# Patient Record
Sex: FEMALE | Race: WHITE | NOT HISPANIC OR LATINO | Employment: FULL TIME | URBAN - METROPOLITAN AREA
[De-identification: names, ages, dates, MRNs, and addresses within clinical notes are randomized per-mention and may not be internally consistent; named-entity substitution may affect disease eponyms.]

---

## 2017-01-04 DIAGNOSIS — M79.604 PAIN OF RIGHT LOWER EXTREMITY: Primary | ICD-10-CM

## 2017-01-05 NOTE — TELEPHONE ENCOUNTER
Percocet 5-325mg      Last Written Prescription Date: 12/09/2016  Last Fill Quantity: 30,  # refills: 0   Last Office Visit with G, P or [facility] prescribing provider: 06/10/2016    Leilani Cristobal, Pharmacy Technician  [facility]  [phone]

## 2017-01-06 RX ORDER — OXYCODONE AND ACETAMINOPHEN 5; 325 MG/1; MG/1
1 TABLET ORAL
Qty: 30 TABLET | Refills: 0 | Status: SHIPPED | OUTPATIENT
Start: 2017-01-08 | End: 2017-02-03

## 2017-01-11 DIAGNOSIS — G89.4 CHRONIC PAIN SYNDROME: Primary | ICD-10-CM

## 2017-01-12 NOTE — TELEPHONE ENCOUNTER
Toradol      Last Written Prescription Date: 12/9/16  Last Fill Quantity: 30,  # refills: 1   Last Office Visit with [facility], [facility] or [facility] prescribing provider: 6/10/16    Routing refill request to provider for review/approval because:  Drug not on the G refill protocol

## 2017-01-13 NOTE — TELEPHONE ENCOUNTER
Should have had enough to last until 2/9. Please verify last 3 fill dates with pharmacy.  Yaima Muse MD

## 2017-01-16 RX ORDER — KETOROLAC TROMETHAMINE 10 MG/1
TABLET, FILM COATED ORAL
Qty: 30 TABLET | Refills: 1 | OUTPATIENT
Start: 2017-01-16

## 2017-01-16 NOTE — TELEPHONE ENCOUNTER
Please see message below. Please get last 3 fill dates, and does she have 1 refill remaining?  Yaima Muse MD

## 2017-01-30 ENCOUNTER — TELEPHONE (OUTPATIENT)
Dept: FAMILY MEDICINE | Facility: CLINIC | Age: 42
End: 2017-01-30

## 2017-02-03 DIAGNOSIS — G89.4 CHRONIC PAIN SYNDROME: Primary | ICD-10-CM

## 2017-02-03 RX ORDER — KETOROLAC TROMETHAMINE 10 MG/1
TABLET, FILM COATED ORAL
Qty: 30 TABLET | Refills: 1 | Status: SHIPPED | OUTPATIENT
Start: 2017-02-03 | End: 2017-04-09

## 2017-02-03 NOTE — TELEPHONE ENCOUNTER
ketorolac (TORADOL) 10 MG tablet      Last Written Prescription Date:  12/9/16  Last Fill Quantity: 30,   # refills: 1  Last Office Visit with [facility], [facility] or [facility] prescribing provider: 6/10/16  Future Office visit:    Next 5 appointments (look out 90 days)     Apr 14, 2017 11:00 AM   [facility] Visit with [facility] Muse [facility]   [facility]    [address] [place] [id number] [phone]                   Routing refill request to provider for review/approval because:  Drug not on the [facility], [facility] or [facility] refill protocol or controlled substance

## 2017-02-07 ENCOUNTER — APPOINTMENT (OUTPATIENT)
Dept: GENERAL RADIOLOGY | Facility: CLINIC | Age: 42
End: 2017-02-07
Attending: FAMILY MEDICINE
Payer: COMMERCIAL

## 2017-02-07 ENCOUNTER — HOSPITAL ENCOUNTER (EMERGENCY)
Facility: CLINIC | Age: 42
Discharge: HOME | End: 2017-02-07
Attending: FAMILY MEDICINE | Admitting: FAMILY MEDICINE
Payer: COMMERCIAL

## 2017-02-07 VITALS
TEMPERATURE: 97.7 F | RESPIRATION RATE: 16 BRPM | BODY MASS INDEX: 26.52 KG/M2 | HEIGHT: 66 IN | WEIGHT: 165 LBS | SYSTOLIC BLOOD PRESSURE: 123 MMHG | OXYGEN SATURATION: 96 % | DIASTOLIC BLOOD PRESSURE: 70 MMHG

## 2017-02-07 DIAGNOSIS — R10.9 ABDOMINAL CRAMPING: ICD-10-CM

## 2017-02-07 DIAGNOSIS — G89.4 CHRONIC PAIN SYNDROME: ICD-10-CM

## 2017-02-07 LAB
ALBUMIN SERPL-MCNC: 3.3 G/DL (ref 3.4–5)
ALBUMIN UR-MCNC: NEGATIVE MG/DL
ALP SERPL-CCNC: 59 U/L (ref 40–150)
ALT SERPL W P-5'-P-CCNC: 18 U/L (ref 0–50)
AMPHETAMINES UR QL: ABNORMAL
ANION GAP SERPL CALCULATED.3IONS-SCNC: 10 MMOL/L (ref 3–14)
APPEARANCE UR: CLEAR
AST SERPL W P-5'-P-CCNC: 12 U/L (ref 0–45)
BACTERIA #/AREA URNS HPF: ABNORMAL /HPF
BARBITURATES UR QL: ABNORMAL
BASOPHILS # BLD AUTO: 0 10E9/L (ref 0–0.2)
BASOPHILS NFR BLD AUTO: 0.6 %
BENZODIAZ UR QL: ABNORMAL
BILIRUB SERPL-MCNC: 0.1 MG/DL (ref 0.2–1.3)
BILIRUB UR QL STRIP: NEGATIVE
BUN SERPL-MCNC: 11 MG/DL (ref 7–30)
CALCIUM SERPL-MCNC: 8.4 MG/DL (ref 8.5–10.1)
CANNABINOIDS UR QL: ABNORMAL
CHLORIDE SERPL-SCNC: 105 MMOL/L (ref 94–109)
CO2 SERPL-SCNC: 25 MMOL/L (ref 20–32)
COCAINE UR QL: ABNORMAL
COLOR UR AUTO: YELLOW
CREAT SERPL-MCNC: 0.52 MG/DL (ref 0.52–1.04)
DIFFERENTIAL METHOD BLD: ABNORMAL
EOSINOPHIL # BLD AUTO: 0.1 10E9/L (ref 0–0.7)
EOSINOPHIL NFR BLD AUTO: 1.5 %
ERYTHROCYTE [DISTWIDTH] IN BLOOD BY AUTOMATED COUNT: 16.8 % (ref 10–15)
GFR SERPL CREATININE-BSD FRML MDRD: ABNORMAL ML/MIN/1.7M2
GLUCOSE SERPL-MCNC: 183 MG/DL (ref 70–99)
GLUCOSE UR STRIP-MCNC: NEGATIVE MG/DL
HCG UR QL: NEGATIVE
HCT VFR BLD AUTO: 34.5 % (ref 35–47)
HGB BLD-MCNC: 10.1 G/DL (ref 11.7–15.7)
HGB UR QL STRIP: ABNORMAL
IMM GRANULOCYTES # BLD: 0 10E9/L (ref 0–0.4)
IMM GRANULOCYTES NFR BLD: 0.3 %
KETONES UR STRIP-MCNC: ABNORMAL MG/DL
LEUKOCYTE ESTERASE UR QL STRIP: NEGATIVE
LIPASE SERPL-CCNC: 218 U/L (ref 73–393)
LYMPHOCYTES # BLD AUTO: 1.7 10E9/L (ref 0.8–5.3)
LYMPHOCYTES NFR BLD AUTO: 25.4 %
MCH RBC QN AUTO: 19.3 PG (ref 26.5–33)
MCHC RBC AUTO-ENTMCNC: 29.3 G/DL (ref 31.5–36.5)
MCV RBC AUTO: 66 FL (ref 78–100)
MDA UR QL SCN: ABNORMAL
METHADONE UR QL SCN: ABNORMAL
METHAMPHET UR QL SCN: ABNORMAL
MONOCYTES # BLD AUTO: 0.5 10E9/L (ref 0–1.3)
MONOCYTES NFR BLD AUTO: 7.5 %
MUCOUS THREADS #/AREA URNS LPF: PRESENT /LPF
NEUTROPHILS # BLD AUTO: 4.3 10E9/L (ref 1.6–8.3)
NEUTROPHILS NFR BLD AUTO: 64.7 %
NITRATE UR QL: NEGATIVE
NON-SQ EPI CELLS #/AREA URNS LPF: ABNORMAL /LPF
OPIATES UR QL SCN: ABNORMAL
OXYCODONE UR QL: ABNORMAL
PCP UR QL SCN: ABNORMAL
PH UR STRIP: 6 PH (ref 5–7)
PLATELET # BLD AUTO: 261 10E9/L (ref 150–450)
POTASSIUM SERPL-SCNC: 4.1 MMOL/L (ref 3.4–5.3)
PROT SERPL-MCNC: 6.9 G/DL (ref 6.8–8.8)
RBC # BLD AUTO: 5.24 10E12/L (ref 3.8–5.2)
RBC #/AREA URNS AUTO: ABNORMAL /HPF (ref 0–2)
SODIUM SERPL-SCNC: 140 MMOL/L (ref 133–144)
SP GR UR STRIP: >1.03 (ref 1–1.03)
TRICYCLICS UR QL SCN: ABNORMAL
URN SPEC COLLECT METH UR: ABNORMAL
UROBILINOGEN UR STRIP-ACNC: 0.2 EU/DL (ref 0.2–1)
WBC # BLD AUTO: 6.7 10E9/L (ref 4–11)
WBC #/AREA URNS AUTO: ABNORMAL /HPF (ref 0–2)

## 2017-02-07 PROCEDURE — 99285 EMERGENCY DEPT VISIT HI MDM: CPT | Mod: 25

## 2017-02-07 PROCEDURE — 80305 DRUG TEST PRSMV DIR OPT OBS: CPT | Performed by: FAMILY MEDICINE

## 2017-02-07 PROCEDURE — 36415 COLL VENOUS BLD VENIPUNCTURE: CPT | Performed by: FAMILY MEDICINE

## 2017-02-07 PROCEDURE — 80053 COMPREHEN METABOLIC PANEL: CPT | Performed by: FAMILY MEDICINE

## 2017-02-07 PROCEDURE — 81001 URINALYSIS AUTO W/SCOPE: CPT | Performed by: FAMILY MEDICINE

## 2017-02-07 PROCEDURE — 96375 TX/PRO/DX INJ NEW DRUG ADDON: CPT

## 2017-02-07 PROCEDURE — 25000128 H RX IP 250 OP 636: Performed by: FAMILY MEDICINE

## 2017-02-07 PROCEDURE — 99285 EMERGENCY DEPT VISIT HI MDM: CPT | Performed by: FAMILY MEDICINE

## 2017-02-07 PROCEDURE — 85025 COMPLETE CBC W/AUTO DIFF WBC: CPT | Performed by: FAMILY MEDICINE

## 2017-02-07 PROCEDURE — 96374 THER/PROPH/DIAG INJ IV PUSH: CPT

## 2017-02-07 PROCEDURE — 83690 ASSAY OF LIPASE: CPT | Performed by: FAMILY MEDICINE

## 2017-02-07 PROCEDURE — 81025 URINE PREGNANCY TEST: CPT | Performed by: FAMILY MEDICINE

## 2017-02-07 PROCEDURE — 96361 HYDRATE IV INFUSION ADD-ON: CPT

## 2017-02-07 PROCEDURE — 74020 XR ABDOMEN 2 VW: CPT | Mod: TC

## 2017-02-07 RX ORDER — SODIUM CHLORIDE 9 MG/ML
1000 INJECTION, SOLUTION INTRAVENOUS CONTINUOUS
Status: DISCONTINUED | OUTPATIENT
Start: 2017-02-07 | End: 2017-02-07 | Stop reason: HOSPADM

## 2017-02-07 RX ORDER — KETOROLAC TROMETHAMINE 30 MG/ML
30 INJECTION, SOLUTION INTRAMUSCULAR; INTRAVENOUS ONCE
Status: COMPLETED | OUTPATIENT
Start: 2017-02-07 | End: 2017-02-07

## 2017-02-07 RX ORDER — DICYCLOMINE HCL 20 MG
20 TABLET ORAL 4 TIMES DAILY PRN
Qty: 20 TABLET | Refills: 0 | Status: SHIPPED | OUTPATIENT
Start: 2017-02-07 | End: 2017-04-14

## 2017-02-07 RX ORDER — LIDOCAINE 40 MG/G
CREAM TOPICAL
Status: DISCONTINUED | OUTPATIENT
Start: 2017-02-07 | End: 2017-02-07 | Stop reason: HOSPADM

## 2017-02-07 RX ORDER — LORAZEPAM 2 MG/ML
1 INJECTION INTRAMUSCULAR ONCE
Status: COMPLETED | OUTPATIENT
Start: 2017-02-07 | End: 2017-02-07

## 2017-02-07 RX ORDER — CALCIUM POLYCARBOPHIL 625 MG 625 MG/1
2 TABLET ORAL DAILY
Qty: 60 TABLET | Refills: 1 | COMMUNITY
Start: 2017-02-07 | End: 2017-03-09

## 2017-02-07 RX ADMIN — SODIUM CHLORIDE 1000 ML: 9 INJECTION, SOLUTION INTRAVENOUS at 03:08

## 2017-02-07 RX ADMIN — KETOROLAC TROMETHAMINE 30 MG: 30 INJECTION, SOLUTION INTRAMUSCULAR at 03:09

## 2017-02-07 RX ADMIN — LORAZEPAM 1 MG: 2 INJECTION INTRAMUSCULAR; INTRAVENOUS at 03:42

## 2017-02-07 ASSESSMENT — ENCOUNTER SYMPTOMS
EYES NEGATIVE: 1
VOMITING: 0
ABDOMINAL PAIN: 1
DYSURIA: 0
MUSCULOSKELETAL NEGATIVE: 1
ACTIVITY CHANGE: 1
FATIGUE: 1
DIARRHEA: 0
NERVOUS/ANXIOUS: 1
NAUSEA: 1
RESPIRATORY NEGATIVE: 1
APPETITE CHANGE: 1
CARDIOVASCULAR NEGATIVE: 1
NEUROLOGICAL NEGATIVE: 1
RECTAL PAIN: 0
ENDOCRINE NEGATIVE: 1
FLANK PAIN: 0
FEVER: 0
CONSTIPATION: 1
BLOOD IN STOOL: 0

## 2017-02-07 NOTE — ED NOTES
Pt presents with constipation.  Pt is unsure of last bowel movement, maybe a week ago.  Pt complaining of intense abdominal pain and when she burps smells like the worst farts.  Pt has tried a suppository and a laxative, small result.  Last took both at 1000 on 02/16/17.

## 2017-02-07 NOTE — ED NOTES
IV placed.  Medication administered and bolus started.  Unable to collect labs.  Order changed to lab collect.  Pt ready for radiology at this time.  Pt unable to provide urine sample.

## 2017-02-07 NOTE — ED PROVIDER NOTES
History     Chief Complaint   Patient presents with     Constipation     HPI  Stacy Brown is a 41 year old female who presents to the ER with concerns about lower abdominal cramping pains and constipation.  Patient states that she's had chronic constipation for some time.  She states usually she can take a laxative and a rectal suppository and have results resolving her symptoms but has been unable to have any results from using these medications.  She is rates that it's been around 10 days since her last bowel movement.  She is concerned about possible obstruction or bowel and she states that she has a fecal taste to her burps.  She also is requesting pain medications for her lower abdominal pain.  She stated after asked that she ran out of her Percocet this morning and cannot fill a new prescription until 02/09/2017.  She takes chronic pain medications because of a dog bite injury to her right leg with chronic pain since that bite injury.  Patient has diabetes.      I have reviewed the Medications, Allergies, Past Medical and Surgical History, and Social History in the Epic system.  Patient Active Problem List   Diagnosis     TYPE 2 DIABETES, HBA1C GOAL < 7%     HYPERLIPIDEMIA LDL GOAL <100     PMDD (premenstrual dysphoric disorder)     Health Care Home     Iron deficiency anemia     Halitosis     RUQ abdominal pain     Gallbladder polyp     Moderate major depression (H)     Restless legs syndrome (RLS)     Insomnia     Chronic pain syndrome     Overweight       Past Medical History   Diagnosis Date     Type II or unspecified type diabetes mellitus without mention of complication, not stated as uncontrolled      Mixed hyperlipidemia      Knee pain, chronic         Past Surgical History   Procedure Laterality Date     Hc open tx metatarsal fracture  age 12     softball injury,open fracture left foot     C stabism surg,prev eye surg,not musc       Right     Hc tooth extraction w/forcep       Extract wisdom  teeth     Tubal ligation  7/27/2006       Family History   Problem Relation Age of Onset     DIABETES Maternal Grandmother      Allergies Mother      Hypertension Maternal Grandmother      CEREBROVASCULAR DISEASE Paternal Grandfather      CANCER Maternal Grandfather      Lung - metastatic     HEART DISEASE Maternal Grandmother      Bypass     Anesthesia Reaction No family hx of      Lipids Father      cholesterol     Alzheimer Disease Paternal Grandmother      HEART DISEASE Paternal Grandmother      valve replacement       Social History     Social History     Marital Status:      Spouse Name: Humble     Number of Children: 2     Years of Education: 14     Occupational History     [employer]      Social History Main Topics     Smoking status: Former Smoker -- 6 years     Quit date: 09/22/2010     Smokeless tobacco: Never Used     Alcohol Use: No      Comment: 5/30/2014 States she has quit     Drug Use: No     Sexual Activity:     Partners: Male     Birth Control/ Protection: Surgical     Other Topics Concern      Service No     Blood Transfusions No     Caffeine Concern Yes     Diet Pepsi/at least 20 ounces/day - advised not more than 16 ounces/day     Occupational Exposure Yes     L[employer] Store/[employer]     Hobby Hazards No     Sleep Concern No     Stress Concern No     Weight Concern No     Special Diet No     Back Care No     Exercise No     Advised walking 30 minutes/day at least 3 days/week     Seat Belt Yes     Parent/Sibling W/ Cabg, Mi Or Angioplasty Before 65f 55m? Yes     Social History Narrative     and lives at home in [place] with , Humble, and their daughter and son.       Current Outpatient Rx   Name  Route  Sig  Dispense  Refill     dicyclomine (BENTYL) 20 MG tablet    Oral    Take 1 tablet (20 mg) by mouth 4 times daily as needed (ABDOMINAL CRAMPING) AS NEEDED FOR CRAMPING    20 tablet    0       calcium polycarbophil (FIBERCON) 625 MG tablet    Oral    Take 2  tablets (1,250 mg) by mouth daily    60 tablet    1       ketorolac (TORADOL) 10 MG tablet        TAKE ONE TABLET BY MOUTH NIGHTLY AS NEEDED FOR MODERATE PAIN    30 tablet    1       oxyCODONE-acetaminophen (PERCOCET) 5-325 MG per tablet    Oral    Take 1 tablet by mouth nightly as needed for moderate to severe pain    30 tablet    0       [facility]10[facility] 30/30ds, OK to fill on o ...       ketorolac (TORADOL) 10 MG tablet        TAKE ONE TABLET BY MOUTH NIGHTLY AS NEEDED FOR MODERATE PAIN    30 tablet    1       traZODone (DESYREL) 50 MG tablet        TAKE 1-2 TABLETS BY MOUTH NIGHTLY AS NEEDED FOR SLEEP    90 tablet    3       albuterol (PROAIR HFA, PROVENTIL HFA, VENTOLIN HFA) 108 (90 BASE) MCG/ACT inhaler    Inhalation    Inhale 2 puffs into the lungs every 3 hours as needed for shortness of breath / dyspnea    1 Inhaler    0       metFORMIN (GLUCOPHAGE-XR) 500 MG 24 hr tablet    Oral    Take 4 tablets (2,000 mg) by mouth daily (with dinner)    120 tablet    3       insulin glargine (LANTUS SOLOSTAR) 100 UNIT/ML PEN        23 units once daily and increase by 3 units every 4 days, up 37 units daily.    6 mL    5       Dose updated.  Do not fill today.  Patient to call ...       ferrous sulfate 325 (65 FE) MG tablet    Oral    Take 1 tablet (325 mg) by mouth 2 times daily  Patient taking differently: Take 1 tablet by mouth daily (with breakfast) Prn use    180 tablet    3       IBUPROFEN PO    Oral    Take 600 mg by mouth every 4 hours as needed for moderate pain               insulin pen needle (NOVOFINE) 32G X 6 MM        Use once daily or as directed.    100 each    3       Blood Glucose Monitoring Suppl (ACCU-CHEK COMPLETE) KIT    Does not apply    1 Device daily    1 Device    0       blood glucose monitoring (BL TEST STRIP PACK) test strip        Use to test blood sugar 1-2 times daily or as directed. Per patients glucose meter (getting new one today)    100 each    3       blood glucose monitoring  "(FREESTYLE) lancets        Use to test blood sugars 1-2 times daily or as directed, per patients glucose meter.    3 Box    3       blood glucose meter (NO BRAND SPECIFIED) meter device kit        Use to test blood sugar 1-2 times daily or as directed.    1 kit    0       Multiple Vitamins-Minerals (MULTI-VITAMIN GUMMIES) CHEW    Oral    Take  by mouth.               BLOOD GLUCOSE TEST STRIPS STRP    Does not apply    use qid as directed    100    10         Allergies   Allergen Reactions     Amoxicillin Hives       Immunization History   Administered Date(s) Administered     Influenza (IIV3) 09/21/2012     Influenza Vaccine IM 3yrs+ 4 Valent IIV4 12/28/2015     Pneumococcal 23 valent 12/28/2015     TDAP (ADACEL AGES 11-64) 10/08/2007, 09/07/2015       Review of Systems   Constitutional: Positive for activity change, appetite change and fatigue. Negative for fever.   HENT: Negative.    Eyes: Negative.    Respiratory: Negative.    Cardiovascular: Negative.    Gastrointestinal: Positive for nausea, abdominal pain (lower right and left but worse on the left pain) and constipation. Negative for vomiting, diarrhea, blood in stool and rectal pain.   Endocrine: Negative.    Genitourinary: Positive for pelvic pain. Negative for dysuria, urgency, flank pain, vaginal bleeding and vaginal discharge.   Musculoskeletal: Negative.    Skin: Negative.    Neurological: Negative.    Psychiatric/Behavioral: The patient is nervous/anxious.    All other systems reviewed and are negative.      Physical Exam   BP: 126/88 mmHg  Heart Rate: 86  Temp: 97.7  F (36.5  C)  Resp: 18  Height: 167.6 cm (5' 6\")  Weight: 74.844 kg (165 lb)  SpO2: 96 %  Physical Exam   Constitutional: She is oriented to person, place, and time. She appears well-developed and well-nourished. She appears distressed.   HENT:   Head: Normocephalic and atraumatic.   Mouth/Throat: Oropharynx is clear and moist.   Eyes: Conjunctivae and EOM are normal. Pupils are equal, " round, and reactive to light.   Neck: Normal range of motion. Neck supple.   Cardiovascular: Normal rate.    Pulmonary/Chest: Effort normal. No respiratory distress.   Abdominal: Soft. She exhibits no distension, no fluid wave, no pulsatile midline mass and no mass. Bowel sounds are decreased. There is tenderness (diffusely tender) in the suprapubic area and left lower quadrant. There is guarding (mild). There is no rigidity and no rebound. No hernia.       Musculoskeletal: Normal range of motion.   Neurological: She is alert and oriented to person, place, and time.   Skin: Skin is warm and intact. No bruising, no ecchymosis and no laceration noted. She is not diaphoretic.   Psychiatric: Her speech is normal and behavior is normal. Thought content normal. Her mood appears anxious. Cognition and memory are normal.   Nursing note and vitals reviewed.      ED Course   Procedures           Critical Care time:  none               Results for orders placed or performed during the hospital encounter of 02/07/17 (from the past 48 hour(s))   CBC with platelets differential   Result Value Ref Range    WBC 6.7 4.0 - 11.0 10e9/L    RBC Count 5.24 (H) 3.8 - 5.2 10e12/L    Hemoglobin 10.1 (L) 11.7 - 15.7 g/dL    Hematocrit 34.5 (L) 35.0 - 47.0 %    MCV 66 (L) 78 - 100 fl    MCH 19.3 (L) 26.5 - 33.0 pg    MCHC 29.3 (L) 31.5 - 36.5 g/dL    RDW 16.8 (H) 10.0 - 15.0 %    Platelet Count 261 150 - 450 10e9/L    Diff Method Automated Method     % Neutrophils 64.7 %    % Lymphocytes 25.4 %    % Monocytes 7.5 %    % Eosinophils 1.5 %    % Basophils 0.6 %    % Immature Granulocytes 0.3 %    Absolute Neutrophil 4.3 1.6 - 8.3 10e9/L    Absolute Lymphocytes 1.7 0.8 - 5.3 10e9/L    Absolute Monocytes 0.5 0.0 - 1.3 10e9/L    Absolute Eosinophils 0.1 0.0 - 0.7 10e9/L    Absolute Basophils 0.0 0.0 - 0.2 10e9/L    Abs Immature Granulocytes 0.0 0 - 0.4 10e9/L   Comprehensive metabolic panel   Result Value Ref Range    Sodium 140 133 - 144 mmol/L     Potassium 4.1 3.4 - 5.3 mmol/L    Chloride 105 94 - 109 mmol/L    Carbon Dioxide 25 20 - 32 mmol/L    Anion Gap 10 3 - 14 mmol/L    Glucose 183 (H) 70 - 99 mg/dL    Urea Nitrogen 11 7 - 30 mg/dL    Creatinine 0.52 0.52 - 1.04 mg/dL    GFR Estimate >90  Non  GFR Calc   >60 mL/min/1.7m2    GFR Estimate If Black >90   GFR Calc   >60 mL/min/1.7m2    Calcium 8.4 (L) 8.5 - 10.1 mg/dL    Bilirubin Total 0.1 (L) 0.2 - 1.3 mg/dL    Albumin 3.3 (L) 3.4 - 5.0 g/dL    Protein Total 6.9 6.8 - 8.8 g/dL    Alkaline Phosphatase 59 40 - 150 U/L    ALT 18 0 - 50 U/L    AST 12 0 - 45 U/L   Lipase   Result Value Ref Range    Lipase 218 73 - 393 U/L   XR Abdomen 2 Views    Narrative    XR ABDOMEN 2 VW  2/7/2017 3:41 AM      HISTORY: Abdominal pain.     COMPARISON: None.      Impression    IMPRESSION: Supine and upright views. The bowel gas pattern is  unremarkable. No free air or air-fluid levels. Small amount of stool  in the colon.    HUMPHREY YADAV MD   Drug abuse screen   Result Value Ref Range    Methamphetamine Qual Urine  NEG     Negative   Cutoff for a negative methamphetamine is 1000 ng/mL or less.      Cocaine Qual Urine  NEG     Negative   Cutoff for a negative cocaine is 300 ng/mL or less.      Cannabinoids Qual Urine (A) NEG     Positive   Cutoff for a positive cannabinoid is greater than 50 ng/mL. This is an   unconfirmed screening result to be used for medical purposes only.      MDMA Qual Urine  NEG     Negative   Cutoff for a negative MDMA (ecstasy) is 500 ng/mL or less.      Methadone Qual Urine  NEG     Negative   Cutoff for a negative methadone is 300 ng/mL or less.      Opiates Qualitative Urine  NEG     Negative   Cutoff for a negative opiate is 300 ng/mL or less.      Benzodiazepine Qual Urine  NEG     Negative   Cutoff for a negative benzodiazepine is 300 ng/mL or less.      Tricyc Anti Qual Urine  NEG     Negative   Cutoff for a negative tricyclic antidepressant is 1000 ng/mL or  less.      Barbiturates Qual Urine  NEG     Negative   Cutoff for a negative barbituate is 300 ng/mL or less.      PCP Qual Urine  NEG     Negative   Cutoff for a negative PCP is 25 ng/mL or less.      Amphetamine Qual Urine  NEG     Negative   Cutoff for a negative amphetamine is 1000 ng/mL or less.      Oxycodone Qual Urine  NEG     Negative   Cutoff for a negative Oxycodone is 100 ng/mL or less.     UA reflex to Microscopic and Culture   Result Value Ref Range    Color Urine Yellow     Appearance Urine Clear     Glucose Urine Negative NEG mg/dL    Bilirubin Urine Negative NEG    Ketones Urine Trace (A) NEG mg/dL    Specific Gravity Urine >1.030 1.003 - 1.035    Blood Urine Small (A) NEG    pH Urine 6.0 5.0 - 7.0 pH    Protein Albumin Urine Negative NEG mg/dL    Urobilinogen Urine 0.2 0.2 - 1.0 EU/dL    Nitrite Urine Negative NEG    Leukocyte Esterase Urine Negative NEG    Source Midstream Urine    HCG qualitative urine   Result Value Ref Range    HCG Qual Urine Negative NEG   Urine Microscopic   Result Value Ref Range    WBC Urine O - 2 0 - 2 /HPF    RBC Urine O - 2 0 - 2 /HPF    Squamous Epithelial /LPF Urine Few FEW /LPF    Bacteria Urine Few (A) NEG /HPF    Mucous Urine Present (A) NEG /LPF         Assessments & Plan (with Medical Decision Making)  Patient with concerns about intense intermittent episodes of abdominal pain associated with increased burping and gas.  Significant workup initiated due to the dental pain with essentially unremarkable findings without specific etiology identified for her symptomatology.  Patient was concerned about constipation but her abdominal x-ray was unremarkable for signs of constipation or instructions.  She was somewhat insistent on receiving narcotic medications however it discussed with her that this would potentially increase her symptoms and slow her intestinal movement and also increase her risk for constipation so we would like to avoid additional narcotic medication.   I discussed the use of a non-fermenting fiber laxative such as FiberCon.  Also discussed use of medications such as Bentyl to help with the abdominal cramping symptoms.  Dietary changes were also discussed.  Medications prescribed as listed below.  She was discharged home in the care of her family.     I have reviewed the nursing notes.    I have reviewed the findings, diagnosis, plan and need for follow up with the patient.    Discharge Medication List as of 2/7/2017  5:15 AM      START taking these medications    Details   dicyclomine (BENTYL) 20 MG tablet Take 1 tablet (20 mg) by mouth 4 times daily as needed (ABDOMINAL CRAMPING) AS NEEDED FOR CRAMPING, Disp-20 tablet, R-0, Local Print      calcium polycarbophil (FIBERCON) 625 MG tablet Take 2 tablets (1,250 mg) by mouth daily, Disp-60 tablet, R-1, OTC                  I verbally discussed the findings of the evaluation today in the ER. I have verbally discussed with Stacy the suggested treatment(s) as described in the discharge instructions and handouts. I have prescribed the above listed medications and instructed her on appropriate use of these medications.      I have verbally suggested she follow-up in her clinic or return to the ER for increased symptoms. See the follow-up recommendations documented  in the after visit summary in this visit's EPIC chart.      Final diagnoses:   Abdominal cramping   Chronic pain syndrome       2/7/2017   [facility] EMERGENCY DEPARTMENT      Tre Morales,   02/08/17 0228

## 2017-02-07 NOTE — DISCHARGE INSTRUCTIONS
Please read and follow the handout(s) instructions. Return, if needed, for increased or worsening symptoms and as directed by the handout(s).    Increase your fluid intake. Drinking small amounts often is the best way to take in more fluids when you are feeling nauseated.    You may want to start eating some yogurt or take a probiotic tablet daily. Beeno can also help the gas pains/cramping.    Fibercon and the Bentyl can also help. The Bentyl has the most potential for side effects.    See the note for work.    I hope you feel better soon!    Electronically signed, Tre Morales DO

## 2017-02-07 NOTE — ED NOTES
Pain is 8/10.  Administered Ativan to pt.  Pt is unable to provide urine sample at this time.  Supplies left in room and instructions for pt.  Pt states almost immediately feeling some relief after administration.

## 2017-02-07 NOTE — ED NOTES
Urine sample collected and sent to lab.  Pt able to walk without difficulty.  Pt states pain is 7/10, pt states that she is tired.  Pt back to bed.  Call light within reach and no requests at this time.

## 2017-02-07 NOTE — ED AVS SNAPSHOT
[facility] Emergency Department    [address] [place] [address]    Phone:  [phone]    Fax:  [phone]                                       Stacy Brown   MRN: [id number]    Department:  [facility] Emergency Department   Date of Visit:  2/7/2017           After Visit Summary Signature Page     I have received my discharge instructions, and my questions have been answered. I have discussed any challenges I see with this plan with the nurse or doctor.    ..........................................................................................................................................  Patient/Patient Representative Signature      ..........................................................................................................................................  Patient Representative Print Name and Relationship to Patient    ..................................................               ................................................  Date                                            Time    ..........................................................................................................................................  Reviewed by Signature/Title    ...................................................              ..............................................  Date                                                            Time

## 2017-02-07 NOTE — LETTER
[facility] [place] [place]  Emergency Room  [address]   [address]  Tel: [phone]   Fax: [phone]  2017    Stacy Brown  [address] [place] [address]  [phone] (home)     : 1975          To Whom it May Concern:    Stacy Brown was seen in our ER today 2017. I expect her condition to improve over the next 2-3 days.  She may return to work, without restriction, when improved. If not improved during the above expected time period,  then I have encouraged her to be rechecked in her clinic for further evaluation.      Please contact me for questions or concerns.    Sincerely,      Tre Morales  Physician  [facility] Emergency Room

## 2017-02-07 NOTE — ED AVS SNAPSHOT
[facility] Emergency Department    [address] [place] [address]    Phone:  [phone]    Fax:  [phone]                                       Stacy Brown   MRN: [id number]    Department:  [facility] Emergency Department   Date of Visit:  2/7/2017           Patient Information     Date Of Birth          1975        Your diagnoses for this visit were:     Abdominal cramping     Chronic pain syndrome        You were seen by Tre Morales DO.      Follow-up Information     Follow up with Yaima Muse MD.    Specialty:  Family Practice    Why:  As needed, if not improved in 3-5 days    Contact information:    [facility]  [address]   [place] [address] [phone]          Follow up with [facility] Emergency Department.    Specialty:  EMERGENCY MEDICINE    Why:  If symptoms worsen    Contact information:    [address] [place] [address] [phone]    Additional information:    From [address]: Exit at [address] on south side of [place]. Turn right on [address]. Turn left at stoplight on [address]. [facility] will be in view two blocks ahead        Discharge Instructions       Please read and follow the handout(s) instructions. Return, if needed, for increased or worsening symptoms and as directed by the handout(s).    Increase your fluid intake. Drinking small amounts often is the best way to take in more fluids when you are feeling nauseated.    You may want to start eating some yogurt or take a probiotic tablet daily. Beeno can also help the gas pains/cramping.    Fibercon and the Bentyl can also help. The Bentyl has the most potential for side effects.    See the note for work.    I hope you feel better soon!    Electronically signed, Tre Morales DO      Discharge References/Attachments     ABDOMINAL PAIN, UNKNOWN CAUSE, (FEMALE) (ENGLISH)    GASTROPARESIS, DIABETIC  (ENGLISH)      Future Appointments        Provider Department Dept Phone Center    4/14/2017 11:00 AM Yaima Muse MD [facility] [phone] [facility]      24 Hour Appointment Hotline       To make an appointment at any [facility], call [phone] ([phone]). If you don't have a family doctor or clinic, we will help you find one. [facility] are conveniently located to serve the needs of you and your family.             Review of your medicines      START taking        Dose / Directions Last dose taken    calcium polycarbophil 625 MG tablet   Commonly known as:  FIBERCON   Dose:  2 tablet   Quantity:  60 tablet        Take 2 tablets (1,250 mg) by mouth daily   Refills:  1        dicyclomine 20 MG tablet   Commonly known as:  BENTYL   Dose:  20 mg   Quantity:  20 tablet        Take 1 tablet (20 mg) by mouth 4 times daily as needed (ABDOMINAL CRAMPING) AS NEEDED FOR CRAMPING   Refills:  0          Our records show that you are taking the medicines listed below. If these are incorrect, please call your family doctor or clinic.        Dose / Directions Last dose taken    albuterol 108 (90 BASE) MCG/ACT Inhaler   Commonly known as:  PROAIR HFA/PROVENTIL HFA/VENTOLIN HFA   Dose:  2 puff   Quantity:  1 Inhaler        Inhale 2 puffs into the lungs every 3 hours as needed for shortness of breath / dyspnea   Refills:  0        blood glucose monitoring lancets   Quantity:  3 Box        Use to test blood sugars 1-2 times daily or as directed, per patients glucose meter.   Refills:  3        blood glucose monitoring test strip   Commonly known as:  BL TEST STRIP PACK   Quantity:  100 each        Use to test blood sugar 1-2 times daily or as directed. Per patients glucose meter (getting new one today)   Refills:  3        * BLOOD GLUCOSE TEST STRIPS STRP   Quantity:  100        use qid as directed   Refills:  10        * blood glucose monitoring meter device kit   Commonly  known as:  no brand specified   Quantity:  1 kit        Use to test blood sugar 1-2 times daily or as directed.   Refills:  0        * ACCU-CHEK COMPLETE Kit   Dose:  1 Device   Quantity:  1 Device        1 Device daily   Refills:  0        ferrous sulfate 325 (65 FE) MG tablet   Commonly known as:  IRON   Dose:  1 tablet   Quantity:  180 tablet        Take 1 tablet (325 mg) by mouth 2 times daily   Refills:  3        IBUPROFEN PO   Dose:  600 mg        Take 600 mg by mouth every 4 hours as needed for moderate pain   Refills:  0        insulin glargine 100 UNIT/ML injection   Commonly known as:  LANTUS SOLOSTAR   Quantity:  6 mL        23 units once daily and increase by 3 units every 4 days, up 37 units daily.   Refills:  5        insulin pen needle 32G X 6 MM   Commonly known as:  NOVOFINE   Quantity:  100 each        Use once daily or as directed.   Refills:  3        * ketorolac 10 MG tablet   Commonly known as:  TORADOL   Quantity:  30 tablet        TAKE ONE TABLET BY MOUTH NIGHTLY AS NEEDED FOR MODERATE PAIN   Refills:  1        * ketorolac 10 MG tablet   Commonly known as:  TORADOL   Quantity:  30 tablet        TAKE ONE TABLET BY MOUTH NIGHTLY AS NEEDED FOR MODERATE PAIN   Refills:  1        metFORMIN 500 MG 24 hr tablet   Commonly known as:  GLUCOPHAGE-XR   Dose:  2000 mg   Quantity:  120 tablet        Take 4 tablets (2,000 mg) by mouth daily (with dinner)   Refills:  3        MULTI-VITAMIN GUMMIES Chew        Take  by mouth.   Refills:  0        oxyCODONE-acetaminophen 5-325 MG per tablet   Commonly known as:  PERCOCET   Dose:  1 tablet   Quantity:  30 tablet   Start taking on:  2/9/2017        Take 1 tablet by mouth nightly as needed for moderate to severe pain   Refills:  0        traZODone 50 MG tablet   Commonly known as:  DESYREL   Quantity:  90 tablet        TAKE 1-2 TABLETS BY MOUTH NIGHTLY AS NEEDED FOR SLEEP   Refills:  3        * Notice:  This list has 5 medication(s) that are the same as other  "medications prescribed for you. Read the directions carefully, and ask your doctor or other care provider to review them with you.            Prescriptions were sent or printed at these locations (2 Prescriptions)                   Other Prescriptions                Not Printed or Sent (1 of 2)         calcium polycarbophil (FIBERCON) 625 MG tablet                 Printed at Department/Unit printer (1 of 2)         dicyclomine (BENTYL) 20 MG tablet                Procedures and tests performed during your visit     CBC with platelets differential    Comprehensive metabolic panel    Drug abuse screen    HCG qualitative urine    Lipase    Peripheral IV catheter    UA reflex to Microscopic and Culture    Urine Microscopic    XR Abdomen 2 Views      Orders Needing Specimen Collection     None      Pending Results     Date and Time Order Name Status Description    2/7/2017 0251 XR Abdomen 2 Views Preliminary             Pending Culture Results     No orders found from 2/6/2017 to 2/8/2017.            Thank you for choosing [facility]       Thank you for choosing [facility] for your care. Our goal is always to provide you with excellent care. Hearing back from our patients is one way we can continue to improve our services. Please take a few minutes to complete the written survey that you may receive in the mail after you visit with us. Thank you!        [employer] Information     [employer] lets you send messages to your doctor, view your test results, renew your prescriptions, schedule appointments and more. To sign up, go to [email or web address] . Click on \"Log in\" on the left side of the screen, which will take you to the Welcome page. Then click on \"Sign up Now\" on the right side of the page.     You will be asked to enter the access code listed below, as well as some personal information. Please follow the directions to create your username and password.     Your access code is: [email or web address]  Expires: 5/8/2017  5:13 AM   "   Your access code will  in 90 days. If you need help or a new code, please call your [facility] clinic or [phone].        Care EveryWhere ID     This is your Care EveryWhere ID. This could be used by other organizations to access your [facility] medical records  [id number]        After Visit Summary       This is your record. Keep this with you and show to your community pharmacist(s) and doctor(s) at your next visit.

## 2017-03-07 DIAGNOSIS — M79.604 PAIN OF RIGHT LOWER EXTREMITY: ICD-10-CM

## 2017-03-07 RX ORDER — OXYCODONE AND ACETAMINOPHEN 5; 325 MG/1; MG/1
1 TABLET ORAL
Qty: 30 TABLET | Refills: 0 | Status: SHIPPED | OUTPATIENT
Start: 2017-03-11 | End: 2017-04-09

## 2017-03-08 NOTE — TELEPHONE ENCOUNTER
Percocet      Last Written Prescription Date: 2/9/17  Last Fill Quantity: 30,  # refills: 0   Last Office Visit with [facility] or [facility] prescribing provider: 6/10/16                                         Next 5 appointments (look out 90 days)     Apr 14, 2017 11:00 AM CDT   [facility]    [address] [place] [id number] [phone]               Estephanie Damon [facility]- Float Dept.

## 2017-04-09 DIAGNOSIS — G89.4 CHRONIC PAIN SYNDROME: ICD-10-CM

## 2017-04-09 DIAGNOSIS — M79.604 PAIN OF RIGHT LOWER EXTREMITY: ICD-10-CM

## 2017-04-10 RX ORDER — OXYCODONE AND ACETAMINOPHEN 5; 325 MG/1; MG/1
1 TABLET ORAL
Qty: 30 TABLET | Refills: 0 | Status: SHIPPED | OUTPATIENT
Start: 2017-04-10 | End: 2017-05-06

## 2017-04-10 RX ORDER — KETOROLAC TROMETHAMINE 10 MG/1
TABLET, FILM COATED ORAL
Qty: 30 TABLET | Refills: 1 | Status: SHIPPED | OUTPATIENT
Start: 2017-04-10 | End: 2017-06-12

## 2017-04-10 NOTE — TELEPHONE ENCOUNTER
oxyCODONE-acetaminophen (PERCOCET) 5-325 MG per tablet     Last Written Prescription Date:  3/11/17  Last Fill Quantity: 30,   # refills: 0  Last Office Visit with [facility], [facility] or [facility] prescribing provider: 6/10/16  Future Office visit:    Next 5 appointments (look out 90 days)     Apr 14, 2017 11:00 AM CDT   Office Visit with Yaima Muse MD   [facility] ([address] [address]   [phone]                   Routing refill request to provider for review/approval because:  Drug not on the [facility], [facility] or [facility] refill protocol or controlled substance            ketorolac (TORADOL) 10 MG tablet  Last Written Prescription Date:  2/3/17  Last Fill Quantity: 30,   # refills: 1  Last Office Visit with [facility], [facility] or [facility] prescribing provider: 6/10/16  Future Office visit:    Next 5 appointments (look out 90 days)     Apr 14, 2017 11:00 AM CDT   Office Visit with Yaima Muse MD   [facility] ([address] [address]   [phone]                   Routing refill request to provider for review/approval because:  Drug not on the [facility], [facility] or [facility] refill protocol or controlled substance

## 2017-04-14 ENCOUNTER — OFFICE VISIT (OUTPATIENT)
Dept: FAMILY MEDICINE | Facility: CLINIC | Age: 42
End: 2017-04-14
Payer: COMMERCIAL

## 2017-04-14 VITALS
BODY MASS INDEX: 26.15 KG/M2 | SYSTOLIC BLOOD PRESSURE: 122 MMHG | RESPIRATION RATE: 18 BRPM | DIASTOLIC BLOOD PRESSURE: 60 MMHG | WEIGHT: 162 LBS | HEART RATE: 76 BPM | TEMPERATURE: 97.9 F

## 2017-04-14 DIAGNOSIS — N63.10 LUMP OF RIGHT BREAST: ICD-10-CM

## 2017-04-14 DIAGNOSIS — E66.3 OVERWEIGHT: ICD-10-CM

## 2017-04-14 DIAGNOSIS — G89.4 CHRONIC PAIN SYNDROME: ICD-10-CM

## 2017-04-14 DIAGNOSIS — M25.561 CHRONIC PAIN OF RIGHT KNEE: ICD-10-CM

## 2017-04-14 DIAGNOSIS — D50.9 IRON DEFICIENCY ANEMIA, UNSPECIFIED IRON DEFICIENCY ANEMIA TYPE: ICD-10-CM

## 2017-04-14 DIAGNOSIS — E11.65 TYPE 2 DIABETES MELLITUS WITH HYPERGLYCEMIA, WITH LONG-TERM CURRENT USE OF INSULIN (H): Primary | ICD-10-CM

## 2017-04-14 DIAGNOSIS — E78.5 HYPERLIPIDEMIA LDL GOAL <100: ICD-10-CM

## 2017-04-14 DIAGNOSIS — G89.29 CHRONIC PAIN OF RIGHT KNEE: ICD-10-CM

## 2017-04-14 DIAGNOSIS — Z23 ENCOUNTER FOR IMMUNIZATION: ICD-10-CM

## 2017-04-14 DIAGNOSIS — Z79.4 TYPE 2 DIABETES MELLITUS WITH HYPERGLYCEMIA, WITH LONG-TERM CURRENT USE OF INSULIN (H): Primary | ICD-10-CM

## 2017-04-14 DIAGNOSIS — F33.1 MAJOR DEPRESSIVE DISORDER, RECURRENT EPISODE, MODERATE (H): ICD-10-CM

## 2017-04-14 LAB
ANION GAP SERPL CALCULATED.3IONS-SCNC: 9 MMOL/L (ref 3–14)
BUN SERPL-MCNC: 16 MG/DL (ref 7–30)
CALCIUM SERPL-MCNC: 9.3 MG/DL (ref 8.5–10.1)
CHLORIDE SERPL-SCNC: 105 MMOL/L (ref 94–109)
CHOLEST SERPL-MCNC: 268 MG/DL
CO2 SERPL-SCNC: 24 MMOL/L (ref 20–32)
CREAT SERPL-MCNC: 0.45 MG/DL (ref 0.52–1.04)
CREAT UR-MCNC: 131 MG/DL
ERYTHROCYTE [DISTWIDTH] IN BLOOD BY AUTOMATED COUNT: 20 % (ref 10–15)
GFR SERPL CREATININE-BSD FRML MDRD: ABNORMAL ML/MIN/1.7M2
GLUCOSE SERPL-MCNC: 183 MG/DL (ref 70–99)
HBA1C MFR BLD: 9.3 % (ref 4.3–6)
HCT VFR BLD AUTO: 38.6 % (ref 35–47)
HDLC SERPL-MCNC: 65 MG/DL
HGB BLD-MCNC: 11.5 G/DL (ref 11.7–15.7)
LDLC SERPL CALC-MCNC: 173 MG/DL
MCH RBC QN AUTO: 19.1 PG (ref 26.5–33)
MCHC RBC AUTO-ENTMCNC: 29.8 G/DL (ref 31.5–36.5)
MCV RBC AUTO: 64 FL (ref 78–100)
MICROALBUMIN UR-MCNC: 21 MG/L
MICROALBUMIN/CREAT UR: 15.8 MG/G CR (ref 0–25)
NONHDLC SERPL-MCNC: 203 MG/DL
PLATELET # BLD AUTO: 361 10E9/L (ref 150–450)
POTASSIUM SERPL-SCNC: 4.1 MMOL/L (ref 3.4–5.3)
RBC # BLD AUTO: 6.01 10E12/L (ref 3.8–5.2)
SODIUM SERPL-SCNC: 138 MMOL/L (ref 133–144)
TRIGL SERPL-MCNC: 150 MG/DL
TSH SERPL DL<=0.005 MIU/L-ACNC: 0.87 MU/L (ref 0.4–4)
WBC # BLD AUTO: 7.4 10E9/L (ref 4–11)

## 2017-04-14 PROCEDURE — 80048 BASIC METABOLIC PNL TOTAL CA: CPT | Performed by: FAMILY MEDICINE

## 2017-04-14 PROCEDURE — 82043 UR ALBUMIN QUANTITATIVE: CPT | Performed by: FAMILY MEDICINE

## 2017-04-14 PROCEDURE — 85027 COMPLETE CBC AUTOMATED: CPT | Performed by: FAMILY MEDICINE

## 2017-04-14 PROCEDURE — 99207 C FOOT EXAM  NO CHARGE: CPT | Performed by: FAMILY MEDICINE

## 2017-04-14 PROCEDURE — 90471 IMMUNIZATION ADMIN: CPT | Performed by: FAMILY MEDICINE

## 2017-04-14 PROCEDURE — 99214 OFFICE O/P EST MOD 30 MIN: CPT | Mod: 25 | Performed by: FAMILY MEDICINE

## 2017-04-14 PROCEDURE — 36415 COLL VENOUS BLD VENIPUNCTURE: CPT | Performed by: FAMILY MEDICINE

## 2017-04-14 PROCEDURE — 84443 ASSAY THYROID STIM HORMONE: CPT | Performed by: FAMILY MEDICINE

## 2017-04-14 PROCEDURE — 90670 PCV13 VACCINE IM: CPT | Performed by: FAMILY MEDICINE

## 2017-04-14 PROCEDURE — 83036 HEMOGLOBIN GLYCOSYLATED A1C: CPT | Performed by: FAMILY MEDICINE

## 2017-04-14 PROCEDURE — 80061 LIPID PANEL: CPT | Performed by: FAMILY MEDICINE

## 2017-04-14 ASSESSMENT — PAIN SCALES - GENERAL: PAINLEVEL: MODERATE PAIN (5)

## 2017-04-14 NOTE — MR AVS SNAPSHOT
After Visit Summary   4/14/2017    Stacy Brown    MRN: [id number]           Patient Information     Date Of Birth          1975        Visit Information        Provider Department      4/14/2017 11:00 AM Yaima Muse MD [facility]        Today's Diagnoses     Type 2 diabetes mellitus with hyperglycemia, with long-term current use of insulin (H)    -  1    Hyperlipidemia LDL goal <100        Iron deficiency anemia, unspecified iron deficiency anemia type        Major depressive disorder, recurrent episode, moderate (H)        Chronic pain syndrome        Overweight        Chronic pain of right knee        Lump of right breast        Encounter for immunization           Follow-ups after your visit        Your next 10 appointments already scheduled     Apr 20, 2017 11:00 AM CDT   US BREAST RIGHT CMPL 4 QUAD with PHUS1, PH RAD   [facility] Ultrasound ([facility])    [address] [place] [address] [phone]           Please bring a list of your medicines (including vitamins, minerals and over-the-counter drugs). Also, tell your doctor about any allergies you may have. Wear comfortable clothes and leave your valuables at home.  You do not need to do anything special to prepare for your exam.  Please call the Imaging Department at your exam site with any questions.              Future tests that were ordered for you today     Open Future Orders        Priority Expected Expires Ordered    US Breast Right Complete 4 Quadrants Routine  4/14/2018 4/14/2017            Who to contact     If you have questions or need follow up information about today's clinic visit or your schedule please contact [facility] directly at [phone].  Normal or non-critical lab and imaging results will be communicated to you by MyChart, letter or phone within 4 business days after the clinic has received the results. If you do not  "hear from us within 7 days, please contact the clinic through [employer] or phone. If you have a critical or abnormal lab result, we will notify you by phone as soon as possible.  Submit refill requests through [employer] or call your pharmacy and they will forward the refill request to us. Please allow 3 business days for your refill to be completed.          Additional Information About Your Visit        [employer] Information     [employer] lets you send messages to your doctor, view your test results, renew your prescriptions, schedule appointments and more. To sign up, go to [email or web address] . Click on \"Log in\" on the left side of the screen, which will take you to the Welcome page. Then click on \"Sign up Now\" on the right side of the page.     You will be asked to enter the access code listed below, as well as some personal information. Please follow the directions to create your username and password.     Your access code is: [email or web address]  Expires: 2017  6:13 AM     Your access code will  in 90 days. If you need help or a new code, please call your [place] clinic or [phone].        Care EveryWhere ID     This is your Care EveryWhere ID. This could be used by other organizations to access your [place] medical records  [id number]        Your Vitals Were     Pulse Temperature Respirations BMI (Body Mass Index)          76 97.9  F (36.6  C) (Temporal) 18 26.15 kg/m2         Blood Pressure from Last 3 Encounters:   17 122/60   17 123/70   16 122/78    Weight from Last 3 Encounters:   17 162 lb (73.5 kg)   17 165 lb (74.8 kg)   16 165 lb (74.8 kg)              We Performed the Following     ADMIN: Vaccine, Initial (60122)     Albumin Random Urine Quantitative     Basic metabolic panel  (Ca, Cl, CO2, Creat, Gluc, K, Na, BUN)     CBC with platelets     FOOT EXAM     Hemoglobin A1c     Lipid Profile with reflex to direct LDL     Pneumococcal vaccine 13 valent PCV13 IM " (Prevnar) [95577]     SCREENING QUESTIONS FOR ADULT IMMUNIZATIONS     TSH with free T4 reflex          Today's Medication Changes          These changes are accurate as of: 4/14/17  1:30 PM.  If you have any questions, ask your nurse or doctor.               Start taking these medicines.        Dose/Directions    FLUoxetine 20 MG capsule   Commonly known as:  PROzac   Used for:  Major depressive disorder, recurrent episode, moderate (H)   Started by:  Yaima Muse MD        Dose:  20 mg   Take 1 capsule (20 mg) by mouth daily   Quantity:  90 capsule   Refills:  1       GLUCOSAMINE CHOND DOUBLE STR Tabs   Used for:  Chronic pain of right knee   Started by:  Yaima Muse MD        Dose:  1 tablet   Take 1 tablet by mouth daily   Quantity:  90 tablet   Refills:  3            Where to get your medicines      These medications were sent to [facility] [address]   [address] [facility] [address]     Phone:  [phone]     FLUoxetine 20 MG capsule    GLUCOSAMINE CHOND DOUBLE STR Tabs                Primary Care Provider Office Phone # Fax #    Yaima Muse [phone]       [address]   [place] [address]        Thank you!     Thank you for choosing [facility]  for your care. Our goal is always to provide you with excellent care. Hearing back from our patients is one way we can continue to improve our services. Please take a few minutes to complete the written survey that you may receive in the mail after your visit with us. Thank you!             Your Updated Medication List - Protect others around you: Learn how to safely use, store and throw away your medicines at www.disposemymeds.org.          This list is accurate as of: 4/14/17  1:30 PM.  Always use your most recent med list.                   Brand Name Dispense Instructions for use    ACCU-CHEK COMPLETE Kit      1 Device    1 Device daily       blood glucose monitoring lancets     3 Box    Use to test blood sugars 1-2 times daily or as directed, per patients glucose meter.       blood glucose monitoring test strip    BL TEST STRIP PACK    100 each    Use to test blood sugar 1-2 times daily or as directed. Per patients glucose meter (getting new one today)       FLUoxetine 20 MG capsule    PROzac    90 capsule    Take 1 capsule (20 mg) by mouth daily       GLUCOSAMINE CHOND DOUBLE STR Tabs     90 tablet    Take 1 tablet by mouth daily       IBUPROFEN PO      Take 600 mg by mouth every 4 hours as needed for moderate pain       insulin glargine 100 UNIT/ML injection    LANTUS SOLOSTAR    6 mL    23 units once daily and increase by 3 units every 4 days, up 37 units daily.       insulin pen needle 32G X 6 MM    NOVOFINE    100 each    Use once daily or as directed.       ketorolac 10 MG tablet    TORADOL    30 tablet    TAKE ONE TABLET BY MOUTH NIGHTLY AS NEEDED FOR MODERATE PAIN       metFORMIN 500 MG 24 hr tablet    GLUCOPHAGE-XR    120 tablet    Take 4 tablets (2,000 mg) by mouth daily (with dinner)       MULTI-VITAMIN GUMMIES Chew      Take  by mouth.       oxyCODONE-acetaminophen 5-325 MG per tablet    PERCOCET    30 tablet    Take 1 tablet by mouth nightly as needed for moderate to severe pain       traZODone 50 MG tablet    DESYREL    90 tablet    TAKE 1-2 TABLETS BY MOUTH NIGHTLY AS NEEDED FOR SLEEP

## 2017-04-14 NOTE — NURSING NOTE
Prior to injection verified patient identity using patient's name and date of birth. Instucted to wait for 20minutes after injection.  Fanta ARIAS MA

## 2017-04-14 NOTE — PROGRESS NOTES
SUBJECTIVE:                                                    Stacy Brown is a 41 year old female who presents to clinic today for the following health issues:    (E11.65,  Z79.4) Type 2 diabetes mellitus with hyperglycemia, with long-term current use of insulin (H)   Comment: Patient has a history of Type II Diabetes treated with Lantus 37 units qd and Metformin 2000 mg qd. She says that she is taking her Diabetes medications 1-2 times weekly at best. She often forgets to take it. Her last HGB A1C was 9.8% 10 months ago. She has not been checking her blood sugar. She states that she is following a fair Diabetic diet. She complains of paresthesias in her bilateral feet, in between her toes, for the last week. Feels like a foot fungus, but no rash seen. Patient denies any other concerns.     (E78.5) Hyperlipidemia LDL goal <100  Comment: Patient has a history of Hyperlipidemia, currently not treated with a statin. She is following a low fat/ low cholesterol diet. She denies any myalgias or other associated symptoms.     (D50.9) Iron deficiency anemia, unspecified iron deficiency anemia type  Comment: Patient has a history of Iron Deficiency Anemia treated with an iron supplement, but she has not been taking it regularly. She is due for a lab recheck today.    (F33.1) Major depressive disorder, recurrent episode, moderate (H)  Comment: Patient has a history of Depression.  She hasn't been sleeping. She says that she has been feeling increasingly depressed and unmotivated. Her  feels that she needs to be on something for depression, as she currently is not. The patient was on Cymbalta in the past, but says that the insurance made this too expensive so she stopped taking it. She feels that she needs to be on something to treat her depression. She has been on Prozac, Zoloft and Effexor in the past.     PHQ-9 SCORE 12/28/2015 6/10/2016 4/14/2017   Total Score - - -   Total Score 11 7 16     (G89.4) Chronic  pain syndrome  Comment: Patient has a history of chronic pain syndrome treated with Toradol and Percocet. She has been taking both of these at night with some relief. Her  says that her pain relief is noticeable after taking her Percocet.     (E66.3) Overweight  Comment: Patient says that she is walking daily at work and working on losing weight.     (M25.561,  G89.29) Chronic pain of right knee  Comment: She complains of posterior right knee pain. Patient was bitten by a dog in the back of her right knee several years ago, and complains of intermittent pain since then. She says this pain is especially severe with ambulation. Her knee pops when waling. 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 She has been taking her Percocet and Toradol with some relief.  However, her pain is still unbearable at this time, wondering what else she can take or do.     (N63) Lump of right breast  Comment: Patient complains of a persistent lump in the right upper breast, near the midline. She says that the size of this lump fluctuates, and it is tender to touch. Patient was evaluated for this in the clinic at her last visit in 6/2016. She had an U/S and Mammogram at that time that were normal. It hasn't changed.     Problem list and histories reviewed & adjusted, as indicated.  Additional history: as documented    Patient Active Problem List   Diagnosis     TYPE 2 DIABETES, HBA1C GOAL < 7%     HYPERLIPIDEMIA LDL GOAL <100     PMDD (premenstrual dysphoric disorder)     Health Care Home     Iron deficiency anemia     Halitosis     RUQ abdominal pain     Gallbladder polyp     Moderate major depression (H)     Restless legs syndrome (RLS)     Insomnia     Chronic pain syndrome     Overweight     Past Surgical History:   Procedure Laterality Date     C STABISM SURG,PREV EYE SURG,NOT MUSC      Right     HC OPEN TX METATARSAL FRACTURE  age 12     softball injury,open fracture left foot     HC TOOTH EXTRACTION W/FORCEP      Extract wisdom teeth     TUBAL LIGATION  7/27/2006       Social History   Substance Use Topics     Smoking status: Former Smoker     Years: 6.00     Quit date: 9/22/2010     Smokeless tobacco: Never Used     Alcohol use No      Comment: 5/30/2014 States she has quit     Family History   Problem Relation Age of Onset     DIABETES Maternal Grandmother      Allergies Mother      Hypertension Maternal Grandmother      CEREBROVASCULAR DISEASE Paternal Grandfather      CANCER Maternal Grandfather      Lung - metastatic     HEART DISEASE Maternal Grandmother      Bypass     Anesthesia Reaction No family hx of      Lipids Father      cholesterol     Alzheimer Disease Paternal Grandmother      HEART DISEASE Paternal Grandmother      valve replacement         Current Outpatient Prescriptions   Medication Sig Dispense Refill     Misc Natural Products (GLUCOSAMINE CHOND DOUBLE STR) TABS Take 1 tablet by mouth daily 90 tablet 3     FLUoxetine (PROZAC) 20 MG capsule Take 1 capsule (20 mg) by mouth daily 90 capsule 1     ketorolac (TORADOL) 10 MG tablet TAKE ONE TABLET BY MOUTH NIGHTLY AS NEEDED FOR MODERATE PAIN 30 tablet 1     oxyCODONE-acetaminophen (PERCOCET) 5-325 MG per tablet Take 1 tablet by mouth nightly as needed for moderate to severe pain 30 tablet 0     traZODone (DESYREL) 50 MG tablet TAKE 1-2 TABLETS BY MOUTH NIGHTLY AS NEEDED FOR SLEEP 90 tablet 3     metFORMIN (GLUCOPHAGE-XR) 500 MG 24 hr tablet Take 4 tablets (2,000 mg) by mouth daily (with dinner) 120 tablet 3     insulin glargine (LANTUS SOLOSTAR) 100 UNIT/ML PEN 23 units once daily and increase by 3 units every 4 days, up 37 units daily. 6 mL 5     insulin pen needle (NOVOFINE) 32G X 6 MM Use once daily or as directed. 100 each 3     Blood Glucose Monitoring Suppl (ACCU-CHEK COMPLETE) KIT 1 Device daily 1 Device 0     blood glucose monitoring (BL TEST STRIP PACK) test strip Use to  test blood sugar 1-2 times daily or as directed. Per patients glucose meter (getting new one today) 100 each 3     blood glucose monitoring (FREESTYLE) lancets Use to test blood sugars 1-2 times daily or as directed, per patients glucose meter. 3 Box 3     Multiple Vitamins-Minerals (MULTI-VITAMIN GUMMIES) CHEW Take  by mouth.       IBUPROFEN PO Take 600 mg by mouth every 4 hours as needed for moderate pain       Allergies   Allergen Reactions     Amoxicillin Hives     Recent Labs   Lab Test  04/14/17   1224  02/07/17   0340  06/10/16   1103  12/28/15   1117  08/10/15   1353  10/22/14   0910  07/21/14   0949   03/31/14   1054   11/29/13   1514   A1C  9.3*   --   9.8*  10.5*  11.5*   --   10.5*   --   10.2*   < >   --    LDL   --    --    --   135*  122   --   171*   --   170*   --    --    HDL   --    --    --    --   38*   --   46*   --   48*   --    --    TRIG   --    --    --    --   321*   --   204*   --   302*   --    --    ALT   --   18   --    --   25  27  28   --    --    --    --    CR   --   0.52   --    --   0.45*  0.41*   --    < >   --    --   0.42*   GFRESTIMATED   --   >90  Non  GFR Calc     --    --   >90  Non  GFR Calc    >90  Non  GFR Calc     --    < >   --    --   >90   GFRESTBLACK   --   >90   GFR Calc     --    --   >90   GFR Calc    >90   GFR Calc     --    < >   --    --   >90   POTASSIUM   --   4.1   --    --   3.8  3.8   --    < >   --    --   4.2   TSH   --    --    --    --   0.73   --    --    --    --    --   0.55    < > = values in this interval not displayed.        Reviewed and updated as needed this visit by clinical staff  Tobacco  Allergies  Meds  Med Hx  Surg Hx  Fam Hx  Soc Hx      Reviewed and updated as needed this visit by Provider         ROS:  SKIN: Positive for a rash on her 3rd finger, and purple discoloration on the right fifth finger. She says that this area is  intermittently sensitive and becomes larger in size. Patient says that she has tried triple Antibiotic cream without relief. 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 That woman's doctor thought it was caused by hitting her finger when she was closing mailboxes, causing inflammation.   All other ROS reviewed and are negative or non-contributory except as stated in HPI or above.     OBJECTIVE:                                                    /60  Pulse 76  Temp 97.9  F (36.6  C) (Temporal)  Resp 18  Wt 162 lb (73.5 kg)  BMI 26.15 kg/m2  Body mass index is 26.15 kg/(m^2).   Vitals noted.  Patient alert, oriented, and in no acute distress.  CV:  RRR without murmur.  Respiratory:  Lungs clear to auscultation bilaterally.  Skin:On the dorsum of the right hand, the middle phalanx shows some elevated, thickened skin in a linear pattern almost like skin lorraine. No other areas of rash, no web space rash, no rash on wrists or forearms. This looks thickened like a scar from a previous surgery.   Foot: Diabetic foot exam showed decreased sensation to pinprick testing on bilateral toes and lateral sides of feet. Sensation is normal to light touch testing. Skin is intact. No callus, no ulcers.     Diagnostic Test Results:  Orders Placed This Encounter   Procedures     FOOT EXAM     US Breast Right Complete 4 Quadrants     Hemoglobin A1c     Lipid Profile with reflex to direct LDL     Basic metabolic panel  (Ca, Cl, CO2, Creat, Gluc, K, Na, BUN)     TSH with free T4 reflex     CBC with platelets     Albumin Random Urine Quantitative        ASSESSMENT:                                                        ICD-10-CM    1. Type 2 diabetes mellitus with hyperglycemia, with long-term current use of insulin (H) E11.65 Hemoglobin A1c    Z79.4 Lipid Profile with reflex to direct LDL     Basic metabolic panel  (Ca, Cl, CO2, Creat, Gluc, K, Na, BUN)     TSH with free T4 reflex     Albumin Random Urine  Quantitative     FOOT EXAM     simvastatin (ZOCOR) 20 MG tablet     metFORMIN (GLUCOPHAGE-XR) 500 MG 24 hr tablet     insulin glargine (LANTUS SOLOSTAR) 100 UNIT/ML injection     insulin pen needle (NOVOFINE) 32G X 6 MM     blood glucose monitoring (BL TEST STRIP PACK) test strip     blood glucose monitoring (FREESTYLE) lancets   2. Hyperlipidemia LDL goal <100 E78.5 simvastatin (ZOCOR) 20 MG tablet   3. Iron deficiency anemia, unspecified iron deficiency anemia type D50.9 CBC with platelets     Ferritin     Iron and iron binding capacity     ferrous sulfate (IRON) 325 (65 FE) MG tablet   4. Major depressive disorder, recurrent episode, moderate (H) F33.1 FLUoxetine (PROZAC) 20 MG capsule   5. Chronic pain syndrome G89.4    6. Overweight E66.3    7. Chronic pain of right knee M25.561 Misc Natural Products (GLUCOSAMINE CHOND DOUBLE STR) TABS    G89.29    8. Lump of right breast N63 US Breast Right Complete 4 Quadrants   9. Encounter for immunization Z23 Pneumococcal vaccine 13 valent PCV13 IM (Prevnar) [90824]     ADMIN: Vaccine, Initial (80203)     SCREENING QUESTIONS FOR ADULT IMMUNIZATIONS       PLAN:                                                        (E11.65,  Z79.4) Type 2 diabetes mellitus with hyperglycemia, with long-term current use of insulin (H)   Plan: Labs drawn, will notify with results and discuss further evaluation/ treatment if needed at that time. Discussed the effects of long term uncontrolled diabetes with the patient. I stressed to her the importance of taking her medications every day. Patient is in agreement with this plan. She will work to follow a diabetic diet.     Hemoglobin A1c,     Lipid Profile with reflex to direct LDL,     Basic metabolic panel  (Ca, Cl, CO2, Creat, Gluc, K, Na, BUN),     TSH with free T4 reflex,     Albumin Random Urine Quantitative,     FOOT EXAM    (E78.5) Hyperlipidemia LDL goal <100  Plan: Lipid profile collected, will notify with results and discuss further  "evaluation/ treatment if needed.     (D50.9) Iron deficiency anemia, unspecified iron deficiency anemia type  Plan: CBC collected, will notify with results and discuss further evaluation/ treatment if needed at that time.     CBC with platelets    (F33.1) Major depressive disorder, recurrent episode, moderate (H)  Plan: Discussed patient's concerns about Depression. I am willing to start her on an antidepressant. Prescribed Prozac 20 mg qd, see orders. Encouraged the patient to monitor her symptoms and follow up in 4-6 weeks or sooner if needed.     FLUoxetine (PROZAC) 20 MG capsule    (G89.4) Chronic pain syndrome  Plan: Discussed patient's chronic pain. Encouraged her to begin taking her Toradol in the morning to give her pain relief throughout the day, and Percocet at night. Patient will try this and notify me with results.     (E66.3) Overweight  Plan: Encouraged the patient to continue exercising regularly and following a healthy diet.     (M25.561,  G89.29) Chronic pain of right knee  Plan: See \"(G89.4) Chronic pain syndrome\" plan above. I prescribed Glucosamine Chondroitin to combat her pain as well, see orders.     Misc Natural Products (GLUCOSAMINE CHOND DOUBLE STR) TABS    (N63) Lump of right breast  Plan: Breast U/S ordered to evaluate the patient's breast lump, see orders. Depending on findings we may want a mammogram as well. Will notify with results and discuss further evaluation/ treatment if needed at that time.     US Breast Right Complete 4 Quadrants    For the rash on her finger, she is going to just use otc cortisone, may also cover it during work hours to reduce irritation.     PCV also given.     This document serves as a record of services personally performed by Yaima Muse MD. It was created on their behalf by Demetrice Silva, a trained medical scribe. The creation of this record is based on the provider's personal observations and the statements of the patient. This document has " been checked and approved by the attending provider.    Yaima Muse MD  [facility]

## 2017-04-14 NOTE — NURSING NOTE
"Chief Complaint   Patient presents with     Depression     Diabetes       Initial /60  Pulse 76  Temp 97.9  F (36.6  C) (Temporal)  Resp 18  Wt 162 lb (73.5 kg)  BMI 26.15 kg/m2 Estimated body mass index is 26.15 kg/(m^2) as calculated from the following:    Height as of 2/7/17: 5' 6\" (1.676 m).    Weight as of this encounter: 162 lb (73.5 kg).  Medication Reconciliation: complete   Fanta ARIAS MA      "

## 2017-04-15 LAB
FERRITIN SERPL-MCNC: 4 NG/ML (ref 12–150)
IRON SATN MFR SERPL: 4 % (ref 15–46)
IRON SERPL-MCNC: 26 UG/DL (ref 35–180)
TIBC SERPL-MCNC: 585 UG/DL (ref 240–430)

## 2017-04-15 PROCEDURE — 83550 IRON BINDING TEST: CPT | Performed by: FAMILY MEDICINE

## 2017-04-15 PROCEDURE — 83540 ASSAY OF IRON: CPT | Performed by: FAMILY MEDICINE

## 2017-04-15 PROCEDURE — 36415 COLL VENOUS BLD VENIPUNCTURE: CPT | Performed by: FAMILY MEDICINE

## 2017-04-15 PROCEDURE — 82728 ASSAY OF FERRITIN: CPT | Performed by: FAMILY MEDICINE

## 2017-04-15 ASSESSMENT — PATIENT HEALTH QUESTIONNAIRE - PHQ9: SUM OF ALL RESPONSES TO PHQ QUESTIONS 1-9: 16

## 2017-04-17 ENCOUNTER — TELEPHONE (OUTPATIENT)
Dept: FAMILY MEDICINE | Facility: CLINIC | Age: 42
End: 2017-04-17

## 2017-04-17 DIAGNOSIS — K04.7 DENTAL INFECTION: Primary | ICD-10-CM

## 2017-04-17 RX ORDER — FERROUS SULFATE 325(65) MG
325 TABLET ORAL 2 TIMES DAILY
Qty: 180 TABLET | Refills: 3 | Status: SHIPPED | OUTPATIENT
Start: 2017-04-17 | End: 2019-08-08 | Stop reason: ALTCHOICE

## 2017-04-17 RX ORDER — METFORMIN HCL 500 MG
2000 TABLET, EXTENDED RELEASE 24 HR ORAL
Qty: 360 TABLET | Refills: 3 | Status: SHIPPED | OUTPATIENT
Start: 2017-04-17 | End: 2018-06-07

## 2017-04-17 RX ORDER — LANCETS 28 GAUGE
EACH MISCELLANEOUS
Qty: 3 BOX | Refills: 3 | Status: SHIPPED | OUTPATIENT
Start: 2017-04-17 | End: 2024-04-24

## 2017-04-17 RX ORDER — SIMVASTATIN 20 MG
20 TABLET ORAL AT BEDTIME
Qty: 90 TABLET | Refills: 3 | Status: SHIPPED | OUTPATIENT
Start: 2017-04-17 | End: 2019-12-18

## 2017-04-17 NOTE — PROGRESS NOTES
Please notify Stacy of the following:   Her diabetes control is just a little better than last time, but still not near her goal. She does need to take her medication (oral AND insulin) every day as prescribed.   She also needs to be on a cholesterol lowering medication, as her cholesterol is very high. I am sending in a Rx for that, as well.   She needs to take her iron pills, as her iron is extremely low.  She said she has some at home, I would like her to take one twice daily, and then I'd like to see her back in a month to see how she is doing with taking her medications.   I also would like her to see Jemma for a diabetes ed refresher.   I am sending in a Rx for her iron pills, too, just for when she runs out.   If she has any questions or concerns, let me know.   Yaima Muse MD

## 2017-04-20 ENCOUNTER — HOSPITAL ENCOUNTER (OUTPATIENT)
Dept: MAMMOGRAPHY | Facility: CLINIC | Age: 42
End: 2017-04-20
Attending: FAMILY MEDICINE
Payer: COMMERCIAL

## 2017-04-20 ENCOUNTER — HOSPITAL ENCOUNTER (OUTPATIENT)
Dept: ULTRASOUND IMAGING | Facility: CLINIC | Age: 42
Discharge: HOME | End: 2017-04-20
Attending: FAMILY MEDICINE | Admitting: FAMILY MEDICINE
Payer: COMMERCIAL

## 2017-04-20 DIAGNOSIS — N63.10 LUMP OF RIGHT BREAST: ICD-10-CM

## 2017-04-20 PROCEDURE — 76642 ULTRASOUND BREAST LIMITED: CPT | Mod: RT

## 2017-04-20 PROCEDURE — G0206 DX MAMMO INCL CAD UNI: HCPCS | Mod: RT

## 2017-04-21 ENCOUNTER — TELEPHONE (OUTPATIENT)
Dept: FAMILY MEDICINE | Facility: CLINIC | Age: 42
End: 2017-04-21

## 2017-04-21 DIAGNOSIS — E11.65 TYPE 2 DIABETES MELLITUS WITH HYPERGLYCEMIA, WITH LONG-TERM CURRENT USE OF INSULIN (H): Primary | ICD-10-CM

## 2017-04-21 DIAGNOSIS — Z79.4 TYPE 2 DIABETES MELLITUS WITH HYPERGLYCEMIA, WITH LONG-TERM CURRENT USE OF INSULIN (H): Primary | ICD-10-CM

## 2017-04-21 RX ORDER — CLINDAMYCIN HCL 150 MG
300 CAPSULE ORAL 4 TIMES DAILY
Qty: 80 CAPSULE | Refills: 0 | Status: SHIPPED | OUTPATIENT
Start: 2017-04-21 | End: 2018-03-18

## 2017-04-21 NOTE — TELEPHONE ENCOUNTER
Offered MTM service during consultation today for her diabetes, and pt would like to see MTM clinic.    MTM Referral order placed today.  Please contact pt for appt.    Thanks you,  Flaca Wesley, Pharm D   referred by [facility] [employer] [facility]

## 2017-04-22 ENCOUNTER — HOSPITAL ENCOUNTER (EMERGENCY)
Facility: CLINIC | Age: 42
Discharge: HOME | End: 2017-04-22
Attending: FAMILY MEDICINE | Admitting: FAMILY MEDICINE
Payer: COMMERCIAL

## 2017-04-22 VITALS
WEIGHT: 162 LBS | HEIGHT: 66 IN | SYSTOLIC BLOOD PRESSURE: 145 MMHG | DIASTOLIC BLOOD PRESSURE: 87 MMHG | RESPIRATION RATE: 16 BRPM | BODY MASS INDEX: 26.03 KG/M2 | TEMPERATURE: 98.4 F | OXYGEN SATURATION: 100 %
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
## 2017-04-22 DIAGNOSIS — S39.012A LOW BACK STRAIN, INITIAL ENCOUNTER: Primary | ICD-10-CM

## 2017-04-22 PROCEDURE — 99284 EMERGENCY DEPT VISIT MOD MDM: CPT | Mod: Z6 | Performed by: FAMILY MEDICINE

## 2017-04-22 PROCEDURE — 99282 EMERGENCY DEPT VISIT SF MDM: CPT | Performed by: FAMILY MEDICINE

## 2017-04-22 RX ORDER — PREDNISONE 20 MG/1
20 TABLET ORAL DAILY
Qty: 3 TABLET | Refills: 0 | Status: SHIPPED | OUTPATIENT
Start: 2017-04-22 | End: 2017-04-25

## 2017-04-22 ASSESSMENT — ENCOUNTER SYMPTOMS: BACK PAIN: 1

## 2017-04-22 NOTE — ED AVS SNAPSHOT
[facility] Emergency Department    [address] [place] [address]    Phone:  [phone]    Fax:  [phone]                                       Stacy Brown   MRN: [id number]    Department:  [facility] Emergency Department   Date of Visit:  4/22/2017           After Visit Summary Signature Page     I have received my discharge instructions, and my questions have been answered. I have discussed any challenges I see with this plan with the nurse or doctor.    ..........................................................................................................................................  Patient/Patient Representative Signature      ..........................................................................................................................................  Patient Representative Print Name and Relationship to Patient    ..................................................               ................................................  Date                                            Time    ..........................................................................................................................................  Reviewed by Signature/Title    ...................................................              ..............................................  Date                                                            Time

## 2017-04-22 NOTE — ED PROVIDER NOTES
History     Chief Complaint   Patient presents with     Back Pain     The history is provided by the patient.     Stacy Brown is a 41 year old female who presents to the emergency department with back pain. She states that while putting her pants on this morning she suddenly felt a pop in her lower mid back. Patient rates the pain 8/10. She denies radiation down legs, numbness or tingling in legs or buttocks, incontinence, or other injuries during this episode. Patient has had low back pain when son was born and occasionally has back soreness but never a severe injury. Patient has history of chronic right knee pain after a dog bite which she takes oxycodone for. Patient took one oxycodone this morning for this back pain without relief.    I have reviewed the Medications, Allergies, Past Medical and Surgical History, and Social History in the Epic system.    Patient Active Problem List   Diagnosis     TYPE 2 DIABETES, HBA1C GOAL < 7%     HYPERLIPIDEMIA LDL GOAL <100     PMDD (premenstrual dysphoric disorder)     Health Care Home     Iron deficiency anemia     Halitosis     Moderate major depression (H)     Restless legs syndrome (RLS)     Insomnia     Chronic pain syndrome     Overweight     Past Medical History:   Diagnosis Date     Knee pain, chronic      Mixed hyperlipidemia      Type II or unspecified type diabetes mellitus without mention of complication, not stated as uncontrolled        Past Surgical History:   Procedure Laterality Date     C STABISM SURG,PREV EYE SURG,NOT MUSC      Right     HC OPEN TX METATARSAL FRACTURE  age 12    softball injury,open fracture left foot     HC TOOTH EXTRACTION W/FORCEP      Extract wisdom teeth     TUBAL LIGATION  7/27/2006       Family History   Problem Relation Age of Onset     DIABETES Maternal Grandmother      Allergies Mother      Hypertension Maternal Grandmother      CEREBROVASCULAR DISEASE Paternal Grandfather      CANCER Maternal Grandfather      Lung -  metastatic     HEART DISEASE Maternal Grandmother      Bypass     Anesthesia Reaction No family hx of      Lipids Father      cholesterol     Alzheimer Disease Paternal Grandmother      HEART DISEASE Paternal Grandmother      valve replacement       Social History   Substance Use Topics     Smoking status: Former Smoker     Years: 6.00     Quit date: 9/22/2010     Smokeless tobacco: Never Used     Alcohol use No        Immunization History   Administered Date(s) Administered     Influenza (IIV3) 09/21/2012     Influenza Vaccine IM 3yrs+ 4 Valent IIV4 12/28/2015     Pneumococcal (PCV 13) 04/14/2017     Pneumococcal 23 valent 12/28/2015     TDAP Vaccine (Adacel) 10/08/2007, 09/07/2015          Allergies   Allergen Reactions     Amoxicillin Hives       Current Outpatient Prescriptions   Medication Sig Dispense Refill     predniSONE (DELTASONE) 20 MG tablet Take 1 tablet (20 mg) by mouth daily for 3 days 3 tablet 0     oxyCODONE-acetaminophen (PERCOCET) 5-325 MG per tablet Take 1 tablet by mouth nightly as needed for moderate to severe pain 30 tablet 0     clindamycin (CLEOCIN) 150 MG capsule Take 2 capsules (300 mg) by mouth 4 times daily for 10 days 80 capsule 0     ferrous sulfate (IRON) 325 (65 FE) MG tablet Take 1 tablet (325 mg) by mouth 2 times daily 180 tablet 3     simvastatin (ZOCOR) 20 MG tablet Take 1 tablet (20 mg) by mouth At Bedtime 90 tablet 3     metFORMIN (GLUCOPHAGE-XR) 500 MG 24 hr tablet Take 4 tablets (2,000 mg) by mouth daily (with dinner) 360 tablet 3     insulin glargine (LANTUS SOLOSTAR) 100 UNIT/ML injection 26 units once daily 6 mL 5     insulin pen needle (NOVOFINE) 32G X 6 MM Use once daily or as directed. 100 each 3     blood glucose monitoring (BL TEST STRIP PACK) test strip Use to test blood sugar 1-2 times daily or as directed. Per patients glucose meter (getting new one today) 100 each 3     blood glucose monitoring (FREESTYLE) lancets Use to test blood sugars 1-2 times daily or as  "directed, per patients glucose meter. 3 Box 3     Misc Natural Products (GLUCOSAMINE CHOND DOUBLE STR) TABS Take 1 tablet by mouth daily 90 tablet 3     FLUoxetine (PROZAC) 20 MG capsule Take 1 capsule (20 mg) by mouth daily 90 capsule 1     ketorolac (TORADOL) 10 MG tablet TAKE ONE TABLET BY MOUTH NIGHTLY AS NEEDED FOR MODERATE PAIN 30 tablet 1     traZODone (DESYREL) 50 MG tablet TAKE 1-2 TABLETS BY MOUTH NIGHTLY AS NEEDED FOR SLEEP 90 tablet 3     IBUPROFEN PO Take 600 mg by mouth every 4 hours as needed for moderate pain       Blood Glucose Monitoring Suppl (ACCU-CHEK COMPLETE) KIT 1 Device daily 1 Device 0     Multiple Vitamins-Minerals (MULTI-VITAMIN GUMMIES) CHEW Take  by mouth.       Review of Systems   Genitourinary: Negative for enuresis.   Musculoskeletal: Positive for back pain (low mid).   All other systems reviewed and are negative.      Physical Exam   BP: 145/87  Heart Rate: 76  Temp: 98.4  F (36.9  C)  Resp: 16  Height: 167.6 cm (5' 6\")  Weight: 73.5 kg (162 lb)  SpO2: 100 %    Physical Exam   Constitutional: She is oriented to person, place, and time. She appears well-developed and well-nourished. No distress.   HENT:   Head: Normocephalic and atraumatic.   Eyes: Conjunctivae and EOM are normal.   Neck: Normal range of motion. Neck supple.   Musculoskeletal:        Thoracic back: She exhibits no bony tenderness.        Lumbar back: She exhibits no bony tenderness.   She has low back tenderness to the left of the spine.  She has no midline back tenderness.  There is no radiation to the legs.  She has no \"saddle numbness\".  She has tenderness with straight leg raise of both the right and left leg at about 20 .   Neurological: She is alert and oriented to person, place, and time.   Skin: Skin is warm and dry.   Psychiatric: She has a normal mood and affect. Her behavior is normal.   Nursing note and vitals reviewed.      ED Course     ED Course     Procedures      Assessments & Plan (with Medical " Decision Making)  Stacy is a 41 year old female who presents to the ED with complaints mid low back pain beginning this morning after putting her pants on. Patient took one oxycodone without relief.  She has chronic pain and has oxycodone at home.  Upon arrival, patient had blood pressure of 145/87 and was otherwise vitally stable. On exam, she exhibits tenderness with bilateral straight leg raise, and left-sided low back tenderness with palpation. Because patient did not have trauma or fall, I do not believe an xray is necessary at this time. I will order prednisone to be taken for three days. I also recommended she take ibuprofen and her oxycodone as needed for pain. I will provide a work note per patient request. I discussed my plan with the patient and she was in agreement and suitable for discharge.       I have reviewed the nursing notes.    I have reviewed the findings, diagnosis, plan and need for follow up with the patient.    New Prescriptions    PREDNISONE (DELTASONE) 20 MG TABLET    Take 1 tablet (20 mg) by mouth daily for 3 days       Final diagnoses:   Low back strain, initial encounter     This document serves as a record of services personally performed by Tre Lopez MD. It was created on their behalf by Jossy Strong, a trained medical scribe. The creation of this record is based on the provider's personal observations and the statements of the patient. This document has been checked and approved by the attending provider.     Note: Chart documentation done in part with Dragon Voice Recognition software. Although reviewed after completion, some word and grammatical errors may remain.    4/22/2017   [facility] EMERGENCY DEPARTMENT     Tre Lopez MD  04/22/17 110

## 2017-04-22 NOTE — ED AVS SNAPSHOT
[facility] Emergency Department    [address] [place] [address]    Phone:  [phone]    Fax:  [phone]                                       Stacy Brown   MRN: [id number]    Department:  [facility] Emergency Department   Date of Visit:  4/22/2017           Patient Information     Date Of Birth          1975        Your diagnoses for this visit were:     Low back strain, initial encounter        You were seen by Tre Lopez MD.      Follow-up Information     Schedule an appointment as soon as possible for a visit with Yaima Muse MD.    Specialty:  Family Practice    Why:  As needed    Contact information:    [facility]  [address]   [place] [address]  [phone]          Discharge Instructions         Back Sprain or Strain    Injury to the muscles (strain) or ligaments (sprain) around the spine can be troubling. Injury may occur after a sudden forceful twisting or bending force such as in a car accident, after a simple awkward movement, or after lifting something heavy with poor body positioning. In any case, muscle spasm is often present and adds to the pain.  Thankfully, most people feel better in 1 to 2 weeks, and most of the rest in 1 to 2 months. Most people can remain active. Unless you had a forceful physical injury such as a car accident or fall, X-rays are usually not ordered for the first evaluation of a back sprain or strain. If pain continues and does not respond to medical treatment, your healthcare provider may order X-rays and other tests.  Home care  The following guidelines will help you care for your injury at home:    When in bed, try to find a comfortable position. A firm mattress is best. Try lying flat on your back with pillows under your knees. You can also try lying on your side with your knees bent up toward your chest and a pillow between your knees.    Don't sit for long periods. Try not to take  long car rides or take other trips that have you sitting for a long time. This puts more stress on the lower back than standing or walking.    During the first 24 to 72 hours after an injury or flare-up, apply an ice pack to the painful area for 20 minutes. Then remove it for 20 minutes. Do this for 60 to 90 minutes, or several times a day, This will reduce swelling and pain. Be sure to wrap the ice pack in a thin towel or plastic to protect your skin.    You can start with ice, then switch to heat. Heat from a hot shower, hot bath, or heating pad reduces swelling and pain and works well for muscle spasms. Put heat on the painful area for 20 minutes, then remove for 20 minutes. Do this for 60 to 90 minutes, or several times a day. Do not use a heating pad while sleeping. It can burn the skin.    You can alternate the ice and heat. Talk with your healthcare provider to find out the best treatment or therapy for your back pain.    Therapeutic massage will help relax the back muscles without stretching them.    Be aware of safe lifting methods. Do not lift anything over 15 pounds until all of the pain is gone.  Medicines  Talk to your healthcare provider before using medicines, especially if you have other health problems or are taking other medicines.    You may use acetaminophen or ibuprofen to control pain, unless another pain medicine was prescribed. If you have chronic conditions like diabetes, liver or kidney disease, stomach ulcers, or gastrointestinal bleeding, or are taking blood-thinner medicines, talk with your doctor before taking any medicines.    I did send a prescription for prednisone to the pharmacy out front. Please take one a day for three days.  Follow-up care  Follow up with your healthcare provider or this facility as advised. You may need physical therapy or more tests if your symptoms get worse.  If you had X-rays today, they didn t show any broken bones, breaks, or fractures. Sometimes  fractures don t show up on the first X-ray. Bruises and sprains can sometimes hurt as much as a fracture. These injuries can take time to heal completely. If your symptoms don t improve or they get worse, talk with your healthcare provider. You may need a repeat X-ray.  Call 911  Call for emergency care if any of the following occur:    Trouble breathing    Confused    Very drowsy or trouble awakening    Fainting or loss of consciousness    Rapid or very slow heart rate    Loss of bowel or bladder control  When to seek medical advice  Call your healthcare provider right away if any of the following occur:    Pain gets worse or spreads to your arms or legs    Weakness or numbness in one or both arms or legs    Numbness in the groin or genital area    Thank you for choosing our Emergency Department for your care.     Sincerely,    Dr Fabián Lopez M.D.          24 Hour Appointment Hotline       To make an appointment at any [facility], call [phone] ([phone]). If you don't have a family doctor or clinic, we will help you find one. [facility] are conveniently located to serve the needs of you and your family.             Review of your medicines      START taking        Dose / Directions Last dose taken    predniSONE 20 MG tablet   Commonly known as:  DELTASONE   Dose:  20 mg   Quantity:  3 tablet        Take 1 tablet (20 mg) by mouth daily for 3 days   Refills:  0          Our records show that you are taking the medicines listed below. If these are incorrect, please call your family doctor or clinic.        Dose / Directions Last dose taken    ACCU-CHEK COMPLETE Kit   Dose:  1 Device   Quantity:  1 Device        1 Device daily   Refills:  0        blood glucose monitoring lancets   Quantity:  3 Box        Use to test blood sugars 1-2 times daily or as directed, per patients glucose meter.   Refills:  3        blood glucose monitoring test strip   Commonly known as:  BL TEST STRIP PACK    Quantity:  100 each        Use to test blood sugar 1-2 times daily or as directed. Per patients glucose meter (getting new one today)   Refills:  3        clindamycin 150 MG capsule   Commonly known as:  CLEOCIN   Dose:  300 mg   Quantity:  80 capsule        Take 2 capsules (300 mg) by mouth 4 times daily for 10 days   Refills:  0        ferrous sulfate 325 (65 FE) MG tablet   Commonly known as:  IRON   Dose:  325 mg   Quantity:  180 tablet        Take 1 tablet (325 mg) by mouth 2 times daily   Refills:  3        FLUoxetine 20 MG capsule   Commonly known as:  PROzac   Dose:  20 mg   Quantity:  90 capsule        Take 1 capsule (20 mg) by mouth daily   Refills:  1        GLUCOSAMINE CHOND DOUBLE STR Tabs   Dose:  1 tablet   Quantity:  90 tablet        Take 1 tablet by mouth daily   Refills:  3        IBUPROFEN PO   Dose:  600 mg        Take 600 mg by mouth every 4 hours as needed for moderate pain   Refills:  0        insulin glargine 100 UNIT/ML injection   Commonly known as:  LANTUS SOLOSTAR   Quantity:  6 mL        26 units once daily   Refills:  5        insulin pen needle 32G X 6 MM   Commonly known as:  NOVOFINE   Quantity:  100 each        Use once daily or as directed.   Refills:  3        ketorolac 10 MG tablet   Commonly known as:  TORADOL   Quantity:  30 tablet        TAKE ONE TABLET BY MOUTH NIGHTLY AS NEEDED FOR MODERATE PAIN   Refills:  1        metFORMIN 500 MG 24 hr tablet   Commonly known as:  GLUCOPHAGE-XR   Dose:  2000 mg   Quantity:  360 tablet        Take 4 tablets (2,000 mg) by mouth daily (with dinner)   Refills:  3        MULTI-VITAMIN GUMMIES Chew        Take  by mouth.   Refills:  0        oxyCODONE-acetaminophen 5-325 MG per tablet   Commonly known as:  PERCOCET   Dose:  1 tablet   Quantity:  30 tablet        Take 1 tablet by mouth nightly as needed for moderate to severe pain   Refills:  0        simvastatin 20 MG tablet   Commonly known as:  ZOCOR   Dose:  20 mg   Quantity:  90 tablet     "    Take 1 tablet (20 mg) by mouth At Bedtime   Refills:  3        traZODone 50 MG tablet   Commonly known as:  DESYREL   Quantity:  90 tablet        TAKE 1-2 TABLETS BY MOUTH NIGHTLY AS NEEDED FOR SLEEP   Refills:  3                Prescriptions were sent or printed at these locations (1 Prescription)                   [facility] [place], [place] - [address]   [address] [place] [facility] [address]    Telephone:  [phone]   Fax:  [phone]   Hours:                  E-Prescribed (1 of 1)         predniSONE (DELTASONE) 20 MG tablet                Orders Needing Specimen Collection     None      Pending Results     No orders found from 2017 to 2017.            Pending Culture Results     No orders found from 2017 to 2017.            Thank you for choosing [facility]       Thank you for choosing [facility] for your care. Our goal is always to provide you with excellent care. Hearing back from our patients is one way we can continue to improve our services. Please take a few minutes to complete the written survey that you may receive in the mail after you visit with us. Thank you!        AVIcode Information     [employer] lets you send messages to your doctor, view your test results, renew your prescriptions, schedule appointments and more. To sign up, go to [email or web address] . Click on \"Log in\" on the left side of the screen, which will take you to the Welcome page. Then click on \"Sign up Now\" on the right side of the page.     You will be asked to enter the access code listed below, as well as some personal information. Please follow the directions to create your username and password.     Your access code is: [email or web address]  Expires: 2017  6:13 AM     Your access code will  in 90 days. If you need help or a new code, please call your [facility] clinic or [phone].        Care EveryWhere ID     This is your Care EveryWhere ID. This could be used by other " organizations to access your [facility] medical records  [id number]        After Visit Summary       This is your record. Keep this with you and show to your community pharmacist(s) and doctor(s) at your next visit.

## 2017-04-22 NOTE — DISCHARGE INSTRUCTIONS
Back Sprain or Strain    Injury to the muscles (strain) or ligaments (sprain) around the spine can be troubling. Injury may occur after a sudden forceful twisting or bending force such as in a car accident, after a simple awkward movement, or after lifting something heavy with poor body positioning. In any case, muscle spasm is often present and adds to the pain.  Thankfully, most people feel better in 1 to 2 weeks, and most of the rest in 1 to 2 months. Most people can remain active. Unless you had a forceful physical injury such as a car accident or fall, X-rays are usually not ordered for the first evaluation of a back sprain or strain. If pain continues and does not respond to medical treatment, your healthcare provider may order X-rays and other tests.  Home care  The following guidelines will help you care for your injury at home:    When in bed, try to find a comfortable position. A firm mattress is best. Try lying flat on your back with pillows under your knees. You can also try lying on your side with your knees bent up toward your chest and a pillow between your knees.    Don't sit for long periods. Try not to take long car rides or take other trips that have you sitting for a long time. This puts more stress on the lower back than standing or walking.    During the first 24 to 72 hours after an injury or flare-up, apply an ice pack to the painful area for 20 minutes. Then remove it for 20 minutes. Do this for 60 to 90 minutes, or several times a day, This will reduce swelling and pain. Be sure to wrap the ice pack in a thin towel or plastic to protect your skin.    You can start with ice, then switch to heat. Heat from a hot shower, hot bath, or heating pad reduces swelling and pain and works well for muscle spasms. Put heat on the painful area for 20 minutes, then remove for 20 minutes. Do this for 60 to 90 minutes, or several times a day. Do not use a heating pad while sleeping. It can burn the  skin.    You can alternate the ice and heat. Talk with your healthcare provider to find out the best treatment or therapy for your back pain.    Therapeutic massage will help relax the back muscles without stretching them.    Be aware of safe lifting methods. Do not lift anything over 15 pounds until all of the pain is gone.  Medicines  Talk to your healthcare provider before using medicines, especially if you have other health problems or are taking other medicines.    You may use acetaminophen or ibuprofen to control pain, unless another pain medicine was prescribed. If you have chronic conditions like diabetes, liver or kidney disease, stomach ulcers, or gastrointestinal bleeding, or are taking blood-thinner medicines, talk with your doctor before taking any medicines.    I did send a prescription for prednisone to the pharmacy out front. Please take one a day for three days.  Follow-up care  Follow up with your healthcare provider or this facility as advised. You may need physical therapy or more tests if your symptoms get worse.  If you had X-rays today, they didn t show any broken bones, breaks, or fractures. Sometimes fractures don t show up on the first X-ray. Bruises and sprains can sometimes hurt as much as a fracture. These injuries can take time to heal completely. If your symptoms don t improve or they get worse, talk with your healthcare provider. You may need a repeat X-ray.  Call 911  Call for emergency care if any of the following occur:    Trouble breathing    Confused    Very drowsy or trouble awakening    Fainting or loss of consciousness    Rapid or very slow heart rate    Loss of bowel or bladder control  When to seek medical advice  Call your healthcare provider right away if any of the following occur:    Pain gets worse or spreads to your arms or legs    Weakness or numbness in one or both arms or legs    Numbness in the groin or genital area    Thank you for choosing our Emergency  Department for your care.     Sincerely,    Dr Fabián Lopez M.D.

## 2017-04-22 NOTE — LETTER
[facility] EMERGENCY DEPARTMENT  [address] [place] [address]  [phone]    2017    Stacy Brown  [address]    [place] [address]  [phone] (home)     : 1975      To Whom it may concern:    Stacy Brown was seen in our Emergency Department today, 2017.  I expect her condition to improve over the next few days.  She may return to work when improved.    Sincerely,            Tre Lopez

## 2017-05-02 ENCOUNTER — HOSPITAL ENCOUNTER (EMERGENCY)
Facility: CLINIC | Age: 42
Discharge: HOME | End: 2017-05-02
Attending: NURSE PRACTITIONER | Admitting: NURSE PRACTITIONER
Payer: COMMERCIAL

## 2017-05-02 ENCOUNTER — APPOINTMENT (OUTPATIENT)
Dept: GENERAL RADIOLOGY | Facility: CLINIC | Age: 42
End: 2017-05-02
Attending: NURSE PRACTITIONER
Payer: COMMERCIAL

## 2017-05-02 VITALS
HEIGHT: 66 IN | HEART RATE: 95 BPM | SYSTOLIC BLOOD PRESSURE: 123 MMHG | OXYGEN SATURATION: 98 % | WEIGHT: 162 LBS | DIASTOLIC BLOOD PRESSURE: 91 MMHG | RESPIRATION RATE: 12 BRPM | BODY MASS INDEX: 26.03 KG/M2 | TEMPERATURE: 98.2 F

## 2017-05-02 DIAGNOSIS — J06.9 VIRAL URI WITH COUGH: ICD-10-CM

## 2017-05-02 LAB
DEPRECATED S PYO AG THROAT QL EIA: NORMAL
FLUAV+FLUBV AG SPEC QL: NEGATIVE
FLUAV+FLUBV AG SPEC QL: NORMAL
MICRO REPORT STATUS: NORMAL
SPECIMEN SOURCE: NORMAL
SPECIMEN SOURCE: NORMAL

## 2017-05-02 PROCEDURE — 99283 EMERGENCY DEPT VISIT LOW MDM: CPT | Mod: Z6 | Performed by: NURSE PRACTITIONER

## 2017-05-02 PROCEDURE — 87081 CULTURE SCREEN ONLY: CPT | Performed by: FAMILY MEDICINE

## 2017-05-02 PROCEDURE — 71020 XR CHEST 2 VW: CPT | Mod: TC

## 2017-05-02 PROCEDURE — 99284 EMERGENCY DEPT VISIT MOD MDM: CPT | Performed by: NURSE PRACTITIONER

## 2017-05-02 PROCEDURE — 87804 INFLUENZA ASSAY W/OPTIC: CPT | Performed by: NURSE PRACTITIONER

## 2017-05-02 PROCEDURE — 87880 STREP A ASSAY W/OPTIC: CPT | Performed by: FAMILY MEDICINE

## 2017-05-02 PROCEDURE — 25000131 ZZH RX MED GY IP 250 OP 636 PS 637: Performed by: NURSE PRACTITIONER

## 2017-05-02 RX ORDER — DEXAMETHASONE SODIUM PHOSPHATE 10 MG/ML
10 INJECTION, SOLUTION INTRAMUSCULAR; INTRAVENOUS ONCE
Status: COMPLETED | OUTPATIENT
Start: 2017-05-02 | End: 2017-05-02

## 2017-05-02 RX ADMIN — DEXAMETHASONE SODIUM PHOSPHATE 10 MG: 10 INJECTION, SOLUTION INTRAMUSCULAR; INTRAVENOUS at 11:46

## 2017-05-02 ASSESSMENT — ENCOUNTER SYMPTOMS
ACTIVITY CHANGE: 1
MYALGIAS: 1
COUGH: 1
SORE THROAT: 1

## 2017-05-02 NOTE — ED NOTES
Son had strep two weeks ago.  ill with resp prob after. Pt has sore throat, cough, sinus pressure, ha, chest discomfort since Sunday and feeling worse.

## 2017-05-02 NOTE — ED PROVIDER NOTES
"  History     Chief Complaint   Patient presents with     Cough     HPI  Stacy Brown is a 41 year old female who presents to the ED today with c/o productive cough, sore throat, sinus pressure and chest congestion since Sunday. Patient reports that her  had similar symptoms last week. Her son had strep throat 2 weeks ago.  Patient denies any vomiting or diarrhea.  Patient has been taking Nyquil with some relief at bedtime.  Patient has been able to stay well hydrated. Patient denies any fever/chills.  She reports mild body aches.     I have reviewed the Medications, Allergies, Past Medical and Surgical History, and Social History in the Epic system.    Review of Systems   Constitutional: Positive for activity change.   HENT: Positive for congestion and sore throat.    Respiratory: Positive for cough.    Musculoskeletal: Positive for myalgias.   All other systems reviewed and are negative.      Physical Exam   BP: (!) 123/91  Pulse: 95  Temp: 98.2  F (36.8  C)  Resp: 16  Height: 167.6 cm (5' 6\")  Weight: 73.5 kg (162 lb)  SpO2: 98 %  Physical Exam   Constitutional: She is oriented to person, place, and time. She appears well-developed and well-nourished. No distress.   HENT:   Head: Normocephalic.   Right Ear: External ear normal. A middle ear effusion is present.   Left Ear: External ear normal.   Mouth/Throat: Oropharynx is clear and moist. No oropharyngeal exudate.   Eyes: Conjunctivae are normal.   Neck: Normal range of motion. Neck supple.   Cardiovascular: Normal rate and regular rhythm.    Pulmonary/Chest: Effort normal and breath sounds normal. No respiratory distress. She has no wheezes.   Abdominal: Soft. Bowel sounds are normal.   Neurological: She is alert and oriented to person, place, and time.   Skin: Skin is warm. She is not diaphoretic. No erythema.   Psychiatric: She has a normal mood and affect.       ED Course     ED Course     Procedures    Results for orders placed or performed during " the hospital encounter of 05/02/17   XR Chest 2 Views    Narrative    XR CHEST 2 VW 5/2/2017 11:48 AM     HISTORY: cough      Impression    IMPRESSION: Negative exam.    TRE HILL MD   Rapid strep screen   Result Value Ref Range    Specimen Description Throat     Rapid Strep A Screen       NEGATIVE: No Group A streptococcal antigen detected by immunoassay, await   culture report.      Micro Report Status FINAL 05/02/2017    Influenza A/B antigen   Result Value Ref Range    Influenza A/B Agn Specimen Nares     Influenza A Negative NEG    Influenza B  NEG     Negative   Test results must be correlated with clinical data. If necessary, results   should be confirmed by a molecular assay or viral culture.       Assessments & Plan (with Medical Decision Making)  Stacy is a 41 year old female with a hx of Type 2 diabetes, depression and high cholesterol who presents to the ED today with viral type symptoms since Sunday. Please refer to HPI and focused exam.  Patient on exam is well hydrated, she is non-toxic appearing.  Exam is rather unremarkable except for some clear fluid behind Right TM.  Patient was swabbed for influenza and strep and these are both negative.  CXR obtained to r/o pneumonia and is also negative.  I discussed with patient supportive care at home including Tylenol/Ibuprofen and decongestants for congestion and fluid behind ear as well as plenty of rest and hydration.  Patient can follow up with PCP as needed in one week.  Reasons to return to the ED discussed, patient agreeable to plan of care and questions were answered prior to discharge.  Patient discharged from the ED in stable condition.      I have reviewed the nursing notes.    I have reviewed the findings, diagnosis, plan and need for follow up with the patient.    Discharge Medication List as of 5/2/2017 12:30 PM          Final diagnoses:   Viral URI with cough       5/2/2017   [facility] EMERGENCY DEPARTMENT     Miriam Griffith  JOVITA Maria CNP  05/02/17 9402

## 2017-05-02 NOTE — ED AVS SNAPSHOT
[facility] Emergency Department    [address] [place] [address]    Phone:  [phone]    Fax:  [phone]                                       Stacy Brown   MRN: [id number]    Department:  [facility] Emergency Department   Date of Visit:  5/2/2017           After Visit Summary Signature Page     I have received my discharge instructions, and my questions have been answered. I have discussed any challenges I see with this plan with the nurse or doctor.    ..........................................................................................................................................  Patient/Patient Representative Signature      ..........................................................................................................................................  Patient Representative Print Name and Relationship to Patient    ..................................................               ................................................  Date                                            Time    ..........................................................................................................................................  Reviewed by Signature/Title    ...................................................              ..............................................  Date                                                            Time

## 2017-05-02 NOTE — ED AVS SNAPSHOT
[facility] Emergency Department    [address] [place] [address]    Phone:  [phone]    Fax:  [phone]                                       Stacy Brown   MRN: [id number]    Department:  [facility] Emergency Department   Date of Visit:  5/2/2017           Patient Information     Date Of Birth          1975        Your diagnoses for this visit were:     Viral URI with cough        You were seen by Miriam Griffith APRN CNP.      Follow-up Information     Follow up with Yaima Muse MD In 1 week.    Specialty:  Family Practice    Why:  As needed    Contact information:    [facility]  [address]   [place] [address] [phone]          Discharge Instructions         Viral Upper Respiratory Illness (Adult)  You have a viral upper respiratory illness (URI), which is another term for the common cold. This illness is contagious during the first few days. It is spread through the air by coughing and sneezing. It may also be spread by direct contact (touching the sick person and then touching your own eyes, nose, or mouth). Frequent handwashing will decrease risk of spread. Most viral illnesses go away within 7 to 10 days with rest and simple home remedies. Sometimes the illness may last for several weeks. Antibiotics will not kill a virus, and they are generally not prescribed for this condition.    Home care    If symptoms are severe, rest at home for the first 2 to 3 days. When you resume activity, don't let yourself get too tired.    Avoid being exposed to cigarette smoke (yours or others ).    You may use acetaminophen or ibuprofen to control pain and fever, unless another medicine was prescribed. (Note: If you have chronic liver or kidney disease, have ever had a stomach ulcer or gastrointestinal bleeding, or are taking blood-thinning medicines, talk with your healthcare provider before using these medicines.) Aspirin should never be  given to anyone under 18 years of age who is ill with a viral infection or fever. It may cause severe liver or brain damage.    Your appetite may be poor, so a light diet is fine. Avoid dehydration by drinking 6 to 8 glasses of fluids per day (water, soft drinks, juices, tea, or soup). Extra fluids will help loosen secretions in the nose and lungs.    Over-the-counter cold medicines will not shorten the length of time you re sick, but they may be helpful for the following symptoms: cough, sore throat, and nasal and sinus congestion. (Note: Do not use decongestants if you have high blood pressure.)  Follow-up care  Follow up with your healthcare provider, or as advised.  When to seek medical advice  Call your healthcare provider right away if any of these occur:    Cough with lots of colored sputum (mucus)    Severe headache; face, neck, or ear pain    Difficulty swallowing due to throat pain    Fever of 100.4 F (38 C)  Call 911, or get immediate medical care  Call emergency services right away if any of these occur:    Chest pain, shortness of breath, wheezing, or difficulty breathing    Coughing up blood    Inability to swallow due to throat pain    5837-3771 [employer]. [address] [place] [address]. All rights reserved. This information is not intended as a substitute for professional medical care. Always follow your healthcare professional's instructions.      Stacy, you can take Sudafed or Claritin D for you congestion and fluid behind you right ear.  Make sure you are getting plenty of rest and drinking plenty of fluids.     Future Appointments        Provider Department Dept Phone Center    5/10/2017 3:30 PM Jhonny Espino [facility] [phone] formerly [facility]      24 Hour Appointment Hotline       To make an appointment at any [facility], call [phone] ([phone]). If you don't have a family doctor or clinic, we will help you find one.  [facility] are conveniently located to serve the needs of you and your family.             Review of your medicines      Our records show that you are taking the medicines listed below. If these are incorrect, please call your family doctor or clinic.        Dose / Directions Last dose taken    ACCU-CHEK COMPLETE Kit   Dose:  1 Device   Quantity:  1 Device        1 Device daily   Refills:  0        blood glucose monitoring lancets   Quantity:  3 Box        Use to test blood sugars 1-2 times daily or as directed, per patients glucose meter.   Refills:  3        blood glucose monitoring test strip   Commonly known as:  BL TEST STRIP PACK   Quantity:  100 each        Use to test blood sugar 1-2 times daily or as directed. Per patients glucose meter (getting new one today)   Refills:  3        ferrous sulfate 325 (65 FE) MG tablet   Commonly known as:  IRON   Dose:  325 mg   Quantity:  180 tablet        Take 1 tablet (325 mg) by mouth 2 times daily   Refills:  3        FLUoxetine 20 MG capsule   Commonly known as:  PROzac   Dose:  20 mg   Quantity:  90 capsule        Take 1 capsule (20 mg) by mouth daily   Refills:  1        GLUCOSAMINE CHOND DOUBLE STR Tabs   Dose:  1 tablet   Quantity:  90 tablet        Take 1 tablet by mouth daily   Refills:  3        IBUPROFEN PO   Dose:  600 mg        Take 600 mg by mouth every 4 hours as needed for moderate pain   Refills:  0        insulin glargine 100 UNIT/ML injection   Commonly known as:  LANTUS SOLOSTAR   Quantity:  6 mL        26 units once daily   Refills:  5        insulin pen needle 32G X 6 MM   Commonly known as:  NOVOFINE   Quantity:  100 each        Use once daily or as directed.   Refills:  3        ketorolac 10 MG tablet   Commonly known as:  TORADOL   Quantity:  30 tablet        TAKE ONE TABLET BY MOUTH NIGHTLY AS NEEDED FOR MODERATE PAIN   Refills:  1        metFORMIN 500 MG 24 hr tablet   Commonly known as:  GLUCOPHAGE-XR   Dose:  2000 mg   Quantity:  360  tablet        Take 4 tablets (2,000 mg) by mouth daily (with dinner)   Refills:  3        MULTI-VITAMIN GUMMIES Chew        Take  by mouth.   Refills:  0        oxyCODONE-acetaminophen 5-325 MG per tablet   Commonly known as:  PERCOCET   Dose:  1 tablet   Quantity:  30 tablet        Take 1 tablet by mouth nightly as needed for moderate to severe pain   Refills:  0        simvastatin 20 MG tablet   Commonly known as:  ZOCOR   Dose:  20 mg   Quantity:  90 tablet        Take 1 tablet (20 mg) by mouth At Bedtime   Refills:  3        traZODone 50 MG tablet   Commonly known as:  DESYREL   Quantity:  90 tablet        TAKE 1-2 TABLETS BY MOUTH NIGHTLY AS NEEDED FOR SLEEP   Refills:  3          ASK your doctor about these medications        Dose / Directions Last dose taken    clindamycin 150 MG capsule   Commonly known as:  CLEOCIN   Dose:  300 mg   Quantity:  80 capsule   Ask about: Should I take this medication?        Take 2 capsules (300 mg) by mouth 4 times daily for 10 days   Refills:  0                Procedures and tests performed during your visit     Beta strep group A culture    Influenza A/B antigen    Rapid strep screen    XR Chest 2 Views      Orders Needing Specimen Collection     None      Pending Results     Date and Time Order Name Status Description    5/2/2017 1112 Beta strep group A culture In process             Pending Culture Results     Date and Time Order Name Status Description    5/2/2017 1112 Beta strep group A culture In process             Pending Results Instructions     If you had any lab results that were not finalized at the time of your Discharge, you can call the ED Lab Result RN at [phone]. You will be contacted by this team for any positive Lab results or changes in treatment. The nurses are available 7 days a week from 10A to 6:30P.  You can leave a message 24 hours per day and they will return your call.        Thank you for choosing [facility]       Thank you for choosing  "[facility] for your care. Our goal is always to provide you with excellent care. Hearing back from our patients is one way we can continue to improve our services. Please take a few minutes to complete the written survey that you may receive in the mail after you visit with us. Thank you!        [email or web address] Information     [employer] lets you send messages to your doctor, view your test results, renew your prescriptions, schedule appointments and more. To sign up, go to [email or web address] . Click on \"Log in\" on the left side of the screen, which will take you to the Welcome page. Then click on \"Sign up Now\" on the right side of the page.     You will be asked to enter the access code listed below, as well as some personal information. Please follow the directions to create your username and password.     Your access code is: [email or web address]  Expires: 2017  6:13 AM     Your access code will  in 90 days. If you need help or a new code, please call your [facility] clinic or [phone].        Care EveryWhere ID     This is your Care EveryWhere ID. This could be used by other organizations to access your [facility] medical records  [id number]        After Visit Summary       This is your record. Keep this with you and show to your community pharmacist(s) and doctor(s) at your next visit.                  "

## 2017-05-02 NOTE — DISCHARGE INSTRUCTIONS

## 2017-05-03 NOTE — PROGRESS NOTES
Notify Stacy that with her normal mammogram and ultrasound, I recommend we do a follow up exam in 6 months and decide then if she needs to have any repeat testing.   Yaima Muse MD

## 2017-05-04 LAB
BACTERIA SPEC CULT: NORMAL
MICRO REPORT STATUS: NORMAL
SPECIMEN SOURCE: NORMAL

## 2017-05-05 ENCOUNTER — HOSPITAL ENCOUNTER (EMERGENCY)
Facility: CLINIC | Age: 42
Discharge: HOME | End: 2017-05-05
Attending: NURSE PRACTITIONER | Admitting: NURSE PRACTITIONER
Payer: COMMERCIAL

## 2017-05-05 ENCOUNTER — APPOINTMENT (OUTPATIENT)
Dept: GENERAL RADIOLOGY | Facility: CLINIC | Age: 42
End: 2017-05-05
Attending: NURSE PRACTITIONER
Payer: COMMERCIAL

## 2017-05-05 ENCOUNTER — TELEPHONE (OUTPATIENT)
Dept: EMERGENCY MEDICINE | Facility: CLINIC | Age: 42
End: 2017-05-05

## 2017-05-05 VITALS
HEART RATE: 90 BPM | SYSTOLIC BLOOD PRESSURE: 128 MMHG | OXYGEN SATURATION: 99 % | RESPIRATION RATE: 20 BRPM | DIASTOLIC BLOOD PRESSURE: 75 MMHG | BODY MASS INDEX: 26.15 KG/M2 | TEMPERATURE: 97.4 F | WEIGHT: 162 LBS

## 2017-05-05 DIAGNOSIS — R05.9 COUGH: ICD-10-CM

## 2017-05-05 DIAGNOSIS — M79.604 PAIN OF RIGHT LOWER EXTREMITY: ICD-10-CM

## 2017-05-05 DIAGNOSIS — J06.9 VIRAL URI WITH COUGH: ICD-10-CM

## 2017-05-05 DIAGNOSIS — J06.9 ACUTE RESPIRATORY DISEASE: ICD-10-CM

## 2017-05-05 DIAGNOSIS — F51.01 PRIMARY INSOMNIA: ICD-10-CM

## 2017-05-05 LAB
ANION GAP SERPL CALCULATED.3IONS-SCNC: 13 MMOL/L (ref 3–14)
BASOPHILS # BLD AUTO: 0 10E9/L (ref 0–0.2)
BASOPHILS NFR BLD AUTO: 0.4 %
BUN SERPL-MCNC: 19 MG/DL (ref 7–30)
CALCIUM SERPL-MCNC: 8.8 MG/DL (ref 8.5–10.1)
CHLORIDE SERPL-SCNC: 103 MMOL/L (ref 94–109)
CO2 SERPL-SCNC: 23 MMOL/L (ref 20–32)
CREAT SERPL-MCNC: 0.49 MG/DL (ref 0.52–1.04)
DEPRECATED S PYO AG THROAT QL EIA: NORMAL
DIFFERENTIAL METHOD BLD: ABNORMAL
EOSINOPHIL # BLD AUTO: 0 10E9/L (ref 0–0.7)
EOSINOPHIL NFR BLD AUTO: 0.2 %
ERYTHROCYTE [DISTWIDTH] IN BLOOD BY AUTOMATED COUNT: 19.5 % (ref 10–15)
GFR SERPL CREATININE-BSD FRML MDRD: ABNORMAL ML/MIN/1.7M2
GLUCOSE SERPL-MCNC: 224 MG/DL (ref 70–99)
HCT VFR BLD AUTO: 35.8 % (ref 35–47)
HGB BLD-MCNC: 11 G/DL (ref 11.7–15.7)
IMM GRANULOCYTES # BLD: 0 10E9/L (ref 0–0.4)
IMM GRANULOCYTES NFR BLD: 0 %
LYMPHOCYTES # BLD AUTO: 1.9 10E9/L (ref 0.8–5.3)
LYMPHOCYTES NFR BLD AUTO: 40 %
MCH RBC QN AUTO: 19.7 PG (ref 26.5–33)
MCHC RBC AUTO-ENTMCNC: 30.7 G/DL (ref 31.5–36.5)
MCV RBC AUTO: 64 FL (ref 78–100)
MICRO REPORT STATUS: NORMAL
MONOCYTES # BLD AUTO: 0.5 10E9/L (ref 0–1.3)
MONOCYTES NFR BLD AUTO: 11 %
NEUTROPHILS # BLD AUTO: 2.3 10E9/L (ref 1.6–8.3)
NEUTROPHILS NFR BLD AUTO: 48.4 %
PLATELET # BLD AUTO: 273 10E9/L (ref 150–450)
POTASSIUM SERPL-SCNC: 3.7 MMOL/L (ref 3.4–5.3)
RBC # BLD AUTO: 5.59 10E12/L (ref 3.8–5.2)
SODIUM SERPL-SCNC: 139 MMOL/L (ref 133–144)
SPECIMEN SOURCE: NORMAL
WBC # BLD AUTO: 4.7 10E9/L (ref 4–11)

## 2017-05-05 PROCEDURE — 96375 TX/PRO/DX INJ NEW DRUG ADDON: CPT

## 2017-05-05 PROCEDURE — 94640 AIRWAY INHALATION TREATMENT: CPT

## 2017-05-05 PROCEDURE — 96374 THER/PROPH/DIAG INJ IV PUSH: CPT

## 2017-05-05 PROCEDURE — 25000128 H RX IP 250 OP 636: Performed by: NURSE PRACTITIONER

## 2017-05-05 PROCEDURE — 25000125 ZZHC RX 250: Performed by: NURSE PRACTITIONER

## 2017-05-05 PROCEDURE — 96361 HYDRATE IV INFUSION ADD-ON: CPT

## 2017-05-05 PROCEDURE — 71020 XR CHEST 2 VW: CPT | Mod: TC

## 2017-05-05 PROCEDURE — 99285 EMERGENCY DEPT VISIT HI MDM: CPT | Mod: 25 | Performed by: NURSE PRACTITIONER

## 2017-05-05 PROCEDURE — 87880 STREP A ASSAY W/OPTIC: CPT | Performed by: NURSE PRACTITIONER

## 2017-05-05 PROCEDURE — 87081 CULTURE SCREEN ONLY: CPT | Performed by: NURSE PRACTITIONER

## 2017-05-05 PROCEDURE — 99285 EMERGENCY DEPT VISIT HI MDM: CPT | Mod: 25

## 2017-05-05 PROCEDURE — 85025 COMPLETE CBC W/AUTO DIFF WBC: CPT | Performed by: NURSE PRACTITIONER

## 2017-05-05 PROCEDURE — 80048 BASIC METABOLIC PNL TOTAL CA: CPT | Performed by: NURSE PRACTITIONER

## 2017-05-05 RX ORDER — MORPHINE SULFATE 4 MG/ML
4 INJECTION, SOLUTION INTRAMUSCULAR; INTRAVENOUS
Status: COMPLETED | OUTPATIENT
Start: 2017-05-05 | End: 2017-05-05

## 2017-05-05 RX ORDER — SODIUM CHLORIDE 9 MG/ML
1000 INJECTION, SOLUTION INTRAVENOUS CONTINUOUS
Status: DISCONTINUED | OUTPATIENT
Start: 2017-05-05 | End: 2017-05-05 | Stop reason: HOSPADM

## 2017-05-05 RX ORDER — ALBUTEROL SULFATE 90 UG/1
2 AEROSOL, METERED RESPIRATORY (INHALATION) EVERY 4 HOURS PRN
Qty: 1 INHALER | Refills: 0 | Status: SHIPPED | OUTPATIENT
Start: 2017-05-05 | End: 2017-08-15

## 2017-05-05 RX ORDER — BENZONATATE 200 MG/1
200 CAPSULE ORAL 3 TIMES DAILY PRN
Qty: 21 CAPSULE | Refills: 0 | Status: SHIPPED | OUTPATIENT
Start: 2017-05-05 | End: 2017-05-10

## 2017-05-05 RX ORDER — KETOROLAC TROMETHAMINE 30 MG/ML
30 INJECTION, SOLUTION INTRAMUSCULAR; INTRAVENOUS ONCE
Status: COMPLETED | OUTPATIENT
Start: 2017-05-05 | End: 2017-05-05

## 2017-05-05 RX ORDER — CODEINE PHOSPHATE AND GUAIFENESIN 10; 100 MG/5ML; MG/5ML
2 SOLUTION ORAL
Qty: 120 ML | Refills: 0 | Status: SHIPPED | OUTPATIENT
Start: 2017-05-05 | End: 2017-05-10

## 2017-05-05 RX ORDER — IPRATROPIUM BROMIDE AND ALBUTEROL SULFATE 2.5; .5 MG/3ML; MG/3ML
3 SOLUTION RESPIRATORY (INHALATION) ONCE
Status: COMPLETED | OUTPATIENT
Start: 2017-05-05 | End: 2017-05-05

## 2017-05-05 RX ADMIN — KETOROLAC TROMETHAMINE 30 MG: 30 INJECTION, SOLUTION INTRAMUSCULAR at 17:43

## 2017-05-05 RX ADMIN — SODIUM CHLORIDE 1000 ML: 9 INJECTION, SOLUTION INTRAVENOUS at 17:42

## 2017-05-05 RX ADMIN — IPRATROPIUM BROMIDE AND ALBUTEROL SULFATE 3 ML: .5; 3 SOLUTION RESPIRATORY (INHALATION) at 18:04

## 2017-05-05 RX ADMIN — MORPHINE SULFATE 4 MG: 4 INJECTION, SOLUTION INTRAMUSCULAR; INTRAVENOUS at 18:45

## 2017-05-05 ASSESSMENT — ENCOUNTER SYMPTOMS
SORE THROAT: 1
ABDOMINAL PAIN: 1
NAUSEA: 1
COUGH: 1
BACK PAIN: 1
VOMITING: 1
SINUS PRESSURE: 1
FEVER: 1
SHORTNESS OF BREATH: 1
APPETITE CHANGE: 1

## 2017-05-05 NOTE — ED NOTES
"Pt has call light on, stating \"My IV has blown\".  She is complaining of pain and swelling in her right forearm, below the insertion site.  Site is assessed above and below IV insertion site.  It flushes easily, good blood return.  There is no visible swelling, skin is warm, no redness, no firmness.  IV fluid rate is slowed down and pt is reassured.  Will reassess to see if this helped her symptoms.   "

## 2017-05-05 NOTE — TELEPHONE ENCOUNTER
Patient need an ER Follow up with Yaima Muse for cold symptoms per Miriam Griffith. Please call patient to schedule appointment.

## 2017-05-05 NOTE — ED AVS SNAPSHOT
[facility] Emergency Department    [address] [place] [address]    Phone:  [phone]    Fax:  [phone]                                       Stacy Brown   MRN: [id number]    Department:  [facility] Emergency Department   Date of Visit:  5/5/2017           Patient Information     Date Of Birth          1975        Your diagnoses for this visit were:     Viral URI with cough        You were seen by Miriam Griffith, JOVITA CNP.      Follow-up Information     Follow up with Yaima Muse MD.    Specialty:  Family Practice    Why:  A message has been left to work you in for a clinic appt next week.     Contact information:    [facility]  [address] [place] [address] [phone]          Follow up with [facility].    Specialty:  EMERGENCY MEDICINE    Why:  If symptoms worsen    Contact information:    [address] [place] [address] [phone]    Additional information:    From [facility] [address]: Exit at [address] on south side of [place]. Turn right on [address] [employer]. Turn left at stoplight on [address]. [facility] will be in view two blocks ahead        Discharge Instructions         Viral Upper Respiratory Illness (Adult)  You have a viral upper respiratory illness (URI), which is another term for the common cold. This illness is contagious during the first few days. It is spread through the air by coughing and sneezing. It may also be spread by direct contact (touching the sick person and then touching your own eyes, nose, or mouth). Frequent handwashing will decrease risk of spread. Most viral illnesses go away within 7 to 10 days with rest and simple home remedies. Sometimes the illness may last for several weeks. Antibiotics will not kill a virus, and they are generally not prescribed for this condition.    Home care    If symptoms are severe, rest at home for the first  2 to 3 days. When you resume activity, don't let yourself get too tired.    Avoid being exposed to cigarette smoke (yours or others ).    You may use acetaminophen or ibuprofen to control pain and fever, unless another medicine was prescribed. (Note: If you have chronic liver or kidney disease, have ever had a stomach ulcer or gastrointestinal bleeding, or are taking blood-thinning medicines, talk with your healthcare provider before using these medicines.) Aspirin should never be given to anyone under 18 years of age who is ill with a viral infection or fever. It may cause severe liver or brain damage.    Your appetite may be poor, so a light diet is fine. Avoid dehydration by drinking 6 to 8 glasses of fluids per day (water, soft drinks, juices, tea, or soup). Extra fluids will help loosen secretions in the nose and lungs.    Over-the-counter cold medicines will not shorten the length of time you re sick, but they may be helpful for the following symptoms: cough, sore throat, and nasal and sinus congestion. (Note: Do not use decongestants if you have high blood pressure.)  Follow-up care  Follow up with your healthcare provider, or as advised.  When to seek medical advice  Call your healthcare provider right away if any of these occur:    Cough with lots of colored sputum (mucus)    Severe headache; face, neck, or ear pain    Difficulty swallowing due to throat pain    Fever of 100.4 F (38 C)  Call 911, or get immediate medical care  Call emergency services right away if any of these occur:    Chest pain, shortness of breath, wheezing, or difficulty breathing    Coughing up blood    Inability to swallow due to throat pain    5878-5947 [employer]. [address] [place] [address], [place] [address]. All rights reserved. This information is not intended as a substitute for professional medical care. Always follow your healthcare professional's instructions.          Future Appointments        Provider  Department Dept Phone Center    5/10/2017 3:30 PM Jhonny Espino, [facility] [phone] [facility]      24 Hour Appointment Hotline       To make an appointment at any [facility], call [phone] ([phone]). If you don't have a family doctor or clinic, we will help you find one. [facility] are conveniently located to serve the needs of you and your family.             Review of your medicines      START taking        Dose / Directions Last dose taken    albuterol 108 (90 BASE) MCG/ACT Inhaler   Commonly known as:  albuterol   Dose:  2 puff   Quantity:  1 Inhaler        Inhale 2 puffs into the lungs every 4 hours as needed for shortness of breath / dyspnea   Refills:  0        benzonatate 200 MG capsule   Commonly known as:  TESSALON   Dose:  200 mg   Quantity:  21 capsule        Take 1 capsule (200 mg) by mouth 3 times daily as needed for cough   Refills:  0          Our records show that you are taking the medicines listed below. If these are incorrect, please call your family doctor or clinic.        Dose / Directions Last dose taken    ACCU-CHEK COMPLETE Kit   Dose:  1 Device   Quantity:  1 Device        1 Device daily   Refills:  0        blood glucose monitoring lancets   Quantity:  3 Box        Use to test blood sugars 1-2 times daily or as directed, per patients glucose meter.   Refills:  3        blood glucose monitoring test strip   Commonly known as:  BL TEST STRIP PACK   Quantity:  100 each        Use to test blood sugar 1-2 times daily or as directed. Per patients glucose meter (getting new one today)   Refills:  3        ferrous sulfate 325 (65 FE) MG tablet   Commonly known as:  IRON   Dose:  325 mg   Quantity:  180 tablet        Take 1 tablet (325 mg) by mouth 2 times daily   Refills:  3        FLUoxetine 20 MG capsule   Commonly known as:  PROzac   Dose:  20 mg   Quantity:  90 capsule        Take 1 capsule (20 mg) by mouth daily   Refills:  1         GLUCOSAMINE CHOND DOUBLE STR Tabs   Dose:  1 tablet   Quantity:  90 tablet        Take 1 tablet by mouth daily   Refills:  3        IBUPROFEN PO   Dose:  600 mg        Take 600 mg by mouth every 4 hours as needed for moderate pain   Refills:  0        insulin glargine 100 UNIT/ML injection   Commonly known as:  LANTUS SOLOSTAR   Quantity:  6 mL        26 units once daily   Refills:  5        insulin pen needle 32G X 6 MM   Commonly known as:  NOVOFINE   Quantity:  100 each        Use once daily or as directed.   Refills:  3        ketorolac 10 MG tablet   Commonly known as:  TORADOL   Quantity:  30 tablet        TAKE ONE TABLET BY MOUTH NIGHTLY AS NEEDED FOR MODERATE PAIN   Refills:  1        metFORMIN 500 MG 24 hr tablet   Commonly known as:  GLUCOPHAGE-XR   Dose:  2000 mg   Quantity:  360 tablet        Take 4 tablets (2,000 mg) by mouth daily (with dinner)   Refills:  3        MULTI-VITAMIN GUMMIES Chew        Take  by mouth.   Refills:  0        oxyCODONE-acetaminophen 5-325 MG per tablet   Commonly known as:  PERCOCET   Dose:  1 tablet   Quantity:  30 tablet        Take 1 tablet by mouth nightly as needed for moderate to severe pain   Refills:  0        simvastatin 20 MG tablet   Commonly known as:  ZOCOR   Dose:  20 mg   Quantity:  90 tablet        Take 1 tablet (20 mg) by mouth At Bedtime   Refills:  3        traZODone 50 MG tablet   Commonly known as:  DESYREL   Quantity:  90 tablet        TAKE 1-2 TABLETS BY MOUTH NIGHTLY AS NEEDED FOR SLEEP   Refills:  3                Prescriptions were sent or printed at these locations (2 Prescriptions)                   [facility] [place] [address] [facility]    [address], [facility] [address]    Telephone:  [phone]   Fax:  [phone]   Hours:                  E-Prescribed (2 of 2)         albuterol (ALBUTEROL) 108 (90 BASE) MCG/ACT Inhaler               benzonatate (TESSALON) 200 MG capsule                Procedures and tests performed  "during your visit     Basic metabolic panel    Beta strep group A culture    CBC with platelets differential    Peripheral IV catheter    Rapid strep screen    XR Chest 2 Views      Orders Needing Specimen Collection     None      Pending Results     Date and Time Order Name Status Description    2017 1710 Beta strep group A culture In process             Pending Culture Results     Date and Time Order Name Status Description    2017 1710 Beta strep group A culture In process             Pending Results Instructions     If you had any lab results that were not finalized at the time of your Discharge, you can call the ED Lab Result RN at [phone]. You will be contacted by this team for any positive Lab results or changes in treatment. The nurses are available 7 days a week from 10A to 6:30P.  You can leave a message 24 hours per day and they will return your call.        Thank you for choosing [facility]       Thank you for choosing [facility] for your care. Our goal is always to provide you with excellent care. Hearing back from our patients is one way we can continue to improve our services. Please take a few minutes to complete the written survey that you may receive in the mail after you visit with us. Thank you!        [employer] Information     [employer] lets you send messages to your doctor, view your test results, renew your prescriptions, schedule appointments and more. To sign up, go to [email or web address] . Click on \"Log in\" on the left side of the screen, which will take you to the Welcome page. Then click on \"Sign up Now\" on the right side of the page.     You will be asked to enter the access code listed below, as well as some personal information. Please follow the directions to create your username and password.     Your access code is: [email or web address]  Expires: 2017  6:13 AM     Your access code will  in 90 days. If you need help or a new code, please call your [facility] clinic or " [phone].        Care EveryWhere ID     This is your Care EveryWhere ID. This could be used by other organizations to access your [facility] medical records  [id number]        After Visit Summary       This is your record. Keep this with you and show to your community pharmacist(s) and doctor(s) at your next visit.

## 2017-05-05 NOTE — ED PROVIDER NOTES
"  History     Chief Complaint   Patient presents with     Cough     The history is provided by the patient.     Stacy Borwn is a 41 year old female who presents to the ED with concerns of a persistent cough. The patient was initially seen in the ED on 5/2, 3 days ago, complaining of a productive cough, sore throat, and sinus pressure since 4/30. Patient had a negative Rapid Strep Swab and Influenza swab, and a normal chest x-ray at her last visit. Patient was diagnosed with a Viral URI, educated on supportive cares, and discharged. Today, patient returns to the ED complaining that her cough and fevers have persisted. She says that her cough is now \"in her chest\" and is so severe that it is causing her low back and abdominal muscles to ache. Patient also complains she feels short of breath. Patient states that she has been experiencing intermittent fevers, though is unsure when she last had a fever. She also reports a sore throat and emesis. She is \"unable to keep any food or water down\". Patient has been taking Ibuprofen, most recently at noon, and Sudafed Chest and Sinus. Patient denies any rash or other associated symptoms.     Patient Active Problem List   Diagnosis     TYPE 2 DIABETES, HBA1C GOAL < 7%     HYPERLIPIDEMIA LDL GOAL <100     PMDD (premenstrual dysphoric disorder)     Health Care Home     Iron deficiency anemia     Halitosis     Moderate major depression (H)     Restless legs syndrome (RLS)     Insomnia     Chronic pain syndrome     Overweight     Past Medical History:   Diagnosis Date     Knee pain, chronic      Mixed hyperlipidemia      Type II or unspecified type diabetes mellitus without mention of complication, not stated as uncontrolled        Past Surgical History:   Procedure Laterality Date     C STABISM SURG,PREV EYE SURG,NOT MUSC      Right     HC OPEN TX METATARSAL FRACTURE  age 12    softball injury,open fracture left foot     HC TOOTH EXTRACTION W/FORCEP      Extract wisdom teeth "     TUBAL LIGATION  7/27/2006       Family History   Problem Relation Age of Onset     DIABETES Maternal Grandmother      Allergies Mother      Hypertension Maternal Grandmother      CEREBROVASCULAR DISEASE Paternal Grandfather      CANCER Maternal Grandfather      Lung - metastatic     HEART DISEASE Maternal Grandmother      Bypass     Anesthesia Reaction No family hx of      Lipids Father      cholesterol     Alzheimer Disease Paternal Grandmother      HEART DISEASE Paternal Grandmother      valve replacement       Social History   Substance Use Topics     Smoking status: Former Smoker     Years: 6.00     Quit date: 9/22/2010     Smokeless tobacco: Never Used     Alcohol use No        Immunization History   Administered Date(s) Administered     Influenza (IIV3) 09/21/2012     Influenza Vaccine IM 3yrs+ 4 Valent IIV4 12/28/2015     Pneumococcal (PCV 13) 04/14/2017     Pneumococcal 23 valent 12/28/2015     TDAP Vaccine (Adacel) 10/08/2007, 09/07/2015          Allergies   Allergen Reactions     Amoxicillin Hives       Current Outpatient Prescriptions   Medication Sig Dispense Refill     ferrous sulfate (IRON) 325 (65 FE) MG tablet Take 1 tablet (325 mg) by mouth 2 times daily 180 tablet 3     simvastatin (ZOCOR) 20 MG tablet Take 1 tablet (20 mg) by mouth At Bedtime 90 tablet 3     metFORMIN (GLUCOPHAGE-XR) 500 MG 24 hr tablet Take 4 tablets (2,000 mg) by mouth daily (with dinner) 360 tablet 3     insulin glargine (LANTUS SOLOSTAR) 100 UNIT/ML injection 26 units once daily 6 mL 5     Misc Natural Products (GLUCOSAMINE CHOND DOUBLE STR) TABS Take 1 tablet by mouth daily 90 tablet 3     FLUoxetine (PROZAC) 20 MG capsule Take 1 capsule (20 mg) by mouth daily 90 capsule 1     ketorolac (TORADOL) 10 MG tablet TAKE ONE TABLET BY MOUTH NIGHTLY AS NEEDED FOR MODERATE PAIN 30 tablet 1     oxyCODONE-acetaminophen (PERCOCET) 5-325 MG per tablet Take 1 tablet by mouth nightly as needed for moderate to severe pain 30 tablet 0      "traZODone (DESYREL) 50 MG tablet TAKE 1-2 TABLETS BY MOUTH NIGHTLY AS NEEDED FOR SLEEP 90 tablet 3     IBUPROFEN PO Take 600 mg by mouth every 4 hours as needed for moderate pain       insulin pen needle (NOVOFINE) 32G X 6 MM Use once daily or as directed. 100 each 3     blood glucose monitoring (BL TEST STRIP PACK) test strip Use to test blood sugar 1-2 times daily or as directed. Per patients glucose meter (getting new one today) 100 each 3     blood glucose monitoring (FREESTYLE) lancets Use to test blood sugars 1-2 times daily or as directed, per patients glucose meter. 3 Box 3     Blood Glucose Monitoring Suppl (ACCU-CHEK COMPLETE) KIT 1 Device daily 1 Device 0     Multiple Vitamins-Minerals (MULTI-VITAMIN GUMMIES) CHEW Take  by mouth.       I have reviewed the Medications, Allergies, Past Medical and Surgical History, and Social History in the Epic system.    Review of Systems   Constitutional: Positive for appetite change (decreased) and fever (intermittent).   HENT: Positive for sinus pressure and sore throat.    Respiratory: Positive for cough (productive) and shortness of breath.    Gastrointestinal: Positive for abdominal pain (\"sore\"), nausea and vomiting.   Musculoskeletal: Positive for back pain (\"sore\").   Skin: Negative for rash.   All other systems reviewed and are negative.      Physical Exam   BP: (!) 135/97  Pulse: 97  Temp: 98  F (36.7  C)  Resp: 18  Weight: 73.5 kg (162 lb)  SpO2: 98 %    Physical Exam   Constitutional: She is oriented to person, place, and time.   HENT:   Head: Normocephalic and atraumatic.   Right Ear: Tympanic membrane, external ear and ear canal normal.   Left Ear: Tympanic membrane, external ear and ear canal normal.   Mouth/Throat: Mucous membranes are dry (modestly). Posterior oropharyngeal erythema present.   Eyes: Conjunctivae and EOM are normal.   Neck: Normal range of motion. Neck supple.   Cardiovascular: Normal rate, regular rhythm, normal heart sounds and intact " distal pulses.    No murmur heard.  Pulmonary/Chest: No respiratory distress. She has decreased breath sounds (right lower lobe). She has no wheezes. She has no rhonchi. She has no rales. She exhibits no tenderness.   Abdominal: Soft. Bowel sounds are normal. She exhibits no distension and no mass. There is no tenderness. There is no rebound and no guarding.   Musculoskeletal: She exhibits no edema.   Lymphadenopathy:     She has no cervical adenopathy.   Neurological: She is alert and oriented to person, place, and time.   Skin: Skin is warm and dry. No rash noted. No pallor.   Psychiatric: She has a normal mood and affect. Her behavior is normal.   Nursing note and vitals reviewed.      ED Course     ED Course     Procedures      Results for orders placed or performed during the hospital encounter of 05/05/17 (from the past 24 hour(s))   Rapid strep screen   Result Value Ref Range    Specimen Description Throat     Rapid Strep A Screen       NEGATIVE: No Group A streptococcal antigen detected by immunoassay, await   culture report.  Culture in progress      Micro Report Status FINAL 05/05/2017    CBC with platelets differential   Result Value Ref Range    WBC 4.7 4.0 - 11.0 10e9/L    RBC Count 5.59 (H) 3.8 - 5.2 10e12/L    Hemoglobin 11.0 (L) 11.7 - 15.7 g/dL    Hematocrit 35.8 35.0 - 47.0 %    MCV 64 (L) 78 - 100 fl    MCH 19.7 (L) 26.5 - 33.0 pg    MCHC 30.7 (L) 31.5 - 36.5 g/dL    RDW 19.5 (H) 10.0 - 15.0 %    Platelet Count 273 150 - 450 10e9/L    Diff Method Automated Method     % Neutrophils 48.4 %    % Lymphocytes 40.0 %    % Monocytes 11.0 %    % Eosinophils 0.2 %    % Basophils 0.4 %    % Immature Granulocytes 0.0 %    Absolute Neutrophil 2.3 1.6 - 8.3 10e9/L    Absolute Lymphocytes 1.9 0.8 - 5.3 10e9/L    Absolute Monocytes 0.5 0.0 - 1.3 10e9/L    Absolute Eosinophils 0.0 0.0 - 0.7 10e9/L    Absolute Basophils 0.0 0.0 - 0.2 10e9/L    Abs Immature Granulocytes 0.0 0 - 0.4 10e9/L   Basic metabolic panel    Result Value Ref Range    Sodium 139 133 - 144 mmol/L    Potassium 3.7 3.4 - 5.3 mmol/L    Chloride 103 94 - 109 mmol/L    Carbon Dioxide 23 20 - 32 mmol/L    Anion Gap 13 3 - 14 mmol/L    Glucose 224 (H) 70 - 99 mg/dL    Urea Nitrogen 19 7 - 30 mg/dL    Creatinine 0.49 (L) 0.52 - 1.04 mg/dL    GFR Estimate >90  Non  GFR Calc   >60 mL/min/1.7m2    GFR Estimate If Black >90   GFR Calc   >60 mL/min/1.7m2    Calcium 8.8 8.5 - 10.1 mg/dL   XR Chest 2 Views    Narrative    CHEST TWO VIEWS  5/5/2017 5:33 PM     COMPARISON: Two-view chest x-ray 5/2/2017    HISTORY: Worsening cough    FINDINGS: The cardiac silhouette, pulmonary vasculature, lungs and  pleural spaces are within normal limits.      Impression    IMPRESSION: Clear lungs.     REBEAC TOWNSEND MD       Medications   sodium chloride (PF) 0.9% PF flush 3 mL (not administered)   sodium chloride (PF) 0.9% PF flush 3 mL (3 mLs Intracatheter Given 5/5/17 1742)   0.9% sodium chloride BOLUS (0 mLs Intravenous Stopped 5/5/17 1933)     Followed by   0.9% sodium chloride infusion (1,000 mLs Intravenous Not Given 5/5/17 1820)   ketorolac (TORADOL) injection 30 mg (30 mg Intravenous Given 5/5/17 1743)   ipratropium - albuterol 0.5 mg/2.5 mg/3 mL (DUONEB) neb solution 3 mL (3 mLs Nebulization Given 5/5/17 1804)   morphine (PF) injection 4 mg (4 mg Intravenous Given 5/5/17 1845)       Assessments & Plan (with Medical Decision Making)  Stacy is a 41 year old female who presents to the ED complaining of a persistent cough and fever. She was evaluated in the ED 3 days ago, at which time she had a negative Influenza Swab, Strep Swab, and Chest X-ray. She was diagnosed with a Viral URI. Patient is afebrile with normal vitals upon presentation to the ED. On exam, she exhibits diminished breath sounds in the right lower lobe, modestly dry mucus membranes, and a injected posterior oropharynx. Otherwise, exam is unremarkable. IV was established and  "patient was given IV fluids and IV Toradol with no relief.  Patient had a \"coughing fit\" in the room and is now crying because her chest wall is painful.  Patient was given IV morphine with improvement in her pain.  Rapid Strep Swab collected, which is negative. Will culture and notify with positive results. Labs drawn, including CBC and BMP, these are relatively unchanged from 2 days ago, white count remains normal, hemoglobin remains stable at 11.  Glucose is elevated on panel in 224. Chest X-ray re-evaluated and is again negative for any acute infiltrate.  Patient is insistent she needs to go home with pain medication for her chest wall pain, patient is on chronic Percocet for her \"nerve pain\", I will discharge patient today with Robitussin-AC which she can use at bedtime.  I did give patient Tessalon Perles to help with her cough as well as an albuterol inhaler as she did have some relief with a DuoNeb here, this may help with her chest congestion overall.  I did discuss with patient the nature of a viral process, and how antibiotics really at this point would likely be more harmful than of any benefit.  A work and request was made for patient's primary care provider, Dr. Muse, to follow-up with patient in clinic next week.  I did discuss with patient continuing adequate hydration at home as well as Tylenol/ibuprofen for her symptoms.  Reasons to return to the emergency department were discussed, patient is agreeable to plan of care and was discharged in stable condition with her  driving.       I have reviewed the nursing notes.    I have reviewed the findings, diagnosis, plan and need for follow up with the patient.    Discharge Medication List as of 5/5/2017  7:33 PM      START taking these medications    Details   albuterol (ALBUTEROL) 108 (90 BASE) MCG/ACT Inhaler Inhale 2 puffs into the lungs every 4 hours as needed for shortness of breath / dyspnea, Disp-1 Inhaler, R-0, E-Prescribe    "   benzonatate (TESSALON) 200 MG capsule Take 1 capsule (200 mg) by mouth 3 times daily as needed for cough, Disp-21 capsule, R-0, E-Prescribe             Final diagnoses:   Viral URI with cough     This document serves as a record of services personally performed by Miriam Griffith AP. It was created on their behalf by Demetrice Silva, a trained medical scribe. The creation of this record is based on the provider's personal observations and the statements of the patient. This document has been checked and approved by the attending provider.    Note: Chart documentation done in part with Dragon Voice Recognition software. Although reviewed after completion, some word and grammatical errors may remain.    5/5/2017   [facility] EMERGENCY DEPARTMENT     Miriam Griffith, JOVITA CNP  05/05/17 2049

## 2017-05-05 NOTE — ED AVS SNAPSHOT
[facility] Emergency Department    [address] [place] [address]    Phone:  [phone]    Fax:  [phone]                                       Stacy Brown   MRN: [id number]    Department:  [facility] Emergency Department   Date of Visit:  5/5/2017           After Visit Summary Signature Page     I have received my discharge instructions, and my questions have been answered. I have discussed any challenges I see with this plan with the nurse or doctor.    ..........................................................................................................................................  Patient/Patient Representative Signature      ..........................................................................................................................................  Patient Representative Print Name and Relationship to Patient    ..................................................               ................................................  Date                                            Time    ..........................................................................................................................................  Reviewed by Signature/Title    ...................................................              ..............................................  Date                                                            Time

## 2017-05-05 NOTE — ED NOTES
Pt is still complaining of discomfort in her right forearm despite decreasing rate on infusion.  Fluid is stopped, provider is updated.

## 2017-05-06 NOTE — DISCHARGE INSTRUCTIONS

## 2017-05-07 LAB
BACTERIA SPEC CULT: NORMAL
MICRO REPORT STATUS: NORMAL
SPECIMEN SOURCE: NORMAL

## 2017-05-08 RX ORDER — OXYCODONE AND ACETAMINOPHEN 5; 325 MG/1; MG/1
1 TABLET ORAL
Qty: 30 TABLET | Refills: 0 | Status: SHIPPED | OUTPATIENT
Start: 2017-05-10 | End: 2017-05-09

## 2017-05-08 RX ORDER — TRAZODONE HYDROCHLORIDE 50 MG/1
TABLET, FILM COATED ORAL
Qty: 180 TABLET | Refills: 1 | Status: SHIPPED | OUTPATIENT
Start: 2017-05-08 | End: 2017-10-05

## 2017-05-08 NOTE — TELEPHONE ENCOUNTER
"    Patient Contact    Attempt # 1    Was call answered?  Yes.  \"May I please speak with Stayc\"  Is patient available?   No. Left message with  for patient to call me back.  Patient is at work, but  is reporting she still feels like \"doggie do\" today.  She will call back to talk to a triage nurse for further information collection.  Nadine Naqvi RN      "

## 2017-05-08 NOTE — TELEPHONE ENCOUNTER
Patient was seen in the ED for Viral URI.  Please see if patient really needs to have a follow up with Dr. Muse.  Patient was seen on Friday 05/05/17   Shu NAGEL

## 2017-05-08 NOTE — TELEPHONE ENCOUNTER
Per Yaima Muse MD patient can be worked in on Friday 5/12 at 1:15pm. Patient states she can not make that appointment time. Please advise on another day that will work. Patient advised rx sent to [facility] pharmacy and that her percocet can be filled on or after 5/10/2017. Patient states she is all out of percocet because she has been having such a bad cough that hurts her chest. Patient would like to discuss this.  Anna Weldon CMA

## 2017-05-08 NOTE — TELEPHONE ENCOUNTER
oxyCODONE-acetaminophen (PERCOCET) 5-325 MG per tablet  Last Written Prescription Date:  4/10/17  Last Fill Quantity: 30,   # refills: 0  Last Office Visit with [facility], [facility] or [facility] prescribing provider: 4/14/17  Future Office visit:    Next 5 appointments (look out 90 days)     May 10, 2017  3:30 PM CDT   Office Visit with Jhonny Espino [facility] ([address] [address]   [phone]                   Routing refill request to provider for review/approval because:  Drug not on the [facility], [facility] or [facility] refill protocol or controlled substance          traZODone (DESYREL) 50 MG tablet  Last Written Prescription Date:  11/8/16  Last Fill Quantity: 90,   # refills: 3  Last Office Visit with [facility], [facility] or [facility] prescribing provider: 4/14/17  Future Office visit:    Next 5 appointments (look out 90 days)     May 10, 2017  3:30 PM CDT   Office Visit with Jhonny Espino [facility] ([address] [address]   [phone]                   Routing refill request to provider for review/approval because:  Drug not on the [facility], [facility] or [facility] refill protocol or controlled substance

## 2017-05-08 NOTE — TELEPHONE ENCOUNTER
"  ED for acute condition Discharge Protocol    \"Hi, my name is Rachelle Mustafa, a registered nurse, and I am calling from [facility].  I am calling to follow up and see how things are going for you after your recent emergency visit.\"    Tell me how you are doing now that you are home?\"   Continues with coughing, reports pain related to cough 7/10, Ibuprofen not helpful.  Not sleeping well.         Discharge Instructions    \"Let's review your discharge instructions.  What is/are the follow-up recommendations?  Pt. Response: Follow up with PCP, medications as ordered.      \"Has an appointment with your primary care provider been scheduled?\"  Appointment needed, will route for work-in appointment due to continued cough and not feeling well.     Medications    \"Tell me what changed about your medicines when you discharged?\"    Start Tessalon, Robitussin AC, Albuterol inhaler.  Reports that these medications are not helpful.     \"What questions do you have about your medications?\"   None        Call Summary    \"What questions or concerns do you have about your recent visit and your follow-up care?\"     none    \"If you have questions or things don't continue to improve, we encourage you contact us through the main clinic number (give number).  Even if the clinic is not open, triage nurses are available 24/7 to help you.     We would like you to know that our clinic has extended hours (provide information).  We also have urgent care (provide details on closest location and hours/contact info)\"    \"Thank you for your time and take care!\"    Rachelle Mustafa RN             "

## 2017-05-09 RX ORDER — OXYCODONE AND ACETAMINOPHEN 5; 325 MG/1; MG/1
1 TABLET ORAL
Qty: 30 TABLET | Refills: 0 | Status: SHIPPED | OUTPATIENT
Start: 2017-05-09 | End: 2017-07-11

## 2017-05-09 NOTE — TELEPHONE ENCOUNTER
Patient states she called the Pharmacy & the RX is dated for 5.10.2017, patient is requesting it be filled today due to pain in chest, please advise  Thank you,  Alicia Mathis  Patient Representative

## 2017-05-09 NOTE — TELEPHONE ENCOUNTER
Per Yaima Muse MD it is ok to work patient in next Tuesday 5/16 at 3:45pm.  advised on this and will have patient return call to confirm this time and date.  Appointment scheduled  Anna Weldon CMA

## 2017-05-09 NOTE — TELEPHONE ENCOUNTER
I did approve it today, 1 day early. However, generally I don't recommend narcotic pain meds for chest pain from cough, so prefer that she just uses heating pad/ice, and ibuprofen or tylenol.   Yaima Muse MD

## 2017-05-10 ENCOUNTER — OFFICE VISIT (OUTPATIENT)
Dept: PHARMACY | Facility: CLINIC | Age: 42
End: 2017-05-10
Payer: COMMERCIAL

## 2017-05-10 VITALS — DIASTOLIC BLOOD PRESSURE: 73 MMHG | SYSTOLIC BLOOD PRESSURE: 130 MMHG | HEART RATE: 66 BPM

## 2017-05-10 DIAGNOSIS — Z71.89 ENCOUNTER FOR HERB AND VITAMIN SUPPLEMENT MANAGEMENT: ICD-10-CM

## 2017-05-10 DIAGNOSIS — G47.00 INSOMNIA, UNSPECIFIED TYPE: ICD-10-CM

## 2017-05-10 DIAGNOSIS — Z79.4 TYPE 2 DIABETES MELLITUS WITH HYPERGLYCEMIA, WITH LONG-TERM CURRENT USE OF INSULIN (H): Primary | ICD-10-CM

## 2017-05-10 DIAGNOSIS — E11.65 TYPE 2 DIABETES MELLITUS WITH HYPERGLYCEMIA, WITH LONG-TERM CURRENT USE OF INSULIN (H): Primary | ICD-10-CM

## 2017-05-10 DIAGNOSIS — G89.4 CHRONIC PAIN SYNDROME: ICD-10-CM

## 2017-05-10 DIAGNOSIS — E78.5 HYPERLIPIDEMIA LDL GOAL <100: ICD-10-CM

## 2017-05-10 DIAGNOSIS — F33.1 MAJOR DEPRESSIVE DISORDER, RECURRENT EPISODE, MODERATE (H): ICD-10-CM

## 2017-05-10 DIAGNOSIS — D50.9 IRON DEFICIENCY ANEMIA, UNSPECIFIED IRON DEFICIENCY ANEMIA TYPE: ICD-10-CM

## 2017-05-10 PROCEDURE — 99605 MTMS BY PHARM NP 15 MIN: CPT | Performed by: PHARMACIST

## 2017-05-10 PROCEDURE — 99607 MTMS BY PHARM ADDL 15 MIN: CPT | Performed by: PHARMACIST

## 2017-05-10 NOTE — PATIENT INSTRUCTIONS
Recommendations from today's MTM visit:                                                    MTM (medication therapy management) is a service provided by a clinical pharmacist designed to help you get the most of out of your medicines.   Today we reviewed what your medicines are for, how to know if they are working, that your medicines are safe and how to make your medicine regimen as easy as possible.     1. Continue to work on remember to take your medications and testing your blood sugars every day.    Diabetes Goals:    Home Monitoring of Blood Sugars:Fasting  mg/dL and 2 hours after a meal less than 180 mg/dL.    Hemoglobin A1C: Less than 7%. Yours is   Lab Results   Component Value Date    A1C 9.3 04/14/2017   .    Blood Pressure: Less than 140/90mmHg. Yours is /73  Pulse 66  LMP 04/06/2017.    Cholesterol: You are taking simvastatin to help decrease the risk of heart disease.    Things you can do to help lower your blood sugars:    Diet: 3 meals and 1-2 snacks per day with 45-60 grams of carbohydrates per meal and 15-30 grams of carbohydrates per snack. Try to fill your plate at least half-full with vegetables, fill one-quarter full with lean meats or protein, and also make sure you get at least some carbohydrate with every meal.    Exercise: 30 minutes per day of anything that will increase your heart rate and make you break a sweat! Gardening, walking, cleaning the house, changing the oil in your car, etc. If you feel like 30 minutes per day is too much, start small. Even lifting canned foods or working your arms with a resistance band in front of the TV can help.    Next MTM visit: 1 year or sooner if needed    To schedule another MTM appointment, please call the clinic directly or you may call the MTM scheduling line at [phone] or toll-free at [phone].     My Clinical Pharmacist's contact information:                                                      It was a pleasure seeing you  today!  Please feel free to contact me with any questions or concerns you have.      Castro Espino, Elzbieta  Medication Therapy Management Provider  Pager (voicemail): [phone]    You may receive a survey about the MTM services you received.  I would appreciate your feedback to help me serve you better in the future. Please fill it out and return it when you can. Your comments will be anonymous.

## 2017-05-10 NOTE — PROGRESS NOTES
SUBJECTIVE/OBJECTIVE:                                                    Stacy Brown is a 41 year old female coming in for an initial visit for Medication Therapy Management.  She was referred to me from her [facility].     Chief Complaint: Comprehensive Medication Review   Personal Healthcare Goals:  wants to make sure she is on the most effective options and that there are no significant interactions    Allergies/ADRs: Reviewed in Epic  Tobacco: History of tobacco dependence - quit 5 years ago   Alcohol: Less than 1 beverage / month  Caffeine: 1 - 32 oz sodas/day  Activity: Lots of walking through her job - active with children when she goes home. Ill recently and not as active  PMH: Reviewed in Epic    Medication Adherence: improved last two weeks - forgot one day ('s birthday on Saturday) -> previously only taking diabetes meds once a week    Diabetes:  Pt currently taking metformin XR 2000 mg daily and Lantus 26 units daily. Pt is not experiencing side effects.  SMBG: 3-4 times a week.   Ranges (patient reported): 80-95 mg/dL  Patient is not experiencing hypoglycemia  Recent symptoms of high blood sugar? none  Eye exam: due  Foot exam: up to date  Microalbumin is < 30 mg/g. Pt is not taking an ACEi/ARB.  Aspirin: Not taking due to age  Diet/Exercise: Does not eat much at work.  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  Sometimes eats snacks at night - has been trying good.  Does drink the large regular pop described above, but that soda takes her the whole day to consume and often does not finish.  She tries to use lots of ice to cut down on volume and drink a lot of water during the day as well.     Depression:  Current medications include: Fluoxetine 20 mg once daily. Pt reports that depression symptoms are improving.  Pt has only been on for under 4 weeks since last PCP visit. Pt has lots  of stressors including disabled , two children with autism, and both sets of parents with declining health. She is someone who typically takes care of others before herself, but has made it a goal to start taking care of herself more.    PHQ-9 SCORE 12/28/2015 6/10/2016 4/14/2017   Total Score - - -   Total Score 11 7 16     Hyperlipidemia: Current therapy includes simvastatin 20 mg once daily.  Pt reports no significant myalgias or other side effects.   The 10-year ASCVD risk score ([place] GARRISON Jr, et al., 2013) is: 1.5%    Values used to calculate the score:      Age: 41 years      Sex: Female      Is Non- : No      Diabetic: Yes      Tobacco smoker: No      Systolic Blood Pressure: 128 mmHg      Is BP treated: No      HDL Cholesterol: 65 mg/dL      Total Cholesterol: 268 mg/dL    Insomnia: Pt is taking trazodone  mg nightly as needed.  Helps mind turn off. Does not use every night only if she hasn't been active with kids.  Works well. Denies any side effects.     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  Uses one Percocet 5-325 and Toradol 10 mg about 4-5 times per week.  Finds that this works well with her pain. She did try gabapentin in the past but did not find it to be overly helpful and found Lyrica just made her drowsy.  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  Does describe restless leg symptoms overnight that contribute to pain.  Has been using ibuprofen PRN for chest pain from recent cough and did start glucosamine chondroitin a few weeks ago, but has yet to notice any benefit. Pt is taking Senna-S every 2-3 days to help with constipation. Denies any other side effects.     Anemia: Pt is taking ferrous 325 mg twice daily.  Denies any issues outside of constipation. Last Hemoglobin was 11 mg/dL. Ferritin was  low.    Supplements: Pt is taking a daily [employer]'s vitamin chew. Denies any issues.    Current labs include:  BP Readings from Last 3 Encounters:   05/05/17 128/75   05/02/17 (!) 123/91   04/22/17 145/87     Today's Vitals: /73  Pulse 66  LMP 04/06/2017  Lab Results   Component Value Date    A1C 9.3 04/14/2017   .  Lab Results   Component Value Date    CHOL 268 04/14/2017     Lab Results   Component Value Date    TRIG 150 04/14/2017     Lab Results   Component Value Date    HDL 65 04/14/2017     Lab Results   Component Value Date     04/14/2017       Liver Function Studies -   Recent Labs   Lab Test  02/07/17   0340   PROTTOTAL  6.9   ALBUMIN  3.3*   BILITOTAL  0.1*   ALKPHOS  59   AST  12   ALT  18       Lab Results   Component Value Date    UCRR 131 04/14/2017    MICROL 21 04/14/2017    UMALCR 15.80 04/14/2017       Last Basic Metabolic Panel:  Lab Results   Component Value Date     05/05/2017      Lab Results   Component Value Date    POTASSIUM 3.7 05/05/2017     Lab Results   Component Value Date    CHLORIDE 103 05/05/2017     Lab Results   Component Value Date    BUN 19 05/05/2017     Lab Results   Component Value Date    CR 0.49 05/05/2017     GFR Estimate   Date Value Ref Range Status   05/05/2017 >90  Non  GFR Calc   >60 mL/min/1.7m2 Final   04/14/2017 >90  Non  GFR Calc   >60 mL/min/1.7m2 Final   02/07/2017 >90  Non  GFR Calc   >60 mL/min/1.7m2 Final     TSH   Date Value Ref Range Status   04/14/2017 0.87 0.40 - 4.00 mU/L Final     Most Recent Immunizations   Administered Date(s) Administered     Influenza (IIV3) 09/21/2012     Influenza Vaccine IM 3yrs+ 4 Valent IIV4 12/28/2015     Pneumococcal (PCV 13) 04/14/2017     Pneumococcal 23 valent 12/28/2015     TDAP Vaccine (Adacel) 09/07/2015     ASSESSMENT:                                                       Current medications were reviewed today.     Medication Adherence: no issues  identified    Diabetes: Improved. Patient is not meeting A1c goal of < 7%. Self monitoring of blood glucose is at goal of fasting  mg/dL. Pt is on most effective oral and injectable diabetes medications - consistent adherence per patient report have lowered her sugars into goal range.  Likely would benefit from checking additional times of the day prior to upcoming PCP visit to determine control throughout the day.     Depression:  Improving per patient.     Hyperlipidemia: Stable. Pt is on moderate intensity statin which is indicated based on 2013 ACC/AHA guidelines for lipid management.      Insomnia: Stable.     Pain: Stable per patient.     Anemia: Needs Improvement. Patient has just recently started to adhere to taking iron supplement consistently - likely needs more time to see effect.  May help with restless legs if iron is properly supplemented.     Supplements: Stable.     PLAN:                                                      1. Continue to work on remember to take your medications and testing your blood sugars every day. Encouraged her to bring meter to PCP visit next week.  2. Reviewed goals and lifestyle of diabetes with patient.   3. Added Aspirin not prescribed placeholder.    I spent 60 minutes with this patient today. A copy of the visit note was provided to the patient's primary care provider.    Will follow up in 1 year or sooner if needed.    The patient was given a summary of these recommendations as an after visit summary.     Castro Espino, Elzbieta  Medication Therapy Management Provider  Pager (voicemail): [phone]

## 2017-05-10 NOTE — MR AVS SNAPSHOT
After Visit Summary   5/10/2017    Stacy Brown    MRN: [id number]           Patient Information     Date Of Birth          1975        Visit Information        Provider Department      5/10/2017 3:30 PM Jhonny Espino, [facility] MTM        Care Instructions    Recommendations from today's MT visit:                                                    MTM (medication therapy management) is a service provided by a clinical pharmacist designed to help you get the most of out of your medicines.   Today we reviewed what your medicines are for, how to know if they are working, that your medicines are safe and how to make your medicine regimen as easy as possible.     1. Continue to work on remember to take your medications and testing your blood sugars every day.    Diabetes Goals:    Home Monitoring of Blood Sugars:Fasting  mg/dL and 2 hours after a meal less than 180 mg/dL.    Hemoglobin A1C: Less than 7%. Yours is   Lab Results   Component Value Date    A1C 9.3 04/14/2017   .    Blood Pressure: Less than 140/90mmHg. Yours is /73  Pulse 66  LMP 04/06/2017.    Cholesterol: You are taking simvastatin to help decrease the risk of heart disease.    Things you can do to help lower your blood sugars:    Diet: 3 meals and 1-2 snacks per day with 45-60 grams of carbohydrates per meal and 15-30 grams of carbohydrates per snack. Try to fill your plate at least half-full with vegetables, fill one-quarter full with lean meats or protein, and also make sure you get at least some carbohydrate with every meal.    Exercise: 30 minutes per day of anything that will increase your heart rate and make you break a sweat! Gardening, walking, cleaning the house, changing the oil in your car, etc. If you feel like 30 minutes per day is too much, start small. Even lifting canned foods or working your arms with a resistance band in front of the TV can help.    Next MTM visit: 1 year or  sooner if needed    To schedule another MT appointment, please call the clinic directly or you may call the MT scheduling line at [phone] or toll-free at [phone].     My Clinical Pharmacist's contact information:                                                      It was a pleasure seeing you today!  Please feel free to contact me with any questions or concerns you have.      Castro Espino, Elzbieta  Medication Therapy Management Provider  Pager (voicemail): [phone]    You may receive a survey about the [facility] services you received.  I would appreciate your feedback to help me serve you better in the future. Please fill it out and return it when you can. Your comments will be anonymous.              Follow-ups after your visit        Your next 10 appointments already scheduled     May 16, 2017  3:45 PM CDT   [facility] Visit [facility] Muse MD   [facility]    [address] [place] [id number] [phone]           Bring a current list of meds and any records pertaining to this visit.  For Physicals, please bring immunization records and any forms needing to be filled out.  Please arrive 10 minutes early to complete paperwork.              Who to contact     If you have questions or need follow up information about today's clinic visit or your schedule please contact [facility] directly at [phone].  Normal or non-critical lab and imaging results will be communicated to you by MyChart, letter or phone within 4 business days after the clinic has received the results. If you do not hear from us within 7 days, please contact the clinic through MyChart or phone. If you have a critical or abnormal lab result, we will notify you by phone as soon as possible.  Submit refill requests through [employer] or call your pharmacy and they will forward the refill request to us. Please allow 3 business days for your refill to be  "completed.          Additional Information About Your Visit        [employer] Information     [employer] lets you send messages to your doctor, view your test results, renew your prescriptions, schedule appointments and more. To sign up, go to [email or web address] . Click on \"Log in\" on the left side of the screen, which will take you to the Welcome page. Then click on \"Sign up Now\" on the right side of the page.     You will be asked to enter the access code listed below, as well as some personal information. Please follow the directions to create your username and password.     Your access code is: [email or web address]  Expires: 2017  4:18 PM     Your access code will  in 90 days. If you need help or a new code, please call your [facility] clinic or [phone].        Care EveryWhere ID     This is your Care EveryWhere ID. This could be used by other organizations to access your [facility] medical records  [id number]        Your Vitals Were     Pulse Last Period                66 2017           Blood Pressure from Last 3 Encounters:   05/10/17 130/73   17 128/75   17 (!) 123/91    Weight from Last 3 Encounters:   17 162 lb (73.5 kg)   17 162 lb (73.5 kg)   17 162 lb (73.5 kg)              Today, you had the following     No orders found for display       Primary Care Provider Office Phone # Fax #    Yaima Nicole Muse [phone]       [facility] [address] [place] [address]        Thank you!     Thank you for choosing [facility]  for your care. Our goal is always to provide you with excellent care. Hearing back from our patients is one way we can continue to improve our services. Please take a few minutes to complete the written survey that you may receive in the mail after your visit with us. Thank you!             Your Updated Medication List - Protect others around you: Learn how to safely use, store and throw " away your medicines at www.disposemymeds.org.          This list is accurate as of: 5/10/17  4:18 PM.  Always use your most recent med list.                   Brand Name Dispense Instructions for use    ACCU-CHEK COMPLETE Kit     1 Device    1 Device daily       albuterol 108 (90 BASE) MCG/ACT Inhaler    albuterol    1 Inhaler    Inhale 2 puffs into the lungs every 4 hours as needed for shortness of breath / dyspnea       blood glucose monitoring lancets     3 Box    Use to test blood sugars 1-2 times daily or as directed, per patients glucose meter.       blood glucose monitoring test strip    BL TEST STRIP PACK    100 each    Use to test blood sugar 1-2 times daily or as directed. Per patients glucose meter (getting new one today)       ferrous sulfate 325 (65 FE) MG tablet    IRON    180 tablet    Take 1 tablet (325 mg) by mouth 2 times daily       FLUoxetine 20 MG capsule    PROzac    90 capsule    Take 1 capsule (20 mg) by mouth daily       GLUCOSAMINE CHOND DOUBLE STR Tabs     90 tablet    Take 1 tablet by mouth daily       IBUPROFEN PO      Take 600 mg by mouth every 4 hours as needed for moderate pain       insulin glargine 100 UNIT/ML injection    LANTUS SOLOSTAR    6 mL    26 units once daily       insulin pen needle 32G X 6 MM    NOVOFINE    100 each    Use once daily or as directed.       ketorolac 10 MG tablet    TORADOL    30 tablet    TAKE ONE TABLET BY MOUTH NIGHTLY AS NEEDED FOR MODERATE PAIN       metFORMIN 500 MG 24 hr tablet    GLUCOPHAGE-XR    360 tablet    Take 4 tablets (2,000 mg) by mouth daily (with dinner)       MULTI-VITAMIN GUMMIES Chew      Take 1 chew tab by mouth daily       oxyCODONE-acetaminophen 5-325 MG per tablet    PERCOCET    30 tablet    Take 1 tablet by mouth nightly as needed for moderate to severe pain       simvastatin 20 MG tablet    ZOCOR    90 tablet    Take 1 tablet (20 mg) by mouth At Bedtime       traZODone 50 MG tablet    DESYREL    180 tablet    TAKE 1-2 TABLETS BY  MOUTH NIGHTLY AS NEEDED FOR SLEEP

## 2017-05-10 NOTE — Clinical Note
CORNELIUSI - was referred to [facility] by the retail pharmacy.  Report that fasting blood sugars have been 80-95 mg/dL since taking diabetes medications daily for last two weeks. Encouraged her to check a few post-prandial readings and to bring her glucometer to her follow-up appointment with you next week.  Castro Espino, BeaD Medication Therapy Management Provider Pager (voicemail): [phone]

## 2017-05-11 RX ORDER — AMOXICILLIN 250 MG
1 CAPSULE ORAL DAILY PRN
COMMUNITY
End: 2017-08-15

## 2017-05-16 ENCOUNTER — OFFICE VISIT (OUTPATIENT)
Dept: FAMILY MEDICINE | Facility: CLINIC | Age: 42
End: 2017-05-16
Payer: COMMERCIAL

## 2017-05-16 VITALS
HEART RATE: 96 BPM | DIASTOLIC BLOOD PRESSURE: 72 MMHG | OXYGEN SATURATION: 99 % | TEMPERATURE: 98.5 F | SYSTOLIC BLOOD PRESSURE: 132 MMHG | BODY MASS INDEX: 26.9 KG/M2 | HEIGHT: 66 IN | WEIGHT: 167.4 LBS

## 2017-05-16 DIAGNOSIS — F33.1 MAJOR DEPRESSIVE DISORDER, RECURRENT EPISODE, MODERATE (H): ICD-10-CM

## 2017-05-16 DIAGNOSIS — R05.9 COUGH: Primary | ICD-10-CM

## 2017-05-16 DIAGNOSIS — K30 UPSET STOMACH: ICD-10-CM

## 2017-05-16 PROCEDURE — 99213 OFFICE O/P EST LOW 20 MIN: CPT | Performed by: FAMILY MEDICINE

## 2017-05-16 RX ORDER — FLUTICASONE PROPIONATE 110 UG/1
2 AEROSOL, METERED RESPIRATORY (INHALATION) 2 TIMES DAILY
Qty: 1 INHALER | Refills: 0 | Status: SHIPPED | OUTPATIENT
Start: 2017-05-16 | End: 2017-08-15

## 2017-05-16 ASSESSMENT — PAIN SCALES - GENERAL: PAINLEVEL: SEVERE PAIN (6)

## 2017-05-16 NOTE — MR AVS SNAPSHOT
"              After Visit Summary   5/16/2017    Stacy Brown    MRN: [id number]           Patient Information     Date Of Birth          1975        Visit Information        Provider Department      5/16/2017 3:45 PM Yaima Muse MD [facility]        Today's Diagnoses     Cough    -  1    Major depressive disorder, recurrent episode, moderate (H)        Upset stomach           Follow-ups after your visit        Your next 10 appointments already scheduled     Aug 15, 2017  4:00 PM CDT   Office Visit with Yaima Muse MD   [facility] ([facility])    [address] [place] [id number] [phone]           Bring a current list of meds and any records pertaining to this visit.  For Physicals, please bring immunization records and any forms needing to be filled out.  Please arrive 10 minutes early to complete paperwork.              Who to contact     If you have questions or need follow up information about today's clinic visit or your schedule please contact [facility] directly at [phone].  Normal or non-critical lab and imaging results will be communicated to you by [email or web address], letter or phone within 4 business days after the clinic has received the results. If you do not hear from us within 7 days, please contact the clinic through Kyru[employer] or phone. If you have a critical or abnormal lab result, we will notify you by phone as soon as possible.  Submit refill requests through [employer] or call your pharmacy and they will forward the refill request to us. Please allow 3 business days for your refill to be completed.          Additional Information About Your Visit        MyChart Information     [employer] lets you send messages to your doctor, view your test results, renew your prescriptions, schedule appointments and more. To sign up, go to [email or web address] . Click on \"Log in\" on the left side of " "the screen, which will take you to the Welcome page. Then click on \"Sign up Now\" on the right side of the page.     You will be asked to enter the access code listed below, as well as some personal information. Please follow the directions to create your username and password.     Your access code is: [email or web address]  Expires: 2017  4:18 PM     Your access code will  in 90 days. If you need help or a new code, please call your [facility] clinic or [phone].        Care EveryWhere ID     This is your Care EveryWhere ID. This could be used by other organizations to access your [facility] medical records  [id number]        Your Vitals Were     Pulse Temperature Height Last Period Pulse Oximetry BMI (Body Mass Index)    96 98.5  F (36.9  C) (Temporal) 5' 6\" (1.676 m) 2017 99% 27.02 kg/m2       Blood Pressure from Last 3 Encounters:   17 132/72   05/10/17 130/73   17 128/75    Weight from Last 3 Encounters:   17 167 lb 6.4 oz (75.9 kg)   17 162 lb (73.5 kg)   17 162 lb (73.5 kg)              Today, you had the following     No orders found for display         Today's Medication Changes          These changes are accurate as of: 17  4:34 PM.  If you have any questions, ask your nurse or doctor.               Start taking these medicines.        Dose/Directions    fluticasone 110 MCG/ACT Inhaler   Commonly known as:  FLOVENT HFA   Used for:  Cough   Started by:  Yaima Muse MD        Dose:  2 puff   Inhale 2 puffs into the lungs 2 times daily   Quantity:  1 Inhaler   Refills:  0         These medicines have changed or have updated prescriptions.        Dose/Directions    FLUoxetine 20 MG capsule   Commonly known as:  PROzac   This may have changed:  how much to take   Used for:  Major depressive disorder, recurrent episode, moderate (H)   Changed by:  Yaima Muse MD        Dose:  40 mg   Take 2 capsules (40 mg) by mouth daily   Quantity:  " 180 capsule   Refills:  1            Where to get your medicines      These medications were sent to [facility], [place] - [address] [facility]   [address] [facility] , [place] [address]     Phone:  [phone]     FLUoxetine 20 MG capsule    fluticasone 110 MCG/ACT Inhaler                Primary Care Provider Office Phone # Fax #    Yaima Nicole Muse [phone]       [place] [id number] [facility]   [place] [address]        Thank you!     Thank you for choosing [facility]  for your care. Our goal is always to provide you with excellent care. Hearing back from our patients is one way we can continue to improve our services. Please take a few minutes to complete the written survey that you may receive in the mail after your visit with us. Thank you!             Your Updated Medication List - Protect others around you: Learn how to safely use, store and throw away your medicines at www.disposemymeds.org.          This list is accurate as of: 5/16/17  4:34 PM.  Always use your most recent med list.                   Brand Name Dispense Instructions for use    ACCU-CHEK COMPLETE Kit     1 Device    1 Device daily       albuterol 108 (90 BASE) MCG/ACT Inhaler    albuterol    1 Inhaler    Inhale 2 puffs into the lungs every 4 hours as needed for shortness of breath / dyspnea       ASPIRIN NOT PRESCRIBED    INTENTIONAL     Antiplatelet medication not prescribed intentionally due to Not indicated based on age       blood glucose monitoring lancets     3 Box    Use to test blood sugars 1-2 times daily or as directed, per patients glucose meter.       blood glucose monitoring test strip    BL TEST STRIP PACK    100 each    Use to test blood sugar 1-2 times daily or as directed. Per patients glucose meter (getting new one today)       ferrous sulfate 325 (65 FE) MG tablet    IRON    180 tablet    Take 1 tablet (325 mg) by mouth 2 times daily       FLUoxetine 20  MG capsule    PROzac    180 capsule    Take 2 capsules (40 mg) by mouth daily       fluticasone 110 MCG/ACT Inhaler    FLOVENT HFA    1 Inhaler    Inhale 2 puffs into the lungs 2 times daily       GLUCOSAMINE CHOND DOUBLE STR Tabs     90 tablet    Take 1 tablet by mouth daily       IBUPROFEN PO      Take 600 mg by mouth every 4 hours as needed for moderate pain       insulin glargine 100 UNIT/ML injection    LANTUS SOLOSTAR    6 mL    26 units once daily       insulin pen needle 32G X 6 MM    NOVOFINE    100 each    Use once daily or as directed.       ketorolac 10 MG tablet    TORADOL    30 tablet    TAKE ONE TABLET BY MOUTH NIGHTLY AS NEEDED FOR MODERATE PAIN       metFORMIN 500 MG 24 hr tablet    GLUCOPHAGE-XR    360 tablet    Take 4 tablets (2,000 mg) by mouth daily (with dinner)       MULTI-VITAMIN GUMMIES Chew      Take 1 chew tab by mouth daily       oxyCODONE-acetaminophen 5-325 MG per tablet    PERCOCET    30 tablet    Take 1 tablet by mouth nightly as needed for moderate to severe pain       senna-docusate 8.6-50 MG per tablet    SENOKOT-S;PERICOLACE     Take 1 tablet by mouth daily as needed for constipation       simvastatin 20 MG tablet    ZOCOR    90 tablet    Take 1 tablet (20 mg) by mouth At Bedtime       traZODone 50 MG tablet    DESYREL    180 tablet    TAKE 1-2 TABLETS BY MOUTH NIGHTLY AS NEEDED FOR SLEEP

## 2017-05-16 NOTE — TELEPHONE ENCOUNTER
The flovent inhaler has a $50 copay and Stacy does not want to pay that. Is it possible to change to [employer] Ellipta?  We have free voucher for those.    If Ok please send new script to [facility]    Thanks  [phone]

## 2017-05-16 NOTE — NURSING NOTE
"Chief Complaint   Patient presents with     Hospital F/U     cough, chest tightness and pain from coughing, productive cough       Initial /72  Pulse 96  Temp 98.5  F (36.9  C) (Temporal)  Ht 5' 6\" (1.676 m)  Wt 167 lb 6.4 oz (75.9 kg)  LMP 04/06/2017  SpO2 99%  BMI 27.02 kg/m2 Estimated body mass index is 27.02 kg/(m^2) as calculated from the following:    Height as of this encounter: 5' 6\" (1.676 m).    Weight as of this encounter: 167 lb 6.4 oz (75.9 kg).  Medication Reconciliation: complete   Anna Weldon CMA    "

## 2017-05-16 NOTE — PROGRESS NOTES
SUBJECTIVE:  Stacy Brown, a 41 year old female scheduled an appointment to discuss the following issues:     Cough  Major depressive disorder, recurrent episode, moderate (H)  Upset stomach     Stacy Brown is a 41 year old female here for an ED follow up. She was seen in our ED on 5/2 and again on 5/5 with cough, thought due to viral URI. Flu test was negative.  She was treated supportively.  Patient has had an ongoing cough that is not improving. At hte first visit, she had a negative chest x-ray, influenza and strep test. At her second visit on 5/5, she also had a negative chest x-ray and strep. She had labs done as well which were normal. She was started on an albuterol inhaler and tessalon Perles.     Since ED she still has a persistent cough. She will switch from a dry to a wet cough. She has been using her inhaler with some relief. The last 2 weeks she develops SOB with exertion at work as a .     She also is having relationship issues with her spouse. This is causing a lot of stress on her. She has talked about marriage counseling but spouse is not interested. He deals with chronic health issues and chronic pain, and it makes it hard on the family. She was just started on Prozac, 20 mg daily, 4 weeks ago or so, and would like this increased if possible.     Patient has also been developing nausea and thinks it's from her percocet and is wondering if she could start on something else for pain.  She hasn't done well with Vicodin in the past.   Medical, social, surgical, and family histories reviewed, see EPIC.     ROS:  C: NEGATIVE for fever, chills  E: NEGATIVE for vision changes   R: POSITIVE for a persistent cough and occasional SOB.   CV: NEGATIVE for chest pain, palpitations   GI: POSITIVE for nausea, maybe from her meds. NEGATIVE for abdominal pain, heartburn, or change in bowel habits  : NEGATIVE for frequency, dysuria, or hematuria  M: NEGATIVE for significant arthralgias or  "myalgia  N: NEGATIVE for weakness, dizziness or paresthesias or headache  P: POSITIVE for depression, on Prozac.     OBJECTIVE:  /72  Pulse 96  Temp 98.5  F (36.9  C) (Temporal)  Ht 5' 6\" (1.676 m)  Wt 167 lb 6.4 oz (75.9 kg)  LMP 04/06/2017  SpO2 99%  BMI 27.02 kg/m2  EXAM:  GENERAL APPEARANCE: healthy, alert and no distress  Neck:  Supple without lymphadenopathy, JVD or masses.   CV:  RRR without murmur.   Respiratory:  Lungs clear to auscultation bilaterally.    ASSESSMENT/PLAN:  (R05) Cough  (primary encounter diagnosis)  Comment: Her exam was normal. I reviewed both her ED visits. I suspect this is just a resolving viral infection, with post-infectious inflammation causing a cough. I am going to try her on Flovent for the next month. See orders.  Follow up if symptoms persist or worsen.  I did not repeat xray since she has had twice recently and both normal.   Plan: fluticasone (FLOVENT HFA) 110 MCG/ACT Inhaler            (F33.1) Major depressive disorder, recurrent episode, moderate (H)  Comment: Discussed her depression. I encourage her to talk to her spouse and keep encouraging him to try counseling. Patient would like her Prozac increased, I am okay with this. I am going to double her dose from 20 mg to 40 mg a day.  Discussed that this may take 4-6 weeks to be fully effective. Will f/u if no help.   Plan: FLUoxetine (PROZAC) 20 MG capsule            (K30) Upset stomach  Comment: We discussed her nausea. She recently started on iron pills and also resumed her metformin 2000 mg daily, (which she had not been taking prior to then) which could be the cause of her nausea instead of the percocet.  Because she is fatigued and her ferritin was very low (4), I don't want her to stop her iron, but we discussed taking iron pill with a vitamin C and food in the morning, which may help absorption and decrease side effects.  We also may need to consider decreasing her metformin if this doesn't help. She may " get by with a lower dose of metformin as long as she takes it consistently. I think we have had to increase her dose over time because she was never that compliant.  She may get better side effect profile AND diabetic control even with only 1000 mg daily as long as she is consistent and follows a more strict diabetic diet.     Plan: as above.     Follow up in July for a diabetes check. Sooner prn.     This document serves as a record of services personally performed by Yaima Muse MD. It was created on their behalf by Della De La Cruz, a trained medical scribe. The creation of this record is based on the provider's personal observations and the statements of the patient. This document has been checked and approved by the attending provider.     Yaima Muse MD

## 2017-06-12 DIAGNOSIS — G89.4 CHRONIC PAIN SYNDROME: ICD-10-CM

## 2017-06-13 RX ORDER — KETOROLAC TROMETHAMINE 10 MG/1
TABLET, FILM COATED ORAL
Qty: 30 TABLET | Refills: 1 | Status: SHIPPED | OUTPATIENT
Start: 2017-06-13 | End: 2017-08-15

## 2017-06-13 NOTE — TELEPHONE ENCOUNTER
ketorolac (TORADOL) 10 MG tablet      Last Written Prescription Date:  4/10/17  Last Fill Quantity: 30,   # refills: 1  Last Office Visit with [facility], [facility] or [facility] prescribing provider: 5/16/17  Future Office visit:    Next 5 appointments (look out 90 days)     Aug 15, 2017  4:00 PM CDT   Office Visit with Yaima Muse MD   [facility] ([address] [id number] [phone]                   Routing refill request to provider for review/approval because:  Drug not on the [facility], [facility] or [facility] refill protocol or controlled substance

## 2017-06-13 NOTE — TELEPHONE ENCOUNTER
Routing refill request to provider for review/approval because:  Drug not on the [facility] refill protocol     Rachelle Mustafa RN

## 2017-06-15 ENCOUNTER — APPOINTMENT (OUTPATIENT)
Dept: GENERAL RADIOLOGY | Facility: CLINIC | Age: 42
End: 2017-06-15
Attending: FAMILY MEDICINE

## 2017-06-15 ENCOUNTER — HOSPITAL ENCOUNTER (EMERGENCY)
Facility: CLINIC | Age: 42
Discharge: HOME | End: 2017-06-15
Attending: FAMILY MEDICINE | Admitting: FAMILY MEDICINE

## 2017-06-15 VITALS
SYSTOLIC BLOOD PRESSURE: 124 MMHG | OXYGEN SATURATION: 97 % | HEART RATE: 94 BPM | WEIGHT: 170 LBS | BODY MASS INDEX: 27.32 KG/M2 | HEIGHT: 66 IN | TEMPERATURE: 99 F | DIASTOLIC BLOOD PRESSURE: 83 MMHG | RESPIRATION RATE: 16 BRPM

## 2017-06-15 DIAGNOSIS — M25.561 ACUTE PAIN OF RIGHT KNEE: Primary | ICD-10-CM

## 2017-06-15 DIAGNOSIS — S80.211A ABRASION OF KNEE, RIGHT, INITIAL ENCOUNTER: ICD-10-CM
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
## 2017-06-15 PROCEDURE — 73562 X-RAY EXAM OF KNEE 3: CPT | Mod: TC,RT

## 2017-06-15 PROCEDURE — 25000132 ZZH RX MED GY IP 250 OP 250 PS 637: Performed by: FAMILY MEDICINE

## 2017-06-15 PROCEDURE — 99284 EMERGENCY DEPT VISIT MOD MDM: CPT | Mod: Z6 | Performed by: FAMILY MEDICINE

## 2017-06-15 PROCEDURE — 99283 EMERGENCY DEPT VISIT LOW MDM: CPT | Performed by: FAMILY MEDICINE

## 2017-06-15 RX ORDER — OXYCODONE HYDROCHLORIDE 5 MG/1
5 TABLET ORAL ONCE
Status: COMPLETED | OUTPATIENT
Start: 2017-06-15 | End: 2017-06-15

## 2017-06-15 RX ORDER — OXYCODONE HYDROCHLORIDE 5 MG/1
5 TABLET ORAL EVERY 6 HOURS PRN
Qty: 5 TABLET | Refills: 0 | Status: SHIPPED | OUTPATIENT
Start: 2017-06-15 | End: 2017-11-21

## 2017-06-15 RX ADMIN — OXYCODONE HYDROCHLORIDE 5 MG: 5 TABLET ORAL at 15:33

## 2017-06-15 ASSESSMENT — ENCOUNTER SYMPTOMS
WOUND: 1
ARTHRALGIAS: 1

## 2017-06-15 NOTE — DISCHARGE INSTRUCTIONS
RICE     Rest an injury, elevate it, and use ice and compression as directed.   RICE stands for rest, ice, compression, and elevation. These can limit pain and swelling after an injury. RICE may be recommended to help treat fractures, sprains, strains, and bruises or bumps.   Home care  The following explain the details of RICE:    Rest. Limit the use of the injured body part. This helps prevent further damage to the body part and gives it time to heal. In some cases, you may need a sling, brace, splint, or cast to help keep the body part still until it has healed.    Ice. Applying ice right after an injury helps relieve pain and swelling. Wrap a bag of ice in a thin towel. Then, place it over the injured area. Do this for 10 to 15 minutes every 3 to 4 hours. Continue for the next 1 to 3 days or until your symptoms improve. Never put ice directly on your skin or ice an area longer than 15 minutes at a time.    Compression. Putting pressure on an injury helps reduce swelling and provides support. Wrap the injured area firmly with an elastic bandage/wrap. Make sure not to wrap the bandage too tightly or you will cut off blood flow to the injured area. If your bandage loosens, rewrap it.    Elevation. Keeping an injury raised above the level of your heart reduces swelling, pain, and throbbing. For instance, if you have a broken leg, it may help to rest your leg on several pillows when sitting or lying down. Try to keep the injured area elevated for at least 2 to 3 hours per day.    Pain medicine: I will send you home with a prescription for 5 tablets of oxycodone for pain. You can still use Tylenol or ibuprofen for pain as well.   Follow-up care  Follow up with your healthcare provider, or as advised.  When to seek medical advice  Call your healthcare provider right away if any of these occur:    Fever of 100.4 F (38 C) or higher, or as directed by your healthcare provider    Increased pain or swelling in the injured  body part    Injured body part becomes cold, blue, numb, or tingly    Signs of infection. These include warmth in the skin, redness, drainage, or bad smell coming from the injured body part.    Thank you for choosing our Emergency Department for your care.     Sincerely,    Dr Fabián Lopez M.D.

## 2017-06-15 NOTE — LETTER
[facility] EMERGENCY DEPARTMENT  [address] [place] [id number]  [phone]    Carla 15, 2017    Stacy Brown  [address] [facility] [address]  [phone] (home)     : 1975      To Whom it may concern:    Stacy Brown was seen in our Emergency Department today, Carla 15, 2017.  I expect her condition to improve over the next few days.  She may return to work on .    Sincerely,            Tre Lopez

## 2017-06-15 NOTE — ED PROVIDER NOTES
History     Chief Complaint   Patient presents with     Fall     The history is provided by the patient.     Stacy Brown is a 42 year old female who presents to the emergency department for evaluation after a fall. 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 Currently, she reports pain in her right knee. Patient has been walking on her knee since the injury but reports significant pain. Her tetanus is up to date.     I have reviewed the Medications, Allergies, Past Medical and Surgical History, and Social History in the Epic system.    Allergies:   Allergies   Allergen Reactions     Amoxicillin Hives         No current facility-administered medications on file prior to encounter.   Current Outpatient Prescriptions on File Prior to Encounter:  ketorolac (TORADOL) 10 MG tablet TAKE ONE TABLET BY MOUTH NIGHTLY AS NEEDED FOR MODERATE PAIN   FLUoxetine (PROZAC) 20 MG capsule Take 2 capsules (40 mg) by mouth daily   fluticasone (FLOVENT HFA) 110 MCG/ACT Inhaler Inhale 2 puffs into the lungs 2 times daily   fluticasone furoate (ARNUITY ELLIPTA) 100 MCG/ACT AEPB inhalation powder Inhale 1 puff into the lungs daily   senna-docusate (SENOKOT-S;PERICOLACE) 8.6-50 MG per tablet Take 1 tablet by mouth daily as needed for constipation   ASPIRIN NOT PRESCRIBED (INTENTIONAL) Antiplatelet medication not prescribed intentionally due to Not indicated based on age   oxyCODONE-acetaminophen (PERCOCET) 5-325 MG per tablet Take 1 tablet by mouth nightly as needed for moderate to severe pain   traZODone (DESYREL) 50 MG tablet TAKE 1-2 TABLETS BY MOUTH NIGHTLY AS NEEDED FOR SLEEP   albuterol (ALBUTEROL) 108 (90 BASE) MCG/ACT Inhaler Inhale 2 puffs into the lungs every 4 hours as needed for shortness of breath / dyspnea (Patient not taking: Reported on 5/16/2017)   ferrous sulfate (IRON) 325 (65 FE) MG tablet Take 1  tablet (325 mg) by mouth 2 times daily   simvastatin (ZOCOR) 20 MG tablet Take 1 tablet (20 mg) by mouth At Bedtime   metFORMIN (GLUCOPHAGE-XR) 500 MG 24 hr tablet Take 4 tablets (2,000 mg) by mouth daily (with dinner)   insulin glargine (LANTUS SOLOSTAR) 100 UNIT/ML injection 26 units once daily   insulin pen needle (NOVOFINE) 32G X 6 MM Use once daily or as directed.   blood glucose monitoring (BL TEST STRIP PACK) test strip Use to test blood sugar 1-2 times daily or as directed. Per patients glucose meter (getting new one today)   blood glucose monitoring (FREESTYLE) lancets Use to test blood sugars 1-2 times daily or as directed, per patients glucose meter.   Misc Natural Products (GLUCOSAMINE CHOND DOUBLE STR) TABS Take 1 tablet by mouth daily   IBUPROFEN PO Take 600 mg by mouth every 4 hours as needed for moderate pain   Blood Glucose Monitoring Suppl (ACCU-CHEK COMPLETE) KIT 1 Device daily   Multiple Vitamins-Minerals (MULTI-VITAMIN GUMMIES) CHEW Take 1 chew tab by mouth daily        Patient Active Problem List   Diagnosis     TYPE 2 DIABETES, HBA1C GOAL < 7%     HYPERLIPIDEMIA LDL GOAL <100     PMDD (premenstrual dysphoric disorder)     Health Care Home     Iron deficiency anemia     Halitosis     Moderate major depression (H)     Restless legs syndrome (RLS)     Insomnia     Chronic pain syndrome     Overweight       Past Surgical History:   Procedure Laterality Date     C STABISM SURG,PREV EYE SURG,NOT MUSC      Right     HC OPEN TX METATARSAL FRACTURE  age 12    softball injury,open fracture left foot     HC TOOTH EXTRACTION W/FORCEP      Extract wisdom teeth     TUBAL LIGATION  7/27/2006       Social History   Substance Use Topics     Smoking status: Former Smoker     Years: 6.00     Quit date: 9/22/2010     Smokeless tobacco: Never Used     Alcohol use 0.0 oz/week     0 Standard drinks or equivalent per week      Comment: once a month       Most Recent Immunizations   Administered Date(s) Administered  "    Influenza (IIV3) 09/21/2012     Influenza Vaccine IM 3yrs+ 4 Valent IIV4 12/28/2015     Pneumococcal (PCV 13) 04/14/2017     Pneumococcal 23 valent 12/28/2015     TDAP Vaccine (Adacel) 09/07/2015       BMI: Estimated body mass index is 27.44 kg/(m^2) as calculated from the following:    Height as of this encounter: 1.676 m (5' 6\").    Weight as of this encounter: 77.1 kg (170 lb).      Review of Systems   Musculoskeletal: Positive for arthralgias (right knee).   Skin: Positive for wound (right knee).   All other systems reviewed and are negative.    Her last tetanus was Sept 2015     Physical Exam   BP: 118/72  Heart Rate: 95  Temp: 99  F (37.2  C)  Resp: 16  Height: 167.6 cm (5' 6\")  Weight: 77.1 kg (170 lb)  SpO2: 97 %    Physical Exam   Constitutional: She is oriented to person, place, and time. She appears well-developed and well-nourished. No distress.   HENT:   Head: Normocephalic and atraumatic.   Eyes: Conjunctivae and EOM are normal.   Neck: Normal range of motion. Neck supple.   Cardiovascular: Intact distal pulses.    Musculoskeletal:        Right knee: She exhibits decreased range of motion (secondary to pain). Tenderness found.        Left knee: Normal.   Neurological: She is alert and oriented to person, place, and time.   Skin: Skin is warm and dry.   Abrasion to right knee.   Psychiatric: She has a normal mood and affect. Her behavior is normal.   Nursing note and vitals reviewed.      ED Course     ED Course     Procedures         Results for orders placed or performed during the hospital encounter of 06/15/17 (from the past 24 hour(s))   Knee XR, 3 views, right    Narrative    KNEE RIGHT THREE VIEWS    6/15/2017 3:56 PM     HISTORY: Fall at work, right knee pain at lateral tibial plateau.    COMPARISON: 8/17/2015      Impression    IMPRESSION: Normal right knee.       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 Upon arrival, patient is vitally stable. On exam, patient has an abrasion to her right knee with slight decreased range of motion secondary to pain.  There is some swelling at the area of injury.  She has a skin abrasion as well.. Distal pulses are intact. Patient was given 5 mg oxycodone here in the ED which was helpful with pain. A right knee xray was done and is negative for fracture.  I offered the patient appear crutches, and she accepted.  I did give her a prescription for 5 tablets of oxycodone for pain.  I did write her a note for work saying she can return to work on Monday, June 19.  She was discharged from the ED.       I have reviewed the nursing notes.    I have reviewed the findings, diagnosis, plan and need for follow up with the patient.       New Prescriptions    OXYCODONE (ROXICODONE) 5 MG IR TABLET    Take 1 tablet (5 mg) by mouth every 6 hours as needed for pain       Final diagnoses:   Acute pain of right knee     This document serves as a record of services personally performed by Tre Lopez MD. It was created on their behalf by Jossy Strong, a trained medical scribe. The creation of this record is based on the provider's personal observations and the statements of the patient. This document has been checked and approved by the attending provider.     Note: Chart documentation done in part with Dragon Voice Recognition software. Although reviewed after completion, some word and grammatical errors may remain.    6/15/2017   [facility] EMERGENCY DEPARTMENT     Tre Lopez MD  06/15/17 5188

## 2017-06-15 NOTE — ED AVS SNAPSHOT
[facility] Emergency Department    [address] [place] [address]    Phone:  [phone]    Fax:  [phone]                                       Stacy Brown   MRN: [id number]    Department:  [facility] Emergency Department   Date of Visit:  6/15/2017           Patient Information     Date Of Birth          1975        Your diagnoses for this visit were:     Acute pain of right knee        You were seen by Tre Lopez MD.      Follow-up Information     Schedule an appointment as soon as possible for a visit with Yaima Muse MD.    Specialty:  Family Practice    Why:  As needed    Contact information:    [facility]  [address] [place] [address] [phone]          Discharge Instructions         RICE     Rest an injury, elevate it, and use ice and compression as directed.   RICE stands for rest, ice, compression, and elevation. These can limit pain and swelling after an injury. RICE may be recommended to help treat fractures, sprains, strains, and bruises or bumps.   Home care  The following explain the details of RICE:    Rest. Limit the use of the injured body part. This helps prevent further damage to the body part and gives it time to heal. In some cases, you may need a sling, brace, splint, or cast to help keep the body part still until it has healed.    Ice. Applying ice right after an injury helps relieve pain and swelling. Wrap a bag of ice in a thin towel. Then, place it over the injured area. Do this for 10 to 15 minutes every 3 to 4 hours. Continue for the next 1 to 3 days or until your symptoms improve. Never put ice directly on your skin or ice an area longer than 15 minutes at a time.    Compression. Putting pressure on an injury helps reduce swelling and provides support. Wrap the injured area firmly with an elastic bandage/wrap. Make sure not to wrap the bandage too tightly or you will cut off blood flow to the  injured area. If your bandage loosens, rewrap it.    Elevation. Keeping an injury raised above the level of your heart reduces swelling, pain, and throbbing. For instance, if you have a broken leg, it may help to rest your leg on several pillows when sitting or lying down. Try to keep the injured area elevated for at least 2 to 3 hours per day.    Pain medicine: I will send you home with a prescription for 5 tablets of oxycodone for pain. You can still use Tylenol or ibuprofen for pain as well.   Follow-up care  Follow up with your healthcare provider, or as advised.  When to seek medical advice  Call your healthcare provider right away if any of these occur:    Fever of 100.4 F (38 C) or higher, or as directed by your healthcare provider    Increased pain or swelling in the injured body part    Injured body part becomes cold, blue, numb, or tingly    Signs of infection. These include warmth in the skin, redness, drainage, or bad smell coming from the injured body part.    Thank you for choosing our Emergency Department for your care.     Sincerely,    Dr Fabián Lopez M.D.          Future Appointments        Provider Department Dept Phone Center    6/16/2017  3:45 PM [facility] MAMMO ROOM 1 [facility] [phone] [facility]    8/15/2017 4:00 PM Yaima Muse MD [facility] [phone] [facility]      24 Hour Appointment Hotline       To make an appointment at any [facility], call [phone] ([phone]). If you don't have a family doctor or clinic, we will help you find one. [facility] are conveniently located to serve the needs of you and your family.          ED Discharge Orders     Crutches       Use gait belt during crutch training.                     Review of your medicines      START taking        Dose / Directions Last dose taken    oxyCODONE 5 MG IR tablet   Commonly known as:  ROXICODONE   Dose:  5 mg   Quantity:  5 tablet         Take 1 tablet (5 mg) by mouth every 6 hours as needed for pain   Refills:  0          Our records show that you are taking the medicines listed below. If these are incorrect, please call your family doctor or clinic.        Dose / Directions Last dose taken    ACCU-CHEK COMPLETE Kit   Dose:  1 Device   Quantity:  1 Device        1 Device daily   Refills:  0        albuterol 108 (90 BASE) MCG/ACT Inhaler   Commonly known as:  albuterol   Dose:  2 puff   Quantity:  1 Inhaler        Inhale 2 puffs into the lungs every 4 hours as needed for shortness of breath / dyspnea   Refills:  0        ASPIRIN NOT PRESCRIBED   Commonly known as:  INTENTIONAL        Antiplatelet medication not prescribed intentionally due to Not indicated based on age   Refills:  0        blood glucose monitoring lancets   Quantity:  3 Box        Use to test blood sugars 1-2 times daily or as directed, per patients glucose meter.   Refills:  3        blood glucose monitoring test strip   Commonly known as:  BL TEST STRIP PACK   Quantity:  100 each        Use to test blood sugar 1-2 times daily or as directed. Per patients glucose meter (getting new one today)   Refills:  3        ferrous sulfate 325 (65 FE) MG tablet   Commonly known as:  IRON   Dose:  325 mg   Quantity:  180 tablet        Take 1 tablet (325 mg) by mouth 2 times daily   Refills:  3        FLUoxetine 20 MG capsule   Commonly known as:  PROzac   Dose:  40 mg   Quantity:  180 capsule        Take 2 capsules (40 mg) by mouth daily   Refills:  1        fluticasone 110 MCG/ACT Inhaler   Commonly known as:  FLOVENT HFA   Dose:  2 puff   Quantity:  1 Inhaler        Inhale 2 puffs into the lungs 2 times daily   Refills:  0        fluticasone furoate 100 MCG/ACT Aepb inhalation powder   Commonly known as:  ARNUITY ELLIPTA   Dose:  1 puff   Quantity:  1 each        Inhale 1 puff into the lungs daily   Refills:  0        GLUCOSAMINE CHOND DOUBLE STR Tabs   Dose:  1 tablet   Quantity:  90  tablet        Take 1 tablet by mouth daily   Refills:  3        IBUPROFEN PO   Dose:  600 mg        Take 600 mg by mouth every 4 hours as needed for moderate pain   Refills:  0        insulin glargine 100 UNIT/ML injection   Commonly known as:  LANTUS SOLOSTAR   Quantity:  6 mL        26 units once daily   Refills:  5        insulin pen needle 32G X 6 MM   Commonly known as:  NOVOFINE   Quantity:  100 each        Use once daily or as directed.   Refills:  3        ketorolac 10 MG tablet   Commonly known as:  TORADOL   Quantity:  30 tablet        TAKE ONE TABLET BY MOUTH NIGHTLY AS NEEDED FOR MODERATE PAIN   Refills:  1        metFORMIN 500 MG 24 hr tablet   Commonly known as:  GLUCOPHAGE-XR   Dose:  2000 mg   Quantity:  360 tablet        Take 4 tablets (2,000 mg) by mouth daily (with dinner)   Refills:  3        MULTI-VITAMIN GUMMIES Chew   Dose:  1 chew tab        Take 1 chew tab by mouth daily   Refills:  0        oxyCODONE-acetaminophen 5-325 MG per tablet   Commonly known as:  PERCOCET   Dose:  1 tablet   Quantity:  30 tablet        Take 1 tablet by mouth nightly as needed for moderate to severe pain   Refills:  0        senna-docusate 8.6-50 MG per tablet   Commonly known as:  SENOKOT-S;PERICOLACE   Dose:  1 tablet        Take 1 tablet by mouth daily as needed for constipation   Refills:  0        simvastatin 20 MG tablet   Commonly known as:  ZOCOR   Dose:  20 mg   Quantity:  90 tablet        Take 1 tablet (20 mg) by mouth At Bedtime   Refills:  3        traZODone 50 MG tablet   Commonly known as:  DESYREL   Quantity:  180 tablet        TAKE 1-2 TABLETS BY MOUTH NIGHTLY AS NEEDED FOR SLEEP   Refills:  1                Prescriptions were sent or printed at these locations (1 Prescription)                   [facility] [place] - [address]    [address]  [place] [address]    Telephone:  [phone]   Fax:  [phone]   Hours:                  Printed at Department/Unit  "printer (1 of 1)         oxyCODONE (ROXICODONE) 5 MG IR tablet                Procedures and tests performed during your visit     Knee XR, 3 views, right      Orders Needing Specimen Collection     None      Pending Results     No orders found from 2017 to 2017.            Pending Culture Results     No orders found from 2017 to 2017.            Pending Results Instructions     If you had any lab results that were not finalized at the time of your Discharge, you can call the ED Lab Result RN at [phone]. You will be contacted by this team for any positive Lab results or changes in treatment. The nurses are available 7 days a week from 10A to 6:30P.  You can leave a message 24 hours per day and they will return your call.        Thank you for choosing [facility]       Thank you for choosing [facility] for your care. Our goal is always to provide you with excellent care. Hearing back from our patients is one way we can continue to improve our services. Please take a few minutes to complete the written survey that you may receive in the mail after you visit with us. Thank you!        [email or web address] Information     [email or web address] lets you send messages to your doctor, view your test results, renew your prescriptions, schedule appointments and more. To sign up, go to [email or web address] . Click on \"Log in\" on the left side of the screen, which will take you to the Welcome page. Then click on \"Sign up Now\" on the right side of the page.     You will be asked to enter the access code listed below, as well as some personal information. Please follow the directions to create your username and password.     Your access code is: [email or web address]  Expires: 2017  4:18 PM     Your access code will  in 90 days. If you need help or a new code, please call your [facility] clinic or [phone].        Care EveryWhere ID     This is your Care EveryWhere ID. This could be used by other organizations to access your " Industry medical records  [id number]        After Visit Summary       This is your record. Keep this with you and show to your community pharmacist(s) and doctor(s) at your next visit.

## 2017-06-15 NOTE — ED AVS SNAPSHOT
[facility] Emergency Department    [address] [place] [address]    Phone:  [phone]    Fax:  [phone]                                       Stacy Brown   MRN: [id number]    Department:  [facility] Emergency Department   Date of Visit:  6/15/2017           After Visit Summary Signature Page     I have received my discharge instructions, and my questions have been answered. I have discussed any challenges I see with this plan with the nurse or doctor.    ..........................................................................................................................................  Patient/Patient Representative Signature      ..........................................................................................................................................  Patient Representative Print Name and Relationship to Patient    ..................................................               ................................................  Date                                            Time    ..........................................................................................................................................  Reviewed by Signature/Title    ...................................................              ..............................................  Date                                                            Time

## 2017-06-16 ENCOUNTER — HOSPITAL ENCOUNTER (OUTPATIENT)
Dept: MAMMOGRAPHY | Facility: CLINIC | Age: 42
Discharge: HOME | End: 2017-06-16
Attending: FAMILY MEDICINE | Admitting: FAMILY MEDICINE
Payer: COMMERCIAL

## 2017-06-16 DIAGNOSIS — Z12.31 VISIT FOR SCREENING MAMMOGRAM: ICD-10-CM

## 2017-06-16 PROCEDURE — G0202 SCR MAMMO BI INCL CAD: HCPCS

## 2017-06-16 NOTE — PROGRESS NOTES
Appropriate assistive devices provided during their visit. yes (Yes, No, N/A) crutches (list device)    Exam table and/or cart  placed in the lowest position.na(Yes, No, N/A)    Brakes on tables/carts/wheelchairs used at all times. na (Yes, No, N/A)    Non slip footwear applied. na (Yes, No, NA)    Patient was accompanied by staff throughout visit. yes (Yes, No, N/A)    Equipment safety straps used. na (Yes, No, N/A)    Assist with toileting. na (Yes, No, N/A)

## 2017-07-11 DIAGNOSIS — M79.604 PAIN OF RIGHT LOWER EXTREMITY: ICD-10-CM

## 2017-07-11 RX ORDER — OXYCODONE AND ACETAMINOPHEN 5; 325 MG/1; MG/1
1 TABLET ORAL
Qty: 30 TABLET | Refills: 0 | Status: SHIPPED | OUTPATIENT
Start: 2017-07-11 | End: 2017-08-15

## 2017-07-11 NOTE — TELEPHONE ENCOUNTER
percocet  Last Written Prescription Date: 5/9/17  Last Fill Quantity: 30,  # refills: 0   Last Office Visit with G, P or [facility] prescribing provider: 5/16/17                                     Next 5 appointments (look out 90 days)     Aug 15, 2017  4:00 PM CDT   Office Visit with Yaima Muse MD   [facility]    [address] [place] [address] [phone]

## 2017-08-15 ENCOUNTER — OFFICE VISIT (OUTPATIENT)
Dept: FAMILY MEDICINE | Facility: CLINIC | Age: 42
End: 2017-08-15
Payer: COMMERCIAL

## 2017-08-15 VITALS
DIASTOLIC BLOOD PRESSURE: 72 MMHG | BODY MASS INDEX: 26.95 KG/M2 | TEMPERATURE: 98.5 F | OXYGEN SATURATION: 99 % | SYSTOLIC BLOOD PRESSURE: 124 MMHG | HEART RATE: 99 BPM | WEIGHT: 167 LBS

## 2017-08-15 DIAGNOSIS — G89.4 CHRONIC PAIN SYNDROME: ICD-10-CM

## 2017-08-15 DIAGNOSIS — Z79.4 TYPE 2 DIABETES MELLITUS WITH HYPERGLYCEMIA, WITH LONG-TERM CURRENT USE OF INSULIN (H): Primary | ICD-10-CM

## 2017-08-15 DIAGNOSIS — E11.65 TYPE 2 DIABETES MELLITUS WITH HYPERGLYCEMIA, WITH LONG-TERM CURRENT USE OF INSULIN (H): Primary | ICD-10-CM

## 2017-08-15 DIAGNOSIS — M25.511 RIGHT ANTERIOR SHOULDER PAIN: ICD-10-CM

## 2017-08-15 DIAGNOSIS — M79.604 PAIN OF RIGHT LOWER EXTREMITY: ICD-10-CM

## 2017-08-15 LAB — HBA1C MFR BLD: 9.4 % (ref 4.3–6)

## 2017-08-15 PROCEDURE — 99214 OFFICE O/P EST MOD 30 MIN: CPT | Performed by: FAMILY MEDICINE

## 2017-08-15 PROCEDURE — 99207 C FOOT EXAM  NO CHARGE: CPT | Mod: 25 | Performed by: FAMILY MEDICINE

## 2017-08-15 PROCEDURE — 36415 COLL VENOUS BLD VENIPUNCTURE: CPT | Performed by: FAMILY MEDICINE

## 2017-08-15 PROCEDURE — 83036 HEMOGLOBIN GLYCOSYLATED A1C: CPT | Performed by: FAMILY MEDICINE

## 2017-08-15 RX ORDER — OXYCODONE AND ACETAMINOPHEN 5; 325 MG/1; MG/1
1 TABLET ORAL
Qty: 30 TABLET | Refills: 0 | Status: SHIPPED | OUTPATIENT
Start: 2017-08-15 | End: 2017-09-09

## 2017-08-15 RX ORDER — KETOROLAC TROMETHAMINE 10 MG/1
TABLET, FILM COATED ORAL
Qty: 30 TABLET | Refills: 1 | Status: SHIPPED | OUTPATIENT
Start: 2017-08-15 | End: 2017-10-11

## 2017-08-15 ASSESSMENT — PAIN SCALES - GENERAL: PAINLEVEL: SEVERE PAIN (7)

## 2017-08-15 NOTE — NURSING NOTE
"Chief Complaint   Patient presents with     Diabetes     Recheck Medication     Musculoskeletal Problem     right shoulder pain       Initial /72  Pulse 99  Temp 98.5  F (36.9  C) (Temporal)  Wt 167 lb (75.8 kg)  LMP 07/26/2017 (Approximate)  SpO2 99%  Breastfeeding? No  BMI 26.95 kg/m2 Estimated body mass index is 26.95 kg/(m^2) as calculated from the following:    Height as of 6/15/17: 5' 6\" (1.676 m).    Weight as of this encounter: 167 lb (75.8 kg).  Medication Reconciliation: complete   Anna Weldon, SANTANA    "

## 2017-08-15 NOTE — PROGRESS NOTES
"  SUBJECTIVE:                                                    Stacy Brown is a 42 year old female who presents to clinic today for the following health issues:    (E11.65,  Z79.4) Type 2 diabetes mellitus with hyperglycemia, with long-term current use of insulin (H)    Comment: The patient has a history of Type II Diabetes treated with Lantus, 28-30 units daily and Metformin 2000 mg qd. Her last HGB A1C was 9.3% 4 months ago. She admits that she forgets to take her Metformin, and is only taking it 3-4 times weekly. Patient is taking her Lantus about 6 times weekly. She says this has improved from prior, as she was only taking her Metformin and Insulin about once weekly. Patient is checking her sugars occasionally. She most recently checked it yesterday, it was around 200. She is not doing a great job with her diet.  She is fairly random about how many units she uses, just clicks it and whatever setting she gets is what she uses.  Not intentional. Despite this, she feels pretty good, no symptoms of low or high blood sugars, no new paresthesias or other concerns.     (M25.511) Right anterior shoulder pain  Comment: Patient complains of right shoulder pain. She says that this has worsened since her last visit. Patient describes this pain as shooting pain from her right shoulder, distally down her arm. She says that when her right arm is up above her head, she has to manually move it back down because it is so painful. 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 Patient's pain is now so severe, that she is only sleeping about 2-3 hours nightly. She says that she sleeps on her left side, with her right shoulder falling forward. She had a right shoulder MRI on 4/2016:    \"IMPRESSION:  1. Minimal degenerative changes of the AC joint.  2. Small focal fatty rests in the marrow of the humeral head (a benign  process).  3. No fracture, malalignment, rotator cuff " "tear, or significant joint  effusion. Etiology for patient's symptoms is not definitely seen.\"    (G89.4) Chronic pain syndrome  Comment: Patient has a history of Chronic Pain Syndrome treated with Ibuprofen, Toradol, Oxycodone, and Percocet. She needs a refill on her percocet.     (M79.604) Pain of right lower extremity  Comment: Patient has a history of right lower extremity pain treated with Ibuprofen, Toradol, Oxycodone, and Percocet. As above.       Problem list and histories reviewed & adjusted, as indicated.  Additional history: as documented    Patient Active Problem List   Diagnosis     TYPE 2 DIABETES, HBA1C GOAL < 7%     HYPERLIPIDEMIA LDL GOAL <100     PMDD (premenstrual dysphoric disorder)     Health Care Home     Iron deficiency anemia     Halitosis     Moderate major depression (H)     Restless legs syndrome (RLS)     Insomnia     Chronic pain syndrome     Overweight     Past Surgical History:   Procedure Laterality Date     C STABISM SURG,PREV EYE SURG,NOT MUSC      Right     HC OPEN TX METATARSAL FRACTURE  age 12    softball injury,open fracture left foot     HC TOOTH EXTRACTION W/FORCEP      Extract wisdom teeth     TUBAL LIGATION  7/27/2006       Social History   Substance Use Topics     Smoking status: Former Smoker     Years: 6.00     Quit date: 9/22/2010     Smokeless tobacco: Never Used     Alcohol use 0.0 oz/week     0 Standard drinks or equivalent per week      Comment: once a month     Family History   Problem Relation Age of Onset     DIABETES Maternal Grandmother      Allergies Mother      Hypertension Maternal Grandmother      CEREBROVASCULAR DISEASE Paternal Grandfather      CANCER Maternal Grandfather      Lung - metastatic     HEART DISEASE Maternal Grandmother      Bypass     Anesthesia Reaction No family hx of      Lipids Father      cholesterol     Alzheimer Disease Paternal Grandmother      HEART DISEASE Paternal Grandmother      valve replacement         Current Outpatient " Prescriptions   Medication Sig Dispense Refill     ketorolac (TORADOL) 10 MG tablet TAKE ONE TABLET BY MOUTH NIGHTLY AS NEEDED FOR MODERATE PAIN 30 tablet 1     oxyCODONE-acetaminophen (PERCOCET) 5-325 MG per tablet Take 1 tablet by mouth nightly as needed for moderate to severe pain 30 tablet 0     oxyCODONE (ROXICODONE) 5 MG IR tablet Take 1 tablet (5 mg) by mouth every 6 hours as needed for pain 5 tablet 0     FLUoxetine (PROZAC) 20 MG capsule Take 2 capsules (40 mg) by mouth daily 180 capsule 1     ASPIRIN NOT PRESCRIBED (INTENTIONAL) Antiplatelet medication not prescribed intentionally due to Not indicated based on age       traZODone (DESYREL) 50 MG tablet TAKE 1-2 TABLETS BY MOUTH NIGHTLY AS NEEDED FOR SLEEP 180 tablet 1     ferrous sulfate (IRON) 325 (65 FE) MG tablet Take 1 tablet (325 mg) by mouth 2 times daily 180 tablet 3     simvastatin (ZOCOR) 20 MG tablet Take 1 tablet (20 mg) by mouth At Bedtime 90 tablet 3     metFORMIN (GLUCOPHAGE-XR) 500 MG 24 hr tablet Take 4 tablets (2,000 mg) by mouth daily (with dinner) 360 tablet 3     insulin glargine (LANTUS SOLOSTAR) 100 UNIT/ML injection 26 units once daily (Patient taking differently: 28 or 30 units once daily) 6 mL 5     insulin pen needle (NOVOFINE) 32G X 6 MM Use once daily or as directed. 100 each 3     blood glucose monitoring (BL TEST STRIP PACK) test strip Use to test blood sugar 1-2 times daily or as directed. Per patients glucose meter (getting new one today) 100 each 3     blood glucose monitoring (FREESTYLE) lancets Use to test blood sugars 1-2 times daily or as directed, per patients glucose meter. 3 Box 3     IBUPROFEN PO Take 600 mg by mouth every 4 hours as needed for moderate pain       Blood Glucose Monitoring Suppl (ACCU-CHEK COMPLETE) KIT 1 Device daily 1 Device 0     Multiple Vitamins-Minerals (MULTI-VITAMIN GUMMIES) CHEW Take 1 chew tab by mouth daily        Allergies   Allergen Reactions     Amoxicillin Hives     Recent Labs   Lab  Test  05/05/17   1715  04/14/17   1224  02/07/17   0340  06/10/16   1103  12/28/15   1117  08/10/15   1353  10/22/14   0910  07/21/14   0949   A1C   --   9.3*   --   9.8*  10.5*  11.5*   --   10.5*   LDL   --   173*   --    --   135*  122   --   171*   HDL   --   65   --    --    --   38*   --   46*   TRIG   --   150*   --    --    --   321*   --   204*   ALT   --    --   18   --    --   25  27  28   CR  0.49*  0.45*  0.52   --    --   0.45*  0.41*   --    GFRESTIMATED  >90  Non  GFR Calc    >90  Non  GFR Calc    >90  Non  GFR Calc     --    --   >90  Non  GFR Calc    >90  Non  GFR Calc     --    GFRESTBLACK  >90   GFR Calc    >90   GFR Calc    >90   GFR Calc     --    --   >90   GFR Calc    >90   GFR Calc     --    POTASSIUM  3.7  4.1  4.1   --    --   3.8  3.8   --    TSH   --   0.87   --    --    --   0.73   --    --       BP Readings from Last 3 Encounters:   08/15/17 124/72   06/15/17 124/83   05/16/17 132/72    Wt Readings from Last 3 Encounters:   08/15/17 167 lb (75.8 kg)   06/15/17 170 lb (77.1 kg)   05/16/17 167 lb 6.4 oz (75.9 kg)        Reviewed and updated as needed this visit by clinical staffTobacco  Allergies  Meds  Med Hx  Surg Hx  Fam Hx  Soc Hx      Reviewed and updated as needed this visit by Provider         ROS:  All other ROS reviewed and are negative or non-contributory except as stated in HPI.    OBJECTIVE:     /72  Pulse 99  Temp 98.5  F (36.9  C) (Temporal)  Wt 167 lb (75.8 kg)  LMP 07/26/2017 (Approximate)  SpO2 99%  Breastfeeding? No  BMI 26.95 kg/m2  Body mass index is 26.95 kg/(m^2).   Vitals noted.  Patient alert, oriented, and in no acute distress.  CV:  RRR without murmur.  Respiratory:  Lungs clear to auscultation bilaterally.  Foot: Diabetic foot exam shows some decreased sensation to bilateral fifth  toes. Otherwise normal. Her skin is in good condition, no calluses or ulcers.      Diagnostic Test Results:  Orders Placed This Encounter   Procedures     Hemoglobin A1c     PHYSICAL THERAPY REFERRAL       ASSESSMENT:       ICD-10-CM    1. Type 2 diabetes mellitus with hyperglycemia, with long-term current use of insulin (H) E11.65 Hemoglobin A1c    Z79.4 DIABETES EDUCATOR REFERRAL     FOOT EXAM   2. Right anterior shoulder pain M25.511 PHYSICAL THERAPY REFERRAL   3. Chronic pain syndrome G89.4 ketorolac (TORADOL) 10 MG tablet   4. Pain of right lower extremity M79.604 oxyCODONE-acetaminophen (PERCOCET) 5-325 MG per tablet       PLAN:     (E11.65,  Z79.4) Type 2 diabetes mellitus with hyperglycemia, with long-term current use of insulin (H)   Plan: HGB A1C collected, will notify with results and discuss further evaluation/ treatment if needed at that time. Hopefully since she has been taking her medication more often, it will be quite improved.  I encouraged her to work on taking her Metformin and insulin daily as instructed, and to work hard on eating right and being active. 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 Continue following a low sugar/ low carb diabetic diet. Follow up in 6 months if at goal, or sooner if needed.     Hemoglobin A1c    (M25.511) Right anterior shoulder pain  Plan: Discussed patient's shoulder pain. I suspect her AC arthritis has something to do with this, but also she may have some repetitive use injury such as tendonitis.  I offered referral to orthopedics, Dr. Pacheco, who specializes in shoulder joint issues, vs. Further imaging, vs conservative measures such as time and rest.  Discussed sleeping positions to help, I she needs to keep her shoulder from flexing forward during sleep. I recommend she use a body pillow or other position to help keep this in more neutral position.  We opted NOT to see ortho right now, as she might also recommend PT and would like to avoid any surgery or other  interventions right now if possible. PT referral placed. Patient will try this and notify me with results.  If her pain persists after PT, we will proceed with referral to DR. Pacheco for further eval and treatment.     PHYSICAL THERAPY REFERRAL    (G89.4) Chronic pain syndrome  Plan: Refilled Toradol and percocet, see orders.     ketorolac (TORADOL) 10 MG tablet    (M79.604) Pain of right lower extremity  Plan: Refilled Percocet and Toradol, see orders.     oxyCODONE-acetaminophen (PERCOCET) 5-325 MG per tablet    This document serves as a record of services personally performed by Yaima Muse MD. It was created on their behalf by Demetrice Silva, a trained medical scribe. The creation of this record is based on the provider's personal observations and the statements of the patient. This document has been checked and approved by the attending provider.    Yaima Muse MD  [facility]

## 2017-08-15 NOTE — MR AVS SNAPSHOT
"              After Visit Summary   8/15/2017    Stacy Brown    MRN: [id number]           Patient Information     Date Of Birth          1975        Visit Information        Provider Department      8/15/2017 4:00 PM Yaima Muse MD [facility]        Today's Diagnoses     Type 2 diabetes mellitus with hyperglycemia, with long-term current use of insulin (H)    -  1    Right anterior shoulder pain        Chronic pain syndrome        Pain of right lower extremity           Follow-ups after your visit        Additional Services     PHYSICAL THERAPY REFERRAL       *This therapy referral will be filtered to a centralized scheduling office at [facility] and the patient will receive a call to schedule an appointment at a [facility] location most convenient for them. *     [facility] provides Physical Therapy evaluation and treatment and many specialty services across the [facility] system.  If requesting a specialty program, please choose from the list below.    If you have not heard from the scheduling office within 2 business days, please call [phone] for all locations, with the exception of [place], please call [phone].  Treatment: Evaluation & Treatment of right anterior/superior shoulder pain. Works as , repetitive use.  Strengthening regimen and pain reduction needed.   Special Instructions/Modalities: as indicated  Special Programs: none    Please be aware that coverage of these services is subject to the terms and limitations of your health insurance plan.  Call member services at your health plan with any benefit or coverage questions.      **Note to Provider:  If you are referring outside of [facility] for the therapy appointment, please list the name of the location in the \"special instructions\" above, print the referral and give to the patient to schedule the appointment.                  Who to contact     If " "you have questions or need follow up information about today's clinic visit or your schedule please contact [facility] directly at [phone].  Normal or non-critical lab and imaging results will be communicated to you by MyChart, letter or phone within 4 business days after the clinic has received the results. If you do not hear from us within 7 days, please contact the clinic through [employer] or phone. If you have a critical or abnormal lab result, we will notify you by phone as soon as possible.  Submit refill requests through [employer] or call your pharmacy and they will forward the refill request to us. Please allow 3 business days for your refill to be completed.          Additional Information About Your Visit        [employer] Information     [employer] lets you send messages to your doctor, view your test results, renew your prescriptions, schedule appointments and more. To sign up, go to [email or web address] . Click on \"Log in\" on the left side of the screen, which will take you to the Welcome page. Then click on \"Sign up Now\" on the right side of the page.     You will be asked to enter the access code listed below, as well as some personal information. Please follow the directions to create your username and password.     Your access code is: [email or web address]  Expires: 2017  5:09 PM     Your access code will  in 90 days. If you need help or a new code, please call your [facility] clinic or [phone].        Care EveryWhere ID     This is your Care EveryWhere ID. This could be used by other organizations to access your [facility] medical records  [id number]        Your Vitals Were     Pulse Temperature Last Period Pulse Oximetry Breastfeeding? BMI (Body Mass Index)    99 98.5  F (36.9  C) (Temporal) 2017 (Approximate) 99% No 26.95 kg/m2       Blood Pressure from Last 3 Encounters:   08/15/17 124/72   06/15/17 124/83   17 132/72    Weight from Last 3 Encounters:   08/15/17 " 167 lb (75.8 kg)   06/15/17 170 lb (77.1 kg)   05/16/17 167 lb 6.4 oz (75.9 kg)              We Performed the Following     Hemoglobin A1c     PHYSICAL THERAPY REFERRAL          Today's Medication Changes          These changes are accurate as of: 8/15/17  5:09 PM.  If you have any questions, ask your nurse or doctor.               These medicines have changed or have updated prescriptions.        Dose/Directions    insulin glargine 100 UNIT/ML injection   Commonly known as:  LANTUS SOLOSTAR   This may have changed:  additional instructions   Used for:  Type 2 diabetes mellitus with hyperglycemia, with long-term current use of insulin (H)        26 units once daily   Quantity:  6 mL   Refills:  5       ketorolac 10 MG tablet   Commonly known as:  TORADOL   This may have changed:  See the new instructions.   Used for:  Chronic pain syndrome   Changed by:  Yaima Muse MD        TAKE ONE TABLET BY MOUTH NIGHTLY AS NEEDED FOR MODERATE PAIN   Quantity:  30 tablet   Refills:  1            Where to get your medicines      These medications were sent to [address]   [address] [place] [address] [facility] [address]     Phone:  [phone]     ketorolac 10 MG tablet         Some of these will need a paper prescription and others can be bought over the counter.  Ask your nurse if you have questions.     Bring a paper prescription for each of these medications     oxyCODONE-acetaminophen 5-325 MG per tablet                Primary Care Provider Office Phone # Fax #    Yaima Muse [phone]       [address] [place] [address]   [facility] [address]        Equal Access to Services     [facility] AH: Hadii candace bruceo Solisandraali, waaxda luqadaha, qaybta kaalmada adeegyada, conrado mason. So [facility] [phone].    ATENCIÓN: Si habla español, tiene a hewitt disposición servicios gratuitos de asistencia lingüística. Llame al  [phone].    We comply with applicable federal civil rights laws and [place] laws. We do not discriminate on the basis of race, color, national origin, age, disability sex, sexual orientation or gender identity.            Thank you!     Thank you for choosing [facility]  for your care. Our goal is always to provide you with excellent care. Hearing back from our patients is one way we can continue to improve our services. Please take a few minutes to complete the written survey that you may receive in the mail after your visit with us. Thank you!             Your Updated Medication List - Protect others around you: Learn how to safely use, store and throw away your medicines at www.disposemymeds.org.          This list is accurate as of: 8/15/17  5:09 PM.  Always use your most recent med list.                   Brand Name Dispense Instructions for use Diagnosis    ACCU-CHEK COMPLETE Kit     1 Device    1 Device daily    Type 2 diabetes, HbA1c goal < 7% (H)       ASPIRIN NOT PRESCRIBED    INTENTIONAL     Antiplatelet medication not prescribed intentionally due to Not indicated based on age        blood glucose monitoring lancets     3 Box    Use to test blood sugars 1-2 times daily or as directed, per patients glucose meter.    Type 2 diabetes mellitus with hyperglycemia, with long-term current use of insulin (H)       blood glucose monitoring test strip    BL TEST STRIP PACK    100 each    Use to test blood sugar 1-2 times daily or as directed. Per patients glucose meter (getting new one today)    Type 2 diabetes mellitus with hyperglycemia, with long-term current use of insulin (H)       ferrous sulfate 325 (65 FE) MG tablet    IRON    180 tablet    Take 1 tablet (325 mg) by mouth 2 times daily    Iron deficiency anemia, unspecified iron deficiency anemia type       FLUoxetine 20 MG capsule    PROzac    180 capsule    Take 2 capsules (40 mg) by mouth daily    Major depressive disorder,  recurrent episode, moderate (H)       IBUPROFEN PO      Take 600 mg by mouth every 4 hours as needed for moderate pain        insulin glargine 100 UNIT/ML injection    LANTUS SOLOSTAR    6 mL    26 units once daily    Type 2 diabetes mellitus with hyperglycemia, with long-term current use of insulin (H)       insulin pen needle 32G X 6 MM    NOVOFINE    100 each    Use once daily or as directed.    Type 2 diabetes mellitus with hyperglycemia, with long-term current use of insulin (H)       ketorolac 10 MG tablet    TORADOL    30 tablet    TAKE ONE TABLET BY MOUTH NIGHTLY AS NEEDED FOR MODERATE PAIN    Chronic pain syndrome       metFORMIN 500 MG 24 hr tablet    GLUCOPHAGE-XR    360 tablet    Take 4 tablets (2,000 mg) by mouth daily (with dinner)    Type 2 diabetes mellitus with hyperglycemia, with long-term current use of insulin (H)       MULTI-VITAMIN GUMMIES Chew      Take 1 chew tab by mouth daily        oxyCODONE 5 MG IR tablet    ROXICODONE    5 tablet    Take 1 tablet (5 mg) by mouth every 6 hours as needed for pain    Acute pain of right knee       oxyCODONE-acetaminophen 5-325 MG per tablet    PERCOCET    30 tablet    Take 1 tablet by mouth nightly as needed for moderate to severe pain    Pain of right lower extremity       simvastatin 20 MG tablet    ZOCOR    90 tablet    Take 1 tablet (20 mg) by mouth At Bedtime    Hyperlipidemia LDL goal <100, Type 2 diabetes mellitus with hyperglycemia, with long-term current use of insulin (H)       traZODone 50 MG tablet    DESYREL    180 tablet    TAKE 1-2 TABLETS BY MOUTH NIGHTLY AS NEEDED FOR SLEEP    Primary insomnia

## 2017-08-16 ENCOUNTER — TELEPHONE (OUTPATIENT)
Dept: FAMILY MEDICINE | Facility: CLINIC | Age: 42
End: 2017-08-16

## 2017-08-16 NOTE — PROGRESS NOTES
Notify Stacy that her diabetes control has NOT improved as we had hoped.  It is actually slightly worse, despite taking her medication more often.  I can raise her insulin dose, but it is only going to work if she actually takes it regularly. We HAVE to make this a priority. I want to speak to her personally, and want to get her re-connected with diabetes educator.   Yaima Muse MD

## 2017-08-16 NOTE — TELEPHONE ENCOUNTER
----- Message from Yaima Muse MD sent at 8/15/2017 10:41 PM CDT -----  Notify Stacy that her diabetes control has NOT improved as we had hoped.  It is actually slightly worse, despite taking her medication more often.  I can raise her insulin dose, but it is only going to work if she actually takes it regularly. We HAVE to make this a priority. I want to speak to her personally, and want to get her re-connected with diabetes educator.   Yaima Muse MD

## 2017-08-18 ENCOUNTER — TELEPHONE (OUTPATIENT)
Dept: FAMILY MEDICINE | Facility: CLINIC | Age: 42
End: 2017-08-18

## 2017-08-18 NOTE — TELEPHONE ENCOUNTER
Diabetes Education Scheduling Outreach #1:    Call to patient to schedule. Left message with phone number to call to schedule.    Plan for 2nd outreach attempt within 1 week.    Mary Porter OnCall  Diabetes and Nutrition Scheduling

## 2017-08-18 NOTE — TELEPHONE ENCOUNTER
I spoke to Stacy and stressed the importance of getting her health in order. I asked her to see diabetes ed, she does not want to yet. She wants 3 more months to work on taking her med as scheduled, will walk every day and eat better and get things under control. She is going to see me again in 3 months and promises to see diabetes ed if not better at that time.  Yaima Muse MD

## 2017-09-05 ENCOUNTER — HOSPITAL ENCOUNTER (OUTPATIENT)
Dept: PHYSICAL THERAPY | Facility: CLINIC | Age: 42
Setting detail: THERAPIES SERIES
End: 2017-09-05
Attending: FAMILY MEDICINE
Payer: COMMERCIAL

## 2017-09-05 PROCEDURE — 40000718 ZZHC STATISTIC PT DEPARTMENT ORTHO VISIT

## 2017-09-05 PROCEDURE — 97110 THERAPEUTIC EXERCISES: CPT | Mod: GP

## 2017-09-05 PROCEDURE — 97161 PT EVAL LOW COMPLEX 20 MIN: CPT | Mod: GP

## 2017-09-06 NOTE — PROGRESS NOTES
09/05/17 1600   General Information   Start of Care Date 09/05/17   Referring Physician Dr. Yaima Muse   Patient/Family Goals Statement reduce the pain level in right shoulder   Orders Evaluate and Treat   Date of Order 09/05/17   Insurance Type [employer]   Insurance Comments/Visits Authorized 40 sessions   Medical Diagnosis right anterior shoulder pain   Surgical/Medical history reviewed Yes   Precautions/Limitations no known precautions/limitations   Weight-Bearing Status - LUE full weight-bearing   Weight-Bearing Status - RUE full weight-bearing   Weight-Bearing Status - LLE full weight-bearing   Weight-Bearing Status - RLE full weight-bearing   Body Part(s)   Body Part(s) Shoulder   Presentation and Etiology   Pertinent history of current problem (include personal factors and/or comorbidities that impact the POC)  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 Pain;D. Decreased ROM;F. Decreased strength and endurance   Functional Limitations perform activities of daily living;perform required work activities;perform desired leisure / sports activities   Symptom Location right shoulder   How/Where did it occur With repetition/overuse   Onset date of current episode/exacerbation 08/02/17   Chronicity Recurrent   Pain rating (0-10 point scale) Best (/10);Worst (/10)   Best (/10) best 0/10    average 5/10   Worst (/10) 8/10   Pain rating comment pain is always in the same spot . bicep tendon and impingement   Pain quality A. Sharp;B. Dull;C. Aching   Pain quality comment change of positions changes the pain feel   Frequency of pain/symptoms B. Intermittent   Pain/symptoms are: Worse during the day   Pain/symptoms exacerbated by G. Certain positions;H. Overhead reach;L. Work tasks   Pain/symptoms eased by E. Changing positions;F. Certain positions   Progression of symptoms since onset: Worsened   Prior Level of Function   Prior Level of Function-Mobility indep  (no  pain)   Prior Level of Function-ADLs indep   Current Level of Function   Patient role/employment history A. Employed;C. Homemaker   Employment Comments  from a truck   Living environment House/townhome   Home/community accessibility driving is okay but opening heavy doors or reaching are painful   Fall Risk Screen   Fall screen completed by PT   Per patient - Fall 2 or more times in past year? No   Per patient - Fall with injury in past year? No   Functional Scales   Other Scales  SPADI 74.62   Shoulder Objective Findings   Side (if bilateral, select both right and left) Right   Integumentary  no issues identified   Posture mild forward head and rounded shoulders   Cervical Screen (ROM, quadrant) normal   Thoracic Mobility Screen normal   Shoulder ROM Comment end range pinch   Scapulothoracic Rhythm WFL   Polanco-Trevor Test pos   Palpation supraspinatus tendon and infraspinatus tendon were very tender . pain isolated to those tendons   Right Shoulder Flexion AROM 170   Right Shoulder Abduction AROM 147   Right Shoulder ER AROM 94   Right Shoulder IR AROM 78   Right Shoulder Flexion Strength 5   Right Shoulder Abduction Strength 5   Right Shoulder ER Strength 5   Right Shoulder IR Strength 5   Right Shoulder Extension Strength 5   Right Mid Trapezius Strength 4   Right Lower Trapezius Strength 3   Planned Therapy Interventions   Planned Therapy Interventions manual therapy;neuromuscular re-education;strengthening;ROM   Planned Modality Interventions   Planned Modality Interventions Ultrasound   Clinical Impression   Criteria for Skilled Therapeutic Interventions Met yes, treatment indicated   PT Diagnosis impingment symptoms and posture dysfunction , post shoulder weakness.   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[facility] [employer]   Clinical Presentation Stable/Uncomplicated   Clinical Decision Making (Complexity) Low complexity   Therapy  Frequency 1 time/week  (increase to 2x or down to 1x/week as pt needs)   Predicted Duration of Therapy Intervention (days/wks) 60 days   Risk & Benefits of therapy have been explained Yes   Patient, Family & other staff in agreement with plan of care Yes   Education Assessment   Preferred Learning Style Listening;Reading;Demonstration;Pictures/video   Barriers to Learning No barriers   Ortho Goal 1   Goal Identifier 1   Goal Description Pt will report 75 to 100% reduced in pain with full ROM to be able to do her job pain free.   Target Date 11/03/17   Ortho Goal 2   Goal Identifier 2   Goal Description Pt will be independent with correct posture and body mechanics to do her job and preserve pain mangment.   Target Date 11/03/17   Total Evaluation Time   Total Evaluation Time 30

## 2017-09-09 ENCOUNTER — HOSPITAL ENCOUNTER (EMERGENCY)
Facility: CLINIC | Age: 42
Discharge: HOME | End: 2017-09-09
Attending: PHYSICIAN ASSISTANT | Admitting: PHYSICIAN ASSISTANT
Payer: COMMERCIAL

## 2017-09-09 VITALS
HEIGHT: 66 IN | BODY MASS INDEX: 26.84 KG/M2 | WEIGHT: 167 LBS | RESPIRATION RATE: 16 BRPM | TEMPERATURE: 97.6 F | SYSTOLIC BLOOD PRESSURE: 147 MMHG | HEART RATE: 88 BPM | DIASTOLIC BLOOD PRESSURE: 86 MMHG | OXYGEN SATURATION: 99 %

## 2017-09-09 DIAGNOSIS — N39.0 URINARY TRACT INFECTION, SITE UNSPECIFIED: ICD-10-CM

## 2017-09-09 LAB
ALBUMIN UR-MCNC: 30 MG/DL
APPEARANCE UR: ABNORMAL
BACTERIA #/AREA URNS HPF: ABNORMAL /HPF
BILIRUB UR QL STRIP: NEGATIVE
COLOR UR AUTO: YELLOW
GLUCOSE UR STRIP-MCNC: >499 MG/DL
HGB UR QL STRIP: NEGATIVE
KETONES UR STRIP-MCNC: 5 MG/DL
LEUKOCYTE ESTERASE UR QL STRIP: ABNORMAL
MUCOUS THREADS #/AREA URNS LPF: PRESENT /LPF
NITRATE UR QL: NEGATIVE
PH UR STRIP: 5 PH (ref 5–7)
RBC #/AREA URNS AUTO: 5 /HPF (ref 0–2)
SOURCE: ABNORMAL
SP GR UR STRIP: 1.03 (ref 1–1.03)
SQUAMOUS #/AREA URNS AUTO: 2 /HPF (ref 0–1)
UROBILINOGEN UR STRIP-MCNC: 2 MG/DL (ref 0–2)
WBC #/AREA URNS AUTO: 4 /HPF (ref 0–2)

## 2017-09-09 PROCEDURE — 81001 URINALYSIS AUTO W/SCOPE: CPT | Performed by: FAMILY MEDICINE

## 2017-09-09 PROCEDURE — 99283 EMERGENCY DEPT VISIT LOW MDM: CPT | Mod: Z6 | Performed by: PHYSICIAN ASSISTANT

## 2017-09-09 PROCEDURE — 99283 EMERGENCY DEPT VISIT LOW MDM: CPT | Performed by: PHYSICIAN ASSISTANT

## 2017-09-09 RX ORDER — PHENAZOPYRIDINE HYDROCHLORIDE 200 MG/1
200 TABLET, FILM COATED ORAL 3 TIMES DAILY
Qty: 9 TABLET | Refills: 0 | Status: SHIPPED | OUTPATIENT
Start: 2017-09-09 | End: 2017-09-12

## 2017-09-09 RX ORDER — CIPROFLOXACIN 500 MG/1
500 TABLET, FILM COATED ORAL 2 TIMES DAILY
Qty: 6 TABLET | Refills: 0 | Status: SHIPPED | OUTPATIENT
Start: 2017-09-09 | End: 2017-09-12

## 2017-09-09 ASSESSMENT — ENCOUNTER SYMPTOMS
ABDOMINAL PAIN: 1
FEVER: 0
DIFFICULTY URINATING: 1
CHILLS: 1
DYSURIA: 1
FREQUENCY: 1

## 2017-09-09 NOTE — ED PROVIDER NOTES
History     Chief Complaint   Patient presents with     Rule out Urinary Tract Infection     The history is provided by the patient.     Stacy Brown is a 42 year old female with a history of recurrent UTI's and Type II Diabetes who presents to the ED complaining of urinary issues. Patient states that she has recently been experiencing dysuria, urinary frequency, urinary urgency, suprapubic pressure, and decreased urine output. She also notes intermittent chills. Patient denies any fever or other associated symptoms.     Patient Active Problem List   Diagnosis     TYPE 2 DIABETES, HBA1C GOAL < 7%     HYPERLIPIDEMIA LDL GOAL <100     PMDD (premenstrual dysphoric disorder)     Health Care Home     Iron deficiency anemia     Halitosis     Moderate major depression (H)     Restless legs syndrome (RLS)     Insomnia     Chronic pain syndrome     Overweight     Past Medical History:   Diagnosis Date     Knee pain, chronic      Mixed hyperlipidemia      Type II or unspecified type diabetes mellitus without mention of complication, not stated as uncontrolled        Past Surgical History:   Procedure Laterality Date     C STABISM SURG,PREV EYE SURG,NOT MUSC      Right     HC OPEN TX METATARSAL FRACTURE  age 12    softball injury,open fracture left foot     HC TOOTH EXTRACTION W/FORCEP      Extract wisdom teeth     TUBAL LIGATION  7/27/2006       Family History   Problem Relation Age of Onset     DIABETES Maternal Grandmother      Allergies Mother      Hypertension Maternal Grandmother      CEREBROVASCULAR DISEASE Paternal Grandfather      CANCER Maternal Grandfather      Lung - metastatic     HEART DISEASE Maternal Grandmother      Bypass     Anesthesia Reaction No family hx of      Lipids Father      cholesterol     Alzheimer Disease Paternal Grandmother      HEART DISEASE Paternal Grandmother      valve replacement       Social History   Substance Use Topics     Smoking status: Former Smoker     Years: 6.00     Quit  date: 9/22/2010     Smokeless tobacco: Never Used     Alcohol use 0.0 oz/week     0 Standard drinks or equivalent per week      Comment: once a month        Immunization History   Administered Date(s) Administered     Influenza (IIV3) 09/21/2012     Influenza Vaccine IM 3yrs+ 4 Valent IIV4 12/28/2015     Pneumococcal (PCV 13) 04/14/2017     Pneumococcal 23 valent 12/28/2015     TDAP Vaccine (Adacel) 10/08/2007, 09/07/2015          Allergies   Allergen Reactions     Amoxicillin Hives       Current Outpatient Prescriptions   Medication Sig Dispense Refill     ciprofloxacin (CIPRO) 500 MG tablet Take 1 tablet (500 mg) by mouth 2 times daily for 3 days 6 tablet 0     phenazopyridine (PYRIDIUM) 200 MG tablet Take 1 tablet (200 mg) by mouth 3 times daily for 3 days 9 tablet 0     ketorolac (TORADOL) 10 MG tablet TAKE ONE TABLET BY MOUTH NIGHTLY AS NEEDED FOR MODERATE PAIN 30 tablet 1     oxyCODONE (ROXICODONE) 5 MG IR tablet Take 1 tablet (5 mg) by mouth every 6 hours as needed for pain 5 tablet 0     FLUoxetine (PROZAC) 20 MG capsule Take 2 capsules (40 mg) by mouth daily 180 capsule 1     traZODone (DESYREL) 50 MG tablet TAKE 1-2 TABLETS BY MOUTH NIGHTLY AS NEEDED FOR SLEEP 180 tablet 1     ferrous sulfate (IRON) 325 (65 FE) MG tablet Take 1 tablet (325 mg) by mouth 2 times daily 180 tablet 3     simvastatin (ZOCOR) 20 MG tablet Take 1 tablet (20 mg) by mouth At Bedtime 90 tablet 3     metFORMIN (GLUCOPHAGE-XR) 500 MG 24 hr tablet Take 4 tablets (2,000 mg) by mouth daily (with dinner) 360 tablet 3     insulin glargine (LANTUS SOLOSTAR) 100 UNIT/ML injection 26 units once daily (Patient taking differently: 28 or 30 units once daily) 6 mL 5     IBUPROFEN PO Take 600 mg by mouth every 4 hours as needed for moderate pain       Multiple Vitamins-Minerals (MULTI-VITAMIN GUMMIES) CHEW Take 1 chew tab by mouth daily        ASPIRIN NOT PRESCRIBED (INTENTIONAL) Antiplatelet medication not prescribed intentionally due to Not  "indicated based on age       insulin pen needle (NOVOFINE) 32G X 6 MM Use once daily or as directed. 100 each 3     blood glucose monitoring (BL TEST STRIP PACK) test strip Use to test blood sugar 1-2 times daily or as directed. Per patients glucose meter (getting new one today) 100 each 3     blood glucose monitoring (FREESTYLE) lancets Use to test blood sugars 1-2 times daily or as directed, per patients glucose meter. 3 Box 3     Blood Glucose Monitoring Suppl (ACCU-CHEK COMPLETE) KIT 1 Device daily 1 Device 0     I have reviewed the Medications, Allergies, Past Medical and Surgical History, and Social History in the Epic system.    Review of Systems   Constitutional: Positive for chills. Negative for fever.   Gastrointestinal: Positive for abdominal pain (suprapubic).   Genitourinary: Positive for decreased urine volume, difficulty urinating, dysuria, frequency and urgency.   All other systems reviewed and are negative.      Physical Exam   BP: 147/86  Pulse: 88  Temp: 97.6  F (36.4  C)  Resp: 16  Height: 167.6 cm (5' 6\")  Weight: 75.8 kg (167 lb)  SpO2: 99 %    Physical Exam   Constitutional: She is oriented to person, place, and time. No distress.   HENT:   Head: Normocephalic and atraumatic.   Eyes: Conjunctivae and EOM are normal.   Neck: Normal range of motion.   Cardiovascular: Normal rate, regular rhythm, normal heart sounds and intact distal pulses.    No murmur heard.  Pulmonary/Chest: Effort normal and breath sounds normal. No respiratory distress. She has no wheezes. She has no rales.   Abdominal: Soft. Bowel sounds are normal. She exhibits no distension. There is no hepatosplenomegaly. There is tenderness (mild) in the suprapubic area. There is CVA tenderness (right sided). There is no rebound and no guarding.   Musculoskeletal: Normal range of motion.   Neurological: She is alert and oriented to person, place, and time.   Skin: Skin is warm and dry. No rash noted. She is not diaphoretic. No pallor. "   Psychiatric: She has a normal mood and affect. Her behavior is normal.   Nursing note and vitals reviewed.      ED Course     ED Course     Procedures             Critical Care time:  none               Results for orders placed or performed during the hospital encounter of 09/09/17 (from the past 24 hour(s))   UA with Microscopic   Result Value Ref Range    Color Urine Yellow     Appearance Urine Slightly Cloudy     Glucose Urine >499 (A) NEG^Negative mg/dL    Bilirubin Urine Negative NEG^Negative    Ketones Urine 5 (A) NEG^Negative mg/dL    Specific Gravity Urine 1.033 1.003 - 1.035    Blood Urine Negative NEG^Negative    pH Urine 5.0 5.0 - 7.0 pH    Protein Albumin Urine 30 (A) NEG^Negative mg/dL    Urobilinogen mg/dL 2.0 0.0 - 2.0 mg/dL    Nitrite Urine Negative NEG^Negative    Leukocyte Esterase Urine Small (A) NEG^Negative    Source Midstream Urine     WBC Urine 4 (H) 0 - 2 /HPF    RBC Urine 5 (H) 0 - 2 /HPF    Bacteria Urine Few (A) NEG^Negative /HPF    Squamous Epithelial /HPF Urine 2 (H) 0 - 1 /HPF    Mucous Urine Present (A) NEG^Negative /LPF       Medications - No data to display    Assessments & Plan (with Medical Decision Making)  Urinary tract infection, site unspecified     Stacy is a 42 year old female who presents to the ED complaining of dysuria, urinary urgency, urinary frequency, suprapubic abdominal pain, and decreased urine output. She is afebrile with normal vitals upon presentation to the ED. On exam, she exhibits very mild right CVA tenderness and mild suprapubic tenderness to palpation. UA collected, which is consistent with a UTI as above. I reviewed my findings with the patient.  Encouraged the patient to rest and stay well hydrated. Prescribed Cipro to combat infection, see orders. I also prescribed Pyridium to treat dysuria, see orders. The patient can use Tylenol/ Ibuprofen to treat her pain prn. Patient is in agreement with this treatment plan and stable for discharge. Follow up in  clinic if symptoms persist, or sooner with high fever, hematuria, or other worsening symptoms.      I have reviewed the nursing notes.    I have reviewed the findings, diagnosis, plan and need for follow up with the patient.       New Prescriptions    CIPROFLOXACIN (CIPRO) 500 MG TABLET    Take 1 tablet (500 mg) by mouth 2 times daily for 3 days    PHENAZOPYRIDINE (PYRIDIUM) 200 MG TABLET    Take 1 tablet (200 mg) by mouth 3 times daily for 3 days       Final diagnoses:   Urinary tract infection, site unspecified     This document serves as a record of services personally performed by Alexandr Givens PA. It was created on their behalf by Demetrice Silva, a trained medical scribe. The creation of this record is based on the provider's personal observations and the statements of the patient. This document has been checked and approved by the attending provider.    Note: Chart documentation done in part with Dragon Voice Recognition software. Although reviewed after completion, some word and grammatical errors may remain.    9/9/2017   Alexandr Givens PA-C   [facility] EMERGENCY DEPARTMENT     Alexandr Givens PA-C  09/09/17 2026

## 2017-09-09 NOTE — DISCHARGE INSTRUCTIONS
Urinary Tract Infections in Women    Urinary tract infections (UTIs) are most often caused by bacteria (germs). These bacteria enter the urinary tract. The bacteria may come from outside the body. Or they may travel from the skin outside the rectum or vagina into the urethra. Female anatomy makes it easy for bacteria from the bowel to enter a woman s urinary tract, which is the most common source of UTI. This means women develop UTIs more often than men. Pain in or around the urinary tract is a common UTI symptom. But the only way to know for sure if you have a UTI for the healthcare provider to test your urine. The two tests that may be done are the urinalysis and urine culture.  Types of UTIs    Cystitis: A bladder infection (cystitis) is the most common UTI in women. You may have urgent or frequent urination. You may also have pain, burning when you urinate, and bloody urine.    Urethritis: This is an inflamed urethra, which is the tube that carries urine from the bladder to outside the body. You may have lower stomach or back pain. You may also have urgent or frequent urination.    Pyelonephritis: This is a kidney infection. If not treated, it can be serious and damage your kidneys. In severe cases, you may be hospitalized. You may have a fever and lower back pain.  Medicines to treat a UTI  Most UTIs are treated with antibiotics. These kill the bacteria. The length of time you need to take them depends on the type of infection. It may be as short as 3 days. If you have repeated UTIs, a low-dose antibiotic may be needed for several months. Take antibiotics exactly as directed. Don t stop taking them until all of the medicine is gone. If you stop taking the antibiotic too soon, the infection may not go away, and you may develop a resistance to the antibiotic. This can make it much harder to treat.  Lifestyle changes to treat and prevent UTIs  The lifestyle changes below will help get rid of your UTI. They may  also help prevent future UTIs.    Drink plenty of fluids. This includes water, juice, or other caffeine-free drinks. Fluids help flush bacteria out of your body.    Empty your bladder. Always empty your bladder when you feel the urge to urinate. And always urinate before going to sleep. Urine that stays in your bladder can lead to infection. Try to urinate before and after sex as well.    Practice good personal hygiene. Wipe yourself from front to back after using the toilet. This helps keep bacteria from getting into the urethra.    Use condoms during sex. These help prevent UTIs caused by sexually transmitted bacteria. Also, avoid using spermicides during sex. These can increase the risk of UTIs. Choose other forms of birth control instead. For women who tend to get UTIs after sex, a low-dose of a preventive antibiotic may be used. Be sure to discuss this option with your healthcare provider.    Follow up with your healthcare provider as directed. He or she may test to make sure the infection has cleared. If needed, more treatment may be started.  Date Last Reviewed: 1/1/2017 2000-2017 [employer]. [address] [place] [id number]. All rights reserved. This information is not intended as a substitute for professional medical care. Always follow your healthcare professional's instructions.

## 2017-09-09 NOTE — ED AVS SNAPSHOT
[facility] Emergency Department    [address] [place] [address]    Phone:  [phone]    Fax:  [phone]                                       Stacy Brown   MRN: [id number]    Department:  [facility] Emergency Department   Date of Visit:  9/9/2017           Patient Information     Date Of Birth          1975        Your diagnoses for this visit were:     Urinary tract infection, site unspecified        You were seen by Alexandr Givens PA-C.      Follow-up Information     Follow up with Yaima Muse MD.    Specialty:  Family Practice    Why:  As needed, If symptoms worsen    Contact information:    [address]   [place] [id number] [phone]          Discharge Instructions         Urinary Tract Infections in Women    Urinary tract infections (UTIs) are most often caused by bacteria (germs). These bacteria enter the urinary tract. The bacteria may come from outside the body. Or they may travel from the skin outside the rectum or vagina into the urethra. Female anatomy makes it easy for bacteria from the bowel to enter a woman s urinary tract, which is the most common source of UTI. This means women develop UTIs more often than men. Pain in or around the urinary tract is a common UTI symptom. But the only way to know for sure if you have a UTI for the healthcare provider to test your urine. The two tests that may be done are the urinalysis and urine culture.  Types of UTIs    Cystitis: A bladder infection (cystitis) is the most common UTI in women. You may have urgent or frequent urination. You may also have pain, burning when you urinate, and bloody urine.    Urethritis: This is an inflamed urethra, which is the tube that carries urine from the bladder to outside the body. You may have lower stomach or back pain. You may also have urgent or frequent urination.    Pyelonephritis: This is a kidney infection. If not treated, it can be serious and  damage your kidneys. In severe cases, you may be hospitalized. You may have a fever and lower back pain.  Medicines to treat a UTI  Most UTIs are treated with antibiotics. These kill the bacteria. The length of time you need to take them depends on the type of infection. It may be as short as 3 days. If you have repeated UTIs, a low-dose antibiotic may be needed for several months. Take antibiotics exactly as directed. Don t stop taking them until all of the medicine is gone. If you stop taking the antibiotic too soon, the infection may not go away, and you may develop a resistance to the antibiotic. This can make it much harder to treat.  Lifestyle changes to treat and prevent UTIs  The lifestyle changes below will help get rid of your UTI. They may also help prevent future UTIs.    Drink plenty of fluids. This includes water, juice, or other caffeine-free drinks. Fluids help flush bacteria out of your body.    Empty your bladder. Always empty your bladder when you feel the urge to urinate. And always urinate before going to sleep. Urine that stays in your bladder can lead to infection. Try to urinate before and after sex as well.    Practice good personal hygiene. Wipe yourself from front to back after using the toilet. This helps keep bacteria from getting into the urethra.    Use condoms during sex. These help prevent UTIs caused by sexually transmitted bacteria. Also, avoid using spermicides during sex. These can increase the risk of UTIs. Choose other forms of birth control instead. For women who tend to get UTIs after sex, a low-dose of a preventive antibiotic may be used. Be sure to discuss this option with your healthcare provider.    Follow up with your healthcare provider as directed. He or she may test to make sure the infection has cleared. If needed, more treatment may be started.  Date Last Reviewed: 1/1/2017 2000-2017 [employer]. [address] [place] [address], [place] [address]. All rights  reserved. This information is not intended as a substitute for professional medical care. Always follow your healthcare professional's instructions.          Future Appointments        Provider Department Dept Phone Center    9/21/2017 4:30 PM Sherly Pavon, PT [facility] [phone] [place]    9/28/2017 4:30 PM Sherly Pavon, PT [facility] [phone] [place]    10/5/2017 4:45 PM Sherly Pavon, PT [facility] [phone] [place]    10/12/2017 4:45 PM Sherly Pavon, PT [facility] [phone] [facility]    11/21/2017 5:00 PM Yaima Muse MD [facility] [phone] [facility]      24 Hour Appointment Hotline       To make an appointment at any [facility], call [phone] ([phone]). If you don't have a family doctor or clinic, we will help you find one. [facility] are conveniently located to serve the needs of you and your family.             Review of your medicines      START taking        Dose / Directions Last dose taken    ciprofloxacin 500 MG tablet   Commonly known as:  CIPRO   Dose:  500 mg   Quantity:  6 tablet        Take 1 tablet (500 mg) by mouth 2 times daily for 3 days   Refills:  0        phenazopyridine 200 MG tablet   Commonly known as:  PYRIDIUM   Dose:  200 mg   Quantity:  9 tablet        Take 1 tablet (200 mg) by mouth 3 times daily for 3 days   Refills:  0          Our records show that you are taking the medicines listed below. If these are incorrect, please call your family doctor or clinic.        Dose / Directions Last dose taken    ACCU-CHEK COMPLETE Kit   Dose:  1 Device   Quantity:  1 Device        1 Device daily   Refills:  0        ASPIRIN NOT PRESCRIBED   Commonly known as:  INTENTIONAL        Antiplatelet medication not prescribed intentionally due to Not indicated based on age   Refills:  0        blood  glucose monitoring lancets   Quantity:  3 Box        Use to test blood sugars 1-2 times daily or as directed, per patients glucose meter.   Refills:  3        blood glucose monitoring test strip   Commonly known as:  BL TEST STRIP PACK   Quantity:  100 each        Use to test blood sugar 1-2 times daily or as directed. Per patients glucose meter (getting new one today)   Refills:  3        ferrous sulfate 325 (65 FE) MG tablet   Commonly known as:  IRON   Dose:  325 mg   Quantity:  180 tablet        Take 1 tablet (325 mg) by mouth 2 times daily   Refills:  3        FLUoxetine 20 MG capsule   Commonly known as:  PROzac   Dose:  40 mg   Quantity:  180 capsule        Take 2 capsules (40 mg) by mouth daily   Refills:  1        IBUPROFEN PO   Dose:  600 mg        Take 600 mg by mouth every 4 hours as needed for moderate pain   Refills:  0        insulin glargine 100 UNIT/ML injection   Commonly known as:  LANTUS SOLOSTAR   Quantity:  6 mL        26 units once daily   Refills:  5        insulin pen needle 32G X 6 MM   Commonly known as:  NOVOFINE   Quantity:  100 each        Use once daily or as directed.   Refills:  3        ketorolac 10 MG tablet   Commonly known as:  TORADOL   Quantity:  30 tablet        TAKE ONE TABLET BY MOUTH NIGHTLY AS NEEDED FOR MODERATE PAIN   Refills:  1        metFORMIN 500 MG 24 hr tablet   Commonly known as:  GLUCOPHAGE-XR   Dose:  2000 mg   Quantity:  360 tablet        Take 4 tablets (2,000 mg) by mouth daily (with dinner)   Refills:  3        MULTI-VITAMIN GUMMIES Chew   Dose:  1 chew tab        Take 1 chew tab by mouth daily   Refills:  0        oxyCODONE 5 MG IR tablet   Commonly known as:  ROXICODONE   Dose:  5 mg   Quantity:  5 tablet        Take 1 tablet (5 mg) by mouth every 6 hours as needed for pain   Refills:  0        simvastatin 20 MG tablet   Commonly known as:  ZOCOR   Dose:  20 mg   Quantity:  90 tablet        Take 1 tablet (20 mg) by mouth At Bedtime   Refills:  3         traZODone 50 MG tablet   Commonly known as:  DESYREL   Quantity:  180 tablet        TAKE 1-2 TABLETS BY MOUTH NIGHTLY AS NEEDED FOR SLEEP   Refills:  1                Prescriptions were sent or printed at these locations (2 Prescriptions)                   [facility]   [address] [address]    Telephone:  [phone]   Fax:  [phone]   Hours:                  These medications are not ready yet because we are checking if your insurance will help you pay for them. Call your pharmacy to confirm that your medication is ready for pickup. It may take up to 24 hours for them to receive the prescription. If the prescription is not ready within 3 business days, please contact your clinic or your provider (2 of 2)         ciprofloxacin (CIPRO) 500 MG tablet               phenazopyridine (PYRIDIUM) 200 MG tablet                Procedures and tests performed during your visit     UA with Microscopic      Orders Needing Specimen Collection     Ordered          09/09/17 1775  Urine Culture Aerobic Bacterial - ROUTINE, Prio: Routine, Needs to be Collected     Scheduled Task Status   09/09/17 1848 Collect Urine Culture Aerobic Bacterial Open   Order Class:  PCU Collect                  Pending Results     No orders found from 9/7/2017 to 9/10/2017.            Pending Culture Results     No orders found from 9/7/2017 to 9/10/2017.            Pending Results Instructions     If you had any lab results that were not finalized at the time of your Discharge, you can call the ED Lab Result RN at [phone]. You will be contacted by this team for any positive Lab results or changes in treatment. The nurses are available 7 days a week from 10A to 6:30P.  You can leave a message 24 hours per day and they will return your call.        Thank you for choosing [facility]       Thank you for choosing [facility] for your care. Our goal is always to provide you with excellent care. Hearing back  "from our patients is one way we can continue to improve our services. Please take a few minutes to complete the written survey that you may receive in the mail after you visit with us. Thank you!        [employer] Information     [employer] lets you send messages to your doctor, view your test results, renew your prescriptions, schedule appointments and more. To sign up, go to [email or web address] . Click on \"Log in\" on the left side of the screen, which will take you to the Welcome page. Then click on \"Sign up Now\" on the right side of the page.     You will be asked to enter the access code listed below, as well as some personal information. Please follow the directions to create your username and password.     Your access code is: [email or web address]  Expires: 2017  5:09 PM     Your access code will  in 90 days. If you need help or a new code, please call your [facility] clinic or [phone].        Care EveryWhere ID     This is your Care EveryWhere ID. This could be used by other organizations to access your [facility] medical records  [id number]        Equal Access to Services     BRADLEY KRAUS : Hadarron Olivier, hazel padilla, candace cm, conrado moya . So [facility] [phone].    ATENCIÓN: Si habla español, tiene a hewitt disposición servicios gratuitos de asistencia lingüística. Gio al [phone].    We comply with applicable federal civil rights laws and [place] laws. We do not discriminate on the basis of race, color, national origin, age, disability sex, sexual orientation or gender identity.            After Visit Summary       This is your record. Keep this with you and show to your community pharmacist(s) and doctor(s) at your next visit.                  "

## 2017-09-09 NOTE — ED AVS SNAPSHOT
[facility] Emergency Department    [address] [place] [address]    Phone:  [phone]    Fax:  [phone]                                       Stacy Brown   MRN: [id number]    Department:  [facility] Emergency Department   Date of Visit:  9/9/2017           After Visit Summary Signature Page     I have received my discharge instructions, and my questions have been answered. I have discussed any challenges I see with this plan with the nurse or doctor.    ..........................................................................................................................................  Patient/Patient Representative Signature      ..........................................................................................................................................  Patient Representative Print Name and Relationship to Patient    ..................................................               ................................................  Date                                            Time    ..........................................................................................................................................  Reviewed by Signature/Title    ...................................................              ..............................................  Date                                                            Time

## 2017-09-10 ENCOUNTER — HOSPITAL ENCOUNTER (EMERGENCY)
Facility: CLINIC | Age: 42
Discharge: HOME | End: 2017-09-10
Attending: FAMILY MEDICINE | Admitting: FAMILY MEDICINE
Payer: COMMERCIAL

## 2017-09-10 ENCOUNTER — APPOINTMENT (OUTPATIENT)
Dept: CT IMAGING | Facility: CLINIC | Age: 42
End: 2017-09-10
Attending: FAMILY MEDICINE
Payer: COMMERCIAL

## 2017-09-10 VITALS
DIASTOLIC BLOOD PRESSURE: 67 MMHG | TEMPERATURE: 98.8 F | SYSTOLIC BLOOD PRESSURE: 122 MMHG | WEIGHT: 167 LBS | HEART RATE: 90 BPM | BODY MASS INDEX: 26.95 KG/M2 | OXYGEN SATURATION: 97 % | RESPIRATION RATE: 18 BRPM

## 2017-09-10 DIAGNOSIS — S33.5XXA SPRAIN OF LUMBAR REGION, INITIAL ENCOUNTER: ICD-10-CM

## 2017-09-10 DIAGNOSIS — N39.0 URINARY TRACT INFECTION WITHOUT HEMATURIA, SITE UNSPECIFIED: ICD-10-CM

## 2017-09-10 DIAGNOSIS — S39.012A STRAIN OF BACK, INITIAL ENCOUNTER: ICD-10-CM
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
## 2017-09-10 LAB
ALBUMIN SERPL-MCNC: 3.3 G/DL (ref 3.4–5)
ALP SERPL-CCNC: 68 U/L (ref 40–150)
ALT SERPL W P-5'-P-CCNC: 17 U/L (ref 0–50)
ANION GAP SERPL CALCULATED.3IONS-SCNC: 12 MMOL/L (ref 3–14)
AST SERPL W P-5'-P-CCNC: 8 U/L (ref 0–45)
BASOPHILS # BLD AUTO: 0 10E9/L (ref 0–0.2)
BASOPHILS NFR BLD AUTO: 0.5 %
BILIRUB SERPL-MCNC: 0.2 MG/DL (ref 0.2–1.3)
BUN SERPL-MCNC: 11 MG/DL (ref 7–30)
CALCIUM SERPL-MCNC: 8.4 MG/DL (ref 8.5–10.1)
CHLORIDE SERPL-SCNC: 106 MMOL/L (ref 94–109)
CO2 SERPL-SCNC: 23 MMOL/L (ref 20–32)
CREAT SERPL-MCNC: 0.4 MG/DL (ref 0.52–1.04)
DIFFERENTIAL METHOD BLD: ABNORMAL
EOSINOPHIL # BLD AUTO: 0.1 10E9/L (ref 0–0.7)
EOSINOPHIL NFR BLD AUTO: 1.3 %
ERYTHROCYTE [DISTWIDTH] IN BLOOD BY AUTOMATED COUNT: 20.6 % (ref 10–15)
GFR SERPL CREATININE-BSD FRML MDRD: >90 ML/MIN/1.7M2
GLUCOSE SERPL-MCNC: 158 MG/DL (ref 70–99)
HCT VFR BLD AUTO: 33.9 % (ref 35–47)
HGB BLD-MCNC: 10.4 G/DL (ref 11.7–15.7)
LYMPHOCYTES # BLD AUTO: 1.5 10E9/L (ref 0.8–5.3)
LYMPHOCYTES NFR BLD AUTO: 18.8 %
MCH RBC QN AUTO: 20.6 PG (ref 26.5–33)
MCHC RBC AUTO-ENTMCNC: 30.7 G/DL (ref 31.5–36.5)
MCV RBC AUTO: 67 FL (ref 78–100)
MONOCYTES # BLD AUTO: 0.7 10E9/L (ref 0–1.3)
MONOCYTES NFR BLD AUTO: 8.8 %
NEUTROPHILS # BLD AUTO: 5.5 10E9/L (ref 1.6–8.3)
NEUTROPHILS NFR BLD AUTO: 70.6 %
PLATELET # BLD AUTO: 245 10E9/L (ref 150–450)
POTASSIUM SERPL-SCNC: 3.8 MMOL/L (ref 3.4–5.3)
PROT SERPL-MCNC: 6.8 G/DL (ref 6.8–8.8)
RBC # BLD AUTO: 5.06 10E12/L (ref 3.8–5.2)
SODIUM SERPL-SCNC: 141 MMOL/L (ref 133–144)
WBC # BLD AUTO: 7.8 10E9/L (ref 4–11)

## 2017-09-10 PROCEDURE — 96374 THER/PROPH/DIAG INJ IV PUSH: CPT | Performed by: FAMILY MEDICINE

## 2017-09-10 PROCEDURE — 25000132 ZZH RX MED GY IP 250 OP 250 PS 637: Performed by: FAMILY MEDICINE

## 2017-09-10 PROCEDURE — 74176 CT ABD & PELVIS W/O CONTRAST: CPT

## 2017-09-10 PROCEDURE — 25000128 H RX IP 250 OP 636: Performed by: FAMILY MEDICINE

## 2017-09-10 PROCEDURE — 99284 EMERGENCY DEPT VISIT MOD MDM: CPT | Mod: Z6 | Performed by: FAMILY MEDICINE

## 2017-09-10 PROCEDURE — 80053 COMPREHEN METABOLIC PANEL: CPT | Performed by: FAMILY MEDICINE

## 2017-09-10 PROCEDURE — 99284 EMERGENCY DEPT VISIT MOD MDM: CPT | Mod: 25 | Performed by: FAMILY MEDICINE

## 2017-09-10 PROCEDURE — 85025 COMPLETE CBC W/AUTO DIFF WBC: CPT | Performed by: FAMILY MEDICINE

## 2017-09-10 RX ORDER — LIDOCAINE 40 MG/G
CREAM TOPICAL
Status: DISCONTINUED | OUTPATIENT
Start: 2017-09-10 | End: 2017-09-10 | Stop reason: HOSPADM

## 2017-09-10 RX ORDER — ONDANSETRON 2 MG/ML
4 INJECTION INTRAMUSCULAR; INTRAVENOUS EVERY 30 MIN PRN
Status: DISCONTINUED | OUTPATIENT
Start: 2017-09-10 | End: 2017-09-10 | Stop reason: HOSPADM

## 2017-09-10 RX ORDER — TRAMADOL HYDROCHLORIDE 50 MG/1
50-100 TABLET ORAL EVERY 6 HOURS PRN
Qty: 12 TABLET | Refills: 0 | Status: SHIPPED | OUTPATIENT
Start: 2017-09-10 | End: 2017-11-21

## 2017-09-10 RX ORDER — KETOROLAC TROMETHAMINE 30 MG/ML
30 INJECTION, SOLUTION INTRAMUSCULAR; INTRAVENOUS ONCE
Status: COMPLETED | OUTPATIENT
Start: 2017-09-10 | End: 2017-09-10

## 2017-09-10 RX ORDER — CIPROFLOXACIN 500 MG/1
500 TABLET, FILM COATED ORAL ONCE
Status: COMPLETED | OUTPATIENT
Start: 2017-09-10 | End: 2017-09-10

## 2017-09-10 RX ADMIN — CIPROFLOXACIN HYDROCHLORIDE 500 MG: 500 TABLET, FILM COATED ORAL at 16:17

## 2017-09-10 RX ADMIN — KETOROLAC TROMETHAMINE 30 MG: 30 INJECTION, SOLUTION INTRAMUSCULAR at 14:16

## 2017-09-10 ASSESSMENT — ENCOUNTER SYMPTOMS
FREQUENCY: 1
VOMITING: 1
FEVER: 0
NAUSEA: 1
DIZZINESS: 1
LIGHT-HEADEDNESS: 1
BACK PAIN: 1

## 2017-09-10 NOTE — ED AVS SNAPSHOT
[facility] Emergency Department    [address] [place] [address]    Phone:  [phone]    Fax:  [phone]                                       Stacy Brown   MRN: [id number]    Department:  [facility] Emergency Department   Date of Visit:  9/10/2017           Patient Information     Date Of Birth          1975        Your diagnoses for this visit were:     Urinary tract infection without hematuria, site unspecified     Sprain of lumbar region, initial encounter        You were seen by Trevor Zuñiga MD.      Follow-up Information     Follow up with Yaima Muse MD. Schedule an appointment as soon as possible for a visit in 2 days.    Specialty:  Family Practice    Why:  For follow up on your ED stay    Contact information:    [address] [place] [address] [phone]        Discharge References/Attachments     BACK SPRAIN/STRAIN (ENGLISH)    URINARY TRACT INFECTIONS IN WOMEN (ENGLISH)      Future Appointments        Provider Department Dept Phone Center    9/21/2017 4:30 PM Sherly Pavon, PT [facility] [phone] [facility]    9/28/2017 4:30 PM Sherly Pavon, PT [facility] [phone] [place]    10/5/2017 4:45 PM Sherly Pavon PT [facility] [phone] [place]    10/12/2017 4:45 PM Sherly Pavon, PT [facility] [phone] [facility]    11/21/2017 5:00 PM Yaima Muse MD [facility] [phone] [facility]      24 Hour Appointment Hotline       To make an appointment at any [facility], call [phone] ([phone]). If you don't have a family doctor or clinic, we will help you find one. [facility] are conveniently located to serve the needs of you and your family.             Review of your medicines      START taking        Dose / Directions Last dose taken     traMADol 50 MG tablet   Commonly known as:  ULTRAM   Dose:   mg   Quantity:  12 tablet        Take 1-2 tablets ( mg) by mouth every 6 hours as needed for pain   Refills:  0          Our records show that you are taking the medicines listed below. If these are incorrect, please call your family doctor or clinic.        Dose / Directions Last dose taken    ACCU-CHEK COMPLETE Kit   Dose:  1 Device   Quantity:  1 Device        1 Device daily   Refills:  0        ASPIRIN NOT PRESCRIBED   Commonly known as:  INTENTIONAL        Antiplatelet medication not prescribed intentionally due to Not indicated based on age   Refills:  0        blood glucose monitoring lancets   Quantity:  3 Box        Use to test blood sugars 1-2 times daily or as directed, per patients glucose meter.   Refills:  3        blood glucose monitoring test strip   Commonly known as:  BL TEST STRIP PACK   Quantity:  100 each        Use to test blood sugar 1-2 times daily or as directed. Per patients glucose meter (getting new one today)   Refills:  3        ciprofloxacin 500 MG tablet   Commonly known as:  CIPRO   Dose:  500 mg   Quantity:  6 tablet        Take 1 tablet (500 mg) by mouth 2 times daily for 3 days   Refills:  0        ferrous sulfate 325 (65 FE) MG tablet   Commonly known as:  IRON   Dose:  325 mg   Quantity:  180 tablet        Take 1 tablet (325 mg) by mouth 2 times daily   Refills:  3        FLUoxetine 20 MG capsule   Commonly known as:  PROzac   Dose:  40 mg   Quantity:  180 capsule        Take 2 capsules (40 mg) by mouth daily   Refills:  1        IBUPROFEN PO   Dose:  600 mg        Take 600 mg by mouth every 4 hours as needed for moderate pain   Refills:  0        insulin glargine 100 UNIT/ML injection   Commonly known as:  LANTUS SOLOSTAR   Quantity:  6 mL        26 units once daily   Refills:  5        insulin pen needle 32G X 6 MM   Commonly known as:  NOVOFINE   Quantity:  100 each        Use once daily or as directed.    Refills:  3        ketorolac 10 MG tablet   Commonly known as:  TORADOL   Quantity:  30 tablet        TAKE ONE TABLET BY MOUTH NIGHTLY AS NEEDED FOR MODERATE PAIN   Refills:  1        metFORMIN 500 MG 24 hr tablet   Commonly known as:  GLUCOPHAGE-XR   Dose:  2000 mg   Quantity:  360 tablet        Take 4 tablets (2,000 mg) by mouth daily (with dinner)   Refills:  3        MULTI-VITAMIN GUMMIES Chew   Dose:  1 chew tab        Take 1 chew tab by mouth daily   Refills:  0        oxyCODONE 5 MG IR tablet   Commonly known as:  ROXICODONE   Dose:  5 mg   Quantity:  5 tablet        Take 1 tablet (5 mg) by mouth every 6 hours as needed for pain   Refills:  0        phenazopyridine 200 MG tablet   Commonly known as:  PYRIDIUM   Dose:  200 mg   Quantity:  9 tablet        Take 1 tablet (200 mg) by mouth 3 times daily for 3 days   Refills:  0        simvastatin 20 MG tablet   Commonly known as:  ZOCOR   Dose:  20 mg   Quantity:  90 tablet        Take 1 tablet (20 mg) by mouth At Bedtime   Refills:  3        traZODone 50 MG tablet   Commonly known as:  DESYREL   Quantity:  180 tablet        TAKE 1-2 TABLETS BY MOUTH NIGHTLY AS NEEDED FOR SLEEP   Refills:  1                Prescriptions were sent or printed at these locations (1 Prescription)                   Other Prescriptions                Printed at Department/Unit printer (1 of 1)         traMADol (ULTRAM) 50 MG tablet                Procedures and tests performed during your visit     CBC with platelets differential    CT Abdomen Pelvis w/o Contrast (stone protocol)    Comprehensive metabolic panel      Orders Needing Specimen Collection     None      Pending Results     No orders found from 9/8/2017 to 9/11/2017.            Pending Culture Results     No orders found from 9/8/2017 to 9/11/2017.            Pending Results Instructions     If you had any lab results that were not finalized at the time of your Discharge, you can call the ED Lab Result RN at [phone].  "You will be contacted by this team for any positive Lab results or changes in treatment. The nurses are available 7 days a week from 10A to 6:30P.  You can leave a message 24 hours per day and they will return your call.        Thank you for choosing [facility]       Thank you for choosing [facility] for your care. Our goal is always to provide you with excellent care. Hearing back from our patients is one way we can continue to improve our services. Please take a few minutes to complete the written survey that you may receive in the mail after you visit with us. Thank you!        [employer] Information     [employer] lets you send messages to your doctor, view your test results, renew your prescriptions, schedule appointments and more. To sign up, go to [email or web address] . Click on \"Log in\" on the left side of the screen, which will take you to the Welcome page. Then click on \"Sign up Now\" on the right side of the page.     You will be asked to enter the access code listed below, as well as some personal information. Please follow the directions to create your username and password.     Your access code is: [email or web address]  Expires: 2017  5:09 PM     Your access code will  in 90 days. If you need help or a new code, please call your [facility] clinic or [phone].        Care EveryWhere ID     This is your Care EveryWhere ID. This could be used by other organizations to access your [facility] medical records  [id number]        Equal Access to Services     BRADLEY KRAUS : Hadii candace Olivier, waaxda luchidiadaha, qaybta kaalmada toi, conrado mason. So [facility] [phone].    ATENCIÓN: Si habla español, tiene a hewitt disposición servicios gratuitos de asistencia lingüística. Llame al [phone].    We comply with applicable federal civil rights laws and [place] laws. We do not discriminate on the basis of race, color, national origin, age, disability sex, sexual orientation " or gender identity.            After Visit Summary       This is your record. Keep this with you and show to your community pharmacist(s) and doctor(s) at your next visit.

## 2017-09-10 NOTE — ED NOTES
Pt here yesterday and dx with UTI.  Today c/o vomiting up her antibiotics and back pain.  Tearful in triage.  Denies fevers.

## 2017-09-10 NOTE — ED AVS SNAPSHOT
[facility] Emergency Department    [address] [place] [address]    Phone:  [phone]    Fax:  [phone]                                       Stacy Brown   MRN: [id number]    Department:  [facility] Emergency Department   Date of Visit:  9/10/2017           After Visit Summary Signature Page     I have received my discharge instructions, and my questions have been answered. I have discussed any challenges I see with this plan with the nurse or doctor.    ..........................................................................................................................................  Patient/Patient Representative Signature      ..........................................................................................................................................  Patient Representative Print Name and Relationship to Patient    ..................................................               ................................................  Date                                            Time    ..........................................................................................................................................  Reviewed by Signature/Title    ...................................................              ..............................................  Date                                                            Time

## 2017-09-10 NOTE — LETTER
To Whom it may concern:      Stacy Brown was seen in our Emergency Department today, 09/10/17.  I expect her condition to improve over the next 2 days.  she may return to work/school when improved.    Sincerely,  Trevor Zuñiga MD

## 2017-09-13 DIAGNOSIS — M79.604 PAIN OF RIGHT LOWER EXTREMITY: ICD-10-CM

## 2017-09-14 NOTE — TELEPHONE ENCOUNTER
Stacy is requesting Oxycodone-Acetaminophen 5mg-325mg with directions to take one tablet every night at bedtime as needed for pain. #30  I do not see this medication as active in Epic, therefore I did not want to select for renewal.  Last filled 08/15/2017  Last office visit: 08/15/2017    Thanks  Rosette Saravia [facility]   [phone]

## 2017-09-15 RX ORDER — OXYCODONE AND ACETAMINOPHEN 5; 325 MG/1; MG/1
1 TABLET ORAL
Qty: 30 TABLET | Refills: 0 | Status: CANCELLED | OUTPATIENT
Start: 2017-09-15

## 2017-09-15 RX ORDER — OXYCODONE AND ACETAMINOPHEN 5; 325 MG/1; MG/1
1 TABLET ORAL
Qty: 30 TABLET | Refills: 0 | Status: SHIPPED | OUTPATIENT
Start: 2017-09-15 | End: 2017-10-13

## 2017-09-28 ENCOUNTER — HOSPITAL ENCOUNTER (OUTPATIENT)
Dept: PHYSICAL THERAPY | Facility: CLINIC | Age: 42
Setting detail: THERAPIES SERIES
End: 2017-09-28
Attending: FAMILY MEDICINE
Payer: COMMERCIAL

## 2017-09-28 PROCEDURE — 40000718 ZZHC STATISTIC PT DEPARTMENT ORTHO VISIT

## 2017-09-28 PROCEDURE — 97110 THERAPEUTIC EXERCISES: CPT | Mod: GP

## 2017-09-28 PROCEDURE — 97140 MANUAL THERAPY 1/> REGIONS: CPT | Mod: GP

## 2017-09-29 ENCOUNTER — TELEPHONE (OUTPATIENT)
Dept: FAMILY MEDICINE | Facility: CLINIC | Age: 42
End: 2017-09-29

## 2017-09-29 ASSESSMENT — PATIENT HEALTH QUESTIONNAIRE - PHQ9: SUM OF ALL RESPONSES TO PHQ QUESTIONS 1-9: 13

## 2017-09-29 NOTE — TELEPHONE ENCOUNTER
Pt returned call and updated PHQ-9. Will route to PCP as FYI due to high score. Pt has an appointment to see PCP in November. Stacia Weathers CMA ([facility])

## 2017-09-29 NOTE — TELEPHONE ENCOUNTER
I have attempted to call the pt to update PHQ-9. I left message for pt to call back. I will call back another time. Stacia Weathers CMA ([facility])

## 2017-09-29 NOTE — TELEPHONE ENCOUNTER
Patient is due for a PHQ-9.  Index start date:9/14/2017  Index end date:11/14/2017    Please call patient.  Pt is scheduled for 11/21/2017 but we need an updated PHQ-9 by 11/14/2017.

## 2017-10-05 ENCOUNTER — HOSPITAL ENCOUNTER (OUTPATIENT)
Dept: PHYSICAL THERAPY | Facility: CLINIC | Age: 42
Setting detail: THERAPIES SERIES
End: 2017-10-05
Attending: FAMILY MEDICINE
Payer: COMMERCIAL

## 2017-10-05 DIAGNOSIS — F51.01 PRIMARY INSOMNIA: ICD-10-CM

## 2017-10-05 PROCEDURE — 40000718 ZZHC STATISTIC PT DEPARTMENT ORTHO VISIT

## 2017-10-05 PROCEDURE — 97110 THERAPEUTIC EXERCISES: CPT | Mod: GP

## 2017-10-06 RX ORDER — TRAZODONE HYDROCHLORIDE 50 MG/1
TABLET, FILM COATED ORAL
Qty: 180 TABLET | Refills: 1 | Status: SHIPPED | OUTPATIENT
Start: 2017-10-06 | End: 2018-04-24

## 2017-10-06 NOTE — TELEPHONE ENCOUNTER
Trazodone       Last Written Prescription Date: 5/08/2017  Last Fill Quantity: 180; # refills: 1  Last Office Visit with [facility], [facility] or [facility] prescribing provider:  8/15/2017   Next 5 appointments (look out 90 days)     Nov 21, 2017  5:00 PM CST   [facility]   [facility]    [address] [place] [id number] [phone]                   Last PHQ-9 score on record=   PHQ-9 SCORE 9/29/2017   Total Score -   Total Score 13       Lab Results   Component Value Date    AST 8 09/10/2017     Lab Results   Component Value Date    ALT 17 09/10/2017

## 2017-10-11 DIAGNOSIS — G89.4 CHRONIC PAIN SYNDROME: ICD-10-CM

## 2017-10-11 RX ORDER — KETOROLAC TROMETHAMINE 10 MG/1
TABLET, FILM COATED ORAL
Qty: 30 TABLET | Refills: 1 | Status: SHIPPED | OUTPATIENT
Start: 2017-10-11 | End: 2017-12-13

## 2017-10-11 NOTE — TELEPHONE ENCOUNTER
ketorolac (TORADOL) 10 MG tablet      Last Written Prescription Date: 8/15/17  Last Fill Quantity: 30,  # refills: 1   Last Office Visit with [facility], [facility] or [facility] prescribing provider: 8/15/17                                         Next 5 appointments (look out 90 days)     Nov 21, 2017  5:00 PM CST   [facility] Visit with [facility]   [facility]    [address] [place] [id number] [phone]

## 2017-10-13 DIAGNOSIS — M79.604 PAIN OF RIGHT LOWER EXTREMITY: ICD-10-CM

## 2017-10-13 RX ORDER — OXYCODONE AND ACETAMINOPHEN 5; 325 MG/1; MG/1
1 TABLET ORAL
Qty: 30 TABLET | Refills: 0 | Status: SHIPPED | OUTPATIENT
Start: 2017-10-15 | End: 2017-11-15

## 2017-10-13 NOTE — TELEPHONE ENCOUNTER
Percocet 5/325      Last Written Prescription Date:  09/15/17  Last Fill Quantity: 30,   # refills: 0   Last Office Visit with [facility], [facility] or [facility] prescribing provider: 08/15/17  Future Office visit:    Next 5 appointments (look out 90 days)     Nov 21, 2017  5:00 PM CST   [facility]   [facility]    [address] [place] [id number] [phone]                   Routing refill request to provider for review/approval because:  Drug not on the [facility], [facility] or [facility] refill protocol or controlled substance  On behalf of [facility]  [facility]

## 2017-10-19 ENCOUNTER — HOSPITAL ENCOUNTER (OUTPATIENT)
Dept: PHYSICAL THERAPY | Facility: CLINIC | Age: 42
Setting detail: THERAPIES SERIES
End: 2017-10-19
Attending: FAMILY MEDICINE
Payer: COMMERCIAL

## 2017-10-19 PROCEDURE — 97140 MANUAL THERAPY 1/> REGIONS: CPT | Mod: GP

## 2017-10-19 PROCEDURE — 40000718 ZZHC STATISTIC PT DEPARTMENT ORTHO VISIT

## 2017-10-19 PROCEDURE — 97035 APP MDLTY 1+ULTRASOUND EA 15: CPT | Mod: GP

## 2017-10-24 NOTE — ADDENDUM NOTE
Encounter addended by: Sherly Pavon, PT on: 10/24/2017  8:26 AM<BR>     Actions taken: Flowsheet accepted

## 2017-11-15 DIAGNOSIS — M79.604 PAIN OF RIGHT LOWER EXTREMITY: ICD-10-CM

## 2017-11-15 RX ORDER — OXYCODONE AND ACETAMINOPHEN 5; 325 MG/1; MG/1
1 TABLET ORAL
Qty: 30 TABLET | Refills: 0 | Status: SHIPPED | OUTPATIENT
Start: 2017-11-15 | End: 2017-12-13

## 2017-11-21 ENCOUNTER — OFFICE VISIT (OUTPATIENT)
Dept: FAMILY MEDICINE | Facility: CLINIC | Age: 42
End: 2017-11-21
Payer: COMMERCIAL

## 2017-11-21 VITALS
DIASTOLIC BLOOD PRESSURE: 72 MMHG | SYSTOLIC BLOOD PRESSURE: 112 MMHG | BODY MASS INDEX: 27.37 KG/M2 | OXYGEN SATURATION: 100 % | HEART RATE: 96 BPM | WEIGHT: 169.6 LBS | TEMPERATURE: 97.5 F

## 2017-11-21 DIAGNOSIS — M25.561 CHRONIC PAIN OF RIGHT KNEE: ICD-10-CM

## 2017-11-21 DIAGNOSIS — E66.3 OVERWEIGHT: ICD-10-CM

## 2017-11-21 DIAGNOSIS — F33.1 MAJOR DEPRESSIVE DISORDER, RECURRENT EPISODE, MODERATE (H): ICD-10-CM

## 2017-11-21 DIAGNOSIS — Z23 NEED FOR PROPHYLACTIC VACCINATION AND INOCULATION AGAINST INFLUENZA: ICD-10-CM

## 2017-11-21 DIAGNOSIS — M25.552 HIP PAIN, LEFT: ICD-10-CM

## 2017-11-21 DIAGNOSIS — E11.65 TYPE 2 DIABETES MELLITUS WITH HYPERGLYCEMIA, WITH LONG-TERM CURRENT USE OF INSULIN (H): Primary | ICD-10-CM

## 2017-11-21 DIAGNOSIS — Z72.0 TOBACCO ABUSE DISORDER: ICD-10-CM

## 2017-11-21 DIAGNOSIS — G89.4 CHRONIC PAIN SYNDROME: ICD-10-CM

## 2017-11-21 DIAGNOSIS — G89.29 CHRONIC PAIN OF RIGHT KNEE: ICD-10-CM

## 2017-11-21 DIAGNOSIS — Z79.4 TYPE 2 DIABETES MELLITUS WITH HYPERGLYCEMIA, WITH LONG-TERM CURRENT USE OF INSULIN (H): Primary | ICD-10-CM

## 2017-11-21 LAB — HBA1C MFR BLD: 9.7 % (ref 4.3–6)

## 2017-11-21 PROCEDURE — 83036 HEMOGLOBIN GLYCOSYLATED A1C: CPT | Performed by: FAMILY MEDICINE

## 2017-11-21 PROCEDURE — 90686 IIV4 VACC NO PRSV 0.5 ML IM: CPT | Performed by: FAMILY MEDICINE

## 2017-11-21 PROCEDURE — 90471 IMMUNIZATION ADMIN: CPT | Performed by: FAMILY MEDICINE

## 2017-11-21 PROCEDURE — 36415 COLL VENOUS BLD VENIPUNCTURE: CPT | Performed by: FAMILY MEDICINE

## 2017-11-21 PROCEDURE — 99214 OFFICE O/P EST MOD 30 MIN: CPT | Mod: 25 | Performed by: FAMILY MEDICINE

## 2017-11-21 ASSESSMENT — PAIN SCALES - GENERAL: PAINLEVEL: NO PAIN (0)

## 2017-11-21 NOTE — MR AVS SNAPSHOT
"              After Visit Summary   2017    Stacy Brown    MRN: [id number]           Patient Information     Date Of Birth          1975        Visit Information        Provider Department      2017 5:00 PM Yaima Muse MD [facility]        Today's Diagnoses     Type 2 diabetes mellitus with hyperglycemia, with long-term current use of insulin (H)    -  1    Need for prophylactic vaccination and inoculation against influenza           Follow-ups after your visit        Who to contact     If you have questions or need follow up information about today's clinic visit or your schedule please contact [facility] directly at [phone].  Normal or non-critical lab and imaging results will be communicated to you by MyChart, letter or phone within 4 business days after the clinic has received the results. If you do not hear from us within 7 days, please contact the clinic through MyChart or phone. If you have a critical or abnormal lab result, we will notify you by phone as soon as possible.  Submit refill requests through [facility] or call your pharmacy and they will forward the refill request to us. Please allow 3 business days for your refill to be completed.          Additional Information About Your Visit        MyChart Information     [facility] lets you send messages to your doctor, view your test results, renew your prescriptions, schedule appointments and more. To sign up, go to [email or web address] . Click on \"Log in\" on the left side of the screen, which will take you to the Welcome page. Then click on \"Sign up Now\" on the right side of the page.     You will be asked to enter the access code listed below, as well as some personal information. Please follow the directions to create your username and password.     Your access code is: [email or web address]  Expires: 2018  6:30 PM     Your access code will  in 90 days. If you need help or a new " code, please call your [facility] clinic or [phone].        Care EveryWhere ID     This is your Care EveryWhere ID. This could be used by other organizations to access your [facility] medical records  [id number]        Your Vitals Were     Pulse Temperature Last Period Pulse Oximetry Breastfeeding? BMI (Body Mass Index)    96 97.5  F (36.4  C) (Temporal) 11/16/2017 (Exact Date) 100% No 27.37 kg/m2       Blood Pressure from Last 3 Encounters:   11/21/17 112/72   09/10/17 122/67   09/09/17 147/86    Weight from Last 3 Encounters:   11/21/17 169 lb 9.6 oz (76.9 kg)   09/10/17 167 lb (75.8 kg)   09/09/17 167 lb (75.8 kg)              We Performed the Following     FLU VAC, SPLIT VIRUS IM > 3 YO (QUADRIVALENT) [33457]     Hemoglobin A1c     Vaccine Administration, Initial [27184]          Today's Medication Changes          These changes are accurate as of: 11/21/17  6:30 PM.  If you have any questions, ask your nurse or doctor.               These medicines have changed or have updated prescriptions.        Dose/Directions    insulin glargine 100 UNIT/ML injection   Commonly known as:  LANTUS SOLOSTAR   This may have changed:  additional instructions   Used for:  Type 2 diabetes mellitus with hyperglycemia, with long-term current use of insulin (H)        26 units once daily   Quantity:  6 mL   Refills:  5                Primary Care Provider Office Phone # Fax #    Yaima Nicole Muse [phone]       [address] [place] [address]        Equal Access to Services     [facility] HospitalNEELA : Hadii candace bruceo Soshira, waaxda luqadaha, qaybta kaalmada conrado cm idiin hayaan adeeg kharash la'aan . So [facility] [phone].    ATENCIÓN: Si habla español, tiene a hewitt disposición servicios gratuitos de asistencia lingüística. Llame al [phone].    We comply with applicable federal civil rights laws and [place] laws. We do not discriminate on the basis of race, color, national origin,  age, disability, sex, sexual orientation, or gender identity.            Thank you!     Thank you for choosing [facility]  for your care. Our goal is always to provide you with excellent care. Hearing back from our patients is one way we can continue to improve our services. Please take a few minutes to complete the written survey that you may receive in the mail after your visit with us. Thank you!             Your Updated Medication List - Protect others around you: Learn how to safely use, store and throw away your medicines at www.disposemymeds.org.          This list is accurate as of: 11/21/17  6:30 PM.  Always use your most recent med list.                   Brand Name Dispense Instructions for use Diagnosis    ACCU-CHEK COMPLETE Kit     1 Device    1 Device daily    Type 2 diabetes, HbA1c goal < 7% (H)       ASPIRIN NOT PRESCRIBED    INTENTIONAL     Antiplatelet medication not prescribed intentionally due to Not indicated based on age        blood glucose monitoring lancets     3 Box    Use to test blood sugars 1-2 times daily or as directed, per patients glucose meter.    Type 2 diabetes mellitus with hyperglycemia, with long-term current use of insulin (H)       blood glucose monitoring test strip    BL TEST STRIP PACK    100 each    Use to test blood sugar 1-2 times daily or as directed. Per patients glucose meter (getting new one today)    Type 2 diabetes mellitus with hyperglycemia, with long-term current use of insulin (H)       ferrous sulfate 325 (65 FE) MG tablet    IRON    180 tablet    Take 1 tablet (325 mg) by mouth 2 times daily    Iron deficiency anemia, unspecified iron deficiency anemia type       FLUoxetine 20 MG capsule    PROzac    180 capsule    Take 2 capsules (40 mg) by mouth daily    Major depressive disorder, recurrent episode, moderate (H)       IBUPROFEN PO      Take 600 mg by mouth every 4 hours as needed for moderate pain        insulin glargine 100 UNIT/ML  injection    LANTUS SOLOSTAR    6 mL    26 units once daily    Type 2 diabetes mellitus with hyperglycemia, with long-term current use of insulin (H)       insulin pen needle 32G X 6 MM    NOVOFINE    100 each    Use once daily or as directed.    Type 2 diabetes mellitus with hyperglycemia, with long-term current use of insulin (H)       ketorolac 10 MG tablet    TORADOL    30 tablet    TAKE ONE TABLET BY MOUTH EVERY NIGHT AS NEEDED FOR MODERATE PAIN    Chronic pain syndrome       metFORMIN 500 MG 24 hr tablet    GLUCOPHAGE-XR    360 tablet    Take 4 tablets (2,000 mg) by mouth daily (with dinner)    Type 2 diabetes mellitus with hyperglycemia, with long-term current use of insulin (H)       MULTI-VITAMIN GUMMIES Chew      Take 1 chew tab by mouth daily        oxyCODONE-acetaminophen 5-325 MG per tablet    PERCOCET    30 tablet    Take 1 tablet by mouth nightly as needed for moderate to severe pain    Pain of right lower extremity       simvastatin 20 MG tablet    ZOCOR    90 tablet    Take 1 tablet (20 mg) by mouth At Bedtime    Hyperlipidemia LDL goal <100, Type 2 diabetes mellitus with hyperglycemia, with long-term current use of insulin (H)       traZODone 50 MG tablet    DESYREL    180 tablet    TAKE 1-2 TABLETS BY MOUTH NIGHTLY AS NEEDED FOR SLEEP    Primary insomnia

## 2017-11-21 NOTE — NURSING NOTE
"Chief Complaint   Patient presents with     Recheck Medication     Diabetes     Musculoskeletal Problem     left hip has gotten painful, trouble walking       Initial /72  Pulse 96  Temp 97.5  F (36.4  C) (Temporal)  Wt 169 lb 9.6 oz (76.9 kg)  LMP 11/16/2017 (Exact Date)  SpO2 100%  Breastfeeding? No  BMI 27.37 kg/m2 Estimated body mass index is 27.37 kg/(m^2) as calculated from the following:    Height as of 9/9/17: 5' 6\" (1.676 m).    Weight as of this encounter: 169 lb 9.6 oz (76.9 kg).  Medication Reconciliation: complete   Anna Weldon CMA    "

## 2017-11-22 ENCOUNTER — TELEPHONE (OUTPATIENT)
Dept: FAMILY MEDICINE | Facility: CLINIC | Age: 42
End: 2017-11-22

## 2017-11-22 NOTE — TELEPHONE ENCOUNTER
Patient notified of Ortho appointment being scheduled on Monday, 11/27 at 10:10. Patient stating that will not work for her and stating she wanted to wait till January, and then she wanted to see Dr. Clay. Scheduled appointment has been cancelled and number given to call and reschedule her appointment. Stephanie Guallpa LPN

## 2017-11-22 NOTE — PROGRESS NOTES

## 2017-11-22 NOTE — TELEPHONE ENCOUNTER
Pt called back and was read msg below. Pt verbalized understanding and had no further questions. Rosa Fleming, CMA

## 2017-11-22 NOTE — TELEPHONE ENCOUNTER
----- Message from Yaima Muse MD sent at 11/21/2017  8:04 PM CST -----  Notify Stacy things have gotten even worse. Since she can't get to the diabetes educator for another month or more, she needs to AT LEAST start checking her sugars twice daily, and update me with her numbers in 2 weeks.  I won't hear any excuse for her not doing this.  She also HAS to take her insulin EVERY DAY as prescribed. Then we can review any changes to her medication .   Yaima Muse MD
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
## 2017-11-22 NOTE — TELEPHONE ENCOUNTER
Called and left message for patient to call clinic.  When they call back please give them the providers message.  Jim Louie MA

## 2017-11-22 NOTE — PROGRESS NOTES
Notify Stacy things have gotten even worse. Since she can't get to the diabetes educator for another month or more, she needs to AT LEAST start checking her sugars twice daily, and update me with her numbers in 2 weeks.  I won't hear any excuse for her not doing this.  She also HAS to take her insulin EVERY DAY as prescribed. Then we can review any changes to her medication .   Yaima Muse MD

## 2017-12-06 ENCOUNTER — OFFICE VISIT (OUTPATIENT)
Dept: ORTHOPEDICS | Facility: CLINIC | Age: 42
End: 2017-12-06
Payer: COMMERCIAL

## 2017-12-06 ENCOUNTER — TELEPHONE (OUTPATIENT)
Dept: FAMILY MEDICINE | Facility: CLINIC | Age: 42
End: 2017-12-06

## 2017-12-06 ENCOUNTER — RADIANT APPOINTMENT (OUTPATIENT)
Dept: GENERAL RADIOLOGY | Facility: CLINIC | Age: 42
End: 2017-12-06
Attending: ORTHOPAEDIC SURGERY
Payer: COMMERCIAL

## 2017-12-06 VITALS — BODY MASS INDEX: 26.86 KG/M2 | WEIGHT: 167.1 LBS | HEIGHT: 66 IN | TEMPERATURE: 98.7 F

## 2017-12-06 DIAGNOSIS — M70.62 GREATER TROCHANTERIC BURSITIS OF LEFT HIP: Primary | ICD-10-CM

## 2017-12-06 DIAGNOSIS — M53.3 CHRONIC SI JOINT PAIN: ICD-10-CM

## 2017-12-06 DIAGNOSIS — G89.29 CHRONIC SI JOINT PAIN: ICD-10-CM

## 2017-12-06 DIAGNOSIS — M25.552 HIP PAIN, LEFT: ICD-10-CM

## 2017-12-06 PROCEDURE — 20610 DRAIN/INJ JOINT/BURSA W/O US: CPT | Mod: LT | Performed by: ORTHOPAEDIC SURGERY

## 2017-12-06 PROCEDURE — 73502 X-RAY EXAM HIP UNI 2-3 VIEWS: CPT | Mod: TC

## 2017-12-06 PROCEDURE — 99244 OFF/OP CNSLTJ NEW/EST MOD 40: CPT | Mod: 25 | Performed by: ORTHOPAEDIC SURGERY

## 2017-12-06 RX ORDER — MELOXICAM 15 MG/1
15 TABLET ORAL DAILY
Qty: 30 TABLET | Refills: 0 | Status: SHIPPED | OUTPATIENT
Start: 2017-12-06 | End: 2018-03-23

## 2017-12-06 RX ORDER — TRIAMCINOLONE ACETONIDE 40 MG/ML
40 INJECTION, SUSPENSION INTRA-ARTICULAR; INTRAMUSCULAR ONCE
Qty: 1 ML | Refills: 0 | OUTPATIENT
Start: 2017-12-06 | End: 2017-12-06

## 2017-12-06 ASSESSMENT — PAIN SCALES - GENERAL: PAINLEVEL: SEVERE PAIN (7)

## 2017-12-06 NOTE — TELEPHONE ENCOUNTER
Panel Management Review      Patient has the following on her problem list:     Depression / Dysthymia review    Measure:  Needs PHQ-9 score of 4 or less during index window.  Administer PHQ-9 and if score is 5 or more, send encounter to provider for next steps.    5 - 7 month window range: 1-16    PHQ-9 SCORE 6/10/2016 4/14/2017 9/29/2017   Total Score - - -   Total Score 7 16 13       If PHQ-9 recheck is 5 or more, route to provider for next steps.    Patient is due for:  PHQ9    Diabetes    ASA: Not Required     Last A1C  Lab Results   Component Value Date    A1C 9.7 11/21/2017    A1C 9.4 08/15/2017    A1C 9.3 04/14/2017    A1C 9.8 06/10/2016    A1C 10.5 12/28/2015     A1C tested: FAILED    Last LDL:    Lab Results   Component Value Date    CHOL 268 04/14/2017     Lab Results   Component Value Date    HDL 65 04/14/2017     Lab Results   Component Value Date     04/14/2017     Lab Results   Component Value Date    TRIG 150 04/14/2017     Lab Results   Component Value Date    CHOLHDLRATIO 5.9 08/10/2015     Lab Results   Component Value Date    NHDL 203 04/14/2017       Is the patient on a Statin? YES             Is the patient on Aspirin? NO    Medications     HMG CoA Reductase Inhibitors    simvastatin (ZOCOR) 20 MG tablet          Last three blood pressure readings:  BP Readings from Last 3 Encounters:   11/21/17 112/72   09/10/17 122/67   09/09/17 147/86       Date of last diabetes office visit: 11/21/2017     Tobacco History:     History   Smoking Status     Former Smoker     Years: 6.00     Quit date: 9/22/2010   Smokeless Tobacco     Never Used               Composite cancer screening  Chart review shows that this patient is due/due soon for the following None  Summary:    Patient is due/failing the following:   A1C    Action needed:   Routed to provider for review.    Type of outreach:    Pt was seen on 11/21 and discussed with MD the need to check blood sugars and eat healthy. Pt is to call with  blood sugar readings this week.    Questions for provider review:    None                                                                                                                                    Anna Weldon CMA      Chart routed to Care Team .

## 2017-12-06 NOTE — LETTER
[address] [address]  Phone: [phone]  Fax: [phone]    December 6, 2017        Stacy Brown  [address] [place] [facility] [place] [facility] [address]          To whom it may concern:    RE: Stacy Brown    Patient was seen and treated today at our clinic.  Recommend patient be limited to 10 hour days for the next 2 weeks.    Please contact me for questions or concerns.      Sincerely,        Markie Posadas APRN, CNP, DNP  Orthopedic Surgery

## 2017-12-06 NOTE — MR AVS SNAPSHOT
"              After Visit Summary   2017    Stacy Brown    MRN: [id number]           Patient Information     Date Of Birth          1975        Visit Information        Provider Department      2017 2:50 PM Ayden Clay, DO [facility]        Today's Diagnoses     Hip pain, left    -  1       Follow-ups after your visit        Who to contact     If you have questions or need follow up information about today's clinic visit or your schedule please contact [facility] directly at [phone].  Normal or non-critical lab and imaging results will be communicated to you by "[employer]hart, letter or phone within 4 business days after the clinic has received the results. If you do not hear from us within 7 days, please contact the clinic through [employer] or phone. If you have a critical or abnormal lab result, we will notify you by phone as soon as possible.  Submit refill requests through [facility] or call your pharmacy and they will forward the refill request to us. Please allow 3 business days for your refill to be completed.          Additional Information About Your Visit        MyChart Information     [facility] lets you send messages to your doctor, view your test results, renew your prescriptions, schedule appointments and more. To sign up, go to [email or web address] . Click on \"Log in\" on the left side of the screen, which will take you to the Welcome page. Then click on \"Sign up Now\" on the right side of the page.     You will be asked to enter the access code listed below, as well as some personal information. Please follow the directions to create your username and password.     Your access code is: [email or web address]  Expires: 2018  6:30 PM     Your access code will  in 90 days. If you need help or a new code, please call your [facility] or [phone].        Care EveryWhere ID     This is your Care EveryWhere ID. This could be used by other " "organizations to access your [facility] medical records  [id number]        Your Vitals Were     Temperature Height Last Period BMI (Body Mass Index)          98.7  F (37.1  C) (Temporal) 1.676 m (5' 6\") 11/16/2017 (Exact Date) 26.97 kg/m2         Blood Pressure from Last 3 Encounters:   11/21/17 112/72   09/10/17 122/67   09/09/17 147/86    Weight from Last 3 Encounters:   12/06/17 75.8 kg (167 lb 1.6 oz)   11/21/17 76.9 kg (169 lb 9.6 oz)   09/10/17 75.8 kg (167 lb)                 Today's Medication Changes          These changes are accurate as of: 12/6/17  2:53 PM.  If you have any questions, ask your nurse or doctor.               These medicines have changed or have updated prescriptions.        Dose/Directions    insulin glargine 100 UNIT/ML injection   Commonly known as:  LANTUS SOLOSTAR   This may have changed:  additional instructions   Used for:  Type 2 diabetes mellitus with hyperglycemia, with long-term current use of insulin (H)        26 units once daily   Quantity:  6 mL   Refills:  5                Primary Care Provider Office Phone # Fax #    Yaima Nicole Muse [phone] [address] [place] [address]        Equal Access to Services     BRADLEY KRAUS AH: Hadii candace delarosa hadasho Soshira, waaxda luqadaha, qaybta kaalmada adeegyada, conrado moya . So [facility] [phone].    ATENCIÓN: Si habla español, tiene a hewitt disposición servicios gratuitos de asistencia lingüística. Llame al [phone].    We comply with applicable federal civil rights laws and [place] laws. We do not discriminate on the basis of race, color, national origin, age, disability, sex, sexual orientation, or gender identity.            Thank you!     Thank you for choosing [facility]  for your care. Our goal is always to provide you with excellent care. Hearing back from our patients is one way we can continue to improve our services. Please take a few " minutes to complete the written survey that you may receive in the mail after your visit with us. Thank you!             Your Updated Medication List - Protect others around you: Learn how to safely use, store and throw away your medicines at www.disposemymeds.org.          This list is accurate as of: 12/6/17  2:53 PM.  Always use your most recent med list.                   Brand Name Dispense Instructions for use Diagnosis    ACCU-CHEK COMPLETE Kit     1 Device    1 Device daily    Type 2 diabetes, HbA1c goal < 7% (H)       ASPIRIN NOT PRESCRIBED    INTENTIONAL     Antiplatelet medication not prescribed intentionally due to Not indicated based on age        blood glucose monitoring lancets     3 Box    Use to test blood sugars 1-2 times daily or as directed, per patients glucose meter.    Type 2 diabetes mellitus with hyperglycemia, with long-term current use of insulin (H)       blood glucose monitoring test strip    BL TEST STRIP PACK    100 each    Use to test blood sugar 1-2 times daily or as directed. Per patients glucose meter (getting new one today)    Type 2 diabetes mellitus with hyperglycemia, with long-term current use of insulin (H)       ferrous sulfate 325 (65 FE) MG tablet    IRON    180 tablet    Take 1 tablet (325 mg) by mouth 2 times daily    Iron deficiency anemia, unspecified iron deficiency anemia type       FLUoxetine 20 MG capsule    PROzac    180 capsule    Take 2 capsules (40 mg) by mouth daily    Major depressive disorder, recurrent episode, moderate (H)       IBUPROFEN PO      Take 600 mg by mouth every 4 hours as needed for moderate pain        insulin glargine 100 UNIT/ML injection    LANTUS SOLOSTAR    6 mL    26 units once daily    Type 2 diabetes mellitus with hyperglycemia, with long-term current use of insulin (H)       insulin pen needle 32G X 6 MM    NOVOFINE    100 each    Use once daily or as directed.    Type 2 diabetes mellitus with hyperglycemia, with long-term current use  of insulin (H)       ketorolac 10 MG tablet    TORADOL    30 tablet    TAKE ONE TABLET BY MOUTH EVERY NIGHT AS NEEDED FOR MODERATE PAIN    Chronic pain syndrome       metFORMIN 500 MG 24 hr tablet    GLUCOPHAGE-XR    360 tablet    Take 4 tablets (2,000 mg) by mouth daily (with dinner)    Type 2 diabetes mellitus with hyperglycemia, with long-term current use of insulin (H)       MULTI-VITAMIN GUMMIES Chew      Take 1 chew tab by mouth daily        oxyCODONE-acetaminophen 5-325 MG per tablet    PERCOCET    30 tablet    Take 1 tablet by mouth nightly as needed for moderate to severe pain    Pain of right lower extremity       simvastatin 20 MG tablet    ZOCOR    90 tablet    Take 1 tablet (20 mg) by mouth At Bedtime    Hyperlipidemia LDL goal <100, Type 2 diabetes mellitus with hyperglycemia, with long-term current use of insulin (H)       traZODone 50 MG tablet    DESYREL    180 tablet    TAKE 1-2 TABLETS BY MOUTH NIGHTLY AS NEEDED FOR SLEEP    Primary insomnia

## 2017-12-06 NOTE — PROGRESS NOTES
ORTHOPEDIC CONSULT      Chief Complaint: Stacy Brown is a 42 year old female who is being seen for Chief Complaint   Patient presents with     Musculoskeletal Problem     left hip pain     Consult     ref: Dr. Muse       History of Present Illness:   Stacy Brown is a 42 year old female who is seen in consultation at the request of Dr. Muse for evaluation of left hip pain.  Mechanism of Injury: No trauma or recallable event.  Has had pain off and on to the left hip for about 3 years, however the pain has been much present and progressive more recently when her working hours were expanded.  Patient states working 12-16 hour days, and by the end of the day the pain is intolerable.  Works as a .  States pain is lateral left hip, ache/pressure, moderate to severe, cannot lay on hip, and states walking and weight bearing activity by the end of the day.  Rest improves the pain.  Takes 1 percocet and 1 toradol a night to help with chronic right knee pain and this does not improve the pain.  Takes ibuprofen off and on throughout the day and this helps somewhat.  Patient has not had previous injections or PT to the left thigh.     Denies any significant heart, lung, kidney disease.  Denies any previous issues with NSAIDs and no hx of gastric ulcers.   States she does have IDDM and last A1c was >9.5.   Denies any kidney problems with this.    Patient's past medical, surgical, social and family histories reviewed.     Past Medical History:   Diagnosis Date     Knee pain, chronic      Mixed hyperlipidemia      Type II or unspecified type diabetes mellitus without mention of complication, not stated as uncontrolled        Past Surgical History:   Procedure Laterality Date     C STABISM SURG,PREV EYE SURG,NOT MUSC      Right     HC OPEN TX METATARSAL FRACTURE  age 12    softball injury,open fracture left foot     HC TOOTH EXTRACTION W/FORCEP      Extract wisdom teeth     TUBAL LIGATION  7/27/2006        Medications:    Current Outpatient Prescriptions on File Prior to Visit:  FLUoxetine (PROZAC) 20 MG capsule Take 2 capsules (40 mg) by mouth daily   oxyCODONE-acetaminophen (PERCOCET) 5-325 MG per tablet Take 1 tablet by mouth nightly as needed for moderate to severe pain   ketorolac (TORADOL) 10 MG tablet TAKE ONE TABLET BY MOUTH EVERY NIGHT AS NEEDED FOR MODERATE PAIN   traZODone (DESYREL) 50 MG tablet TAKE 1-2 TABLETS BY MOUTH NIGHTLY AS NEEDED FOR SLEEP   ASPIRIN NOT PRESCRIBED (INTENTIONAL) Antiplatelet medication not prescribed intentionally due to Not indicated based on age   ferrous sulfate (IRON) 325 (65 FE) MG tablet Take 1 tablet (325 mg) by mouth 2 times daily   simvastatin (ZOCOR) 20 MG tablet Take 1 tablet (20 mg) by mouth At Bedtime   metFORMIN (GLUCOPHAGE-XR) 500 MG 24 hr tablet Take 4 tablets (2,000 mg) by mouth daily (with dinner)   insulin glargine (LANTUS SOLOSTAR) 100 UNIT/ML injection 26 units once daily (Patient taking differently: 28 or 30 units once daily)   insulin pen needle (NOVOFINE) 32G X 6 MM Use once daily or as directed.   blood glucose monitoring (BL TEST STRIP PACK) test strip Use to test blood sugar 1-2 times daily or as directed. Per patients glucose meter (getting new one today)   blood glucose monitoring (FREESTYLE) lancets Use to test blood sugars 1-2 times daily or as directed, per patients glucose meter.   IBUPROFEN PO Take 600 mg by mouth every 4 hours as needed for moderate pain   Blood Glucose Monitoring Suppl (ACCU-CHEK COMPLETE) KIT 1 Device daily   Multiple Vitamins-Minerals (MULTI-VITAMIN GUMMIES) CHEW Take 1 chew tab by mouth daily      No current facility-administered medications on file prior to visit.     Allergies   Allergen Reactions     Amoxicillin Hives       Social History     Occupational History     [employer]     Social History Main Topics     Smoking status: Former Smoker     Years: 6.00     Quit date: 9/22/2010     Smokeless  "tobacco: Never Used     Alcohol use 0.0 oz/week     0 Standard drinks or equivalent per week      Comment: once a month     Drug use: No     Sexual activity: Not Currently     Partners: Male     Birth control/ protection: Surgical       Family History   Problem Relation Age of Onset     DIABETES Maternal Grandmother      Allergies Mother      Hypertension Maternal Grandmother      CEREBROVASCULAR DISEASE Paternal Grandfather      CANCER Maternal Grandfather      Lung - metastatic     HEART DISEASE Maternal Grandmother      Bypass     Anesthesia Reaction No family hx of      Lipids Father      cholesterol     Alzheimer Disease Paternal Grandmother      HEART DISEASE Paternal Grandmother      valve replacement       REVIEW OF SYSTEMS  10 point review systems performed otherwise negative as noted as per history of present illness.    Physical Exam:  Vitals: Temp 98.7  F (37.1  C) (Temporal)  Ht 1.676 m (5' 6\")  Wt 75.8 kg (167 lb 1.6 oz)  LMP 11/16/2017 (Exact Date)  BMI 26.97 kg/m2  BMI= Body mass index is 26.97 kg/(m^2).  Constitutional: healthy, alert and no acute distress   Psychiatric: mentation appears normal and affect normal/bright  NEURO: no focal deficits  RESP: Normal with easy respirations and no use of accessory muscles to breathe, no audible wheezing or retractions  CV: LLE:  no edema   SKIN: No erythema, rashes, excoriation, or breakdown. No evidence of infection.   JOINT/EXTREMITIES:left Hip Exam: Palpation: Tender: exquisite tenderness to left greater trochanter. Pain at SI Joint  Non-tender:   throughout hip otherwise  Range of Motion:  Full ROM to flexion, adduction, abduction.  NO pain with passive internal and external rotation.    Strength:  Hip flexion 5/5, adduction, abduction 5/5  Special tests: + JAY to create pain in buttock  Straight leg raise: pain into SI joint   Lymph: no appreciated lymphedema  GAIT: antalgic    Diagnostic Modalities:  left hip X-ray: No fracture, dislocation and " or lesion. Normal alignment.  Joint space maintained no significant arthritis. No appreciable soft tissue abnormality.   Independent visualization of the images was performed.      Impression: left hip greater trochanteric bursitis  Left SI joint pain    Plan:  All of the above pertinent physical exam and imaging modalities findings was reviewed with Stacy.    Discussed options.  Could not take time off of work to do PT.  Is agreeable to injections and trying Mobic. Understands she should stop Toradol when taking Mobic.    I recommend conservative care for the patient to include NSAIDs, steroid injections, activity modifications. Today I provided or dispensed Mobic prescription, referral for SI joint injection.    Discussed with patient that the SI joint and the greater trochanteric steroid injection can both increase her blood sugars, especially since her blood glucose has not been well controlled.  Patient verbalizes need to monitor her blood glucose closely and does want to proceed with injections understanding they may increase her blood sugars.     The patient was counseled about an  injection, including discussion of risks (including infection), contents of the injection, rationale for performing the injection, and expected benefits of the injection. The skin was prepped with alcohol and betadine and then utilizing sterile technique an injection of the left hip trochanteric bursa at point of maximum tenderness was performed. The injection consisted 1ml of Kenalog (40mg per 1 ml) mixed with 3ml of 0.5% Marcaine. The patient tolerated the injection well, and there were no complications. The injection site was covered with a Band-Aid. The injection was performed by Surya Posadas, APRN, CNP, DNP    I have prescribed an antiinflammatory medication.  We discussed that it is the same class of some the common over the counter medications (Ibuprofen, Advil, Motrin, Aleve, Naproxen, and  Naprosyn). I recommend to avoid  taking these OTC's medication when taking the medication that I prescribed. This medication should be stopped if having stomach issues, bleeding, high blood pressure and/or chest pain    After discussing with patient about backing off of work, as 16 hour days seem to correlate when pain worsened, patient states realistically she could drop down to 10 hour days but could not take time off.  Letter written to limited work days to 10 hours for 2 weeks.    Return to clinic PRN, or sooner as needed for changes.  Re-x-ray on return: No    Scribed by:  Surya Posadas, APRN, CNP, DNP  4:46 PM  12/6/2017    I attest I have seen and evaluated the patient.  I agree with above impression and plan.  Abdelrahman Clay D.O.

## 2017-12-06 NOTE — LETTER
12/6/2017         RE: Stacy Brown  [address]  [facility] [address]        Dear Colleague,    Thank you for referring your patient, Stacy Brown, to the [facility]. Please see a copy of my visit note below.    ORTHOPEDIC CONSULT      Chief Complaint: Stacy Brown is a 42 year old female who is being seen for Chief Complaint   Patient presents with     Musculoskeletal Problem     left hip pain     Consult     ref: Dr. Muse       History of Present Illness:   Stacy Brown is a 42 year old female who is seen in consultation at the request of Dr. Muse for evaluation of left hip pain.  Mechanism of Injury: No trauma or recallable event.  Has had pain off and on to the left hip for about 3 years, however the pain has been much present and progressive more recently when her working hours were expanded.  Patient states working 12-16 hour days, and by the end of the day the pain is intolerable.  Works as a .  States pain is lateral left hip, ache/pressure, moderate to severe, cannot lay on hip, and states walking and weight bearing activity by the end of the day.  Rest improves the pain.  Takes 1 percocet and 1 toradol a night to help with chronic right knee pain and this does not improve the pain.  Takes ibuprofen off and on throughout the day and this helps somewhat.  Patient has not had previous injections or PT to the left thigh.     Denies any significant heart, lung, kidney disease.  Denies any previous issues with NSAIDs and no hx of gastric ulcers.   States she does have IDDM and last A1c was >9.5.   Denies any kidney problems with this.    Patient's past medical, surgical, social and family histories reviewed.     Past Medical History:   Diagnosis Date     Knee pain, chronic      Mixed hyperlipidemia      Type II or unspecified type diabetes mellitus without mention of complication, not stated as uncontrolled        Past Surgical History:   Procedure  Laterality Date     C STABISM SURG,PREV EYE SURG,NOT MUSC      Right     HC OPEN TX METATARSAL FRACTURE  age 12    softball injury,open fracture left foot     HC TOOTH EXTRACTION W/FORCEP      Extract wisdom teeth     TUBAL LIGATION  7/27/2006       Medications:    Current Outpatient Prescriptions on File Prior to Visit:  FLUoxetine (PROZAC) 20 MG capsule Take 2 capsules (40 mg) by mouth daily   oxyCODONE-acetaminophen (PERCOCET) 5-325 MG per tablet Take 1 tablet by mouth nightly as needed for moderate to severe pain   ketorolac (TORADOL) 10 MG tablet TAKE ONE TABLET BY MOUTH EVERY NIGHT AS NEEDED FOR MODERATE PAIN   traZODone (DESYREL) 50 MG tablet TAKE 1-2 TABLETS BY MOUTH NIGHTLY AS NEEDED FOR SLEEP   ASPIRIN NOT PRESCRIBED (INTENTIONAL) Antiplatelet medication not prescribed intentionally due to Not indicated based on age   ferrous sulfate (IRON) 325 (65 FE) MG tablet Take 1 tablet (325 mg) by mouth 2 times daily   simvastatin (ZOCOR) 20 MG tablet Take 1 tablet (20 mg) by mouth At Bedtime   metFORMIN (GLUCOPHAGE-XR) 500 MG 24 hr tablet Take 4 tablets (2,000 mg) by mouth daily (with dinner)   insulin glargine (LANTUS SOLOSTAR) 100 UNIT/ML injection 26 units once daily (Patient taking differently: 28 or 30 units once daily)   insulin pen needle (NOVOFINE) 32G X 6 MM Use once daily or as directed.   blood glucose monitoring (BL TEST STRIP PACK) test strip Use to test blood sugar 1-2 times daily or as directed. Per patients glucose meter (getting new one today)   blood glucose monitoring (FREESTYLE) lancets Use to test blood sugars 1-2 times daily or as directed, per patients glucose meter.   IBUPROFEN PO Take 600 mg by mouth every 4 hours as needed for moderate pain   Blood Glucose Monitoring Suppl (ACCU-CHEK COMPLETE) KIT 1 Device daily   Multiple Vitamins-Minerals (MULTI-VITAMIN GUMMIES) CHEW Take 1 chew tab by mouth daily      No current facility-administered medications on file prior to visit.     Allergies  "  Allergen Reactions     Amoxicillin Hives       Social History     Occupational History     [employer]     Social History Main Topics     Smoking status: Former Smoker     Years: 6.00     Quit date: 9/22/2010     Smokeless tobacco: Never Used     Alcohol use 0.0 oz/week     0 Standard drinks or equivalent per week      Comment: once a month     Drug use: No     Sexual activity: Not Currently     Partners: Male     Birth control/ protection: Surgical       Family History   Problem Relation Age of Onset     DIABETES Maternal Grandmother      Allergies Mother      Hypertension Maternal Grandmother      CEREBROVASCULAR DISEASE Paternal Grandfather      CANCER Maternal Grandfather      Lung - metastatic     HEART DISEASE Maternal Grandmother      Bypass     Anesthesia Reaction No family hx of      Lipids Father      cholesterol     Alzheimer Disease Paternal Grandmother      HEART DISEASE Paternal Grandmother      valve replacement       REVIEW OF SYSTEMS  10 point review systems performed otherwise negative as noted as per history of present illness.    Physical Exam:  Vitals: Temp 98.7  F (37.1  C) (Temporal)  Ht 1.676 m (5' 6\")  Wt 75.8 kg (167 lb 1.6 oz)  LMP 11/16/2017 (Exact Date)  BMI 26.97 kg/m2  BMI= Body mass index is 26.97 kg/(m^2).  Constitutional: healthy, alert and no acute distress   Psychiatric: mentation appears normal and affect normal/bright  NEURO: no focal deficits  RESP: Normal with easy respirations and no use of accessory muscles to breathe, no audible wheezing or retractions  CV: LLE:  no edema   SKIN: No erythema, rashes, excoriation, or breakdown. No evidence of infection.   JOINT/EXTREMITIES:left Hip Exam: Palpation: Tender: exquisite tenderness to left greater trochanter. Pain at SI Joint  Non-tender:   throughout hip otherwise  Range of Motion:  Full ROM to flexion, adduction, abduction.  NO pain with passive internal and external rotation.    Strength:  Hip flexion " 5/5, adduction, abduction 5/5  Special tests: + JAY to create pain in buttock  Straight leg raise: pain into SI joint   Lymph: no appreciated lymphedema  GAIT: antalgic    Diagnostic Modalities:  left hip X-ray: No fracture, dislocation and or lesion. Normal alignment.  Joint space maintained no significant arthritis. No appreciable soft tissue abnormality.   Independent visualization of the images was performed.      Impression: left hip greater trochanteric bursitis  Left SI joint pain    Plan:  All of the above pertinent physical exam and imaging modalities findings was reviewed with Stacy.    Discussed options.  Could not take time off of work to do PT.  Is agreeable to injections and trying Mobic. Understands she should stop Toradol when taking Mobic.    I recommend conservative care for the patient to include NSAIDs, steroid injections, activity modifications. Today I provided or dispensed Mobic prescription, referral for SI joint injection.    Discussed with patient that the SI joint and the greater trochanteric steroid injection can both increase her blood sugars, especially since her blood glucose has not been well controlled.  Patient verbalizes need to monitor her blood glucose closely and does want to proceed with injections understanding they may increase her blood sugars.     The patient was counseled about an  injection, including discussion of risks (including infection), contents of the injection, rationale for performing the injection, and expected benefits of the injection. The skin was prepped with alcohol and betadine and then utilizing sterile technique an injection of the left hip trochanteric bursa at point of maximum tenderness was performed. The injection consisted 1ml of Kenalog (40mg per 1 ml) mixed with 3ml of 0.5% Marcaine. The patient tolerated the injection well, and there were no complications. The injection site was covered with a Band-Aid. The injection was performed by Surya  JOVITA Posadas, CNP, DNP    I have prescribed an antiinflammatory medication.  We discussed that it is the same class of some the common over the counter medications (Ibuprofen, Advil, Motrin, Aleve, Naproxen, and  Naprosyn). I recommend to avoid taking these OTC's medication when taking the medication that I prescribed. This medication should be stopped if having stomach issues, bleeding, high blood pressure and/or chest pain    After discussing with patient about backing off of work, as 16 hour days seem to correlate when pain worsened, patient states realistically she could drop down to 10 hour days but could not take time off.  Letter written to limited work days to 10 hours for 2 weeks.    Return to clinic PRN, or sooner as needed for changes.  Re-x-ray on return: No    Scribed by:  JOVITA Sanchez, CNP, DNP  4:46 PM  12/6/2017    I attest I have seen and evaluated the patient.  I agree with above impression and plan.  Abdelrahman Clay D.O.    Again, thank you for allowing me to participate in the care of your patient.        Sincerely,        Ayden Clay, DO

## 2017-12-06 NOTE — NURSING NOTE
"Chief Complaint   Patient presents with     Musculoskeletal Problem     left hip pain     Consult     ref: Dr. Muse       Initial Temp 98.7  F (37.1  C) (Temporal)  Ht 1.676 m (5' 6\")  Wt 75.8 kg (167 lb 1.6 oz)  LMP 11/16/2017 (Exact Date)  BMI 26.97 kg/m2 Estimated body mass index is 26.97 kg/(m^2) as calculated from the following:    Height as of this encounter: 1.676 m (5' 6\").    Weight as of this encounter: 75.8 kg (167 lb 1.6 oz).  Medication Reconciliation: complete   Kapil/BARBARA     "

## 2017-12-13 ENCOUNTER — TELEPHONE (OUTPATIENT)
Dept: ORTHOPEDICS | Facility: CLINIC | Age: 42
End: 2017-12-13

## 2017-12-13 DIAGNOSIS — G89.4 CHRONIC PAIN SYNDROME: ICD-10-CM

## 2017-12-13 DIAGNOSIS — M79.604 PAIN OF RIGHT LOWER EXTREMITY: ICD-10-CM

## 2017-12-13 NOTE — LETTER
[address] [address]  Phone: [phone]  Fax: [phone]    December 13, 2017        Stacy Brown  [address] [facility] [address]          To whom it may concern:    RE: Stacy Brown    Due to increasing pain, and symptoms recommend patient be off of work for the next 2 weeks, will then re-evaluate patient.    Please contact me for questions or concerns.      Sincerely,        Markie Posadas, APRN, CNP, DNP  Orthopedic Surgery

## 2017-12-13 NOTE — TELEPHONE ENCOUNTER
patient  called back and the letter needs to say effective immediately. Once letter is changed please fax letter to: letter to [facility] :[phone].    Please call Stacy once the letter is faxed.    Kapil/BARBARA

## 2017-12-13 NOTE — TELEPHONE ENCOUNTER
New letter written, includes the word immediately.    Markie Posadas APRN, CNP, DNP  Orthopedic Surgery

## 2017-12-13 NOTE — TELEPHONE ENCOUNTER
This is reasonable.  Will give her a letter for 2 weeks off of work, we should see in return to the clinic around this time to re-evaluate.    Markie Posadas, APRN, CNP, DNP  Orthopedic Surgery

## 2017-12-13 NOTE — TELEPHONE ENCOUNTER
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  Please call when ready to be picked up.     Phone Number Patient can be reached at: Cell number on file:    Telephone Information:   Mobile [phone]       Best Time: any    Can we leave a detailed message on this number? YES    Call taken on 12/13/2017 at 12:28 PM by Ada Lopez

## 2017-12-13 NOTE — LETTER
[address] [address]  Phone: [phone]  Fax: [phone]    December 13, 2017        Stacy Brown  [address] [facility] [address]          To whom it may concern:    RE: Stacy Brown    Due to increasing pain, and symptoms recommend patient be off of work immediately for the next 2 weeks, will then re-evaluate patient.    Please contact me for questions or concerns.      Sincerely,        Markie Posadas, APRN, CNP, DNP  Orthopedic Surgery

## 2017-12-13 NOTE — TELEPHONE ENCOUNTER
"I talked to Stacy and she would like to be pulled out of work immediately until after Reggie.    She states, \" I can hardly walk by the end of the shift.\"    Kapil/BARBARA   "

## 2017-12-14 ENCOUNTER — TELEPHONE (OUTPATIENT)
Dept: SURGERY | Facility: CLINIC | Age: 42
End: 2017-12-14

## 2017-12-14 RX ORDER — OXYCODONE AND ACETAMINOPHEN 5; 325 MG/1; MG/1
1 TABLET ORAL
Qty: 30 TABLET | Refills: 0 | Status: SHIPPED | OUTPATIENT
Start: 2017-12-17 | End: 2018-01-15

## 2017-12-14 NOTE — TELEPHONE ENCOUNTER
Patient called to setup SI joint injection with Dr. Marshall on 1/11/18 at 1000.  Patient instructed to make an appt with PCP for a preop physical and also she needs to have a  for the day of the procedure.  Patient verbalized understanding.

## 2017-12-19 RX ORDER — KETOROLAC TROMETHAMINE 10 MG/1
TABLET, FILM COATED ORAL
Qty: 30 TABLET | Refills: 1 | Status: SHIPPED | OUTPATIENT
Start: 2017-12-19 | End: 2018-01-03 | Stop reason: ALTCHOICE

## 2017-12-19 NOTE — TELEPHONE ENCOUNTER
TORADOL      Last Written Prescription Date:  10/11/17  Last Fill Quantity: 30,   # refills: 1  Last Office Visit: 11/21/17  Future Office visit:    Next 5 appointments (look out 90 days)     Dec 27, 2017  8:20 AM CST   Return Visit with Ayden Clay DO   [facility] ([facility]    [address] [place] [id number]   [phone]                   Routing refill request to provider for review/approval because:  Drug not on the [facility], [facility] or [facility] refill protocol or controlled substance

## 2017-12-20 ENCOUNTER — TELEPHONE (OUTPATIENT)
Dept: FAMILY MEDICINE | Facility: CLINIC | Age: 42
End: 2017-12-20

## 2017-12-20 NOTE — TELEPHONE ENCOUNTER
Reason for Call:  Same Day Appointment, Requested Provider:  Yaima Muse M.D.    PCP: Yaima Muse    Reason for visit: Patient is having surgery on 1/11/2018 and needs a pre-op done before then.     Duration of symptoms: n/a    Have you been treated for this in the past? n/a    Additional comments: please call the patient to get this schedule.     Can we leave a detailed message on this number? YES    Phone number patient can be reached at: Cell number on file:    Telephone Information:   Mobile [phone]       Best Time: any    Call taken on 12/20/2017 at 5:58 PM by Ofe Castro

## 2017-12-22 ENCOUNTER — APPOINTMENT (OUTPATIENT)
Dept: GENERAL RADIOLOGY | Facility: CLINIC | Age: 42
End: 2017-12-22
Attending: PHYSICIAN ASSISTANT
Payer: COMMERCIAL

## 2017-12-22 ENCOUNTER — HOSPITAL ENCOUNTER (EMERGENCY)
Facility: CLINIC | Age: 42
Discharge: HOME | End: 2017-12-22
Attending: PHYSICIAN ASSISTANT | Admitting: PHYSICIAN ASSISTANT
Payer: COMMERCIAL

## 2017-12-22 VITALS
HEART RATE: 75 BPM | TEMPERATURE: 97.3 F | RESPIRATION RATE: 16 BRPM | OXYGEN SATURATION: 99 % | DIASTOLIC BLOOD PRESSURE: 76 MMHG | SYSTOLIC BLOOD PRESSURE: 134 MMHG

## 2017-12-22 DIAGNOSIS — T17.208A FOREIGN BODY IN PHARYNX, INITIAL ENCOUNTER: ICD-10-CM

## 2017-12-22 PROCEDURE — 99283 EMERGENCY DEPT VISIT LOW MDM: CPT | Performed by: PHYSICIAN ASSISTANT

## 2017-12-22 PROCEDURE — 25000132 ZZH RX MED GY IP 250 OP 250 PS 637: Performed by: PHYSICIAN ASSISTANT

## 2017-12-22 PROCEDURE — 99284 EMERGENCY DEPT VISIT MOD MDM: CPT | Mod: Z6 | Performed by: PHYSICIAN ASSISTANT

## 2017-12-22 PROCEDURE — 70360 X-RAY EXAM OF NECK: CPT | Mod: TC

## 2017-12-22 PROCEDURE — 25000125 ZZHC RX 250: Performed by: PHYSICIAN ASSISTANT

## 2017-12-22 RX ADMIN — LIDOCAINE HYDROCHLORIDE 30 ML: 20 SOLUTION ORAL; TOPICAL at 11:40

## 2017-12-22 ASSESSMENT — ENCOUNTER SYMPTOMS
FEVER: 0
NAUSEA: 0
VOMITING: 1
ABDOMINAL PAIN: 0
SHORTNESS OF BREATH: 0
SORE THROAT: 1

## 2017-12-22 NOTE — ED NOTES
Pt put on call light and states that she hiccupped x2 and the pill seemed to go down.  Pt states she feels much better.

## 2017-12-22 NOTE — TELEPHONE ENCOUNTER
Patient can be worked in for pre-op on 1/3/2018 at 1:30pm. Please contact patient to advise and confirm. Appointment made to hold time.  Anna Weldon CMA

## 2017-12-22 NOTE — ED PROVIDER NOTES
"  History     Chief Complaint   Patient presents with     Swallowed Foreign Body     HPI  Stacy Brown is a 42 year old female who presents to the emergency department for concerns of a pill stuck in her throat.  This morning around 9 AM she took 2 metformin pills.  She immediately developed a sensation in her upper throat that the pill was stuck.  She has drank \"a ton of fluid\" and is kept that down okay.  She tried eating something and vomited that up.  She denies nausea otherwise unless she tries to eat.  This sensation is constant.  She complains of pain in her throat with swallowing or breathing.  This has never happened before.    Problem List:    Patient Active Problem List    Diagnosis Date Noted     Tobacco abuse disorder 11/21/2017     Priority: Medium     Overweight 01/05/2016     Priority: Medium     Chronic pain syndrome 08/14/2015     Priority: Medium     Insomnia 08/11/2015     Priority: Medium     Moderate major depression (H) 02/09/2015     Priority: Medium     Restless legs syndrome (RLS) 02/09/2015     Priority: Medium     Halitosis 11/26/2014     Priority: Medium     Iron deficiency anemia 03/12/2014     Priority: Medium     PMDD (premenstrual dysphoric disorder) 01/18/2013     Priority: Medium     TYPE 2 DIABETES, HBA1C GOAL < 7% 10/31/2010     Priority: Medium     HYPERLIPIDEMIA LDL GOAL <100 10/31/2010     Priority: Medium     Health Care Home 07/01/2013     Priority: Low     *See Letters for [facility] ED Care Plan: My Access Plan              Past Medical History:    Past Medical History:   Diagnosis Date     Knee pain, chronic      Mixed hyperlipidemia      Type II or unspecified type diabetes mellitus without mention of complication, not stated as uncontrolled        Past Surgical History:    Past Surgical History:   Procedure Laterality Date     C STABISM SURG,PREV EYE SURG,NOT MUSC      Right     HC OPEN TX METATARSAL FRACTURE  age 12    softball injury,open fracture left foot     HC TOOTH " EXTRACTION W/FORCEP      Extract wisdom teeth     TUBAL LIGATION  7/27/2006       Family History:    Family History   Problem Relation Age of Onset     DIABETES Maternal Grandmother      Allergies Mother      Hypertension Maternal Grandmother      CEREBROVASCULAR DISEASE Paternal Grandfather      CANCER Maternal Grandfather      Lung - metastatic     HEART DISEASE Maternal Grandmother      Bypass     Anesthesia Reaction No family hx of      Lipids Father      cholesterol     Alzheimer Disease Paternal Grandmother      HEART DISEASE Paternal Grandmother      valve replacement       Social History:  Marital Status:   [2]  Social History   Substance Use Topics     Smoking status: Former Smoker     Years: 6.00     Quit date: 9/22/2010     Smokeless tobacco: Never Used     Alcohol use 0.0 oz/week     0 Standard drinks or equivalent per week      Comment: once a month        Medications:      ketorolac (TORADOL) 10 MG tablet   oxyCODONE-acetaminophen (PERCOCET) 5-325 MG per tablet   meloxicam (MOBIC) 15 MG tablet   FLUoxetine (PROZAC) 20 MG capsule   traZODone (DESYREL) 50 MG tablet   ASPIRIN NOT PRESCRIBED (INTENTIONAL)   ferrous sulfate (IRON) 325 (65 FE) MG tablet   simvastatin (ZOCOR) 20 MG tablet   metFORMIN (GLUCOPHAGE-XR) 500 MG 24 hr tablet   insulin glargine (LANTUS SOLOSTAR) 100 UNIT/ML injection   insulin pen needle (NOVOFINE) 32G X 6 MM   blood glucose monitoring (BL TEST STRIP PACK) test strip   blood glucose monitoring (FREESTYLE) lancets   IBUPROFEN PO   Blood Glucose Monitoring Suppl (ACCU-CHEK COMPLETE) KIT   Multiple Vitamins-Minerals (MULTI-VITAMIN GUMMIES) CHEW         Review of Systems   Constitutional: Negative for fever.   HENT: Positive for sore throat.         FB in throat   Respiratory: Negative for shortness of breath.    Cardiovascular: Negative for chest pain.   Gastrointestinal: Positive for vomiting (x1 after eating food, keeps fluids down). Negative for abdominal pain and nausea.    All other systems reviewed and are negative.      Physical Exam   BP: 142/83  Pulse: 73  Temp: 97.5  F (36.4  C)  Resp: 16  SpO2: 97 %      Physical Exam   Constitutional: She is oriented to person, place, and time. She appears well-developed. No distress.   HENT:   Head: Normocephalic and atraumatic.   Mouth/Throat: Oropharynx is clear and moist. No oropharyngeal exudate.   Eyes: Conjunctivae are normal.   Neck: Normal range of motion.   Cardiovascular: Normal rate, regular rhythm and normal heart sounds.    Pulmonary/Chest: Effort normal and breath sounds normal. No respiratory distress.   Abdominal: Soft. She exhibits no distension. There is no tenderness.   Lymphadenopathy:     She has no cervical adenopathy.   Neurological: She is alert and oriented to person, place, and time.   Skin: Skin is warm and dry. She is not diaphoretic.   Psychiatric: She has a normal mood and affect.   Nursing note and vitals reviewed.      ED Course     ED Course     Procedures      Results for orders placed or performed during the hospital encounter of 12/22/17 (from the past 24 hour(s))   XR Neck Soft Tissue    Narrative    NECK SOFT TISSUE   12/22/2017 11:31 AM     HISTORY: Concern for foreign body, pill in throat.     COMPARISON: None.     FINDINGS: There is slight straightening of the normal cervical  lordosis, likely due to positioning of the patient or muscle spasm.  Cervical spine is otherwise seen from the craniocervical junction  through the mid to lower portion of T1. Prevertebral soft tissues are  grossly within normal limits. No radiopaque foreign bodies project  over the soft tissues of the upper esophagus. Epiglottis is normal in  appearance. Visualized portions of the lung apices are clear.      Impression    IMPRESSION:  1. No evidence for radiopaque foreign body within the soft tissues to  suggest retained pill in the oropharynx or upper esophagus.  2. Straightening of the normal cervical lordosis is likely due  to  positioning of the patient or muscle spasm.       Medications   lidocaine (viscous) (XYLOCAINE) 2 % 15 mL, alum & mag hydroxide-simethicone (MYLANTA ES/MAALOX  ES) 15 mL GI Cocktail (30 mLs Oral Given 12/22/17 1140)        Assessments & Plan (with Medical Decision Making)  Stacy Brown is a 42 year old female who presented to the ED complaining of a pill stuck in her throat.  This developed around 9 AM after she attempted to take to metformin pills at once.  She has tolerated fluids orally but no food as that caused her to vomit.  On arrival to the ED she had normal vital signs.  Exam was overall unremarkable.  Concerned that there was a pill stuck in her throat versus esophageal irritation from a pill that had been lodged for short time. No evidence for airway involvement. She was given a GI cocktail here for symptomatic relief.  X-ray of her neck was obtained to evaluate for foreign body, and this showed no evidence for foreign body.  While in the ED the patient apparently hiccuped and felt the pill go down and reported all symptoms had resolved.  She was comfortable going home at this time.  I did explain that she may have some irritation in her esophagus from the pill being lodged there.  I advised advancing diet slowly as tolerated.  If she continues to have issues into next week she should follow-up in the clinic.  She was given instructions on when to return to the ED.  All questions answered and patient was discharged home.     I have reviewed the nursing notes.    I have reviewed the findings, diagnosis, plan and need for follow up with the patient.    New Prescriptions    No medications on file       Final diagnoses:   Foreign body in pharynx, initial encounter     Note: Chart documentation done in part with Dragon Voice Recognition software. Although reviewed after completion, some word and grammatical errors may remain.     12/22/2017   [facility] EMERGENCY DEPARTMENT     Hannah Lopez  GUILLE Sierra  12/22/17 1240

## 2017-12-22 NOTE — ED NOTES
Pt swallowed two metformin pills this morning and feels as if they are stuck in her throat.  Pt tried to eat something and it didn't go down and she vomited two times.

## 2017-12-22 NOTE — DISCHARGE INSTRUCTIONS
Foreign Object In Pharynx  Objects that are swallowed can get stuck in the throat (pharynx). This is most common in children, but it can happen in adults as well. Objects that may become stuck include food, bones, small button batteries, fridge magnets, dry dog food, coins, or other small items. You (or your child) had an object stuck in the throat. A stuck object can cause coughing, choking, pain when swallowing, or trouble swallowing. If the object is dangerous (such as a battery) or blocks breathing, it may need emergency removal.  The object has been removed. You are being sent home to recover. For a day or so, it may continue to feel like something is stuck in the throat. It may also hurt to swallow. This is because the throat tissues were irritated and injured. Symptoms should start to get better as the tissue heals.  Home care    If swallowing is painful, have liquids and soft foods until it gets better.     Gargling with salt solution (1/2 teaspoon of table salt dissolved in 8 ounces of warm water) may help soothe a sore throat. Spit out the solution--don't t swallow it. Ensure that children don't t swallow the solution.    To prevent stuck objects in the future:    Cut food into small pieces that are no longer than one-half inch. Chew well.    Be careful when eating fish or other food with bones.    Be careful with foods that may become stuck, such as raisins, grapes, nuts, hot dog skin, and hard candies. Don't give these to young children.    If pills are an issue, ask the healthcare provider for smaller pills or liquid medicine.  Follow-up care  Follow up with your healthcare provider, or as advised.  When to seek medical advice  Call the healthcare provider if any of the following occur:    Fever of 100.4 F (38 C) or higher, or as directed by the healthcare provider    Throat pain that doesn't improve or gets worse    Continued trouble swallowing, choking, or other symptoms    Inability to open the  mouth wide due to pain    Inability to eat or drink, or refusal to eat or drink    Drooling or inability to swallow saliva    Trouble breathing, noisy breathing, or a muffled voice    Increased pain with neck movement    Nausea or vomiting    Abdominal pain    Bloody bowel movements

## 2017-12-22 NOTE — ED AVS SNAPSHOT
[facility] Emergency Department    [address] [place] [address]    Phone:  [phone]    Fax:  [phone]                                       Stacy Brown   MRN: [id number]    Department:  [facility] Emergency Department   Date of Visit:  12/22/2017           Patient Information     Date Of Birth          1975        Your diagnoses for this visit were:     Foreign body in pharynx, initial encounter        You were seen by Hannah Lopez PA-C.      Follow-up Information     Follow up with [facility] Emergency Department.    Specialty:  EMERGENCY MEDICINE    Why:  If symptoms worsen    Contact information:    [address]  [place] [address] [phone]    Additional information:    From [address]: Exit at [address] on [place] side of [place]. Turn right on [address]. Turn left at stoplight on [address]. [facility] will be in view two blocks ahead        Follow up with Yaima Muse MD In 1 week.    Specialty:  Family Practice    Why:  As needed for persistent symptoms    Contact information:    [address]  [place] [address]  [phone]          Discharge Instructions         Foreign Object In Pharynx  Objects that are swallowed can get stuck in the throat (pharynx). This is most common in children, but it can happen in adults as well. Objects that may become stuck include food, bones, small button batteries, fridge magnets, dry dog food, coins, or other small items. You (or your child) had an object stuck in the throat. A stuck object can cause coughing, choking, pain when swallowing, or trouble swallowing. If the object is dangerous (such as a battery) or blocks breathing, it may need emergency removal.  The object has been removed. You are being sent home to recover. For a day or so, it may continue to feel like something is stuck in the throat. It may also hurt to swallow. This is  because the throat tissues were irritated and injured. Symptoms should start to get better as the tissue heals.  Home care    If swallowing is painful, have liquids and soft foods until it gets better.     Gargling with salt solution (1/2 teaspoon of table salt dissolved in 8 ounces of warm water) may help soothe a sore throat. Spit out the solution--don't t swallow it. Ensure that children don't t swallow the solution.    To prevent stuck objects in the future:    Cut food into small pieces that are no longer than one-half inch. Chew well.    Be careful when eating fish or other food with bones.    Be careful with foods that may become stuck, such as raisins, grapes, nuts, hot dog skin, and hard candies. Don't give these to young children.    If pills are an issue, ask the healthcare provider for smaller pills or liquid medicine.  Follow-up care  Follow up with your healthcare provider, or as advised.  When to seek medical advice  Call the healthcare provider if any of the following occur:    Fever of 100.4 F (38 C) or higher, or as directed by the healthcare provider    Throat pain that doesn't improve or gets worse    Continued trouble swallowing, choking, or other symptoms    Inability to open the mouth wide due to pain    Inability to eat or drink, or refusal to eat or drink    Drooling or inability to swallow saliva    Trouble breathing, noisy breathing, or a muffled voice    Increased pain with neck movement    Nausea or vomiting    Abdominal pain    Bloody bowel movements          Future Appointments        Provider Department Dept Phone Center    12/27/2017 8:20 AM Ayden Clay DO [facility] [phone] [facility]    1/3/2018 1:30 PM Yaima Muse MD [facility] [phone] [facility]      24 Hour Appointment Hotline       To make an appointment at any [facility], call [phone] ([phone]). If you don't have a family doctor or  clinic, we will help you find one. [facility] are conveniently located to serve the needs of you and your family.             Review of your medicines      CONTINUE these medicines which may have CHANGED, or have new prescriptions. If we are uncertain of the size of tablets/capsules you have at home, strength may be listed as something that might have changed.        Dose / Directions Last dose taken    insulin glargine 100 UNIT/ML injection   Commonly known as:  LANTUS SOLOSTAR   What changed:  additional instructions   Quantity:  6 mL        26 units once daily   Refills:  5          Our records show that you are taking the medicines listed below. If these are incorrect, please call your family doctor or clinic.        Dose / Directions Last dose taken    ACCU-CHEK COMPLETE Kit   Dose:  1 Device   Quantity:  1 Device        1 Device daily   Refills:  0        ASPIRIN NOT PRESCRIBED   Commonly known as:  INTENTIONAL        Antiplatelet medication not prescribed intentionally due to Not indicated based on age   Refills:  0        blood glucose monitoring lancets   Quantity:  3 Box        Use to test blood sugars 1-2 times daily or as directed, per patients glucose meter.   Refills:  3        blood glucose monitoring test strip   Commonly known as:  BL TEST STRIP PACK   Quantity:  100 each        Use to test blood sugar 1-2 times daily or as directed. Per patients glucose meter (getting new one today)   Refills:  3        ferrous sulfate 325 (65 FE) MG tablet   Commonly known as:  IRON   Dose:  325 mg   Quantity:  180 tablet        Take 1 tablet (325 mg) by mouth 2 times daily   Refills:  3        FLUoxetine 20 MG capsule   Commonly known as:  PROzac   Dose:  40 mg   Quantity:  180 capsule        Take 2 capsules (40 mg) by mouth daily   Refills:  1        IBUPROFEN PO   Dose:  600 mg        Take 600 mg by mouth every 4 hours as needed for moderate pain   Refills:  0        insulin pen needle 32G X 6 MM    Commonly known as:  NOVOFINE   Quantity:  100 each        Use once daily or as directed.   Refills:  3        ketorolac 10 MG tablet   Commonly known as:  TORADOL   Quantity:  30 tablet        TAKE ONE TABLET BY MOUTH EVERY NIGHT AS NEEDED FOR MODERATE PAIN   Refills:  1        meloxicam 15 MG tablet   Commonly known as:  MOBIC   Dose:  15 mg   Quantity:  30 tablet        Take 1 tablet (15 mg) by mouth daily   Refills:  0        metFORMIN 500 MG 24 hr tablet   Commonly known as:  GLUCOPHAGE-XR   Dose:  2000 mg   Quantity:  360 tablet        Take 4 tablets (2,000 mg) by mouth daily (with dinner)   Refills:  3        MULTI-VITAMIN GUMMIES Chew   Dose:  1 chew tab        Take 1 chew tab by mouth daily   Refills:  0        oxyCODONE-acetaminophen 5-325 MG per tablet   Commonly known as:  PERCOCET   Dose:  1 tablet   Quantity:  30 tablet        Take 1 tablet by mouth nightly as needed for moderate to severe pain   Refills:  0        simvastatin 20 MG tablet   Commonly known as:  ZOCOR   Dose:  20 mg   Quantity:  90 tablet        Take 1 tablet (20 mg) by mouth At Bedtime   Refills:  3        traZODone 50 MG tablet   Commonly known as:  DESYREL   Quantity:  180 tablet        TAKE 1-2 TABLETS BY MOUTH NIGHTLY AS NEEDED FOR SLEEP   Refills:  1                Procedures and tests performed during your visit     XR Neck Soft Tissue      Orders Needing Specimen Collection     None      Pending Results     Date and Time Order Name Status Description    12/22/2017 1118 XR Neck Soft Tissue Preliminary             Pending Culture Results     No orders found from 12/20/2017 to 12/23/2017.            Pending Results Instructions     If you had any lab results that were not finalized at the time of your Discharge, you can call the ED Lab Result RN at [phone]. You will be contacted by this team for any positive Lab results or changes in treatment. The nurses are available 7 days a week from 10A to 6:30P.  You can leave a message  "24 hours per day and they will return your call.        Thank you for choosing [facility]       Thank you for choosing [facility] for your care. Our goal is always to provide you with excellent care. Hearing back from our patients is one way we can continue to improve our services. Please take a few minutes to complete the written survey that you may receive in the mail after you visit with us. Thank you!        [employer] Information     [employer] lets you send messages to your doctor, view your test results, renew your prescriptions, schedule appointments and more. To sign up, go to [email or web address] . Click on \"Log in\" on the left side of the screen, which will take you to the Welcome page. Then click on \"Sign up Now\" on the right side of the page.     You will be asked to enter the access code listed below, as well as some personal information. Please follow the directions to create your username and password.     Your access code is: [email or web address]  Expires: 2018  6:30 PM     Your access code will  in 90 days. If you need help or a new code, please call your [facility] clinic or [phone].        Care EveryWhere ID     This is your Care EveryWhere ID. This could be used by other organizations to access your [facility] medical records  [id number]        Equal Access to Services     BRADLEY KRAUS AH: Royce Olivier, waaxda luqadaha, qaybta kaalmada toi, conrado mason. So [facility] [phone].    ATENCIÓN: Si habla español, tiene a hewitt disposición servicios gratuitos de asistencia lingüística. Llame al [phone].    We comply with applicable federal civil rights laws and [place] laws. We do not discriminate on the basis of race, color, national origin, age, disability, sex, sexual orientation, or gender identity.            After Visit Summary       This is your record. Keep this with you and show to your community pharmacist(s) and doctor(s) at your next " visit.

## 2017-12-27 ENCOUNTER — OFFICE VISIT (OUTPATIENT)
Dept: ORTHOPEDICS | Facility: CLINIC | Age: 42
End: 2017-12-27
Payer: COMMERCIAL

## 2017-12-27 ENCOUNTER — TELEPHONE (OUTPATIENT)
Dept: ORTHOPEDICS | Facility: CLINIC | Age: 42
End: 2017-12-27

## 2017-12-27 VITALS — TEMPERATURE: 97.2 F | WEIGHT: 167 LBS | HEIGHT: 66 IN | BODY MASS INDEX: 26.84 KG/M2

## 2017-12-27 DIAGNOSIS — G89.29 CHRONIC SI JOINT PAIN: ICD-10-CM

## 2017-12-27 DIAGNOSIS — M53.3 CHRONIC SI JOINT PAIN: ICD-10-CM

## 2017-12-27 DIAGNOSIS — M70.62 GREATER TROCHANTERIC BURSITIS OF LEFT HIP: Primary | ICD-10-CM

## 2017-12-27 PROCEDURE — 99213 OFFICE O/P EST LOW 20 MIN: CPT | Performed by: ORTHOPAEDIC SURGERY

## 2017-12-27 ASSESSMENT — PAIN SCALES - GENERAL: PAINLEVEL: SEVERE PAIN (7)

## 2017-12-27 NOTE — LETTER
[address] [address]  Phone: [phone]  Fax: [phone]    December 27, 2017        Stacy Brown  [address] [place] [address]          To whom it may concern:    RE: Stacy Brown    Patient was seen and treated today at our clinic and missed work.  Recommending that patient return to work starting at 6 hour day on January 4th.  Also recommend that she be allowed to do duties that involves more standing than walking.  Patient will need also need time off starting January 11th for a procedure.  Her surgeon from that procedure will then help determine how long to be off following the procedure.      Please contact me for questions or concerns.      Sincerely,        JOVITA Monroy, CNP, DNP  Orthopedic Surgery

## 2017-12-27 NOTE — LETTER
12/27/2017         RE: Stacy Brown  [address]  [place] [address]        Dear Colleague,    Thank you for referring your patient, Stacy Brown, to the [facility]. Please see a copy of my visit note below.    Office Visit-Follow up    Chief Complaint: Stacy Brown is a 42 year old female who is being seen for   Chief Complaint   Patient presents with     RECHECK     left hip greater trochanteric bursitis, Left SI joint pain       History of Present Illness:   Today's visit:  Per patient the lateral hip pain and tenderness has improved following the injection and rest from work.  She states those symptoms continue to improve and get better and happy about that progress.      She states the pain lower buttock and tenderness their is getting worse and really bothersome.  She notes she is scheduled for SI joint injection on 1/11/18.     Visit from 12/6/17  Stacy Brown is a 42 year old female who is seen in consultation at the request of Dr. Muse for evaluation of left hip pain.  Mechanism of Injury: No trauma or recallable event.  Has had pain off and on to the left hip for about 3 years, however the pain has been much present and progressive more recently when her working hours were expanded.  Patient states working 12-16 hour days, and by the end of the day the pain is intolerable.  Works as a .  States pain is lateral left hip, ache/pressure, moderate to severe, cannot lay on hip, and states walking and weight bearing activity by the end of the day.  Rest improves the pain.  Takes 1 percocet and 1 toradol a night to help with chronic right knee pain and this does not improve the pain.  Takes ibuprofen off and on throughout the day and this helps somewhat.  Patient has not had previous injections or PT to the left thigh.      Denies any significant heart, lung, kidney disease.  Denies any previous issues with NSAIDs and no hx of gastric ulcers.   States  "she does have IDDM and last A1c was >9.5.   Denies any kidney problems with this.        REVIEW OF SYSTEMS  General: negative for, night sweats, dizziness, fatigue  Resp: No shortness of breath and no cough  CV: negative for chest pain, syncope or near-syncope  GI: negative for nausea, vomiting and diarrhea  : negative for dysuria and hematuria  Musculoskeletal: as above  Neurologic: negative for syncope   Hematologic: negative for bleeding disorder    Physical Exam:  Vitals: Temp 97.2  F (36.2  C) (Temporal)  Ht 1.676 m (5' 6\")  Wt 75.8 kg (167 lb)  BMI 26.95 kg/m2  BMI= Body mass index is 26.95 kg/(m^2).  Constitutional: healthy, alert and no acute distress   Psychiatric: mentation appears normal and affect normal/bright  NEURO: no focal deficits  RESP: Normal with easy respirations and no use of accessory muscles to breathe, no audible wheezing or retractions  CV: LLE:  no edema  SKIN: No erythema, rashes, excoriation, or breakdown. No evidence of infection.   JOINT/EXTREMITIES:left Hip Exam: Palpation: Tender: mild tenderness to left greater trochanter. Exquisite Pain at SI Joint  Non-tender:   throughout hip otherwise  Range of Motion:  Full ROM to flexion, adduction, abduction.  NO pain with passive internal and external rotation.    Strength:  Hip flexion 5/5, adduction, abduction 5/5  Special tests: + JAY to create pain in buttock  Straight leg raise: pain into SI joint                        Lymph: no appreciated lymphedema  GAIT: antalgic        Diagnostic Modalities:  None today.  Previous imaging reviewed.  Independent visualization of the images was performed.      Impression: left hip greater trochanteric bursitis resolving  Left hip SI joint pain - scheduled for injection 1/11/18    Plan:  All of the above pertinent physical exam and imaging modalities findings was reviewed with Stacy.    Trochanteric bursitis resolving, SI joint pain is getting worse.  Plan for injection on 1/11/18.  "     Discussed with patient for return to work, she would like one more week to continue to rest then start at 6 hour days, prior to full duty. Letter given.  She is planning for injection on 1/11/18 and stated her doctor from that procedure can help determine when it is safe to return.      Return to clinic after 2-3 weeks SI joint injection if no better, or sooner as needed for changes.  Re-x-ray on return: No    Scribed by:  Surya Posadas, APRN, CNP, DNP  9:06 AM  12/27/2017    I attest I have seen and evaluated the patient.  I agree with above impression and plan.  Abdelrahman Clay D.O.          Again, thank you for allowing me to participate in the care of your patient.        Sincerely,        Ayden Clay, DO

## 2017-12-27 NOTE — TELEPHONE ENCOUNTER
Our goal is to have forms completed with 72 hours, however some forms may require a visit or additional information.    Who is the form from?: Patient  Where the form came from: Patient or family brought in     What clinic location was the form placed at?: Only  Where the form was placed: '[facility]  What number is listed as a contact on the form?: [employer]    Phone call message- patient request for a letter, form or note:    Date needed: within one week  Patient will  at the clinic when completed- [phone]- Stacy  Has the patient signed a consent form for release of information? YES    Additional comments:     Call taken on 12/27/2017 at 8:26 AM by Maria Martin    Type of letter, form or note: disability

## 2017-12-27 NOTE — MR AVS SNAPSHOT
After Visit Summary   12/27/2017    Stacy Brown    MRN: [id number]           Patient Information     Date Of Birth          1975        Visit Information        Provider Department      12/27/2017 8:20 AM Ayden Clay DO [facility]        Today's Diagnoses     Greater trochanteric bursitis of left hip    -  1    Chronic SI joint pain           Follow-ups after your visit        Your next 10 appointments already scheduled     Jan 03, 2018  1:30 PM CST   Pre-Op physical with Yaima Muse MD   [facility]    [address] [place] [address] [phone]            Jan 11, 2018   Procedure with Alan Marshall MD   [facility] ([facility])    [address] [place] [id number] [phone]           From [address]: Exit at [address] on south side of [place]. Turn right on [address]. Turn left at stoplight on [address]. [facility] will be in view two blocks ahead              Who to contact     If you have questions or need follow up information about today's clinic visit or your schedule please contact [facility] directly at [phone].  Normal or non-critical lab and imaging results will be communicated to you by Invodohart, letter or phone within 4 business days after the clinic has received the results. If you do not hear from us within 7 days, please contact the clinic through Invodohart or phone. If you have a critical or abnormal lab result, we will notify you by phone as soon as possible.  Submit refill requests through Zaranga or call your pharmacy and they will forward the refill request to us. Please allow 3 business days for your refill to be completed.          Additional Information About Your Visit        Zaranga Information     Zaranga lets you send messages to your doctor, view your test results,  "renew your prescriptions, schedule appointments and more. To sign up, go to [email or web address] . Click on \"Log in\" on the left side of the screen, which will take you to the Welcome page. Then click on \"Sign up Now\" on the right side of the page.     You will be asked to enter the access code listed below, as well as some personal information. Please follow the directions to create your username and password.     Your access code is: [email or web address]  Expires: 2018  6:30 PM     Your access code will  in 90 days. If you need help or a new code, please call your [place] clinic or [phone].        Care EveryWhere ID     This is your Care EveryWhere ID. This could be used by other organizations to access your [place] medical records  [id number]        Your Vitals Were     Temperature Height BMI (Body Mass Index)             97.2  F (36.2  C) (Temporal) 1.676 m (5' 6\") 26.95 kg/m2          Blood Pressure from Last 3 Encounters:   17 134/76   17 112/72   09/10/17 122/67    Weight from Last 3 Encounters:   17 75.8 kg (167 lb)   17 75.8 kg (167 lb 1.6 oz)   17 76.9 kg (169 lb 9.6 oz)              Today, you had the following     No orders found for display         Today's Medication Changes          These changes are accurate as of: 17 11:15 AM.  If you have any questions, ask your nurse or doctor.               These medicines have changed or have updated prescriptions.        Dose/Directions    insulin glargine 100 UNIT/ML injection   Commonly known as:  LANTUS SOLOSTAR   This may have changed:  additional instructions   Used for:  Type 2 diabetes mellitus with hyperglycemia, with long-term current use of insulin (H)        26 units once daily   Quantity:  6 mL   Refills:  5                Primary Care Provider Office Phone # Fax #    Yaima Nicole Muse [phone]       [address] [place] [address]        Equal Access to Services  "    BRADLEY KRAUS : Hadii aad [place] maria c Olivier, waaxda luqadaha, qaybta kaalmada toi, conrado elisabeth hayjoellen hastingsreubenjena moya . So [facility] [phone].    ATENCIÓN: Si habla español, tiene a hewitt disposición servicios gratuitos de asistencia lingüística. Llame al [phone].    We comply with applicable federal civil rights laws and [place] laws. We do not discriminate on the basis of race, color, national origin, age, disability, sex, sexual orientation, or gender identity.            Thank you!     Thank you for choosing [facility]  for your care. Our goal is always to provide you with excellent care. Hearing back from our patients is one way we can continue to improve our services. Please take a few minutes to complete the written survey that you may receive in the mail after your visit with us. Thank you!             Your Updated Medication List - Protect others around you: Learn how to safely use, store and throw away your medicines at www.disposemymeds.org.          This list is accurate as of: 12/27/17 11:15 AM.  Always use your most recent med list.                   Brand Name Dispense Instructions for use Diagnosis    ACCU-CHEK COMPLETE Kit     1 Device    1 Device daily    Type 2 diabetes, HbA1c goal < 7% (H)       ASPIRIN NOT PRESCRIBED    INTENTIONAL     Antiplatelet medication not prescribed intentionally due to Not indicated based on age        blood glucose monitoring lancets     3 Box    Use to test blood sugars 1-2 times daily or as directed, per patients glucose meter.    Type 2 diabetes mellitus with hyperglycemia, with long-term current use of insulin (H)       blood glucose monitoring test strip    BL TEST STRIP PACK    100 each    Use to test blood sugar 1-2 times daily or as directed. Per patients glucose meter (getting new one today)    Type 2 diabetes mellitus with hyperglycemia, with long-term current use of insulin (H)       ferrous sulfate 325 (65 FE) MG tablet     IRON    180 tablet    Take 1 tablet (325 mg) by mouth 2 times daily    Iron deficiency anemia, unspecified iron deficiency anemia type       FLUoxetine 20 MG capsule    PROzac    180 capsule    Take 2 capsules (40 mg) by mouth daily    Major depressive disorder, recurrent episode, moderate (H)       IBUPROFEN PO      Take 600 mg by mouth every 4 hours as needed for moderate pain        insulin glargine 100 UNIT/ML injection    LANTUS SOLOSTAR    6 mL    26 units once daily    Type 2 diabetes mellitus with hyperglycemia, with long-term current use of insulin (H)       insulin pen needle 32G X 6 MM    NOVOFINE    100 each    Use once daily or as directed.    Type 2 diabetes mellitus with hyperglycemia, with long-term current use of insulin (H)       ketorolac 10 MG tablet    TORADOL    30 tablet    TAKE ONE TABLET BY MOUTH EVERY NIGHT AS NEEDED FOR MODERATE PAIN    Chronic pain syndrome       meloxicam 15 MG tablet    MOBIC    30 tablet    Take 1 tablet (15 mg) by mouth daily    Greater trochanteric bursitis of left hip, Chronic SI joint pain       metFORMIN 500 MG 24 hr tablet    GLUCOPHAGE-XR    360 tablet    Take 4 tablets (2,000 mg) by mouth daily (with dinner)    Type 2 diabetes mellitus with hyperglycemia, with long-term current use of insulin (H)       MULTI-VITAMIN GUMMIES Chew      Take 1 chew tab by mouth daily        oxyCODONE-acetaminophen 5-325 MG per tablet    PERCOCET    30 tablet    Take 1 tablet by mouth nightly as needed for moderate to severe pain    Pain of right lower extremity       simvastatin 20 MG tablet    ZOCOR    90 tablet    Take 1 tablet (20 mg) by mouth At Bedtime    Hyperlipidemia LDL goal <100, Type 2 diabetes mellitus with hyperglycemia, with long-term current use of insulin (H)       traZODone 50 MG tablet    DESYREL    180 tablet    TAKE 1-2 TABLETS BY MOUTH NIGHTLY AS NEEDED FOR SLEEP    Primary insomnia

## 2017-12-27 NOTE — NURSING NOTE
"Chief Complaint   Patient presents with     RECHECK     left hip greater trochanteric bursitis, Left SI joint pain       Initial Temp 97.2  F (36.2  C) (Temporal)  Ht 1.676 m (5' 6\")  Wt 75.8 kg (167 lb)  BMI 26.95 kg/m2 Estimated body mass index is 26.95 kg/(m^2) as calculated from the following:    Height as of this encounter: 1.676 m (5' 6\").    Weight as of this encounter: 75.8 kg (167 lb).  Medication Reconciliation: complete   Kapil/BARBARA     "

## 2018-01-02 ENCOUNTER — TELEPHONE (OUTPATIENT)
Dept: ORTHOPEDICS | Facility: CLINIC | Age: 43
End: 2018-01-02

## 2018-01-02 NOTE — LETTER
[address] [address]  Phone: [phone]  Fax: [phone]    January 2, 2018        Stacy Brown  [address] [place] [address]          To whom it may concern:    RE: Stacy Brown      Patient may return to work without restrictions starting  1/4/18.   Patient will need also need time off starting January 11th for a procedure.  Her surgeon from that procedure will then help determine how long to be off following the procedure.      Please contact me for questions or concerns.      Sincerely,        Markie Posadas, APRN, CNP, DNP  Orthopedic Surgery

## 2018-01-02 NOTE — TELEPHONE ENCOUNTER
Reason for Call:  Other     Detailed comments: Patient would like to go back to work sooner and would like an updated workability note. She would like to go back to work on 01/04/18. Please call her when ready.    Phone Number Patient can be reached at: Cell number on file:    Telephone Information:   Mobile [phone]       Best Time: any    Can we leave a detailed message on this number? YES    Call taken on 1/2/2018 at 10:51 AM by Nadine Glez

## 2018-01-02 NOTE — TELEPHONE ENCOUNTER
Done.  Signed given speciality  (lower level) for patient to .    Markie Posadas APRN, CNP, DNP  Orthopedic Surgery

## 2018-01-02 NOTE — TELEPHONE ENCOUNTER
Called and left voicemail for patient.  Need to know if she wants unrestricted, or still wants decreased hours.  Also will need to know if she wants it faxed, picked up, etc.       Markie Posadas, APRN, CNP, DNP  Orthopedic Surgery

## 2018-01-02 NOTE — TELEPHONE ENCOUNTER
Patient would like to return to work with no restrictions up until the procedure date which is on 01/11/18. We will reassess after procedure date. Patient will  the letter here in [place]. Please call her when ready.

## 2018-01-03 ENCOUNTER — OFFICE VISIT (OUTPATIENT)
Dept: FAMILY MEDICINE | Facility: CLINIC | Age: 43
End: 2018-01-03
Payer: COMMERCIAL

## 2018-01-03 VITALS
TEMPERATURE: 97.3 F | BODY MASS INDEX: 27.25 KG/M2 | WEIGHT: 168.8 LBS | HEART RATE: 99 BPM | OXYGEN SATURATION: 100 % | DIASTOLIC BLOOD PRESSURE: 72 MMHG | SYSTOLIC BLOOD PRESSURE: 108 MMHG

## 2018-01-03 DIAGNOSIS — G89.4 CHRONIC PAIN SYNDROME: ICD-10-CM

## 2018-01-03 DIAGNOSIS — E78.5 HYPERLIPIDEMIA LDL GOAL <100: ICD-10-CM

## 2018-01-03 DIAGNOSIS — D50.9 IRON DEFICIENCY ANEMIA, UNSPECIFIED IRON DEFICIENCY ANEMIA TYPE: ICD-10-CM

## 2018-01-03 DIAGNOSIS — Z79.4 TYPE 2 DIABETES MELLITUS WITH HYPERGLYCEMIA, WITH LONG-TERM CURRENT USE OF INSULIN (H): ICD-10-CM

## 2018-01-03 DIAGNOSIS — Z01.818 PREOP GENERAL PHYSICAL EXAM: Primary | ICD-10-CM

## 2018-01-03 DIAGNOSIS — E11.65 TYPE 2 DIABETES MELLITUS WITH HYPERGLYCEMIA, WITH LONG-TERM CURRENT USE OF INSULIN (H): ICD-10-CM

## 2018-01-03 DIAGNOSIS — F32.1 MODERATE MAJOR DEPRESSION (H): ICD-10-CM

## 2018-01-03 DIAGNOSIS — M46.1 SACROILIITIS (H): ICD-10-CM

## 2018-01-03 LAB
CREAT SERPL-MCNC: 0.57 MG/DL (ref 0.52–1.04)
GFR SERPL CREATININE-BSD FRML MDRD: >90 ML/MIN/1.7M2
HGB BLD-MCNC: 10.3 G/DL (ref 11.7–15.7)

## 2018-01-03 PROCEDURE — 99215 OFFICE O/P EST HI 40 MIN: CPT | Mod: 25 | Performed by: FAMILY MEDICINE

## 2018-01-03 PROCEDURE — 82565 ASSAY OF CREATININE: CPT | Performed by: FAMILY MEDICINE

## 2018-01-03 PROCEDURE — 36415 COLL VENOUS BLD VENIPUNCTURE: CPT | Performed by: FAMILY MEDICINE

## 2018-01-03 PROCEDURE — 93000 ELECTROCARDIOGRAM COMPLETE: CPT | Performed by: FAMILY MEDICINE

## 2018-01-03 PROCEDURE — 85018 HEMOGLOBIN: CPT | Performed by: FAMILY MEDICINE

## 2018-01-03 ASSESSMENT — PAIN SCALES - GENERAL: PAINLEVEL: SEVERE PAIN (7)

## 2018-01-03 NOTE — PROGRESS NOTES
[address] [address]  [phone]  Dept: [phone]    PRE-OP EVALUATION:  Today's date: 1/3/2018    Stacy Brown (: 1975) presents for pre-operative evaluation assessment as requested by Dr. Alan Marshall.  She requires evaluation and anesthesia risk assessment prior to undergoing surgery/procedure for treatment of chronic SI joint pain.  Proposed procedure: inject joint sacroiliac left    Date of Surgery/ Procedure: 2018  Time of Surgery/ Procedure: 10:00am  Hospital/Surgical Facility: [facility]  Primary Physician: Yaima Muse  Type of Anesthesia Anticipated: Local with MAC    Patient has a Health Care Directive or Living Will:  NO, FULL CODE    Preop Questions 1/3/2018   1.  Do you have a history of heart attack, stroke, stent, bypass or surgery on an artery in the head, neck, heart or legs? No   2.  Do you ever have any pain or discomfort in your chest? No   3.  Do you have a history of  Heart Failure? No   4.   Are you troubled by shortness of breath when:  walking on a level surface, or up a slight hill, or at night? No   5.  Do you currently have a cold, bronchitis or other respiratory infection? No   6.  Do you have a cough, shortness of breath, or wheezing? No   7.  Do you sometimes get pains in the calves of your legs when you walk? No   8. Do you or anyone in your family have previous history of blood clots? No   9.  Do you or does anyone in your family have a serious bleeding problem such as prolonged bleeding following surgeries or cuts? No   10. Have you ever had problems with anemia or been told to take iron pills? YES - has been treated with iron pills   11. Have you had any abnormal blood loss such as black, tarry or bloody stools, or abnormal vaginal bleeding? No   12. Have you ever had a blood transfusion? No   13. Have you or any of your relatives ever had problems with anesthesia? YES - paternal grandma   14. Do  "you have sleep apnea, excessive snoring or daytime drowsiness? No   15. Do you have any prosthetic heart valves? No   16. Do you have prosthetic joints? No   17. Is there any chance that you may be pregnant? No           HPI:                                                      Brief HPI related to upcoming procedure: Stacy Brown is a 42 year old female who presents to the clinic for a Pre-Operative Evaluation for a left SI joint injection to treat chronic left SI joint pain. This procedure is scheduled for 1/11/2018 with Dr. Alan Marshall. The patient did follow up with orthopedics on 12/27/2017:    \"Per patient the lateral hip pain and tenderness has improved following the injection and rest from work.  She states those symptoms continue to improve and get better and happy about that progress.       She states the pain lower buttock and tenderness their is getting worse and really bothersome.  She notes she is scheduled for SI joint injection on 1/11/18.      Visit from 12/6/17    Stacy Brown is a 42 year old female who is seen in consultation at the request of Dr. Muse for evaluation of left hip pain.  Mechanism of Injury: No trauma or recallable event.  Has had pain off and on to the left hip for about 3 years, however the pain has been much present and progressive more recently when her working hours were expanded.  Patient states working 12-16 hour days, and by the end of the day the pain is intolerable.  Works as a .  States pain is lateral left hip, ache/pressure, moderate to severe, cannot lay on hip, and states walking and weight bearing activity by the end of the day.  Rest improves the pain.  Takes 1 percocet and 1 toradol a night to help with chronic right knee pain and this does not improve the pain.  Takes ibuprofen off and on throughout the day and this helps somewhat.  Patient has not had previous injections or PT to the left thigh.       Denies any significant heart, lung, " "kidney disease.  Denies any previous issues with NSAIDs and no hx of gastric ulcers.   States she does have IDDM and last A1c was >9.5.   Denies any kidney problems with this.\"    See problem list for active medical problems.  Problems all longstanding and stable, except as noted/documented.  See ROS for pertinent symptoms related to these conditions.                                                                                                  .    MEDICAL HISTORY:                                                    Patient Active Problem List    Diagnosis Date Noted     Tobacco abuse disorder 11/21/2017     Priority: Medium     Overweight 01/05/2016     Priority: Medium     Chronic pain syndrome 08/14/2015     Priority: Medium     Insomnia 08/11/2015     Priority: Medium     Moderate major depression (H) 02/09/2015     Priority: Medium     Restless legs syndrome (RLS) 02/09/2015     Priority: Medium     Halitosis 11/26/2014     Priority: Medium     Iron deficiency anemia 03/12/2014     Priority: Medium     PMDD (premenstrual dysphoric disorder) 01/18/2013     Priority: Medium     TYPE 2 DIABETES, HBA1C GOAL < 7% 10/31/2010     Priority: Medium     HYPERLIPIDEMIA LDL GOAL <100 10/31/2010     Priority: Medium     Health Care Home 07/01/2013     Priority: Low     *See Letters for [facility] Care Plan: My Access Plan            Past Medical History:   Diagnosis Date     Knee pain, chronic      Mixed hyperlipidemia      Type II or unspecified type diabetes mellitus without mention of complication, not stated as uncontrolled      Past Surgical History:   Procedure Laterality Date     C STABISM SURG,PREV EYE SURG,NOT MUSC      Right     HC OPEN TX METATARSAL FRACTURE  age 12    softball injury,open fracture left foot     HC TOOTH EXTRACTION W/FORCEP      Extract wisdom teeth     TUBAL LIGATION  7/27/2006     Current Outpatient Prescriptions   Medication Sig Dispense Refill     oxyCODONE-acetaminophen (PERCOCET) 5-325 MG per " tablet Take 1 tablet by mouth nightly as needed for moderate to severe pain 30 tablet 0     meloxicam (MOBIC) 15 MG tablet Take 1 tablet (15 mg) by mouth daily 30 tablet 0     FLUoxetine (PROZAC) 20 MG capsule Take 2 capsules (40 mg) by mouth daily 180 capsule 1     traZODone (DESYREL) 50 MG tablet TAKE 1-2 TABLETS BY MOUTH NIGHTLY AS NEEDED FOR SLEEP 180 tablet 1     ASPIRIN NOT PRESCRIBED (INTENTIONAL) Antiplatelet medication not prescribed intentionally due to Not indicated based on age       ferrous sulfate (IRON) 325 (65 FE) MG tablet Take 1 tablet (325 mg) by mouth 2 times daily 180 tablet 3     simvastatin (ZOCOR) 20 MG tablet Take 1 tablet (20 mg) by mouth At Bedtime 90 tablet 3     metFORMIN (GLUCOPHAGE-XR) 500 MG 24 hr tablet Take 4 tablets (2,000 mg) by mouth daily (with dinner) 360 tablet 3     insulin glargine (LANTUS SOLOSTAR) 100 UNIT/ML injection 26 units once daily (Patient taking differently: 28 or 30 units once daily) 6 mL 5     insulin pen needle (NOVOFINE) 32G X 6 MM Use once daily or as directed. 100 each 3     blood glucose monitoring (BL TEST STRIP PACK) test strip Use to test blood sugar 1-2 times daily or as directed. Per patients glucose meter (getting new one today) 100 each 3     blood glucose monitoring (FREESTYLE) lancets Use to test blood sugars 1-2 times daily or as directed, per patients glucose meter. 3 Box 3     IBUPROFEN PO Take 600 mg by mouth every 4 hours as needed for moderate pain       Blood Glucose Monitoring Suppl (ACCU-CHEK COMPLETE) KIT 1 Device daily 1 Device 0     Multiple Vitamins-Minerals (MULTI-VITAMIN GUMMIES) CHEW Take 1 chew tab by mouth daily        OTC products: None, except as noted above    Allergies   Allergen Reactions     Amoxicillin Hives      Latex Allergy: NO    Social History   Substance Use Topics     Smoking status: Former Smoker     Years: 6.00     Quit date: 9/22/2010     Smokeless tobacco: Never Used     Alcohol use 0.0 oz/week     0 Standard  drinks or equivalent per week      Comment: once a month     History   Drug Use No       REVIEW OF SYSTEMS:                                                    C: NEGATIVE for fever, chills, unexplained or significant change in weight  I: NEGATIVE for worrisome rashes, moles or lesions  E: NEGATIVE for vision changes or irritation  E/M: NEGATIVE for ear, mouth and throat problems  R: NEGATIVE for significant cough or SOB  B: NEGATIVE for masses, tenderness or discharge  CV: NEGATIVE for chest pain, palpitations or peripheral edema  GI: NEGATIVE for nausea, abdominal pain, heartburn, or change in bowel habits  : NEGATIVE for frequency, dysuria, or hematuria  M: POSITIVEE for chronic back pain.  She is currently on Mobic. She only has a couple pills left. She is NOT taking her Toradol while on the Mobic.    N: NEGATIVE for weakness, dizziness or paresthesias  E: NEGATIVE for temperature intolerance, skin/hair changes.  She has been better about taking her diabetes meds lately.  She occasionally misses but much better than prior.  Her last A1C was:  Lab Results   Component Value Date    A1C 9.7 11/21/2017     At that time she was not compliant with her meds or diet. She is usually taking 30 units of her lantus now. She is also taking her metformin.   H: NEGATIVE for bleeding problems  P: NEGATIVE for changes in mood or affect, stable depression.     EXAM:                                                    /72  Pulse 99  Temp 97.3  F (36.3  C) (Temporal)  Wt 168 lb 12.8 oz (76.6 kg)  SpO2 100%  BMI 27.25 kg/m2    GENERAL APPEARANCE: healthy, alert and no distress     EYES: EOMI, PERRL     HENT: ear canals normal. Some clear fluid behind left TM. Right TM is normal. Nose and mouth without ulcers or lesions     NECK: no adenopathy, no asymmetry, masses, or scars and thyroid normal to palpation, no bruits.      RESP: lungs clear to auscultation - no rales, rhonchi or wheezes     CV: regular rates and rhythm,  normal S1 S2, no S3 or S4 and no murmur, click or rub     ABDOMEN:  soft, nontender, no HSM or masses and bowel sounds normal     MS: extremities normal- no gross deformities noted, no evidence of inflammation in joints, FROM in all extremities.     SKIN: no suspicious lesions or rashes     NEURO: Normal strength and tone, sensory exam grossly normal, mentation intact and speech normal     PSYCH: mentation appears normal. and affect normal/bright     LYMPHATICS: No cervical or supraclavicular nodes    DIAGNOSTICS:                                                      Orders Placed This Encounter   Procedures     Hemoglobin     Creatinine     EKG: appears normal, NSR, normal axis, normal intervals, no acute ST/T changes c/w ischemia, no LVH by voltage criteria    IMPRESSION:                                                    Reason for surgery/procedure: Chronic Left SI Joint Pain   Diagnosis/reason for consult: Pre-Operative Evaluation for Left SI Joint Injection       ICD-10-CM    1. Preop general physical exam Z01.818 Hemoglobin     Creatinine   2. Sacroiliitis (H) M46.1    3. Chronic pain syndrome G89.4    4. Type 2 diabetes mellitus with hyperglycemia, with long-term current use of insulin (H) E11.65     Z79.4    5. Hyperlipidemia LDL goal <100 E78.5    6. Iron deficiency anemia, unspecified iron deficiency anemia type D50.9    7. Moderate major depression (H) F32.1        The proposed surgical procedure is considered LOW risk.    REVISED CARDIAC RISK INDEX  The patient has the following serious cardiovascular risks for perioperative complications such as (MI, PE, VFib and 3  AV Block):  Diabetes Mellitus (on Insulin)  INTERPRETATION: 1 risks: Class II (low risk - 0.9% complication rate)    The patient has the following additional risks for perioperative complications:  No identified additional risks    RECOMMENDATIONS:                                                      -- Labs collected, will notify with  results and discuss further evaluation/ treatment if needed at that time.   -- EKG done, as above. Normal, no further workup required prior to injection.   -- Discussed no eating/ drinking the day of surgery. Advised to take only half her Lantus dose on the evening prior to her procedure. Advised to check her blood sugar in the am before leaving home.   -- Discussed staying off Toradol and Mobic until after her injection. Encouraged Tylenol and Percocet for pain control until after her procedure. She should not use her Percocet the day of surgery.    Prior to your injection, you will want to follow these instructions regarding your medications:     On Wednesday evening (the day before your procedure) take only 1/2 your usual dose of Lantus insulin.   If you are currently taking 30 units, take only 15 on Wednesday night. Please check your blood sugar in the morning on Thursday before you go in to make sure it's not TOO low or TOO high.  If it's less than 70 or higher than 160 please call and let us know.      You will stop taking your Mobic when it runs out, and don't restart your Toradol until AFTER you return home from your injection.     Every thing else you can continue through Wednesday as usual.  Please do NOT take any medications by mouth on the morning of your procedure.      APPROVAL GIVEN to proceed with proposed procedure, without further diagnostic evaluation       Signed Electronically by: Yaima Muse MD    Copy of this evaluation report is provided to requesting physician.    [facility] Preop Guidelines    This document serves as a record of services personally performed by Yaima Muse MD. It was created on their behalf by Demetrice Silva, a trained medical scribe. The creation of this record is based on the provider's personal observations and the statements of the patient. This document has been checked and approved by the attending provider.

## 2018-01-03 NOTE — PATIENT INSTRUCTIONS
Prior to your injection, you will want to follow these instructions regarding your medications:     On Wednesday evening (the day before your procedure) take only 1/2 your usual dose of Lantus insulin.   If you are currently taking 30 units, take only 15 on Wednesday night. Please check your blood sugar in the morning on Thursday before you go in to make sure it's not TOO low or TOO high.  If it's less than 70 or higher than 160 please call and let us know.      You will stop taking your Mobic when it runs out, and don't restart your Toradol until AFTER you return home from your injection.     Every thing else you can continue through Wednesday as usual.  Please do NOT take any medications by mouth on the morning of your procedure.      Yaima Muse MD         Before Your Surgery      Call your surgeon if there is any change in your health. This includes signs of a cold or flu (such as a sore throat, runny nose, cough, rash or fever).    Do not smoke, drink alcohol or take over the counter medicine (unless your surgeon or primary care doctor tells you to) for the 24 hours before and after surgery.    If you take prescribed drugs: Follow your doctor s orders about which medicines to take and which to stop until after surgery.    Eating and drinking prior to surgery: follow the instructions from your surgeon    Take a shower or bath the night before surgery. Use the soap your surgeon gave you to gently clean your skin. If you do not have soap from your surgeon, use your regular soap. Do not shave or scrub the surgery site.  Wear clean pajamas and have clean sheets on your bed.

## 2018-01-03 NOTE — NURSING NOTE
"Chief Complaint   Patient presents with     Pre-Op Exam       Initial /72  Pulse 99  Temp 97.3  F (36.3  C) (Temporal)  Wt 168 lb 12.8 oz (76.6 kg)  SpO2 100%  BMI 27.25 kg/m2 Estimated body mass index is 27.25 kg/(m^2) as calculated from the following:    Height as of 12/27/17: 5' 6\" (1.676 m).    Weight as of this encounter: 168 lb 12.8 oz (76.6 kg).  Medication Reconciliation: complete   Anna Weldon CMA    "

## 2018-01-03 NOTE — MR AVS SNAPSHOT
After Visit Summary   1/3/2018    Stacy Brown    MRN: [id number]           Patient Information     Date Of Birth          1975        Visit Information        Provider Department      1/3/2018 1:30 PM Yaima Muse MD [facility]        Today's Diagnoses     Preop general physical exam    -  1    Sacroiliitis (H)        Type 2 diabetes mellitus with hyperglycemia, with long-term current use of insulin (H)        Hyperlipidemia LDL goal <100        Iron deficiency anemia, unspecified iron deficiency anemia type          Care Instructions    Prior to your injection, you will want to follow these instructions regarding your medications:     On Wednesday evening (the day before your procedure) take only 1/2 your usual dose of Lantus insulin.   If you are currently taking 30 units, take only 15 on Wednesday night. Please check your blood sugar in the morning on Thursday before you go in to make sure it's not TOO low or TOO high.  If it's less than 70 or higher than 160 please call and let us know.      You will stop taking your Mobic when it runs out, and don't restart your Toradol until AFTER you return home from your injection.     Every thing else you can continue through Wednesday as usual.  Please do NOT take any medications by mouth on the morning of your procedure.      Yaima Muse MD         Before Your Surgery      Call your surgeon if there is any change in your health. This includes signs of a cold or flu (such as a sore throat, runny nose, cough, rash or fever).    Do not smoke, drink alcohol or take over the counter medicine (unless your surgeon or primary care doctor tells you to) for the 24 hours before and after surgery.    If you take prescribed drugs: Follow your doctor s orders about which medicines to take and which to stop until after surgery.    Eating and drinking prior to surgery: follow the instructions from your surgeon    Take a  shower or bath the night before surgery. Use the soap your surgeon gave you to gently clean your skin. If you do not have soap from your surgeon, use your regular soap. Do not shave or scrub the surgery site.  Wear clean pajamas and have clean sheets on your bed.           Follow-ups after your visit        Your next 10 appointments already scheduled     Jan 11, 2018   Procedure with Alan Marshall MD   [facility] ([facility])    [address] [place] [id number] [phone]           From [address]: Exit at [address] on south side of [place]. Turn right on [address]. Turn left at stoplight on [address]. [facility] will be in view two blocks ahead            Jan 11, 2018 10:00 AM CST   XR SURGERY ANA M LESS THAN 5 MIN FLUORO W STILLS with PHCARM1   [facility] ([facility])    [address] [id number] [phone]           Please bring a list of your current medicines to your exam. (Include vitamins, minerals and over-thecounter medicines.) Leave your valuables at home.  Tell your doctor if there is a chance you may be pregnant.  You do not need to do anything special for this exam.              Who to contact     If you have questions or need follow up information about today's clinic visit or your schedule please contact [facility] directly at [phone].  Normal or non-critical lab and imaging results will be communicated to you by MyChart, letter or phone within 4 business days after the clinic has received the results. If you do not hear from us within 7 days, please contact the clinic through MyChart or phone. If you have a critical or abnormal lab result, we will notify you by phone as soon as possible.  Submit refill requests through [employer] or call your pharmacy and they will forward the refill request to us. Please allow 3 business days for your refill to be  "completed.          Additional Information About Your Visit        [employer] Information     [employer] lets you send messages to your doctor, view your test results, renew your prescriptions, schedule appointments and more. To sign up, go to [email or web address] . Click on \"Log in\" on the left side of the screen, which will take you to the Welcome page. Then click on \"Sign up Now\" on the right side of the page.     You will be asked to enter the access code listed below, as well as some personal information. Please follow the directions to create your username and password.     Your access code is: [email or web address]  Expires: 2018  6:30 PM     Your access code will  in 90 days. If you need help or a new code, please call your [facility] clinic or [phone].        Care EveryWhere ID     This is your Care EveryWhere ID. This could be used by other organizations to access your [facility] medical records  [id number]        Your Vitals Were     Pulse Temperature Pulse Oximetry BMI (Body Mass Index)          99 97.3  F (36.3  C) (Temporal) 100% 27.25 kg/m2         Blood Pressure from Last 3 Encounters:   18 108/72   17 134/76   17 112/72    Weight from Last 3 Encounters:   18 168 lb 12.8 oz (76.6 kg)   17 167 lb (75.8 kg)   17 167 lb 1.6 oz (75.8 kg)              We Performed the Following     Creatinine     Hemoglobin          Today's Medication Changes          These changes are accurate as of: 1/3/18  2:21 PM.  If you have any questions, ask your nurse or doctor.               These medicines have changed or have updated prescriptions.        Dose/Directions    insulin glargine 100 UNIT/ML injection   Commonly known as:  LANTUS SOLOSTAR   This may have changed:  additional instructions   Used for:  Type 2 diabetes mellitus with hyperglycemia, with long-term current use of insulin (H)        26 units once daily   Quantity:  6 mL   Refills:  5         Stop taking these medicines " if you haven't already. Please contact your care team if you have questions.     ketorolac 10 MG tablet   Commonly known as:  TORADOL   Stopped by:  Yaima Muse MD                    Primary Care Provider Office Phone # Fax #    Yaima Muse [phone] [address] [place] [address]        Equal Access to Services     [facility]: Hadii aad ku hadasho Soomaali, waaxda luqadaha, qaybta kaalmada adeegyada, waxay idiin hayaan adeeg kharash la'aan ah. So [facility] [phone].    ATENCIÓN: Si habla español, tiene a hewitt disposición servicios gratuitos de asistencia lingüística. Llame al [phone].    We comply with applicable federal civil rights laws and [place] laws. We do not discriminate on the basis of race, color, national origin, age, disability, sex, sexual orientation, or gender identity.            Thank you!     Thank you for choosing [facility]  for your care. Our goal is always to provide you with excellent care. Hearing back from our patients is one way we can continue to improve our services. Please take a few minutes to complete the written survey that you may receive in the mail after your visit with us. Thank you!             Your Updated Medication List - Protect others around you: Learn how to safely use, store and throw away your medicines at www.disposemymeds.org.          This list is accurate as of: 1/3/18  2:21 PM.  Always use your most recent med list.                   Brand Name Dispense Instructions for use Diagnosis    ACCU-CHEK COMPLETE Kit     1 Device    1 Device daily    Type 2 diabetes, HbA1c goal < 7% (H)       ASPIRIN NOT PRESCRIBED    INTENTIONAL     Antiplatelet medication not prescribed intentionally due to Not indicated based on age        blood glucose monitoring lancets     3 Box    Use to test blood sugars 1-2 times daily or as directed, per patients glucose meter.    Type 2 diabetes mellitus with  hyperglycemia, with long-term current use of insulin (H)       blood glucose monitoring test strip    BL TEST STRIP PACK    100 each    Use to test blood sugar 1-2 times daily or as directed. Per patients glucose meter (getting new one today)    Type 2 diabetes mellitus with hyperglycemia, with long-term current use of insulin (H)       ferrous sulfate 325 (65 FE) MG tablet    IRON    180 tablet    Take 1 tablet (325 mg) by mouth 2 times daily    Iron deficiency anemia, unspecified iron deficiency anemia type       FLUoxetine 20 MG capsule    PROzac    180 capsule    Take 2 capsules (40 mg) by mouth daily    Major depressive disorder, recurrent episode, moderate (H)       IBUPROFEN PO      Take 600 mg by mouth every 4 hours as needed for moderate pain        insulin glargine 100 UNIT/ML injection    LANTUS SOLOSTAR    6 mL    26 units once daily    Type 2 diabetes mellitus with hyperglycemia, with long-term current use of insulin (H)       insulin pen needle 32G X 6 MM    NOVOFINE    100 each    Use once daily or as directed.    Type 2 diabetes mellitus with hyperglycemia, with long-term current use of insulin (H)       meloxicam 15 MG tablet    MOBIC    30 tablet    Take 1 tablet (15 mg) by mouth daily    Greater trochanteric bursitis of left hip, Chronic SI joint pain       metFORMIN 500 MG 24 hr tablet    GLUCOPHAGE-XR    360 tablet    Take 4 tablets (2,000 mg) by mouth daily (with dinner)    Type 2 diabetes mellitus with hyperglycemia, with long-term current use of insulin (H)       MULTI-VITAMIN GUMMIES Chew      Take 1 chew tab by mouth daily        oxyCODONE-acetaminophen 5-325 MG per tablet    PERCOCET    30 tablet    Take 1 tablet by mouth nightly as needed for moderate to severe pain    Pain of right lower extremity       simvastatin 20 MG tablet    ZOCOR    90 tablet    Take 1 tablet (20 mg) by mouth At Bedtime    Hyperlipidemia LDL goal <100, Type 2 diabetes mellitus with hyperglycemia, with long-term  current use of insulin (H)       traZODone 50 MG tablet    DESYREL    180 tablet    TAKE 1-2 TABLETS BY MOUTH NIGHTLY AS NEEDED FOR SLEEP    Primary insomnia

## 2018-01-10 ENCOUNTER — ANESTHESIA EVENT (OUTPATIENT)
Dept: SURGERY | Facility: CLINIC | Age: 43
End: 2018-01-10
Payer: COMMERCIAL

## 2018-01-10 ASSESSMENT — LIFESTYLE VARIABLES: TOBACCO_USE: 1

## 2018-01-10 NOTE — ANESTHESIA PREPROCEDURE EVALUATION
Anesthesia Evaluation     . Pt has had prior anesthetic. Type: General and MAC    No history of anesthetic complications          ROS/MED HX    ENT/Pulmonary:     (+)tobacco use, Past use , . .    Neurologic:  - neg neurologic ROS     Cardiovascular:  - neg cardiovascular ROS   (+) ----. : . . . :. . Previous cardiac testing date:results:date: results:ECG reviewed date:1/8/18 results:NSR date: results:          METS/Exercise Tolerance:     Hematologic:     (+) Anemia, -      Musculoskeletal:   (+) , , other musculoskeletal- Left hip pain      GI/Hepatic:  - neg GI/hepatic ROS       Renal/Genitourinary:  - ROS Renal section negative       Endo:     (+) type I DM, Last HgA1c: 9.7 date: 11/21/17 Using insulin - not using insulin pump Normal glucose range: 91 today in SDS not previously admitted for DM/DKA .      Psychiatric:     (+) psychiatric history depression      Infectious Disease:  - neg infectious disease ROS       Malignancy:      - no malignancy   Other:    (+) No chance of pregnancy C-spine cleared: N/A, H/O Chronic Pain,H/O chronic opiod use , no other significant disability                    Physical Exam  Normal systems: cardiovascular and pulmonary    Airway   Mallampati: II  TM distance: >3 FB  Neck ROM: full    Dental   (+) loose and chipped    Cardiovascular   Rhythm and rate: regular and normal      Pulmonary    breath sounds clear to auscultation    Other findings: Poor dentition, missing upper front  teeth                Anesthesia Plan      History & Physical Review  History and physical reviewed and following examination; no interval change.    ASA Status:  2 .    NPO Status:  > 8 hours    Plan for MAC with Propofol induction. Reason for MAC:  Deep or markedly invasive procedure (G8)         Postoperative Care      Consents  Anesthetic plan, risks, benefits and alternatives discussed with:  Patient.  Use of blood products discussed: No .   .                          .

## 2018-01-10 NOTE — H&P (VIEW-ONLY)
[address] [address]  [phone]  Dept: [phone]    PRE-OP EVALUATION:  Today's date: 1/3/2018    Stacy Brown (: 1975) presents for pre-operative evaluation assessment as requested by Dr. Alan Marshall.  She requires evaluation and anesthesia risk assessment prior to undergoing surgery/procedure for treatment of chronic SI joint pain.  Proposed procedure: inject joint sacroiliac left    Date of Surgery/ Procedure: 2018  Time of Surgery/ Procedure: 10:00am  Hospital/Surgical Facility: [facility]  Primary Physician: Yaima Muse  Type of Anesthesia Anticipated: Local with MAC    Patient has a Health Care Directive or Living Will:  NO, FULL CODE    Preop Questions 1/3/2018   1.  Do you have a history of heart attack, stroke, stent, bypass or surgery on an artery in the head, neck, heart or legs? No   2.  Do you ever have any pain or discomfort in your chest? No   3.  Do you have a history of  Heart Failure? No   4.   Are you troubled by shortness of breath when:  walking on a level surface, or up a slight hill, or at night? No   5.  Do you currently have a cold, bronchitis or other respiratory infection? No   6.  Do you have a cough, shortness of breath, or wheezing? No   7.  Do you sometimes get pains in the calves of your legs when you walk? No   8. Do you or anyone in your family have previous history of blood clots? No   9.  Do you or does anyone in your family have a serious bleeding problem such as prolonged bleeding following surgeries or cuts? No   10. Have you ever had problems with anemia or been told to take iron pills? YES - has been treated with iron pills   11. Have you had any abnormal blood loss such as black, tarry or bloody stools, or abnormal vaginal bleeding? No   12. Have you ever had a blood transfusion? No   13. Have you or any of your relatives ever had problems with anesthesia? YES - paternal grandma   14. Do  "you have sleep apnea, excessive snoring or daytime drowsiness? No   15. Do you have any prosthetic heart valves? No   16. Do you have prosthetic joints? No   17. Is there any chance that you may be pregnant? No           HPI:                                                      Brief HPI related to upcoming procedure: Stacy Brown is a 42 year old female who presents to the clinic for a Pre-Operative Evaluation for a left SI joint injection to treat chronic left SI joint pain. This procedure is scheduled for 1/11/2018 with Dr. Alan Marshall. The patient did follow up with orthopedics on 12/27/2017:    \"Per patient the lateral hip pain and tenderness has improved following the injection and rest from work.  She states those symptoms continue to improve and get better and happy about that progress.       She states the pain lower buttock and tenderness their is getting worse and really bothersome.  She notes she is scheduled for SI joint injection on 1/11/18.      Visit from 12/6/17    Stacy Brown is a 42 year old female who is seen in consultation at the request of Dr. Muse for evaluation of left hip pain.  Mechanism of Injury: No trauma or recallable event.  Has had pain off and on to the left hip for about 3 years, however the pain has been much present and progressive more recently when her working hours were expanded.  Patient states working 12-16 hour days, and by the end of the day the pain is intolerable.  Works as a .  States pain is lateral left hip, ache/pressure, moderate to severe, cannot lay on hip, and states walking and weight bearing activity by the end of the day.  Rest improves the pain.  Takes 1 percocet and 1 toradol a night to help with chronic right knee pain and this does not improve the pain.  Takes ibuprofen off and on throughout the day and this helps somewhat.  Patient has not had previous injections or PT to the left thigh.       Denies any significant heart, lung, " "kidney disease.  Denies any previous issues with NSAIDs and no hx of gastric ulcers.   States she does have IDDM and last A1c was >9.5.   Denies any kidney problems with this.\"    See problem list for active medical problems.  Problems all longstanding and stable, except as noted/documented.  See ROS for pertinent symptoms related to these conditions.                                                                                                  .    MEDICAL HISTORY:                                                    Patient Active Problem List    Diagnosis Date Noted     Tobacco abuse disorder 11/21/2017     Priority: Medium     Overweight 01/05/2016     Priority: Medium     Chronic pain syndrome 08/14/2015     Priority: Medium     Insomnia 08/11/2015     Priority: Medium     Moderate major depression (H) 02/09/2015     Priority: Medium     Restless legs syndrome (RLS) 02/09/2015     Priority: Medium     Halitosis 11/26/2014     Priority: Medium     Iron deficiency anemia 03/12/2014     Priority: Medium     PMDD (premenstrual dysphoric disorder) 01/18/2013     Priority: Medium     TYPE 2 DIABETES, HBA1C GOAL < 7% 10/31/2010     Priority: Medium     HYPERLIPIDEMIA LDL GOAL <100 10/31/2010     Priority: Medium     Health Care Home 07/01/2013     Priority: Low     *See Letters for [facility] Care Plan: My Access Plan            Past Medical History:   Diagnosis Date     Knee pain, chronic      Mixed hyperlipidemia      Type II or unspecified type diabetes mellitus without mention of complication, not stated as uncontrolled      Past Surgical History:   Procedure Laterality Date     C STABISM SURG,PREV EYE SURG,NOT [facility]      Right     HC OPEN TX METATARSAL FRACTURE  age 12    softball injury,open fracture left foot     HC TOOTH EXTRACTION W/FORCEP      Extract wisdom teeth     TUBAL LIGATION  7/27/2006     Current Outpatient Prescriptions   Medication Sig Dispense Refill     oxyCODONE-acetaminophen (PERCOCET) 5-325 MG per " tablet Take 1 tablet by mouth nightly as needed for moderate to severe pain 30 tablet 0     meloxicam (MOBIC) 15 MG tablet Take 1 tablet (15 mg) by mouth daily 30 tablet 0     FLUoxetine (PROZAC) 20 MG capsule Take 2 capsules (40 mg) by mouth daily 180 capsule 1     traZODone (DESYREL) 50 MG tablet TAKE 1-2 TABLETS BY MOUTH NIGHTLY AS NEEDED FOR SLEEP 180 tablet 1     ASPIRIN NOT PRESCRIBED (INTENTIONAL) Antiplatelet medication not prescribed intentionally due to Not indicated based on age       ferrous sulfate (IRON) 325 (65 FE) MG tablet Take 1 tablet (325 mg) by mouth 2 times daily 180 tablet 3     simvastatin (ZOCOR) 20 MG tablet Take 1 tablet (20 mg) by mouth At Bedtime 90 tablet 3     metFORMIN (GLUCOPHAGE-XR) 500 MG 24 hr tablet Take 4 tablets (2,000 mg) by mouth daily (with dinner) 360 tablet 3     insulin glargine (LANTUS SOLOSTAR) 100 UNIT/ML injection 26 units once daily (Patient taking differently: 28 or 30 units once daily) 6 mL 5     insulin pen needle (NOVOFINE) 32G X 6 MM Use once daily or as directed. 100 each 3     blood glucose monitoring (BL TEST STRIP PACK) test strip Use to test blood sugar 1-2 times daily or as directed. Per patients glucose meter (getting new one today) 100 each 3     blood glucose monitoring (FREESTYLE) lancets Use to test blood sugars 1-2 times daily or as directed, per patients glucose meter. 3 Box 3     IBUPROFEN PO Take 600 mg by mouth every 4 hours as needed for moderate pain       Blood Glucose Monitoring Suppl (ACCU-CHEK COMPLETE) KIT 1 Device daily 1 Device 0     Multiple Vitamins-Minerals (MULTI-VITAMIN GUMMIES) CHEW Take 1 chew tab by mouth daily        OTC products: None, except as noted above    Allergies   Allergen Reactions     Amoxicillin Hives      Latex Allergy: NO    Social History   Substance Use Topics     Smoking status: Former Smoker     Years: 6.00     Quit date: 9/22/2010     Smokeless tobacco: Never Used     Alcohol use 0.0 oz/week     0 Standard  drinks or equivalent per week      Comment: once a month     History   Drug Use No       REVIEW OF SYSTEMS:                                                    C: NEGATIVE for fever, chills, unexplained or significant change in weight  I: NEGATIVE for worrisome rashes, moles or lesions  E: NEGATIVE for vision changes or irritation  E/M: NEGATIVE for ear, mouth and throat problems  R: NEGATIVE for significant cough or SOB  B: NEGATIVE for masses, tenderness or discharge  CV: NEGATIVE for chest pain, palpitations or peripheral edema  GI: NEGATIVE for nausea, abdominal pain, heartburn, or change in bowel habits  : NEGATIVE for frequency, dysuria, or hematuria  M: POSITIVEE for chronic back pain.  She is currently on Mobic. She only has a couple pills left. She is NOT taking her Toradol while on the Mobic.    N: NEGATIVE for weakness, dizziness or paresthesias  E: NEGATIVE for temperature intolerance, skin/hair changes.  She has been better about taking her diabetes meds lately.  She occasionally misses but much better than prior.  Her last A1C was:  Lab Results   Component Value Date    A1C 9.7 11/21/2017     At that time she was not compliant with her meds or diet. She is usually taking 30 units of her lantus now. She is also taking her metformin.   H: NEGATIVE for bleeding problems  P: NEGATIVE for changes in mood or affect, stable depression.     EXAM:                                                    /72  Pulse 99  Temp 97.3  F (36.3  C) (Temporal)  Wt 168 lb 12.8 oz (76.6 kg)  SpO2 100%  BMI 27.25 kg/m2    GENERAL APPEARANCE: healthy, alert and no distress     EYES: EOMI, PERRL     HENT: ear canals normal. Some clear fluid behind left TM. Right TM is normal. Nose and mouth without ulcers or lesions     NECK: no adenopathy, no asymmetry, masses, or scars and thyroid normal to palpation, no bruits.      RESP: lungs clear to auscultation - no rales, rhonchi or wheezes     CV: regular rates and rhythm,  normal S1 S2, no S3 or S4 and no murmur, click or rub     ABDOMEN:  soft, nontender, no HSM or masses and bowel sounds normal     MS: extremities normal- no gross deformities noted, no evidence of inflammation in joints, FROM in all extremities.     SKIN: no suspicious lesions or rashes     NEURO: Normal strength and tone, sensory exam grossly normal, mentation intact and speech normal     PSYCH: mentation appears normal. and affect normal/bright     LYMPHATICS: No cervical or supraclavicular nodes    DIAGNOSTICS:                                                      Orders Placed This Encounter   Procedures     Hemoglobin     Creatinine     EKG: appears normal, NSR, normal axis, normal intervals, no acute ST/T changes c/w ischemia, no LVH by voltage criteria    IMPRESSION:                                                    Reason for surgery/procedure: Chronic Left SI Joint Pain   Diagnosis/reason for consult: Pre-Operative Evaluation for Left SI Joint Injection       ICD-10-CM    1. Preop general physical exam Z01.818 Hemoglobin     Creatinine   2. Sacroiliitis (H) M46.1    3. Chronic pain syndrome G89.4    4. Type 2 diabetes mellitus with hyperglycemia, with long-term current use of insulin (H) E11.65     Z79.4    5. Hyperlipidemia LDL goal <100 E78.5    6. Iron deficiency anemia, unspecified iron deficiency anemia type D50.9    7. Moderate major depression (H) F32.1        The proposed surgical procedure is considered LOW risk.    REVISED CARDIAC RISK INDEX  The patient has the following serious cardiovascular risks for perioperative complications such as (MI, PE, VFib and 3  AV Block):  Diabetes Mellitus (on Insulin)  INTERPRETATION: 1 risks: Class II (low risk - 0.9% complication rate)    The patient has the following additional risks for perioperative complications:  No identified additional risks    RECOMMENDATIONS:                                                      -- Labs collected, will notify with  results and discuss further evaluation/ treatment if needed at that time.   -- EKG done, as above. Normal, no further workup required prior to injection.   -- Discussed no eating/ drinking the day of surgery. Advised to take only half her Lantus dose on the evening prior to her procedure. Advised to check her blood sugar in the am before leaving home.   -- Discussed staying off Toradol and Mobic until after her injection. Encouraged Tylenol and Percocet for pain control until after her procedure. She should not use her Percocet the day of surgery.    Prior to your injection, you will want to follow these instructions regarding your medications:     On Wednesday evening (the day before your procedure) take only 1/2 your usual dose of Lantus insulin.   If you are currently taking 30 units, take only 15 on Wednesday night. Please check your blood sugar in the morning on Thursday before you go in to make sure it's not TOO low or TOO high.  If it's less than 70 or higher than 160 please call and let us know.      You will stop taking your Mobic when it runs out, and don't restart your Toradol until AFTER you return home from your injection.     Every thing else you can continue through Wednesday as usual.  Please do NOT take any medications by mouth on the morning of your procedure.      APPROVAL GIVEN to proceed with proposed procedure, without further diagnostic evaluation       Signed Electronically by: Yaima Muse MD    Copy of this evaluation report is provided to requesting physician.    [facility] Preop Guidelines    This document serves as a record of services personally performed by Yaima Muse MD. It was created on their behalf by Demetrice Silva, a trained medical scribe. The creation of this record is based on the provider's personal observations and the statements of the patient. This document has been checked and approved by the attending provider.

## 2018-01-11 ENCOUNTER — SURGERY (OUTPATIENT)
Age: 43
End: 2018-01-11

## 2018-01-11 ENCOUNTER — ANESTHESIA (OUTPATIENT)
Dept: SURGERY | Facility: CLINIC | Age: 43
End: 2018-01-11
Payer: COMMERCIAL

## 2018-01-11 ENCOUNTER — HOSPITAL ENCOUNTER (OUTPATIENT)
Facility: CLINIC | Age: 43
Discharge: HOME | End: 2018-01-11
Attending: ANESTHESIOLOGY | Admitting: ANESTHESIOLOGY
Payer: COMMERCIAL

## 2018-01-11 ENCOUNTER — HOSPITAL ENCOUNTER (OUTPATIENT)
Dept: GENERAL RADIOLOGY | Facility: CLINIC | Age: 43
End: 2018-01-11
Attending: ANESTHESIOLOGY | Admitting: ANESTHESIOLOGY
Payer: COMMERCIAL

## 2018-01-11 VITALS
RESPIRATION RATE: 16 BRPM | SYSTOLIC BLOOD PRESSURE: 125 MMHG | DIASTOLIC BLOOD PRESSURE: 91 MMHG | OXYGEN SATURATION: 98 % | TEMPERATURE: 97.6 F

## 2018-01-11 DIAGNOSIS — G89.29 CHRONIC SI JOINT PAIN: ICD-10-CM

## 2018-01-11 DIAGNOSIS — M53.3 CHRONIC SI JOINT PAIN: ICD-10-CM

## 2018-01-11 LAB
GLUCOSE BLDC GLUCOMTR-MCNC: 91 MG/DL (ref 70–99)
HCG UR QL: NEGATIVE

## 2018-01-11 PROCEDURE — 25000128 H RX IP 250 OP 636: Performed by: NURSE ANESTHETIST, CERTIFIED REGISTERED

## 2018-01-11 PROCEDURE — 25000125 ZZHC RX 250: Performed by: ANESTHESIOLOGY

## 2018-01-11 PROCEDURE — 25000125 ZZHC RX 250: Performed by: NURSE ANESTHETIST, CERTIFIED REGISTERED

## 2018-01-11 PROCEDURE — 40000277 XR SURGERY CARM FLUORO LESS THAN 5 MIN W STILLS: Mod: TC

## 2018-01-11 PROCEDURE — 25000128 H RX IP 250 OP 636: Performed by: ANESTHESIOLOGY

## 2018-01-11 PROCEDURE — 82962 GLUCOSE BLOOD TEST: CPT

## 2018-01-11 PROCEDURE — 81025 URINE PREGNANCY TEST: CPT | Performed by: NURSE ANESTHETIST, CERTIFIED REGISTERED

## 2018-01-11 PROCEDURE — 27096 INJECT SACROILIAC JOINT: CPT | Mod: LT | Performed by: ANESTHESIOLOGY

## 2018-01-11 PROCEDURE — 37000008 ZZH ANESTHESIA TECHNICAL FEE, 1ST 30 MIN: Performed by: ANESTHESIOLOGY

## 2018-01-11 PROCEDURE — 84702 CHORIONIC GONADOTROPIN TEST: CPT | Performed by: ANESTHESIOLOGY

## 2018-01-11 PROCEDURE — 64493 INJ PARAVERT F JNT L/S 1 LEV: CPT | Performed by: ANESTHESIOLOGY

## 2018-01-11 RX ORDER — SODIUM CHLORIDE, SODIUM LACTATE, POTASSIUM CHLORIDE, CALCIUM CHLORIDE 600; 310; 30; 20 MG/100ML; MG/100ML; MG/100ML; MG/100ML
INJECTION, SOLUTION INTRAVENOUS CONTINUOUS
Status: DISCONTINUED | OUTPATIENT
Start: 2018-01-11 | End: 2018-01-11 | Stop reason: HOSPADM

## 2018-01-11 RX ORDER — PROPOFOL 10 MG/ML
INJECTION, EMULSION INTRAVENOUS PRN
Status: DISCONTINUED | OUTPATIENT
Start: 2018-01-11 | End: 2018-01-11

## 2018-01-11 RX ORDER — BUPIVACAINE HYDROCHLORIDE 5 MG/ML
INJECTION, SOLUTION PERINEURAL PRN
Status: DISCONTINUED | OUTPATIENT
Start: 2018-01-11 | End: 2018-01-11 | Stop reason: HOSPADM

## 2018-01-11 RX ORDER — LIDOCAINE 40 MG/G
CREAM TOPICAL
Status: DISCONTINUED | OUTPATIENT
Start: 2018-01-11 | End: 2018-01-11 | Stop reason: HOSPADM

## 2018-01-11 RX ORDER — TRIAMCINOLONE ACETONIDE 40 MG/ML
INJECTION, SUSPENSION INTRA-ARTICULAR; INTRAMUSCULAR PRN
Status: DISCONTINUED | OUTPATIENT
Start: 2018-01-11 | End: 2018-01-11 | Stop reason: HOSPADM

## 2018-01-11 RX ORDER — IOPAMIDOL 612 MG/ML
INJECTION, SOLUTION INTRATHECAL PRN
Status: DISCONTINUED | OUTPATIENT
Start: 2018-01-11 | End: 2018-01-11 | Stop reason: HOSPADM

## 2018-01-11 RX ORDER — LIDOCAINE HYDROCHLORIDE 20 MG/ML
INJECTION, SOLUTION INFILTRATION; PERINEURAL PRN
Status: DISCONTINUED | OUTPATIENT
Start: 2018-01-11 | End: 2018-01-11

## 2018-01-11 RX ADMIN — SODIUM BICARBONATE 50 MEQ: 84 INJECTION, SOLUTION INTRAVENOUS at 10:06

## 2018-01-11 RX ADMIN — LIDOCAINE HYDROCHLORIDE 40 MG: 20 INJECTION, SOLUTION INFILTRATION; PERINEURAL at 10:00

## 2018-01-11 RX ADMIN — BUPIVACAINE HYDROCHLORIDE 2 ML: 5 INJECTION, SOLUTION PERINEURAL at 10:06

## 2018-01-11 RX ADMIN — PROPOFOL 40 MG: 10 INJECTION, EMULSION INTRAVENOUS at 10:04

## 2018-01-11 RX ADMIN — PROPOFOL 20 MG: 10 INJECTION, EMULSION INTRAVENOUS at 10:00

## 2018-01-11 RX ADMIN — PROPOFOL 40 MG: 10 INJECTION, EMULSION INTRAVENOUS at 10:02

## 2018-01-11 RX ADMIN — SODIUM CHLORIDE, POTASSIUM CHLORIDE, SODIUM LACTATE AND CALCIUM CHLORIDE: 600; 310; 30; 20 INJECTION, SOLUTION INTRAVENOUS at 09:36

## 2018-01-11 RX ADMIN — LIDOCAINE HYDROCHLORIDE 0.5 ML: 10 INJECTION, SOLUTION EPIDURAL; INFILTRATION; INTRACAUDAL; PERINEURAL at 09:36

## 2018-01-11 RX ADMIN — IOPAMIDOL 3 ML: 612 INJECTION, SOLUTION INTRATHECAL at 10:06

## 2018-01-11 RX ADMIN — TRIAMCINOLONE ACETONIDE 40 MG: 40 INJECTION, SUSPENSION INTRA-ARTICULAR; INTRAMUSCULAR at 10:07

## 2018-01-11 NOTE — ANESTHESIA POSTPROCEDURE EVALUATION
Patient: Stacy Brown    Procedure(s):  INJECT JOINT SACROILIAC LEFT - Wound Class: I-Clean    Diagnosis:chronic SI joint pain  Diagnosis Additional Information: No value filed.    Anesthesia Type:  MAC    Note:  Anesthesia Post Evaluation    Patient location during evaluation: Phase 2 and Bedside  Patient participation: Able to fully participate in evaluation  Level of consciousness: awake  Pain management: adequate  Airway patency: patent  Cardiovascular status: acceptable and hemodynamically stable  Respiratory status: acceptable, room air and nonlabored ventilation  Hydration status: stable  PONV: none     Anesthetic complications: None    Comments: Patient was happy with the anesthesia care received and no anesthesia related complications were noted.  I will follow up with the patient again if it is needed.        Last vitals:  Vitals:    01/11/18 0925   BP: 105/68   Resp: 16   Temp: 97.6  F (36.4  C)         Electronically Signed By: JOVITA Jaeger CRNA  January 11, 2018  10:19 AM

## 2018-01-11 NOTE — DISCHARGE INSTRUCTIONS
Home Care Instructions  [facility] [phone]               Procedure:  Epidural Spine Injection or Joint injection    Activity:    Rest today    Do not work today    Resume normal activity tomorrow    Pain:    You may experience soreness at the injection site for one or two days    You may use an ice pack for 20 minutes every 2 hours for the first 24 hours    You may use a heating pad after the first 24 hours    You may use Tylenol  (acetaminophen) every 4 hours or other pain medicines as directed by your physician    Safety  Sedation medicine, if given may remain active for many hours.    It is important for the next 24 hours that you do not:    Drive a car    Operate machines or power tools    Consume alcohol, including beer    Sign any important papers or legal documents    You may experience numbness radiating into your legs or arms, (depending on the procedure location)  This numbness may last several hours.  Until the numb sensation returns to normal please use caution in walking, climbing stairs, stepping out of your vehicle, etc.    Common side effects of steroids:  Not everyone will experience corticosteroid side effects. If side effects are experienced they will gradually subside in the 7-10 day period following an injection.    Most common side effects include:    Flushed face and/or chest    Feeling of warmth, particularly in face but could be overall feeling of warmth    Increased blood sugar in diabetic patients    Menstrual irregularities may occur.  If taking hormone based birth control an alternate method of birth control is recommended    Sleep disturbances and/or mood swings are possible    Leg cramps    Please contact us if you have:  Severe pain   Fever more than 101.5 degrees Fahrenheit  Signs of infection (redness, swelling or drainage)      If you have questions, please contact Dr. Marshall's office at [phone] between the hours of 8:00am and 5:00pm  Monday through Friday. After office hours you can contact the on call provider by dialing [phone].  If you need immediate attention we recommend that you go to a hospital emergency room or dial [phone].

## 2018-01-11 NOTE — ANESTHESIA CARE TRANSFER NOTE
Patient: Stacy Brown    Procedure(s):  INJECT JOINT SACROILIAC LEFT - Wound Class: I-Clean    Diagnosis: chronic SI joint pain  Diagnosis Additional Information: No value filed.    Anesthesia Type:   MAC     Note:  Airway :Room Air  Patient transferred to:Phase II  Handoff Report: Identifed the Patient, Identified the Reponsible Provider, Reviewed the pertinent medical history, Discussed the surgical course, Reviewed Intra-OP anesthesia mangement and issues during anesthesia, Set expectations for post-procedure period and Allowed opportunity for questions and acknowledgement of understanding      Vitals: (Last set prior to Anesthesia Care Transfer)    CRNA VITALS  1/11/2018 0946 - 1/11/2018 1019      1/11/2018             Pulse: 64    SpO2: 100 %    Resp Rate (observed): 13                Electronically Signed By: JOVITA Jaeger CRNA  January 11, 2018  10:19 AM

## 2018-01-11 NOTE — IP AVS SNAPSHOT
MRN:[id number]                      After Visit Summary   1/11/2018    Stacy Brown    MRN: [id number]           Thank you!     Thank you for choosing [facility] for your care. Our goal is always to provide you with excellent care. Hearing back from our patients is one way we can continue to improve our services. Please take a few minutes to complete the written survey that you may receive in the mail after you visit with us. Thank you!        Patient Information     Date Of Birth          1975        About your hospital stay     You were admitted on:  January 11, 2018 You last received care in the:  [facility] Phase II    You were discharged on:  January 11, 2018       Who to Call     For medical emergencies, please call 911.  For non-urgent questions about your medical care, please call your primary care provider or clinic, [phone]  For questions related to your surgery, please call your surgery clinic        Attending Provider     Provider Specialty    [facility]       Primary Care Provider Office Phone # Fax #    Yaima Nicole Muse [phone]      After Care Instructions     Discharge Instructions       Review outpatient procedure discharge instructions as directed by Provider. Send in pain form                  Further instructions from your care team       Home Care Instructions  [facility] [phone]               Procedure:  Epidural Spine Injection or Joint injection    Activity:    Rest today    Do not work today    Resume normal activity tomorrow    Pain:    You may experience soreness at the injection site for one or two days    You may use an ice pack for 20 minutes every 2 hours for the first 24 hours    You may use a heating pad after the first 24 hours    You may use Tylenol  (acetaminophen) every 4 hours or other pain medicines as directed by your physician    Safety  Sedation  medicine, if given may remain active for many hours.    It is important for the next 24 hours that you do not:    Drive a car    Operate machines or power tools    Consume alcohol, including beer    Sign any important papers or legal documents    You may experience numbness radiating into your legs or arms, (depending on the procedure location)  This numbness may last several hours.  Until the numb sensation returns to normal please use caution in walking, climbing stairs, stepping out of your vehicle, etc.    Common side effects of steroids:  Not everyone will experience corticosteroid side effects. If side effects are experienced they will gradually subside in the 7-10 day period following an injection.    Most common side effects include:    Flushed face and/or chest    Feeling of warmth, particularly in face but could be overall feeling of warmth    Increased blood sugar in diabetic patients    Menstrual irregularities may occur.  If taking hormone based birth control an alternate method of birth control is recommended    Sleep disturbances and/or mood swings are possible    Leg cramps    Please contact us if you have:  Severe pain   Fever more than 101.5 degrees Fahrenheit  Signs of infection (redness, swelling or drainage)      If you have questions, please contact Dr. Marshall's office at [phone] between the hours of 8:00am and 5:00pm Monday through Friday. After office hours you can contact the on call provider by dialing [phone].  If you need immediate attention we recommend that you go to a hospital emergency room or dial [phone].                 Pending Results     No orders found from 1/9/2018 to 1/12/2018.            Admission Information     Date & Time Provider Department Dept. Phone    1/11/2018 Alan Marshall MD [facility] Phase [phone]      Your Vitals Were     Blood Pressure Temperature Respirations Pulse Oximetry          120/83 97.6  F (36.4  C) (Oral) 16 100%        MyChart  "Information     [employer] lets you send messages to your doctor, view your test results, renew your prescriptions, schedule appointments and more. To sign up, go to [email or web address] . Click on \"Log in\" on the left side of the screen, which will take you to the Welcome page. Then click on \"Sign up Now\" on the right side of the page.     You will be asked to enter the access code listed below, as well as some personal information. Please follow the directions to create your username and password.     Your access code is: [email or web address]  Expires: 2018  6:30 PM     Your access code will  in 90 days. If you need help or a new code, please call your [facility] clinic or [phone].        Care EveryWhere ID     This is your Care EveryWhere ID. This could be used by other organizations to access your [facility] medical records  [id number]        Equal Access to Services     BRADLEY KRAUS : Hadii candace bruceo Soshira, waaxda lujustus, qaybta kaalmada adebhavik, conrado moya . So [facility] [phone].    ATENCIÓN: Si rachelle valdez, tiene a hewitt disposición servicios gratuitos de asistencia lingüística. Llame al [phone].    We comply with applicable federal civil rights laws and [place] laws. We do not discriminate on the basis of race, color, national origin, age, disability, sex, sexual orientation, or gender identity.               Review of your medicines      CONTINUE these medicines which may have CHANGED, or have new prescriptions. If we are uncertain of the size of tablets/capsules you have at home, strength may be listed as something that might have changed.        Dose / Directions    insulin glargine 100 UNIT/ML injection   Commonly known as:  LANTUS SOLOSTAR   This may have changed:  additional instructions   Used for:  Type 2 diabetes mellitus with hyperglycemia, with long-term current use of insulin (H)        26 units once daily   Quantity:  6 mL   Refills:  5       "   CONTINUE these medicines which have NOT CHANGED        Dose / Directions    ACCU-CHEK COMPLETE Kit   Used for:  Type 2 diabetes, HbA1c goal < 7% (H)        Dose:  1 Device   1 Device daily   Quantity:  1 Device   Refills:  0       ASPIRIN NOT PRESCRIBED   Commonly known as:  INTENTIONAL        Antiplatelet medication not prescribed intentionally due to Not indicated based on age   Refills:  0       blood glucose monitoring lancets   Used for:  Type 2 diabetes mellitus with hyperglycemia, with long-term current use of insulin (H)        Use to test blood sugars 1-2 times daily or as directed, per patients glucose meter.   Quantity:  3 Box   Refills:  3       blood glucose monitoring test strip   Commonly known as:  BL TEST STRIP PACK   Used for:  Type 2 diabetes mellitus with hyperglycemia, with long-term current use of insulin (H)        Use to test blood sugar 1-2 times daily or as directed. Per patients glucose meter (getting new one today)   Quantity:  100 each   Refills:  3       ferrous sulfate 325 (65 FE) MG tablet   Commonly known as:  IRON   Used for:  Iron deficiency anemia, unspecified iron deficiency anemia type        Dose:  325 mg   Take 1 tablet (325 mg) by mouth 2 times daily   Quantity:  180 tablet   Refills:  3       FLUoxetine 20 MG capsule   Commonly known as:  PROzac   Used for:  Major depressive disorder, recurrent episode, moderate (H)        Dose:  40 mg   Take 2 capsules (40 mg) by mouth daily   Quantity:  180 capsule   Refills:  1       IBUPROFEN PO        Dose:  600 mg   Take 600 mg by mouth every 4 hours as needed for moderate pain   Refills:  0       insulin pen needle 32G X 6 MM   Commonly known as:  NOVOFINE   Used for:  Type 2 diabetes mellitus with hyperglycemia, with long-term current use of insulin (H)        Use once daily or as directed.   Quantity:  100 each   Refills:  3       meloxicam 15 MG tablet   Commonly known as:  MOBIC   Used for:  Greater trochanteric bursitis of left  hip, Chronic SI joint pain        Dose:  15 mg   Take 1 tablet (15 mg) by mouth daily   Quantity:  30 tablet   Refills:  0       metFORMIN 500 MG 24 hr tablet   Commonly known as:  GLUCOPHAGE-XR   Used for:  Type 2 diabetes mellitus with hyperglycemia, with long-term current use of insulin (H)        Dose:  2000 mg   Take 4 tablets (2,000 mg) by mouth daily (with dinner)   Quantity:  360 tablet   Refills:  3       MULTI-VITAMIN GUMMIES Chew        Dose:  1 chew tab   Take 1 chew tab by mouth daily   Refills:  0       oxyCODONE-acetaminophen 5-325 MG per tablet   Commonly known as:  PERCOCET   Used for:  Pain of right lower extremity        Dose:  1 tablet   Take 1 tablet by mouth nightly as needed for moderate to severe pain   Quantity:  30 tablet   Refills:  0       simvastatin 20 MG tablet   Commonly known as:  ZOCOR   Used for:  Hyperlipidemia LDL goal <100, Type 2 diabetes mellitus with hyperglycemia, with long-term current use of insulin (H)        Dose:  20 mg   Take 1 tablet (20 mg) by mouth At Bedtime   Quantity:  90 tablet   Refills:  3       traZODone 50 MG tablet   Commonly known as:  DESYREL   Used for:  Primary insomnia        TAKE 1-2 TABLETS BY MOUTH NIGHTLY AS NEEDED FOR SLEEP   Quantity:  180 tablet   Refills:  1                Protect others around you: Learn how to safely use, store and throw away your medicines at www.disposemymeds.org.             Medication List: This is a list of all your medications and when to take them. Check marks below indicate your daily home schedule. Keep this list as a reference.      Medications           Morning Afternoon Evening Bedtime As Needed    ACCU-CHEK COMPLETE Kit   1 Device daily                                ASPIRIN NOT PRESCRIBED   Commonly known as:  INTENTIONAL   Antiplatelet medication not prescribed intentionally due to Not indicated based on age                                blood glucose monitoring lancets   Use to test blood sugars 1-2 times  daily or as directed, per patients glucose meter.                                blood glucose monitoring test strip   Commonly known as:  BL TEST STRIP PACK   Use to test blood sugar 1-2 times daily or as directed. Per patients glucose meter (getting new one today)                                ferrous sulfate 325 (65 FE) MG tablet   Commonly known as:  IRON   Take 1 tablet (325 mg) by mouth 2 times daily                                FLUoxetine 20 MG capsule   Commonly known as:  PROzac   Take 2 capsules (40 mg) by mouth daily                                IBUPROFEN PO   Take 600 mg by mouth every 4 hours as needed for moderate pain                                insulin glargine 100 UNIT/ML injection   Commonly known as:  LANTUS SOLOSTAR   26 units once daily                                insulin pen needle 32G X 6 MM   Commonly known as:  NOVOFINE   Use once daily or as directed.                                meloxicam 15 MG tablet   Commonly known as:  MOBIC   Take 1 tablet (15 mg) by mouth daily                                metFORMIN 500 MG 24 hr tablet   Commonly known as:  GLUCOPHAGE-XR   Take 4 tablets (2,000 mg) by mouth daily (with dinner)                                MULTI-VITAMIN GUMMIES Chew   Take 1 chew tab by mouth daily                                oxyCODONE-acetaminophen 5-325 MG per tablet   Commonly known as:  PERCOCET   Take 1 tablet by mouth nightly as needed for moderate to severe pain                                simvastatin 20 MG tablet   Commonly known as:  ZOCOR   Take 1 tablet (20 mg) by mouth At Bedtime                                traZODone 50 MG tablet   Commonly known as:  DESYREL   TAKE 1-2 TABLETS BY MOUTH NIGHTLY AS NEEDED FOR SLEEP

## 2018-01-11 NOTE — IP AVS SNAPSHOT
[facility] Phase II    [address]     [place] [address]    Phone:  [phone]                                       After Visit Summary   1/11/2018    Stacy Brown    MRN: [id number]           After Visit Summary Signature Page     I have received my discharge instructions, and my questions have been answered. I have discussed any challenges I see with this plan with the nurse or doctor.    ..........................................................................................................................................  Patient/Patient Representative Signature      ..........................................................................................................................................  Patient Representative Print Name and Relationship to Patient    ..................................................               ................................................  Date                                            Time    ..........................................................................................................................................  Reviewed by Signature/Title    ...................................................              ..............................................  Date                                                            Time

## 2018-01-15 DIAGNOSIS — M79.604 PAIN OF RIGHT LOWER EXTREMITY: ICD-10-CM

## 2018-01-18 NOTE — TELEPHONE ENCOUNTER
Requested Prescriptions   Pending Prescriptions Disp Refills     oxyCODONE-acetaminophen (PERCOCET) 5-325 MG per tablet 30 tablet 0     Sig: Take 1 tablet by mouth nightly as needed for moderate to severe pain    There is no refill protocol information for this order        Last Written Prescription Date:  12/17/17  Last Fill Quantity: 30,  # refills: 0   Last Office Visit with [facility] or [facility] prescribing provider:  Pre-op 1/13/18   Future Office Visit:     MARCELA PaigeN, RN

## 2018-01-19 RX ORDER — OXYCODONE AND ACETAMINOPHEN 5; 325 MG/1; MG/1
1 TABLET ORAL
Qty: 30 TABLET | Refills: 0 | Status: SHIPPED | OUTPATIENT
Start: 2018-01-19 | End: 2018-02-15

## 2018-01-28 ENCOUNTER — TELEPHONE (OUTPATIENT)
Dept: FAMILY MEDICINE | Facility: CLINIC | Age: 43
End: 2018-01-28

## 2018-01-28 DIAGNOSIS — F51.01 PRIMARY INSOMNIA: ICD-10-CM

## 2018-01-29 NOTE — TELEPHONE ENCOUNTER
"Requested Prescriptions   Pending Prescriptions Disp Refills     traZODone (DESYREL) 50 MG tablet [Pharmacy Med Name: TRAZODONE HCL 50MG TABS] 180 tablet 1     Sig: TAKE 1-2 TABLETS BY MOUTH NIGHTLY AS NEEDED FOR SLEEP    Serotonin Modulators Passed    1/28/2018  8:21 PM       Passed - Recent or future visit with authorizing provider's specialty    Patient had office visit in the last year or has a visit in the next 30 days with authorizing provider.  See \"Patient Info\" tab in inbasket, or \"Choose Columns\" in Meds & Orders section of the refill encounter.            Passed - Patient is age 18 or older       Passed - No active pregnancy on record       Passed - No positive pregnancy test in past 12 months          Last Written Prescription Date:  10/6/17  Last Fill Quantity: 180,  # refills: 1   Last Office Visit with [facility], [facility] or [facility] prescribing provider:  1/3/18   Future Office Visit:       "

## 2018-01-31 RX ORDER — TRAZODONE HYDROCHLORIDE 50 MG/1
TABLET, FILM COATED ORAL
Qty: 180 TABLET | Refills: 1 | OUTPATIENT
Start: 2018-01-31

## 2018-01-31 RX ORDER — TRAZODONE HYDROCHLORIDE 50 MG/1
TABLET, FILM COATED ORAL
Qty: 180 TABLET | Refills: 1 | Status: CANCELLED | OUTPATIENT
Start: 2018-01-31

## 2018-01-31 NOTE — TELEPHONE ENCOUNTER
Notify her that the Rx she has from October should last her until April.  It was a 6 month Rx.   Yaima Muse MD

## 2018-01-31 NOTE — TELEPHONE ENCOUNTER
Patient should have refills to 4/2018 minimum.  Please call pharmacy to confirm.  Declining.  MARCELA PaigeN, RN

## 2018-01-31 NOTE — TELEPHONE ENCOUNTER
Spoke with [facility] pharmacy. They states the last Trazodone prescription they had for patient was from 10/6/2017 #180 x1 refill. Patient picked up first 180 on 10/6/2017 and refill for 180 on 12/3/2017. Pharmacy states patient called the automated refill line to request this medication be refilled. MD to please advise.  Anna Weldon, CMA

## 2018-02-01 DIAGNOSIS — F51.01 PRIMARY INSOMNIA: ICD-10-CM

## 2018-02-01 NOTE — TELEPHONE ENCOUNTER
"Requested Prescriptions   Pending Prescriptions Disp Refills     traZODone (DESYREL) 50 MG tablet [Pharmacy Med Name: TRAZODONE HCL 50MG TABS] 180 tablet 1     Sig: TAKE 1-2 TABLETS BY MOUTH NIGHTLY AS NEEDED FOR SLEEP    Serotonin Modulators Passed    2/1/2018 10:10 AM       Passed - Recent or future visit with authorizing provider's specialty    Patient had office visit in the last year or has a visit in the next 30 days with authorizing provider.  See \"Patient Info\" tab in inbasket, or \"Choose Columns\" in Meds & Orders section of the refill encounter.            Passed - Patient is age 18 or older       Passed - No active pregnancy on record       Passed - No positive pregnancy test in past 12 months          Last Written Prescription Date:  10/6/17  Last Fill Quantity: 180,  # refills: 1   Last Office Visit with [facility], [facility] or [facility] prescribing provider:  1/3/18   Future Office Visit:       "

## 2018-02-02 RX ORDER — TRAZODONE HYDROCHLORIDE 50 MG/1
TABLET, FILM COATED ORAL
Qty: 180 TABLET | Refills: 1 | OUTPATIENT
Start: 2018-02-02

## 2018-02-02 NOTE — TELEPHONE ENCOUNTER
Bg in pharmacy is called again.  He states patient picked up #180 on 10/6/17 and #180 on 12/13/17.  He states insurance will most likely not be covering this as she has picked up the refill too early and now is requesting more.    RN can see this is a duplicate as PCP had addressed this in an encounter from 1/28/18.  This past refill was to last to April.  Closing this encounter.  Nadine Naqvi, MARCELAN, RN

## 2018-02-02 NOTE — TELEPHONE ENCOUNTER
Patient should have enough refills to get through to 4/6/18, MINIMUM.  Please call pharmacy to confirm.  Declined medication.  MARCELA PaigeN, RN

## 2018-02-05 NOTE — TELEPHONE ENCOUNTER
I called pt and informed her of the below msg and she states that she found her other bottle.  Nadine Recinos MA

## 2018-02-15 DIAGNOSIS — M79.604 PAIN OF RIGHT LOWER EXTREMITY: ICD-10-CM

## 2018-02-15 NOTE — TELEPHONE ENCOUNTER
Requested Prescriptions   Pending Prescriptions Disp Refills     oxyCODONE-acetaminophen (PERCOCET) 5-325 MG per tablet 30 tablet 0     Sig: Take 1 tablet by mouth nightly as needed for moderate to severe pain    There is no refill protocol information for this order        Last Written Prescription Date:  1/19/2018  Last Fill Quantity: 30,  # refills: 0   Last office visit: 1/3/2018 with prescribing provider:  1/3/2018   Future Office Visit:

## 2018-02-16 RX ORDER — OXYCODONE AND ACETAMINOPHEN 5; 325 MG/1; MG/1
1 TABLET ORAL
Qty: 30 TABLET | Refills: 0 | Status: SHIPPED | OUTPATIENT
Start: 2018-02-18 | End: 2018-03-15

## 2018-03-15 DIAGNOSIS — G89.4 CHRONIC PAIN SYNDROME: ICD-10-CM

## 2018-03-15 DIAGNOSIS — M79.604 PAIN OF RIGHT LOWER EXTREMITY: ICD-10-CM

## 2018-03-16 NOTE — TELEPHONE ENCOUNTER
Requested Prescriptions   Pending Prescriptions Disp Refills     ketorolac (TORADOL) 10 MG tablet [Pharmacy Med Name: KETOROLAC TROMETHAMINE 10MG TABS] 30 tablet 1     Sig: TAKE ONE TABLET BY MOUTH EVERY NIGHT AS NEEDED FOR MODERATE PAIN    There is no refill protocol information for this order        oxyCODONE-acetaminophen (PERCOCET) 5-325 MG per tablet 30 tablet 0     Sig: Take 1 tablet by mouth nightly as needed for moderate to severe pain    There is no refill protocol information for this order      Tordol_NOT ON CURRENT MED LIST  Last Written Prescription Date:  12/19/2017  Last Fill Quantity: 30,  # refills: 1   Last office visit: 1/3/2018 with prescribing provider:  Almaz Baltazar Office Visit:      Percocet  Last Written Prescription Date:  02/18/2018  Last Fill Quantity: 30,  # refills: 0   Last office visit: 1/3/2018 with prescribing provider:  Almaz Baltazar Office Visit:

## 2018-03-16 NOTE — TELEPHONE ENCOUNTER
Please find out if she is taking her Meloxicam (Mobic). She can't be on both the Mobic/Meloxicam AND the Toradol. One or the other.   Yaima Muse MD

## 2018-03-18 DIAGNOSIS — K04.7 DENTAL INFECTION: ICD-10-CM

## 2018-03-19 RX ORDER — OXYCODONE AND ACETAMINOPHEN 5; 325 MG/1; MG/1
1 TABLET ORAL
Qty: 30 TABLET | Refills: 0 | Status: SHIPPED | OUTPATIENT
Start: 2018-03-20 | End: 2018-04-24

## 2018-03-19 RX ORDER — KETOROLAC TROMETHAMINE 10 MG/1
TABLET, FILM COATED ORAL
Qty: 30 TABLET | Refills: 1 | Status: SHIPPED | OUTPATIENT
Start: 2018-03-19 | End: 2018-07-17

## 2018-03-19 NOTE — TELEPHONE ENCOUNTER
Requested Prescriptions   Pending Prescriptions Disp Refills     clindamycin (CLEOCIN) 150 MG capsule [Pharmacy Med Name: CLINDAMYCIN HCL 150MG CAPS] 80 capsule 0     Sig: TAKE TWO CAPSULES BY MOUTH FOUR TIMES A DAY FOR 10 DAYS    There is no refill protocol information for this order        clindamycin (CLEOCIN) 300 MG capsule  Last Written Prescription Date:  12/28/2015  Last Fill Quantity: 40,  # refills: 0   Last office visit: 1/3/2018 with prescribing provider:     Future Office Visit:      Manda Moody CMA

## 2018-03-19 NOTE — TELEPHONE ENCOUNTER
Spoke with pt and she states Mobic was a one time medication and she is not taking it. She is needing a refill on Toradol. Rosa Fleming, CMA

## 2018-03-20 NOTE — TELEPHONE ENCOUNTER
This is an unexpected refill request. Please get more info.  Why does she need this?  Yaima Muse MD

## 2018-03-20 NOTE — TELEPHONE ENCOUNTER
Routing refill request to provider for review/approval because:  Drug not on the [facility] refill protocol   Kimberly Estrada RN

## 2018-03-21 ENCOUNTER — TELEPHONE (OUTPATIENT)
Dept: FAMILY MEDICINE | Facility: CLINIC | Age: 43
End: 2018-03-21

## 2018-03-21 ENCOUNTER — APPOINTMENT (OUTPATIENT)
Dept: CT IMAGING | Facility: CLINIC | Age: 43
End: 2018-03-21
Attending: EMERGENCY MEDICINE
Payer: COMMERCIAL

## 2018-03-21 ENCOUNTER — HOSPITAL ENCOUNTER (EMERGENCY)
Facility: CLINIC | Age: 43
Discharge: HOME | End: 2018-03-21
Attending: EMERGENCY MEDICINE | Admitting: EMERGENCY MEDICINE
Payer: COMMERCIAL

## 2018-03-21 VITALS
RESPIRATION RATE: 18 BRPM | TEMPERATURE: 97.1 F | BODY MASS INDEX: 27.44 KG/M2 | HEART RATE: 86 BPM | SYSTOLIC BLOOD PRESSURE: 154 MMHG | OXYGEN SATURATION: 100 % | DIASTOLIC BLOOD PRESSURE: 78 MMHG | WEIGHT: 170 LBS

## 2018-03-21 DIAGNOSIS — S20.211S CONTUSION OF RIGHT CHEST WALL, SEQUELA: ICD-10-CM

## 2018-03-21 LAB
CREAT SERPL-MCNC: 0.3 MG/DL (ref 0.52–1.04)
GFR SERPL CREATININE-BSD FRML MDRD: >90 ML/MIN/1.7M2

## 2018-03-21 PROCEDURE — 71260 CT THORAX DX C+: CPT

## 2018-03-21 PROCEDURE — 96374 THER/PROPH/DIAG INJ IV PUSH: CPT | Mod: XS | Performed by: EMERGENCY MEDICINE

## 2018-03-21 PROCEDURE — 96376 TX/PRO/DX INJ SAME DRUG ADON: CPT | Performed by: EMERGENCY MEDICINE

## 2018-03-21 PROCEDURE — 25000125 ZZHC RX 250: Performed by: EMERGENCY MEDICINE

## 2018-03-21 PROCEDURE — 99285 EMERGENCY DEPT VISIT HI MDM: CPT | Mod: 25 | Performed by: EMERGENCY MEDICINE

## 2018-03-21 PROCEDURE — 96375 TX/PRO/DX INJ NEW DRUG ADDON: CPT | Performed by: EMERGENCY MEDICINE

## 2018-03-21 PROCEDURE — 99285 EMERGENCY DEPT VISIT HI MDM: CPT | Mod: Z6 | Performed by: EMERGENCY MEDICINE

## 2018-03-21 PROCEDURE — 25000128 H RX IP 250 OP 636: Performed by: EMERGENCY MEDICINE

## 2018-03-21 PROCEDURE — 82565 ASSAY OF CREATININE: CPT | Performed by: EMERGENCY MEDICINE

## 2018-03-21 RX ORDER — IOPAMIDOL 755 MG/ML
500 INJECTION, SOLUTION INTRAVASCULAR ONCE
Status: COMPLETED | OUTPATIENT
Start: 2018-03-21 | End: 2018-03-21

## 2018-03-21 RX ORDER — CLINDAMYCIN HCL 150 MG
CAPSULE ORAL
Qty: 80 CAPSULE | Refills: 0 | Status: SHIPPED | OUTPATIENT
Start: 2018-03-21 | End: 2018-03-28

## 2018-03-21 RX ORDER — ONDANSETRON 2 MG/ML
4 INJECTION INTRAMUSCULAR; INTRAVENOUS ONCE
Status: COMPLETED | OUTPATIENT
Start: 2018-03-21 | End: 2018-03-21

## 2018-03-21 RX ORDER — HYDROMORPHONE HYDROCHLORIDE 1 MG/ML
0.5 INJECTION, SOLUTION INTRAMUSCULAR; INTRAVENOUS; SUBCUTANEOUS
Status: DISCONTINUED | OUTPATIENT
Start: 2018-03-21 | End: 2018-03-21 | Stop reason: HOSPADM

## 2018-03-21 RX ORDER — ONDANSETRON 4 MG/1
4 TABLET, ORALLY DISINTEGRATING ORAL EVERY 6 HOURS PRN
Qty: 15 TABLET | Refills: 0 | Status: SHIPPED | OUTPATIENT
Start: 2018-03-21 | End: 2019-02-13

## 2018-03-21 RX ADMIN — ONDANSETRON 4 MG: 2 INJECTION INTRAMUSCULAR; INTRAVENOUS at 17:48

## 2018-03-21 RX ADMIN — HYDROMORPHONE HYDROCHLORIDE 0.5 MG: 1 INJECTION, SOLUTION INTRAMUSCULAR; INTRAVENOUS; SUBCUTANEOUS at 17:50

## 2018-03-21 RX ADMIN — SODIUM CHLORIDE 60 ML: 9 INJECTION, SOLUTION INTRAVENOUS at 18:52

## 2018-03-21 RX ADMIN — HYDROMORPHONE HYDROCHLORIDE 0.5 MG: 1 INJECTION, SOLUTION INTRAMUSCULAR; INTRAVENOUS; SUBCUTANEOUS at 19:43

## 2018-03-21 RX ADMIN — IOPAMIDOL 81 ML: 755 INJECTION, SOLUTION INTRAVENOUS at 18:51

## 2018-03-21 ASSESSMENT — ENCOUNTER SYMPTOMS
SHORTNESS OF BREATH: 1
WOUND: 1
NAUSEA: 1
ABDOMINAL PAIN: 0
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
## 2018-03-21 NOTE — ED AVS SNAPSHOT
[facility] Emergency Department    [address] [place] [address]    Phone:  [phone]    Fax:  [phone]                                       Stacy Brown   MRN: [id number]    Department:  [facility] Emergency Department   Date of Visit:  3/21/2018           Patient Information     Date Of Birth          1975        Your diagnoses for this visit were:     Contusion of right chest wall, sequela        You were seen by Ankur Medina MD.      Follow-up Information     Follow up with Yaima Muse MD In 3 days.    Specialty:  Family Practice    Why:  If symptoms worsen    Contact information:    [address]   [place] [address]  [phone]          Discharge Instructions         Chest Contusion    A contusion is a bruise to the skin, muscle, or ribs. It may cause pain, tenderness, and swelling. It may turn the skin purple until it heals. Contusions take a few days to a few weeks to heal.  Home care  Follow these guidelines when caring for yourself at home:    Rest. Don t do any heavy lifting or strenuous activity. Don t do any activity that causes pain.    Put an ice pack on the injured area. Do this for 20 minutes every 1 to 2 hours the first day. You can make an ice pack by wrapping a plastic bag of ice cubes in a thin towel. Continue to use the ice pack 3 to 4 times a day for the next 2 days. Then use the ice pack as needed to ease pain and swelling.    After 1 to 2 days you may put a warm compress on the area. Do this for 10 minutes several times a day. A warm compress is a clean cloth that s damp with warm water.    Hold a pillow to the affected area when you cough. This will help ease pain.    You may use over-the-counter pain medicine such as acetaminophen or ibuprofen to control pain, unless another pain medicine was prescribed. If you have chronic liver or kidney disease, talk with your healthcare provider before using these medicines.  Also talk with your provider if you ve had a stomach ulcer or gastrointestinal bleeding.  Follow-up care  Follow up with your healthcare provider, or as advised.  When to seek medical advice  Call your healthcare provider right away if any of these occur:    New abdominal pain or abdominal pain that gets worse    Fever of 100.4 F (38 C) or higher, or as directed by your healthcare provider  When to call 911  Call 911 or get immediate medical attention if any of these occur:     Dizziness, weakness, or fainting    Shortness of breath, trouble breathing, or breathing fast    Chest pain gets worse when you breathe    Severe pain that comes on suddenly or lasts more than an hour  Date Last Reviewed: 5/1/2017 2000-2017 [employer]. [address] [place] [address], [place] [address]. All rights reserved. This information is not intended as a substitute for professional medical care. Always follow your healthcare professional's instructions.          Your next 10 appointments already scheduled     Mar 23, 2018  9:45 AM CDT   SHORT with Yaima Muse MD   [facility] ([facility])    [address] [place] [id number] [phone]              24 Hour Appointment Hotline       To make an appointment at any [facility], call [phone]). If you don't have a family doctor or clinic, we will help you find one. [facility] are conveniently located to serve the needs of you and your family.             Review of your medicines      START taking        Dose / Directions Last dose taken    clindamycin 150 MG capsule   Commonly known as:  CLEOCIN   Quantity:  80 capsule        TAKE TWO CAPSULES BY MOUTH FOUR TIMES A DAY FOR 10 DAYS   Refills:  0        ondansetron 4 MG ODT tab   Commonly known as:  ZOFRAN ODT   Dose:  4 mg   Quantity:  15 tablet        Take 1 tablet (4 mg) by mouth every 6 hours as needed for nausea   Refills:  0           Our records show that you are taking the medicines listed below. If these are incorrect, please call your family doctor or clinic.        Dose / Directions Last dose taken    ACCU-CHEK COMPLETE Kit   Dose:  1 Device   Quantity:  1 Device        1 Device daily   Refills:  0        ASPIRIN NOT PRESCRIBED   Commonly known as:  INTENTIONAL        Antiplatelet medication not prescribed intentionally due to Not indicated based on age   Refills:  0        blood glucose monitoring lancets   Quantity:  3 Box        Use to test blood sugars 1-2 times daily or as directed, per patients glucose meter.   Refills:  3        blood glucose monitoring test strip   Commonly known as:  BL TEST STRIP PACK   Quantity:  100 each        Use to test blood sugar 1-2 times daily or as directed. Per patients glucose meter (getting new one today)   Refills:  3        ferrous sulfate 325 (65 FE) MG tablet   Commonly known as:  IRON   Dose:  325 mg   Quantity:  180 tablet        Take 1 tablet (325 mg) by mouth 2 times daily   Refills:  3        FLUoxetine 20 MG capsule   Commonly known as:  PROzac   Dose:  40 mg   Quantity:  180 capsule        Take 2 capsules (40 mg) by mouth daily   Refills:  1        IBUPROFEN PO   Dose:  600 mg        Take 600 mg by mouth every 4 hours as needed for moderate pain   Refills:  0        insulin glargine 100 UNIT/ML injection   Commonly known as:  LANTUS SOLOSTAR   Quantity:  6 mL        26 units once daily   Refills:  5        insulin pen needle 32G X 6 MM   Commonly known as:  NOVOFINE   Quantity:  100 each        Use once daily or as directed.   Refills:  3        ketorolac 10 MG tablet   Commonly known as:  TORADOL   Quantity:  30 tablet        TAKE ONE TABLET BY MOUTH EVERY NIGHT AS NEEDED FOR MODERATE PAIN   Refills:  1        meloxicam 15 MG tablet   Commonly known as:  MOBIC   Dose:  15 mg   Quantity:  30 tablet        Take 1 tablet (15 mg) by mouth daily   Refills:  0        metFORMIN 500 MG 24 hr  tablet   Commonly known as:  GLUCOPHAGE-XR   Dose:  2000 mg   Quantity:  360 tablet        Take 4 tablets (2,000 mg) by mouth daily (with dinner)   Refills:  3        MULTI-VITAMIN GUMMIES Chew   Dose:  1 chew tab        Take 1 chew tab by mouth daily   Refills:  0        oxyCODONE-acetaminophen 5-325 MG per tablet   Commonly known as:  PERCOCET   Dose:  1 tablet   Quantity:  30 tablet        Take 1 tablet by mouth nightly as needed for moderate to severe pain   Refills:  0        simvastatin 20 MG tablet   Commonly known as:  ZOCOR   Dose:  20 mg   Quantity:  90 tablet        Take 1 tablet (20 mg) by mouth At Bedtime   Refills:  3        traZODone 50 MG tablet   Commonly known as:  DESYREL   Quantity:  180 tablet        TAKE 1-2 TABLETS BY MOUTH NIGHTLY AS NEEDED FOR SLEEP   Refills:  1                Prescriptions were sent or printed at these locations (1 Prescription)                   [facility] [place] [id number] [facility]    [address]  [facility] [address]    Telephone:  [phone]   Fax:  [phone]   Hours:                  E-Prescribed (1 of 1)         ondansetron (ZOFRAN ODT) 4 MG ODT tab                Procedures and tests performed during your visit     CT CHEST W CONTRAST    Creatinine      Orders Needing Specimen Collection     None      Pending Results     Date and Time Order Name Status Description    3/21/2018 1704 CT CHEST W CONTRAST Preliminary             Pending Culture Results     No orders found from 3/19/2018 to 3/22/2018.            Pending Results Instructions     If you had any lab results that were not finalized at the time of your Discharge, you can call the ED Lab Result RN at [phone]. You will be contacted by this team for any positive Lab results or changes in treatment. The nurses are available 7 days a week from 10A to 6:30P.  You can leave a message 24 hours per day and they will return your call.        Thank you for choosing [facility]      "  Thank you for choosing [facility] for your care. Our goal is always to provide you with excellent care. Hearing back from our patients is one way we can continue to improve our services. Please take a few minutes to complete the written survey that you may receive in the mail after you visit with us. Thank you!        [employer] Information     [employer] lets you send messages to your doctor, view your test results, renew your prescriptions, schedule appointments and more. To sign up, go to [email or web address] . Click on \"Log in\" on the left side of the screen, which will take you to the Welcome page. Then click on \"Sign up Now\" on the right side of the page.     You will be asked to enter the access code listed below, as well as some personal information. Please follow the directions to create your username and password.     Your access code is: [email or web address]  Expires: 2018  7:53 PM     Your access code will  in 90 days. If you need help or a new code, please call your [facility] clinic or [phone].        Care EveryWhere ID     This is your Care EveryWhere ID. This could be used by other organizations to access your [facility] medical records  [id number]        Equal Access to Services     BRADLEY KRAUS AH: Royce Olivier, wadanayda luchidiadaha, qaybta kaalmada adebhavik, conrado mason. So [facility] [phone].    ATENCIÓN: Si habla español, tiene a hewitt disposición servicios gratuitos de asistencia lingüística. Llame al [phone].    We comply with applicable federal civil rights laws and [place] laws. We do not discriminate on the basis of race, color, national origin, age, disability, sex, sexual orientation, or gender identity.            After Visit Summary       This is your record. Keep this with you and show to your community pharmacist(s) and doctor(s) at your next visit.                  "

## 2018-03-21 NOTE — TELEPHONE ENCOUNTER
Patient can be worked in for only the ER follow up on Friday 3/23 at 9:45am. Please contact patient to advise and confirm. Appointment made to hold time.   Anna Weldon CMA

## 2018-03-21 NOTE — ED AVS SNAPSHOT
[facility] Emergency Department    [address] [place] [address]    Phone:  [phone]    Fax:  [phone]                                       Stacy Brown   MRN: [id number]    Department:  [facility] Emergency Department   Date of Visit:  3/21/2018           After Visit Summary Signature Page     I have received my discharge instructions, and my questions have been answered. I have discussed any challenges I see with this plan with the nurse or doctor.    ..........................................................................................................................................  Patient/Patient Representative Signature      ..........................................................................................................................................  Patient Representative Print Name and Relationship to Patient    ..................................................               ................................................  Date                                            Time    ..........................................................................................................................................  Reviewed by Signature/Title    ...................................................              ..............................................  Date                                                            Time
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
## 2018-03-22 NOTE — DISCHARGE INSTRUCTIONS
Chest Contusion    A contusion is a bruise to the skin, muscle, or ribs. It may cause pain, tenderness, and swelling. It may turn the skin purple until it heals. Contusions take a few days to a few weeks to heal.  Home care  Follow these guidelines when caring for yourself at home:    Rest. Don t do any heavy lifting or strenuous activity. Don t do any activity that causes pain.    Put an ice pack on the injured area. Do this for 20 minutes every 1 to 2 hours the first day. You can make an ice pack by wrapping a plastic bag of ice cubes in a thin towel. Continue to use the ice pack 3 to 4 times a day for the next 2 days. Then use the ice pack as needed to ease pain and swelling.    After 1 to 2 days you may put a warm compress on the area. Do this for 10 minutes several times a day. A warm compress is a clean cloth that s damp with warm water.    Hold a pillow to the affected area when you cough. This will help ease pain.    You may use over-the-counter pain medicine such as acetaminophen or ibuprofen to control pain, unless another pain medicine was prescribed. If you have chronic liver or kidney disease, talk with your healthcare provider before using these medicines. Also talk with your provider if you ve had a stomach ulcer or gastrointestinal bleeding.  Follow-up care  Follow up with your healthcare provider, or as advised.  When to seek medical advice  Call your healthcare provider right away if any of these occur:    New abdominal pain or abdominal pain that gets worse    Fever of 100.4 F (38 C) or higher, or as directed by your healthcare provider  When to call 911  Call 911 or get immediate medical attention if any of these occur:     Dizziness, weakness, or fainting    Shortness of breath, trouble breathing, or breathing fast    Chest pain gets worse when you breathe    Severe pain that comes on suddenly or lasts more than an hour  Date Last Reviewed: 5/1/2017 2000-2017 [employer] [address]  [place] [address]. All rights reserved. This information is not intended as a substitute for professional medical care. Always follow your healthcare professional's instructions.

## 2018-03-23 ENCOUNTER — OFFICE VISIT (OUTPATIENT)
Dept: FAMILY MEDICINE | Facility: CLINIC | Age: 43
End: 2018-03-23
Payer: COMMERCIAL

## 2018-03-23 VITALS
BODY MASS INDEX: 27.74 KG/M2 | HEIGHT: 66 IN | TEMPERATURE: 98.1 F | HEART RATE: 87 BPM | DIASTOLIC BLOOD PRESSURE: 86 MMHG | OXYGEN SATURATION: 100 % | SYSTOLIC BLOOD PRESSURE: 128 MMHG | WEIGHT: 172.6 LBS

## 2018-03-23 DIAGNOSIS — K59.00 CONSTIPATION, UNSPECIFIED CONSTIPATION TYPE: ICD-10-CM

## 2018-03-23 DIAGNOSIS — S20.211D CONTUSION OF RIGHT CHEST WALL, SUBSEQUENT ENCOUNTER: ICD-10-CM

## 2018-03-23 DIAGNOSIS — S16.1XXD STRAIN OF NECK MUSCLE, SUBSEQUENT ENCOUNTER: ICD-10-CM

## 2018-03-23 DIAGNOSIS — S30.1XXD CONTUSION OF ABDOMINAL WALL, SUBSEQUENT ENCOUNTER: ICD-10-CM
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
## 2018-03-23 DIAGNOSIS — S80.02XD CONTUSION OF LEFT KNEE, SUBSEQUENT ENCOUNTER: ICD-10-CM

## 2018-03-23 DIAGNOSIS — S06.0X0D CONCUSSION WITHOUT LOSS OF CONSCIOUSNESS, SUBSEQUENT ENCOUNTER: ICD-10-CM

## 2018-03-23 LAB
ERYTHROCYTE [DISTWIDTH] IN BLOOD BY AUTOMATED COUNT: 19.7 % (ref 10–15)
HCT VFR BLD AUTO: 39.8 % (ref 35–47)
HGB BLD-MCNC: 12.2 G/DL (ref 11.7–15.7)
MCH RBC QN AUTO: 22.1 PG (ref 26.5–33)
MCHC RBC AUTO-ENTMCNC: 30.7 G/DL (ref 31.5–36.5)
MCV RBC AUTO: 72 FL (ref 78–100)
PLATELET # BLD AUTO: 380 10E9/L (ref 150–450)
RBC # BLD AUTO: 5.52 10E12/L (ref 3.8–5.2)
WBC # BLD AUTO: 6.5 10E9/L (ref 4–11)

## 2018-03-23 PROCEDURE — 36415 COLL VENOUS BLD VENIPUNCTURE: CPT | Performed by: FAMILY MEDICINE

## 2018-03-23 PROCEDURE — 85027 COMPLETE CBC AUTOMATED: CPT | Performed by: FAMILY MEDICINE

## 2018-03-23 PROCEDURE — 99213 OFFICE O/P EST LOW 20 MIN: CPT | Performed by: FAMILY MEDICINE

## 2018-03-23 RX ORDER — NAPROXEN 500 MG/1
500 TABLET ORAL 2 TIMES DAILY WITH MEALS
Qty: 60 TABLET | Refills: 1 | Status: SHIPPED | OUTPATIENT
Start: 2018-03-23 | End: 2018-07-17

## 2018-03-23 RX ORDER — ASPIRIN 81 MG
100 TABLET, DELAYED RELEASE (ENTERIC COATED) ORAL DAILY
Qty: 60 TABLET | Refills: 1 | Status: SHIPPED | OUTPATIENT
Start: 2018-03-23 | End: 2020-05-05

## 2018-03-23 ASSESSMENT — PAIN SCALES - GENERAL: PAINLEVEL: SEVERE PAIN (7)
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
## 2018-03-23 NOTE — PROGRESS NOTES
Notify Stacy that her blood count is normal.  It is actually much better than her last one 2 months ago. Hopefully she continues to take her iron. It is improved but still low.   Yaima Muse MD
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
## 2018-03-28 ENCOUNTER — RADIANT APPOINTMENT (OUTPATIENT)
Dept: GENERAL RADIOLOGY | Facility: CLINIC | Age: 43
End: 2018-03-28
Attending: ORTHOPAEDIC SURGERY
Payer: COMMERCIAL

## 2018-03-28 ENCOUNTER — OFFICE VISIT (OUTPATIENT)
Dept: ORTHOPEDICS | Facility: CLINIC | Age: 43
End: 2018-03-28
Payer: COMMERCIAL

## 2018-03-28 VITALS — TEMPERATURE: 97.8 F | BODY MASS INDEX: 27.64 KG/M2 | WEIGHT: 172 LBS | HEIGHT: 66 IN

## 2018-03-28 DIAGNOSIS — M25.552 BILATERAL HIP PAIN: ICD-10-CM

## 2018-03-28 DIAGNOSIS — M25.551 BILATERAL HIP PAIN: Primary | ICD-10-CM

## 2018-03-28 DIAGNOSIS — M25.551 BILATERAL HIP PAIN: ICD-10-CM

## 2018-03-28 DIAGNOSIS — M89.8X6 PAIN IN LEFT TIBIA: ICD-10-CM

## 2018-03-28 DIAGNOSIS — M25.552 BILATERAL HIP PAIN: Primary | ICD-10-CM

## 2018-03-28 PROCEDURE — 73523 X-RAY EXAM HIPS BI 5/> VIEWS: CPT | Mod: TC

## 2018-03-28 PROCEDURE — 99214 OFFICE O/P EST MOD 30 MIN: CPT | Performed by: ORTHOPAEDIC SURGERY

## 2018-03-28 PROCEDURE — 73590 X-RAY EXAM OF LOWER LEG: CPT | Mod: TC

## 2018-03-28 RX ORDER — OXYCODONE HYDROCHLORIDE 5 MG/1
TABLET ORAL
Qty: 30 TABLET | Refills: 0 | Status: SHIPPED | OUTPATIENT
Start: 2018-03-28 | End: 2018-11-26

## 2018-03-28 ASSESSMENT — PAIN SCALES - GENERAL: PAINLEVEL: EXTREME PAIN (8)
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
## 2018-03-30 ENCOUNTER — OFFICE VISIT (OUTPATIENT)
Dept: FAMILY MEDICINE | Facility: CLINIC | Age: 43
End: 2018-03-30
Payer: COMMERCIAL

## 2018-03-30 VITALS
TEMPERATURE: 97.6 F | BODY MASS INDEX: 29.09 KG/M2 | HEART RATE: 90 BPM | WEIGHT: 180.2 LBS | DIASTOLIC BLOOD PRESSURE: 62 MMHG | OXYGEN SATURATION: 98 % | SYSTOLIC BLOOD PRESSURE: 110 MMHG | RESPIRATION RATE: 16 BRPM

## 2018-03-30 DIAGNOSIS — S06.0X0D CONCUSSION WITHOUT LOSS OF CONSCIOUSNESS, SUBSEQUENT ENCOUNTER: ICD-10-CM

## 2018-03-30 DIAGNOSIS — S30.1XXD CONTUSION OF ABDOMINAL WALL, SUBSEQUENT ENCOUNTER: ICD-10-CM

## 2018-03-30 DIAGNOSIS — S80.02XD CONTUSION OF LEFT KNEE, SUBSEQUENT ENCOUNTER: ICD-10-CM

## 2018-03-30 DIAGNOSIS — S20.211D CONTUSION OF RIGHT CHEST WALL, SUBSEQUENT ENCOUNTER: ICD-10-CM
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
## 2018-03-30 DIAGNOSIS — F32.1 MODERATE MAJOR DEPRESSION (H): ICD-10-CM

## 2018-03-30 DIAGNOSIS — S16.1XXD STRAIN OF NECK MUSCLE, SUBSEQUENT ENCOUNTER: ICD-10-CM

## 2018-03-30 PROCEDURE — 99213 OFFICE O/P EST LOW 20 MIN: CPT | Performed by: FAMILY MEDICINE

## 2018-03-30 ASSESSMENT — ANXIETY QUESTIONNAIRES
5. BEING SO RESTLESS THAT IT IS HARD TO SIT STILL: NEARLY EVERY DAY
3. WORRYING TOO MUCH ABOUT DIFFERENT THINGS: NEARLY EVERY DAY
IF YOU CHECKED OFF ANY PROBLEMS ON THIS QUESTIONNAIRE, HOW DIFFICULT HAVE THESE PROBLEMS MADE IT FOR YOU TO DO YOUR WORK, TAKE CARE OF THINGS AT HOME, OR GET ALONG WITH OTHER PEOPLE: VERY DIFFICULT
7. FEELING AFRAID AS IF SOMETHING AWFUL MIGHT HAPPEN: NEARLY EVERY DAY
GAD7 TOTAL SCORE: 21
1. FEELING NERVOUS, ANXIOUS, OR ON EDGE: NEARLY EVERY DAY
6. BECOMING EASILY ANNOYED OR IRRITABLE: NEARLY EVERY DAY
2. NOT BEING ABLE TO STOP OR CONTROL WORRYING: NEARLY EVERY DAY

## 2018-03-30 ASSESSMENT — PATIENT HEALTH QUESTIONNAIRE - PHQ9: 5. POOR APPETITE OR OVEREATING: NEARLY EVERY DAY

## 2018-03-30 ASSESSMENT — PAIN SCALES - GENERAL: PAINLEVEL: EXTREME PAIN (8)
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
## 2018-03-30 NOTE — LETTER
My Depression Action Plan  Name: Stacy Brown   Date of Birth 1975  Date: 3/30/2018    My doctor: Yaima Muse   My clinic: [address] [id number] [phone]          GREEN    ZONE   Good Control    What it looks like:     Things are going generally well. You have normal up s and down s. You may even feel depressed from time to time, but bad moods usually last less than a day.   What you need to do:  1. Continue to care for yourself (see self care plan)  2. Check your depression survival kit and update it as needed  3. Follow your physician s recommendations including any medication.  4. Do not stop taking medication unless you consult with your physician first.           YELLOW         ZONE Getting Worse    What it looks like:     Depression is starting to interfere with your life.     It may be hard to get out of bed; you may be starting to isolate yourself from others.    Symptoms of depression are starting to last most all day and this has happened for several days.     You may have suicidal thoughts but they are not constant.   What you need to do:     1. Call your care team, your response to treatment will improve if you keep your care team informed of your progress. Yellow periods are signs an adjustment may need to be made.     2. Continue your self-care, even if you have to fake it!    3. Talk to someone in your support network    4. Open up your depression survival kit           RED    ZONE Medical Alert - Get Help    What it looks like:     Depression is seriously interfering with your life.     You may experience these or other symptoms: You can t get out of bed most days, can t work or engage in other necessary activities, you have trouble taking care of basic hygiene, or basic responsibilities, thoughts of suicide or death that will not go away, self-injurious behavior.     What you need to do:  1. Call your care  team and request a same-day appointment. If they are not available (weekends or after hours) call your local crisis line, emergency room or 911.            Depression Self Care Plan / Survival Kit    Self-Care for Depression  Here s the deal. Your body and mind are really not as separate as most people think.  What you do and think affects how you feel and how you feel influences what you do and think. This means if you do things that people who feel good do, it will help you feel better.  Sometimes this is all it takes.  There is also a place for medication and therapy depending on how severe your depression is, so be sure to consult with your medical provider and/ or Behavioral Health Consultant if your symptoms are worsening or not improving.     In order to better manage my stress, I will:    Exercise  Get some form of exercise, every day. This will help reduce pain and release endorphins, the  feel good  chemicals in your brain. This is almost as good as taking antidepressants!  This is not the same as joining a gym and then never going! (they count on that by the way ) It can be as simple as just going for a walk or doing some gardening, anything that will get you moving.      Hygiene   Maintain good hygiene (Get out of bed in the morning, Make your bed, Brush your teeth, Take a shower, and Get dressed like you were going to work, even if you are unemployed).  If your clothes don't fit try to get ones that do.    Diet  I will strive to eat foods that are good for me, drink plenty of water, and avoid excessive sugar, caffeine, alcohol, and other mood-altering substances.  Some foods that are helpful in depression are: complex carbohydrates, B vitamins, flaxseed, fish or fish oil, fresh fruits and vegetables.    Psychotherapy  I agree to participate in Individual Therapy (if recommended).    Medication  If prescribed medications, I agree to take them.  Missing doses can result in serious side effects.  I  understand that drinking alcohol, or other illicit drug use, may cause potential side effects.  I will not stop my medication abruptly without first discussing it with my provider.    Staying Connected With Others  I will stay in touch with my friends, family members, and my primary care provider/team.    Use your imagination  Be creative.  We all have a creative side; it doesn t matter if it s oil painting, sand castles, or mud pies! This will also kick up the endorphins.    Witness Beauty  (AKA stop and smell the roses) Take a look outside, even in mid-winter. Notice colors, textures. Watch the squirrels and birds.     Service to others  Be of service to others.  There is always someone else in need.  By helping others we can  get out of ourselves  and remember the really important things.  This also provides opportunities for practicing all the other parts of the program.    Humor  Laugh and be silly!  Adjust your TV habits for less news and crime-drama and more comedy.    Control your stress  Try breathing deep, massage therapy, biofeedback, and meditation. Find time to relax each day.     My support system    Clinic Contact:  Phone number:    Contact 1:  Phone number:    Contact 2:  Phone number:    [facility]/:  Phone number:    Therapist:  Phone number:    Local crisis center:    Phone number:    Other community support:  Phone number:
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
## 2018-03-31 ASSESSMENT — ANXIETY QUESTIONNAIRES: GAD7 TOTAL SCORE: 21

## 2018-03-31 ASSESSMENT — PATIENT HEALTH QUESTIONNAIRE - PHQ9: SUM OF ALL RESPONSES TO PHQ QUESTIONS 1-9: 24

## 2018-04-02 ENCOUNTER — TRANSFERRED RECORDS (OUTPATIENT)
Dept: HEALTH INFORMATION MANAGEMENT | Facility: CLINIC | Age: 43
End: 2018-04-02
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
## 2018-04-10 ENCOUNTER — HOSPITAL ENCOUNTER (OUTPATIENT)
Dept: PHYSICAL THERAPY | Facility: CLINIC | Age: 43
Setting detail: THERAPIES SERIES
End: 2018-04-10
Attending: FAMILY MEDICINE
Payer: COMMERCIAL

## 2018-04-10 PROCEDURE — 40000767 ZZHC STATISTIC PT CONCUSSION VISIT: Performed by: PHYSICAL THERAPIST

## 2018-04-10 PROCEDURE — 97163 PT EVAL HIGH COMPLEX 45 MIN: CPT | Mod: GP | Performed by: PHYSICAL THERAPIST

## 2018-04-10 PROCEDURE — 97110 THERAPEUTIC EXERCISES: CPT | Mod: GP | Performed by: PHYSICAL THERAPIST
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
## 2018-04-11 ENCOUNTER — OFFICE VISIT (OUTPATIENT)
Dept: ORTHOPEDICS | Facility: CLINIC | Age: 43
End: 2018-04-11
Payer: COMMERCIAL

## 2018-04-11 VITALS
WEIGHT: 172 LBS | DIASTOLIC BLOOD PRESSURE: 60 MMHG | SYSTOLIC BLOOD PRESSURE: 130 MMHG | HEIGHT: 66 IN | BODY MASS INDEX: 27.64 KG/M2

## 2018-04-11 DIAGNOSIS — M70.62 GREATER TROCHANTERIC BURSITIS OF LEFT HIP: Primary | ICD-10-CM

## 2018-04-11 PROCEDURE — 20610 DRAIN/INJ JOINT/BURSA W/O US: CPT | Mod: 50 | Performed by: ORTHOPAEDIC SURGERY

## 2018-04-11 PROCEDURE — 99213 OFFICE O/P EST LOW 20 MIN: CPT | Mod: 25 | Performed by: ORTHOPAEDIC SURGERY

## 2018-04-11 RX ORDER — TRIAMCINOLONE ACETONIDE 40 MG/ML
40 INJECTION, SUSPENSION INTRA-ARTICULAR; INTRAMUSCULAR ONCE
Qty: 1 ML | Refills: 0
Start: 2018-04-11 | End: 2019-02-13

## 2018-04-11 ASSESSMENT — PAIN SCALES - GENERAL: PAINLEVEL: SEVERE PAIN (7)

## 2018-04-11 NOTE — MR AVS SNAPSHOT
After Visit Summary   4/11/2018    Stacy Brown    MRN: [id number]           Patient Information     Date Of Birth          1975        Visit Information        Provider Department      4/11/2018 11:30 AM Ayden Clay,  [facility]         Follow-ups after your visit        Your next 10 appointments already scheduled     Apr 17, 2018 12:00 PM CDT   Concussion Treatment with Stephanie Retana, ELEAZAR   [facility] ([facility])    [address] [place] [id number]   [phone]            Apr 27, 2018 10:15 AM CDT   [facility]cussion Treatment with Stephanie [facility]ana, PT   [facility]    [address] [address]   [phone]            Apr 30, 2018  1:45 PM CDT   Office Visit with Yaima Muse MD   [facility]    [address] [address]   [phone]           Bring a current list of meds and any records pertaining to this visit. For Physicals, please bring immunization records and any forms needing to be filled out. Please arrive 10 minutes early to complete paperwork.            May 02, 2018  1:00 PM CDT   Concussion Treatment with Stephanie Retana, PT   [facility] ([facility]    [address] [address]   [phone]            May 09, 2018  8:45 AM CDT   Concussion Treatment with Stephanie Retana, PT   [facility] ([facility])    [address] [address]   [phone]            May 16, 2018  1:45 PM CDT   Concussion Treatment with Stephanie Retana, PT   [facility] ([facility])    [address] [address]   [phone]            May 23, 2018  1:00 PM CDT   Concussion Treatment with Stephanie Retana, PT   [facility]  "([facility])    [address] [address] [phone]              Who to contact     If you have questions or need follow up information about today's clinic visit or your schedule please contact [facility] directly at [phone].  Normal or non-critical lab and imaging results will be communicated to you by MyChart, letter or phone within 4 business days after the clinic has received the results. If you do not hear from us within 7 days, please contact the clinic through MyChart or phone. If you have a critical or abnormal lab result, we will notify you by phone as soon as possible.  Submit refill requests through [employer] or call your pharmacy and they will forward the refill request to us. Please allow 3 business days for your refill to be completed.          Additional Information About Your Visit        MyChart Information     [employer] lets you send messages to your doctor, view your test results, renew your prescriptions, schedule appointments and more. To sign up, go to [email or web address] . Click on \"Log in\" on the left side of the screen, which will take you to the Welcome page. Then click on \"Sign up Now\" on the right side of the page.     You will be asked to enter the access code listed below, as well as some personal information. Please follow the directions to create your username and password.     Your access code is: [email or web address]  Expires: 2018  7:53 PM     Your access code will  in 90 days. If you need help or a new code, please call your [facility] or [phone].        Care EveryWhere ID     This is your Care EveryWhere ID. This could be used by other organizations to access your [facility] medical records  [id number]        Your Vitals Were     Height Last Period BMI (Body Mass Index)             1.676 m (5' 6\") 2018 27.76 kg/m2          Blood Pressure from Last 3 Encounters:   18 130/60   18 110/62 "   03/23/18 128/86    Weight from Last 3 Encounters:   04/11/18 78 kg (172 lb)   03/30/18 81.7 kg (180 lb 3.2 oz)   03/28/18 78 kg (172 lb)              Today, you had the following     No orders found for display         Today's Medication Changes          These changes are accurate as of 4/11/18 11:33 AM.  If you have any questions, ask your nurse or doctor.               These medicines have changed or have updated prescriptions.        Dose/Directions    insulin glargine 100 UNIT/ML injection   Commonly known as:  LANTUS SOLOSTAR   This may have changed:  additional instructions   Used for:  Type 2 diabetes mellitus with hyperglycemia, with long-term current use of insulin (H)        26 units once daily   Quantity:  6 mL   Refills:  5                Primary Care Provider Office Phone # Fax #    Yaima Nicole Muse [phone] [address] [place] [address]        Equal Access to Services     BRADLEY KRASU AH: Hadii candace lombardi Sohsira, waaxda lujustus, qaybta kaalmada toi, conrado moya . So [facility] [phone].    ATENCIÓN: Si habla español, tiene a hewitt disposición servicios gratuitos de asistencia lingüística. Llame al [phone].    We comply with applicable federal civil rights laws and [place] laws. We do not discriminate on the basis of race, color, national origin, age, disability, sex, sexual orientation, or gender identity.            Thank you!     Thank you for choosing [facility]  for your care. Our goal is always to provide you with excellent care. Hearing back from our patients is one way we can continue to improve our services. Please take a few minutes to complete the written survey that you may receive in the mail after your visit with us. Thank you!             Your Updated Medication List - Protect others around you: Learn how to safely use, store and throw away your medicines at www.disposemymeds.org.           This list is accurate as of 4/11/18 11:33 AM.  Always use your most recent med list.                   Brand Name Dispense Instructions for use Diagnosis    ACCU-CHEK COMPLETE Kit     1 Device    1 Device daily    Type 2 diabetes, HbA1c goal < 7% (H)       ASPIRIN NOT PRESCRIBED    INTENTIONAL     Antiplatelet medication not prescribed intentionally due to Not indicated based on age        blood glucose monitoring lancets     3 Box    Use to test blood sugars 1-2 times daily or as directed, per patients glucose meter.    Type 2 diabetes mellitus with hyperglycemia, with long-term current use of insulin (H)       blood glucose monitoring test strip    BL TEST STRIP PACK    100 each    Use to test blood sugar 1-2 times daily or as directed. Per patients glucose meter (getting new one today)    Type 2 diabetes mellitus with hyperglycemia, with long-term current use of insulin (H)       docusate sodium 100 MG tablet    COLACE    60 tablet    Take 100 mg by mouth daily    Constipation, unspecified constipation type       ferrous sulfate 325 (65 Fe) MG tablet    IRON    180 tablet    Take 1 tablet (325 mg) by mouth 2 times daily    Iron deficiency anemia, unspecified iron deficiency anemia type       FLUoxetine 20 MG capsule    PROzac    180 capsule    Take 2 capsules (40 mg) by mouth daily    Major depressive disorder, recurrent episode, moderate (H)       IBUPROFEN PO      Take 600 mg by mouth every 4 hours as needed for moderate pain        insulin glargine 100 UNIT/ML injection    LANTUS SOLOSTAR    6 mL    26 units once daily    Type 2 diabetes mellitus with hyperglycemia, with long-term current use of insulin (H)       insulin pen needle 32G X 6 MM    NOVOFINE    100 each    Use once daily or as directed.    Type 2 diabetes mellitus with hyperglycemia, with long-term current use of insulin (H)       ketorolac 10 MG tablet    TORADOL    30 tablet    TAKE ONE TABLET BY MOUTH EVERY NIGHT AS NEEDED FOR MODERATE  PAIN    Chronic pain syndrome       metFORMIN 500 MG 24 hr tablet    GLUCOPHAGE-XR    360 tablet    Take 4 tablets (2,000 mg) by mouth daily (with dinner)    Type 2 diabetes mellitus with hyperglycemia, with long-term current use of insulin (H)       MULTI-VITAMIN GUMMIES Chew      Take 1 chew tab by mouth daily        naproxen 500 MG tablet    NAPROSYN    60 tablet    Take 1 tablet (500 mg) by mouth 2 times daily (with meals)    Motor vehicle collision, subsequent encounter       oxyCODONE IR 5 MG tablet    ROXICODONE    30 tablet    1-2 tabs three times a day as needed for pain    Bilateral hip pain, Pain in left tibia       oxyCODONE-acetaminophen 5-325 MG per tablet    PERCOCET    30 tablet    Take 1 tablet by mouth nightly as needed for moderate to severe pain    Pain of right lower extremity       simvastatin 20 MG tablet    ZOCOR    90 tablet    Take 1 tablet (20 mg) by mouth At Bedtime    Hyperlipidemia LDL goal <100, Type 2 diabetes mellitus with hyperglycemia, with long-term current use of insulin (H)       traZODone 50 MG tablet    DESYREL    180 tablet    TAKE 1-2 TABLETS BY MOUTH NIGHTLY AS NEEDED FOR SLEEP    Primary insomnia
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
## 2018-04-12 NOTE — ADDENDUM NOTE
Encounter addended by: Sherly Pavon, PT on: 4/12/2018 12:47 PM<BR>     Actions taken: Sign clinical note, Episode resolved
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
## 2018-04-17 ENCOUNTER — HOSPITAL ENCOUNTER (OUTPATIENT)
Dept: PHYSICAL THERAPY | Facility: CLINIC | Age: 43
Setting detail: THERAPIES SERIES
End: 2018-04-17
Attending: FAMILY MEDICINE
Payer: COMMERCIAL

## 2018-04-17 DIAGNOSIS — M25.552 BILATERAL HIP PAIN: ICD-10-CM

## 2018-04-17 DIAGNOSIS — M89.8X6 PAIN IN LEFT TIBIA: ICD-10-CM

## 2018-04-17 DIAGNOSIS — M25.551 BILATERAL HIP PAIN: ICD-10-CM

## 2018-04-17 DIAGNOSIS — M79.604 PAIN OF RIGHT LOWER EXTREMITY: ICD-10-CM

## 2018-04-17 PROCEDURE — 40000767 ZZHC STATISTIC PT CONCUSSION VISIT: Performed by: PHYSICAL THERAPIST

## 2018-04-17 PROCEDURE — 97140 MANUAL THERAPY 1/> REGIONS: CPT | Mod: GP | Performed by: PHYSICAL THERAPIST

## 2018-04-17 PROCEDURE — 97110 THERAPEUTIC EXERCISES: CPT | Mod: GP | Performed by: PHYSICAL THERAPIST

## 2018-04-17 RX ORDER — OXYCODONE HYDROCHLORIDE 5 MG/1
TABLET ORAL
Qty: 30 TABLET | Refills: 0 | OUTPATIENT
Start: 2018-04-17

## 2018-04-24 DIAGNOSIS — F51.01 PRIMARY INSOMNIA: ICD-10-CM

## 2018-04-24 RX ORDER — OXYCODONE AND ACETAMINOPHEN 5; 325 MG/1; MG/1
1 TABLET ORAL
Qty: 30 TABLET | Refills: 0 | Status: SHIPPED | OUTPATIENT
Start: 2018-04-24 | End: 2018-05-21

## 2018-04-24 NOTE — TELEPHONE ENCOUNTER
Pt advised on MD notes as written and states understanding. rx walked to [facility] pharmacy.  Anna Weldon CMA

## 2018-04-24 NOTE — TELEPHONE ENCOUNTER
Patient calling to follow up on RX, states she's been out 4.20.  Please advise.  Thank you,  Alicia Mathis  Patient Representative

## 2018-04-25 RX ORDER — TRAZODONE HYDROCHLORIDE 50 MG/1
TABLET, FILM COATED ORAL
Qty: 180 TABLET | Refills: 1 | Status: SHIPPED | OUTPATIENT
Start: 2018-04-25 | End: 2018-10-10 | Stop reason: ALTCHOICE

## 2018-04-25 NOTE — TELEPHONE ENCOUNTER
"Requested Prescriptions   Pending Prescriptions Disp Refills     traZODone (DESYREL) 50 MG tablet [Pharmacy Med Name: TRAZODONE HCL 50MG TABS] 180 tablet 1     Sig: TAKE 1-2 TABLETS BY MOUTH NIGHTLY AS NEEDED FOR SLEEP    Serotonin Modulators Passed    4/24/2018  9:47 PM       Passed - Recent (12 mo) or future (30 days) visit within the authorizing provider's specialty    Patient had office visit in the last 12 months or has a visit in the next 30 days with authorizing provider or within the authorizing provider's specialty.  See \"Patient Info\" tab in inbasket, or \"Choose Columns\" in Meds & Orders section of the refill encounter.           Passed - Patient is age 18 or older       Passed - No active pregnancy on record       Passed - No positive pregnancy test in past 12 months          Last Written Prescription Date:  10/6/17  Last Fill Quantity: 180,  # refills: 1   Last Office Visit with [facility] prescribing provider:  3/30/18   Future Office Visit:    Next 5 appointments (look out 90 days)     Apr 30, 2018  1:45 PM CDT   [facility]    [address] [facility] [address] [phone]                   "

## 2018-04-27 ENCOUNTER — HOSPITAL ENCOUNTER (OUTPATIENT)
Dept: PHYSICAL THERAPY | Facility: OTHER | Age: 43
Setting detail: THERAPIES SERIES
End: 2018-04-27
Attending: FAMILY MEDICINE
Payer: COMMERCIAL

## 2018-04-27 ENCOUNTER — TELEPHONE (OUTPATIENT)
Dept: FAMILY MEDICINE | Facility: CLINIC | Age: 43
End: 2018-04-27

## 2018-04-27 DIAGNOSIS — S06.0X0S CONCUSSION WITHOUT LOSS OF CONSCIOUSNESS, SEQUELA (H): Primary | ICD-10-CM

## 2018-04-27 PROCEDURE — 97140 MANUAL THERAPY 1/> REGIONS: CPT | Mod: GP | Performed by: PHYSICAL THERAPIST

## 2018-04-27 PROCEDURE — 40000718 ZZHC STATISTIC PT DEPARTMENT ORTHO VISIT: Performed by: PHYSICAL THERAPIST

## 2018-04-27 PROCEDURE — 97110 THERAPEUTIC EXERCISES: CPT | Mod: GP | Performed by: PHYSICAL THERAPIST
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
## 2018-04-30 ENCOUNTER — OFFICE VISIT (OUTPATIENT)
Dept: FAMILY MEDICINE | Facility: CLINIC | Age: 43
End: 2018-04-30
Payer: COMMERCIAL

## 2018-04-30 VITALS
OXYGEN SATURATION: 96 % | DIASTOLIC BLOOD PRESSURE: 68 MMHG | BODY MASS INDEX: 29.12 KG/M2 | HEART RATE: 82 BPM | SYSTOLIC BLOOD PRESSURE: 102 MMHG | TEMPERATURE: 97.6 F | WEIGHT: 180.4 LBS

## 2018-04-30 DIAGNOSIS — M54.6 CHRONIC BILATERAL THORACIC BACK PAIN: ICD-10-CM

## 2018-04-30 DIAGNOSIS — G89.29 CHRONIC BILATERAL THORACIC BACK PAIN: ICD-10-CM

## 2018-04-30 DIAGNOSIS — S06.0X0D CONCUSSION WITHOUT LOSS OF CONSCIOUSNESS, SUBSEQUENT ENCOUNTER: Primary | ICD-10-CM

## 2018-04-30 PROCEDURE — 99213 OFFICE O/P EST LOW 20 MIN: CPT | Performed by: FAMILY MEDICINE

## 2018-04-30 RX ORDER — DIAZEPAM 5 MG
5 TABLET ORAL DAILY PRN
Qty: 20 TABLET | Refills: 0 | Status: SHIPPED | OUTPATIENT
Start: 2018-04-30 | End: 2018-07-02

## 2018-04-30 ASSESSMENT — PAIN SCALES - GENERAL: PAINLEVEL: SEVERE PAIN (7)
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
## 2018-05-09 ENCOUNTER — HOSPITAL ENCOUNTER (OUTPATIENT)
Dept: PHYSICAL THERAPY | Facility: OTHER | Age: 43
Setting detail: THERAPIES SERIES
End: 2018-05-09
Attending: FAMILY MEDICINE
Payer: COMMERCIAL

## 2018-05-09 ENCOUNTER — TELEPHONE (OUTPATIENT)
Dept: FAMILY MEDICINE | Facility: CLINIC | Age: 43
End: 2018-05-09

## 2018-05-09 DIAGNOSIS — E11.9 TYPE 2 DIABETES, HBA1C GOAL < 7% (H): Primary | ICD-10-CM

## 2018-05-09 DIAGNOSIS — E78.5 HYPERLIPIDEMIA LDL GOAL <100: ICD-10-CM

## 2018-05-09 PROCEDURE — 97140 MANUAL THERAPY 1/> REGIONS: CPT | Mod: GP | Performed by: PHYSICAL THERAPIST

## 2018-05-09 PROCEDURE — 97110 THERAPEUTIC EXERCISES: CPT | Mod: GP | Performed by: PHYSICAL THERAPIST

## 2018-05-09 PROCEDURE — 40000767 ZZHC STATISTIC PT CONCUSSION VISIT: Performed by: PHYSICAL THERAPIST

## 2018-05-09 NOTE — TELEPHONE ENCOUNTER
Future lab orders placed for Microalbumin, Lipids, and A1C . Note patient is scheduled for her diabetic check on 6/1/18 at which time labs can be completed. Stephanie Guallpa LPN

## 2018-05-14 ENCOUNTER — TELEPHONE (OUTPATIENT)
Dept: FAMILY MEDICINE | Facility: CLINIC | Age: 43
End: 2018-05-14

## 2018-05-14 DIAGNOSIS — Z79.4 TYPE 2 DIABETES MELLITUS WITH HYPERGLYCEMIA, WITH LONG-TERM CURRENT USE OF INSULIN (H): ICD-10-CM

## 2018-05-14 DIAGNOSIS — E11.65 TYPE 2 DIABETES MELLITUS WITH HYPERGLYCEMIA, WITH LONG-TERM CURRENT USE OF INSULIN (H): ICD-10-CM

## 2018-05-14 NOTE — TELEPHONE ENCOUNTER
Reason for Call:  Form, our goal is to have forms completed with 72 hours, however, some forms may require a visit or additional information.    Type of letter, form or note:  FMLA    Who is the form from?: Patient    Where did the form come from: Patient or family brought in       What clinic location was the form placed at?: [facility]    Where the form was placed: 's Box    What number is listed as a contact on the form?: pts cell        Additional comments: call when finished pt will      Call taken on 5/14/2018 at 11:57 AM by Shu Estrada

## 2018-05-14 NOTE — TELEPHONE ENCOUNTER
"Requested Prescriptions   Pending Prescriptions Disp Refills     LANTUS SOLOSTAR 100 UNIT/ML soln [Pharmacy Med Name: LANTUS SOLOSTAR 100UNIT/ML SOPN] 15 mL 5     Sig: INJECT 26 UNITS UNDER THE SKIN DAILY    Long Acting Insulin Protocol Failed    5/14/2018 11:35 AM       Failed - LDL on file in past 12 months    Recent Labs   Lab Test  04/14/17   1224   LDL  173*            Failed - Microalbumin on file in past 12 months    Recent Labs   Lab Test  04/14/17   1239   MICROL  21   UMALCR  15.80            Failed - HgbA1C in past 3 or 6 months    If HgbA1C is 8 or greater, it needs to be on file within the past 3 months.  If less than 8, must be on file within the past 6 months.     Recent Labs   Lab Test  11/21/17   1823   A1C  9.7*            Passed - Blood pressure less than 140/90 in past 6 months    BP Readings from Last 3 Encounters:   04/30/18 102/68   04/11/18 130/60   03/30/18 110/62                Passed - Serum creatinine on file in past 12 months    Recent Labs   Lab Test  03/21/18   1735   CR  0.30*            Passed - Patient is age 18 or older       Passed - Recent (6 mo) or future (30 days) visit within the authorizing provider's specialty    Patient had office visit in the last 6 months or has a visit in the next 30 days with authorizing provider or within the authorizing provider's specialty.  See \"Patient Info\" tab in inbasket, or \"Choose Columns\" in Meds & Orders section of the refill encounter.              Last Written Prescription Date:  4/17/17  Last Fill Quantity: 6 mL,  # refills: 5   Last Office Visit with [facility], [facility] or [facility] prescribing provider:  4/30/18   Future Office Visit:    Next 5 appointments (look out 90 days)     Jun 01, 2018 12:45 PM CDT   SHORT with Yaima Muse MD   [facility] ([address] [address] [phone]            Jun 01, 2018  1:00 PM CDT   SHORT with Yaima Muse MD "   [facility]    [address] [facility] [id number] [phone]

## 2018-05-15 NOTE — TELEPHONE ENCOUNTER
Routing refill request to provider for review/approval because:  Labs out of range:  Cr, A1c  Labs not current:  A1c, Microalbumin, and LDL  T'd up 1 month for provider review.    Has an appointment on 6/1/18.  MARCELA PaigeN, RN

## 2018-05-16 ENCOUNTER — HOSPITAL ENCOUNTER (OUTPATIENT)
Dept: PHYSICAL THERAPY | Facility: OTHER | Age: 43
Setting detail: THERAPIES SERIES
End: 2018-05-16
Attending: FAMILY MEDICINE
Payer: COMMERCIAL

## 2018-05-16 PROCEDURE — 97140 MANUAL THERAPY 1/> REGIONS: CPT | Mod: GP | Performed by: PHYSICAL THERAPIST

## 2018-05-16 PROCEDURE — 40000767 ZZHC STATISTIC PT CONCUSSION VISIT: Performed by: PHYSICAL THERAPIST

## 2018-05-17 ENCOUNTER — HOSPITAL ENCOUNTER (OUTPATIENT)
Dept: SPEECH THERAPY | Facility: CLINIC | Age: 43
Setting detail: THERAPIES SERIES
End: 2018-05-17
Attending: FAMILY MEDICINE
Payer: COMMERCIAL

## 2018-05-17 PROCEDURE — 96125 COGNITIVE TEST BY HC PRO: CPT | Mod: GN | Performed by: SPEECH-LANGUAGE PATHOLOGIST

## 2018-05-17 PROCEDURE — 97127 ZZHC SP THERAPEUTIC INTERVENTION W/FOCUS ON COGNITIVE FUNCTION,EA 15 MIN: CPT | Mod: GN | Performed by: SPEECH-LANGUAGE PATHOLOGIST

## 2018-05-17 PROCEDURE — 40000211 ZZHC STATISTIC SLP  DEPARTMENT VISIT: Performed by: SPEECH-LANGUAGE PATHOLOGIST
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
## 2018-05-21 ENCOUNTER — HOSPITAL ENCOUNTER (OUTPATIENT)
Dept: SPEECH THERAPY | Facility: CLINIC | Age: 43
Setting detail: THERAPIES SERIES
End: 2018-05-21
Attending: FAMILY MEDICINE
Payer: COMMERCIAL

## 2018-05-21 DIAGNOSIS — M79.604 PAIN OF RIGHT LOWER EXTREMITY: ICD-10-CM

## 2018-05-21 PROCEDURE — 40000211 ZZHC STATISTIC SLP  DEPARTMENT VISIT: Performed by: SPEECH-LANGUAGE PATHOLOGIST

## 2018-05-21 PROCEDURE — 97127 ZZHC SP THERAPEUTIC INTERVENTION W/FOCUS ON COGNITIVE FUNCTION,EA 15 MIN: CPT | Mod: GN | Performed by: SPEECH-LANGUAGE PATHOLOGIST

## 2018-05-21 NOTE — ADDENDUM NOTE
Encounter addended by: Mary Ann Naik, SLP on: 5/21/2018  9:38 AM<BR>     Actions taken: Charge Capture section accepted

## 2018-05-21 NOTE — TELEPHONE ENCOUNTER
Percocet 5-325 MG       Last Written Prescription Date:  4/24/18  Last Fill Quantity: 30,   # refills: 0  Last Office Visit: 4/30/18  Future Office visit:    Next 5 appointments (look out 90 days)     Jun 01, 2018 12:45 PM CDT   SHORT with Yaima Muse MD   [facility] ([address] [address]   [phone]            Jun 01, 2018  1:00 PM CDT   SHORT with Yaima Muse MD   [facility] ([facility]    [address] [place] [address]   [phone]                   Routing refill request to provider for review/approval because:  Drug not on the [facility], [facility] or [facility] refill protocol or controlled substance

## 2018-05-22 DIAGNOSIS — E11.65 TYPE 2 DIABETES MELLITUS WITH HYPERGLYCEMIA, WITH LONG-TERM CURRENT USE OF INSULIN (H): ICD-10-CM

## 2018-05-22 DIAGNOSIS — Z79.4 TYPE 2 DIABETES MELLITUS WITH HYPERGLYCEMIA, WITH LONG-TERM CURRENT USE OF INSULIN (H): ICD-10-CM

## 2018-05-22 RX ORDER — OXYCODONE AND ACETAMINOPHEN 5; 325 MG/1; MG/1
1 TABLET ORAL
Qty: 30 TABLET | Refills: 0 | Status: SHIPPED | OUTPATIENT
Start: 2018-05-22 | End: 2018-06-21

## 2018-05-22 NOTE — TELEPHONE ENCOUNTER
Patient says dose has increased for her Lantus Solostar, the dose we have is stating 26 units daily. She is currently using 30 units. could we get new RX sent to [facility]?  Thanks

## 2018-05-23 ENCOUNTER — HOSPITAL ENCOUNTER (OUTPATIENT)
Dept: PHYSICAL THERAPY | Facility: OTHER | Age: 43
Setting detail: THERAPIES SERIES
End: 2018-05-23
Attending: FAMILY MEDICINE
Payer: COMMERCIAL

## 2018-05-23 PROCEDURE — 40000767 ZZHC STATISTIC PT CONCUSSION VISIT: Performed by: PHYSICAL THERAPIST

## 2018-05-23 PROCEDURE — 97110 THERAPEUTIC EXERCISES: CPT | Mod: GP | Performed by: PHYSICAL THERAPIST

## 2018-05-23 PROCEDURE — 97140 MANUAL THERAPY 1/> REGIONS: CPT | Mod: GP | Performed by: PHYSICAL THERAPIST

## 2018-05-30 ENCOUNTER — OFFICE VISIT (OUTPATIENT)
Dept: BEHAVIORAL HEALTH | Facility: CLINIC | Age: 43
End: 2018-05-30
Payer: COMMERCIAL

## 2018-05-30 ENCOUNTER — OFFICE VISIT (OUTPATIENT)
Dept: FAMILY MEDICINE | Facility: CLINIC | Age: 43
End: 2018-05-30
Payer: COMMERCIAL

## 2018-05-30 VITALS
TEMPERATURE: 98.1 F | DIASTOLIC BLOOD PRESSURE: 64 MMHG | SYSTOLIC BLOOD PRESSURE: 104 MMHG | WEIGHT: 181.4 LBS | HEART RATE: 109 BPM | BODY MASS INDEX: 29.28 KG/M2 | OXYGEN SATURATION: 97 %

## 2018-05-30 DIAGNOSIS — F32.1 MODERATE MAJOR DEPRESSION (H): Primary | ICD-10-CM

## 2018-05-30 DIAGNOSIS — R51.9 CHRONIC DAILY HEADACHE: ICD-10-CM

## 2018-05-30 DIAGNOSIS — S06.0X0D CONCUSSION WITHOUT LOSS OF CONSCIOUSNESS, SUBSEQUENT ENCOUNTER: Primary | ICD-10-CM

## 2018-05-30 PROCEDURE — 99214 OFFICE O/P EST MOD 30 MIN: CPT | Performed by: FAMILY MEDICINE

## 2018-05-30 PROCEDURE — 99207 ZZC NO CHARGE BEHAVIORAL WARM HANDOFF: CPT | Performed by: MARRIAGE & FAMILY THERAPIST

## 2018-05-30 RX ORDER — PROPRANOLOL HYDROCHLORIDE 60 MG/1
60 TABLET ORAL 2 TIMES DAILY
Qty: 60 TABLET | Refills: 1 | Status: SHIPPED | OUTPATIENT
Start: 2018-05-30 | End: 2018-07-17

## 2018-05-30 ASSESSMENT — PAIN SCALES - GENERAL: PAINLEVEL: EXTREME PAIN (8)
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
## 2018-05-31 ENCOUNTER — HOSPITAL ENCOUNTER (OUTPATIENT)
Dept: SPEECH THERAPY | Facility: CLINIC | Age: 43
Setting detail: THERAPIES SERIES
End: 2018-05-31
Attending: FAMILY MEDICINE
Payer: COMMERCIAL

## 2018-05-31 ENCOUNTER — OFFICE VISIT (OUTPATIENT)
Dept: BEHAVIORAL HEALTH | Facility: CLINIC | Age: 43
End: 2018-05-31
Payer: COMMERCIAL

## 2018-05-31 ENCOUNTER — HOSPITAL ENCOUNTER (OUTPATIENT)
Dept: PHYSICAL THERAPY | Facility: CLINIC | Age: 43
Setting detail: THERAPIES SERIES
End: 2018-05-31
Attending: FAMILY MEDICINE
Payer: COMMERCIAL

## 2018-05-31 DIAGNOSIS — F43.10 PTSD (POST-TRAUMATIC STRESS DISORDER): ICD-10-CM

## 2018-05-31 DIAGNOSIS — F32.1 MODERATE MAJOR DEPRESSION (H): Primary | ICD-10-CM

## 2018-05-31 PROCEDURE — 92507 TX SP LANG VOICE COMM INDIV: CPT | Mod: GN | Performed by: SPEECH-LANGUAGE PATHOLOGIST

## 2018-05-31 PROCEDURE — 97140 MANUAL THERAPY 1/> REGIONS: CPT | Mod: GP | Performed by: PHYSICAL THERAPIST

## 2018-05-31 PROCEDURE — 40000211 ZZHC STATISTIC SLP  DEPARTMENT VISIT: Performed by: SPEECH-LANGUAGE PATHOLOGIST

## 2018-05-31 PROCEDURE — 97110 THERAPEUTIC EXERCISES: CPT | Mod: GP | Performed by: PHYSICAL THERAPIST

## 2018-05-31 PROCEDURE — 90791 PSYCH DIAGNOSTIC EVALUATION: CPT | Performed by: MARRIAGE & FAMILY THERAPIST

## 2018-05-31 PROCEDURE — 97127 ZZHC SP THERAPEUTIC INTERVENTION W/FOCUS ON COGNITIVE FUNCTION,EA 15 MIN: CPT | Mod: GN | Performed by: SPEECH-LANGUAGE PATHOLOGIST

## 2018-05-31 PROCEDURE — 40000767 ZZHC STATISTIC PT CONCUSSION VISIT: Performed by: PHYSICAL THERAPIST
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
## 2018-06-04 ENCOUNTER — HOSPITAL ENCOUNTER (OUTPATIENT)
Dept: PHYSICAL THERAPY | Facility: OTHER | Age: 43
Setting detail: THERAPIES SERIES
End: 2018-06-04
Attending: FAMILY MEDICINE
Payer: COMMERCIAL

## 2018-06-04 ENCOUNTER — HOSPITAL ENCOUNTER (OUTPATIENT)
Dept: SPEECH THERAPY | Facility: CLINIC | Age: 43
Setting detail: THERAPIES SERIES
End: 2018-06-04
Attending: FAMILY MEDICINE
Payer: COMMERCIAL

## 2018-06-04 PROCEDURE — 92507 TX SP LANG VOICE COMM INDIV: CPT | Mod: GN | Performed by: SPEECH-LANGUAGE PATHOLOGIST

## 2018-06-04 PROCEDURE — 40000767 ZZHC STATISTIC PT CONCUSSION VISIT: Performed by: PHYSICAL THERAPIST

## 2018-06-04 PROCEDURE — 97140 MANUAL THERAPY 1/> REGIONS: CPT | Mod: GP | Performed by: PHYSICAL THERAPIST

## 2018-06-04 PROCEDURE — 40000211 ZZHC STATISTIC SLP  DEPARTMENT VISIT: Performed by: SPEECH-LANGUAGE PATHOLOGIST

## 2018-06-04 PROCEDURE — 97110 THERAPEUTIC EXERCISES: CPT | Mod: GP | Performed by: PHYSICAL THERAPIST

## 2018-06-04 PROCEDURE — 97530 THERAPEUTIC ACTIVITIES: CPT | Mod: GP | Performed by: PHYSICAL THERAPIST

## 2018-06-07 ENCOUNTER — OFFICE VISIT (OUTPATIENT)
Dept: FAMILY MEDICINE | Facility: CLINIC | Age: 43
End: 2018-06-07
Payer: COMMERCIAL

## 2018-06-07 ENCOUNTER — TELEPHONE (OUTPATIENT)
Dept: FAMILY MEDICINE | Facility: CLINIC | Age: 43
End: 2018-06-07

## 2018-06-07 ENCOUNTER — OFFICE VISIT (OUTPATIENT)
Dept: BEHAVIORAL HEALTH | Facility: CLINIC | Age: 43
End: 2018-06-07
Payer: COMMERCIAL

## 2018-06-07 VITALS
BODY MASS INDEX: 28.76 KG/M2 | HEART RATE: 82 BPM | DIASTOLIC BLOOD PRESSURE: 60 MMHG | WEIGHT: 178.2 LBS | TEMPERATURE: 97.5 F | SYSTOLIC BLOOD PRESSURE: 80 MMHG

## 2018-06-07 DIAGNOSIS — E11.65 TYPE 2 DIABETES MELLITUS WITH HYPERGLYCEMIA, WITH LONG-TERM CURRENT USE OF INSULIN (H): Primary | ICD-10-CM

## 2018-06-07 DIAGNOSIS — N92.1 MENORRHAGIA WITH IRREGULAR CYCLE: ICD-10-CM

## 2018-06-07 DIAGNOSIS — E11.9 TYPE 2 DIABETES, HBA1C GOAL < 7% (H): Primary | ICD-10-CM

## 2018-06-07 DIAGNOSIS — E78.5 HYPERLIPIDEMIA LDL GOAL <100: ICD-10-CM

## 2018-06-07 DIAGNOSIS — R53.81 MALAISE: ICD-10-CM

## 2018-06-07 DIAGNOSIS — F43.10 PTSD (POST-TRAUMATIC STRESS DISORDER): ICD-10-CM

## 2018-06-07 DIAGNOSIS — Z79.4 TYPE 2 DIABETES MELLITUS WITH HYPERGLYCEMIA, WITH LONG-TERM CURRENT USE OF INSULIN (H): Primary | ICD-10-CM

## 2018-06-07 DIAGNOSIS — Z12.31 VISIT FOR SCREENING MAMMOGRAM: ICD-10-CM

## 2018-06-07 DIAGNOSIS — F32.1 MODERATE MAJOR DEPRESSION (H): Primary | ICD-10-CM

## 2018-06-07 DIAGNOSIS — D50.9 IRON DEFICIENCY ANEMIA, UNSPECIFIED IRON DEFICIENCY ANEMIA TYPE: ICD-10-CM

## 2018-06-07 LAB
ANION GAP SERPL CALCULATED.3IONS-SCNC: 11 MMOL/L (ref 3–14)
BASOPHILS # BLD AUTO: 0.1 10E9/L (ref 0–0.2)
BASOPHILS NFR BLD AUTO: 0.8 %
BUN SERPL-MCNC: 10 MG/DL (ref 7–30)
CALCIUM SERPL-MCNC: 8.6 MG/DL (ref 8.5–10.1)
CHLORIDE SERPL-SCNC: 100 MMOL/L (ref 94–109)
CHOLEST SERPL-MCNC: 291 MG/DL
CO2 SERPL-SCNC: 26 MMOL/L (ref 20–32)
CREAT SERPL-MCNC: 0.59 MG/DL (ref 0.52–1.04)
DIFFERENTIAL METHOD BLD: ABNORMAL
EOSINOPHIL # BLD AUTO: 0.1 10E9/L (ref 0–0.7)
EOSINOPHIL NFR BLD AUTO: 1.1 %
ERYTHROCYTE [DISTWIDTH] IN BLOOD BY AUTOMATED COUNT: 16.8 % (ref 10–15)
FERRITIN SERPL-MCNC: 5 NG/ML (ref 12–150)
GFR SERPL CREATININE-BSD FRML MDRD: >90 ML/MIN/1.7M2
GLUCOSE SERPL-MCNC: 295 MG/DL (ref 70–99)
HBA1C MFR BLD: 9.9 % (ref 0–5.6)
HCT VFR BLD AUTO: 37.7 % (ref 35–47)
HDLC SERPL-MCNC: 38 MG/DL
HGB BLD-MCNC: 11.6 G/DL (ref 11.7–15.7)
IMM GRANULOCYTES # BLD: 0 10E9/L (ref 0–0.4)
IMM GRANULOCYTES NFR BLD: 0.2 %
LDLC SERPL CALC-MCNC: 197 MG/DL
LYMPHOCYTES # BLD AUTO: 1.9 10E9/L (ref 0.8–5.3)
LYMPHOCYTES NFR BLD AUTO: 28.8 %
MCH RBC QN AUTO: 22.7 PG (ref 26.5–33)
MCHC RBC AUTO-ENTMCNC: 30.8 G/DL (ref 31.5–36.5)
MCV RBC AUTO: 74 FL (ref 78–100)
MONOCYTES # BLD AUTO: 0.5 10E9/L (ref 0–1.3)
MONOCYTES NFR BLD AUTO: 7.3 %
NEUTROPHILS # BLD AUTO: 4 10E9/L (ref 1.6–8.3)
NEUTROPHILS NFR BLD AUTO: 61.8 %
NONHDLC SERPL-MCNC: 253 MG/DL
NRBC # BLD AUTO: 0 10*3/UL
NRBC BLD AUTO-RTO: 0 /100
PLATELET # BLD AUTO: 282 10E9/L (ref 150–450)
POTASSIUM SERPL-SCNC: 3.9 MMOL/L (ref 3.4–5.3)
RBC # BLD AUTO: 5.12 10E12/L (ref 3.8–5.2)
SODIUM SERPL-SCNC: 137 MMOL/L (ref 133–144)
TRIGL SERPL-MCNC: 280 MG/DL
WBC # BLD AUTO: 6.4 10E9/L (ref 4–11)

## 2018-06-07 PROCEDURE — 90832 PSYTX W PT 30 MINUTES: CPT | Performed by: MARRIAGE & FAMILY THERAPIST

## 2018-06-07 PROCEDURE — 82728 ASSAY OF FERRITIN: CPT | Performed by: FAMILY MEDICINE

## 2018-06-07 PROCEDURE — 36415 COLL VENOUS BLD VENIPUNCTURE: CPT | Performed by: FAMILY MEDICINE

## 2018-06-07 PROCEDURE — 99214 OFFICE O/P EST MOD 30 MIN: CPT | Performed by: FAMILY MEDICINE

## 2018-06-07 PROCEDURE — 80061 LIPID PANEL: CPT | Performed by: FAMILY MEDICINE

## 2018-06-07 PROCEDURE — 80048 BASIC METABOLIC PNL TOTAL CA: CPT | Performed by: FAMILY MEDICINE

## 2018-06-07 PROCEDURE — 83036 HEMOGLOBIN GLYCOSYLATED A1C: CPT | Performed by: FAMILY MEDICINE

## 2018-06-07 PROCEDURE — 85025 COMPLETE CBC W/AUTO DIFF WBC: CPT | Performed by: FAMILY MEDICINE

## 2018-06-07 RX ORDER — METFORMIN HCL 500 MG
2000 TABLET, EXTENDED RELEASE 24 HR ORAL
Qty: 360 TABLET | Refills: 3 | Status: SHIPPED | OUTPATIENT
Start: 2018-06-07 | End: 2019-08-28

## 2018-06-07 ASSESSMENT — PAIN SCALES - GENERAL: PAINLEVEL: SEVERE PAIN (7)

## 2018-06-07 NOTE — TELEPHONE ENCOUNTER
Lm to let pt know ultrasound is scheduled for 6/18/18 @ 9am in [place]. Should come with comfortably full bladder. Rosa Fleming, CMA
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
## 2018-06-07 NOTE — TELEPHONE ENCOUNTER
Patient notified of message.  No further questions at this time.    Thank You,  Brenna Vicente  Patient Representative, [facility]

## 2018-06-11 ENCOUNTER — HOSPITAL ENCOUNTER (OUTPATIENT)
Dept: PHYSICAL THERAPY | Facility: OTHER | Age: 43
Setting detail: THERAPIES SERIES
End: 2018-06-11
Attending: FAMILY MEDICINE
Payer: COMMERCIAL

## 2018-06-11 PROCEDURE — 97140 MANUAL THERAPY 1/> REGIONS: CPT | Mod: GP | Performed by: PHYSICAL THERAPIST

## 2018-06-11 PROCEDURE — 40000767 ZZHC STATISTIC PT CONCUSSION VISIT: Performed by: PHYSICAL THERAPIST

## 2018-06-11 PROCEDURE — 97110 THERAPEUTIC EXERCISES: CPT | Mod: GP | Performed by: PHYSICAL THERAPIST

## 2018-06-11 PROCEDURE — 97530 THERAPEUTIC ACTIVITIES: CPT | Mod: GP | Performed by: PHYSICAL THERAPIST

## 2018-06-13 ENCOUNTER — HOSPITAL ENCOUNTER (OUTPATIENT)
Dept: SPEECH THERAPY | Facility: CLINIC | Age: 43
Setting detail: THERAPIES SERIES
End: 2018-06-13
Attending: FAMILY MEDICINE
Payer: COMMERCIAL

## 2018-06-13 PROCEDURE — 40000211 ZZHC STATISTIC SLP  DEPARTMENT VISIT: Performed by: SPEECH-LANGUAGE PATHOLOGIST

## 2018-06-13 PROCEDURE — 92507 TX SP LANG VOICE COMM INDIV: CPT | Mod: GN | Performed by: SPEECH-LANGUAGE PATHOLOGIST

## 2018-06-18 ENCOUNTER — HOSPITAL ENCOUNTER (OUTPATIENT)
Dept: PHYSICAL THERAPY | Facility: OTHER | Age: 43
Setting detail: THERAPIES SERIES
End: 2018-06-18
Attending: FAMILY MEDICINE
Payer: COMMERCIAL

## 2018-06-18 ENCOUNTER — HOSPITAL ENCOUNTER (OUTPATIENT)
Dept: ULTRASOUND IMAGING | Facility: CLINIC | Age: 43
Discharge: HOME | End: 2018-06-18
Attending: FAMILY MEDICINE | Admitting: FAMILY MEDICINE
Payer: COMMERCIAL

## 2018-06-18 DIAGNOSIS — N92.1 MENORRHAGIA WITH IRREGULAR CYCLE: ICD-10-CM

## 2018-06-18 PROCEDURE — 76856 US EXAM PELVIC COMPLETE: CPT

## 2018-06-18 PROCEDURE — 97530 THERAPEUTIC ACTIVITIES: CPT | Mod: GP | Performed by: PHYSICAL THERAPIST

## 2018-06-18 PROCEDURE — 97110 THERAPEUTIC EXERCISES: CPT | Mod: GP | Performed by: PHYSICAL THERAPIST

## 2018-06-18 PROCEDURE — 97140 MANUAL THERAPY 1/> REGIONS: CPT | Mod: GP | Performed by: PHYSICAL THERAPIST

## 2018-06-18 PROCEDURE — 40000718 ZZHC STATISTIC PT DEPARTMENT ORTHO VISIT: Performed by: PHYSICAL THERAPIST

## 2018-06-21 ENCOUNTER — TELEPHONE (OUTPATIENT)
Dept: FAMILY MEDICINE | Facility: CLINIC | Age: 43
End: 2018-06-21

## 2018-06-21 ENCOUNTER — HOSPITAL ENCOUNTER (OUTPATIENT)
Dept: SPEECH THERAPY | Facility: CLINIC | Age: 43
Setting detail: THERAPIES SERIES
End: 2018-06-21
Attending: FAMILY MEDICINE
Payer: COMMERCIAL

## 2018-06-21 DIAGNOSIS — N92.1 MENORRHAGIA WITH IRREGULAR CYCLE: Primary | ICD-10-CM

## 2018-06-21 DIAGNOSIS — M79.604 PAIN OF RIGHT LOWER EXTREMITY: ICD-10-CM

## 2018-06-21 DIAGNOSIS — R93.89 ABNORMAL PELVIC ULTRASOUND: ICD-10-CM

## 2018-06-21 PROCEDURE — 92507 TX SP LANG VOICE COMM INDIV: CPT | Mod: GN | Performed by: SPEECH-LANGUAGE PATHOLOGIST

## 2018-06-21 PROCEDURE — 40000211 ZZHC STATISTIC SLP  DEPARTMENT VISIT: Performed by: SPEECH-LANGUAGE PATHOLOGIST

## 2018-06-21 NOTE — TELEPHONE ENCOUNTER
----- Message from Yaima Muse MD sent at 6/21/2018  8:26 AM CDT -----  Notify Stacy that I am referring her to OB/GYN to evaluate this possibly with another type of ultrasound or possibly a camera procedure to look inside the uterus to see if there is a polyp there. Please arrange referral to OB/GYN. Yaima Muse MD

## 2018-06-21 NOTE — TELEPHONE ENCOUNTER
Percocet       Last Written Prescription Date:  5/22/18  Last Fill Quantity: 30,   # refills: 0  Last Office Visit: 6-7-18  Future Office visit:    Next 5 appointments (look out 90 days)     Jul 25, 2018  2:45 PM CDT   SHORT with Yaima Muse MD   [facility]    [address] [place] [id number]   [phone]                   Routing refill request to provider for review/approval because:  Drug not on the [facility], UMP or  Health refill protocol or controlled substance

## 2018-06-21 NOTE — ADDENDUM NOTE
Encounter addended by: Mary Ann Naik, SLP on: 6/21/2018  9:10 AM<BR>     Actions taken: Flowsheet accepted

## 2018-06-21 NOTE — PROGRESS NOTES
Notify Stacy that I am referring her to OB/GYN to evaluate this possibly with another type of ultrasound or possibly a camera procedure to look inside the uterus to see if there is a polyp there. Please arrange referral to OB/GYN. Yaima Muse MD

## 2018-06-22 RX ORDER — OXYCODONE AND ACETAMINOPHEN 5; 325 MG/1; MG/1
1 TABLET ORAL
Qty: 30 TABLET | Refills: 0 | Status: SHIPPED | OUTPATIENT
Start: 2018-06-22 | End: 2018-07-23

## 2018-06-22 NOTE — TELEPHONE ENCOUNTER
Referral placed for OB/GYN, [facility]. Message sent to [facility] schedulers to schedule patient for the appointment. Stephanie Guallpa LPN

## 2018-06-28 ENCOUNTER — HOSPITAL ENCOUNTER (OUTPATIENT)
Dept: SPEECH THERAPY | Facility: CLINIC | Age: 43
Setting detail: THERAPIES SERIES
End: 2018-06-28
Attending: FAMILY MEDICINE
Payer: COMMERCIAL

## 2018-06-28 PROCEDURE — 92507 TX SP LANG VOICE COMM INDIV: CPT | Mod: GN | Performed by: SPEECH-LANGUAGE PATHOLOGIST

## 2018-06-28 PROCEDURE — 40000211 ZZHC STATISTIC SLP  DEPARTMENT VISIT: Performed by: SPEECH-LANGUAGE PATHOLOGIST

## 2018-07-02 ENCOUNTER — HOSPITAL ENCOUNTER (OUTPATIENT)
Dept: PHYSICAL THERAPY | Facility: OTHER | Age: 43
Setting detail: THERAPIES SERIES
End: 2018-07-02
Attending: FAMILY MEDICINE
Payer: COMMERCIAL

## 2018-07-02 ENCOUNTER — OFFICE VISIT (OUTPATIENT)
Dept: FAMILY MEDICINE | Facility: CLINIC | Age: 43
End: 2018-07-02
Payer: COMMERCIAL

## 2018-07-02 VITALS
SYSTOLIC BLOOD PRESSURE: 130 MMHG | OXYGEN SATURATION: 99 % | WEIGHT: 181 LBS | DIASTOLIC BLOOD PRESSURE: 72 MMHG | TEMPERATURE: 97.8 F | HEART RATE: 90 BPM | BODY MASS INDEX: 29.21 KG/M2

## 2018-07-02 DIAGNOSIS — S06.0X0D CONCUSSION WITHOUT LOSS OF CONSCIOUSNESS, SUBSEQUENT ENCOUNTER: Primary | ICD-10-CM

## 2018-07-02 DIAGNOSIS — M54.2 NECK PAIN: ICD-10-CM

## 2018-07-02 DIAGNOSIS — G89.29 CHRONIC BILATERAL THORACIC BACK PAIN: ICD-10-CM
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
## 2018-07-02 DIAGNOSIS — M54.6 CHRONIC BILATERAL THORACIC BACK PAIN: ICD-10-CM

## 2018-07-02 PROCEDURE — 97110 THERAPEUTIC EXERCISES: CPT | Mod: GP | Performed by: PHYSICAL THERAPIST

## 2018-07-02 PROCEDURE — 40000718 ZZHC STATISTIC PT DEPARTMENT ORTHO VISIT: Performed by: PHYSICAL THERAPIST

## 2018-07-02 PROCEDURE — 99213 OFFICE O/P EST LOW 20 MIN: CPT | Performed by: FAMILY MEDICINE

## 2018-07-02 PROCEDURE — 97140 MANUAL THERAPY 1/> REGIONS: CPT | Mod: GP | Performed by: PHYSICAL THERAPIST

## 2018-07-02 RX ORDER — DIAZEPAM 5 MG
5 TABLET ORAL DAILY PRN
Qty: 10 TABLET | Refills: 0 | Status: SHIPPED | OUTPATIENT
Start: 2018-07-02 | End: 2019-01-09

## 2018-07-02 ASSESSMENT — PAIN SCALES - GENERAL: PAINLEVEL: SEVERE PAIN (7)
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
## 2018-07-02 NOTE — TELEPHONE ENCOUNTER
Diazepam      Last Written Prescription Date:  4/30/18  Last Fill Quantity: 20,   # refills: 0  Last Office Visit: 7/2/18  Future Office visit:    Next 5 appointments (look out 90 days)     Jul 06, 2018  2:15 PM CDT   Office Visit with Sindhu Peterson DO   [facility]    [address] [place] [address]   [phone]            Jul 25, 2018  2:45 PM CDT   SHORT with Yaima Muse MD   [facility]    [address] [place] [address]   [phone]            Aug 06, 2018 11:15 AM CDT   SHORT with Yaima Muse MD   [facility]    [address] [place] [address]   [phone]                   Routing refill request to provider for review/approval because:  Drug not on the [facility], [facility] or [facility] refill protocol or controlled substance
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
## 2018-07-05 ENCOUNTER — HOSPITAL ENCOUNTER (OUTPATIENT)
Dept: SPEECH THERAPY | Facility: CLINIC | Age: 43
Setting detail: THERAPIES SERIES
End: 2018-07-05
Attending: FAMILY MEDICINE
Payer: COMMERCIAL

## 2018-07-05 PROCEDURE — 92507 TX SP LANG VOICE COMM INDIV: CPT | Mod: GN | Performed by: SPEECH-LANGUAGE PATHOLOGIST

## 2018-07-05 PROCEDURE — 40000211 ZZHC STATISTIC SLP  DEPARTMENT VISIT: Performed by: SPEECH-LANGUAGE PATHOLOGIST

## 2018-07-06 ENCOUNTER — OFFICE VISIT (OUTPATIENT)
Dept: OBGYN | Facility: CLINIC | Age: 43
End: 2018-07-06
Attending: FAMILY MEDICINE
Payer: COMMERCIAL

## 2018-07-06 VITALS
HEART RATE: 90 BPM | BODY MASS INDEX: 29.49 KG/M2 | WEIGHT: 182.7 LBS | SYSTOLIC BLOOD PRESSURE: 122 MMHG | DIASTOLIC BLOOD PRESSURE: 77 MMHG

## 2018-07-06 DIAGNOSIS — N84.0 ENDOMETRIAL POLYP: ICD-10-CM

## 2018-07-06 DIAGNOSIS — N92.0 EXCESSIVE OR FREQUENT MENSTRUATION: Primary | ICD-10-CM

## 2018-07-06 PROCEDURE — 99204 OFFICE O/P NEW MOD 45 MIN: CPT | Performed by: OBSTETRICS & GYNECOLOGY

## 2018-07-06 NOTE — NURSING NOTE
"Chief Complaint   Patient presents with     Abnormal Uterine Bleeding       Initial /77 (BP Location: Right arm, Patient Position: Chair, Cuff Size: Adult Regular)  Pulse 90  Wt 182 lb 11.2 oz (82.9 kg)  LMP 2018 (Exact Date)  BMI 29.49 kg/m2 Estimated body mass index is 29.49 kg/(m^2) as calculated from the following:    Height as of 18: 5' 6\" (1.676 m).    Weight as of this encounter: 182 lb 11.2 oz (82.9 kg).  BP completed using cuff size: regular        The following HM Due: NONE      The following patient reported/Care Every where data was sent to:  P ABSTRACT QUALITY INITIATIVES [84409]       N/a    Nadine Knox CMA  2018           "

## 2018-07-06 NOTE — PATIENT INSTRUCTIONS
If you have any questions regarding your visit, Please contact your care team.    [facility] CLINIC HOURS TELEPHONE NUMBER   Sindhu Peterson DO.    SANTANA Acevedo -    LETI Dubois       Monday, Wednesday, Thursday and Friday, [place]  8:30a.m-5:00 p.m   [facility]  [address] [place] [address]  [phone] ask for [facility]    Imaging [phone]       Urgent Care locations:    [facility] Saturday and Sunday   9 am - 5 pm    Monday-Friday   12 pm - 8 pm  Saturday and Sunday   9 am - 5 pm   [phone]     [facility] Labor and Delivery:  [phone]    If you need a medication refill, please contact your pharmacy. Please allow 3 business days for your refill to be completed.  As always, Thank you for trusting us with your healthcare needs!
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
## 2018-07-09 ENCOUNTER — HOSPITAL ENCOUNTER (EMERGENCY)
Facility: CLINIC | Age: 43
Discharge: HOME | End: 2018-07-09
Attending: NURSE PRACTITIONER | Admitting: NURSE PRACTITIONER
Payer: COMMERCIAL

## 2018-07-09 ENCOUNTER — APPOINTMENT (OUTPATIENT)
Dept: ULTRASOUND IMAGING | Facility: CLINIC | Age: 43
End: 2018-07-09
Attending: NURSE PRACTITIONER
Payer: COMMERCIAL

## 2018-07-09 ENCOUNTER — TELEPHONE (OUTPATIENT)
Dept: FAMILY MEDICINE | Facility: CLINIC | Age: 43
End: 2018-07-09

## 2018-07-09 VITALS
RESPIRATION RATE: 16 BRPM | HEIGHT: 66 IN | SYSTOLIC BLOOD PRESSURE: 145 MMHG | TEMPERATURE: 98.3 F | HEART RATE: 89 BPM | WEIGHT: 183 LBS | OXYGEN SATURATION: 95 % | DIASTOLIC BLOOD PRESSURE: 80 MMHG | BODY MASS INDEX: 29.41 KG/M2

## 2018-07-09 DIAGNOSIS — R10.2 PELVIC PAIN IN FEMALE: ICD-10-CM

## 2018-07-09 DIAGNOSIS — N94.6 DYSMENORRHEA: ICD-10-CM

## 2018-07-09 LAB
ALBUMIN UR-MCNC: NEGATIVE MG/DL
ANION GAP SERPL CALCULATED.3IONS-SCNC: 7 MMOL/L (ref 3–14)
APPEARANCE UR: CLEAR
BACTERIA #/AREA URNS HPF: ABNORMAL /HPF
BASOPHILS # BLD AUTO: 0.1 10E9/L (ref 0–0.2)
BASOPHILS NFR BLD AUTO: 0.7 %
BILIRUB UR QL STRIP: NEGATIVE
BUN SERPL-MCNC: 12 MG/DL (ref 7–30)
CALCIUM SERPL-MCNC: 7.9 MG/DL (ref 8.5–10.1)
CHLORIDE SERPL-SCNC: 105 MMOL/L (ref 94–109)
CO2 SERPL-SCNC: 25 MMOL/L (ref 20–32)
COLOR UR AUTO: YELLOW
CREAT SERPL-MCNC: 0.41 MG/DL (ref 0.52–1.04)
DIFFERENTIAL METHOD BLD: ABNORMAL
EOSINOPHIL NFR BLD AUTO: 1.2 %
ERYTHROCYTE [DISTWIDTH] IN BLOOD BY AUTOMATED COUNT: 16 % (ref 10–15)
GFR SERPL CREATININE-BSD FRML MDRD: >90 ML/MIN/1.7M2
GLUCOSE SERPL-MCNC: 264 MG/DL (ref 70–99)
GLUCOSE UR STRIP-MCNC: >499 MG/DL
HCG UR QL: NEGATIVE
HCT VFR BLD AUTO: 36 % (ref 35–47)
HGB BLD-MCNC: 11.4 G/DL (ref 11.7–15.7)
HGB UR QL STRIP: ABNORMAL
IMM GRANULOCYTES # BLD: 0 10E9/L (ref 0–0.4)
IMM GRANULOCYTES NFR BLD: 0.3 %
KETONES UR STRIP-MCNC: NEGATIVE MG/DL
LEUKOCYTE ESTERASE UR QL STRIP: NEGATIVE
LYMPHOCYTES # BLD AUTO: 1.9 10E9/L (ref 0.8–5.3)
LYMPHOCYTES NFR BLD AUTO: 28.2 %
MCH RBC QN AUTO: 23.8 PG (ref 26.5–33)
MCHC RBC AUTO-ENTMCNC: 31.7 G/DL (ref 31.5–36.5)
MCV RBC AUTO: 75 FL (ref 78–100)
MONOCYTES # BLD AUTO: 0.5 10E9/L (ref 0–1.3)
MONOCYTES NFR BLD AUTO: 6.7 %
MUCOUS THREADS #/AREA URNS LPF: PRESENT /LPF
NEUTROPHILS # BLD AUTO: 4.3 10E9/L (ref 1.6–8.3)
NEUTROPHILS NFR BLD AUTO: 62.9 %
NITRATE UR QL: NEGATIVE
NRBC # BLD AUTO: 0 10*3/UL
NRBC BLD AUTO-RTO: 0 /100
PH UR STRIP: 5 PH (ref 5–7)
PLATELET # BLD AUTO: 313 10E9/L (ref 150–450)
POTASSIUM SERPL-SCNC: 3.8 MMOL/L (ref 3.4–5.3)
RBC # BLD AUTO: 4.79 10E12/L (ref 3.8–5.2)
RBC #/AREA URNS AUTO: 83 /HPF (ref 0–2)
SODIUM SERPL-SCNC: 137 MMOL/L (ref 133–144)
SOURCE: ABNORMAL
SP GR UR STRIP: 1.02 (ref 1–1.03)
SQUAMOUS #/AREA URNS AUTO: <1 /HPF (ref 0–1)
UROBILINOGEN UR STRIP-MCNC: 0 MG/DL (ref 0–2)
WBC # BLD AUTO: 6.9 10E9/L (ref 4–11)
WBC #/AREA URNS AUTO: 11 /HPF (ref 0–5)

## 2018-07-09 PROCEDURE — 36415 COLL VENOUS BLD VENIPUNCTURE: CPT | Performed by: NURSE PRACTITIONER

## 2018-07-09 PROCEDURE — 96375 TX/PRO/DX INJ NEW DRUG ADDON: CPT | Performed by: NURSE PRACTITIONER

## 2018-07-09 PROCEDURE — 80048 BASIC METABOLIC PNL TOTAL CA: CPT | Performed by: NURSE PRACTITIONER

## 2018-07-09 PROCEDURE — 81001 URINALYSIS AUTO W/SCOPE: CPT | Performed by: FAMILY MEDICINE

## 2018-07-09 PROCEDURE — 87086 URINE CULTURE/COLONY COUNT: CPT | Performed by: NURSE PRACTITIONER

## 2018-07-09 PROCEDURE — 25000128 H RX IP 250 OP 636: Performed by: NURSE PRACTITIONER

## 2018-07-09 PROCEDURE — 81025 URINE PREGNANCY TEST: CPT | Performed by: FAMILY MEDICINE

## 2018-07-09 PROCEDURE — 96376 TX/PRO/DX INJ SAME DRUG ADON: CPT | Performed by: NURSE PRACTITIONER

## 2018-07-09 PROCEDURE — 96361 HYDRATE IV INFUSION ADD-ON: CPT | Performed by: NURSE PRACTITIONER

## 2018-07-09 PROCEDURE — 99285 EMERGENCY DEPT VISIT HI MDM: CPT | Mod: Z6 | Performed by: NURSE PRACTITIONER

## 2018-07-09 PROCEDURE — 99285 EMERGENCY DEPT VISIT HI MDM: CPT | Mod: 25 | Performed by: NURSE PRACTITIONER

## 2018-07-09 PROCEDURE — 93976 VASCULAR STUDY: CPT | Mod: XS

## 2018-07-09 PROCEDURE — 85025 COMPLETE CBC W/AUTO DIFF WBC: CPT | Performed by: NURSE PRACTITIONER

## 2018-07-09 PROCEDURE — 96374 THER/PROPH/DIAG INJ IV PUSH: CPT | Performed by: NURSE PRACTITIONER

## 2018-07-09 RX ORDER — ONDANSETRON 2 MG/ML
4 INJECTION INTRAMUSCULAR; INTRAVENOUS EVERY 30 MIN PRN
Status: DISCONTINUED | OUTPATIENT
Start: 2018-07-09 | End: 2018-07-09 | Stop reason: HOSPADM

## 2018-07-09 RX ORDER — KETOROLAC TROMETHAMINE 30 MG/ML
30 INJECTION, SOLUTION INTRAMUSCULAR; INTRAVENOUS ONCE
Status: COMPLETED | OUTPATIENT
Start: 2018-07-09 | End: 2018-07-09

## 2018-07-09 RX ORDER — HYDROMORPHONE HYDROCHLORIDE 1 MG/ML
0.5 INJECTION, SOLUTION INTRAMUSCULAR; INTRAVENOUS; SUBCUTANEOUS
Status: COMPLETED | OUTPATIENT
Start: 2018-07-09 | End: 2018-07-09

## 2018-07-09 RX ORDER — HYDROMORPHONE HYDROCHLORIDE 2 MG/1
2 TABLET ORAL EVERY 6 HOURS PRN
Qty: 8 TABLET | Refills: 0 | Status: SHIPPED | OUTPATIENT
Start: 2018-07-09 | End: 2018-07-17

## 2018-07-09 RX ADMIN — ONDANSETRON 4 MG: 2 INJECTION INTRAMUSCULAR; INTRAVENOUS at 16:00

## 2018-07-09 RX ADMIN — SODIUM CHLORIDE 1000 ML: 9 INJECTION, SOLUTION INTRAVENOUS at 16:00

## 2018-07-09 RX ADMIN — HYDROMORPHONE HYDROCHLORIDE 0.5 MG: 1 INJECTION, SOLUTION INTRAMUSCULAR; INTRAVENOUS; SUBCUTANEOUS at 17:09

## 2018-07-09 RX ADMIN — KETOROLAC TROMETHAMINE 30 MG: 30 INJECTION, SOLUTION INTRAMUSCULAR at 16:02

## 2018-07-09 RX ADMIN — HYDROMORPHONE HYDROCHLORIDE 0.5 MG: 1 INJECTION, SOLUTION INTRAMUSCULAR; INTRAVENOUS; SUBCUTANEOUS at 16:03

## 2018-07-09 RX ADMIN — HYDROMORPHONE HYDROCHLORIDE 0.5 MG: 1 INJECTION, SOLUTION INTRAMUSCULAR; INTRAVENOUS; SUBCUTANEOUS at 18:26

## 2018-07-09 ASSESSMENT — ENCOUNTER SYMPTOMS: NAUSEA: 1

## 2018-07-09 NOTE — TELEPHONE ENCOUNTER
Stacy Brown is a 43 year old female who calls with painful periods.  Patient reports that she was diagnosed on 07/06/2018 with a uterine polyp.  She reports that her period started on 07/05/2018.  She reports that her period is heavier then normal, and her pain is worse.  She reports that she did use her percocet for pain, but was not effective.  She rates a 10/10.  She is nauseated, but not vomiting.  She is vague with symptoms.  She is requesting something for pain.  She declined emergent care.      NURSING ASSESSMENT:  Description:  Painful periods.   Onset/duration:  07/05//2018.   Precip. factors:  Diagnosed with uterine polyp.   Associated symptoms:  Heavier bleeding then normal.   Improves/worsens symptoms:  No change.   Pain scale (0-10)   10/10  LMP/preg/breast feeding:  LMP started 07/05/2018  Allergies:   Allergies   Allergen Reactions     Amoxicillin Hives     Hydrocodone-Acetaminophen GI Disturbance     NURSING PLAN: Routed to provider Yes    RECOMMENDED DISPOSITION:  To ED/UC for evaluation, another person to drive - Patient declines and is requesting something from her PCP for pain.    Will comply with recommendation: Yes  If further questions/concerns or if symptoms do not improve, worsen or new symptoms develop, call your PCP or [facility] as soon as possible.    Guideline used:  Telephone Triage Protocols for Nurses, Fifth Edition, Jazmin Mustafa RN

## 2018-07-09 NOTE — ED AVS SNAPSHOT
[facility] Emergency Department    [address] [place] [address]    Phone:  [phone]    Fax:  [phone]                                       Stacy Brown   MRN: [id number]    Department:  [facility] Emergency Department   Date of Visit:  7/9/2018           Patient Information     Date Of Birth          1975        Your diagnoses for this visit were:     Pelvic pain in female        You were seen by Miriam Griffith APRN CNP.      Follow-up Information     Follow up with Yaima Muse MD.    Specialty:  Family Practice    Contact information:    [address] [place] [id number] [phone]          Follow up with [facility] Emergency [facility].    Specialty:  EMERGENCY MEDICINE    Why:  If symptoms worsen    Contact information:    [address] [place] [address] [phone]    Additional information:    From [address]: Exit at [employer] on south side of [place]. Turn right on [address]. Turn left at stoplight on [address]. [facility] will be in view two blocks ahead        Discharge Instructions         Heavy Menstrual Bleeding    Heavy menstrual bleeding means that your periods are heavier or longer than usual. You may soak through a pad or tampon every 1 to 3 hours on the heaviest days of your period. You may also pass large, dark clots. And your periods may last longer than 7 days.  If you have heavy periods often, this can cause a problem called anemia. With anemia, your red blood cell count is too low. Red blood cells are needed because they help carry oxygen throughout your body. Severe anemia may cause you to look pale and feel weak or tired. You might also become short of breath easily.  There are many possible causes of heavy menstrual bleeding. Hormonal imbalance is the most common cause. Having benign growths in your uterus, such as fibroids or polyps, is another cause. Taking  certain medicines or having certain health problems or bleeding disorders are also causes.  To treat heavy menstrual bleeding, your healthcare provider may prescribe medicines first. If these don t help, you may need further testing and treatments.  Home care  Medicines  If you re prescribed medicines, be sure to take them as directed.    To help control heavy bleeding, any of the following may be used:  ? Hormone therapy (this includes all methods of hormonal birth control such as pills, shots, cream, ring, patch, or hormone-releasing IUD)  ? Nonsteroidal anti-inflammatory drugs (NSAIDs), such as ibuprofen  ? Antifibrinolytic medicines, such as transexamic acid    To help treat anemia, iron supplements may be prescribed.            General care    Get plenty of rest if you tire easily. Avoid heavy exertion.    To help relieve pain or cramping, try using a heating pad on the lower belly or back. A warm bath may also help.  Follow-up care  Follow up with your healthcare provider, or as advised.  When to seek medical advice  Call your healthcare provider right away if any of these occur:    Heavier bleeding (soaking 1 pad or tampon every hour for 3 hours)    Heavy bleeding that lasts longer than 1 week    Fever of 100.4 F (38 C) or higher, or as directed by your provider    Pain or cramping that gets worse instead of better    Signs of anemia such as pale skin, extreme fatigue or weakness, or shortness of breath    Dizziness or fainting  Date Last Reviewed: 10/1/2017    8128-7741 [employer]. [address] [place] [address]. All rights reserved. This information is not intended as a substitute for professional medical care. Always follow your healthcare professional's instructions.          Your next 10 appointments already scheduled     Jul 12, 2018  8:00 AM CDT   Concussion Treatment with PACHECO Camarena   [facility] ([facility])    [address] [place] [address]  "[place] [address]   [phone]            Jul 16, 2018 11:15 AM CDT   Neuro Treatment with Besys Brock PT   [facility] ([facility])    [address] [place]  [place] [address]   [phone]            Jul 19, 2018  8:00 AM CDT   Concussion Treatment with Mary Ann Naik, SLP   [facility] ([facility])    [address] [place] [address] [address] [place] [address]   [phone]            Jul 25, 2018  2:30 PM CDT   MA SCREENING DIGITAL BILATERAL with PHMA1   [facility] ([facility])    [address] [place] [address]  [place] [address]   [phone]           Do not use any powder, lotion or deodorant under your arms or on your breast. If you do, we will ask you to remove it before your exam.  Wear comfortable, two-piece clothing.  If you have any allergies, tell your care team.  Bring any previous mammograms from other facilities or have them mailed to the breast center. Three-dimensional (3D) mammograms are available at [facility] locations in [facility], [facility], [facility], [place], [place], and [place]. [facility] locations include [place] and [facility] in [place]. Benefits of 3D mammograms include: - Improved rate of cancer detection - Decreases your chance of having to go back for more tests, which means fewer: - \"False-positive\" results (This means that there is an abnormal area but it isn't cancer.) - Invasive testing procedures, such as a biopsy or surgery - Can provide clearer images of the breast if you have dense breast tissue. 3D mammography is an optional exam that anyone can have with a 2D mammogram. It doesn't replace or take the place of a 2D mammogram. 2D mammograms remain an effective screening test for all women.  Not all insurance companies cover the cost of a 3D mammogram. Check with your insurance.            Jul 25, 2018  2:45 PM CDT   SHORT with " Yaima Muse MD   [facility]    [address] [place] [address]   [phone]            Jul 26, 2018  8:00 AM CDT   Concussion Treatment with PACHECO Camarena   [facility] ([facility])    [address] [place] [address] [place] [address]   [phone]            Jul 30, 2018 11:45 AM CDT   Concussion Treatment with PACHECO Camarena   [facility] ([facility])    [address] [place] [address] [place] [address]   [phone]            Aug 06, 2018 10:30 AM CDT   Concussion Treatment with PACHECO Camarena   [facility] ([facility])    [address] [place] [address] [place] [address]   [phone]            Aug 06, 2018 11:15 AM CDT   SHORT with Yaima Muse MD   [facility]    [address] [place] [address]   [phone]            Aug 15, 2018  7:45 AM CDT   Concussion Treatment with PACHECO Camarena   [facility] ([facility])    [address] [place] [address] [place] [address]   [phone]              24 Hour Appointment Hotline       To make an appointment at any [facility], call [phone] ([phone]). If you don't have a family doctor or clinic, we will help you find one. [facility] are conveniently located to serve the needs of you and your family.             Review of your medicines      START taking        Dose / Directions Last dose taken    HYDROmorphone 2 MG tablet   Commonly known as:  DILAUDID   Dose:  2 mg   Quantity:  8 tablet        Take 1 tablet (2 mg) by mouth every 6 hours as needed for pain   Refills:  0          Our records show that you are taking the medicines listed below. If these are incorrect, please call your family doctor or clinic.        Dose / Directions Last dose taken     ACCU-CHEK COMPLETE Kit   Dose:  1 Device   Quantity:  1 Device        1 Device daily   Refills:  0        ASPIRIN NOT PRESCRIBED   Commonly known as:  INTENTIONAL        Antiplatelet medication not prescribed intentionally due to Not indicated based on age   Refills:  0        blood glucose monitoring lancets   Quantity:  3 Box        Use to test blood sugars 1-2 times daily or as directed, per patients glucose meter.   Refills:  3        blood glucose monitoring test strip   Commonly known as:  BL TEST STRIP PACK   Quantity:  100 each        Use to test blood sugar 1-2 times daily or as directed. Per patients glucose meter (getting new one today)   Refills:  3        diazepam 5 MG tablet   Commonly known as:  VALIUM   Dose:  5 mg   Quantity:  10 tablet        Take 1 tablet (5 mg) by mouth daily as needed for muscle spasms   Refills:  0        docusate sodium 100 MG tablet   Commonly known as:  COLACE   Dose:  100 mg   Quantity:  60 tablet        Take 100 mg by mouth daily   Refills:  1        ferrous sulfate 325 (65 Fe) MG tablet   Commonly known as:  IRON   Dose:  325 mg   Quantity:  180 tablet        Take 1 tablet (325 mg) by mouth 2 times daily   Refills:  3        FLUoxetine 20 MG capsule   Commonly known as:  PROzac   Dose:  40 mg   Quantity:  180 capsule        Take 2 capsules (40 mg) by mouth daily   Refills:  1        IBUPROFEN PO   Dose:  600 mg        Take 600 mg by mouth every 4 hours as needed for moderate pain   Refills:  0        insulin glargine 100 UNIT/ML injection   Commonly known as:  LANTUS SOLOSTAR   Dose:  30 Units   Quantity:  30 mL        Inject 30 Units Subcutaneous daily Appointment needed for additional refills.   Refills:  3        insulin pen needle 32G X 6 MM   Commonly known as:  NOVOFINE   Quantity:  100 each        Use once daily or as directed.   Refills:  3        ketorolac 10 MG tablet   Commonly known as:  TORADOL   Quantity:  30 tablet        TAKE ONE TABLET BY MOUTH EVERY  NIGHT AS NEEDED FOR MODERATE PAIN   Refills:  1        metFORMIN 500 MG 24 hr tablet   Commonly known as:  GLUCOPHAGE-XR   Dose:  2000 mg   Quantity:  360 tablet        Take 4 tablets (2,000 mg) by mouth daily (with dinner)   Refills:  3        MULTI-VITAMIN GUMMIES Chew   Dose:  1 chew tab        Take 1 chew tab by mouth daily   Refills:  0        naproxen 500 MG tablet   Commonly known as:  NAPROSYN   Dose:  500 mg   Quantity:  60 tablet        Take 1 tablet (500 mg) by mouth 2 times daily (with meals)   Refills:  1        oxyCODONE IR 5 MG tablet   Commonly known as:  ROXICODONE   Quantity:  30 tablet        1-2 tabs three times a day as needed for pain   Refills:  0        oxyCODONE-acetaminophen 5-325 MG per tablet   Commonly known as:  PERCOCET   Dose:  1 tablet   Quantity:  30 tablet        Take 1 tablet by mouth nightly as needed for moderate to severe pain   Refills:  0        propranolol HCl 60 MG Tabs   Dose:  60 mg   Quantity:  60 tablet        Take 1 tablet (60 mg) by mouth 2 times daily   Refills:  1        simvastatin 20 MG tablet   Commonly known as:  ZOCOR   Dose:  20 mg   Quantity:  90 tablet        Take 1 tablet (20 mg) by mouth At Bedtime   Refills:  3        traZODone 50 MG tablet   Commonly known as:  DESYREL   Quantity:  180 tablet        TAKE 1-2 TABLETS BY MOUTH NIGHTLY AS NEEDED FOR SLEEP   Refills:  1                Information about OPIOIDS     PRESCRIPTION OPIOIDS: WHAT YOU NEED TO KNOW   We gave you an opioid (narcotic) pain medicine. It is important to manage your pain, but opioids are not always the best choice. You should first try all the other options your care team gave you. Take this medicine for as short a time (and as few doses) as possible.     These medicines have risks:    DO NOT drive when on new or higher doses of pain medicine. These medicines can affect your alertness and reaction times, and you could be arrested for driving under the influence (DUI). If you need to use  opioids long-term, talk to your care team about driving.    DO NOT operate heave machinery    DO NOT do any other dangerous activities while taking these medicines.     DO NOT drink any alcohol while taking these medicines.      If the opioid prescribed includes acetaminophen, DO NOT take with any other medicines that contain acetaminophen. Read all labels carefully. Look for the word  acetaminophen  or  Tylenol.  Ask your pharmacist if you have questions or are unsure.    You can get addicted to pain medicines, especially if you have a history of addiction (chemical, alcohol or substance dependence). Talk to your care team about ways to reduce this risk.    Store your pills in a secure place, locked if possible. We will not replace any lost or stolen medicine. If you don t finish your medicine, please throw away (dispose) as directed by your pharmacist. The [place] [employer] has more information about safe disposal: https://www.pca.[facility].[place].us/living-green/managing-unwanted-medications.     All opioids tend to cause constipation. Drink plenty of water and eat foods that have a lot of fiber, such as fruits, vegetables, prune juice, apple juice and high-fiber cereal. Take a laxative (Miralax, milk of magnesia, Colace, Senna) if you don t move your bowels at least every other day.         Prescriptions were sent or printed at these locations (1 Prescription)                   [facility] [place], [place] [facility] [address] [facility]    [address] , [facility] [address]    Telephone:  [phone]   Fax:  [phone]   Hours:                  Printed at Department/Unit printer (1 of 1)         HYDROmorphone (DILAUDID) 2 MG tablet                Procedures and tests performed during your visit     Basic metabolic panel    CBC with platelets differential    HCG qualitative urine    Peripheral IV catheter    Routine UA with microscopic    US Pelvis Cmplt w Transvag & Doppler LmtPel Duplex Limited     Urine Culture      Orders Needing Specimen Collection     None      Pending Results     Date and Time Order Name Status Description    7/9/2018 1507 Urine Culture In process             Pending Culture Results     Date and Time Order Name Status Description    7/9/2018 1507 Urine Culture In process             Pending Results Instructions     If you had any lab results that were not finalized at the time of your Discharge, you can call the ED Lab Result RN at [phone]. You will be contacted by this team for any positive Lab results or changes in treatment. The nurses are available 7 days a week from 10A to 6:30P.  You can leave a message 24 hours per day and they will return your call.        Thank you for choosing [facility]       Thank you for choosing [facility] for your care. Our goal is always to provide you with excellent care. Hearing back from our patients is one way we can continue to improve our services. Please take a few minutes to complete the written survey that you may receive in the mail after you visit with us. Thank you!        Care EveryWhere ID     This is your Care EveryWhere ID. This could be used by other organizations to access your [facility] medical records  [id number]        Equal Access to Services     BRADLEY KRAUS : Hadarron Olivier, wadanayda lujustus, qaybta kaalyousif cm, conrado moya . So [facility] [phone].    ATENCIÓN: Si habla español, tiene a hewitt disposición servicios gratuitos de asistencia lingüística. Llame al [phone].    We comply with applicable federal civil rights laws and [place] laws. We do not discriminate on the basis of race, color, national origin, age, disability, sex, sexual orientation, or gender identity.            After Visit Summary       This is your record. Keep this with you and show to your community pharmacist(s) and doctor(s) at your next visit.

## 2018-07-09 NOTE — DISCHARGE INSTRUCTIONS
Heavy Menstrual Bleeding    Heavy menstrual bleeding means that your periods are heavier or longer than usual. You may soak through a pad or tampon every 1 to 3 hours on the heaviest days of your period. You may also pass large, dark clots. And your periods may last longer than 7 days.  If you have heavy periods often, this can cause a problem called anemia. With anemia, your red blood cell count is too low. Red blood cells are needed because they help carry oxygen throughout your body. Severe anemia may cause you to look pale and feel weak or tired. You might also become short of breath easily.  There are many possible causes of heavy menstrual bleeding. Hormonal imbalance is the most common cause. Having benign growths in your uterus, such as fibroids or polyps, is another cause. Taking certain medicines or having certain health problems or bleeding disorders are also causes.  To treat heavy menstrual bleeding, your healthcare provider may prescribe medicines first. If these don t help, you may need further testing and treatments.  Home care  Medicines  If you re prescribed medicines, be sure to take them as directed.    To help control heavy bleeding, any of the following may be used:  ? Hormone therapy (this includes all methods of hormonal birth control such as pills, shots, cream, ring, patch, or hormone-releasing IUD)  ? Nonsteroidal anti-inflammatory drugs (NSAIDs), such as ibuprofen  ? Antifibrinolytic medicines, such as transexamic acid    To help treat anemia, iron supplements may be prescribed.            General care    Get plenty of rest if you tire easily. Avoid heavy exertion.    To help relieve pain or cramping, try using a heating pad on the lower belly or back. A warm bath may also help.  Follow-up care  Follow up with your healthcare provider, or as advised.  When to seek medical advice  Call your healthcare provider right away if any of these occur:    Heavier bleeding (soaking 1 pad or tampon  every hour for 3 hours)    Heavy bleeding that lasts longer than 1 week    Fever of 100.4 F (38 C) or higher, or as directed by your provider    Pain or cramping that gets worse instead of better    Signs of anemia such as pale skin, extreme fatigue or weakness, or shortness of breath    Dizziness or fainting  Date Last Reviewed: 10/1/2017    7397-2062 [employer]. [address] [place] [address]. All rights reserved. This information is not intended as a substitute for professional medical care. Always follow your healthcare professional's instructions.
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
## 2018-07-09 NOTE — ED AVS SNAPSHOT
[facility] Emergency Department    [address] [place] [address]    Phone:  [phone]    Fax:  [phone]                                       Stacy Brown   MRN: [id number]    Department:  [facility] Emergency Department   Date of Visit:  7/9/2018           After Visit Summary Signature Page     I have received my discharge instructions, and my questions have been answered. I have discussed any challenges I see with this plan with the nurse or doctor.    ..........................................................................................................................................  Patient/Patient Representative Signature      ..........................................................................................................................................  Patient Representative Print Name and Relationship to Patient    ..................................................               ................................................  Date                                            Time    ..........................................................................................................................................  Reviewed by Signature/Title    ...................................................              ..............................................  Date                                                            Time

## 2018-07-09 NOTE — ED TRIAGE NOTES
"Patient presents with concerns for mid pelvic pain \"excruciating\" since Friday She reports she was seen at [facility] last week and dx with a polyp on her uterus. Kina Zhang RN  "

## 2018-07-10 ENCOUNTER — TELEPHONE (OUTPATIENT)
Dept: OBGYN | Facility: CLINIC | Age: 43
End: 2018-07-10

## 2018-07-10 LAB
BACTERIA SPEC CULT: NORMAL
Lab: NORMAL
SPECIMEN SOURCE: NORMAL

## 2018-07-10 NOTE — TELEPHONE ENCOUNTER
Surgery Scheduled.    Date of Surgery 7/24/18 Time of Surgery 9:15 am  Procedure: Exam Under Anesthesia/ Operative Hysteroscopy/ Polypectomy/ D&C  Hospital/Surgical Facility: [facility]  Surgeon: Dr. Peterson  Type of Anesthesia Anticipated: MAC  Pre-op: 7/23/18 with Dr. Peterson at  OB  2 week post op: 8/8/18 with Dr. Peterson at  [facility]  Pre-certification 7/10/18  Consent signed: NA  Hospital Stay NA       [facility] surgery packet mailed to patient's home address.  Patient instructed NPO 12 hours prior to surgery, arrive 1 hours prior to surgery, must have a .  Patient understood and agrees to the plan.      Surgery Pre-Certification    Medical Record Number: [id number]  Stacy Brown  YOB: 1975   Phone: [phone] (home) [phone] (work)  Primary Provider: Yaima Muse    Reason for Admit:  Menometrorrhagia    Surgeon: Dr. Peterson  Surgical Procedure: Exam Under Anesthesia/ Operative Hysteroscopy/ Polypectomy/ D&C  ICD-9 Coded: N92.0  Date of Surgery: 7/24/18  Consent signed? N/A      Hospital: [facility]  Outpatient    Requestor:  Mabel Ojeda     Location:  [facility]    Katja Arvizu CMA
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
## 2018-07-11 NOTE — TELEPHONE ENCOUNTER
Spoke to Matthew at [facility] of [employer], CPT code: 03146 no auth needed on this procedure. Patient will have a $200 co-payment and will be responsible 30% coinsurance for any agents/drugs/supplies. Call ref. # I- [id number] Matthew  07/11/18 1:25pm

## 2018-07-12 ENCOUNTER — HOSPITAL ENCOUNTER (OUTPATIENT)
Dept: SPEECH THERAPY | Facility: CLINIC | Age: 43
Setting detail: THERAPIES SERIES
End: 2018-07-12
Attending: FAMILY MEDICINE
Payer: COMMERCIAL

## 2018-07-12 PROCEDURE — 40000211 ZZHC STATISTIC SLP  DEPARTMENT VISIT: Performed by: SPEECH-LANGUAGE PATHOLOGIST

## 2018-07-12 PROCEDURE — 92507 TX SP LANG VOICE COMM INDIV: CPT | Mod: GN | Performed by: SPEECH-LANGUAGE PATHOLOGIST

## 2018-07-16 ENCOUNTER — HOSPITAL ENCOUNTER (OUTPATIENT)
Dept: PHYSICAL THERAPY | Facility: OTHER | Age: 43
Setting detail: THERAPIES SERIES
End: 2018-07-16
Attending: FAMILY MEDICINE
Payer: COMMERCIAL

## 2018-07-16 PROCEDURE — 97110 THERAPEUTIC EXERCISES: CPT | Mod: GP | Performed by: PHYSICAL THERAPIST

## 2018-07-16 PROCEDURE — 97140 MANUAL THERAPY 1/> REGIONS: CPT | Mod: GP | Performed by: PHYSICAL THERAPIST

## 2018-07-16 PROCEDURE — 40000718 ZZHC STATISTIC PT DEPARTMENT ORTHO VISIT: Performed by: PHYSICAL THERAPIST

## 2018-07-19 ENCOUNTER — HOSPITAL ENCOUNTER (OUTPATIENT)
Dept: SPEECH THERAPY | Facility: CLINIC | Age: 43
Setting detail: THERAPIES SERIES
End: 2018-07-19
Attending: FAMILY MEDICINE
Payer: COMMERCIAL

## 2018-07-19 PROCEDURE — 92507 TX SP LANG VOICE COMM INDIV: CPT | Mod: GN | Performed by: SPEECH-LANGUAGE PATHOLOGIST

## 2018-07-19 PROCEDURE — 40000211 ZZHC STATISTIC SLP  DEPARTMENT VISIT: Performed by: SPEECH-LANGUAGE PATHOLOGIST

## 2018-07-23 ENCOUNTER — OFFICE VISIT (OUTPATIENT)
Dept: OBGYN | Facility: CLINIC | Age: 43
End: 2018-07-23
Payer: COMMERCIAL

## 2018-07-23 ENCOUNTER — ANESTHESIA EVENT (OUTPATIENT)
Dept: SURGERY | Facility: AMBULATORY SURGERY CENTER | Age: 43
End: 2018-07-23

## 2018-07-23 VITALS
TEMPERATURE: 97.2 F | OXYGEN SATURATION: 100 % | HEART RATE: 82 BPM | DIASTOLIC BLOOD PRESSURE: 74 MMHG | WEIGHT: 181 LBS | BODY MASS INDEX: 29.09 KG/M2 | SYSTOLIC BLOOD PRESSURE: 110 MMHG | HEIGHT: 66 IN

## 2018-07-23 DIAGNOSIS — M79.604 PAIN OF RIGHT LOWER EXTREMITY: ICD-10-CM

## 2018-07-23 DIAGNOSIS — Z01.818 PREOP GENERAL PHYSICAL EXAM: Primary | ICD-10-CM

## 2018-07-23 PROCEDURE — 99214 OFFICE O/P EST MOD 30 MIN: CPT | Performed by: OBSTETRICS & GYNECOLOGY

## 2018-07-23 NOTE — MR AVS SNAPSHOT
After Visit Summary   7/23/2018    Stacy Brown    MRN: [id number]           Patient Information     Date Of Birth          1975        Visit Information        Provider Department      7/23/2018 9:30 AM Sindhu Peterson DO [facility]        Today's Diagnoses     Preop general physical exam    -  1      Care Instructions      Before Your Surgery      Call your surgeon if there is any change in your health. This includes signs of a cold or flu (such as a sore throat, runny nose, cough, rash or fever).    Do not smoke, drink alcohol or take over the counter medicine (unless your surgeon or primary care doctor tells you to) for the 24 hours before and after surgery.    If you take prescribed drugs: Follow your doctor s orders about which medicines to take and which to stop until after surgery.    Eating and drinking prior to surgery: follow the instructions from your surgeon    Take a shower or bath the night before surgery. Use the soap your surgeon gave you to gently clean your skin. If you do not have soap from your surgeon, use your regular soap. Do not shave or scrub the surgery site.  Wear clean pajamas and have clean sheets on your bed.                                                          If you have any questions regarding your visit, Please contact your care team.    [facility] CLINIC HOURS TELEPHONE NUMBER   Sindhu Peterson DO.    SANTANA Acevedo -    LETI Dubois       Monday, Wednesday, Thursday and Friday, [place]  8:30a.m-5:00 p.m   [facility]  [address] [place] [address]  [phone] ask for [facility]    Imaging Ikyszpeela-[phone]       Urgent Care locations:    [facility] Saturday and Sunday   9 am - 5 pm    Monday-Friday   12 pm - 8 pm  Saturday and Sunday   9 am - 5 pm   [phone]     [facility] Labor and Delivery:  [phone]  [phone]    If you need a medication refill, please contact your pharmacy. Please allow 3 business days for your refill to be completed.  As always, Thank you for trusting us with your healthcare needs!                Follow-ups after your visit        Your next 10 appointments already scheduled     Jul 23, 2018  9:30 AM CDT   Office Visit with Sindhu Peterson DO   [facility] ([facility]    [address]  [facility] [address]   [phone]           Bring a current list of meds and any records pertaining to this visit. For Physicals, please bring immunization records and any forms needing to be filled out. Please arrive 10 minutes early to complete paperwork.            Jul 24, 2018   Procedure with Sindhu Peterson DO   [facility] (--)    [address]  [facility] [address]   [phone]            Jul 25, 2018 10:00 AM CDT   Neuro Treatment with Stephanie Retana, PT   [facility]    [address] [place] [id number] [phone]            Jul 25, 2018  2:30 PM CDT   MA SCREENING DIGITAL BILATERAL with PHMA1   [facility] ([facility])    [address] [place] [address] [phone]           Do not use any powder, lotion or deodorant under your arms or on your breast. If you do, we will ask you to remove it before your exam.  Wear comfortable, two-piece clothing.  If you have any allergies, tell your care team.  Bring any previous mammograms from other facilities or have them mailed to the breast center. Three-dimensional (3D) mammograms are available at [place] locations in [facility], [facility], [facility], [place], [place], and [place]. Health locations include [place] [facility] in [place]. Benefits of 3D mammograms include: - Improved rate of cancer detection - Decreases  "your chance of having to go back for more tests, which means fewer: - \"False-positive\" results (This means that there is an abnormal area but it isn't cancer.) - Invasive testing procedures, such as a biopsy or surgery - Can provide clearer images of the breast if you have dense breast tissue. 3D mammography is an optional exam that anyone can have with a 2D mammogram. It doesn't replace or take the place of a 2D mammogram. 2D mammograms remain an effective screening test for all women.  Not all insurance companies cover the cost of a 3D mammogram. Check with your insurance.            Jul 25, 2018  2:45 PM CDT   SHORT with Yaima Muse MD   [facility] ([facility])    [address] [place] [address]   [phone]            Jul 26, 2018  8:00 AM CDT   Concussion Treatment with PACHECO Camarena   [facility] ([facility])    [address] [place] [address] [place] [address]   [phone]            Jul 30, 2018 10:30 AM CDT   Neuro Treatment with Stephanie Retana PT   [facility]    [address] [place] [address]   [phone]            Jul 30, 2018 11:45 AM CDT   Concussion Treatment with PACHECO Camarena   [facility] ([facility])    [address] [place] [address] [place] [address]   [phone]            Aug 06, 2018 10:30 AM CDT   Concussion Treatment with PACHECO Camarena   [facility] ([facility])    [address] [place] [address] [place] [address]   [phone]            Aug 06, 2018 11:15 AM CDT   SHORT with Yiama Muse MD   [facility] ([address] [address]   [phone]              Who to contact     If you have questions or need follow up information about today's clinic visit or your schedule " "please contact [facility] directly at [phone].  Normal or non-critical lab and imaging results will be communicated to you by MyChart, letter or phone within 4 business days after the clinic has received the results. If you do not hear from us within 7 days, please contact the clinic through MyChart or phone. If you have a critical or abnormal lab result, we will notify you by phone as soon as possible.  Submit refill requests through [employer] or call your pharmacy and they will forward the refill request to us. Please allow 3 business days for your refill to be completed.          Additional Information About Your Visit        Care EveryWhere ID     This is your Care EveryWhere ID. This could be used by other organizations to access your [facility] medical records  [id number]        Your Vitals Were     Pulse Temperature Height Last Period Pulse Oximetry Breastfeeding?    82 97.2  F (36.2  C) (Oral) 5' 6\" (1.676 m) 07/05/2018 (Exact Date) 100% No    BMI (Body Mass Index)                   29.21 kg/m2            Blood Pressure from Last 3 Encounters:   07/23/18 110/74   07/09/18 145/80   07/06/18 122/77    Weight from Last 3 Encounters:   07/23/18 181 lb (82.1 kg)   07/09/18 183 lb (83 kg)   07/06/18 182 lb 11.2 oz (82.9 kg)              Today, you had the following     No orders found for display       Primary Care Provider Office Phone # Fax #    Yaima Nicole Muse [phone]       [address] [place] [address]        Equal Access to Services     [facility]: Hadii aad ku hadasho Soomaali, waaxda luqadaha, qaybta kaalmada adebhavik, conrado moya . So [facility] [phone].    ATENCIÓN: Si habla español, tiene a hewitt disposición servicios gratuitos de asistencia lingüística. Llame al [phone].    We comply with applicable federal civil rights laws and [place] laws. We do not discriminate on the basis of race, color, national " origin, age, disability, sex, sexual orientation, or gender identity.            Thank you!     Thank you for choosing [facility]  for your care. Our goal is always to provide you with excellent care. Hearing back from our patients is one way we can continue to improve our services. Please take a few minutes to complete the written survey that you may receive in the mail after your visit with us. Thank you!             Your Updated Medication List - Protect others around you: Learn how to safely use, store and throw away your medicines at www.disposemymeds.org.          This list is accurate as of 7/23/18  9:26 AM.  Always use your most recent med list.                   Brand Name Dispense Instructions for use Diagnosis    ACCU-CHEK COMPLETE Kit     1 Device    1 Device daily    Type 2 diabetes, HbA1c goal < 7% (H)       blood glucose monitoring lancets     3 Box    Use to test blood sugars 1-2 times daily or as directed, per patients glucose meter.    Type 2 diabetes mellitus with hyperglycemia, with long-term current use of insulin (H)       blood glucose monitoring test strip    BL TEST STRIP PACK    100 each    Use to test blood sugar 1-2 times daily or as directed. Per patients glucose meter (getting new one today)    Type 2 diabetes mellitus with hyperglycemia, with long-term current use of insulin (H)       diazepam 5 MG tablet    VALIUM    10 tablet    Take 1 tablet (5 mg) by mouth daily as needed for muscle spasms    Chronic bilateral thoracic back pain       docusate sodium 100 MG tablet    COLACE    60 tablet    Take 100 mg by mouth daily    Constipation, unspecified constipation type       ferrous sulfate 325 (65 Fe) MG tablet    IRON    180 tablet    Take 1 tablet (325 mg) by mouth 2 times daily    Iron deficiency anemia, unspecified iron deficiency anemia type       FLUoxetine 20 MG capsule    PROzac    180 capsule    Take 2 capsules (40 mg) by mouth daily    Major depressive  disorder, recurrent episode, moderate (H)       IBUPROFEN PO      Take 600 mg by mouth every 4 hours as needed for moderate pain        insulin glargine 100 UNIT/ML injection    LANTUS SOLOSTAR    30 mL    Inject 30 Units Subcutaneous daily Appointment needed for additional refills.    Type 2 diabetes mellitus with hyperglycemia, with long-term current use of insulin (H)       insulin pen needle 32G X 6 MM    NOVOFINE    100 each    Use once daily or as directed.    Type 2 diabetes mellitus with hyperglycemia, with long-term current use of insulin (H)       metFORMIN 500 MG 24 hr tablet    GLUCOPHAGE-XR    360 tablet    Take 4 tablets (2,000 mg) by mouth daily (with dinner)    Type 2 diabetes mellitus with hyperglycemia, with long-term current use of insulin (H)       MULTI-VITAMIN GUMMIES Chew      Take 1 chew tab by mouth daily        oxyCODONE IR 5 MG tablet    ROXICODONE    30 tablet    1-2 tabs three times a day as needed for pain    Bilateral hip pain, Pain in left tibia       oxyCODONE-acetaminophen 5-325 MG per tablet    PERCOCET    30 tablet    Take 1 tablet by mouth nightly as needed for moderate to severe pain    Pain of right lower extremity       simvastatin 20 MG tablet    ZOCOR    90 tablet    Take 1 tablet (20 mg) by mouth At Bedtime    Hyperlipidemia LDL goal <100, Type 2 diabetes mellitus with hyperglycemia, with long-term current use of insulin (H)       traZODone 50 MG tablet    DESYREL    180 tablet    TAKE 1-2 TABLETS BY MOUTH NIGHTLY AS NEEDED FOR SLEEP    Primary insomnia

## 2018-07-23 NOTE — PATIENT INSTRUCTIONS
Before Your Surgery      Call your surgeon if there is any change in your health. This includes signs of a cold or flu (such as a sore throat, runny nose, cough, rash or fever).    Do not smoke, drink alcohol or take over the counter medicine (unless your surgeon or primary care doctor tells you to) for the 24 hours before and after surgery.    If you take prescribed drugs: Follow your doctor s orders about which medicines to take and which to stop until after surgery.    Eating and drinking prior to surgery: follow the instructions from your surgeon    Take a shower or bath the night before surgery. Use the soap your surgeon gave you to gently clean your skin. If you do not have soap from your surgeon, use your regular soap. Do not shave or scrub the surgery site.  Wear clean pajamas and have clean sheets on your bed.                                                          If you have any questions regarding your visit, Please contact your care team.    [place] [facility] [facility] CLINIC HOURS TELEPHONE NUMBER   Sindhu HensleyDO monica.    SANTANA Acevedo -    LETI Dubois       Monday, Wednesday, Thursday and Friday, [place]  8:30a.m-5:00 p.m   [facility]  [address] [place] [address]  [phone] ask for [facility]    Imaging [place]-[phone]       Urgent Care locations:    [facility] Saturday and Sunday   9 am - 5 pm    Monday-Friday   12 pm - 8 pm  Saturday and Sunday   9 am - 5 pm   [phone]     [facility] Labor and Delivery:  [phone]    If you need a medication refill, please contact your pharmacy. Please allow 3 business days for your refill to be completed.  As always, Thank you for trusting us with your healthcare needs!
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
## 2018-07-24 ENCOUNTER — ANESTHESIA (OUTPATIENT)
Dept: SURGERY | Facility: AMBULATORY SURGERY CENTER | Age: 43
End: 2018-07-24
Payer: COMMERCIAL

## 2018-07-24 ENCOUNTER — HOSPITAL ENCOUNTER (OUTPATIENT)
Facility: AMBULATORY SURGERY CENTER | Age: 43
Discharge: HOME | End: 2018-07-24
Attending: OBSTETRICS & GYNECOLOGY | Admitting: OBSTETRICS & GYNECOLOGY
Payer: COMMERCIAL

## 2018-07-24 ENCOUNTER — SURGERY (OUTPATIENT)
Age: 43
End: 2018-07-24

## 2018-07-24 VITALS
OXYGEN SATURATION: 100 % | RESPIRATION RATE: 16 BRPM | TEMPERATURE: 97.4 F | SYSTOLIC BLOOD PRESSURE: 113 MMHG | DIASTOLIC BLOOD PRESSURE: 84 MMHG

## 2018-07-24 DIAGNOSIS — G89.18 POSTOPERATIVE PAIN: Primary | ICD-10-CM

## 2018-07-24 LAB
BETA HCG QUAL IFA URINE: NEGATIVE
GLUCOSE BLDC GLUCOMTR-MCNC: 207 MG/DL (ref 70–99)
GLUCOSE BLDC GLUCOMTR-MCNC: 295 MG/DL (ref 70–99)

## 2018-07-24 PROCEDURE — G8918 PT W/O PREOP ORDER IV AB PRO: HCPCS

## 2018-07-24 PROCEDURE — 58558 HYSTEROSCOPY BIOPSY: CPT

## 2018-07-24 PROCEDURE — 84703 CHORIONIC GONADOTROPIN ASSAY: CPT | Performed by: ANESTHESIOLOGY

## 2018-07-24 PROCEDURE — 82962 GLUCOSE BLOOD TEST: CPT | Mod: 76 | Performed by: OBSTETRICS & GYNECOLOGY

## 2018-07-24 PROCEDURE — 58558 HYSTEROSCOPY BIOPSY: CPT | Performed by: OBSTETRICS & GYNECOLOGY

## 2018-07-24 PROCEDURE — G8907 PT DOC NO EVENTS ON DISCHARG: HCPCS

## 2018-07-24 PROCEDURE — 36415 COLL VENOUS BLD VENIPUNCTURE: CPT | Performed by: OBSTETRICS & GYNECOLOGY

## 2018-07-24 PROCEDURE — 88305 TISSUE EXAM BY PATHOLOGIST: CPT | Performed by: OBSTETRICS & GYNECOLOGY

## 2018-07-24 RX ORDER — GABAPENTIN 300 MG/1
300 CAPSULE ORAL ONCE
Status: COMPLETED | OUTPATIENT
Start: 2018-07-24 | End: 2018-07-24

## 2018-07-24 RX ORDER — MEPERIDINE HYDROCHLORIDE 25 MG/ML
12.5 INJECTION INTRAMUSCULAR; INTRAVENOUS; SUBCUTANEOUS
Status: DISCONTINUED | OUTPATIENT
Start: 2018-07-24 | End: 2018-07-25 | Stop reason: HOSPADM

## 2018-07-24 RX ORDER — PHYSOSTIGMINE SALICYLATE 1 MG/ML
1.2 INJECTION INTRAVENOUS
Status: DISCONTINUED | OUTPATIENT
Start: 2018-07-24 | End: 2018-07-25 | Stop reason: HOSPADM

## 2018-07-24 RX ORDER — FENTANYL CITRATE 50 UG/ML
25-50 INJECTION, SOLUTION INTRAMUSCULAR; INTRAVENOUS
Status: DISCONTINUED | OUTPATIENT
Start: 2018-07-24 | End: 2018-07-25 | Stop reason: HOSPADM

## 2018-07-24 RX ORDER — DEXAMETHASONE SODIUM PHOSPHATE 4 MG/ML
INJECTION, SOLUTION INTRA-ARTICULAR; INTRALESIONAL; INTRAMUSCULAR; INTRAVENOUS; SOFT TISSUE PRN
Status: DISCONTINUED | OUTPATIENT
Start: 2018-07-24 | End: 2018-07-24

## 2018-07-24 RX ORDER — DEXAMETHASONE SODIUM PHOSPHATE 4 MG/ML
4 INJECTION, SOLUTION INTRA-ARTICULAR; INTRALESIONAL; INTRAMUSCULAR; INTRAVENOUS; SOFT TISSUE EVERY 10 MIN PRN
Status: DISCONTINUED | OUTPATIENT
Start: 2018-07-24 | End: 2018-07-25 | Stop reason: HOSPADM

## 2018-07-24 RX ORDER — NALOXONE HYDROCHLORIDE 0.4 MG/ML
.1-.4 INJECTION, SOLUTION INTRAMUSCULAR; INTRAVENOUS; SUBCUTANEOUS
Status: DISCONTINUED | OUTPATIENT
Start: 2018-07-24 | End: 2018-07-25 | Stop reason: HOSPADM

## 2018-07-24 RX ORDER — LIDOCAINE 40 MG/G
CREAM TOPICAL
Status: DISCONTINUED | OUTPATIENT
Start: 2018-07-24 | End: 2018-07-25 | Stop reason: HOSPADM

## 2018-07-24 RX ORDER — PROPOFOL 10 MG/ML
INJECTION, EMULSION INTRAVENOUS CONTINUOUS PRN
Status: DISCONTINUED | OUTPATIENT
Start: 2018-07-24 | End: 2018-07-24

## 2018-07-24 RX ORDER — HYDRALAZINE HYDROCHLORIDE 20 MG/ML
2.5-5 INJECTION INTRAMUSCULAR; INTRAVENOUS EVERY 10 MIN PRN
Status: DISCONTINUED | OUTPATIENT
Start: 2018-07-24 | End: 2018-07-25 | Stop reason: HOSPADM

## 2018-07-24 RX ORDER — LIDOCAINE HYDROCHLORIDE 20 MG/ML
INJECTION, SOLUTION INFILTRATION; PERINEURAL PRN
Status: DISCONTINUED | OUTPATIENT
Start: 2018-07-24 | End: 2018-07-24

## 2018-07-24 RX ORDER — HYDROMORPHONE HYDROCHLORIDE 1 MG/ML
.3-.5 INJECTION, SOLUTION INTRAMUSCULAR; INTRAVENOUS; SUBCUTANEOUS EVERY 10 MIN PRN
Status: DISCONTINUED | OUTPATIENT
Start: 2018-07-24 | End: 2018-07-25 | Stop reason: HOSPADM

## 2018-07-24 RX ORDER — OXYCODONE AND ACETAMINOPHEN 5; 325 MG/1; MG/1
1 TABLET ORAL
Qty: 30 TABLET | Refills: 0 | Status: SHIPPED | OUTPATIENT
Start: 2018-07-24 | End: 2018-07-26

## 2018-07-24 RX ORDER — ACETAMINOPHEN 325 MG/1
975 TABLET ORAL ONCE
Status: COMPLETED | OUTPATIENT
Start: 2018-07-24 | End: 2018-07-24

## 2018-07-24 RX ORDER — FENTANYL CITRATE 50 UG/ML
INJECTION, SOLUTION INTRAMUSCULAR; INTRAVENOUS PRN
Status: DISCONTINUED | OUTPATIENT
Start: 2018-07-24 | End: 2018-07-24

## 2018-07-24 RX ORDER — KETOROLAC TROMETHAMINE 30 MG/ML
30 INJECTION, SOLUTION INTRAMUSCULAR; INTRAVENOUS ONCE
Status: DISCONTINUED | OUTPATIENT
Start: 2018-07-24 | End: 2018-07-25 | Stop reason: HOSPADM

## 2018-07-24 RX ORDER — METOPROLOL TARTRATE 1 MG/ML
1-2 INJECTION, SOLUTION INTRAVENOUS EVERY 5 MIN PRN
Status: DISCONTINUED | OUTPATIENT
Start: 2018-07-24 | End: 2018-07-25 | Stop reason: HOSPADM

## 2018-07-24 RX ORDER — ALBUTEROL SULFATE 0.83 MG/ML
2.5 SOLUTION RESPIRATORY (INHALATION) EVERY 4 HOURS PRN
Status: DISCONTINUED | OUTPATIENT
Start: 2018-07-24 | End: 2018-07-25 | Stop reason: HOSPADM

## 2018-07-24 RX ORDER — PROPOFOL 10 MG/ML
INJECTION, EMULSION INTRAVENOUS PRN
Status: DISCONTINUED | OUTPATIENT
Start: 2018-07-24 | End: 2018-07-24

## 2018-07-24 RX ORDER — ONDANSETRON 4 MG/1
4 TABLET, ORALLY DISINTEGRATING ORAL EVERY 30 MIN PRN
Status: DISCONTINUED | OUTPATIENT
Start: 2018-07-24 | End: 2018-07-25 | Stop reason: HOSPADM

## 2018-07-24 RX ORDER — OXYCODONE HYDROCHLORIDE 5 MG/1
10 TABLET ORAL EVERY 4 HOURS PRN
Status: DISCONTINUED | OUTPATIENT
Start: 2018-07-24 | End: 2018-07-25 | Stop reason: HOSPADM

## 2018-07-24 RX ORDER — SODIUM CHLORIDE, SODIUM LACTATE, POTASSIUM CHLORIDE, CALCIUM CHLORIDE 600; 310; 30; 20 MG/100ML; MG/100ML; MG/100ML; MG/100ML
INJECTION, SOLUTION INTRAVENOUS CONTINUOUS
Status: DISCONTINUED | OUTPATIENT
Start: 2018-07-24 | End: 2018-07-25 | Stop reason: HOSPADM

## 2018-07-24 RX ORDER — ONDANSETRON 2 MG/ML
INJECTION INTRAMUSCULAR; INTRAVENOUS PRN
Status: DISCONTINUED | OUTPATIENT
Start: 2018-07-24 | End: 2018-07-24

## 2018-07-24 RX ORDER — KETOROLAC TROMETHAMINE 30 MG/ML
30 INJECTION, SOLUTION INTRAMUSCULAR; INTRAVENOUS ONCE
Status: COMPLETED | OUTPATIENT
Start: 2018-07-24 | End: 2018-07-24

## 2018-07-24 RX ORDER — ONDANSETRON 2 MG/ML
4 INJECTION INTRAMUSCULAR; INTRAVENOUS EVERY 30 MIN PRN
Status: DISCONTINUED | OUTPATIENT
Start: 2018-07-24 | End: 2018-07-25 | Stop reason: HOSPADM

## 2018-07-24 RX ORDER — IBUPROFEN 600 MG/1
600 TABLET, FILM COATED ORAL EVERY 6 HOURS PRN
Qty: 30 TABLET | Refills: 0 | Status: SHIPPED | OUTPATIENT
Start: 2018-07-24 | End: 2018-08-08

## 2018-07-24 RX ADMIN — OXYCODONE HYDROCHLORIDE 5 MG: 5 TABLET ORAL at 10:09

## 2018-07-24 RX ADMIN — FENTANYL CITRATE 50 MCG: 50 INJECTION, SOLUTION INTRAMUSCULAR; INTRAVENOUS at 09:05

## 2018-07-24 RX ADMIN — ACETAMINOPHEN 975 MG: 325 TABLET ORAL at 08:15

## 2018-07-24 RX ADMIN — SODIUM CHLORIDE, SODIUM LACTATE, POTASSIUM CHLORIDE, CALCIUM CHLORIDE: 600; 310; 30; 20 INJECTION, SOLUTION INTRAVENOUS at 09:05

## 2018-07-24 RX ADMIN — LIDOCAINE HYDROCHLORIDE 60 MG: 20 INJECTION, SOLUTION INFILTRATION; PERINEURAL at 09:05

## 2018-07-24 RX ADMIN — DEXAMETHASONE SODIUM PHOSPHATE 4 MG: 4 INJECTION, SOLUTION INTRA-ARTICULAR; INTRALESIONAL; INTRAMUSCULAR; INTRAVENOUS; SOFT TISSUE at 09:10

## 2018-07-24 RX ADMIN — PROPOFOL 30 MG: 10 INJECTION, EMULSION INTRAVENOUS at 09:05

## 2018-07-24 RX ADMIN — GABAPENTIN 300 MG: 300 CAPSULE ORAL at 08:15

## 2018-07-24 RX ADMIN — KETOROLAC TROMETHAMINE 30 MG: 30 INJECTION, SOLUTION INTRAMUSCULAR; INTRAVENOUS at 09:10

## 2018-07-24 RX ADMIN — PROPOFOL 100 MCG/KG/MIN: 10 INJECTION, EMULSION INTRAVENOUS at 09:05

## 2018-07-24 RX ADMIN — ONDANSETRON 4 MG: 2 INJECTION INTRAMUSCULAR; INTRAVENOUS at 09:10

## 2018-07-24 ASSESSMENT — LIFESTYLE VARIABLES: TOBACCO_USE: 1

## 2018-07-24 ASSESSMENT — COPD QUESTIONNAIRES: COPD: 0

## 2018-07-24 NOTE — H&P
I have reviewed her preop H&P from 7/23/18 and no interval changes are needed.  Today's UPT is negative and informed consent has been provided for the procedures and a blood transfusion if emergently necessary.  Her glucose value is pending.

## 2018-07-24 NOTE — DISCHARGE INSTRUCTIONS
[facility]  Same-Day Surgery   Adult Discharge Orders & Instructions   For 24 hours after surgery  1. Get plenty of rest.  A responsible adult must stay with you for at least 24 hours after you leave the hospital.   2. Do not drive or use heavy equipment.  If you have weakness or tingling, don't drive or use heavy equipment until this feeling goes away.  3. Do not drink alcohol.  4. Avoid strenuous or risky activities.  Ask for help when climbing stairs.   5. You may feel lightheaded.  IF so, sit for a few minutes before standing.  Have someone help you get up.   6. If you have nausea (feel sick to your stomach): Drink only clear liquids such as apple juice, ginger ale, broth or 7-Up.  Rest may also help.  Be sure to drink enough fluids.  Move to a regular diet as you feel able.  7. You may have a slight fever. Call the doctor if your fever is over 100 F (37.7 C) (taken under the tongue) or lasts longer than 24 hours.  8. You may have a dry mouth, a sore throat, muscle aches or trouble sleeping.  These should go away after 24 hours.  9. Do not make important or legal decisions.        10.  Nothing per vagina x 2 weeks including no intercourse, douching, or tampon use.         11.  No tub baths or swimming x 2 weeks but you may shower at any time.         12.  If you are bleeding and soaking a maxi pad every hour or less, then you need to go to the emergency room.    Call your doctor for any of the followin.  Signs of infection (fever, growing tenderness at the surgery site, a large amount of drainage or bleeding, severe pain, foul-smelling drainage, redness, swelling).    2. It has been over 8 to 10 hours since surgery and you are still not able to urinate (pass water).    3.  Headache for over 24 hours.  To contact a doctor, call _____[phone]______________________

## 2018-07-24 NOTE — DISCHARGE SUMMARY
Physician Discharge Summary     Patient ID:  Stacy Brown  [id number]  43 year old  1975    Admit date: 7/24/2018    Discharge date and time: 7/24/2018     Admitting Physician: Sindhu Peterson DO     Discharge Physician: Sindhu Peterson DO    Admission Diagnoses: Menometrorrhagia    Discharge Diagnoses: Same    Admission Condition: good    Discharged Condition: good    Indication for Admission: none    Hospital Course: not applicable    Consults: none    Significant Diagnostic Studies: none    Treatments: surgery: exam under anesthesia, operative hysteroscopy with polypectomy using MyoSure Lite, and dilatation and curettage    Discharge Exam: normal postop exam    Disposition: home    Patient Instructions:   Patient's Medications   New Prescriptions    IBUPROFEN (ADVIL/MOTRIN) 600 MG TABLET    Take 1 tablet (600 mg) by mouth every 6 hours as needed for moderate pain   Previous Medications    BLOOD GLUCOSE MONITORING (BL TEST STRIP PACK) TEST STRIP    Use to test blood sugar 1-2 times daily or as directed. Per patients glucose meter (getting new one today)    BLOOD GLUCOSE MONITORING (FREESTYLE) LANCETS    Use to test blood sugars 1-2 times daily or as directed, per patients glucose meter.    BLOOD GLUCOSE MONITORING SUPPL (ACCU-CHEK COMPLETE) KIT    1 Device daily    DIAZEPAM (VALIUM) 5 MG TABLET    Take 1 tablet (5 mg) by mouth daily as needed for muscle spasms    DOCUSATE SODIUM (COLACE) 100 MG TABLET    Take 100 mg by mouth daily    FERROUS SULFATE (IRON) 325 (65 FE) MG TABLET    Take 1 tablet (325 mg) by mouth 2 times daily    FLUOXETINE (PROZAC) 20 MG CAPSULE    Take 2 capsules (40 mg) by mouth daily    IBUPROFEN PO    Take 600 mg by mouth every 4 hours as needed for moderate pain    INSULIN GLARGINE (LANTUS SOLOSTAR) 100 UNIT/ML PEN    Inject 30 Units Subcutaneous daily Appointment needed for additional refills.    INSULIN PEN NEEDLE (NOVOFINE) 32G X 6 MM    Use once daily or as directed.     METFORMIN (GLUCOPHAGE-XR) 500 MG 24 HR TABLET    Take 4 tablets (2,000 mg) by mouth daily (with dinner)    MULTIPLE VITAMINS-MINERALS (MULTI-VITAMIN GUMMIES) CHEW    Take 1 chew tab by mouth daily     OXYCODONE IR (ROXICODONE) 5 MG TABLET    1-2 tabs three times a day as needed for pain    OXYCODONE-ACETAMINOPHEN (PERCOCET) 5-325 MG PER TABLET    Take 1 tablet by mouth nightly as needed for moderate to severe pain    SIMVASTATIN (ZOCOR) 20 MG TABLET    Take 1 tablet (20 mg) by mouth At Bedtime    TRAZODONE (DESYREL) 50 MG TABLET    TAKE 1-2 TABLETS BY MOUTH NIGHTLY AS NEEDED FOR SLEEP   Modified Medications    No medications on file   Discontinued Medications    ASPIRIN NOT PRESCRIBED (INTENTIONAL)    Antiplatelet medication not prescribed intentionally due to Not indicated based on age    HYDROMORPHONE (DILAUDID) 2 MG TABLET    Take 1 tablet (2 mg) by mouth every 6 hours as needed for pain    KETOROLAC (TORADOL) 10 MG TABLET    TAKE ONE TABLET BY MOUTH EVERY NIGHT AS NEEDED FOR MODERATE PAIN    NAPROXEN (NAPROSYN) 500 MG TABLET    Take 1 tablet (500 mg) by mouth 2 times daily (with meals)    PROPRANOLOL HCL 60 MG TABS    Take 1 tablet (60 mg) by mouth 2 times daily     Activity: activity as tolerated and no driving for today  Diet: regular diet  Wound Care: keep wound clean and dry  Your pathology report should be back in about 1 week.    Follow-up with Dr. Peterson in 2 weeks or earlier prn.    Signed:  Sindhu Peterson DO  7/24/2018  9:41 AM

## 2018-07-24 NOTE — IP AVS SNAPSHOT
[facility]    [address] [place] [address]    Phone:  [phone]                                       After Visit Summary   7/24/2018    Stacy Brown    MRN: [id number]           After Visit Summary Signature Page     I have received my discharge instructions, and my questions have been answered. I have discussed any challenges I see with this plan with the nurse or doctor.    ..........................................................................................................................................  Patient/Patient Representative Signature      ..........................................................................................................................................  Patient Representative Print Name and Relationship to Patient    ..................................................               ................................................  Date                                            Time    ..........................................................................................................................................  Reviewed by Signature/Title    ...................................................              ..............................................  Date                                                            Time

## 2018-07-24 NOTE — IP AVS SNAPSHOT
MRN:[id number]                      After Visit Summary   7/24/2018    Stacy Brown    MRN: [id number]           Thank you!     Thank you for choosing [facility] for your care. Our goal is always to provide you with excellent care. Hearing back from our patients is one way we can continue to improve our services. Please take a few minutes to complete the written survey that you may receive in the mail after you visit with us. Thank you!        Patient Information     Date Of Birth          1975        About your hospital stay     You were admitted on:  July 24, 2018 You last received care in the:  [facility]    You were discharged on:  July 24, 2018       Who to Call     For medical emergencies, please call 911.  For non-urgent questions about your medical care, please call your primary care provider or clinic, [phone]  For questions related to your surgery, please call your surgery clinic        Attending Provider     Provider Specialty    Sindhu Peterson,  OB/Gyn       Primary Care Provider Office Phone # Fax #    Yaima Nicole Muse [phone]      Your next 10 appointments already scheduled     Jul 25, 2018 10:00 AM CDT   Neuro Treatment with Stephanie Retana, PT   [facility]    [address] [address] [phone]            Jul 25, 2018  2:30 PM CDT   MA SCREENING DIGITAL BILATERAL with PHMA1   [facility] ([facility])    [address] [address] [phone]           Do not use any powder, lotion or deodorant under your arms or on your breast. If you do, we will ask you to remove it before your exam.  Wear comfortable, two-piece clothing.  If you have any allergies, tell your care team.  Bring any previous mammograms from other facilities or have them mailed to the breast center. Three-dimensional (3D)  "mammograms are available at [facility] locations in [place], [place], [place], [place], [facility], [place], [place], and [place]. -Health locations include [facility] in [place]. Benefits of 3D mammograms include: - Improved rate of cancer detection - Decreases your chance of having to go back for more tests, which means fewer: - \"False-positive\" results (This means that there is an abnormal area but it isn't cancer.) - Invasive testing procedures, such as a biopsy or surgery - Can provide clearer images of the breast if you have dense breast tissue. 3D mammography is an optional exam that anyone can have with a 2D mammogram. It doesn't replace or take the place of a 2D mammogram. 2D mammograms remain an effective screening test for all women.  Not all insurance companies cover the cost of a 3D mammogram. Check with your insurance.            Jul 25, 2018  2:45 PM CDT   SHORT with Yaima Muse MD   [facility]    [address] [place] [address]   [phone]            Jul 26, 2018  8:00 AM CDT   Concussion Treatment with PACHECO Camarena   [facility] ([facility])    [address] [place] [address] [address] [place] [address]   [phone]            Jul 30, 2018 10:30 AM CDT   Neuro Treatment with Stephanie Retana, ELEAZAR   [facility]    [id number] [place] [address]   [phone]            Jul 30, 2018 11:45 AM CDT   Concussion Treatment with PACHECO Camarena   [facility] ([facility])    [address] [place] [address] [address] [place] [address]   [phone]            Aug 06, 2018 10:30 AM CDT   Concussion Treatment with PACHECO Camarena   [facility] ([facility])    [address] [place] [address] [address] [place] [address]   [phone]            Aug 06, 2018 11:15 " AM CDT   SHORT with Yaima Muse MD   [facility]    [address]  [place] [address]   [phone]            Aug 08, 2018 10:30 AM CDT   Post Op with Sindhu Peterson DO   [facility]    [address] [place], [place] [address] [facility] [address]   [phone]            Aug 15, 2018  7:45 AM CDT   Concussion Treatment with PACHECO Camarena   [facility] ([facility])    [address] [place] [address]  [place] [address]   [phone]              Further instructions from your care team       [facility]  Same-Day Surgery   Adult Discharge Orders & Instructions   For 24 hours after surgery  1. Get plenty of rest.  A responsible adult must stay with you for at least 24 hours after you leave the hospital.   2. Do not drive or use heavy equipment.  If you have weakness or tingling, don't drive or use heavy equipment until this feeling goes away.  3. Do not drink alcohol.  4. Avoid strenuous or risky activities.  Ask for help when climbing stairs.   5. You may feel lightheaded.  IF so, sit for a few minutes before standing.  Have someone help you get up.   6. If you have nausea (feel sick to your stomach): Drink only clear liquids such as apple juice, ginger ale, broth or 7-Up.  Rest may also help.  Be sure to drink enough fluids.  Move to a regular diet as you feel able.  7. You may have a slight fever. Call the doctor if your fever is over 100 F (37.7 C) (taken under the tongue) or lasts longer than 24 hours.  8. You may have a dry mouth, a sore throat, muscle aches or trouble sleeping.  These should go away after 24 hours.  9. Do not make important or legal decisions.        10.  Nothing per vagina x 2 weeks including no intercourse, douching, or tampon use.         11.  No tub baths or swimming x 2 weeks but you may shower at any time.          12.  If you are bleeding and soaking a maxi pad every hour or less, then you need to go to the emergency room.    Call your doctor for any of the followin.  Signs of infection (fever, growing tenderness at the surgery site, a large amount of drainage or bleeding, severe pain, foul-smelling drainage, redness, swelling).    2. It has been over 8 to 10 hours since surgery and you are still not able to urinate (pass water).    3.  Headache for over 24 hours.  To contact a doctor, call _____[phone]______________________     Pending Results     No orders found from 2018 to 2018.            Admission Information     Date & Time Provider Department Dept. Phone    2018 Sindhu Peterson, DO [facility] [phone]      Your Vitals Were     Blood Pressure Temperature Respirations Last Period Pulse Oximetry       128/75 97.4  F (36.3  C) (Temporal) 16 2018 (Exact Date) 99%       Care EveryWhere ID     This is your Care EveryWhere ID. This could be used by other organizations to access your [facility] medical records  [id number]        Equal Access to Services     BRADLEY KRAUS : Hadii candace bruceo Soshira, waaxda luqadaha, qaybta kaalmada adeegyada, conrado mason. So [facility] [phone].    ATENCIÓN: Si habla español, tiene a hewitt disposición servicios gratuitos de asistencia lingüística. Llame al [phone].    We comply with applicable federal civil rights laws and [place] laws. We do not discriminate on the basis of race, color, national origin, age, disability, sex, sexual orientation, or gender identity.               Review of your medicines      CONTINUE these medicines which may have CHANGED, or have new prescriptions. If we are uncertain of the size of tablets/capsules you have at home, strength may be listed as something that might have changed.        Dose / Directions    * IBUPROFEN PO   This may have changed:  Another medication  with the same name was added. Make sure you understand how and when to take each.        Dose:  600 mg   Take 600 mg by mouth every 4 hours as needed for moderate pain   Refills:  0       * ibuprofen 600 MG tablet   Commonly known as:  ADVIL/MOTRIN   This may have changed:  You were already taking a medication with the same name, and this prescription was added. Make sure you understand how and when to take each.   Used for:  Postoperative pain        Dose:  600 mg   Take 1 tablet (600 mg) by mouth every 6 hours as needed for moderate pain   Quantity:  30 tablet   Refills:  0       * Notice:  This list has 2 medication(s) that are the same as other medications prescribed for you. Read the directions carefully, and ask your doctor or other care provider to review them with you.      CONTINUE these medicines which have NOT CHANGED        Dose / Directions    ACCU-CHEK COMPLETE Kit   Used for:  Type 2 diabetes, HbA1c goal < 7% (H)        Dose:  1 Device   1 Device daily   Quantity:  1 Device   Refills:  0       blood glucose monitoring lancets   Used for:  Type 2 diabetes mellitus with hyperglycemia, with long-term current use of insulin (H)        Use to test blood sugars 1-2 times daily or as directed, per patients glucose meter.   Quantity:  3 Box   Refills:  3       blood glucose monitoring test strip   Commonly known as:  BL TEST STRIP PACK   Used for:  Type 2 diabetes mellitus with hyperglycemia, with long-term current use of insulin (H)        Use to test blood sugar 1-2 times daily or as directed. Per patients glucose meter (getting new one today)   Quantity:  100 each   Refills:  3       diazepam 5 MG tablet   Commonly known as:  VALIUM   Used for:  Chronic bilateral thoracic back pain        Dose:  5 mg   Take 1 tablet (5 mg) by mouth daily as needed for muscle spasms   Quantity:  10 tablet   Refills:  0       docusate sodium 100 MG tablet   Commonly known as:  COLACE   Used for:  Constipation, unspecified  constipation type        Dose:  100 mg   Take 100 mg by mouth daily   Quantity:  60 tablet   Refills:  1       ferrous sulfate 325 (65 Fe) MG tablet   Commonly known as:  IRON   Used for:  Iron deficiency anemia, unspecified iron deficiency anemia type        Dose:  325 mg   Take 1 tablet (325 mg) by mouth 2 times daily   Quantity:  180 tablet   Refills:  3       FLUoxetine 20 MG capsule   Commonly known as:  PROzac   Used for:  Major depressive disorder, recurrent episode, moderate (H)        Dose:  40 mg   Take 2 capsules (40 mg) by mouth daily   Quantity:  180 capsule   Refills:  1       insulin glargine 100 UNIT/ML injection   Commonly known as:  LANTUS SOLOSTAR   Used for:  Type 2 diabetes mellitus with hyperglycemia, with long-term current use of insulin (H)        Dose:  30 Units   Inject 30 Units Subcutaneous daily Appointment needed for additional refills.   Quantity:  30 mL   Refills:  3       insulin pen needle 32G X 6 MM   Commonly known as:  NOVOFINE   Used for:  Type 2 diabetes mellitus with hyperglycemia, with long-term current use of insulin (H)        Use once daily or as directed.   Quantity:  100 each   Refills:  3       metFORMIN 500 MG 24 hr tablet   Commonly known as:  GLUCOPHAGE-XR   Used for:  Type 2 diabetes mellitus with hyperglycemia, with long-term current use of insulin (H)        Dose:  2000 mg   Take 4 tablets (2,000 mg) by mouth daily (with dinner)   Quantity:  360 tablet   Refills:  3       MULTI-VITAMIN GUMMIES Chew        Dose:  1 chew tab   Take 1 chew tab by mouth daily   Refills:  0       oxyCODONE IR 5 MG tablet   Commonly known as:  ROXICODONE   Used for:  Bilateral hip pain, Pain in left tibia        1-2 tabs three times a day as needed for pain   Quantity:  30 tablet   Refills:  0       oxyCODONE-acetaminophen 5-325 MG per tablet   Commonly known as:  PERCOCET   Used for:  Pain of right lower extremity        Dose:  1 tablet   Take 1 tablet by mouth nightly as needed for  moderate to severe pain   Quantity:  30 tablet   Refills:  0       simvastatin 20 MG tablet   Commonly known as:  ZOCOR   Used for:  Hyperlipidemia LDL goal <100, Type 2 diabetes mellitus with hyperglycemia, with long-term current use of insulin (H)        Dose:  20 mg   Take 1 tablet (20 mg) by mouth At Bedtime   Quantity:  90 tablet   Refills:  3       traZODone 50 MG tablet   Commonly known as:  DESYREL   Used for:  Primary insomnia        TAKE 1-2 TABLETS BY MOUTH NIGHTLY AS NEEDED FOR SLEEP   Quantity:  180 tablet   Refills:  1            Where to get your medicines      Some of these will need a paper prescription and others can be bought over the counter. Ask your nurse if you have questions.     Bring a paper prescription for each of these medications     ibuprofen 600 MG tablet                Protect others around you: Learn how to safely use, store and throw away your medicines at www.disposemymeds.org.             Medication List: This is a list of all your medications and when to take them. Check marks below indicate your daily home schedule. Keep this list as a reference.      Medications           Morning Afternoon Evening Bedtime As Needed    ACCU-CHEK COMPLETE Kit   1 Device daily                                blood glucose monitoring lancets   Use to test blood sugars 1-2 times daily or as directed, per patients glucose meter.                                blood glucose monitoring test strip   Commonly known as:  BL TEST STRIP PACK   Use to test blood sugar 1-2 times daily or as directed. Per patients glucose meter (getting new one today)                                diazepam 5 MG tablet   Commonly known as:  VALIUM   Take 1 tablet (5 mg) by mouth daily as needed for muscle spasms                                docusate sodium 100 MG tablet   Commonly known as:  COLACE   Take 100 mg by mouth daily                                ferrous sulfate 325 (65 Fe) MG tablet   Commonly known as:  IRON    Take 1 tablet (325 mg) by mouth 2 times daily                                FLUoxetine 20 MG capsule   Commonly known as:  PROzac   Take 2 capsules (40 mg) by mouth daily                                * IBUPROFEN PO   Take 600 mg by mouth every 4 hours as needed for moderate pain                                * ibuprofen 600 MG tablet   Commonly known as:  ADVIL/MOTRIN   Take 1 tablet (600 mg) by mouth every 6 hours as needed for moderate pain                                insulin glargine 100 UNIT/ML injection   Commonly known as:  LANTUS SOLOSTAR   Inject 30 Units Subcutaneous daily Appointment needed for additional refills.                                insulin pen needle 32G X 6 MM   Commonly known as:  NOVOFINE   Use once daily or as directed.                                metFORMIN 500 MG 24 hr tablet   Commonly known as:  GLUCOPHAGE-XR   Take 4 tablets (2,000 mg) by mouth daily (with dinner)                                MULTI-VITAMIN GUMMIES Chew   Take 1 chew tab by mouth daily                                oxyCODONE IR 5 MG tablet   Commonly known as:  ROXICODONE   1-2 tabs three times a day as needed for pain                                oxyCODONE-acetaminophen 5-325 MG per tablet   Commonly known as:  PERCOCET   Take 1 tablet by mouth nightly as needed for moderate to severe pain                                simvastatin 20 MG tablet   Commonly known as:  ZOCOR   Take 1 tablet (20 mg) by mouth At Bedtime                                traZODone 50 MG tablet   Commonly known as:  DESYREL   TAKE 1-2 TABLETS BY MOUTH NIGHTLY AS NEEDED FOR SLEEP                                * Notice:  This list has 2 medication(s) that are the same as other medications prescribed for you. Read the directions carefully, and ask your doctor or other care provider to review them with you.

## 2018-07-24 NOTE — ANESTHESIA POSTPROCEDURE EVALUATION
Patient: Stacy Brown    Procedure(s):  Exam under anesthesia, operative hysteroscopy, polypectomy, D & C - Wound Class: II-Clean Contaminated    Diagnosis:Menometrorrhagia  Diagnosis Additional Information: No value filed.    Anesthesia Type:  MAC    Note:  Anesthesia Post Evaluation    Patient location during evaluation: Phase 2  Patient participation: Able to fully participate in evaluation  Level of consciousness: awake and alert  Pain management: adequate  Airway patency: patent  Cardiovascular status: acceptable  Respiratory status: acceptable  Hydration status: acceptable  PONV: none     Anesthetic complications: None    Comments: BS decreased from 300 to low 200s. Baseline control likely sub-optimal with most recent hgA1C 9.9.        Last vitals:  Vitals:    07/24/18 0826 07/24/18 0943 07/24/18 0953   BP: 121/79 121/73 128/75   Resp: 16 14 16   Temp: 97.4  F (36.3  C)     SpO2: 99% 99% 99%         Electronically Signed By: Дмитрий Foley MD  July 24, 2018  10:11 AM
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
## 2018-07-24 NOTE — ANESTHESIA CARE TRANSFER NOTE
Patient: Stacy Brown    Procedure(s):  Exam under anesthesia, operative hysteroscopy, polypectomy, D & C - Wound Class: II-Clean Contaminated    Diagnosis: Menometrorrhagia  Diagnosis Additional Information: No value filed.    Anesthesia Type:   MAC     Note:  Airway :Room Air  Patient transferred to:Phase II  Comments: /68, HR 74, RR 12, Sats 99, Temp 98Handoff Report: Identifed the Patient, Identified the Reponsible Provider, Reviewed the pertinent medical history, Discussed the surgical course, Reviewed Intra-OP anesthesia mangement and issues during anesthesia, Set expectations for post-procedure period and Allowed opportunity for questions and acknowledgement of understanding      Vitals: (Last set prior to Anesthesia Care Transfer)    CRNA VITALS  7/24/2018 0904 - 7/24/2018 0934      7/24/2018             Pulse: 72    SpO2: 99 %                Electronically Signed By: JOVITA Land CRNA  July 24, 2018  9:34 AM

## 2018-07-24 NOTE — TELEPHONE ENCOUNTER
Percocet      Last Written Prescription Date:  6/22/2018  Last Fill Quantity: 30,   # refills: 0  Last Office Visit: 7/02/2018  Future Office visit:    Next 5 appointments (look out 90 days)     Jul 25, 2018  2:45 PM CDT   SHORT with Yaima Muse MD   [facility] ([address] [address]   [phone]            Aug 06, 2018 11:15 AM CDT   SHORT with Yaima Muse MD   [facility] ([address] [address]   [phone]                   Routing refill request to provider for review/approval because:  Drug not on the [facility], [facility] or [facility] refill protocol or controlled substance

## 2018-07-25 ENCOUNTER — OFFICE VISIT (OUTPATIENT)
Dept: FAMILY MEDICINE | Facility: CLINIC | Age: 43
End: 2018-07-25
Payer: COMMERCIAL

## 2018-07-25 ENCOUNTER — HOSPITAL ENCOUNTER (OUTPATIENT)
Dept: MAMMOGRAPHY | Facility: CLINIC | Age: 43
Discharge: HOME | End: 2018-07-25
Attending: FAMILY MEDICINE | Admitting: FAMILY MEDICINE
Payer: COMMERCIAL

## 2018-07-25 ENCOUNTER — HOSPITAL ENCOUNTER (OUTPATIENT)
Dept: PHYSICAL THERAPY | Facility: OTHER | Age: 43
Setting detail: THERAPIES SERIES
End: 2018-07-25
Attending: FAMILY MEDICINE
Payer: COMMERCIAL

## 2018-07-25 VITALS
HEART RATE: 84 BPM | TEMPERATURE: 98.3 F | DIASTOLIC BLOOD PRESSURE: 76 MMHG | SYSTOLIC BLOOD PRESSURE: 112 MMHG | RESPIRATION RATE: 18 BRPM | OXYGEN SATURATION: 99 % | HEIGHT: 66 IN | WEIGHT: 183 LBS | BODY MASS INDEX: 29.41 KG/M2
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
## 2018-07-25 DIAGNOSIS — Z12.31 VISIT FOR SCREENING MAMMOGRAM: ICD-10-CM

## 2018-07-25 DIAGNOSIS — F43.10 PTSD (POST-TRAUMATIC STRESS DISORDER): ICD-10-CM

## 2018-07-25 DIAGNOSIS — S06.0X0D CONCUSSION WITHOUT LOSS OF CONSCIOUSNESS, SUBSEQUENT ENCOUNTER: Primary | ICD-10-CM

## 2018-07-25 PROCEDURE — 99213 OFFICE O/P EST LOW 20 MIN: CPT | Performed by: FAMILY MEDICINE

## 2018-07-25 PROCEDURE — 97140 MANUAL THERAPY 1/> REGIONS: CPT | Mod: GP | Performed by: PHYSICAL THERAPIST

## 2018-07-25 PROCEDURE — 77067 SCR MAMMO BI INCL CAD: CPT

## 2018-07-25 PROCEDURE — 97110 THERAPEUTIC EXERCISES: CPT | Mod: GP | Performed by: PHYSICAL THERAPIST

## 2018-07-25 PROCEDURE — 40000767 ZZHC STATISTIC PT CONCUSSION VISIT: Performed by: PHYSICAL THERAPIST

## 2018-07-25 ASSESSMENT — PAIN SCALES - GENERAL: PAINLEVEL: MILD PAIN (3)

## 2018-07-25 NOTE — PROGRESS NOTES
Outpatient Physical Therapy Progress Note     Patient: Stacy Brown  : 1975    Beginning/End Dates of Reporting Period:  4/10/18 to 2018    Referring Provider:     Therapy Diagnosis: concussion, neck pain, LBP, HA, dizziness     Client Self Report: Patient underwent a D-N-C yesterday and since the procedure the neck pain and stiffness has increased.  Over all feels 50-60% improved in all sx's. Patient not back to work yet.     Objective Measurements:  Objective Measure: CCS  Details: 38    Objective Measure: NDI  Details: 36%    Objective Measure: HA # and position in the head  Details: #3 Occiput area.           Outcome Measures (most recent score):  Concussion Symptom Assessment (score out of 90). A higher score indicates greater impairment: 38    Goals:  Goal Identifier 1   Goal Description Patient has over all 50% decrease in concussion symptoms as measured by the CCS of 26 (not met at 38 currently)   Target Date 10/22/18   Date Met      Progress:     Goal Identifier 2   Goal Description Patient has no dizziness, no vision issues and has no shoulder pain, and therefore is safe to drive.  (dizziness 60% better, vision 50% better, UT's yet)   Target Date 10/22/18   Date Met      Progress:     Goal Identifier 3   Goal Description Pateint has full ROM in the neck and head with no HA or neck pain (HA pain is more centralized, neck pain worse since yesterday)   Target Date 10/22/18   Date Met      Progress:         Progress Toward Goals:   Progress this reporting period: Patient feels that all her symptoms are 50-60% better. However the sleeping is still an issue with waking up from nightmares and this causes a HA. On occasion pt DELCID will be a #0. HA's have been centralizing to the back of the head. Little increase in neck pain today due to laying on procedure table for DNC yesterday.           Plan:  Continue therapy per current plan of care.    Discharge:  No    Thank you for the  referral,            Stephanie Retana PT
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
## 2018-07-26 ENCOUNTER — HOSPITAL ENCOUNTER (OUTPATIENT)
Dept: SPEECH THERAPY | Facility: CLINIC | Age: 43
Setting detail: THERAPIES SERIES
End: 2018-07-26
Attending: FAMILY MEDICINE
Payer: COMMERCIAL

## 2018-07-26 PROCEDURE — 97127 ZZHC SP THERAPEUTIC INTERVENTION W/FOCUS ON COGNITIVE FUNCTION,EA 15 MIN: CPT | Mod: GN | Performed by: SPEECH-LANGUAGE PATHOLOGIST

## 2018-07-26 PROCEDURE — 40000211 ZZHC STATISTIC SLP  DEPARTMENT VISIT: Performed by: SPEECH-LANGUAGE PATHOLOGIST

## 2018-07-26 PROCEDURE — 92507 TX SP LANG VOICE COMM INDIV: CPT | Mod: GN | Performed by: SPEECH-LANGUAGE PATHOLOGIST

## 2018-07-26 RX ORDER — OXYCODONE AND ACETAMINOPHEN 5; 325 MG/1; MG/1
1 TABLET ORAL
Qty: 30 TABLET | Refills: 0 | Status: SHIPPED | OUTPATIENT
Start: 2018-07-26 | End: 2018-08-23

## 2018-07-26 NOTE — TELEPHONE ENCOUNTER
Pharmacy calling, they never received Rx. Unable to find Rx in [place] area or at providers desk. Had pharmacy check  last fill was #30 6/22/18. Can new rx be printed and signed in pcp's absence? Patient has been stopping into pharmacy checking for script. Rosa Fleming, CMA
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
## 2018-07-27 LAB — COPATH REPORT: NORMAL

## 2018-07-30 ENCOUNTER — HOSPITAL ENCOUNTER (OUTPATIENT)
Dept: PHYSICAL THERAPY | Facility: OTHER | Age: 43
Setting detail: THERAPIES SERIES
End: 2018-07-30
Attending: FAMILY MEDICINE
Payer: COMMERCIAL

## 2018-07-30 ENCOUNTER — HOSPITAL ENCOUNTER (OUTPATIENT)
Dept: SPEECH THERAPY | Facility: CLINIC | Age: 43
Setting detail: THERAPIES SERIES
End: 2018-07-30
Attending: FAMILY MEDICINE
Payer: COMMERCIAL

## 2018-07-30 PROCEDURE — 97140 MANUAL THERAPY 1/> REGIONS: CPT | Mod: GP | Performed by: PHYSICAL THERAPIST

## 2018-07-30 PROCEDURE — 97127 ZZHC SP THERAPEUTIC INTERVENTION W/FOCUS ON COGNITIVE FUNCTION,EA 15 MIN: CPT | Mod: GN | Performed by: SPEECH-LANGUAGE PATHOLOGIST

## 2018-07-30 PROCEDURE — 40000767 ZZHC STATISTIC PT CONCUSSION VISIT: Performed by: PHYSICAL THERAPIST

## 2018-07-30 PROCEDURE — 40000211 ZZHC STATISTIC SLP  DEPARTMENT VISIT: Performed by: SPEECH-LANGUAGE PATHOLOGIST

## 2018-08-06 ENCOUNTER — TELEPHONE (OUTPATIENT)
Dept: FAMILY MEDICINE | Facility: CLINIC | Age: 43
End: 2018-08-06
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
## 2018-08-06 NOTE — TELEPHONE ENCOUNTER
Left message on home # for pt to call and speak to any . I called the number below, but went to unidentified voice mail that sounded like a young man, did not want to leave message there.   Thank you,  Estephanie Chavez- Pt Rep.
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
## 2018-08-08 ENCOUNTER — OFFICE VISIT (OUTPATIENT)
Dept: OBGYN | Facility: CLINIC | Age: 43
End: 2018-08-08
Payer: COMMERCIAL

## 2018-08-08 ENCOUNTER — OFFICE VISIT (OUTPATIENT)
Dept: FAMILY MEDICINE | Facility: CLINIC | Age: 43
End: 2018-08-08
Payer: COMMERCIAL

## 2018-08-08 VITALS
WEIGHT: 189 LBS | TEMPERATURE: 98.6 F | BODY MASS INDEX: 30.51 KG/M2 | OXYGEN SATURATION: 98 % | DIASTOLIC BLOOD PRESSURE: 73 MMHG | SYSTOLIC BLOOD PRESSURE: 110 MMHG | HEART RATE: 89 BPM

## 2018-08-08 VITALS
DIASTOLIC BLOOD PRESSURE: 58 MMHG | WEIGHT: 189.4 LBS | HEART RATE: 98 BPM | OXYGEN SATURATION: 98 % | TEMPERATURE: 98.1 F | SYSTOLIC BLOOD PRESSURE: 112 MMHG | BODY MASS INDEX: 30.57 KG/M2

## 2018-08-08 DIAGNOSIS — S06.0X0D CONCUSSION WITHOUT LOSS OF CONSCIOUSNESS, SUBSEQUENT ENCOUNTER: ICD-10-CM
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
## 2018-08-08 DIAGNOSIS — Z98.890 POSTOPERATIVE STATE: Primary | ICD-10-CM

## 2018-08-08 DIAGNOSIS — M54.2 NECK PAIN ON RIGHT SIDE: ICD-10-CM

## 2018-08-08 PROCEDURE — 99213 OFFICE O/P EST LOW 20 MIN: CPT | Performed by: FAMILY MEDICINE

## 2018-08-08 PROCEDURE — 99212 OFFICE O/P EST SF 10 MIN: CPT | Performed by: OBSTETRICS & GYNECOLOGY

## 2018-08-08 RX ORDER — KETOROLAC TROMETHAMINE 10 MG/1
TABLET, FILM COATED ORAL
COMMUNITY
Start: 2018-07-23 | End: 2018-08-23

## 2018-08-08 ASSESSMENT — PAIN SCALES - GENERAL: PAINLEVEL: SEVERE PAIN (7)

## 2018-08-08 NOTE — PATIENT INSTRUCTIONS
If you have any questions regarding your visit, Please contact your care team.    Women [facility] CLINIC HOURS TELEPHONE NUMBER   Sindhu Peterson DO.    [facility] -    [facility]       Monday, Wednesday, Thursday and Friday, [place]  8:30a.m-5:00 p.m   [facility]  [address] [place] [address]  [phone] ask for [facility]    Imaging Ayncjffhju-[phone]       Urgent Care locations:    [facility] Saturday and Sunday   9 am - 5 pm    Monday-Friday   12 pm - 8 pm  Saturday and Sunday   9 am - 5 pm   [phone]     [facility] Delivery:  [phone]    If you need a medication refill, please contact your pharmacy. Please allow 3 business days for your refill to be completed.  As always, Thank you for trusting us with your healthcare needs!
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
## 2018-08-08 NOTE — PROGRESS NOTES
This 42 y/o female, , s/p tubal ligation, presents for her 2-week postop check and denies any problems after surgery.  She has not had a menses since surgery but will call or return if it is abnormal.  /73  Pulse 89  Temp 98.6  F (37  C) (Oral)  Wt 189 lb (85.7 kg)  LMP 2018  SpO2 98%  Breastfeeding? No  BMI 30.51 kg/m2  ROS:  10 systems were reviewed and the positives were listed under problems.  Her pelvic exam was unremarkable.  There were no palpable uterine nor adnexal masses or tenderness.  She is not currently bleeding.  Assessment - 2-week postop check  Plan - The results of her pathology report were reviewed with the patient and her questions were addressed.  She had 2 endometrial polyps and both were benign.  These could have been the source of her menometrorrhagia so will wait to see what her periods are like in the future.  If abnormal, then she will call/return.  She is to f/u with her PCP later today for her annual exam.

## 2018-08-08 NOTE — MR AVS SNAPSHOT
After Visit Summary   8/8/2018    Stacy Brown    MRN: [id number]           Patient Information     Date Of Birth          1975        Visit Information        Provider Department      8/8/2018 1:30 PM Yaima Muse MD [facility]         Follow-ups after your visit        Your next 10 appointments already scheduled     Aug 13, 2018  1:45 PM CDT   Neuro Treatment with Stephanie Retana, PT   [facility]    [address] [place] [address]   [phone]            Aug 15, 2018  7:45 AM CDT   Concussion Treatment with Mary Ann Naik, SLP   [facility] Speech Therapy ([facility])    [address] [place] [address]   [phone]            Aug 20, 2018 11:45 AM CDT   Concussion Treatment with Mary Ann Naik, SLP   [facility] Speech Therapy ([facility])    [address] [place] [address]   [phone]            Aug 20, 2018  1:45 PM CDT   Neuro Treatment with Stephanie Retana, PT   [facility]    [address] [place] [address]   [phone]            Aug 27, 2018  1:45 PM CDT   Neuro Treatment with Stephanie Retana, PT   [facility]    [address] [place] [address]   [phone]            Sep 05, 2018  1:00 PM CDT   Neuro Treatment with Stephanie Retana, PT   [facility]    [address] [place] [address]   [phone]              Who to contact     If you have questions or need follow up information about today's clinic visit or your schedule please contact [facility] directly at [phone].  Normal or non-critical lab and imaging results will be communicated to you by MyChart, letter or phone within 4 business days after the clinic has received the results.  If you do not hear from us within 7 days, please contact the clinic through [employer] or phone. If you have a critical or abnormal lab result, we will notify you by phone as soon as possible.  Submit refill requests through [employer] or call your pharmacy and they will forward the refill request to us. Please allow 3 business days for your refill to be completed.          Additional Information About Your Visit        Care EveryWhere ID     This is your Care EveryWhere ID. This could be used by other organizations to access your [place] medical records  [id number]        Your Vitals Were     Pulse Temperature Last Period Pulse Oximetry Breastfeeding? BMI (Body Mass Index)    98 98.1  F (36.7  C) (Temporal) 08/08/2018 (Exact Date) 98% No 30.57 kg/m2       Blood Pressure from Last 3 Encounters:   08/08/18 112/58   08/08/18 110/73   07/25/18 112/76    Weight from Last 3 Encounters:   08/08/18 189 lb 6.4 oz (85.9 kg)   08/08/18 189 lb (85.7 kg)   07/25/18 183 lb (83 kg)              Today, you had the following     No orders found for display         Today's Medication Changes          These changes are accurate as of 8/8/18  2:53 PM.  If you have any questions, ask your nurse or doctor.               These medicines have changed or have updated prescriptions.        Dose/Directions    IBUPROFEN PO   This may have changed:  Another medication with the same name was removed. Continue taking this medication, and follow the directions you see here.   Changed by:  Sindhu Peterson, DO        Dose:  600 mg   Take 600 mg by mouth every 4 hours as needed for moderate pain   Refills:  0                Primary Care Provider Office Phone # Fax #    Yaima Nicole Muse [phone]       [address] [place] [address]        Equal Access to Services     DELON KRAUS AH: Royce Olivier, hazel padilla, conrado jack. So [facility]  [phone].    ATENCIÓN: Si rachelle valdez, tiene a hewitt disposición servicios gratuitos de asistencia lingüística. Gio murphy [phone].    We comply with applicable federal civil rights laws and [place] laws. We do not discriminate on the basis of race, color, national origin, age, disability, sex, sexual orientation, or gender identity.            Thank you!     Thank you for choosing [facility]  for your care. Our goal is always to provide you with excellent care. Hearing back from our patients is one way we can continue to improve our services. Please take a few minutes to complete the written survey that you may receive in the mail after your visit with us. Thank you!             Your Updated Medication List - Protect others around you: Learn how to safely use, store and throw away your medicines at www.disposemymeds.org.          This list is accurate as of 8/8/18  2:53 PM.  Always use your most recent med list.                   Brand Name Dispense Instructions for use Diagnosis    ACCU-CHEK COMPLETE Kit     1 Device    1 Device daily    Type 2 diabetes, HbA1c goal < 7% (H)       blood glucose monitoring lancets     3 Box    Use to test blood sugars 1-2 times daily or as directed, per patients glucose meter.    Type 2 diabetes mellitus with hyperglycemia, with long-term current use of insulin (H)       blood glucose monitoring test strip    BL TEST STRIP PACK    100 each    Use to test blood sugar 1-2 times daily or as directed. Per patients glucose meter (getting new one today)    Type 2 diabetes mellitus with hyperglycemia, with long-term current use of insulin (H)       diazepam 5 MG tablet    VALIUM    10 tablet    Take 1 tablet (5 mg) by mouth daily as needed for muscle spasms    Chronic bilateral thoracic back pain       docusate sodium 100 MG tablet    COLACE    60 tablet    Take 100 mg by mouth daily    Constipation, unspecified constipation type       ferrous sulfate 325 (65 Fe) MG  tablet    IRON    180 tablet    Take 1 tablet (325 mg) by mouth 2 times daily    Iron deficiency anemia, unspecified iron deficiency anemia type       FLUoxetine 20 MG capsule    PROzac    180 capsule    Take 2 capsules (40 mg) by mouth daily    Major depressive disorder, recurrent episode, moderate (H)       IBUPROFEN PO      Take 600 mg by mouth every 4 hours as needed for moderate pain        insulin glargine 100 UNIT/ML injection    LANTUS SOLOSTAR    30 mL    Inject 30 Units Subcutaneous daily Appointment needed for additional refills.    Type 2 diabetes mellitus with hyperglycemia, with long-term current use of insulin (H)       insulin pen needle 32G X 6 MM    NOVOFINE    100 each    Use once daily or as directed.    Type 2 diabetes mellitus with hyperglycemia, with long-term current use of insulin (H)       ketorolac 10 MG tablet    TORADOL          metFORMIN 500 MG 24 hr tablet    GLUCOPHAGE-XR    360 tablet    Take 4 tablets (2,000 mg) by mouth daily (with dinner)    Type 2 diabetes mellitus with hyperglycemia, with long-term current use of insulin (H)       MULTI-VITAMIN GUMMIES Chew      Take 1 chew tab by mouth daily        oxyCODONE IR 5 MG tablet    ROXICODONE    30 tablet    1-2 tabs three times a day as needed for pain    Bilateral hip pain, Pain in left tibia       oxyCODONE-acetaminophen 5-325 MG per tablet    PERCOCET    30 tablet    Take 1 tablet by mouth nightly as needed for moderate to severe pain    Pain of right lower extremity       simvastatin 20 MG tablet    ZOCOR    90 tablet    Take 1 tablet (20 mg) by mouth At Bedtime    Hyperlipidemia LDL goal <100, Type 2 diabetes mellitus with hyperglycemia, with long-term current use of insulin (H)       traZODone 50 MG tablet    DESYREL    180 tablet    TAKE 1-2 TABLETS BY MOUTH NIGHTLY AS NEEDED FOR SLEEP    Primary insomnia

## 2018-08-08 NOTE — MR AVS SNAPSHOT
After Visit Summary   8/8/2018    Stacy Brown    MRN: [id number]           Patient Information     Date Of Birth          1975        Visit Information        Provider Department      8/8/2018 10:30 AM Sindhu Peterson DO [facility]        Care Instructions                                                         If you have any questions regarding your visit, Please contact your care team.    [facility] CLINIC HOURS TELEPHONE NUMBER   Sindhu Peterson DO.    SANTANA Acevedo -    LETI Dubois       Monday, Wednesday, [facility] and [facility]  8:30a.m-5:00 p.m   [facility]  [address] [place] [address]  [phone] ask for [facility] [place] [facility]    Imaging [phone]       Urgent Care locations:    [facility] Saturday and Sunday   9 am - 5 pm    Monday-Friday   12 pm - 8 pm  Saturday and Sunday   9 am - 5 pm   [phone]     [facility] Labor and Delivery:  [phone]    If you need a medication refill, please contact your pharmacy. Please allow 3 business days for your refill to be completed.  As always, Thank you for trusting us with your healthcare needs!                Follow-ups after your visit        Your next 10 appointments already scheduled     Aug 08, 2018  1:30 PM CDT   SHORT with Yaima Muse MD   [facility] ([facility]    [address] [address] [phone]            Aug 13, 2018  1:45 PM CDT   Neuro Treatment with Stephanie Retana, ELEAZAR   [facility] ([facility])    [address] [address] [phone]            Aug 15, 2018  7:45 AM CDT   Concussion Treatment with Mary Ann Naik, SLP   [facility] Speech Therapy ([facility])    [address] [place] [address] [address] [place] [id number] [phone]             Aug 20, 2018 11:45 AM CDT   Concussion Treatment with Mary Ann Naik, SLP   [facility] Speech Therapy ([facility])    [address] [place] [address]   [phone]            Aug 20, 2018  1:45 PM CDT   Neuro Treatment with Stephanie Retana, PT   [facility])    [address] [address] [phone]            Aug 27, 2018  1:45 PM CDT   Neuro Treatment with Stephanie Retana, PT   [facility] ([facility])    [address] [address] [phone]            Sep 05, 2018  1:00 PM CDT   Neuro Treatment with Stephanie Retana, PT   [facility] ([facility])    [address] [id number] [phone]              Who to contact     If you have questions or need follow up information about today's clinic visit or your schedule please contact [facility] directly at [phone].  Normal or non-critical lab and imaging results will be communicated to you by MyChart, letter or phone within 4 business days after the clinic has received the results. If you do not hear from us within 7 days, please contact the clinic through MyChart or phone. If you have a critical or abnormal lab result, we will notify you by phone as soon as possible.  Submit refill requests through [employer] or call your pharmacy and they will forward the refill request to us. Please allow 3 business days for your refill to be completed.          Additional Information About Your Visit        Care EveryWhere ID     This is your Care EveryWhere ID. This could be used by other organizations to access your [place] medical records  [id number]        Your Vitals Were     Pulse Temperature Last Period Pulse Oximetry Breastfeeding? BMI (Body Mass Index)    89 98.6  F (37  C) (Oral) 07/06/2018 98% No 30.51 kg/m2       Blood Pressure from Last 3  Encounters:   08/08/18 110/73   07/25/18 112/76   07/24/18 113/84    Weight from Last 3 Encounters:   08/08/18 189 lb (85.7 kg)   07/25/18 183 lb (83 kg)   07/23/18 181 lb (82.1 kg)              Today, you had the following     No orders found for display         Today's Medication Changes          These changes are accurate as of 8/8/18 10:58 AM.  If you have any questions, ask your nurse or doctor.               These medicines have changed or have updated prescriptions.        Dose/Directions    IBUPROFEN PO   This may have changed:  Another medication with the same name was removed. Continue taking this medication, and follow the directions you see here.   Changed by:  Sindhu Peterson,         Dose:  600 mg   Take 600 mg by mouth every 4 hours as needed for moderate pain   Refills:  0                Primary Care Provider Office Phone # Fax #    Yaima Nicole Muse [phone]       [address] [place] [address]        Equal Access to Services     [facility] AH: Hadii candace ku hadasho Soomaali, waaxda luqadaha, qaybta kaalmada adeegyada, waxay idiin hayjoellen moya . So [facility] [phone].    ATENCIÓN: Si habla español, tiene a hewitt disposición servicios gratuitos de asistencia lingüística. Llame al [phone].    We comply with applicable federal civil rights laws and [place] laws. We do not discriminate on the basis of race, color, national origin, age, disability, sex, sexual orientation, or gender identity.            Thank you!     Thank you for choosing [facility]  for your care. Our goal is always to provide you with excellent care. Hearing back from our patients is one way we can continue to improve our services. Please take a few minutes to complete the written survey that you may receive in the mail after your visit with us. Thank you!             Your Updated Medication List - Protect others around you: Learn how to safely use,  store and throw away your medicines at www.disposemymeds.org.          This list is accurate as of 8/8/18 10:58 AM.  Always use your most recent med list.                   Brand Name Dispense Instructions for use Diagnosis    ACCU-CHEK COMPLETE Kit     1 Device    1 Device daily    Type 2 diabetes, HbA1c goal < 7% (H)       blood glucose monitoring lancets     3 Box    Use to test blood sugars 1-2 times daily or as directed, per patients glucose meter.    Type 2 diabetes mellitus with hyperglycemia, with long-term current use of insulin (H)       blood glucose monitoring test strip    BL TEST STRIP PACK    100 each    Use to test blood sugar 1-2 times daily or as directed. Per patients glucose meter (getting new one today)    Type 2 diabetes mellitus with hyperglycemia, with long-term current use of insulin (H)       diazepam 5 MG tablet    VALIUM    10 tablet    Take 1 tablet (5 mg) by mouth daily as needed for muscle spasms    Chronic bilateral thoracic back pain       docusate sodium 100 MG tablet    COLACE    60 tablet    Take 100 mg by mouth daily    Constipation, unspecified constipation type       ferrous sulfate 325 (65 Fe) MG tablet    IRON    180 tablet    Take 1 tablet (325 mg) by mouth 2 times daily    Iron deficiency anemia, unspecified iron deficiency anemia type       FLUoxetine 20 MG capsule    PROzac    180 capsule    Take 2 capsules (40 mg) by mouth daily    Major depressive disorder, recurrent episode, moderate (H)       IBUPROFEN PO      Take 600 mg by mouth every 4 hours as needed for moderate pain        insulin glargine 100 UNIT/ML injection    LANTUS SOLOSTAR    30 mL    Inject 30 Units Subcutaneous daily Appointment needed for additional refills.    Type 2 diabetes mellitus with hyperglycemia, with long-term current use of insulin (H)       insulin pen needle 32G X 6 MM    NOVOFINE    100 each    Use once daily or as directed.    Type 2 diabetes mellitus with hyperglycemia, with long-term  current use of insulin (H)       ketorolac 10 MG tablet    TORADOL          metFORMIN 500 MG 24 hr tablet    GLUCOPHAGE-XR    360 tablet    Take 4 tablets (2,000 mg) by mouth daily (with dinner)    Type 2 diabetes mellitus with hyperglycemia, with long-term current use of insulin (H)       MULTI-VITAMIN GUMMIES Chew      Take 1 chew tab by mouth daily        oxyCODONE IR 5 MG tablet    ROXICODONE    30 tablet    1-2 tabs three times a day as needed for pain    Bilateral hip pain, Pain in left tibia       oxyCODONE-acetaminophen 5-325 MG per tablet    PERCOCET    30 tablet    Take 1 tablet by mouth nightly as needed for moderate to severe pain    Pain of right lower extremity       simvastatin 20 MG tablet    ZOCOR    90 tablet    Take 1 tablet (20 mg) by mouth At Bedtime    Hyperlipidemia LDL goal <100, Type 2 diabetes mellitus with hyperglycemia, with long-term current use of insulin (H)       traZODone 50 MG tablet    DESYREL    180 tablet    TAKE 1-2 TABLETS BY MOUTH NIGHTLY AS NEEDED FOR SLEEP    Primary insomnia
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
## 2018-08-10 ASSESSMENT — ANXIETY QUESTIONNAIRES
5. BEING SO RESTLESS THAT IT IS HARD TO SIT STILL: SEVERAL DAYS
1. FEELING NERVOUS, ANXIOUS, OR ON EDGE: SEVERAL DAYS
2. NOT BEING ABLE TO STOP OR CONTROL WORRYING: MORE THAN HALF THE DAYS
IF YOU CHECKED OFF ANY PROBLEMS ON THIS QUESTIONNAIRE, HOW DIFFICULT HAVE THESE PROBLEMS MADE IT FOR YOU TO DO YOUR WORK, TAKE CARE OF THINGS AT HOME, OR GET ALONG WITH OTHER PEOPLE: SOMEWHAT DIFFICULT
3. WORRYING TOO MUCH ABOUT DIFFERENT THINGS: SEVERAL DAYS
7. FEELING AFRAID AS IF SOMETHING AWFUL MIGHT HAPPEN: SEVERAL DAYS
GAD7 TOTAL SCORE: 9
6. BECOMING EASILY ANNOYED OR IRRITABLE: MORE THAN HALF THE DAYS

## 2018-08-10 ASSESSMENT — PATIENT HEALTH QUESTIONNAIRE - PHQ9: 5. POOR APPETITE OR OVEREATING: SEVERAL DAYS

## 2018-08-11 ASSESSMENT — PATIENT HEALTH QUESTIONNAIRE - PHQ9: SUM OF ALL RESPONSES TO PHQ QUESTIONS 1-9: 12

## 2018-08-11 ASSESSMENT — ANXIETY QUESTIONNAIRES: GAD7 TOTAL SCORE: 9

## 2018-08-15 ENCOUNTER — HOSPITAL ENCOUNTER (OUTPATIENT)
Dept: SPEECH THERAPY | Facility: CLINIC | Age: 43
Setting detail: THERAPIES SERIES
End: 2018-08-15
Attending: FAMILY MEDICINE
Payer: COMMERCIAL

## 2018-08-15 PROCEDURE — 97127 ZZHC SP THERAPEUTIC INTERVENTION W/FOCUS ON COGNITIVE FUNCTION,EA 15 MIN: CPT | Mod: GN | Performed by: SPEECH-LANGUAGE PATHOLOGIST

## 2018-08-15 PROCEDURE — 40000211 ZZHC STATISTIC SLP  DEPARTMENT VISIT: Performed by: SPEECH-LANGUAGE PATHOLOGIST
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
## 2018-08-20 ENCOUNTER — HOSPITAL ENCOUNTER (OUTPATIENT)
Dept: PHYSICAL THERAPY | Facility: OTHER | Age: 43
Setting detail: THERAPIES SERIES
End: 2018-08-20
Attending: FAMILY MEDICINE
Payer: COMMERCIAL

## 2018-08-20 ENCOUNTER — HOSPITAL ENCOUNTER (OUTPATIENT)
Dept: SPEECH THERAPY | Facility: CLINIC | Age: 43
Setting detail: THERAPIES SERIES
End: 2018-08-20
Attending: FAMILY MEDICINE
Payer: COMMERCIAL

## 2018-08-20 PROCEDURE — 97140 MANUAL THERAPY 1/> REGIONS: CPT | Mod: GP | Performed by: PHYSICAL THERAPIST

## 2018-08-20 PROCEDURE — 97127 ZZHC SP THERAPEUTIC INTERVENTION W/FOCUS ON COGNITIVE FUNCTION,EA 15 MIN: CPT | Mod: GN | Performed by: SPEECH-LANGUAGE PATHOLOGIST

## 2018-08-20 PROCEDURE — 40000767 ZZHC STATISTIC PT CONCUSSION VISIT: Performed by: PHYSICAL THERAPIST

## 2018-08-20 PROCEDURE — 40000211 ZZHC STATISTIC SLP  DEPARTMENT VISIT: Performed by: SPEECH-LANGUAGE PATHOLOGIST

## 2018-08-23 ENCOUNTER — TELEPHONE (OUTPATIENT)
Dept: FAMILY MEDICINE | Facility: CLINIC | Age: 43
End: 2018-08-23

## 2018-08-23 DIAGNOSIS — M79.604 PAIN OF RIGHT LOWER EXTREMITY: ICD-10-CM

## 2018-08-23 RX ORDER — KETOROLAC TROMETHAMINE 10 MG/1
10 TABLET, FILM COATED ORAL DAILY PRN
Qty: 30 TABLET | Refills: 1 | Status: SHIPPED | OUTPATIENT
Start: 2018-08-23 | End: 2018-11-26

## 2018-08-23 RX ORDER — OXYCODONE AND ACETAMINOPHEN 5; 325 MG/1; MG/1
1 TABLET ORAL
Qty: 30 TABLET | Refills: 0 | Status: SHIPPED | OUTPATIENT
Start: 2018-08-25 | End: 2018-09-20

## 2018-08-23 NOTE — TELEPHONE ENCOUNTER
Letter printed to be signed by provider, will wait to see see where script is to walk to pharmacy if not already done. appt scheduled for 9/26/18 @ 1:15pm with Dr. Muse, attempted to call pt to let her know. No answer, unable to leave msg. Rosa Fleming CMA
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
## 2018-08-24 ENCOUNTER — TELEPHONE (OUTPATIENT)
Dept: FAMILY MEDICINE | Facility: CLINIC | Age: 43
End: 2018-08-24
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
## 2018-08-24 NOTE — TELEPHONE ENCOUNTER
Reason for Call:  Other call back    Detailed comments: patient states that Dr. Muse wrote her a doctors note for work but did not specify the actual day that she can go back.  Patient states that the note needs to say that she can only work on Tuesdays starting on 9/11/18.  Patient states that she will pick this note up.  Please advise.      Phone Number Patient can be reached at: Cell number on file:    Telephone Information:   Mobile [phone]       Best Time: anytime    Can we leave a detailed message on this number? YES    Call taken on 8/24/2018 at 1:13 PM by Brenna Vicente

## 2018-08-24 NOTE — TELEPHONE ENCOUNTER
Pt advised of appt and letter printed, will  at clinic, rx walked to pharmacy. Rosa Fleming, CMA

## 2018-08-27 ENCOUNTER — HOSPITAL ENCOUNTER (OUTPATIENT)
Dept: PHYSICAL THERAPY | Facility: OTHER | Age: 43
Setting detail: THERAPIES SERIES
End: 2018-08-27
Attending: FAMILY MEDICINE
Payer: COMMERCIAL

## 2018-08-27 PROCEDURE — 97110 THERAPEUTIC EXERCISES: CPT | Mod: GP | Performed by: PHYSICAL THERAPIST

## 2018-08-27 PROCEDURE — 40000767 ZZHC STATISTIC PT CONCUSSION VISIT: Performed by: PHYSICAL THERAPIST

## 2018-09-04 ENCOUNTER — TELEPHONE (OUTPATIENT)
Dept: FAMILY MEDICINE | Facility: CLINIC | Age: 43
End: 2018-09-04

## 2018-09-04 NOTE — TELEPHONE ENCOUNTER
"Patient dropped off letter for Dr. Muse. 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 I need all of these things in the note to return back to work. 1)statement of return to work with general reason for absence 2)statement of restrictions needed stating working only Tuesdays for now and follow up 3)statesment that I can resume driving in [facility] 4) statement that returning to work is not a risk to employee or others 5) date of return to work 9/11/1/ 6) That I am not on any meds that impair my driving. Please fax to [employer] [phone]. And I would like to  a copy. My cell is [phone] and you can leave a detailed message.\"    MD to please advise on letter for work.  Anna Weldon CMA   "
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
## 2018-09-05 ENCOUNTER — HOSPITAL ENCOUNTER (OUTPATIENT)
Dept: SPEECH THERAPY | Facility: CLINIC | Age: 43
Setting detail: THERAPIES SERIES
End: 2018-09-05
Attending: FAMILY MEDICINE
Payer: COMMERCIAL

## 2018-09-05 ENCOUNTER — HOSPITAL ENCOUNTER (OUTPATIENT)
Dept: PHYSICAL THERAPY | Facility: OTHER | Age: 43
Setting detail: THERAPIES SERIES
End: 2018-09-05
Attending: FAMILY MEDICINE
Payer: COMMERCIAL

## 2018-09-05 PROCEDURE — 97530 THERAPEUTIC ACTIVITIES: CPT | Mod: GP | Performed by: PHYSICAL THERAPIST

## 2018-09-05 PROCEDURE — 40000767 ZZHC STATISTIC PT CONCUSSION VISIT: Performed by: PHYSICAL THERAPIST

## 2018-09-05 PROCEDURE — 40000211 ZZHC STATISTIC SLP  DEPARTMENT VISIT: Performed by: SPEECH-LANGUAGE PATHOLOGIST

## 2018-09-05 PROCEDURE — 97127 ZZHC SP THERAPEUTIC INTERVENTION W/FOCUS ON COGNITIVE FUNCTION,EA 15 MIN: CPT | Mod: GN | Performed by: SPEECH-LANGUAGE PATHOLOGIST

## 2018-09-05 PROCEDURE — 97110 THERAPEUTIC EXERCISES: CPT | Mod: GP | Performed by: PHYSICAL THERAPIST

## 2018-09-05 NOTE — ADDENDUM NOTE
Encounter addended by: Mary Ann Naik, SLP on: 9/5/2018 11:54 AM<BR>     Actions taken: Flowsheet accepted

## 2018-09-07 NOTE — TELEPHONE ENCOUNTER
Patient returned call, message below was relayed to patient, patient states understanding.  Thank you,  Alicia Mathis  Patient Representative

## 2018-09-07 NOTE — TELEPHONE ENCOUNTER
Letter faxed to number provided and left detailed msg as requested for patient that copy of letter is at  to be picked up and reminded pt she is to not be taking her oxycodone or trazodone when she will be working as it is stated in the letter that she can be driving and is not taking any medications that impair her driving. Rosa Fleming, CMA

## 2018-09-12 ENCOUNTER — HOSPITAL ENCOUNTER (OUTPATIENT)
Dept: SPEECH THERAPY | Facility: CLINIC | Age: 43
Setting detail: THERAPIES SERIES
End: 2018-09-12
Attending: FAMILY MEDICINE
Payer: COMMERCIAL

## 2018-09-12 PROCEDURE — 97127 ZZHC SP THERAPEUTIC INTERVENTION W/FOCUS ON COGNITIVE FUNCTION,EA 15 MIN: CPT | Mod: GN | Performed by: SPEECH-LANGUAGE PATHOLOGIST

## 2018-09-12 PROCEDURE — 40000211 ZZHC STATISTIC SLP  DEPARTMENT VISIT: Performed by: SPEECH-LANGUAGE PATHOLOGIST

## 2018-09-13 ENCOUNTER — HOSPITAL ENCOUNTER (OUTPATIENT)
Dept: PHYSICAL THERAPY | Facility: OTHER | Age: 43
Setting detail: THERAPIES SERIES
End: 2018-09-13
Attending: FAMILY MEDICINE
Payer: COMMERCIAL

## 2018-09-13 PROCEDURE — 40000767 ZZHC STATISTIC PT CONCUSSION VISIT: Performed by: PHYSICAL THERAPIST

## 2018-09-13 PROCEDURE — 97140 MANUAL THERAPY 1/> REGIONS: CPT | Mod: GP | Performed by: PHYSICAL THERAPIST

## 2018-09-13 PROCEDURE — 97110 THERAPEUTIC EXERCISES: CPT | Mod: GP | Performed by: PHYSICAL THERAPIST

## 2018-09-19 ENCOUNTER — HOSPITAL ENCOUNTER (OUTPATIENT)
Dept: PHYSICAL THERAPY | Facility: OTHER | Age: 43
Setting detail: THERAPIES SERIES
End: 2018-09-19
Attending: FAMILY MEDICINE
Payer: COMMERCIAL

## 2018-09-19 ENCOUNTER — HOSPITAL ENCOUNTER (OUTPATIENT)
Dept: SPEECH THERAPY | Facility: CLINIC | Age: 43
Setting detail: THERAPIES SERIES
End: 2018-09-19
Attending: FAMILY MEDICINE
Payer: COMMERCIAL

## 2018-09-19 PROCEDURE — 97110 THERAPEUTIC EXERCISES: CPT | Mod: GP | Performed by: PHYSICAL THERAPIST

## 2018-09-19 PROCEDURE — 40000767 ZZHC STATISTIC PT CONCUSSION VISIT: Performed by: PHYSICAL THERAPIST

## 2018-09-19 PROCEDURE — 40000211 ZZHC STATISTIC SLP  DEPARTMENT VISIT: Performed by: SPEECH-LANGUAGE PATHOLOGIST

## 2018-09-19 PROCEDURE — 97530 THERAPEUTIC ACTIVITIES: CPT | Mod: GP | Performed by: PHYSICAL THERAPIST

## 2018-09-19 PROCEDURE — 97127 ZZHC SP THERAPEUTIC INTERVENTION W/FOCUS ON COGNITIVE FUNCTION,EA 15 MIN: CPT | Mod: GN | Performed by: SPEECH-LANGUAGE PATHOLOGIST

## 2018-09-20 DIAGNOSIS — M79.604 PAIN OF RIGHT LOWER EXTREMITY: ICD-10-CM

## 2018-09-20 NOTE — TELEPHONE ENCOUNTER
Requested Prescriptions   Pending Prescriptions Disp Refills     oxyCODONE-acetaminophen (PERCOCET) 5-325 MG per tablet 30 tablet 0     Sig: Take 1 tablet by mouth nightly as needed for moderate to severe pain    There is no refill protocol information for this order        Last Written Prescription Date:  8/25/18  Last Fill Quantity: 30,  # refills: 0   Last office visit: 8/8/2018 with prescribing provider:  8/8/18   Future Office Visit:   Next 5 appointments (look out 90 days)     Sep 26, 2018  1:15 PM CDT   Office Visit with Yaima Muse MD   [facility]    [address] [place] [address] [phone]
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
## 2018-09-21 RX ORDER — OXYCODONE AND ACETAMINOPHEN 5; 325 MG/1; MG/1
1 TABLET ORAL
Qty: 30 TABLET | Refills: 0 | Status: SHIPPED | OUTPATIENT
Start: 2018-09-24 | End: 2018-10-22

## 2018-09-26 ENCOUNTER — HOSPITAL ENCOUNTER (OUTPATIENT)
Dept: SPEECH THERAPY | Facility: CLINIC | Age: 43
Setting detail: THERAPIES SERIES
End: 2018-09-26
Attending: FAMILY MEDICINE
Payer: COMMERCIAL

## 2018-09-26 ENCOUNTER — OFFICE VISIT (OUTPATIENT)
Dept: FAMILY MEDICINE | Facility: CLINIC | Age: 43
End: 2018-09-26
Payer: COMMERCIAL

## 2018-09-26 DIAGNOSIS — S06.0X0D CONCUSSION WITHOUT LOSS OF CONSCIOUSNESS, SUBSEQUENT ENCOUNTER: Primary | ICD-10-CM
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
## 2018-09-26 DIAGNOSIS — G89.4 CHRONIC PAIN SYNDROME: ICD-10-CM

## 2018-09-26 DIAGNOSIS — F51.01 PRIMARY INSOMNIA: ICD-10-CM

## 2018-09-26 PROCEDURE — 40000211 ZZHC STATISTIC SLP  DEPARTMENT VISIT: Performed by: SPEECH-LANGUAGE PATHOLOGIST

## 2018-09-26 PROCEDURE — 92507 TX SP LANG VOICE COMM INDIV: CPT | Mod: GN | Performed by: SPEECH-LANGUAGE PATHOLOGIST

## 2018-09-26 PROCEDURE — 99213 OFFICE O/P EST LOW 20 MIN: CPT | Performed by: FAMILY MEDICINE

## 2018-09-26 RX ORDER — ZOLPIDEM TARTRATE 5 MG/1
5 TABLET ORAL
Qty: 30 TABLET | Refills: 0 | Status: SHIPPED | OUTPATIENT
Start: 2018-09-26 | End: 2018-10-23

## 2018-09-26 ASSESSMENT — PAIN SCALES - GENERAL: PAINLEVEL: SEVERE PAIN (6)
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
## 2018-10-03 ENCOUNTER — HOSPITAL ENCOUNTER (OUTPATIENT)
Dept: SPEECH THERAPY | Facility: CLINIC | Age: 43
Setting detail: THERAPIES SERIES
End: 2018-10-03
Attending: FAMILY MEDICINE
Payer: COMMERCIAL

## 2018-10-03 ENCOUNTER — HOSPITAL ENCOUNTER (OUTPATIENT)
Dept: PHYSICAL THERAPY | Facility: OTHER | Age: 43
Setting detail: THERAPIES SERIES
End: 2018-10-03
Attending: FAMILY MEDICINE
Payer: COMMERCIAL

## 2018-10-03 PROCEDURE — 97110 THERAPEUTIC EXERCISES: CPT | Mod: GP | Performed by: PHYSICAL THERAPIST

## 2018-10-03 PROCEDURE — 40000211 ZZHC STATISTIC SLP  DEPARTMENT VISIT: Performed by: SPEECH-LANGUAGE PATHOLOGIST

## 2018-10-03 PROCEDURE — 40000767 ZZHC STATISTIC PT CONCUSSION VISIT: Performed by: PHYSICAL THERAPIST

## 2018-10-03 PROCEDURE — 97035 APP MDLTY 1+ULTRASOUND EA 15: CPT | Mod: GP | Performed by: PHYSICAL THERAPIST

## 2018-10-03 PROCEDURE — 97127 ZZHC SP THERAPEUTIC INTERVENTION W/FOCUS ON COGNITIVE FUNCTION,EA 15 MIN: CPT | Mod: GN | Performed by: SPEECH-LANGUAGE PATHOLOGIST

## 2018-10-08 ENCOUNTER — HOSPITAL ENCOUNTER (OUTPATIENT)
Dept: SPEECH THERAPY | Facility: CLINIC | Age: 43
Setting detail: THERAPIES SERIES
End: 2018-10-08
Attending: FAMILY MEDICINE
Payer: COMMERCIAL

## 2018-10-08 PROCEDURE — 40000211 ZZHC STATISTIC SLP  DEPARTMENT VISIT: Performed by: SPEECH-LANGUAGE PATHOLOGIST

## 2018-10-08 PROCEDURE — 97127 ZZHC SP THERAPEUTIC INTERVENTION W/FOCUS ON COGNITIVE FUNCTION,EA 15 MIN: CPT | Mod: GN | Performed by: SPEECH-LANGUAGE PATHOLOGIST

## 2018-10-10 ENCOUNTER — TELEPHONE (OUTPATIENT)
Dept: FAMILY MEDICINE | Facility: CLINIC | Age: 43
End: 2018-10-10

## 2018-10-10 ENCOUNTER — HOSPITAL ENCOUNTER (OUTPATIENT)
Dept: PHYSICAL THERAPY | Facility: OTHER | Age: 43
Setting detail: THERAPIES SERIES
End: 2018-10-10
Attending: FAMILY MEDICINE
Payer: COMMERCIAL

## 2018-10-10 VITALS
TEMPERATURE: 97.6 F | DIASTOLIC BLOOD PRESSURE: 74 MMHG | SYSTOLIC BLOOD PRESSURE: 108 MMHG | OXYGEN SATURATION: 98 % | BODY MASS INDEX: 29.76 KG/M2 | WEIGHT: 184.4 LBS | HEART RATE: 100 BPM

## 2018-10-10 DIAGNOSIS — M25.511 RIGHT SHOULDER PAIN: Primary | ICD-10-CM

## 2018-10-10 PROCEDURE — 97035 APP MDLTY 1+ULTRASOUND EA 15: CPT | Mod: GP | Performed by: PHYSICAL THERAPIST

## 2018-10-10 PROCEDURE — 97110 THERAPEUTIC EXERCISES: CPT | Mod: GP | Performed by: PHYSICAL THERAPIST

## 2018-10-10 PROCEDURE — 40000767 ZZHC STATISTIC PT CONCUSSION VISIT: Performed by: PHYSICAL THERAPIST
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
## 2018-10-10 NOTE — TELEPHONE ENCOUNTER
Reason for Call:  Other call back    Detailed comments: Patient was told to call PCP back when she is ready to add another day on to her work schedule. She feels that she would like to give it a try. Can a letter be written for her employer to make it ok for her to return to work on Tuesdays and Thursdays. You can call her back with any questions. Was also told that we would call her with a date for a 2 month follow up appt from her last one. Has not heard anything about that yet.     Phone Number Patient can be reached at: Cell number on file:    Telephone Information:   Mobile [phone]     Best Time: any    Can we leave a detailed message on this number? YES    Call taken on 10/10/2018 at 10:43 AM by Rachael Do
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
## 2018-10-10 NOTE — TELEPHONE ENCOUNTER
Per Yaima Muse MD ok for new letter to work stating she is ok to work two days a week now. Spoke with patient and scheduled follow up appointment. Patient would like a copy of the letter faxed to her work at [phone] and a copy left at the  for her to . Patient advised this will be completed in the next 30 minutes or so, she states understanding.  Anna Weldon CMA

## 2018-10-10 NOTE — TELEPHONE ENCOUNTER
Reason for Call:  Other call back    Detailed comments: Seen Physical Therapy today for her right shoulder and they think patient should get a cortisone shot due to going back to work and is sore with just one day a week and now patient can go back to 2 days a week PT and patient feel that this shot could help. Please call patient to discuss this.     Phone Number Patient can be reached at: Cell number on file:    Telephone Information:   Mobile [phone]       Best Time: any    Can we leave a detailed message on this number? YES    Call taken on 10/10/2018 at 1:49 PM by Ofe Castro

## 2018-10-15 NOTE — ADDENDUM NOTE
Encounter addended by: Stephanie Retana, PT on: 10/15/2018  8:36 AM<BR>     Actions taken: Charge Capture section accepted

## 2018-10-16 NOTE — TELEPHONE ENCOUNTER
Per Yaima Muse MD she is ok with patient having cortisone injection of her right shoulder. She states patient should see ortho for this Dr. Pacheco. See orders.  LM for patient to call [phone] -to advise  Anna Weldon CMA

## 2018-10-17 ENCOUNTER — OFFICE VISIT (OUTPATIENT)
Dept: ORTHOPEDICS | Facility: CLINIC | Age: 43
End: 2018-10-17
Payer: COMMERCIAL

## 2018-10-17 ENCOUNTER — RADIANT APPOINTMENT (OUTPATIENT)
Dept: GENERAL RADIOLOGY | Facility: CLINIC | Age: 43
End: 2018-10-17
Attending: ORTHOPAEDIC SURGERY
Payer: COMMERCIAL

## 2018-10-17 VITALS
HEIGHT: 66 IN | WEIGHT: 183.5 LBS | DIASTOLIC BLOOD PRESSURE: 75 MMHG | BODY MASS INDEX: 29.49 KG/M2 | SYSTOLIC BLOOD PRESSURE: 111 MMHG

## 2018-10-17 DIAGNOSIS — M25.511 RIGHT SHOULDER PAIN: ICD-10-CM

## 2018-10-17 DIAGNOSIS — M75.41 SUBACROMIAL IMPINGEMENT OF RIGHT SHOULDER: Primary | ICD-10-CM

## 2018-10-17 PROCEDURE — 99213 OFFICE O/P EST LOW 20 MIN: CPT | Mod: 25 | Performed by: ORTHOPAEDIC SURGERY

## 2018-10-17 PROCEDURE — 20610 DRAIN/INJ JOINT/BURSA W/O US: CPT | Mod: RT | Performed by: ORTHOPAEDIC SURGERY

## 2018-10-17 PROCEDURE — 73030 X-RAY EXAM OF SHOULDER: CPT | Mod: TC

## 2018-10-17 RX ORDER — TIZANIDINE 2 MG/1
2 TABLET ORAL
Qty: 30 TABLET | Refills: 0 | Status: SHIPPED | OUTPATIENT
Start: 2018-10-17 | End: 2018-11-26

## 2018-10-17 RX ORDER — TRIAMCINOLONE ACETONIDE 40 MG/ML
40 INJECTION, SUSPENSION INTRA-ARTICULAR; INTRAMUSCULAR ONCE
Qty: 1 ML | Refills: 0
Start: 2018-10-17 | End: 2019-02-13

## 2018-10-17 RX ORDER — METHOCARBAMOL 500 MG/1
500 TABLET, FILM COATED ORAL
Qty: 30 TABLET | Refills: 0 | Status: CANCELLED | OUTPATIENT
Start: 2018-10-17

## 2018-10-17 ASSESSMENT — PAIN SCALES - GENERAL: PAINLEVEL: EXTREME PAIN (8)
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
## 2018-10-17 NOTE — MR AVS SNAPSHOT
After Visit Summary   10/17/2018    Stacy Brown    MRN: [id number]           Patient Information     Date Of Birth          1975        Visit Information        Provider Department      10/17/2018 10:20 AM Ayden Clay DO [facility]        Today's Diagnoses     Right shoulder pain    -  1       Follow-ups after your visit        Your next 10 appointments already scheduled     Oct 24, 2018  1:00 PM CDT   [facility] with Stephanie Retana, PT   [facility]    [address] [address]   [phone]            Oct 31, 2018 11:45 AM CDT   Concussion Treatment with Mary Ann Naik, SLP   [facility] ([facility])    [address] [place] [address] [address] [place] [address]   [phone]            Oct 31, 2018  1:00 PM CDT   Neuro Treatment with Stephanie Retana, PT   [facility] ([facility])    [address] [address]   [phone]            Nov 07, 2018  4:45 PM CST   Concussion Treatment with PACHECO Camarena   [facility] ([facility])    [address] [place] [address] [address] [place] [address]   [phone]            Nov 12, 2018  1:00 PM CST   Concussion Treatment with PACHECO Camarena   [facility] ([facility])    [address] [place] [address] [address] [place] [address]   [phone]            Nov 14, 2018  1:45 PM CST   SHORT with Yaima Muse MD   [facility]    [address] [address]   [phone]              Who to contact     If you have questions or need follow up information about today's clinic visit or your schedule please contact [facility] directly at [phone].  Normal or non-critical lab and imaging results will be communicated to you by MyChart, letter or  "phone within 4 business days after the clinic has received the results. If you do not hear from us within 7 days, please contact the clinic through [employer] or phone. If you have a critical or abnormal lab result, we will notify you by phone as soon as possible.  Submit refill requests through [email or web address] or call your pharmacy and they will forward the refill request to us. Please allow 3 business days for your refill to be completed.          Additional Information About Your Visit        Care EveryWhere ID     This is your Care EveryWhere ID. This could be used by other organizations to access your [place] medical records  [id number]        Your Vitals Were     Height BMI (Body Mass Index)                1.676 m (5' 6\") 29.62 kg/m2           Blood Pressure from Last 3 Encounters:   10/17/18 111/75   09/26/18 108/74   08/08/18 112/58    Weight from Last 3 Encounters:   10/17/18 83.2 kg (183 lb 8 oz)   09/26/18 83.6 kg (184 lb 6.4 oz)   08/08/18 85.9 kg (189 lb 6.4 oz)               Primary Care Provider Office Phone # Fax #    Yaima Nicole Muse [phone] [address] [place] [address]        Equal Access to Services     BRADLEY KRAUS AH: Hadii aad ku hadasho Soomaali, waaxda luqadaha, qaybta kaalmada adeegyada, waxay idiin haynavan kevin worthington laaye mason. So [facility] [phone].    ATENCIÓN: Si habla español, tiene a hewitt disposición servicios gratuitos de asistencia lingüística. Llame al [phone].    We comply with applicable federal civil rights laws and [place] laws. We do not discriminate on the basis of race, color, national origin, age, disability, sex, sexual orientation, or gender identity.            Thank you!     Thank you for choosing [facility]  for your care. Our goal is always to provide you with excellent care. Hearing back from our patients is one way we can continue to improve our services. Please take a few minutes to complete the written " <<-----Click on this checkbox to enter Post-Op Dx survey that you may receive in the mail after your visit with us. Thank you!             Your Updated Medication List - Protect others around you: Learn how to safely use, store and throw away your medicines at www.disposemymeds.org.          This list is accurate as of 10/17/18 10:23 AM.  Always use your most recent med list.                   Brand Name Dispense Instructions for use Diagnosis    ACCU-CHEK COMPLETE Kit     1 Device    1 Device daily    Type 2 diabetes, HbA1c goal < 7% (H)       blood glucose monitoring lancets     3 Box    Use to test blood sugars 1-2 times daily or as directed, per patients glucose meter.    Type 2 diabetes mellitus with hyperglycemia, with long-term current use of insulin (H)       blood glucose monitoring test strip    BL TEST STRIP PACK    100 each    Use to test blood sugar 1-2 times daily or as directed. Per patients glucose meter (getting new one today)    Type 2 diabetes mellitus with hyperglycemia, with long-term current use of insulin (H)       diazepam 5 MG tablet    VALIUM    10 tablet    Take 1 tablet (5 mg) by mouth daily as needed for muscle spasms    Chronic bilateral thoracic back pain       docusate sodium 100 MG tablet    COLACE    60 tablet    Take 100 mg by mouth daily    Constipation, unspecified constipation type       ferrous sulfate 325 (65 Fe) MG tablet    IRON    180 tablet    Take 1 tablet (325 mg) by mouth 2 times daily    Iron deficiency anemia, unspecified iron deficiency anemia type       FLUoxetine 20 MG capsule    PROzac    180 capsule    Take 2 capsules (40 mg) by mouth daily    Major depressive disorder, recurrent episode, moderate (H)       IBUPROFEN PO      Take 600 mg by mouth every 4 hours as needed for moderate pain        insulin glargine 100 UNIT/ML injection    LANTUS SOLOSTAR    30 mL    Inject 30 Units Subcutaneous daily Appointment needed for additional refills.    Type 2 diabetes mellitus with hyperglycemia, with long-term current use  of insulin (H)       insulin pen needle 32G X 6 MM    NOVOFINE    100 each    Use once daily or as directed.    Type 2 diabetes mellitus with hyperglycemia, with long-term current use of insulin (H)       ketorolac 10 MG tablet    TORADOL    30 tablet    Take 1 tablet (10 mg) by mouth daily as needed for moderate pain    Pain of right lower extremity       metFORMIN 500 MG 24 hr tablet    GLUCOPHAGE-XR    360 tablet    Take 4 tablets (2,000 mg) by mouth daily (with dinner)    Type 2 diabetes mellitus with hyperglycemia, with long-term current use of insulin (H)       MULTI-VITAMIN GUMMIES Chew      Take 1 chew tab by mouth daily        oxyCODONE IR 5 MG tablet    ROXICODONE    30 tablet    1-2 tabs three times a day as needed for pain    Bilateral hip pain, Pain in left tibia       oxyCODONE-acetaminophen 5-325 MG per tablet    PERCOCET    30 tablet    Take 1 tablet by mouth nightly as needed for moderate to severe pain    Pain of right lower extremity       simvastatin 20 MG tablet    ZOCOR    90 tablet    Take 1 tablet (20 mg) by mouth At Bedtime    Hyperlipidemia LDL goal <100, Type 2 diabetes mellitus with hyperglycemia, with long-term current use of insulin (H)       zolpidem 5 MG tablet    AMBIEN    30 tablet    Take 1 tablet (5 mg) by mouth nightly as needed for sleep    Primary insomnia

## 2018-10-17 NOTE — NURSING NOTE
"Stacy Brown is a 43 year old female who presents for:  Chief Complaint   Patient presents with     Shoulder Pain     right shoulder pain        Initial Vitals:  /75 (BP Location: Right arm, Patient Position: Sitting, Cuff Size: Adult Large)  Ht 1.676 m (5' 6\")  Wt 83.2 kg (183 lb 8 oz)  BMI 29.62 kg/m2 Estimated body mass index is 29.62 kg/(m^2) as calculated from the following:    Height as of this encounter: 1.676 m (5' 6\").    Weight as of this encounter: 83.2 kg (183 lb 8 oz).. Body surface area is 1.97 meters squared. BP completed using cuff size: large  Extreme Pain (8)    Do you feel safe in your environment?  Yes  Do you need any refills today? No    Nursing Comments:         Katja Holcomb  "
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
## 2018-10-22 DIAGNOSIS — M25.551 BILATERAL HIP PAIN: ICD-10-CM

## 2018-10-22 DIAGNOSIS — M79.604 PAIN OF RIGHT LOWER EXTREMITY: ICD-10-CM

## 2018-10-22 DIAGNOSIS — M89.8X6 PAIN IN LEFT TIBIA: ICD-10-CM

## 2018-10-22 DIAGNOSIS — M25.552 BILATERAL HIP PAIN: ICD-10-CM

## 2018-10-22 NOTE — TELEPHONE ENCOUNTER
Oxycodone  5 MG      Last Written Prescription Date:  3/28/18  Last Fill Quantity: 30,   # refills: 0  Last Office Visit: 9-26-18  Future Office visit:    Next 5 appointments (look out 90 days)     Nov 14, 2018  1:45 PM CST   [facility]    [address] [place] [id number] [phone]                   Routing refill request to provider for review/approval because:  Drug not on the [facility], [facility] or [facility] refill protocol or controlled substance

## 2018-10-23 ENCOUNTER — TELEPHONE (OUTPATIENT)
Dept: FAMILY MEDICINE | Facility: CLINIC | Age: 43
End: 2018-10-23

## 2018-10-23 DIAGNOSIS — F51.01 PRIMARY INSOMNIA: ICD-10-CM

## 2018-10-23 RX ORDER — ZOLPIDEM TARTRATE 5 MG/1
5 TABLET ORAL
Qty: 30 TABLET | Refills: 1 | Status: SHIPPED | OUTPATIENT
Start: 2018-10-26 | End: 2018-12-11

## 2018-10-23 RX ORDER — OXYCODONE AND ACETAMINOPHEN 5; 325 MG/1; MG/1
1 TABLET ORAL
Qty: 30 TABLET | Refills: 0 | Status: SHIPPED | OUTPATIENT
Start: 2018-10-23 | End: 2018-11-26

## 2018-10-23 NOTE — TELEPHONE ENCOUNTER
Zolpidem       Last Written Prescription Date:  9-26-18  Last Fill Quantity: 30,   # refills: 0  Last Office Visit: 9-26-18  Future Office visit:    Next 5 appointments (look out 90 days)     Nov 14, 2018  1:45 PM CST   [facility]    [address] [place] [id number] [phone]                   Routing refill request to provider for review/approval because:  Drug not on the [facility], [facility] or  Health refill protocol or controlled substance

## 2018-10-24 ENCOUNTER — HOSPITAL ENCOUNTER (OUTPATIENT)
Dept: PHYSICAL THERAPY | Facility: OTHER | Age: 43
Setting detail: THERAPIES SERIES
End: 2018-10-24
Attending: FAMILY MEDICINE
Payer: COMMERCIAL

## 2018-10-24 PROCEDURE — 97035 APP MDLTY 1+ULTRASOUND EA 15: CPT | Mod: GP | Performed by: PHYSICAL THERAPIST

## 2018-10-24 PROCEDURE — 40000767 ZZHC STATISTIC PT CONCUSSION VISIT: Performed by: PHYSICAL THERAPIST

## 2018-10-24 PROCEDURE — 97110 THERAPEUTIC EXERCISES: CPT | Mod: GP | Performed by: PHYSICAL THERAPIST

## 2018-10-31 ENCOUNTER — TELEPHONE (OUTPATIENT)
Dept: FAMILY MEDICINE | Facility: CLINIC | Age: 43
End: 2018-10-31

## 2018-10-31 ENCOUNTER — HOSPITAL ENCOUNTER (OUTPATIENT)
Dept: SPEECH THERAPY | Facility: CLINIC | Age: 43
Setting detail: THERAPIES SERIES
End: 2018-10-31
Attending: FAMILY MEDICINE
Payer: COMMERCIAL

## 2018-10-31 ENCOUNTER — HOSPITAL ENCOUNTER (OUTPATIENT)
Dept: PHYSICAL THERAPY | Facility: OTHER | Age: 43
Setting detail: THERAPIES SERIES
End: 2018-10-31
Attending: FAMILY MEDICINE
Payer: COMMERCIAL

## 2018-10-31 PROCEDURE — 97127 ZZHC SP THERAPEUTIC INTERVENTION W/FOCUS ON COGNITIVE FUNCTION,EA 15 MIN: CPT | Mod: GN | Performed by: SPEECH-LANGUAGE PATHOLOGIST

## 2018-10-31 PROCEDURE — 97110 THERAPEUTIC EXERCISES: CPT | Mod: GP | Performed by: PHYSICAL THERAPIST

## 2018-10-31 PROCEDURE — 97035 APP MDLTY 1+ULTRASOUND EA 15: CPT | Mod: GP | Performed by: PHYSICAL THERAPIST

## 2018-10-31 PROCEDURE — 40000718 ZZHC STATISTIC PT DEPARTMENT ORTHO VISIT: Performed by: PHYSICAL THERAPIST

## 2018-10-31 PROCEDURE — 40000211 ZZHC STATISTIC SLP  DEPARTMENT VISIT: Performed by: SPEECH-LANGUAGE PATHOLOGIST

## 2018-10-31 NOTE — TELEPHONE ENCOUNTER
Ambien order printed and signed by provider. Walked to the  pharmacy, [place]. Stephanie Guallpa LPN

## 2018-10-31 NOTE — TELEPHONE ENCOUNTER
Copy of printed order for the Ambien signed today by provider and was walked to the pharmacy. Stephanie Guallpa LPN

## 2018-10-31 NOTE — TELEPHONE ENCOUNTER
No documentation of fax being delivered to the pharmacy. Telephone encounter received from pharmacy today that they did not receive script that was signed on 10/23/18. Stephanie Guallpa LPN

## 2018-10-31 NOTE — TELEPHONE ENCOUNTER
No documentation on script for the Ambien. Will send to provider to reprint script from refill request on 10/23/18, signed off on 10/26/18. Stephanie Guallpa LPN

## 2018-11-06 ENCOUNTER — TELEPHONE (OUTPATIENT)
Dept: FAMILY MEDICINE | Facility: CLINIC | Age: 43
End: 2018-11-06

## 2018-11-06 DIAGNOSIS — G89.29 CHRONIC RIGHT SHOULDER PAIN: ICD-10-CM

## 2018-11-06 DIAGNOSIS — M75.41 SUBACROMIAL IMPINGEMENT OF RIGHT SHOULDER: Primary | ICD-10-CM

## 2018-11-06 DIAGNOSIS — M25.511 CHRONIC RIGHT SHOULDER PAIN: ICD-10-CM

## 2018-11-06 NOTE — TELEPHONE ENCOUNTER
----- Message from Stephanie Retana, PT sent at 11/6/2018 12:29 PM CST -----  Regarding: RE: rehab  Can do as a regular PT order, but in comments write iontophoresis with dex.    THANKS!!  ----- Message -----     From: Yaima Muse MD     Sent: 11/6/2018  12:14 PM       To: Stephanie Retana PT  Subject: RE: rehab                                        Sure.  Do I have to order that separately? Or can it just be a generic PT referral order? I have never had to order that separately before, if I just write modalities as indicated, is that good enough?  Yaima Muse MD   ----- Message -----     From: Stephanie Retana PT     Sent: 10/31/2018   2:00 PM       To: Yaima Muse MD  Subject: rehab                                            Hi Dr. Muse,    Stacy's shoulder pain is a little better and more localized. I feel that iontophoresis would be very affective for the inflammation in the biceps tendon insertion.    Can we get an order for iontophoresis with dexamethasone.    Thanks    Rebecca

## 2018-11-07 ENCOUNTER — HOSPITAL ENCOUNTER (OUTPATIENT)
Dept: SPEECH THERAPY | Facility: CLINIC | Age: 43
Setting detail: THERAPIES SERIES
End: 2018-11-07
Attending: FAMILY MEDICINE
Payer: COMMERCIAL

## 2018-11-07 PROCEDURE — 97127 ZZHC SP THERAPEUTIC INTERVENTION W/FOCUS ON COGNITIVE FUNCTION,EA 15 MIN: CPT | Mod: GN | Performed by: SPEECH-LANGUAGE PATHOLOGIST

## 2018-11-07 PROCEDURE — 40000211 ZZHC STATISTIC SLP  DEPARTMENT VISIT: Performed by: SPEECH-LANGUAGE PATHOLOGIST

## 2018-11-12 ENCOUNTER — HOSPITAL ENCOUNTER (OUTPATIENT)
Dept: SPEECH THERAPY | Facility: CLINIC | Age: 43
Setting detail: THERAPIES SERIES
End: 2018-11-12
Attending: FAMILY MEDICINE
Payer: COMMERCIAL

## 2018-11-12 PROCEDURE — 97127 ZZHC SP THERAPEUTIC INTERVENTION W/FOCUS ON COGNITIVE FUNCTION,EA 15 MIN: CPT | Mod: GN | Performed by: SPEECH-LANGUAGE PATHOLOGIST

## 2018-11-12 PROCEDURE — 40000211 ZZHC STATISTIC SLP  DEPARTMENT VISIT: Performed by: SPEECH-LANGUAGE PATHOLOGIST

## 2018-11-14 ENCOUNTER — TELEPHONE (OUTPATIENT)
Dept: FAMILY MEDICINE | Facility: CLINIC | Age: 43
End: 2018-11-14

## 2018-11-14 ENCOUNTER — HOSPITAL ENCOUNTER (OUTPATIENT)
Dept: PHYSICAL THERAPY | Facility: OTHER | Age: 43
Setting detail: THERAPIES SERIES
End: 2018-11-14
Attending: FAMILY MEDICINE
Payer: COMMERCIAL

## 2018-11-14 PROCEDURE — 97033 APP MDLTY 1+IONTPHRSIS EA 15: CPT | Mod: GP | Performed by: PHYSICAL THERAPIST

## 2018-11-14 PROCEDURE — 97110 THERAPEUTIC EXERCISES: CPT | Mod: GP | Performed by: PHYSICAL THERAPIST

## 2018-11-14 PROCEDURE — 97140 MANUAL THERAPY 1/> REGIONS: CPT | Mod: GP | Performed by: PHYSICAL THERAPIST

## 2018-11-14 PROCEDURE — 40000718 ZZHC STATISTIC PT DEPARTMENT ORTHO VISIT: Performed by: PHYSICAL THERAPIST

## 2018-11-14 NOTE — TELEPHONE ENCOUNTER
Message had been left of need to reschedule appointment today, 11/14 due to provider being out of office. Stephanie Guallpa LPN

## 2018-11-14 NOTE — TELEPHONE ENCOUNTER
Pt calling back. Can she be worked in somewhere for this?   Thank you,  Estephanie Chavez- Pt Rep.
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
## 2018-11-16 NOTE — TELEPHONE ENCOUNTER
Per Yaima Muse MD ok to add saturdays of work with no restrictions. See new letter. Patient advised and letter at  for her to .  Anna Weldon CMA

## 2018-11-16 NOTE — TELEPHONE ENCOUNTER
Per Yaima Muse MD patient can be seen on 11/26 at 11:30am. She will extend patient's work letter until patient is able to be seen.   Patient advised and is asking for her letter to say she is ok to now work on Saturdays starting 11/24. Appointment made. Please advise on workability.  Anna Weldon CMA

## 2018-11-23 ENCOUNTER — HOSPITAL ENCOUNTER (OUTPATIENT)
Dept: PHYSICAL THERAPY | Facility: OTHER | Age: 43
Setting detail: THERAPIES SERIES
End: 2018-11-23
Attending: FAMILY MEDICINE
Payer: COMMERCIAL

## 2018-11-23 PROCEDURE — 97033 APP MDLTY 1+IONTPHRSIS EA 15: CPT | Mod: GP | Performed by: PHYSICAL THERAPIST

## 2018-11-23 PROCEDURE — 97110 THERAPEUTIC EXERCISES: CPT | Mod: GP | Performed by: PHYSICAL THERAPIST

## 2018-11-23 PROCEDURE — 40000718 ZZHC STATISTIC PT DEPARTMENT ORTHO VISIT: Performed by: PHYSICAL THERAPIST

## 2018-11-26 ENCOUNTER — OFFICE VISIT (OUTPATIENT)
Dept: FAMILY MEDICINE | Facility: CLINIC | Age: 43
End: 2018-11-26
Payer: COMMERCIAL

## 2018-11-26 VITALS
HEART RATE: 104 BPM | RESPIRATION RATE: 10 BRPM | OXYGEN SATURATION: 100 % | DIASTOLIC BLOOD PRESSURE: 72 MMHG | TEMPERATURE: 98.2 F | SYSTOLIC BLOOD PRESSURE: 124 MMHG | WEIGHT: 182 LBS | BODY MASS INDEX: 29.38 KG/M2

## 2018-11-26 DIAGNOSIS — M79.604 PAIN OF RIGHT LOWER EXTREMITY: ICD-10-CM
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
## 2018-11-26 DIAGNOSIS — S06.0X0D CONCUSSION WITHOUT LOSS OF CONSCIOUSNESS, SUBSEQUENT ENCOUNTER: ICD-10-CM

## 2018-11-26 DIAGNOSIS — M75.41 SUBACROMIAL IMPINGEMENT OF RIGHT SHOULDER: ICD-10-CM

## 2018-11-26 DIAGNOSIS — Z23 NEED FOR PROPHYLACTIC VACCINATION AND INOCULATION AGAINST INFLUENZA: ICD-10-CM

## 2018-11-26 PROCEDURE — 99213 OFFICE O/P EST LOW 20 MIN: CPT | Performed by: FAMILY MEDICINE

## 2018-11-26 PROCEDURE — 90471 IMMUNIZATION ADMIN: CPT | Performed by: FAMILY MEDICINE

## 2018-11-26 PROCEDURE — 90686 IIV4 VACC NO PRSV 0.5 ML IM: CPT | Performed by: FAMILY MEDICINE

## 2018-11-26 RX ORDER — OXYCODONE AND ACETAMINOPHEN 5; 325 MG/1; MG/1
1 TABLET ORAL
Qty: 30 TABLET | Refills: 0 | Status: SHIPPED | OUTPATIENT
Start: 2018-11-26 | End: 2018-12-20

## 2018-11-26 RX ORDER — TIZANIDINE 2 MG/1
2 TABLET ORAL
Qty: 30 TABLET | Refills: 1 | Status: SHIPPED | OUTPATIENT
Start: 2018-11-26 | End: 2019-01-25

## 2018-11-26 ASSESSMENT — PAIN SCALES - GENERAL: PAINLEVEL: MODERATE PAIN (5)
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
## 2018-11-26 NOTE — MR AVS SNAPSHOT
After Visit Summary   11/26/2018    Stacy Brown    MRN: [id number]           Patient Information     Date Of Birth          1975        Visit Information        Provider Department      11/26/2018 11:30 AM Yaima Muse MD [facility]        Today's Diagnoses     Subacromial impingement of right shoulder        Pain of right lower extremity           Follow-ups after your visit        Your next 10 appointments already scheduled     Nov 28, 2018  9:30 AM CST   [facility] with Stephanie [facility]ana, PT   [facility]    [address] [address]   [phone]            Dec 05, 2018 10:45 AM CST   Neuro Treatment with Stephanie Retana, PT   [facility]    [address] [address]   [phone]            Dec 12, 2018  9:30 AM CST   Neuro Treatment with Stephanie Retana, PT   [facility]    [address] [address]   [phone]            Jan 09, 2019  1:45 PM CST   SHORT with Yaima Muse MD   [facility]    [address] [id number] [phone]              Who to contact     If you have questions or need follow up information about today's clinic visit or your schedule please contact [facility] directly at [phone].  Normal or non-critical lab and imaging results will be communicated to you by MyChart, letter or phone within 4 business days after the clinic has received the results. If you do not hear from us within 7 days, please contact the clinic through [facility] or phone. If you have a critical or abnormal lab result, we will notify you by phone as soon as possible.  Submit refill requests through [employer] or call your pharmacy and they will forward the refill request to us.  "Please allow 3 business days for your refill to be completed.          Additional Information About Your Visit        MyChart Information     [facility] lets you send messages to your doctor, view your test results, renew your prescriptions, schedule appointments and more. To sign up, go to [email or web address] . Click on \"Log in\" on the left side of the screen, which will take you to the Welcome page. Then click on \"Sign up Now\" on the right side of the page.     You will be asked to enter the access code listed below, as well as some personal information. Please follow the directions to create your username and password.     Your access code is: [email or web address]  Expires: 2019  1:40 PM     Your access code will  in 90 days. If you need help or a new code, please call your [facility] clinic or [phone].        Care EveryWhere ID     This is your Care EveryWhere ID. This could be used by other organizations to access your [facility] medical records  [id number]        Your Vitals Were     Pulse Temperature Respirations Last Period Pulse Oximetry Breastfeeding?    104 98.2  F (36.8  C) (Temporal) 10 10/06/2018 (Exact Date) 100% No    BMI (Body Mass Index)                   29.38 kg/m2            Blood Pressure from Last 3 Encounters:   18 124/72   10/17/18 111/75   18 108/74    Weight from Last 3 Encounters:   18 182 lb (82.6 kg)   10/17/18 183 lb 8 oz (83.2 kg)   18 184 lb 6.4 oz (83.6 kg)              Today, you had the following     No orders found for display         Where to get your medicines      These medications were sent to [facility] [place]  [place] - [address]  [address], [place] [address]     Phone:  [phone]     tiZANidine 2 MG tablet         Some of these will need a paper prescription and others can be bought over the counter.  Ask your nurse if you have questions.     Bring a paper prescription for each of these medications     " oxyCODONE-acetaminophen 5-325 MG tablet         Information about OPIOIDS     PRESCRIPTION OPIOIDS: WHAT YOU NEED TO KNOW   We gave you an opioid (narcotic) pain medicine. It is important to manage your pain, but opioids are not always the best choice. You should first try all the other options your care team gave you. Take this medicine for as short a time (and as few doses) as possible.    Some activities can increase your pain, such as bandage changes or therapy sessions. It may help to take your pain medicine 30 to 60 minutes before these activities. Reduce your stress by getting enough sleep, working on hobbies you enjoy and practicing relaxation or meditation. Talk to your care team about ways to manage your pain beyond prescription opioids.    These medicines have risks:    DO NOT drive when on new or higher doses of pain medicine. These medicines can affect your alertness and reaction times, and you could be arrested for driving under the influence (DUI). If you need to use opioids long-term, talk to your care team about driving.    DO NOT operate heavy machinery    DO NOT do any other dangerous activities while taking these medicines.    DO NOT drink any alcohol while taking these medicines.     If the opioid prescribed includes acetaminophen, DO NOT take with any other medicines that contain acetaminophen. Read all labels carefully. Look for the word  acetaminophen  or  Tylenol.  Ask your pharmacist if you have questions or are unsure.    You can get addicted to pain medicines, especially if you have a history of addiction (chemical, alcohol or substance dependence). Talk to your care team about ways to reduce this risk.    All opioids tend to cause constipation. Drink plenty of water and eat foods that have a lot of fiber, such as fruits, vegetables, prune juice, apple juice and high-fiber cereal. Take a laxative (Miralax, milk of magnesia, Colace, Senna) if you don t move your bowels at least every other  day. Other side effects include upset stomach, sleepiness, dizziness, throwing up, tolerance (needing more of the medicine to have the same effect), physical dependence and slowed breathing.    Store your pills in a secure place, locked if possible. We will not replace any lost or stolen medicine. If you don t finish your medicine, please throw away (dispose) as directed by your pharmacist. The [place] Pollution Control Agency has more information about safe disposal: https://www.pca.[facility].[place].us/living-green/managing-unwanted-medications         Primary Care Provider Office Phone # Fax #    Yaima Nicole Muse [phone] [address] [place] [address]        Equal Access to Services     BRADLEY KRAUS : Royce bruceo Soshira, waaxda luqadaha, qaybta kaalmada kevinyada, conrado moya . So [facility] [phone].    ATENCIÓN: Si habla español, tiene a hewitt disposición servicios gratuitos de asistencia lingüística. Llame al [phone].    We comply with applicable federal civil rights laws and [place] laws. We do not discriminate on the basis of race, color, national origin, age, disability, sex, sexual orientation, or gender identity.            Thank you!     Thank you for choosing [facility]  for your care. Our goal is always to provide you with excellent care. Hearing back from our patients is one way we can continue to improve our services. Please take a few minutes to complete the written survey that you may receive in the mail after your visit with us. Thank you!             Your Updated Medication List - Protect others around you: Learn how to safely use, store and throw away your medicines at www.disposemymeds.org.          This list is accurate as of 11/26/18  1:40 PM.  Always use your most recent med list.                   Brand Name Dispense Instructions for use Diagnosis    ACCU-CHEK COMPLETE Kit     1 Device    1  Device daily    Type 2 diabetes, HbA1c goal < 7% (H)       blood glucose monitoring lancets     3 Box    Use to test blood sugars 1-2 times daily or as directed, per patients glucose meter.    Type 2 diabetes mellitus with hyperglycemia, with long-term current use of insulin (H)       blood glucose monitoring test strip    BL TEST STRIP PACK    100 each    Use to test blood sugar 1-2 times daily or as directed. Per patients glucose meter (getting new one today)    Type 2 diabetes mellitus with hyperglycemia, with long-term current use of insulin (H)       diazepam 5 MG tablet    VALIUM    10 tablet    Take 1 tablet (5 mg) by mouth daily as needed for muscle spasms    Chronic bilateral thoracic back pain       docusate sodium 100 MG tablet    COLACE    60 tablet    Take 100 mg by mouth daily    Constipation, unspecified constipation type       ferrous sulfate 325 (65 Fe) MG tablet    IRON    180 tablet    Take 1 tablet (325 mg) by mouth 2 times daily    Iron deficiency anemia, unspecified iron deficiency anemia type       FLUoxetine 20 MG capsule    PROzac    180 capsule    Take 2 capsules (40 mg) by mouth daily    Major depressive disorder, recurrent episode, moderate (H)       IBUPROFEN PO      Take 600 mg by mouth every 4 hours as needed for moderate pain        insulin glargine 100 UNIT/ML pen    LANTUS SOLOSTAR    30 mL    Inject 30 Units Subcutaneous daily Appointment needed for additional refills.    Type 2 diabetes mellitus with hyperglycemia, with long-term current use of insulin (H)       insulin pen needle 32G X 6 MM miscellaneous    NOVOFINE    100 each    Use once daily or as directed.    Type 2 diabetes mellitus with hyperglycemia, with long-term current use of insulin (H)       metFORMIN 500 MG 24 hr tablet    GLUCOPHAGE-XR    360 tablet    Take 4 tablets (2,000 mg) by mouth daily (with dinner)    Type 2 diabetes mellitus with hyperglycemia, with long-term current use of insulin (H)       MULTI-VITAMIN  GUMMIES Chew      Take 1 chew tab by mouth daily        oxyCODONE-acetaminophen 5-325 MG tablet    PERCOCET    30 tablet    Take 1 tablet by mouth nightly as needed for moderate to severe pain    Pain of right lower extremity       simvastatin 20 MG tablet    ZOCOR    90 tablet    Take 1 tablet (20 mg) by mouth At Bedtime    Hyperlipidemia LDL goal <100, Type 2 diabetes mellitus with hyperglycemia, with long-term current use of insulin (H)       tiZANidine 2 MG tablet    ZANAFLEX    30 tablet    Take 1 tablet (2 mg) by mouth nightly as needed for muscle spasms (pain) Can cause sedation. Do not take with alcohol. No driving.    Subacromial impingement of right shoulder       zolpidem 5 MG tablet    AMBIEN    30 tablet    Take 1 tablet (5 mg) by mouth nightly as needed for sleep    Primary insomnia

## 2018-11-26 NOTE — PROGRESS NOTES
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
## 2018-12-05 ENCOUNTER — HOSPITAL ENCOUNTER (OUTPATIENT)
Dept: SPEECH THERAPY | Facility: CLINIC | Age: 43
Setting detail: THERAPIES SERIES
End: 2018-12-05
Attending: FAMILY MEDICINE
Payer: COMMERCIAL

## 2018-12-05 ENCOUNTER — HOSPITAL ENCOUNTER (OUTPATIENT)
Dept: PHYSICAL THERAPY | Facility: OTHER | Age: 43
Setting detail: THERAPIES SERIES
End: 2018-12-05
Attending: FAMILY MEDICINE
Payer: COMMERCIAL

## 2018-12-05 PROCEDURE — 97033 APP MDLTY 1+IONTPHRSIS EA 15: CPT | Mod: GP | Performed by: PHYSICAL THERAPIST

## 2018-12-05 PROCEDURE — 97127 ZZHC SP THERAPEUTIC INTERVENTION W/FOCUS ON COGNITIVE FUNCTION,EA 15 MIN: CPT | Mod: GN | Performed by: SPEECH-LANGUAGE PATHOLOGIST

## 2018-12-05 PROCEDURE — 40000211 ZZHC STATISTIC SLP  DEPARTMENT VISIT: Performed by: SPEECH-LANGUAGE PATHOLOGIST

## 2018-12-05 PROCEDURE — 40000718 ZZHC STATISTIC PT DEPARTMENT ORTHO VISIT: Performed by: PHYSICAL THERAPIST

## 2018-12-06 NOTE — PROGRESS NOTES
"Outpatient Speech Language Pathology Progress Note     Patient: Stacy Brown  : 1975    Beginning/End Dates of Reporting Period:  2018 to 11/15/2018    Referring Provider: Yaima Muse MD    Therapy Diagnosis: Cognitive Linguistic Defitics    Client Self Report: Patient reports that her mother-in-law  2 days ago \"and today is really not a good day to do this.\"  SLP made mention she would note that in the discharge summary.  \"Normally I'd do much better.\" \"The difference between now and when I started in May is huge!\"     Objective Measurements:      Goals:  Goal Identifier Attention   Goal Description Stacy will complete mod complete alternating attention tasks to 90% accuracy without cues   Target Date 18   Date Met      Progress:     Goal Identifier Memory   Goal Description Memory   Target Date 18   Date Met      Progress:     Goal Identifier Memory   Goal Description Stacy will complete moderate level delayed memory tasks using external compensatory memory strategies to 90% without cues   Target Date 18   Date Met      Progress: Patient is able to complete moderate level memory tasks using compensatory memory strategies with good accuracy.     Goal Identifier Language   Goal Description Patient to complete complex expressive language tasks to 90% accuracy without cues.   Target Date 18   Date Met      Progress:  Expressive language skills are increasing and patient is presenting with word finding difficulties at sentence and conversation level     Progress Toward Goals:   Progress this reporting period: Patient has made significant gains toward all goals and is using internal and external memory strategies to compensate for short term memory deficits.  She has returned to her employment on a part time basis and is continue to add hours as she is physically and cognitively able.  Her expressive language skills have increased significantly and  She is " able to express her thoughts, wants and needs in detail at sentence and conversation level with only occasional difficulties.  Re-Assessment indicates scores increased on RBAN from 83 to 89 standard score.  Please note that re-assessment was completed on day when patient was not performing at usual level due to family death.  Patient requests that therapy be placed on hold due to ensure that patient has indeed made the gains noted and able to consistently perform at her previous level of baseline functioning.  Patient has benefited from skilled intervention.        Plan:  Continue therapy per current plan of care.    Discharge:  No

## 2018-12-07 DIAGNOSIS — F51.01 PRIMARY INSOMNIA: ICD-10-CM

## 2018-12-07 RX ORDER — ZOLPIDEM TARTRATE 5 MG/1
5 TABLET ORAL
Qty: 30 TABLET | Refills: 1 | Status: CANCELLED | OUTPATIENT
Start: 2018-12-07

## 2018-12-07 NOTE — TELEPHONE ENCOUNTER
Prior Authorization Retail Medication Request    Medication/Dose: zolpidem : MAX QTY OF 60.000  DAYS  ICD code (if different than what is on RX):  na  Previously Tried and Failed:  [facility]  Rationale:  na    Insurance Name:  [facility] federal   Insurance ID:  [id number]  Bin: [id number]   Phone [phone]   n feprx  Group; [id number]    Pharmacy Information (if different than what is on RX)  Name:  [facility]  Phone:  [facility]

## 2018-12-10 NOTE — TELEPHONE ENCOUNTER
Central Prior Authorization Team   Phone: [phone]      PA Initiation    Medication: zolpidem : MAX QTY OF 60.000  DAYS  Insurance Company: [employer] - Phone [phone] Fax [phone]  Pharmacy Filling the Rx: [facility] [place] [address] [address]  Filling Pharmacy Phone: [phone]  Filling Pharmacy Fax:    Start Date: 12/10/2018

## 2018-12-11 NOTE — TELEPHONE ENCOUNTER
Routing refill request to provider for review/approval because:  Drug not on the G refill protocol   Leisa Sanchez RN, BSN      Ambien  Last Written Prescription Date:  10/16/2018  Last Fill Quantity: 30,  # refills: 1   Last office visit: 11/26/2018 with prescribing provider:  11/26/2018   Future Office Visit:   Next 5 appointments (look out 90 days)    Jan 09, 2019  1:45 PM CST  SHORT with Yaima Muse MD  [facility] [address] [place] [address] [phone]           Requested Prescriptions   Pending Prescriptions Disp Refills     zolpidem (AMBIEN) 5 MG tablet 30 tablet 1     Sig: Take 1 tablet (5 mg) by mouth nightly as needed for sleep    There is no refill protocol information for this order        Leisa Sanchez RN on 12/11/2018 at 8:42 AM

## 2018-12-12 ENCOUNTER — HOSPITAL ENCOUNTER (OUTPATIENT)
Dept: PHYSICAL THERAPY | Facility: OTHER | Age: 43
Setting detail: THERAPIES SERIES
End: 2018-12-12
Attending: FAMILY MEDICINE
Payer: COMMERCIAL

## 2018-12-12 PROCEDURE — 97033 APP MDLTY 1+IONTPHRSIS EA 15: CPT | Mod: GP | Performed by: PHYSICAL THERAPIST

## 2018-12-12 PROCEDURE — 97110 THERAPEUTIC EXERCISES: CPT | Mod: GP | Performed by: PHYSICAL THERAPIST

## 2018-12-12 RX ORDER — ZOLPIDEM TARTRATE 5 MG/1
5 TABLET ORAL
Qty: 30 TABLET | Refills: 2 | Status: SHIPPED | OUTPATIENT
Start: 2018-12-15 | End: 2019-03-20

## 2018-12-13 ENCOUNTER — TELEPHONE (OUTPATIENT)
Dept: FAMILY MEDICINE | Facility: CLINIC | Age: 43
End: 2018-12-13

## 2018-12-13 NOTE — TELEPHONE ENCOUNTER
Prior Authorization Retail Medication Request    Medication/Dose: Prior Auth needed for Zolipem  ICD code (if different than what is on RX):  --  Previously Tried and Failed:  --  Rationale:  Prior Auth needed for Zolpidem    Insurance Name:  BC/ [phone]  Insurance ID:  [id number]      Pharmacy Information (if different than what is on RX)  Name:  [facility]  Phone:  [phone]    Leilani Cristobal, Pharmacy Technician  [facility]  [phone]

## 2018-12-14 NOTE — TELEPHONE ENCOUNTER
Approval faxed to the [facility] to inform of approval. Stephanie Guallpa LPN

## 2018-12-14 NOTE — TELEPHONE ENCOUNTER
Prior Authorization Approval    Authorization Effective Date: 11/12/2018  Authorization Expiration Date: 12/12/2019  Medication: Prior Auth needed for Zolipem  Approved Dose/Quantity:    Reference #:     Insurance Company: [employer] - Phone [phone] Fax [phone]  Expected CoPay:       CoPay Card Available:      Foundation Assistance Needed:    Which Pharmacy is filling the prescription (Not needed for infusion/clinic administered): [facility] [address]   Pharmacy Notified: Yes  Patient Notified: Yes

## 2018-12-14 NOTE — TELEPHONE ENCOUNTER
Prior Authorization Approval    Authorization Effective Date: 11/12/2018  Authorization Expiration Date: 12/12/2019  Medication: zolpidem : MAX QTY OF 60.000  DAYS  Approved Dose/Quantity:    Reference #:     Insurance Company: [employer] - Phone [phone] Fax [phone]  Expected CoPay:       CoPay Card Available:      Foundation Assistance Needed:    Which Pharmacy is filling the prescription (Not needed for infusion/clinic administered): [facility] [address]   Pharmacy Notified: Yes  Patient Notified: Yes

## 2018-12-19 ENCOUNTER — HOSPITAL ENCOUNTER (OUTPATIENT)
Dept: PHYSICAL THERAPY | Facility: OTHER | Age: 43
Setting detail: THERAPIES SERIES
End: 2018-12-19
Attending: FAMILY MEDICINE
Payer: COMMERCIAL

## 2018-12-19 DIAGNOSIS — M79.604 PAIN OF RIGHT LOWER EXTREMITY: ICD-10-CM

## 2018-12-19 PROCEDURE — 97033 APP MDLTY 1+IONTPHRSIS EA 15: CPT | Mod: GP | Performed by: PHYSICAL THERAPIST

## 2018-12-20 RX ORDER — OXYCODONE AND ACETAMINOPHEN 5; 325 MG/1; MG/1
1 TABLET ORAL
Qty: 30 TABLET | Refills: 0 | Status: SHIPPED | OUTPATIENT
Start: 2018-12-26 | End: 2019-01-24

## 2018-12-20 NOTE — TELEPHONE ENCOUNTER
Oxycodone       Last Written Prescription Date:  11/26/2018/  Last Fill Quantity: 30,   # refills: 0  Last Office Visit: 11/26/2018  Future Office visit:    Next 5 appointments (look out 90 days)    Jan 09, 2019  1:45 PM CST  [facility] [address] [place] [address]  [phone]           Routing refill request to provider for review/approval because:  Drug not on the [facility], [facility] or [facility] refill protocol or controlled substance

## 2018-12-20 NOTE — TELEPHONE ENCOUNTER
Patient informed and Rx walked over to the [facility].  Julius Castellanos, CMA

## 2019-01-02 ENCOUNTER — HOSPITAL ENCOUNTER (OUTPATIENT)
Dept: PHYSICAL THERAPY | Facility: OTHER | Age: 44
Setting detail: THERAPIES SERIES
End: 2019-01-02
Attending: FAMILY MEDICINE
Payer: COMMERCIAL

## 2019-01-02 PROCEDURE — 97110 THERAPEUTIC EXERCISES: CPT | Mod: GP | Performed by: PHYSICAL THERAPIST

## 2019-01-02 PROCEDURE — 97033 APP MDLTY 1+IONTPHRSIS EA 15: CPT | Mod: GP | Performed by: PHYSICAL THERAPIST

## 2019-01-02 PROCEDURE — 97140 MANUAL THERAPY 1/> REGIONS: CPT | Mod: GP | Performed by: PHYSICAL THERAPIST

## 2019-01-02 NOTE — PROGRESS NOTES
Outpatient Physical Therapy Progress Note     Patient: Stacy Brown  : 1975    Beginning/End Dates of Reporting Period:  10/22/18 to 2019    Referring Provider:     Therapy Diagnosis: right shoulder pain, concussion, HA, neck pain     Client Self Report: able to reach back to L3 area and getting better. Little HA with stress at work. Right shoulder is not pain with working, but at the end of the work day feels sore, and if lets it rest then better the next day.     Objective Measurements:  Objective Measure: CCS  Details: 6    Objective Measure: right shoulder ROM  Details: MP=096, abd=145, IR/ext to L4, ER=65          Goals:  Goal Identifier 1   Goal Description Patient has over all 50% decrease in concussion symptoms as measured by the CCS of 26   Target Date 10/22/18   Date Met  18   Progress:                   Goal Identifier 2   Goal Description Patient has no dizziness, no vision issues and has no shoulder pain, and therefore is safe to drive.    Target Date 19   Date Met      Progress:                                              Dizziness and vision issues gone, shoulder pain continues. Cont goal for the shoulder     Goal Identifier 3   Goal Description Pateint has full ROM in the neck and head with no HA or neck pain(On a work day will still get a HA, none work day no HA. )   Target Date 19   Date Met      Progress:                                  See above, but the CCS is 6 much improved       Progress Toward Goals:   Progress this reporting period: Patient is doing very well considering the concussion sx's. However still has capsular tightness and pain in the right shoulder. ROM is improving and so is the pain. HA's now on occasion.           Plan:  Continue therapy per current plan of care. 1x per wk. Primarily for the right shoulder    Discharge:  No    Thank you for the referral,            Stephanie Retana PT

## 2019-01-09 ENCOUNTER — HOSPITAL ENCOUNTER (OUTPATIENT)
Dept: PHYSICAL THERAPY | Facility: OTHER | Age: 44
Setting detail: THERAPIES SERIES
End: 2019-01-09
Attending: FAMILY MEDICINE
Payer: COMMERCIAL

## 2019-01-09 ENCOUNTER — OFFICE VISIT (OUTPATIENT)
Dept: FAMILY MEDICINE | Facility: CLINIC | Age: 44
End: 2019-01-09
Payer: COMMERCIAL

## 2019-01-09 VITALS
WEIGHT: 179.4 LBS | HEART RATE: 86 BPM | DIASTOLIC BLOOD PRESSURE: 68 MMHG | RESPIRATION RATE: 12 BRPM | SYSTOLIC BLOOD PRESSURE: 122 MMHG | OXYGEN SATURATION: 100 % | TEMPERATURE: 97.2 F | HEIGHT: 66 IN | BODY MASS INDEX: 28.83 KG/M2

## 2019-01-09 DIAGNOSIS — N91.2 AMENORRHEA: Primary | ICD-10-CM

## 2019-01-09 DIAGNOSIS — F43.10 PTSD (POST-TRAUMATIC STRESS DISORDER): ICD-10-CM

## 2019-01-09 DIAGNOSIS — S39.011A STRAIN OF ABDOMINAL MUSCLE, INITIAL ENCOUNTER: ICD-10-CM

## 2019-01-09 DIAGNOSIS — S06.0X0D CONCUSSION WITHOUT LOSS OF CONSCIOUSNESS, SUBSEQUENT ENCOUNTER: Primary | ICD-10-CM
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
## 2019-01-09 LAB — HCG UR QL: NEGATIVE

## 2019-01-09 PROCEDURE — 99213 OFFICE O/P EST LOW 20 MIN: CPT | Performed by: FAMILY MEDICINE

## 2019-01-09 PROCEDURE — 97033 APP MDLTY 1+IONTPHRSIS EA 15: CPT | Mod: GP | Performed by: PHYSICAL THERAPIST

## 2019-01-09 PROCEDURE — 97110 THERAPEUTIC EXERCISES: CPT | Mod: GP | Performed by: PHYSICAL THERAPIST

## 2019-01-09 PROCEDURE — 81025 URINE PREGNANCY TEST: CPT | Performed by: FAMILY MEDICINE

## 2019-01-09 PROCEDURE — 97140 MANUAL THERAPY 1/> REGIONS: CPT | Mod: GP | Performed by: PHYSICAL THERAPIST

## 2019-01-09 ASSESSMENT — PAIN SCALES - GENERAL: PAINLEVEL: MODERATE PAIN (4)

## 2019-01-09 ASSESSMENT — MIFFLIN-ST. JEOR: SCORE: 1485.5

## 2019-01-09 ASSESSMENT — PATIENT HEALTH QUESTIONNAIRE - PHQ9: SUM OF ALL RESPONSES TO PHQ QUESTIONS 1-9: 9
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
## 2019-01-16 ENCOUNTER — HOSPITAL ENCOUNTER (OUTPATIENT)
Dept: PHYSICAL THERAPY | Facility: OTHER | Age: 44
Setting detail: THERAPIES SERIES
End: 2019-01-16
Attending: FAMILY MEDICINE
Payer: COMMERCIAL

## 2019-01-16 PROCEDURE — 97033 APP MDLTY 1+IONTPHRSIS EA 15: CPT | Mod: GP | Performed by: PHYSICAL THERAPIST

## 2019-01-16 PROCEDURE — 97110 THERAPEUTIC EXERCISES: CPT | Mod: GP | Performed by: PHYSICAL THERAPIST

## 2019-01-16 NOTE — PROGRESS NOTES
"Outpatient Speech Language Pathology Discharge Note     Patient: Stacy Brown  : 1975    Beginning/End Dates of Reporting Period:  2018 to 2019    Referring Provider: Yaima Muse MD    Therapy Diagnosis: Cognitive Linguistic Deficits    Client Self Report: Patient reports that her mother-in-law  2 days ago \"and today is really not a good day to do this.\"  SLP made mention she would note that in the discharge summary.  \"Normally I'd do much better.\" \"The difference between now and when I started in May is huge!\" Update: Patient reports that she has returned to work full time and will be discharging from skilled intervention at this time.        Objective Measurements:     Goals:  Goal Identifier Attention   Goal Description Stacy will complete mod complete alternating attention tasks to 90% accuracy without cues   Target Date 18   Date Met   2018   Progress: Goal has been met.     Goal Identifier Memory   Goal Description Stacy will complete moderate level delayed memory tasks using external compensatory memory strategies to 90% without cues   Target Date 18   Date Met   2018   Progress: Goal has been met.     Goal Identifier Language   Goal Description Patient to complete complec expressive language tasks to 90% accuracy without cues.   Target Date 18   Date Met  2018   Progress: Goal has been met.     Progress Toward Goals:   Progress this reporting period:     Patient's overall standardized score improved on the RBANS, placing her more within baseline level of functioning.  Because patient feels she continues to have gains to be made, she would like possibly return to therapy once  activities are over.  Delaying discharge at this time.      Plan:  Discharge from therapy.    Discharge:    Reason for Discharge: Patient has met all goals.  No further expectation of progress.    Equipment Issued: none    Discharge Plan: Patient to " continue home program.

## 2019-01-16 NOTE — ADDENDUM NOTE
Encounter addended by: Mary Ann Naik, SLP on: 1/16/2019 2:49 PM   Actions taken: Pend clinical note, Sign clinical note, Episode resolved

## 2019-01-23 ENCOUNTER — HOSPITAL ENCOUNTER (OUTPATIENT)
Dept: PHYSICAL THERAPY | Facility: OTHER | Age: 44
Setting detail: THERAPIES SERIES
End: 2019-01-23
Attending: FAMILY MEDICINE
Payer: COMMERCIAL

## 2019-01-23 DIAGNOSIS — M79.604 PAIN OF RIGHT LOWER EXTREMITY: ICD-10-CM

## 2019-01-23 PROCEDURE — 97033 APP MDLTY 1+IONTPHRSIS EA 15: CPT | Mod: GP | Performed by: PHYSICAL THERAPIST

## 2019-01-23 PROCEDURE — 97140 MANUAL THERAPY 1/> REGIONS: CPT | Mod: GP | Performed by: PHYSICAL THERAPIST

## 2019-01-24 DIAGNOSIS — M75.41 SUBACROMIAL IMPINGEMENT OF RIGHT SHOULDER: ICD-10-CM

## 2019-01-24 RX ORDER — OXYCODONE AND ACETAMINOPHEN 5; 325 MG/1; MG/1
1 TABLET ORAL
Qty: 30 TABLET | Refills: 0 | Status: SHIPPED | OUTPATIENT
Start: 2019-01-24 | End: 2019-02-22

## 2019-01-24 NOTE — TELEPHONE ENCOUNTER
Requested Prescriptions   Pending Prescriptions Disp Refills     oxyCODONE-acetaminophen (PERCOCET) 5-325 MG tablet 30 tablet 0     Sig: Take 1 tablet by mouth nightly as needed for moderate to severe pain    There is no refill protocol information for this order        Last Written Prescription Date:  12/26/18  Last Fill Quantity: 30,  # refills: 0   Last office visit: 1/9/2019 with prescribing provider:     Future Office Visit:   Next 5 appointments (look out 90 days)    Feb 13, 2019  1:30 PM CST  Office Visit with Yaima Muse MD  [facility] ([facility]) [address] [place] [id number] [phone]         Routing refill request to provider for review/approval because:  Drug not on the G refill protocol   MARCELA PaigeN, RN

## 2019-01-25 RX ORDER — TIZANIDINE 2 MG/1
2 TABLET ORAL
Qty: 30 TABLET | Refills: 1 | Status: SHIPPED | OUTPATIENT
Start: 2019-01-25 | End: 2019-03-20

## 2019-01-25 NOTE — TELEPHONE ENCOUNTER
Requested Prescriptions   Pending Prescriptions Disp Refills     tiZANidine (ZANAFLEX) 2 MG tablet 30 tablet 1     Sig: Take 1 tablet (2 mg) by mouth nightly as needed for muscle spasms (pain) Can cause sedation. Do not take with alcohol. No driving.    There is no refill protocol information for this order        Last Written Prescription Date:  11/26/18  Last Fill Quantity: 30,  # refills: 1   Last office visit: 1/9/2019 with prescribing provider:     Future Office Visit:   Next 5 appointments (look out 90 days)    Feb 13, 2019  1:30 PM CST  Office Visit with Yaima Muse MD  [facility] ([facility]) [address] [place] [id number] [phone]         Routing refill request to provider for review/approval because:  Drug not on the [facility] refill protocol   Nadine Naqvi, MARCELAN, RN

## 2019-02-06 ENCOUNTER — HOSPITAL ENCOUNTER (OUTPATIENT)
Dept: PHYSICAL THERAPY | Facility: OTHER | Age: 44
Setting detail: THERAPIES SERIES
End: 2019-02-06
Attending: FAMILY MEDICINE
Payer: COMMERCIAL

## 2019-02-06 PROCEDURE — 97033 APP MDLTY 1+IONTPHRSIS EA 15: CPT | Mod: GP | Performed by: PHYSICAL THERAPIST

## 2019-02-06 PROCEDURE — 97110 THERAPEUTIC EXERCISES: CPT | Mod: GP | Performed by: PHYSICAL THERAPIST

## 2019-02-06 PROCEDURE — 97140 MANUAL THERAPY 1/> REGIONS: CPT | Mod: GP | Performed by: PHYSICAL THERAPIST

## 2019-02-13 ENCOUNTER — OFFICE VISIT (OUTPATIENT)
Dept: FAMILY MEDICINE | Facility: CLINIC | Age: 44
End: 2019-02-13
Payer: COMMERCIAL

## 2019-02-13 ENCOUNTER — HOSPITAL ENCOUNTER (OUTPATIENT)
Dept: PHYSICAL THERAPY | Facility: OTHER | Age: 44
Setting detail: THERAPIES SERIES
End: 2019-02-13
Attending: FAMILY MEDICINE
Payer: COMMERCIAL

## 2019-02-13 VITALS
BODY MASS INDEX: 28.18 KG/M2 | SYSTOLIC BLOOD PRESSURE: 124 MMHG | HEART RATE: 88 BPM | DIASTOLIC BLOOD PRESSURE: 76 MMHG | TEMPERATURE: 97.1 F | RESPIRATION RATE: 14 BRPM | OXYGEN SATURATION: 100 % | WEIGHT: 174.6 LBS
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
## 2019-02-13 DIAGNOSIS — S06.0X0D CONCUSSION WITHOUT LOSS OF CONSCIOUSNESS, SUBSEQUENT ENCOUNTER: Primary | ICD-10-CM

## 2019-02-13 PROCEDURE — 97112 NEUROMUSCULAR REEDUCATION: CPT | Mod: GP | Performed by: PHYSICAL THERAPIST

## 2019-02-13 PROCEDURE — 99213 OFFICE O/P EST LOW 20 MIN: CPT | Performed by: FAMILY MEDICINE

## 2019-02-13 RX ORDER — AZITHROMYCIN 250 MG/1
TABLET, FILM COATED ORAL
Qty: 6 TABLET | Refills: 0 | Status: SHIPPED | OUTPATIENT
Start: 2019-02-13 | End: 2019-03-20

## 2019-02-13 ASSESSMENT — PAIN SCALES - GENERAL: PAINLEVEL: SEVERE PAIN (6)
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
## 2019-02-17 ENCOUNTER — NURSE TRIAGE (OUTPATIENT)
Dept: NURSING | Facility: CLINIC | Age: 44
End: 2019-02-17

## 2019-02-17 ENCOUNTER — HOSPITAL ENCOUNTER (EMERGENCY)
Facility: CLINIC | Age: 44
Discharge: HOME | End: 2019-02-17
Admitting: NURSE PRACTITIONER
Payer: COMMERCIAL

## 2019-02-17 VITALS
WEIGHT: 174 LBS | OXYGEN SATURATION: 98 % | RESPIRATION RATE: 18 BRPM | BODY MASS INDEX: 27.97 KG/M2 | SYSTOLIC BLOOD PRESSURE: 136 MMHG | TEMPERATURE: 97.4 F | HEART RATE: 77 BPM | DIASTOLIC BLOOD PRESSURE: 80 MMHG | HEIGHT: 66 IN

## 2019-02-17 DIAGNOSIS — S09.90XA HEAD INJURY, INITIAL ENCOUNTER: ICD-10-CM

## 2019-02-17 DIAGNOSIS — G44.219 EPISODIC TENSION-TYPE HEADACHE, NOT INTRACTABLE: ICD-10-CM

## 2019-02-17 PROCEDURE — 25000132 ZZH RX MED GY IP 250 OP 250 PS 637: Performed by: NURSE PRACTITIONER

## 2019-02-17 PROCEDURE — 99284 EMERGENCY DEPT VISIT MOD MDM: CPT | Performed by: NURSE PRACTITIONER

## 2019-02-17 PROCEDURE — 25000128 H RX IP 250 OP 636: Performed by: NURSE PRACTITIONER

## 2019-02-17 PROCEDURE — 96372 THER/PROPH/DIAG INJ SC/IM: CPT | Performed by: NURSE PRACTITIONER

## 2019-02-17 PROCEDURE — 99284 EMERGENCY DEPT VISIT MOD MDM: CPT | Mod: Z6 | Performed by: NURSE PRACTITIONER

## 2019-02-17 RX ORDER — BUTALBITAL, ACETAMINOPHEN AND CAFFEINE 50; 325; 40 MG/1; MG/1; MG/1
1 TABLET ORAL EVERY 6 HOURS PRN
Qty: 10 TABLET | Refills: 0 | Status: SHIPPED | OUTPATIENT
Start: 2019-02-17 | End: 2019-08-07

## 2019-02-17 RX ORDER — KETOROLAC TROMETHAMINE 30 MG/ML
30 INJECTION, SOLUTION INTRAMUSCULAR; INTRAVENOUS ONCE
Status: COMPLETED | OUTPATIENT
Start: 2019-02-17 | End: 2019-02-17

## 2019-02-17 RX ORDER — MECLIZINE HYDROCHLORIDE 25 MG/1
25 TABLET ORAL ONCE
Status: COMPLETED | OUTPATIENT
Start: 2019-02-17 | End: 2019-02-17

## 2019-02-17 RX ADMIN — KETOROLAC TROMETHAMINE 30 MG: 30 INJECTION, SOLUTION INTRAMUSCULAR; INTRAVENOUS at 19:39

## 2019-02-17 RX ADMIN — MECLIZINE HYDROCHLORIDE 25 MG: 25 TABLET ORAL at 19:38

## 2019-02-17 ASSESSMENT — ENCOUNTER SYMPTOMS
WEAKNESS: 0
BRUISES/BLEEDS EASILY: 0
NECK STIFFNESS: 0
DECREASED CONCENTRATION: 1
VOMITING: 0
HEADACHES: 1

## 2019-02-17 ASSESSMENT — MIFFLIN-ST. JEOR: SCORE: 1461.01

## 2019-02-17 NOTE — LETTER
[facility] EMERGENCY DEPARTMENT  [address] [place] [address]  [phone]  Dept: [phone]      February 17, 2019      Patient: Stacy Brown   YOB: 1975   Date of Visit: 2/17/2019       To Whom It May Concern:    Stacy Brown was seen and treated in our emergency department on 2/17/2019.  She May return to Work 2/21/2019    Additional Information:        Sincerely,         Denise Moser, APRN

## 2019-02-17 NOTE — ED AVS SNAPSHOT
[facility] Emergency Department  [address] [place] [address]  Phone:  [phone]  Fax:  [phone]                                    Stacy Brown   MRN: [id number]    Department:  [facility] Emergency Department   Date of Visit:  2/17/2019           After Visit Summary Signature Page    I have received my discharge instructions, and my questions have been answered. I have discussed any challenges I see with this plan with the nurse or doctor.    ..........................................................................................................................................  Patient/Patient Representative Signature      ..........................................................................................................................................  Patient Representative Print Name and Relationship to Patient    ..................................................               ................................................  Date                                   Time    ..........................................................................................................................................  Reviewed by Signature/Title    ...................................................              ..............................................  Date                                               Time          22EPIC Rev 08/18

## 2019-02-18 ENCOUNTER — TELEPHONE (OUTPATIENT)
Dept: FAMILY MEDICINE | Facility: CLINIC | Age: 44
End: 2019-02-18
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
## 2019-02-20 ENCOUNTER — HOSPITAL ENCOUNTER (OUTPATIENT)
Dept: PHYSICAL THERAPY | Facility: CLINIC | Age: 44
Setting detail: THERAPIES SERIES
End: 2019-02-20
Attending: FAMILY MEDICINE
Payer: COMMERCIAL

## 2019-02-20 PROCEDURE — 97110 THERAPEUTIC EXERCISES: CPT | Mod: GP

## 2019-02-20 PROCEDURE — 97140 MANUAL THERAPY 1/> REGIONS: CPT | Mod: GP

## 2019-02-20 NOTE — TELEPHONE ENCOUNTER
Per Yaima Muse MD she is not able to write a letter to excuse her from work for the . Patient should ask her work for a  leave or attempt to work on Thursday.   Patient advised and states she knows she will not get the time off from her work. He  in the back ground is wondering if patient can call in again tomorrow if she is still having symptoms. I advised them that she can call if her symptoms of headaches are worse but that doesn't mean she will be seen or get a letter to be out of work. She will probably speak to a triage nurse and see if she needs to be seen in clinic or not.   They state understanding and will call if symptoms are worse.  Anna Weldon, CMA   
Per Yaima Muse MD she will encourage patient to return to work on Thursday. If patient is still having trouble on Wednesday she should call back and Yaima Muse MD can write a letter to excuse her until Friday.  Patient advised. She states the only reason she was asking to have Thursday off of work was so she can attend a  for her 's cousin's wife. Will discuss with MD.  Anna Weldon CMA   
Per Yaima Muse MD unsure if patient needs to be seen for ER follow up. MA to please call patient to see how she is feeling and if she just needs a letter for work.   Spoke with patient and she states she does have a letter excusing her from work until Thursday. She states that should would like to have another day at home to heal. She would like to return to work on Friday. I told patient I would speak with Yaima Muse MD regarding this and see if she would be ok updating her work letter. Patient states understanding.  Anna Weldon CMA   
Reason for Call:  Same Day Appointment, Requested Provider:  Yaima Muse M.D.    PCP: Yaima Muse    Reason for visit: Was seen yesterday in the ED for something falling on her head. Was told to follow up with PCP within the next 3 days to be able to return to work.     Duration of symptoms: yesterday    Have you been treated for this in the past? Yes    Additional comments:     Can we leave a detailed message on this number? YES    Phone number patient can be reached at: Cell number on file:    Telephone Information:   Mobile [phone]     Best Time: any    Call taken on 2/18/2019 at 2:57 PM by Rachael Do    
Stacy is calling to check when she can be seen, please call at [phone] asap.  
English

## 2019-02-22 DIAGNOSIS — M79.604 PAIN OF RIGHT LOWER EXTREMITY: ICD-10-CM

## 2019-02-25 NOTE — TELEPHONE ENCOUNTER
Requested Prescriptions   Pending Prescriptions Disp Refills     oxyCODONE-acetaminophen (PERCOCET) 5-325 MG tablet 30 tablet 0     Sig: Take 1 tablet by mouth nightly as needed for moderate to severe pain    There is no refill protocol information for this order        Last Written Prescription Date:  1/24/19  Last Fill Quantity: 30,  # refills: 0   Last office visit: No previous visit found with prescribing provider:  1/9/19   Future Office Visit:   Next 5 appointments (look out 90 days)    Mar 20, 2019  3:45 PM CDT  Office Visit with Yaima Muse MD  [facility] ([facility]) [address] [place] [address] [phone]         Routing refill request to provider for review/approval because:  Drug not on the G refill protocol   Nadine Naqvi, MARCELAN, RN

## 2019-02-26 RX ORDER — OXYCODONE AND ACETAMINOPHEN 5; 325 MG/1; MG/1
1 TABLET ORAL
Qty: 30 TABLET | Refills: 0 | Status: SHIPPED | OUTPATIENT
Start: 2019-02-26 | End: 2019-03-20

## 2019-02-27 ENCOUNTER — HOSPITAL ENCOUNTER (OUTPATIENT)
Dept: PHYSICAL THERAPY | Facility: OTHER | Age: 44
Setting detail: THERAPIES SERIES
End: 2019-02-27
Attending: FAMILY MEDICINE
Payer: COMMERCIAL

## 2019-02-27 PROCEDURE — 97110 THERAPEUTIC EXERCISES: CPT | Mod: GP | Performed by: PHYSICAL THERAPIST

## 2019-03-20 ENCOUNTER — OFFICE VISIT (OUTPATIENT)
Dept: FAMILY MEDICINE | Facility: CLINIC | Age: 44
End: 2019-03-20
Payer: COMMERCIAL

## 2019-03-20 ENCOUNTER — HOSPITAL ENCOUNTER (OUTPATIENT)
Dept: PHYSICAL THERAPY | Facility: OTHER | Age: 44
Setting detail: THERAPIES SERIES
End: 2019-03-20
Attending: FAMILY MEDICINE
Payer: COMMERCIAL

## 2019-03-20 VITALS
WEIGHT: 181.4 LBS | TEMPERATURE: 97.5 F | SYSTOLIC BLOOD PRESSURE: 104 MMHG | RESPIRATION RATE: 12 BRPM | DIASTOLIC BLOOD PRESSURE: 62 MMHG | BODY MASS INDEX: 29.28 KG/M2 | HEART RATE: 102 BPM | OXYGEN SATURATION: 98 %
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
## 2019-03-20 DIAGNOSIS — M75.41 SUBACROMIAL IMPINGEMENT OF RIGHT SHOULDER: ICD-10-CM

## 2019-03-20 DIAGNOSIS — F51.01 PRIMARY INSOMNIA: ICD-10-CM

## 2019-03-20 DIAGNOSIS — F43.10 PTSD (POST-TRAUMATIC STRESS DISORDER): ICD-10-CM

## 2019-03-20 DIAGNOSIS — S06.0X0D CONCUSSION WITHOUT LOSS OF CONSCIOUSNESS, SUBSEQUENT ENCOUNTER: ICD-10-CM

## 2019-03-20 PROCEDURE — 97033 APP MDLTY 1+IONTPHRSIS EA 15: CPT | Mod: GP | Performed by: PHYSICAL THERAPIST

## 2019-03-20 PROCEDURE — 99213 OFFICE O/P EST LOW 20 MIN: CPT | Performed by: FAMILY MEDICINE

## 2019-03-20 PROCEDURE — 97110 THERAPEUTIC EXERCISES: CPT | Mod: GP | Performed by: PHYSICAL THERAPIST

## 2019-03-20 RX ORDER — OXYCODONE AND ACETAMINOPHEN 5; 325 MG/1; MG/1
1 TABLET ORAL
Qty: 30 TABLET | Refills: 0 | Status: SHIPPED | OUTPATIENT
Start: 2019-03-26 | End: 2019-04-24

## 2019-03-20 RX ORDER — ZOLPIDEM TARTRATE 5 MG/1
5 TABLET ORAL
Qty: 90 TABLET | Refills: 1 | Status: SHIPPED | OUTPATIENT
Start: 2019-03-20 | End: 2019-08-07

## 2019-03-20 RX ORDER — TIZANIDINE 2 MG/1
2 TABLET ORAL
Qty: 30 TABLET | Refills: 1 | Status: SHIPPED | OUTPATIENT
Start: 2019-03-26 | End: 2019-05-21

## 2019-03-20 ASSESSMENT — PAIN SCALES - GENERAL: PAINLEVEL: MODERATE PAIN (4)
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
## 2019-04-04 ENCOUNTER — TELEPHONE (OUTPATIENT)
Dept: FAMILY MEDICINE | Facility: CLINIC | Age: 44
End: 2019-04-04

## 2019-04-04 NOTE — TELEPHONE ENCOUNTER
Reason for Call: Request for an order or referral:    Order or referral being requested: Order for another Cortisone Shot in her Rt Shoulder    Date needed: at your convenience    Has the patient been seen by the PCP for this problem? YES    Additional comments: Stacy is aware that Dr Muse is not in today    Phone number Patient can be reached at:  Cell number on file:    Telephone Information:   Mobile [phone]       Best Time:  any    Can we leave a detailed message on this number?  YES    Call taken on 4/4/2019 at 11:27 AM by Josselin Lobo

## 2019-04-10 ENCOUNTER — THERAPY VISIT (OUTPATIENT)
Dept: CHIROPRACTIC MEDICINE | Facility: CLINIC | Age: 44
End: 2019-04-10
Payer: COMMERCIAL

## 2019-04-10 DIAGNOSIS — M99.01 SEGMENTAL DYSFUNCTION OF CERVICAL REGION: Primary | ICD-10-CM

## 2019-04-10 DIAGNOSIS — M99.07 SEGMENTAL DYSFUNCTION OF UPPER EXTREMITY: ICD-10-CM

## 2019-04-10 DIAGNOSIS — M54.9 MECHANICAL BACK PAIN: ICD-10-CM

## 2019-04-10 DIAGNOSIS — M99.02 SEGMENTAL DYSFUNCTION OF THORACIC REGION: ICD-10-CM

## 2019-04-10 DIAGNOSIS — M99.04 SEGMENTAL DYSFUNCTION OF SACRAL REGION: ICD-10-CM

## 2019-04-10 PROCEDURE — 98943 CHIROPRACT MANJ XTRSPINL 1/>: CPT | Performed by: CHIROPRACTOR

## 2019-04-10 PROCEDURE — 99203 OFFICE O/P NEW LOW 30 MIN: CPT | Mod: 25 | Performed by: CHIROPRACTOR

## 2019-04-10 PROCEDURE — 98941 CHIROPRACT MANJ 3-4 REGIONS: CPT | Mod: AT | Performed by: CHIROPRACTOR

## 2019-04-10 NOTE — TELEPHONE ENCOUNTER
Patient was called and stated that she went to the Chiropractor this morning and she feels better already would like to hold off on the injection until her next appointment with Parul.  Patient was also told if she does decide to get the injection to contact Dr. Clay's office that completed the last injection.    Demetrice Harrington CMA
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
## 2019-04-12 ENCOUNTER — TELEPHONE (OUTPATIENT)
Dept: PHARMACY | Facility: CLINIC | Age: 44
End: 2019-04-12

## 2019-04-12 NOTE — TELEPHONE ENCOUNTER
This patient no longer wishes to continue following-up with MTM. States she has too much going on with her own health and with her husbands. We will stop reaching out to the patient at this time. Please let us know if we can assist in this patient's care in the future.    Routing to PCP as FAB.    Castro Espino, BeaD, BCACP  Medication Therapy Management Pharmacist  Pager: [phone]
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
## 2019-04-17 ENCOUNTER — THERAPY VISIT (OUTPATIENT)
Dept: CHIROPRACTIC MEDICINE | Facility: CLINIC | Age: 44
End: 2019-04-17
Payer: COMMERCIAL

## 2019-04-17 DIAGNOSIS — M99.01 SEGMENTAL DYSFUNCTION OF CERVICAL REGION: Primary | ICD-10-CM

## 2019-04-17 DIAGNOSIS — M99.02 SEGMENTAL DYSFUNCTION OF THORACIC REGION: ICD-10-CM

## 2019-04-17 DIAGNOSIS — M99.07 SEGMENTAL DYSFUNCTION OF UPPER EXTREMITY: ICD-10-CM

## 2019-04-17 DIAGNOSIS — M54.9 MECHANICAL BACK PAIN: ICD-10-CM

## 2019-04-17 DIAGNOSIS — M99.04 SEGMENTAL DYSFUNCTION OF SACRAL REGION: ICD-10-CM

## 2019-04-17 PROCEDURE — 98943 CHIROPRACT MANJ XTRSPINL 1/>: CPT | Performed by: CHIROPRACTOR

## 2019-04-17 PROCEDURE — 98941 CHIROPRACT MANJ 3-4 REGIONS: CPT | Mod: AT | Performed by: CHIROPRACTOR

## 2019-04-17 NOTE — PROGRESS NOTES
"Visit #:  2 of 8 based on treatment plan 4/10/2019    Subjective:  Stacy Brown is a 43 year old female who is seen in f/u up for:        Segmental dysfunction of cervical region  Segmental dysfunction of thoracic region  Segmental dysfunction of upper extremity  Segmental dysfunction of sacral region  Mechanical back pain.     Since last visit on 4/10/2019,  Stacy Brown reports the following changes: Patient presents and states that she is doing so much better! Even at work, she is able to do things she could never do before, it has \"made such a huge difference.\" She still has some soreness on the right side, but \"nothing like it was last week.\" She rates her current pain 3/10 today. She states that she has been going to PT for 13 months with no relief, and she feels better after just 1 treatment.        Objective:  The following was observed:    P: palpatory tenderness    A: static palpation demonstrates intersegmental asymmetry     R: motion palpation notes restricted motion    T: localized muscle spasm at: Traps R>>L      Assessment:    Segmental spinal dysfunction/restrictions found at:  C1 RR, LRR  C2 LR, RRR  T1 RR, LRR  T5 E, FR  T10 E, FR  Left SI posterior  Mobs of right UE    Diagnoses:      1. Segmental dysfunction of cervical region    2. Segmental dysfunction of thoracic region    3. Segmental dysfunction of upper extremity    4. Segmental dysfunction of sacral region    5. Mechanical back pain        Patient's condition:  Patient had restrictions pre-manipulation and Patient had decreased motion prior to manipulation    Treatment effectiveness:  Post manipulation there is better intersegmental movement and Patient claims to feel looser post manipulation      Procedures:  CMT:  47748 Chiropractic manipulative treatment 3-4 regions performed   82865 Chiropractic manipulative treatment extraspinal dysfunction/restriction  Cervical: Diversified, C1 , C2, Supine  Thoracic: Diversified, T1, T5, T10, " Prone  Pelvis: Drop Table, PSIS Left , Prone  Extra-spinal: Mobilization, right UE, Seated    Modalities:  73064: MSTM:  To Traps  for 5 min    Therapeutic procedures:  Gave patient Ice instructions post adjustment, and instructions for acute care      Prognosis: Good    Progress towards Goals:   Decrease pain and symptoms into Right UE.  Return to pre-accident status.  Return to work without restrictions.       Response to Treatment:   Reduction of symptoms with first treatment, patient states that she is much improved.       Recommendations:    Instructions:ice 20 minutes every other hour as needed    Follow-up:  Return to care in 2 weeks to make sure that patient can maintain relief.

## 2019-04-24 ENCOUNTER — OFFICE VISIT (OUTPATIENT)
Dept: FAMILY MEDICINE | Facility: CLINIC | Age: 44
End: 2019-04-24
Payer: COMMERCIAL

## 2019-04-24 VITALS
HEART RATE: 97 BPM | BODY MASS INDEX: 29.38 KG/M2 | SYSTOLIC BLOOD PRESSURE: 108 MMHG | DIASTOLIC BLOOD PRESSURE: 62 MMHG | WEIGHT: 182 LBS | RESPIRATION RATE: 10 BRPM | TEMPERATURE: 97.7 F | OXYGEN SATURATION: 97 %

## 2019-04-24 DIAGNOSIS — G47.00 PERSISTENT INSOMNIA: ICD-10-CM

## 2019-04-24 DIAGNOSIS — E11.65 TYPE 2 DIABETES MELLITUS WITH HYPERGLYCEMIA, WITH LONG-TERM CURRENT USE OF INSULIN (H): ICD-10-CM

## 2019-04-24 DIAGNOSIS — M75.41 SUBACROMIAL IMPINGEMENT OF RIGHT SHOULDER: ICD-10-CM

## 2019-04-24 DIAGNOSIS — E11.9 TYPE 2 DIABETES MELLITUS WITHOUT COMPLICATION, WITH LONG-TERM CURRENT USE OF INSULIN (H): Primary | ICD-10-CM

## 2019-04-24 DIAGNOSIS — L30.9 DERMATITIS: ICD-10-CM

## 2019-04-24 DIAGNOSIS — F32.1 MODERATE MAJOR DEPRESSION (H): ICD-10-CM

## 2019-04-24 DIAGNOSIS — D50.9 IRON DEFICIENCY ANEMIA, UNSPECIFIED IRON DEFICIENCY ANEMIA TYPE: ICD-10-CM

## 2019-04-24 DIAGNOSIS — Z79.4 TYPE 2 DIABETES MELLITUS WITH HYPERGLYCEMIA, WITH LONG-TERM CURRENT USE OF INSULIN (H): ICD-10-CM

## 2019-04-24 DIAGNOSIS — Z79.4 TYPE 2 DIABETES MELLITUS WITHOUT COMPLICATION, WITH LONG-TERM CURRENT USE OF INSULIN (H): Primary | ICD-10-CM

## 2019-04-24 PROCEDURE — 99214 OFFICE O/P EST MOD 30 MIN: CPT | Performed by: FAMILY MEDICINE

## 2019-04-24 RX ORDER — TRIAMCINOLONE ACETONIDE 1 MG/G
CREAM TOPICAL 2 TIMES DAILY
Qty: 80 G | Refills: 1 | Status: SHIPPED | OUTPATIENT
Start: 2019-04-24 | End: 2019-08-07

## 2019-04-24 RX ORDER — QUETIAPINE FUMARATE 25 MG/1
25 TABLET, FILM COATED ORAL
Qty: 30 TABLET | Refills: 0 | Status: SHIPPED | OUTPATIENT
Start: 2019-04-24 | End: 2019-08-07

## 2019-04-24 RX ORDER — OXYCODONE AND ACETAMINOPHEN 5; 325 MG/1; MG/1
1 TABLET ORAL
Qty: 30 TABLET | Refills: 0 | Status: SHIPPED | OUTPATIENT
Start: 2019-04-24 | End: 2019-05-22

## 2019-04-24 ASSESSMENT — PAIN SCALES - GENERAL: PAINLEVEL: SEVERE PAIN (6)
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
## 2019-05-01 ENCOUNTER — THERAPY VISIT (OUTPATIENT)
Dept: CHIROPRACTIC MEDICINE | Facility: CLINIC | Age: 44
End: 2019-05-01
Payer: COMMERCIAL

## 2019-05-01 DIAGNOSIS — D50.9 IRON DEFICIENCY ANEMIA, UNSPECIFIED IRON DEFICIENCY ANEMIA TYPE: ICD-10-CM

## 2019-05-01 DIAGNOSIS — E11.9 TYPE 2 DIABETES MELLITUS WITHOUT COMPLICATION, WITH LONG-TERM CURRENT USE OF INSULIN (H): ICD-10-CM

## 2019-05-01 DIAGNOSIS — Z79.4 TYPE 2 DIABETES MELLITUS WITHOUT COMPLICATION, WITH LONG-TERM CURRENT USE OF INSULIN (H): ICD-10-CM

## 2019-05-01 DIAGNOSIS — M99.02 SEGMENTAL DYSFUNCTION OF THORACIC REGION: ICD-10-CM

## 2019-05-01 DIAGNOSIS — M99.01 SEGMENTAL DYSFUNCTION OF CERVICAL REGION: Primary | ICD-10-CM

## 2019-05-01 DIAGNOSIS — M99.07 SEGMENTAL DYSFUNCTION OF UPPER EXTREMITY: ICD-10-CM

## 2019-05-01 DIAGNOSIS — M54.9 MECHANICAL BACK PAIN: ICD-10-CM

## 2019-05-01 DIAGNOSIS — M99.04 SEGMENTAL DYSFUNCTION OF SACRAL REGION: ICD-10-CM

## 2019-05-01 LAB
CHOLEST SERPL-MCNC: 310 MG/DL
CREAT UR-MCNC: 119 MG/DL
FERRITIN SERPL-MCNC: 14 NG/ML (ref 12–150)
HBA1C MFR BLD: 8.9 % (ref 0–5.6)
HDLC SERPL-MCNC: 52 MG/DL
LDLC SERPL CALC-MCNC: 220 MG/DL
MICROALBUMIN UR-MCNC: 15 MG/L
MICROALBUMIN/CREAT UR: 12.69 MG/G CR (ref 0–25)
NONHDLC SERPL-MCNC: 258 MG/DL
TRIGL SERPL-MCNC: 190 MG/DL
TSH SERPL DL<=0.005 MIU/L-ACNC: 0.67 MU/L (ref 0.4–4)

## 2019-05-01 PROCEDURE — 83036 HEMOGLOBIN GLYCOSYLATED A1C: CPT | Mod: QW | Performed by: FAMILY MEDICINE

## 2019-05-01 PROCEDURE — 82043 UR ALBUMIN QUANTITATIVE: CPT | Performed by: FAMILY MEDICINE

## 2019-05-01 PROCEDURE — 36415 COLL VENOUS BLD VENIPUNCTURE: CPT | Performed by: FAMILY MEDICINE

## 2019-05-01 PROCEDURE — 82728 ASSAY OF FERRITIN: CPT | Performed by: FAMILY MEDICINE

## 2019-05-01 PROCEDURE — 84443 ASSAY THYROID STIM HORMONE: CPT | Performed by: FAMILY MEDICINE

## 2019-05-01 PROCEDURE — 98941 CHIROPRACT MANJ 3-4 REGIONS: CPT | Mod: AT | Performed by: CHIROPRACTOR

## 2019-05-01 PROCEDURE — 80061 LIPID PANEL: CPT | Performed by: FAMILY MEDICINE

## 2019-05-01 NOTE — RESULT ENCOUNTER NOTE
Notify Stacy that her urine test for protein is normal.  Unfortunately her cholesterol is extremely high and her diabetes is still poorly controlled.  Her iron is improving but still on the low side.  Her thyroid is perfect.  She has to start taking her cholesterol medication, simvastatin.  20 mg.  I will reorder it.  She also needs to be much more strict about taking her metformin and her insulin every day as prescribed.  Her A1c came down a whole point and if she can get it down to the normal range just by taking her medication as prescribed it would lower her risk for severe disease.  I like to see her back in 3 months.  We will check these labs again.  I want her to continue her iron.  Yaima Muse MD

## 2019-05-01 NOTE — PROGRESS NOTES
Visit #:  3 of 8 based on treatment plan 4/10/2019    Subjective:  Stacy Brown is a 43 year old female who is seen in f/u up for:     Data Unavailable.     Since last visit on 4/17/2019,  Stacy Brown reports the following changes: Patient presents and states that her neck is a little tight and she feels pain on the right side, and some stiffness. She rates her current pain 5/10. Her lower back is not too bad today, she is feeling improvement.        Objective:  The following was observed:    P: palpatory tenderness    A: static palpation demonstrates intersegmental asymmetry     R: motion palpation notes restricted motion    T: localized muscle spasm at: Traps R>>L      Assessment:    Segmental spinal dysfunction/restrictions found at:  C1 RR, LRR  C5 LR, RRR  T1 RR, LRR  T5 E, FR  Left SI posterior      Diagnoses:      No diagnosis found.    Patient's condition:  Patient had restrictions pre-manipulation and Patient had decreased motion prior to manipulation    Treatment effectiveness:  Post manipulation there is better intersegmental movement and Patient claims to feel looser post manipulation      Procedures:  CMT:  95560 Chiropractic manipulative treatment 3-4 regions performed     Cervical: Diversified, C1, C5, Supine  Thoracic: Diversified, T1, T5, T10, Prone  Pelvis: Drop Table, PSIS Left , Prone      Modalities:  70025: MSTM:  To Traps  for 5 min    Therapeutic procedures:  Gave patient Ice instructions post adjustment, and instructions for acute care      Prognosis: Good    Progress towards Goals:   Decrease pain and symptoms into Right UE.  Return to pre-accident status.  Return to work without restrictions.       Response to Treatment:   Reduction of symptoms with first treatment, patient states that she is much improved. She would like to continue in 2 weeks until her pain is much improved.      Recommendations:    Instructions:ice 20 minutes every other hour as needed    Follow-up:  Return to  care in 2 weeks to make sure that patient can maintain relief.

## 2019-05-08 ENCOUNTER — TELEPHONE (OUTPATIENT)
Dept: EMERGENCY MEDICINE | Facility: CLINIC | Age: 44
End: 2019-05-08

## 2019-05-08 NOTE — TELEPHONE ENCOUNTER
Reason for Call:  Form, our goal is to have forms completed with 72 hours, however, some forms may require a visit or additional information.    Type of letter, form or note:  medical    Who is the form from?: Patient    Where did the form come from: Patient or family brought in       What clinic location was the form placed at?: [facility]    Where the form was placed: box/folder Box/Folder    What number is listed as a contact on the form?: [phone]       Additional comments: Patient has diabetic forms to be filled out for work please fax per letter and would like to ALSO be called to  the originals.    Call taken on 5/8/2019 at 10:14 AM by Serjio Lange

## 2019-05-13 NOTE — TELEPHONE ENCOUNTER
Form filled out and placed on Dr. Muse to sign.  Also called the patient to let her know that she did not fill out the top portion of the form.  Once Dr. Muse signs the form the patient will have to come and pick it up and fill out the top portion.  Thank you.     Demetrice Harrington CMA

## 2019-05-13 NOTE — TELEPHONE ENCOUNTER
Form was signed and placed at the  for patient to .  Patient needs to fill out top portion.     Demetrice Harrington CMA

## 2019-05-21 DIAGNOSIS — M75.41 SUBACROMIAL IMPINGEMENT OF RIGHT SHOULDER: ICD-10-CM

## 2019-05-22 ENCOUNTER — THERAPY VISIT (OUTPATIENT)
Dept: CHIROPRACTIC MEDICINE | Facility: CLINIC | Age: 44
End: 2019-05-22
Payer: COMMERCIAL

## 2019-05-22 DIAGNOSIS — M99.02 SEGMENTAL DYSFUNCTION OF THORACIC REGION: ICD-10-CM

## 2019-05-22 DIAGNOSIS — G89.4 CHRONIC PAIN SYNDROME: ICD-10-CM

## 2019-05-22 DIAGNOSIS — M99.04 SEGMENTAL DYSFUNCTION OF SACRAL REGION: ICD-10-CM

## 2019-05-22 DIAGNOSIS — M99.01 SEGMENTAL DYSFUNCTION OF CERVICAL REGION: Primary | ICD-10-CM

## 2019-05-22 DIAGNOSIS — M75.41 SUBACROMIAL IMPINGEMENT OF RIGHT SHOULDER: ICD-10-CM

## 2019-05-22 DIAGNOSIS — M99.07 SEGMENTAL DYSFUNCTION OF UPPER EXTREMITY: ICD-10-CM

## 2019-05-22 DIAGNOSIS — M54.9 MECHANICAL BACK PAIN: ICD-10-CM

## 2019-05-22 PROCEDURE — 98941 CHIROPRACT MANJ 3-4 REGIONS: CPT | Mod: AT | Performed by: CHIROPRACTOR

## 2019-05-22 NOTE — PROGRESS NOTES
"Visit #:  4 of 8 based on treatment plan 4/10/2019    Subjective:  Stacy Brown is a 43 year old female who is seen in f/u up for:        Segmental dysfunction of cervical region  Segmental dysfunction of thoracic region  Segmental dysfunction of upper extremity  Segmental dysfunction of sacral region  Mechanical back pain.     Since last visit on 5/1/2019,  Stacy Brown reports the following changes: Patient presents and states that she has been working 6 days a week now. She is off today and has been sleeping all day. She is not too bad today, she has some soreness into her neck and shoulder, but it is much improved. She can tell its been 3 weeks since she has been adjusted. She rates her current pain 2-3/10, it is \"there.\"       Objective:  The following was observed:    P: palpatory tenderness    A: static palpation demonstrates intersegmental asymmetry     R: motion palpation notes restricted motion    T: localized muscle spasm at: Traps R>>L      Assessment:    Segmental spinal dysfunction/restrictions found at:  C1 RR, LRR  C5 LR, RRR  T1 RR, LRR  T7 E, FR  Right SI posterior      Diagnoses:      1. Segmental dysfunction of cervical region    2. Segmental dysfunction of thoracic region    3. Segmental dysfunction of upper extremity    4. Segmental dysfunction of sacral region    5. Mechanical back pain        Patient's condition:  Patient had restrictions pre-manipulation and Patient had decreased motion prior to manipulation    Treatment effectiveness:  Post manipulation there is better intersegmental movement and Patient claims to feel looser post manipulation      Procedures:  CMT:  40327 Chiropractic manipulative treatment 3-4 regions performed     Cervical: Diversified, C1, C5, Supine  Thoracic: Diversified,T1, T7, Prone  Pelvis: Drop Table, PSIS Left , Prone      Modalities:  33998: MSTM:  To Traps  for 5 min    Therapeutic procedures:  Gave patient Ice instructions post adjustment, and " instructions for acute care      Prognosis: Good    Progress towards Goals:   Decrease pain and symptoms into Right UE.  Return to pre-accident status.  Return to work without restrictions.       Response to Treatment:   Reduction of symptoms with first treatment, patient states that she is much improved. She would like to continue in 2 weeks until her pain is much improved.      Recommendations:    Instructions:ice 20 minutes every other hour as needed    Follow-up:  Return to care in 3 weeks to make sure that patient can maintain relief.

## 2019-05-22 NOTE — TELEPHONE ENCOUNTER
Requested Prescriptions   Pending Prescriptions Disp Refills     tiZANidine (ZANAFLEX) 2 MG tablet [Pharmacy Med Name: TIZANIDINE HCL 2MG TABS] 30 tablet 1     Sig: TAKE ONE TABLET BY MOUTH NIGHTLY AS NEEDED FOR MUSCLE SPASMS (PAIN) CAN CAUSE SEDATION, DO NOT TAKE WITH ALCOHOL - NO DRIVING       There is no refill protocol information for this order        Last Written Prescription Date:  3/26/19  Last Fill Quantity: 30,  # refills: 1   Last office visit: 4/24/2019 with prescribing provider:     Future Office Visit:   Next 5 appointments (look out 90 days)    Aug 07, 2019 10:45 AM CDT  Office Visit with Yaima Muse MD  [facility] ([facility] [address] [place] [id number] [phone]         Routing refill request to provider for review/approval because:  Drug not on the G refill protocol   Nadine Naqvi, MARCELAN, RN

## 2019-05-23 NOTE — TELEPHONE ENCOUNTER
Controlled Substance Refill Request for Percocet    Last refill: 4/24/19  #30    Last clinic visit: 4/24/19     Clinic visit frequency required:   Next appt:   Next 5 appointments (look out 90 days)    Aug 07, 2019 10:45 AM CDT  Office Visit with Yaima Muse MD  [facility] ([facility] [address] [place] [id number] [phone]            Controlled substance agreement on file: No.    Documentation in problem list reviewed:  Yes    Processing:  Staff will hand deliver Rx to on-site pharmacy    [place] reviewed:  reviewed- no concerns  [place]:  [email or web address]    GERA Paige, RN

## 2019-05-24 RX ORDER — OXYCODONE AND ACETAMINOPHEN 5; 325 MG/1; MG/1
1 TABLET ORAL
Qty: 30 TABLET | Refills: 0 | Status: SHIPPED | OUTPATIENT
Start: 2019-05-24 | End: 2019-06-28

## 2019-05-24 RX ORDER — TIZANIDINE 2 MG/1
TABLET ORAL
Qty: 30 TABLET | Refills: 1 | Status: SHIPPED | OUTPATIENT
Start: 2019-05-24 | End: 2019-07-24

## 2019-05-29 ENCOUNTER — HOSPITAL ENCOUNTER (EMERGENCY)
Facility: CLINIC | Age: 44
Discharge: HOME | End: 2019-05-29
Attending: PHYSICIAN ASSISTANT | Admitting: PHYSICIAN ASSISTANT
Payer: COMMERCIAL

## 2019-05-29 ENCOUNTER — APPOINTMENT (OUTPATIENT)
Dept: GENERAL RADIOLOGY | Facility: CLINIC | Age: 44
End: 2019-05-29
Attending: FAMILY MEDICINE
Payer: COMMERCIAL

## 2019-05-29 VITALS
RESPIRATION RATE: 14 BRPM | HEART RATE: 92 BPM | OXYGEN SATURATION: 98 % | DIASTOLIC BLOOD PRESSURE: 87 MMHG | TEMPERATURE: 98.7 F | SYSTOLIC BLOOD PRESSURE: 162 MMHG

## 2019-05-29 DIAGNOSIS — S90.129A TOE CONTUSION: ICD-10-CM

## 2019-05-29 DIAGNOSIS — L03.032 CELLULITIS OF LEFT TOE: ICD-10-CM

## 2019-05-29 PROCEDURE — 99284 EMERGENCY DEPT VISIT MOD MDM: CPT | Mod: Z6 | Performed by: PHYSICIAN ASSISTANT

## 2019-05-29 PROCEDURE — 73660 X-RAY EXAM OF TOE(S): CPT | Mod: TC,RT

## 2019-05-29 PROCEDURE — 99283 EMERGENCY DEPT VISIT LOW MDM: CPT | Performed by: PHYSICIAN ASSISTANT

## 2019-05-29 PROCEDURE — 25000132 ZZH RX MED GY IP 250 OP 250 PS 637: Performed by: PHYSICIAN ASSISTANT

## 2019-05-29 RX ORDER — OXYCODONE HYDROCHLORIDE 5 MG/1
5 TABLET ORAL ONCE
Status: COMPLETED | OUTPATIENT
Start: 2019-05-29 | End: 2019-05-29

## 2019-05-29 RX ORDER — CEPHALEXIN 500 MG/1
500 CAPSULE ORAL 4 TIMES DAILY
Qty: 40 CAPSULE | Refills: 0 | Status: SHIPPED | OUTPATIENT
Start: 2019-05-29 | End: 2019-08-07

## 2019-05-29 RX ORDER — OXYCODONE HYDROCHLORIDE 5 MG/1
5 TABLET ORAL EVERY 6 HOURS PRN
Qty: 6 TABLET | Refills: 0 | Status: SHIPPED | OUTPATIENT
Start: 2019-05-29 | End: 2019-06-28

## 2019-05-29 RX ADMIN — OXYCODONE HYDROCHLORIDE 5 MG: 5 TABLET ORAL at 20:40

## 2019-05-29 NOTE — ED AVS SNAPSHOT
[facility] Emergency Department  [address] [place] [address]  Phone:  [phone]  Fax:  [phone]                                    Stacy Brown   MRN: [id number]    Department:  [facility] Emergency Department   Date of Visit:  5/29/2019           After Visit Summary Signature Page    I have received my discharge instructions, and my questions have been answered. I have discussed any challenges I see with this plan with the nurse or doctor.    ..........................................................................................................................................  Patient/Patient Representative Signature      ..........................................................................................................................................  Patient Representative Print Name and Relationship to Patient    ..................................................               ................................................  Date                                   Time    ..........................................................................................................................................  Reviewed by Signature/Title    ...................................................              ..............................................  Date                                               Time          22EPIC Rev 08/18

## 2019-05-29 NOTE — LETTER
May 29, 2019      To Whom It May Concern:      Stacy Brown was seen in our Emergency Department today, 05/29/19.  I expect her condition to improve over the next few days.  She may return to work when improved.  Please excuse her from work on 5/30/2019 and 5/31/2019 due to her medical condition.    Sincerely,              Alexandr Givens PA-C

## 2019-05-29 NOTE — ED TRIAGE NOTES
Pt stubbed her rt big toe on Saturday, now that toe and her other toes around it are numb. Pt is diabetic.
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
## 2019-05-30 NOTE — DISCHARGE INSTRUCTIONS
It was a pleasure working with you today!  I hope your condition improves rapidly!     Thankfully, your x-ray did not show any sign of fracture.  I am concerned that you have developed an infection of your toe.    Please rest!  Elevate your toe is much as possible.  Soak your toe in warm water for 20 minutes every 1-2 hours of the next couple days.  Start the cephalexin, antibiotic, right away to treat the underlying infection.

## 2019-06-12 ENCOUNTER — THERAPY VISIT (OUTPATIENT)
Dept: CHIROPRACTIC MEDICINE | Facility: CLINIC | Age: 44
End: 2019-06-12
Payer: COMMERCIAL

## 2019-06-12 DIAGNOSIS — M99.04 SEGMENTAL DYSFUNCTION OF SACRAL REGION: ICD-10-CM

## 2019-06-12 DIAGNOSIS — M99.07 SEGMENTAL DYSFUNCTION OF UPPER EXTREMITY: ICD-10-CM

## 2019-06-12 DIAGNOSIS — M99.01 SEGMENTAL DYSFUNCTION OF CERVICAL REGION: Primary | ICD-10-CM

## 2019-06-12 DIAGNOSIS — M54.9 MECHANICAL BACK PAIN: ICD-10-CM

## 2019-06-12 DIAGNOSIS — M99.02 SEGMENTAL DYSFUNCTION OF THORACIC REGION: ICD-10-CM

## 2019-06-12 PROCEDURE — 98941 CHIROPRACT MANJ 3-4 REGIONS: CPT | Mod: AT | Performed by: CHIROPRACTOR

## 2019-06-12 NOTE — PROGRESS NOTES
"Visit #:  5 of 8 based on treatment plan 4/10/2019    Subjective:  Stacy Brown is a 43 year old female who is seen in f/u up for:     Data Unavailable.     Since last visit on 5/22/2019,  Stacy Brown reports the following changes: Patient presents and states that she is feeling pretty good today. She feels like her shoulders are doing pretty good, but her right hip is bothering her. This the first day off she has had in 4 weeks, and she has been \"leaning\" quite a bit. She is feeling pretty good at this point.        Objective:  The following was observed:    P: palpatory tenderness    A: static palpation demonstrates intersegmental asymmetry     R: motion palpation notes restricted motion    T: localized muscle spasm at: Traps R>>L      Assessment:    Segmental spinal dysfunction/restrictions found at:  C1 RR, LRR  C2 LR, RRR  T1 RR, LRR  T7 E, FR  T10 E, FR  L4 LR, RRR  Right SI posterior      Diagnoses:      No diagnosis found.    Patient's condition:  Patient had restrictions pre-manipulation and Patient had decreased motion prior to manipulation    Treatment effectiveness:  Post manipulation there is better intersegmental movement and Patient claims to feel looser post manipulation      Procedures:  CMT:  14954 Chiropractic manipulative treatment 3-4 regions performed     Cervical: Diversified, C1, C5, Supine  Thoracic: Diversified,T1, T7, T10,  Prone  Pelvis: Drop Table, L4, Right SI, Prone      Modalities:  24083: MSTM:  To Traps  for 5 min    Therapeutic procedures:  Gave patient Ice instructions post adjustment, and instructions for acute care      Prognosis: Good    Progress towards Goals:   Decrease pain and symptoms into Right UE.  Return to pre-accident status.  Return to work without restrictions.       Response to Treatment:   Reduction of symptoms with first treatment, patient states that she is much improved. She would like to continue in 2 weeks until her pain is much " improved.      Recommendations:    Instructions:ice 20 minutes every other hour as needed    Follow-up:  Return to care PRN.

## 2019-06-16 DIAGNOSIS — E11.65 TYPE 2 DIABETES MELLITUS WITH HYPERGLYCEMIA, WITH LONG-TERM CURRENT USE OF INSULIN (H): ICD-10-CM

## 2019-06-16 DIAGNOSIS — Z79.4 TYPE 2 DIABETES MELLITUS WITH HYPERGLYCEMIA, WITH LONG-TERM CURRENT USE OF INSULIN (H): ICD-10-CM

## 2019-06-17 RX ORDER — INSULIN GLARGINE 100 [IU]/ML
INJECTION, SOLUTION SUBCUTANEOUS
Qty: 30 ML | Refills: 0 | Status: SHIPPED | OUTPATIENT
Start: 2019-06-17 | End: 2019-08-08

## 2019-06-17 NOTE — TELEPHONE ENCOUNTER
"Requested Prescriptions   Pending Prescriptions Disp Refills     LANTUS SOLOSTAR 100 UNIT/ML soln [Pharmacy Med Name: LANTUS SOLOSTAR 100UNIT/ML SOPN] 30 mL 3     Sig: INJECT 30 UNITS SUBCUTANEOUS DAILY (APPOINTMENT NEEDED FOR ADDITIONAL REFILLS)   Last Written Prescription Date:  5/22/2018  Last Fill Quantity: 30 ml,  # refills: 3   Last office visit: 4/24/2019 with prescribing provider:     Future Office Visit:   Next 5 appointments (look out 90 days)    Aug 07, 2019 10:45 AM CDT  Office Visit with Yaima Muse MD  [facility] ([facility]) [address] [place] [id number] [phone]             Long Acting Insulin Protocol Failed - 6/16/2019 12:39 PM        Failed - Blood pressure less than 140/90 in past 6 months     BP Readings from Last 3 Encounters:   05/29/19 162/87   04/24/19 108/62   03/20/19 104/62           Passed - LDL on file in past 12 months     Recent Labs   Lab Test 05/01/19  0814   *           Passed - Microalbumin on file in past 12 months     Recent Labs   Lab Test 05/01/19  0835   MICROL 15   UMALCR 12.69           Passed - Serum creatinine on file in past 12 months     Recent Labs   Lab Test 07/09/18  1652   CR 0.41*           Passed - HgbA1C in past 3 or 6 months     If HgbA1C is 8 or greater, it needs to be on file within the past 3 months.  If less than 8, must be on file within the past 6 months.     Recent Labs   Lab Test 05/01/19  0814   A1C 8.9*           Passed - Medication is active on med list        Passed - Patient is age 18 or older        Passed - Recent (6 mo) or future (30 days) visit within the authorizing provider's specialty     Patient had office visit in the last 6 months or has a visit in the next 30 days with authorizing provider or within the authorizing provider's specialty.  See \"Patient Info\" tab in inbasket, or \"Choose Columns\" in Meds & Orders section of the refill encounter.          Routing " refill request to provider for review/approval because:  Labs out of range:  BP    T'd up 1 refill for provider review.    Deanne Mace RN

## 2019-06-25 DIAGNOSIS — M75.41 SUBACROMIAL IMPINGEMENT OF RIGHT SHOULDER: ICD-10-CM

## 2019-06-27 NOTE — TELEPHONE ENCOUNTER
OXYCODONE 5 MG  Last Written Prescription Date:  5/29/19  Last Fill Quantity: 6,  # refills: 0   Last office visit: 4/24/2019 with prescribing provider:     Future Office Visit:   Next 5 appointments (look out 90 days)    Aug 07, 2019 10:45 AM CDT  Office Visit with [facility]a Muse MD  [facility] [address] [place] [id number] [phone]       Routing refill request to provider for review/approval because:  Drug not on the [facility] refill protocol   LTEI Brown

## 2019-06-28 RX ORDER — OXYCODONE AND ACETAMINOPHEN 5; 325 MG/1; MG/1
1 TABLET ORAL
Qty: 30 TABLET | Refills: 0 | Status: SHIPPED | OUTPATIENT
Start: 2019-06-28 | End: 2019-07-24

## 2019-06-28 RX ORDER — OXYCODONE HYDROCHLORIDE 5 MG/1
5 TABLET ORAL EVERY 6 HOURS PRN
Qty: 6 TABLET | Refills: 0 | Status: CANCELLED | OUTPATIENT
Start: 2019-06-28

## 2019-06-28 NOTE — TELEPHONE ENCOUNTER
Chucho for pt to call clinic back. Please ask pt when she returns call if she actually needed a refill on her percocet instead of the roxicodone. Rosa Fleming CMA

## 2019-06-28 NOTE — TELEPHONE ENCOUNTER
Patient calling back, verified it is the percocet.        Shirin Estrada ~ Patient Representative  [address] [address]  [email or web address]  [email or web address]  Office:  [phone]  Fax:  [phone]

## 2019-07-19 ENCOUNTER — HOSPITAL ENCOUNTER (EMERGENCY)
Facility: CLINIC | Age: 44
Discharge: HOME | End: 2019-07-19
Attending: NURSE PRACTITIONER | Admitting: NURSE PRACTITIONER
Payer: COMMERCIAL

## 2019-07-19 VITALS
BODY MASS INDEX: 27.76 KG/M2 | WEIGHT: 172 LBS | SYSTOLIC BLOOD PRESSURE: 133 MMHG | DIASTOLIC BLOOD PRESSURE: 84 MMHG | RESPIRATION RATE: 16 BRPM | OXYGEN SATURATION: 98 % | TEMPERATURE: 98.1 F

## 2019-07-19 DIAGNOSIS — M70.62 TROCHANTERIC BURSITIS OF BOTH HIPS: ICD-10-CM

## 2019-07-19 DIAGNOSIS — M70.61 TROCHANTERIC BURSITIS OF BOTH HIPS: ICD-10-CM

## 2019-07-19 PROCEDURE — 99284 EMERGENCY DEPT VISIT MOD MDM: CPT | Mod: Z6 | Performed by: NURSE PRACTITIONER

## 2019-07-19 PROCEDURE — 25000132 ZZH RX MED GY IP 250 OP 250 PS 637: Performed by: NURSE PRACTITIONER

## 2019-07-19 PROCEDURE — 99283 EMERGENCY DEPT VISIT LOW MDM: CPT | Performed by: NURSE PRACTITIONER

## 2019-07-19 RX ORDER — OXYCODONE AND ACETAMINOPHEN 5; 325 MG/1; MG/1
1 TABLET ORAL ONCE
Status: COMPLETED | OUTPATIENT
Start: 2019-07-19 | End: 2019-07-19

## 2019-07-19 RX ORDER — PREDNISONE 20 MG/1
TABLET ORAL
Qty: 10 TABLET | Refills: 0 | Status: SHIPPED | OUTPATIENT
Start: 2019-07-19 | End: 2019-08-07

## 2019-07-19 RX ADMIN — OXYCODONE HYDROCHLORIDE AND ACETAMINOPHEN 1 TABLET: 5; 325 TABLET ORAL at 18:37

## 2019-07-19 ASSESSMENT — ENCOUNTER SYMPTOMS
NUMBNESS: 0
FEVER: 0
ARTHRALGIAS: 1
WEAKNESS: 0
MYALGIAS: 1

## 2019-07-19 NOTE — ED TRIAGE NOTES
Right hip pain for a couple weeks.  Today had pain to both hips radiating down legs while working and walking.
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
## 2019-07-19 NOTE — ED AVS SNAPSHOT
[facility] Emergency Department  [address] [place] [address]  Phone:  [phone]  Fax:  [phone]                                    Stacy Brown   MRN: [id number]    Department:  [facility] Emergency Department   Date of Visit:  7/19/2019           After Visit Summary Signature Page    I have received my discharge instructions, and my questions have been answered. I have discussed any challenges I see with this plan with the nurse or doctor.    ..........................................................................................................................................  Patient/Patient Representative Signature      ..........................................................................................................................................  Patient Representative Print Name and Relationship to Patient    ..................................................               ................................................  Date                                   Time    ..........................................................................................................................................  Reviewed by Signature/Title    ...................................................              ..............................................  Date                                               Time          22EPIC Rev 08/18

## 2019-07-19 NOTE — LETTER
July 19, 2019      To Whom It May Concern:      Stacy Brown was seen in our Emergency Department today, 07/19/19.  I expect her condition to improve over the next 3 days.  She may return to work when improved 7/22/2019    Sincerely,        JOVITA Pretty CNP

## 2019-07-22 ENCOUNTER — OFFICE VISIT (OUTPATIENT)
Dept: ORTHOPEDICS | Facility: CLINIC | Age: 44
End: 2019-07-22
Payer: COMMERCIAL

## 2019-07-22 ENCOUNTER — ANCILLARY PROCEDURE (OUTPATIENT)
Dept: GENERAL RADIOLOGY | Facility: CLINIC | Age: 44
End: 2019-07-22
Attending: PHYSICIAN ASSISTANT
Payer: COMMERCIAL

## 2019-07-22 VITALS
BODY MASS INDEX: 27.64 KG/M2 | HEIGHT: 66 IN | DIASTOLIC BLOOD PRESSURE: 76 MMHG | WEIGHT: 172 LBS | SYSTOLIC BLOOD PRESSURE: 124 MMHG

## 2019-07-22 DIAGNOSIS — M70.61 GREATER TROCHANTERIC BURSITIS OF BOTH HIPS: Primary | ICD-10-CM

## 2019-07-22 DIAGNOSIS — M25.552 PAIN OF BOTH HIP JOINTS: ICD-10-CM

## 2019-07-22 DIAGNOSIS — M25.551 PAIN OF BOTH HIP JOINTS: ICD-10-CM

## 2019-07-22 DIAGNOSIS — M70.62 GREATER TROCHANTERIC BURSITIS OF BOTH HIPS: Primary | ICD-10-CM

## 2019-07-22 PROCEDURE — 73522 X-RAY EXAM HIPS BI 3-4 VIEWS: CPT | Mod: TC

## 2019-07-22 PROCEDURE — 20610 DRAIN/INJ JOINT/BURSA W/O US: CPT | Mod: 50 | Performed by: PHYSICIAN ASSISTANT

## 2019-07-22 PROCEDURE — 99213 OFFICE O/P EST LOW 20 MIN: CPT | Mod: 25 | Performed by: PHYSICIAN ASSISTANT

## 2019-07-22 RX ORDER — TRIAMCINOLONE ACETONIDE 40 MG/ML
40 INJECTION, SUSPENSION INTRA-ARTICULAR; INTRAMUSCULAR ONCE
Status: COMPLETED | OUTPATIENT
Start: 2019-07-22 | End: 2019-07-22

## 2019-07-22 RX ADMIN — TRIAMCINOLONE ACETONIDE 40 MG: 40 INJECTION, SUSPENSION INTRA-ARTICULAR; INTRAMUSCULAR at 10:32

## 2019-07-22 ASSESSMENT — PAIN SCALES - GENERAL: PAINLEVEL: WORST PAIN (10)

## 2019-07-22 ASSESSMENT — MIFFLIN-ST. JEOR: SCORE: 1446.94
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
## 2019-07-22 NOTE — PATIENT INSTRUCTIONS
Encounter Diagnosis   Name Primary?     Greater trochanteric bursitis of both hips Yes      Rest, ice and elevate above heart level as needed for pain control  1. On exam, I feel this is greater trochanteric bursitis.  You have had this multiple times in last several years so you are no stranger to it.  This is a pad/sac on the outside of your hip that protects the bony part of the side of your hip. It helps tendons and ligaments glide over it.  Sometimes it gets inflamed and painful.    2.  This usually goes away with time, exercises/stretching, cortisone injections and NSAIDs medications.  3. We decided on the cortisone injection today, we did both hips today.  You are diabetic so watch your blood sugars.  You understand this.  These cortisone injections have helped in the past.  Your last cortisone injection was on 4/11/2018 by the Dr. Clay team.  4.  Therapy: You can always do formal therapy but we decided to do home exercises for now, I have the main stretch below.  We know you have tried formal therapy in the past but you stated did not help you.  I think this try to I demonstrated in clinic it would be best for you.  There is a picture of it below.  5.  Medication: Anti-inflammatories such as ibuprofen does help you some.  We talked about Mobic as a prescription medication but you stated you have some of that already.  You also asked about pain medication as an narcotic medication but we talked about how the [facility] regulates this strictly and you already get some Percocet from your primary care provider so it would have to go through that provider or pain management specialist.   6.  Work: At the end of our visit we talked about work.  It sounds like you cannot do seated work only or any restricted work with your job it is okay with me to keep you off work for 1 week to help rest your hips and then after that you can go back to work without restrictions.  We gave you a letter today.    7.  Follow up with  Humble Mejia PA-C on an as needed basis.    Cortisone Instructions:     1. You received an injection of cortisone into your bilateral hips today.  2. The joint(s) may be more painful for the first 1-2 days.  3. We ask you to continue to rest the joint(s) for a few more days before resuming regular activities.  4. Pain Medications you can take (as long as your primary care provider allows these meds and you do not have kidney or liver conditions):  Tylenol  Take 1000 mg by mouth every 6 hours as needed; maximum dose 4000 mg a day  Ibuprofen  600 mg every 6 hours as needed; maximum 2400 mg a day  (OK to take tylenol and ibuprofen at the same time)  5. Rest, ice and elevate as needed for pain control  6. Watch for these signs of infection: redness, swelling, drainage, warmth to touch, increased pain, or fever. Call the clinic or make an appointment to be seen if you think you have an infection.  7. If you are diabetic, make sure you keep a close eye on your blood sugars, they can get elevated with cortisone injections.   8. Sometimes it can take 1-2 weeks for it to reach its full effect.    Cortisone Injections  Cortisone is a type of steroid. It can greatly reduce swelling, redness, and irritation (inflammation) and pain. Being injected with cortisone is simple and doesn t take long. Your doctor may ask you questions about your health. Certain health conditions, such as diabetes, can be affected by cortisone.     Your pain may be relieved by a cortisone injection.   Why have a cortisone injection?  Injecting cortisone can relieve pain for anything from a sports injury to arthritis. Your doctor may suggest an injection if rest, splints, or oral medicine doesn t relieve your pain. Injecting cortisone is simpler than having surgery. And cortisone may provide the lasting pain relief that can help you get out and enjoy life again.  Getting the injection  Your doctor will start by cleaning and occasionally numbing your  skin at the injection site. Next you ll be injected with local anesthetics (for short-term pain relief) and cortisone. The injection may last a few moments. A small bandage will be put over the injection site. You ll then be ready to go home.  After your injection  After being injected, make sure you don t injure the treated region. But stay active. Enjoy a walk or some other mild activity. Just be careful not to strain the region that gave you trouble.  The next day  Some people feel more pain after being injected. This is normal, and it will go away soon. Applying ice for 20 minutes at a time to your injury may reduce the increased pain. Rest for the first day or two. You don t need to stay in bed. But avoid tasks that may strain the injured region.  If you have diabetes  Cortisone injections can cause blood sugar to be increased for several days after the injection. If you have diabetes, you should follow your blood  sugar closely during this time. Follow your regular plan for what to do when your blood sugar is elevated.     5749-0391 [employer]. [address] [place] [address]. All rights reserved. This information is not intended as a substitute for professional medical care. Always follow your healthcare professional's instructions.      Understanding Trochanteric Bursitis    A bursa is a thin, slippery, sac-like film. It contains a small amount of fluid. This structure is found between bones and soft tissues in and around joints. A bursa cushions and protects a joint. It keeps parts of a joint from rubbing against each other. If a bursa becomes inflamed and irritated, it is known as bursitis.  The trochanteric bursa is found on the hip joint. It lies on top of the bump at the top of the thighbone called the greater trochanter. Inflammation of this bursa is called trochanteric bursitis.      How to say it  [id number]   Causes of trochanteric bursitis  Causes may  include:    Overuse of the hip during running or other sports, dance, or work    Falling on or irritation to the side of the hip  This condition may occur along with other problems, such as osteoarthritis of the hip or knee, or low back problems. In rare cases, it may occur after hip surgery.  Symptoms of trochanteric bursitis    Pain or aching on the side of the hip. The pain may travel down the leg.    Swelling, tenderness, or warmth on the side of the hip at the bony bump at the top of the thigh  Treatment for trochanteric bursitis  These may include:    Resting the hip. This allows the bursa to heal.    Prescription or over-the-counter pain medicines. These help reduce inflammation, swelling, and pain.    Cold packs and heat packs. These help reduce pain and swelling.    Stretching and strengthening exercises. These improve flexibility and strength around the hip.    Physical therapy. This includes exercises or other treatments.    Injections of medicine into the bursa. This may help reduce inflammation and relieve symptoms.  Possible complications  If you don t give your hip time to heal, the problem may not go away, may return, or may get worse. Rest and treat your hip as directed.      When to call your healthcare provider  Call your healthcare provider right away if you have any of these:    Fever of 100.4 F (38 C) or higher, or as directed    Redness, swelling, or warmth that gets worse    Symptoms that don t get better with prescribed medicines, or get worse    New symptoms   Date Last Reviewed: 3/29/2016    5351-7810 [employer]. [address] [place] [address]. All rights reserved. This information is not intended as a substitute for professional medical care. Always follow your healthcare professional's instructions.       Iliotibial Band Stretch (Flexibility)    1. Stand next to a chair. Hold onto the chair with your right hand for support. Cross your right leg behind your left  leg.  2. Lean your right hip toward the right. Feel the stretch at the outside of your hip.  3. Hold for 30 to 60 seconds. Then relax.  4. Repeat 2 times, or as instructed.  5. Switch sides and repeat.  6. Do this 3 times a day, or as instructed.     Tip: Don t bend forward or twist at the waist.   Date Last Reviewed: 3/29/2016    5289-3422 [employer]. [address] [place] [address], [place] [address]. All rights reserved. This information is not intended as a substitute for professional medical care. Always follow your healthcare professional's instructions.          [employer] and [employer] may offer reliable information regarding your diagnosis and treatment plan.    THANK YOU for coming in today. If you receive a survey via [employer] or mail please let us know if there was anything you especially appreciated today or if there is any way we can improve our clinic. We appreciate your input.    GENERAL INFORMATION:    [facility] for any issues or concerns: [phone]      We are not in the office Thursdays. Therefore non- urgent calls and medical messages received on Thursday will be addressed when we are back in the office on Wednesday. Urgent matters will be reviewed and addressed by one of our partners in the office as needed.    If lab work was done today as part of your evaluation you will generally be contacted via [employer], mail, or phone with the results within 1-5 days. If there is an alarming result we will contact you by phone. Lab results come back at varying times, I generally wait until all labs are resulted before making comments on results. Please note labs are automatically released to [employer] (if you have signed up for it) once available-at times you may see these prior to my having a chance to review them as well.    If you need refills please contact your pharmacist. They will send a refill request to me to review. Please allow 3 business days for us to process all  refill requests. All narcotic refills should be handled in the clinic at the time of your visit.

## 2019-07-22 NOTE — LETTER
[address] [address]  Phone: [phone]  Fax: [phone]    July 22, 2019        Stacy Brown  [id number] [address] [place], [place] [address]E [facility] [address]          To whom it may concern:    RE: Stacy Brown    Patient was seen and treated today at our clinic. She will be off work for one week.  Patient may return to work 7/29/19 with the following:  No restrictions    Please contact me for questions or concerns.      Sincerely,        Humble Mejia PA-C
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
## 2019-07-22 NOTE — PROGRESS NOTES
Prior to injection, verified patient identity using patient's name and date of birth.  Due to injection administration, patient instructed to remain in clinic for 15 minutes  afterwards, and to report any adverse reaction to me immediately.    Joint injection was performed.      Drug Amount Wasted:  None.  Vial/Syringe: Single dose vial  : [employer]  EXPIRATION DATE:  2/2021  NDC: [address]-9  Lot   [id number]  GUILLE Goldman/damir hip

## 2019-07-24 DIAGNOSIS — M75.41 SUBACROMIAL IMPINGEMENT OF RIGHT SHOULDER: ICD-10-CM

## 2019-07-24 RX ORDER — OXYCODONE AND ACETAMINOPHEN 5; 325 MG/1; MG/1
1 TABLET ORAL
Qty: 30 TABLET | Refills: 0 | Status: SHIPPED | OUTPATIENT
Start: 2019-07-28 | End: 2019-08-26

## 2019-07-24 RX ORDER — TIZANIDINE 2 MG/1
TABLET ORAL
Qty: 30 TABLET | Refills: 1 | Status: SHIPPED | OUTPATIENT
Start: 2019-07-24 | End: 2019-10-02

## 2019-07-29 ENCOUNTER — TELEPHONE (OUTPATIENT)
Dept: ORTHOPEDICS | Facility: CLINIC | Age: 44
End: 2019-07-29

## 2019-07-29 NOTE — TELEPHONE ENCOUNTER
Patient called back and I gave her the message about her letter. Patient stated that she wants to   letter tomorrow morning.     Thank you,  Ofe Castro   for [facility]

## 2019-07-29 NOTE — LETTER
[address] [address]  Phone: [phone]  Fax: [phone]    July 29, 2019        Stacy Brown  [address] [facility] [address]          To whom it may concern:    RE: Stacy Brown    Patient is to remain off work until 8/5/19 at which time she may return with no restrictions.    Please contact me for questions or concerns.      Sincerely,        Humble Mejia PA-C

## 2019-07-29 NOTE — TELEPHONE ENCOUNTER
Per RADHA Mejia okay for letter. This was written and signed. Left message for patient as to how she would like to get this letter.  Placed copy of letter in RADHA Mejia's basket on first floor until we hear back from patient.     Karyn COLE RN. . .  7/29/2019, 4:32 PM

## 2019-07-29 NOTE — TELEPHONE ENCOUNTER
Stacy is still having issues with her hip pain after her injections last week.  She stated that Randal Mejia was willing to do a note for her to be off 2 weeks and she only took 1 week.  She would like to be off this week as well, she tried to go to work today and it was too much pain.  Please call her at [phone], this is her Cell.

## 2019-07-31 NOTE — TELEPHONE ENCOUNTER
Writer called patient and this was a duplicate letter that I found. Patient picked this up Tuesday.  Nadine Mckeon RN on 7/31/2019 at 3:56 PM

## 2019-08-07 ENCOUNTER — OFFICE VISIT (OUTPATIENT)
Dept: FAMILY MEDICINE | Facility: CLINIC | Age: 44
End: 2019-08-07
Payer: COMMERCIAL

## 2019-08-07 VITALS
TEMPERATURE: 97.1 F | RESPIRATION RATE: 14 BRPM | DIASTOLIC BLOOD PRESSURE: 78 MMHG | HEIGHT: 66 IN | HEART RATE: 68 BPM | OXYGEN SATURATION: 97 % | BODY MASS INDEX: 27.32 KG/M2 | WEIGHT: 170 LBS | SYSTOLIC BLOOD PRESSURE: 132 MMHG

## 2019-08-07 DIAGNOSIS — D50.8 IRON DEFICIENCY ANEMIA SECONDARY TO INADEQUATE DIETARY IRON INTAKE: ICD-10-CM

## 2019-08-07 DIAGNOSIS — M70.62 GREATER TROCHANTERIC BURSITIS OF BOTH HIPS: ICD-10-CM

## 2019-08-07 DIAGNOSIS — M70.61 GREATER TROCHANTERIC BURSITIS OF BOTH HIPS: ICD-10-CM

## 2019-08-07 DIAGNOSIS — Z79.4 TYPE 2 DIABETES MELLITUS WITH HYPERGLYCEMIA, WITH LONG-TERM CURRENT USE OF INSULIN (H): Primary | ICD-10-CM

## 2019-08-07 DIAGNOSIS — E11.65 TYPE 2 DIABETES MELLITUS WITH HYPERGLYCEMIA, WITH LONG-TERM CURRENT USE OF INSULIN (H): Primary | ICD-10-CM

## 2019-08-07 DIAGNOSIS — E78.5 HYPERLIPIDEMIA LDL GOAL <100: ICD-10-CM

## 2019-08-07 PROCEDURE — 99214 OFFICE O/P EST MOD 30 MIN: CPT | Performed by: FAMILY MEDICINE

## 2019-08-07 RX ORDER — FERROUS SULFATE 7.5 MG/0.5
45 SYRINGE (EA) ORAL DAILY
Qty: 100 ML | Refills: 3 | Status: SHIPPED | OUTPATIENT
Start: 2019-08-07 | End: 2020-05-05

## 2019-08-07 ASSESSMENT — ANXIETY QUESTIONNAIRES
5. BEING SO RESTLESS THAT IT IS HARD TO SIT STILL: NOT AT ALL
6. BECOMING EASILY ANNOYED OR IRRITABLE: SEVERAL DAYS
7. FEELING AFRAID AS IF SOMETHING AWFUL MIGHT HAPPEN: NOT AT ALL
IF YOU CHECKED OFF ANY PROBLEMS ON THIS QUESTIONNAIRE, HOW DIFFICULT HAVE THESE PROBLEMS MADE IT FOR YOU TO DO YOUR WORK, TAKE CARE OF THINGS AT HOME, OR GET ALONG WITH OTHER PEOPLE: NOT DIFFICULT AT ALL
2. NOT BEING ABLE TO STOP OR CONTROL WORRYING: NOT AT ALL
1. FEELING NERVOUS, ANXIOUS, OR ON EDGE: NOT AT ALL
GAD7 TOTAL SCORE: 1
3. WORRYING TOO MUCH ABOUT DIFFERENT THINGS: NOT AT ALL

## 2019-08-07 ASSESSMENT — PAIN SCALES - GENERAL: PAINLEVEL: SEVERE PAIN (7)

## 2019-08-07 ASSESSMENT — PATIENT HEALTH QUESTIONNAIRE - PHQ9
5. POOR APPETITE OR OVEREATING: NOT AT ALL
SUM OF ALL RESPONSES TO PHQ QUESTIONS 1-9: 3

## 2019-08-07 ASSESSMENT — MIFFLIN-ST. JEOR: SCORE: 1437.86
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
## 2019-08-08 ASSESSMENT — ANXIETY QUESTIONNAIRES: GAD7 TOTAL SCORE: 1

## 2019-08-11 DIAGNOSIS — D50.8 IRON DEFICIENCY ANEMIA SECONDARY TO INADEQUATE DIETARY IRON INTAKE: ICD-10-CM

## 2019-08-11 DIAGNOSIS — Z79.4 TYPE 2 DIABETES MELLITUS WITH HYPERGLYCEMIA, WITH LONG-TERM CURRENT USE OF INSULIN (H): ICD-10-CM

## 2019-08-11 DIAGNOSIS — E78.5 HYPERLIPIDEMIA LDL GOAL <100: ICD-10-CM

## 2019-08-11 DIAGNOSIS — E11.65 TYPE 2 DIABETES MELLITUS WITH HYPERGLYCEMIA, WITH LONG-TERM CURRENT USE OF INSULIN (H): ICD-10-CM

## 2019-08-11 LAB
ALT SERPL W P-5'-P-CCNC: 25 U/L (ref 0–50)
ANION GAP SERPL CALCULATED.3IONS-SCNC: 12 MMOL/L (ref 3–14)
BUN SERPL-MCNC: 11 MG/DL (ref 7–30)
CALCIUM SERPL-MCNC: 9.4 MG/DL (ref 8.5–10.1)
CHLORIDE SERPL-SCNC: 105 MMOL/L (ref 94–109)
CHOLEST SERPL-MCNC: 259 MG/DL
CO2 SERPL-SCNC: 27 MMOL/L (ref 20–32)
CREAT SERPL-MCNC: 0.48 MG/DL (ref 0.52–1.04)
FERRITIN SERPL-MCNC: 7 NG/ML (ref 12–150)
GFR SERPL CREATININE-BSD FRML MDRD: >90 ML/MIN/{1.73_M2}
GLUCOSE SERPL-MCNC: 150 MG/DL (ref 70–99)
HBA1C MFR BLD: 10.9 % (ref 0–5.6)
HDLC SERPL-MCNC: 49 MG/DL
LDLC SERPL CALC-MCNC: 162 MG/DL
NONHDLC SERPL-MCNC: 210 MG/DL
POTASSIUM SERPL-SCNC: 3.8 MMOL/L (ref 3.4–5.3)
SODIUM SERPL-SCNC: 144 MMOL/L (ref 133–144)
TRIGL SERPL-MCNC: 238 MG/DL

## 2019-08-11 PROCEDURE — 36415 COLL VENOUS BLD VENIPUNCTURE: CPT | Performed by: FAMILY MEDICINE

## 2019-08-11 PROCEDURE — 80048 BASIC METABOLIC PNL TOTAL CA: CPT | Performed by: FAMILY MEDICINE

## 2019-08-11 PROCEDURE — 80061 LIPID PANEL: CPT | Performed by: FAMILY MEDICINE

## 2019-08-11 PROCEDURE — 84460 ALANINE AMINO (ALT) (SGPT): CPT | Performed by: FAMILY MEDICINE

## 2019-08-11 PROCEDURE — 83036 HEMOGLOBIN GLYCOSYLATED A1C: CPT | Mod: QW | Performed by: FAMILY MEDICINE

## 2019-08-11 PROCEDURE — 82728 ASSAY OF FERRITIN: CPT | Performed by: FAMILY MEDICINE

## 2019-08-14 ENCOUNTER — OFFICE VISIT (OUTPATIENT)
Dept: ORTHOPEDICS | Facility: CLINIC | Age: 44
End: 2019-08-14
Payer: COMMERCIAL

## 2019-08-14 ENCOUNTER — TELEPHONE (OUTPATIENT)
Dept: ORTHOPEDICS | Facility: CLINIC | Age: 44
End: 2019-08-14

## 2019-08-14 VITALS
DIASTOLIC BLOOD PRESSURE: 70 MMHG | HEIGHT: 66 IN | BODY MASS INDEX: 27.4 KG/M2 | SYSTOLIC BLOOD PRESSURE: 120 MMHG | WEIGHT: 170.5 LBS

## 2019-08-14 DIAGNOSIS — Z79.4 TYPE 2 DIABETES MELLITUS WITH HYPERGLYCEMIA, WITH LONG-TERM CURRENT USE OF INSULIN (H): ICD-10-CM

## 2019-08-14 DIAGNOSIS — M54.16 LUMBAR RADICULOPATHY: ICD-10-CM

## 2019-08-14 DIAGNOSIS — M70.61 GREATER TROCHANTERIC BURSITIS OF BOTH HIPS: Primary | ICD-10-CM

## 2019-08-14 DIAGNOSIS — E11.65 TYPE 2 DIABETES MELLITUS WITH HYPERGLYCEMIA, WITH LONG-TERM CURRENT USE OF INSULIN (H): ICD-10-CM

## 2019-08-14 DIAGNOSIS — M70.62 GREATER TROCHANTERIC BURSITIS OF BOTH HIPS: Primary | ICD-10-CM

## 2019-08-14 PROCEDURE — 99213 OFFICE O/P EST LOW 20 MIN: CPT | Performed by: NURSE PRACTITIONER

## 2019-08-14 RX ORDER — DEXAMETHASONE SODIUM PHOSPHATE 4 MG/ML
INJECTION, SOLUTION INTRA-ARTICULAR; INTRALESIONAL; INTRAMUSCULAR; INTRAVENOUS; SOFT TISSUE
Qty: 5 ML | Refills: 0
Start: 2019-08-14 | End: 2019-12-18

## 2019-08-14 RX ORDER — DICLOFENAC SODIUM 75 MG/1
75 TABLET, DELAYED RELEASE ORAL 2 TIMES DAILY
Qty: 30 TABLET | Refills: 0 | Status: SHIPPED | OUTPATIENT
Start: 2019-08-14 | End: 2019-12-18

## 2019-08-14 ASSESSMENT — MIFFLIN-ST. JEOR: SCORE: 1440.13

## 2019-08-14 ASSESSMENT — PAIN SCALES - GENERAL: PAINLEVEL: EXTREME PAIN (9)
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
## 2019-08-14 NOTE — TELEPHONE ENCOUNTER
"\"Patient was seen and treated today at our clinic for an orthopedic related concern. At this point she is incapacitated from work.  Patient may not return to work until re-evaluation.     Next appointment is 2 weeks.     Please contact me for questions or concerns\"    Patient wants to limit the information given to employer.    Patient wants to wait until next week until Lorge is back as patient needs a \"doctor\" to write this note.    Nadine Mckeon RN on 8/14/2019 at 4:10 PM    "

## 2019-08-14 NOTE — TELEPHONE ENCOUNTER
SANDHYA for return call. Upon return call, please ask patient exactly what patient needs on note.    Nadine Mckeon RN on 8/14/2019 at 3:57 PM

## 2019-08-14 NOTE — LETTER
[address] [address]  Phone: [phone]  Fax: [phone]    August 14, 2019        Stacy Brown  [address] [facility] [place] [address]          To whom it may concern:    RE: Stacy Brown    Patient was seen and treated today at our clinic.  Patient may not return to work until reevaluation.    Next appointment is 2 weeks.    Please contact me for questions or concerns.      Sincerely,        JOVITA Cohen CNP

## 2019-08-14 NOTE — TELEPHONE ENCOUNTER
Our goal is to have forms completed with 72 hours, however some forms may require a visit or additional information.    Who is the form from?: Patient  Where the form came from: Patient or family brought in     What clinic location was the form placed at?: [place]  Where the form was placed: [facility] Box/Folder  What number is listed as a contact on the form?: YULISSA    Phone call message- patient request for a letter, form or note:    Date needed: within one week  Patient will  at the clinic when completed  Has the patient signed a consent form for release of information? YES    Additional comments:     Call taken on 8/14/2019 at 1:48 PM by Maria Martin    Type of letter, form or note: [place]

## 2019-08-15 ENCOUNTER — TELEPHONE (OUTPATIENT)
Dept: ORTHOPEDICS | Facility: OTHER | Age: 44
End: 2019-08-15

## 2019-08-15 NOTE — TELEPHONE ENCOUNTER
Reason for call:  Other   Patient called regarding (reason for call): call back  Additional comments: Brayan hips - Patient states she has a work note to be off for 2 weeks, she states the patients work is requesting her to go back to work tomorrow Please call asap, she would like to speak with Maria COLE     Phone number to reach patient:  Cell number on file:    Telephone Information:   Mobile [phone]       Best Time:  ASAP     Can we leave a detailed message on this number?  YES

## 2019-08-15 NOTE — TELEPHONE ENCOUNTER
Pt calling. They are not accepting the note that was written. This note has to come from an MD, cannot come from the CNP that is covering Dr. Clay while he is out. Can PCP's covering providers do this today?   Thank you,  Estephanie Chavez- Patient Representative

## 2019-08-15 NOTE — TELEPHONE ENCOUNTER
I have placed forms at the  for Stacy to , made a copy for my drawer and sent a copy to sadie Dick/BARBARA

## 2019-08-15 NOTE — TELEPHONE ENCOUNTER
Letter was reprinted and signed by Dr. Hammonds.  Left message for patient asking how she would like to get the letter. Does she want it faxed or come pick it up?     Karyn COLE RN. . .  8/15/2019, 2:56 PM

## 2019-08-26 ENCOUNTER — HOSPITAL ENCOUNTER (EMERGENCY)
Facility: CLINIC | Age: 44
Discharge: HOME | End: 2019-08-26
Attending: EMERGENCY MEDICINE | Admitting: EMERGENCY MEDICINE
Payer: COMMERCIAL

## 2019-08-26 ENCOUNTER — TELEPHONE (OUTPATIENT)
Dept: ORTHOPEDICS | Facility: OTHER | Age: 44
End: 2019-08-26

## 2019-08-26 VITALS
RESPIRATION RATE: 20 BRPM | TEMPERATURE: 97.8 F | DIASTOLIC BLOOD PRESSURE: 116 MMHG | OXYGEN SATURATION: 98 % | SYSTOLIC BLOOD PRESSURE: 159 MMHG

## 2019-08-26 DIAGNOSIS — M75.41 SUBACROMIAL IMPINGEMENT OF RIGHT SHOULDER: ICD-10-CM

## 2019-08-26 DIAGNOSIS — M54.41 ACUTE MIDLINE LOW BACK PAIN WITH RIGHT-SIDED SCIATICA: ICD-10-CM

## 2019-08-26 PROCEDURE — 25000128 H RX IP 250 OP 636: Performed by: EMERGENCY MEDICINE

## 2019-08-26 PROCEDURE — 99285 EMERGENCY DEPT VISIT HI MDM: CPT | Performed by: EMERGENCY MEDICINE

## 2019-08-26 PROCEDURE — 96372 THER/PROPH/DIAG INJ SC/IM: CPT | Performed by: EMERGENCY MEDICINE

## 2019-08-26 PROCEDURE — 99285 EMERGENCY DEPT VISIT HI MDM: CPT | Mod: Z6 | Performed by: EMERGENCY MEDICINE

## 2019-08-26 RX ORDER — TRAMADOL HYDROCHLORIDE 50 MG/1
50-100 TABLET ORAL EVERY 6 HOURS PRN
Qty: 20 TABLET | Refills: 0 | Status: SHIPPED | OUTPATIENT
Start: 2019-08-26 | End: 2019-08-28 | Stop reason: SINTOL

## 2019-08-26 RX ORDER — OXYCODONE AND ACETAMINOPHEN 5; 325 MG/1; MG/1
1 TABLET ORAL
Qty: 30 TABLET | Refills: 0 | Status: SHIPPED | OUTPATIENT
Start: 2019-08-27 | End: 2019-08-28

## 2019-08-26 RX ADMIN — HYDROMORPHONE HYDROCHLORIDE 1 MG: 1 INJECTION, SOLUTION INTRAMUSCULAR; INTRAVENOUS; SUBCUTANEOUS at 19:26

## 2019-08-26 NOTE — TELEPHONE ENCOUNTER
In the absence of symptoms like numbness/tingling, loss of bowel or bladder control or leg weakness with this we are scheduled to see her in 2 days and discuss more fully in clinic.    If there are any of the above symptoms, seek immediate medical evaluation.    Would recommend to continue previous interventions and we will discuss more in return visit.    JOVITA Monroy, CNP  Orthopedic Surgery

## 2019-08-26 NOTE — TELEPHONE ENCOUNTER
Reason for Call:  Other appointment    Detailed comments: Patient calling because she had seen Surya and he told her to call if there were any new issues and over the weekend she developed a burning sensation in her rt upper thigh and above her knee. Is this normal? Please call her at  [phone]    Phone Number Patient can be reached at: Home number on file  [phone]    Best Time: any    Can we leave a detailed message on this number? YES    Call taken on 8/26/2019 at 8:43 AM by Nadine Glez

## 2019-08-26 NOTE — ED TRIAGE NOTES
Pt reports hip pain that started about 6 weeks ago that she has doctoring for, reports Saturday pain started in her right leg and back. Pt has an ortho appointment on Wednesday but states she was unable to tolerate the pain.

## 2019-08-26 NOTE — TELEPHONE ENCOUNTER
Requested Prescriptions   Pending Prescriptions Disp Refills     oxyCODONE-acetaminophen (PERCOCET) 5-325 MG tablet 30 tablet 0     Sig: Take 1 tablet by mouth nightly as needed for moderate to severe pain       There is no refill protocol information for this order        Last Written Prescription Date:  7/28/2019  Last Fill Quantity: 30,  # refills: 0   Last office visit: 8/7/2019 with prescribing provider:     Future Office Visit:   Next 5 appointments (look out 90 days)    Aug 28, 2019  9:50 AM CDT  [facility] DO  [facility] [address] [place] [id number] [phone]         Subacromial impingement of right shoulder [M75.41]     Routing refill request to provider for review/approval because:  Drug not on the [facility] refill protocol     Deanne Mace RN

## 2019-08-26 NOTE — TELEPHONE ENCOUNTER
Informed patient, she denied any other symptoms and will discuss this more at her follow up in 2 days.     Karyn COLE RN. . .  8/26/2019, 2:03 PM
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
## 2019-08-27 NOTE — DISCHARGE INSTRUCTIONS
You may use Aleve, 2 pills twice a day as needed for pain.  Or instead of Aleve you can use ibuprofen.  You may use the tramadol as needed for pain in between.

## 2019-08-28 ENCOUNTER — OFFICE VISIT (OUTPATIENT)
Dept: ORTHOPEDICS | Facility: CLINIC | Age: 44
End: 2019-08-28
Payer: COMMERCIAL

## 2019-08-28 ENCOUNTER — OFFICE VISIT (OUTPATIENT)
Dept: FAMILY MEDICINE | Facility: CLINIC | Age: 44
End: 2019-08-28
Payer: COMMERCIAL

## 2019-08-28 VITALS
WEIGHT: 170 LBS | BODY MASS INDEX: 27.32 KG/M2 | HEIGHT: 66 IN | SYSTOLIC BLOOD PRESSURE: 161 MMHG | DIASTOLIC BLOOD PRESSURE: 96 MMHG

## 2019-08-28 VITALS
TEMPERATURE: 97.2 F | BODY MASS INDEX: 27.7 KG/M2 | SYSTOLIC BLOOD PRESSURE: 178 MMHG | OXYGEN SATURATION: 98 % | RESPIRATION RATE: 16 BRPM | WEIGHT: 171.6 LBS | HEART RATE: 95 BPM | DIASTOLIC BLOOD PRESSURE: 102 MMHG

## 2019-08-28 DIAGNOSIS — R42 DIZZINESS: ICD-10-CM

## 2019-08-28 DIAGNOSIS — M70.61 GREATER TROCHANTERIC BURSITIS OF BOTH HIPS: ICD-10-CM

## 2019-08-28 DIAGNOSIS — Z79.4 TYPE 2 DIABETES MELLITUS WITH HYPERGLYCEMIA, WITH LONG-TERM CURRENT USE OF INSULIN (H): ICD-10-CM

## 2019-08-28 DIAGNOSIS — M54.50 LOW BACK PAIN POTENTIALLY ASSOCIATED WITH RADICULOPATHY: ICD-10-CM

## 2019-08-28 DIAGNOSIS — M70.61 GREATER TROCHANTERIC BURSITIS OF BOTH HIPS: Primary | ICD-10-CM

## 2019-08-28 DIAGNOSIS — G89.4 CHRONIC PAIN SYNDROME: ICD-10-CM

## 2019-08-28 DIAGNOSIS — R11.0 NAUSEA: ICD-10-CM

## 2019-08-28 DIAGNOSIS — R42 DIZZINESS: Primary | ICD-10-CM

## 2019-08-28 DIAGNOSIS — I10 BENIGN ESSENTIAL HYPERTENSION: ICD-10-CM

## 2019-08-28 DIAGNOSIS — E61.1 IRON DEFICIENCY: ICD-10-CM

## 2019-08-28 DIAGNOSIS — E11.65 TYPE 2 DIABETES MELLITUS WITH HYPERGLYCEMIA, WITH LONG-TERM CURRENT USE OF INSULIN (H): ICD-10-CM

## 2019-08-28 DIAGNOSIS — M70.62 GREATER TROCHANTERIC BURSITIS OF BOTH HIPS: ICD-10-CM

## 2019-08-28 DIAGNOSIS — M70.62 GREATER TROCHANTERIC BURSITIS OF BOTH HIPS: Primary | ICD-10-CM

## 2019-08-28 LAB
ALBUMIN SERPL-MCNC: 3.6 G/DL (ref 3.4–5)
ALBUMIN UR-MCNC: NEGATIVE MG/DL
ALP SERPL-CCNC: 90 U/L (ref 40–150)
ALT SERPL W P-5'-P-CCNC: 27 U/L (ref 0–50)
ANION GAP SERPL CALCULATED.3IONS-SCNC: 9 MMOL/L (ref 3–14)
APPEARANCE UR: CLEAR
AST SERPL W P-5'-P-CCNC: 20 U/L (ref 0–45)
BASOPHILS # BLD AUTO: 0 10E9/L (ref 0–0.2)
BASOPHILS NFR BLD AUTO: 0.4 %
BILIRUB SERPL-MCNC: 0.3 MG/DL (ref 0.2–1.3)
BILIRUB UR QL STRIP: NEGATIVE
BUN SERPL-MCNC: 11 MG/DL (ref 7–30)
CALCIUM SERPL-MCNC: 9.1 MG/DL (ref 8.5–10.1)
CHLORIDE SERPL-SCNC: 100 MMOL/L (ref 94–109)
CO2 SERPL-SCNC: 24 MMOL/L (ref 20–32)
COLOR UR AUTO: YELLOW
CREAT SERPL-MCNC: 0.42 MG/DL (ref 0.52–1.04)
DIFFERENTIAL METHOD BLD: ABNORMAL
EOSINOPHIL NFR BLD AUTO: 0.8 %
ERYTHROCYTE [DISTWIDTH] IN BLOOD BY AUTOMATED COUNT: 17.4 % (ref 10–15)
GFR SERPL CREATININE-BSD FRML MDRD: >90 ML/MIN/{1.73_M2}
GLUCOSE SERPL-MCNC: 330 MG/DL (ref 70–99)
GLUCOSE UR STRIP-MCNC: >499 MG/DL
HCT VFR BLD AUTO: 44.2 % (ref 35–47)
HGB BLD-MCNC: 14.3 G/DL (ref 11.7–15.7)
HGB UR QL STRIP: NEGATIVE
IMM GRANULOCYTES # BLD: 0 10E9/L (ref 0–0.4)
IMM GRANULOCYTES NFR BLD: 0.4 %
KETONES UR STRIP-MCNC: 5 MG/DL
LEUKOCYTE ESTERASE UR QL STRIP: ABNORMAL
LYMPHOCYTES # BLD AUTO: 1.4 10E9/L (ref 0.8–5.3)
LYMPHOCYTES NFR BLD AUTO: 16 %
MCH RBC QN AUTO: 24.6 PG (ref 26.5–33)
MCHC RBC AUTO-ENTMCNC: 32.4 G/DL (ref 31.5–36.5)
MCV RBC AUTO: 76 FL (ref 78–100)
MONOCYTES # BLD AUTO: 0.5 10E9/L (ref 0–1.3)
MONOCYTES NFR BLD AUTO: 5.5 %
MUCOUS THREADS #/AREA URNS LPF: PRESENT /LPF
NEUTROPHILS # BLD AUTO: 6.8 10E9/L (ref 1.6–8.3)
NEUTROPHILS NFR BLD AUTO: 76.9 %
NITRATE UR QL: NEGATIVE
NRBC # BLD AUTO: 0 10*3/UL
NRBC BLD AUTO-RTO: 0 /100
PH UR STRIP: 5 PH (ref 5–7)
PLATELET # BLD AUTO: 304 10E9/L (ref 150–450)
POTASSIUM SERPL-SCNC: 4.2 MMOL/L (ref 3.4–5.3)
PROT SERPL-MCNC: 7.9 G/DL (ref 6.8–8.8)
RBC # BLD AUTO: 5.82 10E12/L (ref 3.8–5.2)
RBC #/AREA URNS AUTO: 3 /HPF (ref 0–2)
SODIUM SERPL-SCNC: 133 MMOL/L (ref 133–144)
SOURCE: ABNORMAL
SP GR UR STRIP: 1.04 (ref 1–1.03)
SQUAMOUS #/AREA URNS AUTO: 3 /HPF (ref 0–1)
UROBILINOGEN UR STRIP-MCNC: 0 MG/DL (ref 0–2)
WBC # BLD AUTO: 8.9 10E9/L (ref 4–11)
WBC #/AREA URNS AUTO: 3 /HPF (ref 0–5)

## 2019-08-28 PROCEDURE — 81001 URINALYSIS AUTO W/SCOPE: CPT | Performed by: FAMILY MEDICINE

## 2019-08-28 PROCEDURE — 36415 COLL VENOUS BLD VENIPUNCTURE: CPT | Performed by: FAMILY MEDICINE

## 2019-08-28 PROCEDURE — 99213 OFFICE O/P EST LOW 20 MIN: CPT | Performed by: ORTHOPAEDIC SURGERY

## 2019-08-28 PROCEDURE — 80053 COMPREHEN METABOLIC PANEL: CPT | Performed by: FAMILY MEDICINE

## 2019-08-28 PROCEDURE — 85025 COMPLETE CBC W/AUTO DIFF WBC: CPT | Performed by: FAMILY MEDICINE

## 2019-08-28 PROCEDURE — 99214 OFFICE O/P EST MOD 30 MIN: CPT | Performed by: FAMILY MEDICINE

## 2019-08-28 RX ORDER — HEPARIN SODIUM,PORCINE 10 UNIT/ML
5 VIAL (ML) INTRAVENOUS
Status: CANCELLED | OUTPATIENT
Start: 2019-08-29

## 2019-08-28 RX ORDER — HEPARIN SODIUM (PORCINE) LOCK FLUSH IV SOLN 100 UNIT/ML 100 UNIT/ML
5 SOLUTION INTRAVENOUS
Status: CANCELLED | OUTPATIENT
Start: 2019-08-29

## 2019-08-28 RX ORDER — OXYCODONE AND ACETAMINOPHEN 5; 325 MG/1; MG/1
1 TABLET ORAL 2 TIMES DAILY
Qty: 60 TABLET | Refills: 0 | Status: SHIPPED | OUTPATIENT
Start: 2019-08-28 | End: 2019-10-10

## 2019-08-28 RX ORDER — METFORMIN HCL 500 MG
2000 TABLET, EXTENDED RELEASE 24 HR ORAL
Qty: 360 TABLET | Refills: 3 | Status: ON HOLD | OUTPATIENT
Start: 2019-08-28 | End: 2021-03-16

## 2019-08-28 ASSESSMENT — MIFFLIN-ST. JEOR: SCORE: 1437.86

## 2019-08-28 ASSESSMENT — PAIN SCALES - GENERAL: PAINLEVEL: EXTREME PAIN (8)

## 2019-08-28 NOTE — PATIENT INSTRUCTIONS
Patient Education     How to Quit Smoking  Smoking is one of the hardest habits to break. About half of all people who have ever smoked have been able to quit. Most people who still smoke want to quit. Here are some of the best ways to stop smoking.    Keep trying  Most smokers make many attempts at quitting before they are successful. It s important not to give up.  Go cold turkey  Most former smokers quit cold turkey (all at once). Trying to cut back gradually doesn't seem to work as well, perhaps because it continues the smoking habit. Also, it is possible to inhale more while smoking fewer cigarettes. This results in the same amount of nicotine in your body.  Get support  Support programs can be a big help, especially for heavy smokers. These groups offer lectures, ways to change behavior, and peer support. Here are some ways to find a support program:    Free national quitline: 800-QUIT-NOW (485-095-5751).    [facility] quit-smoking programs.    American Lung Association: (113.152.3074).    American Cancer Society (445-951-0796).  Support at home is important too. Nonsmokers can offer praise and encouragement. If the smoker in your life finds it hard to quit, encourage them to keep trying.  Over-the-counter medicines  Nicotine replacement therapy may make quitting easier. Certain aids, such as the nicotine patch, gum, and lozenges, are available without a prescription. It is best to use these under a doctor s care, though. The skin patch provides a steady supply of nicotine. Nicotine gum and lozenges give temporary bursts of low levels of nicotine. Both methods reduce the craving for cigarettes. Warning: If you have nausea, vomiting, dizziness, weakness, or a fast heartbeat, stop using these products and see your doctor.  Prescription medicines  After reviewing your smoking patterns and past attempts to quit, your doctor may offer a prescription medicine such as bupropion, varenicline, a nicotine inhaler, or  "nasal spray. Each has advantages and side effects. Your doctor can review these with you.  Health benefits of quitting  The benefits of quitting start right away and keep improving the longer you go without smoking. These benefits occur at any age.  So whether you are 17 or 70, quitting is a good decision. Some of the benefits include:    20 minutes: Blood pressure and pulse return to normal.    8 hours: Oxygen levels return to normal.    2 days: Ability to smell and taste begin to improve as damaged nerves regrow.    2 to 3 weeks: Circulation and lung function improve.    1 to 9 months: Coughing, congestion, and shortness of breath decrease; tiredness decreases.    1 year: Risk of heart attack decreases by half.    5 years: Risk of lung cancer decreases by half; risk of stroke becomes the same as a nonsmoker s.  For more on how to quit smoking, try these online resources:     Smokefree.gov    \"Clearing the Air\" booklet from the National Cancer [place]: smokefree.gov/sites/default/files/pdf/clearing-the-air-accessible.pdf  Date Last Reviewed: 3/1/2017    7370-6410 [employer]. [address] [place] [id number]. All rights reserved. This information is not intended as a substitute for professional medical care. Always follow your healthcare professional's instructions.           "
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
## 2019-08-28 NOTE — LETTER
[address] [address]  Phone: [phone]  Fax: [phone]    August 28, 2019        Stacy Brown  [address] [place], [place] [address]E [facility] [address]          To whom it may concern:    RE: Stacy Brown    Patient was seen and treated today at our clinic.  Patient may return to work Sept. 11th 2019 with the following:  No restrictions.    Please contact me for questions or concerns.      Sincerely,        Ayden Clay, DO
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
## 2019-08-29 ENCOUNTER — INFUSION THERAPY VISIT (OUTPATIENT)
Dept: INFUSION THERAPY | Facility: CLINIC | Age: 44
End: 2019-08-29
Attending: FAMILY MEDICINE
Payer: COMMERCIAL

## 2019-08-29 VITALS
BODY MASS INDEX: 27.91 KG/M2 | DIASTOLIC BLOOD PRESSURE: 65 MMHG | OXYGEN SATURATION: 96 % | SYSTOLIC BLOOD PRESSURE: 120 MMHG | WEIGHT: 172.9 LBS | TEMPERATURE: 97.9 F | HEART RATE: 87 BPM | RESPIRATION RATE: 16 BRPM

## 2019-08-29 DIAGNOSIS — E61.1 IRON DEFICIENCY: Primary | ICD-10-CM

## 2019-08-29 PROCEDURE — 96365 THER/PROPH/DIAG IV INF INIT: CPT

## 2019-08-29 PROCEDURE — 25000128 H RX IP 250 OP 636: Performed by: FAMILY MEDICINE

## 2019-08-29 PROCEDURE — 25800030 ZZH RX IP 258 OP 636: Performed by: FAMILY MEDICINE

## 2019-08-29 RX ORDER — HEPARIN SODIUM,PORCINE 10 UNIT/ML
5 VIAL (ML) INTRAVENOUS
Status: CANCELLED | OUTPATIENT
Start: 2019-09-05

## 2019-08-29 RX ORDER — HEPARIN SODIUM (PORCINE) LOCK FLUSH IV SOLN 100 UNIT/ML 100 UNIT/ML
5 SOLUTION INTRAVENOUS
Status: CANCELLED | OUTPATIENT
Start: 2019-09-05

## 2019-08-29 RX ADMIN — SODIUM CHLORIDE 250 ML: 9 INJECTION, SOLUTION INTRAVENOUS at 14:36

## 2019-08-29 RX ADMIN — FERRIC CARBOXYMALTOSE INJECTION 750 MG: 50 INJECTION, SOLUTION INTRAVENOUS at 14:37

## 2019-08-29 ASSESSMENT — PAIN SCALES - GENERAL: PAINLEVEL: EXTREME PAIN (8)

## 2019-08-29 NOTE — PROGRESS NOTES
Infusion Nursing Note:  Stacy Brown presents today for Day 1 Injectafer.    Patient seen by provider today: No, was seen in clinic by Dr. Muse yesterday.   present during visit today: Not Applicable.    Note: Arrived steady on her feet, alert, calm. Accompanied by . Admits to dizziness, nausea and chronic hip/back pain. Will be starting PT soon. Taking pain medication to alleviate pain. VSS, slightly elevated B/P after arrival.     Intravenous Access:  Peripheral IV placed.    Treatment Conditions:  Not Applicable.      Post Infusion Assessment:  Patient tolerated infusion without incident. VSS, B/P improved. Patient reports nausea is better, no other symptoms.  Patient observed for 15 minutes post Injectafer per protocol.  Blood return noted pre and post infusion.  Site patent and intact, free from redness, edema or discomfort.  No evidence of extravasations.  Peripheral IV access discontinued per protocol.       Discharge Plan:   Copy of AVS reviewed with patient and/or family.  Patient will return 9/5 for next appointment.  Patient discharged in stable condition accompanied by: .  Departure Mode: Ambulatory.    Padmaja Hendrickson, RN, RN

## 2019-08-30 ENCOUNTER — TELEPHONE (OUTPATIENT)
Dept: FAMILY MEDICINE | Facility: CLINIC | Age: 44
End: 2019-08-30

## 2019-08-30 ENCOUNTER — NURSE TRIAGE (OUTPATIENT)
Dept: NURSING | Facility: CLINIC | Age: 44
End: 2019-08-30

## 2019-08-30 DIAGNOSIS — K90.89 POOR IRON ABSORPTION: Primary | ICD-10-CM

## 2019-08-30 DIAGNOSIS — K90.9 MALABSORPTION OF IRON: ICD-10-CM

## 2019-08-30 NOTE — TELEPHONE ENCOUNTER
----- Message from Yaima Muse MD sent at 8/30/2019  4:12 PM CDT -----  Notify Stacy that her sugar was 330, but the other results we already went over in the clinic. We discussed these at her visit, likely elevated due to her recent steroid shots. Please send a copy of all labs for her records.   Yaima Muse MD

## 2019-08-30 NOTE — TELEPHONE ENCOUNTER
I left a message asking patient to return our call.  Please inform patient of the message below.  Julius Castellanos, CMA

## 2019-08-30 NOTE — TELEPHONE ENCOUNTER
Message from provider regarding lab results relayed to patient.    Amy Reece RN  [facility]      Reason for Disposition    Health Information question, no triage required and triager able to answer question    Protocols used: INFORMATION ONLY CALL-A-AH

## 2019-08-30 NOTE — RESULT ENCOUNTER NOTE
Notify Stacy that her sugar was 330, but the other results we already went over in the clinic. We discussed these at her visit, likely elevated due to her recent steroid shots. Please send a copy of all labs for her records.   Yaima Muse MD

## 2019-09-04 ENCOUNTER — DOCUMENTATION ONLY (OUTPATIENT)
Dept: FAMILY MEDICINE | Facility: CLINIC | Age: 44
End: 2019-09-04

## 2019-09-04 DIAGNOSIS — Z79.4 TYPE 2 DIABETES MELLITUS WITH HYPERGLYCEMIA, WITH LONG-TERM CURRENT USE OF INSULIN (H): Primary | ICD-10-CM

## 2019-09-04 DIAGNOSIS — E11.65 TYPE 2 DIABETES MELLITUS WITH HYPERGLYCEMIA, WITH LONG-TERM CURRENT USE OF INSULIN (H): Primary | ICD-10-CM

## 2019-09-04 NOTE — PROGRESS NOTES
Met in consultation for Diabetes Care Team Review.     Diabetes    Last A1C  Lab Results   Component Value Date    A1C 10.9 08/11/2019    A1C 8.9 05/01/2019    A1C 9.9 06/07/2018    A1C 9.7 11/21/2017    A1C 9.4 08/15/2017       Is the patient on a Statin? YES             Is the patient on Aspirin? NO    Medications     HMG CoA Reductase Inhibitors     simvastatin (ZOCOR) 20 MG tablet             Last three blood pressure readings:  BP Readings from Last 3 Encounters:   08/29/19 120/65   08/28/19 (!) 178/102   08/28/19 (!) 161/96       Date of last diabetes office visit: 8/28/2019     Tobacco History:     History   Smoking Status     Current Every Day Smoker     Packs/day: 0.50     Years: 6.00     Last attempt to quit: 9/22/2010   Smokeless Tobacco     Never Used           Summary:    Action recommended: Offer CDE?     Questions for provider review:  Start on ASA?    Plan: Message PCP about diabetes ed referral and/or aspirin therapy    Munira Medrano RN,BSN  Clinical Care Coordinator      Chelly Estrada [facility]  Care Coordination     Jemma Sanchez RDN, LD, CDE  Diabetic     Kimberly Estrada RN  [facility] Nurse    Leisa Thompson [facility]  Behavioral Health Clinician     Nadine Mckeon RN  Specialty Clinic

## 2019-09-06 ENCOUNTER — TELEPHONE (OUTPATIENT)
Dept: FAMILY MEDICINE | Facility: CLINIC | Age: 44
End: 2019-09-06

## 2019-09-06 ENCOUNTER — THERAPY VISIT (OUTPATIENT)
Dept: CHIROPRACTIC MEDICINE | Facility: CLINIC | Age: 44
End: 2019-09-06
Payer: COMMERCIAL

## 2019-09-06 DIAGNOSIS — M99.04 SEGMENTAL DYSFUNCTION OF SACRAL REGION: Primary | ICD-10-CM

## 2019-09-06 DIAGNOSIS — M99.06 SEGMENTAL DYSFUNCTION OF LOWER EXTREMITY: ICD-10-CM

## 2019-09-06 DIAGNOSIS — M99.02 SEGMENTAL DYSFUNCTION OF THORACIC REGION: ICD-10-CM

## 2019-09-06 DIAGNOSIS — M70.61 TROCHANTERIC BURSITIS OF RIGHT HIP: ICD-10-CM

## 2019-09-06 DIAGNOSIS — M99.03 SEGMENTAL DYSFUNCTION OF LUMBAR REGION: ICD-10-CM

## 2019-09-06 PROBLEM — K90.9 MALABSORPTION OF IRON: Status: ACTIVE | Noted: 2019-09-06

## 2019-09-06 PROBLEM — K90.89 POOR IRON ABSORPTION: Status: ACTIVE | Noted: 2019-09-06

## 2019-09-06 PROCEDURE — 99213 OFFICE O/P EST LOW 20 MIN: CPT | Mod: 25 | Performed by: CHIROPRACTOR

## 2019-09-06 PROCEDURE — 98941 CHIROPRACT MANJ 3-4 REGIONS: CPT | Mod: AT | Performed by: CHIROPRACTOR

## 2019-09-06 PROCEDURE — 97035 APP MDLTY 1+ULTRASOUND EA 15: CPT | Performed by: CHIROPRACTOR

## 2019-09-06 PROCEDURE — 98943 CHIROPRACT MANJ XTRSPINL 1/>: CPT | Performed by: CHIROPRACTOR

## 2019-09-06 NOTE — PROGRESS NOTES
I did request diabetes education referral and I did order ASA, yes I do recommend both for her.   Yaima Muse MD

## 2019-09-06 NOTE — TELEPHONE ENCOUNTER
Also I was notified that her iron infusion was not covered due to the associated diagnosis.  I clarified that her diagnosis is malabsorption of iron, she has tried and failed oral iron supplement.   Yaima Muse MD

## 2019-09-06 NOTE — LETTER
[address]   [address]  Tel. [phone] / Fax [phone]    September 10, 2019      Re:   Stacy Brown  [id number] [facility] [id number]          To whom it may concern,     I am the physician taking care of the above patient. Stacy has a diagnosis of severe iron deficiency.  Her ferritin is 7.  She is symptomatic with dizzyness, fatigue.  She is high risk due to diabetes as well, with glucose fluctuations. She is not anemic but still severely iron deficient. She has not been able to tolerate oral iron preparations well enough to have adequate response.  We have tried pill and liquid forms.  I have requested iron infusion for improved medical benefit. Please cover this to help her improve her iron level and her overall well being.       Sincerely,        Yaima Muse MD
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
## 2019-09-07 NOTE — TELEPHONE ENCOUNTER
Stacy is not anemic. She has severe iron deficiency unresponsive to oral therapy. Can we contact her insurance to start a PA for iron infusion?  Yaima Muse MD

## 2019-09-07 NOTE — TELEPHONE ENCOUNTER
----- Message from Joseheriberto Weber sent at 9/6/2019  3:48 PM CDT -----  Hello,    Unfortunately that code does not work per her insurance. They require an anemia diagnosis for coverage.    Thank you,  Jose Weber   - Infusion Therapy   [facility]   [email or web address]  [email or web address]            ----- Message -----  From: Yaima Muse MD  Sent: 9/6/2019   3:25 PM  To: Jose Weber    I did add in malabsorption of iron, hopefully that will help. She is not anemic, so I can't add in anemia. Please let me know if that diagnosis works.   Yaima Muse MD   ----- Message -----  From: Jose Weber  Sent: 9/4/2019   2:00 PM  To: MD Virgil Cast,    I'm reaching out regarding Mrs. Brown. Currently in her treatment plan for her injectafer there is only iron deficiency, in order for it to be on label and covered we would need an anemia diagnosis added as well. I do see some notes in her chart regarding that, is there any way to have that diagnosis attached to her treatment plan for infusion?    Thank you,  Jose Weber   - Infusion Therapy   [facility]   [email or web address]  [email or web address]

## 2019-09-09 ENCOUNTER — TELEPHONE (OUTPATIENT)
Dept: FAMILY MEDICINE | Facility: CLINIC | Age: 44
End: 2019-09-09

## 2019-09-09 ENCOUNTER — THERAPY VISIT (OUTPATIENT)
Dept: CHIROPRACTIC MEDICINE | Facility: CLINIC | Age: 44
End: 2019-09-09
Payer: COMMERCIAL

## 2019-09-09 ENCOUNTER — OFFICE VISIT (OUTPATIENT)
Dept: FAMILY MEDICINE | Facility: CLINIC | Age: 44
End: 2019-09-09
Payer: COMMERCIAL

## 2019-09-09 VITALS
BODY MASS INDEX: 29.12 KG/M2 | TEMPERATURE: 98.1 F | RESPIRATION RATE: 16 BRPM | SYSTOLIC BLOOD PRESSURE: 136 MMHG | HEART RATE: 94 BPM | WEIGHT: 180.4 LBS | DIASTOLIC BLOOD PRESSURE: 66 MMHG | OXYGEN SATURATION: 100 %

## 2019-09-09 DIAGNOSIS — Z23 NEED FOR PROPHYLACTIC VACCINATION AND INOCULATION AGAINST INFLUENZA: ICD-10-CM

## 2019-09-09 DIAGNOSIS — M99.02 SEGMENTAL DYSFUNCTION OF THORACIC REGION: ICD-10-CM

## 2019-09-09 DIAGNOSIS — E61.1 IRON DEFICIENCY: ICD-10-CM

## 2019-09-09 DIAGNOSIS — M70.61 TROCHANTERIC BURSITIS OF RIGHT HIP: ICD-10-CM

## 2019-09-09 DIAGNOSIS — K90.9 MALABSORPTION OF IRON: ICD-10-CM

## 2019-09-09 DIAGNOSIS — M99.03 SEGMENTAL DYSFUNCTION OF LUMBAR REGION: ICD-10-CM

## 2019-09-09 DIAGNOSIS — M70.61 GREATER TROCHANTERIC BURSITIS OF BOTH HIPS: Primary | ICD-10-CM

## 2019-09-09 DIAGNOSIS — M70.62 GREATER TROCHANTERIC BURSITIS OF BOTH HIPS: Primary | ICD-10-CM

## 2019-09-09 DIAGNOSIS — R42 DIZZINESS: ICD-10-CM

## 2019-09-09 DIAGNOSIS — Z79.4 TYPE 2 DIABETES MELLITUS WITH HYPERGLYCEMIA, WITH LONG-TERM CURRENT USE OF INSULIN (H): ICD-10-CM

## 2019-09-09 DIAGNOSIS — M46.1 SACROILIITIS (H): ICD-10-CM

## 2019-09-09 DIAGNOSIS — E11.65 TYPE 2 DIABETES MELLITUS WITH HYPERGLYCEMIA, WITH LONG-TERM CURRENT USE OF INSULIN (H): ICD-10-CM

## 2019-09-09 DIAGNOSIS — G89.4 CHRONIC PAIN SYNDROME: ICD-10-CM

## 2019-09-09 DIAGNOSIS — M99.04 SEGMENTAL DYSFUNCTION OF SACRAL REGION: Primary | ICD-10-CM

## 2019-09-09 DIAGNOSIS — M99.06 SEGMENTAL DYSFUNCTION OF LOWER EXTREMITY: ICD-10-CM

## 2019-09-09 PROCEDURE — 98941 CHIROPRACT MANJ 3-4 REGIONS: CPT | Mod: AT | Performed by: CHIROPRACTOR

## 2019-09-09 PROCEDURE — 90686 IIV4 VACC NO PRSV 0.5 ML IM: CPT | Performed by: FAMILY MEDICINE

## 2019-09-09 PROCEDURE — 99214 OFFICE O/P EST MOD 30 MIN: CPT | Mod: 25 | Performed by: FAMILY MEDICINE

## 2019-09-09 PROCEDURE — 90471 IMMUNIZATION ADMIN: CPT | Performed by: FAMILY MEDICINE

## 2019-09-09 PROCEDURE — 98943 CHIROPRACT MANJ XTRSPINL 1/>: CPT | Performed by: CHIROPRACTOR

## 2019-09-09 NOTE — PATIENT INSTRUCTIONS
I will let you know when I hear back about whether insurance will cover your iron infusion. In the mean time, I'd like you to start taking your iron orally again. Please make sure to take this consistently every day.     I provided you with a work letter that will let you return to work 5 days weekly, with Sunday and Wednesday off. Continue using your Percocet, Tizanidine, and Lidoderm patches for pain control as prescribed. Try to work on walking and stretching to help your bursitis. Follow up with your Chiropractor and Physical therapy as scheduled.     Use your calendar on your phone to set reminders for your Diabetes medications and other medications as we discussed. Continue to monitor your blood sugar and follow a low carb/ low sugar diet.     Follow up in 3 months for a Diabetes Follow Up or sooner if needed.

## 2019-09-09 NOTE — PROGRESS NOTES
Visit #:  2 of 8 based on treatment plan 6/12/2019    Subjective:  Stacy Brown is a 44 year old female who is seen in f/u up for:        Segmental dysfunction of lower extremity  Segmental dysfunction of lumbar region  Trochanteric bursitis of right hip  Segmental dysfunction of sacral region.     Since last visit on 9/6/2019,  Stacy Brown reports the following changes: Patient presents and states that she is sore in her lower back and hips, but she is going back to work on Thursday. She rates her current pain 7/10 and points to pain in her sacrum. Her hips are sore but seem somewhat better.        Objective:  The following was observed:    P: palpatory tenderness Right SI region/hip    A: static palpation demonstrates intersegmental asymmetry     R: motion palpation notes restricted motion    T: localized muscle spasm at: Gluteal, Lumbar erector spine and Piriformis R>>L      Assessment:    Segmental spinal dysfunction/restrictions found at:  T7 E, FR  T10 E, FR  L4 RR, LRR  Right SI posterior  Right hip LAD    Diagnoses:      1. Segmental dysfunction of lower extremity    2. Segmental dysfunction of lumbar region    3. Trochanteric bursitis of right hip    4. Segmental dysfunction of sacral region        Patient's condition:  Patient had restrictions pre-manipulation and Patient had decreased motion prior to manipulation    Treatment effectiveness:  Post manipulation there is better intersegmental movement and Patient claims to feel looser post manipulation      Procedures:  CMT:  77034 Chiropractic manipulative treatment 3-4 regions performed   81833 Chiropractic manipulative treatment extraspinal dysfunction/restriction  Thoracic: Diversified, T7, T10, Prone  Lumbar: Drop Table, L4, Prone  Pelvis: Drop Table, PSIS Right , Prone  Extra-spinal: Mobilization, Right hip LAD, Supine    Modalities:  MSTM to affected musculature  Flexion Distraction of lumbar spine.     Therapeutic procedures:  Gave patient Ice  instructions post adjustment, and instructions for acute care      Prognosis: Good    Progress towards Goals:   Patient got some improvement in hip pain, lower back is painful today.      Response to Treatment:   Patient tolerated treatment well today. When she got up from treatment table, she said it was the first time in 8 weeks she didn't have pain.       Recommendations:    Instructions:ice 20 minutes every other hour as needed    Follow-up:  Return to care Wednesday.

## 2019-09-09 NOTE — LETTER
[address] [address]  Phone: [phone]  Fax: [phone]    September 9, 2019        Stacy Brown  [address] [address] [place] [id number]          To whom it may concern:    RE: Stacy Brown    When the patient returns to work, the following restrictions apply until 12/31/19:    She may work full time days 5 days per week, needing Wednesday off and Sunday off. She has medical appointments that she needs to be able to attend on a regular basis for her health maintenance and pain control to be able to continue working, so needs Wednesdays off for those appointments.     She may work this Wednesday 9/11/19 as she has had time off until now, but starting next Wednesday 9/18 she needs to be off.     Please contact me for questions or concerns.      Sincerely,        aYima Muse MD
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
## 2019-09-09 NOTE — TELEPHONE ENCOUNTER
Per [facility] [employer], complete a letter of issue with being unresponsive to oral therapy in regards to iron deficiency and forward back to Jose Weber who is working on a pre- determination. Stephanie Guallpa LPN

## 2019-09-09 NOTE — TELEPHONE ENCOUNTER
Diabetes Education Scheduling Outreach #1:    Call to patient to schedule. Patient declined to schedule. She stated that it will be very hard to do at this time. She took out phone number and will call to schedule when it'll be easier for her.    Josselin Porter OnCall  Diabetes and Nutrition Scheduling

## 2019-09-09 NOTE — LETTER
[address] [address]  Phone: [phone]  Fax: [phone]    September 9, 2019        Stacy Brown  [address] [address] [place] [id number]          To whom it may concern:    RE: Stacy Brown    When the patient returns to work, the following restrictions apply until 12/31/19:    She may work full time days 5 days per week, needing Wednesday off and Sunday off. She has medical appointments that she needs to be able to attend on a regular basis for her health maintenance and pain control to be able to continue working, so needs Wednesdays off for those appointments.     Please contact me for questions or concerns.      Sincerely,        Yaima Muse MD

## 2019-09-10 ENCOUNTER — TELEPHONE (OUTPATIENT)
Dept: FAMILY MEDICINE | Facility: CLINIC | Age: 44
End: 2019-09-10

## 2019-09-10 NOTE — TELEPHONE ENCOUNTER
Diabetes Education Scheduling Outreach #1:    Call to patient to schedule. Patient declined to schedule and wants to talk to her pcp about it first.    Josselin Porter OnCall  Diabetes and Nutrition Scheduling

## 2019-09-11 NOTE — TELEPHONE ENCOUNTER
I spoke to her and explained that it is to learn about the insulin pump.  She is interested in this.  She agrees to make appointment with diabetes ed. Please call her again and schedule.   Yaima Muse MD

## 2019-09-11 NOTE — TELEPHONE ENCOUNTER
Please see the letter in her chart regarding her iron. Please get this to her insurance company to request coverage of the iron infusion.   Yaima Muse MD

## 2019-09-13 ENCOUNTER — TELEPHONE (OUTPATIENT)
Dept: FAMILY MEDICINE | Facility: CLINIC | Age: 44
End: 2019-09-13

## 2019-09-13 NOTE — TELEPHONE ENCOUNTER
Reason for Call:  Form, our goal is to have forms completed with 72 hours, however, some forms may require a visit or additional information.    Type of letter, form or note:  YULISSA    Who is the form from?: Patient    Where did the form come from: Patient or family brought in       What clinic location was the form placed at?:[facility]     Where the form was placed:  Mail Box  Box/Folder    What number is listed as a contact on the form?: [phone]       Additional comments: Patients  dropping of [place] paperwork to be filled out. Requesting form to be faxed to [phone] when completed     Call taken on 9/13/2019 at 10:36 AM by Shirin Estrada

## 2019-09-18 ENCOUNTER — THERAPY VISIT (OUTPATIENT)
Dept: CHIROPRACTIC MEDICINE | Facility: CLINIC | Age: 44
End: 2019-09-18
Payer: COMMERCIAL

## 2019-09-18 ENCOUNTER — HOSPITAL ENCOUNTER (OUTPATIENT)
Dept: PHYSICAL THERAPY | Facility: CLINIC | Age: 44
Setting detail: THERAPIES SERIES
End: 2019-09-18
Attending: NURSE PRACTITIONER
Payer: COMMERCIAL

## 2019-09-18 DIAGNOSIS — M70.61 TROCHANTERIC BURSITIS OF RIGHT HIP: ICD-10-CM

## 2019-09-18 DIAGNOSIS — M99.03 SEGMENTAL DYSFUNCTION OF LUMBAR REGION: ICD-10-CM

## 2019-09-18 DIAGNOSIS — M99.06 SEGMENTAL DYSFUNCTION OF LOWER EXTREMITY: ICD-10-CM

## 2019-09-18 DIAGNOSIS — M99.02 SEGMENTAL DYSFUNCTION OF THORACIC REGION: ICD-10-CM

## 2019-09-18 DIAGNOSIS — M99.04 SEGMENTAL DYSFUNCTION OF SACRAL REGION: Primary | ICD-10-CM

## 2019-09-18 PROCEDURE — 97162 PT EVAL MOD COMPLEX 30 MIN: CPT | Mod: GP | Performed by: PHYSICAL THERAPIST

## 2019-09-18 PROCEDURE — 97161 PT EVAL LOW COMPLEX 20 MIN: CPT | Mod: GP | Performed by: PHYSICAL THERAPIST

## 2019-09-18 PROCEDURE — 97112 NEUROMUSCULAR REEDUCATION: CPT | Mod: GP | Performed by: PHYSICAL THERAPIST

## 2019-09-18 PROCEDURE — 98941 CHIROPRACT MANJ 3-4 REGIONS: CPT | Mod: AT | Performed by: CHIROPRACTOR

## 2019-09-18 PROCEDURE — 98943 CHIROPRACT MANJ XTRSPINL 1/>: CPT | Performed by: CHIROPRACTOR

## 2019-09-18 PROCEDURE — 97140 MANUAL THERAPY 1/> REGIONS: CPT | Mod: GP | Performed by: PHYSICAL THERAPIST

## 2019-09-18 NOTE — PROGRESS NOTES
Visit #:  2 of 8 based on treatment plan 6/12/2019    Subjective:  Stacy Brown is a 44 year old female who is seen in f/u up for:        Segmental dysfunction of lower extremity  Segmental dysfunction of lumbar region  Trochanteric bursitis of right hip  Segmental dysfunction of sacral region.     Since last visit on 9/9/2019,  Stacy Brown reports the following changes: Patient presents and states that she is better than she was last week. She is back to work and doing well. She doesn't feel near the pain that she has had the last few weeks. She rates her current pain 4/10 in the right SI region. She does have some left-sided SI pain, rated 2/10.        Objective:  The following was observed:    P: palpatory tenderness Right SI region/hip    A: static palpation demonstrates intersegmental asymmetry     R: motion palpation notes restricted motion    T: localized muscle spasm at: Gluteal, Lumbar erector spine and Piriformis R>>L      Assessment:    Segmental spinal dysfunction/restrictions found at:  T7 E, FR  T10 E, FR  L4 RR, LRR  Right SI posterior  Right hip LAD    Diagnoses:      1. Segmental dysfunction of lower extremity    2. Segmental dysfunction of lumbar region    3. Trochanteric bursitis of right hip    4. Segmental dysfunction of sacral region        Patient's condition:  Patient had restrictions pre-manipulation and Patient had decreased motion prior to manipulation    Treatment effectiveness:  Post manipulation there is better intersegmental movement and Patient claims to feel looser post manipulation      Procedures:  CMT:  53897 Chiropractic manipulative treatment 3-4 regions performed   13011 Chiropractic manipulative treatment extraspinal dysfunction/restriction  Thoracic: Diversified, T7, T10, Prone  Lumbar: Drop Table, L4, Prone  Pelvis: Drop Table, PSIS Right , Prone  Extra-spinal: Mobilization, Right hip LAD, Supine    Modalities:  MSTM to affected musculature  Flexion Distraction of  lumbar spine.     Therapeutic procedures:  Gave patient Ice instructions post adjustment, and instructions for acute care      Prognosis: Good    Progress towards Goals:   Patient is improving with consistent care.    Response to Treatment:   Patient tolerated treatment well today.       Recommendations:    Instructions:ice 20 minutes every other hour as needed    Follow-up:  Return to care Wednesday.

## 2019-09-24 NOTE — PROGRESS NOTES
[facility]          OUTPATIENT PHYSICAL THERAPY ORTHOPEDIC EVALUATION  PLAN OF TREATMENT FOR OUTPATIENT REHABILITATION  (COMPLETE FOR INITIAL CLAIMS ONLY)  Patient's Last Name, First Name, M.I.  YOB: 1975  Stacy Brown    Provider s Name:  [facility]   Medical Record No.  [id number]   Start of Care Date:  09/18/19   Onset Date:  07/23/19(approx 2-3 months ago)   Type:     _X__PT   ___OT   ___SLP Medical Diagnosis:  Neck pain complicated by previous lumbar surgery     PT Diagnosis:  Cervical tightness/myofascial symptoms   Visits from SOC:  1      _________________________________________________________________________________  Plan of Treatment/Functional Goals:  manual therapy, joint mobilization, neuromuscular re-education, ROM, strengthening, stretching        as needed for symptoms  Goals  Goal Identifier: AROM  Goal Description: Maximize cervical AROM for looking around her environment especially for driving  Target Date: 10/17/19    Goal Identifier: House hold tasks  Goal Description: Stacy will be able to complete her home tasks such as vacuuming applying the concepts of pacing so she can complete these tasks with improvement (JACOB) in her NDI  Target Date: 10/31/19       Therapy Frequency:     Predicted Duration of Therapy Intervention:  1-2 times per week x 6 weeks    Aylin Urena, PT                 I CERTIFY THE NEED FOR THESE SERVICES FURNISHED UNDER        THIS PLAN OF TREATMENT AND WHILE UNDER MY CARE .             Physician Signature               Date    X_____________________________________________________                             Certification Date From:  09/18/19   Certification Date To:  10/31/19    Referring Provider:  Abdelrahman Elmore ATC PT    Initial Assessment        See Epic Evaluation Start of Care Date: 09/18/19

## 2019-09-24 NOTE — PROGRESS NOTES
09/18/19 1507   General Information   Type of Visit Initial OP Ortho PT Evaluation   Start of Care Date 09/18/19   Referring Physician Abdelrahman Elmore ATC PT   Patient/Family Goals Statement continue working    Orders Evaluate and Treat   Date of Order 08/25/19   Certification Required? Yes  (Medicare [employer])   Medical Diagnosis Neck pain complicated by previous lumbar surgery   Surgical/Medical history reviewed Yes   Precautions/Limitations no known precautions/limitations   Weight-Bearing Status - LUE full weight-bearing   Weight-Bearing Status - RUE full weight-bearing   Weight-Bearing Status - LLE full weight-bearing   Weight-Bearing Status - RLE full weight-bearing       Present No   Body Part(s)   Body Part(s) Cervical Spine   Presentation and Etiology   Pertinent history of current problem (include personal factors and/or comorbidities that impact the POC) 45 yo female with low back injury. She has been to the ED recently due to this low back pain with leg pain. She reported this on set was approximately 6 weeks ago. She has had chronic pain. She is here specifically for her cervical pain. She has had this a while and she believes this is complicated by her low back issues. Her sympoms for her neck are in the L neck and into the L scapula with radiation into the L elbow. She reports it started as a  like shape from her R-> L side of her neck and is now into the lateral aspect of her neck.    Impairments A. Pain;D. Decreased ROM;E. Decreased flexibility;K. Numbness  (numbess and burning in her low back and hips)   Functional Limitations perform activities of daily living;perform required work activities;perform desired leisure / sports activities   Symptom Location as above   How/Where did it occur From insidious onset  (see above)   Onset date of current episode/exacerbation 07/23/19  (approx 2-3 months ago)   Chronicity Chronic   Frequency of pain/symptoms A. Constant   Pain/symptoms  are: Worse in the morning   Pain/symptoms exacerbated by M. Other   Pain exacerbation comment Long sitting 1+ hour as well sitting, walking long distance 5 minutes, lifting heavy packages (approx 60# - needs to be able to lift 70#).   Pain/symptoms eased by K. Other   Pain eased by comment Low back: ice, bed massager, muscle relaxant and oxycodone for sleeping, chiropractor   Progression of symptoms since onset: Improved   Current / Previous Interventions   Diagnostic Tests:   (none at this time. )   Prior Level of Function   Functional Level Prior Comment low back pain and chronic pain issues   Current Level of Function   Current Community Support Family/friend caregiver  ( 12 yo son and aolder daughter both autistic, disab h)   Patient role/employment history A. Employed   Living environment House/townhome   Home/community accessibility long walks if parking a distance and long term sitting at job.    Fall Risk Screen   Fall screen completed by PT   Have you fallen 2 or more times in the past year? Yes   Have you fallen and had an injury in the past year? No   Is patient a fall risk? No   Fall screen comments freezer in garage and had burning in leg and would not bear weight. She was also in kitchen and had dizzy issues.    Abuse Screen (yes response referral indicated)   Feels Unsafe at Home or Work/School no   Feels Threatened by Someone no   Does Anyone Try to Keep You From Having Contact with Others or Doing Things Outside Your Home? no   Physical Signs of Abuse Present no   Functional Scales   Functional Scales Other   Other Scales  NDI: 31 - discussed results   Cervical Spine   Observation moving around in sitting to get comfortable throughout the session.    Integumentary  negative   Posture forward head and shoulders, Increased thoraic kyphosis, lumbar lordosis and cervical lordosis. Feet and knees are neutral position   Cervical Flexion ROM pulling posterior neck and 85%   Cervical Extension ROM  "slight deviation to the R and 25%   Cervical Right Side Bending ROM 35% with pulling L   Cervical Left Side Bending ROM 45% with pain L   Cervical Right Rotation ROM 40% with L sided pulling   Cervical Left Rotation ROM 30% with L sided symptoms   Alar Ligament Test negative   Transverse Ligament Test negative   Spurling Test negative   Cervical Distraction Test Positive   Cervical/Shoulder Special Tests Comments CARLY negative but felt \"something\" she could not describe and felt it in her ulnar distribution and it was not familiar to her.    Segmental Mobility-Cervical Very tight and tender with supine PA glides   Palpation Nontender along the SCMs, tender over the posterior scalenes and L suboccipital base tightness, tightness along the L>R upper traps   UE Neural Tension   (Neg with neural glides bilaterally)   Planned Therapy Interventions   Planned Therapy Interventions manual therapy;joint mobilization;neuromuscular re-education;ROM;strengthening;stretching   Planned Modality Interventions   Planned Modality Interventions Comments as needed for symptoms   Clinical Impression   Criteria for Skilled Therapeutic Interventions Met yes, treatment indicated   PT Diagnosis Cervical tightness/myofascial symptoms   Influenced by the following impairments decreased cervical AROM, did not tolerate the AROM - increased symptoms   Functional limitations due to impairments sleeping, looking around environment for driving, lifting, work and home activities    Clinical Presentation Evolving/Changing   Clinical Presentation Rationale clinical judgement   Clinical Decision Making (Complexity) Moderate complexity   Predicted Duration of Therapy Intervention (days/wks) 1-2 times per week x 6 weeks   Risk & Benefits of therapy have been explained Yes   Patient, Family & other staff in agreement with plan of care Yes   Clinical Impression Comments Stacy tends to push through her symptoms with her activities. She is very " uncomfortable today so assessment was held to be completed for reflexes and strength tests for shoulder   Education Assessment   Preferred Learning Style Reading   Barriers to Learning No barriers   ORTHO GOALS   PT Ortho Eval Goals 1;2   Ortho Goal 1   Goal Identifier AROM   Goal Description Maximize cervical AROM for looking around her environment especially for driving   Target Date 10/17/19   Ortho Goal 2   Goal Identifier House hold tasks   Goal Description Stacy will be able to complete her home tasks such as vacuuming applying the concepts of pacing so she can complete these tasks with improvement (JACOB) in her NDI   Target Date 10/31/19   Total Evaluation Time   PT Eval, Moderate Complexity Minutes (66561) 24   Therapy Certification   Certification date from 09/18/19   Certification date to 10/31/19   Medical Diagnosis Neck pain complicated by previous lumbar surgery     Thank you for referring Stacy  to [facility] [place]    Aylin Urena, PT  [phone]

## 2019-09-25 ENCOUNTER — THERAPY VISIT (OUTPATIENT)
Dept: CHIROPRACTIC MEDICINE | Facility: CLINIC | Age: 44
End: 2019-09-25
Payer: COMMERCIAL

## 2019-09-25 DIAGNOSIS — M70.61 TROCHANTERIC BURSITIS OF RIGHT HIP: ICD-10-CM

## 2019-09-25 DIAGNOSIS — M99.02 SEGMENTAL DYSFUNCTION OF THORACIC REGION: ICD-10-CM

## 2019-09-25 DIAGNOSIS — M99.06 SEGMENTAL DYSFUNCTION OF LOWER EXTREMITY: ICD-10-CM

## 2019-09-25 DIAGNOSIS — M99.04 SEGMENTAL DYSFUNCTION OF SACRAL REGION: Primary | ICD-10-CM

## 2019-09-25 DIAGNOSIS — M99.03 SEGMENTAL DYSFUNCTION OF LUMBAR REGION: ICD-10-CM

## 2019-09-25 PROCEDURE — 98943 CHIROPRACT MANJ XTRSPINL 1/>: CPT | Performed by: CHIROPRACTOR

## 2019-09-25 PROCEDURE — 98941 CHIROPRACT MANJ 3-4 REGIONS: CPT | Mod: AT | Performed by: CHIROPRACTOR

## 2019-09-25 NOTE — PROGRESS NOTES
Visit #:  3 of 8 based on treatment plan 6/12/2019    Subjective:  Stacy Brown is a 44 year old female who is seen in f/u up for:        Segmental dysfunction of lower extremity  Segmental dysfunction of lumbar region  Trochanteric bursitis of right hip  Segmental dysfunction of sacral region.     Since last visit on 9/18/2019,  Stacy Brown reports the following changes: Patient presents and states that she is doing better. She has been able to work her days without crying at the end. She feels like with the adjustment and PT she is getting improvement. She rates her current pain 5/10.      Objective:  The following was observed:    P: palpatory tenderness Right SI region/hip    A: static palpation demonstrates intersegmental asymmetry     R: motion palpation notes restricted motion    T: localized muscle spasm at: Gluteal, Lumbar erector spine and Piriformis R>>L      Assessment:    Segmental spinal dysfunction/restrictions found at:  T7 E, FR  T10 E, FR  L4 RR, LRR  Right SI posterior  Right hip LAD    Diagnoses:      1. Segmental dysfunction of lower extremity    2. Segmental dysfunction of lumbar region    3. Trochanteric bursitis of right hip    4. Segmental dysfunction of sacral region        Patient's condition:  Patient had restrictions pre-manipulation and Patient had decreased motion prior to manipulation    Treatment effectiveness:  Post manipulation there is better intersegmental movement and Patient claims to feel looser post manipulation      Procedures:  CMT:  19221 Chiropractic manipulative treatment 3-4 regions performed   68693 Chiropractic manipulative treatment extraspinal dysfunction/restriction  Thoracic: Diversified, T7, T10, Prone  Lumbar: Drop Table, L4, Prone  Pelvis: Drop Table, PSIS Right , Prone  Extra-spinal: Mobilization, Right hip LAD, Supine    Modalities:  MSTM to affected musculature  Flexion Distraction of lumbar spine.     Therapeutic procedures:  Gave patient Ice  instructions post adjustment, and instructions for acute care      Prognosis: Good    Progress towards Goals:   Patient is improving with consistent care.    Response to Treatment:   Patient tolerated treatment well today.       Recommendations:    Instructions:ice 20 minutes every other hour as needed    Follow-up:  Return to care next week for 1 more week to make sure patient can maintain relief and continue working.
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
## 2019-10-02 ENCOUNTER — ALLIED HEALTH/NURSE VISIT (OUTPATIENT)
Dept: EDUCATION SERVICES | Facility: CLINIC | Age: 44
End: 2019-10-02
Payer: COMMERCIAL

## 2019-10-02 ENCOUNTER — THERAPY VISIT (OUTPATIENT)
Dept: CHIROPRACTIC MEDICINE | Facility: CLINIC | Age: 44
End: 2019-10-02
Payer: COMMERCIAL

## 2019-10-02 DIAGNOSIS — M99.04 SEGMENTAL DYSFUNCTION OF SACRAL REGION: Primary | ICD-10-CM

## 2019-10-02 DIAGNOSIS — M70.61 TROCHANTERIC BURSITIS OF RIGHT HIP: ICD-10-CM

## 2019-10-02 DIAGNOSIS — M99.03 SEGMENTAL DYSFUNCTION OF LUMBAR REGION: ICD-10-CM

## 2019-10-02 DIAGNOSIS — E11.65 TYPE 2 DIABETES MELLITUS WITH HYPERGLYCEMIA, WITH LONG-TERM CURRENT USE OF INSULIN (H): Primary | ICD-10-CM

## 2019-10-02 DIAGNOSIS — M99.02 SEGMENTAL DYSFUNCTION OF THORACIC REGION: ICD-10-CM

## 2019-10-02 DIAGNOSIS — Z79.4 TYPE 2 DIABETES MELLITUS WITH HYPERGLYCEMIA, WITH LONG-TERM CURRENT USE OF INSULIN (H): Primary | ICD-10-CM

## 2019-10-02 DIAGNOSIS — M99.06 SEGMENTAL DYSFUNCTION OF LOWER EXTREMITY: ICD-10-CM

## 2019-10-02 DIAGNOSIS — M75.41 SUBACROMIAL IMPINGEMENT OF RIGHT SHOULDER: ICD-10-CM

## 2019-10-02 PROCEDURE — 98941 CHIROPRACT MANJ 3-4 REGIONS: CPT | Mod: AT | Performed by: CHIROPRACTOR

## 2019-10-02 PROCEDURE — G0108 DIAB MANAGE TRN  PER INDIV: HCPCS

## 2019-10-02 RX ORDER — FLASH GLUCOSE SENSOR
1 KIT MISCELLANEOUS
Qty: 2 EACH | Refills: 11 | Status: SHIPPED | OUTPATIENT
Start: 2019-10-02 | End: 2020-10-29

## 2019-10-02 RX ORDER — FLASH GLUCOSE SCANNING READER
1 EACH MISCELLANEOUS CONTINUOUS
Qty: 1 DEVICE | Refills: 0 | Status: SHIPPED | OUTPATIENT
Start: 2019-10-02 | End: 2020-10-29

## 2019-10-02 NOTE — PROGRESS NOTES
Visit #:  4 of 8 based on treatment plan 6/12/2019    Subjective:  Stacy Brown is a 44 year old female who is seen in f/u up for:        Segmental dysfunction of lower extremity  Segmental dysfunction of lumbar region  Trochanteric bursitis of right hip  Segmental dysfunction of sacral region.     Since last visit on 9/25/2019,  Stacy Brown reports the following changes: Patient presents and states that she had to go into work this morning for 4 hours. She states that her pain is getting better, rated 4/10 today. She points to pain in the middle of her back and less into her right hip.         Objective:  The following was observed:    P: palpatory tenderness in central lower back    A: static palpation demonstrates intersegmental asymmetry     R: motion palpation notes restricted motion    T: localized muscle spasm at: Gluteal, Lumbar erector spine and Piriformis R>>L      Assessment:    Segmental spinal dysfunction/restrictions found at:  T7 E, FR  T10 E, FR  L4 LR, RRR  Right SI posterior  Right hip LAD - not performed today as hip is improving    Diagnoses:      1. Segmental dysfunction of lower extremity    2. Segmental dysfunction of lumbar region    3. Trochanteric bursitis of right hip    4. Segmental dysfunction of sacral region        Patient's condition:  Patient had restrictions pre-manipulation and Patient had decreased motion prior to manipulation    Treatment effectiveness:  Post manipulation there is better intersegmental movement and Patient claims to feel looser post manipulation      Procedures:  CMT:  98621 Chiropractic manipulative treatment 3-4 regions performed   25972 Chiropractic manipulative treatment extraspinal dysfunction/restriction  Thoracic: Diversified, T7, T10, Prone  Lumbar: Drop Table, L4, Prone  Pelvis: Drop Table, PSIS Right , Prone  Extra-spinal: Mobilization, Right hip LAD, Supine - not performed today as hip is improving    Modalities:  MSTM to affected  musculature  Flexion Distraction of lumbar spine.     Therapeutic procedures:  Gave patient Ice instructions post adjustment, and instructions for acute care      Prognosis: Good    Progress towards Goals:   Patient is improving with consistent care.    Response to Treatment:   Patient tolerated treatment well today.       Recommendations:    Instructions:ice 20 minutes every other hour as needed    Follow-up:  Return to care PRN.

## 2019-10-02 NOTE — PATIENT INSTRUCTIONS
Check blood sugar once daily before breakfast or before or after evening meal.     Have physical therapy or  message me around noon and we can quick talk about your numbers and insulin. We could put a sensor on next Wed if you want.     [facility] will likely be calling you about the sensor coverage.

## 2019-10-02 NOTE — PROGRESS NOTES
"Diabetes Self-Management Education & Support    Diabetes Education Self Management & Training    SUBJECTIVE/OBJECTIVE:  Diabetes education in the past 24mo: No  Diabetes type: Type 2  Disease course: Worsening  Diabetes management related comments/concerns: no  Cultural Influences/Ethnic Background:  American  Interested in pump therapy    Diabetes Symptoms & Complications  Blurred vision: No  Fatigue: Yes  Foot ulcerations: No  Polydipsia: No  Polyphagia: No  Polyuria: No  Visual change: No  Weakness: No  Weight loss: No  Slow healing wounds: Yes  Weight trend: Stable  Autonomic neuropathy: No  CVA: No  Heart disease: No  Nephropathy: No  Peripheral neuropathy: No  Peripheral Vascular Disease: No  Retinopathy: No    Patient Problem List and Family Medical History reviewed for relevant medical history, current medical status, and diabetes risk factors.    Vitals:  There were no vitals taken for this visit.  Estimated body mass index is 29.12 kg/m  as calculated from the following:    Height as of 8/28/19: 1.676 m (5' 6\").    Weight as of 9/9/19: 81.8 kg (180 lb 6.4 oz).   Last 3 BP:   BP Readings from Last 3 Encounters:   09/09/19 136/66   08/29/19 120/65   08/28/19 (!) 178/102       History   Smoking Status     Current Every Day Smoker     Packs/day: 0.50     Years: 6.00     Last attempt to quit: 9/22/2010   Smokeless Tobacco     Never Used       Labs:  Lab Results   Component Value Date    A1C 10.9 08/11/2019     Lab Results   Component Value Date     08/28/2019     Lab Results   Component Value Date     08/11/2019     HDL Cholesterol   Date Value Ref Range Status   08/11/2019 49 (L) >49 mg/dL Final   ]  GFR Estimate   Date Value Ref Range Status   08/28/2019 >90 >60 mL/min/[1.73_m2] Final     Comment:     Non  GFR Calc  Starting 12/18/2018, serum creatinine based estimated GFR (eGFR) will be   calculated using the Chronic Kidney Disease Epidemiology Collaboration   (CKD-EPI) " equation.       GFR Estimate If Black   Date Value Ref Range Status   08/28/2019 >90 >60 mL/min/[1.73_m2] Final     Comment:      GFR Calc  Starting 12/18/2018, serum creatinine based estimated GFR (eGFR) will be   calculated using the Chronic Kidney Disease Epidemiology Collaboration   (CKD-EPI) equation.       Lab Results   Component Value Date    CR 0.42 08/28/2019     No results found for: MICROALBUMIN    Healthy Eating  Cultural/Congregation diet restrictions?: No  Meal planning: Smaller portions  Meals include: Breakfast, Lunch, Dinner  Works as .   B: Nutrigrain bar on way to work and watered down [employer]. 32 oz mug throughout day.   L: no lunch  D: main meal evening: tacos, less carb or chicken, pork chops with potatoes or pasta or salad. Milk or water  Occasionally sweets on Sundays.   Carries snacks, cheese and crackers and nutrigrain bars  Beverages: Water, Milk, Soda  Has patient met with a dietitian in the past?: No    Being Active  Barrier to exercise: Time, Physical limitation    Monitoring  Blood Glucose Meter: One Touch Ultra 2  Home Glucose (Sugar) Monitoring: Other  Blood glucose trend: Increasing steadily      Taking Medications  Diabetes Medication(s)     Biguanides       metFORMIN (GLUCOPHAGE-XR) 500 MG 24 hr tablet    Take 4 tablets (2,000 mg) by mouth daily (with dinner)    Insulin       insulin glargine (LANTUS SOLOSTAR) 100 UNIT/ML pen    Inject 34 Units Subcutaneous every morning          Current Treatments: Insulin Injections, Oral Agent (monotherapy)    Problem Solving  Hypoglycemia Frequency: Rarely  Medical alert: No  Severe weather/disaster plan for diabetes management?: No  DKA prevention plan?: No  Sick day plan for diabetes management?: (P) No    Hypoglycemia symptoms  Confusion: No  Dizziness or Light-Headedness: No  Headaches: No  Hunger: No  Mood changes: No  Nervousness/Anxiety: No  Sleepiness: No  Speech difficulty: No  Sweats: No  Tremors:  No    Hypoglycemia Complications  Blackouts: No  Hospitalization: No  Nocturnal hypoglycemia: No  Required assistance: No  Required glucagon injection: No  Seizures: No    Reducing Risks  CAD Risks: Tobacco exposure  Has dilated eye exam at least once a year?: No  Sees dentist every 6 months?: No    Healthy Coping  Informal Support system:: Children, Family, Partner  Difficulty affording diabetes management supplies?: Yes  Patient Activation Measure Survey Score:  MIA Score (Last Two) 11/15/2012   MIA Raw Score 45   Activation Score 73.1   MIA Level 4       ASSESSMENT:  Problem is high A1c. Does not monitor glucose regularly but reports she had a BG of 74 last week. She is a  and does not eat much for breakfast or lunch. She is referred for pump therapy but is not on meal time insulin yet. Before we can start additional insulin we need more glucose data. We may be able to move to pump therapy but recommend we start with meal time insulin at supper meal. May not need pump therapy yet.   Recommend we get CGM either professional or personal and in meantime have her try to check some blood sugars.     Patient's most recent   Lab Results   Component Value Date    A1C 10.9 08/11/2019    is not meeting goal of <8.0    INTERVENTION:   Diabetes knowledge and skills assessment:     Patient is knowledgeable in diabetes management concepts related to: Healthy Eating, Being Active, Monitoring and Taking Medication    Patient needs further education on the following diabetes management concepts: Healthy Eating, Monitoring, Taking Medication, Problem Solving, Reducing Risks and Healthy Coping    Based on learning assessment above, most appropriate setting for further diabetes education would be: Individual setting.    Education provided today on:  AADE Self-Care Behaviors:  Monitoring: frequency of monitoring and CGM  Taking Medication: action of prescribed medication and when to take medications  Reducing Risks: A1C -  goals, relating to blood glucose levels, how often to check  Healthy Coping: benefits of making appropriate lifestyle changes    Opportunities for ongoing education and support in diabetes-self management were discussed.    Pt verbalized understanding of concepts discussed and recommendations provided today.       Education Materials Provided:  BG Log Sheet    PLAN:  1-2 BG checks each day. Sensor order pending. CDE to send Novolog order to PCP for approval.   In 1 week will review numbers and/or start LibrePro and possibly start insulin.   See Patient Instructions for co-developed, patient-stated behavior change goals.  AVS printed and provided to patient today. See Follow-Up section for recommended follow-up.    Jemma Sanchez RDN, GENEVIEVE, CDE    Time Spent: 60 minutes  Encounter Type: Individual    Any diabetes medication dose changes were made via the CDE Protocol and Collaborative Practice Agreement with the patient's primary care provider. A copy of this encounter was shared with the provider.

## 2019-10-03 NOTE — TELEPHONE ENCOUNTER
Requested Prescriptions   Pending Prescriptions Disp Refills     tiZANidine (ZANAFLEX) 2 MG tablet [Pharmacy Med Name: TIZANIDINE HCL 2MG TABS] 30 tablet 1     Sig: TAKE ONE TABLET BY MOUTH NIGHTLY AS NEEDED FOR MUSCLE SPASMS OR PAIN. CAN CAUSE SEDATION, DO NOT TAKE WITH ALCOHOL-NO DRIVING.       There is no refill protocol information for this order        Last Written Prescription Date:  7/24/2019  Last Fill Quantity: 30,  # refills: 1   Last office visit: 9/9/2019 with prescribing provider:     Future Office Visit:   Next 5 appointments (look out 90 days)    Dec 18, 2019 11:30 AM CST  Office Visit with [facility]aima Muse MD  [facility] [address] [address] [phone]         Subacromial impingement of right shoulder [M75.41]      Routing refill request to provider for review/approval because:  Drug not on the [facility] refill protocol     Deanne Mace RN

## 2019-10-07 RX ORDER — TIZANIDINE 2 MG/1
TABLET ORAL
Qty: 30 TABLET | Refills: 1 | Status: SHIPPED | OUTPATIENT
Start: 2019-10-07 | End: 2019-12-18

## 2019-10-09 ENCOUNTER — HOSPITAL ENCOUNTER (OUTPATIENT)
Dept: PHYSICAL THERAPY | Facility: CLINIC | Age: 44
Setting detail: THERAPIES SERIES
End: 2019-10-09
Attending: NURSE PRACTITIONER
Payer: COMMERCIAL

## 2019-10-09 DIAGNOSIS — G89.4 CHRONIC PAIN SYNDROME: Primary | ICD-10-CM

## 2019-10-09 PROCEDURE — 97140 MANUAL THERAPY 1/> REGIONS: CPT | Mod: GP | Performed by: PHYSICAL THERAPIST

## 2019-10-09 PROCEDURE — 97112 NEUROMUSCULAR REEDUCATION: CPT | Mod: GP | Performed by: PHYSICAL THERAPIST

## 2019-10-09 NOTE — TELEPHONE ENCOUNTER
Requested Prescriptions   Pending Prescriptions Disp Refills     oxyCODONE-acetaminophen (PERCOCET) 5-325 MG tablet 60 tablet 0     Sig: Take 1 tablet by mouth 2 times daily       There is no refill protocol information for this order        Last Written Prescription Date:  8/28/2019  Last Fill Quantity: 60,  # refills: 0   Last office visit: 9/9/2019 with prescribing provider:     Future Office Visit:   Next 5 appointments (look out 90 days)    Dec 18, 2019 11:30 AM CST  Office Visit with Yaima Muse MD  [facility] [address] [place] [id number] [phone]         Routing refill request to provider for review/approval because:  Drug not on the [facility] refill protocol     Deanne Mace RN

## 2019-10-10 RX ORDER — OXYCODONE AND ACETAMINOPHEN 5; 325 MG/1; MG/1
1 TABLET ORAL 2 TIMES DAILY
Qty: 60 TABLET | Refills: 0 | Status: SHIPPED | OUTPATIENT
Start: 2019-10-10 | End: 2019-11-22

## 2019-10-11 NOTE — TELEPHONE ENCOUNTER
Notify her that I am filling this but want her to go back down to once daily now.  I want this to last a full 2 months now if possible.   Yaima Muse MD

## 2019-10-11 NOTE — TELEPHONE ENCOUNTER
Patient notified that provider is wanting her to try and cut back on prescription and try and only take 1 daily. If needed you can take a second dose. Just wanting you to try and cut back on use. She wants you to try and stretch this prescription out to last two months if possible. Informed that the prescription is being taken to the pharmacy. Script brought to [facility] pharmacy.  Stephanie Guallpa LPN

## 2019-10-16 ENCOUNTER — HOSPITAL ENCOUNTER (OUTPATIENT)
Dept: PHYSICAL THERAPY | Facility: CLINIC | Age: 44
Setting detail: THERAPIES SERIES
End: 2019-10-16
Attending: NURSE PRACTITIONER
Payer: COMMERCIAL

## 2019-10-16 PROCEDURE — 97112 NEUROMUSCULAR REEDUCATION: CPT | Mod: GP

## 2019-10-16 PROCEDURE — 97140 MANUAL THERAPY 1/> REGIONS: CPT | Mod: GP

## 2019-10-23 ENCOUNTER — HOSPITAL ENCOUNTER (OUTPATIENT)
Dept: PHYSICAL THERAPY | Facility: CLINIC | Age: 44
Setting detail: THERAPIES SERIES
End: 2019-10-23
Attending: NURSE PRACTITIONER
Payer: COMMERCIAL

## 2019-10-23 PROCEDURE — 97112 NEUROMUSCULAR REEDUCATION: CPT | Mod: GP | Performed by: PHYSICAL THERAPIST

## 2019-10-23 PROCEDURE — 97140 MANUAL THERAPY 1/> REGIONS: CPT | Mod: GP | Performed by: PHYSICAL THERAPIST

## 2019-10-23 NOTE — TELEPHONE ENCOUNTER
I refilled this but only for 10. I want her to use this VERY infrequently and this will not be a long term med.   Yaima Muse MD    Patient called to schedule an us kidney bilateral. Current order is incorrect, please replace order with US Kidney and Bladder Urinary Tract.  Thank you.

## 2019-10-30 ENCOUNTER — HOSPITAL ENCOUNTER (OUTPATIENT)
Dept: PHYSICAL THERAPY | Facility: CLINIC | Age: 44
Setting detail: THERAPIES SERIES
End: 2019-10-30
Attending: NURSE PRACTITIONER
Payer: COMMERCIAL

## 2019-10-30 PROCEDURE — 97110 THERAPEUTIC EXERCISES: CPT | Mod: GP | Performed by: PHYSICAL THERAPIST

## 2019-10-30 PROCEDURE — 97112 NEUROMUSCULAR REEDUCATION: CPT | Mod: GP | Performed by: PHYSICAL THERAPIST

## 2019-10-30 NOTE — PROGRESS NOTES
Outpatient Physical Therapy Progress Note     Patient: Stacy Brown  : 1975    Beginning/End Dates of Reporting Period:  5 sessions between 19 and 10-30-19    Referring Provider: Markie HUSSEIN CNP    Therapy Diagnosis: Dural tightness affecting the hip and low back area     Client Self Report: SHe is noting improvement overall and is able to use the hip adductor stretches during the day to decrease her hip symptoms. She feels she is meeting all of her goals at this. She is noting an increase in her hip symptoms as she had to use an older truck for work. 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     Objective Measurements:  Objective Measure: LEFS  Details:  - we discussed results  Objective Measure: Hip rotator AROM in sitting  Details: R/L: IR: 0-24 degrees/23 degrees; ER: 0-30 degrees/25 degrees  Objective Measure: Trunk AROM:  Details: Standing 100 degrees flexion, extension 27 degrees extension     Goals:  Goal Identifier Symptoms   Goal Description Stacy will be able to apply her concepts of pain education and back care so she can increase her ability to sit, stand and walk as noted in JACOB in her MARY KAY and LEFS scores(improving, by end of day no longer in tears from these. 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 (being met - no missed work days)   Target Date     Date Met      Progress:     Progress Toward Goals:   Progress this reporting period: Since the initial evaluation, Stacy has been noting a decrease in her symptoms. She is now experiencing increased hip symptoms as her work truck broke down and she is now using an older truck and there  is a wider gap to reach out window to deliver mail. She did not feel the options we discussed would work due to the speed she needs to work at to complete her job. She is feeling able to complete her home program but is not sure so we agreed to hold her chart x 4 weeks and then reassess. She had negtive thigh thrust, FABERs and FADIRs tests bilaterally other than a pulling with the FABERS test. Her LEFS score has improved since the initial evaluation.     Plan:  Changes to therapy plan of care: She will work on her home program and call if she needs an appt - 1 -3 sessions x 4 weeks    Discharge:  No    Thank you for referring Stacy  to [facility]    Aylin Urena, PT  [phone]

## 2019-11-05 ENCOUNTER — TELEPHONE (OUTPATIENT)
Dept: FAMILY MEDICINE | Facility: CLINIC | Age: 44
End: 2019-11-05

## 2019-11-05 DIAGNOSIS — E11.65 TYPE 2 DIABETES MELLITUS WITH HYPERGLYCEMIA, WITH LONG-TERM CURRENT USE OF INSULIN (H): Primary | ICD-10-CM

## 2019-11-05 DIAGNOSIS — Z79.4 TYPE 2 DIABETES MELLITUS WITH HYPERGLYCEMIA, WITH LONG-TERM CURRENT USE OF INSULIN (H): Primary | ICD-10-CM

## 2019-11-05 NOTE — LETTER
[address]   [address]  Tel. [phone] / Fax [phone]    November 5, 2019      Re:   Stacy Brown  [address] [place] [facility] [address]          To whom it may concern,     Stacy is a patient under my care. She needs to have her day off switched to Monday, and can work 5 days per week, Tuesday, Wednesday, Thursday, Friday and Saturday.  She will need to be excused from work for a doctor appointment on Wednesday 12/18/19.       Sincerely,        Yaima Muse MD

## 2019-11-22 DIAGNOSIS — G89.4 CHRONIC PAIN SYNDROME: ICD-10-CM

## 2019-11-22 RX ORDER — OXYCODONE AND ACETAMINOPHEN 5; 325 MG/1; MG/1
TABLET ORAL
Qty: 60 TABLET | Refills: 0 | Status: SHIPPED | OUTPATIENT
Start: 2019-11-22 | End: 2019-12-18

## 2019-11-22 NOTE — TELEPHONE ENCOUNTER
Oxycodone  Last Written Prescription Date:  10/10/2019  Last Fill Quantity: 60,  # refills: 0   Last office visit: 9/9/2019 with prescribing provider:     Future Office Visit:   Next 5 appointments (look out 90 days)    Dec 18, 2019 11:30 AM CST  Office Visit with Yaima Muse MD  [facility] ([facility]) [address] [place] [id number] [phone]       Routing refill request to provider for review/approval because:  Drug not on the [facility] refill protocol       Requested Prescriptions   Pending Prescriptions Disp Refills     oxyCODONE-acetaminophen (PERCOCET) 5-325 MG tablet [Pharmacy Med Name: OXYCODONE-ACETAMINOPHEN 5-325 TABS] 60 tablet 0     Sig: TAKE ONE TABLET BY MOUTH TWICE A DAY       There is no refill protocol information for this order

## 2019-12-02 ENCOUNTER — TELEPHONE (OUTPATIENT)
Dept: PHYSICAL THERAPY | Facility: CLINIC | Age: 44
End: 2019-12-02

## 2019-12-02 NOTE — DISCHARGE SUMMARY
Outpatient Physical Therapy Discharge Note     Patient: Stacy Brown  : 1975    Beginning/End Dates of Reporting Period:  5 sessions between 19 and 10-30-19    Referring Provider: Markie HUSSEIN CNP    Therapy Diagnosis: Dural tightness affecting the hip and low back area     Client Self Report: Please see 10-30-19 note for details.     Objective Measurements:  Please see 10=30-19 note for details.     Goals:  Goal Identifier Symptoms   Goal Description Stacy will be able to apply her concepts of pain education and back care so she can increase her ability to sit, stand and walk as noted in JACOB in her MARY KAY and LEFS scores(improving, by end of day no longer in tears from these. 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 (being met - no missed work days)   Target Date     Date Met      Progress:   Progress Toward Goals:   Not assessed this period.    Plan:  Discharge from therapy.    Discharge:    Reason for Discharge: Patient chooses to discontinue therapy. She does not have time for PT and was progressing at time of the last session.     Equipment Issued:     Discharge Plan: Patient to continue home program.    Thank you for referring Stacy  to [facility] - Erin Urena, PT  [phone]

## 2019-12-04 ENCOUNTER — NURSE TRIAGE (OUTPATIENT)
Dept: FAMILY MEDICINE | Facility: CLINIC | Age: 44
End: 2019-12-04

## 2019-12-04 NOTE — LETTER
[address] [address]  Phone: [phone]  Fax: [phone]    December 6, 2019        Stacy Brown  [address] [address] [place], [place] [address] [facility] [address]          To whom it may concern:    RE: Stacy Brown    Patient may return to work 12/6/19 with the following:  Working no more then 10 hours a day effective immediately until 12/27/19.     Please contact me for questions or concerns.      Sincerely,        Yaima Muse MD/ac

## 2019-12-04 NOTE — TELEPHONE ENCOUNTER
Verified release of information signed 07/10/2018.  Attempted to reach patient  to inform him that patient needs to contact the clinic with concerns. Did not leave message, unable to confirm was husbands voice mail.    Aydee Torres RN BSN

## 2019-12-04 NOTE — TELEPHONE ENCOUNTER
Reason for call:  Patient reporting a symptom    Symptom or request: anxiety     Duration (how long have symptoms been present): ongoing     Have you been treated for this before? Yes    Additional comments:  calling. Pt works at this [employer]. He states that he is very concerned for Stacy. She is very stressed at work and they do not care. 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 He is asking for Dr. Muse or her nurse to reach out to her. He does not think it is good for her to be driving right now. She was not with him at time of call.     Phone Number patient can be reached at:  [phone]    Best Time:  Any     Can we leave a detailed message on this number:  YES    Call taken on 12/4/2019 at 12:41 PM by Estephanie Chavez

## 2019-12-06 NOTE — TELEPHONE ENCOUNTER
Letter was printed and placed at the  for . Patient's  was informed and will .     Demetrice Posey CMA ([facility])
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
## 2019-12-06 NOTE — TELEPHONE ENCOUNTER
She wants a work reduction letter to be effective immediately, her  will  the note as soon as it is done.  She states she only needs this reduction until December 27th.  Please call patient.    Cell # [phone]    Jessica Méndez RN on 12/6/2019 at 10:39 AM

## 2019-12-18 ENCOUNTER — OFFICE VISIT (OUTPATIENT)
Dept: FAMILY MEDICINE | Facility: CLINIC | Age: 44
End: 2019-12-18
Payer: COMMERCIAL

## 2019-12-18 VITALS
TEMPERATURE: 96.4 F | WEIGHT: 179 LBS | RESPIRATION RATE: 18 BRPM | DIASTOLIC BLOOD PRESSURE: 66 MMHG | SYSTOLIC BLOOD PRESSURE: 136 MMHG | OXYGEN SATURATION: 98 % | BODY MASS INDEX: 28.89 KG/M2 | HEART RATE: 96 BPM

## 2019-12-18 DIAGNOSIS — G89.4 CHRONIC PAIN SYNDROME: ICD-10-CM

## 2019-12-18 DIAGNOSIS — M70.61 GREATER TROCHANTERIC BURSITIS OF BOTH HIPS: ICD-10-CM

## 2019-12-18 DIAGNOSIS — M75.41 SUBACROMIAL IMPINGEMENT OF RIGHT SHOULDER: ICD-10-CM

## 2019-12-18 DIAGNOSIS — M70.62 GREATER TROCHANTERIC BURSITIS OF BOTH HIPS: ICD-10-CM

## 2019-12-18 DIAGNOSIS — E11.65 TYPE 2 DIABETES MELLITUS WITH HYPERGLYCEMIA, WITH LONG-TERM CURRENT USE OF INSULIN (H): Primary | ICD-10-CM

## 2019-12-18 DIAGNOSIS — Z79.4 TYPE 2 DIABETES MELLITUS WITH HYPERGLYCEMIA, WITH LONG-TERM CURRENT USE OF INSULIN (H): Primary | ICD-10-CM

## 2019-12-18 DIAGNOSIS — E78.5 HYPERLIPIDEMIA LDL GOAL <100: ICD-10-CM

## 2019-12-18 DIAGNOSIS — M54.16 LUMBAR RADICULOPATHY: ICD-10-CM

## 2019-12-18 LAB — HBA1C MFR BLD: 9.8 % (ref 0–5.6)

## 2019-12-18 PROCEDURE — 99214 OFFICE O/P EST MOD 30 MIN: CPT | Performed by: FAMILY MEDICINE

## 2019-12-18 PROCEDURE — 99207 C FOOT EXAM  NO CHARGE: CPT | Performed by: FAMILY MEDICINE

## 2019-12-18 PROCEDURE — 36415 COLL VENOUS BLD VENIPUNCTURE: CPT | Performed by: FAMILY MEDICINE

## 2019-12-18 PROCEDURE — 83036 HEMOGLOBIN GLYCOSYLATED A1C: CPT | Performed by: FAMILY MEDICINE

## 2019-12-18 RX ORDER — TIZANIDINE 2 MG/1
TABLET ORAL
Qty: 30 TABLET | Refills: 1 | Status: SHIPPED | OUTPATIENT
Start: 2019-12-18 | End: 2020-02-28

## 2019-12-18 RX ORDER — SIMVASTATIN 20 MG
20 TABLET ORAL AT BEDTIME
Qty: 90 TABLET | Refills: 0 | Status: SHIPPED | OUTPATIENT
Start: 2019-12-18 | End: 2021-03-19

## 2019-12-18 RX ORDER — OXYCODONE AND ACETAMINOPHEN 5; 325 MG/1; MG/1
1 TABLET ORAL 2 TIMES DAILY
Qty: 60 TABLET | Refills: 0 | Status: SHIPPED | OUTPATIENT
Start: 2019-12-18 | End: 2019-12-28

## 2019-12-18 ASSESSMENT — PAIN SCALES - GENERAL: PAINLEVEL: NO PAIN (0)

## 2019-12-18 ASSESSMENT — PATIENT HEALTH QUESTIONNAIRE - PHQ9: SUM OF ALL RESPONSES TO PHQ QUESTIONS 1-9: 8
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
## 2019-12-20 NOTE — RESULT ENCOUNTER NOTE
Notify Stacy, her A1C is slightly better. I'd like her to increase her lantus to 38 units and her novolog to 10 units. I'd like to see her back in 3 months for diabetes recheck.   Yaima Muse MD

## 2019-12-28 ENCOUNTER — APPOINTMENT (OUTPATIENT)
Dept: GENERAL RADIOLOGY | Facility: CLINIC | Age: 44
End: 2019-12-28
Attending: NURSE PRACTITIONER

## 2019-12-28 ENCOUNTER — HOSPITAL ENCOUNTER (EMERGENCY)
Facility: CLINIC | Age: 44
Discharge: HOME | End: 2019-12-28
Attending: NURSE PRACTITIONER | Admitting: NURSE PRACTITIONER

## 2019-12-28 VITALS
SYSTOLIC BLOOD PRESSURE: 139 MMHG | DIASTOLIC BLOOD PRESSURE: 73 MMHG | HEART RATE: 78 BPM | HEIGHT: 66 IN | WEIGHT: 178 LBS | OXYGEN SATURATION: 97 % | TEMPERATURE: 98.3 F | RESPIRATION RATE: 18 BRPM | BODY MASS INDEX: 28.61 KG/M2
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
## 2019-12-28 DIAGNOSIS — S30.0XXA CONTUSION OF COCCYX, INITIAL ENCOUNTER: ICD-10-CM

## 2019-12-28 DIAGNOSIS — S30.0XXA CONTUSION OF BUTTOCK, INITIAL ENCOUNTER: ICD-10-CM

## 2019-12-28 PROCEDURE — 25000132 ZZH RX MED GY IP 250 OP 250 PS 637: Performed by: NURSE PRACTITIONER

## 2019-12-28 PROCEDURE — 72170 X-RAY EXAM OF PELVIS: CPT | Mod: TC

## 2019-12-28 PROCEDURE — 99283 EMERGENCY DEPT VISIT LOW MDM: CPT | Performed by: NURSE PRACTITIONER

## 2019-12-28 PROCEDURE — 99284 EMERGENCY DEPT VISIT MOD MDM: CPT | Mod: Z6 | Performed by: NURSE PRACTITIONER

## 2019-12-28 RX ORDER — OXYCODONE HYDROCHLORIDE 5 MG/1
5 TABLET ORAL ONCE
Status: COMPLETED | OUTPATIENT
Start: 2019-12-28 | End: 2019-12-28

## 2019-12-28 RX ORDER — IBUPROFEN 600 MG/1
600 TABLET, FILM COATED ORAL ONCE
Status: COMPLETED | OUTPATIENT
Start: 2019-12-28 | End: 2019-12-28

## 2019-12-28 RX ORDER — IBUPROFEN 600 MG/1
600 TABLET, FILM COATED ORAL EVERY 6 HOURS PRN
Qty: 60 TABLET | Refills: 0 | Status: ON HOLD | OUTPATIENT
Start: 2019-12-28 | End: 2021-03-16

## 2019-12-28 RX ORDER — OXYCODONE AND ACETAMINOPHEN 5; 325 MG/1; MG/1
1 TABLET ORAL EVERY 6 HOURS PRN
Qty: 12 TABLET | Refills: 0 | Status: SHIPPED | OUTPATIENT
Start: 2019-12-28 | End: 2019-12-28

## 2019-12-28 RX ADMIN — OXYCODONE HYDROCHLORIDE 5 MG: 5 TABLET ORAL at 17:38

## 2019-12-28 RX ADMIN — IBUPROFEN 600 MG: 600 TABLET ORAL at 17:38

## 2019-12-28 ASSESSMENT — MIFFLIN-ST. JEOR: SCORE: 1474.15

## 2019-12-28 NOTE — LETTER
December 28, 2019      To Whom It May Concern:      Stacy Brown was seen in our Emergency Department today, 12/28/19.  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  She will not have any restrictions upon her return.    Thank you      Sincerely,        JOVITA Petersen CNP
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
## 2019-12-28 NOTE — ED AVS SNAPSHOT
[facility] Emergency Department  [address] [place] [address]  Phone:  [phone]  Fax:  [phone]                                    Stacy Brown   MRN: [id number]    Department:  [facility] Emergency Department   Date of Visit:  12/28/2019           After Visit Summary Signature Page    I have received my discharge instructions, and my questions have been answered. I have discussed any challenges I see with this plan with the nurse or doctor.    ..........................................................................................................................................  Patient/Patient Representative Signature      ..........................................................................................................................................  Patient Representative Print Name and Relationship to Patient    ..................................................               ................................................  Date                                   Time    ..........................................................................................................................................  Reviewed by Signature/Title    ...................................................              ..............................................  Date                                               Time          22EPIC Rev 08/18

## 2020-01-06 NOTE — ADDENDUM NOTE
Encounter addended by: Stephanie Retana, PT on: 1/6/2020 3:56 PM   Actions taken: Clinical Note Signed, Episode resolved

## 2020-01-06 NOTE — PROGRESS NOTES
Outpatient Physical Therapy Discharge Note     Patient: Stacy Brown  : 1975    Beginning/End Dates of Reporting Period:  4/10/19 to 3/20/19    Referring Provider:     Therapy Diagnosis: concussion, neck pain     Client Self Report: Patient was told that she can not come back to work until cleared to full duty. So pt has not worked since last Tu. 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 Noticing that she can now reach her bra better with the right UE.     Objective Measurements:        Objective Measure: right shoulder ROM  Details: full ROM except IR is limited to reaching behind the back to L4         cleared concussion symptoms          Goals:  Goal Identifier 1   Goal Description Patient has over all 50% decrease in concussion symptoms as measured by the CCS of 26   Target Date 10/22/18   Date Met  18   Progress:     Goal Identifier 2   Goal Description Patient has no dizziness, no vision issues and has no shoulder pain, and therefore is safe to drive.    Target Date 19   Date Met   3/20/19   Progress:     Goal Identifier 3   Goal Description Pateint has full ROM in the neck and head with no HA or neck pain(goal met)   Target Date 19   Date Met  19   Progress:       Progress Toward Goals:   Not assessed this period.          Plan:  Discharge from therapy.    Discharge:    Reason for Discharge: Patient has met all goals.      Discharge Plan: Patient to continue home program.    Thank you for the referral,            Stephanie Retana PT

## 2020-01-15 ENCOUNTER — DOCUMENTATION ONLY (OUTPATIENT)
Dept: FAMILY MEDICINE | Facility: CLINIC | Age: 45
End: 2020-01-15

## 2020-01-15 NOTE — PROGRESS NOTES
Diabetic Review Team Follow up  Plan from last contact:  Message PCP about diabetes ed referral and/or aspirin therapy  Progress: Saw diabetes ed 10/2/19 and PCP 12/18/19 (increased both Lantus and Novolog at this visit).   Lab Results   Component Value Date    A1C 9.8 12/18/2019    A1C 10.9 08/11/2019    A1C 8.9 05/01/2019    A1C 9.9 06/07/2018    A1C 9.7 11/21/2017     New Gaps Identified: A1c still elevated.  New/Continuing plan:  Scheduled for PCP recheck on 3/23/20. May benefit from CDE follow-up.    Munira Medrano RN,BSN  Clinical Care Coordinator      Jemma Sanchez RDN, LD, CDE  Diabetic     Castro Espino PharmD  [facility] pharmacist    Kimberly Estrada RN  [facility] Nurse    Leisa Thompson [facility]  Behavioral Health Clinician

## 2020-01-21 ENCOUNTER — HOSPITAL ENCOUNTER (EMERGENCY)
Facility: CLINIC | Age: 45
Discharge: HOME | End: 2020-01-21
Attending: PHYSICIAN ASSISTANT | Admitting: PHYSICIAN ASSISTANT
Payer: COMMERCIAL

## 2020-01-21 VITALS
DIASTOLIC BLOOD PRESSURE: 101 MMHG | OXYGEN SATURATION: 100 % | TEMPERATURE: 97.6 F | SYSTOLIC BLOOD PRESSURE: 151 MMHG | RESPIRATION RATE: 16 BRPM

## 2020-01-21 DIAGNOSIS — G89.4 CHRONIC PAIN SYNDROME: ICD-10-CM

## 2020-01-21 DIAGNOSIS — J06.9 URI WITH COUGH AND CONGESTION: ICD-10-CM

## 2020-01-21 PROCEDURE — 99282 EMERGENCY DEPT VISIT SF MDM: CPT | Performed by: PHYSICIAN ASSISTANT

## 2020-01-21 PROCEDURE — 99282 EMERGENCY DEPT VISIT SF MDM: CPT | Mod: Z6 | Performed by: PHYSICIAN ASSISTANT

## 2020-01-21 RX ORDER — OXYCODONE AND ACETAMINOPHEN 5; 325 MG/1; MG/1
1 TABLET ORAL 2 TIMES DAILY
Qty: 60 TABLET | Refills: 0 | Status: SHIPPED | OUTPATIENT
Start: 2020-01-21 | End: 2020-02-28

## 2020-01-21 NOTE — LETTER
January 21, 2020      Stacy Brown  [address] [facility] [address]        To Whom It May Concern:    Stacy Brown  was seen on 1/21/2020.  Please excuse her from 1/21 through 1/23 due to acute illness.        Sincerely,          Hannah Lopez PA-C
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
## 2020-01-21 NOTE — DISCHARGE INSTRUCTIONS
Please be sure you are drinking lots of fluids.  Over-the-counter decongestants can help with sinus congestion and ear pressure.  Ibuprofen and Tylenol can help with body aches.  Symptoms should improve in the next 3 to 4 days.  If you develop any worsening concerns please return to the emergency department.    Thank you for choosing [facility]'s Emergency Department. It was a pleasure taking care of you today. If you have any questions, please call [phone].    Hannah Lopez PA-C

## 2020-01-21 NOTE — TELEPHONE ENCOUNTER
Oxycodone  Last Written Prescription Date:  12/28/2019  Last Fill Quantity: 12,  # refills: 0   Last office visit: 12/18/2019 with prescribing provider:     Future Office Visit:   Next 5 appointments (look out 90 days)    Mar 23, 2020  1:45 PM CDT  [facility] [address] [place] [address] [id number] [phone]         Routing refill request to provider for review/approval because:  Drug not on the [facility] refill protocol   Drug not active on patient's medication list    Aydee Torres RN BSN

## 2020-01-21 NOTE — ED AVS SNAPSHOT
[facility] Emergency Department  [address] [place] [address]  Phone:  [phone]  Fax:  [phone]                                    Stacy Brown   MRN: [id number]    Department:  [facility] Emergency Department   Date of Visit:  1/21/2020           After Visit Summary Signature Page    I have received my discharge instructions, and my questions have been answered. I have discussed any challenges I see with this plan with the nurse or doctor.    ..........................................................................................................................................  Patient/Patient Representative Signature      ..........................................................................................................................................  Patient Representative Print Name and Relationship to Patient    ..................................................               ................................................  Date                                   Time    ..........................................................................................................................................  Reviewed by Signature/Title    ...................................................              ..............................................  Date                                               Time          22EPIC Rev 08/18

## 2020-02-01 ENCOUNTER — HOSPITAL ENCOUNTER (EMERGENCY)
Facility: CLINIC | Age: 45
Discharge: HOME | End: 2020-02-01
Attending: FAMILY MEDICINE | Admitting: FAMILY MEDICINE
Payer: COMMERCIAL

## 2020-02-01 VITALS
WEIGHT: 170 LBS | BODY MASS INDEX: 27.44 KG/M2 | OXYGEN SATURATION: 97 % | HEART RATE: 87 BPM | TEMPERATURE: 97.8 F | SYSTOLIC BLOOD PRESSURE: 140 MMHG | DIASTOLIC BLOOD PRESSURE: 85 MMHG | RESPIRATION RATE: 18 BRPM

## 2020-02-01 DIAGNOSIS — E16.2 HYPOGLYCEMIA: ICD-10-CM

## 2020-02-01 DIAGNOSIS — Z79.4 TYPE 2 DIABETES MELLITUS WITHOUT COMPLICATION, WITH LONG-TERM CURRENT USE OF INSULIN (H): ICD-10-CM

## 2020-02-01 DIAGNOSIS — E11.9 TYPE 2 DIABETES MELLITUS WITHOUT COMPLICATION, WITH LONG-TERM CURRENT USE OF INSULIN (H): ICD-10-CM

## 2020-02-01 LAB
ALBUMIN SERPL-MCNC: 4.4 G/DL (ref 3.4–5)
ALBUMIN UR-MCNC: NEGATIVE MG/DL
ALP SERPL-CCNC: 78 U/L (ref 40–150)
ALT SERPL W P-5'-P-CCNC: 23 U/L (ref 0–50)
ANION GAP SERPL CALCULATED.3IONS-SCNC: 4 MMOL/L (ref 3–14)
APPEARANCE UR: CLEAR
AST SERPL W P-5'-P-CCNC: 11 U/L (ref 0–45)
BACTERIA #/AREA URNS HPF: ABNORMAL /HPF
BASOPHILS # BLD AUTO: 0.1 10E9/L (ref 0–0.2)
BASOPHILS NFR BLD AUTO: 0.6 %
BILIRUB SERPL-MCNC: 0.2 MG/DL (ref 0.2–1.3)
BILIRUB UR QL STRIP: NEGATIVE
BUN SERPL-MCNC: 11 MG/DL (ref 7–30)
CALCIUM SERPL-MCNC: 9.4 MG/DL (ref 8.5–10.1)
CHLORIDE SERPL-SCNC: 106 MMOL/L (ref 94–109)
CO2 SERPL-SCNC: 29 MMOL/L (ref 20–32)
COLOR UR AUTO: ABNORMAL
CREAT SERPL-MCNC: 0.49 MG/DL (ref 0.52–1.04)
DIFFERENTIAL METHOD BLD: ABNORMAL
EOSINOPHIL NFR BLD AUTO: 0.6 %
ERYTHROCYTE [DISTWIDTH] IN BLOOD BY AUTOMATED COUNT: 12.9 % (ref 10–15)
GFR SERPL CREATININE-BSD FRML MDRD: >90 ML/MIN/{1.73_M2}
GLUCOSE SERPL-MCNC: 202 MG/DL (ref 70–99)
GLUCOSE UR STRIP-MCNC: >499 MG/DL
HCT VFR BLD AUTO: 47 % (ref 35–47)
HGB BLD-MCNC: 15.9 G/DL (ref 11.7–15.7)
HGB UR QL STRIP: NEGATIVE
IMM GRANULOCYTES # BLD: 0 10E9/L (ref 0–0.4)
IMM GRANULOCYTES NFR BLD: 0.5 %
KETONES UR STRIP-MCNC: NEGATIVE MG/DL
LEUKOCYTE ESTERASE UR QL STRIP: ABNORMAL
LYMPHOCYTES # BLD AUTO: 1.7 10E9/L (ref 0.8–5.3)
LYMPHOCYTES NFR BLD AUTO: 19.4 %
MCH RBC QN AUTO: 28.3 PG (ref 26.5–33)
MCHC RBC AUTO-ENTMCNC: 33.8 G/DL (ref 31.5–36.5)
MCV RBC AUTO: 84 FL (ref 78–100)
MONOCYTES # BLD AUTO: 0.3 10E9/L (ref 0–1.3)
MONOCYTES NFR BLD AUTO: 4 %
MUCOUS THREADS #/AREA URNS LPF: PRESENT /LPF
NEUTROPHILS # BLD AUTO: 6.4 10E9/L (ref 1.6–8.3)
NEUTROPHILS NFR BLD AUTO: 74.9 %
NITRATE UR QL: NEGATIVE
NRBC # BLD AUTO: 0 10*3/UL
NRBC BLD AUTO-RTO: 0 /100
PH UR STRIP: 6 PH (ref 5–7)
PLATELET # BLD AUTO: 297 10E9/L (ref 150–450)
POTASSIUM SERPL-SCNC: 3.9 MMOL/L (ref 3.4–5.3)
PROT SERPL-MCNC: 8.3 G/DL (ref 6.8–8.8)
RBC # BLD AUTO: 5.61 10E12/L (ref 3.8–5.2)
RBC #/AREA URNS AUTO: <1 /HPF (ref 0–2)
SODIUM SERPL-SCNC: 139 MMOL/L (ref 133–144)
SOURCE: ABNORMAL
SP GR UR STRIP: 1.01 (ref 1–1.03)
SQUAMOUS #/AREA URNS AUTO: 2 /HPF (ref 0–1)
UROBILINOGEN UR STRIP-MCNC: 0 MG/DL (ref 0–2)
WBC # BLD AUTO: 8.5 10E9/L (ref 4–11)
WBC #/AREA URNS AUTO: 3 /HPF (ref 0–5)

## 2020-02-01 PROCEDURE — 99284 EMERGENCY DEPT VISIT MOD MDM: CPT

## 2020-02-01 PROCEDURE — 80053 COMPREHEN METABOLIC PANEL: CPT | Performed by: FAMILY MEDICINE

## 2020-02-01 PROCEDURE — 81001 URINALYSIS AUTO W/SCOPE: CPT | Performed by: FAMILY MEDICINE

## 2020-02-01 PROCEDURE — 85025 COMPLETE CBC W/AUTO DIFF WBC: CPT | Performed by: FAMILY MEDICINE

## 2020-02-01 PROCEDURE — 99284 EMERGENCY DEPT VISIT MOD MDM: CPT | Mod: Z6 | Performed by: FAMILY MEDICINE

## 2020-02-01 ASSESSMENT — ENCOUNTER SYMPTOMS
DIARRHEA: 0
CHILLS: 0
SHORTNESS OF BREATH: 0
FATIGUE: 0
FEVER: 0
SORE THROAT: 0
DIZZINESS: 1
LIGHT-HEADEDNESS: 1
COUGH: 0
NAUSEA: 1
WEAKNESS: 1
VOMITING: 1

## 2020-02-01 NOTE — ED TRIAGE NOTES
Presents to ED with nausea and dry heaving and overall unwell feeling for a week. States her insulin was changed last week. Patient has not been checking her sugars but is concerned it deals with a brand switch.

## 2020-02-01 NOTE — LETTER
February 1, 2020      To Whom It May Concern:      Stacy Brown was seen in our Emergency Department today, 02/01/20.  I expect her condition to improve over the next several days.  She may return to work  when improved.  I have recommended that she follow-up in clinic early next week.      Sincerely,        Alan Ragsdale MD

## 2020-02-01 NOTE — ED AVS SNAPSHOT
[facility] Emergency Department  [address] [place] [address]  Phone:  [phone]  Fax:  [phone]                                    Stacy Brown   MRN: [id number]    Department:  [facility] Emergency Department   Date of Visit:  2/1/2020           After Visit Summary Signature Page    I have received my discharge instructions, and my questions have been answered. I have discussed any challenges I see with this plan with the nurse or doctor.    ..........................................................................................................................................  Patient/Patient Representative Signature      ..........................................................................................................................................  Patient Representative Print Name and Relationship to Patient    ..................................................               ................................................  Date                                   Time    ..........................................................................................................................................  Reviewed by Signature/Title    ...................................................              ..............................................  Date                                               Time          22EPIC Rev 08/18
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
## 2020-02-03 ENCOUNTER — PATIENT OUTREACH (OUTPATIENT)
Dept: EDUCATION SERVICES | Facility: CLINIC | Age: 45
End: 2020-02-03

## 2020-02-03 NOTE — PROGRESS NOTES
Called patient to schedule diabetes ed appointment asap at request of Dr. Muse.   Patient states she works 6 days/week until 4:30 and only day off is Sunday.   Briefly discussed low blood sugars and she reports her insurance changed her insulin and she has been getting lows. Changed Lantus to something else but not sure of name.   Will try to place a LibrePro when she comes in if she is agreeable, and discuss food and blood sugars.     Jemma Sanchez RDN, LD, CDE

## 2020-02-07 ENCOUNTER — TELEPHONE (OUTPATIENT)
Dept: FAMILY MEDICINE | Facility: CLINIC | Age: 45
End: 2020-02-07

## 2020-02-07 NOTE — TELEPHONE ENCOUNTER
----- Message from Stephanie Guallpa LPN sent at 2/6/2020 12:05 PM CST -----  Regarding: FW: Appointment needed this week.  Roseanna,  Just giving you an update on patient being scheduled with Diabetic ED.  See message from Jemma.    Stephanie Guallpa LPN  ----- Message -----  From: Jemma Sanchez RD  Sent: 2/6/2020  11:35 AM CST  To: Stephanie Guallpa LPN  Subject: RE: Appointment needed this week.                Just to update you. I had called patient Monday and she reluctantly agreed to come in today at 5pm, which is overbooking for my schedule but that was only time she was able to come in. She has cancelled the appointment now I noticed and did not reschedule or call me.  I tried!    Jemma Sanchez RDN, LD, CDE  ----- Message -----  From: Stephanie Guallpa LPN  Sent: 2/3/2020  11:35 AM CST  To: Jemma Sanchez RD  Subject: Appointment needed this week.                    Jemma,  Patient was seen in the ED on 2/1/2020, Hypoglycemia.    Dr. Muse is wanting her to be seen for follow up, she is booked full and unable to see her this week.   Dr. Muse is going to be out of office due to medical reasons for at least 3 weeks starting 2/11/2020.    Thanks,  Stephanie Guallpa LPN

## 2020-02-07 NOTE — TELEPHONE ENCOUNTER
"Spoke with patient and stressed that we really needed her to get in to see Diabetic Ed. To get her diabetes under control. She states, \"I work 6 days a week and can't take time off but I will try.\"    Forwarding the message to Jemma to set up an appointment.     Demetrice Posey CMA ([facility])    "

## 2020-02-07 NOTE — TELEPHONE ENCOUNTER
Had our schedulers reach out to her this morning. 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 She usually doesn t get off work until 330pm. Depending on how much mail there is each day, she may have to get off later. She works in [place]. She cannot get off work early. She is wondering if there s a day when she can come in at 4pm, preferably 430pm if in [place].    Will offer her a time of 4 pm in [place] on Feb 14th with CDE. Will ideally be able to place a Librepro sensor on her at this time.    Rosa Leos, GILBERT VALLEJO CDE

## 2020-02-07 NOTE — TELEPHONE ENCOUNTER
Thank you! Patient is scheduled 2/14/2020 in [place].     Demetrice Posey CMA ([facility])

## 2020-02-07 NOTE — TELEPHONE ENCOUNTER
Please notify Stacy that I NEED her to come in and see Jemma with diabetes ed. She needs to get things under better control so she can feel better. Tell her this is a high priority visit.   Yaima Muse MD

## 2020-02-21 DIAGNOSIS — G89.4 CHRONIC PAIN SYNDROME: ICD-10-CM

## 2020-02-21 DIAGNOSIS — M75.41 SUBACROMIAL IMPINGEMENT OF RIGHT SHOULDER: ICD-10-CM

## 2020-02-21 NOTE — TELEPHONE ENCOUNTER
Requested Prescriptions   Pending Prescriptions Disp Refills     tiZANidine 2 MG PO tablet [Pharmacy Med Name: TIZANIDINE HCL 2MG TABS] 30 tablet 1     Sig: TAKE ONE TABLET BY MOUTH NIGHTLY AS NEEDED FOR MUSCLE SPASMS OR PAIN. CAN CAUSE SEDATION, DO NOT TAKE WITH ALCOHOL. NO DRIVING   Subacromial impingement of right shoulder [M75.41]     There is no refill protocol information for this order     Last Written Prescription Date:  12/18/2019  Last Fill Quantity: 30,  # refills: 1   Last office visit: 12/18/2019 with prescribing provider:     Future Office Visit:   Next 5 appointments (look out 90 days)    Mar 23, 2020  1:45 PM CDT  PHYSICAL with Yaima Muse MD  [facility] ([address] [id number] [phone]              OXYCODONE-ACETAMINOPHEN 5-325 MG PO tablet [Pharmacy Med Name: OXYCODONE-ACETAMINOPHEN 5-325 TABS] 60 tablet 0     Sig: TAKE ONE TABLET BY MOUTH TWICE A DAY       There is no refill protocol information for this order   Chronic pain syndrome [G89.4]    Last Written Prescription Date:  1/21/2020  Last Fill Quantity: 60,  # refills: 0   Last office visit: 12/18/2019 with prescribing provider:     Future Office Visit:   Next 5 appointments (look out 90 days)    Mar 23, 2020  1:45 PM CDT  PHYSICAL with Yaima Muse MD  [facility] ([address] [id number] [phone]           Routing refill requests to provider for review/approval because:  Drug not on the G refill protocol     Deanne Mace RN

## 2020-02-24 RX ORDER — OXYCODONE AND ACETAMINOPHEN 5; 325 MG/1; MG/1
TABLET ORAL
Qty: 60 TABLET | Refills: 0 | OUTPATIENT
Start: 2020-02-24

## 2020-02-24 RX ORDER — TIZANIDINE 2 MG/1
TABLET ORAL
Qty: 30 TABLET | Refills: 1 | OUTPATIENT
Start: 2020-02-24

## 2020-02-27 DIAGNOSIS — M75.41 SUBACROMIAL IMPINGEMENT OF RIGHT SHOULDER: ICD-10-CM

## 2020-02-27 DIAGNOSIS — G89.4 CHRONIC PAIN SYNDROME: ICD-10-CM

## 2020-02-28 RX ORDER — TIZANIDINE 2 MG/1
TABLET ORAL
Qty: 30 TABLET | Refills: 1 | Status: SHIPPED | OUTPATIENT
Start: 2020-02-28 | End: 2020-05-26

## 2020-02-28 RX ORDER — OXYCODONE AND ACETAMINOPHEN 5; 325 MG/1; MG/1
TABLET ORAL
Qty: 60 TABLET | Refills: 0 | Status: SHIPPED | OUTPATIENT
Start: 2020-02-28 | End: 2020-03-23

## 2020-02-28 NOTE — TELEPHONE ENCOUNTER
Routing refill request to covering provider for review/approval because:  Drug not on the [facility] refill protocol     Aydee Torres RN BSN

## 2020-02-28 NOTE — TELEPHONE ENCOUNTER
Oxycodone  Last Written Prescription Date:  01/21/2020  Last Fill Quantity: 60,  # refills: 0   Last office visit: 12/18/2019 with prescribing provider:  12/18/2019  Future Office Visit:   Next 5 appointments (look out 90 days)    Mar 23, 2020  1:45 PM CDT  [facility] [address] [place] [id number] [phone]         Tizanidine  Last Written Prescription Date:  12/18/2019  Last Fill Quantity: 30,  # refills: 1   Last office visit: 12/18/2019 with prescribing provider:  12/18/2019    Requested Prescriptions   Pending Prescriptions Disp Refills     tiZANidine (ZANAFLEX) 2 MG tablet [Pharmacy Med Name: TIZANIDINE HCL 2MG TABS] 30 tablet 1     Sig: TAKE ONE TABLET BY MOUTH NIGHTLY AS NEEDED FOR MUSCLE SPASMS OR PAIN. CAN CAUSE SEDATION, DO NOT TAKE WITH ALCOHOL. NO DRIVING       There is no refill protocol information for this order        oxyCODONE-acetaminophen (PERCOCET) 5-325 MG tablet [Pharmacy Med Name: OXYCODONE-ACETAMINOPHEN 5-325 TABS] 60 tablet 0     Sig: TAKE ONE TABLET BY MOUTH TWICE A DAY       There is no refill protocol information for this order        Aydee Torres RN BSN

## 2020-03-23 ENCOUNTER — VIRTUAL VISIT (OUTPATIENT)
Dept: FAMILY MEDICINE | Facility: CLINIC | Age: 45
End: 2020-03-23
Payer: COMMERCIAL

## 2020-03-23 DIAGNOSIS — G89.4 CHRONIC PAIN SYNDROME: ICD-10-CM

## 2020-03-23 DIAGNOSIS — J01.00 ACUTE NON-RECURRENT MAXILLARY SINUSITIS: ICD-10-CM

## 2020-03-23 DIAGNOSIS — E11.65 TYPE 2 DIABETES MELLITUS WITH HYPERGLYCEMIA, WITH LONG-TERM CURRENT USE OF INSULIN (H): Primary | ICD-10-CM

## 2020-03-23 DIAGNOSIS — Z79.4 TYPE 2 DIABETES MELLITUS WITH HYPERGLYCEMIA, WITH LONG-TERM CURRENT USE OF INSULIN (H): Primary | ICD-10-CM

## 2020-03-23 PROCEDURE — 99443 ZZC PHYSICIAN TELEPHONE EVALUATION 21-30 MIN: CPT | Performed by: FAMILY MEDICINE

## 2020-03-23 RX ORDER — OXYCODONE AND ACETAMINOPHEN 5; 325 MG/1; MG/1
1 TABLET ORAL 2 TIMES DAILY
Qty: 60 TABLET | Refills: 0 | Status: SHIPPED | OUTPATIENT
Start: 2020-03-30 | End: 2020-04-29

## 2020-03-23 NOTE — LETTER
[address]   [address]  Tel. [phone] / Fax [phone]    March 24, 2020      Re:  Stacy Brown  [address] [facility] [address]      To whom it may concern,  Stacy has been diagnosed with a sinus infection.  She is not a a person under suspicion of Covid-19.   She is advised to stay home from March 23 through March 25 and may return to work unrestricted as of March 26, 2020.      Sincerely,        Yaima Muse MD

## 2020-03-23 NOTE — LETTER
April 29, 2020      Stacy Brown  [address]  [place] [address]            Your previous orders for lab work have been extended for 1 month. Please call to schedule a lab appointment and return to the clinic to have the labs drawn at your earliest convenience. This is for the following labs:       If you are required to be fasting for these tests, remember to have nothing by mouth (except water) after midnight on the night before your test.    If you have any questions or concerns, please contact our office.    Thanks  Your [facility] Team                    Sincerely,        Yaima Muse MD

## 2020-03-23 NOTE — PROGRESS NOTES
"Stacy Brown is a 44 year old female who is being evaluated via a billable telephone visit.    Telephone visit performed in lieu of an in-person visit due to current concerns regarding the current COVID-19 situation including social distancing and keeping at risk people safe.  Patient agreed to proceed with this visit due to these circumstances.    The patient has been notified of following:     \"This telephone visit will be conducted via a call between you and your physician/provider. We have found that certain health care needs can be provided without the need for a physical exam.  This service lets us provide the care you need with a short phone conversation.  If a prescription is necessary we can send it directly to your pharmacy.  If lab work is needed we can place an order for that and you can then stop by our lab to have the test done at a later time.    If during the course of the call the physician/provider feels a telephone visit is not appropriate, you will not be charged for this service.\"     Stacy Brown complains of  Needing medication follow-up and a possible sinus infection.    I have reviewed and updated the patient's Past Medical History, Social History, Family History and Medication List.    ALLERGIES  Amoxicillin and Hydrocodone-acetaminophen    Diabetes Follow-up    Since her last visit she has done a much better job of remembering to take her insulin.  She started keeping it by her toothbrush so she remembers it every day in the morning.  She is only missing 1 dose every few weeks.  Her evening dose she misses once or twice a week.  However she still is not checking her blood sugar except for rarely.  When she does check it is usually pre-meal and ranges between 1 20-1 60.  She is due for lab work.  She still forgets her metformin a lot because she tries to take it in the evening.  How often are you checking your blood sugar? rarely    What concerns do you have today about your diabetes? " None     Do you have any of these symptoms? (Select all that apply)  No numbness or tingling in feet.  No redness, sores or blisters on feet.  No complaints of excessive thirst.  No reports of blurry vision.  No significant changes to weight.              Hyperlipidemia Follow-Up      Are you regularly taking any medication or supplement to lower your cholesterol?   Yes, about 5 times per week. Forgets it often.     Are you having muscle aches or other side effects that you think could be caused by your cholesterol lowering medication?  No    Hypertension Follow-up      Do you check your blood pressure regularly outside of the clinic? No     Are your blood pressures ever more than 140 on the top number (systolic) OR more   than 90 on the bottom number (diastolic), for example 140/90? n/a    BP Readings from Last 2 Encounters:   02/01/20 (!) 140/85   01/21/20 (!) 151/101     Hemoglobin A1C (%)   Date Value   12/18/2019 9.8 (H)   08/11/2019 10.9 (H)     LDL Cholesterol Calculated (mg/dL)   Date Value   08/11/2019 162 (H)   05/01/2019 220 (H)       Additional provider notes: She thinks she may be getting a sinus infection.  For the last 2 to 3 days she has had some slight pain below her eyes if she bends over and gets up.  She has got a little bit of a cough and a runny nose but no fever or shortness of breath.  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    She is remembering to take her aspirin and still taking her tizanidine 1 daily.  She has 1 refill left on that.    I advised her that she is due for lab work.  She will come in fasting in the next few days.  We talked about ways to remember her medications more often.  I asked her to put her evening insulin reminders somewhere where she will remember them more often such as in the kitchen or dining  room where she gets ready for dinner.  I'd like her to take her simvastatin still in the evening if possible but move her metformin to morning so she remembers it more often.  We will notify her with lab results.  I did renew her oxycodone for March 30.  Congratulated her on taking her insulin more often but I still want her to try to check her blood sugars periodically and work on her diet.    For her possible sinus infection since it has only been a few days I recommended supportive cares at home.  I do recommend she isolate herself from work with the public just because of the coronavirus concerns and her diabetes which is a risk factor.  I am going to have her stay at home for 2 days and then reassess.  If she is better she can go back to work but if not she will need to touch base with me again.    Assessment/Plan:    ICD-10-CM    1. Type 2 diabetes mellitus with hyperglycemia, with long-term current use of insulin (H)  E11.65 Hemoglobin A1c    Z79.4 Lipid panel reflex to direct LDL Fasting   2. Acute non-recurrent maxillary sinusitis  J01.00    3. Chronic pain syndrome  G89.4 oxyCODONE-acetaminophen (PERCOCET) 5-325 MG tablet        Phone call duration:  21 minutes    Yaima Muse MD

## 2020-04-07 ENCOUNTER — TELEPHONE (OUTPATIENT)
Dept: FAMILY MEDICINE | Facility: CLINIC | Age: 45
End: 2020-04-07

## 2020-04-07 ENCOUNTER — VIRTUAL VISIT (OUTPATIENT)
Dept: FAMILY MEDICINE | Facility: OTHER | Age: 45
End: 2020-04-07

## 2020-04-07 NOTE — PROGRESS NOTES
"Date: 2020 07:46:59  Clinician: Rosa Isela Mendieta  Clinician NPI: [id number]  Patient: Stacy Brown  Patient : 1975  Patient Address: [address] [place] [address]  Patient Phone: [phone]  Visit Protocol: URI  Patient Summary:  Stacy is a 44 year old ( : 1975 ) female who initiated a Visit for cold, sinus infection, or influenza. When asked the question \"Please sign me up to receive news, health information and promotions. \", Stacy responded \"Yes\".    Stacy states her symptoms started gradually 10-13 days ago. After her symptoms started, they improved and then got worse again.   Her symptoms consist of rhinitis, facial pain or pressure, myalgia, a sore throat, a cough, nasal congestion, malaise, ear pain, chills, vomiting, and a headache. She is experiencing mild difficulty breathing with activities but can speak normally in full sentences.   Symptom details     Nasal secretions: The color of her mucus is yellow.    Cough: Stacy coughs every 5-10 minutes and her cough is more bothersome at night. Phlegm comes into her throat when she coughs. She does not believe her cough is caused by post-nasal drip. The color of the phlegm is yellow.     Sore throat: Stacy reports having moderate throat pain (4-6 on a 10 point pain scale), does not have exudate on her tonsils, and can swallow liquids. The lymph nodes in her neck are not enlarged. A rash has not appeared on the skin since the sore throat started.     Facial pain or pressure: The facial pain or pressure feels worse when bending over or leaning forward.     Headache: She states the headache is moderate (4-6 on a 10 point pain scale).      Stacy denies having enlarged lymph nodes, diarrhea, nausea, teeth pain, fever, and wheezing. She also denies taking antibiotic medication for the symptoms and having recent facial or sinus surgery in the past 60 days.   Precipitating events  Stacy is not sure if she has been exposed " to someone with strep throat. She has not recently been exposed to someone with influenza. Stacy has been in close contact with the following high risk individuals: immunocompromised people.   Pertinent COVID-19 (Coronavirus) information  Stacy has not traveled internationally or to the areas where COVID-19 (Coronavirus) is widespread, including cruise ship travel in the last 14 days before the start of her symptoms.   Stacy has not had a close contact with a laboratory-confirmed COVID-19 patient within 14 days of symptom onset. She also has not had a close contact with a suspected COVID-19 patient within 14 days of symptom onset.   Stacy does not work or volunteer as healthcare worker or a  and does not work or volunteer in a healthcare facility. She does not live with a healthcare worker.   Pertinent medical history  Stacy had 1 sinus infection within the past year.   Stacy does not get yeast infections when she takes antibiotics.   Stacy needs a return to work/school note.   Weight: 172 lbs   Stacy smokes or uses smokeless tobacco.   She denies pregnancy and denies breastfeeding. She has menstruated in the past month.   Weight: 172 lbs  A synchronous phone visit was initiated by the provider for the following reason: Clarification of symptoms and specifically vomiting.    MEDICATIONS: metformin oral, insulin syringe U-100 with needle, simvastatin oral, oxycodone-acetaminophen oral, ALLERGIES: amoxicillin  Clinician Response:  Dear Stacy,  Based on the information provided, you have an influenza-like illness. This is an infection that has the same symptoms of the flu, but the specific virus is not known. Lab testing would be required to confirm the flu virus, but this is often not necessary because the treatment will be the same no matter what is causing your symptoms.  Your symptoms should improve gradually over the next week.  Medication information  The CDC recommends treatment for flu  only if antiviral medications can be started within 48 hours of the first flu symptoms or if you fall into one of the high-risk groups that are more likely to get flu complications.  Since you do not meet guidelines for treatment with antiviral medications, antiviral treatment is not recommended for you. Treatment focuses on controlling your symptoms.  Unless you are allergic to the over-the-counter medication(s) below, I recommend using:       Acetaminophen (Tylenol or store brand) oral tablet. Take 1-2 tablets by mouth every 4-6 hours to help with the discomfort.      Guaifenesin + dextromethorphan (Robitussin DM, Mucinex DM, or store brand).     Over-the-counter medications do not require a prescription. Ask the pharmacist if you have any questions.  Self care  Steps you can take to be as comfortable as possible:     Rest.    Drink plenty of fluids.    Take a warm shower to loosen congestion    Use a cool-mist humidifier.    Use throat lozenges.    Suck on frozen items such as popsicles.    Drink hot tea with lemon and honey.    Gargle with warm salt water (1/4 teaspoon of salt per 8 ounce glass of water).    Take a spoonful of honey to reduce your cough.     If you have a fever, stay home until your temperature has returned to normal for 24 hours and you feel well enough for daily activities. And of course, wash your hands often to prevent spreading the flu and other illnesses. However, the best way to prevent the flu is to get a flu shot before each flu season.  Also, as your provider, I need you to know that becoming tobacco-free is the most important thing you can do to protect your current and future health.  When to seek care  Please be seen in a clinic or urgent care if any of the following occur:   New symptoms develop, or symptoms become worse   Call ahead before going to the clinic or urgent care.  Call 911 or go to the emergency room if you feel that your throat is closing off, you suddenly develop a  rash, you are unable to swallow fluids, you are drooling, or you are having difficulty breathing.  Additional treatment plan    Based on the information you have provided, you do have symptoms that are consistent with Coronavirus (COVID-19).  The coronavirus causes mild to severe respiratory illness with the most common symptoms including fever, cough and difficulty breathing. Unfortunately, many viruses cause similar symptoms and it can be difficult to distinguish between viruses, especially in mild cases, so we are presuming that anyone with cough or fever has coronavirus at this time.  Coronavirus/COVID-19 has reached the point of community spread in [place], meaning that we are finding the virus in people with no known exposure risk for estefanía the virus. Given the increasing commonness of coronavirus in the community we are no longer testing patients who are not critically ill.  If you are a health care worker, you should refer to your employee health office for instructions about testing and returning to work.  For everyone else who has cough or fever, you should assume you are infected with coronavirus. Since you will not be tested but have symptoms that may be consistent with coronavirus, the CDC recommends you stay in self-isolation until these three things have happened:    You have had no fever for at least 72 hours (that is three full days of no fever without the use of medicine that reduces fevers)    AND   Other symptoms have improved (for example, when your cough or shortness of breath have improved)   AND   At least 7 days have passed since your symptoms first appeared.   How to Isolate:   Isolate yourself at home.  Do Not allow any visitors  Do Not go to work or school  Do Not go to [employer],  centers, shopping, or other public places.  Do Not shake hands.  Avoid close contact with others (hugging, kissing).   Protect Others:   Cover Your Mouth and Nose with a mask, disposable tissue  or wash cloth to avoid spreading germs to others.  Wash your hands and face frequently with soap and water.   We know it can be scary to hear that you might have COVID-19. Our team can help track your symptoms and make sure you are doing ok over the next two weeks using a program called [employer] to keep in touch. When you receive an email from [employer], please consider enrolling in our monitoring program. There is no cost to you for monitoring. Here is a URL where you can learn more: [email or web address]  Managing Symptoms:   At this time, we primarily recommend Tylenol (Acetaminophen) for fever or pain. If you have liver or kidney problems, contact your primary care provider for instructions on use of tylenol. Adults can take 650 mg (two 325 mg pills) by mouth every 4-6 hours as needed OR 1,000 mg (two 500 mg pills) every 8 hours as needed. MAXIMUM DAILY DOSE: 3,000mg. For children, refer to dosing on bottle based on age or weight.   If you develop significant shortness of breath that prevents you from doing normal activities, please call 911 or proceed to the nearest emergency room and alert them immediately that you have been in self-isolation for possible coronavirus.  If you have a higher risk medical condition such as cancer, heart failure, end stage renal disease on dialysis or have a transplant, please reach out to your specialist's clinic to advise them of your [facility] visit should you not improve within the next two days.   For more information about COVID19 and options for caring for yourself at home, please visit the CDC website at https://www.cdc.gov/coronavirus/2019-ncov/about/steps-when-sick.htmlFor more options for care at [facility] [place], please visit our website at [email or web address]      Diagnosis: Cough  Diagnosis ICD: R05  Additional Clinician Notes: Andriy Kumar I have included information here about COVID as well as OTC medications you can use  for your symptoms. I am referring you to [facility], so you should be receiving information on how to enroll in this. Please don't hesitate to reach out with questions or contact your PCP. Feel better soon!  Synchronous Triage: phone, status: completed, duration: 513 seconds  Addendum created: April 07 11:02:55, 2020 created by: Pj Robledo body: I reviewed the e-visit and discussed the patient with the resident and agree with the resident's assessment and plan of care as documented.

## 2020-04-28 DIAGNOSIS — G89.4 CHRONIC PAIN SYNDROME: ICD-10-CM

## 2020-04-29 RX ORDER — OXYCODONE AND ACETAMINOPHEN 5; 325 MG/1; MG/1
TABLET ORAL
Qty: 60 TABLET | Refills: 0 | Status: SHIPPED | OUTPATIENT
Start: 2020-04-29 | End: 2020-05-26

## 2020-04-29 NOTE — TELEPHONE ENCOUNTER
Percocet 5-325 MG       Last Written Prescription Date:  3/30/2020  Last Fill Quantity: 60,   # refills: 0  Last Office Visit: 3/23/2020  Future Office visit:    Next 5 appointments (look out 90 days)    Jul 13, 2020 10:45 AM CDT  PHYSICAL with Yaima Muse MD  [facility] ([facility] [address] [place] [address] [id number] [phone]           Routing refill request to provider for review/approval because:  Drug not on the [facility], [facility] or [facility] refill protocol or controlled substance

## 2020-05-05 ENCOUNTER — TELEPHONE (OUTPATIENT)
Dept: FAMILY MEDICINE | Facility: CLINIC | Age: 45
End: 2020-05-05

## 2020-05-05 ENCOUNTER — OFFICE VISIT (OUTPATIENT)
Dept: FAMILY MEDICINE | Facility: CLINIC | Age: 45
End: 2020-05-05
Payer: COMMERCIAL

## 2020-05-05 VITALS
SYSTOLIC BLOOD PRESSURE: 112 MMHG | WEIGHT: 180 LBS | BODY MASS INDEX: 29.05 KG/M2 | OXYGEN SATURATION: 97 % | HEART RATE: 83 BPM | DIASTOLIC BLOOD PRESSURE: 72 MMHG | TEMPERATURE: 96.6 F

## 2020-05-05 DIAGNOSIS — M70.62 GREATER TROCHANTERIC BURSITIS OF BOTH HIPS: Primary | ICD-10-CM

## 2020-05-05 DIAGNOSIS — G89.4 CHRONIC PAIN SYNDROME: ICD-10-CM

## 2020-05-05 DIAGNOSIS — M70.61 GREATER TROCHANTERIC BURSITIS OF BOTH HIPS: Primary | ICD-10-CM

## 2020-05-05 DIAGNOSIS — E11.65 TYPE 2 DIABETES MELLITUS WITH HYPERGLYCEMIA, WITH LONG-TERM CURRENT USE OF INSULIN (H): ICD-10-CM

## 2020-05-05 DIAGNOSIS — Z79.4 TYPE 2 DIABETES MELLITUS WITH HYPERGLYCEMIA, WITH LONG-TERM CURRENT USE OF INSULIN (H): ICD-10-CM

## 2020-05-05 PROCEDURE — 99214 OFFICE O/P EST MOD 30 MIN: CPT | Performed by: FAMILY MEDICINE

## 2020-05-05 RX ORDER — IBUPROFEN 800 MG/1
800 TABLET, FILM COATED ORAL EVERY 8 HOURS PRN
Qty: 30 TABLET | Refills: 1 | Status: SHIPPED | OUTPATIENT
Start: 2020-05-05 | End: 2020-10-29

## 2020-05-05 RX ORDER — DIAZEPAM 5 MG
5 TABLET ORAL
Qty: 10 TABLET | Refills: 0 | Status: SHIPPED | OUTPATIENT
Start: 2020-05-05 | End: 2020-07-13

## 2020-05-05 NOTE — LETTER
[address] [address]  Phone: [phone]  Fax: [phone]    May 5, 2020        Stacy Brown  [address] [place] [address]          To whom it may concern:    RE: Stacy Brown    Patient was seen and treated today at our clinic and missed work.  Please excuse her from work through tomorrow May 6    Please contact me for questions or concerns.      Sincerely,        Catracho Estrada MD

## 2020-05-05 NOTE — PROGRESS NOTES
Subjective     Stacy Brown is a 44 year old female who presents to clinic today for the following health issues:  Chief Complaint   Patient presents with     Hip Pain     both sides for 3 days, hard to walk constant pain       HPI   Joint Pain    Onset: 3 days     Description:   Location: left hip and right hip  Character: Sharp    Intensity: severe    Progression of Symptoms: worse    Accompanying Signs & Symptoms:  Other symptoms: none    History:   Previous similar pain: YES- has had steroid injections before in the past      Precipitating factors:   Trauma or overuse: no     Alleviating factors:  Improved by: nothing and chiropractor    Therapies Tried and outcome: chiropractor seems to help but they a    SUBJECTIVE:  Stacy  is a 44 year old female who presents for: Follow-up of hip pain.  She has been seen for this over the years.  Diagnosed with greater trochanteric bursitis.  It is flared up again now.  No known cause.  Quite painful over the greater trochanter on both hips.  She has had pelvic x-rays done of her hips and pelvis and have not revealed any significant osteoarthritis.  She did get a cortisone injection in her trochanter in the past and this did help.  But it made her blood sugars go very high.  She is fearful of this.  Has a history of low back pain.  She has been given some tizanidine and it helps a little with the back but not with the hips.  She states the Percocet she takes is for her back and has not been helping with the hips.  The pain is not worsened by ambulation.  Bothers her just as much when she is sitting down and laying on each hip.  Diabetes has not been in the best of control.    Past Medical History:   Diagnosis Date     Knee pain, chronic      Mixed hyperlipidemia      Type II or unspecified type diabetes mellitus without mention of complication, not stated as uncontrolled      Past Surgical History:   Procedure Laterality Date     C STABISM SURG,PREV EYE SURG,NOT MUSC       Right     HC OPEN TX METATARSAL FRACTURE  age 12    softball injury,open fracture left foot     HC TOOTH EXTRACTION W/FORCEP      Extract wisdom teeth     INJECT JOINT SACROILIAC Left 2018    Procedure: INJECT JOINT SACROILIAC;  INJECT JOINT SACROILIAC LEFT;  Surgeon: Alan Marshall MD;  Location: [facility] OR     OPERATIVE HYSTEROSCOPY WITH MORCELLATOR N/A 2018    Procedure: OPERATIVE HYSTEROSCOPY WITH MORCELLATOR (MYOSURE);  Exam under anesthesia, operative hysteroscopy, polypectomy, D & C;  Surgeon: Sindhu Peterson DO;  Location: [facility] OR     TUBAL LIGATION  2006     Social History     Tobacco Use     Smoking status: Current Every Day Smoker     Packs/day: 0.50     Years: 6.00     Pack years: 3.00     Last attempt to quit: 2010     Years since quittin.6     Smokeless tobacco: Never Used   Substance Use Topics     Alcohol use: Yes     Alcohol/week: 0.0 standard drinks     Comment: once a month     Current Outpatient Medications   Medication Sig Dispense Refill     aspirin (ASA) 81 MG tablet Take 1 tablet (81 mg) by mouth daily 90 tablet 3     blood glucose (NO BRAND SPECIFIED) test strip Use to test blood sugar up to 4 times daily or as directed. To accompany: Blood Glucose Monitor Brands: per insurance. 200 strip 6     blood glucose calibration (NO BRAND SPECIFIED) solution To accompany: Blood Glucose Monitor Brands: per insurance. 1 Bottle 3     blood glucose monitoring (BL TEST STRIP PACK) test strip Use to test blood sugar 1-2 times daily or as directed. Per patients glucose meter (getting new one today) 100 each 3     blood glucose monitoring (FREESTYLE) lancets Use to test blood sugars 1-2 times daily or as directed, per patients glucose meter. 3 Box 3     blood glucose monitoring (NO BRAND SPECIFIED) meter device kit Use to test blood sugar up to 4 times daily or as directed. Preferred blood glucose meter OR supplies to accompany: Blood Glucose Monitor Brands: per insurance. 1 kit 0      Blood Glucose Monitoring Suppl (ACCU-CHEK COMPLETE) KIT 1 Device daily 1 Device 0     Continuous Blood Gluc  (FREESTYLE MAXI 14 DAY READER) SUSAN 1 Device continuous 1 Device 0     Continuous Blood Gluc Sensor (FREESTYLE MAXI 14 DAY SENSOR) MISC 1 each every 14 days 2 each 11     diazepam (VALIUM) 5 MG tablet Take 1 tablet (5 mg) by mouth nightly as needed for muscle spasms 10 tablet 0     ibuprofen (ADVIL/MOTRIN) 800 MG tablet Take 1 tablet (800 mg) by mouth every 8 hours as needed for moderate pain 30 tablet 1     insulin aspart (NOVOLOG PEN) 100 UNIT/ML pen Inject 10 Units Subcutaneous daily (with dinner) Inject 8 units before evening meal. 3 mL 3     insulin glargine (LANTUS SOLOSTAR) 100 UNIT/ML pen Inject 38 Units Subcutaneous every morning 30 mL 4     insulin pen needle (NOVOFINE) 32G X 6 MM Use once daily or as directed. 100 each 3     metFORMIN (GLUCOPHAGE-XR) 500 MG 24 hr tablet Take 4 tablets (2,000 mg) by mouth daily (with dinner) 360 tablet 3     Multiple Vitamins-Minerals (MULTI-VITAMIN GUMMIES) CHEW Take 1 chew tab by mouth daily        oxyCODONE-acetaminophen (PERCOCET) 5-325 MG tablet TAKE ONE TABLET BY MOUTH TWICE A DAY 60 tablet 0     simvastatin (ZOCOR) 20 MG tablet Take 1 tablet (20 mg) by mouth At Bedtime 90 tablet 0     tiZANidine (ZANAFLEX) 2 MG tablet TAKE ONE TABLET BY MOUTH NIGHTLY AS NEEDED FOR MUSCLE SPASMS OR PAIN. CAN CAUSE SEDATION, DO NOT TAKE WITH ALCOHOL. NO DRIVING 30 tablet 1     ibuprofen (ADVIL/MOTRIN) 600 MG tablet Take 1 tablet (600 mg) by mouth every 6 hours as needed for moderate pain (Patient not taking: Reported on 5/5/2020) 60 tablet 0       REVIEW OF SYSTEMS:   10 point ROS negative except as noted above in HPI, including Gen., HEENT, Neck, resp, CV, GI & , musculoskeletal.  Neurologic.  Psychiatric.  Skin system review.     OBJECTIVE:  Vitals: /72   Pulse 83   Temp 96.6  F (35.9  C) (Temporal)   Wt 81.6 kg (180 lb)   SpO2 97%   BMI 29.05 kg/m     BMI= Body mass index is 29.05 kg/m .  Alert and appears in no distress.  Abdomen is obese.  Her gait is a little slow.  She is tender over the greater trochanter to palpation on both sides.  There is no redness or warmth there.  Internal rotation external rotation flexion-extension of the hip does not cause really any pain.  No dependent edema.  Skin with no rash.    ASSESSMENT:  #1 bilateral greater trochanter bursitis with flareup.  #2 chronic pain syndrome #3 diabetes    PLAN:  Decisions were a bit complicated by her diabetes and the fact that she is already on Percocet.  She states that the injections caused very high blood sugars and is fearful of using oral prednisone.  She has a fear of using ibuprofen because of leg COVID the situation.  I reassured her that she has no symptoms and can use ibuprofen.  Also can go with some Valium to see with his or help her sleep.  We will follow-up with orthopedics again if no success with the ibuprofen and Valium over the short period.  Tobacco Cessation Action Plan: Information offered: Patient not interested at this time      Catracho Estrada MD  [facility]

## 2020-05-26 DIAGNOSIS — G89.4 CHRONIC PAIN SYNDROME: ICD-10-CM

## 2020-05-26 DIAGNOSIS — M75.41 SUBACROMIAL IMPINGEMENT OF RIGHT SHOULDER: ICD-10-CM

## 2020-05-26 RX ORDER — TIZANIDINE 2 MG/1
TABLET ORAL
Qty: 30 TABLET | Refills: 1 | Status: SHIPPED | OUTPATIENT
Start: 2020-05-26 | End: 2020-07-13

## 2020-05-26 RX ORDER — OXYCODONE AND ACETAMINOPHEN 5; 325 MG/1; MG/1
TABLET ORAL
Qty: 60 TABLET | Refills: 0 | Status: SHIPPED | OUTPATIENT
Start: 2020-05-26 | End: 2020-06-25

## 2020-05-26 NOTE — TELEPHONE ENCOUNTER
Requested Prescriptions   Pending Prescriptions Disp Refills     oxyCODONE-acetaminophen (PERCOCET) 5-325 MG tablet [Pharmacy Med Name: OXYCODONE-ACETAMINOPHEN 5-325 TABS] 60 tablet 0     Sig: TAKE ONE TABLET BY MOUTH TWICE A DAY     Last Written Prescription Date:  04/29/2020  Last Fill Quantity: 60,   # refills: 0  Last Office Visit: 05/05/2020  Future Office visit:    Next 5 appointments (look out 90 days)    Jul 13, 2020 10:45 AM CDT  [facility]YS[facility] with [facility]  [facility] [address] [place] [id number] [phone]           Routing refill request to provider for review/approval because:  Drug not on the [facility], [facility] or [facility] refill protocol or controlled substance    Clair Ortiz MA

## 2020-05-26 NOTE — TELEPHONE ENCOUNTER
Requested Prescriptions   Pending Prescriptions Disp Refills     tiZANidine (ZANAFLEX) 2 MG tablet [Pharmacy Med Name: TIZANIDINE HCL 2MG TABS] 30 tablet 1     Sig: TAKE ONE TABLET BY MOUTH NIGHTLY AS NEEDED FOR MUSCLE SPASMS OR PAIN. CAN CAUSE SEDATION, DO NOT TAKE WITH ALCOHOL. NO DRIVING     Last Written Prescription Date:  02/28/2020  Last Fill Quantity: 30,   # refills: 1  Last Office Visit: 05/05/2020  Future Office visit:    Next 5 appointments (look out 90 days)    Jul 13, 2020 10:45 AM CDT  PHYSICAL with [facility]a Muse MD  [facility] [address] [place] [id number] [phone]           Routing refill request to provider for review/approval because:  Drug not on the [facility], [facility] or [facility] refill protocol or controlled substance    Clair Ortiz MA

## 2020-06-01 ENCOUNTER — THERAPY VISIT (OUTPATIENT)
Dept: CHIROPRACTIC MEDICINE | Facility: CLINIC | Age: 45
End: 2020-06-01
Payer: COMMERCIAL

## 2020-06-01 DIAGNOSIS — M99.06 SEGMENTAL DYSFUNCTION OF LOWER EXTREMITY: ICD-10-CM

## 2020-06-01 DIAGNOSIS — M99.07 SEGMENTAL DYSFUNCTION OF UPPER EXTREMITY: ICD-10-CM

## 2020-06-01 DIAGNOSIS — M99.04 SEGMENTAL DYSFUNCTION OF SACRAL REGION: Primary | ICD-10-CM

## 2020-06-01 DIAGNOSIS — M99.02 SEGMENTAL DYSFUNCTION OF THORACIC REGION: ICD-10-CM

## 2020-06-01 DIAGNOSIS — M54.9 MECHANICAL BACK PAIN: ICD-10-CM

## 2020-06-01 DIAGNOSIS — M99.03 SEGMENTAL DYSFUNCTION OF LUMBAR REGION: ICD-10-CM

## 2020-06-01 PROCEDURE — 98941 CHIROPRACT MANJ 3-4 REGIONS: CPT | Mod: AT | Performed by: CHIROPRACTOR

## 2020-06-01 PROCEDURE — 98943 CHIROPRACT MANJ XTRSPINL 1/>: CPT | Performed by: CHIROPRACTOR

## 2020-06-01 PROCEDURE — 99213 OFFICE O/P EST LOW 20 MIN: CPT | Mod: 25 | Performed by: CHIROPRACTOR

## 2020-06-01 NOTE — PROGRESS NOTES
"Visit #:  1 of 8 based on treatment plan 6/12/2019    Subjective:  Stacy Brown is a 44 year old female who is seen in f/u up for:     Data Unavailable.     Since last visit on 10/2/2019,  Stacy Brown reports the following changes: Patient presents and states that she has been having pain in her hips and lower back and between her shoulder blades. She needs someone to \"put humpty dumpty back together again.\" She has been really busy at work at [employer] since COVID-19. She rates her current pain 8/10. Her left side worse today.       Objective:  The following was observed:    LAROM: LR and LLF mildly reduced with pain    P: palpatory tenderness in central lower back, worse on left side    A: static palpation demonstrates intersegmental asymmetry     R: motion palpation notes restricted motion    T: localized muscle spasm at: Gluteal, Lumbar erector spine and Piriformis L>>R      Assessment:    Segmental spinal dysfunction/restrictions found at:  T1 RR, LRR  T3 LR, RRR  T7 E, FR  T10 E, FR  L4 LR, RRR  Left SI posterior  Bilateral ankles LAD  Right elbow mobilization    Diagnoses:      No diagnosis found.    Patient's condition:  Patient had restrictions pre-manipulation and Patient had decreased motion prior to manipulation    Treatment effectiveness:  Post manipulation there is better intersegmental movement and Patient claims to feel looser post manipulation      Procedures:  Level 2 re-eval  CMT:  82928 Chiropractic manipulative treatment 3-4 regions performed   15885 Chiropractic manipulative treatment extraspinal dysfunction/restriction  Thoracic: Diversified, T1, T3, T7, T10, Prone  Lumbar: Drop Table, L4, Prone  Pelvis: Drop Table, Left SI, Prone  Extra-spinal: Mobilization, Bilateral ankles LAD, left elbow mobilization    Modalities:  MSTM to affected musculature  Flexion Distraction of lumbar spine.     Therapeutic procedures:  Gave patient Ice instructions post adjustment, and instructions for " acute care      Prognosis: Good    Progress towards Goals:   First treatment this episode.     Response to Treatment:   Patient tolerated treatment well today.       Recommendations:    Instructions:ice 20 minutes every other hour as needed    Follow-up:  Return to care in 2 weeks.

## 2020-06-15 ENCOUNTER — THERAPY VISIT (OUTPATIENT)
Dept: CHIROPRACTIC MEDICINE | Facility: CLINIC | Age: 45
End: 2020-06-15
Payer: COMMERCIAL

## 2020-06-15 DIAGNOSIS — M99.03 SEGMENTAL DYSFUNCTION OF LUMBAR REGION: ICD-10-CM

## 2020-06-15 DIAGNOSIS — M99.04 SEGMENTAL DYSFUNCTION OF SACRAL REGION: Primary | ICD-10-CM

## 2020-06-15 DIAGNOSIS — M70.61 TROCHANTERIC BURSITIS OF RIGHT HIP: ICD-10-CM

## 2020-06-15 DIAGNOSIS — M99.06 SEGMENTAL DYSFUNCTION OF LOWER EXTREMITY: ICD-10-CM

## 2020-06-15 DIAGNOSIS — M99.02 SEGMENTAL DYSFUNCTION OF THORACIC REGION: ICD-10-CM

## 2020-06-15 PROCEDURE — 98943 CHIROPRACT MANJ XTRSPINL 1/>: CPT | Performed by: CHIROPRACTOR

## 2020-06-15 PROCEDURE — 98941 CHIROPRACT MANJ 3-4 REGIONS: CPT | Mod: AT | Performed by: CHIROPRACTOR

## 2020-06-15 NOTE — PROGRESS NOTES
Visit #:  2 of 8 based on treatment plan 6/12/2019    Subjective:  Stacy Brown is a 44 year old female who is seen in f/u up for:        Segmental dysfunction of lower extremity  Segmental dysfunction of lumbar region  Trochanteric bursitis of right hip  Segmental dysfunction of sacral region.     Since last visit on 6/1/2020,  Stacy Brown reports the following changes: Patient presents and states that her hips are better than they were, but still sore. Her shoulder and neck are way better. She rates her current pain in her hips 6/10. The pain is bilateral.      Objective:  The following was observed:      P: palpatory tenderness in central lower back, worse on left side    A: static palpation demonstrates intersegmental asymmetry     R: motion palpation notes restricted motion    T: localized muscle spasm at: Gluteal, Lumbar erector spine and Piriformis L>>R      Assessment:    Segmental spinal dysfunction/restrictions found at:  T1 RR, LRR  T3 LR, RRR  T7 E, FR  T10 E, FR  L4 LR, RRR  Right SI posterior  Bilateral ankles LAD    Diagnoses:      1. Segmental dysfunction of lower extremity    2. Segmental dysfunction of lumbar region    3. Trochanteric bursitis of right hip    4. Segmental dysfunction of sacral region        Patient's condition:  Patient had restrictions pre-manipulation and Patient had decreased motion prior to manipulation    Treatment effectiveness:  Post manipulation there is better intersegmental movement and Patient claims to feel looser post manipulation      Procedures:  CMT:  96522 Chiropractic manipulative treatment 3-4 regions performed   41378 Chiropractic manipulative treatment extraspinal dysfunction/restriction  Thoracic: Diversified, T1, T3, T7, T10, Prone  Lumbar: Drop Table, L4, Prone  Pelvis: Drop Table, Right SI, Prone  Extra-spinal: Mobilization, Bilateral ankles LAD    Modalities:  MSTM to affected musculature  Flexion Distraction of lumbar spine.     Therapeutic  procedures:  Gave patient Ice instructions post adjustment, and instructions for acute care      Prognosis: Good    Progress towards Goals:   Patient is improving with care.     Response to Treatment:   Patient tolerated treatment well today.       Recommendations:    Instructions:ice 20 minutes every other hour as needed    Follow-up:  Return to care in 2 weeks.

## 2020-06-25 DIAGNOSIS — G89.4 CHRONIC PAIN SYNDROME: ICD-10-CM

## 2020-06-25 RX ORDER — OXYCODONE AND ACETAMINOPHEN 5; 325 MG/1; MG/1
TABLET ORAL
Qty: 60 TABLET | Refills: 0 | Status: SHIPPED | OUTPATIENT
Start: 2020-06-25 | End: 2020-07-24

## 2020-06-25 NOTE — TELEPHONE ENCOUNTER
Requested Prescriptions   Pending Prescriptions Disp Refills     oxyCODONE-acetaminophen (PERCOCET) 5-325 MG tablet [Pharmacy Med Name: OXYCODONE-ACETAMINOPHEN 5-325 TABS] 60 tablet 0     Sig: TAKE ONE TABLET BY MOUTH TWICE A DAY     Last Written Prescription Date:  05/26/2020  Last Fill Quantity: 60,   # refills: 0  Last Office Visit: 05/05/2020  Future Office visit:    Next 5 appointments (look out 90 days)    Jul 13, 2020 10:45 AM CDT  PHYSICAL with Yaima Muse MD  [facility] [address] [place] [phone]           Routing refill request to provider for review/approval because:  Drug not on the [facility], [facility] or [facility] refill protocol or controlled substance    Clair Ortiz MA

## 2020-07-06 ENCOUNTER — HOSPITAL ENCOUNTER (EMERGENCY)
Facility: CLINIC | Age: 45
Discharge: HOME | End: 2020-07-06
Attending: EMERGENCY MEDICINE | Admitting: EMERGENCY MEDICINE
Payer: COMMERCIAL

## 2020-07-06 ENCOUNTER — THERAPY VISIT (OUTPATIENT)
Dept: CHIROPRACTIC MEDICINE | Facility: CLINIC | Age: 45
End: 2020-07-06
Payer: COMMERCIAL

## 2020-07-06 ENCOUNTER — TELEPHONE (OUTPATIENT)
Dept: FAMILY MEDICINE | Facility: CLINIC | Age: 45
End: 2020-07-06

## 2020-07-06 VITALS
TEMPERATURE: 97.5 F | SYSTOLIC BLOOD PRESSURE: 155 MMHG | DIASTOLIC BLOOD PRESSURE: 92 MMHG | WEIGHT: 185.3 LBS | BODY MASS INDEX: 29.91 KG/M2 | RESPIRATION RATE: 20 BRPM | OXYGEN SATURATION: 98 % | HEART RATE: 83 BPM

## 2020-07-06 DIAGNOSIS — M70.61 TROCHANTERIC BURSITIS OF RIGHT HIP: ICD-10-CM

## 2020-07-06 DIAGNOSIS — M99.03 SEGMENTAL DYSFUNCTION OF LUMBAR REGION: ICD-10-CM

## 2020-07-06 DIAGNOSIS — M99.06 SEGMENTAL DYSFUNCTION OF LOWER EXTREMITY: ICD-10-CM

## 2020-07-06 DIAGNOSIS — K08.89 PAIN, DENTAL: Primary | ICD-10-CM

## 2020-07-06 DIAGNOSIS — K08.89 TOOTHACHE: ICD-10-CM

## 2020-07-06 DIAGNOSIS — M99.02 SEGMENTAL DYSFUNCTION OF THORACIC REGION: ICD-10-CM

## 2020-07-06 DIAGNOSIS — M99.04 SEGMENTAL DYSFUNCTION OF SACRAL REGION: Primary | ICD-10-CM

## 2020-07-06 PROCEDURE — 64400 NJX AA&/STRD TRIGEMINAL NRV: CPT | Performed by: EMERGENCY MEDICINE

## 2020-07-06 PROCEDURE — 98941 CHIROPRACT MANJ 3-4 REGIONS: CPT | Mod: AT | Performed by: CHIROPRACTOR

## 2020-07-06 PROCEDURE — 99284 EMERGENCY DEPT VISIT MOD MDM: CPT | Mod: 25 | Performed by: EMERGENCY MEDICINE

## 2020-07-06 PROCEDURE — 99283 EMERGENCY DEPT VISIT LOW MDM: CPT | Mod: 25 | Performed by: EMERGENCY MEDICINE

## 2020-07-06 PROCEDURE — 98943 CHIROPRACT MANJ XTRSPINL 1/>: CPT | Performed by: CHIROPRACTOR

## 2020-07-06 PROCEDURE — 64400 NJX AA&/STRD TRIGEMINAL NRV: CPT | Mod: Z6 | Performed by: EMERGENCY MEDICINE

## 2020-07-06 RX ORDER — CLINDAMYCIN HCL 300 MG
300 CAPSULE ORAL 4 TIMES DAILY
Qty: 40 CAPSULE | Refills: 0 | Status: SHIPPED | OUTPATIENT
Start: 2020-07-06 | End: 2020-07-16
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
## 2020-07-06 NOTE — ED AVS SNAPSHOT
[facility] Emergency Department  [address] [place] [address]  Phone:  [phone]  Fax:  [phone]                                    Stacy Brown   MRN: [id number]    Department:  [facility] Emergency Department   Date of Visit:  7/6/2020           After Visit Summary Signature Page    I have received my discharge instructions, and my questions have been answered. I have discussed any challenges I see with this plan with the nurse or doctor.    ..........................................................................................................................................  Patient/Patient Representative Signature      ..........................................................................................................................................  Patient Representative Print Name and Relationship to Patient    ..................................................               ................................................  Date                                   Time    ..........................................................................................................................................  Reviewed by Signature/Title    ...................................................              ..............................................  Date                                               Time          22EPIC Rev 08/18

## 2020-07-06 NOTE — PROGRESS NOTES
Visit #:  3 of 8 based on treatment plan 6/12/2019    Subjective:  Stacy Brown is a 44 year old female who is seen in f/u up for:        Segmental dysfunction of lower extremity  Segmental dysfunction of lumbar region  Trochanteric bursitis of right hip  Segmental dysfunction of sacral region.     Since last visit on 6/15/2020,  Stacy Brown reports the following changes: Patient presents and states that her hips and lower back are stiff and sore today. She is better overall but really having pain today.     Objective:  The following was observed:      P: palpatory tenderness in central lower back, worse on left side    A: static palpation demonstrates intersegmental asymmetry     R: motion palpation notes restricted motion    T: localized muscle spasm at: Gluteal, Lumbar erector spine and Piriformis L>>R      Assessment:    Segmental spinal dysfunction/restrictions found at:  T5 E, FR  T10 E, FR  L4 LR, RRR  Right SI posterior  Bilateral ankles LAD    Diagnoses:      1. Segmental dysfunction of lower extremity    2. Segmental dysfunction of lumbar region    3. Trochanteric bursitis of right hip    4. Segmental dysfunction of sacral region        Patient's condition:  Patient had restrictions pre-manipulation and Patient had decreased motion prior to manipulation    Treatment effectiveness:  Post manipulation there is better intersegmental movement and Patient claims to feel looser post manipulation      Procedures:  CMT:  17614 Chiropractic manipulative treatment 3-4 regions performed   00966 Chiropractic manipulative treatment extraspinal dysfunction/restriction  Thoracic: Diversified, T5, T10, Prone  Lumbar: Drop Table, L4, Prone  Pelvis: Drop Table, Right SI, Prone  Extra-spinal: Mobilization, Bilateral ankles LAD    Modalities:  MSTM to affected musculature  Flexion Distraction of lumbar spine.     Therapeutic procedures:  Gave patient Ice instructions post adjustment, and instructions for acute  care      Prognosis: Good    Progress towards Goals:   Patient is improving with care.     Response to Treatment:   Patient tolerated treatment well today.       Recommendations:    Instructions:ice 20 minutes every other hour as needed    Follow-up:  Return to care in 2 weeks.

## 2020-07-06 NOTE — TELEPHONE ENCOUNTER
Pt is currently in the ED. Was given an injection that will last up to 4-6 hrs. Wondering if [facility] is able to prescribe something for the pain before she leaves the ED. Please call. [phone]. Pt is restricted to [facility].

## 2020-07-06 NOTE — TELEPHONE ENCOUNTER
Spoke with patient to offer virtual appt. Pt states she tried calling dentist and said they won't do anything for her. Pt states she is in so much pain she will go into the ED today. Rosa Fleming CMA

## 2020-07-06 NOTE — TELEPHONE ENCOUNTER
Notify her that she already has the percocet for twice daily, so in addition I am giving her some T3 that she can take on the off hours twice daily (spaced in between her percocet).   Yaima Muse MD

## 2020-07-06 NOTE — TELEPHONE ENCOUNTER
Reason for Call:  Medication or medication refill:    Do you use a [facility]?  Name of the pharmacy and phone number for the current request:  [facility] [phone]    Name of the medication requested: antibiotic    Other request: pt is requesting an antibiotic for a toothache    Can we leave a detailed message on this number? YES    Phone number patient can be reached at: [phone]    Best Time:     Call taken on 7/6/2020 at 10:57 AM by Jia Lowe

## 2020-07-13 ENCOUNTER — OFFICE VISIT (OUTPATIENT)
Dept: FAMILY MEDICINE | Facility: CLINIC | Age: 45
End: 2020-07-13
Payer: COMMERCIAL

## 2020-07-13 VITALS
SYSTOLIC BLOOD PRESSURE: 134 MMHG | TEMPERATURE: 97.1 F | RESPIRATION RATE: 18 BRPM | DIASTOLIC BLOOD PRESSURE: 72 MMHG | WEIGHT: 181.4 LBS | HEART RATE: 85 BPM | OXYGEN SATURATION: 98 % | BODY MASS INDEX: 29.15 KG/M2 | HEIGHT: 66 IN

## 2020-07-13 DIAGNOSIS — Z79.4 TYPE 2 DIABETES MELLITUS WITH HYPERGLYCEMIA, WITH LONG-TERM CURRENT USE OF INSULIN (H): ICD-10-CM

## 2020-07-13 DIAGNOSIS — E78.5 HYPERLIPIDEMIA LDL GOAL <100: ICD-10-CM

## 2020-07-13 DIAGNOSIS — I10 BENIGN ESSENTIAL HYPERTENSION: ICD-10-CM

## 2020-07-13 DIAGNOSIS — D50.8 IRON DEFICIENCY ANEMIA SECONDARY TO INADEQUATE DIETARY IRON INTAKE: ICD-10-CM

## 2020-07-13 DIAGNOSIS — E11.65 TYPE 2 DIABETES MELLITUS WITH HYPERGLYCEMIA, WITH LONG-TERM CURRENT USE OF INSULIN (H): ICD-10-CM

## 2020-07-13 DIAGNOSIS — M75.41 SUBACROMIAL IMPINGEMENT OF RIGHT SHOULDER: ICD-10-CM

## 2020-07-13 DIAGNOSIS — Z00.00 ROUTINE GENERAL MEDICAL EXAMINATION AT A HEALTH CARE FACILITY: Primary | ICD-10-CM

## 2020-07-13 DIAGNOSIS — G89.4 CHRONIC PAIN SYNDROME: ICD-10-CM

## 2020-07-13 DIAGNOSIS — Z12.4 SCREENING FOR MALIGNANT NEOPLASM OF CERVIX: ICD-10-CM

## 2020-07-13 DIAGNOSIS — Z12.31 ENCOUNTER FOR SCREENING MAMMOGRAM FOR BREAST CANCER: ICD-10-CM

## 2020-07-13 PROCEDURE — 99207 C FOOT EXAM  NO CHARGE: CPT | Mod: 25 | Performed by: FAMILY MEDICINE

## 2020-07-13 PROCEDURE — 99213 OFFICE O/P EST LOW 20 MIN: CPT | Mod: 25 | Performed by: FAMILY MEDICINE

## 2020-07-13 PROCEDURE — 87624 HPV HI-RISK TYP POOLED RSLT: CPT | Performed by: FAMILY MEDICINE

## 2020-07-13 PROCEDURE — 99396 PREV VISIT EST AGE 40-64: CPT | Performed by: FAMILY MEDICINE

## 2020-07-13 PROCEDURE — G0145 SCR C/V CYTO,THINLAYER,RESCR: HCPCS | Performed by: FAMILY MEDICINE

## 2020-07-13 ASSESSMENT — PATIENT HEALTH QUESTIONNAIRE - PHQ9: SUM OF ALL RESPONSES TO PHQ QUESTIONS 1-9: 4

## 2020-07-13 ASSESSMENT — MIFFLIN-ST. JEOR: SCORE: 1486.83
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
## 2020-07-13 NOTE — LETTER
August 4, 2020    Stacy Brown  [address] [facility] [address]    Dear ,  This letter is regarding your recent Pap smear (cervical cancer screening) and Human Papillomavirus (HPV) test.  We are happy to inform you that your Pap smear result is normal. Cervical cancer is closely linked with certain types of HPV. Your results showed no evidence of high-risk HPV.  We recommend you have your next PAP smear and HPV test in 3 years.  You will still need to return to the clinic every year for an annual exam and other preventive tests.  If you have additional questions regarding this result, please call our registered nurse, Judi at [phone].  Sincerely,    Yaima Muse MD //[facility]

## 2020-07-15 DIAGNOSIS — Z11.59 SCREENING FOR VIRAL DISEASE: ICD-10-CM

## 2020-07-15 LAB
COPATH REPORT: NORMAL
PAP: NORMAL

## 2020-07-17 LAB
FINAL DIAGNOSIS: NORMAL
HPV HR 12 DNA CVX QL NAA+PROBE: NEGATIVE
HPV16 DNA SPEC QL NAA+PROBE: NEGATIVE
HPV18 DNA SPEC QL NAA+PROBE: NEGATIVE
SPECIMEN DESCRIPTION: NORMAL
SPECIMEN SOURCE CVX/VAG CYTO: NORMAL

## 2020-07-20 NOTE — LETTER
[address] [address]  Phone: [phone]  Fax: [phone]    August 15, 2019        Stacy Brown  [address] [place] [address]          To whom it may concern:    RE: Stacy Brown    Patient was seen and treated today at our clinic.  Patient may not return to work until reevaluation.    Next appointment is 2 weeks.    Please contact me for questions or concerns.      Sincerely,          ________________________________________________    Dr. Hammonds (Covering provider signing in Dr. Clay's Absence)      RX PROGRESS NOTE: Warfarin Anticoagulation Monitoring Initiation Note    Warfarin prescribed for the indication of Atrial fibrillation/atrial flutter.    Target INR is 2.0 - 3.0.    Anticipated duration of therapy is Indefinite.    Patient newly starting on warfarin tonight.    Most Recent Lab Values:  INR (sec)   Date/Time Value   07/19/2020 2159 1.3     HGB (g/dL)   Date/Time Value   07/20/2020 0610 13.9     HCT (%)   Date/Time Value   07/20/2020 0610 42.7     PLT (K/mcL)   Date/Time Value   07/20/2020 0610 170     GOT/AST (Units/L)   Date/Time Value   07/19/2020 2159 21     GPT/ALT (Units/L)   Date/Time Value   07/19/2020 2159 25     Serum creatinine: 1.34 mg/dL (H) 07/20/20 0610  Estimated creatinine clearance: 24.6 mL/min (A)  OB/Gyn status: unknown      Medical History:  Weight: Weight: 51.5 kg (07/20/20 0100)  Age: 89 year old  Patient does not have factors that may warrant deviation from the standard protocol (protocol exception).     No pertinent medical history noted.    The patient's medication and allergy profile was reviewed for possible drug-allergy, drug-drug, and drug-herbal interactions.    Assessment & Plan:  For anticoagulation therapy, will initiate warfarin dose of 2.5 mg once on 7-.     Pharmacist inpatient anticoagulation management will review/monitor patient daily and order warfarin dose/regimen based on protocol.    Thank you,    Adela Martinze RPH  7/20/2020 9:57 AM

## 2020-07-21 DIAGNOSIS — G89.4 CHRONIC PAIN SYNDROME: ICD-10-CM

## 2020-07-22 NOTE — TELEPHONE ENCOUNTER
Percocet 5-325 MG       Last Written Prescription Date:  6-  Last Fill Quantity: 60,   # refills: 0  Last Office Visit: 7/13/2020  Future Office visit:       Routing refill request to provider for review/approval because:  Drug not on the [facility], [facility] or [facility] refill protocol or controlled substance

## 2020-07-24 RX ORDER — OXYCODONE AND ACETAMINOPHEN 5; 325 MG/1; MG/1
TABLET ORAL
Qty: 60 TABLET | Refills: 0 | Status: SHIPPED | OUTPATIENT
Start: 2020-07-24 | End: 2020-08-21

## 2020-08-03 ENCOUNTER — HOSPITAL ENCOUNTER (OUTPATIENT)
Dept: MAMMOGRAPHY | Facility: CLINIC | Age: 45
Discharge: HOME | End: 2020-08-03
Attending: FAMILY MEDICINE | Admitting: FAMILY MEDICINE
Payer: COMMERCIAL

## 2020-08-03 DIAGNOSIS — Z11.59 SCREENING FOR VIRAL DISEASE: ICD-10-CM

## 2020-08-03 DIAGNOSIS — Z12.31 ENCOUNTER FOR SCREENING MAMMOGRAM FOR BREAST CANCER: ICD-10-CM

## 2020-08-03 PROCEDURE — 77067 SCR MAMMO BI INCL CAD: CPT

## 2020-08-03 PROCEDURE — 86769 SARS-COV-2 COVID-19 ANTIBODY: CPT | Performed by: FAMILY MEDICINE

## 2020-08-03 PROCEDURE — 36415 COLL VENOUS BLD VENIPUNCTURE: CPT | Performed by: FAMILY MEDICINE

## 2020-08-04 LAB
COVID-19 ANTIBODY IGG: NEGATIVE
LAB TEST METHOD: NORMAL

## 2020-08-20 DIAGNOSIS — G89.4 CHRONIC PAIN SYNDROME: ICD-10-CM

## 2020-08-21 RX ORDER — OXYCODONE AND ACETAMINOPHEN 5; 325 MG/1; MG/1
TABLET ORAL
Qty: 60 TABLET | Refills: 0 | Status: SHIPPED | OUTPATIENT
Start: 2020-08-21 | End: 2020-09-17

## 2020-08-21 NOTE — TELEPHONE ENCOUNTER
Pending Prescriptions:                       Disp   Refills    oxyCODONE-acetaminophen (PERCOCET) 5-325 M*60 tab*0        Sig: TAKE ONE TABLET BY MOUTH TWICE A DAY    Last Written Prescription Date:  7/24/20  Last Fill Quantity: 60,  # refills: 0   Last office visit: 7/13/2020 with prescribing provider:  Almaz   Future Office Visit:        Routing refill request to provider for review/approval because:  Drug not on the [facility] refill protocol     Jessica Méndez RN on 8/21/2020 at 7:56 AM

## 2020-08-24 ENCOUNTER — THERAPY VISIT (OUTPATIENT)
Dept: CHIROPRACTIC MEDICINE | Facility: CLINIC | Age: 45
End: 2020-08-24
Payer: COMMERCIAL

## 2020-08-24 DIAGNOSIS — M99.04 SEGMENTAL DYSFUNCTION OF SACRAL REGION: Primary | ICD-10-CM

## 2020-08-24 DIAGNOSIS — M70.61 TROCHANTERIC BURSITIS OF RIGHT HIP: ICD-10-CM

## 2020-08-24 DIAGNOSIS — M99.03 SEGMENTAL DYSFUNCTION OF LUMBAR REGION: ICD-10-CM

## 2020-08-24 DIAGNOSIS — M99.02 SEGMENTAL DYSFUNCTION OF THORACIC REGION: ICD-10-CM

## 2020-08-24 DIAGNOSIS — M99.06 SEGMENTAL DYSFUNCTION OF LOWER EXTREMITY: ICD-10-CM

## 2020-08-24 PROCEDURE — 98943 CHIROPRACT MANJ XTRSPINL 1/>: CPT | Performed by: CHIROPRACTOR

## 2020-08-24 PROCEDURE — 98941 CHIROPRACT MANJ 3-4 REGIONS: CPT | Mod: AT | Performed by: CHIROPRACTOR

## 2020-08-24 NOTE — PROGRESS NOTES
Visit #:  4 of 8 based on treatment plan 6/12/2019    Subjective:  Stacy Brown is a 44 year old female who is seen in f/u up for:        Segmental dysfunction of lower extremity  Segmental dysfunction of lumbar region  Trochanteric bursitis of right hip  Segmental dysfunction of sacral region.     Since last visit on 7/6/2020,  Stacy Brown reports the following changes: Patient presents and states that her right hip is stiff and sore today. She rates her current pain 6-7/10, with certain movements. Walking causes pain. She has an older truck right now for work, which means that she is leaning a lot.           Objective:  The following was observed:      P: palpatory tenderness in right hip/SI    A: static palpation demonstrates intersegmental asymmetry     R: motion palpation notes restricted motion    T: localized muscle spasm at: Gluteal, Lumbar erector spine and Piriformis R>>L      Assessment:    Segmental spinal dysfunction/restrictions found at:  T5 E, FR  T10 E, FR  L4 LR, RRR  Right SI posterior  Right hip LAD    Diagnoses:      1. Segmental dysfunction of lower extremity    2. Segmental dysfunction of lumbar region    3. Trochanteric bursitis of right hip    4. Segmental dysfunction of sacral region        Patient's condition:  Patient had restrictions pre-manipulation and Patient had decreased motion prior to manipulation    Treatment effectiveness:  Post manipulation there is better intersegmental movement and Patient claims to feel looser post manipulation      Procedures:  CMT:  54647 Chiropractic manipulative treatment 3-4 regions performed   69103 Chiropractic manipulative treatment extraspinal dysfunction/restriction  Thoracic: Diversified, T5, T10, Prone  Lumbar: Drop Table, L4, Prone  Pelvis: Drop Table, Right SI, Prone  Extra-spinal: Mobilization, Right hip LAD    Modalities:  MSTM to affected musculature  Flexion Distraction of lumbar spine.     Therapeutic procedures:  Gave patient Ice  instructions post adjustment, and instructions for acute care      Prognosis: Good    Progress towards Goals:   Patient is improving with care.     Response to Treatment:   Patient tolerated treatment well today.       Recommendations:    Instructions:ice 20 minutes every other hour as needed    Follow-up:  Return to care PRN.

## 2020-09-10 ENCOUNTER — TELEPHONE (OUTPATIENT)
Dept: FAMILY MEDICINE | Facility: CLINIC | Age: 45
End: 2020-09-10

## 2020-09-10 NOTE — TELEPHONE ENCOUNTER
Reason for Call:  Form, our goal is to have forms completed with 72 hours, however, some forms may require a visit or additional information.    Type of letter, form or note:  medical    Who is the form from?: Patient    Where did the form come from: Patient or family brought in       What clinic location was the form placed at?: [facility]    Where the form was placed: Given to     What number is listed as a contact on the form?: Unknown       Additional comments: Pt states she brought a form in about a month ago. It is for the DMV and regarding insulin use. 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 Please call to advise. [phone].    Call taken on 9/10/2020 at 2:42 PM by Lucy Church

## 2020-09-16 DIAGNOSIS — G89.4 CHRONIC PAIN SYNDROME: ICD-10-CM

## 2020-09-16 DIAGNOSIS — M75.41 SUBACROMIAL IMPINGEMENT OF RIGHT SHOULDER: ICD-10-CM

## 2020-09-16 NOTE — TELEPHONE ENCOUNTER
Routing refill request to provider for review/approval because:  Drug not on the [facility] refill protocol   Last Written Prescription Date:  7/13/2020  Last Fill Quantity: 30,  # refills: 1   Last office visit: 7/13/2020 with prescribing provider:     Future Office Visit:      MARCELA PaigeN, RN

## 2020-09-16 NOTE — TELEPHONE ENCOUNTER
Routing refill request to provider for review/approval because:  Drug not on the [facility] refill protocol   Last Written Prescription Date:  8/21/2020  Last Fill Quantity: 60,  # refills: 0   Last office visit: 7/13/2020 with prescribing provider:     Future Office Visit:      MARCELA PaigeN, RN

## 2020-09-17 DIAGNOSIS — G89.4 CHRONIC PAIN SYNDROME: ICD-10-CM

## 2020-09-17 RX ORDER — OXYCODONE AND ACETAMINOPHEN 5; 325 MG/1; MG/1
TABLET ORAL
Qty: 45 TABLET | Refills: 0 | Status: SHIPPED | OUTPATIENT
Start: 2020-09-17 | End: 2020-09-17

## 2020-09-17 RX ORDER — OXYCODONE AND ACETAMINOPHEN 5; 325 MG/1; MG/1
TABLET ORAL
Qty: 45 TABLET | Refills: 0 | Status: SHIPPED | OUTPATIENT
Start: 2020-09-17 | End: 2020-10-19

## 2020-10-15 DIAGNOSIS — G89.4 CHRONIC PAIN SYNDROME: ICD-10-CM

## 2020-10-16 NOTE — TELEPHONE ENCOUNTER
Routing refill request to provider for review/approval because:  Drug not on the [facility] refill protocol   Last Written Prescription Date:  9/17/2020  Last Fill Quantity: 45/,  # refills: 0   Last office visit: 7/13/2020 with prescribing provider:     Future Office Visit:      MARCELA PaigeN, RN

## 2020-10-16 NOTE — TELEPHONE ENCOUNTER
Percocet      Last Written Prescription Date:  9/17/2020  Last Fill Quantity: 45,   # refills: 0  Last Office Visit: 7/13/2020  Future Office visit:       Routing refill request to provider for review/approval because:  Drug not on the [facility], [facility] or [facility] refill protocol or controlled substance

## 2020-10-19 RX ORDER — OXYCODONE AND ACETAMINOPHEN 5; 325 MG/1; MG/1
TABLET ORAL
Qty: 30 TABLET | Refills: 0 | Status: SHIPPED | OUTPATIENT
Start: 2020-10-19 | End: 2020-10-19

## 2020-10-19 RX ORDER — OXYCODONE AND ACETAMINOPHEN 5; 325 MG/1; MG/1
TABLET ORAL
Qty: 45 TABLET | Refills: 0 | Status: SHIPPED | OUTPATIENT
Start: 2020-10-19 | End: 2020-12-22

## 2020-10-19 NOTE — TELEPHONE ENCOUNTER
I spoke with her and with the increased volume of packages she is barely getting by, thinks it will continue to be bad through the holidays. She is asking for a slower taper. Would like to do 2 more months of 45 tablets per month, then will try reducing to 40 pills per month for 2 months, then 35 pills per month for 2 months then down to 30.     Also wondering if she needs restrictions on her work hours through the holidays, how to go about that.  I advised her that if her workload gets to be intolerable she will call me to set up a visit and we can discuss work restrictions.  Yaima Muse MD

## 2020-10-19 NOTE — TELEPHONE ENCOUNTER
Spoke with patient and informed of message below. Patient understood but states that this is not right and the wrong time of the year to be decreasing this. She is a  and this time of the year she is working a lot more and and is in more pain. Please advise.     Clair Ortiz MA

## 2020-10-19 NOTE — TELEPHONE ENCOUNTER
Please notify Stacy that I am refilling this but I am decreasing amount to once daily as we had done in the past, to get her back down on her dosing.   Yaima Muse MD

## 2020-10-29 ENCOUNTER — APPOINTMENT (OUTPATIENT)
Dept: GENERAL RADIOLOGY | Facility: CLINIC | Age: 45
End: 2020-10-29
Attending: EMERGENCY MEDICINE
Payer: COMMERCIAL

## 2020-10-29 ENCOUNTER — HOSPITAL ENCOUNTER (EMERGENCY)
Facility: CLINIC | Age: 45
Discharge: HOME | End: 2020-10-29
Attending: EMERGENCY MEDICINE | Admitting: EMERGENCY MEDICINE
Payer: COMMERCIAL

## 2020-10-29 VITALS
OXYGEN SATURATION: 98 % | DIASTOLIC BLOOD PRESSURE: 96 MMHG | HEART RATE: 91 BPM | RESPIRATION RATE: 10 BRPM | BODY MASS INDEX: 28.54 KG/M2 | WEIGHT: 177.6 LBS | SYSTOLIC BLOOD PRESSURE: 144 MMHG | TEMPERATURE: 98.7 F

## 2020-10-29 DIAGNOSIS — J18.9 PNEUMONIA OF LEFT LOWER LOBE DUE TO INFECTIOUS ORGANISM: ICD-10-CM

## 2020-10-29 DIAGNOSIS — J06.9 VIRAL UPPER RESPIRATORY ILLNESS: ICD-10-CM

## 2020-10-29 LAB
ALBUMIN SERPL-MCNC: 4.1 G/DL (ref 3.4–5)
ALP SERPL-CCNC: 113 U/L (ref 40–150)
ALT SERPL W P-5'-P-CCNC: 19 U/L (ref 0–50)
ANION GAP SERPL CALCULATED.3IONS-SCNC: 13 MMOL/L (ref 3–14)
AST SERPL W P-5'-P-CCNC: 9 U/L (ref 0–45)
BASE DEFICIT BLDV-SCNC: 1.5 MMOL/L
BASOPHILS # BLD AUTO: 0.1 10E9/L (ref 0–0.2)
BASOPHILS NFR BLD AUTO: 0.6 %
BILIRUB SERPL-MCNC: 0.3 MG/DL (ref 0.2–1.3)
BUN SERPL-MCNC: 15 MG/DL (ref 7–30)
CALCIUM SERPL-MCNC: 10.2 MG/DL (ref 8.5–10.1)
CHLORIDE SERPL-SCNC: 92 MMOL/L (ref 94–109)
CO2 SERPL-SCNC: 23 MMOL/L (ref 20–32)
CREAT SERPL-MCNC: 0.59 MG/DL (ref 0.52–1.04)
DIFFERENTIAL METHOD BLD: ABNORMAL
EOSINOPHIL NFR BLD AUTO: 0.5 %
ERYTHROCYTE [DISTWIDTH] IN BLOOD BY AUTOMATED COUNT: 12.8 % (ref 10–15)
GFR SERPL CREATININE-BSD FRML MDRD: >90 ML/MIN/{1.73_M2}
GLUCOSE BLDC GLUCOMTR-MCNC: 292 MG/DL (ref 70–99)
GLUCOSE SERPL-MCNC: 206 MG/DL (ref 70–99)
GLUCOSE SERPL-MCNC: 260 MG/DL (ref 70–99)
GLUCOSE SERPL-MCNC: 609 MG/DL (ref 70–99)
HBA1C MFR BLD: 12.1 % (ref 0–5.6)
HCO3 BLDV-SCNC: 23 MMOL/L (ref 21–28)
HCT VFR BLD AUTO: 45.8 % (ref 35–47)
HGB BLD-MCNC: 15.7 G/DL (ref 11.7–15.7)
IMM GRANULOCYTES # BLD: 0 10E9/L (ref 0–0.4)
IMM GRANULOCYTES NFR BLD: 0.3 %
LACTATE BLD-SCNC: 3 MMOL/L (ref 0.7–2)
LACTATE BLD-SCNC: 3.2 MMOL/L (ref 0.7–2)
LACTATE BLD-SCNC: 4.5 MMOL/L (ref 0.7–2)
LYMPHOCYTES # BLD AUTO: 1.7 10E9/L (ref 0.8–5.3)
LYMPHOCYTES NFR BLD AUTO: 14.7 %
MCH RBC QN AUTO: 27.3 PG (ref 26.5–33)
MCHC RBC AUTO-ENTMCNC: 34.3 G/DL (ref 31.5–36.5)
MCV RBC AUTO: 80 FL (ref 78–100)
MONOCYTES # BLD AUTO: 0.6 10E9/L (ref 0–1.3)
MONOCYTES NFR BLD AUTO: 5 %
NEUTROPHILS # BLD AUTO: 8.9 10E9/L (ref 1.6–8.3)
NEUTROPHILS NFR BLD AUTO: 78.9 %
NRBC # BLD AUTO: 0 10*3/UL
NRBC BLD AUTO-RTO: 0 /100
O2/TOTAL GAS SETTING VFR VENT: 21 %
PCO2 BLDV: 39 MM HG (ref 40–50)
PH BLDV: 7.39 PH (ref 7.32–7.43)
PLATELET # BLD AUTO: 338 10E9/L (ref 150–450)
PO2 BLDV: 45 MM HG (ref 25–47)
POTASSIUM SERPL-SCNC: 4 MMOL/L (ref 3.4–5.3)
PROT SERPL-MCNC: 8.3 G/DL (ref 6.8–8.8)
RBC # BLD AUTO: 5.76 10E12/L (ref 3.8–5.2)
SODIUM SERPL-SCNC: 128 MMOL/L (ref 133–144)
WBC # BLD AUTO: 11.3 10E9/L (ref 4–11)

## 2020-10-29 PROCEDURE — 82947 ASSAY GLUCOSE BLOOD QUANT: CPT | Mod: 91 | Performed by: EMERGENCY MEDICINE

## 2020-10-29 PROCEDURE — 999N001017 HC STATISTIC GLUCOSE BY METER IP

## 2020-10-29 PROCEDURE — U0003 INFECTIOUS AGENT DETECTION BY NUCLEIC ACID (DNA OR RNA); SEVERE ACUTE RESPIRATORY SYNDROME CORONAVIRUS 2 (SARS-COV-2) (CORONAVIRUS DISEASE [COVID-19]), AMPLIFIED PROBE TECHNIQUE, MAKING USE OF HIGH THROUGHPUT TECHNOLOGIES AS DESCRIBED BY CMS-2020-01-R: HCPCS | Performed by: EMERGENCY MEDICINE

## 2020-10-29 PROCEDURE — 96365 THER/PROPH/DIAG IV INF INIT: CPT | Performed by: EMERGENCY MEDICINE

## 2020-10-29 PROCEDURE — 96375 TX/PRO/DX INJ NEW DRUG ADDON: CPT | Performed by: EMERGENCY MEDICINE

## 2020-10-29 PROCEDURE — 99284 EMERGENCY DEPT VISIT MOD MDM: CPT | Performed by: EMERGENCY MEDICINE

## 2020-10-29 PROCEDURE — 83036 HEMOGLOBIN GLYCOSYLATED A1C: CPT | Performed by: EMERGENCY MEDICINE

## 2020-10-29 PROCEDURE — 80053 COMPREHEN METABOLIC PANEL: CPT | Performed by: EMERGENCY MEDICINE

## 2020-10-29 PROCEDURE — 250N000011 HC RX IP 250 OP 636: Performed by: EMERGENCY MEDICINE

## 2020-10-29 PROCEDURE — 83605 ASSAY OF LACTIC ACID: CPT | Performed by: EMERGENCY MEDICINE

## 2020-10-29 PROCEDURE — 99284 EMERGENCY DEPT VISIT MOD MDM: CPT | Mod: 25 | Performed by: EMERGENCY MEDICINE

## 2020-10-29 PROCEDURE — 36415 COLL VENOUS BLD VENIPUNCTURE: CPT | Performed by: EMERGENCY MEDICINE

## 2020-10-29 PROCEDURE — 71045 X-RAY EXAM CHEST 1 VIEW: CPT

## 2020-10-29 PROCEDURE — 258N000003 HC RX IP 258 OP 636: Performed by: EMERGENCY MEDICINE

## 2020-10-29 PROCEDURE — 85025 COMPLETE CBC W/AUTO DIFF WBC: CPT | Performed by: EMERGENCY MEDICINE

## 2020-10-29 PROCEDURE — 250N000013 HC RX MED GY IP 250 OP 250 PS 637: Performed by: EMERGENCY MEDICINE

## 2020-10-29 PROCEDURE — 96361 HYDRATE IV INFUSION ADD-ON: CPT | Performed by: EMERGENCY MEDICINE

## 2020-10-29 PROCEDURE — 250N000012 HC RX MED GY IP 250 OP 636 PS 637: Performed by: EMERGENCY MEDICINE

## 2020-10-29 PROCEDURE — C9803 HOPD COVID-19 SPEC COLLECT: HCPCS | Performed by: EMERGENCY MEDICINE

## 2020-10-29 PROCEDURE — 82803 BLOOD GASES ANY COMBINATION: CPT | Performed by: EMERGENCY MEDICINE

## 2020-10-29 RX ORDER — LEVOFLOXACIN 500 MG/1
500 TABLET, FILM COATED ORAL DAILY
Qty: 7 TABLET | Refills: 0 | Status: SHIPPED | OUTPATIENT
Start: 2020-10-29 | End: 2020-11-05

## 2020-10-29 RX ORDER — CEFTRIAXONE 1 G/1
1 INJECTION, POWDER, FOR SOLUTION INTRAMUSCULAR; INTRAVENOUS ONCE
Status: COMPLETED | OUTPATIENT
Start: 2020-10-29 | End: 2020-10-29

## 2020-10-29 RX ORDER — SODIUM CHLORIDE 9 MG/ML
INJECTION, SOLUTION INTRAVENOUS CONTINUOUS
Status: DISCONTINUED | OUTPATIENT
Start: 2020-10-29 | End: 2020-10-29 | Stop reason: HOSPADM

## 2020-10-29 RX ORDER — AZITHROMYCIN 250 MG/1
500 TABLET, FILM COATED ORAL ONCE
Status: COMPLETED | OUTPATIENT
Start: 2020-10-29 | End: 2020-10-29

## 2020-10-29 RX ORDER — KETOROLAC TROMETHAMINE 30 MG/ML
30 INJECTION, SOLUTION INTRAMUSCULAR; INTRAVENOUS ONCE
Status: COMPLETED | OUTPATIENT
Start: 2020-10-29 | End: 2020-10-29

## 2020-10-29 RX ADMIN — SODIUM CHLORIDE 1000 ML: 9 INJECTION, SOLUTION INTRAVENOUS at 16:06

## 2020-10-29 RX ADMIN — SODIUM CHLORIDE 1000 ML: 9 INJECTION, SOLUTION INTRAVENOUS at 13:40

## 2020-10-29 RX ADMIN — SODIUM CHLORIDE 1000 ML: 9 INJECTION, SOLUTION INTRAVENOUS at 14:32

## 2020-10-29 RX ADMIN — KETOROLAC TROMETHAMINE 30 MG: 30 INJECTION, SOLUTION INTRAMUSCULAR at 13:42

## 2020-10-29 RX ADMIN — HUMAN INSULIN 15 UNITS: 100 INJECTION, SOLUTION SUBCUTANEOUS at 15:05

## 2020-10-29 RX ADMIN — SODIUM CHLORIDE 1000 ML: 9 INJECTION, SOLUTION INTRAVENOUS at 17:35

## 2020-10-29 RX ADMIN — CEFTRIAXONE SODIUM 1 G: 1 INJECTION, POWDER, FOR SOLUTION INTRAMUSCULAR; INTRAVENOUS at 14:33

## 2020-10-29 RX ADMIN — AZITHROMYCIN 500 MG: 250 TABLET, FILM COATED ORAL at 15:34

## 2020-10-29 NOTE — ED TRIAGE NOTES
Patient arrives with 6 day history of shortness of breath and cough. 

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
## 2020-10-29 NOTE — LETTER
October 29, 2020      To Whom It May Concern:      Stacy Brown was seen in our Emergency Department today, 10/29/20.  I expect her condition to improve over the next 2-3 days.  She may return to work when improved.    Sincerely,        Shahabanand Flynn MD

## 2020-10-29 NOTE — ED NOTES
DATE:  10/29/2020   TIME OF RECEIPT FROM LAB:  5661  LAB TEST:  Lactic  LAB VALUE:  4.5  RESULTS GIVEN WITH READ-BACK TO (PROVIDER):  Shahab Flynn MD and Kina GREEN RN    TIME LAB VALUE REPORTED TO PROVIDER:   1784

## 2020-10-29 NOTE — ED AVS SNAPSHOT
[facility] Emergency Dept  [address] [place] [address]  Phone: [phone]  Fax: [phone]                                    Stacy Brown   MRN: [id number]    Department: [facility] Emergency Dept   Date of Visit: 10/29/2020           After Visit Summary Signature Page    I have received my discharge instructions, and my questions have been answered. I have discussed any challenges I see with this plan with the nurse or doctor.    ..........................................................................................................................................  Patient/Patient Representative Signature      ..........................................................................................................................................  Patient Representative Print Name and Relationship to Patient    ..................................................               ................................................  Date                                   Time    ..........................................................................................................................................  Reviewed by Signature/Title    ...................................................              ..............................................  Date                                               Time          22EPIC Rev 08/18

## 2020-10-29 NOTE — ED NOTES
DATE:  10/29/2020   TIME OF RECEIPT FROM LAB:  1437  LAB TEST:  Glucose  LAB VALUE:  609  RESULTS GIVEN WITH READ-BACK TO (PROVIDER):  Shahab Flynn MD  TIME LAB VALUE REPORTED TO PROVIDER:   7755

## 2020-10-29 NOTE — ED NOTES
DATE:  10/29/2020   TIME OF RECEIPT FROM LAB:  6556  LAB TEST:  Lactic  LAB VALUE:  3.2  RESULTS GIVEN WITH READ-BACK TO (PROVIDER):  Shahab Flynn MD  TIME LAB VALUE REPORTED TO PROVIDER:   2816

## 2020-10-30 LAB
SARS-COV-2 RNA SPEC QL NAA+PROBE: NOT DETECTED
SPECIMEN SOURCE: NORMAL

## 2020-10-30 NOTE — DISCHARGE INSTRUCTIONS
We are going to treat you for pneumonia with Levaquin 1 tab daily for 1 week.  Begin this tomorrow.  Should encourage fluids to maintain hydration.  Need to be more active with controlling your diabetes by eating regular meals and follow your blood sugar closely.  Your Covid test will be available in the next 24 to 48 hours.  If positive you will need to self isolate for 2 weeks.

## 2020-11-01 ENCOUNTER — NURSE TRIAGE (OUTPATIENT)
Dept: NURSING | Facility: CLINIC | Age: 45
End: 2020-11-01

## 2020-11-01 NOTE — TELEPHONE ENCOUNTER
Coronavirus (COVID-19) Notification    Lab Result   Lab test 2019-nCoV rRt-PCR OR SARS-COV-2 PCR    Nasopharyngeal AND/OR Oropharyngeal swab is NEGATIVE for 2019-nCoV RNA [OR] SARS-COV-2 RNA (COVID-19) RNA    Your result was negative. This means that we didn't find the virus that causes COVID-19 in your sample. A test may show negative when you do actually have the virus. This can happen when the virus is in the early stages of infection, before you feel illness symptoms.    If you have symptoms   Stay home and away from others (self-isolate) until you meet ALL of the guidelines below:    You've had no fever--and no medicine that reduces fever--for 1 full day (24 hours). And      Your other symptoms have gotten better. For example, your cough or breathing has improved. And   ; At least 10 days have passed since your symptoms started. (If you've been told by a doctor that you have a weak immune system, wait 20 days.)         During this time:    Stay home. Don't go to work, school or anywhere else.     Stay in your own room, including for meals. Use your own bathroom if you can.    Stay away from others in your home. No hugging, kissing or shaking hands. No visitors.    Clean  high touch  surfaces often (doorknobs, counters, handles, etc.). Use a household cleaning spray or wipes. You can find a full list on the EPA website at www.epa.gov/pesticide-registration/list-n-disinfectants-use-against-sars-cov-2.    Cover your mouth and nose with a mask, tissue or other face covering to avoid spreading germs.    Wash your hands and face often with soap and water.    Going back to work  Check with your employer for any guidelines to follow for going back to work.  You are sent a letter for your Employer which will serve as formal document notice that you, the employee, tested negative for COVID-19, as of the testing date shown above.    If your symptoms worsen or other concerning symptoms, contact PCP, oncare or consider  returning to Emergency Dept.    Where can I get more information?    [facility]: [email or web address]    Coronavirus Basics: [email or web address]/diseases/coronavirus/basics.html    [facility] Hotline ([phone])    {Name]  Shasha Arce RN  [facility]

## 2020-11-18 DIAGNOSIS — M75.41 SUBACROMIAL IMPINGEMENT OF RIGHT SHOULDER: ICD-10-CM

## 2020-11-19 ENCOUNTER — VIRTUAL VISIT (OUTPATIENT)
Dept: FAMILY MEDICINE | Facility: CLINIC | Age: 45
End: 2020-11-19
Payer: COMMERCIAL

## 2020-11-19 DIAGNOSIS — R11.2 NON-INTRACTABLE VOMITING WITH NAUSEA, UNSPECIFIED VOMITING TYPE: Primary | ICD-10-CM

## 2020-11-19 DIAGNOSIS — R53.83 OTHER FATIGUE: ICD-10-CM

## 2020-11-19 PROCEDURE — 99213 OFFICE O/P EST LOW 20 MIN: CPT | Mod: 95 | Performed by: PHYSICIAN ASSISTANT

## 2020-11-19 ASSESSMENT — ENCOUNTER SYMPTOMS
FEVER: 0
NAUSEA: 1
CHEST TIGHTNESS: 0
VOMITING: 1
SINUS PRESSURE: 0
RHINORRHEA: 0
SORE THROAT: 0
DIARRHEA: 0
SHORTNESS OF BREATH: 0
HEADACHES: 0
COUGH: 0
ADENOPATHY: 0
SINUS PAIN: 0
STRIDOR: 0
WHEEZING: 0
FATIGUE: 0

## 2020-11-19 NOTE — TELEPHONE ENCOUNTER
Zanaflex      Last Written Prescription Date:  9/16/2020  Last Fill Quantity: 30,   # refills: 1  Last Office Visit: 7/13/2020  Future Office visit:       Routing refill request to provider for review/approval because:  Drug not on the [facility], [facility] or [facility] refill protocol or controlled substance

## 2020-11-19 NOTE — PATIENT INSTRUCTIONS
Quarantine is for those who have had a close contact to someone who is COVID positive. A close contact is defined as being within 6 feet for 15 minutes or longer. We recommend you stay home and away from others for 14 days to monitor for symptoms. If you develop symptoms, enter isolation guidelines and be tested for COVID-19.    Isolation is for those who have symptoms or test positive for COVID-19. You should remain home and away from others for 10 days after your symptoms start. You may return to activities after 10 days as long as you have been fever free for 24 hours and have had an improvement in your symptoms. If you are tested and your test comes back negative, you may return to activities 24 hours after you have been fever free without medication.    Your symptoms show that you may have coronavirus (COVID-19). This illness can cause fever, cough and trouble  breathing. Many people get a mild case and get better on their own. Some people can get very sick.    What should I do?  1. We would like to test you for this virus. A  will call you to set up the tesing.    2. When it s time for your COVID test:    Stay at least 6 feet away from others. (If someone will drive you to your test, stay in the backseat, as  far away from the  as you can.)    Cover your mouth and nose with a mask, tissue or washcloth.    Go straight to the testing site. Don't make any stops on the way there or back.    3. Starting now: Stay home and away from others (self-isolate) until:    You've had no fever--and no medicine that reduces fever--for 3 full days (72 hours). And     Your other symptoms have gotten better. For example, your cough or breathing has improved. And     At least 10 days have passed since your symptoms started.    4. During this time, don t leave the house except for testing or medical care.    Stay in your own room, even for meals. Use your own bathroom if you can.    Stay away from others in your  home. No hugging, kissing or shaking hands. No visitors.    Don t go to work, school or anywhere else.    Clean  high touch  surfaces often (doorknobs, counters, handles, etc.). Use a household cleaning  spray or wipes. You ll find a full list of  on the EPA website: www.epa.gov/pesticideregistration/  list-n-disinfectants-use-against-sars-cov-2.    Cover your mouth and nose with a mask, tissue or washcloth to avoid spreading germs.    Wash your hands and face often. Use soap and water.    Caregivers in these groups are at risk for severe illness due to COVID-19:  o People 65 years and older  o People who live in a nursing home or long-term care facility  o People with chronic disease (lung, heart, cancer, diabetes, kidney, liver, immunologic)  o People who have a weakened immune system, including those who:    Are in cancer treatment    Take medicine that weakens the immune system, such as corticosteroids    Had a bone marrow or organ transplant    Have an immune deficiency    Have poorly controlled HIV or AIDS    Are obese (body mass index of 40 or higher)    Smoke regularly  o Caregivers should wear gloves while washing dishes, handling laundry and cleaning  bedrooms and bathrooms.  o Use caution when washing and drying laundry: Don t shake dirty laundry, and use the  warmest water setting that you can.  o For more tips, go to www.cdc.gov/coronavirus/2019-ncov/downloads/10Things.pdf.    How can I take care of myself?  1. Get lots of rest. Drink extra fluids (unless a doctor has told you not to).  2. Take Tylenol (acetaminophen) for fever or pain. If you have liver or kidney problems, ask your family  doctor if it's okay to take Tylenol.  Adults can take either:    650 mg (two 325 mg pills) every 4 to 6 hours, or     1,000 mg (two 500 mg pills) every 8 hours as needed.    Note: Don't take more than 3,000 mg in one day. Acetaminophen is found in many medicines  (both prescribed and over-the-counter  medicines). Read all labels to be sure you don t take too  much.  For children, check the Tylenol bottle for the right dose. The dose is based on the child's age or weight.  3. If you have other health problems (like cancer, heart failure, an organ transplant or severe kidney  disease): Call your specialty clinic if you don't feel better in the next 2 days.  4. Know when to call 911. Emergency warning signs include:    Trouble breathing or shortness of breath    Pain or pressure in the chest that doesn t go away    Feeling confused like you haven t felt before, or not being able to wake up    Bluish-colored lips or face    Where can I get more information?    [facility] - About COVID-19: [email or web address]    CDC - What to Do If You re Sick: www.cdc.gov/coronavirus/2019-ncov/about/steps-when-sick.html    CDC - Ending Home Isolation: www.cdc.gov/coronavirus/2019-ncov/hcp/disposition-in-homepatients.  html    CDC - Caring for Someone: www.cdc.gov/coronavirus/2019-ncov/if-you-are-sick/care-for-someone.html    [facility] - Interim Guidance for Hospital Discharge to Home:  www.health.[facility].mn.us/diseases/coronavirus/hcp/hospdischarge.pdf    [facility] clinical trials (COVID-19 research studies): clinicalaffairs.[facility]-clinicaltrials    Below are the COVID-19 hotlines at the [facility] Department of Health ([facility]). Interpreters are  available.  o For health questions: Call [phone] or [phone] (7 a.m. to 7 p.m.)  o For questions about schools and childcare: Call [phone] or [phone] (7 a.m. to 7  p.m.)

## 2020-11-19 NOTE — PROGRESS NOTES
"Stacy Brown is a 45 year old female who is being evaluated via a billable telephone visit.      The patient has been notified of following:     \"This telephone visit will be conducted via a call between you and your physician/provider. We have found that certain health care needs can be provided without the need for a physical exam.  This service lets us provide the care you need with a short phone conversation.  If a prescription is necessary we can send it directly to your pharmacy.  If lab work is needed we can place an order for that and you can then stop by our lab to have the test done at a later time.    Telephone visits are billed at different rates depending on your insurance coverage. During this emergency period, for some insurers they may be billed the same as an in-person visit.  Please reach out to your insurance provider with any questions.    If during the course of the call the physician/provider feels a telephone visit is not appropriate, you will not be charged for this service.\"    Patient has given verbal consent for Telephone visit?  Yes    What phone number would you like to be contacted at? [phone]    How would you like to obtain your AVS? Mail a copy    Subjective     Stacy Brown is a 45 year old female who presents via phone visit today for the following health issues:    HPI     Concern - nausea and vomiting, needs a note for work  Onset: since this morning, vomited 3x when she gt up and 2x after, patient is feeling better, but still needs a note for missing work today  Description: patient feels really tired today, along with the nausea and vomiting  Intensity: mild  Progression of Symptoms:  improving  Accompanying Signs & Symptoms: no other sx  Previous history of similar problem: N/A  Precipitating factors:        Worsened by: N/A  Alleviating factors:        Improved by: NOTHING  Therapies tried and outcome: None    Stacy presents via telephone for evaluation of nausea " and vomiting. She notes that her alarm went off at 5 AM this morning, she sat on the edge of the bed and felt nauseated. She went to the bathroom and vomited. Since then she has vomited twice more. Now, she still feels fatigued but has not vomited recently. No cough, fever, shortness of breath, loss of taste or smell, sore throat, diarrhea, chills, muscle pain, headache, nasal congestion or a runny nose.      Review of Systems   Constitutional: Negative for fatigue and fever.   HENT: Negative for congestion, ear pain, rhinorrhea, sinus pressure, sinus pain and sore throat.    Respiratory: Negative for cough, chest tightness, shortness of breath, wheezing and stridor.    Gastrointestinal: Positive for nausea and vomiting. Negative for diarrhea.   Neurological: Negative for headaches.   Hematological: Negative for adenopathy.             Objective          Vitals:  No vitals were obtained today due to virtual visit.    healthy, alert and no distress  PSYCH: Alert and oriented times 3; coherent speech, normal   rate and volume, able to articulate logical thoughts, able   to abstract reason, no tangential thoughts, no hallucinations   or delusions  Her affect is normal and pleasant  RESP: No cough, no audible wheezing, able to talk in full sentences  Remainder of exam unable to be completed due to telephone visits    Assessment/Plan:    Assessment & Plan     Non-intractable vomiting with nausea, unspecified vomiting type  - Symptomatic COVID-19 Virus (Coronavirus) by PCR; Future    Other fatigue  - Symptomatic COVID-19 Virus (Coronavirus) by PCR; Future      These symptoms do not have an alternate diagnosis that makes COVID unlikely so she should have a negative test before returning to work. Isolation reviewed.    No follow-ups on file.    Shyanne Victoria PA-C  [facility]    Phone call duration:  5 minutes

## 2020-11-19 NOTE — LETTER
[address] [address]  Phone: [phone]  Fax: [phone]        2020    Stacy Brown  [address] [facility] [id number] [phone] (home) [phone] (work)    :     1975      To Whom it May Concern:    Stacy was evaluated on 2020  for symptoms that could be related to COVID-19. Per CDC guidelines, the following criteria must be met before it is recommended that she be around others:    At least 10 days since symptoms first appeared and  At least 24 hours with no fever without fever-reducing medication and  Other symptoms of COVID-19 are improving    OR    COVID test is negative.    Please excuse her from work until this criteria is met    For more information, visit:   https://www.cdc.gov/coronavirus/2019-ncov/if-you-are-sick/isolation.html  https://www.cdc.gov/coronavirus/2019-ncov/daily-life-coping/npybwmerd-xx-mdne.html    Please contact me for questions or concerns.    Sincerely,        Shyanne Victoria PA-C

## 2020-11-23 ENCOUNTER — MYC MEDICAL ADVICE (OUTPATIENT)
Dept: FAMILY MEDICINE | Facility: CLINIC | Age: 45
End: 2020-11-23

## 2020-11-23 DIAGNOSIS — R53.83 OTHER FATIGUE: ICD-10-CM

## 2020-11-23 DIAGNOSIS — R11.2 NON-INTRACTABLE VOMITING WITH NAUSEA, UNSPECIFIED VOMITING TYPE: ICD-10-CM

## 2020-11-23 PROCEDURE — U0003 INFECTIOUS AGENT DETECTION BY NUCLEIC ACID (DNA OR RNA); SEVERE ACUTE RESPIRATORY SYNDROME CORONAVIRUS 2 (SARS-COV-2) (CORONAVIRUS DISEASE [COVID-19]), AMPLIFIED PROBE TECHNIQUE, MAKING USE OF HIGH THROUGHPUT TECHNOLOGIES AS DESCRIBED BY CMS-2020-01-R: HCPCS | Performed by: PHYSICIAN ASSISTANT

## 2020-11-23 NOTE — LETTER
[address] [address]  Phone: [phone]  Fax: [phone]        2020    Stacy Brown  [address] [facility] [id number] [phone] (home) [phone] (work)    :     1975      To Whom it May Concern:    Stacy was evaluated on 2020  for symptoms that could be related to COVID-19. Per CDC guidelines, the following criteria must be met before it is recommended that she be around others:    At least 10 days since symptoms first appeared and  At least 24 hours with no fever without fever-reducing medication and  Other symptoms of COVID-19 are improving    OR    COVID test is negative.    Please excuse her from work until this criteria is met    For more information, visit:   https://www.cdc.gov/coronavirus/2019-ncov/if-you-are-sick/isolation.html  https://www.cdc.gov/coronavirus/2019-ncov/daily-life-coping/ovdjvslvp-wk-actd.html    Please contact me for questions or concerns.    Sincerely,        Shyanne Victoria PA-C

## 2020-11-24 LAB
SARS-COV-2 RNA SPEC QL NAA+PROBE: NOT DETECTED
SPECIMEN SOURCE: NORMAL

## 2020-11-24 NOTE — TELEPHONE ENCOUNTER
Routing to Shyanne Victoria due to placing patient's covid order. Please advise if you are able to complete note to patient for work.  Rebekah Sevilla MA

## 2020-12-07 ENCOUNTER — MYC MEDICAL ADVICE (OUTPATIENT)
Dept: FAMILY MEDICINE | Facility: CLINIC | Age: 45
End: 2020-12-07

## 2020-12-11 ENCOUNTER — HOSPITAL ENCOUNTER (EMERGENCY)
Facility: CLINIC | Age: 45
Discharge: HOME | End: 2020-12-11
Attending: EMERGENCY MEDICINE | Admitting: EMERGENCY MEDICINE
Payer: COMMERCIAL

## 2020-12-11 VITALS
WEIGHT: 180 LBS | TEMPERATURE: 97.7 F | OXYGEN SATURATION: 98 % | HEART RATE: 91 BPM | DIASTOLIC BLOOD PRESSURE: 87 MMHG | RESPIRATION RATE: 20 BRPM | SYSTOLIC BLOOD PRESSURE: 123 MMHG | BODY MASS INDEX: 28.93 KG/M2

## 2020-12-11 DIAGNOSIS — H57.89 IRRITATION OF LEFT EYE: ICD-10-CM

## 2020-12-11 PROCEDURE — 99283 EMERGENCY DEPT VISIT LOW MDM: CPT | Performed by: EMERGENCY MEDICINE

## 2020-12-11 PROCEDURE — 250N000009 HC RX 250: Performed by: EMERGENCY MEDICINE

## 2020-12-11 PROCEDURE — 99284 EMERGENCY DEPT VISIT MOD MDM: CPT | Performed by: EMERGENCY MEDICINE

## 2020-12-11 RX ORDER — TETRACAINE HYDROCHLORIDE 5 MG/ML
1-2 SOLUTION OPHTHALMIC ONCE
Status: COMPLETED | OUTPATIENT
Start: 2020-12-11 | End: 2020-12-11

## 2020-12-11 RX ORDER — GENTAMICIN SULFATE 3 MG/ML
1 SOLUTION/ DROPS OPHTHALMIC EVERY 4 HOURS
Qty: 5 ML | Refills: 0 | Status: SHIPPED | OUTPATIENT
Start: 2020-12-11 | End: 2021-01-27

## 2020-12-11 RX ADMIN — TETRACAINE HYDROCHLORIDE 2 DROP: 5 SOLUTION OPHTHALMIC at 16:20

## 2020-12-11 NOTE — DISCHARGE INSTRUCTIONS
On exam he did not have any foreign body that we could appreciate.  There was no irritation of the cornea.  The white part of your eye was irritated and tacked up with the dye that we placed.  I am going to place you on some antibiotics to prevent secondary infection.  Reasons to return to reassessment are discussed.
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
## 2020-12-11 NOTE — LETTER
December 11, 2020      To Whom It May Concern:      Stacy Brown was seen in our Emergency Department today, 12/11/20.  I expect her condition to improve over the next 1-2 days.  She may return to work when improved.    Sincerely,        Shahabanand Flynn MD

## 2020-12-11 NOTE — ED AVS SNAPSHOT
[facility] Emergency Dept  [address] [place] [address]  Phone: [phone]  Fax: [phone]                                    Stacy Brown   MRN: [id number]    Department: [facility] Emergency Dept   Date of Visit: 12/11/2020           After Visit Summary Signature Page    I have received my discharge instructions, and my questions have been answered. I have discussed any challenges I see with this plan with the nurse or doctor.    ..........................................................................................................................................  Patient/Patient Representative Signature      ..........................................................................................................................................  Patient Representative Print Name and Relationship to Patient    ..................................................               ................................................  Date                                   Time    ..........................................................................................................................................  Reviewed by Signature/Title    ...................................................              ..............................................  Date                                               Time          22EPIC Rev 08/18

## 2020-12-18 DIAGNOSIS — G89.4 CHRONIC PAIN SYNDROME: ICD-10-CM

## 2020-12-21 ENCOUNTER — MYC REFILL (OUTPATIENT)
Dept: FAMILY MEDICINE | Facility: CLINIC | Age: 45
End: 2020-12-21

## 2020-12-21 DIAGNOSIS — G89.4 CHRONIC PAIN SYNDROME: ICD-10-CM

## 2020-12-21 NOTE — TELEPHONE ENCOUNTER
Requested Prescriptions   Pending Prescriptions Disp Refills     oxyCODONE-acetaminophen (PERCOCET) 5-325 MG tablet [Pharmacy Med Name: OXYCODONE-ACETAMINOPHEN 5-325 TABS] 45 tablet 0     Sig: TAKE ONE TABLET BY MOUTH ONCE DAILY AS NEEDED FOR PAIN, MAY TAKE A SECOND PILL ON SEVERE DAYS UP TO 45 TOTAL PER MONTH     Last Written Prescription Date:  10/19/2020  Last Fill Quantity: 45,   # refills: 0  Last Office Visit: 07/13/2020  Future Office visit:       Routing refill request to provider for review/approval because:  Drug not on the [facility], [facility] or [facility] refill protocol or controlled substance    Clair Ortiz MA

## 2020-12-22 RX ORDER — OXYCODONE AND ACETAMINOPHEN 5; 325 MG/1; MG/1
TABLET ORAL
Qty: 45 TABLET | Refills: 0 | Status: SHIPPED | OUTPATIENT
Start: 2020-12-22 | End: 2020-12-23

## 2020-12-22 RX ORDER — OXYCODONE AND ACETAMINOPHEN 5; 325 MG/1; MG/1
TABLET ORAL
Qty: 45 TABLET | Refills: 0 | OUTPATIENT
Start: 2020-12-22

## 2020-12-22 NOTE — TELEPHONE ENCOUNTER
oxyCODONE-acetaminophen (PERCOCET) 5-325 MG tablet       Last Written Prescription Date:  10/19/20  Last Fill Quantity: 45,   # refills: 0  Last Office Visit: 11/19/20  Future Office visit:       Routing refill request to provider for review/approval because:  Drug not on the [facility], [facility] or [facility] refill protocol or controlled substance

## 2020-12-22 NOTE — TELEPHONE ENCOUNTER
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  She would like to know if Dr. Muse will be able to get her script filled tonight.  Thank you   Shu NAGEL

## 2020-12-23 NOTE — TELEPHONE ENCOUNTER
Pharmacy is calling to report they can see in her chart that it was sent and received by pharmacy, but this never came through to the pharmacy. This could be due to PA being needed.  They are requesting a printed copy be signed and brought over.    They are requesting another script be sent.  Nadine Naqvi, MARCELAN, RN

## 2020-12-24 RX ORDER — OXYCODONE AND ACETAMINOPHEN 5; 325 MG/1; MG/1
TABLET ORAL
Qty: 45 TABLET | Refills: 0 | Status: SHIPPED | OUTPATIENT
Start: 2020-12-24 | End: 2021-01-22

## 2021-01-04 DIAGNOSIS — E11.65 TYPE 2 DIABETES MELLITUS WITH HYPERGLYCEMIA, WITH LONG-TERM CURRENT USE OF INSULIN (H): ICD-10-CM

## 2021-01-04 DIAGNOSIS — Z79.4 TYPE 2 DIABETES MELLITUS WITH HYPERGLYCEMIA, WITH LONG-TERM CURRENT USE OF INSULIN (H): ICD-10-CM

## 2021-01-05 DIAGNOSIS — E11.65 TYPE 2 DIABETES MELLITUS WITH HYPERGLYCEMIA, WITH LONG-TERM CURRENT USE OF INSULIN (H): ICD-10-CM

## 2021-01-05 DIAGNOSIS — Z79.4 TYPE 2 DIABETES MELLITUS WITH HYPERGLYCEMIA, WITH LONG-TERM CURRENT USE OF INSULIN (H): ICD-10-CM

## 2021-01-05 RX ORDER — INSULIN GLARGINE 100 [IU]/ML
INJECTION, SOLUTION SUBCUTANEOUS
Qty: 30 ML | Refills: 0 | Status: SHIPPED | OUTPATIENT
Start: 2021-01-05 | End: 2021-01-21

## 2021-01-05 NOTE — TELEPHONE ENCOUNTER
Patient got a new prescription for Basaglar but also needs pen needles please.  Thank you,  Serena Steele, [facility]  [phone]

## 2021-01-05 NOTE — TELEPHONE ENCOUNTER
Prescription approved per [facility] Refill Protocol.    Rachelle Mustafa RN

## 2021-01-06 DIAGNOSIS — E11.65 TYPE 2 DIABETES MELLITUS WITH HYPERGLYCEMIA, WITH LONG-TERM CURRENT USE OF INSULIN (H): ICD-10-CM

## 2021-01-06 DIAGNOSIS — Z79.4 TYPE 2 DIABETES MELLITUS WITH HYPERGLYCEMIA, WITH LONG-TERM CURRENT USE OF INSULIN (H): ICD-10-CM

## 2021-01-06 DIAGNOSIS — M75.41 SUBACROMIAL IMPINGEMENT OF RIGHT SHOULDER: ICD-10-CM

## 2021-01-07 NOTE — TELEPHONE ENCOUNTER
"  Requested Prescriptions   Pending Prescriptions Disp Refills     insulin pen needle (NOVOFINE) 32G X 6 MM miscellaneous 100 each 3     Sig: Use once daily or as directed.   11/19/2020    Diabetic Supplies Protocol Passed - 1/7/2021  7:26 AM        Passed - Medication is active on med list        Passed - Patient is 18 years of age or older        Passed - Recent (6 mo) or future (30 days) visit within the authorizing provider's specialty     Patient had office visit in the last 6 months or has a visit in the next 30 days with authorizing provider.  See \"Patient Info\" tab in inbasket, or \"Choose Columns\" in Meds & Orders section of the refill encounter.             Prescription approved per [facility] Refill Protocol.  Deanne Mace RN      "

## 2021-01-07 NOTE — TELEPHONE ENCOUNTER
Zanaflex      Last Written Prescription Date:  11/21/2020  Last Fill Quantity: 30,   # refills: 1  Last Office Visit: 7/13/2020  Future Office visit:       Routing refill request to provider for review/approval because:  Drug not on the [facility], [facility] or [facility] refill protocol or controlled substance

## 2021-01-10 ENCOUNTER — MYC MEDICAL ADVICE (OUTPATIENT)
Dept: FAMILY MEDICINE | Facility: CLINIC | Age: 46
End: 2021-01-10

## 2021-01-12 NOTE — TELEPHONE ENCOUNTER
Called and offered patient a virtual visit patient declines states started taking z-quil 3 days ago and takes 1mg gummies 2 a night and states is working well for her. Noted to provider.  Rebekah Sevilla MA

## 2021-01-15 ENCOUNTER — HEALTH MAINTENANCE LETTER (OUTPATIENT)
Age: 46
End: 2021-01-15

## 2021-01-17 ENCOUNTER — MYC MEDICAL ADVICE (OUTPATIENT)
Dept: FAMILY MEDICINE | Facility: CLINIC | Age: 46
End: 2021-01-17

## 2021-01-17 DIAGNOSIS — M75.41 SUBACROMIAL IMPINGEMENT OF RIGHT SHOULDER: ICD-10-CM

## 2021-01-17 DIAGNOSIS — G89.4 CHRONIC PAIN SYNDROME: ICD-10-CM

## 2021-01-17 DIAGNOSIS — Z79.4 TYPE 2 DIABETES MELLITUS WITH HYPERGLYCEMIA, WITH LONG-TERM CURRENT USE OF INSULIN (H): ICD-10-CM

## 2021-01-17 DIAGNOSIS — E11.65 TYPE 2 DIABETES MELLITUS WITH HYPERGLYCEMIA, WITH LONG-TERM CURRENT USE OF INSULIN (H): ICD-10-CM

## 2021-01-20 ENCOUNTER — MYC MEDICAL ADVICE (OUTPATIENT)
Dept: FAMILY MEDICINE | Facility: CLINIC | Age: 46
End: 2021-01-20

## 2021-01-22 ENCOUNTER — THERAPY VISIT (OUTPATIENT)
Dept: CHIROPRACTIC MEDICINE | Facility: CLINIC | Age: 46
End: 2021-01-22
Payer: COMMERCIAL

## 2021-01-22 DIAGNOSIS — M99.02 SEGMENTAL DYSFUNCTION OF THORACIC REGION: ICD-10-CM

## 2021-01-22 DIAGNOSIS — M99.03 SEGMENTAL DYSFUNCTION OF LUMBAR REGION: ICD-10-CM

## 2021-01-22 DIAGNOSIS — M70.61 TROCHANTERIC BURSITIS OF RIGHT HIP: ICD-10-CM

## 2021-01-22 DIAGNOSIS — M54.9 MECHANICAL BACK PAIN: ICD-10-CM

## 2021-01-22 DIAGNOSIS — M99.04 SEGMENTAL DYSFUNCTION OF SACRAL REGION: Primary | ICD-10-CM

## 2021-01-22 DIAGNOSIS — M99.06 SEGMENTAL DYSFUNCTION OF LOWER EXTREMITY: ICD-10-CM

## 2021-01-22 PROCEDURE — 98941 CHIROPRACT MANJ 3-4 REGIONS: CPT | Mod: AT | Performed by: CHIROPRACTOR

## 2021-01-22 RX ORDER — INSULIN GLARGINE 100 [IU]/ML
INJECTION, SOLUTION SUBCUTANEOUS
Qty: 30 ML | Refills: 3 | Status: SHIPPED | OUTPATIENT
Start: 2021-01-22 | End: 2021-02-11

## 2021-01-22 RX ORDER — OXYCODONE AND ACETAMINOPHEN 5; 325 MG/1; MG/1
TABLET ORAL
Qty: 45 TABLET | Refills: 0 | Status: SHIPPED | OUTPATIENT
Start: 2021-01-22 | End: 2021-02-11

## 2021-01-22 NOTE — TELEPHONE ENCOUNTER
I am filling her insulin, and her oxycodone is due tomorrow. I am not filling the Tizanidine since I filled it on 1/10 for 2 months.   Yaima Muse MD

## 2021-01-22 NOTE — TELEPHONE ENCOUNTER
Patient notified that her prescriptions were sent to her pharmacy. Patient states that she will be leaving on vacation 2/14/21 and will be gone for 2 weeks. Her prescriptions will run out before she returns, she will call back in a couple weeks before she leaves to make sure she gets her meds taken care of

## 2021-01-22 NOTE — PROGRESS NOTES
"Visit #:  5 of 8 based on treatment plan 6/12/2019    Subjective:  Stacy Brown is a 44 year old female who is seen in f/u up for:        Segmental dysfunction of lower extremity  Segmental dysfunction of lumbar region  Trochanteric bursitis of right hip  Segmental dysfunction of sacral region.     Since last visit on 8/24/2020,  Stacy Brown reports the following changes: Patient presents and states that her left hip is bothering her. It has been getting worse this week from overuse at her job. Tuesday was a day that she worked a double day. She points to pain in her left hip joint, she only has minimal pain in her lower back. 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 She feels like her hip \"went out of socket.\" She rates her current pain 8/10. She has had injection in both hips from Dr. Clay.         Objective:  The following was observed:      P: palpatory tenderness in left hip    A: static palpation demonstrates intersegmental asymmetry     R: motion palpation notes restricted motion    T: localized muscle spasm at: Gluteal, Lumbar erector spine and Piriformis L>>R      Assessment:    Segmental spinal dysfunction/restrictions found at:  T5 E, FR  T10 E, FR  L4 RR, LRR  Left SI posterior      Diagnoses:      1. Segmental dysfunction of lower extremity    2. Segmental dysfunction of lumbar region    3. Trochanteric bursitis of right hip    4. Segmental dysfunction of sacral region        Patient's condition:  Patient had restrictions pre-manipulation and Patient had decreased motion prior to manipulation    Treatment effectiveness:  Post manipulation there is better intersegmental movement and Patient claims to feel looser post manipulation      Procedures:  CMT:  79353 Chiropractic manipulative treatment 3-4 regions performed   Thoracic: Diversified, T5, T10, Prone  Lumbar: Drop Table, L4, Prone  Pelvis: Drop Table, Left SI, " Prone    Modalities:  MSTM to affected musculature  Flexion Distraction of lumbar spine.     Therapeutic procedures:  Gave patient Ice instructions post adjustment, and instructions for acute care      Prognosis: Good    Progress towards Goals:   Patient is improving with care.     Response to Treatment:   Patient tolerated treatment well today.       Recommendations:    Instructions:ice 20 minutes every other hour as needed    Follow-up:  Return to care PRN.

## 2021-01-25 ENCOUNTER — THERAPY VISIT (OUTPATIENT)
Dept: CHIROPRACTIC MEDICINE | Facility: CLINIC | Age: 46
End: 2021-01-25
Payer: COMMERCIAL

## 2021-01-25 DIAGNOSIS — M99.04 SEGMENTAL DYSFUNCTION OF SACRAL REGION: Primary | ICD-10-CM

## 2021-01-25 DIAGNOSIS — M99.06 SEGMENTAL DYSFUNCTION OF LOWER EXTREMITY: ICD-10-CM

## 2021-01-25 DIAGNOSIS — M70.61 TROCHANTERIC BURSITIS OF RIGHT HIP: ICD-10-CM

## 2021-01-25 DIAGNOSIS — M99.03 SEGMENTAL DYSFUNCTION OF LUMBAR REGION: ICD-10-CM

## 2021-01-25 DIAGNOSIS — M99.02 SEGMENTAL DYSFUNCTION OF THORACIC REGION: ICD-10-CM

## 2021-01-25 PROCEDURE — 98941 CHIROPRACT MANJ 3-4 REGIONS: CPT | Mod: AT | Performed by: CHIROPRACTOR

## 2021-01-25 NOTE — PROGRESS NOTES
Visit #:  6 of 8 based on treatment plan 6/12/2019    Subjective:  Stacy Brown is a 44 year old female who is seen in f/u up for:        Segmental dysfunction of lower extremity  Segmental dysfunction of lumbar region  Trochanteric bursitis of right hip  Segmental dysfunction of sacral region.     Since last visit on 1/22/2021,  Stacy Brown reports the following changes: Patient presents and states that her left hip is a little better today, but she is still in significant. She doesn't think her dr will do anything because she already takes Oxycodone every night and a muscle relaxer. 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 She isn't sure that she can work tomorrow. She rates her current pain 6-7/10.         Objective:  The following was observed:      P: palpatory tenderness in left hip    A: static palpation demonstrates intersegmental asymmetry     R: motion palpation notes restricted motion    T: localized muscle spasm at: Gluteal, Lumbar erector spine and Piriformis L>>R      Assessment:    Segmental spinal dysfunction/restrictions found at:  T5 E, FR  T10 E, FR  L4 RR, LRR  Left SI posterior  Flexion Distraction      Diagnoses:      1. Segmental dysfunction of lower extremity    2. Segmental dysfunction of lumbar region    3. Trochanteric bursitis of right hip    4. Segmental dysfunction of sacral region        Patient's condition:  Patient had restrictions pre-manipulation and Patient had decreased motion prior to manipulation    Treatment effectiveness:  Post manipulation there is better intersegmental movement and Patient claims to feel looser post manipulation      Procedures:  CMT:  50637 Chiropractic manipulative treatment 3-4 regions performed   Thoracic: Diversified, T5, T10, Prone  Lumbar: Drop Table, L4, Prone  Pelvis: Drop Table, Left SI, Prone    Modalities:  MSTM to affected musculature  Flexion Distraction of lumbar spine.     Therapeutic procedures:  Gave patient Ice  instructions post adjustment, and instructions for acute care      Prognosis: Good    Progress towards Goals:   Patient is improving with care.     Response to Treatment:   Patient tolerated treatment well today.       Recommendations:    Instructions:ice 20 minutes every other hour as needed    Follow-up:  Return to care PRN. Patient is made aware that [facility] is discontinuing chiropractic services and suggested patient care elsewhere. 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 I referred her back to Dr. Clay for her left hip, and to her PCP for medication and workability.

## 2021-01-25 NOTE — LETTER
Stacy Brown  [id number] [facility] [address]      1/25/2021        To Whom It May Concern:    The above patient is being seen in our clinic for the treatment of left hip pain and lower back pain. She will be seeing an additional provider on Wednesday, January 27, 2021 at 8:30am in our clinic. Please excuse her from work tomorrow, Tuesday, January 26, 2021 and up to her appt on Wednesday, 1/27/2021, so that she can be assessed and determine next steps.    If you have any questions regarding our care or treatment plan, please do not hesitate to contact our clinic.      In Health,              Dr. Parul Maldonado, DC

## 2021-01-27 ENCOUNTER — OFFICE VISIT (OUTPATIENT)
Dept: ORTHOPEDICS | Facility: CLINIC | Age: 46
End: 2021-01-27
Payer: COMMERCIAL

## 2021-01-27 ENCOUNTER — HOSPITAL ENCOUNTER (OUTPATIENT)
Dept: PHYSICAL THERAPY | Facility: CLINIC | Age: 46
Setting detail: THERAPIES SERIES
End: 2021-01-27
Attending: NURSE PRACTITIONER
Payer: COMMERCIAL

## 2021-01-27 ENCOUNTER — ANCILLARY PROCEDURE (OUTPATIENT)
Dept: GENERAL RADIOLOGY | Facility: CLINIC | Age: 46
End: 2021-01-27
Attending: ORTHOPAEDIC SURGERY
Payer: COMMERCIAL

## 2021-01-27 VITALS
HEIGHT: 66 IN | BODY MASS INDEX: 28.33 KG/M2 | SYSTOLIC BLOOD PRESSURE: 120 MMHG | WEIGHT: 176.3 LBS | DIASTOLIC BLOOD PRESSURE: 70 MMHG

## 2021-01-27 DIAGNOSIS — M70.62 GREATER TROCHANTERIC BURSITIS OF LEFT HIP: Primary | ICD-10-CM

## 2021-01-27 DIAGNOSIS — M70.62 GREATER TROCHANTERIC BURSITIS OF LEFT HIP: ICD-10-CM

## 2021-01-27 DIAGNOSIS — M70.61 GREATER TROCHANTERIC BURSITIS OF BOTH HIPS: ICD-10-CM

## 2021-01-27 DIAGNOSIS — M70.62 GREATER TROCHANTERIC BURSITIS OF BOTH HIPS: ICD-10-CM

## 2021-01-27 PROCEDURE — 97161 PT EVAL LOW COMPLEX 20 MIN: CPT | Mod: GP | Performed by: PHYSICAL THERAPIST

## 2021-01-27 PROCEDURE — 99213 OFFICE O/P EST LOW 20 MIN: CPT | Performed by: ORTHOPAEDIC SURGERY

## 2021-01-27 PROCEDURE — 97110 THERAPEUTIC EXERCISES: CPT | Mod: GP | Performed by: PHYSICAL THERAPIST

## 2021-01-27 PROCEDURE — 73502 X-RAY EXAM HIP UNI 2-3 VIEWS: CPT | Mod: TC | Performed by: RADIOLOGY

## 2021-01-27 PROCEDURE — 97035 APP MDLTY 1+ULTRASOUND EA 15: CPT | Mod: GP | Performed by: PHYSICAL THERAPIST

## 2021-01-27 PROCEDURE — 97140 MANUAL THERAPY 1/> REGIONS: CPT | Mod: GP | Performed by: PHYSICAL THERAPIST

## 2021-01-27 ASSESSMENT — MIFFLIN-ST. JEOR: SCORE: 1461.44

## 2021-01-27 ASSESSMENT — PAIN SCALES - GENERAL: PAINLEVEL: EXTREME PAIN (8)
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
## 2021-01-27 NOTE — LETTER
[address] [address]  Phone: [phone]  Fax: [phone]    January 27, 2021        Stacy Brown  [id number] [facility] [address]          To whom it may concern:    RE: Satcy Brown    Patient was seen and treated today at our clinic. Patient may return to work starting February 3rd without restrictions.     Please contact me for questions or concerns.      Sincerely,          JOVITA Monroy, CNP  Orthopedic Surgery

## 2021-01-29 ENCOUNTER — HOSPITAL ENCOUNTER (OUTPATIENT)
Dept: PHYSICAL THERAPY | Facility: CLINIC | Age: 46
Setting detail: THERAPIES SERIES
End: 2021-01-29
Attending: NURSE PRACTITIONER
Payer: COMMERCIAL

## 2021-01-29 PROCEDURE — 97035 APP MDLTY 1+ULTRASOUND EA 15: CPT | Mod: GP

## 2021-01-29 PROCEDURE — 97110 THERAPEUTIC EXERCISES: CPT | Mod: GP

## 2021-01-29 PROCEDURE — 97140 MANUAL THERAPY 1/> REGIONS: CPT | Mod: GP

## 2021-02-05 ENCOUNTER — HOSPITAL ENCOUNTER (OUTPATIENT)
Dept: PHYSICAL THERAPY | Facility: CLINIC | Age: 46
Setting detail: THERAPIES SERIES
End: 2021-02-05
Attending: NURSE PRACTITIONER
Payer: COMMERCIAL

## 2021-02-05 PROCEDURE — 97035 APP MDLTY 1+ULTRASOUND EA 15: CPT | Mod: GP

## 2021-02-05 PROCEDURE — 97110 THERAPEUTIC EXERCISES: CPT | Mod: GP

## 2021-02-09 ENCOUNTER — MYC MEDICAL ADVICE (OUTPATIENT)
Dept: FAMILY MEDICINE | Facility: CLINIC | Age: 46
End: 2021-02-09

## 2021-02-09 DIAGNOSIS — G89.4 CHRONIC PAIN SYNDROME: ICD-10-CM

## 2021-02-09 DIAGNOSIS — Z79.4 TYPE 2 DIABETES MELLITUS WITH HYPERGLYCEMIA, WITH LONG-TERM CURRENT USE OF INSULIN (H): ICD-10-CM

## 2021-02-09 DIAGNOSIS — E11.65 TYPE 2 DIABETES MELLITUS WITH HYPERGLYCEMIA, WITH LONG-TERM CURRENT USE OF INSULIN (H): ICD-10-CM

## 2021-02-09 DIAGNOSIS — M75.41 SUBACROMIAL IMPINGEMENT OF RIGHT SHOULDER: ICD-10-CM

## 2021-02-10 NOTE — TELEPHONE ENCOUNTER
Rx pended will send to provider to see if can be approved. Please see mychart from patient requesting early refill due to vacation.  Rebekah Sevilla MA

## 2021-02-11 RX ORDER — INSULIN GLARGINE 100 [IU]/ML
INJECTION, SOLUTION SUBCUTANEOUS
Qty: 30 ML | Refills: 3 | Status: ON HOLD | OUTPATIENT
Start: 2021-02-11 | End: 2021-03-16

## 2021-02-11 RX ORDER — OXYCODONE AND ACETAMINOPHEN 5; 325 MG/1; MG/1
TABLET ORAL
Qty: 30 TABLET | Refills: 0 | Status: SHIPPED | OUTPATIENT
Start: 2021-02-11 | End: 2021-03-01

## 2021-02-11 NOTE — TELEPHONE ENCOUNTER
Appointment made for 3/29/21.  ................Melquiades Decker LPN,   February 11, 2021,      4:41 PM,   [facility]

## 2021-02-12 ENCOUNTER — HOSPITAL ENCOUNTER (OUTPATIENT)
Dept: PHYSICAL THERAPY | Facility: CLINIC | Age: 46
Setting detail: THERAPIES SERIES
End: 2021-02-12
Attending: NURSE PRACTITIONER
Payer: COMMERCIAL

## 2021-02-12 PROCEDURE — 97110 THERAPEUTIC EXERCISES: CPT | Mod: GP

## 2021-02-12 PROCEDURE — 97035 APP MDLTY 1+ULTRASOUND EA 15: CPT | Mod: GP

## 2021-02-28 DIAGNOSIS — G89.4 CHRONIC PAIN SYNDROME: ICD-10-CM

## 2021-03-01 ENCOUNTER — MYC MEDICAL ADVICE (OUTPATIENT)
Dept: FAMILY MEDICINE | Facility: CLINIC | Age: 46
End: 2021-03-01

## 2021-03-01 DIAGNOSIS — G89.4 CHRONIC PAIN SYNDROME: ICD-10-CM

## 2021-03-01 RX ORDER — OXYCODONE AND ACETAMINOPHEN 5; 325 MG/1; MG/1
TABLET ORAL
Qty: 30 TABLET | Refills: 0 | Status: ON HOLD | OUTPATIENT
Start: 2021-03-23 | End: 2021-03-16

## 2021-03-01 NOTE — TELEPHONE ENCOUNTER
Percocet      Last Written Prescription Date:  2/11/2021  Last Fill Quantity: 30,   # refills: 0  Last Office Visit: 7/13/2020  Future Office visit:    Next 5 appointments (look out 90 days)    Mar 29, 2021  9:45 AM  SHORT with Yaima Muse MD  [facility] [address] [place] [id number] [phone]           Routing refill request to provider for review/approval because:  Drug not on the [facility], [facility] or [facility] refill protocol or controlled substance

## 2021-03-03 ENCOUNTER — HOSPITAL ENCOUNTER (OUTPATIENT)
Dept: PHYSICAL THERAPY | Facility: CLINIC | Age: 46
Setting detail: THERAPIES SERIES
End: 2021-03-03
Attending: NURSE PRACTITIONER
Payer: COMMERCIAL

## 2021-03-03 PROCEDURE — 97035 APP MDLTY 1+ULTRASOUND EA 15: CPT | Mod: GP

## 2021-03-03 PROCEDURE — 97110 THERAPEUTIC EXERCISES: CPT | Mod: GP

## 2021-03-03 RX ORDER — OXYCODONE AND ACETAMINOPHEN 5; 325 MG/1; MG/1
TABLET ORAL
Qty: 30 TABLET | Refills: 0 | OUTPATIENT
Start: 2021-03-03

## 2021-03-05 ENCOUNTER — APPOINTMENT (OUTPATIENT)
Dept: GENERAL RADIOLOGY | Facility: CLINIC | Age: 46
End: 2021-03-05
Attending: FAMILY MEDICINE
Payer: COMMERCIAL

## 2021-03-05 ENCOUNTER — APPOINTMENT (OUTPATIENT)
Dept: CARDIOLOGY | Facility: CLINIC | Age: 46
End: 2021-03-05
Attending: FAMILY MEDICINE
Payer: COMMERCIAL

## 2021-03-05 ENCOUNTER — HOSPITAL ENCOUNTER (INPATIENT)
Facility: CLINIC | Age: 46
LOS: 11 days | Discharge: HOME HEALTH | End: 2021-03-16
Attending: INTERNAL MEDICINE | Admitting: INTERNAL MEDICINE
Payer: COMMERCIAL

## 2021-03-05 ENCOUNTER — HOSPITAL ENCOUNTER (EMERGENCY)
Facility: CLINIC | Age: 46
Discharge: ANOTHER HOSPITAL | End: 2021-03-05
Attending: FAMILY MEDICINE | Admitting: FAMILY MEDICINE
Payer: COMMERCIAL

## 2021-03-05 VITALS
BODY MASS INDEX: 28.41 KG/M2 | TEMPERATURE: 98 F | SYSTOLIC BLOOD PRESSURE: 142 MMHG | OXYGEN SATURATION: 99 % | DIASTOLIC BLOOD PRESSURE: 68 MMHG | WEIGHT: 176 LBS | HEART RATE: 87 BPM | RESPIRATION RATE: 16 BRPM

## 2021-03-05 DIAGNOSIS — E11.65 TYPE 2 DIABETES MELLITUS WITH HYPERGLYCEMIA, WITH LONG-TERM CURRENT USE OF INSULIN (H): ICD-10-CM

## 2021-03-05 DIAGNOSIS — Z79.4 TYPE 2 DIABETES MELLITUS WITH HYPERGLYCEMIA, WITH LONG-TERM CURRENT USE OF INSULIN (H): ICD-10-CM

## 2021-03-05 DIAGNOSIS — I21.4 NSTEMI (NON-ST ELEVATED MYOCARDIAL INFARCTION) (H): Primary | ICD-10-CM

## 2021-03-05 DIAGNOSIS — I21.4 NSTEMI (NON-ST ELEVATED MYOCARDIAL INFARCTION) (H): ICD-10-CM

## 2021-03-05 LAB
ALBUMIN SERPL-MCNC: 4 G/DL (ref 3.4–5)
ALP SERPL-CCNC: 101 U/L (ref 40–150)
ALT SERPL W P-5'-P-CCNC: 23 U/L (ref 0–50)
ANION GAP SERPL CALCULATED.3IONS-SCNC: 8 MMOL/L (ref 3–14)
APTT PPP: 22 SEC (ref 22–37)
AST SERPL W P-5'-P-CCNC: 6 U/L (ref 0–45)
BASOPHILS # BLD AUTO: 0.1 10E9/L (ref 0–0.2)
BASOPHILS NFR BLD AUTO: 0.7 %
BILIRUB SERPL-MCNC: 0.3 MG/DL (ref 0.2–1.3)
BUN SERPL-MCNC: 12 MG/DL (ref 7–30)
CALCIUM SERPL-MCNC: 9.5 MG/DL (ref 8.5–10.1)
CHLORIDE SERPL-SCNC: 99 MMOL/L (ref 94–109)
CO2 SERPL-SCNC: 27 MMOL/L (ref 20–32)
CREAT SERPL-MCNC: 0.5 MG/DL (ref 0.52–1.04)
D DIMER PPP FEU-MCNC: 0.3 UG/ML FEU (ref 0–0.5)
DIFFERENTIAL METHOD BLD: ABNORMAL
EOSINOPHIL # BLD AUTO: 0.1 10E9/L (ref 0–0.7)
EOSINOPHIL NFR BLD AUTO: 1.4 %
ERYTHROCYTE [DISTWIDTH] IN BLOOD BY AUTOMATED COUNT: 13.2 % (ref 10–15)
GFR SERPL CREATININE-BSD FRML MDRD: >90 ML/MIN/{1.73_M2}
GLUCOSE BLDC GLUCOMTR-MCNC: 140 MG/DL (ref 70–99)
GLUCOSE SERPL-MCNC: 314 MG/DL (ref 70–99)
HBA1C MFR BLD: 10.1 % (ref 0–5.6)
HCT VFR BLD AUTO: 44.3 % (ref 35–47)
HGB BLD-MCNC: 15 G/DL (ref 11.7–15.7)
IMM GRANULOCYTES # BLD: 0 10E9/L (ref 0–0.4)
IMM GRANULOCYTES NFR BLD: 0.3 %
LABORATORY COMMENT REPORT: NORMAL
LIPASE SERPL-CCNC: 83 U/L (ref 73–393)
LYMPHOCYTES # BLD AUTO: 1.6 10E9/L (ref 0.8–5.3)
LYMPHOCYTES NFR BLD AUTO: 21.9 %
MCH RBC QN AUTO: 27.8 PG (ref 26.5–33)
MCHC RBC AUTO-ENTMCNC: 33.9 G/DL (ref 31.5–36.5)
MCV RBC AUTO: 82 FL (ref 78–100)
MONOCYTES # BLD AUTO: 0.4 10E9/L (ref 0–1.3)
MONOCYTES NFR BLD AUTO: 5.8 %
NEUTROPHILS # BLD AUTO: 5 10E9/L (ref 1.6–8.3)
NEUTROPHILS NFR BLD AUTO: 69.9 %
NRBC # BLD AUTO: 0 10*3/UL
NRBC BLD AUTO-RTO: 0 /100
NT-PROBNP SERPL-MCNC: 21 PG/ML (ref 0–450)
PLATELET # BLD AUTO: 304 10E9/L (ref 150–450)
POTASSIUM SERPL-SCNC: 4.1 MMOL/L (ref 3.4–5.3)
PROT SERPL-MCNC: 8.3 G/DL (ref 6.8–8.8)
RBC # BLD AUTO: 5.39 10E12/L (ref 3.8–5.2)
SARS-COV-2 RNA RESP QL NAA+PROBE: NEGATIVE
SODIUM SERPL-SCNC: 134 MMOL/L (ref 133–144)
SPECIMEN SOURCE: NORMAL
TROPONIN I SERPL-MCNC: 0.02 UG/L (ref 0–0.04)
TROPONIN I SERPL-MCNC: 0.07 UG/L (ref 0–0.04)
TROPONIN I SERPL-MCNC: 0.12 UG/L (ref 0–0.04)
TSH SERPL DL<=0.005 MIU/L-ACNC: 1.33 MU/L (ref 0.4–4)
UFH PPP CHRO-ACNC: 0.49 IU/ML
WBC # BLD AUTO: 7.2 10E9/L (ref 4–11)

## 2021-03-05 PROCEDURE — 250N000013 HC RX MED GY IP 250 OP 250 PS 637: Performed by: PHYSICIAN ASSISTANT

## 2021-03-05 PROCEDURE — 80053 COMPREHEN METABOLIC PANEL: CPT | Performed by: FAMILY MEDICINE

## 2021-03-05 PROCEDURE — 93005 ELECTROCARDIOGRAM TRACING: CPT | Performed by: FAMILY MEDICINE

## 2021-03-05 PROCEDURE — 83880 ASSAY OF NATRIURETIC PEPTIDE: CPT | Performed by: FAMILY MEDICINE

## 2021-03-05 PROCEDURE — 93306 TTE W/DOPPLER COMPLETE: CPT | Mod: 26 | Performed by: INTERNAL MEDICINE

## 2021-03-05 PROCEDURE — 84484 ASSAY OF TROPONIN QUANT: CPT | Performed by: PHYSICIAN ASSISTANT

## 2021-03-05 PROCEDURE — 85379 FIBRIN DEGRADATION QUANT: CPT | Performed by: FAMILY MEDICINE

## 2021-03-05 PROCEDURE — 83036 HEMOGLOBIN GLYCOSYLATED A1C: CPT | Performed by: PHYSICIAN ASSISTANT

## 2021-03-05 PROCEDURE — 255N000002 HC RX 255 OP 636: Performed by: INTERNAL MEDICINE

## 2021-03-05 PROCEDURE — 85520 HEPARIN ASSAY: CPT | Performed by: INTERNAL MEDICINE

## 2021-03-05 PROCEDURE — 999N000208 ECHOCARDIOGRAM COMPLETE

## 2021-03-05 PROCEDURE — 99285 EMERGENCY DEPT VISIT HI MDM: CPT | Mod: 25 | Performed by: FAMILY MEDICINE

## 2021-03-05 PROCEDURE — 84443 ASSAY THYROID STIM HORMONE: CPT | Performed by: PHYSICIAN ASSISTANT

## 2021-03-05 PROCEDURE — C9803 HOPD COVID-19 SPEC COLLECT: HCPCS | Performed by: FAMILY MEDICINE

## 2021-03-05 PROCEDURE — 71045 X-RAY EXAM CHEST 1 VIEW: CPT

## 2021-03-05 PROCEDURE — 250N000013 HC RX MED GY IP 250 OP 250 PS 637: Performed by: FAMILY MEDICINE

## 2021-03-05 PROCEDURE — 36415 COLL VENOUS BLD VENIPUNCTURE: CPT | Performed by: PHYSICIAN ASSISTANT

## 2021-03-05 PROCEDURE — 83690 ASSAY OF LIPASE: CPT | Performed by: FAMILY MEDICINE

## 2021-03-05 PROCEDURE — 96376 TX/PRO/DX INJ SAME DRUG ADON: CPT | Performed by: FAMILY MEDICINE

## 2021-03-05 PROCEDURE — 99223 1ST HOSP IP/OBS HIGH 75: CPT | Mod: AI | Performed by: INTERNAL MEDICINE

## 2021-03-05 PROCEDURE — 999N001017 HC STATISTIC GLUCOSE BY METER IP

## 2021-03-05 PROCEDURE — 84484 ASSAY OF TROPONIN QUANT: CPT | Performed by: FAMILY MEDICINE

## 2021-03-05 PROCEDURE — 93010 ELECTROCARDIOGRAM REPORT: CPT | Performed by: FAMILY MEDICINE

## 2021-03-05 PROCEDURE — 85025 COMPLETE CBC W/AUTO DIFF WBC: CPT | Performed by: FAMILY MEDICINE

## 2021-03-05 PROCEDURE — 93005 ELECTROCARDIOGRAM TRACING: CPT | Mod: 76 | Performed by: FAMILY MEDICINE

## 2021-03-05 PROCEDURE — 36415 COLL VENOUS BLD VENIPUNCTURE: CPT | Performed by: INTERNAL MEDICINE

## 2021-03-05 PROCEDURE — 250N000011 HC RX IP 250 OP 636: Performed by: PHYSICIAN ASSISTANT

## 2021-03-05 PROCEDURE — 210N000002 HC R&B HEART CARE

## 2021-03-05 PROCEDURE — 99291 CRITICAL CARE FIRST HOUR: CPT | Mod: 25 | Performed by: FAMILY MEDICINE

## 2021-03-05 PROCEDURE — 85730 THROMBOPLASTIN TIME PARTIAL: CPT | Performed by: FAMILY MEDICINE

## 2021-03-05 PROCEDURE — 96375 TX/PRO/DX INJ NEW DRUG ADDON: CPT | Performed by: FAMILY MEDICINE

## 2021-03-05 PROCEDURE — 96365 THER/PROPH/DIAG IV INF INIT: CPT | Performed by: FAMILY MEDICINE

## 2021-03-05 PROCEDURE — 87635 SARS-COV-2 COVID-19 AMP PRB: CPT | Performed by: FAMILY MEDICINE

## 2021-03-05 PROCEDURE — 96366 THER/PROPH/DIAG IV INF ADDON: CPT | Performed by: FAMILY MEDICINE

## 2021-03-05 PROCEDURE — 250N000011 HC RX IP 250 OP 636: Performed by: FAMILY MEDICINE

## 2021-03-05 PROCEDURE — 36415 COLL VENOUS BLD VENIPUNCTURE: CPT | Performed by: FAMILY MEDICINE

## 2021-03-05 RX ORDER — NALOXONE HYDROCHLORIDE 0.4 MG/ML
0.4 INJECTION, SOLUTION INTRAMUSCULAR; INTRAVENOUS; SUBCUTANEOUS
Status: DISCONTINUED | OUTPATIENT
Start: 2021-03-05 | End: 2021-03-16 | Stop reason: HOSPADM

## 2021-03-05 RX ORDER — ONDANSETRON 2 MG/ML
4 INJECTION INTRAMUSCULAR; INTRAVENOUS EVERY 6 HOURS PRN
Status: DISCONTINUED | OUTPATIENT
Start: 2021-03-05 | End: 2021-03-09

## 2021-03-05 RX ORDER — ONDANSETRON 2 MG/ML
4 INJECTION INTRAMUSCULAR; INTRAVENOUS
Status: COMPLETED | OUTPATIENT
Start: 2021-03-05 | End: 2021-03-05

## 2021-03-05 RX ORDER — NICOTINE POLACRILEX 4 MG
15-30 LOZENGE BUCCAL
Status: DISCONTINUED | OUTPATIENT
Start: 2021-03-05 | End: 2021-03-09

## 2021-03-05 RX ORDER — ACETAMINOPHEN 650 MG/1
650 SUPPOSITORY RECTAL EVERY 4 HOURS PRN
Status: DISCONTINUED | OUTPATIENT
Start: 2021-03-05 | End: 2021-03-12

## 2021-03-05 RX ORDER — PROCHLORPERAZINE MALEATE 5 MG
10 TABLET ORAL EVERY 6 HOURS PRN
Status: DISCONTINUED | OUTPATIENT
Start: 2021-03-05 | End: 2021-03-16 | Stop reason: HOSPADM

## 2021-03-05 RX ORDER — MAGNESIUM HYDROXIDE/ALUMINUM HYDROXICE/SIMETHICONE 120; 1200; 1200 MG/30ML; MG/30ML; MG/30ML
30 SUSPENSION ORAL EVERY 4 HOURS PRN
Status: DISCONTINUED | OUTPATIENT
Start: 2021-03-05 | End: 2021-03-16 | Stop reason: HOSPADM

## 2021-03-05 RX ORDER — ATORVASTATIN CALCIUM 40 MG/1
40 TABLET, FILM COATED ORAL DAILY
Status: DISCONTINUED | OUTPATIENT
Start: 2021-03-05 | End: 2021-03-06

## 2021-03-05 RX ORDER — METOPROLOL TARTRATE 25 MG/1
25 TABLET, FILM COATED ORAL 2 TIMES DAILY
Status: DISCONTINUED | OUTPATIENT
Start: 2021-03-05 | End: 2021-03-09

## 2021-03-05 RX ORDER — NALOXONE HYDROCHLORIDE 0.4 MG/ML
0.2 INJECTION, SOLUTION INTRAMUSCULAR; INTRAVENOUS; SUBCUTANEOUS
Status: DISCONTINUED | OUTPATIENT
Start: 2021-03-05 | End: 2021-03-16 | Stop reason: HOSPADM

## 2021-03-05 RX ORDER — KETOROLAC TROMETHAMINE 30 MG/ML
30 INJECTION, SOLUTION INTRAMUSCULAR; INTRAVENOUS ONCE
Status: COMPLETED | OUTPATIENT
Start: 2021-03-05 | End: 2021-03-05

## 2021-03-05 RX ORDER — ASPIRIN 81 MG/1
81 TABLET ORAL DAILY
Status: DISCONTINUED | OUTPATIENT
Start: 2021-03-06 | End: 2021-03-09

## 2021-03-05 RX ORDER — ACETAMINOPHEN 325 MG/1
650 TABLET ORAL EVERY 4 HOURS PRN
Status: DISCONTINUED | OUTPATIENT
Start: 2021-03-05 | End: 2021-03-08

## 2021-03-05 RX ORDER — LIDOCAINE 40 MG/G
CREAM TOPICAL
Status: DISCONTINUED | OUTPATIENT
Start: 2021-03-05 | End: 2021-03-11

## 2021-03-05 RX ORDER — NITROGLYCERIN 0.4 MG/1
0.4 TABLET SUBLINGUAL EVERY 5 MIN PRN
Status: DISCONTINUED | OUTPATIENT
Start: 2021-03-05 | End: 2021-03-05 | Stop reason: HOSPADM

## 2021-03-05 RX ORDER — ASPIRIN 81 MG/1
324 TABLET, CHEWABLE ORAL ONCE
Status: COMPLETED | OUTPATIENT
Start: 2021-03-05 | End: 2021-03-05

## 2021-03-05 RX ORDER — OXYCODONE AND ACETAMINOPHEN 5; 325 MG/1; MG/1
1 TABLET ORAL EVERY 8 HOURS PRN
Status: DISCONTINUED | OUTPATIENT
Start: 2021-03-05 | End: 2021-03-10

## 2021-03-05 RX ORDER — ONDANSETRON 4 MG/1
4 TABLET, ORALLY DISINTEGRATING ORAL EVERY 6 HOURS PRN
Status: DISCONTINUED | OUTPATIENT
Start: 2021-03-05 | End: 2021-03-09

## 2021-03-05 RX ORDER — PROCHLORPERAZINE 25 MG
25 SUPPOSITORY, RECTAL RECTAL EVERY 12 HOURS PRN
Status: DISCONTINUED | OUTPATIENT
Start: 2021-03-05 | End: 2021-03-16 | Stop reason: HOSPADM

## 2021-03-05 RX ORDER — DEXTROSE MONOHYDRATE 25 G/50ML
25-50 INJECTION, SOLUTION INTRAVENOUS
Status: DISCONTINUED | OUTPATIENT
Start: 2021-03-05 | End: 2021-03-09

## 2021-03-05 RX ORDER — HEPARIN SODIUM 10000 [USP'U]/100ML
0-5000 INJECTION, SOLUTION INTRAVENOUS CONTINUOUS
Status: DISCONTINUED | OUTPATIENT
Start: 2021-03-05 | End: 2021-03-08

## 2021-03-05 RX ORDER — HEPARIN SODIUM 10000 [USP'U]/100ML
0-5000 INJECTION, SOLUTION INTRAVENOUS CONTINUOUS
Status: DISCONTINUED | OUTPATIENT
Start: 2021-03-05 | End: 2021-03-05 | Stop reason: HOSPADM

## 2021-03-05 RX ORDER — NITROGLYCERIN 0.4 MG/1
0.4 TABLET SUBLINGUAL EVERY 5 MIN PRN
Status: DISCONTINUED | OUTPATIENT
Start: 2021-03-05 | End: 2021-03-16 | Stop reason: HOSPADM

## 2021-03-05 RX ADMIN — METOPROLOL TARTRATE 25 MG: 25 TABLET, FILM COATED ORAL at 21:46

## 2021-03-05 RX ADMIN — HEPARIN SODIUM 950 UNITS/HR: 10000 INJECTION, SOLUTION INTRAVENOUS at 15:40

## 2021-03-05 RX ADMIN — NITROGLYCERIN 0.4 MG: 0.4 TABLET SUBLINGUAL at 11:52

## 2021-03-05 RX ADMIN — ONDANSETRON 4 MG: 2 INJECTION INTRAMUSCULAR; INTRAVENOUS at 13:58

## 2021-03-05 RX ADMIN — NITROGLYCERIN 0.4 MG: 0.4 TABLET SUBLINGUAL at 11:42

## 2021-03-05 RX ADMIN — OXYCODONE HYDROCHLORIDE AND ACETAMINOPHEN 1 TABLET: 5; 325 TABLET ORAL at 21:46

## 2021-03-05 RX ADMIN — NITROGLYCERIN 0.4 MG: 0.4 TABLET SUBLINGUAL at 11:47

## 2021-03-05 RX ADMIN — ONDANSETRON 4 MG: 2 INJECTION INTRAMUSCULAR; INTRAVENOUS at 22:37

## 2021-03-05 RX ADMIN — ASPIRIN 81 MG 324 MG: 81 TABLET ORAL at 12:15

## 2021-03-05 RX ADMIN — HUMAN ALBUMIN MICROSPHERES AND PERFLUTREN 3 ML: 10; .22 INJECTION, SOLUTION INTRAVENOUS at 13:13

## 2021-03-05 RX ADMIN — KETOROLAC TROMETHAMINE 30 MG: 30 INJECTION, SOLUTION INTRAMUSCULAR at 11:09

## 2021-03-05 RX ADMIN — TIZANIDINE 4 MG: 4 TABLET ORAL at 21:46

## 2021-03-05 RX ADMIN — ATORVASTATIN CALCIUM 40 MG: 40 TABLET, FILM COATED ORAL at 21:46

## 2021-03-05 ASSESSMENT — MIFFLIN-ST. JEOR: SCORE: 1462.8

## 2021-03-05 NOTE — ED TRIAGE NOTES
Patient c/o left sided chest pain, jaw pain and METZGER since 0900. Returned from [place] 2/27/21.

## 2021-03-05 NOTE — ED NOTES
Patient was up to the bathroom and then returned to bed 8. She denied chest pain or shortness of breath with activity. She is back on BP, tele, oximetry and zofran given for nausea.
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
## 2021-03-05 NOTE — ED NOTES
Pt resting, SO at bedside. VS and cardiac monitoring. Call light in reach, side rails x2. Waiting for transfer.

## 2021-03-05 NOTE — ED NOTES
Covid swab completed and sent to lab. Pt is resting, updated,  at bedside. Med rec and belongings completed with pt. Call light in reach. VS and cardiac monitoring. Pt denies chest pain, comfortable and resting.

## 2021-03-05 NOTE — H&P
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 Had reoccurrence with radiation to jaw. EKG non ischemic. Initial trop elevated at 0.07. TTE with EF 60% and no WMA.  Started and heparin gtt prior to transfer  - Admit inpatient [facility]  - Trend troponin. FLP in am  - Cardiology consult  - Continue heparin gtt  - Change simvastatin to lipitor  - Start Metoprolol 25 mg bid    Uncontrolled DM-2, A1C 12.1 [ PTA regimen includes Lantus 38 units daily,  Novolog 10 units daily and metformin 2000 mg/d] Patient does not monitor her BG. 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 BG >300 on arrival to OSH  - Repeat A1C  - Hold PTA Metformin  - Continue Lanuts 38 units every morning  - Will hold scheduled dinner Novolog and place on 1 unit/15 g carb and SSI with meals    Chronic Pain  Sacroiliitis:   - Continue PTA Percocet    Tobacco Dependence: In recent weeks has cut down from 1ppd-1/2ppd. She is now motivated to quit  - Encouraged cessation  - Nicotine gum available       Diet:   Diabetic and Cardiac  DVT Prophylaxis: Heparin gtt  Schultz Catheter: not present  Code Status:   Full         Disposition Plan   Expected discharge: Admit inpatient  Entered: Ida Banks PA-C 03/05/2021, 3:39 PM     The patient's care was discussed with the Patient.    Ida Banks PA-C  [facility]  Contact information available via [facility] Paging/Directory      ______________________________________________________________________    Chief Complaint   Chest Pain    History is obtained from the patient    History of Present Illness   Stacy Brown is a 45 year old female with a PMH of  insulin dependent DM, chronic pain and hyperlipidemia who presented to ER with complaints with chest pain. She was at her job as a  and developed chest pain while carrying a heavy package. It was located in the center of her chest and described as a heaviness. She had associated SOB but no dizziness, diaphoresis or nausea. When she got back in her truck she had onset of jaw pain and elected to present to the ER for evaluation. Chest pain resolved on arrival but reoccurred and she received 3 SL nitro with resolution.   EKG non ischemic. Initial troponin was elevated on at 0.07. She was started on heparin gtt and transferred to [facility] for cardiology consult.     Presently, she is evaluated in her hospital room. She is currently chest pain free. Denies recent fevers, chills or N/V. Recently returned from a trip to [place] and felt well. No recent exertional symptoms, she has an active job as a . No previous heart history. She is a diabetic with most recent A1C > 12. She is complaint with her insulin but admits she doesn't check her BGs.    Review of Systems    The 10 point Review of Systems is negative other than noted in the HPI or here.     Past Medical History    I have reviewed this patient's medical history and updated it with pertinent information if needed.   Past Medical History:   Diagnosis Date     Knee pain, chronic      Mixed hyperlipidemia      Type II or unspecified type diabetes mellitus without mention of complication, not stated as uncontrolled 08/16/2002    diagnosed 8/16/02, started insulin 2006       Past Surgical History   I have reviewed this patient's surgical history and updated it with pertinent information if needed.  Past Surgical History:   Procedure Laterality Date     C STABISM SURG,PREV EYE SURG,NOT MUSC      Right     HC OPEN TX METATARSAL FRACTURE  age 12    softball injury,open fracture left foot     HC TOOTH EXTRACTION W/FORCEP      Extract wisdom teeth     INJECT  JOINT SACROILIAC Left 1/11/2018    Procedure: INJECT JOINT SACROILIAC;  INJECT JOINT SACROILIAC LEFT;  Surgeon: Alan Marshall MD;  Location: [facility] OR     OPERATIVE HYSTEROSCOPY WITH MORCELLATOR N/A 7/24/2018    Procedure: OPERATIVE HYSTEROSCOPY WITH MORCELLATOR (MYOSURE);  Exam under anesthesia, operative hysteroscopy, polypectomy, D & C;  Surgeon: Sindhu Peterson DO;  Location: [facility] OR     TUBAL LIGATION  7/27/2006       Social History   I have reviewed this patient's social history and updated it with pertinent information if needed.  Social History     Tobacco Use     Smoking status: Current Every Day Smoker     Packs/day: 0.50     Years: 6.00     Pack years: 3.00     Last attempt to quit: 9/22/2010     Years since quitting: 10.4     Smokeless tobacco: Never Used   Substance Use Topics     Alcohol use: Yes     Alcohol/week: 0.0 standard drinks     Comment: once a month     Drug use: No       Family History   I have reviewed this patient's family history and updated it with pertinent information if needed.  Family History   Problem Relation Age of Onset     Allergies Mother      Lipids Father         cholesterol     Diabetes Maternal Grandmother      Hypertension Maternal Grandmother      Heart Disease Maternal Grandmother         Bypass     Cancer Maternal Grandfather         Lung - metastatic     Alzheimer Disease Paternal Grandmother      Heart Disease Paternal Grandmother         valve replacement     Cerebrovascular Disease Paternal Grandfather      Anesthesia Reaction No family hx of        Prior to Admission Medications   Cannot display prior to admission medications because the patient has not been admitted in this contact.     Allergies   Allergies   Allergen Reactions     Amoxicillin Hives     Hydrocodone-Acetaminophen GI Disturbance     Tylenol is ok       Physical Exam   Vital Signs:                    Weight: 0 lbs 0 oz    Physical Exam  Vitals signs and nursing note reviewed.   Constitutional:        Appearance: Normal appearance. She is not toxic-appearing.   HENT:      Head: Normocephalic and atraumatic.   Eyes:      General: No scleral icterus.  Neck:      Musculoskeletal: Normal range of motion and neck supple.   Cardiovascular:      Rate and Rhythm: Normal rate and regular rhythm.      Heart sounds: No murmur.   Pulmonary:      Effort: Pulmonary effort is normal.      Breath sounds: Normal breath sounds. No wheezing.   Musculoskeletal:      Right lower leg: No edema.      Left lower leg: No edema.   Skin:     General: Skin is warm and dry.      Findings: No rash.   Neurological:      General: No focal deficit present.      Mental Status: She is alert.   Psychiatric:         Mood and Affect: Mood normal.         Behavior: Behavior normal.           Data   Data reviewed today: I reviewed all medications, new labs and imaging results over the last 24 hours. I personally reviewed no images or EKG's today.    Recent Labs   Lab 03/05/21  1337 03/05/21  1025   WBC  --  7.2   HGB  --  15.0   MCV  --  82   PLT  --  304   NA  --  134   POTASSIUM  --  4.1   CHLORIDE  --  99   CO2  --  27   BUN  --  12   CR  --  0.50*   ANIONGAP  --  8   LUNA  --  9.5   GLC  --  314*   ALBUMIN  --  4.0   PROTTOTAL  --  8.3   BILITOTAL  --  0.3   ALKPHOS  --  101   ALT  --  23   AST  --  6   LIPASE  --  83   TROPI 0.072* 0.020     Recent Results (from the past 24 hour(s))   XR Chest Port 1 View    Narrative    XR CHEST PORT 1 VW 3/5/2021 11:26 AM    HISTORY: chest pain    COMPARISON: Chest x-ray 10/29/2020      Impression    IMPRESSION: The cardiac silhouette and pulmonary vasculature are  within normal limits. No focal pulmonary consolidations. No pleural  effusion or pneumothorax.    TRUONG STRONG MD   Echocardiogram Complete    Narrative    [id number]  [id number]  [id number]  [id number]^CARLA^ABDI^MICAELA           [facility]  Echocardiography Laboratory  [address] [place] [address]        Name:  SARAH PHELPS  MRN: [id number]  : 1975  Study Date: 2021 12:25 PM  Age: 45 yrs  Gender: Female  Patient Location: [facility]  Reason For Study: Chest Pain  History: HTN, PTSD, Diabetes  Ordering Physician: ABDI AGUIRRE  Referring Physician: Yaima Muse  Performed By: Jamila Valencia     BSA: 1.9 m2  Height: 66 in  Weight: 176 lb  HR: 73  BP: 110/85 mmHg  _____________________________________________________________________________  __        Procedure  Complete Portable Echo Adult. Optison (NDC #[id number]) given intravenously.  _____________________________________________________________________________  __        Interpretation Summary     1. The left ventricle is normal in structure, function and size. The visual  ejection fraction is estimated at 60%.  2. The right ventricle is normal in structure, function and size.  3. No valve disease.     No previous echo for comparison.  _____________________________________________________________________________  __        Left Ventricle  The left ventricle is normal in structure, function and size. There is normal  left ventricular wall thickness. The visual ejection fraction is estimated at  60%. Left ventricular diastolic function is normal. Normal left ventricular  wall motion.     Right Ventricle  The right ventricle is normal in structure, function and size.     Atria  Normal left atrial size. Right atrial size is normal. There is no atrial shunt  seen.     Mitral Valve  The mitral valve is normal in structure and function.        Tricuspid Valve  No tricuspid regurgitation. Right ventricular systolic pressure could not be  approximated due to inadequate tricuspid regurgitation.     Aortic Valve  The aortic valve is normal in structure and function.     Pulmonic Valve  The pulmonic valve is normal in structure and function.     Vessels  Normal ascending, transverse (arch), and descending aorta. The inferior vena  cava was normal in size with  preserved respiratory variability.     Pericardium  There is no pericardial effusion.        Rhythm  Sinus rhythm was noted.  _____________________________________________________________________________  __  MMode/2D Measurements & Calculations  IVSd: 0.82 cm     LVIDd: 4.5 cm  LVIDs: 3.4 cm  LVPWd: 1.0 cm  FS: 24.9 %  LV mass(C)d: 139.6 grams  LV mass(C)dI: 73.7 grams/m2  LA dimension: 3.2 cm  asc Aorta Diam: 2.6 cm  LA Volume (BP): 16.6 ml  LA Volume Index (BP): 8.8 ml/m2  RWT: 0.46           Doppler Measurements & Calculations  MV E max dejuan: 73.3 cm/sec  MV A max dejuan: 57.2 cm/sec  MV E/A: 1.3  MV dec time: 0.19 sec  Ao V2 max: 140.4 cm/sec  Ao max P.0 mmHg  LV V1 max PG: 3.7 mmHg  LV V1 max: 95.6 cm/sec  PA acc time: 0.16 sec  AV Dejuan Ratio (DI): 0.68  E/E' av.7  Lateral E/e': 7.2  Medial E/e': 10.1           _____________________________________________________________________________  __           Report approved by: Eyal Leigh 2021 01:50 PM

## 2021-03-06 LAB
CHOLEST SERPL-MCNC: 261 MG/DL
ERYTHROCYTE [DISTWIDTH] IN BLOOD BY AUTOMATED COUNT: 13.3 % (ref 10–15)
GLUCOSE BLDC GLUCOMTR-MCNC: 136 MG/DL (ref 70–99)
GLUCOSE BLDC GLUCOMTR-MCNC: 148 MG/DL (ref 70–99)
GLUCOSE BLDC GLUCOMTR-MCNC: 148 MG/DL (ref 70–99)
GLUCOSE BLDC GLUCOMTR-MCNC: 202 MG/DL (ref 70–99)
HCT VFR BLD AUTO: 40.9 % (ref 35–47)
HDLC SERPL-MCNC: 43 MG/DL
HGB BLD-MCNC: 13.6 G/DL (ref 11.7–15.7)
LDLC SERPL CALC-MCNC: 178 MG/DL
MCH RBC QN AUTO: 27.2 PG (ref 26.5–33)
MCHC RBC AUTO-ENTMCNC: 33.3 G/DL (ref 31.5–36.5)
MCV RBC AUTO: 82 FL (ref 78–100)
NONHDLC SERPL-MCNC: 218 MG/DL
PLATELET # BLD AUTO: 276 10E9/L (ref 150–450)
RBC # BLD AUTO: 5 10E12/L (ref 3.8–5.2)
TRIGL SERPL-MCNC: 201 MG/DL
TROPONIN I SERPL-MCNC: 0.08 UG/L (ref 0–0.04)
UFH PPP CHRO-ACNC: 0.36 IU/ML
UFH PPP CHRO-ACNC: 0.43 IU/ML
WBC # BLD AUTO: 5.2 10E9/L (ref 4–11)

## 2021-03-06 PROCEDURE — 250N000013 HC RX MED GY IP 250 OP 250 PS 637: Performed by: PHYSICIAN ASSISTANT

## 2021-03-06 PROCEDURE — 85520 HEPARIN ASSAY: CPT | Performed by: HOSPITALIST

## 2021-03-06 PROCEDURE — 84484 ASSAY OF TROPONIN QUANT: CPT | Performed by: INTERNAL MEDICINE

## 2021-03-06 PROCEDURE — 99222 1ST HOSP IP/OBS MODERATE 55: CPT | Performed by: INTERNAL MEDICINE

## 2021-03-06 PROCEDURE — 210N000002 HC R&B HEART CARE

## 2021-03-06 PROCEDURE — 250N000011 HC RX IP 250 OP 636: Performed by: PHYSICIAN ASSISTANT

## 2021-03-06 PROCEDURE — 93010 ELECTROCARDIOGRAM REPORT: CPT | Performed by: INTERNAL MEDICINE

## 2021-03-06 PROCEDURE — 999N001017 HC STATISTIC GLUCOSE BY METER IP

## 2021-03-06 PROCEDURE — 250N000012 HC RX MED GY IP 250 OP 636 PS 637: Performed by: PHYSICIAN ASSISTANT

## 2021-03-06 PROCEDURE — 93005 ELECTROCARDIOGRAM TRACING: CPT

## 2021-03-06 PROCEDURE — 80061 LIPID PANEL: CPT | Performed by: PHYSICIAN ASSISTANT

## 2021-03-06 PROCEDURE — 250N000013 HC RX MED GY IP 250 OP 250 PS 637: Performed by: HOSPITALIST

## 2021-03-06 PROCEDURE — 99233 SBSQ HOSP IP/OBS HIGH 50: CPT | Performed by: HOSPITALIST

## 2021-03-06 PROCEDURE — 85027 COMPLETE CBC AUTOMATED: CPT | Performed by: PHYSICIAN ASSISTANT

## 2021-03-06 PROCEDURE — 36415 COLL VENOUS BLD VENIPUNCTURE: CPT | Performed by: HOSPITALIST

## 2021-03-06 PROCEDURE — 36415 COLL VENOUS BLD VENIPUNCTURE: CPT | Performed by: PHYSICIAN ASSISTANT

## 2021-03-06 RX ORDER — ROSUVASTATIN CALCIUM 20 MG/1
20 TABLET, COATED ORAL DAILY
Status: DISCONTINUED | OUTPATIENT
Start: 2021-03-07 | End: 2021-03-16 | Stop reason: HOSPADM

## 2021-03-06 RX ORDER — LORAZEPAM 1 MG/1
1 TABLET ORAL ONCE
Status: COMPLETED | OUTPATIENT
Start: 2021-03-06 | End: 2021-03-06

## 2021-03-06 RX ORDER — LANOLIN ALCOHOL/MO/W.PET/CERES
3 CREAM (GRAM) TOPICAL
Status: DISCONTINUED | OUTPATIENT
Start: 2021-03-06 | End: 2021-03-16 | Stop reason: HOSPADM

## 2021-03-06 RX ADMIN — ACETAMINOPHEN 650 MG: 325 TABLET, FILM COATED ORAL at 03:12

## 2021-03-06 RX ADMIN — METOPROLOL TARTRATE 25 MG: 25 TABLET, FILM COATED ORAL at 08:05

## 2021-03-06 RX ADMIN — HEPARIN SODIUM 950 UNITS/HR: 10000 INJECTION, SOLUTION INTRAVENOUS at 09:17

## 2021-03-06 RX ADMIN — ATORVASTATIN CALCIUM 40 MG: 40 TABLET, FILM COATED ORAL at 08:05

## 2021-03-06 RX ADMIN — NITROGLYCERIN 0.4 MG: 0.4 TABLET SUBLINGUAL at 03:12

## 2021-03-06 RX ADMIN — LORAZEPAM 1 MG: 1 TABLET ORAL at 03:59

## 2021-03-06 RX ADMIN — ASPIRIN 81 MG: 81 TABLET ORAL at 08:05

## 2021-03-06 RX ADMIN — ONDANSETRON 4 MG: 4 TABLET, ORALLY DISINTEGRATING ORAL at 14:21

## 2021-03-06 RX ADMIN — ALUMINUM HYDROXIDE, MAGNESIUM HYDROXIDE, AND SIMETHICONE 30 ML: 200; 200; 20 SUSPENSION ORAL at 19:41

## 2021-03-06 RX ADMIN — METOPROLOL TARTRATE 25 MG: 25 TABLET, FILM COATED ORAL at 19:39

## 2021-03-06 RX ADMIN — OXYCODONE HYDROCHLORIDE AND ACETAMINOPHEN 1 TABLET: 5; 325 TABLET ORAL at 19:41

## 2021-03-06 RX ADMIN — INSULIN GLARGINE 38 UNITS: 100 INJECTION, SOLUTION SUBCUTANEOUS at 08:05

## 2021-03-06 RX ADMIN — TIZANIDINE 4 MG: 4 TABLET ORAL at 19:40

## 2021-03-06 ASSESSMENT — ACTIVITIES OF DAILY LIVING (ADL)
ADLS_ACUITY_SCORE: 15

## 2021-03-06 ASSESSMENT — MIFFLIN-ST. JEOR: SCORE: 1462.8

## 2021-03-06 NOTE — PROGRESS NOTES
MD Notification    Notified Person: MD    Notified Person Name: Dr. Reed     Notification Date/Time: 3/6/2021 3:16 AM    Notification Interaction: Paged    Purpose of Notification: Pt requesting something to help relax    Orders Received:    Comments:

## 2021-03-06 NOTE — CONSULTS
[facility]    Cardiology Consult Note-Cardiology      Date of Admission:  3/5/2021  Consult Requested by: Internal Medicine   Reason for Consult: NSTEMI    Assessment & Plan: HVSL     #1 NSTEMI  #2 Hypertension  #3 Diabetes mellitus Type 2; poorly controlled   #4 Hyperlipidemia with history of intolerance to atorvastatin   #5 Tobacco dependence      Ms. Stacy Brown is a 45-year-old woman with history of hypertension, poorly controlled diabetes, and hyperlipidemia, as well as tobacco dependence, who presents with chest pain and elevated troponins, consistent with a non-ST elevation myocardial infarction.    I had a lengthy discussion with Ms. Brown and her partner.  We discussed the pathophysiology of coronary artery disease, and we discussed her many risk factors for disease.  I recommended proceeding with a coronary angiogram that would be both diagnostic and potentially therapeutic if a lesion is found.  We discussed potential for PCI, as well as potential for finding multiple lesions that could require need for coronary bypass surgery, although I think that my suspicion for finding this would be less likely in her setting, given her normal left ventricular function without regional wall motion abnormalities.  We had a lengthy discussion regarding the importance of the medical regimen that she will need following her hospitalization.  We also discussed her modifiable risk factors, and addressed factors including improved control of her diabetes, as well as smoking cessation.  She is very interesting in quitting smoking, and will reach out if she needs additional assistance with this.     PLAN:  1. Plan for angiogram Monday 3/8/2021, given current stability and lack of symptoms. Discussed risks/benefits of procedure. Will need to be NPO after midnight Sunday night.   2. Continue heparin drip until angiogram.  3. Continue aspirin 81 mg daily.  4. Continue metoprolol tartrate 25 mg  BID. Monitor BP, and if elevated, can consider addition of ACE or ARB.   5. Continue high intensity statin, although given history of intolerance to atorvastatin, will switch to rosuvastatin 20 mg, which she has not been on in the past. May require further up-titration in outpatient setting vs addition of ezetimibe, would aim for goal LDL <70.   6. Continue current diabetes management, will need counseling for further assistance with improved control at home.   7. Will need cardiac rehabilitation following discharge.       Jose Ortiz MD  [facility]          ______________________________________________________________________    Chief Complaint   Chest pain     History of Present Illness     Stacy Brown is a 45 year old female admitted on 3/5/2021.  Her medical history is significant for type 2 diabetes mellitus, poorly controlled, tobacco dependence, essential hypertension, and chronic pain.      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  After lifting the package and dropping it off it on customers doorstep, she developed significant substernal chest discomfort with radiation into her jaw.  This concerned her, so she called her manager and then presented to the emergency department for further evaluation.  In the emergency department, she received nitroglycerin, which resolved her pain.  ECG did not show significant acute findings.  Transthoracic echo showed normal left ventricular function and size with an EF of 60% and no regional wall motion abnormalities.  The right ventricular size was normal with normal function.  There was no valve disease.  Troponin, however was elevated to 0.02, with an increased to 0.072 and a further increase to 0.115.  Due to these findings, she was treated for NSTEMI with  heparin and full dose aspirin.  She was transferred to [facility] for further care.  As of this morning, troponin is trended down to 0.084.    Since arrival here, she has had 2 smaller occurrences of chest pain, that have been relieved with sublingual nitroglycerin.  As of today, during the day, she has had no further recurrence of chest pain.  In the room currently, she is comfortable without dyspnea or chest pain.    She reports no known family history of coronary artery disease.    She is current smoker; she reports 1 PPD until about 3 weeks ago, she was able to cut down to 1/2 PPD. She currently is interested in tobacco cessation.     Review of Systems   A complete review of systems was performed was negative except as otherwise noted per HPI.    Past Medical History    I have reviewed this patient's medical history and updated it with pertinent information if needed.   Past Medical History:   Diagnosis Date     Knee pain, chronic      Mixed hyperlipidemia      Type II or unspecified type diabetes mellitus without mention of complication, not stated as uncontrolled 08/16/2002    diagnosed 8/16/02, started insulin 2006       Past Surgical History   I have reviewed this patient's surgical history and updated it with pertinent information if needed.  Past Surgical History:   Procedure Laterality Date     C STABISM SURG,PREV EYE SURG,NOT MUSC      Right     HC OPEN TX METATARSAL FRACTURE  age 12    softball injury,open fracture left foot     HC TOOTH EXTRACTION W/FORCEP      Extract wisdom teeth     INJECT JOINT SACROILIAC Left 1/11/2018    Procedure: INJECT JOINT SACROILIAC;  INJECT JOINT SACROILIAC LEFT;  Surgeon: Alan Marshall MD;  Location:  OR     OPERATIVE HYSTEROSCOPY WITH MORCELLATOR N/A 7/24/2018    Procedure: OPERATIVE HYSTEROSCOPY WITH MORCELLATOR (MYOSURE);  Exam under anesthesia, operative hysteroscopy, polypectomy, D & C;  Surgeon: Sindhu Peterson DO;  Location: [facility] OR     TUBAL  LIGATION  7/27/2006       Social History   I have reviewed this patient's social history and updated it with pertinent information if needed.  Social History     Tobacco Use     Smoking status: Current Every Day Smoker     Packs/day: 0.50     Years: 6.00     Pack years: 3.00     Last attempt to quit: 9/22/2010     Years since quitting: 10.4     Smokeless tobacco: Never Used   Substance Use Topics     Alcohol use: Yes     Alcohol/week: 0.0 standard drinks     Comment: once a month     Drug use: No     Family History   I have reviewed this patient's family history and updated it with pertinent information if needed.   I have reviewed this patient's family history and updated it with pertinent information if needed.  Family History   Problem Relation Age of Onset     Allergies Mother      Lipids Father         cholesterol     Diabetes Maternal Grandmother      Hypertension Maternal Grandmother      Heart Disease Maternal Grandmother         Bypass     Cancer Maternal Grandfather         Lung - metastatic     Alzheimer Disease Paternal Grandmother      Heart Disease Paternal Grandmother         valve replacement     Cerebrovascular Disease Paternal Grandfather      Anesthesia Reaction No family hx of        Medications   Medications Prior to Admission   Medication Sig Dispense Refill Last Dose     aspirin (ASA) 81 MG tablet Take 1 tablet (81 mg) by mouth daily 90 tablet 3 3/5/2021 at Unknown time     insulin aspart (NOVOLOG PEN) 100 UNIT/ML pen Inject 10 Units Subcutaneous daily (with dinner) Inject 8 units before evening meal. (Patient taking differently: Inject 10 Units Subcutaneous daily (with dinner) ) 3 mL 3 3/4/2021 at Unknown time     insulin glargine (BASAGLAR KWIKPEN) 100 UNIT/ML pen INJECT 38 UNITS UNDER THE SKIN EVERY MORNING 30 mL 3 3/5/2021 at Unknown time     metFORMIN (GLUCOPHAGE-XR) 500 MG 24 hr tablet Take 4 tablets (2,000 mg) by mouth daily (with dinner) 360 tablet 3 3/5/2021 at Unknown time      Multiple Vitamins-Minerals (MULTI-VITAMIN GUMMIES) CHEW Take 1 chew tab by mouth daily    Past Week at Unknown time     [START ON 3/23/2021] oxyCODONE-acetaminophen (PERCOCET) 5-325 MG tablet TAKE ONE TABLET BY MOUTH ONCE DAILY AS NEEDED FOR PAIN 30 tablet 0  at prn     simvastatin (ZOCOR) 20 MG tablet Take 1 tablet (20 mg) by mouth At Bedtime 90 tablet 0 3/4/2021 at Unknown time     tiZANidine (ZANAFLEX) 4 MG tablet TAKE ONE TABLET BY MOUTH EVERY NIGHT AT BEDTIME 30 tablet 1 Past Week at Unknown time     blood glucose (NO BRAND SPECIFIED) test strip Use to test blood sugar up to 4 times daily or as directed. To accompany: Blood Glucose Monitor Brands: per insurance. (Patient taking differently: 1 strip by In Vitro route 2 times daily ) 200 strip 6      blood glucose calibration (NO BRAND SPECIFIED) solution To accompany: Blood Glucose Monitor Brands: per insurance. 1 Bottle 3      blood glucose monitoring (BL TEST STRIP PACK) test strip Use to test blood sugar 1-2 times daily or as directed. Per patients glucose meter (getting new one today) 100 each 3      blood glucose monitoring (FREESTYLE) lancets Use to test blood sugars 1-2 times daily or as directed, per patients glucose meter. 3 Box 3      blood glucose monitoring (NO BRAND SPECIFIED) meter device kit Use to test blood sugar up to 4 times daily or as directed. Preferred blood glucose meter OR supplies to accompany: Blood Glucose Monitor Brands: per insurance. 1 kit 0      Blood Glucose Monitoring Suppl (ACCU-CHEK COMPLETE) KIT 1 Device daily 1 Device 0      ibuprofen (ADVIL/MOTRIN) 600 MG tablet Take 1 tablet (600 mg) by mouth every 6 hours as needed for moderate pain 60 tablet 0 More than a month at Unknown time     insulin pen needle (NOVOFINE) 32G X 6 MM miscellaneous Use once daily or as directed. 100 each 3      insulin pen needle (NOVOFINE) 32G X 6 MM miscellaneous Use once daily or as directed. 100 each 0        Allergies   Allergies   Allergen  Reactions     Amoxicillin Hives     Hydrocodone-Acetaminophen GI Disturbance     Tylenol is ok       Physical Exam   Vital Signs: Temp: 98.1  F (36.7  C) Temp src: Oral BP: 108/75 Pulse: 75   Resp: 18 SpO2: 97 % O2 Device: None (Room air)    Weight: 176 lbs 9.6 oz    General: The patient is a pleasant, well-appearing female who appears her stated age and is in no acute distress.  Eyes: Anicteric sclera.  ENT: Exam normal.  Vessels: Jugular venous pressure within normal limits.  Radial dorsalis pedis pulses intact bilaterally.  Heart: Normal S1, S2.  Regular rate and rhythm.  No murmurs, gallops, or rubs appreciated.  Lungs: Clear to auscultation bilaterally with good inspiratory effort.  No wheezes, rales, or rhonchi noted.  Abdomen: Soft, nontender.  Nondistended.  Extremities: Warm, well-perfused.  No cyanosis, clubbing, or significant lower extreme edema noted.  Musculoskeletal: Exam normal.  Skin: Exam normal.  Mental: Patient is alert and oriented x3 with appropriate mood and affect.    Data   Results for orders placed or performed during the hospital encounter of 03/05/21 (from the past 24 hour(s))   Glucose by meter   Result Value Ref Range    Glucose 140 (H) 70 - 99 mg/dL   Troponin I   Result Value Ref Range    Troponin I ES 0.115 (H) 0.000 - 0.045 ug/L   TSH with free T4 reflex   Result Value Ref Range    TSH 1.33 0.40 - 4.00 mU/L   Hemoglobin A1c   Result Value Ref Range    Hemoglobin A1C 10.1 (H) 0 - 5.6 %   Heparin Unfractionated Anti Xa Level   Result Value Ref Range    Heparin Unfractionated Anti Xa Level 0.49 IU/mL   Heparin Unfractionated Anti Xa Level   Result Value Ref Range    Heparin Unfractionated Anti Xa Level 0.36 IU/mL   Glucose by meter   Result Value Ref Range    Glucose 148 (H) 70 - 99 mg/dL   EKG 12-lead, tracing only   Result Value Ref Range    Interpretation ECG Click View Image link to view waveform and result    Glucose by meter   Result Value Ref Range    Glucose 148 (H) 70 - 99  mg/dL   Lipid panel   Result Value Ref Range    Cholesterol 261 (H) <200 mg/dL    Triglycerides 201 (H) <150 mg/dL    HDL Cholesterol 43 (L) >49 mg/dL    LDL Cholesterol Calculated 178 (H) <100 mg/dL    Non HDL Cholesterol 218 (H) <130 mg/dL   CBC with platelets   Result Value Ref Range    WBC 5.2 4.0 - 11.0 10e9/L    RBC Count 5.00 3.8 - 5.2 10e12/L    Hemoglobin 13.6 11.7 - 15.7 g/dL    Hematocrit 40.9 35.0 - 47.0 %    MCV 82 78 - 100 fl    MCH 27.2 26.5 - 33.0 pg    MCHC 33.3 31.5 - 36.5 g/dL    RDW 13.3 10.0 - 15.0 %    Platelet Count 276 150 - 450 10e9/L   Heparin Unfractionated Anti Xa Level   Result Value Ref Range    Heparin Unfractionated Anti Xa Level 0.43 IU/mL   Troponin I - Now then in 4 hours x 2   Result Value Ref Range    Troponin I ES 0.084 (H) 0.000 - 0.045 ug/L   Glucose by meter   Result Value Ref Range    Glucose 202 (H) 70 - 99 mg/dL

## 2021-03-06 NOTE — PLAN OF CARE
VSS. Pt sating well on RA. Pt had x1 complaints of mild CP, relieved with nitroglycerin SL, given tylenol after CP relieved to prevent headache.. Pt complained of nausea x1 no emesis, relieved w/ Zofran. Pt complained of some chronic pain to RLE, given PRN percocet. HR SR. Pt up w/ SBA.

## 2021-03-06 NOTE — PHARMACY-ADMISSION MEDICATION HISTORY
Pharmacy Medication History  Admission medication history interview status for the 3/5/2021  admission is complete. See EPIC admission navigator for prior to admission medications     Location of Interview: Phone  Medication history sources: Patient, Surescripts and Pharmacy ([place] [facility])    Significant changes made to the medication list:      In the past week, patient estimated taking medication this percent of the time: greater than 90%    Additional medication history information:   -Per pharmacy metformin was last filled on August 2019, and simvastatin was last filled on Dec 2019; however, pt stated that she has a lot of free samples and she didn't need to refill them, and she still takes them daily.     Medication reconciliation completed by provider prior to medication history? No    Time spent in this activity: 10 min    Prior to Admission medications    Medication Sig Last Dose Taking? Auth Provider   aspirin (ASA) 81 MG tablet Take 1 tablet (81 mg) by mouth daily 3/5/2021 at Unknown time Yes Yaima Muse MD   insulin aspart (NOVOLOG PEN) 100 UNIT/ML pen Inject 10 Units Subcutaneous daily (with dinner) Inject 8 units before evening meal.  Patient taking differently: Inject 10 Units Subcutaneous daily (with dinner)  3/4/2021 at Unknown time Yes Yaima Muse MD   insulin glargine (BASAGLAR KWIKPEN) 100 UNIT/ML pen INJECT 38 UNITS UNDER THE SKIN EVERY MORNING 3/5/2021 at Unknown time Yes Yaima Mues MD   metFORMIN (GLUCOPHAGE-XR) 500 MG 24 hr tablet Take 4 tablets (2,000 mg) by mouth daily (with dinner) 3/5/2021 at Unknown time Yes Yaima Muse MD   Multiple Vitamins-Minerals (MULTI-VITAMIN GUMMIES) CHEW Take 1 chew tab by mouth daily  Past Week at Unknown time Yes Reported, Patient   oxyCODONE-acetaminophen (PERCOCET) 5-325 MG tablet TAKE ONE TABLET BY MOUTH ONCE DAILY AS NEEDED FOR PAIN  at prn Yes Yaima Muse  MD Nicole   simvastatin (ZOCOR) 20 MG tablet Take 1 tablet (20 mg) by mouth At Bedtime 3/4/2021 at Unknown time Yes Yaima Muse MD   tiZANidine (ZANAFLEX) 4 MG tablet TAKE ONE TABLET BY MOUTH EVERY NIGHT AT BEDTIME Past Week at Unknown time Yes Yaima Muse MD   blood glucose (NO BRAND SPECIFIED) test strip Use to test blood sugar up to 4 times daily or as directed. To accompany: Blood Glucose Monitor Brands: per insurance.  Patient taking differently: 1 strip by In Vitro route 2 times daily    Yaima Muse MD   blood glucose calibration (NO BRAND SPECIFIED) solution To accompany: Blood Glucose Monitor Brands: per insurance.   Yaima Muse MD   blood glucose monitoring (BL TEST STRIP PACK) test strip Use to test blood sugar 1-2 times daily or as directed. Per patients glucose meter (getting new one today)   Yaima Muse MD   blood glucose monitoring (FREESTYLE) lancets Use to test blood sugars 1-2 times daily or as directed, per patients glucose meter.   Yaima Muse MD   blood glucose monitoring (NO BRAND SPECIFIED) meter device kit Use to test blood sugar up to 4 times daily or as directed. Preferred blood glucose meter OR supplies to accompany: Blood Glucose Monitor Brands: per insurance.   Yaima Muse MD   Blood Glucose Monitoring Suppl (ACCU-CHEK COMPLETE) KIT 1 Device daily   aYima Muse MD   ibuprofen (ADVIL/MOTRIN) 600 MG tablet Take 1 tablet (600 mg) by mouth every 6 hours as needed for moderate pain More than a month at Unknown time  Miriam Griffith APRN CNP   insulin pen needle (NOVOFINE) 32G X 6 MM miscellaneous Use once daily or as directed.   Yaima Muse MD   insulin pen needle (NOVOFINE) 32G X 6 MM miscellaneous Use once daily or as directed.   Yaima Muse MD       The information provided in this note is only as accurate  as the sources available at the time of update(s)

## 2021-03-06 NOTE — PROGRESS NOTES
[facility]    Medicine Progress Note - Hospitalist Service       Date of Admission:  3/5/2021  Assessment & Plan        Stacy Brown is a 45 year old female smoker with type 2 diabetes mellitus who presented with chest pain and ruled in for a non-ST-elevation myocardial infarction.  She was [place] to the [facility] [place] intravenous heparin.     Non-ST-elevation myocardial infarction   No further chest pain this morning.    Awaiting cardiology consultation     Continue intravenous heparin, aspirin, metoprolol, statin     Uncontrolled type 2 diabetes mellitus   Hemoglobin A1C   Date Value Ref Range Status   03/05/2021 10.1 (H) 0 - 5.6 % Final     Comment:     Normal <5.7% Prediabetes 5.7-6.4%  Diabetes 6.5% or higher - adopted from ADA   consensus guidelines.       Recent Labs   Lab 03/06/21  1159 03/06/21  0729 03/06/21  0208 03/05/21 2027 03/05/21  1025   GLC  --   --   --   --  314*   * 148* 148* 140*  --      Discussed home insulin regimens with her.  She is using Lantus in the morning and Novolog once per day in the evening.      She would be willing to try 70/30 bid at discharge.    Continue intensive therapy here with Lantus, carbohydrate coverage and correction scale    Hyperlipidemia     Continue statin     Chronic Pain  Sacroiliitis    Continue PTA Percocet     Tobacco Dependence  In recent weeks has cut down from 1ppd-1/2ppd. She is now motivated to quit.    Encouraged cessation    Nicotine gum available     Diet: Combination Diet 9230-3964 Calories: Moderate Consistent CHO (4-6 CHO units/meal); No Caffeine Diet; No Caffeine for 24 hours (once tests completed, may have caffeine)    DVT Prophylaxis: intravenous heparin  Schultz Catheter: not present  Code Status: Full Code        Disposition Plan   Expected discharge: 2 - 3 days, recommended to prior living arrangement once cardiac status is stable .  Entered: Markie Taveras MD 03/06/2021, 12:41 PM     The patient's care  was discussed with the Patient.    Marike Taveras MD  Hospitalist Service  [facility]  Contact information available via [facility] Paging/Directory    ______________________________________________________________________    Interval History   No further chest pain this morning.    Data reviewed today: I reviewed all medications, new labs and imaging results over the last 24 hours. I personally reviewed no images or EKG's today.    Physical Exam   Vital Signs: Temp: 98.1  F (36.7  C) Temp src: Oral BP: 108/75 Pulse: 75   Resp: 18 SpO2: 97 % O2 Device: None (Room air)    Weight: 176 lbs 9.6 oz  Constitutional: awake, alert, cooperative, no apparent distress  Respiratory: No increased work of breathing, good air exchange, clear to auscultation bilaterally, no crackles or wheezing  Cardiovascular: regular rate and rhythm, normal S1 and S2, no S3 or S4, and no murmur noted  GI:  normal bowel sounds, soft, non-distended, non-tender  Skin: no rashes and no jaundice  Neuropsychiatric: General: normal, calm and normal eye contact    Data   Recent Labs   Lab 03/06/21  0746 03/05/21 2029 03/05/21  1337 03/05/21  1025   WBC 5.2  --   --  7.2   HGB 13.6  --   --  15.0   MCV 82  --   --  82     --   --  304   NA  --   --   --  134   POTASSIUM  --   --   --  4.1   CHLORIDE  --   --   --  99   CO2  --   --   --  27   BUN  --   --   --  12   CR  --   --   --  0.50*   ANIONGAP  --   --   --  8   LUNA  --   --   --  9.5   GLC  --   --   --  314*   ALBUMIN  --   --   --  4.0   PROTTOTAL  --   --   --  8.3   BILITOTAL  --   --   --  0.3   ALKPHOS  --   --   --  101   ALT  --   --   --  23   AST  --   --   --  6   LIPASE  --   --   --  83   TROPI 0.084* 0.115* 0.072* 0.020     Recent Results (from the past 24 hour(s))   Echocardiogram Complete    Narrative    [id number]  [id number]  [id number]  [id number]^CARLA^ABDI^MICAELA           [facility]  Echocardiography Laboratory  [address]  [place] [address]        Name: SARAH PHELPS  MRN: [id number]  : 1975  Study Date: 2021 12:25 PM  Age: 45 yrs  Gender: Female  Patient Location: [facility]  Reason For Study: Chest Pain  History: HTN, PTSD, Diabetes  Ordering Physician: ABDI AGUIRRE  Referring Physician: Yaima Muse  Performed By: Jamila Valencia     BSA: 1.9 m2  Height: 66 in  Weight: 176 lb  HR: 73  BP: 110/85 mmHg  _____________________________________________________________________________  __        Procedure  Complete Portable Echo Adult. Optison (NDC #[id number]) given intravenously.  _____________________________________________________________________________  __        Interpretation Summary     1. The left ventricle is normal in structure, function and size. The visual  ejection fraction is estimated at 60%.  2. The right ventricle is normal in structure, function and size.  3. No valve disease.     No previous echo for comparison.  _____________________________________________________________________________  __        Left Ventricle  The left ventricle is normal in structure, function and size. There is normal  left ventricular wall thickness. The visual ejection fraction is estimated at  60%. Left ventricular diastolic function is normal. Normal left ventricular  wall motion.     Right Ventricle  The right ventricle is normal in structure, function and size.     Atria  Normal left atrial size. Right atrial size is normal. There is no atrial shunt  seen.     Mitral Valve  The mitral valve is normal in structure and function.        Tricuspid Valve  No tricuspid regurgitation. Right ventricular systolic pressure could not be  approximated due to inadequate tricuspid regurgitation.     Aortic Valve  The aortic valve is normal in structure and function.     Pulmonic Valve  The pulmonic valve is normal in structure and function.     Vessels  Normal ascending, transverse (arch), and descending aorta.  The inferior vena  cava was normal in size with preserved respiratory variability.     Pericardium  There is no pericardial effusion.        Rhythm  Sinus rhythm was noted.  _____________________________________________________________________________  __  MMode/2D Measurements & Calculations  IVSd: 0.82 cm     LVIDd: 4.5 cm  LVIDs: 3.4 cm  LVPWd: 1.0 cm  FS: 24.9 %  LV mass(C)d: 139.6 grams  LV mass(C)dI: 73.7 grams/m2  LA dimension: 3.2 cm  asc Aorta Diam: 2.6 cm  LA Volume (BP): 16.6 ml  LA Volume Index (BP): 8.8 ml/m2  RWT: 0.46           Doppler Measurements & Calculations  MV E max dejuan: 73.3 cm/sec  MV A max dejuan: 57.2 cm/sec  MV E/A: 1.3  MV dec time: 0.19 sec  Ao V2 max: 140.4 cm/sec  Ao max P.0 mmHg  LV V1 max PG: 3.7 mmHg  LV V1 max: 95.6 cm/sec  PA acc time: 0.16 sec  AV Dejuan Ratio (DI): 0.68  E/E' av.7  Lateral E/e': 7.2  Medial E/e': 10.1           _____________________________________________________________________________  __           Report approved by: Eyal Leigh 2021 01:50 PM        Medications     - MEDICATION INSTRUCTIONS -       heparin 950 Units/hr (21 0917)     - MEDICATION INSTRUCTIONS -       - MEDICATION INSTRUCTIONS -       - MEDICATION INSTRUCTIONS -       ACE/ARB/ARNI NOT PRESCRIBED         aspirin  81 mg Oral Daily     atorvastatin  40 mg Oral Daily     insulin aspart  1-7 Units Subcutaneous TID AC     insulin aspart  1-5 Units Subcutaneous At Bedtime     insulin aspart   Subcutaneous TID w/meals     insulin glargine  38 Units Subcutaneous QAM AC     metoprolol tartrate  25 mg Oral BID     tiZANidine  4 mg Oral At Bedtime

## 2021-03-06 NOTE — PLAN OF CARE
VSS on RA. Tele- SR. UAL. Heparin gtt infusing @ 950, 10A in AM. Denies chest pain today. Cards consulted. Possible angio Monday. Zofran x1 for nausea. BG covered per sliding scale and carb coverage. Continue to monitor.

## 2021-03-06 NOTE — PLAN OF CARE
CR: Pt not yet seen by cardiology, note transferred from [facility] for further work up. Will hold until further work up completed.

## 2021-03-07 LAB
GLUCOSE BLDC GLUCOMTR-MCNC: 180 MG/DL (ref 70–99)
GLUCOSE BLDC GLUCOMTR-MCNC: 213 MG/DL (ref 70–99)
GLUCOSE BLDC GLUCOMTR-MCNC: 225 MG/DL (ref 70–99)
GLUCOSE BLDC GLUCOMTR-MCNC: 254 MG/DL (ref 70–99)
GLUCOSE BLDC GLUCOMTR-MCNC: 269 MG/DL (ref 70–99)
UFH PPP CHRO-ACNC: 0.37 IU/ML

## 2021-03-07 PROCEDURE — 99232 SBSQ HOSP IP/OBS MODERATE 35: CPT | Performed by: HOSPITALIST

## 2021-03-07 PROCEDURE — 36415 COLL VENOUS BLD VENIPUNCTURE: CPT | Performed by: HOSPITALIST

## 2021-03-07 PROCEDURE — 250N000013 HC RX MED GY IP 250 OP 250 PS 637: Performed by: PHYSICIAN ASSISTANT

## 2021-03-07 PROCEDURE — 999N001017 HC STATISTIC GLUCOSE BY METER IP

## 2021-03-07 PROCEDURE — 210N000002 HC R&B HEART CARE

## 2021-03-07 PROCEDURE — 250N000013 HC RX MED GY IP 250 OP 250 PS 637: Performed by: INTERNAL MEDICINE

## 2021-03-07 PROCEDURE — 250N000012 HC RX MED GY IP 250 OP 636 PS 637: Performed by: PHYSICIAN ASSISTANT

## 2021-03-07 PROCEDURE — 99232 SBSQ HOSP IP/OBS MODERATE 35: CPT | Performed by: INTERNAL MEDICINE

## 2021-03-07 PROCEDURE — 250N000011 HC RX IP 250 OP 636: Performed by: PHYSICIAN ASSISTANT

## 2021-03-07 PROCEDURE — 85520 HEPARIN ASSAY: CPT | Performed by: HOSPITALIST

## 2021-03-07 RX ORDER — DOCUSATE SODIUM 100 MG/1
100 CAPSULE, LIQUID FILLED ORAL 2 TIMES DAILY PRN
Status: DISCONTINUED | OUTPATIENT
Start: 2021-03-07 | End: 2021-03-11

## 2021-03-07 RX ADMIN — ACETAMINOPHEN 650 MG: 325 TABLET, FILM COATED ORAL at 15:23

## 2021-03-07 RX ADMIN — ASPIRIN 81 MG: 81 TABLET ORAL at 08:46

## 2021-03-07 RX ADMIN — INSULIN ASPART 1 UNITS: 100 INJECTION, SOLUTION INTRAVENOUS; SUBCUTANEOUS at 21:02

## 2021-03-07 RX ADMIN — INSULIN GLARGINE 38 UNITS: 100 INJECTION, SOLUTION SUBCUTANEOUS at 08:43

## 2021-03-07 RX ADMIN — OXYCODONE HYDROCHLORIDE AND ACETAMINOPHEN 1 TABLET: 5; 325 TABLET ORAL at 20:49

## 2021-03-07 RX ADMIN — METOPROLOL TARTRATE 25 MG: 25 TABLET, FILM COATED ORAL at 08:47

## 2021-03-07 RX ADMIN — MELATONIN TAB 3 MG 3 MG: 3 TAB at 20:49

## 2021-03-07 RX ADMIN — TIZANIDINE 4 MG: 4 TABLET ORAL at 20:49

## 2021-03-07 RX ADMIN — ALUMINUM HYDROXIDE, MAGNESIUM HYDROXIDE, AND SIMETHICONE 30 ML: 200; 200; 20 SUSPENSION ORAL at 20:49

## 2021-03-07 RX ADMIN — METOPROLOL TARTRATE 25 MG: 25 TABLET, FILM COATED ORAL at 20:49

## 2021-03-07 RX ADMIN — ROSUVASTATIN CALCIUM 20 MG: 20 TABLET, FILM COATED ORAL at 08:47

## 2021-03-07 RX ADMIN — OXYCODONE HYDROCHLORIDE AND ACETAMINOPHEN 1 TABLET: 5; 325 TABLET ORAL at 08:46

## 2021-03-07 RX ADMIN — HEPARIN SODIUM 950 UNITS/HR: 10000 INJECTION, SOLUTION INTRAVENOUS at 11:42

## 2021-03-07 ASSESSMENT — ACTIVITIES OF DAILY LIVING (ADL)
ADLS_ACUITY_SCORE: 15

## 2021-03-07 ASSESSMENT — MIFFLIN-ST. JEOR: SCORE: 1452.82

## 2021-03-07 NOTE — PROGRESS NOTES
[facility]    Cardiology Progress Note- Cardiology        Date of Admission:  3/5/2021     #1 NSTEMI  #2 Hypertension  #3 Diabetes mellitus Type 2; poorly controlled   #4 Hyperlipidemia with history of intolerance to atorvastatin   #5 Tobacco dependence       Ms. Stacy Brown is a 45-year-old woman with history of hypertension, poorly controlled diabetes, and hyperlipidemia, as well as tobacco dependence, who presents with chest pain and elevated troponins, consistent with a non-ST elevation myocardial infarction.     She is doing well today and has had no further chest pain. She has been up walking without problems.  I again discussed plans for angiogram tomorrow and discussed risks and benefits; she has consented to proceed.      PLAN:  1. Plan for angiogram Monday 3/8/2021. Discussed risks/benefits of procedure. Will need to be NPO after midnight tonight.   2. Continue heparin drip until angiogram.  3. Continue aspirin 81 mg daily.  4. Continue metoprolol tartrate 25 mg BID. Monitor BP, and if elevated, can consider addition of ACE or ARB.   5. Continue high intensity statin, although given history of intolerance to atorvastatin, will switch to rosuvastatin 20 mg, which she has not been on in the past. May require further up-titration in outpatient setting vs addition of ezetimibe, would aim for goal LDL <70.   6. Continue current diabetes management, will need counseling for further assistance with improved control at home.   7. Will need cardiac rehabilitation following discharge.   8. Smoking cessation       Entered: Jose Ortiz MD 03/07/2021, 11:41 AM         Jose Ortiz MD  [facility]    ______________________________________________________________________    Interval History   No events overnight. She has been up walking around the halls without symptoms. No chest pain or dyspnea.     Data reviewed today: I reviewed all medications, new  labs and imaging results over the last 24 hours.     Physical Exam   Vital Signs: Temp: 98.3  F (36.8  C) Temp src: Oral BP: 118/83 Pulse: 69   Resp: 16 SpO2: 95 % O2 Device: None (Room air)    Weight: 174 lbs 6.4 oz    General: The patient is a pleasant, well-appearing female who appears her stated age and is in no acute distress.  Eyes: Anicteric sclera.  ENT: Exam normal.  Vessels: Jugular venous pressure within normal limits.  Radial dorsalis pedis pulses intact bilaterally.  Heart: Normal S1, S2.  Regular rate and rhythm.  No murmurs, gallops, or rubs appreciated.  Lungs: Clear to auscultation bilaterally with good inspiratory effort.  No wheezes, rales, or rhonchi noted.  Abdomen: Soft, nontender.  Nondistended.  Extremities: Warm, well-perfused.  No cyanosis, clubbing, or significant lower extreme edema noted.  Musculoskeletal: Exam normal.  Skin: Exam normal.  Mental: Patient is alert and oriented x3 with appropriate mood and affect.    Data   Recent Labs   Lab 03/06/21  0746 03/05/21 2029 03/05/21  1337 03/05/21  1025   WBC 5.2  --   --  7.2   HGB 13.6  --   --  15.0   MCV 82  --   --  82     --   --  304   NA  --   --   --  134   POTASSIUM  --   --   --  4.1   CHLORIDE  --   --   --  99   CO2  --   --   --  27   BUN  --   --   --  12   CR  --   --   --  0.50*   ANIONGAP  --   --   --  8   LUNA  --   --   --  9.5   GLC  --   --   --  314*   ALBUMIN  --   --   --  4.0   PROTTOTAL  --   --   --  8.3   BILITOTAL  --   --   --  0.3   ALKPHOS  --   --   --  101   ALT  --   --   --  23   AST  --   --   --  6   LIPASE  --   --   --  83   TROPI 0.084* 0.115* 0.072* 0.020

## 2021-03-07 NOTE — PLAN OF CARE
VSS on RA. Tele- SR. Denies chest pain. UAL. Heparin gtt infusing, 10A in AM. BG covered per sliding scale and carb coverage. Percocet and tylenol for generalized pain. Plan for angio Monday, consent signed. NPO at midnight. Continue to monitor.

## 2021-03-07 NOTE — PROGRESS NOTES
[facility]    Medicine Progress Note - Hospitalist Service       Date of Admission:  3/5/2021  Assessment & Plan        Stacy Brown is a 45 year old female smoker with type 2 diabetes mellitus who presented with chest pain and ruled in for a non-ST-elevation myocardial infarction.  She was admitted to the [facility] on intravenous heparin.     Non-ST-elevation myocardial infarction   No further chest pain this morning.    Cardiac catheterization tomorrow     Continue intravenous heparin, aspirin, metoprolol, statin     Uncontrolled type 2 diabetes mellitus   Hemoglobin A1C   Date Value Ref Range Status   03/05/2021 10.1 (H) 0 - 5.6 % Final     Comment:     Normal <5.7% Prediabetes 5.7-6.4%  Diabetes 6.5% or higher - adopted from ADA   consensus guidelines.       Recent Labs   Lab 03/07/21  0802 03/07/21  0322 03/06/21  1945 03/06/21  1159 03/06/21  0729 03/06/21  0208 03/05/21  1025 03/05/21  1025   GLC  --   --   --   --   --   --   --  314*   * 269* 136* 202* 148* 148*   < >  --     < > = values in this interval not displayed.     Discussed home insulin regimens with her.  She is using Lantus in the morning and Novolog once per day in the evening.  She is a  and says it is not feasible for her to take Novolog during the day, but would try 70/30 insulin bid before and after work.    She would be willing to try 70/30 bid at discharge.    Continue intensive therapy here with Lantus, carbohydrate coverage and correction scale- carbohydrate coverage increased today    Hyperlipidemia     Continue statin     Chronic Pain  Sacroiliitis    Continue PTA Percocet     Tobacco Dependence  In recent weeks has cut down from 1ppd-1/2ppd. She is now motivated to quit.    Encouraged cessation    Nicotine gum available     Diet: Combination Diet 9430-2956 Calories: Moderate Consistent CHO (4-6 CHO units/meal); No Caffeine Diet; No Caffeine for 24 hours (once tests completed, may have  caffeine)    DVT Prophylaxis: intravenous heparin  Schultz Catheter: not present  Code Status: Full Code        Disposition Plan   Expected discharge: 1-2 days, recommended to prior living arrangement once cardiac status is stable .  Entered: Markie Taveras MD 03/07/2021, 11:06 AM     The patient's care was discussed with the Patient.    Markie Taveras MD  Hospitalist Service  [facility]  Contact information available via [facility] Paging/Directory    ______________________________________________________________________    Interval History   No pain or complaints.    Data reviewed today: I reviewed all medications, new labs and imaging results over the last 24 hours. I personally reviewed no images or EKG's today.    Physical Exam   Vital Signs: Temp: 98.3  F (36.8  C) Temp src: Oral BP: 118/83 Pulse: 69   Resp: 16 SpO2: 95 % O2 Device: None (Room air)    Weight: 174 lbs 6.4 oz  Constitutional: awake, alert, cooperative, no apparent distress  Skin: no rashes and no jaundice  Neuropsychiatric: General: normal, calm and normal eye contact    Data   Recent Labs   Lab 03/06/21  0746 03/05/21 2029 03/05/21  1337 03/05/21  1025   WBC 5.2  --   --  7.2   HGB 13.6  --   --  15.0   MCV 82  --   --  82     --   --  304   NA  --   --   --  134   POTASSIUM  --   --   --  4.1   CHLORIDE  --   --   --  99   CO2  --   --   --  27   BUN  --   --   --  12   CR  --   --   --  0.50*   ANIONGAP  --   --   --  8   LUNA  --   --   --  9.5   GLC  --   --   --  314*   ALBUMIN  --   --   --  4.0   PROTTOTAL  --   --   --  8.3   BILITOTAL  --   --   --  0.3   ALKPHOS  --   --   --  101   ALT  --   --   --  23   AST  --   --   --  6   LIPASE  --   --   --  83   TROPI 0.084* 0.115* 0.072* 0.020     No results found for this or any previous visit (from the past 24 hour(s)).  Medications     - MEDICATION INSTRUCTIONS -       heparin 950 Units/hr (03/06/21 0917)     - MEDICATION INSTRUCTIONS -       -  MEDICATION INSTRUCTIONS -       - MEDICATION INSTRUCTIONS -       ACE/ARB/ARNI NOT PRESCRIBED         aspirin  81 mg Oral Daily     insulin aspart  1-7 Units Subcutaneous TID AC     insulin aspart  1-5 Units Subcutaneous At Bedtime     insulin aspart   Subcutaneous TID w/meals     insulin glargine  38 Units Subcutaneous QAM AC     metoprolol tartrate  25 mg Oral BID     rosuvastatin  20 mg Oral Daily     tiZANidine  4 mg Oral At Bedtime

## 2021-03-07 NOTE — PLAN OF CARE
Patient alert, steady with ind ambulation. Denies any symptoms:CP, SOB. IV heparin continues at 950units/hr, Hep 10a this am. Tele SR.BG well controlled. Review POC with patient.

## 2021-03-08 ENCOUNTER — ANESTHESIA EVENT (OUTPATIENT)
Dept: SURGERY | Facility: CLINIC | Age: 46
End: 2021-03-08
Payer: COMMERCIAL

## 2021-03-08 ENCOUNTER — APPOINTMENT (OUTPATIENT)
Dept: CT IMAGING | Facility: CLINIC | Age: 46
End: 2021-03-08
Attending: SURGERY
Payer: COMMERCIAL

## 2021-03-08 ENCOUNTER — APPOINTMENT (OUTPATIENT)
Dept: ULTRASOUND IMAGING | Facility: CLINIC | Age: 46
End: 2021-03-08
Attending: SURGERY
Payer: COMMERCIAL

## 2021-03-08 LAB
ALBUMIN SERPL-MCNC: 3.5 G/DL (ref 3.4–5)
ALP SERPL-CCNC: 78 U/L (ref 40–150)
ALT SERPL W P-5'-P-CCNC: 30 U/L (ref 0–50)
AST SERPL W P-5'-P-CCNC: 26 U/L (ref 0–45)
BILIRUB DIRECT SERPL-MCNC: <0.1 MG/DL (ref 0–0.2)
BILIRUB SERPL-MCNC: 0.2 MG/DL (ref 0.2–1.3)
GLUCOSE BLDC GLUCOMTR-MCNC: 126 MG/DL (ref 70–99)
GLUCOSE BLDC GLUCOMTR-MCNC: 143 MG/DL (ref 70–99)
GLUCOSE BLDC GLUCOMTR-MCNC: 185 MG/DL (ref 70–99)
GLUCOSE BLDC GLUCOMTR-MCNC: 203 MG/DL (ref 70–99)
GLUCOSE BLDC GLUCOMTR-MCNC: 218 MG/DL (ref 70–99)
POTASSIUM SERPL-SCNC: 4.2 MMOL/L (ref 3.4–5.3)
PROT SERPL-MCNC: 6.8 G/DL (ref 6.8–8.8)
UFH PPP CHRO-ACNC: 0.37 IU/ML

## 2021-03-08 PROCEDURE — 210N000002 HC R&B HEART CARE

## 2021-03-08 PROCEDURE — 999N001017 HC STATISTIC GLUCOSE BY METER IP

## 2021-03-08 PROCEDURE — 250N000013 HC RX MED GY IP 250 OP 250 PS 637: Performed by: PHYSICIAN ASSISTANT

## 2021-03-08 PROCEDURE — 99152 MOD SED SAME PHYS/QHP 5/>YRS: CPT | Performed by: INTERNAL MEDICINE

## 2021-03-08 PROCEDURE — 84132 ASSAY OF SERUM POTASSIUM: CPT | Performed by: HOSPITALIST

## 2021-03-08 PROCEDURE — 36415 COLL VENOUS BLD VENIPUNCTURE: CPT | Performed by: HOSPITALIST

## 2021-03-08 PROCEDURE — 86850 RBC ANTIBODY SCREEN: CPT | Performed by: SURGERY

## 2021-03-08 PROCEDURE — 87640 STAPH A DNA AMP PROBE: CPT | Performed by: THORACIC SURGERY (CARDIOTHORACIC VASCULAR SURGERY)

## 2021-03-08 PROCEDURE — C1887 CATHETER, GUIDING: HCPCS | Performed by: INTERNAL MEDICINE

## 2021-03-08 PROCEDURE — 99232 SBSQ HOSP IP/OBS MODERATE 35: CPT | Performed by: HOSPITALIST

## 2021-03-08 PROCEDURE — 86901 BLOOD TYPING SEROLOGIC RH(D): CPT | Performed by: SURGERY

## 2021-03-08 PROCEDURE — 93970 EXTREMITY STUDY: CPT

## 2021-03-08 PROCEDURE — 71250 CT THORAX DX C-: CPT

## 2021-03-08 PROCEDURE — 93010 ELECTROCARDIOGRAM REPORT: CPT | Performed by: INTERNAL MEDICINE

## 2021-03-08 PROCEDURE — 272N000001 HC OR GENERAL SUPPLY STERILE: Performed by: INTERNAL MEDICINE

## 2021-03-08 PROCEDURE — 86900 BLOOD TYPING SEROLOGIC ABO: CPT | Performed by: SURGERY

## 2021-03-08 PROCEDURE — 93005 ELECTROCARDIOGRAM TRACING: CPT

## 2021-03-08 PROCEDURE — 36415 COLL VENOUS BLD VENIPUNCTURE: CPT | Performed by: SURGERY

## 2021-03-08 PROCEDURE — 250N000009 HC RX 250: Performed by: INTERNAL MEDICINE

## 2021-03-08 PROCEDURE — 86923 COMPATIBILITY TEST ELECTRIC: CPT | Performed by: SURGERY

## 2021-03-08 PROCEDURE — 93454 CORONARY ARTERY ANGIO S&I: CPT | Performed by: INTERNAL MEDICINE

## 2021-03-08 PROCEDURE — 250N000011 HC RX IP 250 OP 636: Performed by: INTERNAL MEDICINE

## 2021-03-08 PROCEDURE — 93880 EXTRACRANIAL BILAT STUDY: CPT

## 2021-03-08 PROCEDURE — 93931 UPPER EXTREMITY STUDY: CPT | Mod: LT

## 2021-03-08 PROCEDURE — 258N000003 HC RX IP 258 OP 636: Performed by: INTERNAL MEDICINE

## 2021-03-08 PROCEDURE — 81001 URINALYSIS AUTO W/SCOPE: CPT | Performed by: SURGERY

## 2021-03-08 PROCEDURE — 250N000011 HC RX IP 250 OP 636: Performed by: PHYSICIAN ASSISTANT

## 2021-03-08 PROCEDURE — 250N000013 HC RX MED GY IP 250 OP 250 PS 637: Performed by: INTERNAL MEDICINE

## 2021-03-08 PROCEDURE — 80076 HEPATIC FUNCTION PANEL: CPT | Performed by: HOSPITALIST

## 2021-03-08 PROCEDURE — B2111ZZ FLUOROSCOPY OF MULTIPLE CORONARY ARTERIES USING LOW OSMOLAR CONTRAST: ICD-10-PCS | Performed by: INTERNAL MEDICINE

## 2021-03-08 PROCEDURE — 87641 MR-STAPH DNA AMP PROBE: CPT | Performed by: THORACIC SURGERY (CARDIOTHORACIC VASCULAR SURGERY)

## 2021-03-08 PROCEDURE — 85520 HEPARIN ASSAY: CPT | Performed by: HOSPITALIST

## 2021-03-08 PROCEDURE — 250N000012 HC RX MED GY IP 250 OP 636 PS 637: Performed by: HOSPITALIST

## 2021-03-08 PROCEDURE — 99232 SBSQ HOSP IP/OBS MODERATE 35: CPT | Mod: 25 | Performed by: INTERNAL MEDICINE

## 2021-03-08 RX ORDER — ATROPINE SULFATE 0.1 MG/ML
0.5 INJECTION INTRAVENOUS
Status: ACTIVE | OUTPATIENT
Start: 2021-03-08 | End: 2021-03-08

## 2021-03-08 RX ORDER — LORAZEPAM 2 MG/ML
0.5 INJECTION INTRAMUSCULAR
Status: DISCONTINUED | OUTPATIENT
Start: 2021-03-08 | End: 2021-03-08 | Stop reason: HOSPADM

## 2021-03-08 RX ORDER — HEPARIN SODIUM 1000 [USP'U]/ML
INJECTION, SOLUTION INTRAVENOUS; SUBCUTANEOUS
Status: DISCONTINUED | OUTPATIENT
Start: 2021-03-08 | End: 2021-03-08 | Stop reason: HOSPADM

## 2021-03-08 RX ORDER — HEPARIN SODIUM 10000 [USP'U]/100ML
0-5000 INJECTION, SOLUTION INTRAVENOUS CONTINUOUS
Status: DISCONTINUED | OUTPATIENT
Start: 2021-03-08 | End: 2021-03-09

## 2021-03-08 RX ORDER — SODIUM CHLORIDE 9 MG/ML
INJECTION, SOLUTION INTRAVENOUS CONTINUOUS
Status: DISCONTINUED | OUTPATIENT
Start: 2021-03-08 | End: 2021-03-08 | Stop reason: HOSPADM

## 2021-03-08 RX ORDER — NALOXONE HYDROCHLORIDE 0.4 MG/ML
0.4 INJECTION, SOLUTION INTRAMUSCULAR; INTRAVENOUS; SUBCUTANEOUS
Status: DISCONTINUED | OUTPATIENT
Start: 2021-03-08 | End: 2021-03-08

## 2021-03-08 RX ORDER — SODIUM CHLORIDE 9 MG/ML
INJECTION, SOLUTION INTRAVENOUS CONTINUOUS
Status: DISCONTINUED | OUTPATIENT
Start: 2021-03-08 | End: 2021-03-11

## 2021-03-08 RX ORDER — NALOXONE HYDROCHLORIDE 0.4 MG/ML
0.2 INJECTION, SOLUTION INTRAMUSCULAR; INTRAVENOUS; SUBCUTANEOUS
Status: DISCONTINUED | OUTPATIENT
Start: 2021-03-08 | End: 2021-03-08

## 2021-03-08 RX ORDER — ASPIRIN 81 MG/1
81 TABLET, CHEWABLE ORAL
Status: COMPLETED | OUTPATIENT
Start: 2021-03-09 | End: 2021-03-09

## 2021-03-08 RX ORDER — FAMOTIDINE 20 MG/1
20 TABLET, FILM COATED ORAL
Status: COMPLETED | OUTPATIENT
Start: 2021-03-09 | End: 2021-03-09

## 2021-03-08 RX ORDER — FENTANYL CITRATE 50 UG/ML
25-50 INJECTION, SOLUTION INTRAMUSCULAR; INTRAVENOUS
Status: ACTIVE | OUTPATIENT
Start: 2021-03-08 | End: 2021-03-08

## 2021-03-08 RX ORDER — CLINDAMYCIN PHOSPHATE 900 MG/50ML
900 INJECTION, SOLUTION INTRAVENOUS SEE ADMIN INSTRUCTIONS
Status: DISCONTINUED | OUTPATIENT
Start: 2021-03-08 | End: 2021-03-09 | Stop reason: HOSPADM

## 2021-03-08 RX ORDER — CLINDAMYCIN PHOSPHATE 900 MG/50ML
900 INJECTION, SOLUTION INTRAVENOUS
Status: COMPLETED | OUTPATIENT
Start: 2021-03-09 | End: 2021-03-09

## 2021-03-08 RX ORDER — IOPAMIDOL 755 MG/ML
INJECTION, SOLUTION INTRAVASCULAR
Status: DISCONTINUED | OUTPATIENT
Start: 2021-03-08 | End: 2021-03-08 | Stop reason: HOSPADM

## 2021-03-08 RX ORDER — ACETAMINOPHEN 325 MG/1
650 TABLET ORAL EVERY 4 HOURS PRN
Status: DISCONTINUED | OUTPATIENT
Start: 2021-03-08 | End: 2021-03-10

## 2021-03-08 RX ORDER — POTASSIUM CHLORIDE 1500 MG/1
20 TABLET, EXTENDED RELEASE ORAL
Status: DISCONTINUED | OUTPATIENT
Start: 2021-03-08 | End: 2021-03-08 | Stop reason: HOSPADM

## 2021-03-08 RX ORDER — LORAZEPAM 0.5 MG/1
0.5 TABLET ORAL
Status: DISCONTINUED | OUTPATIENT
Start: 2021-03-08 | End: 2021-03-08 | Stop reason: HOSPADM

## 2021-03-08 RX ORDER — FENTANYL CITRATE 50 UG/ML
INJECTION, SOLUTION INTRAMUSCULAR; INTRAVENOUS
Status: DISCONTINUED | OUTPATIENT
Start: 2021-03-08 | End: 2021-03-08 | Stop reason: HOSPADM

## 2021-03-08 RX ORDER — CHLORHEXIDINE GLUCONATE ORAL RINSE 1.2 MG/ML
10 SOLUTION DENTAL ONCE
Status: COMPLETED | OUTPATIENT
Start: 2021-03-08 | End: 2021-03-09

## 2021-03-08 RX ORDER — FLUMAZENIL 0.1 MG/ML
0.2 INJECTION, SOLUTION INTRAVENOUS
Status: ACTIVE | OUTPATIENT
Start: 2021-03-08 | End: 2021-03-08

## 2021-03-08 RX ORDER — ASPIRIN 81 MG/1
162 TABLET, CHEWABLE ORAL
Status: COMPLETED | OUTPATIENT
Start: 2021-03-09 | End: 2021-03-09

## 2021-03-08 RX ORDER — LIDOCAINE 40 MG/G
CREAM TOPICAL
Status: DISCONTINUED | OUTPATIENT
Start: 2021-03-08 | End: 2021-03-09 | Stop reason: HOSPADM

## 2021-03-08 RX ORDER — LIDOCAINE 40 MG/G
CREAM TOPICAL
Status: DISCONTINUED | OUTPATIENT
Start: 2021-03-08 | End: 2021-03-08

## 2021-03-08 RX ADMIN — OXYCODONE HYDROCHLORIDE AND ACETAMINOPHEN 1 TABLET: 5; 325 TABLET ORAL at 19:35

## 2021-03-08 RX ADMIN — METOPROLOL TARTRATE 25 MG: 25 TABLET, FILM COATED ORAL at 23:38

## 2021-03-08 RX ADMIN — ROSUVASTATIN CALCIUM 20 MG: 20 TABLET, FILM COATED ORAL at 08:56

## 2021-03-08 RX ADMIN — ASPIRIN 81 MG: 81 TABLET ORAL at 08:56

## 2021-03-08 RX ADMIN — METOPROLOL TARTRATE 25 MG: 25 TABLET, FILM COATED ORAL at 08:56

## 2021-03-08 RX ADMIN — HEPARIN SODIUM 950 UNITS/HR: 10000 INJECTION, SOLUTION INTRAVENOUS at 21:11

## 2021-03-08 RX ADMIN — INSULIN GLARGINE 19 UNITS: 100 INJECTION, SOLUTION SUBCUTANEOUS at 09:59

## 2021-03-08 RX ADMIN — OXYCODONE HYDROCHLORIDE AND ACETAMINOPHEN 1 TABLET: 5; 325 TABLET ORAL at 08:55

## 2021-03-08 RX ADMIN — TIZANIDINE 4 MG: 4 TABLET ORAL at 23:38

## 2021-03-08 RX ADMIN — HEPARIN SODIUM 950 UNITS/HR: 10000 INJECTION, SOLUTION INTRAVENOUS at 08:54

## 2021-03-08 RX ADMIN — SODIUM CHLORIDE 150 ML/HR: 9 INJECTION, SOLUTION INTRAVENOUS at 10:47

## 2021-03-08 ASSESSMENT — ACTIVITIES OF DAILY LIVING (ADL)
ADLS_ACUITY_SCORE: 15

## 2021-03-08 ASSESSMENT — MIFFLIN-ST. JEOR: SCORE: 1463.71

## 2021-03-08 ASSESSMENT — LIFESTYLE VARIABLES: TOBACCO_USE: 1

## 2021-03-08 NOTE — PROVIDER NOTIFICATION
MD Notification    Notified Person: MD    Notified Person Name:     Notification Date/Time: 3-8-2021, 0857    Notification Interaction: Text paged    Purpose of Notification: Accucheck 185. Pt. NPO for angiogram.     Orders Received: Orders received for Lantus 19 unit(s).     Comments:

## 2021-03-08 NOTE — CONSULTS
CARDIOTHORACIC SURGERY CONSULT NOTE  3/8/2021      Reason for Consult: CABG evaluation      ASSESSMENT/PLAN: Patient is a 45 year old female with a history of DM2, HTN, chronic pain presented with chest pain. Patient underwent coronary angiogram which showed multivessel coronary disease.     - Recommend CABG this admission, potentially 3/9 if OR time is available  - Will obtain left radial duplex, vein mapping, bilateral carotid duplex US, CT chest w/o contrast  - Heparin gtt per cardiology  - Other cares per primary team  - Thank you for the opportunity to participate in the care of this patient.    Patient and plan discussed with attending, Dr. Montana.      Ofe Pacheco MD  Cardiothoracic Fellow  Pgr [phone]        ________________________________________________________________________________________________    HPI: Patient is a 45 year old female with a history of poorly controlled DM2 (A1c 10.1), HTN, chronic pain who presented with chest pain. Patient underwent coronary angiogram which showed multivessel coronary disease. Patient currently admitted under hospitalist service and CVTS was consulted for consideration of CABG.    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 She describes the pain as substernal and radiating to her jaw. She went to the ER where she was given nitroglycerin, which improved the pain. She did not have EKG changes. Troponin peaked at 0.115. She was started on ASA and heparin. She underwent angiogram today. She has had 2 additional episodes of chest pain since admission that have resolved with nitroglycerin, but she currently denies chest pain. She otherwise denies any SOB, lightheadedness, dizziness, palpitations, LE edema. She was previously a 1 ppd smoker but has cut down over the last several weeks to 1/2 ppd. She has been tobacco free for the last several days and desires to continue.      PMH:  Past Medical History:   Diagnosis  Date     Knee pain, chronic      Mixed hyperlipidemia      Type II or unspecified type diabetes mellitus without mention of complication, not stated as uncontrolled 08/16/2002    diagnosed 8/16/02, started insulin 2006       PSH:  Past Surgical History:   Procedure Laterality Date     C STABISM SURG,PREV EYE SURG,NOT MUSC      Right     HC OPEN TX METATARSAL FRACTURE  age 12    softball injury,open fracture left foot     HC TOOTH EXTRACTION W/FORCEP      Extract wisdom teeth     INJECT JOINT SACROILIAC Left 1/11/2018    Procedure: INJECT JOINT SACROILIAC;  INJECT JOINT SACROILIAC LEFT;  Surgeon: Alan Marshall MD;  Location: [facility] OR     OPERATIVE HYSTEROSCOPY WITH MORCELLATOR N/A 7/24/2018    Procedure: OPERATIVE HYSTEROSCOPY WITH MORCELLATOR (MYOSURE);  Exam under anesthesia, operative hysteroscopy, polypectomy, D & C;  Surgeon: Sindhu Peterson DO;  Location: [facility] OR     TUBAL LIGATION  7/27/2006       FH:  Family History   Problem Relation Age of Onset     Allergies Mother      Lipids Father         cholesterol     Diabetes Maternal Grandmother      Hypertension Maternal Grandmother      Heart Disease Maternal Grandmother         Bypass     Cancer Maternal Grandfather         Lung - metastatic     Alzheimer Disease Paternal Grandmother      Heart Disease Paternal Grandmother         valve replacement     Cerebrovascular Disease Paternal Grandfather      Anesthesia Reaction No family hx of        SH:  Social History     Socioeconomic History     Marital status:      Spouse name: Humble     Number of children: 2     Years of education: 14     Highest education level: Not on file   Occupational History     Occupation: [employer]     Employer: [employer]   Social Needs     Financial resource strain: Not on file     Food insecurity     Worry: Not on file     Inability: Not on file     Transportation needs     Medical: Not on file     Non-medical: Not on file   Tobacco Use     Smoking status: Current  Every Day Smoker     Packs/day: 0.50     Years: 6.00     Pack years: 3.00     Last attempt to quit: 9/22/2010     Years since quitting: 10.4     Smokeless tobacco: Never Used   Substance and Sexual Activity     Alcohol use: Yes     Alcohol/week: 0.0 standard drinks     Comment: once a month     Drug use: No     Sexual activity: Not Currently     Partners: Male     Birth control/protection: Surgical   Lifestyle     Physical activity     Days per week: Not on file     Minutes per session: Not on file     Stress: Not on file   Relationships     Social connections     Talks on phone: Not on file     Gets together: Not on file     Attends Congregational service: Not on file     Active member of club or organization: Not on file     Attends meetings of clubs or organizations: Not on file     Relationship status: Not on file     Intimate partner violence     Fear of current or ex partner: Not on file     Emotionally abused: Not on file     Physically abused: Not on file     Forced sexual activity: Not on file   Other Topics Concern      Service No     Blood Transfusions No     Caffeine Concern Yes     Comment: Diet Pepsi/at least 20 ounces/day - advised not more than 16 ounces/day     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 Yes   Social History Narrative     and lives at home in [place] with Humble, and their daughter and son.       Home Meds:  Medications Prior to Admission   Medication Sig Dispense Refill Last Dose     aspirin (ASA) 81 MG tablet Take 1 tablet (81 mg) by mouth daily 90 tablet 3 3/5/2021 at Unknown time     insulin aspart (NOVOLOG  PEN) 100 UNIT/ML pen Inject 10 Units Subcutaneous daily (with dinner) Inject 8 units before evening meal. (Patient taking differently: Inject 10 Units Subcutaneous daily (with dinner) ) 3 mL 3 3/4/2021 at Unknown time     insulin glargine (BASAGLAR KWIKPEN) 100 UNIT/ML pen INJECT 38 UNITS UNDER THE SKIN EVERY MORNING 30 mL 3 3/5/2021 at Unknown time     metFORMIN (GLUCOPHAGE-XR) 500 MG 24 hr tablet Take 4 tablets (2,000 mg) by mouth daily (with dinner) 360 tablet 3 3/5/2021 at Unknown time     Multiple Vitamins-Minerals (MULTI-VITAMIN GUMMIES) CHEW Take 1 chew tab by mouth daily    Past Week at Unknown time     [START ON 3/23/2021] oxyCODONE-acetaminophen (PERCOCET) 5-325 MG tablet TAKE ONE TABLET BY MOUTH ONCE DAILY AS NEEDED FOR PAIN 30 tablet 0  at prn     simvastatin (ZOCOR) 20 MG tablet Take 1 tablet (20 mg) by mouth At Bedtime 90 tablet 0 3/4/2021 at Unknown time     tiZANidine (ZANAFLEX) 4 MG tablet TAKE ONE TABLET BY MOUTH EVERY NIGHT AT BEDTIME 30 tablet 1 Past Week at Unknown time     blood glucose (NO BRAND SPECIFIED) test strip Use to test blood sugar up to 4 times daily or as directed. To accompany: Blood Glucose Monitor Brands: per insurance. (Patient taking differently: 1 strip by In Vitro route 2 times daily ) 200 strip 6      blood glucose calibration (NO BRAND SPECIFIED) solution To accompany: Blood Glucose Monitor Brands: per insurance. 1 Bottle 3      blood glucose monitoring (BL TEST STRIP PACK) test strip Use to test blood sugar 1-2 times daily or as directed. Per patients glucose meter (getting new one today) 100 each 3      blood glucose monitoring (FREESTYLE) lancets Use to test blood sugars 1-2 times daily or as directed, per patients glucose meter. 3 Box 3      blood glucose monitoring (NO BRAND SPECIFIED) meter device kit Use to test blood sugar up to 4 times daily or as directed. Preferred blood glucose meter OR supplies to accompany: Blood Glucose Monitor Brands: per insurance. 1 kit 0       Blood Glucose Monitoring Suppl (ACCU-CHEK COMPLETE) KIT 1 Device daily 1 Device 0      ibuprofen (ADVIL/MOTRIN) 600 MG tablet Take 1 tablet (600 mg) by mouth every 6 hours as needed for moderate pain 60 tablet 0 More than a month at Unknown time     insulin pen needle (NOVOFINE) 32G X 6 MM miscellaneous Use once daily or as directed. 100 each 3      insulin pen needle (NOVOFINE) 32G X 6 MM miscellaneous Use once daily or as directed. 100 each 0        Allergies:  Allergies   Allergen Reactions     Amoxicillin Hives     Hydrocodone-Acetaminophen GI Disturbance     Tylenol is ok       ROS: No fevers, chills, or night sweats. No recent weight changes.  No new visual or hearing complaints. No sore throat or nasal congestion. No SOB, cough, or wheezing.  +chest pain prior to admission. No palpitations, syncopal episodes, or dependent edema.  No new muscle or joint pain.  No weakness, numbness, or tingling of extremities. No new headaches. No changes in memory, mood, or affect.  No new rashes or bruises.     Physical Exam:  Temp:  [97.4  F (36.3  C)-98.3  F (36.8  C)] 97.5  F (36.4  C)  Pulse:  [61-74] 74  Resp:  [16-18] 16  BP: (103-133)/(68-76) 109/69  SpO2:  [97 %-99 %] 99 %  Gen: NAD, resting comfortably in bed, conversational  HEENT: normocephalic, atraumatic cranium, EOMI, sclerae anicteric. Oral mucosa pink and moist, no tonsillar edema or erythema, midline trachea, nonpalpable thyroid, poor dentition  Lungs: CTA in all fields, no wheezing or rhonchi  CV: RRR, S1S2 normal, no murmur. Radial pulses and DP pulses symmetric. No dependent edema.   Abd: positive normal pitched bowel sounds, overall soft and non distended, nontender, no hepatosplenomegaly, no masses/guarding/rebound tenderness.   Musculoskeletal: grossly intact, strength 5/5 upper and lower extremities  Neuro: AOx3, CN II-VII grossly intact, sensation/motor intact in upper and lower extremities  Mental: normal mood and affect, regular rate of  speech    Labs:  ABG No lab results found in last 7 days.  CBC  Recent Labs   Lab 03/06/21  0746 03/05/21  1025   WBC 5.2 7.2   HGB 13.6 15.0    304     BMP  Recent Labs   Lab 03/08/21  0549 03/05/21  1025   NA  --  134   POTASSIUM 4.2 4.1   CHLORIDE  --  99   CO2  --  27   BUN  --  12   CR  --  0.50*   GLC  --  314*     LFT  Recent Labs   Lab 03/08/21  0549 03/05/21  1025   AST 26 6   ALT 30 23   ALKPHOS 78 101   BILITOTAL 0.2 0.3   ALBUMIN 3.5 4.0     Pancreas  Recent Labs   Lab 03/05/21  1025   LIPASE 83       Imaging:  Recent Results (from the past 24 hour(s))   Cardiac Catheterization    Narrative    1. Severe multivessel coronary artery disease involving the prox-mid LAD,   mid LCx, mid RCA, proximal rPL

## 2021-03-08 NOTE — PROGRESS NOTES
"Cardiology Progress Note          Assessment and Plan:         Ms. Stacy Brown is a 45-year-old woman with history of hypertension, poorly controlled diabetes, and hyperlipidemia, as well as tobacco dependence, who presents with chest pain and elevated troponins, consistent with a non-ST elevation myocardial infarction    #1 NSTEMI  #2 Hypertension  #3 Diabetes mellitus Type 2; poorly controlled   #4 Hyperlipidemia with history of intolerance to atorvastatin   #5 Tobacco dependence       PLAN  - Coronary angiography today  - continue current medical therapy        Interval History:     No CP or SOB this AM.             Review of Systems:   As per subjective, otherwise 5 systems reviewed and negative.           Physical Exam:   Blood pressure 124/76, pulse 66, temperature 97.4  F (36.3  C), temperature source Oral, resp. rate 16, height 1.676 m (5' 6\"), weight 80.2 kg (176 lb 12.8 oz), SpO2 97 %, not currently breastfeeding.      Vital Sign Ranges  Temperature Temp  Av.6  F (36.4  C)  Min: 97.1  F (36.2  C)  Max: 98.3  F (36.8  C)   Blood pressure Systolic (24hrs), Av , Min:103 , Max:124        Diastolic (24hrs), Av, Min:68, Max:83      Pulse Pulse  Av.6  Min: 61  Max: 74   Respirations Resp  Av.5  Min: 16  Max: 18   Pulse oximetry SpO2  Av.5 %  Min: 93 %  Max: 98 %       No intake or output data in the 24 hours ending 21 1026    Constitutional: NAD  Skin: Warm and dry  Neck: No JVD  Lungs: CTA  Cardiovascular: RRR, no m/r/g  Abdomen: Soft, non tender.  Extremities and Back: No LE edema  Neurological: Nonfocal           Medications:     I have reviewed this patient's current medications.              Data:     Labs reviewed.     Tele: POLO Ham MD, MTommyD.    "

## 2021-03-08 NOTE — PLAN OF CARE
Patient alert, steady with ind ambulation. Denies CP or SOB or other symptoms. VSS. Heparin drip at 950u/hr continues. Pt to have angio today. NPO. Review POC with patient.

## 2021-03-08 NOTE — PROGRESS NOTES
[facility]    Medicine Progress Note - Hospitalist Service       Date of Admission:  3/5/2021  Assessment & Plan       Stacy Borwn is a 45 year old female admitted on 3/5/2021.  Past history of DM II and hyperlipidemia who presented with chest pain and was found to have NSTEMI.       Non-ST-elevation myocardial infarction   EKG without ischemic changes, serial trop with peak of 0.115.  TTE with EF 60% without WMA.  Diabetes and cholesterol uncontrolled with A1C 10.1% and .  - NPO for angiogram today  - heparin gtt to be held prior to angio  - continue aspirin, metoprolol, rosuvastatin     DM II on long term insulin, uncontrolled  Admission A1C 10.1%.  PTA regimen of Lantus 38 units daily, metformin.    - decrease lantus to 19 units as NPO  - will plan to start 70/30 insulin tomorrow (is a  so is not feasible to do novolog during the day)     Hyperlipidemia     Continue statin (switched to rosuvastatin per Cards)     Chronic Pain  Sacroiliitis    Continue PTA Percocet     Tobacco Dependence  In recent weeks has cut down from 1ppd-1/2ppd. She is now motivated to quit.    Nicotine gum available         Diet: Combination Diet 1589-9189 Calories: Moderate Consistent CHO (4-6 CHO units/meal); No Caffeine Diet; No Caffeine for 24 hours (once tests completed, may have caffeine)  NPO for Medical/Clinical Reasons Except for: Meds    DVT Prophylaxis: heparin gtt  Schultz Catheter: not present  Code Status: Full Code           Disposition Plan   Expected discharge: Tomorrow, recommended to prior living arrangement once work-up complete, cleared by cardiology.  Entered: Kwesi Jenkins MD 03/08/2021, 10:38 AM       The patient's care was discussed with the Bedside Nurse, Patient and Patient's Family.    Kwesi Jenkins MD  Hospitalist Service  [facility]  Contact information available via [facility]  [place]y    ______________________________________________________________________    Interval History   Denies any further chest pain/pressure.  Denies dyspnea, palpitations, lightheadedness, fever/chills, edema or other complaints.     Data reviewed today: I reviewed all medications, new labs and imaging results over the last 24 hours. I personally reviewed no images or EKG's today.    Physical Exam   Vital Signs: Temp: 97.4  F (36.3  C) Temp src: Oral BP: 124/76 Pulse: 66   Resp: 16 SpO2: 97 % O2 Device: None (Room air)    Weight: 176 lbs 12.8 oz  General Appearance: Well nourished female in NAD  Respiratory: lungs CTAB, no wheezes or crackles, no tachypnea  Cardiovascular: RRR, normal s1/s2 without murmur  GI: abdomen soft, normal bowel sounds  Skin: no peripheral edema  Other: Alert and appropriate, cranial nerves grossly intact     Data   Recent Labs   Lab 03/08/21  0549 03/06/21  0746 03/05/21  2029 03/05/21  1337 03/05/21  1025   WBC  --  5.2  --   --  7.2   HGB  --  13.6  --   --  15.0   MCV  --  82  --   --  82   PLT  --  276  --   --  304   NA  --   --   --   --  134   POTASSIUM 4.2  --   --   --  4.1   CHLORIDE  --   --   --   --  99   CO2  --   --   --   --  27   BUN  --   --   --   --  12   CR  --   --   --   --  0.50*   ANIONGAP  --   --   --   --  8   LUNA  --   --   --   --  9.5   GLC  --   --   --   --  314*   ALBUMIN  --   --   --   --  4.0   PROTTOTAL  --   --   --   --  8.3   BILITOTAL  --   --   --   --  0.3   ALKPHOS  --   --   --   --  101   ALT  --   --   --   --  23   AST  --   --   --   --  6   LIPASE  --   --   --   --  83   TROPI  --  0.084* 0.115* 0.072* 0.020

## 2021-03-08 NOTE — PLAN OF CARE
VSS. Monitor remains Sinus rhythm. Right radial TR band in place with 13 ml of air. Continue to monitor. Plan for CV surgery consult, Vein mapping, Chest CT and Carotid ultrasound. Spouse at bedside.

## 2021-03-09 ENCOUNTER — ANESTHESIA (OUTPATIENT)
Dept: SURGERY | Facility: CLINIC | Age: 46
End: 2021-03-09
Payer: COMMERCIAL

## 2021-03-09 ENCOUNTER — APPOINTMENT (OUTPATIENT)
Dept: GENERAL RADIOLOGY | Facility: CLINIC | Age: 46
End: 2021-03-09
Attending: SURGERY
Payer: COMMERCIAL

## 2021-03-09 LAB
ABO + RH BLD: NORMAL
ABO + RH BLD: NORMAL
ALBUMIN SERPL-MCNC: 3.2 G/DL (ref 3.4–5)
ALBUMIN SERPL-MCNC: 3.3 G/DL (ref 3.4–5)
ALBUMIN UR-MCNC: NEGATIVE MG/DL
ALP SERPL-CCNC: 57 U/L (ref 40–150)
ALP SERPL-CCNC: 60 U/L (ref 40–150)
ALT SERPL W P-5'-P-CCNC: 81 U/L (ref 0–50)
ALT SERPL W P-5'-P-CCNC: 88 U/L (ref 0–50)
ANION GAP SERPL CALCULATED.3IONS-SCNC: 6 MMOL/L (ref 3–14)
ANION GAP SERPL CALCULATED.3IONS-SCNC: 7 MMOL/L (ref 3–14)
APPEARANCE UR: CLEAR
APTT PPP: 24 SEC (ref 22–37)
APTT PPP: 27 SEC (ref 22–37)
AST SERPL W P-5'-P-CCNC: 67 U/L (ref 0–45)
AST SERPL W P-5'-P-CCNC: 74 U/L (ref 0–45)
B-HCG SERPL-ACNC: 2 IU/L (ref 0–5)
BASE DEFICIT BLDA-SCNC: 0 MMOL/L
BILIRUB SERPL-MCNC: 0.4 MG/DL (ref 0.2–1.3)
BILIRUB SERPL-MCNC: 0.7 MG/DL (ref 0.2–1.3)
BILIRUB UR QL STRIP: NEGATIVE
BLD GP AB SCN SERPL QL: NORMAL
BLD PROD TYP BPU: NORMAL
BLD UNIT ID BPU: 0
BLD UNIT ID BPU: 0
BLOOD BANK CMNT PATIENT-IMP: NORMAL
BLOOD PRODUCT CODE: NORMAL
BLOOD PRODUCT CODE: NORMAL
BPU ID: NORMAL
BPU ID: NORMAL
BUN SERPL-MCNC: 12 MG/DL (ref 7–30)
BUN SERPL-MCNC: 12 MG/DL (ref 7–30)
CA-I BLD-MCNC: 4.9 MG/DL (ref 4.4–5.2)
CALCIUM SERPL-MCNC: 8.5 MG/DL (ref 8.5–10.1)
CALCIUM SERPL-MCNC: 9.4 MG/DL (ref 8.5–10.1)
CHLORIDE SERPL-SCNC: 112 MMOL/L (ref 94–109)
CHLORIDE SERPL-SCNC: 113 MMOL/L (ref 94–109)
CO2 SERPL-SCNC: 25 MMOL/L (ref 20–32)
CO2 SERPL-SCNC: 26 MMOL/L (ref 20–32)
COLOR UR AUTO: ABNORMAL
CREAT SERPL-MCNC: 0.46 MG/DL (ref 0.52–1.04)
CREAT SERPL-MCNC: 0.5 MG/DL (ref 0.52–1.04)
ERYTHROCYTE [DISTWIDTH] IN BLOOD BY AUTOMATED COUNT: 13.2 % (ref 10–15)
ERYTHROCYTE [DISTWIDTH] IN BLOOD BY AUTOMATED COUNT: 13.2 % (ref 10–15)
ERYTHROCYTE [DISTWIDTH] IN BLOOD BY AUTOMATED COUNT: 13.3 % (ref 10–15)
FIBRINOGEN PPP-MCNC: 325 MG/DL (ref 200–420)
GFR SERPL CREATININE-BSD FRML MDRD: >90 ML/MIN/{1.73_M2}
GFR SERPL CREATININE-BSD FRML MDRD: >90 ML/MIN/{1.73_M2}
GLUCOSE BLDC GLUCOMTR-MCNC: 130 MG/DL (ref 70–99)
GLUCOSE BLDC GLUCOMTR-MCNC: 148 MG/DL (ref 70–99)
GLUCOSE BLDC GLUCOMTR-MCNC: 155 MG/DL (ref 70–99)
GLUCOSE BLDC GLUCOMTR-MCNC: 162 MG/DL (ref 70–99)
GLUCOSE BLDC GLUCOMTR-MCNC: 166 MG/DL (ref 70–99)
GLUCOSE BLDC GLUCOMTR-MCNC: 168 MG/DL (ref 70–99)
GLUCOSE BLDC GLUCOMTR-MCNC: 181 MG/DL (ref 70–99)
GLUCOSE BLDC GLUCOMTR-MCNC: 185 MG/DL (ref 70–99)
GLUCOSE BLDC GLUCOMTR-MCNC: 190 MG/DL (ref 70–99)
GLUCOSE BLDC GLUCOMTR-MCNC: 192 MG/DL (ref 70–99)
GLUCOSE BLDC GLUCOMTR-MCNC: 211 MG/DL (ref 70–99)
GLUCOSE SERPL-MCNC: 189 MG/DL (ref 70–99)
GLUCOSE SERPL-MCNC: 208 MG/DL (ref 70–99)
GLUCOSE UR STRIP-MCNC: NEGATIVE MG/DL
HCO3 BLD-SCNC: 26 MMOL/L (ref 21–28)
HCT VFR BLD AUTO: 30.1 % (ref 35–47)
HCT VFR BLD AUTO: 34.6 % (ref 35–47)
HCT VFR BLD AUTO: 40.6 % (ref 35–47)
HGB BLD-MCNC: 10.3 G/DL (ref 11.7–15.7)
HGB BLD-MCNC: 11.7 G/DL (ref 11.7–15.7)
HGB BLD-MCNC: 13.9 G/DL (ref 11.7–15.7)
HGB UR QL STRIP: NEGATIVE
INR PPP: 1.21 (ref 0.86–1.14)
INR PPP: 1.38 (ref 0.86–1.14)
INTERPRETATION ECG - MUSE: NORMAL
KETONES UR STRIP-MCNC: NEGATIVE MG/DL
LACTATE BLD-SCNC: 1.2 MMOL/L (ref 0.7–2)
LEUKOCYTE ESTERASE UR QL STRIP: NEGATIVE
MAGNESIUM SERPL-MCNC: 2.4 MG/DL (ref 1.6–2.3)
MCH RBC QN AUTO: 27.5 PG (ref 26.5–33)
MCH RBC QN AUTO: 27.7 PG (ref 26.5–33)
MCH RBC QN AUTO: 28 PG (ref 26.5–33)
MCHC RBC AUTO-ENTMCNC: 33.8 G/DL (ref 31.5–36.5)
MCHC RBC AUTO-ENTMCNC: 34.2 G/DL (ref 31.5–36.5)
MCHC RBC AUTO-ENTMCNC: 34.2 G/DL (ref 31.5–36.5)
MCV RBC AUTO: 81 FL (ref 78–100)
MCV RBC AUTO: 81 FL (ref 78–100)
MCV RBC AUTO: 82 FL (ref 78–100)
MRSA DNA SPEC QL NAA+PROBE: NEGATIVE
NITRATE UR QL: NEGATIVE
NUM BPU REQUESTED: 4
O2/TOTAL GAS SETTING VFR VENT: ABNORMAL %
OXYHGB MFR BLD: 94 % (ref 92–100)
PCO2 BLD: 44 MM HG (ref 35–45)
PH BLD: 7.37 PH (ref 7.35–7.45)
PH UR STRIP: 6 PH (ref 5–7)
PHOSPHATE SERPL-MCNC: 3.9 MG/DL (ref 2.5–4.5)
PLATELET # BLD AUTO: 238 10E9/L (ref 150–450)
PLATELET # BLD AUTO: 266 10E9/L (ref 150–450)
PLATELET # BLD AUTO: 303 10E9/L (ref 150–450)
PO2 BLD: 78 MM HG (ref 80–105)
POTASSIUM SERPL-SCNC: 3.7 MMOL/L (ref 3.4–5.3)
POTASSIUM SERPL-SCNC: 3.8 MMOL/L (ref 3.4–5.3)
POTASSIUM SERPL-SCNC: 4 MMOL/L (ref 3.4–5.3)
PROT SERPL-MCNC: 5.5 G/DL (ref 6.8–8.8)
PROT SERPL-MCNC: 5.8 G/DL (ref 6.8–8.8)
RBC # BLD AUTO: 3.68 10E12/L (ref 3.8–5.2)
RBC # BLD AUTO: 4.26 10E12/L (ref 3.8–5.2)
RBC # BLD AUTO: 5.01 10E12/L (ref 3.8–5.2)
RBC #/AREA URNS AUTO: <1 /HPF (ref 0–2)
SODIUM SERPL-SCNC: 144 MMOL/L (ref 133–144)
SODIUM SERPL-SCNC: 145 MMOL/L (ref 133–144)
SOURCE: ABNORMAL
SP GR UR STRIP: 1.01 (ref 1–1.03)
SPECIMEN EXP DATE BLD: NORMAL
SPECIMEN SOURCE: NORMAL
SQUAMOUS #/AREA URNS AUTO: 2 /HPF (ref 0–1)
TRANSFUSION STATUS PATIENT QL: NORMAL
UFH PPP CHRO-ACNC: 0.23 IU/ML
UFH PPP CHRO-ACNC: 0.98 IU/ML
UFH PPP CHRO-ACNC: <0.1 IU/ML
UROBILINOGEN UR STRIP-MCNC: 0 MG/DL (ref 0–2)
WBC # BLD AUTO: 16.9 10E9/L (ref 4–11)
WBC # BLD AUTO: 21.8 10E9/L (ref 4–11)
WBC # BLD AUTO: 9.4 10E9/L (ref 4–11)
WBC #/AREA URNS AUTO: 1 /HPF (ref 0–5)

## 2021-03-09 PROCEDURE — 85730 THROMBOPLASTIN TIME PARTIAL: CPT | Performed by: SURGERY

## 2021-03-09 PROCEDURE — 410N000005 HC PER-PERFUSION, SH ONLY, 1ST 30 MIN: Performed by: THORACIC SURGERY (CARDIOTHORACIC VASCULAR SURGERY)

## 2021-03-09 PROCEDURE — 36415 COLL VENOUS BLD VENIPUNCTURE: CPT | Performed by: INTERNAL MEDICINE

## 2021-03-09 PROCEDURE — 250N000013 HC RX MED GY IP 250 OP 250 PS 637: Performed by: PHYSICIAN ASSISTANT

## 2021-03-09 PROCEDURE — 83605 ASSAY OF LACTIC ACID: CPT | Performed by: THORACIC SURGERY (CARDIOTHORACIC VASCULAR SURGERY)

## 2021-03-09 PROCEDURE — 250N000011 HC RX IP 250 OP 636: Performed by: NURSE ANESTHETIST, CERTIFIED REGISTERED

## 2021-03-09 PROCEDURE — 82330 ASSAY OF CALCIUM: CPT | Performed by: THORACIC SURGERY (CARDIOTHORACIC VASCULAR SURGERY)

## 2021-03-09 PROCEDURE — 999N000141 HC STATISTIC PRE-PROCEDURE NURSING ASSESSMENT: Performed by: THORACIC SURGERY (CARDIOTHORACIC VASCULAR SURGERY)

## 2021-03-09 PROCEDURE — 370N000017 HC ANESTHESIA TECHNICAL FEE, PER MIN: Performed by: THORACIC SURGERY (CARDIOTHORACIC VASCULAR SURGERY)

## 2021-03-09 PROCEDURE — 5A1221Z PERFORMANCE OF CARDIAC OUTPUT, CONTINUOUS: ICD-10-PCS | Performed by: THORACIC SURGERY (CARDIOTHORACIC VASCULAR SURGERY)

## 2021-03-09 PROCEDURE — 85730 THROMBOPLASTIN TIME PARTIAL: CPT | Performed by: INTERNAL MEDICINE

## 2021-03-09 PROCEDURE — 250N000012 HC RX MED GY IP 250 OP 636 PS 637: Performed by: SURGERY

## 2021-03-09 PROCEDURE — 200N000001 HC R&B ICU

## 2021-03-09 PROCEDURE — 85027 COMPLETE CBC AUTOMATED: CPT | Performed by: INTERNAL MEDICINE

## 2021-03-09 PROCEDURE — 360N000079 HC SURGERY LEVEL 6, PER MIN: Performed by: THORACIC SURGERY (CARDIOTHORACIC VASCULAR SURGERY)

## 2021-03-09 PROCEDURE — 999N000157 HC STATISTIC RCP TIME EA 10 MIN

## 2021-03-09 PROCEDURE — 85384 FIBRINOGEN ACTIVITY: CPT | Performed by: INTERNAL MEDICINE

## 2021-03-09 PROCEDURE — 93010 ELECTROCARDIOGRAM REPORT: CPT | Mod: 76 | Performed by: INTERNAL MEDICINE

## 2021-03-09 PROCEDURE — 85520 HEPARIN ASSAY: CPT | Performed by: INTERNAL MEDICINE

## 2021-03-09 PROCEDURE — 250N000009 HC RX 250: Performed by: THORACIC SURGERY (CARDIOTHORACIC VASCULAR SURGERY)

## 2021-03-09 PROCEDURE — 84100 ASSAY OF PHOSPHORUS: CPT | Performed by: SURGERY

## 2021-03-09 PROCEDURE — 84295 ASSAY OF SERUM SODIUM: CPT | Performed by: THORACIC SURGERY (CARDIOTHORACIC VASCULAR SURGERY)

## 2021-03-09 PROCEDURE — 02100AW BYPASS CORONARY ARTERY, ONE ARTERY FROM AORTA WITH AUTOLOGOUS ARTERIAL TISSUE, OPEN APPROACH: ICD-10-PCS | Performed by: THORACIC SURGERY (CARDIOTHORACIC VASCULAR SURGERY)

## 2021-03-09 PROCEDURE — 84132 ASSAY OF SERUM POTASSIUM: CPT | Performed by: INTERNAL MEDICINE

## 2021-03-09 PROCEDURE — 82330 ASSAY OF CALCIUM: CPT | Performed by: SURGERY

## 2021-03-09 PROCEDURE — 85610 PROTHROMBIN TIME: CPT | Performed by: SURGERY

## 2021-03-09 PROCEDURE — 03BC4ZZ EXCISION OF LEFT RADIAL ARTERY, PERCUTANEOUS ENDOSCOPIC APPROACH: ICD-10-PCS | Performed by: THORACIC SURGERY (CARDIOTHORACIC VASCULAR SURGERY)

## 2021-03-09 PROCEDURE — P9041 ALBUMIN (HUMAN),5%, 50ML: HCPCS | Performed by: SURGERY

## 2021-03-09 PROCEDURE — 410N000006: Performed by: THORACIC SURGERY (CARDIOTHORACIC VASCULAR SURGERY)

## 2021-03-09 PROCEDURE — 94003 VENT MGMT INPAT SUBQ DAY: CPT

## 2021-03-09 PROCEDURE — 83605 ASSAY OF LACTIC ACID: CPT | Performed by: SURGERY

## 2021-03-09 PROCEDURE — 250N000013 HC RX MED GY IP 250 OP 250 PS 637: Performed by: SURGERY

## 2021-03-09 PROCEDURE — 250N000011 HC RX IP 250 OP 636: Performed by: SURGERY

## 2021-03-09 PROCEDURE — 250N000011 HC RX IP 250 OP 636: Performed by: THORACIC SURGERY (CARDIOTHORACIC VASCULAR SURGERY)

## 2021-03-09 PROCEDURE — 82805 BLOOD GASES W/O2 SATURATION: CPT | Performed by: SURGERY

## 2021-03-09 PROCEDURE — 250N000012 HC RX MED GY IP 250 OP 636 PS 637: Performed by: THORACIC SURGERY (CARDIOTHORACIC VASCULAR SURGERY)

## 2021-03-09 PROCEDURE — 80053 COMPREHEN METABOLIC PANEL: CPT | Performed by: SURGERY

## 2021-03-09 PROCEDURE — 99207 PR NO CHARGE LOS: CPT | Performed by: HOSPITALIST

## 2021-03-09 PROCEDURE — 258N000003 HC RX IP 258 OP 636: Performed by: THORACIC SURGERY (CARDIOTHORACIC VASCULAR SURGERY)

## 2021-03-09 PROCEDURE — 02100Z9 BYPASS CORONARY ARTERY, ONE ARTERY FROM LEFT INTERNAL MAMMARY, OPEN APPROACH: ICD-10-PCS | Performed by: THORACIC SURGERY (CARDIOTHORACIC VASCULAR SURGERY)

## 2021-03-09 PROCEDURE — 250N000009 HC RX 250: Performed by: NURSE ANESTHETIST, CERTIFIED REGISTERED

## 2021-03-09 PROCEDURE — 250N000009 HC RX 250

## 2021-03-09 PROCEDURE — 83735 ASSAY OF MAGNESIUM: CPT | Performed by: SURGERY

## 2021-03-09 PROCEDURE — 82803 BLOOD GASES ANY COMBINATION: CPT | Performed by: THORACIC SURGERY (CARDIOTHORACIC VASCULAR SURGERY)

## 2021-03-09 PROCEDURE — P9016 RBC LEUKOCYTES REDUCED: HCPCS | Performed by: SURGERY

## 2021-03-09 PROCEDURE — 84702 CHORIONIC GONADOTROPIN TEST: CPT | Performed by: INTERNAL MEDICINE

## 2021-03-09 PROCEDURE — 82947 ASSAY GLUCOSE BLOOD QUANT: CPT | Performed by: THORACIC SURGERY (CARDIOTHORACIC VASCULAR SURGERY)

## 2021-03-09 PROCEDURE — 250N000013 HC RX MED GY IP 250 OP 250 PS 637: Performed by: THORACIC SURGERY (CARDIOTHORACIC VASCULAR SURGERY)

## 2021-03-09 PROCEDURE — 06BQ4ZZ EXCISION OF LEFT SAPHENOUS VEIN, PERCUTANEOUS ENDOSCOPIC APPROACH: ICD-10-PCS | Performed by: THORACIC SURGERY (CARDIOTHORACIC VASCULAR SURGERY)

## 2021-03-09 PROCEDURE — 250N000011 HC RX IP 250 OP 636: Performed by: INTERNAL MEDICINE

## 2021-03-09 PROCEDURE — 258N000003 HC RX IP 258 OP 636: Performed by: ANESTHESIOLOGY

## 2021-03-09 PROCEDURE — 06BP4ZZ EXCISION OF RIGHT SAPHENOUS VEIN, PERCUTANEOUS ENDOSCOPIC APPROACH: ICD-10-PCS | Performed by: THORACIC SURGERY (CARDIOTHORACIC VASCULAR SURGERY)

## 2021-03-09 PROCEDURE — 93005 ELECTROCARDIOGRAM TRACING: CPT

## 2021-03-09 PROCEDURE — 3E043XZ INTRODUCTION OF VASOPRESSOR INTO CENTRAL VEIN, PERCUTANEOUS APPROACH: ICD-10-PCS | Performed by: THORACIC SURGERY (CARDIOTHORACIC VASCULAR SURGERY)

## 2021-03-09 PROCEDURE — 272N000001 HC OR GENERAL SUPPLY STERILE: Performed by: THORACIC SURGERY (CARDIOTHORACIC VASCULAR SURGERY)

## 2021-03-09 PROCEDURE — 258N000003 HC RX IP 258 OP 636: Performed by: SURGERY

## 2021-03-09 PROCEDURE — 99291 CRITICAL CARE FIRST HOUR: CPT | Mod: 24 | Performed by: INTERNAL MEDICINE

## 2021-03-09 PROCEDURE — 250N000025 HC SEVOFLURANE, PER MIN: Performed by: THORACIC SURGERY (CARDIOTHORACIC VASCULAR SURGERY)

## 2021-03-09 PROCEDURE — 021109W BYPASS CORONARY ARTERY, TWO ARTERIES FROM AORTA WITH AUTOLOGOUS VENOUS TISSUE, OPEN APPROACH: ICD-10-PCS | Performed by: THORACIC SURGERY (CARDIOTHORACIC VASCULAR SURGERY)

## 2021-03-09 PROCEDURE — 85610 PROTHROMBIN TIME: CPT | Performed by: INTERNAL MEDICINE

## 2021-03-09 PROCEDURE — 250N000011 HC RX IP 250 OP 636: Performed by: PHYSICIAN ASSISTANT

## 2021-03-09 PROCEDURE — 258N000003 HC RX IP 258 OP 636: Performed by: NURSE ANESTHETIST, CERTIFIED REGISTERED

## 2021-03-09 PROCEDURE — 84132 ASSAY OF SERUM POTASSIUM: CPT | Performed by: THORACIC SURGERY (CARDIOTHORACIC VASCULAR SURGERY)

## 2021-03-09 PROCEDURE — 250N000011 HC RX IP 250 OP 636

## 2021-03-09 PROCEDURE — 250N000006 HC OR RX SURGIFLO W/THROMBIN KIT 2ML 1991 OPNP: Performed by: THORACIC SURGERY (CARDIOTHORACIC VASCULAR SURGERY)

## 2021-03-09 PROCEDURE — 85027 COMPLETE CBC AUTOMATED: CPT | Performed by: SURGERY

## 2021-03-09 PROCEDURE — 999N000063 XR CHEST PORT 1 VW

## 2021-03-09 PROCEDURE — 999N001017 HC STATISTIC GLUCOSE BY METER IP

## 2021-03-09 PROCEDURE — 80053 COMPREHEN METABOLIC PANEL: CPT | Performed by: INTERNAL MEDICINE

## 2021-03-09 RX ORDER — ATROPINE SULFATE 0.4 MG/ML
AMPUL (ML) INJECTION PRN
Status: DISCONTINUED | OUTPATIENT
Start: 2021-03-09 | End: 2021-03-09

## 2021-03-09 RX ORDER — PROPOFOL 10 MG/ML
5-75 INJECTION, EMULSION INTRAVENOUS CONTINUOUS
Status: DISCONTINUED | OUTPATIENT
Start: 2021-03-09 | End: 2021-03-09

## 2021-03-09 RX ORDER — SODIUM CHLORIDE, SODIUM LACTATE, POTASSIUM CHLORIDE, CALCIUM CHLORIDE 600; 310; 30; 20 MG/100ML; MG/100ML; MG/100ML; MG/100ML
INJECTION, SOLUTION INTRAVENOUS CONTINUOUS
Status: DISCONTINUED | OUTPATIENT
Start: 2021-03-09 | End: 2021-03-09 | Stop reason: HOSPADM

## 2021-03-09 RX ORDER — DEXTROSE MONOHYDRATE 25 G/50ML
25-50 INJECTION, SOLUTION INTRAVENOUS
Status: DISCONTINUED | OUTPATIENT
Start: 2021-03-09 | End: 2021-03-09

## 2021-03-09 RX ORDER — NICOTINE POLACRILEX 4 MG
15-30 LOZENGE BUCCAL
Status: DISCONTINUED | OUTPATIENT
Start: 2021-03-09 | End: 2021-03-15

## 2021-03-09 RX ORDER — CLINDAMYCIN PHOSPHATE 900 MG/50ML
900 INJECTION, SOLUTION INTRAVENOUS EVERY 8 HOURS
Status: COMPLETED | OUTPATIENT
Start: 2021-03-10 | End: 2021-03-10

## 2021-03-09 RX ORDER — SODIUM CHLORIDE, SODIUM LACTATE, POTASSIUM CHLORIDE, CALCIUM CHLORIDE 600; 310; 30; 20 MG/100ML; MG/100ML; MG/100ML; MG/100ML
INJECTION, SOLUTION INTRAVENOUS CONTINUOUS PRN
Status: DISCONTINUED | OUTPATIENT
Start: 2021-03-09 | End: 2021-03-09

## 2021-03-09 RX ORDER — HEPARIN SODIUM 1000 [USP'U]/ML
INJECTION, SOLUTION INTRAVENOUS; SUBCUTANEOUS PRN
Status: DISCONTINUED | OUTPATIENT
Start: 2021-03-09 | End: 2021-03-09

## 2021-03-09 RX ORDER — SODIUM CHLORIDE, SODIUM LACTATE, POTASSIUM CHLORIDE, CALCIUM CHLORIDE 600; 310; 30; 20 MG/100ML; MG/100ML; MG/100ML; MG/100ML
INJECTION, SOLUTION INTRAVENOUS CONTINUOUS
Status: DISCONTINUED | OUTPATIENT
Start: 2021-03-09 | End: 2021-03-09

## 2021-03-09 RX ORDER — LIDOCAINE 40 MG/G
CREAM TOPICAL
Status: DISCONTINUED | OUTPATIENT
Start: 2021-03-09 | End: 2021-03-16 | Stop reason: HOSPADM

## 2021-03-09 RX ORDER — CHLORHEXIDINE GLUCONATE ORAL RINSE 1.2 MG/ML
15 SOLUTION DENTAL EVERY 12 HOURS
Status: DISCONTINUED | OUTPATIENT
Start: 2021-03-09 | End: 2021-03-09

## 2021-03-09 RX ORDER — PHENYLEPHRINE HCL IN 0.9% NACL 50MG/250ML
.1-4 PLASTIC BAG, INJECTION (ML) INTRAVENOUS CONTINUOUS PRN
Status: DISCONTINUED | OUTPATIENT
Start: 2021-03-09 | End: 2021-03-11

## 2021-03-09 RX ORDER — NITROGLYCERIN 10 MG/100ML
INJECTION INTRAVENOUS PRN
Status: DISCONTINUED | OUTPATIENT
Start: 2021-03-09 | End: 2021-03-09

## 2021-03-09 RX ORDER — DEXTROSE MONOHYDRATE, SODIUM CHLORIDE, AND POTASSIUM CHLORIDE 50; 1.49; 4.5 G/1000ML; G/1000ML; G/1000ML
INJECTION, SOLUTION INTRAVENOUS CONTINUOUS
Status: DISCONTINUED | OUTPATIENT
Start: 2021-03-09 | End: 2021-03-11

## 2021-03-09 RX ORDER — NICOTINE POLACRILEX 4 MG
15-30 LOZENGE BUCCAL
Status: DISCONTINUED | OUTPATIENT
Start: 2021-03-09 | End: 2021-03-09

## 2021-03-09 RX ORDER — VECURONIUM BROMIDE 1 MG/ML
INJECTION, POWDER, LYOPHILIZED, FOR SOLUTION INTRAVENOUS PRN
Status: DISCONTINUED | OUTPATIENT
Start: 2021-03-09 | End: 2021-03-09

## 2021-03-09 RX ORDER — NICARDIPINE HYDROCHLORIDE 0.2 MG/ML
1-10 INJECTION INTRAVENOUS CONTINUOUS
Status: DISCONTINUED | OUTPATIENT
Start: 2021-03-09 | End: 2021-03-11

## 2021-03-09 RX ORDER — DEXTROSE MONOHYDRATE 100 MG/ML
INJECTION, SOLUTION INTRAVENOUS CONTINUOUS PRN
Status: DISCONTINUED | OUTPATIENT
Start: 2021-03-09 | End: 2021-03-11

## 2021-03-09 RX ORDER — KETAMINE HYDROCHLORIDE 10 MG/ML
INJECTION INTRAMUSCULAR; INTRAVENOUS PRN
Status: DISCONTINUED | OUTPATIENT
Start: 2021-03-09 | End: 2021-03-09

## 2021-03-09 RX ORDER — SODIUM CHLORIDE 9 MG/ML
INJECTION, SOLUTION INTRAVENOUS CONTINUOUS PRN
Status: DISCONTINUED | OUTPATIENT
Start: 2021-03-09 | End: 2021-03-09

## 2021-03-09 RX ORDER — FENTANYL CITRATE 50 UG/ML
25-50 INJECTION, SOLUTION INTRAMUSCULAR; INTRAVENOUS
Status: DISCONTINUED | OUTPATIENT
Start: 2021-03-09 | End: 2021-03-11

## 2021-03-09 RX ORDER — SUFENTANIL CITRATE 50 UG/ML
INJECTION EPIDURAL; INTRAVENOUS PRN
Status: DISCONTINUED | OUTPATIENT
Start: 2021-03-09 | End: 2021-03-09

## 2021-03-09 RX ORDER — PROPOFOL 10 MG/ML
INJECTION, EMULSION INTRAVENOUS PRN
Status: DISCONTINUED | OUTPATIENT
Start: 2021-03-09 | End: 2021-03-09

## 2021-03-09 RX ORDER — DEXMEDETOMIDINE HYDROCHLORIDE 4 UG/ML
.2-.7 INJECTION, SOLUTION INTRAVENOUS CONTINUOUS PRN
Status: DISCONTINUED | OUTPATIENT
Start: 2021-03-09 | End: 2021-03-09

## 2021-03-09 RX ORDER — DEXTROSE MONOHYDRATE 25 G/50ML
25-50 INJECTION, SOLUTION INTRAVENOUS
Status: DISCONTINUED | OUTPATIENT
Start: 2021-03-09 | End: 2021-03-15

## 2021-03-09 RX ORDER — MUPIROCIN 20 MG/G
0.5 OINTMENT TOPICAL 2 TIMES DAILY
Status: DISCONTINUED | OUTPATIENT
Start: 2021-03-09 | End: 2021-03-09 | Stop reason: CLARIF

## 2021-03-09 RX ORDER — FUROSEMIDE 10 MG/ML
20 INJECTION INTRAMUSCULAR; INTRAVENOUS
Status: DISCONTINUED | OUTPATIENT
Start: 2021-03-09 | End: 2021-03-11

## 2021-03-09 RX ORDER — PROPOFOL 10 MG/ML
10-20 INJECTION, EMULSION INTRAVENOUS EVERY 30 MIN PRN
Status: DISCONTINUED | OUTPATIENT
Start: 2021-03-09 | End: 2021-03-09

## 2021-03-09 RX ORDER — PROTAMINE SULFATE 10 MG/ML
INJECTION, SOLUTION INTRAVENOUS PRN
Status: DISCONTINUED | OUTPATIENT
Start: 2021-03-09 | End: 2021-03-09

## 2021-03-09 RX ORDER — SIMVASTATIN 20 MG
20 TABLET ORAL AT BEDTIME
Status: DISCONTINUED | OUTPATIENT
Start: 2021-03-09 | End: 2021-03-09

## 2021-03-09 RX ORDER — FENTANYL CITRATE 50 UG/ML
50 INJECTION, SOLUTION INTRAMUSCULAR; INTRAVENOUS
Status: DISCONTINUED | OUTPATIENT
Start: 2021-03-09 | End: 2021-03-09 | Stop reason: HOSPADM

## 2021-03-09 RX ORDER — ASPIRIN 325 MG
325 TABLET ORAL DAILY
Status: DISCONTINUED | OUTPATIENT
Start: 2021-03-10 | End: 2021-03-16 | Stop reason: HOSPADM

## 2021-03-09 RX ORDER — HEPARIN SODIUM 5000 [USP'U]/.5ML
5000 INJECTION, SOLUTION INTRAVENOUS; SUBCUTANEOUS EVERY 8 HOURS
Status: DISCONTINUED | OUTPATIENT
Start: 2021-03-10 | End: 2021-03-16 | Stop reason: HOSPADM

## 2021-03-09 RX ORDER — PANTOPRAZOLE SODIUM 40 MG/1
40 TABLET, DELAYED RELEASE ORAL DAILY
Status: DISCONTINUED | OUTPATIENT
Start: 2021-03-09 | End: 2021-03-16 | Stop reason: HOSPADM

## 2021-03-09 RX ORDER — KETOROLAC TROMETHAMINE 15 MG/ML
15 INJECTION, SOLUTION INTRAMUSCULAR; INTRAVENOUS EVERY 6 HOURS PRN
Status: DISCONTINUED | OUTPATIENT
Start: 2021-03-09 | End: 2021-03-10

## 2021-03-09 RX ORDER — HEPARIN SODIUM 5000 [USP'U]/.5ML
5000 INJECTION, SOLUTION INTRAVENOUS; SUBCUTANEOUS EVERY 8 HOURS
Status: DISCONTINUED | OUTPATIENT
Start: 2021-03-10 | End: 2021-03-09

## 2021-03-09 RX ORDER — NICARDIPINE HYDROCHLORIDE 0.2 MG/ML
INJECTION INTRAVENOUS CONTINUOUS PRN
Status: DISCONTINUED | OUTPATIENT
Start: 2021-03-09 | End: 2021-03-09

## 2021-03-09 RX ORDER — HYDRALAZINE HYDROCHLORIDE 20 MG/ML
10 INJECTION INTRAMUSCULAR; INTRAVENOUS EVERY 30 MIN PRN
Status: DISCONTINUED | OUTPATIENT
Start: 2021-03-09 | End: 2021-03-16 | Stop reason: HOSPADM

## 2021-03-09 RX ORDER — EPHEDRINE SULFATE 50 MG/ML
INJECTION, SOLUTION INTRAMUSCULAR; INTRAVENOUS; SUBCUTANEOUS PRN
Status: DISCONTINUED | OUTPATIENT
Start: 2021-03-09 | End: 2021-03-09

## 2021-03-09 RX ORDER — ALBUMIN, HUMAN INJ 5% 5 %
500-1000 SOLUTION INTRAVENOUS
Status: COMPLETED | OUTPATIENT
Start: 2021-03-09 | End: 2021-03-10

## 2021-03-09 RX ADMIN — SODIUM CHLORIDE, POTASSIUM CHLORIDE, SODIUM LACTATE AND CALCIUM CHLORIDE: 600; 310; 30; 20 INJECTION, SOLUTION INTRAVENOUS at 13:56

## 2021-03-09 RX ADMIN — Medication 60 MG: at 10:05

## 2021-03-09 RX ADMIN — Medication 5 MG: at 11:25

## 2021-03-09 RX ADMIN — SUFENTANIL CITRATE 10 MCG: 50 INJECTION EPIDURAL; INTRAVENOUS at 13:54

## 2021-03-09 RX ADMIN — SODIUM CHLORIDE, POTASSIUM CHLORIDE, SODIUM LACTATE AND CALCIUM CHLORIDE: 600; 310; 30; 20 INJECTION, SOLUTION INTRAVENOUS at 10:30

## 2021-03-09 RX ADMIN — KETOROLAC TROMETHAMINE 15 MG: 15 INJECTION, SOLUTION INTRAMUSCULAR; INTRAVENOUS at 19:29

## 2021-03-09 RX ADMIN — SODIUM CHLORIDE 5.5 UNITS/HR: 9 INJECTION, SOLUTION INTRAVENOUS at 22:35

## 2021-03-09 RX ADMIN — NITROGLYCERIN 20 MCG: 10 INJECTION INTRAVENOUS at 16:34

## 2021-03-09 RX ADMIN — METOPROLOL TARTRATE 25 MG: 25 TABLET, FILM COATED ORAL at 05:14

## 2021-03-09 RX ADMIN — TIZANIDINE 4 MG: 4 TABLET ORAL at 21:27

## 2021-03-09 RX ADMIN — DEXMEDETOMIDINE HYDROCHLORIDE 4 MCG: 100 INJECTION, SOLUTION INTRAVENOUS at 10:16

## 2021-03-09 RX ADMIN — PROPOFOL 20 MG: 10 INJECTION, EMULSION INTRAVENOUS at 13:55

## 2021-03-09 RX ADMIN — PHENYLEPHRINE HYDROCHLORIDE 50 MCG: 10 INJECTION INTRAVENOUS at 13:17

## 2021-03-09 RX ADMIN — SODIUM CHLORIDE, POTASSIUM CHLORIDE, SODIUM LACTATE AND CALCIUM CHLORIDE: 600; 310; 30; 20 INJECTION, SOLUTION INTRAVENOUS at 10:40

## 2021-03-09 RX ADMIN — NICARDIPINE HYDROCHLORIDE 1 MG/HR: 0.2 INJECTION INTRAVENOUS at 16:28

## 2021-03-09 RX ADMIN — HYDROMORPHONE HYDROCHLORIDE 0.5 MG: 1 INJECTION, SOLUTION INTRAMUSCULAR; INTRAVENOUS; SUBCUTANEOUS at 17:34

## 2021-03-09 RX ADMIN — Medication 10 MG: at 16:59

## 2021-03-09 RX ADMIN — CLINDAMYCIN IN 5 PERCENT DEXTROSE 900 MG: 18 INJECTION, SOLUTION INTRAVENOUS at 10:14

## 2021-03-09 RX ADMIN — ASPIRIN 81 MG CHEWABLE TABLET 162 MG: 81 TABLET CHEWABLE at 05:11

## 2021-03-09 RX ADMIN — ALBUMIN HUMAN 500 ML: 0.05 INJECTION, SOLUTION INTRAVENOUS at 22:28

## 2021-03-09 RX ADMIN — FENTANYL CITRATE 50 MCG: 50 INJECTION, SOLUTION INTRAMUSCULAR; INTRAVENOUS at 20:56

## 2021-03-09 RX ADMIN — Medication 20 MG: at 11:18

## 2021-03-09 RX ADMIN — PROCHLORPERAZINE EDISYLATE 10 MG: 5 INJECTION INTRAMUSCULAR; INTRAVENOUS at 23:58

## 2021-03-09 RX ADMIN — HEPARIN SODIUM 30000 UNITS: 1000 INJECTION INTRAVENOUS; SUBCUTANEOUS at 13:23

## 2021-03-09 RX ADMIN — VECURONIUM BROMIDE 2 MG: 1 INJECTION, POWDER, LYOPHILIZED, FOR SOLUTION INTRAVENOUS at 16:19

## 2021-03-09 RX ADMIN — FENTANYL CITRATE 50 MCG: 50 INJECTION, SOLUTION INTRAMUSCULAR; INTRAVENOUS at 23:58

## 2021-03-09 RX ADMIN — PANTOPRAZOLE SODIUM 40 MG: 40 TABLET, DELAYED RELEASE ORAL at 21:27

## 2021-03-09 RX ADMIN — CLINDAMYCIN IN 5 PERCENT DEXTROSE 900 MG: 18 INJECTION, SOLUTION INTRAVENOUS at 16:14

## 2021-03-09 RX ADMIN — SODIUM CHLORIDE: 9 INJECTION, SOLUTION INTRAVENOUS at 17:02

## 2021-03-09 RX ADMIN — PHENYLEPHRINE HYDROCHLORIDE 50 MCG: 10 INJECTION INTRAVENOUS at 13:46

## 2021-03-09 RX ADMIN — DEXMEDETOMIDINE HYDROCHLORIDE 0.2 MCG/KG/HR: 100 INJECTION, SOLUTION INTRAVENOUS at 10:40

## 2021-03-09 RX ADMIN — PHENYLEPHRINE HYDROCHLORIDE 100 MCG: 10 INJECTION INTRAVENOUS at 10:45

## 2021-03-09 RX ADMIN — VECURONIUM BROMIDE 5 MG: 1 INJECTION, POWDER, LYOPHILIZED, FOR SOLUTION INTRAVENOUS at 11:09

## 2021-03-09 RX ADMIN — FAMOTIDINE 20 MG: 20 TABLET ORAL at 05:12

## 2021-03-09 RX ADMIN — PROPOFOL 20 MG: 10 INJECTION, EMULSION INTRAVENOUS at 13:53

## 2021-03-09 RX ADMIN — PHENYLEPHRINE HYDROCHLORIDE 100 MCG: 10 INJECTION INTRAVENOUS at 10:31

## 2021-03-09 RX ADMIN — PHENYLEPHRINE HYDROCHLORIDE 50 MCG: 10 INJECTION INTRAVENOUS at 13:28

## 2021-03-09 RX ADMIN — PHENYLEPHRINE HYDROCHLORIDE 50 MCG: 10 INJECTION INTRAVENOUS at 12:43

## 2021-03-09 RX ADMIN — PROTAMINE SULFATE 240 MG: 10 INJECTION, SOLUTION INTRAVENOUS at 16:32

## 2021-03-09 RX ADMIN — PHENYLEPHRINE HYDROCHLORIDE 25 MCG: 10 INJECTION INTRAVENOUS at 14:12

## 2021-03-09 RX ADMIN — HEPARIN SODIUM 3000 UNITS: 1000 INJECTION INTRAVENOUS; SUBCUTANEOUS at 11:28

## 2021-03-09 RX ADMIN — ATROPINE SULFATE 0.4 MG: 0.4 INJECTION, SOLUTION INTRAMUSCULAR; INTRAVENOUS; SUBCUTANEOUS at 13:49

## 2021-03-09 RX ADMIN — POTASSIUM CHLORIDE, DEXTROSE MONOHYDRATE AND SODIUM CHLORIDE: 150; 5; 450 INJECTION, SOLUTION INTRAVENOUS at 18:30

## 2021-03-09 RX ADMIN — EPINEPHRINE 20 MCG: 1 INJECTION INTRAMUSCULAR; INTRAVENOUS; SUBCUTANEOUS at 16:30

## 2021-03-09 RX ADMIN — SUGAMMADEX 200 MG: 100 INJECTION, SOLUTION INTRAVENOUS at 17:35

## 2021-03-09 RX ADMIN — OXYCODONE HYDROCHLORIDE AND ACETAMINOPHEN 1 TABLET: 5; 325 TABLET ORAL at 22:55

## 2021-03-09 RX ADMIN — POTASSIUM CHLORIDE, DEXTROSE MONOHYDRATE AND SODIUM CHLORIDE: 150; 5; 450 INJECTION, SOLUTION INTRAVENOUS at 19:13

## 2021-03-09 RX ADMIN — HUMAN INSULIN 1.5 UNITS/HR: 100 INJECTION, SOLUTION SUBCUTANEOUS at 10:50

## 2021-03-09 RX ADMIN — PHENYLEPHRINE HYDROCHLORIDE 0.2 MCG/KG/MIN: 10 INJECTION INTRAVENOUS at 10:40

## 2021-03-09 RX ADMIN — ROCURONIUM BROMIDE 50 MG: 10 INJECTION INTRAVENOUS at 10:05

## 2021-03-09 RX ADMIN — AMINOCAPROIC ACID 5 G/HR: 250 INJECTION, SOLUTION INTRAVENOUS at 10:40

## 2021-03-09 RX ADMIN — PROPOFOL 20 MG: 10 INJECTION, EMULSION INTRAVENOUS at 14:02

## 2021-03-09 RX ADMIN — SODIUM CHLORIDE, POTASSIUM CHLORIDE, SODIUM LACTATE AND CALCIUM CHLORIDE: 600; 310; 30; 20 INJECTION, SOLUTION INTRAVENOUS at 09:35

## 2021-03-09 RX ADMIN — Medication 10 MG: at 13:25

## 2021-03-09 RX ADMIN — MIDAZOLAM HYDROCHLORIDE 2 MG: 1 INJECTION, SOLUTION INTRAMUSCULAR; INTRAVENOUS at 13:25

## 2021-03-09 RX ADMIN — CHLORHEXIDINE GLUCONATE 0.12% ORAL RINSE 10 ML: 1.2 LIQUID ORAL at 09:20

## 2021-03-09 RX ADMIN — PROPOFOL 40 MG: 10 INJECTION, EMULSION INTRAVENOUS at 13:58

## 2021-03-09 RX ADMIN — SUFENTANIL CITRATE 10 MCG: 50 INJECTION EPIDURAL; INTRAVENOUS at 11:19

## 2021-03-09 RX ADMIN — PROPOFOL 50 MG: 10 INJECTION, EMULSION INTRAVENOUS at 10:05

## 2021-03-09 RX ADMIN — PHENYLEPHRINE HYDROCHLORIDE 0.2 MCG/KG/MIN: 10 INJECTION, SOLUTION INTRAVENOUS at 18:33

## 2021-03-09 RX ADMIN — DEXMEDETOMIDINE HYDROCHLORIDE 0.4 MCG/KG/HR: 4 INJECTION, SOLUTION INTRAVENOUS at 18:42

## 2021-03-09 RX ADMIN — SODIUM CHLORIDE 2.5 MG/HR: 900 INJECTION, SOLUTION INTRAVENOUS at 18:41

## 2021-03-09 RX ADMIN — HEPARIN SODIUM 1100 UNITS/HR: 10000 INJECTION, SOLUTION INTRAVENOUS at 04:48

## 2021-03-09 RX ADMIN — MIDAZOLAM HYDROCHLORIDE 1 MG: 1 INJECTION, SOLUTION INTRAMUSCULAR; INTRAVENOUS at 09:38

## 2021-03-09 RX ADMIN — VECURONIUM BROMIDE 5 MG: 1 INJECTION, POWDER, LYOPHILIZED, FOR SOLUTION INTRAVENOUS at 12:38

## 2021-03-09 RX ADMIN — VECURONIUM BROMIDE 4 MG: 1 INJECTION, POWDER, LYOPHILIZED, FOR SOLUTION INTRAVENOUS at 15:24

## 2021-03-09 RX ADMIN — EPINEPHRINE 0.03 MCG/KG/MIN: 1 INJECTION INTRAMUSCULAR; INTRAVENOUS; SUBCUTANEOUS at 16:21

## 2021-03-09 RX ADMIN — MIDAZOLAM HYDROCHLORIDE 2 MG: 1 INJECTION, SOLUTION INTRAMUSCULAR; INTRAVENOUS at 09:46

## 2021-03-09 RX ADMIN — SUFENTANIL CITRATE 70 MCG: 50 INJECTION EPIDURAL; INTRAVENOUS at 10:05

## 2021-03-09 ASSESSMENT — ACTIVITIES OF DAILY LIVING (ADL)
ADLS_ACUITY_SCORE: 15
ADLS_ACUITY_SCORE: 15
ADLS_ACUITY_SCORE: 16
ADLS_ACUITY_SCORE: 15

## 2021-03-09 ASSESSMENT — MIFFLIN-ST. JEOR: SCORE: 1455.09

## 2021-03-09 NOTE — PLAN OF CARE
VSS. Monitor remains Sinus rhythm. Pt. Denies pain. Pt. Sent to Pre-op.  accompanies pt.  took pt. Belongings- glasses, clothing, wallet and cellphone.

## 2021-03-09 NOTE — PROGRESS NOTES
"Cardiology Progress Note          Assessment and Plan:         Ms. Stacy Brown is a 45-year-old woman with history of hypertension, poorly controlled diabetes, and hyperlipidemia, as well as tobacco dependence, who presents with chest pain and elevated troponins, consistent with a non-ST elevation myocardial infarction.    #1 NSTEMI with three vessel CAD on cor angio yesterday  #2 Hypertension  #3 Diabetes mellitus Type 2; poorly controlled   #4 Hyperlipidemia with history of intolerance to atorvastatin   #5 Tobacco dependence       PLAN  - Appreciate [facility] consultation  - continue current medical therapy        Interval History:     No CP or SOB this AM.             Review of Systems:   As per subjective, otherwise 5 systems reviewed and negative.           Physical Exam:   Blood pressure 109/69, pulse 74, temperature 97.5  F (36.4  C), temperature source Oral, resp. rate 16, height 1.676 m (5' 6\"), weight 80.2 kg (176 lb 12.8 oz), SpO2 97 %, not currently breastfeeding.      Vital Sign Ranges  Temperature Temp  Av.6  F (36.4  C)  Min: 97.1  F (36.2  C)  Max: 98.3  F (36.8  C)   Blood pressure Systolic (24hrs), Av , Min:103 , Max:124        Diastolic (24hrs), Av, Min:68, Max:83      Pulse Pulse  Av.6  Min: 61  Max: 74   Respirations Resp  Av.5  Min: 16  Max: 18   Pulse oximetry SpO2  Av.5 %  Min: 93 %  Max: 98 %       No intake or output data in the 24 hours ending 21 1026    Constitutional: NAD  Skin: Warm and dry  Neck: No JVD  Lungs: CTA  Cardiovascular: RRR, no m/r/g  Abdomen: Soft, non tender.  Extremities and Back: No LE edema  Neurological: Nonfocal           Medications:     I have reviewed this patient's current medications.              Data:     Labs reviewed.     Tele: POLO Ham MD, MTONYA.    "

## 2021-03-09 NOTE — ANESTHESIA PROCEDURE NOTES
Airway   Date/Time: 3/9/2021 10:09 AM      Staff -   CRNA: Gricelda Ricci APRN CRNA  Performed By: CRNA    Consent for Airway   Urgency: elective    Indications and Patient Condition  Indications for airway management: moose-procedural  Induction type:intravenousMask difficulty assessment: 2 - vent by mask + OA or adjuvant +/- NMBA    Final Airway Details  Final airway type: endotracheal airway  Successful airway:ETT - single and Single subglottic suction  Endotracheal Airway Details   ETT size (mm): 7.0  Cuffed: yes  Successful intubation technique: direct laryngoscopy  Grade View of Cords: 1  Adjucts: stylet  Measured from: gums/teeth  Secured at (cm): 23  Secured with: pink tape  Bite block used: None    Post intubation assessment   Placement verified by: capnometry, equal breath sounds and chest rise   Number of attempts at approach: 1  Number of other approaches attempted: 0  Secured with:pink tape  Ease of procedure: easy  Dentition: Intact and Unchanged

## 2021-03-09 NOTE — ANESTHESIA PROCEDURE NOTES
ARTERIAL LINE PROCEDURE NOTE  Pre-Procedure   Staff -   Anesthesiologist:  Rakesh Lopez DO  Performed By: anesthesiologist  Location: pre-op  Pre-Anesthestic Checklist: patient identified, IV checked, site marked, risks and benefits discussed, informed consent, monitors and equipment checked, pre-op evaluation and at physician/surgeon's request  Timeout:  Correct Patient: Yes   Correct Procedure: Yes   Correct Site: Yes   Correct Position: Yes   Correct Laterality: Yes   Site Marked: Yes, N/A    Procedure   Procedure: arterial line  ASA 4  Insertion Site: brachial.  Patient Position:supine  Sterile Prep   standard elements of sterile barrier followed  Patient Prep/Sterile Barriers: hand hygiene, sterile gloves, hat, mask, draped, sterile gown, Chloraprep, draped  Skin prep: Chloraprep  Insertion/Injection   Local skin infiltrated with 2 mL of 1% lidocaine.   Injection technique: Seldinger Technique   Narrative   Tegaderm dressing used.  Arterial waveform: Yes IBP within 10% of NIBP: Yes  Comments:  Ultrasound Interpretation for line placement    1.  Ultrasound was used to identify the right brachial artery.  2.  The vessel was assessed and patent.  3.  Ultrasound was used to visualize needle entry into the right brachial artery.  4.  The selected vessel appeared anatomically normal.  5.  There were no apparent abnormal findings.  6.  A permanent ultrasound image was saved in the patient's record.    Rakesh Lopez DO  10:45 AM

## 2021-03-09 NOTE — PROVIDER NOTIFICATION
MD Notification    Notified Person: MD Cardiologist     Notified Person Name: Dr. Ireland     Notification Date/Time: 3/8/21 2105    Notification Interaction: Telephone     Purpose of Notification: Clarification Heparin gtt post Angio     Orders Received: Resume Heparin gtt at previous rate without bolus

## 2021-03-09 NOTE — PROGRESS NOTES
[facility]    Medicine Progress Note - Hospitalist Service       Date of Admission:  3/5/2021  Assessment & Plan       Stacy Brown is a 45 year old female admitted on 3/5/2021.  Past history of DM II and hyperlipidemia who presented with chest pain and was found to have NSTEMI.     No charge note, patient in surgery all afternoon.  Chart reviewed with plan as below.    Non-ST-elevation myocardial infarction   EKG without ischemic changes, serial trop with peak of 0.115.  TTE with EF 60% without WMA.  Diabetes and cholesterol uncontrolled with A1C 10.1% and .  Angiogram 3/9 showed multivessel disease, cardiothoracic surgery recommending bypass.  - currently in surgery for CABG  - defer resumption of aspirin, metoprolol, rosuvastatin to CT surg post-op     DM II on long term insulin, uncontrolled  Admission A1C 10.1%.  PTA regimen of Lantus 38 units daily, metformin.    - will continue lantus at reduced dose of 19 units today; will have insulin gtt post-op  - will plan to start 70/30 insulin once able to transition off insulin gtt (is a  and not able to take novolog during the day)     Hyperlipidemia     Continue statin (switched to rosuvastatin per Cards)     Chronic Pain  Sacroiliitis    Continue PTA Percocet     Tobacco Dependence  In recent weeks has cut down from 1ppd-1/2ppd. She is now motivated to quit.    Nicotine gum available         Diet: NPO per Anesthesia Guidelines for Procedure/Surgery Except for: Meds    DVT Prophylaxis: heparin gtt  Schultz Catheter: in place, indication:    Code Status: Full Code           Disposition Plan   Expected discharge: 2 - 3 days, recommended to prior living arrangement once cleared by CT Surg.  Entered: Kwesi Jenkins MD 03/09/2021, 4:34 PM       The patient's care was discussed with the Bedside Nurse.    Kwesi Jenkins MD  Hospitalist Service  [facility]  Contact information available via [facility]  [place]    ______________________________________________________________________    Interval History   Was not interviewed on 3/9 as in OR.     Data reviewed today: I reviewed all medications, new labs and imaging results over the last 24 hours. I personally reviewed no images or EKG's today.    Physical Exam   Vital Signs: Temp: 98  F (36.7  C) Temp src: Oral BP: 132/71 Pulse: 76   Resp: 16 SpO2: 97 % O2 Device: None (Room air)    Weight: 174 lbs 14.4 oz   Not examined 3/9  General Appearance:   Respiratory:   Cardiovascular:   Skin:   Other:     Data   Recent Labs   Lab 03/09/21  0538 03/08/21  0549 03/06/21  0746 03/05/21 2029 03/05/21  1337 03/05/21  1025   WBC 9.4  --  5.2  --   --  7.2   HGB 13.9  --  13.6  --   --  15.0   MCV 81  --  82  --   --  82     --  276  --   --  304   NA  --   --   --   --   --  134   POTASSIUM 3.7 4.2  --   --   --  4.1   CHLORIDE  --   --   --   --   --  99   CO2  --   --   --   --   --  27   BUN  --   --   --   --   --  12   CR  --   --   --   --   --  0.50*   ANIONGAP  --   --   --   --   --  8   LUNA  --   --   --   --   --  9.5   GLC  --   --   --   --   --  314*   ALBUMIN  --  3.5  --   --   --  4.0   PROTTOTAL  --  6.8  --   --   --  8.3   BILITOTAL  --  0.2  --   --   --  0.3   ALKPHOS  --  78  --   --   --  101   ALT  --  30  --   --   --  23   AST  --  26  --   --   --  6   LIPASE  --   --   --   --   --  83   TROPI  --   --  0.084* 0.115* 0.072* 0.020

## 2021-03-09 NOTE — BRIEF OP NOTE
[facility]    Brief Operative Note    Pre-operative diagnosis: Coronary artery disease [I25.10]  Post-operative diagnosis Same as pre-operative diagnosis    Procedure: Procedure(s):  CORONARY ARTERY BYPASS GRAFT X 4 (LIMA - LAD, SV - RPL, SV - PDA,  RA - OM) LEFT RADIAL ENDOARTERY HARVEST AND BILATERAL LEG ENDOVEIN HARVEST (ON CARDIOPULMONARY PUMP OXYGENATOR ; INTRAOPERATIVE TRANSESOPHAGEAL ECHOCARDIOGRAM BY ANESTHESIOLOGIST DR. NEL AVILA)     Surgeon: Surgeon(s) and Role:     * Kunal Selby MD - Primary     * Margie West PA-C - Assisting     * Maverick Hayden PA-C - Assisting  Anesthesia: General   Estimated blood loss: 400 ml  Drains: Chest tubes  Specimens: * No specimens in log *  Findings:   Adequate conduit and targets.  Complications: None.  Implants: * No implants in log *

## 2021-03-09 NOTE — ANESTHESIA PROCEDURE NOTES
CENTRAL LINE INSERTION PROCEDURE NOTE  Pre-Procedure   Staff -   Anesthesiologist:  Rakesh Lopez DO  Performed By: anesthesiologist  Location: pre-op  Pre-Anesthestic Checklist: patient identified, IV checked, site marked, risks and benefits discussed, informed consent, monitors and equipment checked, pre-op evaluation and at physician/surgeon's request  Timeout:  Correct Patient: Yes   Correct Procedure: Yes   Correct Site: Yes   Correct Position: Yes   Correct Laterality: Yes     Procedure   Procedure: central line  Laterality: right  Insertion Site: internal jugular, right.  Patient Position:Trendelenburg  Sterile Prep   standard elements of sterile barrier followed  Patient Prep/Sterile Barriers: draped, Chloraprep, hand hygiene performed prior to central venous catheter insertion, gloves , hat , mask , draped, gown, sterile gel and probe cover  Skin prep: Chloraprep  Insertion/Injection   Injection technique: ultrasound guided and Seldinger Technique   Using U/S with sterile probe cover and sterile gel, vein evaluated for patency/adequacy of cortis insertion is adequate, and using realtime U/S imaging the maryellen was punctured, and needle was observed entering vein on U/S  Narrative    Secured by: suture  Tegaderm and Biopatch dressing used.blood aspirated from all lumensAll lumens flushed: Yes  Comments:  A time out was preformed identifying the correct procedure, the correct location with nursing staff. The right neck was prepped with 2% chlorhexidine and draped with a full length sterile sheet in the usual fashion. 1% lidocaine was administered subcutaneously for local anesthesia. The right internal jugular vein was accessed under ultrasound guidance with an 18 gauge thin wall needle, a 1.75 inch catheter was advanced over the needle and the needle was removed. Normal venous pressure was confirmed using a fluid column technique. A wire was then advanced through the catheter and catheter was then  removed. A 9 Slovenian introducer was advanced over the wire via the seldinger technique. Blood was withdrawn from all lumens and flushed with normal saline.  The catheter was sutured in place and a sterile dressing was applied over the site prior to removal of drapes. This was atraumatic and there were no complications.    Ultrasound Interpretation for line placement    1.  Ultrasound was used to identify the right internal jugular vein.  2.  The vessel was assessed and patent.  3.  Ultrasound was used to visualize needle entry into the right jugular vein.  4.  The selected vessel appeared anatomically normal.  5.  There were no apparent abnormal findings.  6.  A permanent ultrasound image was saved in the patient's record.    Rakesh Lopez DO  10:46 AM

## 2021-03-09 NOTE — OP NOTE
Date of Service: 3/9/2021    Preoperative Diagnosis:   1) Severe multivessel coronary artery disease with recent NSTEMI  2) Diabetes mellitus with A1C >10  3) Active tobacco abuse  4) Hyperlipidemia  5) Chronic pain on opiates  6) Hypertension    Postoperative Diagnosis:   1) Severe multivessel coronary artery disease with recent NSTEMI  2) Diabetes mellitus with A1C >10  3) Active tobacco abuse  4) Hyperlipidemia  5) Chronic pain on opiates  6) Hypertension      Operation:   1) Coronary artery bypass grafting x4, LIMA-LAD, RA-OM, SVG-PDA, SVG-RPL  2) Endoscopic harvest bilateral greater saphenous vein  3) Endoscopic harvest left radial artery  4) Placement of temporary atrial and ventricular pacing wires    Surgeon: Kunal Selby MD    Assistant: Ofe Pacheco MD; DEIDRE Arreola; DEIDRE Lal      Indication: 45 year old female who presented with exertional chest pain. She was found to have elevated troponin without EKG changes. Subsequent cor angio revealed severe multivessel coronary disease. Her additional history is as noted. Following discussion of risks and benefits, she has elected to proceed with surgery.       Findings: Sternum adequate quality. LIMA 2mm and great flow. Full left leg and right thigh used to obtain adequate length of usable quality saphenous vein. Portions used for grafts were mostly 3-4mm and adequate. Left radial 2-2.5mm and good quality. PDA 1.5mm and adequate target. RPL 1.75mm and good target. OM 1.75mm and good target. LAD 2mm and good target. The diagonal branches all appeared too small to graft.       Procedure: The patient was brought to the operating room and placed supine on the OR table. Appropriate monitoring lines were placed and general endotracheal anesthesia established. The patient was then sterilely prepped and draped in the standard fashion. A time out was performed to confirm patient identity, procedure to be performed, and the administration of pre operative  antibiotics.    A midline sternotomy was performed. Peristernal hemostasis was achieved and the pericardium opened. The left pleural space was entered and the left internal thoracic artery was dissected off the chest wall from the level of the subclavian vein to the xiphoid process. Simultaneously, the greater saphenous vein was harvested from the bilateral lower extremities.    Simultaneously, the left radial artery was harvested in endoscopic fashion. After exposing the artery at the wrist, it was test clamped with a pulse oximeter on the left index finger. The oxygen saturation did not change. The clamp was removed and 3000 units heparin given IV. After two minutes, the arm was exsanguinated with an Esmark, and sterile tourniquet applied. The radial artery was then dissected to the level of the antecubital fossa. This was ligated through a stab incision and removed through the wrist incision after being ligated there as well. The tourniquet was then released. The incisions were closed in layers of absorbable suture, and the arm wrapped and tucked at the side.    The patient was systemically heparinized and the internal thoracic artery divided. Hemostasis of the chest wall was confirmed. The pericardium was tented on sutures and the vein inspected and noted to be adequate for conduit. While manipulating the pericardium, the patient became bradycardic to the 20s with resultant hypotension. Surface pacing was used by tapping the RV with a forceps to bring the heart rate up until atropine could be given by anesthesia. This largely resolved the hypotension and we proceeded with cannulation.     After confirmation of adequate ACT, the distal ascending aorta and right atrial appendage were cannulated. Additionally a retrograde cardioplegia catheter was inserted into the coronary sinus and an antegrade catheter/root vent secured in the mid ascending aorta. Cardiopulmonary bypass was established.    The aorta was cross  clamped and the heart arrested with antegrade cardioplegia. Additional retrograde was given to ensure good position, and additional doses of cardioplegia were supplied every 15 minutes for the remainder of cross clamp.    The posterior descending coronary artery was identified and dissected out over approximately 1 cm and opened for approximately 7 mm. The vein was brought to the field and the distal anastomosis created in end to side fashion with running 7-0 Prolene. Cardioplegia was flushed down the graft and the anastomosis noted to be hemostatic.    The right posterolateral coronary artery was identified and dissected out over approximately 1 cm and opened for approximately 7 mm. The vein was brought to the field and the distal anastomosis created in end to side fashion with running 7-0 Prolene. Cardioplegia was flushed down the graft and the anastomosis noted to be hemostatic.    The obtuse marginal coronary artery was identified and dissected out over approximately 1 cm and opened for approximately 7 mm. The radial artery was brought to the field and the distal anastomosis created in end to side fashion with running 7-0 Prolene. Cardioplegia was flushed down the graft and the anastomosis noted to be hemostatic.    The left anterior descending coronary artery was identified and dissected out over approximately 1 cm and opened for approximately 7 mm. The internal thoracic artery was brought to the field and the distal anastomosis created in end to side fashion with running 7-0 Prolene. Native flow was allowed down the graft and the anastomosis noted to be hemostatic.    Two separate punch aortotomies were created on the ascending aorta, and the proximal anastomoses created in end to side fashion with running 6-0 Prolene. Given limited space on the ascending aorta, the proximal for the RPL was jumped from the PDA graft just distal to the aorta.    The hot shot was administered in a retrograde fashion and the  aortic cross clamp removed. Total period of aortic cross clamp was 123 minutes. The vein grafts were de aired and the retrograde catheter removed. The distal anastomoses were inspected and noted to be hemostatic. Atrial and ventricular pacing wires were placed.    Ventilation was resumed and the patient weaned from cardiopulmonary bypass without difficulty. Total period of cardiopulmonary bypass was 137 minutes. Protamine was administered and the patient decannulated after the pump volume was infused.      A 32F angled chest tube was placed in the left pleural space, as well as a 32F mediastinal chest tube, both of which were brought out through separate subcostal stab incisions. The sternum was then reapproximated with interrupted single and double steel wires and the presternal tissues closed in multiple layers of absorbable suture. The skin closure was reinforced with an incisional VAC. Sterile dressings were applied. All counts were reported as correct.    The patient was then taken to the intensive care unit in critical but stable condition.        Kunal Selby MD

## 2021-03-10 ENCOUNTER — APPOINTMENT (OUTPATIENT)
Dept: GENERAL RADIOLOGY | Facility: CLINIC | Age: 46
End: 2021-03-10
Attending: SURGERY
Payer: COMMERCIAL

## 2021-03-10 ENCOUNTER — APPOINTMENT (OUTPATIENT)
Dept: PHYSICAL THERAPY | Facility: CLINIC | Age: 46
End: 2021-03-10
Attending: PHYSICIAN ASSISTANT
Payer: COMMERCIAL

## 2021-03-10 LAB
ALBUMIN SERPL-MCNC: 4.1 G/DL (ref 3.4–5)
ALP SERPL-CCNC: 45 U/L (ref 40–150)
ALT SERPL W P-5'-P-CCNC: 71 U/L (ref 0–50)
ANION GAP SERPL CALCULATED.3IONS-SCNC: 8 MMOL/L (ref 3–14)
AST SERPL W P-5'-P-CCNC: 50 U/L (ref 0–45)
BASE DEFICIT BLDA-SCNC: 1.1 MMOL/L
BASE DEFICIT BLDA-SCNC: 1.3 MMOL/L
BASE DEFICIT BLDA-SCNC: 1.8 MMOL/L
BASE DEFICIT BLDA-SCNC: 2.9 MMOL/L
BASE EXCESS BLDA CALC-SCNC: 0.7 MMOL/L
BASE EXCESS BLDV CALC-SCNC: 0.8 MMOL/L
BILIRUB SERPL-MCNC: 0.7 MG/DL (ref 0.2–1.3)
BLD PROD TYP BPU: NORMAL
BLD PROD TYP BPU: NORMAL
BLD UNIT ID BPU: 0
BLD UNIT ID BPU: 0
BLOOD PRODUCT CODE: NORMAL
BLOOD PRODUCT CODE: NORMAL
BPU ID: NORMAL
BPU ID: NORMAL
BUN SERPL-MCNC: 12 MG/DL (ref 7–30)
CA-I BLD-MCNC: 4.1 MG/DL (ref 4.4–5.2)
CA-I BLD-MCNC: 4.2 MG/DL (ref 4.4–5.2)
CA-I BLD-MCNC: 4.2 MG/DL (ref 4.4–5.2)
CA-I BLD-MCNC: 4.3 MG/DL (ref 4.4–5.2)
CA-I BLD-MCNC: 4.3 MG/DL (ref 4.4–5.2)
CA-I BLD-MCNC: 4.4 MG/DL (ref 4.4–5.2)
CA-I BLD-MCNC: 5.4 MG/DL (ref 4.4–5.2)
CALCIUM SERPL-MCNC: 8.2 MG/DL (ref 8.5–10.1)
CHLORIDE SERPL-SCNC: 111 MMOL/L (ref 94–109)
CO2 SERPL-SCNC: 25 MMOL/L (ref 20–32)
CREAT SERPL-MCNC: 0.57 MG/DL (ref 0.52–1.04)
ERYTHROCYTE [DISTWIDTH] IN BLOOD BY AUTOMATED COUNT: 13.5 % (ref 10–15)
GFR SERPL CREATININE-BSD FRML MDRD: >90 ML/MIN/{1.73_M2}
GLUCOSE BLD-MCNC: 155 MG/DL (ref 70–99)
GLUCOSE BLD-MCNC: 169 MG/DL (ref 70–99)
GLUCOSE BLD-MCNC: 191 MG/DL (ref 70–99)
GLUCOSE BLD-MCNC: 197 MG/DL (ref 70–99)
GLUCOSE BLD-MCNC: 199 MG/DL (ref 70–99)
GLUCOSE BLD-MCNC: 218 MG/DL (ref 70–99)
GLUCOSE BLDC GLUCOMTR-MCNC: 106 MG/DL (ref 70–99)
GLUCOSE BLDC GLUCOMTR-MCNC: 111 MG/DL (ref 70–99)
GLUCOSE BLDC GLUCOMTR-MCNC: 112 MG/DL (ref 70–99)
GLUCOSE BLDC GLUCOMTR-MCNC: 122 MG/DL (ref 70–99)
GLUCOSE BLDC GLUCOMTR-MCNC: 123 MG/DL (ref 70–99)
GLUCOSE BLDC GLUCOMTR-MCNC: 126 MG/DL (ref 70–99)
GLUCOSE BLDC GLUCOMTR-MCNC: 133 MG/DL (ref 70–99)
GLUCOSE BLDC GLUCOMTR-MCNC: 182 MG/DL (ref 70–99)
GLUCOSE BLDC GLUCOMTR-MCNC: 194 MG/DL (ref 70–99)
GLUCOSE BLDC GLUCOMTR-MCNC: 84 MG/DL (ref 70–99)
GLUCOSE SERPL-MCNC: 89 MG/DL (ref 70–99)
HCO3 BLD-SCNC: 24 MMOL/L (ref 21–28)
HCO3 BLD-SCNC: 26 MMOL/L (ref 21–28)
HCO3 BLDV-SCNC: 26 MMOL/L (ref 21–28)
HCT VFR BLD AUTO: 27.9 % (ref 35–47)
HGB BLD-MCNC: 10.7 G/DL (ref 11.7–15.7)
HGB BLD-MCNC: 10.8 G/DL (ref 11.7–15.7)
HGB BLD-MCNC: 9.5 G/DL (ref 11.7–15.7)
HGB BLD-MCNC: 9.8 G/DL (ref 11.7–15.7)
HGB BLD-MCNC: 9.9 G/DL (ref 11.7–15.7)
LACTATE BLD-SCNC: 0.7 MMOL/L (ref 0.7–2)
LACTATE BLD-SCNC: 0.8 MMOL/L (ref 0.7–2)
LACTATE BLD-SCNC: 0.8 MMOL/L (ref 0.7–2)
LACTATE BLD-SCNC: 0.9 MMOL/L (ref 0.7–2)
LACTATE BLD-SCNC: 0.9 MMOL/L (ref 0.7–2)
LACTATE BLD-SCNC: 1.8 MMOL/L (ref 0.7–2)
MAGNESIUM SERPL-MCNC: 2.1 MG/DL (ref 1.6–2.3)
MCH RBC QN AUTO: 27.8 PG (ref 26.5–33)
MCHC RBC AUTO-ENTMCNC: 34.1 G/DL (ref 31.5–36.5)
MCV RBC AUTO: 82 FL (ref 78–100)
O2/TOTAL GAS SETTING VFR VENT: 100 %
O2/TOTAL GAS SETTING VFR VENT: 75 %
O2/TOTAL GAS SETTING VFR VENT: 80 %
O2/TOTAL GAS SETTING VFR VENT: 80 %
PCO2 BLD: 40 MM HG (ref 35–45)
PCO2 BLD: 43 MM HG (ref 35–45)
PCO2 BLD: 45 MM HG (ref 35–45)
PCO2 BLD: 45 MM HG (ref 35–45)
PCO2 BLD: 48 MM HG (ref 35–45)
PCO2 BLDV: 44 MM HG (ref 40–50)
PH BLD: 7.3 PH (ref 7.35–7.45)
PH BLD: 7.34 PH (ref 7.35–7.45)
PH BLD: 7.36 PH (ref 7.35–7.45)
PH BLD: 7.38 PH (ref 7.35–7.45)
PH BLD: 7.38 PH (ref 7.35–7.45)
PH BLDV: 7.39 PH (ref 7.32–7.43)
PHOSPHATE SERPL-MCNC: 3.4 MG/DL (ref 2.5–4.5)
PLATELET # BLD AUTO: 228 10E9/L (ref 150–450)
PO2 BLD: 294 MM HG (ref 80–105)
PO2 BLD: 316 MM HG (ref 80–105)
PO2 BLD: 330 MM HG (ref 80–105)
PO2 BLD: 352 MM HG (ref 80–105)
PO2 BLD: 378 MM HG (ref 80–105)
PO2 BLDV: 45 MM HG (ref 25–47)
POTASSIUM BLD-SCNC: 3.8 MMOL/L (ref 3.4–5.3)
POTASSIUM BLD-SCNC: 4.3 MMOL/L (ref 3.4–5.3)
POTASSIUM BLD-SCNC: 4.8 MMOL/L (ref 3.4–5.3)
POTASSIUM BLD-SCNC: 4.9 MMOL/L (ref 3.4–5.3)
POTASSIUM BLD-SCNC: 5.1 MMOL/L (ref 3.4–5.3)
POTASSIUM BLD-SCNC: 5.6 MMOL/L (ref 3.4–5.3)
POTASSIUM SERPL-SCNC: 3.5 MMOL/L (ref 3.4–5.3)
PROT SERPL-MCNC: 6.2 G/DL (ref 6.8–8.8)
RBC # BLD AUTO: 3.42 10E12/L (ref 3.8–5.2)
SODIUM BLD-SCNC: 140 MMOL/L (ref 133–144)
SODIUM BLD-SCNC: 140 MMOL/L (ref 133–144)
SODIUM BLD-SCNC: 141 MMOL/L (ref 133–144)
SODIUM BLD-SCNC: 142 MMOL/L (ref 133–144)
SODIUM BLD-SCNC: 143 MMOL/L (ref 133–144)
SODIUM BLD-SCNC: 144 MMOL/L (ref 133–144)
SODIUM SERPL-SCNC: 144 MMOL/L (ref 133–144)
TRANSFUSION STATUS PATIENT QL: NORMAL
WBC # BLD AUTO: 13.6 10E9/L (ref 4–11)

## 2021-03-10 PROCEDURE — 250N000012 HC RX MED GY IP 250 OP 636 PS 637: Performed by: SURGERY

## 2021-03-10 PROCEDURE — 83735 ASSAY OF MAGNESIUM: CPT | Performed by: SURGERY

## 2021-03-10 PROCEDURE — 82330 ASSAY OF CALCIUM: CPT | Performed by: SURGERY

## 2021-03-10 PROCEDURE — 71045 X-RAY EXAM CHEST 1 VIEW: CPT

## 2021-03-10 PROCEDURE — 200N000001 HC R&B ICU

## 2021-03-10 PROCEDURE — 258N000003 HC RX IP 258 OP 636: Performed by: THORACIC SURGERY (CARDIOTHORACIC VASCULAR SURGERY)

## 2021-03-10 PROCEDURE — 93010 ELECTROCARDIOGRAM REPORT: CPT | Performed by: INTERNAL MEDICINE

## 2021-03-10 PROCEDURE — 250N000013 HC RX MED GY IP 250 OP 250 PS 637: Performed by: SURGERY

## 2021-03-10 PROCEDURE — 84100 ASSAY OF PHOSPHORUS: CPT | Performed by: SURGERY

## 2021-03-10 PROCEDURE — 97530 THERAPEUTIC ACTIVITIES: CPT | Mod: GP | Performed by: PHYSICAL THERAPIST

## 2021-03-10 PROCEDURE — 250N000013 HC RX MED GY IP 250 OP 250 PS 637: Performed by: HOSPITALIST

## 2021-03-10 PROCEDURE — 250N000011 HC RX IP 250 OP 636: Performed by: PHYSICIAN ASSISTANT

## 2021-03-10 PROCEDURE — 93005 ELECTROCARDIOGRAM TRACING: CPT

## 2021-03-10 PROCEDURE — 258N000003 HC RX IP 258 OP 636: Performed by: SURGERY

## 2021-03-10 PROCEDURE — 85027 COMPLETE CBC AUTOMATED: CPT | Performed by: SURGERY

## 2021-03-10 PROCEDURE — 250N000011 HC RX IP 250 OP 636: Performed by: SURGERY

## 2021-03-10 PROCEDURE — P9041 ALBUMIN (HUMAN),5%, 50ML: HCPCS | Performed by: PHYSICIAN ASSISTANT

## 2021-03-10 PROCEDURE — 250N000013 HC RX MED GY IP 250 OP 250 PS 637: Performed by: PHYSICIAN ASSISTANT

## 2021-03-10 PROCEDURE — 99233 SBSQ HOSP IP/OBS HIGH 50: CPT | Performed by: HOSPITALIST

## 2021-03-10 PROCEDURE — 999N000157 HC STATISTIC RCP TIME EA 10 MIN

## 2021-03-10 PROCEDURE — 250N000013 HC RX MED GY IP 250 OP 250 PS 637: Performed by: INTERNAL MEDICINE

## 2021-03-10 PROCEDURE — 80053 COMPREHEN METABOLIC PANEL: CPT | Performed by: SURGERY

## 2021-03-10 PROCEDURE — 250N000009 HC RX 250: Performed by: SURGERY

## 2021-03-10 PROCEDURE — P9041 ALBUMIN (HUMAN),5%, 50ML: HCPCS | Performed by: SURGERY

## 2021-03-10 PROCEDURE — 999N001017 HC STATISTIC GLUCOSE BY METER IP

## 2021-03-10 PROCEDURE — 250N000011 HC RX IP 250 OP 636

## 2021-03-10 PROCEDURE — 97161 PT EVAL LOW COMPLEX 20 MIN: CPT | Mod: GP | Performed by: PHYSICAL THERAPIST

## 2021-03-10 PROCEDURE — P9041 ALBUMIN (HUMAN),5%, 50ML: HCPCS

## 2021-03-10 RX ORDER — KETOROLAC TROMETHAMINE 15 MG/ML
15 INJECTION, SOLUTION INTRAMUSCULAR; INTRAVENOUS EVERY 6 HOURS PRN
Status: DISCONTINUED | OUTPATIENT
Start: 2021-03-10 | End: 2021-03-10

## 2021-03-10 RX ORDER — AMLODIPINE BESYLATE 2.5 MG/1
2.5 TABLET ORAL DAILY
Status: DISCONTINUED | OUTPATIENT
Start: 2021-03-10 | End: 2021-03-16 | Stop reason: HOSPADM

## 2021-03-10 RX ORDER — ACETAMINOPHEN 325 MG/1
975 TABLET ORAL EVERY 6 HOURS PRN
Status: DISCONTINUED | OUTPATIENT
Start: 2021-03-10 | End: 2021-03-11

## 2021-03-10 RX ORDER — OXYCODONE HYDROCHLORIDE 5 MG/1
5-10 TABLET ORAL
Status: DISCONTINUED | OUTPATIENT
Start: 2021-03-10 | End: 2021-03-11

## 2021-03-10 RX ORDER — SODIUM CHLORIDE 9 MG/ML
INJECTION, SOLUTION INTRAVENOUS CONTINUOUS
Status: DISCONTINUED | OUTPATIENT
Start: 2021-03-10 | End: 2021-03-11

## 2021-03-10 RX ORDER — ALBUMIN, HUMAN INJ 5% 5 %
500 SOLUTION INTRAVENOUS ONCE
Status: COMPLETED | OUTPATIENT
Start: 2021-03-10 | End: 2021-03-10

## 2021-03-10 RX ORDER — ALBUMIN, HUMAN INJ 5% 5 %
SOLUTION INTRAVENOUS
Status: COMPLETED
Start: 2021-03-10 | End: 2021-03-10

## 2021-03-10 RX ORDER — POTASSIUM CHLORIDE 29.8 MG/ML
20 INJECTION INTRAVENOUS ONCE
Status: COMPLETED | OUTPATIENT
Start: 2021-03-10 | End: 2021-03-10

## 2021-03-10 RX ORDER — ALBUMIN, HUMAN INJ 5% 5 %
500 SOLUTION INTRAVENOUS
Status: COMPLETED | OUTPATIENT
Start: 2021-03-10 | End: 2021-03-10

## 2021-03-10 RX ORDER — POLYETHYLENE GLYCOL 3350 17 G/17G
17 POWDER, FOR SOLUTION ORAL DAILY
Status: DISCONTINUED | OUTPATIENT
Start: 2021-03-10 | End: 2021-03-16 | Stop reason: HOSPADM

## 2021-03-10 RX ADMIN — ROSUVASTATIN CALCIUM 20 MG: 20 TABLET, FILM COATED ORAL at 08:42

## 2021-03-10 RX ADMIN — FENTANYL CITRATE 50 MCG: 50 INJECTION, SOLUTION INTRAMUSCULAR; INTRAVENOUS at 04:57

## 2021-03-10 RX ADMIN — Medication 1 ML: at 23:45

## 2021-03-10 RX ADMIN — OXYCODONE HYDROCHLORIDE 10 MG: 5 TABLET ORAL at 11:43

## 2021-03-10 RX ADMIN — FENTANYL CITRATE 50 MCG: 50 INJECTION, SOLUTION INTRAMUSCULAR; INTRAVENOUS at 09:19

## 2021-03-10 RX ADMIN — SODIUM CHLORIDE 4 UNITS/HR: 9 INJECTION, SOLUTION INTRAVENOUS at 12:31

## 2021-03-10 RX ADMIN — FENTANYL CITRATE 50 MCG: 50 INJECTION, SOLUTION INTRAMUSCULAR; INTRAVENOUS at 07:34

## 2021-03-10 RX ADMIN — FENTANYL CITRATE 50 MCG: 50 INJECTION, SOLUTION INTRAMUSCULAR; INTRAVENOUS at 14:00

## 2021-03-10 RX ADMIN — KETOROLAC TROMETHAMINE 15 MG: 15 INJECTION, SOLUTION INTRAMUSCULAR; INTRAVENOUS at 09:04

## 2021-03-10 RX ADMIN — FENTANYL CITRATE 50 MCG: 50 INJECTION, SOLUTION INTRAMUSCULAR; INTRAVENOUS at 12:26

## 2021-03-10 RX ADMIN — ALBUMIN HUMAN 500 ML: 0.05 INJECTION, SOLUTION INTRAVENOUS at 18:10

## 2021-03-10 RX ADMIN — TIZANIDINE 4 MG: 4 TABLET ORAL at 08:42

## 2021-03-10 RX ADMIN — Medication 1 ML: at 21:42

## 2021-03-10 RX ADMIN — POLYETHYLENE GLYCOL 3350 17 G: 17 POWDER, FOR SOLUTION ORAL at 16:19

## 2021-03-10 RX ADMIN — PANTOPRAZOLE SODIUM 40 MG: 40 TABLET, DELAYED RELEASE ORAL at 08:42

## 2021-03-10 RX ADMIN — CLINDAMYCIN IN 5 PERCENT DEXTROSE 900 MG: 18 INJECTION, SOLUTION INTRAVENOUS at 00:06

## 2021-03-10 RX ADMIN — OXYCODONE HYDROCHLORIDE 10 MG: 5 TABLET ORAL at 08:42

## 2021-03-10 RX ADMIN — KETOROLAC TROMETHAMINE 15 MG: 15 INJECTION, SOLUTION INTRAMUSCULAR; INTRAVENOUS at 15:15

## 2021-03-10 RX ADMIN — FENTANYL CITRATE 50 MCG: 50 INJECTION, SOLUTION INTRAMUSCULAR; INTRAVENOUS at 03:43

## 2021-03-10 RX ADMIN — OXYCODONE HYDROCHLORIDE 10 MG: 5 TABLET ORAL at 19:50

## 2021-03-10 RX ADMIN — HEPARIN SODIUM 5000 UNITS: 5000 INJECTION, SOLUTION INTRAVENOUS; SUBCUTANEOUS at 19:50

## 2021-03-10 RX ADMIN — ALBUMIN HUMAN 500 ML: 0.05 INJECTION, SOLUTION INTRAVENOUS at 07:31

## 2021-03-10 RX ADMIN — TIZANIDINE 4 MG: 4 TABLET ORAL at 14:59

## 2021-03-10 RX ADMIN — FENTANYL CITRATE 50 MCG: 50 INJECTION, SOLUTION INTRAMUSCULAR; INTRAVENOUS at 19:50

## 2021-03-10 RX ADMIN — FENTANYL CITRATE 50 MCG: 50 INJECTION, SOLUTION INTRAMUSCULAR; INTRAVENOUS at 06:16

## 2021-03-10 RX ADMIN — ALBUMIN HUMAN 500 ML: 0.05 INJECTION, SOLUTION INTRAVENOUS at 01:29

## 2021-03-10 RX ADMIN — Medication 1 ML: at 14:16

## 2021-03-10 RX ADMIN — FENTANYL CITRATE 50 MCG: 50 INJECTION, SOLUTION INTRAMUSCULAR; INTRAVENOUS at 18:25

## 2021-03-10 RX ADMIN — FENTANYL CITRATE 50 MCG: 50 INJECTION, SOLUTION INTRAMUSCULAR; INTRAVENOUS at 21:40

## 2021-03-10 RX ADMIN — ACETAMINOPHEN 975 MG: 325 TABLET, FILM COATED ORAL at 11:43

## 2021-03-10 RX ADMIN — TIZANIDINE 4 MG: 4 TABLET ORAL at 22:15

## 2021-03-10 RX ADMIN — ASPIRIN 325 MG ORAL TABLET 325 MG: 325 PILL ORAL at 08:41

## 2021-03-10 RX ADMIN — PHENYLEPHRINE HYDROCHLORIDE 2 MCG/KG/MIN: 10 INJECTION, SOLUTION INTRAVENOUS at 03:38

## 2021-03-10 RX ADMIN — CLINDAMYCIN IN 5 PERCENT DEXTROSE 900 MG: 18 INJECTION, SOLUTION INTRAVENOUS at 16:19

## 2021-03-10 RX ADMIN — SODIUM CHLORIDE 2.5 MG/HR: 900 INJECTION, SOLUTION INTRAVENOUS at 06:07

## 2021-03-10 RX ADMIN — CLINDAMYCIN IN 5 PERCENT DEXTROSE 900 MG: 18 INJECTION, SOLUTION INTRAVENOUS at 09:17

## 2021-03-10 RX ADMIN — ACETAMINOPHEN 975 MG: 325 TABLET, FILM COATED ORAL at 16:19

## 2021-03-10 RX ADMIN — KETOROLAC TROMETHAMINE 15 MG: 15 INJECTION, SOLUTION INTRAMUSCULAR; INTRAVENOUS at 01:41

## 2021-03-10 RX ADMIN — SODIUM CHLORIDE: 9 INJECTION, SOLUTION INTRAVENOUS at 07:00

## 2021-03-10 RX ADMIN — ALBUMIN HUMAN 500 ML: 50 SOLUTION INTRAVENOUS at 07:31

## 2021-03-10 RX ADMIN — OXYCODONE HYDROCHLORIDE 10 MG: 5 TABLET ORAL at 16:19

## 2021-03-10 RX ADMIN — HEPARIN SODIUM 5000 UNITS: 5000 INJECTION, SOLUTION INTRAVENOUS; SUBCUTANEOUS at 12:00

## 2021-03-10 RX ADMIN — OXYCODONE HYDROCHLORIDE 10 MG: 5 TABLET ORAL at 23:44

## 2021-03-10 RX ADMIN — FENTANYL CITRATE 50 MCG: 50 INJECTION, SOLUTION INTRAMUSCULAR; INTRAVENOUS at 16:19

## 2021-03-10 RX ADMIN — FENTANYL CITRATE 50 MCG: 50 INJECTION, SOLUTION INTRAMUSCULAR; INTRAVENOUS at 23:07

## 2021-03-10 RX ADMIN — POTASSIUM CHLORIDE 20 MEQ: 29.8 INJECTION, SOLUTION INTRAVENOUS at 04:59

## 2021-03-10 RX ADMIN — AMLODIPINE BESYLATE 2.5 MG: 2.5 TABLET ORAL at 09:04

## 2021-03-10 RX ADMIN — PHENYLEPHRINE HYDROCHLORIDE 1.5 MCG/KG/MIN: 10 INJECTION, SOLUTION INTRAVENOUS at 13:58

## 2021-03-10 ASSESSMENT — ACTIVITIES OF DAILY LIVING (ADL)
ADLS_ACUITY_SCORE: 15

## 2021-03-10 ASSESSMENT — MIFFLIN-ST. JEOR: SCORE: 1481.75

## 2021-03-10 NOTE — CONSULTS
[facility] Intensive Care Consultation    Date of Admission:  3/5/2021     Assessment & Plan   Stacy Brown is a 45 year old female who was admitted on 3/5/2021. She was diagnosed with a NSTEMI and had 4 vessel bypass surgery today. Returns to ICU for postoperative management    Neurology:  Patient with chronic opioid dependence. Anesthesiologist did not report difficulties with pain control  - Continue Dexmedetomidine and other scheduled opioids as well as PRNs.    Cardiovascular:  : CAD sp 4 vessel bypass with saphenous, radial artery and lima grafts to pda, OM, posterolateal, LAD. Is pacemaker dependent for the moment. Currently on both Phenylephrine low dose to maintain MAP and Nicardipine to prevent vasospasm  - Management as above.    Pulmonary:        Postoperative ventilatory requirement: No history of lung disease prior to admission although does use tobacco. Synchronous with ventilator  - Pressure support when mental status and hemodynamic status allow    Resp: 16          Endocrine:   DM with poor control and stress hyperglycemia  - Insulin infusion for now    Heme/Onc:  Perioperative blood loss. No indication for transfusion and no evidence of ongoing bleeding. Chest tube output minimal.       Cammy Randall    Time Spent on this Encounter   Billing:  I spent 35 minutes bedside and on the inpatient unit today managing the critical care of Stacy Brown in relation to the issues listed in this note.    Reason for Consult   Reason for consult: postoperative ventilator management    History of Present Illness   Stacy Brown is a 45 year old female who presents with NSTEMI. Found to have multivessel CAD and underwent bypass today. Surgery was uneventful although patient is requiring pacemaker.  Also has chronic pain and is on opoids at home.    Past Medical History:   Diagnosis Date     Knee pain, chronic      Mixed hyperlipidemia      Type II or  unspecified type diabetes mellitus without mention of complication, not stated as uncontrolled 08/16/2002    diagnosed 8/16/02, started insulin 2006     Social History     Tobacco Use     Smoking status: Current Every Day Smoker     Packs/day: 0.50     Years: 6.00     Pack years: 3.00     Last attempt to quit: 9/22/2010     Years since quitting: 10.4     Smokeless tobacco: Never Used   Substance Use Topics     Alcohol use: Yes     Alcohol/week: 0.0 standard drinks     Comment: once a month     Drug use: No     Past Surgical History:   Procedure Laterality Date     C STABISM SURG,PREV EYE SURG,NOT MUSC      Right     HC OPEN TX METATARSAL FRACTURE  age 12    softball injury,open fracture left foot     HC TOOTH EXTRACTION W/FORCEP      Extract wisdom teeth     INJECT JOINT SACROILIAC Left 1/11/2018    Procedure: INJECT JOINT SACROILIAC;  INJECT JOINT SACROILIAC LEFT;  Surgeon: Alan Marshall MD;  Location:  OR     OPERATIVE HYSTEROSCOPY WITH MORCELLATOR N/A 7/24/2018    Procedure: OPERATIVE HYSTEROSCOPY WITH MORCELLATOR (MYOSURE);  Exam under anesthesia, operative hysteroscopy, polypectomy, D & C;  Surgeon: Sindhu Peterson DO;  Location: [facility] OR     TUBAL LIGATION  7/27/2006     ROS: unable as patient is intubated    Physical Exam   Temp: 98  F (36.7  C) Temp src: Oral Temp  Min: 97.9  F (36.6  C)  Max: 98  F (36.7  C) BP: 132/71 Pulse: 76   Resp: 16 SpO2: 97 % O2 Device: None (Room air)    Vitals:    03/07/21 0657 03/08/21 0301 03/09/21 0146   Weight: 79.1 kg (174 lb 6.4 oz) 80.2 kg (176 lb 12.8 oz) 79.3 kg (174 lb 14.4 oz)     I/O last 3 completed shifts:  In: 2320 [P.O.:720; I.V.:1600]  Out: 1050 [Urine:1050]    Constitutional: STated age, sedated, comfortable  ENT: ET and OG  Respiratory: clear and synchronous  Cardiovascular: RRR, chest tubes at base of sternum, pacer wires  GI: benign  Skin: ace wraps bilaterally lower extremities and on the left upper extremity, warm  Musculoskeletal: as  above  Neurologic: sedated    Medications     - MEDICATION INSTRUCTIONS -       heparin Stopped (03/09/21 0608)     - MEDICATION INSTRUCTIONS -       niCARdipine       - MEDICATION INSTRUCTIONS -       ACE/ARB/ARNI NOT PRESCRIBED       sodium chloride 75 mL/hr (03/08/21 1347)       rosuvastatin  20 mg Oral Daily     tiZANidine  4 mg Oral At Bedtime       Data   Recent Labs   Lab 03/09/21  1642 03/09/21  0538 03/08/21  0549 03/06/21  0746 03/05/21 2029 03/05/21  1337 03/05/21  1025   WBC 21.8* 9.4  --  5.2  --   --  7.2   HGB 10.3* 13.9  --  13.6  --   --  15.0   MCV 82 81  --  82  --   --  82    303  --  276  --   --  304   INR 1.38*  --   --   --   --   --   --      --   --   --   --   --  134   POTASSIUM 3.8 3.7 4.2  --   --   --  4.1   CHLORIDE 112*  --   --   --   --   --  99   CO2 25  --   --   --   --   --  27   BUN 12  --   --   --   --   --  12   CR 0.50*  --   --   --   --   --  0.50*   ANIONGAP 7  --   --   --   --   --  8   LUAN 9.4  --   --   --   --   --  9.5   *  --   --   --   --   --  314*   ALBUMIN 3.2*  --  3.5  --   --   --  4.0   PROTTOTAL 5.5*  --  6.8  --   --   --  8.3   BILITOTAL 0.4  --  0.2  --   --   --  0.3   ALKPHOS 57  --  78  --   --   --  101   ALT 81*  --  30  --   --   --  23   AST 67*  --  26  --   --   --  6   TROPI  --   --   --  0.084* 0.115* 0.072* 0.020     Recent Results (from the past 24 hour(s))   CT Chest w/o Contrast    Narrative    EXAM: CT CHEST W/O CONTRAST  LOCATION: [facility]  DATE/TIME: 3/8/2021 11:17 PM    INDICATION: Thoracic aorta disease, pre-op planning  COMPARISON: CTA chest exam 03/21/2018  TECHNIQUE: CT chest without IV contrast. Multiplanar reformats were obtained. Dose reduction techniques were used.  CONTRAST: None.    FINDINGS:   LUNGS AND PLEURA: Curvilinear atelectasis posterior aspects of both lower lobes and within the right middle lobe. No airspace infiltrates or pleural effusions.    MEDIASTINUM/AXILLAE: No  lymphadenopathy. No thoracic aortic aneurysm. No significant atheromatous disease.    CORONARY ARTERY CALCIFICATION: None.    UPPER ABDOMEN: No significant finding.    MUSCULOSKELETAL: Unremarkable.      Impression    IMPRESSION:   1.  Exam limited by the lack of IV contrast. The thoracic aorta is unremarkable.  2.  Subsegmental atelectasis both lungs, otherwise no acute pulmonary disease.     US Lower Extremity Venous Mapping Bilateral    Narrative    EXAM: US LOWER EXTREMITY VENOUS MAPPING BILATERAL  LOCATION: [facility]  DATE/TIME: 3/8/2021 10:20 PM    INDICATION: Evaluation for vein graft prior to coronary artery bypass grafting.  COMPARISON: None.  TECHNIQUE: Grayscale and color Doppler ultrasound evaluation of the bilateral greater and lesser saphenous veins was performed.    FINDINGS: Exam includes the common femoral, femoral, popliteal veins as well as segmentally visualized deep calf veins and greater saphenous vein.     The bilateral greater and lesser saphenous veins are patent. The following measurements were obtained:    RIGHT GREATER SAPHENOUS VEIN:  Saphenofemoral junction: 6.1 mm  Proximal thigh: 2.0 mm  Proximal to mid thigh: 2.1 mm  Mid thigh: 2.5 mm  Mid to distal thigh: 1.3 mm  Distal thigh: 1.5 mm  At the knee: 1.6 mm  Proximal calf: 0.9 mm  Proximal to mid calf: 1.3 mm  Mid calf: 1.0 mm  Mid to distal calf: 0.6 mm  Distal calf: 0.8 mm  At the ankle 1.3 mm    RIGHT LESSER SAPHENOUS VEIN:  No measurement images submitted by the sonographer.    LEFT GREATER SAPHENOUS VEIN:  Saphenofemoral junction: 3.8 mm  Proximal thigh: 2.8 mm  Proximal to mid thigh: 2.4 mm  Mid thigh: 1.8 mm  Mid to distal thigh: 1.3 mm  Distal thigh: 1.7 mm  At the knee: 2.0 mm  Proximal calf: 2.3 mm  Proximal to mid calf: 1.3 mm  below the proximal to mid calf, the left greater saphenous vein is not well seen.    LEFT LESSER SAPHENOUS VEIN:  Proximal calf: 4.2 mm  Proximal to mid calf: 3.5 mm  Mid calf: 3.9  mm  Mid to distal calf: 2.7 mm  Distal calf: 1.0 mm      Impression    IMPRESSION:  Bilateral greater and lesser saphenous veins are patent with measurements as detailed above.   US Upper Extremity Arterial Duplex Left    Narrative    EXAM: US UPPER EXTREMITY ARTERIAL DUPLEX LEFT  LOCATION: [facility]  DATE/TIME: 3/8/2021 10:20 PM    INDICATION: Preoperative evaluation prior to coronary artery bypass grafting.  COMPARISON: None.  TECHNIQUE: Targeted grayscale, color, and pulse Doppler ultrasound evaluation was performed of the left arm including the brachial, radial, and ulnar arteries.    FINDINGS:  The left brachial, radial, and ulnar arteries are widely patent with appropriate waveforms. The following measurements were obtained:    LEFT BRACHIAL ARTERY:   Distal brachium: 3 mm (peak systolic velocity 147 cm/s)    LEFT RADIAL ARTERY:  Proximal forearm: 2.1 mm  Mid forearm: 2.0 mm  Distal forearm: 1.8 mm  Wrist: 1.9 mm (peak systolic velocity 80 cm/s)    LEFT ULNAR ARTERY:  Wrist: 1.9 mm (peak systolic velocity 69 cm/s)      Impression    IMPRESSION:   Patent left brachial, radial, and ulnar arteries with measurements as detailed above.   US Carotid Bilateral    Narrative    EXAM: US CAROTID BILATERAL  LOCATION: [facility]  DATE/TIME: 3/8/2021 10:20 PM    INDICATION: Evaluation prior to coronary artery bypass grafting.  COMPARISON: None.  TECHNIQUE: Duplex exam performed utilizing 2D gray-scale imaging, Doppler interrogation with color-flow and spectral waveform analysis. The percent diameter stenosis is determined using NASCET criteria and Society of Radiologists in Ultrasound Consensus   Criteria.    FINDINGS:    RIGHT: Mild plaque at the bifurcation. The peak systolic velocity in the ICA is less than 125 cm/sec, consistent with less than 50% stenosis. Normal velocities in the ECA. Antegrade flow within the vertebral artery.     LEFT: Mild plaque at the bifurcation. The peak systolic  velocity in the ICA is less than 125 cm/sec, consistent with less than 50% stenosis. Normal velocities in the ECA. Antegrade flow within the vertebral artery.    VELOCITY CHART:  CCA   Right: 98 cm/s   Left: 98 cm/s  ICA   Right: 125 cm/s   Left: 104 cm/s  ECA   Right: 117 cm/s   Left: 102 cm/s  ICA/CCA PSV Ratio   Right: 1.3   Left: 1.1      Impression    IMPRESSION:  1.  Mild plaque formation, velocities consistent with less than 50% stenosis in the right internal carotid artery.  2.  Mild plaque formation, velocities consistent with less than 50% stenosis in the left internal carotid artery.  3.  Flow within the vertebral arteries is antegrade.     CC time: 35 minutes    Cammy Randall MD

## 2021-03-10 NOTE — PROGRESS NOTES
[facility]    Medicine Progress Note - Hospitalist Service       Date of Admission:  3/5/2021  Assessment & Plan       Stacy Brown is a 45 year old female admitted on 3/5/2021.  Past history of DM II and hyperlipidemia who presented with chest pain and was found to have NSTEMI.       NSTEMI s/p CABG x4 on 3/9   EKG without ischemic changes, serial trop with peak of 0.115.  TTE with EF 60% without WMA.  Diabetes and cholesterol uncontrolled with A1C 10.1% and .  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  - post-op management per CT Surg: currently on aspirin, amlodipine and rosuvastatin    Fever  - temp of 100.4 this AM, reports hot flashes which are chronic for her but no fever/chills  - mild leukocytosis improved from yesterday, suspect stress demargination  - CXR showing some left basilar atelectasis, no clear infiltrate  - denies any recent dysuria; ross currently in plance  - monitor off antibiotics    Acute blood loss anemia  EBL of 400cc with surgery.  Pre-op hgb 13.9.  - hgb 9.5 this AM, monitor     DM II on long term insulin, uncontrolled  Admission A1C 10.1%.  PTA regimen of Lantus 38 units daily, metformin.    - continues on insulin drip  - required 55 units insulin in past 24 hours; would consider 30 units lantus + prandial insulin 1u/10g carb + high dose ssi when ready to transition to subcutaneous insulin (will ultimately need to consider 70/30 but will wait until oral intake improves)     Hyperlipidemia     Continue statin     Chronic Pain  Sacroiliitis    Continue PTA Percocet     Tobacco Dependence  In recent weeks has cut down from 1ppd-1/2ppd. She is now motivated to quit.    Nicotine gum available         Diet: NPO for Medical/Clinical Reasons Except for: Meds  Advance Diet as Tolerated: Clear Liquid Diet  Advance Diet as Tolerated: Regular Diet Adult; Low Saturated Fat Na <2400mg Diet    DVT Prophylaxis: heparin  subcutaneous   Schultz Catheter: in place, indication: Strict 1-2 Hour I&O  Code Status: Full Code           Disposition Plan   Expected discharge: 2 - 3 days, recommended to prior living arrangement once cleared by CT Surg.  Entered: Kwesi Jenkins MD 03/10/2021, 8:09 AM       The patient's care was discussed with the Bedside Nurse and Patient.    Kwesi Jenkins MD  Hospitalist Service  [facility]  Contact information available via [facility] Paging/Directory    ______________________________________________________________________    Interval History   Reporting poor pain control today.  Reports hot flashes which are chronic, no fever/chills.  Denies dyspnea or cough.  No recent dysuria.  No abdominal pain.    Data reviewed today: I reviewed all medications, new labs and imaging results over the last 24 hours. I personally reviewed no images or EKG's today.    Physical Exam   Vital Signs: Temp: 100.4  F (38  C) Temp src: Bladder BP: 109/66 Pulse: 78   Resp: 15 SpO2: 97 % O2 Device: Oxymask    Weight: 180 lbs 12.44 oz     General Appearance: Well nourished female appearing fatigued  Respiratory: shallow inspirations, no clear wheezing or crackles, no tachypnea  Cardiovascular: RRR, normal s1/s2 without murmur  Skin: no peripheral edema  Other: Somewhat lethargic but responding appropriately, cranial nerves grossly intact    Data   Recent Labs   Lab 03/10/21  0405 03/09/21  1820 03/09/21  1654 03/09/21  1642 03/06/21  0746 03/06/21  0746 03/05/21  2029 03/05/21  1337 03/05/21  1025   WBC 13.6* 16.9*  --  21.8*   < > 5.2  --   --  7.2   HGB 9.5* 11.7 10.8* 10.3*   < > 13.6  --   --  15.0   MCV 82 81  --  82   < > 82  --   --  82    238  --  266   < > 276  --   --  304   INR  --  1.21*  --  1.38*  --   --   --   --   --     145* 143 144   < >  --   --   --  134   POTASSIUM 3.5 4.0 3.8 3.8   < >  --   --   --  4.1   CHLORIDE 111* 113*  --  112*  --   --   --   --  99   CO2 25 26  --  25   --   --   --   --  27   BUN 12 12  --  12  --   --   --   --  12   CR 0.57 0.46*  --  0.50*  --   --   --   --  0.50*   ANIONGAP 8 6  --  7  --   --   --   --  8   LUNA 8.2* 8.5  --  9.4  --   --   --   --  9.5   GLC 89 189* 218* 208*   < >  --   --   --  314*   ALBUMIN 4.1 3.3*  --  3.2*   < >  --   --   --  4.0   PROTTOTAL 6.2* 5.8*  --  5.5*   < >  --   --   --  8.3   BILITOTAL 0.7 0.7  --  0.4   < >  --   --   --  0.3   ALKPHOS 45 60  --  57   < >  --   --   --  101   ALT 71* 88*  --  81*   < >  --   --   --  23   AST 50* 74*  --  67*   < >  --   --   --  6   LIPASE  --   --   --   --   --   --   --   --  83   TROPI  --   --   --   --   --  0.084* 0.115* 0.072* 0.020    < > = values in this interval not displayed.

## 2021-03-10 NOTE — PROGRESS NOTES
Outpatient Physical Therapy Discharge Note     Patient: Stacy Brown  : 1975    Beginning/End Dates of Reporting Period:  2021 to 3/9/2021    Referring Provider: JOVITA Monroy CNP    Therapy Diagnosis: Left Hip Pain     Client Self Report: Patient had a great two weeks away in [place].  She had no pain at all in [place]. However after returning to work and starting her job again the pain returned. It returned today around 1 PM after carrying multiple bags of dog food. It's now 2/10 on the right hip.    Objective Measurements:  Objective Measure: Pain  Details: Arrived with 2/10 and after 0/10                                    Outcome Measures (most recent score):  Not assessed at this time    Goals:  Goal Identifier HEP   Goal Description Pt will be independent with HEP in order to improve LE strength and flexibility.   Target Date 03/10/21   Date Met      Progress:     Goal Identifier LEFS   Goal Description Pt will report 20% improvement per LEFS in order to demonstrate functional improvement of L hip.   Target Date 03/10/21   Date Met      Progress:       Progress Toward Goals:   Progress this reporting period: Patient's hip symptoms were reducing greatly and was ready to progress to strengthening for prevention of the return of symptoms. However, the patient had a NSTEMI and has been transferred for a CABG. PT with be discontinued for the change in medical status.          Plan:  Discharge from therapy.    Discharge:    Reason for Discharge: Change in medical status.    Equipment Issued: None    Discharge Plan: Patient to continue home program.

## 2021-03-10 NOTE — PROGRESS NOTES
Patient seen and discussed with Dr. Montana.     [facility]  Cardiovascular and Thoracic Surgery Daily Note          Assessment and Plan:   POD#1 s/p Coronary artery bypass grafting x4, LIMA-LAD, RA-OM, SVG-PDA, SVG-RPL, Endoscopic harvest bilateral greater saphenous vein, Endoscopic harvest left radial artery, Placement of temporary atrial and ventricular pacing wires by Dr. Kunal Selby  -CVS: HR: 70s-80s. SBP: 90s-110s. Remains on suzie- weaning as able. Received albumin. Transitioned nicardipine to amlodipine 2.5mg this am for radial artery spasm prophylaxis. Pre op EF: 60%. ASA, BB on hold while on pressors, statin. Pacer wires capped, Chest tubes to suction. Wound vac in place and will continue for approximately 5 days.   -Resp: Extubated within protocol. Saturating well on 3L. Continue to encourage IS, cough, deep breathing, ambulation.   -Neuro:  Grossly intact. Poor pain controlled. Pain regimen adjusted to oxycodone 5-10mg q 3hr PRN, Tizanidine 4mg q 6hrs PRN, toradol PRN and Acetaminophen increased to 975mg q 6hrs PRN and fentanyl PRN. Pt has history of chronic pain. May need on going adjustments and consider consult to pain management team.   -Renal: good UO, up about 2kg from preoperative weight. Holding diuresis d/t low SBP.   Creatinine   Date Value Ref Range Status   03/10/2021 0.57 0.52 - 1.04 mg/dL Final   ]  -GI: Tolerating clears. No BM. Continue bowel regimen miralax added  -:  Schultz in place d/t limited mobility and need for accurate I&Os.  -Endo: pre op a1c: 10.1. Hx of poorly controlled DM. PTA Metformin on hold. Continue Insulin gtt 48hrs, Hospitalist following-- appreciate assistance with post operative glycemic control  -FEN: replete lytes as needed, ADAT. Na: 144. K: 3.5  Orders Placed This Encounter      NPO for Medical/Clinical Reasons Except for: Meds      Advance Diet as Tolerated: Clear Liquid Diet      Advance Diet as Tolerated: Regular Diet Adult; Low Saturated  Fat Na <2400mg Diet    -ID: Temp (24hrs), Av  F (37.8  C), Min:98.3  F (36.8  C), Max:100.8  F (38.2  C)   Completing perioperative abx.   WBC   Date Value Ref Range Status   03/10/2021 13.6 (H) 4.0 - 11.0 10e9/L Final   ]  -Heme: plt: 228. Acute blood loss anemia and thrombocytopenia related to surgery.   Hemoglobin   Date Value Ref Range Status   03/10/2021 9.5 (L) 11.7 - 15.7 g/dL Final   ],   -Proph: PCD, ASA, statin, PPI, sub q heparin  -Dispo: Continue ICU cares. Remains on Russell-- weaning as able. Working on pain control. Initiate therapies. Continue to encourage IS, cough, deep breathing, ambulation.           Interval History:   Extubated. Remains on Russell. Pain poorly controlled today. Saturating well on 3L. Tolerating clears. No BM.          Medications:       amLODIPine  2.5 mg Oral Daily     aspirin  325 mg Oral or NG Tube Daily     clindamycin  900 mg Intravenous Q8H     heparin ANTICOAGULANT  5,000 Units Subcutaneous Q8H     pantoprazole  40 mg Oral or NG Tube Daily    Or     pantoprazole  40 mg Oral Daily     rosuvastatin  20 mg Oral Daily     acetaminophen, acetaminophen, alum & mag hydroxide-simethicone, - MEDICATION INSTRUCTIONS -, dextrose, glucose **OR** dextrose **OR** glucagon, docusate sodium, fentaNYL, furosemide, HOLD MEDICATION, hydrALAZINE, ketorolac, lidocaine 4%, lidocaine 4%, lidocaine (buffered or not buffered), lidocaine (buffered or not buffered), - MEDICATION INSTRUCTIONS -, melatonin, midazolam, naloxone **OR** naloxone **OR** naloxone **OR** naloxone, nicotine polacrilex, nitroGLYcerin, oxyCODONE, - MEDICATION INSTRUCTIONS -, phenol, phenylephrine, prochlorperazine **OR** prochlorperazine **OR** prochlorperazine, ACE/ARB/ARNI NOT PRESCRIBED, BETA BLOCKER NOT PRESCRIBED, sodium chloride (PF), sodium chloride (PF), sodium chloride (PF), sodium chloride (PF), tiZANidine          Physical Exam:   Vitals were reviewed  Blood pressure 94/56, pulse 69, temperature 100.2  F (37.9  C),  "resp. rate 23, height 1.676 m (5' 6\"), weight 82 kg (180 lb 12.4 oz), SpO2 99 %, not currently breastfeeding.  Rhythm: NSR    Lungs: diminished bases    Cardiovascular: RRR  Normal s1 and s2    Abdomen: soft NTND    Extremeties: minimal edema    Incision: wound vac in place    CT: to suction    Weight:   Vitals:    03/06/21 0540 03/07/21 0657 03/08/21 0301 03/09/21 0146   Weight: 80.1 kg (176 lb 9.6 oz) 79.1 kg (174 lb 6.4 oz) 80.2 kg (176 lb 12.8 oz) 79.3 kg (174 lb 14.4 oz)    03/10/21 0500   Weight: 82 kg (180 lb 12.4 oz)            Data:   Labs:   Lab Results   Component Value Date    WBC 13.6 03/10/2021     Lab Results   Component Value Date    RBC 3.42 03/10/2021     Lab Results   Component Value Date    HGB 9.5 03/10/2021     Lab Results   Component Value Date    HCT 27.9 03/10/2021     No components found for: MCT  Lab Results   Component Value Date    MCV 82 03/10/2021     Lab Results   Component Value Date    MCH 27.8 03/10/2021     Lab Results   Component Value Date    MCHC 34.1 03/10/2021     Lab Results   Component Value Date    RDW 13.5 03/10/2021     Lab Results   Component Value Date     03/10/2021       Last Basic Metabolic Panel:  Lab Results   Component Value Date     03/10/2021      Lab Results   Component Value Date    POTASSIUM 3.5 03/10/2021     Lab Results   Component Value Date    CHLORIDE 111 03/10/2021     Lab Results   Component Value Date    LUNA 8.2 03/10/2021     Lab Results   Component Value Date    CO2 25 03/10/2021     Lab Results   Component Value Date    BUN 12 03/10/2021     Lab Results   Component Value Date    CR 0.57 03/10/2021     Lab Results   Component Value Date    GLC 89 03/10/2021       CXR: 3/10/21    IMPRESSION: Interval extubation. Right IJ central venous catheter tip at cavoatrial junction. Mediastinal drain and left basilar chest tube in place. Status post CABG. Linear density overlying the left apex appears to extend medially beyond the lung, not   " favored to reflect pneumothorax. Left basilar atelectasis.    Margie West PA-C  [facility] Surgery  Pager #[phone]

## 2021-03-10 NOTE — ANESTHESIA POSTPROCEDURE EVALUATION
Patient: Stacy Brown    Procedure(s):  CORONARY ARTERY BYPASS GRAFT X 4 (LIMA - LAD, SV - RPL, SV - PDA,  RA - OM) LEFT RADIAL ENDOARTERY HARVEST AND BILATERAL LEG ENDOVEIN HARVEST (ON CARDIOPULMONARY PUMP OXYGENATOR ; INTRAOPERATIVE TRANSESOPHAGEAL ECHOCARDIOGRAM BY ANESTHESIOLOGIST DR. NEL AVILA)       Diagnosis:Coronary artery disease [I25.10]  Diagnosis Additional Information: No value filed.    Anesthesia Type:  General    Note:  Disposition: ICU            ICU Sign Out: Anesthesiologist/ICU physician sign out WAS performed (Sign out to Tonia SANABRIA)   Postop Pain Control: Uneventful            Sign Out: Well controlled pain   PONV: No   Neuro/Psych: Uneventful            Sign Out: Acceptable/Baseline neuro status   Airway/Respiratory: Uneventful            Sign Out: AIRWAY IN SITU/Resp. Support               Airway in situ/Resp. Support: ETT                 Reason: Planned Pre-op   CV/Hemodynamics: Uneventful            Sign Out: Acceptable CV status   Other NRE: NONE   DID A NON-ROUTINE EVENT OCCUR? No    Event details/Postop Comments:  Stable transport from OR 12 to . Vitals stable. Dexmedetomidine infusion via CVC. O2 via ambu bag (patient reversed, spontaneously breathing and assisted). Hand off/SBAR to surgical team. Report to Tonia SANABRIA.    Nel Avila D.O.  Staff Anesthesiologist  [facility], [facility]           Last vitals:  Vitals:    03/09/21 0750 03/09/21 0910 03/09/21 0930   BP: 132/71     Pulse: 73 76    Resp: 16 16    Temp: 36.7  C (98  F)     SpO2: 98%  97%       Last vitals prior to Anesthesia Care Transfer:  CRNA VITALS  3/9/2021 1730 - 3/9/2021 1808      3/9/2021             Resp Rate (set):  10          Electronically Signed By: Nel Avila DO  March 9, 2021  6:08 PM

## 2021-03-10 NOTE — PLAN OF CARE
[facility] Intensive Care Unit   Nursing Note                                                       Neuro:  PERRL.  Moves all extremities.  Follows commands.  A&O x 4.  Cardiovascular:  RRR.  Hemodynamically stable on Russell.  Pulmonary:  Tolerating NC.  Oxygen saturation > 92  GI/:  Adequate UOP.  Endocrine: Glucose well controlled.  Skin:  Intact except incisional areas.  Protective mepilex applied to sacrum.  Restraints:  Not in use or necessary.  AM labs noted; electrolytes replaced as indicated.  Frequent c/o pain.  Frequent fentanyl   Family updated on eves  Continue to monitor closely.    Recent Labs   Lab 03/09/21 1820   PH 7.37   PCO2 44   PO2 78*   HCO3 26   O2PER 60%       ROUTINE IP LABS (Last four results)  BMP  Recent Labs   Lab 03/10/21  0405 03/09/21 1820 03/09/21 1642 03/09/21  0538 03/05/21  1025 03/05/21  1025    145* 144  --   --  134   POTASSIUM 3.5 4.0 3.8 3.7   < > 4.1   CHLORIDE 111* 113* 112*  --   --  99   LUNA 8.2* 8.5 9.4  --   --  9.5   CO2 25 26 25  --   --  27   BUN 12 12 12  --   --  12   CR 0.57 0.46* 0.50*  --   --  0.50*   GLC 89 189* 208*  --   --  314*    < > = values in this interval not displayed.     CBC  Recent Labs   Lab 03/10/21  0405 03/09/21  1820 03/09/21  1642 03/09/21  0538   WBC 13.6* 16.9* 21.8* 9.4   RBC 3.42* 4.26 3.68* 5.01   HGB 9.5* 11.7 10.3* 13.9   HCT 27.9* 34.6* 30.1* 40.6   MCV 82 81 82 81   MCH 27.8 27.5 28.0 27.7   MCHC 34.1 33.8 34.2 34.2   RDW 13.5 13.3 13.2 13.2    238 266 303     INR  Recent Labs   Lab 03/09/21 1820 03/09/21 1642   INR 1.21* 1.38*     PTT  Recent Labs   Lab 03/09/21 1820 03/09/21 1642 03/05/21  1538   PTT 27 24 22     LACTIC ACID  Lactic Acid (mmol/L)   Date Value   03/09/2021 1.2   10/29/2020 3.0 (H)   10/29/2020 3.2 (H)   01/23/2013 1.1     Blood Glucose  Glucose (mg/dL)   Date Value   03/10/2021 122 (H)   03/10/2021 84   03/10/2021 106 (H)   03/10/2021 112 (H)   03/09/2021 130 (H)   03/09/2021 181 (H)        Intake/Output Summary (Last 24 hours) at 3/10/2021 0622  Last data filed at 3/10/2021 0600  Gross per 24 hour   Intake 5523.72 ml   Output 2405 ml   Net 3118.72 ml       Catracho Callejas MSN RN PHN CCRN  [facility]  Intensive Care Unit

## 2021-03-10 NOTE — ANESTHESIA CARE TRANSFER NOTE
Patient: Stacy Brown    Procedure(s):  CORONARY ARTERY BYPASS GRAFT X 4 (LIMA - LAD, SV - RPL, SV - PDA,  RA - OM) LEFT RADIAL ENDOARTERY HARVEST AND BILATERAL LEG ENDOVEIN HARVEST (ON CARDIOPULMONARY PUMP OXYGENATOR ; INTRAOPERATIVE TRANSESOPHAGEAL ECHOCARDIOGRAM BY ANESTHESIOLOGIST DR. NEL AVILA)       Diagnosis: Coronary artery disease [I25.10]  Diagnosis Additional Information: No value filed.    Anesthesia Type:   General     Note:    Oropharynx: endotracheal tube in place, oral gastic tube in place, spontaneously breathing and ventilatory support      Level of Supplemental Oxygen (L/min / FiO2): 15  Independent Airway: airway patency not satisfactory and stable  Dentition: dentition unchanged  Vital Signs Stable: post-procedure vital signs reviewed and stable  Report to RN Given: handoff report given  Patient transferred to: ICU ()  Comments: Patient connected to SpO2, EKG, and arterial blood pressure transport monitors and accompanied by CRNA, anesthesiologist, circulating RN, surgeon (fellow) to ICU room. Patient ventilated by anesthesiologist with ambu via ETT with O2 at 15 liters per minute during transport.     On arrival to ICU, endotracheal tube position unchanged, equal, bilateral breath sounds auscultated in ICU room, patient placed on ICU ventilator by respiratory therapist, teeth and oral mucosa intact and unchanged at handoff of care. At anesthesia handoff of care, clinical monitors and alarms on and functioning, report on patient's clinical status given to ICU RN.  ICU Handoff: Call for PAUSE to initiate/utilize ICU HANDOFF, Identified Patient, Identified Responsible Provider, Reviewed the Pertinent Medical History, Discussed Surgical Course, Reviewed Intra-OP Anesthesia Management and Issues during Anesthesia, Set Expectations for Post Procedure Period and Allowed Opportunity for Questions and Acknowledgement of Understanding      Vitals: (Last set prior to Anesthesia Care  Transfer)  CRNA VITALS  3/9/2021 1730 - 3/9/2021 1807      3/9/2021             Resp Rate (set):  10        Electronically Signed By: JOVITA Covarrubias CRNA  March 9, 2021  6:07 PM

## 2021-03-10 NOTE — PROGRESS NOTES
Extubation Note    Successful completion of SBT: yes    Extubation time:20:32    Patient assessment:    Lung sounds:clear    Stridor Present : No    Patient tolerance: very good    Oxygen device:    Liter flow: 10 liters on oxymask    SpO2: 100%    Serjio Castro, RT

## 2021-03-10 NOTE — CONSULTS
CARDIAC SURGERY NUTRITION CONSULT    Received standing order to assess and educate patient.  Chart also reviewed per LOS protocol.    Will follow and complete assessment once patient is extubated and/or is transferred to medical unit.  Noted patient was eating % of meals prior to surgery.    Patient will receive nutrition education during the Outpatient Cardiac Rehab Program (nutrition classes/dietitian counseling).    Sita Rodriguez RD, LD, CNSC   Clinical Dietitian - [facility]

## 2021-03-10 NOTE — PROVIDER NOTIFICATION
Spoke with Margie HAN re: low urine output; 30cc per 2hours x3. Order received for albumin 500cc and discharge toradol. Rosa Rodriguez RN 03/10/21 4:35 PM
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
## 2021-03-10 NOTE — PROGRESS NOTES
End of Shift Nursing Summary  03/09/21 17:58 to 1930   Neuro:  Pt following commands. Dex gtt weaned. RASS -1  Pain: Toradol x1  CV:  sBP goal <130, MAP 65-70 per Dr Selby. Maintained goal with russell. Nicardipine gtt running  Pulm: CMV 60%, 500, peep 5, 15. PS 5/5 within hour of being in ICU, tolerating.    GI/: OG   Endocrine: Insulin gtt  Skin: Surgical sites c/d/i. Chest tube 50 ml sanguinous 1st hour. [place] sites c/d/i.   Fever: Normothermic  Lines/drips: Russell, insulin, nicard gtt running   Additional:  updated by bedside, supportive, questions answered.      *See EPIC flowsheet for full nursing assessment findings    Mc Oswald RN

## 2021-03-10 NOTE — PROGRESS NOTES
MISTY RCAT Note    Date: 3/10/21  Admission Diagnosis: CAD  Pulmonary History: Smoker  Home Nebulizer/ MDI Use: NA  Home Oxygen Use: NA  Acuity Level (from RT Assessment flow sheet): 4    Aerosol Therapy Initiated: NA      Pulmonary Hygiene Initiated: Flutter  Valve QID      Volume Expansion Therapy Initiated: Ezpap QID      Current Oxygen Requirement: 3 lpm  Current SpO2: 98%    Re-evaluation date: 3/13/21    See 'RT Assessments' flow sheet for patient assessment scoring and Acuity Level Details.

## 2021-03-11 ENCOUNTER — APPOINTMENT (OUTPATIENT)
Dept: GENERAL RADIOLOGY | Facility: CLINIC | Age: 46
End: 2021-03-11
Attending: HOSPITALIST
Payer: COMMERCIAL

## 2021-03-11 LAB
ALBUMIN SERPL-MCNC: 3.8 G/DL (ref 3.4–5)
ALBUMIN UR-MCNC: 30 MG/DL
ALP SERPL-CCNC: 66 U/L (ref 40–150)
ALT SERPL W P-5'-P-CCNC: 76 U/L (ref 0–50)
ANION GAP SERPL CALCULATED.3IONS-SCNC: 3 MMOL/L (ref 3–14)
APPEARANCE UR: CLEAR
AST SERPL W P-5'-P-CCNC: 46 U/L (ref 0–45)
BILIRUB SERPL-MCNC: 0.6 MG/DL (ref 0.2–1.3)
BILIRUB UR QL STRIP: NEGATIVE
BUN SERPL-MCNC: 20 MG/DL (ref 7–30)
CALCIUM SERPL-MCNC: 8.2 MG/DL (ref 8.5–10.1)
CHLORIDE SERPL-SCNC: 110 MMOL/L (ref 94–109)
CO2 SERPL-SCNC: 26 MMOL/L (ref 20–32)
COLOR UR AUTO: YELLOW
CREAT SERPL-MCNC: 0.62 MG/DL (ref 0.52–1.04)
ERYTHROCYTE [DISTWIDTH] IN BLOOD BY AUTOMATED COUNT: 14.5 % (ref 10–15)
GFR SERPL CREATININE-BSD FRML MDRD: >90 ML/MIN/{1.73_M2}
GLUCOSE BLDC GLUCOMTR-MCNC: 119 MG/DL (ref 70–99)
GLUCOSE BLDC GLUCOMTR-MCNC: 128 MG/DL (ref 70–99)
GLUCOSE BLDC GLUCOMTR-MCNC: 130 MG/DL (ref 70–99)
GLUCOSE BLDC GLUCOMTR-MCNC: 133 MG/DL (ref 70–99)
GLUCOSE BLDC GLUCOMTR-MCNC: 134 MG/DL (ref 70–99)
GLUCOSE BLDC GLUCOMTR-MCNC: 177 MG/DL (ref 70–99)
GLUCOSE BLDC GLUCOMTR-MCNC: 204 MG/DL (ref 70–99)
GLUCOSE BLDC GLUCOMTR-MCNC: 99 MG/DL (ref 70–99)
GLUCOSE SERPL-MCNC: 110 MG/DL (ref 70–99)
GLUCOSE UR STRIP-MCNC: NEGATIVE MG/DL
HCT VFR BLD AUTO: 27.7 % (ref 35–47)
HGB BLD-MCNC: 8.8 G/DL (ref 11.7–15.7)
HGB UR QL STRIP: NEGATIVE
INTERPRETATION ECG - MUSE: NORMAL
KETONES UR STRIP-MCNC: 150 MG/DL
LEUKOCYTE ESTERASE UR QL STRIP: NEGATIVE
MAGNESIUM SERPL-MCNC: 2.4 MG/DL (ref 1.6–2.3)
MCH RBC QN AUTO: 27 PG (ref 26.5–33)
MCHC RBC AUTO-ENTMCNC: 31.8 G/DL (ref 31.5–36.5)
MCV RBC AUTO: 85 FL (ref 78–100)
MUCOUS THREADS #/AREA URNS LPF: PRESENT /LPF
NITRATE UR QL: NEGATIVE
PH UR STRIP: 5.5 PH (ref 5–7)
PHOSPHATE SERPL-MCNC: 2.5 MG/DL (ref 2.5–4.5)
PLATELET # BLD AUTO: 174 10E9/L (ref 150–450)
POTASSIUM SERPL-SCNC: 4 MMOL/L (ref 3.4–5.3)
PROCALCITONIN SERPL-MCNC: 0.3 NG/ML
PROT SERPL-MCNC: 6.1 G/DL (ref 6.8–8.8)
RBC # BLD AUTO: 3.26 10E12/L (ref 3.8–5.2)
RBC #/AREA URNS AUTO: 6 /HPF (ref 0–2)
SODIUM SERPL-SCNC: 139 MMOL/L (ref 133–144)
SOURCE: ABNORMAL
SP GR UR STRIP: 1.03 (ref 1–1.03)
SQUAMOUS #/AREA URNS AUTO: 1 /HPF (ref 0–1)
UROBILINOGEN UR STRIP-MCNC: 0 MG/DL (ref 0–2)
WBC # BLD AUTO: 11.5 10E9/L (ref 4–11)
WBC #/AREA URNS AUTO: 5 /HPF (ref 0–5)

## 2021-03-11 PROCEDURE — 94799 UNLISTED PULMONARY SVC/PX: CPT

## 2021-03-11 PROCEDURE — 94660 CPAP INITIATION&MGMT: CPT

## 2021-03-11 PROCEDURE — 999N001017 HC STATISTIC GLUCOSE BY METER IP

## 2021-03-11 PROCEDURE — 84145 PROCALCITONIN (PCT): CPT | Performed by: HOSPITALIST

## 2021-03-11 PROCEDURE — 83735 ASSAY OF MAGNESIUM: CPT | Performed by: HOSPITALIST

## 2021-03-11 PROCEDURE — 250N000013 HC RX MED GY IP 250 OP 250 PS 637: Performed by: PHYSICIAN ASSISTANT

## 2021-03-11 PROCEDURE — 999N000157 HC STATISTIC RCP TIME EA 10 MIN

## 2021-03-11 PROCEDURE — 250N000012 HC RX MED GY IP 250 OP 636 PS 637: Performed by: PHYSICIAN ASSISTANT

## 2021-03-11 PROCEDURE — 120N000001 HC R&B MED SURG/OB

## 2021-03-11 PROCEDURE — 250N000013 HC RX MED GY IP 250 OP 250 PS 637: Performed by: SURGERY

## 2021-03-11 PROCEDURE — 99233 SBSQ HOSP IP/OBS HIGH 50: CPT | Performed by: HOSPITALIST

## 2021-03-11 PROCEDURE — 250N000011 HC RX IP 250 OP 636: Performed by: HOSPITALIST

## 2021-03-11 PROCEDURE — 250N000011 HC RX IP 250 OP 636

## 2021-03-11 PROCEDURE — 81001 URINALYSIS AUTO W/SCOPE: CPT | Performed by: HOSPITALIST

## 2021-03-11 PROCEDURE — 36415 COLL VENOUS BLD VENIPUNCTURE: CPT | Performed by: HOSPITALIST

## 2021-03-11 PROCEDURE — 250N000013 HC RX MED GY IP 250 OP 250 PS 637: Performed by: OBSTETRICS & GYNECOLOGY

## 2021-03-11 PROCEDURE — 87040 BLOOD CULTURE FOR BACTERIA: CPT | Performed by: HOSPITALIST

## 2021-03-11 PROCEDURE — 250N000011 HC RX IP 250 OP 636: Performed by: SURGERY

## 2021-03-11 PROCEDURE — 250N000011 HC RX IP 250 OP 636: Performed by: PHYSICIAN ASSISTANT

## 2021-03-11 PROCEDURE — 84100 ASSAY OF PHOSPHORUS: CPT | Performed by: HOSPITALIST

## 2021-03-11 PROCEDURE — 80053 COMPREHEN METABOLIC PANEL: CPT | Performed by: HOSPITALIST

## 2021-03-11 PROCEDURE — 250N000013 HC RX MED GY IP 250 OP 250 PS 637

## 2021-03-11 PROCEDURE — 258N000003 HC RX IP 258 OP 636: Performed by: SURGERY

## 2021-03-11 PROCEDURE — 71045 X-RAY EXAM CHEST 1 VIEW: CPT

## 2021-03-11 PROCEDURE — 85027 COMPLETE CBC AUTOMATED: CPT | Performed by: HOSPITALIST

## 2021-03-11 PROCEDURE — 250N000012 HC RX MED GY IP 250 OP 636 PS 637: Performed by: SURGERY

## 2021-03-11 PROCEDURE — 250N000012 HC RX MED GY IP 250 OP 636 PS 637: Performed by: HOSPITALIST

## 2021-03-11 PROCEDURE — 250N000013 HC RX MED GY IP 250 OP 250 PS 637: Performed by: INTERNAL MEDICINE

## 2021-03-11 RX ORDER — DEXTROSE MONOHYDRATE 25 G/50ML
25-50 INJECTION, SOLUTION INTRAVENOUS
Status: DISCONTINUED | OUTPATIENT
Start: 2021-03-11 | End: 2021-03-11

## 2021-03-11 RX ORDER — FENTANYL CITRATE 50 UG/ML
25 INJECTION, SOLUTION INTRAMUSCULAR; INTRAVENOUS
Status: DISCONTINUED | OUTPATIENT
Start: 2021-03-11 | End: 2021-03-11

## 2021-03-11 RX ORDER — HYDROMORPHONE HYDROCHLORIDE 2 MG/1
2 TABLET ORAL
Status: DISCONTINUED | OUTPATIENT
Start: 2021-03-11 | End: 2021-03-12

## 2021-03-11 RX ORDER — AMOXICILLIN 250 MG
2 CAPSULE ORAL 2 TIMES DAILY
Status: DISCONTINUED | OUTPATIENT
Start: 2021-03-11 | End: 2021-03-16 | Stop reason: HOSPADM

## 2021-03-11 RX ORDER — NICOTINE POLACRILEX 4 MG
15-30 LOZENGE BUCCAL
Status: DISCONTINUED | OUTPATIENT
Start: 2021-03-11 | End: 2021-03-11

## 2021-03-11 RX ORDER — FUROSEMIDE 20 MG
20 TABLET ORAL DAILY
Status: DISCONTINUED | OUTPATIENT
Start: 2021-03-11 | End: 2021-03-13

## 2021-03-11 RX ADMIN — FENTANYL CITRATE 50 MCG: 50 INJECTION, SOLUTION INTRAMUSCULAR; INTRAVENOUS at 02:12

## 2021-03-11 RX ADMIN — TIZANIDINE 4 MG: 4 TABLET ORAL at 14:27

## 2021-03-11 RX ADMIN — FUROSEMIDE 20 MG: 20 TABLET ORAL at 13:02

## 2021-03-11 RX ADMIN — ASPIRIN 325 MG ORAL TABLET 325 MG: 325 PILL ORAL at 09:27

## 2021-03-11 RX ADMIN — ROSUVASTATIN CALCIUM 20 MG: 20 TABLET, FILM COATED ORAL at 09:27

## 2021-03-11 RX ADMIN — SODIUM CHLORIDE 1.5 UNITS/HR: 9 INJECTION, SOLUTION INTRAVENOUS at 00:01

## 2021-03-11 RX ADMIN — TIZANIDINE 4 MG: 4 TABLET ORAL at 07:43

## 2021-03-11 RX ADMIN — FENTANYL CITRATE 50 MCG: 50 INJECTION, SOLUTION INTRAMUSCULAR; INTRAVENOUS at 03:36

## 2021-03-11 RX ADMIN — Medication 1 ML: at 20:35

## 2021-03-11 RX ADMIN — SENNOSIDES AND DOCUSATE SODIUM 2 TABLET: 8.6; 5 TABLET ORAL at 20:35

## 2021-03-11 RX ADMIN — FENTANYL CITRATE 50 MCG: 50 INJECTION, SOLUTION INTRAMUSCULAR; INTRAVENOUS at 07:43

## 2021-03-11 RX ADMIN — CEFEPIME HYDROCHLORIDE 2 G: 2 INJECTION, POWDER, FOR SOLUTION INTRAVENOUS at 19:10

## 2021-03-11 RX ADMIN — INSULIN ASPART 3 UNITS: 100 INJECTION, SOLUTION INTRAVENOUS; SUBCUTANEOUS at 17:47

## 2021-03-11 RX ADMIN — TIZANIDINE 4 MG: 4 TABLET ORAL at 22:32

## 2021-03-11 RX ADMIN — HYDROMORPHONE HYDROCHLORIDE 1 MG: 2 TABLET ORAL at 13:04

## 2021-03-11 RX ADMIN — FENTANYL CITRATE 25 MCG: 50 INJECTION, SOLUTION INTRAMUSCULAR; INTRAVENOUS at 14:27

## 2021-03-11 RX ADMIN — POTASSIUM & SODIUM PHOSPHATES POWDER PACK 280-160-250 MG 1 PACKET: 280-160-250 PACK at 20:35

## 2021-03-11 RX ADMIN — HYDROMORPHONE HYDROCHLORIDE 2 MG: 2 TABLET ORAL at 17:34

## 2021-03-11 RX ADMIN — FENTANYL CITRATE 25 MCG: 50 INJECTION, SOLUTION INTRAMUSCULAR; INTRAVENOUS at 13:13

## 2021-03-11 RX ADMIN — HEPARIN SODIUM 5000 UNITS: 5000 INJECTION, SOLUTION INTRAVENOUS; SUBCUTANEOUS at 03:37

## 2021-03-11 RX ADMIN — MELATONIN TAB 3 MG 3 MG: 3 TAB at 22:32

## 2021-03-11 RX ADMIN — POLYETHYLENE GLYCOL 3350 17 G: 17 POWDER, FOR SOLUTION ORAL at 09:28

## 2021-03-11 RX ADMIN — HEPARIN SODIUM 5000 UNITS: 5000 INJECTION, SOLUTION INTRAVENOUS; SUBCUTANEOUS at 20:35

## 2021-03-11 RX ADMIN — PHENYLEPHRINE HYDROCHLORIDE 0.5 MCG/KG/MIN: 10 INJECTION, SOLUTION INTRAVENOUS at 00:00

## 2021-03-11 RX ADMIN — HYDROMORPHONE HYDROCHLORIDE 2 MG: 2 TABLET ORAL at 20:35

## 2021-03-11 RX ADMIN — HEPARIN SODIUM 5000 UNITS: 5000 INJECTION, SOLUTION INTRAVENOUS; SUBCUTANEOUS at 13:02

## 2021-03-11 RX ADMIN — AMLODIPINE BESYLATE 2.5 MG: 2.5 TABLET ORAL at 09:27

## 2021-03-11 RX ADMIN — PANTOPRAZOLE SODIUM 40 MG: 40 TABLET, DELAYED RELEASE ORAL at 09:27

## 2021-03-11 RX ADMIN — POTASSIUM & SODIUM PHOSPHATES POWDER PACK 280-160-250 MG 1 PACKET: 280-160-250 PACK at 09:27

## 2021-03-11 RX ADMIN — OXYCODONE HYDROCHLORIDE 10 MG: 5 TABLET ORAL at 07:43

## 2021-03-11 RX ADMIN — FENTANYL CITRATE 50 MCG: 50 INJECTION, SOLUTION INTRAMUSCULAR; INTRAVENOUS at 05:12

## 2021-03-11 RX ADMIN — OXYCODONE HYDROCHLORIDE 10 MG: 5 TABLET ORAL at 03:35

## 2021-03-11 RX ADMIN — INSULIN GLARGINE 20 UNITS: 100 INJECTION, SOLUTION SUBCUTANEOUS at 13:02

## 2021-03-11 ASSESSMENT — ACTIVITIES OF DAILY LIVING (ADL)
ADLS_ACUITY_SCORE: 14
ADLS_ACUITY_SCORE: 16
ADLS_ACUITY_SCORE: 14
ADLS_ACUITY_SCORE: 15
ADLS_ACUITY_SCORE: 14
ADLS_ACUITY_SCORE: 14

## 2021-03-11 ASSESSMENT — MIFFLIN-ST. JEOR
SCORE: 1505.89
SCORE: 1532.75

## 2021-03-11 NOTE — PLAN OF CARE
[facility] Intensive Care Unit   Nursing Note                                                       Neuro:  PERRL.  Moves all extremities.  Follows commands.  Alert and appropriate.  Frequent requests for pain meds.  Cardiovascular:  RRR.  Hemodynamically stable.  Russell now off.  Pulmonary:  Tolerating NC.  Oxygen saturation > 92.  Focusing on pulm toilet.  GI/:  Adequate UOP.  Endocrine: Glucose well controlled.  Insulin drip currently off.  Skin:  Intact except incisional areas.  Protective mepilex applied to sacrum.  Restraints:  Not in use or necessary.  AM labs noted; electrolytes replaced as indicated.  Continue to monitor closely.    Recent Labs   Lab 03/09/21 1820 03/09/21 1654 03/09/21  1624 03/09/21  1559   PH 7.37 7.38 7.38 7.30*   PCO2 44 40 45 48*   PO2 78* 294* 378* 352*   HCO3 26 24 26 24   O2PER 60% 100 80 80       ROUTINE IP LABS (Last four results)  BMP  Recent Labs   Lab 03/11/21  0410 03/10/21  0405 03/09/21  1820 03/09/21  1654 03/09/21  1642    144 145* 143 144   POTASSIUM 4.0 3.5 4.0 3.8 3.8   CHLORIDE 110* 111* 113*  --  112*   LUNA 8.2* 8.2* 8.5  --  9.4   CO2 26 25 26  --  25   BUN 20 12 12  --  12   CR 0.62 0.57 0.46*  --  0.50*   * 89 189* 218* 208*     CBC  Recent Labs   Lab 03/11/21  0410 03/10/21  0405 03/09/21  1820 03/09/21  1654 03/09/21  1642   WBC 11.5* 13.6* 16.9*  --  21.8*   RBC 3.26* 3.42* 4.26  --  3.68*   HGB 8.8* 9.5* 11.7 10.8* 10.3*   HCT 27.7* 27.9* 34.6*  --  30.1*   MCV 85 82 81  --  82   MCH 27.0 27.8 27.5  --  28.0   MCHC 31.8 34.1 33.8  --  34.2   RDW 14.5 13.5 13.3  --  13.2    228 238  --  266     INR  Recent Labs   Lab 03/09/21  1820 03/09/21  1642   INR 1.21* 1.38*     PTT  Recent Labs   Lab 03/09/21  1820 03/09/21  1642 03/05/21  1538   PTT 27 24 22     LACTIC ACID  Lactic Acid (mmol/L)   Date Value   03/09/2021 1.2   03/09/2021 1.8   03/09/2021 0.9   03/09/2021 0.9     Blood Glucose  Glucose (mg/dL)   Date Value   03/11/2021 99    03/11/2021 119 (H)   03/11/2021 130 (H)   03/10/2021 134 (H)   03/10/2021 111 (H)   03/10/2021 126 (H)       Intake/Output Summary (Last 24 hours) at 3/11/2021 0700  Last data filed at 3/11/2021 0700  Gross per 24 hour   Intake 2310.86 ml   Output 1060 ml   Net 1250.86 ml       Catracho Callejas MSN RN PHN CCRN  [facility]  Intensive Care Unit

## 2021-03-11 NOTE — PROGRESS NOTES
Patient was seen and assessed by RT, scheduled breathing treatments (IS, Aerobika, EzPAP) were performed.     Patient's IS volume was between 400-500 ml with poor effort as she complained of chest pain during treatments.     RT will continue to follow and monitor.       Alix Hooker

## 2021-03-11 NOTE — PLAN OF CARE
"[id number]   Neuro: lethargic. Oriented x4. Up with A1.   CV: suzie for MAP>65. SR 70-80. Tmax 38.0. Pulses w/ doppler  Pulm: LS clear, 2L NC. Needing pulmonary hygiene encouragement. CT WDL  GI: Clears; pt \"not very hungry\". No BM, no gas  : Schultz; UO low, albumin ordered and given this evening  Skin: bruised, graft sites intact  Access: RT internal jugular, rt art line, piv x2   Rosa Rodriguez RN 03/11/21 7:48 AM        "

## 2021-03-11 NOTE — PROGRESS NOTES
[facility]    Medicine Progress Note - Hospitalist Service       Date of Admission:  3/5/2021  Assessment & Plan         Stacy Brown is a 45 year old female admitted on 3/5/2021.  Past history of DM II and hyperlipidemia who presented with chest pain and was found to have NSTEMI.       NSTEMI s/p CABG x4 on 3/9   EKG without ischemic changes, serial trop with peak of 0.115.  TTE with EF 60% without WMA.  Diabetes and cholesterol uncontrolled with A1C 10.1% and .  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  - post-op management per CT Surg: currently on aspirin, amlodipine and rosuvastatin    Fever  Low grade fever starting 3/10. CXR 3/10 showing some left basilar atelectasis, no clear infiltrate.  - T101.1 overnight  - mild leukocytosis continues to improve  - will repeat CXR, obtain blood cultures and check UA  - monitor off antibiotics  ADDENDUM:  Persistently febrile during the day, CXR with increasing infiltrates.  Will initiate cefepime for possible HCAP.  Will add on procal to be able to trend.     Acute blood loss anemia  EBL of 400cc with surgery.  Pre-op hgb 13.9.  - hgb 9.5 > 8.8 post-op, monitor     DM II on long term insulin, uncontrolled  Admission A1C 10.1%.  PTA regimen of Lantus 38 units daily, metformin.    - continues on insulin drip  - required 41 units insulin in past 24 hours; will start 20 units lantus + prandial insulin 1u/10g carb + high dose ssi when ready to transition to subcutaneous insulin (will ultimately need to consider 70/30 but will wait until oral intake improves)     Hyperlipidemia     Continue statin     Chronic Pain  Sacroiliitis    Continue PTA Percocet     Tobacco Dependence  In recent weeks has cut down from 1ppd-1/2ppd. She is now motivated to quit.    Nicotine gum available         Diet: Advance Diet as Tolerated: Clear Liquid Diet  Advance Diet as Tolerated: Regular Diet Adult; Low Saturated Fat  Na <2400mg Diet    DVT Prophylaxis: heparin subcutaneous   Schultz Catheter: in place, indication: Strict 1-2 Hour I&O  Code Status: Full Code           Disposition Plan   Expected discharge: 2 - 3 days, recommended to prior living arrangement once cleared by CT Surg.  Entered: Kwesi Jenkins MD 03/11/2021, 8:57 AM       The patient's care was discussed with the Bedside Nurse and Patient.    Kwesi Jenkins MD  Hospitalist Service  [facility]  Contact information available via [facility] Paging/Directory    ______________________________________________________________________    Interval History    Reports pain is slightly better today.  Reports a mildly productive cough and endorses subjective fever overnight.  Denies dyspnea.  No nausea, abdominal pain, lightheadedness.  No other complaints.     Data reviewed today: I reviewed all medications, new labs and imaging results over the last 24 hours. I personally reviewed no images or EKG's today.    Physical Exam   Vital Signs: Temp: 100.9  F (38.3  C) Temp src: Bladder BP: 94/56 Pulse: 89   Resp: 25 SpO2: 92 % O2 Device: Nasal cannula Oxygen Delivery: 4 LPM  Weight: 192 lbs .33 oz     General Appearance: Well nourished female appearing fatigued, lying in bed  Respiratory: somewhat shallow inspirations, left basilar crackles, no wheezing, no tachypnea  Cardiovascular: RRR, normal s1/s2 without murmur  Skin: no peripheral edema  Other: Mildly lethargic but responding appropriately, cranial nerves grossly intact    Data   Recent Labs   Lab 03/11/21  0410 03/10/21  0405 03/09/21  1820 03/09/21  1642 03/09/21  1642 03/06/21  0746 03/06/21  0746 03/05/21  2029 03/05/21  1337 03/05/21  1025   WBC 11.5* 13.6* 16.9*  --  21.8*   < > 5.2  --   --  7.2   HGB 8.8* 9.5* 11.7   < > 10.3*   < > 13.6  --   --  15.0   MCV 85 82 81  --  82   < > 82  --   --  82    228 238  --  266   < > 276  --   --  304   INR  --   --  1.21*  --  1.38*  --   --   --   --   --      144 145*   < > 144   < >  --   --   --  134   POTASSIUM 4.0 3.5 4.0   < > 3.8   < >  --   --   --  4.1   CHLORIDE 110* 111* 113*  --  112*  --   --   --   --  99   CO2 26 25 26  --  25  --   --   --   --  27   BUN 20 12 12  --  12  --   --   --   --  12   CR 0.62 0.57 0.46*  --  0.50*  --   --   --   --  0.50*   ANIONGAP 3 8 6  --  7  --   --   --   --  8   LUNA 8.2* 8.2* 8.5  --  9.4  --   --   --   --  9.5   * 89 189*   < > 208*   < >  --   --   --  314*   ALBUMIN 3.8 4.1 3.3*  --  3.2*   < >  --   --   --  4.0   PROTTOTAL 6.1* 6.2* 5.8*  --  5.5*   < >  --   --   --  8.3   BILITOTAL 0.6 0.7 0.7  --  0.4   < >  --   --   --  0.3   ALKPHOS 66 45 60  --  57   < >  --   --   --  101   ALT 76* 71* 88*  --  81*   < >  --   --   --  23   AST 46* 50* 74*  --  67*   < >  --   --   --  6   LIPASE  --   --   --   --   --   --   --   --   --  83   TROPI  --   --   --   --   --   --  0.084* 0.115* 0.072* 0.020    < > = values in this interval not displayed.

## 2021-03-11 NOTE — PLAN OF CARE
CR: Attempted to see patient for CR; reports having too much pain in neck to participate at time of scheduled session; Nurse attending to patient and aware. Encouraged patient to get up with nursing staff later if therapist unable to return this date.

## 2021-03-11 NOTE — PLAN OF CARE
Pt lethargic, up with A1 to chair. VSS on 2-4L nc. Pt sleeping and appearing comfortable between cares although c/o pain when awake. Pain consult done. Pulmonary hygiene promoted; pt needing continual encouragement. LS dim/coarse. CT removed. Central lines removed. Pt adequate for transfer to surgical floor. Rosa Rodriguez RN 03/11/21 5:50 PM

## 2021-03-11 NOTE — CONSULTS
Inpatient Pharmacy Pain Consult:   I met with Zully for about fifteen minutes.   Others present during the interview include: assigned nurse  Appreciated assessments of pain management by her nurse.     Subjective:  Alertness: She was sleepy indicated by soft voice and eyes closing.  Stacy was conversational; with short answers; not much elaboration.       Anxiety: She presented as emotionally bewildered. She was positive about the support that she has with family.     Competency as an historian: good     Non-Verbal Pain presentation: sleepy; holding pillow close to chest    Patient description of pain: Reporting sharp surgical pain- different than her chronic pain. Sleep was interrupted last night by commotion in the next room.     Triggers for pain: Suggest that the emotions in dealing with sudden cardiac event at age 48 years old add on to the acute surgical pain. Chest tubes pending to come out.     Pain Score(s): six out of ten    Non-Pain Score Assessment of pain Control:  Quality of sleep/rest: challenged with noise a factor  Participation in activity-therapy as ordered: to be determined   Ability to focus away from pain: challenging     Objective:  Pertinent medical conditions/Labs to be aware for managing pain: diabetes, depression, insomnia, hyperlipidemia, right hip bursitis, lower back pain, radiculopathy, knee and shoulder pain, unexpected cardiac event     Pertinent pain medication allergies/sensitivities: amoxicillin, hydrocodone     Chronic pain history: See multiple ED visits with injuries/pain challenges.     Pertinent pain medication history: Percocet usage appears to be low, but somewhat chronic. She takes tizanidine; mostly at bedtime.     Side effects from pain medications: sleepy this morning     Addiction history: She did not present as drug seeking;  I just do not want to have a lot of pain .     Assessment:  We discussed mental diversion techniques to focus away from pain including  focused breathing. I encouraged patient to divert thoughts to activities of comfort (music, reading, TV, thinking about favorite activities).  NO; she may have some of these skills since she has chronic pain.       Opiate Evaluation:  Fentanyl 50 mcg IV administrations include 4 x 50 mcg today (3/11/21)  1) 3/11 0743   2) 3/11 0512  3) 3/11 0336  4) 3/11 0212  Oxycodone 10 mg PO administrations include 2 x 10 mg on 3/11/21  1) 3/11 0743  2) 3/11 0335    She has some opiate tolerance; but suggest only modest tolerance based on chronic but low use of Percocet. Sometimes switching opiates is of benefit.   Discontinue oxycodone.   Add hydromorphone 3mg PO Q 3 H PRN (consider 3mg of hydromorphone to compare to 10 mg of oxycodone).   Adjust fentanyl to 25 mcg IV Q 1 H for breakthrough pain; separate oral and IV doses by greater than 1-hour.             Non-Opiate Evaluation:  Acetaminophen: Agree with 975 mg scheduled.   Anti-anxiety agents: Do not recommend.  Anti-depressants: Defer!  Anti-histamines: Do not recommend.  Muscle relaxants: Agree with PTA tizanidine; may lower blood pressure.   Neuropathic agents: Do not recommend.  NSAIDS: Defer!   Topical products: Do not recommend.  ICE/Heat: Defer!   Therapy PT/OT: yes!     Plan/Recommendations:  1. Discontinue oxycodone.   2. Add hydromorphone 3mg PO Q 3 H PRN   3. Adjust fentanyl to 25 mcg IV Q 1 H for breakthrough pain; separate oral and IV doses by greater than 1-hour.     Plan for follow-up: Plan to follow patient for the next 24 hours. Encourage re-consult if pain management becomes complex again or needs further follow-up.  Thank you for this consult!  Eddie May, Cell Phone: [phone]

## 2021-03-12 ENCOUNTER — APPOINTMENT (OUTPATIENT)
Dept: GENERAL RADIOLOGY | Facility: CLINIC | Age: 46
End: 2021-03-12
Attending: PHYSICIAN ASSISTANT
Payer: COMMERCIAL

## 2021-03-12 ENCOUNTER — APPOINTMENT (OUTPATIENT)
Dept: PHYSICAL THERAPY | Facility: CLINIC | Age: 46
End: 2021-03-12
Attending: INTERNAL MEDICINE
Payer: COMMERCIAL

## 2021-03-12 ENCOUNTER — APPOINTMENT (OUTPATIENT)
Dept: OCCUPATIONAL THERAPY | Facility: CLINIC | Age: 46
End: 2021-03-12
Attending: SURGERY
Payer: COMMERCIAL

## 2021-03-12 LAB
ANION GAP SERPL CALCULATED.3IONS-SCNC: 9 MMOL/L (ref 3–14)
BUN SERPL-MCNC: 17 MG/DL (ref 7–30)
CALCIUM SERPL-MCNC: 8.8 MG/DL (ref 8.5–10.1)
CHLORIDE SERPL-SCNC: 106 MMOL/L (ref 94–109)
CO2 SERPL-SCNC: 23 MMOL/L (ref 20–32)
CREAT SERPL-MCNC: 0.54 MG/DL (ref 0.52–1.04)
ERYTHROCYTE [DISTWIDTH] IN BLOOD BY AUTOMATED COUNT: 13.9 % (ref 10–15)
GFR SERPL CREATININE-BSD FRML MDRD: >90 ML/MIN/{1.73_M2}
GLUCOSE BLDC GLUCOMTR-MCNC: 159 MG/DL (ref 70–99)
GLUCOSE BLDC GLUCOMTR-MCNC: 193 MG/DL (ref 70–99)
GLUCOSE BLDC GLUCOMTR-MCNC: 197 MG/DL (ref 70–99)
GLUCOSE BLDC GLUCOMTR-MCNC: 202 MG/DL (ref 70–99)
GLUCOSE BLDC GLUCOMTR-MCNC: 203 MG/DL (ref 70–99)
GLUCOSE BLDC GLUCOMTR-MCNC: 248 MG/DL (ref 70–99)
GLUCOSE SERPL-MCNC: 192 MG/DL (ref 70–99)
HCT VFR BLD AUTO: 26.5 % (ref 35–47)
HGB BLD-MCNC: 8.7 G/DL (ref 11.7–15.7)
INTERPRETATION ECG - MUSE: NORMAL
MAGNESIUM SERPL-MCNC: 2.4 MG/DL (ref 1.6–2.3)
MCH RBC QN AUTO: 27.9 PG (ref 26.5–33)
MCHC RBC AUTO-ENTMCNC: 32.8 G/DL (ref 31.5–36.5)
MCV RBC AUTO: 85 FL (ref 78–100)
PLATELET # BLD AUTO: 179 10E9/L (ref 150–450)
POTASSIUM SERPL-SCNC: 4.1 MMOL/L (ref 3.4–5.3)
RBC # BLD AUTO: 3.12 10E12/L (ref 3.8–5.2)
SODIUM SERPL-SCNC: 138 MMOL/L (ref 133–144)
WBC # BLD AUTO: 9.2 10E9/L (ref 4–11)

## 2021-03-12 PROCEDURE — 250N000009 HC RX 250: Performed by: PHYSICIAN ASSISTANT

## 2021-03-12 PROCEDURE — 71046 X-RAY EXAM CHEST 2 VIEWS: CPT

## 2021-03-12 PROCEDURE — 999N000157 HC STATISTIC RCP TIME EA 10 MIN

## 2021-03-12 PROCEDURE — 94799 UNLISTED PULMONARY SVC/PX: CPT

## 2021-03-12 PROCEDURE — 250N000013 HC RX MED GY IP 250 OP 250 PS 637: Performed by: PHYSICIAN ASSISTANT

## 2021-03-12 PROCEDURE — 99232 SBSQ HOSP IP/OBS MODERATE 35: CPT | Performed by: HOSPITALIST

## 2021-03-12 PROCEDURE — 97165 OT EVAL LOW COMPLEX 30 MIN: CPT | Mod: GO

## 2021-03-12 PROCEDURE — 250N000011 HC RX IP 250 OP 636: Performed by: HOSPITALIST

## 2021-03-12 PROCEDURE — 36415 COLL VENOUS BLD VENIPUNCTURE: CPT | Performed by: PHYSICIAN ASSISTANT

## 2021-03-12 PROCEDURE — 94640 AIRWAY INHALATION TREATMENT: CPT | Mod: 76

## 2021-03-12 PROCEDURE — 999N001017 HC STATISTIC GLUCOSE BY METER IP

## 2021-03-12 PROCEDURE — 250N000013 HC RX MED GY IP 250 OP 250 PS 637

## 2021-03-12 PROCEDURE — 97530 THERAPEUTIC ACTIVITIES: CPT | Mod: GP

## 2021-03-12 PROCEDURE — 80048 BASIC METABOLIC PNL TOTAL CA: CPT | Performed by: PHYSICIAN ASSISTANT

## 2021-03-12 PROCEDURE — 250N000012 HC RX MED GY IP 250 OP 636 PS 637: Performed by: HOSPITALIST

## 2021-03-12 PROCEDURE — 97116 GAIT TRAINING THERAPY: CPT | Mod: GP

## 2021-03-12 PROCEDURE — 97110 THERAPEUTIC EXERCISES: CPT | Mod: GP

## 2021-03-12 PROCEDURE — 83735 ASSAY OF MAGNESIUM: CPT | Performed by: PHYSICIAN ASSISTANT

## 2021-03-12 PROCEDURE — 94640 AIRWAY INHALATION TREATMENT: CPT

## 2021-03-12 PROCEDURE — 97535 SELF CARE MNGMENT TRAINING: CPT | Mod: GO

## 2021-03-12 PROCEDURE — 250N000011 HC RX IP 250 OP 636: Performed by: PHYSICIAN ASSISTANT

## 2021-03-12 PROCEDURE — 120N000001 HC R&B MED SURG/OB

## 2021-03-12 PROCEDURE — 85027 COMPLETE CBC AUTOMATED: CPT | Performed by: PHYSICIAN ASSISTANT

## 2021-03-12 RX ORDER — GUAIFENESIN 600 MG/1
600 TABLET, EXTENDED RELEASE ORAL 2 TIMES DAILY
Status: DISCONTINUED | OUTPATIENT
Start: 2021-03-12 | End: 2021-03-16 | Stop reason: HOSPADM

## 2021-03-12 RX ORDER — OXYCODONE HYDROCHLORIDE 5 MG/1
5 TABLET ORAL
Status: DISCONTINUED | OUTPATIENT
Start: 2021-03-12 | End: 2021-03-16 | Stop reason: HOSPADM

## 2021-03-12 RX ORDER — OXYCODONE AND ACETAMINOPHEN 7.5; 325 MG/1; MG/1
1 TABLET ORAL
Status: DISCONTINUED | OUTPATIENT
Start: 2021-03-12 | End: 2021-03-12

## 2021-03-12 RX ORDER — IPRATROPIUM BROMIDE AND ALBUTEROL SULFATE 2.5; .5 MG/3ML; MG/3ML
3 SOLUTION RESPIRATORY (INHALATION)
Status: DISPENSED | OUTPATIENT
Start: 2021-03-12 | End: 2021-03-14

## 2021-03-12 RX ADMIN — Medication 2.5 MG: at 22:11

## 2021-03-12 RX ADMIN — AMLODIPINE BESYLATE 2.5 MG: 2.5 TABLET ORAL at 09:32

## 2021-03-12 RX ADMIN — SENNOSIDES AND DOCUSATE SODIUM 2 TABLET: 8.6; 5 TABLET ORAL at 20:16

## 2021-03-12 RX ADMIN — OXYCODONE HYDROCHLORIDE 5 MG: 5 TABLET ORAL at 22:11

## 2021-03-12 RX ADMIN — ASPIRIN 325 MG ORAL TABLET 325 MG: 325 PILL ORAL at 09:30

## 2021-03-12 RX ADMIN — Medication 1 ML: at 04:43

## 2021-03-12 RX ADMIN — OXYCODONE HYDROCHLORIDE 5 MG: 5 TABLET ORAL at 15:19

## 2021-03-12 RX ADMIN — IPRATROPIUM BROMIDE AND ALBUTEROL SULFATE 3 ML: .5; 3 SOLUTION RESPIRATORY (INHALATION) at 15:46

## 2021-03-12 RX ADMIN — HEPARIN SODIUM 5000 UNITS: 5000 INJECTION, SOLUTION INTRAVENOUS; SUBCUTANEOUS at 04:43

## 2021-03-12 RX ADMIN — Medication 1 ML: at 22:13

## 2021-03-12 RX ADMIN — Medication 1 ML: at 02:37

## 2021-03-12 RX ADMIN — INSULIN ASPART 1 UNITS: 100 INJECTION, SOLUTION INTRAVENOUS; SUBCUTANEOUS at 18:58

## 2021-03-12 RX ADMIN — FUROSEMIDE 20 MG: 20 TABLET ORAL at 09:32

## 2021-03-12 RX ADMIN — INSULIN ASPART 3 UNITS: 100 INJECTION, SOLUTION INTRAVENOUS; SUBCUTANEOUS at 09:41

## 2021-03-12 RX ADMIN — HYDROMORPHONE HYDROCHLORIDE 2 MG: 2 TABLET ORAL at 09:31

## 2021-03-12 RX ADMIN — TIZANIDINE 4 MG: 4 TABLET ORAL at 12:08

## 2021-03-12 RX ADMIN — HYDROMORPHONE HYDROCHLORIDE 2 MG: 2 TABLET ORAL at 03:43

## 2021-03-12 RX ADMIN — Medication 2.5 MG: at 15:19

## 2021-03-12 RX ADMIN — CEFEPIME HYDROCHLORIDE 2 G: 2 INJECTION, POWDER, FOR SOLUTION INTRAVENOUS at 18:52

## 2021-03-12 RX ADMIN — HYDROMORPHONE HYDROCHLORIDE 1 MG: 2 TABLET ORAL at 09:31

## 2021-03-12 RX ADMIN — GUAIFENESIN 600 MG: 600 TABLET, EXTENDED RELEASE ORAL at 20:17

## 2021-03-12 RX ADMIN — POLYETHYLENE GLYCOL 3350 17 G: 17 POWDER, FOR SOLUTION ORAL at 09:32

## 2021-03-12 RX ADMIN — HEPARIN SODIUM 5000 UNITS: 5000 INJECTION, SOLUTION INTRAVENOUS; SUBCUTANEOUS at 20:17

## 2021-03-12 RX ADMIN — OXYCODONE HYDROCHLORIDE 5 MG: 5 TABLET ORAL at 18:56

## 2021-03-12 RX ADMIN — INSULIN ASPART 3 UNITS: 100 INJECTION, SOLUTION INTRAVENOUS; SUBCUTANEOUS at 12:09

## 2021-03-12 RX ADMIN — IPRATROPIUM BROMIDE AND ALBUTEROL SULFATE 3 ML: .5; 3 SOLUTION RESPIRATORY (INHALATION) at 10:59

## 2021-03-12 RX ADMIN — ROSUVASTATIN CALCIUM 20 MG: 20 TABLET, FILM COATED ORAL at 09:30

## 2021-03-12 RX ADMIN — HYDROMORPHONE HYDROCHLORIDE 2 MG: 2 TABLET ORAL at 00:28

## 2021-03-12 RX ADMIN — PANTOPRAZOLE SODIUM 40 MG: 40 TABLET, DELAYED RELEASE ORAL at 09:32

## 2021-03-12 RX ADMIN — TIZANIDINE 4 MG: 4 TABLET ORAL at 20:16

## 2021-03-12 RX ADMIN — MELATONIN TAB 3 MG 3 MG: 3 TAB at 22:11

## 2021-03-12 RX ADMIN — IPRATROPIUM BROMIDE AND ALBUTEROL SULFATE 3 ML: .5; 3 SOLUTION RESPIRATORY (INHALATION) at 19:18

## 2021-03-12 RX ADMIN — TIZANIDINE 4 MG: 4 TABLET ORAL at 04:44

## 2021-03-12 RX ADMIN — CEFEPIME HYDROCHLORIDE 2 G: 2 INJECTION, POWDER, FOR SOLUTION INTRAVENOUS at 02:20

## 2021-03-12 RX ADMIN — CEFEPIME HYDROCHLORIDE 2 G: 2 INJECTION, POWDER, FOR SOLUTION INTRAVENOUS at 09:45

## 2021-03-12 RX ADMIN — Medication 2.5 MG: at 18:57

## 2021-03-12 RX ADMIN — INSULIN GLARGINE 25 UNITS: 100 INJECTION, SOLUTION SUBCUTANEOUS at 09:41

## 2021-03-12 RX ADMIN — HYDROMORPHONE HYDROCHLORIDE 2 MG: 2 TABLET ORAL at 06:46

## 2021-03-12 RX ADMIN — HEPARIN SODIUM 5000 UNITS: 5000 INJECTION, SOLUTION INTRAVENOUS; SUBCUTANEOUS at 12:08

## 2021-03-12 RX ADMIN — SENNOSIDES AND DOCUSATE SODIUM 2 TABLET: 8.6; 5 TABLET ORAL at 09:32

## 2021-03-12 ASSESSMENT — ACTIVITIES OF DAILY LIVING (ADL)
ADLS_ACUITY_SCORE: 15
ADLS_ACUITY_SCORE: 16
ADLS_ACUITY_SCORE: 15
PREVIOUS_RESPONSIBILITIES: MEAL PREP;HOUSEKEEPING;SHOPPING;MEDICATION MANAGEMENT;FINANCES;DRIVING;WORK
ADLS_ACUITY_SCORE: 16
ADLS_ACUITY_SCORE: 15
ADLS_ACUITY_SCORE: 15

## 2021-03-12 ASSESSMENT — MIFFLIN-ST. JEOR: SCORE: 1497.75

## 2021-03-12 NOTE — PLAN OF CARE
[id number]. Txf from ICU this andreea. VSS. A/O. Up-1. Chest wound vac intact. Incisions CDI. Dilaudid/tizanidine given. Sore throat. Tolerating diet. Good UOP ross. Trace of LE edema. Dim lungs, 2L O2. Tele SR.

## 2021-03-12 NOTE — PROGRESS NOTES
"NUTRITION ASSESSMENT      REASON FOR ASSESSMENT:  Cardiac Surgery Nutrition Consult    CURRENT DIET / INTAKE:  Regular diet, nurse to advance - \"IF DIABETIC, post extubation advance to diet combination of Moderate Consistent CHO diet and Low Saturated Fat Sodium NA less than 2400 mg (Cardiac) diet.   Advance as tolerated beginning POD 1.\"    % intakes documented in flow sheets.  3/11: one meal ordered (420 kcals and 16 g pro)   3/8: two meals ordered (540 kcals and 55 g pro)   3/6 & 3/7: three meals ordered    Pt states her appetite is not good and she is in a lot of pain, very somnolent at the time of my visit, willing to try nutrition supplements - pt states she really likes chocolate.    ANTHROPOMETRICS:   Ht: 167.6 cm (5'6\")  Wt: 83.6 kg (184 lb 4.9 oz)   BMI: 29.75 kg/m2  IBW: 59 kg  Weight Status: Overweight BMI 25-29.9  %IBW: 142%    MALNUTRITION:  Patient does not meet two of the following criteria necessary for diagnosing malnutrition:  significant weight loss, reduced intake, subcutaneous fat loss, muscle loss or fluid retention. Nutrition Focused Physical Assessment (NFPA) not appropriate at this time.    NUTRITION DIAGNOSIS:   Inadequate oral intake related to poor appetite as evidenced by pt has only ordered one meal in the past 3 days.     INTERVENTIONS:    Nutrition Prescription:  Mod consistent CHO, low SF, 2 g Na diet.  Chocolate Boost GC BID @ 10am and 2pm    Implementation:  Nutrition Education (Content):  Discussed the importance of adequate nutrition post-op for healing and energy, emphasizing protein foods, and encouraged patient to order a protein food at each meal.  Ordered chocolate Boost GC BID @ 10am and 2pm    Goals:  Patient to consume ~75% at meals in the next 3 - 5 days    Follow Up/Monitoring (InPatient):  Food and Fluid intake -  Monitor for adequacy    Follow Up/Monitoring (OutPatient):  Patient will participate in out-patient cardiac rehab and attend nutrition classes during " the program    Hannah Duncan  Dietetic Intern

## 2021-03-12 NOTE — PLAN OF CARE
VSS. Tele NSR. A&O x4. Pain controlled with PO dilaudid and zanaflex. Pt able to rest between cares.  Sternal incision intact with woundvac in place. [place] sites CDI with dermabond. Schultz in place, will be removed this AM. Passing flatus. Cough encouraged, IS and acapella done. Repositioned with assist of 1.

## 2021-03-12 NOTE — PROGRESS NOTES
SPIRITUAL HEALTH SERVICES Progress Note  [facility]    Visited pt due to length of stay. I introduced myself and SH to pt and spouse. They welcomed me to stay.    I offered reflective listening and affirmation of feelings, meaning, and experience as they both expressed surprise at pt's condition and need for surgery as she has tried to maintain a healthy lifestyle. We talked about the unexpectedness of experiences in life and pt also talked about being thankful for the surgery. Pt reported that she is Druze and welcomed prayer. I also offered words of our care and support for her and family and they laughed when I mistakenly referred to her spouse as her son during my prayer.     [facility] remains available.      Jessica Smith  Chaplain Resident

## 2021-03-12 NOTE — PLAN OF CARE
CR: Attempted to see pt in AM, pt just returned from Xray, fatigued, declining activity, RN aware

## 2021-03-12 NOTE — PROGRESS NOTES
[facility]  Cardiovascular and Thoracic Surgery Daily Note          Assessment and Plan:   POD#2 s/p coronary artery bypass grafting x 4 (LIMA to LAD, SVG to PDA, SVG to RPL, RA to OM) on 3/9/21 by Dr Selby  -CVS: -130s/60-70s, HR 70-80s, aspirin 325 mg, amlodipine 2.5 mg PO (for prevention of radial artery spasm x 6 months), will hold on BB, crestor 20 mg, subcutaneous heparin tid, gentle diuresis lasix 20 mg PO daily  -Resp: Extubated POD #0 at 2000, saturating well on NC, will start duoneb qid x 2 days, mucinex bid, smoked prior to surgery with plans for cessation, +HAP - cefepime 2 g q 8 hours  -Neuro:  Moving all extremities, answering all questions appropriately  -Renal: ross removed POD #1, Cr/BUN 0.54/17  Creatinine   Date Value Ref Range Status   2021 0.54 0.52 - 1.04 mg/dL Final   -GI:  Continue bowel regimen  -:  Ross removed POD #1  -Endo: Per hospitalist, Hgb A1C 10.1 (3/5/2021), lantus and sliding scale insulin   -FEN: replete lytes as needed, Na 138, K 4.1  -ID: Temp (24hrs), Av.7  F (37.6  C), Min:98.2  F (36.8  C), Max:101.1  F (38.4  C), perioperative antibiotics, no further fevers, cultures neg to date, UA neg     WBC   Date Value Ref Range Status   2021 9.2 4.0 - 11.0 10e9/L Final   ]  -Heme:   Hemoglobin   Date Value Ref Range Status   2021 8.7 (L) 11.7 - 15.7 g/dL Final   -Proph: PCD, subcutaneous heparin tid  -Dispo: Encouraged IS/TCDB/amb. Sternal precautions. Needs pulm toilet. Started duoneb qid. Mucinex started to loosen secretions. Continue to monitor.           Interval History:   States she is having pain. Denies any hallucinations. Breathing hurts with deep breaths. Not feeling very hungry. +flatus/-BM, denies any popping/clicking in his breast bone, was able to get some sleep, has not ambulated          Medications:       amLODIPine  2.5 mg Oral Daily     aspirin  325 mg Oral or NG Tube Daily     ceFEPIme (MAXIPIME) IV  2 g Intravenous  "Q8H     furosemide  20 mg Oral Daily     heparin ANTICOAGULANT  5,000 Units Subcutaneous Q8H     insulin aspart   Subcutaneous TID w/meals     insulin aspart  1-10 Units Subcutaneous TID AC     insulin aspart  1-7 Units Subcutaneous At Bedtime     insulin glargine  25 Units Subcutaneous QAM AC     ipratropium - albuterol 0.5 mg/2.5 mg/3 mL  3 mL Nebulization 4x daily     pantoprazole  40 mg Oral Daily     polyethylene glycol  17 g Oral Daily     rosuvastatin  20 mg Oral Daily     senna-docusate  2 tablet Oral BID     acetaminophen, alum & mag hydroxide-simethicone, - MEDICATION INSTRUCTIONS -, glucose **OR** dextrose **OR** glucagon, hydrALAZINE, HYDROmorphone **AND** HYDROmorphone, lidocaine 4%, lidocaine (buffered or not buffered), - MEDICATION INSTRUCTIONS -, melatonin, naloxone **OR** naloxone **OR** naloxone **OR** naloxone, nicotine polacrilex, nitroGLYcerin, - MEDICATION INSTRUCTIONS -, phenol, prochlorperazine **OR** prochlorperazine **OR** prochlorperazine, sodium chloride (PF), sodium chloride (PF), sodium chloride (PF), sodium chloride (PF), tiZANidine          Physical Exam:   Vitals were reviewed  Blood pressure 113/61, pulse 73, temperature 98.4  F (36.9  C), temperature source Oral, resp. rate 18, height 1.676 m (5' 6\"), weight 83.6 kg (184 lb 4.9 oz), SpO2 96 %, not currently breastfeeding.  Gen: lying in bed, comfortable, +O2    Lungs: decreased breath sounds, trace crackles in L base, IS < 500 ml, +flutter valve    Cardiovascular: RRR, telemetry SR 90s, trace edema LE    Abdomen: BS+, NT/ND, soft    Derm: sternal incision D/I, +wound VAC   L UE incisions D/I   Bilateral LE incisions D/I    CT: removed    CXR (3/12/21) - chest tubes removed, no pneumothorax, atelectasis    Carotid US (3/8/21) - L and R ICA less than 50% stenosis    Echo (3/5/21) - EF 60%   No significant valve disease    Weight:   Vitals:    03/09/21 0146 03/10/21 0500 03/11/21 0600 03/11/21 1734   Weight: 79.3 kg (174 lb 14.4 oz) " 82 kg (180 lb 12.4 oz) 87.1 kg (192 lb 0.3 oz) 84.4 kg (186 lb 1.6 oz)    03/12/21 0646   Weight: 83.6 kg (184 lb 4.9 oz)            Data:   Labs:   Lab Results   Component Value Date    WBC 11.5 03/11/2021     Lab Results   Component Value Date    RBC 3.26 03/11/2021     Lab Results   Component Value Date    HGB 8.8 03/11/2021     Lab Results   Component Value Date    HCT 27.7 03/11/2021     No components found for: MCT  Lab Results   Component Value Date    MCV 85 03/11/2021     Lab Results   Component Value Date    MCH 27.0 03/11/2021     Lab Results   Component Value Date    MCHC 31.8 03/11/2021     Lab Results   Component Value Date    RDW 14.5 03/11/2021     Lab Results   Component Value Date     03/11/2021       Last Basic Metabolic Panel:  Lab Results   Component Value Date     03/11/2021      Lab Results   Component Value Date    POTASSIUM 4.0 03/11/2021     Lab Results   Component Value Date    CHLORIDE 110 03/11/2021     Lab Results   Component Value Date    LUNA 8.2 03/11/2021     Lab Results   Component Value Date    CO2 26 03/11/2021     Lab Results   Component Value Date    BUN 20 03/11/2021     Lab Results   Component Value Date    CR 0.62 03/11/2021     Lab Results   Component Value Date     03/11/2021       Maverick Hayden PA-C  [phone]

## 2021-03-12 NOTE — PROGRESS NOTES
03/12/21 0825   Quick Adds   Type of Visit Initial Occupational Therapy Evaluation   Living Environment   People in home child(valencia), adult;child(valencia), dependent;spouse  (14 and 21 years old; both autisic but able to care for self)   Current Living Arrangements house   Home Accessibility stairs to enter home   Number of Stairs, Main Entrance 3   Stair Railings, Main Entrance railings on both sides of stairs   Transportation Anticipated car, drives self;family or friend will provide   Living Environment Comments all needs on main level; laundry is downstairs and her daughter completes   Self-Care   Usual Activity Tolerance good   Current Activity Tolerance fair   Equipment Currently Used at Home none   Activity/Exercise/Self-Care Comment Indep with ADLs, IADLs, works as a . TBI with frontal lobe damage 3 years ago. Pt reports she had fully recovered but does notive some forgetfulness when tired.    Disability/Function   Hearing Difficulty or Deaf no   Wear Glasses or Blind yes   Vision Management glasses   Walking or Climbing Stairs Difficulty yes   Walking or Climbing Stairs ambulation difficulty, assistance 1 person;transferring difficulty, assistance 1 person   Dressing/Bathing Difficulty yes   Dressing/Bathing bathing difficulty, assistance 1 person;dressing difficulty, assistance 1 person   Toileting issues yes   Toileting Assistance toileting difficulty, assistance 1 person   General Information   Onset of Illness/Injury or Date of Surgery 03/05/21   Referring Physician Trevor Zuñiga MD   Patient/Family Therapy Goal Statement (OT) Get better   Additional Occupational Profile Info/Pertinent History of Current Problem Stacy Brown is a 45 year old female admitted on 3/5/2021.  Past history of DM II and hyperlipidemia who presented with chest pain and was found to have NSTEMI. s/p CABG x4 on 3/9    Existing Precautions/Restrictions fall;sternal   Cognitive Status Examination   Orientation  Status orientation to person, place and time  (slow to respond, possible d/t lethargy)   Affect/Mental Status (Cognitive) WNL   Follows Commands follows two-step commands   Visual Perception   Visual Impairment/Limitations WNL   Sensory   Sensory Quick Adds No deficits were identified   Pain Assessment   Patient Currently in Pain   (Pain 6/10 on incision site)   Integumentary/Edema   Integumentary/Edema no deficits were identifed   Posture   Posture protracted shoulders   Range of Motion Comprehensive   General Range of Motion other (see comments)  (N/T d/t pain with arm movement. Assess when pain is managed)   Strength Comprehensive (MMT)   General Manual Muscle Testing (MMT) Assessment other (see comments)  (N/T d/t sx precautions)   Bed Mobility   Bed Mobility supine-sit;scooting/bridging   Scooting/Bridging Sullivan (Bed Mobility) moderate assist (50% patient effort)   Supine-Sit Sullivan (Bed Mobility) moderate assist (50% patient effort)   Assistive Device (Bed Mobility)   (HOB elevated)   Transfers   Transfers bed-chair transfer   Transfer Comments Min A for stability. Pt holds pillow throughout   Instrumental Activities of Daily Living (IADL)   Previous Responsibilities meal prep;housekeeping;shopping;medication management;finances;driving;work   Clinical Impression   Criteria for Skilled Therapeutic Interventions Met (OT) yes;meets criteria;skilled treatment is necessary   OT Diagnosis Impaired ADL and IADLs   OT Problem List-Impairments impacting ADL problems related to;activity tolerance impaired;balance;strength;pain;mobility   Assessment of Occupational Performance 1-3 Performance Deficits   Identified Performance Deficits Dressing, bed mobility, functional transfers g/h   Planned Therapy Interventions (OT) ADL retraining;bed mobility training;strengthening;transfer training;home program guidelines   Clinical Decision Making Complexity (OT) low complexity   Therapy Frequency (OT) Daily    Predicted Duration of Therapy 5   Anticipated Equipment Needs Upon Discharge (OT) shower chair   Risk & Benefits of therapy have been explained evaluation/treatment results reviewed;care plan/treatment goals reviewed;risks/benefits reviewed   OT Discharge Planning    OT Discharge Recommendation (DC Rec) Home with assist;home with outpatient cardiac rehab   OT Rationale for DC Rec Pt is currently below baseline for ADL, IADLs and functional mobility. She lives with her spouse and two adult children. Her  is on disability and able to assist with household chores (he is able to care for himself - just not work d/t back issures). Pt would benefit from OP cardiac rehab for continued endurance and functional mobility tranining. Pt in agreeance to this plan.    OT Brief overview of current status  Min A for sit <> stand and functional transfers.    Total Evaluation Time (Minutes)   Total Evaluation Time (Minutes) 10

## 2021-03-12 NOTE — PLAN OF CARE
Pt to floor from ICU at approx 530pm, alert and oriented, slightly lethargic, up with assist of one in room, complains of 10/10 pain, will sleep between cares, lungs diminished, O2 sats low to mid 90's on 2L NC, IS encouraged, wound vac in place to sternal incision, ross in place with good urine output, passing flatus, pt oriented to room and call lights, blood sugars covered per orders,

## 2021-03-12 NOTE — PROGRESS NOTES
[facility]    Medicine Progress Note - Hospitalist Service       Date of Admission:  3/5/2021  Assessment & Plan         Stacy Brown is a 45 year old female admitted on 3/5/2021.  Past history of DM II and hyperlipidemia who presented with chest pain and was found to have NSTEMI.       NSTEMI s/p CABG x4 on 3/9   EKG without ischemic changes, serial trop with peak of 0.115.  TTE with EF 60% without WMA.  Diabetes and cholesterol uncontrolled with A1C 10.1% and .  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  - post-op management per CT Surg: currently on aspirin, amlodipine (for spasm ppx) and rosuvastatin, low dose lasix    Possible HCAP  Low grade fever starting 3/10.  Persisting fever 3/11 with repeat CXR showing increasing left infiltrates.  - afebrile following initiation of cefepime 3/11, continue  - leukocytosis resolved  - blood cultures 3/11 ngtd  - attempt sputum collection  - wean oxygen as able, encourage pulm toilet    Acute blood loss anemia  EBL of 400cc with surgery.  Pre-op hgb 13.9 stabilized about 9 g/dL post-op.      DM II on long term insulin, uncontrolled  Admission A1C 10.1%.  PTA regimen of Lantus 38 units daily, metformin.    - increase lantus to 25 units daily 3/12  - continue prandial insulin 1u/10g carb + high dose ssi   - will ultimately need to transition to 70/30 once oral intake stable ( and unable to take short acting insulin during the day)     Hyperlipidemia     Continue statin     Chronic Pain  Sacroiliitis    Continue PTA Percocet     Tobacco Dependence  In recent weeks has cut down from 1ppd-1/2ppd. She is now motivated to quit.    Nicotine gum available         Diet: Advance Diet as Tolerated: Regular Diet Adult; Low Saturated Fat Na <2400mg Diet  Snacks/Supplements Adult: Boost Glucose Control; Between Meals    DVT Prophylaxis: heparin subcutaneous   Schultz Catheter: not present  Code  Status: Full Code           Disposition Plan   Expected discharge: 2 - 3 days, recommended to prior living arrangement once cleared by CT Surg.  Entered: Kwesi Jenkins MD 03/12/2021, 2:00 PM       The patient's care was discussed with the Bedside Nurse and Patient.    Kwesi Jenkins MD  Hospitalist Service  [facility]  Contact information available via [facility] Paging/Directory    ______________________________________________________________________    Interval History    Pain control remains difficult.  Reports some mild cough, occasionally productive.  Chronically has hot flashes, unchanged; denies any other subjective fever/chills.  No BM yet.  Poor appetite.  No other complaints.      Data reviewed today: I reviewed all medications, new labs and imaging results over the last 24 hours. I personally reviewed no images or EKG's today.    Physical Exam   Vital Signs: Temp: 98  F (36.7  C) Temp src: Oral BP: (!) 149/74 Pulse: 78   Resp: 16 SpO2: 100 % O2 Device: Nasal cannula Oxygen Delivery: 2 LPM  Weight: 184 lbs 4.87 oz     General Appearance: Well nourished female in NAD  Respiratory: fewer basilar crackles today, no wheezing or tachypnea  Cardiovascular: RRR, normal s1/s2 without murmur  Skin: no peripheral edema  Other: More alert today, appropriate, cranial nerves grossly intact    Data   Recent Labs   Lab 03/12/21  0717 03/11/21  0410 03/10/21  0405 03/09/21  1820 03/09/21  1642 03/09/21  1642 03/06/21  0746 03/06/21  0746 03/05/21 2029   WBC 9.2 11.5* 13.6* 16.9*  --  21.8*   < > 5.2  --    HGB 8.7* 8.8* 9.5* 11.7   < > 10.3*   < > 13.6  --    MCV 85 85 82 81  --  82   < > 82  --     174 228 238  --  266   < > 276  --    INR  --   --   --  1.21*  --  1.38*  --   --   --     139 144 145*   < > 144   < >  --   --    POTASSIUM 4.1 4.0 3.5 4.0   < > 3.8   < >  --   --    CHLORIDE 106 110* 111* 113*  --  112*   < >  --   --    CO2 23 26 25 26  --  25   < >  --   --    BUN 17  20 12 12  --  12   < >  --   --    CR 0.54 0.62 0.57 0.46*  --  0.50*   < >  --   --    ANIONGAP 9 3 8 6  --  7   < >  --   --    LUNA 8.8 8.2* 8.2* 8.5  --  9.4   < >  --   --    * 110* 89 189*   < > 208*   < >  --   --    ALBUMIN  --  3.8 4.1 3.3*  --  3.2*   < >  --   --    PROTTOTAL  --  6.1* 6.2* 5.8*  --  5.5*   < >  --   --    BILITOTAL  --  0.6 0.7 0.7  --  0.4   < >  --   --    ALKPHOS  --  66 45 60  --  57   < >  --   --    ALT  --  76* 71* 88*  --  81*   < >  --   --    AST  --  46* 50* 74*  --  67*   < >  --   --    TROPI  --   --   --   --   --   --   --  0.084* 0.115*    < > = values in this interval not displayed.

## 2021-03-13 ENCOUNTER — APPOINTMENT (OUTPATIENT)
Dept: PHYSICAL THERAPY | Facility: CLINIC | Age: 46
End: 2021-03-13
Attending: INTERNAL MEDICINE
Payer: COMMERCIAL

## 2021-03-13 LAB
ANION GAP SERPL CALCULATED.3IONS-SCNC: 7 MMOL/L (ref 3–14)
BUN SERPL-MCNC: 13 MG/DL (ref 7–30)
CALCIUM SERPL-MCNC: 9.5 MG/DL (ref 8.5–10.1)
CHLORIDE SERPL-SCNC: 105 MMOL/L (ref 94–109)
CO2 SERPL-SCNC: 26 MMOL/L (ref 20–32)
CREAT SERPL-MCNC: 0.5 MG/DL (ref 0.52–1.04)
ERYTHROCYTE [DISTWIDTH] IN BLOOD BY AUTOMATED COUNT: 14 % (ref 10–15)
GFR SERPL CREATININE-BSD FRML MDRD: >90 ML/MIN/{1.73_M2}
GLUCOSE BLDC GLUCOMTR-MCNC: 174 MG/DL (ref 70–99)
GLUCOSE BLDC GLUCOMTR-MCNC: 201 MG/DL (ref 70–99)
GLUCOSE BLDC GLUCOMTR-MCNC: 231 MG/DL (ref 70–99)
GLUCOSE BLDC GLUCOMTR-MCNC: 245 MG/DL (ref 70–99)
GLUCOSE SERPL-MCNC: 197 MG/DL (ref 70–99)
HCT VFR BLD AUTO: 32.5 % (ref 35–47)
HGB BLD-MCNC: 10.5 G/DL (ref 11.7–15.7)
MCH RBC QN AUTO: 27.3 PG (ref 26.5–33)
MCHC RBC AUTO-ENTMCNC: 32.3 G/DL (ref 31.5–36.5)
MCV RBC AUTO: 84 FL (ref 78–100)
PLATELET # BLD AUTO: 257 10E9/L (ref 150–450)
POTASSIUM SERPL-SCNC: 3.7 MMOL/L (ref 3.4–5.3)
RBC # BLD AUTO: 3.85 10E12/L (ref 3.8–5.2)
SODIUM SERPL-SCNC: 138 MMOL/L (ref 133–144)
WBC # BLD AUTO: 8.2 10E9/L (ref 4–11)

## 2021-03-13 PROCEDURE — 250N000013 HC RX MED GY IP 250 OP 250 PS 637: Performed by: STUDENT IN AN ORGANIZED HEALTH CARE EDUCATION/TRAINING PROGRAM

## 2021-03-13 PROCEDURE — 97530 THERAPEUTIC ACTIVITIES: CPT | Mod: GP | Performed by: PHYSICAL THERAPIST

## 2021-03-13 PROCEDURE — 250N000011 HC RX IP 250 OP 636: Performed by: PHYSICIAN ASSISTANT

## 2021-03-13 PROCEDURE — 250N000013 HC RX MED GY IP 250 OP 250 PS 637: Performed by: HOSPITALIST

## 2021-03-13 PROCEDURE — 250N000013 HC RX MED GY IP 250 OP 250 PS 637: Performed by: PHYSICIAN ASSISTANT

## 2021-03-13 PROCEDURE — 120N000001 HC R&B MED SURG/OB

## 2021-03-13 PROCEDURE — 999N000157 HC STATISTIC RCP TIME EA 10 MIN

## 2021-03-13 PROCEDURE — 94660 CPAP INITIATION&MGMT: CPT

## 2021-03-13 PROCEDURE — 80048 BASIC METABOLIC PNL TOTAL CA: CPT | Performed by: PHYSICIAN ASSISTANT

## 2021-03-13 PROCEDURE — 250N000011 HC RX IP 250 OP 636: Performed by: HOSPITALIST

## 2021-03-13 PROCEDURE — 94640 AIRWAY INHALATION TREATMENT: CPT

## 2021-03-13 PROCEDURE — 85027 COMPLETE CBC AUTOMATED: CPT | Performed by: PHYSICIAN ASSISTANT

## 2021-03-13 PROCEDURE — 250N000012 HC RX MED GY IP 250 OP 636 PS 637: Performed by: HOSPITALIST

## 2021-03-13 PROCEDURE — 250N000009 HC RX 250: Performed by: PHYSICIAN ASSISTANT

## 2021-03-13 PROCEDURE — 97110 THERAPEUTIC EXERCISES: CPT | Mod: GP | Performed by: PHYSICAL THERAPIST

## 2021-03-13 PROCEDURE — 99232 SBSQ HOSP IP/OBS MODERATE 35: CPT | Performed by: INTERNAL MEDICINE

## 2021-03-13 PROCEDURE — 94799 UNLISTED PULMONARY SVC/PX: CPT

## 2021-03-13 PROCEDURE — 36415 COLL VENOUS BLD VENIPUNCTURE: CPT | Performed by: PHYSICIAN ASSISTANT

## 2021-03-13 PROCEDURE — 999N001017 HC STATISTIC GLUCOSE BY METER IP

## 2021-03-13 PROCEDURE — 250N000013 HC RX MED GY IP 250 OP 250 PS 637

## 2021-03-13 PROCEDURE — 94640 AIRWAY INHALATION TREATMENT: CPT | Mod: 76

## 2021-03-13 RX ORDER — FUROSEMIDE 10 MG/ML
20 INJECTION INTRAMUSCULAR; INTRAVENOUS DAILY
Status: DISCONTINUED | OUTPATIENT
Start: 2021-03-13 | End: 2021-03-14

## 2021-03-13 RX ORDER — POTASSIUM CHLORIDE 1500 MG/1
20 TABLET, EXTENDED RELEASE ORAL ONCE
Status: COMPLETED | OUTPATIENT
Start: 2021-03-13 | End: 2021-03-13

## 2021-03-13 RX ORDER — ACETAMINOPHEN 650 MG/1
650 SUPPOSITORY RECTAL EVERY 4 HOURS PRN
Status: DISCONTINUED | OUTPATIENT
Start: 2021-03-13 | End: 2021-03-16 | Stop reason: HOSPADM

## 2021-03-13 RX ORDER — ACETAMINOPHEN 325 MG/1
650 TABLET ORAL EVERY 4 HOURS PRN
Status: DISCONTINUED | OUTPATIENT
Start: 2021-03-13 | End: 2021-03-16 | Stop reason: HOSPADM

## 2021-03-13 RX ADMIN — FUROSEMIDE 20 MG: 10 INJECTION, SOLUTION INTRAVENOUS at 10:20

## 2021-03-13 RX ADMIN — ACETAMINOPHEN 650 MG: 325 TABLET, FILM COATED ORAL at 04:22

## 2021-03-13 RX ADMIN — HEPARIN SODIUM 5000 UNITS: 5000 INJECTION, SOLUTION INTRAVENOUS; SUBCUTANEOUS at 11:58

## 2021-03-13 RX ADMIN — IPRATROPIUM BROMIDE AND ALBUTEROL SULFATE 3 ML: .5; 3 SOLUTION RESPIRATORY (INHALATION) at 20:07

## 2021-03-13 RX ADMIN — HEPARIN SODIUM 5000 UNITS: 5000 INJECTION, SOLUTION INTRAVENOUS; SUBCUTANEOUS at 20:42

## 2021-03-13 RX ADMIN — HEPARIN SODIUM 5000 UNITS: 5000 INJECTION, SOLUTION INTRAVENOUS; SUBCUTANEOUS at 04:22

## 2021-03-13 RX ADMIN — CEFEPIME HYDROCHLORIDE 2 G: 2 INJECTION, POWDER, FOR SOLUTION INTRAVENOUS at 10:21

## 2021-03-13 RX ADMIN — METOPROLOL TARTRATE 12.5 MG: 25 TABLET, FILM COATED ORAL at 20:42

## 2021-03-13 RX ADMIN — INSULIN ASPART 4 UNITS: 100 INJECTION, SOLUTION INTRAVENOUS; SUBCUTANEOUS at 12:06

## 2021-03-13 RX ADMIN — POLYETHYLENE GLYCOL 3350 17 G: 17 POWDER, FOR SOLUTION ORAL at 08:05

## 2021-03-13 RX ADMIN — GUAIFENESIN 600 MG: 600 TABLET, EXTENDED RELEASE ORAL at 20:42

## 2021-03-13 RX ADMIN — OXYCODONE HYDROCHLORIDE 7.5 MG: 5 TABLET ORAL at 01:17

## 2021-03-13 RX ADMIN — INSULIN GLARGINE 25 UNITS: 100 INJECTION, SOLUTION SUBCUTANEOUS at 08:07

## 2021-03-13 RX ADMIN — AMLODIPINE BESYLATE 2.5 MG: 2.5 TABLET ORAL at 08:05

## 2021-03-13 RX ADMIN — POTASSIUM CHLORIDE 20 MEQ: 1500 TABLET, EXTENDED RELEASE ORAL at 08:05

## 2021-03-13 RX ADMIN — ACETAMINOPHEN 650 MG: 325 TABLET, FILM COATED ORAL at 23:17

## 2021-03-13 RX ADMIN — ASPIRIN 325 MG ORAL TABLET 325 MG: 325 PILL ORAL at 08:05

## 2021-03-13 RX ADMIN — CEFEPIME HYDROCHLORIDE 2 G: 2 INJECTION, POWDER, FOR SOLUTION INTRAVENOUS at 18:43

## 2021-03-13 RX ADMIN — OXYCODONE HYDROCHLORIDE 5 MG: 5 TABLET ORAL at 15:51

## 2021-03-13 RX ADMIN — OXYCODONE HYDROCHLORIDE 5 MG: 5 TABLET ORAL at 23:17

## 2021-03-13 RX ADMIN — GUAIFENESIN 600 MG: 600 TABLET, EXTENDED RELEASE ORAL at 08:07

## 2021-03-13 RX ADMIN — Medication 2.5 MG: at 08:08

## 2021-03-13 RX ADMIN — CEFEPIME HYDROCHLORIDE 2 G: 2 INJECTION, POWDER, FOR SOLUTION INTRAVENOUS at 02:05

## 2021-03-13 RX ADMIN — FUROSEMIDE 20 MG: 20 TABLET ORAL at 08:07

## 2021-03-13 RX ADMIN — INSULIN ASPART 3 UNITS: 100 INJECTION, SOLUTION INTRAVENOUS; SUBCUTANEOUS at 08:07

## 2021-03-13 RX ADMIN — ROSUVASTATIN CALCIUM 20 MG: 20 TABLET, FILM COATED ORAL at 08:05

## 2021-03-13 RX ADMIN — PANTOPRAZOLE SODIUM 40 MG: 40 TABLET, DELAYED RELEASE ORAL at 08:05

## 2021-03-13 RX ADMIN — SENNOSIDES AND DOCUSATE SODIUM 2 TABLET: 8.6; 5 TABLET ORAL at 20:42

## 2021-03-13 RX ADMIN — METOPROLOL TARTRATE 12.5 MG: 25 TABLET, FILM COATED ORAL at 10:20

## 2021-03-13 RX ADMIN — INSULIN ASPART 2 UNITS: 100 INJECTION, SOLUTION INTRAVENOUS; SUBCUTANEOUS at 18:44

## 2021-03-13 RX ADMIN — OXYCODONE HYDROCHLORIDE 7.5 MG: 5 TABLET ORAL at 08:06

## 2021-03-13 RX ADMIN — Medication 2.5 MG: at 23:17

## 2021-03-13 RX ADMIN — TIZANIDINE 4 MG: 4 TABLET ORAL at 05:50

## 2021-03-13 RX ADMIN — TIZANIDINE 4 MG: 4 TABLET ORAL at 13:43

## 2021-03-13 RX ADMIN — OXYCODONE HYDROCHLORIDE 7.5 MG: 5 TABLET ORAL at 11:58

## 2021-03-13 RX ADMIN — Medication 2.5 MG: at 18:46

## 2021-03-13 RX ADMIN — OXYCODONE HYDROCHLORIDE 7.5 MG: 5 TABLET ORAL at 04:22

## 2021-03-13 RX ADMIN — Medication 2.5 MG: at 15:51

## 2021-03-13 RX ADMIN — SENNOSIDES AND DOCUSATE SODIUM 2 TABLET: 8.6; 5 TABLET ORAL at 08:05

## 2021-03-13 RX ADMIN — OXYCODONE HYDROCHLORIDE 5 MG: 5 TABLET ORAL at 18:47

## 2021-03-13 ASSESSMENT — ACTIVITIES OF DAILY LIVING (ADL)
ADLS_ACUITY_SCORE: 17

## 2021-03-13 ASSESSMENT — MIFFLIN-ST. JEOR: SCORE: 1502.75

## 2021-03-13 NOTE — PROGRESS NOTES
Respiratory Care note - SpO2 96% on O2/ NC @ 2 LPM. Breath sounds diminished in the bases. EZPAP given at 6 LPM. Continues on DuoNeb. IS and Aerobika done.

## 2021-03-13 NOTE — PROGRESS NOTES
Nursing [id number]: No change in overall status. Anxiety attack and anxiousness x 1. Improved after reassurance and PRN PO Zanaflex x 1 for a muscle spasm. No CP or SOB. Still on 3L oxygen per NC. Lungs dim. Wound Vac patent. No leak. Afebrile. AVSS.

## 2021-03-13 NOTE — PROGRESS NOTES
[facility]    Medicine Progress Note - Hospitalist Service       Date of Admission:  3/5/2021  Assessment & Plan         Stacy Brown is a 45 year old female admitted on 3/5/2021.  Past history of DM II and hyperlipidemia who presented with chest pain and was found to have NSTEMI.       NSTEMI s/p CABG x4 on 3/9   EKG without ischemic changes, serial trop with peak of 0.115.  TTE with EF 60% without WMA.  Diabetes and cholesterol uncontrolled with A1C 10.1% and .  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  - post-op management per CT Surg  - currently on aspirin, amlodipine (for spasm ppx) and rosuvastatin,  -Metoprolol 12.5 mg twice daily started on 3/13 and on Lasix 20 mg IV daily    Possible HCAP  Low grade fever starting 3/10.  Persisting fever 3/11 with repeat CXR showing increasing left infiltrates.  - afebrile following initiation of cefepime 3/11, continue  - leukocytosis resolved  - blood cultures 3/11 ngtd  - wean oxygen as able, encourage pulm toilet    Acute blood loss anemia  EBL of 400cc with surgery.  Pre-op hgb 13.9   -Hemoglobin 10.51 313     DM II on long term insulin, uncontrolled  Admission A1C 10.1%.  PTA regimen of Lantus 38 units daily, metformin.    - increase lantus to 28 units daily--we will start on 3/14  -Adjust prandial insulin 1u/7 g carb + high dose ssi   -Patient is scheduled to do what she has to do to control blood sugar       Hyperlipidemia   - Continue Crestor 20 mg daily     Chronic Pain  Sacroiliitis  - Continue PTA Percocet     Tobacco Dependence  In recent weeks has cut down from 1ppd-1/2ppd. She is now motivated to quit.  - Nicotine gum available    Constipation  -Continue senna-Colace twice daily, MiraLAX daily       Diet: Advance Diet as Tolerated: Regular Diet Adult; Low Saturated Fat Na <2400mg Diet  Snacks/Supplements Adult: Boost Glucose Control; Between Meals    DVT Prophylaxis: heparin  subcutaneous   Schultz Catheter: not present  Code Status: Full Code           Disposition Plan   Expected discharge: 2 - 3 days, recommended to prior living arrangement once cleared by CT Surg.  Entered: Jacob Chavez MD 03/13/2021, 1:54 PM       The patient's care was discussed with the Bedside Nurse and Patient.    Jacob Chavez MD  Hospitalist Service  [facility]  Contact information available via [facility] Paging/Directory    ______________________________________________________________________    Interval History    Feels overall weak and dysnea after her activities. Has some cough. Denies nausea or vomiting.   Reports has not had BM for few days.     Data reviewed today: I reviewed all medications, new labs and imaging results over the last 24 hours. I personally reviewed no images or EKG's today.    Physical Exam   Vital Signs: Temp: 98.3  F (36.8  C) Temp src: Oral BP: 120/66 Pulse: 80   Resp: 18 SpO2: 95 % O2 Device: Nasal cannula Oxygen Delivery: 2 LPM  Weight: 185 lbs 6.51 oz     General Appearance: Alert, awake and no apparent distress  Respiratory: diminished breath sounds at the baes  Cardiovascular: RRR  Skin: no peripheral edema  Abd: soft and non-tender    Data   Recent Labs   Lab 03/13/21  0636 03/12/21  0717 03/11/21  0410 03/10/21  0405 03/09/21  1820 03/09/21  1642 03/09/21  1642   WBC 8.2 9.2 11.5* 13.6* 16.9*  --  21.8*   HGB 10.5* 8.7* 8.8* 9.5* 11.7   < > 10.3*   MCV 84 85 85 82 81  --  82    179 174 228 238  --  266   INR  --   --   --   --  1.21*  --  1.38*    138 139 144 145*   < > 144   POTASSIUM 3.7 4.1 4.0 3.5 4.0   < > 3.8   CHLORIDE 105 106 110* 111* 113*  --  112*   CO2 26 23 26 25 26  --  25   BUN 13 17 20 12 12  --  12   CR 0.50* 0.54 0.62 0.57 0.46*  --  0.50*   ANIONGAP 7 9 3 8 6  --  7   LUNA 9.5 8.8 8.2* 8.2* 8.5  --  9.4   * 192* 110* 89 189*   < > 208*   ALBUMIN  --   --  3.8 4.1 3.3*  --  3.2*   PROTTOTAL  --   --  6.1*  6.2* 5.8*  --  5.5*   BILITOTAL  --   --  0.6 0.7 0.7  --  0.4   ALKPHOS  --   --  66 45 60  --  57   ALT  --   --  76* 71* 88*  --  81*   AST  --   --  46* 50* 74*  --  67*    < > = values in this interval not displayed.

## 2021-03-13 NOTE — PLAN OF CARE
Shift Summary    Neuro: A&O. Anxious at times  Cardiac: Tele SR/ST. BP stable. C/o dizziness when attempting to ambulate to BR.  Pulmonary: Remains on 3L.  GI/: Voiding  Skin: Incisions intact. Wound vac intact.  Pain: Oxy, tylenol, and xanaflex for pain.

## 2021-03-13 NOTE — PLAN OF CARE
Patient is A+OX4, VSS on 3L. Unable to wean O2 further. Wound vac to sternal incision CDI. Up with assist of 1 GB+walker. Passing gas but no BM yet. Pain managed fairly well with oxycodone although patient complains that it makes her feel lethargic. Regular diet with poor appetite. Blood sugars stable. Plan to continue to work with therapies.

## 2021-03-13 NOTE — PROGRESS NOTES
[facility]  Cardiovascular and Thoracic Surgery Daily Note          Assessment and Plan:   POD#3 s/p coronary artery bypass grafting x 4 (LIMA to LAD, SVG to PDA, SVG to RPL, RA to OM) on 3/9/21 by Dr Selby  -CVS: -130s/60-70s, HR 70-80s, aspirin 325 mg, amlodipine 2.5 mg PO (for prevention of radial artery spasm x 6 months), start lopressor 12.5 mg PO bid with parameters, crestor 20 mg, subcutaneous heparin tid, Increase lasix 20 mg IV daily, needs to be on ace inhibitor due to DM  -Resp: Extubated POD #0 at , saturating well on NC, continue duonebs qid x 2 days, mucinex bid, smoked prior to surgery with plans for cessation, +HAP - cefepime 2 g q 8 hours  -Neuro:  Moving all extremities, answering all questions appropriately  -Renal: ross removed POD #1, Cr/BUN 0.5/13  Creatinine   Date Value Ref Range Status   2021 0.50 (L) 0.52 - 1.04 mg/dL Final   -GI:  Continue bowel regimen  -:  Ross removed POD #1  -Endo: Per hospitalist, Hgb A1C 10.1 (3/5/2021), lantus and sliding scale insulin   -FEN: replete lytes as needed, Na 138, K 3.7  -ID: Temp (24hrs), Av.7  F (37.6  C), Min:98.2  F (36.8  C), Max:101.1  F (38.4  C), perioperative antibiotics finished, being treated for HAP - cefepime, no further fevers, cultures neg to date, UA neg     WBC   Date Value Ref Range Status   2021 8.2 4.0 - 11.0 10e9/L Final   ]  -Heme:   Hemoglobin   Date Value Ref Range Status   2021 10.5 (L) 11.7 - 15.7 g/dL Final   -Proph: PCD, subcutaneous heparin tid  -Dispo: Encouraged IS/TCDB/amb. Sternal precautions. Needs pulm toilet. Start lopressor and increase lasix. Will plan to remove wound VAC tomorrow. Continue to monitor.           Interval History:   States she is having pain but improving. Denies any hallucinations. Working with IS and flutter. Not feeling very hungry. +flatus/-BM, denies any popping/clicking in his breast bone, was able to get some sleep, ambulating, some numbness  "of her base of her L thumb         Medications:       amLODIPine  2.5 mg Oral Daily     aspirin  325 mg Oral or NG Tube Daily     ceFEPIme (MAXIPIME) IV  2 g Intravenous Q8H     furosemide  20 mg Oral Daily     guaiFENesin  600 mg Oral BID     heparin ANTICOAGULANT  5,000 Units Subcutaneous Q8H     insulin aspart   Subcutaneous TID w/meals     insulin aspart  1-10 Units Subcutaneous TID AC     insulin aspart  1-7 Units Subcutaneous At Bedtime     insulin glargine  25 Units Subcutaneous QAM AC     ipratropium - albuterol 0.5 mg/2.5 mg/3 mL  3 mL Nebulization 4x daily     pantoprazole  40 mg Oral Daily     polyethylene glycol  17 g Oral Daily     rosuvastatin  20 mg Oral Daily     senna-docusate  2 tablet Oral BID     acetaminophen **OR** acetaminophen, alum & mag hydroxide-simethicone, - MEDICATION INSTRUCTIONS -, glucose **OR** dextrose **OR** glucagon, hydrALAZINE, lidocaine 4%, lidocaine (buffered or not buffered), - MEDICATION INSTRUCTIONS -, melatonin, naloxone **OR** naloxone **OR** naloxone **OR** naloxone, nicotine polacrilex, nitroGLYcerin, oxyCODONE **AND** oxyCODONE, - MEDICATION INSTRUCTIONS -, phenol, prochlorperazine **OR** prochlorperazine **OR** prochlorperazine, sodium chloride (PF), sodium chloride (PF), sodium chloride (PF), sodium chloride (PF), tiZANidine          Physical Exam:   Vitals were reviewed  Blood pressure 121/69, pulse 77, temperature 98.3  F (36.8  C), temperature source Oral, resp. rate 18, height 1.676 m (5' 6\"), weight 84.1 kg (185 lb 6.5 oz), SpO2 96 %, not currently breastfeeding.  Gen: lying in bed, comfortable, +O2    Lungs: decreased breath sounds, fairly clear throughout, -750 ml, +flutter valve    Cardiovascular: RRR, telemetry SR 90s, trace edema LE    Abdomen: BS+, NT/ND, soft    Derm: sternal incision D/I, +wound VAC   L UE incisions D/I   Bilateral LE incisions D/I    CT: removed    CXR (3/12/21) - chest tubes removed, no pneumothorax, atelectasis    Carotid US " (3/8/21) - L and R ICA less than 50% stenosis    Echo (3/5/21) - EF 60%   No significant valve disease    Weight:   Vitals:    03/10/21 0500 03/11/21 0600 03/11/21 1734 03/12/21 0646   Weight: 82 kg (180 lb 12.4 oz) 87.1 kg (192 lb 0.3 oz) 84.4 kg (186 lb 1.6 oz) 83.6 kg (184 lb 4.9 oz)    03/13/21 0646   Weight: 84.1 kg (185 lb 6.5 oz)            Data:   Labs:   Lab Results   Component Value Date    WBC 11.5 03/11/2021     Lab Results   Component Value Date    RBC 3.26 03/11/2021     Lab Results   Component Value Date    HGB 8.8 03/11/2021     Lab Results   Component Value Date    HCT 27.7 03/11/2021     No components found for: MCT  Lab Results   Component Value Date    MCV 85 03/11/2021     Lab Results   Component Value Date    MCH 27.0 03/11/2021     Lab Results   Component Value Date    MCHC 31.8 03/11/2021     Lab Results   Component Value Date    RDW 14.5 03/11/2021     Lab Results   Component Value Date     03/11/2021       Last Basic Metabolic Panel:  Lab Results   Component Value Date     03/11/2021      Lab Results   Component Value Date    POTASSIUM 4.0 03/11/2021     Lab Results   Component Value Date    CHLORIDE 110 03/11/2021     Lab Results   Component Value Date    LUNA 8.2 03/11/2021     Lab Results   Component Value Date    CO2 26 03/11/2021     Lab Results   Component Value Date    BUN 20 03/11/2021     Lab Results   Component Value Date    CR 0.62 03/11/2021     Lab Results   Component Value Date     03/11/2021       Maverick Hayden PA-C  [phone]

## 2021-03-14 ENCOUNTER — APPOINTMENT (OUTPATIENT)
Dept: PHYSICAL THERAPY | Facility: CLINIC | Age: 46
End: 2021-03-14
Attending: INTERNAL MEDICINE
Payer: COMMERCIAL

## 2021-03-14 ENCOUNTER — APPOINTMENT (OUTPATIENT)
Dept: OCCUPATIONAL THERAPY | Facility: CLINIC | Age: 46
End: 2021-03-14
Attending: INTERNAL MEDICINE
Payer: COMMERCIAL

## 2021-03-14 ENCOUNTER — APPOINTMENT (OUTPATIENT)
Dept: GENERAL RADIOLOGY | Facility: CLINIC | Age: 46
End: 2021-03-14
Attending: SURGERY
Payer: COMMERCIAL

## 2021-03-14 LAB
ANION GAP SERPL CALCULATED.3IONS-SCNC: 4 MMOL/L (ref 3–14)
BUN SERPL-MCNC: 10 MG/DL (ref 7–30)
CALCIUM SERPL-MCNC: 9.3 MG/DL (ref 8.5–10.1)
CHLORIDE SERPL-SCNC: 103 MMOL/L (ref 94–109)
CO2 SERPL-SCNC: 29 MMOL/L (ref 20–32)
CREAT SERPL-MCNC: 0.49 MG/DL (ref 0.52–1.04)
ERYTHROCYTE [DISTWIDTH] IN BLOOD BY AUTOMATED COUNT: 13.8 % (ref 10–15)
GFR SERPL CREATININE-BSD FRML MDRD: >90 ML/MIN/{1.73_M2}
GLUCOSE BLDC GLUCOMTR-MCNC: 236 MG/DL (ref 70–99)
GLUCOSE BLDC GLUCOMTR-MCNC: 296 MG/DL (ref 70–99)
GLUCOSE SERPL-MCNC: 203 MG/DL (ref 70–99)
HCT VFR BLD AUTO: 29.9 % (ref 35–47)
HGB BLD-MCNC: 9.6 G/DL (ref 11.7–15.7)
LABORATORY COMMENT REPORT: NORMAL
MCH RBC QN AUTO: 26.8 PG (ref 26.5–33)
MCHC RBC AUTO-ENTMCNC: 32.1 G/DL (ref 31.5–36.5)
MCV RBC AUTO: 84 FL (ref 78–100)
PLATELET # BLD AUTO: 302 10E9/L (ref 150–450)
POTASSIUM SERPL-SCNC: 3.7 MMOL/L (ref 3.4–5.3)
RBC # BLD AUTO: 3.58 10E12/L (ref 3.8–5.2)
SARS-COV-2 RNA RESP QL NAA+PROBE: NEGATIVE
SODIUM SERPL-SCNC: 136 MMOL/L (ref 133–144)
SPECIMEN SOURCE: NORMAL
WBC # BLD AUTO: 5.8 10E9/L (ref 4–11)

## 2021-03-14 PROCEDURE — 250N000013 HC RX MED GY IP 250 OP 250 PS 637: Performed by: PHYSICIAN ASSISTANT

## 2021-03-14 PROCEDURE — 120N000001 HC R&B MED SURG/OB

## 2021-03-14 PROCEDURE — 94799 UNLISTED PULMONARY SVC/PX: CPT

## 2021-03-14 PROCEDURE — 999N000157 HC STATISTIC RCP TIME EA 10 MIN

## 2021-03-14 PROCEDURE — 94660 CPAP INITIATION&MGMT: CPT

## 2021-03-14 PROCEDURE — 71046 X-RAY EXAM CHEST 2 VIEWS: CPT

## 2021-03-14 PROCEDURE — 36415 COLL VENOUS BLD VENIPUNCTURE: CPT | Performed by: PHYSICIAN ASSISTANT

## 2021-03-14 PROCEDURE — 250N000013 HC RX MED GY IP 250 OP 250 PS 637

## 2021-03-14 PROCEDURE — 999N001017 HC STATISTIC GLUCOSE BY METER IP

## 2021-03-14 PROCEDURE — 87635 SARS-COV-2 COVID-19 AMP PRB: CPT | Performed by: INTERNAL MEDICINE

## 2021-03-14 PROCEDURE — 250N000009 HC RX 250: Performed by: PHYSICIAN ASSISTANT

## 2021-03-14 PROCEDURE — 97110 THERAPEUTIC EXERCISES: CPT | Mod: GP | Performed by: PHYSICAL THERAPIST

## 2021-03-14 PROCEDURE — 97535 SELF CARE MNGMENT TRAINING: CPT | Mod: GO | Performed by: OCCUPATIONAL THERAPIST

## 2021-03-14 PROCEDURE — 250N000011 HC RX IP 250 OP 636: Performed by: PHYSICIAN ASSISTANT

## 2021-03-14 PROCEDURE — 250N000011 HC RX IP 250 OP 636: Performed by: HOSPITALIST

## 2021-03-14 PROCEDURE — 94640 AIRWAY INHALATION TREATMENT: CPT

## 2021-03-14 PROCEDURE — 80048 BASIC METABOLIC PNL TOTAL CA: CPT | Performed by: PHYSICIAN ASSISTANT

## 2021-03-14 PROCEDURE — 97530 THERAPEUTIC ACTIVITIES: CPT | Mod: GP | Performed by: PHYSICAL THERAPIST

## 2021-03-14 PROCEDURE — 99233 SBSQ HOSP IP/OBS HIGH 50: CPT | Performed by: INTERNAL MEDICINE

## 2021-03-14 PROCEDURE — 250N000013 HC RX MED GY IP 250 OP 250 PS 637: Performed by: STUDENT IN AN ORGANIZED HEALTH CARE EDUCATION/TRAINING PROGRAM

## 2021-03-14 PROCEDURE — 250N000012 HC RX MED GY IP 250 OP 636 PS 637: Performed by: INTERNAL MEDICINE

## 2021-03-14 PROCEDURE — 250N000013 HC RX MED GY IP 250 OP 250 PS 637: Performed by: HOSPITALIST

## 2021-03-14 PROCEDURE — 85027 COMPLETE CBC AUTOMATED: CPT | Performed by: PHYSICIAN ASSISTANT

## 2021-03-14 RX ORDER — BISACODYL 10 MG
10 SUPPOSITORY, RECTAL RECTAL DAILY PRN
Status: DISCONTINUED | OUTPATIENT
Start: 2021-03-14 | End: 2021-03-16 | Stop reason: HOSPADM

## 2021-03-14 RX ORDER — POTASSIUM CHLORIDE 1500 MG/1
20 TABLET, EXTENDED RELEASE ORAL ONCE
Status: COMPLETED | OUTPATIENT
Start: 2021-03-14 | End: 2021-03-14

## 2021-03-14 RX ORDER — FUROSEMIDE 20 MG
20 TABLET ORAL DAILY
Status: DISCONTINUED | OUTPATIENT
Start: 2021-03-14 | End: 2021-03-16 | Stop reason: HOSPADM

## 2021-03-14 RX ORDER — LISINOPRIL 2.5 MG/1
2.5 TABLET ORAL DAILY
Status: DISCONTINUED | OUTPATIENT
Start: 2021-03-14 | End: 2021-03-16 | Stop reason: HOSPADM

## 2021-03-14 RX ADMIN — PROCHLORPERAZINE EDISYLATE 10 MG: 5 INJECTION INTRAMUSCULAR; INTRAVENOUS at 08:03

## 2021-03-14 RX ADMIN — Medication 7.5 MG: at 22:11

## 2021-03-14 RX ADMIN — Medication 2.5 MG: at 08:16

## 2021-03-14 RX ADMIN — Medication 2.5 MG: at 17:12

## 2021-03-14 RX ADMIN — ACETAMINOPHEN 650 MG: 325 TABLET, FILM COATED ORAL at 19:44

## 2021-03-14 RX ADMIN — METOPROLOL TARTRATE 12.5 MG: 25 TABLET, FILM COATED ORAL at 22:11

## 2021-03-14 RX ADMIN — LISINOPRIL 2.5 MG: 2.5 TABLET ORAL at 08:15

## 2021-03-14 RX ADMIN — HEPARIN SODIUM 5000 UNITS: 5000 INJECTION, SOLUTION INTRAVENOUS; SUBCUTANEOUS at 13:09

## 2021-03-14 RX ADMIN — POLYETHYLENE GLYCOL 3350 17 G: 17 POWDER, FOR SOLUTION ORAL at 08:05

## 2021-03-14 RX ADMIN — SENNOSIDES AND DOCUSATE SODIUM 2 TABLET: 8.6; 5 TABLET ORAL at 08:05

## 2021-03-14 RX ADMIN — AMLODIPINE BESYLATE 2.5 MG: 2.5 TABLET ORAL at 08:05

## 2021-03-14 RX ADMIN — HEPARIN SODIUM 5000 UNITS: 5000 INJECTION, SOLUTION INTRAVENOUS; SUBCUTANEOUS at 03:51

## 2021-03-14 RX ADMIN — ACETAMINOPHEN 650 MG: 325 TABLET, FILM COATED ORAL at 03:51

## 2021-03-14 RX ADMIN — MAGNESIUM HYDROXIDE 30 ML: 400 SUSPENSION ORAL at 08:56

## 2021-03-14 RX ADMIN — INSULIN GLARGINE 6 UNITS: 100 INJECTION, SOLUTION SUBCUTANEOUS at 14:51

## 2021-03-14 RX ADMIN — METOPROLOL TARTRATE 12.5 MG: 25 TABLET, FILM COATED ORAL at 08:05

## 2021-03-14 RX ADMIN — HEPARIN SODIUM 5000 UNITS: 5000 INJECTION, SOLUTION INTRAVENOUS; SUBCUTANEOUS at 19:43

## 2021-03-14 RX ADMIN — IPRATROPIUM BROMIDE AND ALBUTEROL SULFATE 3 ML: .5; 3 SOLUTION RESPIRATORY (INHALATION) at 09:03

## 2021-03-14 RX ADMIN — ROSUVASTATIN CALCIUM 20 MG: 20 TABLET, FILM COATED ORAL at 08:05

## 2021-03-14 RX ADMIN — OXYCODONE HYDROCHLORIDE 5 MG: 5 TABLET ORAL at 08:15

## 2021-03-14 RX ADMIN — OXYCODONE HYDROCHLORIDE 5 MG: 5 TABLET ORAL at 03:51

## 2021-03-14 RX ADMIN — INSULIN ASPART 4 UNITS: 100 INJECTION, SOLUTION INTRAVENOUS; SUBCUTANEOUS at 18:23

## 2021-03-14 RX ADMIN — GUAIFENESIN 600 MG: 600 TABLET, EXTENDED RELEASE ORAL at 08:05

## 2021-03-14 RX ADMIN — FUROSEMIDE 20 MG: 20 TABLET ORAL at 08:15

## 2021-03-14 RX ADMIN — CEFEPIME HYDROCHLORIDE 2 G: 2 INJECTION, POWDER, FOR SOLUTION INTRAVENOUS at 03:52

## 2021-03-14 RX ADMIN — CEFEPIME HYDROCHLORIDE 2 G: 2 INJECTION, POWDER, FOR SOLUTION INTRAVENOUS at 18:23

## 2021-03-14 RX ADMIN — GUAIFENESIN 600 MG: 600 TABLET, EXTENDED RELEASE ORAL at 22:11

## 2021-03-14 RX ADMIN — INSULIN ASPART 3 UNITS: 100 INJECTION, SOLUTION INTRAVENOUS; SUBCUTANEOUS at 08:55

## 2021-03-14 RX ADMIN — INSULIN ASPART 7 UNITS: 100 INJECTION, SOLUTION INTRAVENOUS; SUBCUTANEOUS at 13:07

## 2021-03-14 RX ADMIN — OXYCODONE HYDROCHLORIDE 5 MG: 5 TABLET ORAL at 17:12

## 2021-03-14 RX ADMIN — SENNOSIDES AND DOCUSATE SODIUM 2 TABLET: 8.6; 5 TABLET ORAL at 22:11

## 2021-03-14 RX ADMIN — Medication 2.5 MG: at 13:07

## 2021-03-14 RX ADMIN — POTASSIUM CHLORIDE 20 MEQ: 1500 TABLET, EXTENDED RELEASE ORAL at 10:44

## 2021-03-14 RX ADMIN — ASPIRIN 325 MG ORAL TABLET 325 MG: 325 PILL ORAL at 08:05

## 2021-03-14 RX ADMIN — INSULIN ASPART 4 UNITS: 100 INJECTION, SOLUTION INTRAVENOUS; SUBCUTANEOUS at 19:38

## 2021-03-14 RX ADMIN — CEFEPIME HYDROCHLORIDE 2 G: 2 INJECTION, POWDER, FOR SOLUTION INTRAVENOUS at 10:12

## 2021-03-14 RX ADMIN — BISACODYL 10 MG: 10 SUPPOSITORY RECTAL at 17:13

## 2021-03-14 RX ADMIN — PANTOPRAZOLE SODIUM 40 MG: 40 TABLET, DELAYED RELEASE ORAL at 08:05

## 2021-03-14 RX ADMIN — OXYCODONE HYDROCHLORIDE 5 MG: 5 TABLET ORAL at 13:06

## 2021-03-14 RX ADMIN — INSULIN GLARGINE 28 UNITS: 100 INJECTION, SOLUTION SUBCUTANEOUS at 08:15

## 2021-03-14 RX ADMIN — Medication 2.5 MG: at 03:51

## 2021-03-14 ASSESSMENT — ACTIVITIES OF DAILY LIVING (ADL)
ADLS_ACUITY_SCORE: 17
ADLS_ACUITY_SCORE: 15
ADLS_ACUITY_SCORE: 17
ADLS_ACUITY_SCORE: 15
ADLS_ACUITY_SCORE: 17
ADLS_ACUITY_SCORE: 15

## 2021-03-14 ASSESSMENT — MIFFLIN-ST. JEOR: SCORE: 1478.68

## 2021-03-14 NOTE — PLAN OF CARE
VSS. A/O. Up-1/walker. Chest wound vac intact. Incisions with some bruises. Oxy given. Tolerating diet. O2 wean to 2L, , some fine crackles lungs, good UOP with lasix. K+ replaced. Mapilex on coccyx. Few walks in hallway. Tele SR

## 2021-03-14 NOTE — PROGRESS NOTES
[facility]    Medicine Progress Note - Hospitalist Service       Date of Admission:  3/5/2021  Assessment & Plan         Stacy Brown is a 45 year old female admitted on 3/5/2021.  Past history of DM II and hyperlipidemia who presented with chest pain and was found to have NSTEMI.     NSTEMI s/p CABG x4 on 3/9   EKG without ischemic changes, serial trop with peak of 0.115.  TTE with EF 60% without WMA.  Diabetes and cholesterol uncontrolled with A1C 10.1% and .  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  - post-op management per CT Surg  - currently on aspirin, amlodipine (for spasm ppx) and rosuvastatin,  -Metoprolol 12.5 mg twice daily started on 3/13, Lisinopril 2.5mg daily and Lasix changed to PO 20 mg daily on 3/14    Possible HCAP  Low grade fever starting 3/10.  Persisting fever 3/11 with repeat CXR showing increasing left infiltrates.  - afebrile following initiation of cefepime 3/11, continue  - leukocytosis resolved  - blood cultures 3/11 ngtd  - wean oxygen as able, encourage pulm toilet    Acute blood loss anemia  EBL of 400cc with surgery.  Pre-op hgb 13.9   -monitor hemoglobin     DM II on long term insulin, uncontrolled  Admission A1C 10.1%.  PTA regimen of Lantus 38 units daily, metformin.    - BG remains uncontrolled  - increase Lantus to 34 units qAM (will give additional 6 units to make it total 34 units today)  - continue prandial insulin 1u/7 g carb + high dose ssi   - monitor BG and adjust accordingly     Hyperlipidemia   - Continue Crestor 20 mg daily     Chronic Pain  Sacroiliitis  - Continue PTA Percocet     Tobacco Dependence  In recent weeks has cut down from 1ppd-1/2ppd. She is now motivated to quit.  - Nicotine gum available    Constipation  -Continue senna-Colace twice daily, MiraLAX daily  - suppository prn       Diet: Advance Diet as Tolerated: Regular Diet Adult; Low Saturated Fat Na <2400mg  Diet  Snacks/Supplements Adult: Boost Glucose Control; Between Meals    DVT Prophylaxis: heparin subcutaneous   Schultz Catheter: not present  Code Status: Full Code           Disposition Plan   Expected discharge: 2 - 3 days, recommended to prior living arrangement once cleared by CT Surg, weaned off oxygen.  Entered: Jacob Chavez MD 03/14/2021, 1:04 PM       The patient's care was discussed with the Bedside Nurse, Patient and Patient's Family.    Jacob Chavez MD  Hospitalist Service  [facility]  Contact information available via [facility] Paging/Directory    ______________________________________________________________________    Interval History    Patient states she is tired after working with therapy and sitting in a chair. She feels breathing is better today. She is working on IS and her  reports her use is improving. She is afebrile. Still no BM, prn suppository has been added.     Data reviewed today: I reviewed all medications, new labs and imaging results over the last 24 hours. I personally reviewed no images or EKG's today.    Physical Exam   Vital Signs: Temp: 98.8  F (37.1  C) Temp src: Oral BP: 128/66 Pulse: 88   Resp: 16 SpO2: 95 % O2 Device: Nasal cannula Oxygen Delivery: 2 LPM  Weight: 180 lbs 1.6 oz     General Appearance: Alert, awake and no apparent distress  Respiratory: diminished breath sounds at the baes  Cardiovascular: RRR  Skin: no peripheral edema  Abd: soft and non-tender    Data   Recent Labs   Lab 03/14/21  0718 03/13/21  0636 03/12/21  0717 03/11/21  0410 03/10/21  0405 03/09/21  1820 03/09/21  1642 03/09/21  1642   WBC 5.8 8.2 9.2 11.5* 13.6* 16.9*  --  21.8*   HGB 9.6* 10.5* 8.7* 8.8* 9.5* 11.7   < > 10.3*   MCV 84 84 85 85 82 81  --  82    257 179 174 228 238  --  266   INR  --   --   --   --   --  1.21*  --  1.38*    138 138 139 144 145*   < > 144   POTASSIUM 3.7 3.7 4.1 4.0 3.5 4.0   < > 3.8   CHLORIDE 103 105 106 110*  111* 113*  --  112*   CO2 29 26 23 26 25 26  --  25   BUN 10 13 17 20 12 12  --  12   CR 0.49* 0.50* 0.54 0.62 0.57 0.46*  --  0.50*   ANIONGAP 4 7 9 3 8 6  --  7   LUNA 9.3 9.5 8.8 8.2* 8.2* 8.5  --  9.4   * 197* 192* 110* 89 189*   < > 208*   ALBUMIN  --   --   --  3.8 4.1 3.3*  --  3.2*   PROTTOTAL  --   --   --  6.1* 6.2* 5.8*  --  5.5*   BILITOTAL  --   --   --  0.6 0.7 0.7  --  0.4   ALKPHOS  --   --   --  66 45 60  --  57   ALT  --   --   --  76* 71* 88*  --  81*   AST  --   --   --  46* 50* 74*  --  67*    < > = values in this interval not displayed.

## 2021-03-14 NOTE — PLAN OF CARE
VSS. A/O. SBA. Incisions some bruises CDI. Oxy given. +bs/gas, suppository given no luck yet. Voiding fine. 1L O2, fine crackles lungs bases. Watching heart education vidoes. Tele SR. Routine Covid swab done.

## 2021-03-14 NOTE — PROGRESS NOTES
Respiratory Care note - SpO2 98% on O2/ NC @ 2 LPM. Breath sounds are fine crackles in the bases. EZPAP given at 6 LPM. Duoneb given X 1 per orders. Obtained 750 mL on IS and Aerobika done.

## 2021-03-14 NOTE — PLAN OF CARE
Alert and oriented x4. VSS. Pain helped with oxycodone. Up with one and walker. Wound vac removed today. IV lasix changed to oral. Blood glucose 203, 296, Lantus increased. Tele NSR. Potassium replaced, recheck in morning. Will continue to monitor tonight.

## 2021-03-14 NOTE — PROGRESS NOTES
[facility]  Cardiovascular and Thoracic Surgery Daily Note          Assessment and Plan:   POD#4 s/p coronary artery bypass grafting x 4 (LIMA to LAD, SVG to PDA, SVG to RPL, RA to OM) on 3/9/21 by Dr Selby  -CVS: -150s/60-80s, HR 60-90s, aspirin 325 mg, amlodipine 2.5 mg PO (for prevention of radial artery spasm x 6 months), lopressor 12.5 mg PO bid with parameters, crestor 20 mg, subcutaneous heparin tid, change lasix to 20 mg PO daily, start lisinopril 2.5 mg PO daily  -Resp: Extubated POD #0 at , saturating well on NC, continue mucinex bid, smoked prior to surgery with plans for cessation, +HAP - cefepime 2 g q 8 hours, wean O2 as able  -Neuro:  Moving all extremities, answering all questions appropriately  -Renal: ross removed POD #1, Cr/BUN 0.5/13  Creatinine   Date Value Ref Range Status   2021 PENDING 0.52 - 1.04 mg/dL Incomplete   -GI:  Continue bowel regimen  -:  Ross removed POD #1  -Endo: Per hospitalist, Hgb A1C 10.1 (3/5/2021), lantus and sliding scale insulin   -FEN: replete lytes as needed, Na 138, K 3.7  -ID: Temp (24hrs), Av.7  F (37.6  C), Min:98.2  F (36.8  C), Max:101.1  F (38.4  C), perioperative antibiotics finished, being treated for HAP - cefepime, no further fevers, cultures neg to date, UA neg     WBC   Date Value Ref Range Status   2021 5.8 4.0 - 11.0 10e9/L Final   ]  -Heme:   Hemoglobin   Date Value Ref Range Status   2021 9.6 (L) 11.7 - 15.7 g/dL Final   -Proph: PCD, subcutaneous heparin tid  -Dispo: Encouraged IS/TCDB/amb. Sternal precautions. Needs pulm toilet. Start lisinopril and change lasix to PO. Will plan to remove wound VAC. Ok to shower today. Looking at discharge in about 2 days. Needs BM. Awaiting labs this am. Continue to monitor.           Interval History:   States pain is being controlled. Denies any hallucinations. Working with IS and flutter. Appetite is ok. +flatus/-BM, some nausea this am, denies any  "popping/clicking in his breast bone, was able to get some sleep, ambulating, some numbness of her base of her L thumb (decreased this am)         Medications:       amLODIPine  2.5 mg Oral Daily     aspirin  325 mg Oral or NG Tube Daily     ceFEPIme (MAXIPIME) IV  2 g Intravenous Q8H     furosemide  20 mg Intravenous Daily     guaiFENesin  600 mg Oral BID     heparin ANTICOAGULANT  5,000 Units Subcutaneous Q8H     insulin aspart   Subcutaneous TID w/meals     insulin aspart  1-10 Units Subcutaneous TID AC     insulin aspart  1-7 Units Subcutaneous At Bedtime     insulin glargine  28 Units Subcutaneous QAM AC     ipratropium - albuterol 0.5 mg/2.5 mg/3 mL  3 mL Nebulization 4x daily     metoprolol tartrate  12.5 mg Oral BID     pantoprazole  40 mg Oral Daily     polyethylene glycol  17 g Oral Daily     rosuvastatin  20 mg Oral Daily     senna-docusate  2 tablet Oral BID     acetaminophen **OR** acetaminophen, alum & mag hydroxide-simethicone, - MEDICATION INSTRUCTIONS -, glucose **OR** dextrose **OR** glucagon, hydrALAZINE, lidocaine 4%, lidocaine (buffered or not buffered), - MEDICATION INSTRUCTIONS -, melatonin, miconazole, naloxone **OR** naloxone **OR** naloxone **OR** naloxone, nicotine polacrilex, nitroGLYcerin, oxyCODONE **AND** oxyCODONE, - MEDICATION INSTRUCTIONS -, phenol, prochlorperazine **OR** prochlorperazine **OR** prochlorperazine, sodium chloride (PF), sodium chloride (PF), sodium chloride (PF), sodium chloride (PF), tiZANidine          Physical Exam:   Vitals were reviewed  Blood pressure (!) 157/84, pulse 67, temperature 98  F (36.7  C), temperature source Oral, resp. rate 16, height 1.676 m (5' 6\"), weight 81.7 kg (180 lb 1.6 oz), SpO2 93 %, not currently breastfeeding.  Gen: lying in bed, comfortable, +O2 (2L NC)    Lungs: decreased breath sounds, fairly clear throughout, +flutter valve    Cardiovascular: RRR, telemetry SR 80s, trace edema LE    Abdomen: BS+, NT/ND, soft    Derm: sternal incision " D/I, +wound VAC   L UE incisions D/I   Bilateral LE incisions D/I    CT: removed    CXR (3/12/21) - chest tubes removed, no pneumothorax, atelectasis    Carotid US (3/8/21) - L and R ICA less than 50% stenosis    Echo (3/5/21) - EF 60%   No significant valve disease    Weight:   Vitals:    03/11/21 0600 03/11/21 1734 03/12/21 0646 03/13/21 0646   Weight: 87.1 kg (192 lb 0.3 oz) 84.4 kg (186 lb 1.6 oz) 83.6 kg (184 lb 4.9 oz) 84.1 kg (185 lb 6.5 oz)    03/14/21 0000   Weight: 81.7 kg (180 lb 1.6 oz)            Data:   Labs:   Lab Results   Component Value Date    WBC 11.5 03/11/2021     Lab Results   Component Value Date    RBC 3.26 03/11/2021     Lab Results   Component Value Date    HGB 8.8 03/11/2021     Lab Results   Component Value Date    HCT 27.7 03/11/2021     No components found for: MCT  Lab Results   Component Value Date    MCV 85 03/11/2021     Lab Results   Component Value Date    MCH 27.0 03/11/2021     Lab Results   Component Value Date    MCHC 31.8 03/11/2021     Lab Results   Component Value Date    RDW 14.5 03/11/2021     Lab Results   Component Value Date     03/11/2021       Last Basic Metabolic Panel:  Lab Results   Component Value Date     03/11/2021      Lab Results   Component Value Date    POTASSIUM 4.0 03/11/2021     Lab Results   Component Value Date    CHLORIDE 110 03/11/2021     Lab Results   Component Value Date    LUNA 8.2 03/11/2021     Lab Results   Component Value Date    CO2 26 03/11/2021     Lab Results   Component Value Date    BUN 20 03/11/2021     Lab Results   Component Value Date    CR 0.62 03/11/2021     Lab Results   Component Value Date     03/11/2021       Maverick Hayden PA-C  [phone]

## 2021-03-14 NOTE — PLAN OF CARE
AVSS ON 2L NC. Pain controlled with PO oxycodone and tylenol. Sternal incision CDI with wound VAC. LS clear. Diet regular with poor appt. Up with assist of 1. BS active and audible throughout. Due to stool. Voiding well.

## 2021-03-15 ENCOUNTER — APPOINTMENT (OUTPATIENT)
Dept: PHYSICAL THERAPY | Facility: CLINIC | Age: 46
End: 2021-03-15
Attending: INTERNAL MEDICINE
Payer: COMMERCIAL

## 2021-03-15 ENCOUNTER — APPOINTMENT (OUTPATIENT)
Dept: OCCUPATIONAL THERAPY | Facility: CLINIC | Age: 46
End: 2021-03-15
Attending: INTERNAL MEDICINE
Payer: COMMERCIAL

## 2021-03-15 LAB
ANION GAP SERPL CALCULATED.3IONS-SCNC: 4 MMOL/L (ref 3–14)
BUN SERPL-MCNC: 9 MG/DL (ref 7–30)
CALCIUM SERPL-MCNC: 9.1 MG/DL (ref 8.5–10.1)
CHLORIDE SERPL-SCNC: 104 MMOL/L (ref 94–109)
CO2 SERPL-SCNC: 29 MMOL/L (ref 20–32)
CREAT SERPL-MCNC: 0.44 MG/DL (ref 0.52–1.04)
ERYTHROCYTE [DISTWIDTH] IN BLOOD BY AUTOMATED COUNT: 14 % (ref 10–15)
GFR SERPL CREATININE-BSD FRML MDRD: >90 ML/MIN/{1.73_M2}
GLUCOSE BLDC GLUCOMTR-MCNC: 124 MG/DL (ref 70–99)
GLUCOSE BLDC GLUCOMTR-MCNC: 167 MG/DL (ref 70–99)
GLUCOSE BLDC GLUCOMTR-MCNC: 270 MG/DL (ref 70–99)
GLUCOSE SERPL-MCNC: 241 MG/DL (ref 70–99)
HCT VFR BLD AUTO: 29.1 % (ref 35–47)
HGB BLD-MCNC: 9.5 G/DL (ref 11.7–15.7)
MCH RBC QN AUTO: 27.3 PG (ref 26.5–33)
MCHC RBC AUTO-ENTMCNC: 32.6 G/DL (ref 31.5–36.5)
MCV RBC AUTO: 84 FL (ref 78–100)
PLATELET # BLD AUTO: 311 10E9/L (ref 150–450)
POTASSIUM SERPL-SCNC: 3.8 MMOL/L (ref 3.4–5.3)
RBC # BLD AUTO: 3.48 10E12/L (ref 3.8–5.2)
SODIUM SERPL-SCNC: 137 MMOL/L (ref 133–144)
WBC # BLD AUTO: 6.3 10E9/L (ref 4–11)

## 2021-03-15 PROCEDURE — 80048 BASIC METABOLIC PNL TOTAL CA: CPT | Performed by: PHYSICIAN ASSISTANT

## 2021-03-15 PROCEDURE — 250N000013 HC RX MED GY IP 250 OP 250 PS 637: Performed by: INTERNAL MEDICINE

## 2021-03-15 PROCEDURE — 97535 SELF CARE MNGMENT TRAINING: CPT | Mod: GO | Performed by: OCCUPATIONAL THERAPIST

## 2021-03-15 PROCEDURE — 120N000001 HC R&B MED SURG/OB

## 2021-03-15 PROCEDURE — 85027 COMPLETE CBC AUTOMATED: CPT | Performed by: PHYSICIAN ASSISTANT

## 2021-03-15 PROCEDURE — 999N000157 HC STATISTIC RCP TIME EA 10 MIN

## 2021-03-15 PROCEDURE — 97110 THERAPEUTIC EXERCISES: CPT | Mod: GP

## 2021-03-15 PROCEDURE — 250N000011 HC RX IP 250 OP 636: Performed by: HOSPITALIST

## 2021-03-15 PROCEDURE — 99232 SBSQ HOSP IP/OBS MODERATE 35: CPT | Performed by: INTERNAL MEDICINE

## 2021-03-15 PROCEDURE — 250N000013 HC RX MED GY IP 250 OP 250 PS 637: Performed by: STUDENT IN AN ORGANIZED HEALTH CARE EDUCATION/TRAINING PROGRAM

## 2021-03-15 PROCEDURE — 250N000013 HC RX MED GY IP 250 OP 250 PS 637: Performed by: PHYSICIAN ASSISTANT

## 2021-03-15 PROCEDURE — 250N000011 HC RX IP 250 OP 636: Performed by: PHYSICIAN ASSISTANT

## 2021-03-15 PROCEDURE — 999N001017 HC STATISTIC GLUCOSE BY METER IP

## 2021-03-15 PROCEDURE — 36415 COLL VENOUS BLD VENIPUNCTURE: CPT | Performed by: PHYSICIAN ASSISTANT

## 2021-03-15 PROCEDURE — 94799 UNLISTED PULMONARY SVC/PX: CPT

## 2021-03-15 PROCEDURE — 97530 THERAPEUTIC ACTIVITIES: CPT | Mod: GP

## 2021-03-15 PROCEDURE — 250N000013 HC RX MED GY IP 250 OP 250 PS 637

## 2021-03-15 PROCEDURE — 94660 CPAP INITIATION&MGMT: CPT

## 2021-03-15 PROCEDURE — 250N000012 HC RX MED GY IP 250 OP 636 PS 637: Performed by: INTERNAL MEDICINE

## 2021-03-15 RX ORDER — NICOTINE POLACRILEX 4 MG
15-30 LOZENGE BUCCAL
Status: DISCONTINUED | OUTPATIENT
Start: 2021-03-15 | End: 2021-03-15

## 2021-03-15 RX ORDER — DEXTROSE MONOHYDRATE 25 G/50ML
25-50 INJECTION, SOLUTION INTRAVENOUS
Status: DISCONTINUED | OUTPATIENT
Start: 2021-03-15 | End: 2021-03-15

## 2021-03-15 RX ORDER — FUROSEMIDE 40 MG
40 TABLET ORAL ONCE
Status: COMPLETED | OUTPATIENT
Start: 2021-03-15 | End: 2021-03-15

## 2021-03-15 RX ORDER — NICOTINE POLACRILEX 4 MG
15-30 LOZENGE BUCCAL
Status: DISCONTINUED | OUTPATIENT
Start: 2021-03-15 | End: 2021-03-16 | Stop reason: HOSPADM

## 2021-03-15 RX ORDER — METOPROLOL TARTRATE 25 MG/1
25 TABLET, FILM COATED ORAL 2 TIMES DAILY
Status: DISCONTINUED | OUTPATIENT
Start: 2021-03-15 | End: 2021-03-16 | Stop reason: HOSPADM

## 2021-03-15 RX ORDER — DEXTROSE MONOHYDRATE 25 G/50ML
25-50 INJECTION, SOLUTION INTRAVENOUS
Status: DISCONTINUED | OUTPATIENT
Start: 2021-03-15 | End: 2021-03-16 | Stop reason: HOSPADM

## 2021-03-15 RX ORDER — METFORMIN HYDROCHLORIDE 750 MG/1
750 TABLET, EXTENDED RELEASE ORAL 2 TIMES DAILY WITH MEALS
Status: DISCONTINUED | OUTPATIENT
Start: 2021-03-15 | End: 2021-03-16 | Stop reason: HOSPADM

## 2021-03-15 RX ADMIN — OXYCODONE HYDROCHLORIDE 5 MG: 5 TABLET ORAL at 18:48

## 2021-03-15 RX ADMIN — OXYCODONE HYDROCHLORIDE 5 MG: 5 TABLET ORAL at 03:38

## 2021-03-15 RX ADMIN — ACETAMINOPHEN 650 MG: 325 TABLET, FILM COATED ORAL at 14:39

## 2021-03-15 RX ADMIN — ROSUVASTATIN CALCIUM 20 MG: 20 TABLET, FILM COATED ORAL at 08:17

## 2021-03-15 RX ADMIN — INSULIN GLARGINE 4 UNITS: 100 INJECTION, SOLUTION SUBCUTANEOUS at 12:26

## 2021-03-15 RX ADMIN — Medication 2.5 MG: at 18:47

## 2021-03-15 RX ADMIN — Medication 2.5 MG: at 07:53

## 2021-03-15 RX ADMIN — GUAIFENESIN 600 MG: 600 TABLET, EXTENDED RELEASE ORAL at 08:17

## 2021-03-15 RX ADMIN — CEFEPIME HYDROCHLORIDE 2 G: 2 INJECTION, POWDER, FOR SOLUTION INTRAVENOUS at 03:44

## 2021-03-15 RX ADMIN — CEFEPIME HYDROCHLORIDE 2 G: 2 INJECTION, POWDER, FOR SOLUTION INTRAVENOUS at 18:48

## 2021-03-15 RX ADMIN — ASPIRIN 325 MG ORAL TABLET 325 MG: 325 PILL ORAL at 08:17

## 2021-03-15 RX ADMIN — METFORMIN ER 750 MG 750 MG: 750 TABLET ORAL at 18:48

## 2021-03-15 RX ADMIN — FUROSEMIDE 20 MG: 20 TABLET ORAL at 08:17

## 2021-03-15 RX ADMIN — ACETAMINOPHEN 650 MG: 325 TABLET, FILM COATED ORAL at 03:38

## 2021-03-15 RX ADMIN — LISINOPRIL 2.5 MG: 2.5 TABLET ORAL at 08:17

## 2021-03-15 RX ADMIN — ACETAMINOPHEN 650 MG: 325 TABLET, FILM COATED ORAL at 11:00

## 2021-03-15 RX ADMIN — METFORMIN ER 750 MG 750 MG: 750 TABLET ORAL at 12:26

## 2021-03-15 RX ADMIN — INSULIN ASPART 5 UNITS: 100 INJECTION, SOLUTION INTRAVENOUS; SUBCUTANEOUS at 08:18

## 2021-03-15 RX ADMIN — INSULIN GLARGINE 34 UNITS: 100 INJECTION, SOLUTION SUBCUTANEOUS at 08:18

## 2021-03-15 RX ADMIN — SENNOSIDES AND DOCUSATE SODIUM 2 TABLET: 8.6; 5 TABLET ORAL at 08:17

## 2021-03-15 RX ADMIN — HEPARIN SODIUM 5000 UNITS: 5000 INJECTION, SOLUTION INTRAVENOUS; SUBCUTANEOUS at 03:44

## 2021-03-15 RX ADMIN — METOPROLOL TARTRATE 25 MG: 25 TABLET, FILM COATED ORAL at 20:32

## 2021-03-15 RX ADMIN — CEFEPIME HYDROCHLORIDE 2 G: 2 INJECTION, POWDER, FOR SOLUTION INTRAVENOUS at 11:00

## 2021-03-15 RX ADMIN — Medication 2.5 MG: at 03:38

## 2021-03-15 RX ADMIN — ACETAMINOPHEN 650 MG: 325 TABLET, FILM COATED ORAL at 16:20

## 2021-03-15 RX ADMIN — FUROSEMIDE 40 MG: 40 TABLET ORAL at 16:20

## 2021-03-15 RX ADMIN — TIZANIDINE 4 MG: 4 TABLET ORAL at 21:14

## 2021-03-15 RX ADMIN — OXYCODONE HYDROCHLORIDE 5 MG: 5 TABLET ORAL at 14:40

## 2021-03-15 RX ADMIN — Medication 2.5 MG: at 14:39

## 2021-03-15 RX ADMIN — HEPARIN SODIUM 5000 UNITS: 5000 INJECTION, SOLUTION INTRAVENOUS; SUBCUTANEOUS at 20:32

## 2021-03-15 RX ADMIN — OXYCODONE HYDROCHLORIDE 5 MG: 5 TABLET ORAL at 07:54

## 2021-03-15 RX ADMIN — PANTOPRAZOLE SODIUM 40 MG: 40 TABLET, DELAYED RELEASE ORAL at 08:17

## 2021-03-15 RX ADMIN — Medication 2.5 MG: at 10:59

## 2021-03-15 RX ADMIN — GUAIFENESIN 600 MG: 600 TABLET, EXTENDED RELEASE ORAL at 20:32

## 2021-03-15 RX ADMIN — OXYCODONE HYDROCHLORIDE 5 MG: 5 TABLET ORAL at 10:58

## 2021-03-15 RX ADMIN — AMLODIPINE BESYLATE 2.5 MG: 2.5 TABLET ORAL at 08:17

## 2021-03-15 RX ADMIN — METOPROLOL TARTRATE 12.5 MG: 25 TABLET, FILM COATED ORAL at 08:17

## 2021-03-15 RX ADMIN — SENNOSIDES AND DOCUSATE SODIUM 2 TABLET: 8.6; 5 TABLET ORAL at 20:32

## 2021-03-15 RX ADMIN — HEPARIN SODIUM 5000 UNITS: 5000 INJECTION, SOLUTION INTRAVENOUS; SUBCUTANEOUS at 11:00

## 2021-03-15 ASSESSMENT — ACTIVITIES OF DAILY LIVING (ADL)
ADLS_ACUITY_SCORE: 17
ADLS_ACUITY_SCORE: 19
ADLS_ACUITY_SCORE: 17
ADLS_ACUITY_SCORE: 17
ADLS_ACUITY_SCORE: 19
ADLS_ACUITY_SCORE: 17

## 2021-03-15 ASSESSMENT — MIFFLIN-ST. JEOR: SCORE: 1473.75

## 2021-03-15 NOTE — PROVIDER NOTIFICATION
AVSS ON RA. Pain controlled with PO oxycodone and tylenol. Sternal incision YAJAIRA with skin glue. LS clear. Diet regular with poor appt. Up with assist of 1. BS active and audible throughout. 2x stools; semi formed. Voiding well.

## 2021-03-15 NOTE — PLAN OF CARE
VSS.Tele SR. A/Ox 4. Incisions CDI. Pain controlled with oxycodone/tylenol/tizanidine. Up with AO1. Low Na and Low Fat diet. Blood sugars monitored, corrected with sliding scale and carb count. Flatus +. Voiding adequately. LS fine crackles, productive cough. IS to 750. Insulin regimen adjusted, restarted metformin. Educated on carb coverage, patient able to teach back education. Possible discharge home tomorrow.

## 2021-03-15 NOTE — PROGRESS NOTES
[facility]    Medicine Progress Note - Hospitalist Service       Date of Admission:  3/5/2021  Assessment & Plan         Stacy Brown is a 45 year old female admitted on 3/5/2021.  Past history of DM II and hyperlipidemia who presented with chest pain and was found to have NSTEMI.     NSTEMI s/p CABG x4 on 3/9   EKG without ischemic changes, serial trop with peak of 0.115.  TTE with EF 60% without WMA.  Diabetes and cholesterol uncontrolled with A1C 10.1% and .  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  - post-op management per CT Surg  - currently on aspirin, amlodipine (for spasm ppx) and rosuvastatin,  -Metoprolol 12.5 mg twice daily started on 3/13, Lisinopril 2.5mg daily and Lasix  PO 20 mg daily on 3/14    Possible HCAP  Low grade fever starting 3/10.  Persisting fever 3/11 with repeat CXR showing increasing left infiltrates.  - afebrile following initiation of cefepime 3/11, continue, day 5 today  - leukocytosis resolved  - blood cultures 3/11 ngtd  - on RA  - encourage IS/flutter valve    Acute blood loss anemia  EBL of 400cc with surgery.  Pre-op hgb 13.9   -monitor hemoglobin     DM II on long term insulin, uncontrolled  Admission A1C 10.1%.  PTA regimen of Lantus 38 units daily, metformin.    - BG remains uncontrolled  - increase Lantus to 38 units qAM  - adjust Aspart to 1 units/10 g carb TID w/meals, high dose sliding scale insulin  - restart PTA Metformin at 750mg BID, increase to PTA dose at discharge  - discussed with RN, will work with patient about carb counting  - Endocrinology referral at discharg     Hyperlipidemia   - Continue Crestor 20 mg daily     Chronic Pain  Sacroiliitis  - Continue PTA Percocet     Tobacco Dependence  In recent weeks has cut down from 1ppd-1/2ppd. She is now motivated to quit.  - Nicotine gum available    Constipation  -Continue senna-Colace twice daily, MiraLAX daily  - suppository prn        Diet: Advance Diet as Tolerated: Regular Diet Adult; Low Saturated Fat Na <2400mg Diet  Snacks/Supplements Adult: Boost Glucose Control; Between Meals    DVT Prophylaxis: heparin subcutaneous   Schultz Catheter: not present  Code Status: Full Code           Disposition Plan   Expected discharge: likely tomorrow per CV surgery    .  Entered: Jacob Chavez MD 03/15/2021, 11:26 AM       The patient's care was discussed with the Bedside Nurse and Patient.    Jacob Chavez MD  Hospitalist Service  [facility]  Contact information available via [facility] Paging/Directory    ______________________________________________________________________    Interval History    Patient reports she is improving. Feels she is more alert today. Had BMs since yesterday. Abdomen feels a little crampy. Denies nausea or emesis. She is currently on RA.     Data reviewed today: I reviewed all medications, new labs and imaging results over the last 24 hours. I personally reviewed no images or EKG's today.    Physical Exam   Vital Signs: Temp: 97.7  F (36.5  C) Temp src: Oral BP: 119/60(Post CR) Pulse: 88   Resp: 18 SpO2: 94 % O2 Device: None (Room air) Oxygen Delivery: 1 LPM  Weight: 179 lbs .22 oz     General Appearance: Alert, awake and no apparent distress  Respiratory: non-labored breathing, diminished at the bases, upper lungs are CTAB  Cardiovascular: RRR  Skin: no peripheral edema  Abd: soft and non-tender    Data   Recent Labs   Lab 03/15/21  0704 03/14/21  0718 03/13/21  0636 03/11/21  0410 03/11/21  0410 03/10/21  0405 03/09/21  1820 03/09/21  1642 03/09/21  1642   WBC 6.3 5.8 8.2   < > 11.5* 13.6* 16.9*  --  21.8*   HGB 9.5* 9.6* 10.5*   < > 8.8* 9.5* 11.7   < > 10.3*   MCV 84 84 84   < > 85 82 81  --  82    302 257   < > 174 228 238  --  266   INR  --   --   --   --   --   --  1.21*  --  1.38*    136 138   < > 139 144 145*   < > 144   POTASSIUM 3.8 3.7 3.7   < > 4.0 3.5 4.0   < > 3.8    CHLORIDE 104 103 105   < > 110* 111* 113*  --  112*   CO2 29 29 26   < > 26 25 26  --  25   BUN 9 10 13   < > 20 12 12  --  12   CR 0.44* 0.49* 0.50*   < > 0.62 0.57 0.46*  --  0.50*   ANIONGAP 4 4 7   < > 3 8 6  --  7   LNUA 9.1 9.3 9.5   < > 8.2* 8.2* 8.5  --  9.4   * 203* 197*   < > 110* 89 189*   < > 208*   ALBUMIN  --   --   --   --  3.8 4.1 3.3*  --  3.2*   PROTTOTAL  --   --   --   --  6.1* 6.2* 5.8*  --  5.5*   BILITOTAL  --   --   --   --  0.6 0.7 0.7  --  0.4   ALKPHOS  --   --   --   --  66 45 60  --  57   ALT  --   --   --   --  76* 71* 88*  --  81*   AST  --   --   --   --  46* 50* 74*  --  67*    < > = values in this interval not displayed.

## 2021-03-15 NOTE — PLAN OF CARE
Date & Time: 3/15 5144-6457  Diagnosis: NSTEMI   Procedures: 3/9 - CABGx4 w/ Dr. Selby   Orientation/Cognitive: AOx4  VS/O2: VSS, RA   Mobility: SBA + gb   Diet: Cardiac   Pain Management: PRN tylenol, oxycodone   Bowel & Bladder: Continent, BMx1, does have her period  Skin: Sternal incision & graft sites CDI, L thumb - numbness   Abnormal Labs: , Cr 0.44  Tele: NSR   IV Access/Drips/Fluids: R PIVx2 SL   Drains: NA  Tests: NA  Consults: Cardiology, cardiothoracic, PT, OT, RT, hospitalist  Discharge Plan: Possibly 3/16 home   Other: Stressed about going home d/t 2 children &  relying on her to care for them

## 2021-03-15 NOTE — PROGRESS NOTES
RT EZPAP Therapy Note    Daily assessment of necessity for PAP therapy: atelectasisi  Route (mouth piece or mask): mouthpiece  PAP Flow rate (L/min): 6  PAP Duration (min): 5 min  Patient position: sitting up  Post treatment assessment:  BS crackles, SPO2 94% on RA  Patient tolerance: well    Plan:  RT will continue to follow.  Carla Munoz, RT

## 2021-03-15 NOTE — PROGRESS NOTES
[facility]  Cardiovascular and Thoracic Surgery Daily Note          Assessment and Plan:   POD#5 s/p coronary artery bypass grafting x 4 (LIMA to LAD, SVG to PDA, SVG to RPL, RA to OM) on 3/9/21 by Dr Selby  -CVS: -150s/60-80s, HR 60-90s, aspirin 325 mg, amlodipine 2.5 mg PO (for prevention of radial artery spasm x 6 months), lopressor inc 25 mg PO bid with parameters, crestor 20 mg, subcutaneous heparin tid, change lasix to 20 mg PO daily, lisinopril 2.5 mg PO daily  -Resp: Extubated POD #0 at 2000, saturating well on RA, continue mucinex bid, smoked prior to surgery with plans for cessation, +HAP - cefepime 2 g q 8 hours, wean O2 as able  -Neuro:  Moving all extremities, answering all questions appropriately  -Renal: ross removed POD #1, Cr/BUN 0.5/13  -GI:  Continue bowel regimen, +BM  -:  Ross removed POD #1  -Endo: Per hospitalist, Hgb A1C 10.1 (3/5/2021), lantus and sliding scale insulin, inc per hospitalist service as sugars still >200  -FEN: replete lytes as needed, Na 138, K 3.8  -ID: WBC: 6.3, Temp (24hrs), Av.3  F (36.8  C), Min:97.7  F (36.5  C), Max:98.7  F (37.1  C), perioperative antibiotics finished, being treated for HAP - cefepime, no further fevers, cultures neg to date, UA neg   -Heme: Hgb: 9.5, plt: 311  -Proph: PCD, subcutaneous heparin tid  -Dispo: Encouraged IS/TCDB/amb. Sternal precautions. Needs pulm toilet. Looking at discharge tomorrow. Continue to monitor.           Interval History:   Sitting up in bed, eating lunch, going over carb counting with nurse, breathing fine, tolerating diet, encouraged to use IS          Medications:       amLODIPine  2.5 mg Oral Daily     aspirin  325 mg Oral or NG Tube Daily     ceFEPIme (MAXIPIME) IV  2 g Intravenous Q8H     furosemide  20 mg Oral Daily     guaiFENesin  600 mg Oral BID     heparin ANTICOAGULANT  5,000 Units Subcutaneous Q8H     insulin aspart   Subcutaneous TID w/meals     insulin aspart  1-10 Units  "Subcutaneous TID AC     insulin aspart  1-7 Units Subcutaneous At Bedtime     [START ON 3/16/2021] insulin glargine  38 Units Subcutaneous QAM AC     lisinopril  2.5 mg Oral Daily     metFORMIN  750 mg Oral BID w/meals     metoprolol tartrate  25 mg Oral BID     pantoprazole  40 mg Oral Daily     polyethylene glycol  17 g Oral Daily     rosuvastatin  20 mg Oral Daily     senna-docusate  2 tablet Oral BID     @MEDSPRN-@          Physical Exam:   Vitals were reviewed  Blood pressure 131/68, pulse 83, temperature 98.1  F (36.7  C), temperature source Oral, resp. rate 18, height 1.676 m (5' 6\"), weight 81.2 kg (179 lb 0.2 oz), SpO2 93 %, not currently breastfeeding.  Rhythm: NSr    Lungs: course    Cardiovascular: s1/s2, no m/r/g    Abdomen: s/nt/nd    Extremeties: trace LE edema    Incision: cdi    CT: na    Weight:   Vitals:    03/11/21 1734 03/12/21 0646 03/13/21 0646 03/14/21 0000   Weight: 84.4 kg (186 lb 1.6 oz) 83.6 kg (184 lb 4.9 oz) 84.1 kg (185 lb 6.5 oz) 81.7 kg (180 lb 1.6 oz)    03/15/21 0457   Weight: 81.2 kg (179 lb 0.2 oz)            Data:   Labs:   Lab Results   Component Value Date    WBC 6.3 03/15/2021     Lab Results   Component Value Date    RBC 3.48 03/15/2021     Lab Results   Component Value Date    HGB 9.5 03/15/2021     Lab Results   Component Value Date    HCT 29.1 03/15/2021     No components found for: MCT  Lab Results   Component Value Date    MCV 84 03/15/2021     Lab Results   Component Value Date    MCH 27.3 03/15/2021     Lab Results   Component Value Date    MCHC 32.6 03/15/2021     Lab Results   Component Value Date    RDW 14.0 03/15/2021     Lab Results   Component Value Date     03/15/2021       Last Basic Metabolic Panel:  Lab Results   Component Value Date     03/15/2021      Lab Results   Component Value Date    POTASSIUM 3.8 03/15/2021     Lab Results   Component Value Date    CHLORIDE 104 03/15/2021     Lab Results   Component Value Date    LUNA 9.1 03/15/2021 "     Lab Results   Component Value Date    CO2 29 03/15/2021     Lab Results   Component Value Date    BUN 9 03/15/2021     Lab Results   Component Value Date    CR 0.44 03/15/2021     Lab Results   Component Value Date     03/15/2021       Katy Lee PA-C  Pager #: [phone]

## 2021-03-16 ENCOUNTER — APPOINTMENT (OUTPATIENT)
Dept: PHYSICAL THERAPY | Facility: CLINIC | Age: 46
End: 2021-03-16
Attending: INTERNAL MEDICINE
Payer: COMMERCIAL

## 2021-03-16 ENCOUNTER — APPOINTMENT (OUTPATIENT)
Dept: OCCUPATIONAL THERAPY | Facility: CLINIC | Age: 46
End: 2021-03-16
Attending: INTERNAL MEDICINE
Payer: COMMERCIAL

## 2021-03-16 VITALS
HEART RATE: 82 BPM | OXYGEN SATURATION: 95 % | BODY MASS INDEX: 28.24 KG/M2 | HEIGHT: 66 IN | TEMPERATURE: 98.2 F | SYSTOLIC BLOOD PRESSURE: 112 MMHG | RESPIRATION RATE: 16 BRPM | DIASTOLIC BLOOD PRESSURE: 62 MMHG | WEIGHT: 175.71 LBS

## 2021-03-16 PROBLEM — R73.9 TRANSIENT HYPERGLYCEMIA POST PROCEDURE: Status: ACTIVE | Noted: 2021-03-16

## 2021-03-16 PROBLEM — Z95.1 S/P CABG (CORONARY ARTERY BYPASS GRAFT): Status: ACTIVE | Noted: 2021-03-16

## 2021-03-16 PROBLEM — E87.70 FLUID OVERLOAD: Status: ACTIVE | Noted: 2021-03-16

## 2021-03-16 LAB
GLUCOSE BLDC GLUCOMTR-MCNC: 140 MG/DL (ref 70–99)
GLUCOSE BLDC GLUCOMTR-MCNC: 147 MG/DL (ref 70–99)
GLUCOSE BLDC GLUCOMTR-MCNC: 197 MG/DL (ref 70–99)
PLATELET # BLD AUTO: 364 10E9/L (ref 150–450)

## 2021-03-16 PROCEDURE — 999N001017 HC STATISTIC GLUCOSE BY METER IP

## 2021-03-16 PROCEDURE — 94799 UNLISTED PULMONARY SVC/PX: CPT

## 2021-03-16 PROCEDURE — 36415 COLL VENOUS BLD VENIPUNCTURE: CPT | Performed by: PHYSICIAN ASSISTANT

## 2021-03-16 PROCEDURE — 94660 CPAP INITIATION&MGMT: CPT

## 2021-03-16 PROCEDURE — 250N000012 HC RX MED GY IP 250 OP 636 PS 637: Performed by: INTERNAL MEDICINE

## 2021-03-16 PROCEDURE — 250N000011 HC RX IP 250 OP 636: Performed by: PHYSICIAN ASSISTANT

## 2021-03-16 PROCEDURE — 250N000013 HC RX MED GY IP 250 OP 250 PS 637

## 2021-03-16 PROCEDURE — 999N000157 HC STATISTIC RCP TIME EA 10 MIN

## 2021-03-16 PROCEDURE — 250N000013 HC RX MED GY IP 250 OP 250 PS 637: Performed by: PHYSICIAN ASSISTANT

## 2021-03-16 PROCEDURE — 250N000011 HC RX IP 250 OP 636: Performed by: HOSPITALIST

## 2021-03-16 PROCEDURE — 97110 THERAPEUTIC EXERCISES: CPT | Mod: GP

## 2021-03-16 PROCEDURE — 250N000013 HC RX MED GY IP 250 OP 250 PS 637: Performed by: INTERNAL MEDICINE

## 2021-03-16 PROCEDURE — 85049 AUTOMATED PLATELET COUNT: CPT | Performed by: PHYSICIAN ASSISTANT

## 2021-03-16 PROCEDURE — 250N000013 HC RX MED GY IP 250 OP 250 PS 637: Performed by: STUDENT IN AN ORGANIZED HEALTH CARE EDUCATION/TRAINING PROGRAM

## 2021-03-16 PROCEDURE — 97535 SELF CARE MNGMENT TRAINING: CPT | Mod: GO

## 2021-03-16 PROCEDURE — 99232 SBSQ HOSP IP/OBS MODERATE 35: CPT | Performed by: INTERNAL MEDICINE

## 2021-03-16 RX ORDER — INSULIN GLARGINE 100 [IU]/ML
INJECTION, SOLUTION SUBCUTANEOUS
Qty: 30 ML | Refills: 0 | Status: SHIPPED | OUTPATIENT
Start: 2021-03-16 | End: 2021-09-01

## 2021-03-16 RX ORDER — ROSUVASTATIN CALCIUM 20 MG/1
20 TABLET, COATED ORAL DAILY
Qty: 30 TABLET | Refills: 3 | Status: SHIPPED | OUTPATIENT
Start: 2021-03-16 | End: 2021-06-21

## 2021-03-16 RX ORDER — AMLODIPINE BESYLATE 2.5 MG/1
2.5 TABLET ORAL DAILY
Qty: 30 TABLET | Refills: 3 | Status: SHIPPED | OUTPATIENT
Start: 2021-03-16 | End: 2021-06-21

## 2021-03-16 RX ORDER — METOPROLOL TARTRATE 25 MG/1
25 TABLET, FILM COATED ORAL 2 TIMES DAILY
Qty: 60 TABLET | Refills: 3 | Status: SHIPPED | OUTPATIENT
Start: 2021-03-16 | End: 2021-08-09

## 2021-03-16 RX ORDER — LISINOPRIL 2.5 MG/1
2.5 TABLET ORAL DAILY
Qty: 30 TABLET | Refills: 3 | Status: SHIPPED | OUTPATIENT
Start: 2021-03-16 | End: 2021-06-21

## 2021-03-16 RX ORDER — ASPIRIN 325 MG
325 TABLET ORAL DAILY
Qty: 30 TABLET | Refills: 0 | Status: SHIPPED | OUTPATIENT
Start: 2021-03-16 | End: 2021-03-29

## 2021-03-16 RX ORDER — PANTOPRAZOLE SODIUM 40 MG/1
40 TABLET, DELAYED RELEASE ORAL DAILY
Qty: 14 TABLET | Refills: 0 | Status: SHIPPED | OUTPATIENT
Start: 2021-03-16 | End: 2021-03-30

## 2021-03-16 RX ORDER — METFORMIN HCL 500 MG
1000 TABLET, EXTENDED RELEASE 24 HR ORAL 2 TIMES DAILY WITH MEALS
Qty: 120 TABLET | Refills: 0 | Status: SHIPPED | OUTPATIENT
Start: 2021-03-16 | End: 2021-03-29

## 2021-03-16 RX ORDER — INSULIN ASPART 100 [IU]/ML
INJECTION, SOLUTION INTRAVENOUS; SUBCUTANEOUS
Qty: 30 ML | Refills: 0 | Status: SHIPPED | OUTPATIENT
Start: 2021-03-16 | End: 2021-03-30

## 2021-03-16 RX ORDER — ACETAMINOPHEN 325 MG/1
650 TABLET ORAL EVERY 4 HOURS PRN
Qty: 40 TABLET | Refills: 0 | Status: SHIPPED | OUTPATIENT
Start: 2021-03-16 | End: 2021-03-23

## 2021-03-16 RX ORDER — AMOXICILLIN 250 MG
2 CAPSULE ORAL 2 TIMES DAILY
Qty: 30 TABLET | Refills: 0 | Status: SHIPPED | OUTPATIENT
Start: 2021-03-16 | End: 2021-04-15

## 2021-03-16 RX ORDER — OXYCODONE HYDROCHLORIDE 5 MG/1
7.5 TABLET ORAL EVERY 4 HOURS PRN
Qty: 60 TABLET | Refills: 0 | Status: SHIPPED | OUTPATIENT
Start: 2021-03-16 | End: 2021-03-23

## 2021-03-16 RX ORDER — POLYETHYLENE GLYCOL 3350 17 G/17G
17 POWDER, FOR SOLUTION ORAL DAILY
Qty: 510 G | Refills: 0 | Status: SHIPPED | OUTPATIENT
Start: 2021-03-16 | End: 2023-01-17

## 2021-03-16 RX ADMIN — PROCHLORPERAZINE MALEATE 10 MG: 5 TABLET ORAL at 03:20

## 2021-03-16 RX ADMIN — GUAIFENESIN 600 MG: 600 TABLET, EXTENDED RELEASE ORAL at 08:29

## 2021-03-16 RX ADMIN — FUROSEMIDE 20 MG: 20 TABLET ORAL at 08:30

## 2021-03-16 RX ADMIN — OXYCODONE HYDROCHLORIDE 5 MG: 5 TABLET ORAL at 13:27

## 2021-03-16 RX ADMIN — CEFEPIME HYDROCHLORIDE 2 G: 2 INJECTION, POWDER, FOR SOLUTION INTRAVENOUS at 10:21

## 2021-03-16 RX ADMIN — INSULIN GLARGINE 38 UNITS: 100 INJECTION, SOLUTION SUBCUTANEOUS at 08:30

## 2021-03-16 RX ADMIN — METOPROLOL TARTRATE 25 MG: 25 TABLET, FILM COATED ORAL at 08:29

## 2021-03-16 RX ADMIN — OXYCODONE HYDROCHLORIDE 5 MG: 5 TABLET ORAL at 02:07

## 2021-03-16 RX ADMIN — HEPARIN SODIUM 5000 UNITS: 5000 INJECTION, SOLUTION INTRAVENOUS; SUBCUTANEOUS at 06:07

## 2021-03-16 RX ADMIN — PANTOPRAZOLE SODIUM 40 MG: 40 TABLET, DELAYED RELEASE ORAL at 08:29

## 2021-03-16 RX ADMIN — ROSUVASTATIN CALCIUM 20 MG: 20 TABLET, FILM COATED ORAL at 08:29

## 2021-03-16 RX ADMIN — CEFEPIME HYDROCHLORIDE 2 G: 2 INJECTION, POWDER, FOR SOLUTION INTRAVENOUS at 02:06

## 2021-03-16 RX ADMIN — Medication 7.5 MG: at 13:07

## 2021-03-16 RX ADMIN — ASPIRIN 325 MG ORAL TABLET 325 MG: 325 PILL ORAL at 08:30

## 2021-03-16 RX ADMIN — ACETAMINOPHEN 650 MG: 325 TABLET, FILM COATED ORAL at 02:07

## 2021-03-16 RX ADMIN — OXYCODONE HYDROCHLORIDE 7.5 MG: 5 TABLET ORAL at 08:29

## 2021-03-16 RX ADMIN — Medication 2.5 MG: at 02:08

## 2021-03-16 RX ADMIN — TIZANIDINE 4 MG: 4 TABLET ORAL at 11:12

## 2021-03-16 RX ADMIN — METFORMIN ER 750 MG 750 MG: 750 TABLET ORAL at 08:29

## 2021-03-16 RX ADMIN — LISINOPRIL 2.5 MG: 2.5 TABLET ORAL at 08:30

## 2021-03-16 RX ADMIN — AMLODIPINE BESYLATE 2.5 MG: 2.5 TABLET ORAL at 08:30

## 2021-03-16 ASSESSMENT — MIFFLIN-ST. JEOR: SCORE: 1458.75

## 2021-03-16 ASSESSMENT — ACTIVITIES OF DAILY LIVING (ADL)
ADLS_ACUITY_SCORE: 17

## 2021-03-16 NOTE — PLAN OF CARE
A+Ox4 VSS on room air. LS clear. Tele NSR. Sternotomy and vein harvest sites w/ liquid bandage. Bowels active, flatus+, BM+. Voiding adequately. Numbness in L hand. Oxy for pain. Up SBA. CHO diet.

## 2021-03-16 NOTE — PROGRESS NOTES
[facility]    Medicine Progress Note - Hospitalist Service       Date of Admission:  3/5/2021  Assessment & Plan         Stacy Brown is a 45 year old female admitted on 3/5/2021.  Past history of DM II and hyperlipidemia who presented with chest pain and was found to have NSTEMI.     NSTEMI s/p CABG x4 on 3/9   EKG without ischemic changes, serial trop with peak of 0.115.  TTE with EF 60% without WMA.  Diabetes and cholesterol uncontrolled with A1C 10.1% and .  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  - post-op management per CT Surg  - currently on aspirin, amlodipine (for spasm ppx) and rosuvastatin,  -Metoprolol 12.5 mg twice daily started on 3/13, Lisinopril 2.5mg daily and Lasix  PO 20 mg daily on 3/14    Possible HCAP  Low grade fever starting 3/10.  Persisting fever 3/11 with repeat CXR showing increasing left infiltrates.  - has completed 5+ days of antibiotics with cefepime. No need to continue antibiotics at this time  - fever/leukocytosis has resolved. She is on RA.   - blood cultures 3/11 ngtd  - on RA  - encourage IS/flutter valve    Acute blood loss anemia  EBL of 400cc with surgery.  Pre-op hgb 13.9   -monitor hemoglobin     DM II on long term insulin, uncontrolled  Admission A1C 10.1%.  PTA regimen of Lantus 38 units daily, metformin.    - BG improved to less than 150.  - continue with PTA Lantus 38 units at discharge  - Aspart 1 units/10 g carb TID w/meals--patient educated on carbohydrate counting  - continue PTA Glucophage XR 1000mg BID (PTA was taking 2000mg daily)  - keep log of her blood glucose and take it to PCP visit for further adjustment in insulin as indicated  - Endocrinology referral at discharge  --->insulin and metformin refill rx sent to pharmacy     Hyperlipidemia   - Continue Crestor 20 mg daily     Chronic Pain  Sacroiliitis  - Continue PTA Percocet     Tobacco Dependence  In recent weeks has cut down  from 1ppd-1/2ppd. She is now motivated to quit.  - Nicotine gum available    Constipation  -improved       Diet: Advance Diet as Tolerated: Regular Diet Adult; Low Saturated Fat Na <2400mg Diet  Snacks/Supplements Adult: Boost Glucose Control; Between Meals  Diet    DVT Prophylaxis: heparin subcutaneous   Schultz Catheter: not present  Code Status: Full Code           Disposition Plan   Expected discharge: plan to discharge home today per CV surgery  .  Entered: Jacob Chavez MD 03/16/2021, 9:57 AM       The patient's care was discussed with the Bedside Nurse and Patient.    Jacob Chavez MD  Hospitalist Service  [facility]  Contact information available via [facility] Paging/Directory    ______________________________________________________________________    Interval History    Discharging home today. [place] nauseous this morning. Has been eating more carckers.   Would like rx for insulin/metformin.     Data reviewed today: I reviewed all medications, new labs and imaging results over the last 24 hours. I personally reviewed no images or EKG's today.    Physical Exam   Vital Signs: Temp: 98.2  F (36.8  C) Temp src: Oral BP: 112/62(Post CR) Pulse: 82   Resp: 16 SpO2: 95 % O2 Device: None (Room air)    Weight: 175 lbs 11.31 oz     General Appearance: Alert, awake and no apparent distress  Respiratory: non-labored breathing, CTAB  Cardiovascular: RRR  Skin: warm and dry  Abd: soft and non-tender    Data   Recent Labs   Lab 03/16/21  0623 03/15/21  0704 03/14/21  0718 03/13/21  0636 03/11/21  0410 03/11/21  0410 03/10/21  0405 03/09/21  1820 03/09/21  1642 03/09/21  1642   WBC  --  6.3 5.8 8.2   < > 11.5* 13.6* 16.9*  --  21.8*   HGB  --  9.5* 9.6* 10.5*   < > 8.8* 9.5* 11.7   < > 10.3*   MCV  --  84 84 84   < > 85 82 81  --  82    311 302 257   < > 174 228 238  --  266   INR  --   --   --   --   --   --   --  1.21*  --  1.38*   NA  --  137 136 138   < > 139 144 145*   < > 144    POTASSIUM  --  3.8 3.7 3.7   < > 4.0 3.5 4.0   < > 3.8   CHLORIDE  --  104 103 105   < > 110* 111* 113*  --  112*   CO2  --  29 29 26   < > 26 25 26  --  25   BUN  --  9 10 13   < > 20 12 12  --  12   CR  --  0.44* 0.49* 0.50*   < > 0.62 0.57 0.46*  --  0.50*   ANIONGAP  --  4 4 7   < > 3 8 6  --  7   LUNA  --  9.1 9.3 9.5   < > 8.2* 8.2* 8.5  --  9.4   GLC  --  241* 203* 197*   < > 110* 89 189*   < > 208*   ALBUMIN  --   --   --   --   --  3.8 4.1 3.3*  --  3.2*   PROTTOTAL  --   --   --   --   --  6.1* 6.2* 5.8*  --  5.5*   BILITOTAL  --   --   --   --   --  0.6 0.7 0.7  --  0.4   ALKPHOS  --   --   --   --   --  66 45 60  --  57   ALT  --   --   --   --   --  76* 71* 88*  --  81*   AST  --   --   --   --   --  46* 50* 74*  --  67*    < > = values in this interval not displayed.

## 2021-03-16 NOTE — CONSULTS
Care Management Initial Consult    General Information  Assessment completed with: Patient, VM-chart review,    Type of CM/SW Visit: Initial Assessment    Primary Care Provider verified and updated as needed: Yes   Readmission within the last 30 days: planned readmission      Reason for Consult: discharge planning  Advance Care Planning: Advance Care Planning Reviewed: other (comment)(none)          Communication Assessment  Patient's communication style: spoken language (English or Bilingual)    Hearing Difficulty or Deaf: no   Wear Glasses or Blind: yes    Cognitive  Cognitive/Neuro/Behavioral: WDL  Level of Consciousness: alert  Arousal Level: opens eyes spontaneously  Orientation: oriented x 4  Mood/Behavior: calm, cooperative  Best Language: 0 - No aphasia  Speech: clear, spontaneous, logical    Living Environment:   People in home: child(valencia), adult, spouse     Current living Arrangements: house      Able to return to prior arrangements: yes       Family/Social Support:  Care provided by: self  Provides care for: no one  Marital Status:   , Children          Description of Support System: Involved, Other (see comments)(daughter doing some of the household chores now, spouse might not have as much insight into what patient has been through with surgery--will need reinforcement of post op education, spouse can drive patient to appointments when appropriate )         Current Resources:   Patient receiving home care services: No     Community Resources:    Equipment currently used at home: none  Supplies currently used at home:      Employment/Financial:  Employment Status:          Financial Concerns: other (see comments)(nothing voiced)           Lifestyle & Psychosocial Needs:        Socioeconomic History     Marital status:      Spouse name: Humble     Number of children: 2     Years of education: 14     Highest education level: Not on file   Occupational History     Occupation: Post  Office     Employer: [employer]     Tobacco Use     Smoking status: Current Every Day Smoker     Packs/day: 0.50     Years: 6.00     Pack years: 3.00     Last attempt to quit: 9/22/2010     Years since quitting: 10.4     Smokeless tobacco: Never Used   Substance and Sexual Activity     Alcohol use: Yes     Alcohol/week: 0.0 standard drinks     Comment: once a month     Drug use: No     Sexual activity: Not Currently     Partners: Male     Birth control/protection: Surgical       Functional Status:  Prior to admission patient needed assistance:              Mental Health Status:          Chemical Dependency Status:                Values/Beliefs:  Spiritual, Cultural Beliefs, Rastafari Practices, Values that affect care: no               Care Management Discharge Note    Discharge Date: 03/16/21(home)       Discharge Disposition: Home, Home Care    Discharge Services: None    Discharge DME: None    Discharge Transportation: family or friend will provide    Private pay costs discussed: Not applicable    PAS Confirmation Code:    Patient/family educated on Medicare website which has current facility and service quality ratings: yes    Education Provided on the Discharge Plan:    Persons Notified of Discharge Plans: patient, spouse  Patient/Family in Agreement with the Plan: yes    Handoff Referral Completed: Yes    Additional Information:  Home today with [facility] services through [facility]. Referral sent via standard process. Endocrinology and PCP appointments completed.      Estephanie Seth RN   [facility]   Phone [phone]

## 2021-03-16 NOTE — PROGRESS NOTES
CV Surgery    S: Sitting up in bed, breathing fine, tolerating diet, moves well with rehab, home in late am    O: B/P: 109/65, T: 98.3, P: 79, R: 16  General: NAD  Heart: s1/s2, no m/r/g  Lungs: course  Abd: s/nt/nd  Sternum: cdi  Extremities: no LE edema    Lab Results   Component Value Date    WBC 6.3 03/15/2021     Lab Results   Component Value Date    RBC 3.48 03/15/2021     Lab Results   Component Value Date    HGB 9.5 03/15/2021     Lab Results   Component Value Date    HCT 29.1 03/15/2021     No components found for: MCT  Lab Results   Component Value Date    MCV 84 03/15/2021     Lab Results   Component Value Date    MCH 27.3 03/15/2021     Lab Results   Component Value Date    MCHC 32.6 03/15/2021     Lab Results   Component Value Date    RDW 14.0 03/15/2021     Lab Results   Component Value Date     03/16/2021       Last Basic Metabolic Panel:  Lab Results   Component Value Date     03/15/2021      Lab Results   Component Value Date    POTASSIUM 3.8 03/15/2021     Lab Results   Component Value Date    CHLORIDE 104 03/15/2021     Lab Results   Component Value Date    LUNA 9.1 03/15/2021     Lab Results   Component Value Date    CO2 29 03/15/2021     Lab Results   Component Value Date    BUN 9 03/15/2021     Lab Results   Component Value Date    CR 0.44 03/15/2021     Lab Results   Component Value Date     03/15/2021       A/P: POD#6 s/p coronary artery bypass grafting x 4 (LIMA to LAD, SVG to PDA, SVG to RPL, RA to OM) on 3/9/21 by Dr Selby      Seen and discussed with surgeon  Katy Lee PA-C  Pager [phone]

## 2021-03-16 NOTE — PLAN OF CARE
A/o x4. AVSS on RA. Pain managed w/ PO oxy. Sternal incision and CT sites all YAJAIRA, L radial, BLE no signs of infection noted. LS clear. BS active, +flatus, +BM. Void +. Tolerating Mod CHO diet. CMS intact. Up SBA. Tele: SR. Provided discharge teaching.

## 2021-03-16 NOTE — PLAN OF CARE
Physical Therapy Discharge Summary    Reason for therapy discharge:    Discharged to home with outpatient therapy.    Progress towards therapy goal(s). See goals on Care Plan in [facility] electronic health record for goal details.  Goals partially met.  Barriers to achieving goals:   discharge from facility.    Therapy recommendation(s):    Continued therapy is recommended.  Rationale/Recommendations:  For further cardiac education and endurance training.
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
## 2021-03-16 NOTE — PLAN OF CARE
Occupational Therapy Discharge Summary    Reason for therapy discharge:    Discharged to home.    Progress towards therapy goal(s). See goals on Care Plan in [facility] electronic health record for goal details.  Goals met    Therapy recommendation(s):    No further therapy is recommended.

## 2021-03-16 NOTE — PROGRESS NOTES
[facility] Care  Spoke with patient and spouse Matteo to discuss plans for HC. Patient to be discharged home today and has agreed to have [facility] follow with RN, PT, OT. Patient care support center processing referral. Patient verbalized understanding that initial visit is scheduled for 3/17 or 3/18. Patient has 24 hour phone number for [facility] for any questions or concerns.    Nydia Estrada RN   [facility] Care Liaison   [phone]

## 2021-03-17 ENCOUNTER — TELEPHONE (OUTPATIENT)
Dept: FAMILY MEDICINE | Facility: CLINIC | Age: 46
End: 2021-03-17

## 2021-03-17 DIAGNOSIS — Z79.4 TYPE 2 DIABETES MELLITUS WITH HYPERGLYCEMIA, WITH LONG-TERM CURRENT USE OF INSULIN (H): ICD-10-CM

## 2021-03-17 DIAGNOSIS — E11.65 TYPE 2 DIABETES MELLITUS WITH HYPERGLYCEMIA, WITH LONG-TERM CURRENT USE OF INSULIN (H): ICD-10-CM

## 2021-03-17 DIAGNOSIS — E78.5 HYPERLIPIDEMIA LDL GOAL <100: ICD-10-CM

## 2021-03-17 LAB
BACTERIA SPEC CULT: NO GROWTH
BACTERIA SPEC CULT: NO GROWTH
SPECIMEN SOURCE: NORMAL
SPECIMEN SOURCE: NORMAL

## 2021-03-17 RX ORDER — SIMVASTATIN 20 MG
20 TABLET ORAL AT BEDTIME
Qty: 90 TABLET | Refills: 0 | Status: CANCELLED | OUTPATIENT
Start: 2021-03-17

## 2021-03-17 NOTE — TELEPHONE ENCOUNTER
"Hospital/TCU/ED for chronic condition Discharge Protocol    \"Hi, my name is Nadine Naqvi RN, a registered nurse, and I am calling from [facility].  I am calling to follow up and see how things are going for you after your recent emergency visit/hospital/TCU stay.\"    Tell me how you are doing now that you are home?\" Not terrible, not great, but not terrible      Discharge Instructions    \"Let's review your discharge instructions.  What is/are the follow-up recommendations?  Pt. Response: Instructions listed below are discussed.    \"Has an appointment with your primary care provider been scheduled?\"   Yes. (confirm)    \"When you see the provider, I would recommend that you bring your medications with you.\"    Medications    \"Tell me what changed about your medicines when you discharged?\"    Changes to chronic meds?    2 or more - Epic MTM referral needed    \"What questions do you have about your medications?\"    None     New diagnoses of heart failure, COPD, diabetes, or MI?    Yes - Care Coordination Referral needed     On insulin: \"Did you start on insulin in the hospital or did you have your insulin dose changed?\"  No         Post Discharge Medication Reconciliation Status: discharge medications reconciled, continue medications without change.    Was MTM referral placed (*Make sure to put transitions as reason for referral)?   No    Call Summary    \"What questions or concerns do you have about your recent visit and your follow-up care?\"     none    \"If you have questions or things don't continue to improve, we encourage you contact us through the main clinic number (give number).  Even if the clinic is not open, triage nurses are available 24/7 to help you.     We would like you to know that our clinic has extended hours (provide information).  We also have urgent care (provide details on closest location and hours/contact info)\"      \"Thank you for your time and take care!\"       Patient states she " cannot find her Simvastatin.  She is informed that the last time it was filled was 12/19 for #90 with 0 refills.  She states she is supposed to be on both the Simvastatin and Crestor, but they did not refill the Simvastatin at the hospital discharge.  Routing to PCP to decide if she is supposed to be on both of these medications.    She is also wanting to know if she should have nitroglycerin on hand in case she were to have another heart attack.    Please call back to inform her about these medication questions.  Routing to PCP for further advice.    Nadine Naqvi, RN

## 2021-03-17 NOTE — TELEPHONE ENCOUNTER
Discharge Instructions and Follow-Up:   Discharge diet: Regular   Discharge activity: Daily weights: Call if weight gain 2-3 lbs over 24 hours or if greater than 5 lbs in 1 week.  No lifting more than 10 lbs for 8-12 weeks.  No driving for 1 month.  Call for pain medication refill.  Call for temperature greater than 101.0  Daily shower with antibacterial soap.   Discharge follow-up: *Follow up primary care provider in 7-10 days after discharge in order to review your medication, vital signs, obtain any necessary lab work your doctor may want, and to update them on your hospitalization and medical issues.   *Follow up with Katy/Margie/Maverick Yates with Dr Montana, heart surgeon, at [facility] at [facility] [address] on 3/29/21 at 2pm. If any questions or concerns call [phone].  You will see us once at this visit and then if everything is going well you will not need to see us again.  You will follow long term with your cardiologist.   *Follow up with Dr Ham, cardiologist, on 5/20/21 in [place]. This is who you will follow with long term about your heart issues. [phone].

## 2021-03-17 NOTE — TELEPHONE ENCOUNTER
Patient called to schedule an appointment for a hospital follow-up or appeared on a report showing that they were recently discharged from the hospital.    Patient was admitted to [facility]:  3/5/2021  Discharged date: 3/16/2021  Reason for hospital admission:  Nstemi (Non-St Elevated Myocardial Infarction) (H)  Does patient have future appointment scheduled with provider? No  Date of future appointment:  NA      This information will be used to help the care team plan for the patients upcoming visit.  The triage RN may determine that a follow up call is necessary and reach out to the patient via the phone number listed in the chart.     Please route this message on routine priority to the Triage RN pool.

## 2021-03-17 NOTE — TELEPHONE ENCOUNTER
Home care calling with therapy alerts  Amlodipine, simvastatin  - tizanidine  Asp lisinopril - metoprolol  Lisinopril - tizanidine  Metoprolol  - tizanidine  Oxycodone -  tizanidine  Duplicate therapy rosuvastatin and simvastatin    Need orders for skilled nursing 1 X week for 1 week, 2 x week for 2 weeks    Order for physical therapy and OT eval and non billable social work eval

## 2021-03-18 ENCOUNTER — TELEPHONE (OUTPATIENT)
Dept: CARDIOLOGY | Facility: CLINIC | Age: 46
End: 2021-03-18

## 2021-03-18 ENCOUNTER — NURSE TRIAGE (OUTPATIENT)
Dept: NURSING | Facility: CLINIC | Age: 46
End: 2021-03-18

## 2021-03-18 DIAGNOSIS — E78.5 HYPERLIPIDEMIA LDL GOAL <100: Primary | ICD-10-CM

## 2021-03-18 RX ORDER — SIMVASTATIN 20 MG
20 TABLET ORAL AT BEDTIME
Qty: 30 TABLET | Refills: 3 | Status: SHIPPED | OUTPATIENT
Start: 2021-03-18 | End: 2021-03-23

## 2021-03-18 NOTE — TELEPHONE ENCOUNTER
48 hour post op call    ACTIVITY  How are you doing with activity? Up walking around  Are you continuing to use your IS 3-4 times a day and do you have any shortness of breath. 750-100, coughing up phlegm, no fevers  Are you weighing yourself daily and has it been stable?. Down a few pounds     PAIN  How is your pain, what are you doing for your pain? Terrible, utilizing pain medications     Are you still doing sternal precautions? yes   Do you hear any clicking when you are moving or taking a deep breath? once     INCISION: Looks good    ASSISTANCE  Do you have someone at home to help you with your daily activities and transportation needs?        MEDICATIONS  I would like to review your discharge medications and answer any questions you may have.   no        FOLLOW UP       Scheduled for cardiac rehab? 3/23  Scheduled to see our PA or Surgeon? yes  Scheduled to see your cardiologist ? Couple of months  Scheduled to see your primary care physician? yes  Do you have our contact information? yes    STOPLIGHT TOOL      Were you happy with your care? yes

## 2021-03-19 RX ORDER — NITROGLYCERIN 0.4 MG/1
TABLET SUBLINGUAL
Qty: 25 TABLET | Refills: 0 | Status: SHIPPED | OUTPATIENT
Start: 2021-03-19 | End: 2023-09-15

## 2021-03-19 NOTE — TELEPHONE ENCOUNTER
They are calling again as they still do not have orders for this patient.  They are also looking for the orders for wound being open to air.     Please call back today as the patient was to have home care and PT OT out today but have not received those orders from Dr. Muse.     Shu NAGEL

## 2021-03-19 NOTE — TELEPHONE ENCOUNTER
Patient was informed that  she should have NTG on hand, I am ordering that now. She has a follow up appointment with me next week, will discuss at that time.      She does not need to be on both of the statins, can stop the simvastatin, just take the rosuvastatin.     Ping Briscoe, CMA

## 2021-03-19 NOTE — TELEPHONE ENCOUNTER
"Patient reports quadruple bypass and   pain is \"off the chart.\" Patient says she has Tylenol and oxycodone and tizanidine for pain.    Patient reports level is 9.5/10 for the past couple hours - says worse pain is her buttocks because she is sitting and laying own most of the time. Incisional pain is 6-7/10.    Patient reports she took two tylenol, tizanidine one hr ago, and took oxycodone 40 min ago    Denies fever; temp is 98.2 F    Patient had a 48-hr post op call w/ Katy Carolina today. PT to come out tomorrow.    FNA advised to move more, reposition while laying down or sitting in her chair. FNA advised to call them tomorrow if she needs to do so.        Reason for Disposition    [1] MILD-MODERATE post-op pain (e.g., pain scale 1-7) AND [2] not controlled with pain medications    Additional Information    Negative: Sounds like a life-threatening emergency to the triager    Negative: [1] Widespread rash AND [2] bright red, sunburn-like    Negative: [1] SEVERE headache AND [2] after spinal (epidural) anesthesia    Negative: [1] Vomiting AND [2] persists > 4 hours    Negative: [1] Vomiting AND [2] abdomen looks much more swollen than usual    Negative: [1] Drinking very little AND [2] dehydration suspected (e.g., no urine > 12 hours, very dry mouth, very lightheaded)    Negative: Patient sounds very sick or weak to the triager    Negative: Sounds like a serious complication to the triager    Negative: Fever > 100.5 F (38.1 C)    Negative: [1] SEVERE post-op pain (e.g., excruciating, pain scale 8-10) AND [2] not controlled with pain medications    Negative: [1] Caller has URGENT question AND [2] triager unable to answer question    Negative: [1] Headache AND [2] after spinal (epidural) anesthesia AND [3] not severe    Negative: Fever present > 3 days (72 hours)    Protocols used: POST-OP SYMPTOMS AND [id number]      "

## 2021-03-19 NOTE — TELEPHONE ENCOUNTER
Yes she should have NTG on hand, I am ordering that now. She has a follow up appointment with me next week, will discuss at that time.     She does not need to be on both of the statins, can stop the simvastatin, just take the rosuvastatin.     Yaima Muse MD

## 2021-03-19 NOTE — TELEPHONE ENCOUNTER
I called to give verbal skilled nursing 1 X week for 1 week, 2 x week for 2 weeks     Order for physical therapy and OT eval and non billable social work eval  Along with the wound open to air   Shu NAGEL     Someone please call patient re: her medications as Dr. Muse is asking about.

## 2021-03-19 NOTE — TELEPHONE ENCOUNTER
I see that the cardiology nurse refilled the simvastatin for her on 3/18. I have reached out to the cardiologist to clarify and will notify Stacy when I hear back.   Yaima Muse MD

## 2021-03-19 NOTE — TELEPHONE ENCOUNTER
RN unable to discontinue as it states Simvastatin is ordered in a future encounter.  Pt is taking rosuvastatin. Please review.     Leisa Sanchez RN, BSN

## 2021-03-19 NOTE — TELEPHONE ENCOUNTER
Patient states she is taking the rosuvastatin. I am not able to take the simvastatin off her medication list. Routing to RN to remove.     Demetrice Posey CMA ([facility]) 3/19/2021

## 2021-03-20 ENCOUNTER — MYC MEDICAL ADVICE (OUTPATIENT)
Dept: FAMILY MEDICINE | Facility: CLINIC | Age: 46
End: 2021-03-20

## 2021-03-22 DIAGNOSIS — I21.4 NSTEMI (NON-ST ELEVATED MYOCARDIAL INFARCTION) (H): ICD-10-CM

## 2021-03-22 NOTE — TELEPHONE ENCOUNTER
Please triage if able to give options on other means of pain relief patient is early on refills refills were on the 16th.  Thank you  Rebekah Sevilla MA

## 2021-03-22 NOTE — TELEPHONE ENCOUNTER
RN TRIAGE CALL:    Patient Contact    Attempt # 1    Was call answered?  No.  Unable to leave message.  Wireless caller is not available.    My Chart message sent to call triage.    Deanne Mace RN

## 2021-03-23 ENCOUNTER — HOSPITAL ENCOUNTER (OUTPATIENT)
Dept: CARDIAC REHAB | Facility: CLINIC | Age: 46
End: 2021-03-23
Attending: SURGERY
Payer: COMMERCIAL

## 2021-03-23 ENCOUNTER — MYC MEDICAL ADVICE (OUTPATIENT)
Dept: FAMILY MEDICINE | Facility: CLINIC | Age: 46
End: 2021-03-23

## 2021-03-23 ENCOUNTER — TELEPHONE (OUTPATIENT)
Dept: FAMILY MEDICINE | Facility: CLINIC | Age: 46
End: 2021-03-23

## 2021-03-23 DIAGNOSIS — I21.4 NSTEMI (NON-ST ELEVATED MYOCARDIAL INFARCTION) (H): ICD-10-CM

## 2021-03-23 PROCEDURE — 93798 PHYS/QHP OP CAR RHAB W/ECG: CPT | Performed by: REHABILITATION PRACTITIONER

## 2021-03-23 RX ORDER — ACETAMINOPHEN 325 MG/1
TABLET ORAL
Qty: 40 TABLET | Refills: 0 | Status: SHIPPED | OUTPATIENT
Start: 2021-03-23 | End: 2022-08-31

## 2021-03-23 RX ORDER — OXYCODONE HYDROCHLORIDE 5 MG/1
TABLET ORAL
Qty: 60 TABLET | Refills: 0 | Status: SHIPPED | OUTPATIENT
Start: 2021-03-23 | End: 2021-04-14

## 2021-03-23 NOTE — TELEPHONE ENCOUNTER
I left Kaye a voicemail to call me back. I did not fill Stacy's medication yesterday, this is an error. Want to clarify if someone else filled it, who and what/when so we don't double up.     Please verify from pharmacy last narcotic pick ups in the past few weeks.   Yaima Muse MD

## 2021-03-23 NOTE — TELEPHONE ENCOUNTER
Arlen oLpez, RN, with [facility] ( [place]) said Stacy had cardiac surgery done( quadruple bypass) 2 weeks ago 3/9/21. She has been  taking Oxycodone 5 mg 1 t 1.5 tabs every 4 hours prn pain. She called in for a refill yesterday and Dr. Muse filled the previous Percocet she was using before surgery.  Can she get the Oxycodone filled??  Please call and Let LETI Restrepo, Home Care nurse , know what you recommend. Thank you.  [phone].    LETI Brown

## 2021-03-23 NOTE — TELEPHONE ENCOUNTER
Patient asked additional questions via [employer].  Will wait for a response.  MARCELA PaigeN, RN

## 2021-03-23 NOTE — TELEPHONE ENCOUNTER
Tylenol:  Rx refilled per RN protocol.    Oxycodone   (if taken at maximum would last 5 days)   Last Written Prescription Date:  3/16/21  Last Fill Quantity: 60,   # refills: 0  Last Office Visit: Hospital discharge on 3/16/21  Future Office visit:    Next 5 appointments (look out 90 days)    Mar 29, 2021  9:45 AM  SHORT with Yaima Muse MD  [facility] ([facility] ) [address] [place] [id number]  [phone]   May 20, 2021 11:00 AM  Return Visit with Cici Ham MD  [facility] ([facility] ) [address] [place] [id number]  [phone]           Routing refill request to provider for review/approval because:  Drug not on the [facility], [facility] or [facility] refill protocol or controlled substance    Zanaflex    (if taken at maximum would last 7.5 days)  Last Written Prescription Date:  3/16/21  Last Fill Quantity: 30,   # refills: 0  Last Office Visit: Hospital discharge 3/16/21  Future Office visit:    Next 5 appointments (look out 90 days)    Mar 29, 2021  9:45 AM  SHORT with Yaima Muse MD  [facility] ([facility] ) [address] [place] [id number]  [phone]   May 20, 2021 11:00 AM  Return Visit with Cici Ham MD  [facility] ([facility] ) [address] [place] [id number]  [phone]           Routing refill request to provider for review/approval because:  Drug not on the [facility], [facility] or [facility] refill protocol or controlled substance  Nadine Naqvi, MARCELAN, RN

## 2021-03-23 NOTE — TELEPHONE ENCOUNTER
Percocet last filled today [facility] at 942am and the month before that was 2/12    Patient has only received oxycodone 1 time through hospital on the 16 th for 60 tabs to last 7 days.   Alaina Hearn MA 3/23/2021

## 2021-03-24 NOTE — TELEPHONE ENCOUNTER
I spoke to Stacy and notified her that I would fill her medication.  For the past week she has been taking a tizanidine about 3 times a day.  She has 9 left from her prescription of 30, 1 week ago.    Gradually I would like her to decrease her use in time.    Also I refilled her oxycodone today.  She is going to put her Percocets aside for future use and go back to taking the oxycodone tomorrow when she gets it filled.  This is a temporary prescription, her last 1 before going back to her usual dose of chronic pain medication.  She is taking 1-1/2 tablets up to every 4 hours but is going to start to gradually decrease that in the next few days and when she runs out of these oxycodone she will go back to her previous dose of Percocet.    She also knows not to be taking simvastatin, but to stay on the rosuvastatin.    Yaima Muse MD

## 2021-03-24 NOTE — TELEPHONE ENCOUNTER
PCP spoke to patient's nurse yesterday, 3/23/21.  Patient is asked if there are further questions.  Will wait to see if there is a response.  MARCELA PaigeN, RN

## 2021-03-25 ENCOUNTER — MYC MEDICAL ADVICE (OUTPATIENT)
Dept: FAMILY MEDICINE | Facility: CLINIC | Age: 46
End: 2021-03-25

## 2021-03-25 NOTE — TELEPHONE ENCOUNTER
Patient read message and no further questions.  Closing this encounter.  aNdine Naqvi, BSN, RN

## 2021-03-25 NOTE — TELEPHONE ENCOUNTER
Patient asked additional questions via [employer].  Will wait for a response.  MARCELA PaigeN, RN

## 2021-03-29 ENCOUNTER — OFFICE VISIT (OUTPATIENT)
Dept: CARDIOLOGY | Facility: CLINIC | Age: 46
End: 2021-03-29
Payer: COMMERCIAL

## 2021-03-29 ENCOUNTER — OFFICE VISIT (OUTPATIENT)
Dept: FAMILY MEDICINE | Facility: CLINIC | Age: 46
End: 2021-03-29
Payer: COMMERCIAL

## 2021-03-29 VITALS
TEMPERATURE: 97.7 F | DIASTOLIC BLOOD PRESSURE: 64 MMHG | RESPIRATION RATE: 12 BRPM | HEART RATE: 78 BPM | BODY MASS INDEX: 28.25 KG/M2 | WEIGHT: 175 LBS | OXYGEN SATURATION: 97 % | SYSTOLIC BLOOD PRESSURE: 122 MMHG

## 2021-03-29 VITALS
HEART RATE: 73 BPM | WEIGHT: 176 LBS | BODY MASS INDEX: 28.28 KG/M2 | HEIGHT: 66 IN | SYSTOLIC BLOOD PRESSURE: 95 MMHG | DIASTOLIC BLOOD PRESSURE: 63 MMHG

## 2021-03-29 DIAGNOSIS — Z95.1 S/P CABG (CORONARY ARTERY BYPASS GRAFT): Primary | ICD-10-CM

## 2021-03-29 DIAGNOSIS — E11.65 TYPE 2 DIABETES MELLITUS WITH HYPERGLYCEMIA, WITH LONG-TERM CURRENT USE OF INSULIN (H): ICD-10-CM

## 2021-03-29 DIAGNOSIS — H69.92 ETD (EUSTACHIAN TUBE DYSFUNCTION), LEFT: ICD-10-CM

## 2021-03-29 DIAGNOSIS — Z79.4 TYPE 2 DIABETES MELLITUS WITH HYPERGLYCEMIA, WITH LONG-TERM CURRENT USE OF INSULIN (H): ICD-10-CM

## 2021-03-29 DIAGNOSIS — I21.4 NSTEMI (NON-ST ELEVATED MYOCARDIAL INFARCTION) (H): Primary | ICD-10-CM

## 2021-03-29 DIAGNOSIS — E66.3 OVERWEIGHT: ICD-10-CM

## 2021-03-29 DIAGNOSIS — Z95.1 S/P CABG (CORONARY ARTERY BYPASS GRAFT): ICD-10-CM

## 2021-03-29 PROCEDURE — 99024 POSTOP FOLLOW-UP VISIT: CPT | Performed by: PHYSICIAN ASSISTANT

## 2021-03-29 PROCEDURE — 99215 OFFICE O/P EST HI 40 MIN: CPT | Performed by: FAMILY MEDICINE

## 2021-03-29 RX ORDER — ASPIRIN 325 MG
325 TABLET ORAL DAILY
Qty: 90 TABLET | Refills: 3 | Status: ON HOLD | OUTPATIENT
Start: 2021-03-29 | End: 2022-12-15

## 2021-03-29 RX ORDER — METFORMIN HCL 500 MG
1000 TABLET, EXTENDED RELEASE 24 HR ORAL 2 TIMES DAILY WITH MEALS
Qty: 360 TABLET | Refills: 3 | Status: SHIPPED | OUTPATIENT
Start: 2021-03-29 | End: 2022-03-14

## 2021-03-29 ASSESSMENT — MIFFLIN-ST. JEOR: SCORE: 1460.08

## 2021-03-29 ASSESSMENT — PATIENT HEALTH QUESTIONNAIRE - PHQ9: SUM OF ALL RESPONSES TO PHQ QUESTIONS 1-9: 10

## 2021-03-29 NOTE — PATIENT INSTRUCTIONS
Your surgery was on 3/9/21    Ok to start driving on 4/6/21 as long as you are no longer taking narcotic pain medications.    No lifting greater than 10 pounds until 5/4/21, then    No lifting greater than 20-30 pounds until 6/1/21    Do not soak your incisions until fully healed over with no scabbing; this includes hot tubs, baths, pools etc.    If you have questions or concerns, please call us:    Laisha Julio  [phone]

## 2021-03-29 NOTE — PROGRESS NOTES
Assessment & Plan       ICD-10-CM    1. NSTEMI (non-ST elevated myocardial infarction) (H)  I21.4 aspirin (ASA) 325 MG tablet   2. S/P CABG (coronary artery bypass graft)  Z95.1    3. Type 2 diabetes mellitus with hyperglycemia, with long-term current use of insulin (H)  E11.65 metFORMIN (GLUCOPHAGE-XR) 500 MG 24 hr tablet    Z79.4 AMBULATORY ADULT DIABETES EDUCATOR REFERRAL   4. ETD (Eustachian tube dysfunction), left  H69.82 fluticasone (FLONASE) 50 MCG/ACT nasal spray   5. Overweight  E66.3       Stacy mentioned several times today that she is finally ready to make some serious changes in her health. She admits that prior to her heart attack, she really didn't take her health seriously and had no motivation to change, but that now she knows how important it is to get healthier and she never wants to have this happen again. She feels ready to do whatever it takes to get her health under control, improve her diabetes control and prevent further heart disease or other complications.     She still faces a few obstacles: her family is sort of on board, but her children are just to eating junk food and do not have the best habits of their own.  They are somewhat resistant to change and is going to need a lot of discipline to get the junk food out of the house and get everybody on the same page.  Also she does not know exactly how to count her carbs and how to dose her mealtime insulin.  I recommend she get back to the diabetes educator and this time she actually is willing to learn about what foods she can and should eat and how to measure her carb units and dose her mealtime insulin.  I advised her this is a of the utmost importance in getting her diabetes under control.  I reminded her and her  Humble that she should not be guessing how much she is going to eat but rather planning out her meals in accordance with her nutritional needs, along with planning an appropriate dose of insulin followed by tracking  "and making adjustments as needed.  Referral made.  While she has done diabetes education in the past, she has never taken it very seriously nor made the changes recommended.    Also because of her chronic pain and previous head injury, she will have a little more difficulty exercising.  She is currently starting up cardiac rehab and this will be a benefit to her.  We talked briefly about the possibility of her going back to work versus taking an extended leave versus permanent disability.  At this point it is too soon to determine her outcome.  Ideally I would like to keep her working but she needs to put her health as the top priority.    For her ear, I recommend flonase to reduce congestion, avoid decongestants due to heart disease.     We are going to get her scheduled for COVID vaccine as soon as possible.     I will see her back in 1 month for update on her diabetes control and her overall well being with cardiac rehab, etc. Will be looking for direction on her physical restrictions from cardiology. At this time she is not ready to return to work.     55 minutes spent on the date of the encounter doing chart review, history and exam, documentation and further activities per the note     BMI:   Estimated body mass index is 28.25 kg/m  as calculated from the following:    Height as of 3/5/21: 1.676 m (5' 6\").    Weight as of this encounter: 79.4 kg (175 lb).   Weight management plan: Discussed healthy diet and exercise guidelines    Return in about 1 month (around 4/29/2021) for in person.    Yaima Muse MD  [facility] ARIANNA Kumar is a 45 year old who presents for the following health issues  accompanied by her spouse:    HPI     Diabetes Follow-up    How often are you checking your blood sugar? Two times daily  Blood sugar testing frequency justification:  Uncontrolled diabetes  What time of day are you checking your blood sugars (select all that apply)?  " Before and after meals  Have you had any blood sugars above 200?  Yes not last couple days  Have you had any blood sugars below 70?  No    What symptoms do you notice when your blood sugar is low?  None    What concerns do you have today about your diabetes? None     Do you have any of these symptoms? (Select all that apply)  No numbness or tingling in feet.  No redness, sores or blisters on feet.  No complaints of excessive thirst.  No reports of blurry vision.  No significant changes to weight.    Have you had a diabetic eye exam in the last 12 months? No    Taking every thing as prescribed. She is using 38 units of basaglar daily in the am, and using a carb ratio of 1:10, not 1:15 for her meals. She isn't sure how to guess how much she is going to eat and appropriately dose her meal time insulin.   This morning her glucose was 112, last night 146.     She recently was admitted to the hospital with a non-ST elevation myocardial infarction.  She underwent a four-vessel bypass.  She also had adjustment of her insulin because her diabetes was out of control prior to admission.  She is now back at home and will be starting cardiac rehab this week, planning 3 times weekly.  She is not working currently.    BP Readings from Last 2 Encounters:   03/29/21 95/63   03/29/21 122/64     Hemoglobin A1C (%)   Date Value   03/05/2021 10.1 (H)   10/29/2020 12.1 (H)     LDL Cholesterol Calculated (mg/dL)   Date Value   03/06/2021 178 (H)   08/11/2019 162 (H)           How many servings of fruits and vegetables do you eat daily?  2-3    On average, how many sweetened beverages do you drink each day (Examples: soda, juice, sweet tea, etc.  Do NOT count diet or artificially sweetened beverages)?   0    How many days per week do you exercise enough to make your heart beat faster? 3 or less    How many minutes a day do you exercise enough to make your heart beat faster? 20 - 29  How many days per week do you miss taking your  medication? 0 now.     Review of Systems   She does complain of feeling tired and her left ear hurts.  She still gets occasional lightheadedness.  Her chest hurts with movement and cough.  Constitutional, HEENT, cardiovascular, pulmonary, GI, , musculoskeletal, neuro, skin, endocrine and psych systems are negative, except as otherwise noted.      Objective    /64   Pulse 78   Temp 97.7  F (36.5  C) (Temporal)   Resp 12   Wt 79.4 kg (175 lb)   LMP 03/07/2021 (Approximate)   SpO2 97%   BMI 28.25 kg/m    Body mass index is 28.25 kg/m .  Physical Exam   Vitals noted.  Patient alert, oriented, and in no acute distress.   Ears:  Canals clear, TM nl on the right, small amount of clear fluid bubbles across the superior portion of the left TM, no erythema.    Nares patent without inflammation or drainage.   Neck:  Supple without lymphadenopathy, JVD or masses.   CV:  RRR without murmur.   Respiratory:  Lungs clear to auscultation bilaterally.   Sternal incision is healing well with glue still intact all the way down the length of the incision.  Her upper abdominal incisions are healing well with a slight amount of redness at the edge but no evidence for infection.  No drainage.  Small crust over the incisions.  She has a left wrist incision that is also healing well.  Extremities warm and dry without cyanosis, clubbing or edema.

## 2021-03-29 NOTE — PROGRESS NOTES
CV Surgery Post Op Follow Up Note:    S:  From discharge summary:     On 3/9/21 Mrs Brown successfully underwent Coronary artery bypass grafting x4, LIMA-LAD, RA-OM, SVG-PDA, SVG-RPL, Endoscopic harvest bilateral greater saphenous vein, Endoscopic harvest left radial artery, Placement of temporary atrial and ventricular pacing wires by Dr. Kunal Selby  Was extubated within 24 hours of surgery. Once she was weaned from hemodynamic stabilizing gtt's, transferred to the surgical floor.   Had some transient hyperglycemia treated with an insulin gtt, transitioned to sliding scale insulin and the hospitalist assisted her with her new insulin regimen as she had uncontrolled blood sugars prior to surgery (A1C 10.1) and was sent with metformin and sliding scale insulin.   Had some fluid overload treated with diuretic medication. At discharge she is 1 kg below her pre-op weight and is not sent with any diuretic medication.   She is sent with amlodipine for 6 months to prevent radial artery spasm.    She was placed on 5 day course of cefepime for HAP. Her breathing is stable at discharge and is using IS. She was an active smoker up until surgery and understands the benefit of not smoking moving forward and her risk of graft failure.   Heart rhythm remained stable. She progressed well with cardiac rehab. They are discharged to home on pod# 6 with the help of their family. She was sent with home care to help with her medication regimen while she received diabetic education, this is still very new to her and she is worried about her family not being able to to support her.     Since being discharged from hospital, patient is recovering well at home. Her pain is better controlled now and she has been spacing out how often she is utilizing the oxycodone and is only using one tablet approximately every 6 hours when needed, two tablets at bedtime. She had one refill already from our office. She has chronic pain and was on  oxycodone at bedtime prior to surgery and was prescribed by her PCP. She has been utilizing tylenol and tizanidine as well. She has been improving on her IS and has been increasing her activity as tolerated. She reports her blood sugars are under better control now and she had a follow up with her PCP this am- with no changes to insulin regimen. Her blood pressure is low on our visit today but has been stable with cardiac rehab and at her PCP. She has been using her IS but reports that it fell into the fire pit recently and she needs another one. Her weights have been stable. She has completed home cares and has started cardiac rehab and will continue later this week. We discussed her employment and that she works as a  and will need to lift 70lbs when she returns to work. She has brought [facility] paperwork and request for letter with restrictions spelled out for her employer. We reviewed her medications.     Allergies:   Allergies   Allergen Reactions     Amoxicillin Hives     Hydrocodone-Acetaminophen GI Disturbance     Tylenol is ok       Medications:   Current Outpatient Medications:      acetaminophen (TYLENOL) 325 MG tablet, TAKE TWO TABLETS BY MOUTH EVERY 4 HOURS AS NEEDED FOR MILD PAIN, Disp: 40 tablet, Rfl: 0     amLODIPine (NORVASC) 2.5 MG tablet, Take 1 tablet (2.5 mg) by mouth daily, Disp: 30 tablet, Rfl: 3     aspirin (ASA) 325 MG tablet, 1 tablet (325 mg) by Oral or NG Tube route daily, Disp: 90 tablet, Rfl: 3     blood glucose (NO BRAND SPECIFIED) test strip, Use to test blood sugar up to 4 times daily or as directed. To accompany: Blood Glucose Monitor Brands: per insurance. (Patient taking differently: 1 strip by In Vitro route 2 times daily ), Disp: 200 strip, Rfl: 6     blood glucose calibration (NO BRAND SPECIFIED) solution, To accompany: Blood Glucose Monitor Brands: per insurance., Disp: 1 Bottle, Rfl: 3     blood glucose monitoring (BL TEST STRIP PACK) test strip, Use to test blood  sugar 1-2 times daily or as directed. Per patients glucose meter (getting new one today), Disp: 100 each, Rfl: 3     blood glucose monitoring (FREESTYLE) lancets, Use to test blood sugars 1-2 times daily or as directed, per patients glucose meter., Disp: 3 Box, Rfl: 3     blood glucose monitoring (NO BRAND SPECIFIED) meter device kit, Use to test blood sugar up to 4 times daily or as directed. Preferred blood glucose meter OR supplies to accompany: Blood Glucose Monitor Brands: per insurance., Disp: 1 kit, Rfl: 0     Blood Glucose Monitoring Suppl (ACCU-CHEK COMPLETE) KIT, 1 Device daily, Disp: 1 Device, Rfl: 0     insulin aspart (NOVOLOG FLEXPEN) 100 UNIT/ML pen, Novolog Flexpen. Inject 1 units per 15 gram carb unit before breakfast, lunch and dinner, Disp: 30 mL, Rfl: 0     insulin glargine (BASAGLAR KWIKPEN) 100 UNIT/ML pen, INJECT 38 UNITS UNDER THE SKIN EVERY MORNING, Disp: 30 mL, Rfl: 0     insulin pen needle (31G X 8 MM) 31G X 8 MM miscellaneous, 1 Box of 100 insulin pen needles to be dispensed with every insulin pen prescription, Disp: 100 each, Rfl: 0     insulin pen needle (NOVOFINE) 32G X 6 MM miscellaneous, Use once daily or as directed., Disp: 100 each, Rfl: 3     insulin pen needle (NOVOFINE) 32G X 6 MM miscellaneous, Use once daily or as directed., Disp: 100 each, Rfl: 0     lisinopril (ZESTRIL) 2.5 MG tablet, Take 1 tablet (2.5 mg) by mouth daily, Disp: 30 tablet, Rfl: 3     metFORMIN (GLUCOPHAGE-XR) 500 MG 24 hr tablet, Take 2 tablets (1,000 mg) by mouth 2 times daily (with meals) ., Disp: 360 tablet, Rfl: 3     metoprolol tartrate (LOPRESSOR) 25 MG tablet, Take 1 tablet (25 mg) by mouth 2 times daily, Disp: 60 tablet, Rfl: 3     Multiple Vitamins-Minerals (MULTI-VITAMIN GUMMIES) CHEW, Take 1 chew tab by mouth daily , Disp: , Rfl:      nitroGLYcerin (NITROSTAT) 0.4 MG sublingual tablet, For chest pain place 1 tablet under the tongue every 5 minutes for 3 doses. If symptoms persist 5 minutes after  "1st dose call 911., Disp: 25 tablet, Rfl: 0     oxyCODONE (ROXICODONE) 5 MG tablet, TAKE ONE AND ONE-HALF TABLETS BY MOUTH EVERY 4 HOURS AS NEEDED FOR MODERATE TO SEVERE PAIN, Disp: 60 tablet, Rfl: 0     pantoprazole (PROTONIX) 40 MG EC tablet, Take 1 tablet (40 mg) by mouth daily for 14 days, Disp: 14 tablet, Rfl: 0     polyethylene glycol (MIRALAX) 17 GM/Dose powder, Take 17 g by mouth daily, Disp: 510 g, Rfl: 0     rosuvastatin (CRESTOR) 20 MG tablet, Take 1 tablet (20 mg) by mouth daily, Disp: 30 tablet, Rfl: 3     senna-docusate (SENOKOT-S/PERICOLACE) 8.6-50 MG tablet, Take 2 tablets by mouth 2 times daily, Disp: 30 tablet, Rfl: 0     tiZANidine (ZANAFLEX) 4 MG tablet, Take 1 tablet (4 mg) by mouth every 6 hours as needed for muscle spasms, Disp: 90 tablet, Rfl: 0    Physical Exam: BP 95/63   Pulse 73   Ht 1.676 m (5' 6\")   Wt 79.8 kg (176 lb)   LMP 03/07/2021 (Approximate)   BMI 28.41 kg/m     Gen: NAD  Cor: RRR normal s1 and s2  Resp: cTAB  Ext: minimal edema  Inc: CDI    Assessment/Plan:     Continue current medications  Continue cardiac rehab  Discussed that future narcotic refills will likely need to come from her PCP office due to her chronic pain. Discussed weaning strategies  New incentive spirometer provided at this visit  Discussed timeline for restrictions and will plan to spell this out in a letter for her employer  Discussed normal expectations for recovering from heart surgery  Follow up with cardiology and PCP as scheduled.     All of the patient's questions were answered. Our contact information was given to him/her if they should have any further questions/concerns. No further follow-up is needed with us.    Margie West PA-C  CV Surgery  [facility] [place]  Pager: [phone]  "

## 2021-03-29 NOTE — TELEPHONE ENCOUNTER
Patient was sent questions to follow up on 3/25/2021 with no response.  My chart sent for patient to call RN triage for better assessment.      Deanne Mace RN

## 2021-03-30 ENCOUNTER — TELEPHONE (OUTPATIENT)
Dept: FAMILY MEDICINE | Facility: CLINIC | Age: 46
End: 2021-03-30

## 2021-03-30 RX ORDER — INSULIN ASPART 100 [IU]/ML
INJECTION, SOLUTION INTRAVENOUS; SUBCUTANEOUS
Qty: 30 ML | Refills: 0 | COMMUNITY
Start: 2021-03-30 | End: 2021-11-01

## 2021-03-30 RX ORDER — FLUTICASONE PROPIONATE 50 MCG
2 SPRAY, SUSPENSION (ML) NASAL AT BEDTIME
Qty: 16 G | Refills: 3 | Status: SHIPPED | OUTPATIENT
Start: 2021-03-30 | End: 2021-07-16

## 2021-03-30 NOTE — TELEPHONE ENCOUNTER
Diabetes Education Scheduling Outreach #1:    Call to patient to schedule. Left message with phone number to call to schedule.    Plan for 2nd outreach attempt within 2 business days.    Josselin Porter OnCall  Diabetes and Nutrition Scheduling

## 2021-04-05 ENCOUNTER — HOSPITAL ENCOUNTER (OUTPATIENT)
Dept: CARDIAC REHAB | Facility: CLINIC | Age: 46
End: 2021-04-05
Attending: SURGERY
Payer: COMMERCIAL

## 2021-04-05 PROCEDURE — 93798 PHYS/QHP OP CAR RHAB W/ECG: CPT | Performed by: REHABILITATION PRACTITIONER

## 2021-04-07 ENCOUNTER — HOSPITAL ENCOUNTER (OUTPATIENT)
Dept: CARDIAC REHAB | Facility: CLINIC | Age: 46
End: 2021-04-07
Attending: SURGERY
Payer: COMMERCIAL

## 2021-04-07 ENCOUNTER — TRANSFERRED RECORDS (OUTPATIENT)
Dept: HEALTH INFORMATION MANAGEMENT | Facility: CLINIC | Age: 46
End: 2021-04-07

## 2021-04-07 PROCEDURE — 93798 PHYS/QHP OP CAR RHAB W/ECG: CPT | Performed by: REHABILITATION PRACTITIONER

## 2021-04-09 ENCOUNTER — HOSPITAL ENCOUNTER (OUTPATIENT)
Dept: CARDIAC REHAB | Facility: CLINIC | Age: 46
End: 2021-04-09
Attending: SURGERY
Payer: COMMERCIAL

## 2021-04-09 PROCEDURE — 93798 PHYS/QHP OP CAR RHAB W/ECG: CPT

## 2021-04-12 ENCOUNTER — HOSPITAL ENCOUNTER (OUTPATIENT)
Dept: CARDIAC REHAB | Facility: CLINIC | Age: 46
End: 2021-04-12
Attending: SURGERY
Payer: COMMERCIAL

## 2021-04-12 PROCEDURE — 93798 PHYS/QHP OP CAR RHAB W/ECG: CPT | Performed by: REHABILITATION PRACTITIONER

## 2021-04-14 ENCOUNTER — HOSPITAL ENCOUNTER (OUTPATIENT)
Dept: CARDIAC REHAB | Facility: CLINIC | Age: 46
End: 2021-04-14
Attending: SURGERY
Payer: COMMERCIAL

## 2021-04-14 ENCOUNTER — TELEPHONE (OUTPATIENT)
Dept: FAMILY MEDICINE | Facility: CLINIC | Age: 46
End: 2021-04-14

## 2021-04-14 DIAGNOSIS — I21.4 NSTEMI (NON-ST ELEVATED MYOCARDIAL INFARCTION) (H): ICD-10-CM

## 2021-04-14 PROCEDURE — 93798 PHYS/QHP OP CAR RHAB W/ECG: CPT | Performed by: REHABILITATION PRACTITIONER

## 2021-04-14 RX ORDER — OXYCODONE HYDROCHLORIDE 5 MG/1
TABLET ORAL
Qty: 60 TABLET | Refills: 0 | Status: SHIPPED | OUTPATIENT
Start: 2021-04-14 | End: 2021-05-12

## 2021-04-14 NOTE — TELEPHONE ENCOUNTER
Tizanidine        Last Written Prescription Date:  3/23/2021  Last Fill Quantity: 90,   # refills: 0  Last Office Visit: 3/29/2021  Future Office visit:    Next 5 appointments (look out 90 days)    Apr 22, 2021 10:15 AM  Telephone Visit with [facility]  [facility] ([facility] ) [address]  [facility] [address] [phone]   May 20, 2021 11:00 AM  Return Visit with Cici Ham MD  [facility] ([facility] ) [address] [facility] [address]  [phone]           Routing refill request to provider for review/approval because:  Drug not on the [facility], [facility] or [facility] refill protocol or controlled substance    Oxycodone 5 mg        Last Written Prescription Date:  3/23/2021  Last Fill Quantity: 60,   # refills: 0  Last Office Visit: 3/29/2021  Future Office visit:    Next 5 appointments (look out 90 days)    Apr 22, 2021 10:15 AM  Telephone Visit with [facility]  [facility] ([facility] ) [address] [facility] [address]  [facility] [address] [phone]   May 20, 2021 11:00 AM  Return Visit with Cici Ham MD  [facility] ([facility] ) [address] [address]  [phone]           Routing refill request to provider for review/approval because:  Drug not on the [facility], [facility] or [facility] refill protocol or controlled substance

## 2021-04-14 NOTE — TELEPHONE ENCOUNTER
Patient is requesting protonix. I don't see it on their file      Thank You,  Bg Mcelroy, Pharmacy [facility] [place]

## 2021-04-15 RX ORDER — AMOXICILLIN 250 MG
2 CAPSULE ORAL 2 TIMES DAILY
Qty: 30 TABLET | Refills: 0 | Status: SHIPPED | OUTPATIENT
Start: 2021-04-15 | End: 2021-04-20

## 2021-04-19 ENCOUNTER — HOSPITAL ENCOUNTER (OUTPATIENT)
Dept: CARDIAC REHAB | Facility: CLINIC | Age: 46
End: 2021-04-19
Attending: SURGERY
Payer: COMMERCIAL

## 2021-04-19 PROCEDURE — 93798 PHYS/QHP OP CAR RHAB W/ECG: CPT | Performed by: REHABILITATION PRACTITIONER

## 2021-04-20 ENCOUNTER — IMMUNIZATION (OUTPATIENT)
Dept: FAMILY MEDICINE | Facility: CLINIC | Age: 46
End: 2021-04-20
Payer: COMMERCIAL

## 2021-04-20 ENCOUNTER — VIRTUAL VISIT (OUTPATIENT)
Dept: FAMILY MEDICINE | Facility: CLINIC | Age: 46
End: 2021-04-20
Payer: COMMERCIAL

## 2021-04-20 DIAGNOSIS — G89.4 CHRONIC PAIN SYNDROME: ICD-10-CM

## 2021-04-20 DIAGNOSIS — I21.4 NSTEMI (NON-ST ELEVATED MYOCARDIAL INFARCTION) (H): Primary | ICD-10-CM

## 2021-04-20 DIAGNOSIS — Z79.4 TYPE 2 DIABETES MELLITUS WITH HYPERGLYCEMIA, WITH LONG-TERM CURRENT USE OF INSULIN (H): ICD-10-CM

## 2021-04-20 DIAGNOSIS — E11.65 TYPE 2 DIABETES MELLITUS WITH HYPERGLYCEMIA, WITH LONG-TERM CURRENT USE OF INSULIN (H): ICD-10-CM

## 2021-04-20 PROCEDURE — 99215 OFFICE O/P EST HI 40 MIN: CPT | Mod: 95 | Performed by: FAMILY MEDICINE

## 2021-04-20 PROCEDURE — 91301 PR COVID VAC MODERNA 100 MCG/0.5 ML IM: CPT

## 2021-04-20 PROCEDURE — 0011A PR COVID VAC MODERNA 100 MCG/0.5 ML IM: CPT

## 2021-04-20 RX ORDER — AMOXICILLIN 250 MG
2 CAPSULE ORAL 2 TIMES DAILY PRN
Qty: 30 TABLET | Refills: 2 | Status: SHIPPED | OUTPATIENT
Start: 2021-04-20 | End: 2022-11-01

## 2021-04-20 NOTE — PROGRESS NOTES
Stacy is a 45 year old who is being evaluated via a billable video visit.      Video visit performed in lieu of an in-person visit due to current concerns regarding social distancing and keeping people safe.  Physician and provider agreed this was appropriate for concerns addressed.    How would you like to obtain your AVS? MyChart  If the video visit is dropped, the invitation should be resent by: Text to cell phone: [phone]  Will anyone else be joining your video visit? No      Video Start Time: 11:52 AM    Assessment & Plan       ICD-10-CM    1. NSTEMI (non-ST elevated myocardial infarction) (H)  I21.4 senna-docusate (SENOKOT-S/PERICOLACE) 8.6-50 MG tablet   2. Type 2 diabetes mellitus with hyperglycemia, with long-term current use of insulin (H)  E11.65     Z79.4    3. Chronic pain syndrome  G89.4       I plan to see Stacy back in person in 6 weeks.  By then she will have completed further cardiac rehab, diabetes education, will be further along in her weight restriction and nearing return to work decision.  I am not going to allow her to return to work until that visit is complete and we can further decide her return to work plans based on her abilities.    I encouraged her to limit her oxycodone usage to 2 pills a day for the next 1 to 2 weeks and then get back down to her 1 a day as she was doing prior to her surgery.  We discussed not using the medication unless her therapy is unbearable without the medication.  I do not want her pain to prohibit her from participating in her rehab, but I do not want her increasing her overall long-term use of narcotics.  I gave her the same advice for her muscle relaxer.    I did get her scheduled for a Covid vaccine.    I also refilled her stool softeners.  We will plan to repeat her A1c in mid June.  Once she has met with diabetes Ed, she will continue to work hard on getting her glucoses to target.    She will follow-up sooner as needed.    40 minutes spent on the  date of the encounter doing chart review, history and exam, documentation and further activities per the note       Return in about 6 weeks (around 6/1/2021) for Follow up, in person.    Yaima Muse MD  [facility]    Kwadwo Kumar is a 45 year old who presents for the following health issues    HPI     Medication Followup of All     Taking Medication as prescribed: yes    Side Effects:  None    Medication Helping Symptoms:  yes     Since our last visit 1 month ago she has done cardiac rehab 6 times.  She walks on the treadmill for 30 minutes at a speed of 2.2 with a 1% incline.  Yesterday she bike for 20 minutes and did weight lifting with 3 pound weights.  She finds it painful but tolerable and she is increasing her ability with age visit.  Yesterday she had a bit of pain in her right shoulder which she was not sure why but she is doing better.  However over the weekend, Friday Saturday and Sunday she was vomiting off and on for no obvious reason.  Today she feels a lot better.    Since our last visit her sugars have typically been in the 90s in the morning and in the 140s to 180s in the afternoon which is about 30 to 60 minutes post prandial lunch.  Yesterday her morning glucose was high after the vomiting episodes all weekend at 182.  Yesterday at therapy it was 279.  Today her morning sugar was back down to 97.  She is working hard on healthy eating.  Tomorrow she will be attending a class on healthy nutrition through her cardiac rehab and then on Thursday she has her appointment with diabetes Ed.    Her cardiologist gave her work restrictions for lifting no more than 10 pounds until May 4 and then no more than 20 to 30 pounds until June 1.  She cannot return to work until she can lift 70 pounds without restriction.    She is still using her oxycodone as many as 3/day.  The instructions say up to 9/day but she has not been using that and she is wondering how long  she can continue to use more than her baseline of 1/day.  She just got a refill last week.    Her breathing is good.  She has been using her incentive spirometer and can get between 1000 and 1500 which seems low but that is her baseline.    Review of Systems   Constitutional, HEENT, cardiovascular, pulmonary, gi and gu systems are negative, except as otherwise noted.      Objective           Vitals:  No vitals were obtained today due to virtual visit.    Physical Exam   GENERAL: Healthy, alert and no distress  EYES: Eyes grossly normal to inspection.  No discharge or erythema, or obvious scleral/conjunctival abnormalities.  RESP: No audible wheeze, cough, or visible cyanosis.  No visible retractions or increased work of breathing.    SKIN: Visible skin clear. No significant rash, abnormal pigmentation or lesions.  NEURO: Cranial nerves grossly intact.  Mentation and speech appropriate for age.  PSYCH: Mentation appears normal, affect normal/bright, judgement and insight intact, normal speech and appearance well-groomed.          Video-Visit Details    Type of service:  Video Visit    Video End Time:12:20 PM    Originating Location (pt. Location): Home    Distant Location (provider location):  [facility]     Platform used for Video Visit: Dot VN

## 2021-04-21 ENCOUNTER — HOSPITAL ENCOUNTER (OUTPATIENT)
Dept: CARDIAC REHAB | Facility: CLINIC | Age: 46
End: 2021-04-21
Attending: SURGERY
Payer: COMMERCIAL

## 2021-04-21 PROCEDURE — 93798 PHYS/QHP OP CAR RHAB W/ECG: CPT

## 2021-04-22 ENCOUNTER — PATIENT OUTREACH (OUTPATIENT)
Dept: EDUCATION SERVICES | Facility: CLINIC | Age: 46
End: 2021-04-22
Attending: FAMILY MEDICINE
Payer: COMMERCIAL

## 2021-04-22 DIAGNOSIS — E11.65 TYPE 2 DIABETES MELLITUS WITH HYPERGLYCEMIA, WITH LONG-TERM CURRENT USE OF INSULIN (H): ICD-10-CM

## 2021-04-22 DIAGNOSIS — Z79.4 TYPE 2 DIABETES MELLITUS WITH HYPERGLYCEMIA, WITH LONG-TERM CURRENT USE OF INSULIN (H): ICD-10-CM

## 2021-04-22 PROCEDURE — G0108 DIAB MANAGE TRN  PER INDIV: HCPCS

## 2021-04-22 NOTE — PROGRESS NOTES
"Diabetes Self-Management Education & Support  Type of Service: Telephone Visit    How would patient like to obtain AVS? Tali    Presents for: Individual review    SUBJECTIVE/OBJECTIVE:  Presents for: Individual review  Accompanied by: Self  Diabetes education in the past 24mo: No  Focus of Visit: Healthy Eating  Diabetes type: Type 2  Other concerns:: None  Cultural Influences/Ethnic Background:  American      Diabetes Symptoms & Complications:  Weight trend: Decreasing(down 10# over 2 months)  Complications assessed today?: Yes  Heart disease: Yes    Patient Problem List and Family Medical History reviewed for relevant medical history, current medical status, and diabetes risk factors.    Vitals:  There were no vitals taken for this visit.  Estimated body mass index is 28.41 kg/m  as calculated from the following:    Height as of 3/29/21: 1.676 m (5' 6\").    Weight as of 3/29/21: 79.8 kg (176 lb).   Last 3 BP:   BP Readings from Last 3 Encounters:   03/29/21 95/63   03/29/21 122/64   03/16/21 112/62       History   Smoking Status     Current Every Day Smoker     Packs/day: 0.50     Years: 6.00     Last attempt to quit: 9/22/2010   Smokeless Tobacco     Never Used       Labs:  Lab Results   Component Value Date    A1C 10.1 03/05/2021     Lab Results   Component Value Date     03/15/2021     Lab Results   Component Value Date     03/06/2021     HDL Cholesterol   Date Value Ref Range Status   03/06/2021 43 (L) >49 mg/dL Final   ]  GFR Estimate   Date Value Ref Range Status   03/15/2021 >90 >60 mL/min/[1.73_m2] Final     Comment:     Non  GFR Calc  Starting 12/18/2018, serum creatinine based estimated GFR (eGFR) will be   calculated using the Chronic Kidney Disease Epidemiology Collaboration   (CKD-EPI) equation.       GFR Estimate If Black   Date Value Ref Range Status   03/15/2021 >90 >60 mL/min/[1.73_m2] Final     Comment:      GFR Calc  Starting 12/18/2018, serum " "creatinine based estimated GFR (eGFR) will be   calculated using the Chronic Kidney Disease Epidemiology Collaboration   (CKD-EPI) equation.       Lab Results   Component Value Date    CR 0.44 03/15/2021     No results found for: MICROALBUMIN    Healthy Eating:  Healthy Eating Assessed Today: Yes  Meals include: Breakfast, Lunch, Dinner  Breakfast: \"not a big breakfast person\" - orange  Lunch: tuna salad  Dinner: goulash - beef, pepperoni, onion, tomato sauce, parmesan  Snacks: pb crackers  Beverages: Water, Milk(1% milk)  Has patient met with a dietitian in the past?: Yes    Being Active:  Being Active Assessed Today: No    Monitoring:  Monitoring Assessed Today: Yes  Did patient bring glucose meter to appointment? : Yes  Times checking blood sugar at home (number): 1  Times checking blood sugar at home (per): Day(Fasting - \"90s\", highest 144)  Blood glucose trend: Decreasing      Taking Medications:  Diabetes Medication(s)     Biguanides       metFORMIN (GLUCOPHAGE-XR) 500 MG 24 hr tablet    Take 2 tablets (1,000 mg) by mouth 2 times daily (with meals) .    Insulin       insulin aspart (NOVOLOG FLEXPEN) 100 UNIT/ML pen    Novolog Flexpen. Inject 1 units per 10 gram carb unit before breakfast, lunch and dinner     insulin glargine (BASAGLAR KWIKPEN) 100 UNIT/ML pen    INJECT 38 UNITS UNDER THE SKIN EVERY MORNING          Taking Medication Assessed Today: Yes  Current Treatments: Insulin Injections  Dose schedule: Pre-breakfast, Pre-lunch, Pre-dinner  Given by: Patient  Problems taking diabetes medications regularly?: No  Diabetes medication side effects?: No    Problem Solving:  Problem Solving Assessed Today: No  Is the patient at risk for hypoglycemia?: Yes  Hypoglycemia Frequency: Never              Reducing Risks:  Reducing Risks Assessed Today: Yes  CAD Risks: Diabetes Mellitus    Healthy Coping:  Healthy Coping Assessed Today: Yes  Emotional response to diabetes: Ready to learn, Concern for health and " well-being  Stage of change: PREPARATION (Decided to change - considering how)  Support resources: Websites  Patient Activation Measure Survey Score:  MIA Score (Last Two) 11/15/2012   MIA Raw Score 45   Activation Score 73.1   MIA Level 4       Diabetes knowledge and skills assessment:   Patient is knowledgeable in diabetes management concepts related to: Monitoring and Taking Medication    Patient needs further education on the following diabetes management concepts: Healthy Eating, Being Active and Problem Solving    Based on learning assessment above, most appropriate setting for further diabetes education would be: Group class or Individual setting.      INTERVENTIONS:    Education provided today on:  AADE Self-Care Behaviors:  Healthy Eating: carbohydrate counting, consistency in amount, composition, and timing of food intake, heart healthy diet, portion control and label reading  Monitoring: individual blood glucose targets  Reducing Risks: major complications of diabetes  Healthy Coping: benefits of making appropriate lifestyle changes    Opportunities for ongoing education and support in diabetes-self management were discussed.    Pt verbalized understanding of concepts discussed and recommendations provided today.       Education Materials Provided:  No new materials provided today      ASSESSMENT:  Patient needing review on healthy diet for diabetes and heart disease.  Patient stated her diabetes was poorly controlled prior to her heart attack and bypass surgery in March.  She acknowledges not caring and feeling invincible before her heart problems.    Reviewed carb controlled diet for diabetes.  Currently, patient is eating small/reasonable amounts of carbohydrates at her meals due to a loss of appetite after her surgery.      Discussed healthy eating for heart disease.  Encouraged more fruits and non-starchy vegetables.  Consume whole grains vs white products.  Choose leans meats and limit red meat to  <2 times weekly.  Choose healthy fats, such as olive/canola oil, avocado, nuts/seeds.  Choose low-fat dairy products, such as light yogurt or skim/1% milk.        Patient's most recent   Lab Results   Component Value Date    A1C 10.1 03/05/2021    is not meeting goal of <7.0    PLAN  See Patient Instructions for co-developed, patient-stated behavior change goals.      GILBERT Sarabia CDE    Time Spent: 35 minutes  Encounter Type: Individual    Any diabetes medication dose changes were made via the CDE Protocol and Collaborative Practice Agreement with the patient's referring provider. A copy of this encounter was shared with the provider.

## 2021-04-22 NOTE — PATIENT INSTRUCTIONS
Medical Nutrition Therapy    My Healthy Eating and Activity Goals:      Count carbohydrates and have 30-45gm carbohydrates at each meal and 0-15gm carbohydrates at snacks  Increase fruit intake - 2 servings daily  Increase vegetable intake - 3+ servings daily  Eat 3 meals a day  Choose heart healthy fats most often  Reduce sodium intake, <2000mg daily  Increase physical activity: as able, goal is 20-30 minutes daily

## 2021-04-23 ENCOUNTER — HOSPITAL ENCOUNTER (OUTPATIENT)
Dept: CARDIAC REHAB | Facility: CLINIC | Age: 46
End: 2021-04-23
Attending: SURGERY
Payer: COMMERCIAL

## 2021-04-23 PROCEDURE — 93798 PHYS/QHP OP CAR RHAB W/ECG: CPT

## 2021-04-26 ENCOUNTER — HOSPITAL ENCOUNTER (OUTPATIENT)
Dept: CARDIAC REHAB | Facility: CLINIC | Age: 46
End: 2021-04-26
Attending: SURGERY
Payer: COMMERCIAL

## 2021-04-26 PROCEDURE — 93798 PHYS/QHP OP CAR RHAB W/ECG: CPT | Performed by: REHABILITATION PRACTITIONER

## 2021-04-27 ENCOUNTER — OFFICE VISIT (OUTPATIENT)
Dept: ENDOCRINOLOGY | Facility: CLINIC | Age: 46
End: 2021-04-27
Payer: COMMERCIAL

## 2021-04-27 VITALS
BODY MASS INDEX: 28.28 KG/M2 | DIASTOLIC BLOOD PRESSURE: 60 MMHG | HEIGHT: 66 IN | SYSTOLIC BLOOD PRESSURE: 96 MMHG | WEIGHT: 176 LBS | HEART RATE: 77 BPM

## 2021-04-27 DIAGNOSIS — E11.65 UNCONTROLLED TYPE 2 DIABETES MELLITUS WITH HYPERGLYCEMIA (H): ICD-10-CM

## 2021-04-27 DIAGNOSIS — E11.59 TYPE 2 DIABETES MELLITUS WITH OTHER CIRCULATORY COMPLICATION, WITH LONG-TERM CURRENT USE OF INSULIN (H): Primary | ICD-10-CM

## 2021-04-27 DIAGNOSIS — Z79.4 TYPE 2 DIABETES MELLITUS WITH OTHER CIRCULATORY COMPLICATION, WITH LONG-TERM CURRENT USE OF INSULIN (H): Primary | ICD-10-CM

## 2021-04-27 PROCEDURE — 99244 OFF/OP CNSLTJ NEW/EST MOD 40: CPT | Performed by: INTERNAL MEDICINE

## 2021-04-27 ASSESSMENT — MIFFLIN-ST. JEOR: SCORE: 1460.08

## 2021-04-27 NOTE — LETTER
4/27/2021         RE: Stacy Brown  [address] [facility] [address]        Dear Colleague,    Thank you for referring your patient, Stacy Brown, to the [facility] [place]. Please see a copy of my visit note below.    Name: Stacy Brown is a 45 year old woman, seen at the request of Dr. Yaima Muse for evaluation of     Chief Complaint   Patient presents with     Diabetes       HPI:  Recent issues:  Here for evaluation of diabetes, with her  Humble today  Had MI 3/5/21, then heart surgery with [id number] 3/9/21  Reviewed medical history from patient and [facility] chart record        2006. Diagnosis of diabetes mellitus age 30, when pregnant  Diagnosis of GDM, took insulin medication during pregnancy  Son born 6/12/06, birth wt 8#13oz at 39 weeks gestation  Continued insulin treatment postpartum  Has seen Dr. Yaima Muse/[facility]  Medication treatment with metformin, insulin medications  Took Novolog 10U with supper meal (not other meals)  Does not recall taking other DM meds    Previous FV hgbA1c trends include:     Lab Test 03/05/21  2029 10/29/20  1335 12/18/19  1222 08/11/19  0802 05/01/19  0814   A1C 10.1* 12.1* 9.8* 10.9* 8.9*     Current DM medications:  MetforminXR 500 mg 2-tabs morning and evening  Basaglar Kwikpen 38U in morning  Novolog Flexpen 1U per 10 gm carb     sscale 1-2U extra    Blood glucose (BG) meter:  Accu-check   Tests 1x/day, also pre/post cardiac rehab M/W/F   Recent FBG trends 90- 140 mg/dl    Has not worn a CGM sensor previously  Recent FV labs include:  Lab Results   Component Value Date    A1C 10.1 (H) 03/05/2021     03/15/2021    POTASSIUM 3.8 03/15/2021    CHLORIDE 104 03/15/2021    CO2 29 03/15/2021    ANIONGAP 4 03/15/2021     (H) 03/15/2021    BUN 9 03/15/2021    CR 0.44 (L) 03/15/2021    GFRESTIMATED >90 03/15/2021    GFRESTBLACK >90 03/15/2021    LUNA 9.1 03/15/2021    CHOL 261 (H)  03/06/2021    TRIG 201 (H) 03/06/2021    HDL 43 (L) 03/06/2021     (H) 03/06/2021    NHDL 218 (H) 03/06/2021    UCRR 119 05/01/2019    MICROL 15 05/01/2019    UMALCR 12.69 05/01/2019    TSH 1.33 03/05/2021     Last eye exam 2019, no DR per patient  DM Complications:   Macrovascular disease:    CAD with MI 3/2021    Previous smoker- quit 3/2021    Other chronic illnesses include:   Hypertension:   Takes amlodipine, lisinopril, metoprolol meds, followed by PCP   CAD:     Followed by cardiologist   Hyperlipidemia: Takes rosuvastatin med, followed by PCP      Lives in [place], [place], 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 Yaima Muse for general medicine evaluations.    PMH/PSH:  Past Medical History:   Diagnosis Date     Knee pain, chronic      Mixed hyperlipidemia      Type II or unspecified type diabetes mellitus without mention of complication, not stated as uncontrolled 08/16/2002    diagnosed 8/16/02, started insulin 2006     Past Surgical History:   Procedure Laterality Date     BYPASS GRAFT ARTERY CORONARY N/A 3/9/2021    Procedure: CORONARY ARTERY BYPASS GRAFT X 4 (LIMA - LAD, SV - RPL, SV - PDA,  RA - OM) LEFT RADIAL ENDOARTERY HARVEST AND BILATERAL LEG ENDOVEIN HARVEST (ON CARDIOPULMONARY PUMP OXYGENATOR ; INTRAOPERATIVE TRANSESOPHAGEAL ECHOCARDIOGRAM BY ANESTHESIOLOGIST DR. NEL AVILA)   ;  Surgeon: Kunal Selby MD;  Location: [facility] STABISM SURG,PREV EYE SURG,[facility]      Right     CV HEART CATHETERIZATION WITH POSSIBLE INTERVENTION N/A 3/8/2021    Procedure: Heart Catheterization with Possible Intervention;  Surgeon: Vadim Kamara MD;  Location:  HEART CARDIAC CATH LAB     HC OPEN TX METATARSAL FRACTURE  age 12    softball injury,open fracture left foot     HC TOOTH EXTRACTION W/FORCEP      Extract wisdom teeth     INJECT JOINT SACROILIAC Left 1/11/2018    Procedure: INJECT JOINT SACROILIAC;  INJECT JOINT SACROILIAC LEFT;  Surgeon: Alan Marshall MD;  Location:   OR     OPERATIVE HYSTEROSCOPY WITH MORCELLATOR N/A 2018    Procedure: OPERATIVE HYSTEROSCOPY WITH MORCELLATOR (MYOSURE);  Exam under anesthesia, operative hysteroscopy, polypectomy, D & C;  Surgeon: Sindhu Peterson DO;  Location: [facility] OR     TUBAL LIGATION  2006       Family Hx:  Family History   Problem Relation Age of Onset     Allergies Mother      Lipids Father         cholesterol     Diabetes Maternal Grandmother      Hypertension Maternal Grandmother      Heart Disease Maternal Grandmother         Bypass     Cancer Maternal Grandfather         Lung - metastatic     Alzheimer Disease Paternal Grandmother      Heart Disease Paternal Grandmother         valve replacement     Cerebrovascular Disease Paternal Grandfather      Anesthesia Reaction No family hx of          Social Hx:  Social History     Socioeconomic History     Marital status:      Spouse name: Humble     Number of children: 2     Years of education: 14     Highest education level: Not on file   Occupational History     Occupation: Post [employer]     Employer: [employer]   Social Needs     Financial resource strain: Not on file     Food insecurity     Worry: Not on file     Inability: Not on file     Transportation needs     Medical: Not on file     Non-medical: Not on file   Tobacco Use     Smoking status: Former Smoker     Packs/day: 0.50     Years: 6.00     Pack years: 3.00     Quit date: 3/1/2021     Years since quittin.1     Smokeless tobacco: Never Used   Substance and Sexual Activity     Alcohol use: Yes     Alcohol/week: 0.0 standard drinks     Comment: once a month     Drug use: No     Sexual activity: Not Currently     Partners: Male     Birth control/protection: Surgical   Lifestyle     Physical activity     Days per week: Not on file     Minutes per session: Not on file     Stress: Not on file   Relationships     Social connections     Talks on phone: Not on file     Gets together: Not on file     Attends  Oriental orthodox service: Not on file     Active member of club or organization: Not on file     Attends meetings of clubs or organizations: Not on file     Relationship status: Not on file     Intimate partner violence     Fear of current or ex partner: Not on file     Emotionally abused: Not on file     Physically abused: Not on file     Forced sexual activity: Not on file   Other Topics Concern      Service No     Blood Transfusions No     Caffeine Concern Yes     Comment: Diet Pepsi/at least 20 ounces/day - advised not more than 16 ounces/day     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 Yes   Social History Narrative     and lives at home in [place] with , Humble, and their daughter and son.          MEDICATIONS:  has a current medication list which includes the following prescription(s): acetaminophen, amlodipine, aspirin, novolog flexpen, insulin glargine, lisinopril, metformin, metoprolol tartrate, multi-vitamin gummies, oxycodone, polyethylene glycol, rosuvastatin, senna-docusate, tizanidine, blood glucose, blood glucose calibration, blood glucose, blood glucose monitoring, blood glucose monitoring, accu-chek complete, fluticasone, insulin pen needle, novofine, novofine, and nitroglycerin.    ROS:     ROS: 10 point ROS neg other than the symptoms noted above in the HPI.    GENERAL: fatigue, wt stable; denies fevers, chills, night sweats.   HEENT: no dysphagia, odonophagia, diplopia, neck pain  THYROID:  no apparent hyper or hypothyroid symptoms  CV: no chest pain, pressure, palpitations  LUNGS: no SOB, METZGER, cough, wheezing   ABDOMEN: no  "diarrhea, constipation, abdominal pain  EXTREMITIES: no rashes, ulcers, edema  NEUROLOGY: right leg pain behind knee related to dog bit injury, left ankle and left thumb numbness;    no headaches, denies changes in vision, tingling  MSK: mild back pains, chest wall aching with torso ROM; denies muscle weakness  SKIN: no rashes or lesions  : no menses since early-mid 40's  PSYCH:  stable mood, no significant anxiety or depression  ENDOCRINE: no heat or cold intolerance    Physical Exam   VS: BP 96/60   Pulse 77   Ht 1.676 m (5' 6\")   Wt 79.8 kg (176 lb)   BMI 28.41 kg/m    GENERAL: AXOX3, NAD, well dressed, answering questions appropriately, appears stated age.  THYROID:  normal gland, no apparent nodules or goiter  HEENT: eyeglasses; neck non-tender, no exopthalmous, no proptosis, EOMI  CV: RRR, no rubs, gallops, no murmurs  LUNGS: CTAB, no wheezes, rales, or ronchi  ABDOMEN: soft, nontender, nondistended  EXTREMITIES: no feet edema, +pedal pulses, no skin lesions  NEUROLOGY: CN grossly intact, no tremors  MSK: grossly intact  SKIN: no rashes, no lesions    LABS:    All pertinent notes, labs, and images personally reviewed by me.     A/P:  Encounter Diagnoses   Name Primary?     Type 2 diabetes mellitus with other circulatory complication, with long-term current use of insulin (H) Yes     Uncontrolled type 2 diabetes mellitus with hyperglycemia (H)        Comments:  Reviewed health history and diabetes issues.  Chronic high glucose trends due to inadequate prandial and correction dose insulin  Reviewed and interpreted tests that I previously ordered.   Ordered appropriate tests for the endocrinology disease management.    Management options discussed and implemented after shared medical decision making with the patient.    Plan:  Discussed general issues with the diabetes diagnosis and management  Reviewed the pathophysiology of T2DM  We discussed the hgbA1c test which reflects previous overall glucose levels " or control  Discussed the importance of blood glucose (BG) testing to assess glucose trends  Provided general overview of the diabetes medication options and medication treatment plan.    Recommend:  Continue current metformin, Basaglar, and Novolog insulin use  Important to use mealtime Novolog dose adjusted for meal carb content and glucose level   Continue ICR method for mealtime dosing   Evaluate the premeal glucose with BG meter or glucose sensor, use correction scale more often  Consider adding a GLP1RA medication, if covered by insurance and if measurable C-Peptide level  Check morning labs soon   Discussed lab appt at [facility]   Lab orders placed  Goal target premeal glucose 80- 150 mg/dl  Consider using a CGM sensor such as Freestyle Nancy or Libre2, DexcomG6 also an option  Plan a follow-up CDE evaluation soon   Review the MDI insulin plan, emphasize correction scale use   Wear diagnostic Freestyle Nancy sensor   Referral placed  Keep focus on diet, exercise as per cardiac rehab plan, weight management  Continue statin med use, goal LDL <70 mg/dl.  Advise having fasting lipid panel testing and dilated eye examination, at least annually    Addressed patient questions today    Future labs ordered today:   Orders Placed This Encounter   Procedures     Glucose     C-peptide     Glutamic acid decarboxylase antibody     Albumin Random Urine Quantitative with Creat Ratio     AMBULATORY ADULT DIABETES EDUCATOR REFERRAL     Radiology/Consults ordered today: AMBULATORY ADULT DIABETES EDUCATOR REFERRAL    Total time spent in with the patient evaluation:  40 min  Additional time spent reviewing pertinent lab tests and chart notes, and documentation:  20 min    Follow-up:  5/24/21 at 8am, Catherine Jacobo MD, MS  Endocrinology  [facility]    CC: EMILI Muse and EMILI Sanchez          Again, thank you for allowing me to participate in the care of your patient.        Sincerely,        Silverio VALDERRAMA  MD [place]

## 2021-04-27 NOTE — PROGRESS NOTES
Name: Stacy Brown is a 45 year old woman, seen at the request of Dr. Yaima Muse for evaluation of     Chief Complaint   Patient presents with     Diabetes       HPI:  Recent issues:  Here for evaluation of diabetes, with her  Humble today  Had MI 3/5/21, then heart surgery with [id number] 3/9/21  Reviewed medical history from patient and [facility] chart record        2006. Diagnosis of diabetes mellitus age 30, when pregnant  Diagnosis of GDM, took insulin medication during pregnancy  Son born 6/12/06, birth wt 8#13oz at 39 weeks gestation  Continued insulin treatment postpartum  [facility]  Medication treatment with metformin, insulin medications  Took Novolog 10U with supper meal (not other meals)  Does not recall taking other DM meds    Previous FV hgbA1c trends include:     Lab Test 03/05/21  2029 10/29/20  1335 12/18/19  1222 08/11/19  0802 05/01/19  0814   A1C 10.1* 12.1* 9.8* 10.9* 8.9*     Current DM medications:  MetforminXR 500 mg 2-tabs morning and evening  Basaglar Kwikpen 38U in morning  Novolog Flexpen 1U per 10 gm carb     sscale 1-2U extra    Blood glucose (BG) meter:  Accu-check   Tests 1x/day, also pre/post cardiac rehab M/W/F   Recent FBG trends 90- 140 mg/dl    Has not worn a CGM sensor previously  Recent FV labs include:  Lab Results   Component Value Date    A1C 10.1 (H) 03/05/2021     03/15/2021    POTASSIUM 3.8 03/15/2021    CHLORIDE 104 03/15/2021    CO2 29 03/15/2021    ANIONGAP 4 03/15/2021     (H) 03/15/2021    BUN 9 03/15/2021    CR 0.44 (L) 03/15/2021    GFRESTIMATED >90 03/15/2021    GFRESTBLACK >90 03/15/2021    LUNA 9.1 03/15/2021    CHOL 261 (H) 03/06/2021    TRIG 201 (H) 03/06/2021    HDL 43 (L) 03/06/2021     (H) 03/06/2021    NHDL 218 (H) 03/06/2021    UCRR 119 05/01/2019    MICROL 15 05/01/2019    UMALCR 12.69 05/01/2019    TSH 1.33 03/05/2021     Last eye exam 2019, no DR per patient  DM  Complications:   Macrovascular disease:    CAD with MI 3/2021    Previous smoker- quit 3/2021    Other chronic illnesses include:   Hypertension:   Takes amlodipine, lisinopril, metoprolol meds, followed by PCP   CAD:     Followed by cardiologist   Hyperlipidemia: Takes rosuvastatin med, followed by PCP      Lives in [place], [place], 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 Yaima Muse for general medicine evaluations.    PMH/PSH:  Past Medical History:   Diagnosis Date     Knee pain, chronic      Mixed hyperlipidemia      Type II or unspecified type diabetes mellitus without mention of complication, not stated as uncontrolled 08/16/2002    diagnosed 8/16/02, started insulin 2006     Past Surgical History:   Procedure Laterality Date     BYPASS GRAFT ARTERY CORONARY N/A 3/9/2021    Procedure: CORONARY ARTERY BYPASS GRAFT X 4 (LIMA - LAD, SV - RPL, SV - PDA,  RA - OM) LEFT RADIAL ENDOARTERY HARVEST AND BILATERAL LEG ENDOVEIN HARVEST (ON CARDIOPULMONARY PUMP OXYGENATOR ; INTRAOPERATIVE TRANSESOPHAGEAL ECHOCARDIOGRAM BY ANESTHESIOLOGIST DR. NEL AVILA)   ;  Surgeon: Kunal Selby MD;  Location:  OR     C STABISM SURG,PREV EYE SURG,NOT [facility]      Right     CV HEART CATHETERIZATION WITH POSSIBLE INTERVENTION N/A 3/8/2021    Procedure: Heart Catheterization with Possible Intervention;  Surgeon: Vadim Kamara MD;  Location:  HEART CARDIAC CATH LAB     HC OPEN TX METATARSAL FRACTURE  age 12    softball injury,open fracture left foot     HC TOOTH EXTRACTION W/FORCEP      Extract wisdom teeth     INJECT JOINT SACROILIAC Left 1/11/2018    Procedure: INJECT JOINT SACROILIAC;  INJECT JOINT SACROILIAC LEFT;  Surgeon: Alan Marshall MD;  Location:  OR     OPERATIVE HYSTEROSCOPY WITH MORCELLATOR N/A 7/24/2018    Procedure: OPERATIVE HYSTEROSCOPY WITH MORCELLATOR (MYOSURE);  Exam under anesthesia, operative hysteroscopy, polypectomy, D & C;  Surgeon: Sindhu Peterson DO;  Location: [facility] OR     TUBAL  LIGATION  2006       Family Hx:  Family History   Problem Relation Age of Onset     Allergies Mother      Lipids Father         cholesterol     Diabetes Maternal Grandmother      Hypertension Maternal Grandmother      Heart Disease Maternal Grandmother         Bypass     Cancer Maternal Grandfather         Lung - metastatic     Alzheimer Disease Paternal Grandmother      Heart Disease Paternal Grandmother         valve replacement     Cerebrovascular Disease Paternal Grandfather      Anesthesia Reaction No family hx of          Social Hx:  Social History     Socioeconomic History     Marital status:      Spouse name: Humble     Number of children: 2     Years of education: 14     Highest education level: Not on file   Occupational History     Occupation: Post Office     Employer: [employer]   Social Needs     Financial resource strain: Not on file     Food insecurity     Worry: Not on file     Inability: Not on file     Transportation needs     Medical: Not on file     Non-medical: Not on file   Tobacco Use     Smoking status: Former Smoker     Packs/day: 0.50     Years: 6.00     Pack years: 3.00     Quit date: 3/1/2021     Years since quittin.1     Smokeless tobacco: Never Used   Substance and Sexual Activity     Alcohol use: Yes     Alcohol/week: 0.0 standard drinks     Comment: once a month     Drug use: No     Sexual activity: Not Currently     Partners: Male     Birth control/protection: Surgical   Lifestyle     Physical activity     Days per week: Not on file     Minutes per session: Not on file     Stress: Not on file   Relationships     Social connections     Talks on phone: Not on file     Gets together: Not on file     Attends Advent service: Not on file     Active member of club or organization: Not on file     Attends meetings of clubs or organizations: Not on file     Relationship status: Not on file     Intimate partner violence     Fear of current or ex partner: Not on file      Emotionally abused: Not on file     Physically abused: Not on file     Forced sexual activity: Not on file   Other Topics Concern      Service No     Blood Transfusions No     Caffeine Concern Yes     Comment: Diet Pepsi/at least 20 ounces/day - advised not more than 16 ounces/day     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 Yes   Social History Narrative     and lives at home in [place] with , Humble, and their daughter and son.          MEDICATIONS:  has a current medication list which includes the following prescription(s): acetaminophen, amlodipine, aspirin, novolog flexpen, insulin glargine, lisinopril, metformin, metoprolol tartrate, multi-vitamin gummies, oxycodone, polyethylene glycol, rosuvastatin, senna-docusate, tizanidine, blood glucose, blood glucose calibration, blood glucose, blood glucose monitoring, blood glucose monitoring, accu-chek complete, fluticasone, insulin pen needle, novofine, novofine, and nitroglycerin.    ROS:     ROS: 10 point ROS neg other than the symptoms noted above in the HPI.    GENERAL: fatigue, wt stable; denies fevers, chills, night sweats.   HEENT: no dysphagia, odonophagia, diplopia, neck pain  THYROID:  no apparent hyper or hypothyroid symptoms  CV: no chest pain, pressure, palpitations  LUNGS: no SOB, METZGER, cough, wheezing   ABDOMEN: no diarrhea, constipation, abdominal pain  EXTREMITIES: no rashes, ulcers, edema  NEUROLOGY: right leg pain behind knee related to dog bit injury, left ankle and left thumb numbness;    no headaches, denies changes in vision, tingling  MSK: mild back pains, chest  "wall aching with torso ROM; denies muscle weakness  SKIN: no rashes or lesions  : no menses since early-mid 40's  PSYCH:  stable mood, no significant anxiety or depression  ENDOCRINE: no heat or cold intolerance    Physical Exam   VS: BP 96/60   Pulse 77   Ht 1.676 m (5' 6\")   Wt 79.8 kg (176 lb)   BMI 28.41 kg/m    GENERAL: AXOX3, NAD, well dressed, answering questions appropriately, appears stated age.  THYROID:  normal gland, no apparent nodules or goiter  HEENT: eyeglasses; neck non-tender, no exopthalmous, no proptosis, EOMI  CV: RRR, no rubs, gallops, no murmurs  LUNGS: CTAB, no wheezes, rales, or ronchi  ABDOMEN: soft, nontender, nondistended  EXTREMITIES: no feet edema, +pedal pulses, no skin lesions  NEUROLOGY: CN grossly intact, no tremors  MSK: grossly intact  SKIN: no rashes, no lesions    LABS:    All pertinent notes, labs, and images personally reviewed by me.     A/P:  Encounter Diagnoses   Name Primary?     Type 2 diabetes mellitus with other circulatory complication, with long-term current use of insulin (H) Yes     Uncontrolled type 2 diabetes mellitus with hyperglycemia (H)        Comments:  Reviewed health history and diabetes issues.  Chronic high glucose trends due to inadequate prandial and correction dose insulin  Reviewed and interpreted tests that I previously ordered.   Ordered appropriate tests for the endocrinology disease management.    Management options discussed and implemented after shared medical decision making with the patient.    Plan:  Discussed general issues with the diabetes diagnosis and management  Reviewed the pathophysiology of T2DM  We discussed the hgbA1c test which reflects previous overall glucose levels or control  Discussed the importance of blood glucose (BG) testing to assess glucose trends  Provided general overview of the diabetes medication options and medication treatment plan.    Recommend:  Continue current metformin, Basaglar, and Novolog insulin " use  Important to use mealtime Novolog dose adjusted for meal carb content and glucose level   Continue ICR method for mealtime dosing   Evaluate the premeal glucose with BG meter or glucose sensor, use correction scale more often  Consider adding a GLP1RA medication, if covered by insurance and if measurable C-Peptide level  Check morning labs soon   D[facility]   Lab orders placed  Goal target premeal glucose 80- 150 mg/dl  Consider using a CGM sensor such as Freestyle Nancy or Libre2, DexcomG6 also an option  Plan a follow-up CDE evaluation soon   Review the MDI insulin plan, emphasize correction scale use   Wear diagnostic Freestyle Nancy sensor   Referral placed  Keep focus on diet, exercise as per cardiac rehab plan, weight management  Continue statin med use, goal LDL <70 mg/dl.  Advise having fasting lipid panel testing and dilated eye examination, at least annually    Addressed patient questions today    Future labs ordered today:   Orders Placed This Encounter   Procedures     Glucose     C-peptide     Glutamic acid decarboxylase antibody     Albumin Random Urine Quantitative with Creat Ratio     AMBULATORY ADULT DIABETES EDUCATOR REFERRAL     Radiology/Consults ordered today: AMBULATORY ADULT DIABETES EDUCATOR REFERRAL    Total time spent in with the patient evaluation:  40 min  Additional time spent reviewing pertinent lab tests and chart notes, and documentation:  20 min    Follow-up:  5/24/21 at 8am, Catherine Jacobo MD, MS  Endocrinology  [facility]    CC: EMILI Muse and EMILI Sanchez

## 2021-04-28 ENCOUNTER — HOSPITAL ENCOUNTER (OUTPATIENT)
Dept: CARDIAC REHAB | Facility: CLINIC | Age: 46
End: 2021-04-28
Attending: SURGERY
Payer: COMMERCIAL

## 2021-04-28 ENCOUNTER — TELEPHONE (OUTPATIENT)
Dept: ENDOCRINOLOGY | Facility: CLINIC | Age: 46
End: 2021-04-28

## 2021-04-28 PROCEDURE — 93798 PHYS/QHP OP CAR RHAB W/ECG: CPT | Performed by: REHABILITATION PRACTITIONER

## 2021-04-28 NOTE — TELEPHONE ENCOUNTER
Diabetes Education Scheduling Outreach #1:    Call to patient to schedule. Left message with phone number to call to schedule.    Plan for 2nd outreach attempt within 1 week.    Josselin Austin  [facility]  Diabetes and Nutrition Scheduling

## 2021-04-30 ENCOUNTER — HOSPITAL ENCOUNTER (OUTPATIENT)
Dept: CARDIAC REHAB | Facility: CLINIC | Age: 46
End: 2021-04-30
Attending: SURGERY
Payer: COMMERCIAL

## 2021-04-30 PROCEDURE — 93798 PHYS/QHP OP CAR RHAB W/ECG: CPT

## 2021-05-03 ENCOUNTER — HOSPITAL ENCOUNTER (OUTPATIENT)
Dept: CARDIAC REHAB | Facility: CLINIC | Age: 46
End: 2021-05-03
Attending: SURGERY
Payer: COMMERCIAL

## 2021-05-03 DIAGNOSIS — I21.4 NSTEMI (NON-ST ELEVATED MYOCARDIAL INFARCTION) (H): ICD-10-CM

## 2021-05-03 PROCEDURE — 93798 PHYS/QHP OP CAR RHAB W/ECG: CPT | Performed by: REHABILITATION PRACTITIONER

## 2021-05-04 NOTE — TELEPHONE ENCOUNTER
Routing to covering provider to review. PCP out of clinic.  Routing refill request to provider for review/approval because:  Drug not on the [facility] refill protocol     Zanaflex  Last Written Prescription Date:  4/14/2021  Last Fill Quantity: 90,  # refills: 0   Last office visit: 4/20/2021 with prescribing provider:  Almaz   Future Office Visit:   Next 5 appointments (look out 90 days)    Jun 01, 2021  4:15 PM  Office Visit with Yaima Muse MD  [facility] ([facility] ) [address] [place] [id number] [phone]           Requested Prescriptions   Pending Prescriptions Disp Refills     tiZANidine (ZANAFLEX) 4 MG tablet [Pharmacy Med Name: TIZANIDINE HCL 4MG TABS] 90 tablet 0     Sig: TAKE 1 TABLET (4 MG) BY MOUTH EVERY 6 HOURS AS NEEDED FOR MUSCLE SPASMS       There is no refill protocol information for this order        Leisa Sanchez RN on 5/4/2021 at 11:53 AM

## 2021-05-11 DIAGNOSIS — I21.4 NSTEMI (NON-ST ELEVATED MYOCARDIAL INFARCTION) (H): ICD-10-CM

## 2021-05-12 ENCOUNTER — HOSPITAL ENCOUNTER (OUTPATIENT)
Dept: CARDIAC REHAB | Facility: CLINIC | Age: 46
End: 2021-05-12
Attending: SURGERY
Payer: COMMERCIAL

## 2021-05-12 PROCEDURE — 93798 PHYS/QHP OP CAR RHAB W/ECG: CPT | Performed by: REHABILITATION PRACTITIONER

## 2021-05-12 RX ORDER — OXYCODONE HYDROCHLORIDE 5 MG/1
TABLET ORAL
Qty: 60 TABLET | Refills: 0 | Status: SHIPPED | OUTPATIENT
Start: 2021-05-14 | End: 2021-06-19

## 2021-05-12 NOTE — TELEPHONE ENCOUNTER
Oxycodone        Last Written Prescription Date:  4/14/2021  Last Fill Quantity: 60,   # refills: 0  Last Office Visit: 4/20/2021  Future Office visit:    Next 5 appointments (look out 90 days)    Jun 01, 2021  4:15 PM  Office Visit with Yaima Muse MD  [facility] ([facility] ) [address] [place] [id number]  [phone]   Jun 02, 2021 10:00 AM  Telephone Visit with [facility]  [facility] [place] ([facility] ) [address] [place] [address]  [place] [address]  [phone]           Routing refill request to provider for review/approval because:  Drug not on the [facility], [facility] or [facility] refill protocol or controlled substance

## 2021-05-18 ENCOUNTER — IMMUNIZATION (OUTPATIENT)
Dept: FAMILY MEDICINE | Facility: CLINIC | Age: 46
End: 2021-05-18
Attending: FAMILY MEDICINE
Payer: COMMERCIAL

## 2021-05-18 PROCEDURE — 91301 PR COVID VAC MODERNA 100 MCG/0.5 ML IM: CPT

## 2021-05-18 PROCEDURE — 0012A PR COVID VAC MODERNA 100 MCG/0.5 ML IM: CPT

## 2021-05-19 NOTE — PROGRESS NOTES
HPI and Plan:   See dictation    No orders of the defined types were placed in this encounter.      No orders of the defined types were placed in this encounter.      There are no discontinued medications.      No diagnosis found.    CURRENT MEDICATIONS:  Current Outpatient Medications   Medication Sig Dispense Refill     acetaminophen (TYLENOL) 325 MG tablet TAKE TWO TABLETS BY MOUTH EVERY 4 HOURS AS NEEDED FOR MILD PAIN 40 tablet 0     amLODIPine (NORVASC) 2.5 MG tablet Take 1 tablet (2.5 mg) by mouth daily 30 tablet 3     aspirin (ASA) 325 MG tablet 1 tablet (325 mg) by Oral or NG Tube route daily 90 tablet 3     blood glucose (NO BRAND SPECIFIED) test strip Use to test blood sugar up to 4 times daily or as directed. To accompany: Blood Glucose Monitor Brands: per insurance. (Patient taking differently: 1 strip by In Vitro route 2 times daily ) 200 strip 6     blood glucose calibration (NO BRAND SPECIFIED) solution To accompany: Blood Glucose Monitor Brands: per insurance. 1 Bottle 3     blood glucose monitoring (BL TEST STRIP PACK) test strip Use to test blood sugar 1-2 times daily or as directed. Per patients glucose meter (getting new one today) 100 each 3     blood glucose monitoring (FREESTYLE) lancets Use to test blood sugars 1-2 times daily or as directed, per patients glucose meter. 3 Box 3     blood glucose monitoring (NO BRAND SPECIFIED) meter device kit Use to test blood sugar up to 4 times daily or as directed. Preferred blood glucose meter OR supplies to accompany: Blood Glucose Monitor Brands: per insurance. 1 kit 0     Blood Glucose Monitoring Suppl (ACCU-CHEK COMPLETE) KIT 1 Device daily 1 Device 0     fluticasone (FLONASE) 50 MCG/ACT nasal spray Spray 2 sprays into both nostrils At Bedtime (Patient not taking: Reported on 4/20/2021) 16 g 3     insulin aspart (NOVOLOG FLEXPEN) 100 UNIT/ML pen Novolog Flexpen. Inject 1 units per 10 gram carb unit before breakfast, lunch and dinner 30 mL 0      insulin glargine (BASAGLAR KWIKPEN) 100 UNIT/ML pen INJECT 38 UNITS UNDER THE SKIN EVERY MORNING 30 mL 0     insulin pen needle (31G X 8 MM) 31G X 8 MM miscellaneous 1 Box of 100 insulin pen needles to be dispensed with every insulin pen prescription 100 each 0     insulin pen needle (NOVOFINE) 32G X 6 MM miscellaneous Use once daily or as directed. 100 each 3     insulin pen needle (NOVOFINE) 32G X 6 MM miscellaneous Use once daily or as directed. (Patient not taking: Reported on 4/20/2021) 100 each 0     lisinopril (ZESTRIL) 2.5 MG tablet Take 1 tablet (2.5 mg) by mouth daily 30 tablet 3     metFORMIN (GLUCOPHAGE-XR) 500 MG 24 hr tablet Take 2 tablets (1,000 mg) by mouth 2 times daily (with meals) . 360 tablet 3     metoprolol tartrate (LOPRESSOR) 25 MG tablet Take 1 tablet (25 mg) by mouth 2 times daily 60 tablet 3     Multiple Vitamins-Minerals (MULTI-VITAMIN GUMMIES) CHEW Take 1 chew tab by mouth daily        nitroGLYcerin (NITROSTAT) 0.4 MG sublingual tablet For chest pain place 1 tablet under the tongue every 5 minutes for 3 doses. If symptoms persist 5 minutes after 1st dose call 911. 25 tablet 0     oxyCODONE (ROXICODONE) 5 MG tablet TAKE ONE AND ONE-HALF TABLETS BY MOUTH EVERY 4 HOURS AS NEEDED FOR MODERATE TO SEVERE PAIN 60 tablet 0     polyethylene glycol (MIRALAX) 17 GM/Dose powder Take 17 g by mouth daily 510 g 0     rosuvastatin (CRESTOR) 20 MG tablet Take 1 tablet (20 mg) by mouth daily 30 tablet 3     senna-docusate (SENOKOT-S/PERICOLACE) 8.6-50 MG tablet Take 2 tablets by mouth 2 times daily as needed for constipation 30 tablet 2     tiZANidine (ZANAFLEX) 4 MG tablet TAKE 1 TABLET (4 MG) BY MOUTH EVERY 6 HOURS AS NEEDED FOR MUSCLE SPASMS 90 tablet 0       ALLERGIES     Allergies   Allergen Reactions     Amoxicillin Hives     Hydrocodone-Acetaminophen GI Disturbance     Tylenol is ok       PAST MEDICAL HISTORY:  Past Medical History:   Diagnosis Date     Knee pain, chronic      Mixed hyperlipidemia       Type II or unspecified type diabetes mellitus without mention of complication, not stated as uncontrolled 08/16/2002    diagnosed 8/16/02, started insulin 2006       PAST SURGICAL HISTORY:  Past Surgical History:   Procedure Laterality Date     BYPASS GRAFT ARTERY CORONARY N/A 3/9/2021    Procedure: CORONARY ARTERY BYPASS GRAFT X 4 (LIMA - LAD, SV - RPL, SV - PDA,  RA - OM) LEFT RADIAL ENDOARTERY HARVEST AND BILATERAL LEG ENDOVEIN HARVEST (ON CARDIOPULMONARY PUMP OXYGENATOR ; INTRAOPERATIVE TRANSESOPHAGEAL ECHOCARDIOGRAM BY ANESTHESIOLOGIST DR. NEL AVILA)   ;  Surgeon: Kunal Selby MD;  Location:  OR      STABISM SURG,PREV EYE SURG,NOT [facility]      Right     CV HEART CATHETERIZATION WITH POSSIBLE INTERVENTION N/A 3/8/2021    Procedure: Heart Catheterization with Possible Intervention;  Surgeon: Vadim Kamara MD;  Location:  HEART CARDIAC CATH LAB     HC OPEN TX METATARSAL FRACTURE  age 12    softball injury,open fracture left foot     HC TOOTH EXTRACTION W/FORCEP      Extract wisdom teeth     INJECT JOINT SACROILIAC Left 1/11/2018    Procedure: INJECT JOINT SACROILIAC;  INJECT JOINT SACROILIAC LEFT;  Surgeon: Alan Marshall MD;  Location:  OR     OPERATIVE HYSTEROSCOPY WITH MORCELLATOR N/A 7/24/2018    Procedure: OPERATIVE HYSTEROSCOPY WITH MORCELLATOR (MYOSURE);  Exam under anesthesia, operative hysteroscopy, polypectomy, D & C;  Surgeon: Sindhu Peterson DO;  Location:  OR     TUBAL LIGATION  7/27/2006       FAMILY HISTORY:  Family History   Problem Relation Age of Onset     Allergies Mother      Lipids Father         cholesterol     Diabetes Maternal Grandmother      Hypertension Maternal Grandmother      Heart Disease Maternal Grandmother         Bypass     Cancer Maternal Grandfather         Lung - metastatic     Alzheimer Disease Paternal Grandmother      Heart Disease Paternal Grandmother         valve replacement     Cerebrovascular Disease Paternal Grandfather       Anesthesia Reaction No family hx of        SOCIAL HISTORY:  Social History     Socioeconomic History     Marital status:      Spouse name: Humble     Number of children: 2     Years of education: 14     Highest education level: Not on file   Occupational History     Occupation: Post Office     Employer: [employer]   Social Needs     Financial resource strain: Not on file     Food insecurity     Worry: Not on file     Inability: Not on file     Transportation needs     Medical: Not on file     Non-medical: Not on file   Tobacco Use     Smoking status: Former Smoker     Packs/day: 0.50     Years: 6.00     Pack years: 3.00     Quit date: 3/1/2021     Years since quittin.2     Smokeless tobacco: Never Used   Substance and Sexual Activity     Alcohol use: Yes     Alcohol/week: 0.0 standard drinks     Comment: once a month     Drug use: No     Sexual activity: Not Currently     Partners: Male     Birth control/protection: Surgical   Lifestyle     Physical activity     Days per week: Not on file     Minutes per session: Not on file     Stress: Not on file   Relationships     Social connections     Talks on phone: Not on file     Gets together: Not on file     Attends Pentecostal service: Not on file     Active member of club or organization: Not on file     Attends meetings of clubs or organizations: Not on file     Relationship status: Not on file     Intimate partner violence     Fear of current or ex partner: Not on file     Emotionally abused: Not on file     Physically abused: Not on file     Forced sexual activity: Not on file   Other Topics Concern      Service No     Blood Transfusions No     Caffeine Concern Yes     Comment: Diet Pepsi/at least 20 ounces/day - advised not more than 16 ounces/day     Occupational Exposure Yes     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 Yes   Social History Narrative     and lives at home in [place] with Humble, and their daughter and son.       Review of Systems:  Skin:          Eyes:         ENT:         Respiratory:          Cardiovascular:         Gastroenterology:        Genitourinary:         Musculoskeletal:         Neurologic:         Psychiatric:         Heme/Lymph/Imm:         Endocrine:           Physical Exam:  Vitals: There were no vitals taken for this visit.    Constitutional:  cooperative, alert and oriented, well developed, well nourished, in no acute distress        Skin:  warm and dry to the touch, no apparent skin lesions or masses noted          Head:  normocephalic, no masses or lesions        Eyes:  pupils equal and round        Lymph:      ENT:  no pallor or cyanosis, dentition good        Neck:  JVP normal        Respiratory:  clear to auscultation         Cardiac: regular rhythm                pulses full and equal                                        GI:  abdomen soft        Extremities and Muscular Skeletal:  no edema              Neurological:  no gross motor deficits        Psych:  Alert and Oriented x 3        CC  No referring provider defined for this encounter.

## 2021-05-20 ENCOUNTER — OFFICE VISIT (OUTPATIENT)
Dept: CARDIOLOGY | Facility: CLINIC | Age: 46
End: 2021-05-20
Payer: COMMERCIAL

## 2021-05-20 VITALS
TEMPERATURE: 96.6 F | WEIGHT: 173.6 LBS | BODY MASS INDEX: 28.02 KG/M2 | HEART RATE: 88 BPM | DIASTOLIC BLOOD PRESSURE: 70 MMHG | OXYGEN SATURATION: 96 % | SYSTOLIC BLOOD PRESSURE: 107 MMHG

## 2021-05-20 DIAGNOSIS — Z95.1 S/P CABG (CORONARY ARTERY BYPASS GRAFT): Primary | ICD-10-CM

## 2021-05-20 PROCEDURE — 99204 OFFICE O/P NEW MOD 45 MIN: CPT | Performed by: INTERNAL MEDICINE

## 2021-05-20 NOTE — LETTER
5/20/2021    Yaima Muse MD  [address] [place] [address]    RE: Stacy Brown       Dear Colleague,    I had the pleasure of seeing Stacy Brown in the [facility].    HPI and Plan:   See dictation    No orders of the defined types were placed in this encounter.      No orders of the defined types were placed in this encounter.      There are no discontinued medications.      No diagnosis found.    CURRENT MEDICATIONS:  Current Outpatient Medications   Medication Sig Dispense Refill     acetaminophen (TYLENOL) 325 MG tablet TAKE TWO TABLETS BY MOUTH EVERY 4 HOURS AS NEEDED FOR MILD PAIN 40 tablet 0     amLODIPine (NORVASC) 2.5 MG tablet Take 1 tablet (2.5 mg) by mouth daily 30 tablet 3     aspirin (ASA) 325 MG tablet 1 tablet (325 mg) by Oral or NG Tube route daily 90 tablet 3     blood glucose (NO BRAND SPECIFIED) test strip Use to test blood sugar up to 4 times daily or as directed. To accompany: Blood Glucose Monitor Brands: per insurance. (Patient taking differently: 1 strip by In Vitro route 2 times daily ) 200 strip 6     blood glucose calibration (NO BRAND SPECIFIED) solution To accompany: Blood Glucose Monitor Brands: per insurance. 1 Bottle 3     blood glucose monitoring (BL TEST STRIP PACK) test strip Use to test blood sugar 1-2 times daily or as directed. Per patients glucose meter (getting new one today) 100 each 3     blood glucose monitoring (FREESTYLE) lancets Use to test blood sugars 1-2 times daily or as directed, per patients glucose meter. 3 Box 3     blood glucose monitoring (NO BRAND SPECIFIED) meter device kit Use to test blood sugar up to 4 times daily or as directed. Preferred blood glucose meter OR supplies to accompany: Blood Glucose Monitor Brands: per insurance. 1 kit 0     Blood Glucose Monitoring Suppl (ACCU-CHEK COMPLETE) KIT 1 Device daily 1 Device 0     fluticasone (FLONASE) 50 MCG/ACT nasal spray  Spray 2 sprays into both nostrils At Bedtime (Patient not taking: Reported on 4/20/2021) 16 g 3     insulin aspart (NOVOLOG FLEXPEN) 100 UNIT/ML pen Novolog Flexpen. Inject 1 units per 10 gram carb unit before breakfast, lunch and dinner 30 mL 0     insulin glargine (BASAGLAR KWIKPEN) 100 UNIT/ML pen INJECT 38 UNITS UNDER THE SKIN EVERY MORNING 30 mL 0     insulin pen needle (31G X 8 MM) 31G X 8 MM miscellaneous 1 Box of 100 insulin pen needles to be dispensed with every insulin pen prescription 100 each 0     insulin pen needle (NOVOFINE) 32G X 6 MM miscellaneous Use once daily or as directed. 100 each 3     insulin pen needle (NOVOFINE) 32G X 6 MM miscellaneous Use once daily or as directed. (Patient not taking: Reported on 4/20/2021) 100 each 0     lisinopril (ZESTRIL) 2.5 MG tablet Take 1 tablet (2.5 mg) by mouth daily 30 tablet 3     metFORMIN (GLUCOPHAGE-XR) 500 MG 24 hr tablet Take 2 tablets (1,000 mg) by mouth 2 times daily (with meals) . 360 tablet 3     metoprolol tartrate (LOPRESSOR) 25 MG tablet Take 1 tablet (25 mg) by mouth 2 times daily 60 tablet 3     Multiple Vitamins-Minerals (MULTI-VITAMIN GUMMIES) CHEW Take 1 chew tab by mouth daily        nitroGLYcerin (NITROSTAT) 0.4 MG sublingual tablet For chest pain place 1 tablet under the tongue every 5 minutes for 3 doses. If symptoms persist 5 minutes after 1st dose call 911. 25 tablet 0     oxyCODONE (ROXICODONE) 5 MG tablet TAKE ONE AND ONE-HALF TABLETS BY MOUTH EVERY 4 HOURS AS NEEDED FOR MODERATE TO SEVERE PAIN 60 tablet 0     polyethylene glycol (MIRALAX) 17 GM/Dose powder Take 17 g by mouth daily 510 g 0     rosuvastatin (CRESTOR) 20 MG tablet Take 1 tablet (20 mg) by mouth daily 30 tablet 3     senna-docusate (SENOKOT-S/PERICOLACE) 8.6-50 MG tablet Take 2 tablets by mouth 2 times daily as needed for constipation 30 tablet 2     tiZANidine (ZANAFLEX) 4 MG tablet TAKE 1 TABLET (4 MG) BY MOUTH EVERY 6 HOURS AS NEEDED FOR MUSCLE SPASMS 90 tablet 0        ALLERGIES     Allergies   Allergen Reactions     Amoxicillin Hives     Hydrocodone-Acetaminophen GI Disturbance     Tylenol is ok       PAST MEDICAL HISTORY:  Past Medical History:   Diagnosis Date     Knee pain, chronic      Mixed hyperlipidemia      Type II or unspecified type diabetes mellitus without mention of complication, not stated as uncontrolled 08/16/2002    diagnosed 8/16/02, started insulin 2006       PAST SURGICAL HISTORY:  Past Surgical History:   Procedure Laterality Date     BYPASS GRAFT ARTERY CORONARY N/A 3/9/2021    Procedure: CORONARY ARTERY BYPASS GRAFT X 4 (LIMA - LAD, SV - RPL, SV - PDA,  RA - OM) LEFT RADIAL ENDOARTERY HARVEST AND BILATERAL LEG ENDOVEIN HARVEST (ON CARDIOPULMONARY PUMP OXYGENATOR ; INTRAOPERATIVE TRANSESOPHAGEAL ECHOCARDIOGRAM BY ANESTHESIOLOGIST DR. NEL AVILA)   ;  Surgeon: Kunal Selby MD;  Location:  OR      STABISM SURG,PREV EYE SURG,[facility]      Right     CV HEART CATHETERIZATION WITH POSSIBLE INTERVENTION N/A 3/8/2021    Procedure: Heart Catheterization with Possible Intervention;  Surgeon: Vadim Kamara MD;  Location:  HEART CARDIAC CATH LAB     HC OPEN TX METATARSAL FRACTURE  age 12    softball injury,open fracture left foot     HC TOOTH EXTRACTION W/FORCEP      Extract wisdom teeth     INJECT JOINT SACROILIAC Left 1/11/2018    Procedure: INJECT JOINT SACROILIAC;  INJECT JOINT SACROILIAC LEFT;  Surgeon: Alan Marshall MD;  Location:  OR     OPERATIVE HYSTEROSCOPY WITH MORCELLATOR N/A 7/24/2018    Procedure: OPERATIVE HYSTEROSCOPY WITH MORCELLATOR (MYOSURE);  Exam under anesthesia, operative hysteroscopy, polypectomy, D & C;  Surgeon: Sindhu Peterson DO;  Location:  OR     TUBAL LIGATION  7/27/2006       FAMILY HISTORY:  Family History   Problem Relation Age of Onset     Allergies Mother      Lipids Father         cholesterol     Diabetes Maternal Grandmother      Hypertension Maternal Grandmother      Heart Disease  Maternal Grandmother         Bypass     Cancer Maternal Grandfather         Lung - metastatic     Alzheimer Disease Paternal Grandmother      Heart Disease Paternal Grandmother         valve replacement     Cerebrovascular Disease Paternal Grandfather      Anesthesia Reaction No family hx of        SOCIAL HISTORY:  Social History     Socioeconomic History     Marital status:      Spouse name: Humble     Number of children: 2     Years of education: 14     Highest education level: Not on file   Occupational History     Occupation: Post Office     Employer: [employer]   Social Needs     Financial resource strain: Not on file     Food insecurity     Worry: Not on file     Inability: Not on file     Transportation needs     Medical: Not on file     Non-medical: Not on file   Tobacco Use     Smoking status: Former Smoker     Packs/day: 0.50     Years: 6.00     Pack years: 3.00     Quit date: 3/1/2021     Years since quittin.2     Smokeless tobacco: Never Used   Substance and Sexual Activity     Alcohol use: Yes     Alcohol/week: 0.0 standard drinks     Comment: once a month     Drug use: No     Sexual activity: Not Currently     Partners: Male     Birth control/protection: Surgical   Lifestyle     Physical activity     Days per week: Not on file     Minutes per session: Not on file     Stress: Not on file   Relationships     Social connections     Talks on phone: Not on file     Gets together: Not on file     Attends Mandaen service: Not on file     Active member of club or organization: Not on file     Attends meetings of clubs or organizations: Not on file     Relationship status: Not on file     Intimate partner violence     Fear of current or ex partner: Not on file     Emotionally abused: Not on file     Physically abused: Not on file     Forced sexual activity: Not on file   Other Topics Concern      Service No     Blood Transfusions No     Caffeine Concern Yes     Comment: Diet  Pepsi/at least 20 ounces/day - advised not more than 16 ounces/day     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 Yes   Social History Narrative     and lives at home in [place] with Humble, and their daughter and son.       Review of Systems:  Skin:          Eyes:         ENT:         Respiratory:          Cardiovascular:         Gastroenterology:        Genitourinary:         Musculoskeletal:         Neurologic:         Psychiatric:         Heme/Lymph/Imm:         Endocrine:           Physical Exam:  Vitals: There were no vitals taken for this visit.    Constitutional:  cooperative, alert and oriented, well developed, well nourished, in no acute distress        Skin:  warm and dry to the touch, no apparent skin lesions or masses noted          Head:  normocephalic, no masses or lesions        Eyes:  pupils equal and round        Lymph:      ENT:  no pallor or cyanosis, dentition good        Neck:  JVP normal        Respiratory:  clear to auscultation         Cardiac: regular rhythm                pulses full and equal                                        GI:  abdomen soft        Extremities and Muscular Skeletal:  no edema              Neurological:  no gross motor deficits        Psych:  Alert and Oriented x 3        Thank you for allowing me to participate in the care of your patient.      Sincerely,     Cici Ham MD     [facility]    cc:   No referring provider defined for this encounter.

## 2021-05-20 NOTE — PROGRESS NOTES
Service Date: 05/20/2021    CARDIOLOGY CONSULT    HISTORY OF PRESENT ILLNESS:  I had the pleasure of seeing Ms. Brown in followup at the [facility] today.  She is a very pleasant, 46-year-old female whom I saw as an inpatient at [facility] in March of this year.    Ms. Brown has a history of hypertension, poorly controlled diabetes, untreated dyslipidemia as well as tobacco dependence who presented to [facility] at the time with chest discomfort and elevated troponins consistent with a non-ST elevation myocardial infarction.  Coronary angiography on 03/08/2021 demonstrated severe 3 vessel coronary artery disease involving the proximal to mid LAD/mid circumflex/mid right coronary artery/proximal RPL.  Subsequently, she underwent coronary artery bypass grafting x4 on 03/09/2021.  She specifically underwent a LIMA to the LAD as well as saphenous venous grafting to the RPL and PDA and radial artery grafting to the obtuse marginal.  Echocardiography prior to bypass surgery demonstrated normal left ventricular systolic function with no significant valvular disease.    The patient did well postoperatively and was discharged on 03/16/2021.  She did experience some postoperative fluid overload that was treated with diuresis.  She was also placed on amlodipine for 6 months to prevent radial artery spasm.    Today she feels that she has improved dramatically from a cardiovascular standpoint.  She specifically denies any exertional chest discomfort or dyspnea, palpitations, syncope or presyncope.  She has been participating in cardiac rehab, working out for an hour at a time 3 times a week.  She appears to be progressing well, although she still experiences some incisional chest discomfort.    She also has some numbness involving the left hand and lower extremity.  She is curious as to whether she will be able to return to work as a  if these issues persist.    She has been fully  compliant with all her medications.  She has not smoked since her hospitalization.    Past medical history, family history, social history and medications are reviewed in my Epic note.      ALLERGIES:  Reviewed in my Epic note.    PHYSICAL EXAMINATION:  Dictated below.    She is scheduled for a followup lipid panel in the near future as ordered by her primary care physician.    IMPRESSION:    1.  Coronary artery disease, status post coronary artery bypass grafting x4 in 2021 as described above.  2.  Hypertension.  Blood pressure well controlled on metoprolol 25 mg p.o. b.i.d. and amlodipine 2.5 mg daily.  3.  Dyslipidemia.  Currently on rosuvastatin 20 mg daily.  4.  Diabetes.  Currently on metformin and insulin.  5.  History of tobacco use.    The patient is doing well overall from a cardiovascular standpoint without any evidence of angina or congestive heart failure.  A followup lipid panel has been ordered appropriately, and I will review the results to decide if further intensification of her statin therapy is indicated.    I will plan on following up with her in approximately 6 months.  We will obviously have to see if her neurological deficits improve over the next month or so to make an informed decision regarding work resumption.    Cici Ham MD        D: 2021   T: 2021   MT: gui    Name:     SARAH PHELPS  MRN:      [id number]        Account:      [id number]   :      1975           Service Date: 2021       Document: [id number]

## 2021-05-24 ENCOUNTER — HOSPITAL ENCOUNTER (OUTPATIENT)
Dept: CARDIAC REHAB | Facility: CLINIC | Age: 46
End: 2021-05-24
Attending: SURGERY
Payer: COMMERCIAL

## 2021-05-24 PROCEDURE — 93798 PHYS/QHP OP CAR RHAB W/ECG: CPT | Performed by: REHABILITATION PRACTITIONER

## 2021-05-26 ENCOUNTER — HOSPITAL ENCOUNTER (OUTPATIENT)
Dept: CARDIAC REHAB | Facility: CLINIC | Age: 46
End: 2021-05-26
Attending: SURGERY
Payer: COMMERCIAL

## 2021-05-26 ENCOUNTER — MYC MEDICAL ADVICE (OUTPATIENT)
Dept: FAMILY MEDICINE | Facility: CLINIC | Age: 46
End: 2021-05-26

## 2021-05-26 PROCEDURE — 93798 PHYS/QHP OP CAR RHAB W/ECG: CPT | Performed by: REHABILITATION PRACTITIONER

## 2021-05-26 NOTE — TELEPHONE ENCOUNTER
Patient is informed this cannot be discussed in the wrong chart.  She is infomred it needs to be seen to have abx prescribed.    Closing this encounter.  Nadine Naqvi, MARCELAN, RN

## 2021-05-27 DIAGNOSIS — I21.4 NSTEMI (NON-ST ELEVATED MYOCARDIAL INFARCTION) (H): ICD-10-CM

## 2021-05-27 NOTE — TELEPHONE ENCOUNTER
Requested Prescriptions   Pending Prescriptions Disp Refills     tiZANidine (ZANAFLEX) 4 MG tablet [Pharmacy Med Name: TIZANIDINE HCL 4MG TABS] 90 tablet 0     Sig: TAKE 1 TABLET (4 MG) BY MOUTH EVERY 6 HOURS AS NEEDED FOR MUSCLE SPASMS     Last Written Prescription Date:  05/04/2021  Last Fill Quantity: 90,   # refills: 0  Last Office Visit: 04/20/2021  Future Office visit:    Next 5 appointments (look out 90 days)    Jun 01, 2021  4:15 PM  Office Visit with Yaima Muse MD  [facility] ([facility] ) [address] [address]  [phone]   Jun 02, 2021 10:00 AM  Telephone Visit with [facility]  [facility] ([facility] ) [address] [place] [address] [address] [place] [address]  [phone]           Routing refill request to provider for review/approval because:  Drug not on the [facility], [facility] or [facility] refill protocol or controlled substance    Routing to covering provider to sign.   Clair Ortiz MA

## 2021-05-28 ENCOUNTER — HOSPITAL ENCOUNTER (OUTPATIENT)
Dept: CARDIAC REHAB | Facility: CLINIC | Age: 46
End: 2021-05-28
Attending: SURGERY
Payer: COMMERCIAL

## 2021-05-28 PROCEDURE — 93798 PHYS/QHP OP CAR RHAB W/ECG: CPT

## 2021-06-01 ENCOUNTER — OFFICE VISIT (OUTPATIENT)
Dept: FAMILY MEDICINE | Facility: CLINIC | Age: 46
End: 2021-06-01
Payer: COMMERCIAL

## 2021-06-01 VITALS
RESPIRATION RATE: 14 BRPM | HEART RATE: 94 BPM | OXYGEN SATURATION: 97 % | BODY MASS INDEX: 28.63 KG/M2 | WEIGHT: 177.4 LBS | TEMPERATURE: 97.5 F | SYSTOLIC BLOOD PRESSURE: 110 MMHG | DIASTOLIC BLOOD PRESSURE: 60 MMHG

## 2021-06-01 DIAGNOSIS — Z79.4 TYPE 2 DIABETES MELLITUS WITH HYPERGLYCEMIA, WITH LONG-TERM CURRENT USE OF INSULIN (H): ICD-10-CM

## 2021-06-01 DIAGNOSIS — E11.65 TYPE 2 DIABETES MELLITUS WITH HYPERGLYCEMIA, WITH LONG-TERM CURRENT USE OF INSULIN (H): ICD-10-CM

## 2021-06-01 DIAGNOSIS — I21.4 NSTEMI (NON-ST ELEVATED MYOCARDIAL INFARCTION) (H): Primary | ICD-10-CM

## 2021-06-01 PROCEDURE — 99214 OFFICE O/P EST MOD 30 MIN: CPT | Mod: 95 | Performed by: FAMILY MEDICINE

## 2021-06-01 NOTE — PROGRESS NOTES
Assessment & Plan       ICD-10-CM    1. NSTEMI (non-ST elevated myocardial infarction) (H)  I21.4    2. Type 2 diabetes mellitus with hyperglycemia, with long-term current use of insulin (H)  E11.65     Z79.4       I am going to have her start back to work, 1 day per week, up to 10 hours per day, with a lifting restriction of 50 lbs. See letter.   Will have her follow up in 1 month to see how it's going at work with her restrictions. Will follow up sooner with any problems or worsening symptoms.     Continue to attend cardiac rehab. Will continue to work on controlling her blood sugar through healthy diet and exercise, continue to follow with diabetes educator.   Regarding her numbness, will continue to monitor for improvement, as this could be temporary due to surgery in the setting of background diabetes.     25 minutes spent on the date of the encounter doing chart review, history and exam, documentation and further activities per the note      Yaima Muse MD  [facility]    Kwadwo Kumar is a 46 year old who presents for the following health issues  accompanied by her spouse:    HPI     Diabetes Follow-up    How often are you checking your blood sugar? One time daily  What time of day are you checking your blood sugars (select all that apply)?  Before meals  Have you had any blood sugars above 200?  Yes   Have you had any blood sugars below 70?  Yes     What symptoms do you notice when your blood sugar is low?  None    What concerns do you have today about your diabetes? None     Do you have any of these symptoms? (Select all that apply)  No numbness or tingling in feet.  No redness, sores or blisters on feet.  No complaints of excessive thirst.  No reports of blurry vision.  No significant changes to weight.  Have you had a diabetic eye exam in the last 12 months? No     Lab Results   Component Value Date    A1C 10.1 03/05/2021        She is checking her sugar  every morning, usually runs in the 90s but does vary some. Occasionally in the 140s-160s.   She is using her 38 units of Basaglar and 15-20 units of Novolog with each meal tid.   Last week on Monday she had a glucose >400, but then by Wednesday it was down to 161 and then dropped to low of 59.       CAD follow up.   She is still getting some occasional chest pain. She is doing cardiac rehab, working on a 7-10% incline, with 8 lb weights. She does 15-20 minutes on the bike, 30 minutes on the treadmill. She has more pain with laying down.   She has not needed any NTG.     She complains that her left hand is always numb.     She is not smoking since 3/5/21! She states she will not go back to smoking.     BP Readings from Last 2 Encounters:   06/24/21 (!) 151/71   06/01/21 110/60     Hemoglobin A1C (%)   Date Value   03/05/2021 10.1 (H)   10/29/2020 12.1 (H)     LDL Cholesterol Calculated (mg/dL)   Date Value   03/06/2021 178 (H)   08/11/2019 162 (H)           How many servings of fruits and vegetables do you eat daily?  2-3    On average, how many sweetened beverages do you drink each day (Examples: soda, juice, sweet tea, etc.  Do NOT count diet or artificially sweetened beverages)?   1    How many days per week do you exercise enough to make your heart beat faster? 3 or less    How many minutes a day do you exercise enough to make your heart beat faster? 30 - 60    How many days per week do you miss taking your medication? 0        Review of Systems   Constitutional, HEENT, cardiovascular, pulmonary, gi and gu systems are negative, except as otherwise noted.      Objective    /60   Pulse 94   Temp 97.5  F (36.4  C) (Temporal)   Resp 14   Wt 80.5 kg (177 lb 6.4 oz)   SpO2 97%   BMI 28.63 kg/m    Body mass index is 28.63 kg/m .  Physical Exam   Vitals noted.  Patient alert, oriented, and in no acute distress.   Neck:  Supple without lymphadenopathy, JVD or masses.   CV:  RRR without murmur.   Respiratory:   Lungs clear to auscultation bilaterally.   Her sternotomy incision is healing well. No drainage, slightly tender.   Extremities without edema.   She has decreased sensation to light touch in the left medial ankle, right medial ankle, and left thumb.

## 2021-06-01 NOTE — LETTER
[address] [address]  Phone: [phone]  Fax: [phone]    June 1, 2021        Stacy Brown  [address] [place] [address]          To whom it may concern:    RE: Stacy Brown    Patient was seen and treated today at our clinic. She may return to work as of Tuesday 6/8/2021 with the following restrictions:   No lifting greater than 50 lbs.   Stacy may work 1 day per week, Tuesday only, up to 10 hours per day.   She may drive without restrictions.   These restrictions will be in effect for 1 month at which time she will be re-evaluated.     Please contact me for questions or concerns.      Sincerely,        Yaima Muse MD

## 2021-06-02 ENCOUNTER — HOSPITAL ENCOUNTER (OUTPATIENT)
Dept: CARDIAC REHAB | Facility: CLINIC | Age: 46
End: 2021-06-02
Attending: SURGERY
Payer: COMMERCIAL

## 2021-06-02 PROCEDURE — 93798 PHYS/QHP OP CAR RHAB W/ECG: CPT | Performed by: REHABILITATION PRACTITIONER

## 2021-06-04 ENCOUNTER — HOSPITAL ENCOUNTER (OUTPATIENT)
Dept: CARDIAC REHAB | Facility: CLINIC | Age: 46
End: 2021-06-04
Attending: SURGERY
Payer: COMMERCIAL

## 2021-06-04 PROCEDURE — 93798 PHYS/QHP OP CAR RHAB W/ECG: CPT

## 2021-06-14 ENCOUNTER — HOSPITAL ENCOUNTER (OUTPATIENT)
Dept: CARDIAC REHAB | Facility: CLINIC | Age: 46
End: 2021-06-14
Attending: SURGERY
Payer: COMMERCIAL

## 2021-06-14 ENCOUNTER — MYC MEDICAL ADVICE (OUTPATIENT)
Dept: FAMILY MEDICINE | Facility: CLINIC | Age: 46
End: 2021-06-14

## 2021-06-14 PROCEDURE — 93798 PHYS/QHP OP CAR RHAB W/ECG: CPT | Performed by: REHABILITATION PRACTITIONER

## 2021-06-15 ENCOUNTER — MYC MEDICAL ADVICE (OUTPATIENT)
Dept: FAMILY MEDICINE | Facility: CLINIC | Age: 46
End: 2021-06-15

## 2021-06-15 NOTE — LETTER
[address] [address]  Phone: [phone]  Fax: [phone]    June 21, 2021        Stacy Brown  [address] [facility] [address]          To whom it may concern:    RE: Stacy Brown    Patient was seen and treated today at our clinic. She may return to work as of Tuesday 6/22/2021 with the following restrictions:   No lifting greater than 70 lbs.   Stacy may work 1 day per week, Tuesday only, up to 10 hours per day.   She may drive without restrictions.   These restrictions will be in effect for 1 month at which time she will be re-evaluated.     Please contact me for questions or concerns.      Sincerely,        Yaima Muse MD

## 2021-06-15 NOTE — LETTER
[address] [address]  Phone: [phone]  Fax: [phone]    June 21, 2021        Stacy Brown  [address] [facility] [address]          To whom it may concern:    RE: Stacy Brown    Patient was seen and treated today at our clinic. She may return to work as of Tuesday 6/22/2021 with the following restrictions:   No lifting greater than 50 lbs.   Stacy may work 1 day per week, Tuesday only, up to 10 hours per day.   She may drive without restrictions.   These restrictions will be in effect for 1 month at which time she will be re-evaluated.     Please contact me for questions or concerns.      Sincerely,        Yaima Muse MD

## 2021-06-15 NOTE — LETTER
[address] [address] [phone]        Carla 15, 2021    Regarding:  Stacy Brown  [address] [address] [place] [id number]              To Whom It May Concern;    Stacy may return to work with a lifting restriction of 70 lbs.     If there are any further questions or concerns please contact me at the number listed above. Thank you          Sincerely,        Yaima Muse MD

## 2021-06-16 ENCOUNTER — HOSPITAL ENCOUNTER (OUTPATIENT)
Dept: CARDIAC REHAB | Facility: CLINIC | Age: 46
End: 2021-06-16
Attending: SURGERY
Payer: COMMERCIAL

## 2021-06-16 DIAGNOSIS — I21.4 NSTEMI (NON-ST ELEVATED MYOCARDIAL INFARCTION) (H): ICD-10-CM

## 2021-06-16 PROCEDURE — 93798 PHYS/QHP OP CAR RHAB W/ECG: CPT | Performed by: REHABILITATION PRACTITIONER

## 2021-06-16 NOTE — TELEPHONE ENCOUNTER
Requested Prescriptions   Pending Prescriptions Disp Refills     oxyCODONE (ROXICODONE) 5 MG tablet [Pharmacy Med Name: OXYCODONE HCL 5MG TABS] 60 tablet 0     Sig: TAKE 1 & 1/2 TABLETS BY MOUTH EVERY 4 HOURS AS NEEDED FOR MODERATE TO SEVERE PAIN     Last Written Prescription Date:  05/14/2021  Last Fill Quantity: 60,   # refills: 0  Last Office Visit: 06/01/2021  Future Office visit:       Routing refill request to provider for review/approval because:  Drug not on the [facility], [facility] or [facility] refill protocol or controlled substance

## 2021-06-19 RX ORDER — OXYCODONE HYDROCHLORIDE 5 MG/1
5 TABLET ORAL 2 TIMES DAILY PRN
Qty: 60 TABLET | Refills: 0 | Status: SHIPPED | OUTPATIENT
Start: 2021-06-19 | End: 2021-07-16

## 2021-06-21 ENCOUNTER — HOSPITAL ENCOUNTER (OUTPATIENT)
Dept: CARDIAC REHAB | Facility: CLINIC | Age: 46
End: 2021-06-21
Attending: SURGERY
Payer: COMMERCIAL

## 2021-06-21 ENCOUNTER — MYC MEDICAL ADVICE (OUTPATIENT)
Dept: FAMILY MEDICINE | Facility: CLINIC | Age: 46
End: 2021-06-21

## 2021-06-21 DIAGNOSIS — I21.4 NSTEMI (NON-ST ELEVATED MYOCARDIAL INFARCTION) (H): ICD-10-CM

## 2021-06-21 PROCEDURE — 93798 PHYS/QHP OP CAR RHAB W/ECG: CPT | Performed by: REHABILITATION PRACTITIONER

## 2021-06-21 RX ORDER — LISINOPRIL 2.5 MG/1
2.5 TABLET ORAL DAILY
Qty: 90 TABLET | Refills: 1 | Status: SHIPPED | OUTPATIENT
Start: 2021-06-21 | End: 2021-12-14

## 2021-06-21 RX ORDER — ROSUVASTATIN CALCIUM 20 MG/1
20 TABLET, COATED ORAL DAILY
Qty: 90 TABLET | Refills: 1 | Status: SHIPPED | OUTPATIENT
Start: 2021-06-21 | End: 2021-12-14

## 2021-06-21 RX ORDER — AMLODIPINE BESYLATE 2.5 MG/1
2.5 TABLET ORAL DAILY
Qty: 90 TABLET | Refills: 1 | Status: SHIPPED | OUTPATIENT
Start: 2021-06-21 | End: 2021-12-14

## 2021-06-21 NOTE — TELEPHONE ENCOUNTER
Lm for patient please inform patient letter is pending for approval from provider then will contact patient   Rebekah Sevilla MA

## 2021-06-21 NOTE — TELEPHONE ENCOUNTER
Patient is still waiting for this letter. She would like to come into the clinic today to pick it up.

## 2021-06-24 ENCOUNTER — HOSPITAL ENCOUNTER (EMERGENCY)
Facility: CLINIC | Age: 46
Discharge: HOME | End: 2021-06-24
Attending: EMERGENCY MEDICINE | Admitting: EMERGENCY MEDICINE
Payer: COMMERCIAL

## 2021-06-24 ENCOUNTER — APPOINTMENT (OUTPATIENT)
Dept: GENERAL RADIOLOGY | Facility: CLINIC | Age: 46
End: 2021-06-24
Attending: EMERGENCY MEDICINE
Payer: COMMERCIAL

## 2021-06-24 VITALS
SYSTOLIC BLOOD PRESSURE: 151 MMHG | DIASTOLIC BLOOD PRESSURE: 71 MMHG | RESPIRATION RATE: 14 BRPM | HEART RATE: 72 BPM | TEMPERATURE: 97.9 F | OXYGEN SATURATION: 97 %

## 2021-06-24 DIAGNOSIS — Z95.1 S/P CABG (CORONARY ARTERY BYPASS GRAFT): ICD-10-CM

## 2021-06-24 DIAGNOSIS — I25.729 CORONARY ARTERY DISEASE INVOLVING AUTOLOGOUS ARTERY CORONARY BYPASS GRAFT WITH ANGINA PECTORIS (H): ICD-10-CM

## 2021-06-24 DIAGNOSIS — I21.4 NSTEMI (NON-ST ELEVATED MYOCARDIAL INFARCTION) (H): ICD-10-CM

## 2021-06-24 LAB
ANION GAP SERPL CALCULATED.3IONS-SCNC: 8 MMOL/L (ref 3–14)
BASOPHILS # BLD AUTO: 0.1 10E9/L (ref 0–0.2)
BASOPHILS NFR BLD AUTO: 0.7 %
BUN SERPL-MCNC: 16 MG/DL (ref 7–30)
CALCIUM SERPL-MCNC: 9.2 MG/DL (ref 8.5–10.1)
CHLORIDE SERPL-SCNC: 103 MMOL/L (ref 94–109)
CO2 SERPL-SCNC: 23 MMOL/L (ref 20–32)
CREAT SERPL-MCNC: 0.52 MG/DL (ref 0.52–1.04)
D DIMER PPP FEU-MCNC: 0.3 UG/ML FEU (ref 0–0.5)
DIFFERENTIAL METHOD BLD: ABNORMAL
EOSINOPHIL # BLD AUTO: 0.1 10E9/L (ref 0–0.7)
EOSINOPHIL NFR BLD AUTO: 1.2 %
ERYTHROCYTE [DISTWIDTH] IN BLOOD BY AUTOMATED COUNT: 15.8 % (ref 10–15)
GFR SERPL CREATININE-BSD FRML MDRD: >90 ML/MIN/{1.73_M2}
GLUCOSE SERPL-MCNC: 207 MG/DL (ref 70–99)
HCT VFR BLD AUTO: 36.9 % (ref 35–47)
HGB BLD-MCNC: 12.2 G/DL (ref 11.7–15.7)
IMM GRANULOCYTES # BLD: 0 10E9/L (ref 0–0.4)
IMM GRANULOCYTES NFR BLD: 0.3 %
LYMPHOCYTES # BLD AUTO: 1.7 10E9/L (ref 0.8–5.3)
LYMPHOCYTES NFR BLD AUTO: 24.5 %
MCH RBC QN AUTO: 26.2 PG (ref 26.5–33)
MCHC RBC AUTO-ENTMCNC: 33.1 G/DL (ref 31.5–36.5)
MCV RBC AUTO: 79 FL (ref 78–100)
MONOCYTES # BLD AUTO: 0.5 10E9/L (ref 0–1.3)
MONOCYTES NFR BLD AUTO: 6.8 %
NEUTROPHILS # BLD AUTO: 4.6 10E9/L (ref 1.6–8.3)
NEUTROPHILS NFR BLD AUTO: 66.5 %
NRBC # BLD AUTO: 0 10*3/UL
NRBC BLD AUTO-RTO: 0 /100
PLATELET # BLD AUTO: 252 10E9/L (ref 150–450)
POTASSIUM SERPL-SCNC: 4 MMOL/L (ref 3.4–5.3)
RBC # BLD AUTO: 4.66 10E12/L (ref 3.8–5.2)
SODIUM SERPL-SCNC: 134 MMOL/L (ref 133–144)
TROPONIN I SERPL-MCNC: <0.015 UG/L (ref 0–0.04)
TROPONIN I SERPL-MCNC: <0.015 UG/L (ref 0–0.04)
WBC # BLD AUTO: 7 10E9/L (ref 4–11)

## 2021-06-24 PROCEDURE — 96374 THER/PROPH/DIAG INJ IV PUSH: CPT | Performed by: EMERGENCY MEDICINE

## 2021-06-24 PROCEDURE — 250N000013 HC RX MED GY IP 250 OP 250 PS 637: Performed by: FAMILY MEDICINE

## 2021-06-24 PROCEDURE — 250N000011 HC RX IP 250 OP 636: Performed by: EMERGENCY MEDICINE

## 2021-06-24 PROCEDURE — 71046 X-RAY EXAM CHEST 2 VIEWS: CPT

## 2021-06-24 PROCEDURE — 84484 ASSAY OF TROPONIN QUANT: CPT | Mod: 91 | Performed by: EMERGENCY MEDICINE

## 2021-06-24 PROCEDURE — 36415 COLL VENOUS BLD VENIPUNCTURE: CPT | Performed by: EMERGENCY MEDICINE

## 2021-06-24 PROCEDURE — 93005 ELECTROCARDIOGRAM TRACING: CPT | Performed by: EMERGENCY MEDICINE

## 2021-06-24 PROCEDURE — 80048 BASIC METABOLIC PNL TOTAL CA: CPT | Performed by: EMERGENCY MEDICINE

## 2021-06-24 PROCEDURE — 250N000013 HC RX MED GY IP 250 OP 250 PS 637: Performed by: EMERGENCY MEDICINE

## 2021-06-24 PROCEDURE — 93010 ELECTROCARDIOGRAM REPORT: CPT | Performed by: EMERGENCY MEDICINE

## 2021-06-24 PROCEDURE — 99285 EMERGENCY DEPT VISIT HI MDM: CPT | Mod: 25 | Performed by: EMERGENCY MEDICINE

## 2021-06-24 PROCEDURE — 85025 COMPLETE CBC W/AUTO DIFF WBC: CPT | Performed by: EMERGENCY MEDICINE

## 2021-06-24 PROCEDURE — 85379 FIBRIN DEGRADATION QUANT: CPT | Performed by: EMERGENCY MEDICINE

## 2021-06-24 PROCEDURE — 36416 COLLJ CAPILLARY BLOOD SPEC: CPT | Performed by: EMERGENCY MEDICINE

## 2021-06-24 RX ORDER — ASPIRIN 81 MG/1
324 TABLET, CHEWABLE ORAL ONCE
Status: COMPLETED | OUTPATIENT
Start: 2021-06-24 | End: 2021-06-24

## 2021-06-24 RX ORDER — OXYCODONE HYDROCHLORIDE 5 MG/1
5 TABLET ORAL ONCE
Status: COMPLETED | OUTPATIENT
Start: 2021-06-24 | End: 2021-06-24

## 2021-06-24 RX ORDER — MORPHINE SULFATE 4 MG/ML
4 INJECTION, SOLUTION INTRAMUSCULAR; INTRAVENOUS
Status: COMPLETED | OUTPATIENT
Start: 2021-06-24 | End: 2021-06-24

## 2021-06-24 RX ORDER — NITROGLYCERIN 0.4 MG/1
0.4 TABLET SUBLINGUAL EVERY 5 MIN PRN
Status: DISCONTINUED | OUTPATIENT
Start: 2021-06-24 | End: 2021-06-24 | Stop reason: HOSPADM

## 2021-06-24 RX ADMIN — MORPHINE SULFATE 4 MG: 4 INJECTION, SOLUTION INTRAMUSCULAR; INTRAVENOUS at 17:27

## 2021-06-24 RX ADMIN — OXYCODONE HYDROCHLORIDE 5 MG: 5 TABLET ORAL at 20:17

## 2021-06-24 RX ADMIN — ASPIRIN 324 MG: 81 TABLET, CHEWABLE ORAL at 16:27

## 2021-06-24 RX ADMIN — NITROGLYCERIN 0.4 MG: 0.4 TABLET SUBLINGUAL at 16:27

## 2021-06-24 NOTE — TELEPHONE ENCOUNTER
Tizanidine        Last Written Prescription Date:  5/27/2021  Last Fill Quantity: 90,   # refills: 0  Last Office Visit: 6/1/2021  Future Office visit:       Routing refill request to provider for review/approval because:  Drug not on the [facility], [facility] or [facility] refill protocol or controlled substance
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
## 2021-06-24 NOTE — DISCHARGE INSTRUCTIONS
Continue with your post bypass surgery rehab.  Encouraged daily walking for improved cardiac endurance.  Continue the current medications.  Congratulations on being able to discontinue tobacco use.  Continue with a low salt and low fat diet.    Monitor for any chest discomfort triggered by emotional stress, physical exertion- if noted I would contact your cardiologist to discuss.

## 2021-06-25 ENCOUNTER — HOSPITAL ENCOUNTER (OUTPATIENT)
Dept: CARDIAC REHAB | Facility: CLINIC | Age: 46
End: 2021-06-25
Attending: SURGERY
Payer: COMMERCIAL

## 2021-06-25 PROCEDURE — 93798 PHYS/QHP OP CAR RHAB W/ECG: CPT

## 2021-06-28 ENCOUNTER — HOSPITAL ENCOUNTER (OUTPATIENT)
Dept: CARDIAC REHAB | Facility: CLINIC | Age: 46
End: 2021-06-28
Attending: SURGERY
Payer: COMMERCIAL

## 2021-06-28 PROCEDURE — 93798 PHYS/QHP OP CAR RHAB W/ECG: CPT

## 2021-06-30 ENCOUNTER — HOSPITAL ENCOUNTER (OUTPATIENT)
Dept: CARDIAC REHAB | Facility: CLINIC | Age: 46
End: 2021-06-30
Attending: SURGERY
Payer: COMMERCIAL

## 2021-06-30 PROCEDURE — 93798 PHYS/QHP OP CAR RHAB W/ECG: CPT

## 2021-07-05 ENCOUNTER — HOSPITAL ENCOUNTER (OUTPATIENT)
Dept: CARDIAC REHAB | Facility: CLINIC | Age: 46
End: 2021-07-05
Attending: SURGERY
Payer: COMMERCIAL

## 2021-07-05 PROCEDURE — G0463 HOSPITAL OUTPT CLINIC VISIT: HCPCS

## 2021-07-12 ENCOUNTER — HOSPITAL ENCOUNTER (OUTPATIENT)
Dept: CARDIAC REHAB | Facility: CLINIC | Age: 46
End: 2021-07-12
Attending: SURGERY
Payer: COMMERCIAL

## 2021-07-12 PROCEDURE — 93798 PHYS/QHP OP CAR RHAB W/ECG: CPT | Performed by: REHABILITATION PRACTITIONER

## 2021-07-16 ENCOUNTER — VIRTUAL VISIT (OUTPATIENT)
Dept: FAMILY MEDICINE | Facility: CLINIC | Age: 46
End: 2021-07-16
Payer: COMMERCIAL

## 2021-07-16 DIAGNOSIS — G89.4 CHRONIC PAIN SYNDROME: ICD-10-CM

## 2021-07-16 DIAGNOSIS — E61.1 IRON DEFICIENCY: ICD-10-CM

## 2021-07-16 DIAGNOSIS — Z79.4 TYPE 2 DIABETES MELLITUS WITH HYPERGLYCEMIA, WITH LONG-TERM CURRENT USE OF INSULIN (H): ICD-10-CM

## 2021-07-16 DIAGNOSIS — Z95.1 S/P CABG (CORONARY ARTERY BYPASS GRAFT): ICD-10-CM

## 2021-07-16 DIAGNOSIS — E11.65 TYPE 2 DIABETES MELLITUS WITH HYPERGLYCEMIA, WITH LONG-TERM CURRENT USE OF INSULIN (H): ICD-10-CM

## 2021-07-16 DIAGNOSIS — I21.4 NSTEMI (NON-ST ELEVATED MYOCARDIAL INFARCTION) (H): Primary | ICD-10-CM

## 2021-07-16 PROBLEM — R11.0 NAUSEA: Status: RESOLVED | Noted: 2019-08-28 | Resolved: 2021-07-16

## 2021-07-16 PROBLEM — E87.70 FLUID OVERLOAD: Status: RESOLVED | Noted: 2021-03-16 | Resolved: 2021-07-16

## 2021-07-16 PROCEDURE — 99214 OFFICE O/P EST MOD 30 MIN: CPT | Mod: 95 | Performed by: FAMILY MEDICINE

## 2021-07-16 RX ORDER — OXYCODONE HYDROCHLORIDE 5 MG/1
5 TABLET ORAL 2 TIMES DAILY PRN
Qty: 60 TABLET | Refills: 0 | Status: SHIPPED | OUTPATIENT
Start: 2021-07-16 | End: 2021-08-12

## 2021-07-16 NOTE — LETTER
[address] [address]  Phone: [phone]  Fax: [phone]    July 16, 2021        Stacy Brown  [address] [facility] [address]          To whom it may concern:    RE: Stacy Brown    Patient was seen and treated today through our clinic. Beginning Tuesday 7/20/2021 she may work Tuesdays and Thursdays with the following restrictions:   No lifting greater than 70 lbs.   Stacy may work up to 10 hours per day.   She may drive without restrictions.     Beginning August 3, she may work 3 days per week, Tuesdays, Thursdays and Fridays, with the same restrictions as above.      These restrictions will be in effect until her next visit with me which is scheduled for September 1, 2021.      Please contact me for questions or concerns.      Sincerely,        Yaima Muse MD

## 2021-07-16 NOTE — PROGRESS NOTES
Stacy is a 46 year old who is being evaluated via a billable telephone visit.      Telephone visit performed in lieu of an in-person visit due to current concerns regarding social distancing and keeping people safe.  Physician and patient agreed this was appropriate for concerns addressed.    What phone number would you like to be contacted at? [phone]  How would you like to obtain your AVS? MyChart    Assessment & Plan       ICD-10-CM    1. NSTEMI (non-ST elevated myocardial infarction) (H)  I21.4 oxyCODONE (ROXICODONE) 5 MG tablet     Lipid panel reflex to direct LDL Fasting   2. S/P CABG (coronary artery bypass graft)  Z95.1    3. Type 2 diabetes mellitus with hyperglycemia, with long-term current use of insulin (H)  E11.65 Lipid panel reflex to direct LDL Fasting    Z79.4 Hemoglobin A1c   4. Chronic pain syndrome  G89.4 Drug Abuse Screen Panel 13, Urine (Pain Care Package) - lab collect   5. Iron deficiency  E61.1 Ferritin      I agreed to renew her pain pills.  Also I am increasing her work hours to 2 days a week for the next 2 weeks, then if doing well will increase to 3 days a week.  She feels like she is ready for this but I want to be cautious given her previous surgery and her health conditions.  She needs to continue to pay close attention to her health including her healthy diet and her glucose management.  She will be due for repeat A1c and I will get her lipids rechecked at that time and she is due for a urine drug screen. Will add ferritin as well since it is due.  Because her follow-up appointment with me will be late in the day, I am going to have her make a separate lab appointment for an early morning prior to our visit.    If she does not feel ready to increase to 3 days/week after the 2-week trial of 2 days/week, she needs to notify me.    27 minutes spent on the date of the encounter doing chart review, history and exam, documentation and further activities per the note    No follow-ups  on file.    Yaima Muse MD  [facility] ARIANNA Kumar is a 46 year old who presents for the following health issues     HPI     Chief Complaint   Patient presents with     RECHECK     MI - Return to work     In March she was admitted with NSTEMI and underwent coronary artery bypass grafting.  She has been doing cardiac rehab and had been off work.  I recently started her back at work 1 day a week and she is following up today.  She states it is going well at work.  She is tired by the end of the day but feels like she can add a little bit more back in.  She wants to try to add 1 or 2 more days per week on Thursday and Friday.  She has not had any chest pain at work or after work.  She did have one bout of chest pain [place] from [place] but this was not work-related.  It had resolved by the time she got to the ER and her work-up at that point was negative.  They felt it was noncardiac in origin.  It has not recurred.    She is concerned that her incision is causing her pain. She rates the incision pain a 5 out of 10. The incision looks like it is bulging some. She denies any fluid leaking from it. She denies that it feels warm to the touch. She does not feel that it is infected.    She continues to work hard at controlling her diabetes.  Her morning sugars tend to run in the 90s.  When she comes in for therapy it ranges quite a bit more.  She had a low of 68 but was asymptomatic, and has some values in the 160s.  She has had a couple low blood sugars at home in the low 70s and she has been symptomatic at times.  Lab Results   Component Value Date    A1C 10.1 03/05/2021     She is due for follow-up A1c.    She also has a history of iron deficiency anemia and is due for recheck on her ferritin.    She also needs a refill on her oxycodone.  It will be due on Monday.    Review of Systems   Constitutional, HEENT, cardiovascular, pulmonary, gi and gu systems are  negative, except as otherwise noted.      Objective    Vitals - Patient Reported  Systolic (Patient Reported): 98  Diastolic (Patient Reported): 64  Blood pressure taken with manual cuff (will exclude from quality measure):  (battery)  Weight (Patient Reported): 82.6 kg (182 lb)  Temperature (Patient Reported): 97.7  F (36.5  C) (te)  Pain Score: Moderate Pain (5)  Pain Loc: Knee (incision on chest, left knee)        Physical Exam   healthy, alert and no distress  PSYCH: Alert and oriented times 3; coherent speech, normal   rate and volume, able to articulate logical thoughts, able   to abstract reason, no tangential thoughts, no hallucinations   or delusions  Her affect is normal and pleasant  RESP: No cough, no audible wheezing, able to talk in full sentences  Remainder of exam unable to be completed due to telephone visits    Orders Placed This Encounter   Procedures     Lipid panel reflex to direct LDL Fasting     Hemoglobin A1c     Drug Abuse Screen Panel 13, Urine (Pain Care Package) - lab collect     Ferritin            Phone call duration: 20 minutes

## 2021-07-16 NOTE — NURSING NOTE
Health Maintenance Due   Topic Date Due     ANNUAL REVIEW OF HM ORDERS  Never done     ADVANCE CARE PLANNING  Never done     HEPATITIS C SCREENING  Never done     HEPATITIS B IMMUNIZATION (1 of 3 - Risk 3-dose series) Never done     URINE DRUG SCREEN  02/07/2018     EYE EXAM  04/02/2019     MICROALBUMIN  05/01/2020     PREVENTIVE CARE VISIT  07/13/2021     MAMMO SCREENING  08/03/2021     Ping Briscoe, [facility]

## 2021-07-19 ENCOUNTER — HOSPITAL ENCOUNTER (OUTPATIENT)
Dept: CARDIAC REHAB | Facility: CLINIC | Age: 46
End: 2021-07-19
Attending: SURGERY
Payer: COMMERCIAL

## 2021-07-19 PROCEDURE — 93798 PHYS/QHP OP CAR RHAB W/ECG: CPT | Performed by: REHABILITATION PRACTITIONER
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
## 2021-07-27 DIAGNOSIS — I21.4 NSTEMI (NON-ST ELEVATED MYOCARDIAL INFARCTION) (H): ICD-10-CM

## 2021-07-28 ENCOUNTER — HOSPITAL ENCOUNTER (OUTPATIENT)
Dept: CARDIAC REHAB | Facility: CLINIC | Age: 46
End: 2021-07-28
Attending: SURGERY
Payer: COMMERCIAL

## 2021-07-28 PROCEDURE — 93798 PHYS/QHP OP CAR RHAB W/ECG: CPT | Performed by: REHABILITATION PRACTITIONER

## 2021-07-28 NOTE — TELEPHONE ENCOUNTER
Requested Prescriptions   Pending Prescriptions Disp Refills     tiZANidine (ZANAFLEX) 4 MG tablet [Pharmacy Med Name: TIZANIDINE HCL 4MG TABS] 90 tablet 0     Sig: TAKE 1 TABLET (4 MG) BY MOUTH EVERY 6 HOURS AS NEEDED FOR MUSCLE SPASMS     Last Written Prescription Date:  06/29/2021  Last Fill Quantity: 90,   # refills: 0  Last Office Visit: 07/16/2021  Future Office visit:    Next 5 appointments (look out 90 days)    Aug 31, 2021  2:00 PM  SHORT with Yaima Muse MD  [facility] ([facility] ) [address] [place] [id number]  [phone]   Sep 01, 2021  2:00 PM  Office Visit with Yaima Muse MD  [facility] ([facility] ) [address] [place] [id number]  [phone]           Routing refill request to provider for review/approval because:  Drug not on the [facility], [facility] or [facility] refill protocol or controlled substance

## 2021-08-06 ENCOUNTER — MYC MEDICAL ADVICE (OUTPATIENT)
Dept: FAMILY MEDICINE | Facility: CLINIC | Age: 46
End: 2021-08-06

## 2021-08-06 NOTE — TELEPHONE ENCOUNTER
Patient is informed she should cancel the extra appointment.  Closing this encounter.  Nadine Naqvi, MARCELAN, RN

## 2021-08-09 DIAGNOSIS — I21.4 NSTEMI (NON-ST ELEVATED MYOCARDIAL INFARCTION) (H): ICD-10-CM

## 2021-08-09 RX ORDER — METOPROLOL TARTRATE 25 MG/1
25 TABLET, FILM COATED ORAL 2 TIMES DAILY
Qty: 180 TABLET | Refills: 2 | Status: SHIPPED | OUTPATIENT
Start: 2021-08-09 | End: 2022-03-14

## 2021-08-10 DIAGNOSIS — I21.4 NSTEMI (NON-ST ELEVATED MYOCARDIAL INFARCTION) (H): ICD-10-CM

## 2021-08-11 NOTE — TELEPHONE ENCOUNTER
Pending Prescriptions:                       Disp   Refills    oxyCODONE (ROXICODONE) 5 MG tablet [Pharma*60 tab*0        Sig: Take 1 tablet (5 mg) by mouth 2 times daily as needed           for severe pain    Routing refill request to provider for review/approval because:  Drug not on the [facility] refill protocol

## 2021-08-12 ENCOUNTER — HOSPITAL ENCOUNTER (EMERGENCY)
Facility: CLINIC | Age: 46
Discharge: HOME | End: 2021-08-12
Attending: FAMILY MEDICINE | Admitting: FAMILY MEDICINE
Payer: COMMERCIAL

## 2021-08-12 ENCOUNTER — APPOINTMENT (OUTPATIENT)
Dept: GENERAL RADIOLOGY | Facility: CLINIC | Age: 46
End: 2021-08-12
Attending: FAMILY MEDICINE
Payer: COMMERCIAL

## 2021-08-12 VITALS
RESPIRATION RATE: 20 BRPM | WEIGHT: 178 LBS | TEMPERATURE: 98.3 F | OXYGEN SATURATION: 100 % | BODY MASS INDEX: 28.73 KG/M2 | DIASTOLIC BLOOD PRESSURE: 81 MMHG | SYSTOLIC BLOOD PRESSURE: 130 MMHG | HEART RATE: 83 BPM

## 2021-08-12 DIAGNOSIS — S20.219A CONTUSION OF CHEST WALL, UNSPECIFIED LATERALITY, INITIAL ENCOUNTER: ICD-10-CM

## 2021-08-12 PROCEDURE — 99284 EMERGENCY DEPT VISIT MOD MDM: CPT | Performed by: FAMILY MEDICINE

## 2021-08-12 PROCEDURE — 250N000013 HC RX MED GY IP 250 OP 250 PS 637: Performed by: FAMILY MEDICINE

## 2021-08-12 PROCEDURE — 71046 X-RAY EXAM CHEST 2 VIEWS: CPT

## 2021-08-12 PROCEDURE — 99283 EMERGENCY DEPT VISIT LOW MDM: CPT | Mod: 25 | Performed by: FAMILY MEDICINE

## 2021-08-12 RX ORDER — OXYCODONE HYDROCHLORIDE 5 MG/1
5 TABLET ORAL 2 TIMES DAILY PRN
Qty: 60 TABLET | Refills: 0 | Status: SHIPPED | OUTPATIENT
Start: 2021-08-18 | End: 2021-09-15

## 2021-08-12 RX ORDER — ACETAMINOPHEN 325 MG/1
975 TABLET ORAL ONCE
Status: COMPLETED | OUTPATIENT
Start: 2021-08-12 | End: 2021-08-12

## 2021-08-12 RX ADMIN — ACETAMINOPHEN 975 MG: 325 TABLET, FILM COATED ORAL at 06:27
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
## 2021-08-12 NOTE — Clinical Note
Stacy Brown was seen and treated in our emergency department on 8/12/2021.  She may return to work on 08/14/2021.       If you have any questions or concerns, please don't hesitate to call.      Trevor Zuñiga MD
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
## 2021-08-25 DIAGNOSIS — I21.4 NSTEMI (NON-ST ELEVATED MYOCARDIAL INFARCTION) (H): ICD-10-CM

## 2021-08-25 NOTE — TELEPHONE ENCOUNTER
Pending Prescriptions:                       Disp   Refills    tiZANidine (ZANAFLEX) 4 MG tablet [Pharmac*90 tab*0        Sig: TAKE ONE TABLET BY MOUTH EVERY 6 HOURS AS NEEDED FOR           MUSCLE SPASMS    Routing refill request to provider for review/approval because:  Drug not on the [facility] refill protocol

## 2021-09-01 ENCOUNTER — LAB (OUTPATIENT)
Dept: LAB | Facility: CLINIC | Age: 46
End: 2021-09-01
Payer: COMMERCIAL

## 2021-09-01 ENCOUNTER — OFFICE VISIT (OUTPATIENT)
Dept: FAMILY MEDICINE | Facility: CLINIC | Age: 46
End: 2021-09-01
Payer: COMMERCIAL

## 2021-09-01 VITALS
WEIGHT: 187.6 LBS | OXYGEN SATURATION: 99 % | TEMPERATURE: 97.3 F | DIASTOLIC BLOOD PRESSURE: 60 MMHG | SYSTOLIC BLOOD PRESSURE: 104 MMHG | BODY MASS INDEX: 30.28 KG/M2 | RESPIRATION RATE: 16 BRPM | HEART RATE: 81 BPM

## 2021-09-01 DIAGNOSIS — E11.65 TYPE 2 DIABETES MELLITUS WITH HYPERGLYCEMIA, WITH LONG-TERM CURRENT USE OF INSULIN (H): Primary | ICD-10-CM

## 2021-09-01 DIAGNOSIS — I21.4 NSTEMI (NON-ST ELEVATED MYOCARDIAL INFARCTION) (H): ICD-10-CM

## 2021-09-01 DIAGNOSIS — Z95.1 S/P CABG (CORONARY ARTERY BYPASS GRAFT): ICD-10-CM

## 2021-09-01 DIAGNOSIS — G89.4 CHRONIC PAIN SYNDROME: ICD-10-CM

## 2021-09-01 DIAGNOSIS — E11.65 TYPE 2 DIABETES MELLITUS WITH HYPERGLYCEMIA, WITH LONG-TERM CURRENT USE OF INSULIN (H): ICD-10-CM

## 2021-09-01 DIAGNOSIS — E66.09 CLASS 1 OBESITY DUE TO EXCESS CALORIES WITH SERIOUS COMORBIDITY AND BODY MASS INDEX (BMI) OF 30.0 TO 30.9 IN ADULT: ICD-10-CM

## 2021-09-01 DIAGNOSIS — E66.811 CLASS 1 OBESITY DUE TO EXCESS CALORIES WITH SERIOUS COMORBIDITY AND BODY MASS INDEX (BMI) OF 30.0 TO 30.9 IN ADULT: ICD-10-CM

## 2021-09-01 DIAGNOSIS — E78.5 HYPERLIPIDEMIA LDL GOAL <100: ICD-10-CM

## 2021-09-01 DIAGNOSIS — Z79.4 TYPE 2 DIABETES MELLITUS WITH HYPERGLYCEMIA, WITH LONG-TERM CURRENT USE OF INSULIN (H): Primary | ICD-10-CM

## 2021-09-01 DIAGNOSIS — Z79.4 TYPE 2 DIABETES MELLITUS WITH OTHER CIRCULATORY COMPLICATION, WITH LONG-TERM CURRENT USE OF INSULIN (H): ICD-10-CM

## 2021-09-01 DIAGNOSIS — E61.1 IRON DEFICIENCY: ICD-10-CM

## 2021-09-01 DIAGNOSIS — E11.59 TYPE 2 DIABETES MELLITUS WITH OTHER CIRCULATORY COMPLICATION, WITH LONG-TERM CURRENT USE OF INSULIN (H): ICD-10-CM

## 2021-09-01 DIAGNOSIS — Z79.4 TYPE 2 DIABETES MELLITUS WITH HYPERGLYCEMIA, WITH LONG-TERM CURRENT USE OF INSULIN (H): ICD-10-CM

## 2021-09-01 LAB
AMPHETAMINES UR QL: NOT DETECTED
BARBITURATES UR QL SCN: NOT DETECTED
BENZODIAZ UR QL SCN: NOT DETECTED
BUPRENORPHINE UR QL: NOT DETECTED
CANNABINOIDS UR QL: DETECTED
CHOLEST SERPL-MCNC: 176 MG/DL
COCAINE UR QL SCN: NOT DETECTED
CREAT UR-MCNC: 51 MG/DL
D-METHAMPHET UR QL: NOT DETECTED
FASTING STATUS PATIENT QL REPORTED: YES
FASTING STATUS PATIENT QL REPORTED: YES
FERRITIN SERPL-MCNC: 26 NG/ML (ref 8–252)
GLUCOSE BLD-MCNC: 113 MG/DL (ref 70–99)
HBA1C MFR BLD: 7.6 % (ref 0–5.6)
HDLC SERPL-MCNC: 51 MG/DL
LDLC SERPL CALC-MCNC: 72 MG/DL
METHADONE UR QL SCN: NOT DETECTED
MICROALBUMIN UR-MCNC: 7 MG/L
MICROALBUMIN/CREAT UR: 13.73 MG/G CR (ref 0–25)
NONHDLC SERPL-MCNC: 125 MG/DL
OPIATES UR QL SCN: NOT DETECTED
OXYCODONE UR QL SCN: NOT DETECTED
PCP UR QL SCN: NOT DETECTED
PROPOXYPH UR QL: NOT DETECTED
TRICYCLICS UR QL SCN: NOT DETECTED
TRIGL SERPL-MCNC: 266 MG/DL

## 2021-09-01 PROCEDURE — 82947 ASSAY GLUCOSE BLOOD QUANT: CPT

## 2021-09-01 PROCEDURE — 80061 LIPID PANEL: CPT

## 2021-09-01 PROCEDURE — 99000 SPECIMEN HANDLING OFFICE-LAB: CPT

## 2021-09-01 PROCEDURE — 83036 HEMOGLOBIN GLYCOSYLATED A1C: CPT

## 2021-09-01 PROCEDURE — 82728 ASSAY OF FERRITIN: CPT

## 2021-09-01 PROCEDURE — 99214 OFFICE O/P EST MOD 30 MIN: CPT | Performed by: FAMILY MEDICINE

## 2021-09-01 PROCEDURE — 36415 COLL VENOUS BLD VENIPUNCTURE: CPT

## 2021-09-01 PROCEDURE — 86341 ISLET CELL ANTIBODY: CPT | Mod: 90

## 2021-09-01 PROCEDURE — 82043 UR ALBUMIN QUANTITATIVE: CPT

## 2021-09-01 PROCEDURE — 84681 ASSAY OF C-PEPTIDE: CPT

## 2021-09-01 PROCEDURE — 80306 DRUG TEST PRSMV INSTRMNT: CPT

## 2021-09-01 ASSESSMENT — ANXIETY QUESTIONNAIRES
IF YOU CHECKED OFF ANY PROBLEMS ON THIS QUESTIONNAIRE, HOW DIFFICULT HAVE THESE PROBLEMS MADE IT FOR YOU TO DO YOUR WORK, TAKE CARE OF THINGS AT HOME, OR GET ALONG WITH OTHER PEOPLE: NOT DIFFICULT AT ALL
1. FEELING NERVOUS, ANXIOUS, OR ON EDGE: NOT AT ALL
2. NOT BEING ABLE TO STOP OR CONTROL WORRYING: NOT AT ALL
7. FEELING AFRAID AS IF SOMETHING AWFUL MIGHT HAPPEN: NOT AT ALL
GAD7 TOTAL SCORE: 1
3. WORRYING TOO MUCH ABOUT DIFFERENT THINGS: SEVERAL DAYS
6. BECOMING EASILY ANNOYED OR IRRITABLE: NOT AT ALL
5. BEING SO RESTLESS THAT IT IS HARD TO SIT STILL: NOT AT ALL

## 2021-09-01 ASSESSMENT — PAIN SCALES - GENERAL: PAINLEVEL: NO PAIN (0)

## 2021-09-01 ASSESSMENT — PATIENT HEALTH QUESTIONNAIRE - PHQ9
SUM OF ALL RESPONSES TO PHQ QUESTIONS 1-9: 2
5. POOR APPETITE OR OVEREATING: NOT AT ALL

## 2021-09-01 NOTE — LETTER
[address] [address]  Phone: [phone]  Fax: [phone]    September 1, 2021      Re:  Stacy Brown  [address] [address] [place], [place] [address]E [facility] [address]          To whom it may concern:    RE: Stacy Brown    Patient was seen and treated today at our clinic. Beginning Tuesday 9/7/2021 she may work normal unrestricted hours with the following lifting restrictions:   No lifting greater than 70 lbs.   She may drive without restrictions.      These restrictions will be in effect until her next visit in 3 months.     Please contact me for questions or concerns.      Sincerely,        Yaima Muse MD

## 2021-09-01 NOTE — NURSING NOTE
Health Maintenance Due   Topic Date Due     ANNUAL REVIEW OF HM ORDERS  Never done     ADVANCE CARE PLANNING  Never done     HEPATITIS C SCREENING  Never done     HEPATITIS B IMMUNIZATION (1 of 3 - Risk 3-dose series) Never done     EYE EXAM  04/02/2019     PREVENTIVE CARE VISIT  07/13/2021     DIABETIC FOOT EXAM  08/20/2021     INFLUENZA VACCINE (1) 09/01/2021     MAMMO SCREENING  08/03/2021     PHQ-9  09/29/2021     Ping Brisco[place], [facility]

## 2021-09-01 NOTE — PROGRESS NOTES
Kwadwo Kumar is a 46 year old female who presents to clinic for follow-up regarding diabetes management and coronary artery disease.  She has a significant pertinent past medical history of type 2 diabetes with insulin dependence, chronic pain, hyperlipidemia who is now 6 months s/p quadruple coronary artery bypass surgery.  She reports to have been doing well and improving her diet to include mostly fruits, vegetables, and lean proteins however still notes the occasional snacks.  She works for [employer] and has been successful in working 3 days/week per her previous goal.  She reports no significant symptoms associated with hypoglycemic episodes, and reports that her lowest blood sugar over the past 3 months has been 90.  She does note occasional elevated blood glucoses that reach into the low to mid 200s.  She has been consistent with taking her long-acting and mealtime insulin doses.  She has been able to exercise more and reports that she has been able to quit smoking, although it has caused her to gain some weight. She has no ongoing concerns at the time of visit.    HPI     Diabetes Follow-up    How often are you checking your blood sugar? One-Two time daily  What time of day are you checking your blood sugars (select all that apply)?  Before meals and After meals  Have you had any blood sugars above 200?  Yes   Have you had any blood sugars below 70?  No    What symptoms do you notice when your blood sugar is low?  None    What concerns do you have today about your diabetes? Other: Has had over 200 once or twice     Do you have any of these symptoms? (Select all that apply)  Numbness in feet    Have you had a diabetic eye exam in the last 12 months? No        BP Readings from Last 2 Encounters:   09/01/21 104/60   08/12/21 130/81     Hemoglobin A1C (%)   Date Value   09/01/2021 7.6 (H)   03/05/2021 10.1 (H)   10/29/2020 12.1 (H)     LDL Cholesterol Calculated (mg/dL)   Date Value   09/01/2021  72   03/06/2021 178 (H)   08/11/2019 162 (H)       Vascular Disease Follow-up      How often do you take nitroglycerin? Never    Do you take an aspirin every day? Yes      How many servings of fruits and vegetables do you eat daily?  2-3    On average, how many sweetened beverages do you drink each day (Examples: soda, juice, sweet tea, etc.  Do NOT count diet or artificially sweetened beverages)?   0    How many days per week do you exercise enough to make your heart beat faster? 3-4    How many minutes a day do you exercise enough to make your heart beat faster? 30 - 60    How many days per week do you miss taking your medication? 0    Review of Systems   Constitutional, HEENT, cardiovascular, pulmonary, GI, , musculoskeletal, neuro, skin, endocrine and psych systems are negative, except as otherwise noted.      Objective    /60   Pulse 81   Temp 97.3  F (36.3  C) (Temporal)   Resp 16   Wt 85.1 kg (187 lb 9.6 oz)   LMP 03/07/2021 (Approximate)   SpO2 99%   Breastfeeding No   BMI 30.28 kg/m    Body mass index is 30.28 kg/m .  Physical Exam   GENERAL: healthy, alert and no distress  EYES: Eyes grossly normal to inspection, PERRL and conjunctivae and sclerae normal  HENT: ear canals and TM's normal, nose and mouth without ulcers or lesions  NECK: no adenopathy, no asymmetry, masses, or scars and thyroid normal to palpation  RESP: lungs clear to auscultation - no rales, rhonchi or wheezes  BREAST: normal without masses, tenderness or nipple discharge and no palpable axillary masses or adenopathy  CV: regular rate and rhythm, normal S1 S2, no S3 or S4, no murmur, click or rub, no peripheral edema and peripheral pulses strong  ABDOMEN: soft, nontender, no hepatosplenomegaly, no masses and bowel sounds normal  MS: no gross musculoskeletal defects noted, no edema  SKIN: no suspicious lesions or rashes  NEURO: Normal strength and tone, mentation intact and speech normal  PSYCH: mentation appears normal,  affect normal/bright  Diabetic foot exam: normal DP and PT pulses, no trophic changes or ulcerative lesions, normal sensory exam and normal monofilament exam       Recent Results (from the past 168 hour(s))   Ferritin   Result Value Ref Range Status    Ferritin 26 8 - 252 ng/mL Final   Hemoglobin A1c   Result Value Ref Range Status    Hemoglobin A1C 7.6 (H) 0.0 - 5.6 % Final   Lipid panel reflex to direct LDL Fasting   Result Value Ref Range Status    Cholesterol 176 <200 mg/dL Final    Triglycerides 266 (H) <150 mg/dL Final    Direct Measure HDL 51 >=50 mg/dL Final    LDL Cholesterol Calculated 72 <=100 mg/dL Final    Non HDL Cholesterol 125 <130 mg/dL Final    Patient Fasting > 8hrs? Yes  Final   Glucose   Result Value Ref Range Status    Glucose 113 (H) 70 - 99 mg/dL Final    Patient Fasting > 8hrs? Yes  Final   Drug Abuse Screen Panel 13, Urine (Pain Care Package) - lab collect   Result Value Ref Range Status    Cannabinoids (21-nzj-2-carboxy-9-THC) Detected (A) Not Detected, Indeterminate Final    Phencyclidine Not Detected Not Detected, Indeterminate Final    Cocaine (Benzoylecgonine) Not Detected Not Detected, Indeterminate Final    Methamphetamine (d-Methamphetamine) Not Detected Not Detected, Indeterminate Final    Opiates (Morphine) Not Detected Not Detected, Indeterminate Final    Amphetamine (d-Amphetamine) Not Detected Not Detected, Indeterminate Final    Benzodiazepines (Nordiazepam) Not Detected Not Detected, Indeterminate Final    Tricyclic Antidepressants (Desipramine) Not Detected Not Detected, Indeterminate Final    Methadone Not Detected Not Detected, Indeterminate Final    Barbiturates (Butalbital) Not Detected Not Detected, Indeterminate Final    Oxycodone Not Detected Not Detected, Indeterminate Final    Propoxyphene (Norpropoxyphene) Not Detected Not Detected, Indeterminate Final    Buprenorphine Not Detected Not Detected, Indeterminate Final     *Note: Due to a large number of results and/or  encounters for the requested time period, some results have not been displayed. A complete set of results can be found in Results Review.     Assessment & Plan     ASSESSMENT/ORDERS:    ICD-10-CM    1. Type 2 diabetes mellitus with hyperglycemia, with long-term current use of insulin (H)  E11.65 insulin glargine (BASAGLAR KWIKPEN) 100 UNIT/ML pen    Z79.4 insulin aspart (NOVOLOG FLEXPEN) 100 UNIT/ML pen   2. S/P CABG (coronary artery bypass graft)  Z95.1    3. Class 1 obesity due to excess calories with serious comorbidity and body mass index (BMI) of 30.0 to 30.9 in adult  E66.09     Z68.30    4. Chronic pain syndrome  G89.4    5. Hyperlipidemia LDL goal <100  E78.5      PLAN:  1.  We congratulated Stacy on her significantly decreased A1c, however noting that she does still have room for improvement.  The lifestyle changes that she has made involving her diet and exercise have been impressive and it is reassuring to see how motivated she has become when it comes to improving her health since her heart attack.  We did discuss improving snack time and including fruits and vegetables rather than carb and sodium rich snacks such as cheezits or chips.     2.  Due to ongoing problems with constipation, Citrucel fiber supplementation was recommended as it could help her better control her sugars, and give her more consistent, soft bowel movements.  She reports to have some at home and will initiate every day use.    3.  We also recommended the use of continuous serum glucose monitoring with the Nancy Freestyle glucose monitor.  Because of her requiring more than 2 injections of insulin per day, and being motivated in making lifestyle changes, she is likely to have success with continuous glucose monitoring in order to utilize biofeedback methods in order to continue to pursue healthy lifestyle choices in her diet.    4.  Unfortunately she was urine drug screen did come back positive for THC.  Discussion was had with  Stacy regarding abstinence from THC/marijuana as it is not been proven to be an effective tool in managing chronic pain and could possibly result in further harm.  We plan to recheck this at her next follow-up appointment.      30 minutes spent on the date of the encounter doing chart review, history and exam, documentation and further activities per the note      DEIDRE Pierce Student on 9/1/2021 at 6:22 PM    Pt was personally seen, interviewed and examined by me, chart notes edited and I agree with assessment as documented above.   Yaima Muse MD   [facility]

## 2021-09-02 LAB — C PEPTIDE SERPL-MCNC: 2.3 NG/ML (ref 0.9–6.9)

## 2021-09-02 ASSESSMENT — ANXIETY QUESTIONNAIRES: GAD7 TOTAL SCORE: 1

## 2021-09-05 LAB — GAD65 AB SER IA-ACNC: <5 IU/ML

## 2021-09-15 DIAGNOSIS — I21.4 NSTEMI (NON-ST ELEVATED MYOCARDIAL INFARCTION) (H): ICD-10-CM

## 2021-09-15 RX ORDER — OXYCODONE HYDROCHLORIDE 5 MG/1
5 TABLET ORAL 2 TIMES DAILY PRN
Qty: 60 TABLET | Refills: 0 | Status: SHIPPED | OUTPATIENT
Start: 2021-09-15 | End: 2021-10-17

## 2021-09-15 NOTE — TELEPHONE ENCOUNTER
Pending Prescriptions:                       Disp   Refills    oxyCODONE (ROXICODONE) 5 MG tablet [Pharma*60 tab*0        Sig: Take 1 tablet (5 mg) by mouth 2 times daily as needed           for severe pain      Routing refill request to provider for review/approval because:  Drug not on the [facility] [place] [facility] refill protocol     Jessica Méndez RN on 9/15/2021 at 5:38 PM

## 2021-09-16 NOTE — TELEPHONE ENCOUNTER
See mychart note to patient. Refill approved. Would like to slowly wean her back down to 30 per month.   Yaima Muse MD

## 2021-09-18 ENCOUNTER — HEALTH MAINTENANCE LETTER (OUTPATIENT)
Age: 46
End: 2021-09-18

## 2021-09-30 DIAGNOSIS — I21.4 NSTEMI (NON-ST ELEVATED MYOCARDIAL INFARCTION) (H): ICD-10-CM

## 2021-10-04 NOTE — TELEPHONE ENCOUNTER
Routing refill request to provider for review/approval because:  Drug not on the [facility] refill protocol     Requested Prescriptions   Pending Prescriptions Disp Refills     tiZANidine (ZANAFLEX) 4 MG tablet [Pharmacy Med Name: TIZANIDINE HCL 4MG TABS] 90 tablet 0     Sig: TAKE ONE TABLET BY MOUTH EVERY 6 HOURS AS NEEDED FOR MUSCLE SPASMS       There is no refill protocol information for this order      Last refilled 8/26/2021 for 90 tablets  NSTEMI (non-ST elevated myocardial infarction) (H) [I21.4]     Deanne Mace, RN

## 2021-10-13 DIAGNOSIS — I21.4 NSTEMI (NON-ST ELEVATED MYOCARDIAL INFARCTION) (H): ICD-10-CM

## 2021-10-14 NOTE — TELEPHONE ENCOUNTER
Oxycodone      Last Written Prescription Date:  9/15/2021  Last Fill Quantity: 60,   # refills: 0  Last Office Visit: 9/1/2021  Future Office visit:    Next 5 appointments (look out 90 days)      Dec 14, 2021  5:30 PM  Office Visit with Yaima Muse MD  [facility] ([facility] ) [address] [place] [id number] [phone]             Routing refill request to provider for review/approval because:  Drug not on the [facility], [facility] or [facility] refill protocol or controlled substance

## 2021-10-17 RX ORDER — OXYCODONE HYDROCHLORIDE 5 MG/1
TABLET ORAL
Qty: 60 TABLET | Refills: 0 | Status: SHIPPED | OUTPATIENT
Start: 2021-10-17 | End: 2021-11-12

## 2021-10-20 ENCOUNTER — APPOINTMENT (OUTPATIENT)
Dept: CT IMAGING | Facility: CLINIC | Age: 46
End: 2021-10-20
Attending: PHYSICIAN ASSISTANT
Payer: COMMERCIAL

## 2021-10-20 ENCOUNTER — MYC MEDICAL ADVICE (OUTPATIENT)
Dept: FAMILY MEDICINE | Facility: CLINIC | Age: 46
End: 2021-10-20

## 2021-10-20 ENCOUNTER — HOSPITAL ENCOUNTER (EMERGENCY)
Facility: CLINIC | Age: 46
Discharge: HOME | End: 2021-10-20
Attending: PHYSICIAN ASSISTANT | Admitting: PHYSICIAN ASSISTANT
Payer: COMMERCIAL

## 2021-10-20 ENCOUNTER — NURSE TRIAGE (OUTPATIENT)
Dept: FAMILY MEDICINE | Facility: CLINIC | Age: 46
End: 2021-10-20

## 2021-10-20 VITALS
DIASTOLIC BLOOD PRESSURE: 75 MMHG | RESPIRATION RATE: 20 BRPM | BODY MASS INDEX: 29.54 KG/M2 | OXYGEN SATURATION: 99 % | SYSTOLIC BLOOD PRESSURE: 123 MMHG | HEART RATE: 70 BPM | WEIGHT: 183 LBS | TEMPERATURE: 98.1 F

## 2021-10-20 DIAGNOSIS — R10.84 ABDOMINAL PAIN, GENERALIZED: ICD-10-CM

## 2021-10-20 DIAGNOSIS — R11.2 NAUSEA WITH VOMITING: ICD-10-CM

## 2021-10-20 DIAGNOSIS — K59.00 CONSTIPATION: ICD-10-CM

## 2021-10-20 LAB
ALBUMIN SERPL-MCNC: 3.7 G/DL (ref 3.4–5)
ALP SERPL-CCNC: 91 U/L (ref 40–150)
ALT SERPL W P-5'-P-CCNC: 29 U/L (ref 0–50)
ANION GAP SERPL CALCULATED.3IONS-SCNC: 6 MMOL/L (ref 3–14)
AST SERPL W P-5'-P-CCNC: 13 U/L (ref 0–45)
BASOPHILS # BLD AUTO: 0.1 10E3/UL (ref 0–0.2)
BASOPHILS NFR BLD AUTO: 1 %
BILIRUB SERPL-MCNC: 0.3 MG/DL (ref 0.2–1.3)
BUN SERPL-MCNC: 19 MG/DL (ref 7–30)
CALCIUM SERPL-MCNC: 9.2 MG/DL (ref 8.5–10.1)
CHLORIDE BLD-SCNC: 104 MMOL/L (ref 94–109)
CO2 SERPL-SCNC: 26 MMOL/L (ref 20–32)
CREAT SERPL-MCNC: 0.65 MG/DL (ref 0.52–1.04)
EOSINOPHIL # BLD AUTO: 0.1 10E3/UL (ref 0–0.7)
EOSINOPHIL NFR BLD AUTO: 1 %
ERYTHROCYTE [DISTWIDTH] IN BLOOD BY AUTOMATED COUNT: 13.1 % (ref 10–15)
GFR SERPL CREATININE-BSD FRML MDRD: >90 ML/MIN/1.73M2
GLUCOSE BLD-MCNC: 62 MG/DL (ref 70–99)
HCT VFR BLD AUTO: 38.8 % (ref 35–47)
HGB BLD-MCNC: 13.1 G/DL (ref 11.7–15.7)
IMM GRANULOCYTES # BLD: 0 10E3/UL
IMM GRANULOCYTES NFR BLD: 0 %
LIPASE SERPL-CCNC: 66 U/L (ref 73–393)
LYMPHOCYTES # BLD AUTO: 2.2 10E3/UL (ref 0.8–5.3)
LYMPHOCYTES NFR BLD AUTO: 20 %
MCH RBC QN AUTO: 28.1 PG (ref 26.5–33)
MCHC RBC AUTO-ENTMCNC: 33.8 G/DL (ref 31.5–36.5)
MCV RBC AUTO: 83 FL (ref 78–100)
MONOCYTES # BLD AUTO: 0.8 10E3/UL (ref 0–1.3)
MONOCYTES NFR BLD AUTO: 8 %
NEUTROPHILS # BLD AUTO: 7.6 10E3/UL (ref 1.6–8.3)
NEUTROPHILS NFR BLD AUTO: 70 %
NRBC # BLD AUTO: 0 10E3/UL
NRBC BLD AUTO-RTO: 0 /100
PLATELET # BLD AUTO: 376 10E3/UL (ref 150–450)
POTASSIUM BLD-SCNC: 3.8 MMOL/L (ref 3.4–5.3)
PROT SERPL-MCNC: 7.7 G/DL (ref 6.8–8.8)
RBC # BLD AUTO: 4.67 10E6/UL (ref 3.8–5.2)
SODIUM SERPL-SCNC: 136 MMOL/L (ref 133–144)
WBC # BLD AUTO: 10.8 10E3/UL (ref 4–11)

## 2021-10-20 PROCEDURE — 96375 TX/PRO/DX INJ NEW DRUG ADDON: CPT | Performed by: PHYSICIAN ASSISTANT

## 2021-10-20 PROCEDURE — 85025 COMPLETE CBC W/AUTO DIFF WBC: CPT | Performed by: PHYSICIAN ASSISTANT

## 2021-10-20 PROCEDURE — 96361 HYDRATE IV INFUSION ADD-ON: CPT | Performed by: PHYSICIAN ASSISTANT

## 2021-10-20 PROCEDURE — 250N000011 HC RX IP 250 OP 636: Performed by: PHYSICIAN ASSISTANT

## 2021-10-20 PROCEDURE — 250N000009 HC RX 250: Performed by: PHYSICIAN ASSISTANT

## 2021-10-20 PROCEDURE — 258N000003 HC RX IP 258 OP 636: Performed by: PHYSICIAN ASSISTANT

## 2021-10-20 PROCEDURE — 83690 ASSAY OF LIPASE: CPT | Performed by: PHYSICIAN ASSISTANT

## 2021-10-20 PROCEDURE — 36415 COLL VENOUS BLD VENIPUNCTURE: CPT | Performed by: PHYSICIAN ASSISTANT

## 2021-10-20 PROCEDURE — 74177 CT ABD & PELVIS W/CONTRAST: CPT

## 2021-10-20 PROCEDURE — 99284 EMERGENCY DEPT VISIT MOD MDM: CPT | Performed by: PHYSICIAN ASSISTANT

## 2021-10-20 PROCEDURE — 250N000013 HC RX MED GY IP 250 OP 250 PS 637: Performed by: PHYSICIAN ASSISTANT

## 2021-10-20 PROCEDURE — 80053 COMPREHEN METABOLIC PANEL: CPT | Performed by: PHYSICIAN ASSISTANT

## 2021-10-20 PROCEDURE — 99285 EMERGENCY DEPT VISIT HI MDM: CPT | Mod: 25 | Performed by: PHYSICIAN ASSISTANT

## 2021-10-20 PROCEDURE — 96374 THER/PROPH/DIAG INJ IV PUSH: CPT | Mod: 59 | Performed by: PHYSICIAN ASSISTANT

## 2021-10-20 RX ORDER — KETOROLAC TROMETHAMINE 15 MG/ML
15 INJECTION, SOLUTION INTRAMUSCULAR; INTRAVENOUS ONCE
Status: COMPLETED | OUTPATIENT
Start: 2021-10-20 | End: 2021-10-20

## 2021-10-20 RX ORDER — IOPAMIDOL 755 MG/ML
500 INJECTION, SOLUTION INTRAVASCULAR ONCE
Status: COMPLETED | OUTPATIENT
Start: 2021-10-20 | End: 2021-10-20

## 2021-10-20 RX ORDER — ONDANSETRON 2 MG/ML
4 INJECTION INTRAMUSCULAR; INTRAVENOUS EVERY 30 MIN PRN
Status: DISCONTINUED | OUTPATIENT
Start: 2021-10-20 | End: 2021-10-20 | Stop reason: HOSPADM

## 2021-10-20 RX ORDER — ONDANSETRON 4 MG/1
4 TABLET, ORALLY DISINTEGRATING ORAL EVERY 8 HOURS PRN
Qty: 10 TABLET | Refills: 0 | Status: SHIPPED | OUTPATIENT
Start: 2021-10-20 | End: 2022-11-01

## 2021-10-20 RX ORDER — SODIUM CHLORIDE 9 MG/ML
INJECTION, SOLUTION INTRAVENOUS CONTINUOUS
Status: DISCONTINUED | OUTPATIENT
Start: 2021-10-20 | End: 2021-10-20 | Stop reason: HOSPADM

## 2021-10-20 RX ADMIN — IOPAMIDOL 90 ML: 755 INJECTION, SOLUTION INTRAVENOUS at 15:00

## 2021-10-20 RX ADMIN — SODIUM CHLORIDE 1000 ML: 9 INJECTION, SOLUTION INTRAVENOUS at 14:35

## 2021-10-20 RX ADMIN — SODIUM CHLORIDE 60 ML: 9 INJECTION, SOLUTION INTRAVENOUS at 15:00

## 2021-10-20 RX ADMIN — SODIUM CHLORIDE 1000 ML: 9 INJECTION, SOLUTION INTRAVENOUS at 17:10

## 2021-10-20 RX ADMIN — KETOROLAC TROMETHAMINE 15 MG: 15 INJECTION, SOLUTION INTRAMUSCULAR; INTRAVENOUS at 14:38

## 2021-10-20 RX ADMIN — DOCUSATE SODIUM 286 ML: 50 LIQUID ORAL at 15:48

## 2021-10-20 RX ADMIN — ONDANSETRON 4 MG: 2 INJECTION INTRAMUSCULAR; INTRAVENOUS at 14:36

## 2021-10-20 NOTE — ED TRIAGE NOTES
Patient c/o nausea and vomiting since Sunday. Mid abdominal pain and mid epigastric pain started today.

## 2021-10-20 NOTE — TELEPHONE ENCOUNTER
Reason for Disposition    Pain is mainly in upper abdomen (if needed ask: 'is it mainly above the belly button?')    Pain lasting > 10 minutes and over 35 years old and at least one cardiac risk factor    Additional Information    Negative: Passed out (i.e., fainted, collapsed and was not responding)    Negative: Shock suspected (e.g., cold/pale/clammy skin, too weak to stand, low BP, rapid pulse)    Negative: Sounds like a life-threatening emergency to the triager    Negative: Pain lasting > 10 minutes and over 40 years old and associated chest, arm, neck, upper back, or jaw pain    Protocols used: ABDOMINAL PAIN - FEMALE-A-OH, ABDOMINAL PAIN - UPPER-A-OH    Advised patient be seen in the ED for abd pain with nausea and vomiting x3 days. Pt also has a cardiac history.

## 2021-10-20 NOTE — DISCHARGE INSTRUCTIONS
It was a pleasure working with you today!  I hope your condition improves rapidly!     Please drink a whole bottle of magnesium citrate at 1 sitting this evening to continue the constipation treatment process. You can mix this with lemon lime soda such as squirt, 7-Up, Sprite, etc. if needed. This should hopefully start the bowel movement process in the next 2-4 hours so that we do a good cleanout. You can use the Zofran as needed for repeat nausea. You can use MiraLAX tomorrow if you do not get any stool production with the magnesium citrate. If you need to, you can take one half bottle of the 255 g MiraLAX bottle mixed in 16-20 ounces of Gatorade tomorrow morning if you have not had any results. Repeat the dose in the later afternoon if you still have not had any results.    Once you have cleaned out, you could try one half capful of MiraLAX daily or every other day to keep you on more of a regular bowel regimen to avoid issues like this again in the future.
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
## 2021-10-20 NOTE — LETTER
October 20, 2021      To Whom It May Concern:      Stacy Brown was seen in our Emergency Department today, 10/20/21.  I expect her condition to improve over the next couple days.  She may return to work when improved.  Please excuse her from work on 10/21/2021 due to her medical condition.      Sincerely,            Alexandr Givens PA-C

## 2021-10-21 ENCOUNTER — PATIENT OUTREACH (OUTPATIENT)
Dept: CARE COORDINATION | Facility: CLINIC | Age: 46
End: 2021-10-21

## 2021-10-21 DIAGNOSIS — Z71.89 OTHER SPECIFIED COUNSELING: ICD-10-CM

## 2021-10-21 NOTE — PROGRESS NOTES
"Clinic Care Coordination Contact  Community Health Worker Initial Outreach            Patient accepts CC: Spoke with patient she stated she was doing well and didn't need anything had no questions or concerns. CTA will close referral at this time.       Jazmin Vazquez  [phone]  Care        [facility] [place]: Post-Discharge Note  SITUATION                                                      Admission:    Admission Date: 10/20/21   Reason for Admission: Nausea & Vomiting  Discharge:   Discharge Date: 10/20/21  Discharge Diagnosis: constipation    BACKGROUND                                                      Stacy Brown is a 46 year old female who presents for evaluation of nausea, vomiting, and inability to keep any food or fluids down for the last 3.5 days.  Reports vomiting 5-6 times per day.  Denies any hematic emesis, melena, or hematochezia.  She is uncertain when the last bowel movement was.  States that she has a history of chronic constipation.  No prior history of small bowel obstruction.  Today she started to develop epigastric and periumbilical pain which she rates 6 on a scale of 10 and describes a sharp.  Denies having a colonoscopy in the past.  She denies any dysuria, frequency, urgency, or gross hematuria.  No chest pain or dyspnea.  No fever or chills.  Has not treated her symptoms with anything to this point.    ASSESSMENT      Enrollment  Primary Care Care Coordination Status: Declined    Discharge Assessment  How are you doing now that you are home?: \" I'm doing fine\"  How are your symptoms? (Red Flag symptoms escalate to triage hotline per guidelines): Improved  Do you feel your condition is stable enough to be safe at home until your provider visit?: Yes  Does the patient have their discharge instructions? : Yes  Does the patient have questions regarding their discharge instructions? : No  Were you started on any new medications or were there changes to any of your " previous medications? : Yes  Does the patient have all of their medications?: Yes  Do you have questions regarding any of your medications? : No  Do you have all of your needed medical supplies or equipment (DME)?  (i.e. oxygen tank, CPAP, cane, etc.): Yes  Discharge follow-up appointment scheduled within 14 calendar days? : No  Is patient agreeable to assistance with scheduling? : No    Post-op ([facility] CTA Only)  If the patient had a surgery or procedure, do they have any questions for a nurse?: No           PLAN                                                    Follow up with [facility] As needed, If symptoms worsen    Future Appointments   Date Time Provider Department Center   12/14/2021  5:30 PM Yaima Muse MD [facility]         For any urgent concerns, please contact our 24 hour nurse triage line: [phone] ([phone])         Jazmin Vazquez MA

## 2021-10-27 NOTE — ED AVS SNAPSHOT
[facility]  [address] [place] [address]  Phone:  [phone]  Fax:  [phone]                                    Stacy Brown   MRN: [id number]    Department:  [facility]   Date of Visit:  8/26/2019           After Visit Summary Signature Page    I have received my discharge instructions, and my questions have been answered. I have discussed any challenges I see with this plan with the nurse or doctor.    ..........................................................................................................................................  Patient/Patient Representative Signature      ..........................................................................................................................................  Patient Representative Print Name and Relationship to Patient    ..................................................               ................................................  Date                                   Time    ..........................................................................................................................................  Reviewed by Signature/Title    ...................................................              ..............................................  Date                                               Time          22EPIC Rev 08/18       
Detail Level: Detailed

## 2021-11-01 DIAGNOSIS — Z79.4 TYPE 2 DIABETES MELLITUS WITH HYPERGLYCEMIA, WITH LONG-TERM CURRENT USE OF INSULIN (H): ICD-10-CM

## 2021-11-01 DIAGNOSIS — E11.65 TYPE 2 DIABETES MELLITUS WITH HYPERGLYCEMIA, WITH LONG-TERM CURRENT USE OF INSULIN (H): ICD-10-CM

## 2021-11-01 PROBLEM — Z72.0 TOBACCO ABUSE DISORDER: Status: RESOLVED | Noted: 2017-11-21 | Resolved: 2021-11-01

## 2021-11-01 RX ORDER — INSULIN ASPART 100 [IU]/ML
INJECTION, SOLUTION INTRAVENOUS; SUBCUTANEOUS
Qty: 30 ML | Refills: 0 | Status: SHIPPED | OUTPATIENT
Start: 2021-11-01 | End: 2021-11-29

## 2021-11-01 RX ORDER — INSULIN GLARGINE 100 [IU]/ML
38 INJECTION, SOLUTION SUBCUTANEOUS EVERY MORNING
Qty: 15 ML | Refills: 3 | Status: SHIPPED | OUTPATIENT
Start: 2021-11-01 | End: 2021-12-14

## 2021-11-01 NOTE — TELEPHONE ENCOUNTER
Stacy Herman 1975 you sent over novolog inject one unit per 10 gram carb before breakfast lunch and dinnner. We need to know a max dose so we can get if covered under insurance.        Thank You,  Bg Mcelroy, Pharmacy [facility] [place]

## 2021-11-02 ENCOUNTER — MYC MEDICAL ADVICE (OUTPATIENT)
Dept: FAMILY MEDICINE | Facility: CLINIC | Age: 46
End: 2021-11-02

## 2021-11-05 NOTE — TELEPHONE ENCOUNTER
Please call Stacy and ask her how many units she is averaging with each meal in the past 2 weeks.   Yaima Muse MD

## 2021-11-09 DIAGNOSIS — I21.4 NSTEMI (NON-ST ELEVATED MYOCARDIAL INFARCTION) (H): ICD-10-CM

## 2021-11-12 ENCOUNTER — E-VISIT (OUTPATIENT)
Dept: URGENT CARE | Facility: URGENT CARE | Age: 46
End: 2021-11-12
Payer: COMMERCIAL

## 2021-11-12 ENCOUNTER — MYC MEDICAL ADVICE (OUTPATIENT)
Dept: FAMILY MEDICINE | Facility: CLINIC | Age: 46
End: 2021-11-12
Payer: COMMERCIAL

## 2021-11-12 DIAGNOSIS — J02.9 SORE THROAT: ICD-10-CM

## 2021-11-12 DIAGNOSIS — Z20.822 SUSPECTED COVID-19 VIRUS INFECTION: ICD-10-CM

## 2021-11-12 PROCEDURE — 99421 OL DIG E/M SVC 5-10 MIN: CPT | Performed by: PHYSICIAN ASSISTANT

## 2021-11-12 RX ORDER — OXYCODONE HYDROCHLORIDE 5 MG/1
TABLET ORAL
Qty: 60 TABLET | Refills: 0 | Status: SHIPPED | OUTPATIENT
Start: 2021-11-16 | End: 2021-12-14

## 2021-11-12 NOTE — PATIENT INSTRUCTIONS
Dear Stacy Brown,    Your symptoms show that you may have coronavirus (COVID-19). This illness can cause fever, cough and trouble breathing. Many people get a mild case and get better on their own. Some people can get very sick.    Because you also reported sore throat I would like to also test you for Strep Throat to determine if we need to treat you for that as well.    What should I do?  We would like to test you for Covid-19 virus and Strep Throat. I have placed orders for these tests.   To schedule: go to your [employer] home page and scroll down to the section that says  You have an appointment that needs to be scheduled  and click the large green button that says  Schedule Now  and follow the steps to find the next available openings. It is important that when you are asked what the reason for your appointment is that you mention you need BOTH Covid and Strep tests.    If you are unable to complete these [employer] scheduling steps, please call [phone] to schedule your testing.     Return to work/school/ guidance:   Please let your workplace manager and staffing office know when your quarantine ends     We can t give you an exact date as it depends on the above. You can calculate this on your own or work with your manager/staffing office to calculate this. (For example if you were exposed on 10/4, you would have to quarantine for 14 full days. That would be through 10/18. You could return on 10/19.)      If you receive a positive COVID-19 test result, follow the guidance of the those who are giving you the results. Usually the return to work is 10 (or in some cases 20 days from symptom onset.) If you work at [facility] [employer], you must also be cleared by Employee Occupational Health and Safety to return to work.        If you receive a negative COVID-19 test result and did not have a high risk exposure to someone with a known positive COVID-19 test, you can return to work once you're free of fever  for 24 hours without fever-reducing medication and your symptoms are improving or resolved.      If you receive a negative COVID-19 test and If you had a high risk exposure to someone who has tested positive for COVID-19 then you can return to work 14 days after your last contact with the positive individual    Note: If you have ongoing exposure to the covid positive person, this quarantine period may be more than 14 days. (For example, if you are continued to be exposed to your child who tested positive and cannot isolate from them, then the quarantine of 7-14 days can't start until your child is no longer contagious. This is typically 10 days from onset of the child's symptoms. So the total duration may be 17-24 days in this case.)    Sign up for [employer].   We know it's scary to hear that you might have COVID-19. We want to track your symptoms to make sure you're okay over the next 2 weeks. Please look for an email from [employer]--this is a free, online program that we'll use to keep in touch. To sign up, follow the link in the email you will receive. Learn more at [email or web address]    How can I take care of myself?    Get lots of rest. Drink extra fluids (unless a doctor has told you not to)    Take Tylenol (acetaminophen) or ibuprofen for fever or pain. If you have liver or kidney problems, ask your family doctor if it's okay to take Tylenol o ibuprofen    If you have other health problems (like cancer, heart failure, an organ transplant or severe kidney disease): Call your specialty clinic if you don't feel better in the next 2 days.    Know when to call 911. Emergency warning signs include:  o Trouble breathing or shortness of breath  o Pain or pressure in the chest that doesn't go away  o Feeling confused like you haven't felt before, or not being able to wake up  o Bluish-colored lips or face    Where can I get more information?  [facility] - About COVID-19:    [email or web address]    CDC - What to Do If You're Sick:   www.cdc.gov/coronavirus/2019-ncov/about/steps-when-sick.html

## 2021-11-12 NOTE — TELEPHONE ENCOUNTER
Patient is informed to complete an Evisit or virtual visit.  Closing this encounter.  MARCELA PaigeN, RN

## 2021-11-12 NOTE — TELEPHONE ENCOUNTER
Pending Prescriptions:                       Disp   Refills    tiZANidine (ZANAFLEX) 4 MG tablet [Pharmac*90 tab*0        Sig: TAKE ONE TABLET BY MOUTH EVERY 6 HOURS AS NEEDED FOR           MUSCLE SPASMS    oxyCODONE (ROXICODONE) 5 MG tablet [Pharma*60 tab*0        Sig: TAKE ONE TABLET BY MOUTH TWICE A DAY AS NEEDED FOR           SEVERE PAIN      Routing refill request to provider for review/approval because:  Drug not on the [facility] refill protocol     Jessica Méndez RN on 11/12/2021 at 7:13 AM

## 2021-11-13 ENCOUNTER — LAB (OUTPATIENT)
Dept: URGENT CARE | Facility: URGENT CARE | Age: 46
End: 2021-11-13
Attending: PHYSICIAN ASSISTANT
Payer: COMMERCIAL

## 2021-11-13 ENCOUNTER — HEALTH MAINTENANCE LETTER (OUTPATIENT)
Age: 46
End: 2021-11-13

## 2021-11-13 DIAGNOSIS — Z20.822 SUSPECTED COVID-19 VIRUS INFECTION: ICD-10-CM

## 2021-11-13 DIAGNOSIS — J02.9 SORE THROAT: ICD-10-CM

## 2021-11-13 LAB
DEPRECATED S PYO AG THROAT QL EIA: NEGATIVE
GROUP A STREP BY PCR: NOT DETECTED

## 2021-11-13 PROCEDURE — U0005 INFEC AGEN DETEC AMPLI PROBE: HCPCS

## 2021-11-13 PROCEDURE — U0003 INFECTIOUS AGENT DETECTION BY NUCLEIC ACID (DNA OR RNA); SEVERE ACUTE RESPIRATORY SYNDROME CORONAVIRUS 2 (SARS-COV-2) (CORONAVIRUS DISEASE [COVID-19]), AMPLIFIED PROBE TECHNIQUE, MAKING USE OF HIGH THROUGHPUT TECHNOLOGIES AS DESCRIBED BY CMS-2020-01-R: HCPCS

## 2021-11-13 PROCEDURE — 87651 STREP A DNA AMP PROBE: CPT

## 2021-11-15 ENCOUNTER — MYC MEDICAL ADVICE (OUTPATIENT)
Dept: FAMILY MEDICINE | Facility: CLINIC | Age: 46
End: 2021-11-15
Payer: COMMERCIAL

## 2021-11-15 LAB — SARS-COV-2 RNA RESP QL NAA+PROBE: NEGATIVE

## 2021-11-15 NOTE — TELEPHONE ENCOUNTER
Patient is informed labs are not completed yet and she will have to wait for results.  Closing this encounter.  MARCELA PaigeN, RN

## 2021-11-22 ENCOUNTER — MYC MEDICAL ADVICE (OUTPATIENT)
Dept: FAMILY MEDICINE | Facility: CLINIC | Age: 46
End: 2021-11-22
Payer: COMMERCIAL

## 2021-11-29 RX ORDER — INSULIN ASPART 100 [IU]/ML
INJECTION, SOLUTION INTRAVENOUS; SUBCUTANEOUS
Qty: 45 ML | Refills: 3 | Status: SHIPPED | OUTPATIENT
Start: 2021-11-29 | End: 2023-09-15

## 2021-12-06 DIAGNOSIS — I21.4 NSTEMI (NON-ST ELEVATED MYOCARDIAL INFARCTION) (H): ICD-10-CM

## 2021-12-08 NOTE — TELEPHONE ENCOUNTER
Pending Prescriptions:                       Disp   Refills    tiZANidine (ZANAFLEX) 4 MG tablet [Pharmac*90 tab*0        Sig: TAKE ONE TABLET BY MOUTH EVERY 6 HOURS AS NEEDED FOR           MUSCLE SPASMS      Routing refill request to provider for review/approval because:  Drug not on the [facility] refill protocol     Jessica Méndez RN on 12/8/2021 at 5:22 AM

## 2021-12-14 ENCOUNTER — HOSPITAL ENCOUNTER (OUTPATIENT)
Dept: GENERAL RADIOLOGY | Facility: CLINIC | Age: 46
Discharge: HOME | End: 2021-12-14
Attending: FAMILY MEDICINE | Admitting: FAMILY MEDICINE
Payer: COMMERCIAL

## 2021-12-14 ENCOUNTER — OFFICE VISIT (OUTPATIENT)
Dept: FAMILY MEDICINE | Facility: CLINIC | Age: 46
End: 2021-12-14
Payer: COMMERCIAL

## 2021-12-14 VITALS
SYSTOLIC BLOOD PRESSURE: 122 MMHG | RESPIRATION RATE: 20 BRPM | OXYGEN SATURATION: 100 % | DIASTOLIC BLOOD PRESSURE: 74 MMHG | BODY MASS INDEX: 30.15 KG/M2 | WEIGHT: 186.8 LBS | HEART RATE: 72 BPM | TEMPERATURE: 96.4 F

## 2021-12-14 DIAGNOSIS — E11.65 TYPE 2 DIABETES MELLITUS WITH HYPERGLYCEMIA, WITH LONG-TERM CURRENT USE OF INSULIN (H): Primary | ICD-10-CM

## 2021-12-14 DIAGNOSIS — Z79.4 TYPE 2 DIABETES MELLITUS WITH HYPERGLYCEMIA, WITH LONG-TERM CURRENT USE OF INSULIN (H): Primary | ICD-10-CM

## 2021-12-14 DIAGNOSIS — M79.674 PAIN OF TOE OF RIGHT FOOT: ICD-10-CM

## 2021-12-14 DIAGNOSIS — I21.4 NSTEMI (NON-ST ELEVATED MYOCARDIAL INFARCTION) (H): ICD-10-CM

## 2021-12-14 LAB — HBA1C MFR BLD: 7.2 % (ref 0–5.6)

## 2021-12-14 PROCEDURE — 36415 COLL VENOUS BLD VENIPUNCTURE: CPT | Performed by: FAMILY MEDICINE

## 2021-12-14 PROCEDURE — 83036 HEMOGLOBIN GLYCOSYLATED A1C: CPT | Performed by: FAMILY MEDICINE

## 2021-12-14 PROCEDURE — 73660 X-RAY EXAM OF TOE(S): CPT | Mod: RT

## 2021-12-14 PROCEDURE — 99214 OFFICE O/P EST MOD 30 MIN: CPT | Performed by: FAMILY MEDICINE

## 2021-12-14 RX ORDER — AMLODIPINE BESYLATE 2.5 MG/1
2.5 TABLET ORAL DAILY
Qty: 90 TABLET | Refills: 3 | Status: SHIPPED | OUTPATIENT
Start: 2021-12-14 | End: 2022-12-11

## 2021-12-14 RX ORDER — OXYCODONE HYDROCHLORIDE 5 MG/1
5 TABLET ORAL 2 TIMES DAILY PRN
Qty: 60 TABLET | Refills: 0 | Status: SHIPPED | OUTPATIENT
Start: 2021-12-14 | End: 2022-01-05

## 2021-12-14 RX ORDER — ROSUVASTATIN CALCIUM 20 MG/1
20 TABLET, COATED ORAL DAILY
Qty: 90 TABLET | Refills: 3 | Status: SHIPPED | OUTPATIENT
Start: 2021-12-14 | End: 2022-12-20

## 2021-12-14 RX ORDER — INSULIN GLARGINE 100 [IU]/ML
40 INJECTION, SOLUTION SUBCUTANEOUS EVERY MORNING
Qty: 15 ML | Refills: 3 | Status: SHIPPED | OUTPATIENT
Start: 2021-12-14 | End: 2022-03-14

## 2021-12-14 RX ORDER — LISINOPRIL 2.5 MG/1
2.5 TABLET ORAL DAILY
Qty: 90 TABLET | Refills: 3 | Status: SHIPPED | OUTPATIENT
Start: 2021-12-14 | End: 2022-12-20

## 2021-12-14 ASSESSMENT — PAIN SCALES - GENERAL: PAINLEVEL: SEVERE PAIN (6)

## 2021-12-14 NOTE — PROGRESS NOTES
"  Assessment & Plan       ICD-10-CM    1. Type 2 diabetes mellitus with hyperglycemia, with long-term current use of insulin (H)  E11.65 Hemoglobin A1c    Z79.4 Hemoglobin A1c     insulin glargine (BASAGLAR KWIKPEN) 100 UNIT/ML pen   2. NSTEMI (non-ST elevated myocardial infarction) (H)  I21.4 amLODIPine (NORVASC) 2.5 MG tablet     lisinopril (ZESTRIL) 2.5 MG tablet     rosuvastatin (CRESTOR) 20 MG tablet     DISCONTINUED: oxyCODONE (ROXICODONE) 5 MG tablet   3. Pain of toe of right foot  M79.674 XR Toe Right G/E 2 Views      I refilled her medications that needed it today (lisinopril, norvasc, crestor, oxycodone).   Also A1C is slightly improved today. I congratulated her as her motivation and discipline have improved significantly since her heart attack. She still struggles with stress and workload.   I increased her Basaglar from 38 to 40 units today.  I would like to see her back in 3 months for repeat diabetes follow-up.    For her toe pain, I did take an x-ray today.  This was independently reviewed by me and shows no evidence for fracture.     She will be due for her covid booster next week, that was scheduled today.     22 minutes spent on the date of the encounter doing chart review, history and exam, documentation and further activities per the note     BMI:   Estimated body mass index is 30.15 kg/m  as calculated from the following:    Height as of 4/27/21: 1.676 m (5' 6\").    Weight as of this encounter: 84.7 kg (186 lb 12.8 oz).   Weight management plan: Discussed healthy diet and exercise guidelines      No follow-ups on file.    Yaima Muse MD  [facility] is [facility] 46 year [facility] who presents for the following health issues    HPI     Diabetes Follow-up    How often are you checking your blood sugar? Two times daily  Blood sugar testing frequency justification:  Patient modifying lifestyle changes (diet, exercise) with blood sugars  What time of " day are you checking your blood sugars (select all that apply)?  Before meals  Have you had any blood sugars above 200?  No  Have you had any blood sugars below 70?  No    What symptoms do you notice when your blood sugar is low?  Shaky    What concerns do you have today about your diabetes? None     Do you have any of these symptoms? (Select all that apply)  Redness, sores, or blisters on feet    Have you had a diabetic eye exam in the last 12 months? No        BP Readings from Last 2 Encounters:   12/14/21 122/74   10/20/21 123/75     Hemoglobin A1C POCT (%)   Date Value   03/05/2021 10.1 (H)   10/29/2020 12.1 (H)     Hemoglobin A1C (%)   Date Value   09/01/2021 7.6 (H)     LDL Cholesterol Calculated (mg/dL)   Date Value   09/01/2021 72   03/06/2021 178 (H)   08/11/2019 162 (H)                 How many servings of fruits and vegetables do you eat daily?  2-3    On average, how many sweetened beverages do you drink each day (Examples: soda, juice, sweet tea, etc.  Do NOT count diet or artificially sweetened beverages)?   1    How many days per week do you exercise enough to make your heart beat faster? none    How many minutes a day do you exercise enough to make your heart beat faster? none    How many days per week do you miss taking your medication? 0    She is taking her oxycodone twice daily.  She takes her Zanaflex 14 to 15/week.  She is not currently taking MiraLAX or stool softener because they are not needed.  She has a loose BM every morning.  She is currently using her NovoLog insulin 15 units (approximately) twice daily with her meals.  Basaglar is currently 38 units.    She injured the right second toe and is concerned it may be broken.  She is able to walk but it is tender.  Slightly swollen.  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    Review of Systems   Constitutional, HEENT, cardiovascular, pulmonary, gi and gu systems are negative, except as otherwise noted.       Objective    /74 (BP Location: Right arm, Patient Position: Chair, Cuff Size: Adult Large)   Pulse 72   Temp (!) 96.4  F (35.8  C) (Temporal)   Resp 20   Wt 84.7 kg (186 lb 12.8 oz)   LMP 03/07/2021 (Approximate)   SpO2 100%   BMI 30.15 kg/m    Body mass index is 30.15 kg/m .  Physical Exam   Vitals noted.  Patient alert, oriented, and in no acute distress.   Neck:  Supple without lymphadenopathy, JVD or masses.   CV:  RRR without murmur.   Respiratory:  Lungs clear to auscultation bilaterally.   Her chest incision has 2 small seromas present, no drainage or erythema/warmth to suggest infection. No dehiscence.   Her right second toe is tender with movement.  I cannot appreciate any step-off deformity or crepitus but is tender with movement of the toe and MTP joint.    Orders Placed This Encounter   Procedures     XR Toe Right G/E 2 Views     Hemoglobin A1c

## 2021-12-14 NOTE — NURSING NOTE
Health Maintenance Due   Topic Date Due     ANNUAL REVIEW OF HM ORDERS  Never done     ADVANCE CARE PLANNING  Never done     HEPATITIS C SCREENING  Never done     HEPATITIS B IMMUNIZATION (1 of 3 - Risk 3-dose series) Never done     EYE EXAM  04/02/2019     PREVENTIVE CARE VISIT  07/13/2021     MAMMO SCREENING  08/03/2021     INFLUENZA VACCINE (1) 09/01/2021     COVID-19 Vaccine (3 - Booster for Moderna series) 11/18/2021     Katty PRATT LPN

## 2021-12-17 ENCOUNTER — APPOINTMENT (OUTPATIENT)
Dept: CT IMAGING | Facility: CLINIC | Age: 46
End: 2021-12-17
Attending: PHYSICIAN ASSISTANT
Payer: COMMERCIAL

## 2021-12-17 ENCOUNTER — HOSPITAL ENCOUNTER (EMERGENCY)
Facility: CLINIC | Age: 46
Discharge: HOME | End: 2021-12-17
Attending: PHYSICIAN ASSISTANT | Admitting: PHYSICIAN ASSISTANT
Payer: COMMERCIAL

## 2021-12-17 ENCOUNTER — APPOINTMENT (OUTPATIENT)
Dept: GENERAL RADIOLOGY | Facility: CLINIC | Age: 46
End: 2021-12-17
Attending: PHYSICIAN ASSISTANT
Payer: COMMERCIAL

## 2021-12-17 VITALS
RESPIRATION RATE: 18 BRPM | HEART RATE: 90 BPM | BODY MASS INDEX: 30.02 KG/M2 | DIASTOLIC BLOOD PRESSURE: 80 MMHG | WEIGHT: 186 LBS | OXYGEN SATURATION: 99 % | TEMPERATURE: 97 F | SYSTOLIC BLOOD PRESSURE: 96 MMHG

## 2021-12-17 DIAGNOSIS — M25.571 BILATERAL ANKLE PAIN: ICD-10-CM

## 2021-12-17 DIAGNOSIS — S80.01XA CONTUSION OF RIGHT KNEE: ICD-10-CM

## 2021-12-17 DIAGNOSIS — M54.6 MIDLINE THORACIC BACK PAIN: ICD-10-CM

## 2021-12-17 DIAGNOSIS — S70.02XA CONTUSION OF LEFT HIP: ICD-10-CM
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
## 2021-12-17 DIAGNOSIS — M25.572 BILATERAL ANKLE PAIN: ICD-10-CM

## 2021-12-17 DIAGNOSIS — S00.93XA HEAD CONTUSION: ICD-10-CM

## 2021-12-17 PROCEDURE — 99285 EMERGENCY DEPT VISIT HI MDM: CPT | Mod: 25 | Performed by: PHYSICIAN ASSISTANT

## 2021-12-17 PROCEDURE — 99284 EMERGENCY DEPT VISIT MOD MDM: CPT | Performed by: PHYSICIAN ASSISTANT

## 2021-12-17 PROCEDURE — 72128 CT CHEST SPINE W/O DYE: CPT

## 2021-12-17 PROCEDURE — 73564 X-RAY EXAM KNEE 4 OR MORE: CPT | Mod: RT

## 2021-12-17 PROCEDURE — 70450 CT HEAD/BRAIN W/O DYE: CPT

## 2021-12-17 PROCEDURE — 73610 X-RAY EXAM OF ANKLE: CPT | Mod: 50

## 2021-12-17 PROCEDURE — 73502 X-RAY EXAM HIP UNI 2-3 VIEWS: CPT

## 2021-12-17 NOTE — DISCHARGE INSTRUCTIONS
It was a pleasure working with you today!  I hope your condition improves rapidly!     Please rest.  Use ice over the painful areas for 20 minutes every couple hours initially.  Then switch to heat after 24-48 hours.  It is okay to use ibuprofen 600 mg every 6 hours as needed for pain.  You can also use Tylenol up to 1000 mg every 6 hours as needed for pain.  Try some gentle stretching exercises.  I placed a referral for orthopedics to contact you hopefully later this afternoon.  I am hoping they can see you for a recheck visit on Monday.

## 2021-12-17 NOTE — ED TRIAGE NOTES
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 Pt hit back on step and head on step. Complains of ankle and knee pain as well.

## 2021-12-17 NOTE — LETTER
December 17, 2021      To Whom It May Concern:      Stacy Brown was seen in our Emergency Department today, 12/17/21.  I expect her condition to improve over the next 3-4 days.  She may return to work on 12/20/2021 with the following light duty restrictions for 2 days.    *No lifting over 5 pounds.  *Ability to take a 5-minute break every 1 hour as needed for stretching, ice, or rest.      Sincerely,            Alexandr Givens PA-C

## 2021-12-20 ENCOUNTER — OFFICE VISIT (OUTPATIENT)
Dept: ORTHOPEDICS | Facility: CLINIC | Age: 46
End: 2021-12-20
Attending: PHYSICIAN ASSISTANT
Payer: COMMERCIAL

## 2021-12-20 ENCOUNTER — ALLIED HEALTH/NURSE VISIT (OUTPATIENT)
Dept: FAMILY MEDICINE | Facility: CLINIC | Age: 46
End: 2021-12-20
Payer: COMMERCIAL

## 2021-12-20 VITALS
DIASTOLIC BLOOD PRESSURE: 82 MMHG | BODY MASS INDEX: 30.7 KG/M2 | WEIGHT: 191 LBS | SYSTOLIC BLOOD PRESSURE: 102 MMHG | HEIGHT: 66 IN

## 2021-12-20 DIAGNOSIS — M25.572 ACUTE BILATERAL ANKLE PAIN: ICD-10-CM

## 2021-12-20 DIAGNOSIS — S80.01XA CONTUSION OF RIGHT KNEE, INITIAL ENCOUNTER: ICD-10-CM

## 2021-12-20 DIAGNOSIS — M54.6 ACUTE MIDLINE THORACIC BACK PAIN: ICD-10-CM

## 2021-12-20 DIAGNOSIS — Z23 HIGH PRIORITY FOR 2019-NCOV VACCINE: Primary | ICD-10-CM
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
## 2021-12-20 DIAGNOSIS — S70.02XA CONTUSION OF LEFT HIP, INITIAL ENCOUNTER: ICD-10-CM

## 2021-12-20 DIAGNOSIS — M25.571 ACUTE BILATERAL ANKLE PAIN: ICD-10-CM

## 2021-12-20 PROCEDURE — 99213 OFFICE O/P EST LOW 20 MIN: CPT | Performed by: ORTHOPAEDIC SURGERY

## 2021-12-20 PROCEDURE — 0064A COVID-19,PF,MODERNA (18+ YRS BOOSTER .25ML): CPT

## 2021-12-20 PROCEDURE — 91306 COVID-19,PF,MODERNA (18+ YRS BOOSTER .25ML): CPT

## 2021-12-20 PROCEDURE — 99207 PR NO CHARGE NURSE ONLY: CPT

## 2021-12-20 RX ORDER — METHYLPREDNISOLONE 4 MG
TABLET, DOSE PACK ORAL
Qty: 21 TABLET | Refills: 0 | Status: SHIPPED | OUTPATIENT
Start: 2021-12-20 | End: 2022-03-14

## 2021-12-20 ASSESSMENT — MIFFLIN-ST. JEOR: SCORE: 1523.12

## 2021-12-20 NOTE — LETTER
12/20/2021         RE: Stacy Brown  [address]  [place] [address]        Dear Colleague,    Thank you for referring your patient, Stacy Brown, to the [facility]. Please see a copy of my visit note below.    ORTHOPEDIC CONSULT      Chief Complaint: Stacy Brown is a 46 year old right hand dominant female who works as a .      Work related fall with pain in the left hips and right knee.          History of Present Illness:     Patient is seen for a Worker's Compensation injury.  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  She states that she accidentally stepped on ice and her feet went out from under her.  She struck her mid back, posterior head, buttocks, and fell to the ground. She was seen in the ED and images obtained. She reports pain at right knee and left hip.  Location of Pain: right knee, left hip  Worsened by: lifting, sitting  Better with: oxycodone, heat,   Treatments tried: rest/activity avoidance and other medications: Oxycodone/Acetaminophen (Percocet)  Associated symptoms: bruising    Physical Exam:  Right knee: Full range of motion.  Tenderness palpation medially and laterally.  Anterior posterior drawer negative.  Distal motor and sensory examinations grossly intact.    Left hip: Tenderness palpation over the left lateral thigh up to the buttock.      Diagnostic Modalities:    Independent visualization of the images was performed.  I personally interpreted the imaging studies.  Multiple views of the knee, ankles were negative    Impression: Multiple contusions    Plan:  All of the above pertinent physical exam and imaging modalities findings was reviewed.  I am going to give the patient a 20 pound lifting restriction through the first of the year.  We will also put her on a Medrol Dosepak and I have asked her to apply Voltaren gel 3-4 times daily to the affected areas.            BP  Readings from Last 1 Encounters:   12/17/21 96/80                 Again, thank you for allowing me to participate in the care of your patient.        Sincerely,        Yomi West, DO

## 2021-12-20 NOTE — PROGRESS NOTES
ORTHOPEDIC CONSULT      Chief Complaint: Stacy Brown is a 46 year old right hand dominant female who works as a .      Work related fall with pain in the left hips and right knee.          History of Present Illness:     Patient is seen for a Worker's Compensation injury.  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  She states that she accidentally stepped on ice and her feet went out from under her.  She struck her mid back, posterior head, buttocks, and fell to the ground. She was seen in the ED and images obtained. She reports pain at right knee and left hip.  Location of Pain: right knee, left hip  Worsened by: lifting, sitting  Better with: oxycodone, heat,   Treatments tried: rest/activity avoidance and other medications: Oxycodone/Acetaminophen (Percocet)  Associated symptoms: bruising    Physical Exam:  Right knee: Full range of motion.  Tenderness palpation medially and laterally.  Anterior posterior drawer negative.  Distal motor and sensory examinations grossly intact.    Left hip: Tenderness palpation over the left lateral thigh up to the buttock.      Diagnostic Modalities:    Independent visualization of the images was performed.  I personally interpreted the imaging studies.  Multiple views of the knee, ankles were negative    Impression: Multiple contusions    Plan:  All of the above pertinent physical exam and imaging modalities findings was reviewed.  I am going to give the patient a 20 pound lifting restriction through the first of the year.  We will also put her on a Medrol Dosepak and I have asked her to apply Voltaren gel 3-4 times daily to the affected areas.            BP Readings from Last 1 Encounters:   12/17/21 96/80

## 2021-12-20 NOTE — LETTER
[address] [address] [phone]          December 20, 2021    RE:  Stacy Brown                                                                                                                                                       [address] [place] [address]            To whom it may concern:    Stacy Brown is under my professional care for    Fall  Contusion of right knee  Bilateral ankle pain  Contusion of left hip  Midline thoracic back pain  Head contusion She  may return to work with the following: The employee is ABLE to return to work today.  No lifting over 20 pounds until January 1, 2022.    Sincerely,        Yomi West DO

## 2021-12-23 ENCOUNTER — TELEPHONE (OUTPATIENT)
Dept: ORTHOPEDICS | Facility: CLINIC | Age: 46
End: 2021-12-23
Payer: COMMERCIAL

## 2021-12-23 NOTE — LETTER
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     Please contact me for questions or concerns.      Sincerely,        Yomi West, DO

## 2021-12-23 NOTE — LETTER
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 Restrictions will be reevaluated at next appointment.     Please contact me for questions or concerns.      Sincerely,        Yomi West, DO

## 2021-12-23 NOTE — TELEPHONE ENCOUNTER
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 YES    Call taken on 12/23/2021 at 10:57 AM by Gayla Alfaro

## 2021-12-23 NOTE — TELEPHONE ENCOUNTER
Writer got verbal consent from  to change restrictions to no lifting at this time.     Will write a new letter and update Pt.     Bria Camarillo on 12/23/2021 at 11:35 AM

## 2021-12-23 NOTE — TELEPHONE ENCOUNTER
Pt. Called and notified. Pt's  will come  letter for her at the [employer] check in.     Bria Camarillo on 12/23/2021 at 11:42 AM

## 2021-12-30 ENCOUNTER — TELEPHONE (OUTPATIENT)
Dept: ORTHOPEDICS | Facility: CLINIC | Age: 46
End: 2021-12-30
Payer: COMMERCIAL

## 2021-12-30 NOTE — TELEPHONE ENCOUNTER
Scheduled an appt to see DEIDRE Michel on 1/3 to follow-up with work comp injuries and restrictions. Please complete form at appointment and give to patient.     Bety Russell RN on 12/30/2021 at 1:06 PM

## 2021-12-30 NOTE — TELEPHONE ENCOUNTER
Reason for Call:  Form, our goal is to have forms completed with 72 hours, however, some forms may require a visit or additional information.    Type of letter, form or note:  worker's compensation    Who is the form from?: Patient    Where did the form come from: Patient or family brought in       What clinic location was the form placed at?: [facility]    Where the form was placed: [facility] Box/Folder    What number is listed as a contact on the form?: [phone]       Additional comments: Pt will  when completed to turn into employer. Please call her cell when ready    Call taken on 12/30/2021 at 9:58 AM by Sabina Perales

## 2022-01-03 ENCOUNTER — MYC MEDICAL ADVICE (OUTPATIENT)
Dept: FAMILY MEDICINE | Facility: CLINIC | Age: 47
End: 2022-01-03
Payer: COMMERCIAL

## 2022-01-03 ENCOUNTER — OFFICE VISIT (OUTPATIENT)
Dept: ORTHOPEDICS | Facility: CLINIC | Age: 47
End: 2022-01-03
Payer: COMMERCIAL

## 2022-01-03 VITALS — BODY MASS INDEX: 29.86 KG/M2 | OXYGEN SATURATION: 98 % | HEART RATE: 82 BPM | WEIGHT: 185 LBS

## 2022-01-03 DIAGNOSIS — S30.0XXD CONTUSION OF COCCYX, SUBSEQUENT ENCOUNTER: ICD-10-CM

## 2022-01-03 DIAGNOSIS — S80.01XD CONTUSION OF RIGHT KNEE, SUBSEQUENT ENCOUNTER: ICD-10-CM

## 2022-01-03 DIAGNOSIS — S70.02XD CONTUSION OF LEFT HIP, SUBSEQUENT ENCOUNTER: Primary | ICD-10-CM

## 2022-01-03 PROCEDURE — 99213 OFFICE O/P EST LOW 20 MIN: CPT | Performed by: PHYSICIAN ASSISTANT

## 2022-01-03 ASSESSMENT — PAIN SCALES - GENERAL: PAINLEVEL: SEVERE PAIN (6)

## 2022-01-03 NOTE — TELEPHONE ENCOUNTER
I looked at the note today/form and it appears that this is for the work notes that were written previously.  I signed the form and will give it to the patient today when she comes in.  I also printed off the previous work notes to attach to the form.

## 2022-01-03 NOTE — LETTER
1/3/2022         RE: Stacy Brown  [address]  [place] [facility] [address]        Dear Colleague,    Thank you for referring your patient, Stacy Brown, to the [facility]. Please see a copy of my visit note below.    Office Visit-Follow up    Chief Complaint: Stacy Brown is a 46 year old female who is being seen for   Chief Complaint   Patient presents with     Right Knee - Pain     WC follow up     Left Hip - Pain     I reviewed emergency department note from 12/17/2021 DEIDRE Givens as well as Dr. West's note on 12/20/2021.    History of Present Illness:   Mechanism of Injury: Patient was working when she tried to step out of her car and slipped on the ice and fell straight down.  Location: Tailbone currently, previously left hip, right knee and bilateral ankles.  Duration of Pain: Since date of injury which was 12/17/2021, 17 days ago  Rating of Pain: Mild  Pain Quality: Achy  Pain is better with: Rest  Pain is worse with: Riding in car with bumps.  Treatment so far consists of: Patient was seen in emergency department on 12/17/2021 and there were multiple images done such as CT of head and C-spine and x-rays pelvis knee and ankle showing no fracture dislocation or tumor.  Patient was given work restrictions and referral to orthopedics.  Patient was seen on 12/20/2021 by Dr. West.  Patient was given a 20 pound lifting restriction throughout 2021 and given Medrol Dosepak and Voltaren gel.  Patient admits that she feels the pain is getting a bit better.  Her ankles are not bothering her anymore now is more of her tailbone.  Associated Features: Patient denies any numbness or tingling or radicular symptoms.  Pain is Limiting: Sitting for long periods of times or sitting when there is bumps like in a car.  Here to: Orthopedic follow-up  Additional History: Patient is here with her  today.  Patient does have a donut pillow to sit on and has tried it.  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    REVIEW OF SYSTEMS  General: negative for, night sweats, dizziness, fatigue  Resp: No shortness of breath and no cough  CV: negative for chest pain, syncope or near-syncope  GI: negative for nausea, vomiting and diarrhea  : negative for dysuria and hematuria  Musculoskeletal: as above  Neurologic: negative for syncope   Hematologic: negative for bleeding disorder    Physical Exam:  Vitals: Pulse 82   Wt 83.9 kg (185 lb)   LMP 03/07/2021 (Approximate)   SpO2 98%   BMI 29.86 kg/m    BMI= Body mass index is 29.86 kg/m .  Constitutional: healthy, alert and no acute distress   Psychiatric: mentation appears normal and affect normal/bright  NEURO: no focal deficits, CMS intact bilateral lower extremities   RESP: Normal with easy respirations and no use of accessory muscles to breathe, no audible wheezing or retractions  CV: Calves soft and nontender to palpation, bilateral legs warm   SKIN: No erythema, rashes, excoriation, or breakdown. No evidence of infection.   MUSCULOSKELETAL:    INSPECTION of left hip: No gross deformities, erythema, edema, ecchymosis, atrophy or fasciculations.     PALPATION: no tenderness over the lateral hip and greater trochanteric region, thigh or lower leg.     ROM: Passive flexion to 125, internal rotation to 35, external rotation to 75.  All range of motion without catching ocular pain     STRENGTH: 5 out of 5 hip flexion, quad and hamstring     SPECIAL TEST: None  INSPECTION of right knee: No gross deformities, erythema, edema, ecchymosis, atrophy or fasciculations.   PALPATION: No tenderness medial, lateral, anterior and posterior portion of the knee. No specific joint line tenderness. No increased warmth.  No effusion.   ROM: Extension full, flexion to 125 . All range  of motion without catching, locking or pain.     STRENGTH: 5 out of 5 quad and hamstring.   SPECIAL TEST: Patient has a negative Lachman's negative drawer sign. Patient's knee is stable to varus and valgus stress at 30  of flexion.   GAIT: non-antalgic  Lymph: no palpable lymph nodes      Diagnostic Modalities:  Recent Results (from the past 744 hour(s))   XR Toe Right G/E 2 Views    Narrative    EXAM: XR TOE RIGHT G/E 2 VIEWS  LOCATION: [facility]  DATE/TIME: 12/14/2021 6:57 PM    INDICATION: trauma, pain right 2nd toe, rule out fracture  COMPARISON: None.      Impression    IMPRESSION: The right second toe is negative for fracture. No dislocation.   CT Head w/o Contrast    Narrative    CT OF THE HEAD WITHOUT CONTRAST December 17, 2021 12:26 PM     HISTORY: Fall, posterior head pain.    TECHNIQUE: Axial CT images of the head from the skull base to the  vertex were acquired without IV contrast. Radiation dose for this scan  was reduced using automated exposure control, adjustment of the mA  and/or kV according to patient size, or iterative reconstruction  technique.    COMPARISON: Head CT 11/29/2013.    FINDINGS: The ventricles and basal cisterns are within normal limits  in configuration. There is no midline shift. There are no extra-axial  fluid collections. Gray-white differentiation is well maintained.    No intracranial hemorrhage, mass or recent infarct.    The visualized paranasal sinuses are well-aerated. There is no  mastoiditis. There are no fractures of the visualized bones.       Impression    IMPRESSION: Normal head CT.         REBECA TOWNSEND MD         SYSTEM ID:  [id number]   CT Thoracic Spine w/o Contrast    Narrative    CT THORACIC SPINE WITHOUT CONTRAST   12/17/2021 12:26 PM     HISTORY: Fall, T8 pain.     TECHNIQUE: Axial images of the thoracic spine were obtained without  intravenous contrast. Multiplanar reformations were performed.   Radiation dose for this scan  was reduced using automated exposure  control, adjustment of the mA and/or kV according to patient size, or  iterative reconstruction technique.    COMPARISON: None.    FINDINGS:  No evidence of acute fracture or posttraumatic subluxation. Lucent  lesion within the T4 vertebral body probably represents incidental  benign intraosseous cavernous venous malformation (benign bone  hemangioma). Mild degenerative change is present. No high-grade spinal  canal or foraminal stenosis. Scattered vascular calcifications.  Presumed scattered pulmonary atelectasis. Cervical spine degenerative  change at the superior field of view. Clips within the lower neck and  mediastinum.      Impression    IMPRESSION:    1. No evidence of acute fracture or posttraumatic subluxation.  2. Mild degenerative change.    INDY ORTEGA MD         SYSTEM ID:  [id number]   XR Ankle Bilateral G/E 3 Views    Narrative    XR ANKLE BILATERAL G/E 3 VIEWS 12/17/2021 12:37 PM     HISTORY: fall, lateral ankle pain      Impression    IMPRESSION: No evidence of fracture. Joint alignment is congruent.  Bilateral calcaneal spurs, left greater than right.    TRE HILL MD         SYSTEM ID:  [id number]   XR Knee Right G/E 4 Views    Narrative    XR KNEE RIGHT G/E 4 VIEWS 12/17/2021 12:37 PM     HISTORY: fall, anterior knee pain      Impression    IMPRESSION: Superficial medial soft tissues surgical clip. No evidence  of fracture or joint space narrowing.    TRE HILL MD         SYSTEM ID:  [id number]   XR Pelvis w Hip Left 1 View    Narrative    XR PELVIS AND HIP LEFT 1 VIEW 12/17/2021 12:38 PM     HISTORY: fall, lateral hip pain      Impression    IMPRESSION: Anterosuperior iliac spine spurring, chronic in appearance  and unchanged from 1/27/2021. No apparent fracture. Hip joint space  widths are within normal limits.    TRE HILL MD         SYSTEM ID:  [id number]     I agree with the above readings, no fractures dislocations or tumors.    Independent  visualization of the images was performed.      Impression: 1.  Coccyx contusion.  2.  Left hip contusion.  3.  Right knee contusion.  4.  Bilateral ankle contusion, resolved    Plan:  All of the above pertinent physical exam and imaging modalities findings was reviewed with Stacy and her .      FOCUSED PLAN:   Today we gave the patient some paperwork that was handed in previously on previous work notes.  Today we discussed work note and decided to increase her lifting status.  She is still having pain in her coccyx.  Recommended using donut.  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  I recommended the patient use a donut pillow to keep pressure off the coccyx while it heals.  Patient can follow-up on an as-needed basis.  She will call if she needs to adjust the work restrictions but hopefully this will give her enough time to heal up.    Re-x-ray on return: No      This note was dictated with [facility].    Humble Mejia PA-C                Again, thank you for allowing me to participate in the care of your patient.        Sincerely,        Humble Mejia PA-C

## 2022-01-03 NOTE — PROGRESS NOTES
Office Visit-Follow up    Chief Complaint: Stacy Brown is a 46 year old female who is being seen for   Chief Complaint   Patient presents with     Right Knee - Pain     WC follow up     Left Hip - Pain     I reviewed emergency department note from 12/17/2021 DEIDRE Givens as well as Dr. West's note on 12/20/2021.    History of Present Illness:   Mechanism of Injury: Patient was working when she tried to step out of her car and slipped on the ice and fell straight down.  Location: Tailbone currently, previously left hip, right knee and bilateral ankles.  Duration of Pain: Since date of injury which was 12/17/2021, 17 days ago  Rating of Pain: Mild  Pain Quality: Achy  Pain is better with: Rest  Pain is worse with: Riding in car with bumps.  Treatment so far consists of: Patient was seen in emergency department on 12/17/2021 and there were multiple images done such as CT of head and C-spine and x-rays pelvis knee and ankle showing no fracture dislocation or tumor.  Patient was given work restrictions and referral to orthopedics.  Patient was seen on 12/20/2021 by Dr. West.  Patient was given a 20 pound lifting restriction throughout 2021 and given Medrol Dosepak and Voltaren gel.  Patient admits that she feels the pain is getting a bit better.  Her ankles are not bothering her anymore now is more of her tailbone.  Associated Features: Patient denies any numbness or tingling or radicular symptoms.  Pain is Limiting: Sitting for long periods of times or sitting when there is bumps like in a car.  Here to: Orthopedic follow-up  Additional History: Patient is here with her  today.  Patient does have a donut pillow to sit on and has tried it.  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    REVIEW OF SYSTEMS  General:  negative for, night sweats, dizziness, fatigue  Resp: No shortness of breath and no cough  CV: negative for chest pain, syncope or near-syncope  GI: negative for nausea, vomiting and diarrhea  : negative for dysuria and hematuria  Musculoskeletal: as above  Neurologic: negative for syncope   Hematologic: negative for bleeding disorder    Physical Exam:  Vitals: Pulse 82   Wt 83.9 kg (185 lb)   LMP 03/07/2021 (Approximate)   SpO2 98%   BMI 29.86 kg/m    BMI= Body mass index is 29.86 kg/m .  Constitutional: healthy, alert and no acute distress   Psychiatric: mentation appears normal and affect normal/bright  NEURO: no focal deficits, CMS intact bilateral lower extremities   RESP: Normal with easy respirations and no use of accessory muscles to breathe, no audible wheezing or retractions  CV: Calves soft and nontender to palpation, bilateral legs warm   SKIN: No erythema, rashes, excoriation, or breakdown. No evidence of infection.   MUSCULOSKELETAL:    INSPECTION of left hip: No gross deformities, erythema, edema, ecchymosis, atrophy or fasciculations.     PALPATION: no tenderness over the lateral hip and greater trochanteric region, thigh or lower leg.     ROM: Passive flexion to 125, internal rotation to 35, external rotation to 75.  All range of motion without catching ocular pain     STRENGTH: 5 out of 5 hip flexion, quad and hamstring     SPECIAL TEST: None  INSPECTION of right knee: No gross deformities, erythema, edema, ecchymosis, atrophy or fasciculations.   PALPATION: No tenderness medial, lateral, anterior and posterior portion of the knee. No specific joint line tenderness. No increased warmth.  No effusion.   ROM: Extension full, flexion to 125 . All range of motion without catching, locking or pain.     STRENGTH: 5 out of 5 quad and hamstring.   SPECIAL TEST: Patient has a negative Lachman's negative drawer sign. Patient's knee is stable to varus and valgus stress at 30  of flexion.   GAIT:  non-antalgic  Lymph: no palpable lymph nodes      Diagnostic Modalities:  Recent Results (from the past 744 hour(s))   XR Toe Right G/E 2 Views    Narrative    EXAM: XR TOE RIGHT G/E 2 VIEWS  LOCATION: [facility]  DATE/TIME: 12/14/2021 6:57 PM    INDICATION: trauma, pain right 2nd toe, rule out fracture  COMPARISON: None.      Impression    IMPRESSION: The right second toe is negative for fracture. No dislocation.   CT Head w/o Contrast    Narrative    CT OF THE HEAD WITHOUT CONTRAST December 17, 2021 12:26 PM     HISTORY: Fall, posterior head pain.    TECHNIQUE: Axial CT images of the head from the skull base to the  vertex were acquired without IV contrast. Radiation dose for this scan  was reduced using automated exposure control, adjustment of the mA  and/or kV according to patient size, or iterative reconstruction  technique.    COMPARISON: Head CT 11/29/2013.    FINDINGS: The ventricles and basal cisterns are within normal limits  in configuration. There is no midline shift. There are no extra-axial  fluid collections. Gray-white differentiation is well maintained.    No intracranial hemorrhage, mass or recent infarct.    The visualized paranasal sinuses are well-aerated. There is no  mastoiditis. There are no fractures of the visualized bones.       Impression    IMPRESSION: Normal head CT.         REBECA TOWNSEND MD         SYSTEM ID:  [id number]   CT Thoracic Spine w/o Contrast    Narrative    CT THORACIC SPINE WITHOUT CONTRAST   12/17/2021 12:26 PM     HISTORY: Fall, T8 pain.     TECHNIQUE: Axial images of the thoracic spine were obtained without  intravenous contrast. Multiplanar reformations were performed.   Radiation dose for this scan was reduced using automated exposure  control, adjustment of the mA and/or kV according to patient size, or  iterative reconstruction technique.    COMPARISON: None.    FINDINGS:  No evidence of acute fracture or posttraumatic subluxation.  Lucent  lesion within the T4 vertebral body probably represents incidental  benign intraosseous cavernous venous malformation (benign bone  hemangioma). Mild degenerative change is present. No high-grade spinal  canal or foraminal stenosis. Scattered vascular calcifications.  Presumed scattered pulmonary atelectasis. Cervical spine degenerative  change at the superior field of view. Clips within the lower neck and  mediastinum.      Impression    IMPRESSION:    1. No evidence of acute fracture or posttraumatic subluxation.  2. Mild degenerative change.    INDY ORTEGA MD         SYSTEM ID:  [id number]   XR Ankle Bilateral G/E 3 Views    Narrative    XR ANKLE BILATERAL G/E 3 VIEWS 12/17/2021 12:37 PM     HISTORY: fall, lateral ankle pain      Impression    IMPRESSION: No evidence of fracture. Joint alignment is congruent.  Bilateral calcaneal spurs, left greater than right.    TRE HILL MD         SYSTEM ID:  [id number]   XR Knee Right G/E 4 Views    Narrative    XR KNEE RIGHT G/E 4 VIEWS 12/17/2021 12:37 PM     HISTORY: fall, anterior knee pain      Impression    IMPRESSION: Superficial medial soft tissues surgical clip. No evidence  of fracture or joint space narrowing.    TRE HILL MD         SYSTEM ID:  [id number]   XR Pelvis w Hip Left 1 View    Narrative    XR PELVIS AND HIP LEFT 1 VIEW 12/17/2021 12:38 PM     HISTORY: fall, lateral hip pain      Impression    IMPRESSION: Anterosuperior iliac spine spurring, chronic in appearance  and unchanged from 1/27/2021. No apparent fracture. Hip joint space  widths are within normal limits.    TRE HILL MD         SYSTEM ID:  [id number]     I agree with the above readings, no fractures dislocations or tumors.    Independent visualization of the images was performed.      Impression: 1.  Coccyx contusion.  2.  Left hip contusion.  3.  Right knee contusion.  4.  Bilateral ankle contusion, resolved    Plan:  All of the above pertinent physical exam and imaging  modalities findings was reviewed with Stacy and her .      FOCUSED PLAN:   Today we gave the patient some paperwork that was handed in previously on previous work notes.  Today we discussed work note and decided to increase her lifting status.  She is still having pain in her coccyx.  Recommended using donut.  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  I recommended the patient use a donut pillow to keep pressure off the coccyx while it heals.  Patient can follow-up on an as-needed basis.  She will call if she needs to adjust the work restrictions but hopefully this will give her enough time to heal up.    Re-x-ray on return: No      This note was dictated with [employer].    Humble Mejia PA-C

## 2022-01-03 NOTE — LETTER
January 3, 2022      Stacy Brown  [address] [facility] [facility] [address]        To Whom It May Concern:    Stacy Brown  was seen on 1/3/22 for work comp injury follow up. She may return to work as of 1/3/22 with the following restrictions. She may discontinue work restrictions and return to full duty work on 1/17/22.     Restrictions (Until 1/17/22):  1. 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

## 2022-01-05 ENCOUNTER — MYC REFILL (OUTPATIENT)
Dept: FAMILY MEDICINE | Facility: CLINIC | Age: 47
End: 2022-01-05

## 2022-01-05 ENCOUNTER — HOSPITAL ENCOUNTER (EMERGENCY)
Facility: CLINIC | Age: 47
Discharge: HOME | End: 2022-01-05
Attending: PHYSICIAN ASSISTANT | Admitting: PHYSICIAN ASSISTANT
Payer: COMMERCIAL

## 2022-01-05 VITALS
HEART RATE: 75 BPM | OXYGEN SATURATION: 97 % | WEIGHT: 187.2 LBS | SYSTOLIC BLOOD PRESSURE: 103 MMHG | DIASTOLIC BLOOD PRESSURE: 70 MMHG | TEMPERATURE: 97.7 F | BODY MASS INDEX: 30.21 KG/M2 | RESPIRATION RATE: 18 BRPM

## 2022-01-05 DIAGNOSIS — I21.4 NSTEMI (NON-ST ELEVATED MYOCARDIAL INFARCTION) (H): ICD-10-CM

## 2022-01-05 DIAGNOSIS — N39.0 URINARY TRACT INFECTION: ICD-10-CM

## 2022-01-05 LAB
ALBUMIN UR-MCNC: 100 MG/DL
APPEARANCE UR: ABNORMAL
BACTERIA #/AREA URNS HPF: ABNORMAL /HPF
BILIRUB UR QL STRIP: ABNORMAL
COLOR UR AUTO: ABNORMAL
GLUCOSE UR STRIP-MCNC: NEGATIVE MG/DL
HGB UR QL STRIP: NEGATIVE
HYALINE CASTS: 98 /LPF
KETONES UR STRIP-MCNC: NEGATIVE MG/DL
LEUKOCYTE ESTERASE UR QL STRIP: ABNORMAL
MUCOUS THREADS #/AREA URNS LPF: PRESENT /LPF
NITRATE UR QL: NEGATIVE
PH UR STRIP: 5 [PH] (ref 5–7)
RBC URINE: 20 /HPF
RENAL TUB EPI: 2 /HPF
SP GR UR STRIP: 1.03 (ref 1–1.03)
SQUAMOUS EPITHELIAL: 17 /HPF
UROBILINOGEN UR STRIP-MCNC: NORMAL MG/DL
WBC CLUMPS #/AREA URNS HPF: PRESENT /HPF
WBC URINE: >182 /HPF

## 2022-01-05 PROCEDURE — 81001 URINALYSIS AUTO W/SCOPE: CPT | Performed by: PHYSICIAN ASSISTANT

## 2022-01-05 PROCEDURE — 99284 EMERGENCY DEPT VISIT MOD MDM: CPT | Performed by: PHYSICIAN ASSISTANT

## 2022-01-05 PROCEDURE — 87086 URINE CULTURE/COLONY COUNT: CPT | Performed by: PHYSICIAN ASSISTANT

## 2022-01-05 RX ORDER — PHENAZOPYRIDINE HYDROCHLORIDE 200 MG/1
200 TABLET, FILM COATED ORAL 3 TIMES DAILY
Qty: 9 TABLET | Refills: 0 | Status: SHIPPED | OUTPATIENT
Start: 2022-01-05 | End: 2022-01-08

## 2022-01-05 RX ORDER — SULFAMETHOXAZOLE/TRIMETHOPRIM 800-160 MG
1 TABLET ORAL 2 TIMES DAILY
Qty: 6 TABLET | Refills: 0 | Status: SHIPPED | OUTPATIENT
Start: 2022-01-05 | End: 2022-01-08
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
## 2022-01-06 RX ORDER — OXYCODONE HYDROCHLORIDE 5 MG/1
5 TABLET ORAL 2 TIMES DAILY PRN
Qty: 60 TABLET | Refills: 0 | Status: SHIPPED | OUTPATIENT
Start: 2022-01-13 | End: 2022-02-04

## 2022-01-06 NOTE — TELEPHONE ENCOUNTER
Roxicodone      Last Written Prescription Date:  12/14/2021  Last Fill Quantity: 60,   # refills: 0  Last Office Visit: 12/14/2021  Future Office visit:    Next 5 appointments (look out 90 days)    Mar 14, 2022  2:20 PM  (Arrive by 2:00 PM)  Provider Visit with Yaima Muse MD  [facility] ([facility] [address] [place] [id number]  [phone]         Routing refill request to provider for review/approval because:  Drug not on the [facility], [facility] or [facility] refill protocol or controlled substance    Tizanidine      Last Written Prescription Date:  12/08/2021  Last Fill Quantity: 90,   # refills: 0  Last Office Visit: 12/14/2021  Future Office visit:    Next 5 appointments (look out 90 days)    Mar 14, 2022  2:20 PM  (Arrive by 2:00 PM)  Provider Visit with Yaima Muse MD  [facility] ([facility] ) [address] [place] [id number]  [phone]           Routing refill request to provider for review/approval because:  Drug not on the [facility], [facility] or [facility] refill protocol or controlled substance

## 2022-01-07 LAB
BACTERIA UR CULT: ABNORMAL
BACTERIA UR CULT: ABNORMAL

## 2022-01-13 ENCOUNTER — MYC MEDICAL ADVICE (OUTPATIENT)
Dept: ORTHOPEDICS | Facility: CLINIC | Age: 47
End: 2022-01-13
Payer: COMMERCIAL

## 2022-02-03 DIAGNOSIS — I21.4 NSTEMI (NON-ST ELEVATED MYOCARDIAL INFARCTION) (H): ICD-10-CM

## 2022-02-03 NOTE — TELEPHONE ENCOUNTER
Pending Prescriptions:                       Disp   Refills    tiZANidine (ZANAFLEX) 4 MG tablet [Pharmac*90 tab*0        Sig: Take 1 tablet (4 mg) by mouth 3 times daily as needed           for muscle spasms    Routing refill request to provider for review/approval because:  Drug not on the [facility] refill protocol

## 2022-02-04 ENCOUNTER — MYC REFILL (OUTPATIENT)
Dept: FAMILY MEDICINE | Facility: CLINIC | Age: 47
End: 2022-02-04
Payer: COMMERCIAL

## 2022-02-04 DIAGNOSIS — I21.4 NSTEMI (NON-ST ELEVATED MYOCARDIAL INFARCTION) (H): ICD-10-CM

## 2022-02-05 ENCOUNTER — HOSPITAL ENCOUNTER (EMERGENCY)
Facility: CLINIC | Age: 47
Discharge: HOME | End: 2022-02-05
Attending: NURSE PRACTITIONER | Admitting: NURSE PRACTITIONER
Payer: COMMERCIAL

## 2022-02-05 VITALS
WEIGHT: 180 LBS | HEART RATE: 74 BPM | SYSTOLIC BLOOD PRESSURE: 132 MMHG | OXYGEN SATURATION: 100 % | RESPIRATION RATE: 16 BRPM | DIASTOLIC BLOOD PRESSURE: 85 MMHG | TEMPERATURE: 98.4 F | BODY MASS INDEX: 29.05 KG/M2

## 2022-02-05 DIAGNOSIS — T14.8XXA SPLINTER IN SKIN: ICD-10-CM

## 2022-02-05 PROCEDURE — 99283 EMERGENCY DEPT VISIT LOW MDM: CPT | Mod: 25 | Performed by: NURSE PRACTITIONER

## 2022-02-05 PROCEDURE — 64450 NJX AA&/STRD OTHER PN/BRANCH: CPT | Performed by: NURSE PRACTITIONER

## 2022-02-05 PROCEDURE — 99282 EMERGENCY DEPT VISIT SF MDM: CPT | Mod: 25 | Performed by: NURSE PRACTITIONER
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
## 2022-02-07 NOTE — TELEPHONE ENCOUNTER
Oxycodone  Routing refill request to provider for review/approval because:  Drug not on the [facility] refill protocol     Zanaflex  Denied, sent 2/6/2022      Deanne Mace RN

## 2022-02-08 RX ORDER — OXYCODONE HYDROCHLORIDE 5 MG/1
5 TABLET ORAL 2 TIMES DAILY PRN
Qty: 53 TABLET | Refills: 0 | Status: SHIPPED | OUTPATIENT
Start: 2022-02-12 | End: 2022-03-07

## 2022-02-08 NOTE — TELEPHONE ENCOUNTER
Pending Prescriptions:                       Disp   Refills    oxyCODONE (ROXICODONE) 5 MG tablet         60 tab*0        Sig: Take 1 tablet (5 mg) by mouth 2 times daily as needed           for severe pain  Refused Prescriptions:                       Disp   Refills    tiZANidine (ZANAFLEX) 4 MG tablet          90 tab*0        Sig: Take 1 tablet (4 mg) by mouth 3 times daily as needed           for muscle spasms  Refused By: GIOVANNI LONGORIA  Reason for Refusal: Duplicate          Last Written Prescription Date:  01/13/2022  Last Fill Quantity: 60,   # refills: 0  Last Office Visit: 12/14/2021  Future Office visit:    Next 5 appointments (look out 90 days)      Mar 14, 2022  2:20 PM  (Arrive by 2:00 PM)  Provider Visit with Yaima Muse MD  [facility] ([facility] ) [address] [place] [id number] [phone]                 Routing refill request to provider for review/approval because:  Drug not on the [facility] refill protocol     Katie Layne RN

## 2022-02-28 ENCOUNTER — HOSPITAL ENCOUNTER (EMERGENCY)
Facility: CLINIC | Age: 47
Discharge: HOME | End: 2022-02-28
Attending: FAMILY MEDICINE | Admitting: FAMILY MEDICINE
Payer: COMMERCIAL

## 2022-02-28 VITALS
OXYGEN SATURATION: 96 % | SYSTOLIC BLOOD PRESSURE: 110 MMHG | BODY MASS INDEX: 29.73 KG/M2 | HEART RATE: 68 BPM | HEIGHT: 66 IN | WEIGHT: 185 LBS | RESPIRATION RATE: 18 BRPM | DIASTOLIC BLOOD PRESSURE: 73 MMHG | TEMPERATURE: 97.5 F

## 2022-02-28 DIAGNOSIS — I21.4 NSTEMI (NON-ST ELEVATED MYOCARDIAL INFARCTION) (H): ICD-10-CM

## 2022-02-28 DIAGNOSIS — M79.18 MYOFASCIAL PAIN ON RIGHT SIDE: ICD-10-CM

## 2022-02-28 PROCEDURE — 99284 EMERGENCY DEPT VISIT MOD MDM: CPT | Performed by: FAMILY MEDICINE

## 2022-02-28 PROCEDURE — 250N000011 HC RX IP 250 OP 636: Performed by: FAMILY MEDICINE

## 2022-02-28 PROCEDURE — 96372 THER/PROPH/DIAG INJ SC/IM: CPT | Performed by: FAMILY MEDICINE

## 2022-02-28 PROCEDURE — 99284 EMERGENCY DEPT VISIT MOD MDM: CPT | Mod: 25

## 2022-02-28 PROCEDURE — 250N000013 HC RX MED GY IP 250 OP 250 PS 637: Performed by: FAMILY MEDICINE

## 2022-02-28 RX ORDER — LIDOCAINE 4 G/G
1 PATCH TOPICAL ONCE
Status: DISCONTINUED | OUTPATIENT
Start: 2022-02-28 | End: 2022-02-28 | Stop reason: HOSPADM

## 2022-02-28 RX ORDER — KETOROLAC TROMETHAMINE 30 MG/ML
60 INJECTION, SOLUTION INTRAMUSCULAR; INTRAVENOUS ONCE
Status: COMPLETED | OUTPATIENT
Start: 2022-02-28 | End: 2022-02-28

## 2022-02-28 RX ADMIN — LIDOCAINE PATCH 4% 1 PATCH: 40 PATCH TOPICAL at 10:56

## 2022-02-28 RX ADMIN — KETOROLAC TROMETHAMINE 60 MG: 30 INJECTION, SOLUTION INTRAMUSCULAR at 10:55

## 2022-02-28 NOTE — ED TRIAGE NOTES
Neck pain 8/10 to right side of neck radiating to right shoulder and down to right arm since Thursday. Reports numbness and tingling to right fingers starting today.

## 2022-02-28 NOTE — ED PROVIDER NOTES
ED Provider Note     Stacy Brown  [id number]  February 28, 2022      CC:     Chief Complaint   Patient presents with     Neck Pain          History is obtained from the patient.    HPI: Stacy Brown is a 46 year old female presenting with right-sided neck and shoulder pain that started Thursday morning when she awoke.  Patient states she felt fine going to bed.  She started to have some pain that morning, and it has progressively worsened through the weekend.  She is having trouble sleeping at nighttime.  She has a aching pain and it is worse when she tries to move her neck.  She has some pain radiating down the arm and was concerned she might have a pinched nerve.  Patient recalls no injuries whatsoever.  She has taken Voltaren cream, and used heat which helps briefly.  She had some pain medication and muscle relaxers which have not helped.    PMH/Problem List:   Past Medical History:   Diagnosis Date     Knee pain, chronic      Mixed hyperlipidemia      NSTEMI (non-ST elevated myocardial infarction) (H) 3/5/2021     S/P CABG (coronary artery bypass graft) 3/16/2021     Tobacco abuse disorder 11/21/2017     Type II or unspecified type diabetes mellitus without mention of complication, not stated as uncontrolled 08/16/2002       PSH:   Past Surgical History:   Procedure Laterality Date     BYPASS GRAFT ARTERY CORONARY N/A 3/9/2021    Procedure: CORONARY ARTERY BYPASS GRAFT X 4 (LIMA - LAD, SV - RPL, SV - PDA,  RA - OM) LEFT RADIAL ENDOARTERY HARVEST AND BILATERAL LEG ENDOVEIN HARVEST (ON CARDIOPULMONARY PUMP OXYGENATOR ; INTRAOPERATIVE TRANSESOPHAGEAL ECHOCARDIOGRAM BY ANESTHESIOLOGIST DR. NEL AVILA)   ;  Surgeon: Kunal Selby MD;  Location:  OR     CV HEART CATHETERIZATION WITH POSSIBLE INTERVENTION N/A 3/8/2021    Procedure: Heart Catheterization with Possible Intervention;  Surgeon: Vadim Kamara MD;  Location:  HEART  CARDIAC CATH LAB     HC OPEN TX METATARSAL FRACTURE  age 12    softball injury,open fracture left foot     HC TOOTH EXTRACTION W/FORCEP      Extract wisdom teeth     INJECT JOINT SACROILIAC Left 1/11/2018    Procedure: INJECT JOINT SACROILIAC;  INJECT JOINT SACROILIAC LEFT;  Surgeon: Alan Marshall MD;  Location: [facility] OR     OPERATIVE HYSTEROSCOPY WITH MORCELLATOR N/A 7/24/2018    Procedure: OPERATIVE HYSTEROSCOPY WITH MORCELLATOR (MYOSURE);  Exam under anesthesia, operative hysteroscopy, polypectomy, D & C;  Surgeon: Sindhu Peterson DO;  Location: [facility] OR     TUBAL LIGATION  7/27/2006     ZZC STABISM SURG,PREV EYE SURG,NOT MUSC      Right       MEDS: No current facility-administered medications on file prior to encounter.  acetaminophen (TYLENOL) 325 MG tablet, TAKE TWO TABLETS BY MOUTH EVERY 4 HOURS AS NEEDED FOR MILD PAIN  amLODIPine (NORVASC) 2.5 MG tablet, Take 1 tablet (2.5 mg) by mouth daily  aspirin (ASA) 325 MG tablet, 1 tablet (325 mg) by Oral or NG Tube route daily (Patient taking differently: Take 325 mg by mouth daily )  blood glucose (NO BRAND SPECIFIED) test strip, Use to test blood sugar up to 4 times daily or as directed. To accompany: Blood Glucose Monitor Brands: per insurance. (Patient taking differently: 1 strip by In Vitro route 2 times daily )  blood glucose calibration (NO BRAND SPECIFIED) solution, To accompany: Blood Glucose Monitor Brands: per insurance.  blood glucose monitoring (FREESTYLE) lancets, Use to test blood sugars 1-2 times daily or as directed, per patients glucose meter.  Blood Glucose Monitoring Suppl (ACCU-CHEK COMPLETE) KIT, 1 Device daily  insulin aspart (NOVOLOG FLEXPEN) 100 UNIT/ML pen, Novolog Flexpen. Inject 1 units per 10 gram carb unit before breakfast, lunch and dinner, up to 15 units per meal.  insulin glargine (BASAGLAR KWIKPEN) 100 UNIT/ML pen, Inject 40 Units Subcutaneous every morning  insulin pen needle (31G X 8 MM) 31G X 8 MM miscellaneous, 1 Box of 100  insulin pen needles to be dispensed with every insulin pen prescription  insulin pen needle (NOVOFINE) 32G X 6 MM miscellaneous, Use once daily or as directed.  lisinopril (ZESTRIL) 2.5 MG tablet, Take 1 tablet (2.5 mg) by mouth daily  metFORMIN (GLUCOPHAGE-XR) 500 MG 24 hr tablet, Take 2 tablets (1,000 mg) by mouth 2 times daily (with meals) .  methylPREDNISolone (MEDROL DOSEPAK) 4 MG tablet therapy pack, Follow Package Directions  metoprolol tartrate (LOPRESSOR) 25 MG tablet, Take 1 tablet (25 mg) by mouth 2 times daily  Multiple Vitamins-Minerals (MULTI-VITAMIN GUMMIES) CHEW, Take 1 chew tab by mouth daily   nitroGLYcerin (NITROSTAT) 0.4 MG sublingual tablet, For chest pain place 1 tablet under the tongue every 5 minutes for 3 doses. If symptoms persist 5 minutes after 1st dose call 911. (Patient not taking: Reported on 12/14/2021)  ondansetron (ZOFRAN ODT) 4 MG ODT tab, Take 1 tablet (4 mg) by mouth every 8 hours as needed for nausea or vomiting (Patient not taking: Reported on 12/14/2021)  oxyCODONE (ROXICODONE) 5 MG tablet, Take 1 tablet (5 mg) by mouth 2 times daily as needed for severe pain  polyethylene glycol (MIRALAX) 17 GM/Dose powder, Take 17 g by mouth daily (Patient not taking: Reported on 12/14/2021)  rosuvastatin (CRESTOR) 20 MG tablet, Take 1 tablet (20 mg) by mouth daily  senna-docusate (SENOKOT-S/PERICOLACE) 8.6-50 MG tablet, Take 2 tablets by mouth 2 times daily as needed for constipation (Patient not taking: Reported on 9/1/2021)  tiZANidine (ZANAFLEX) 4 MG tablet, TAKE 1 TABLET (4 MG) BY MOUTH 3 TIMES DAILY AS NEEDED FOR MUSCLE SPASMS        Allergies: Amoxicillin and Hydrocodone-acetaminophen    Triage and nursing notes were reviewed.    ROS: All other systems were reviewed and are negative    Physical Exam:  Vitals:    02/28/22 0951 02/28/22 1000 02/28/22 1015 02/28/22 1121   BP: 110/61   110/73   Pulse: 65   68   Resp: 18   18   Temp: 97.5  F (36.4  C)      TempSrc: Oral      SpO2: 97% 98%  "96% 96%   Weight: 83.9 kg (185 lb)      Height: 1.676 m (5' 6\")        GENERAL APPEARANCE: Alert, mild to moderate distress due to the right neck and shoulder pain.  HEAD: atraumatic  EYES: PERRL, EOMI  HENT: Normal external exam  NECK: Patient has pain over the trapezius and upper rhomboideus with spasm of the muscles.  She has more pain with left lateral flexion and with rotation to the left.  RESP: lungs clear to auscultation - no rales, rhonchi or wheezes  CV: regular rate and rhythm, no significant murmurs or rubs  ABDOMEN: soft, nontender, no masses with normal bowel sounds  EXT: Unremarkable  SKIN: no rash; as above  NEURO: mentation and speech normal; no focal deficits    Labs/Imaging Results:  No results found for this or any previous visit (from the past 24 hour(s)).        Impression:  Final diagnoses:   Myofascial pain on right side         ED Course & Medical Decision Making (Plan):  Stacy Brown is a 46 year old female with myofascial pain of the lower neck and upper back.  Patient has spasms of the trapezius and rhomboideus muscles.  Patient received Toradol IM and Lidoderm patch in the ED with mild modest improvement in her pain. I would like her to begin deep tissue/muscle massage with a tennis ball.  Continue to use ice and heat.  Take ibuprofen as needed for pain for the next 2 to 3 days.  Follow-up in the clinic in 3-5 days if not improving.    Written after-visit summary and instructions were given at the time of discharge.          Follow Up Plan:  Yaima Muse MD  [address] [place] [address]  [phone]    In 4 days  if not improving        Discharge Instructions:  You have myofascial pain of the right upper back and shoulder.  Leave the Lidoderm patch on for 12 hours.  Take ibuprofen up to 600 mg 2-3 times a day with food for 2 to 3 days.  Use a tennis ball to massage the upper back and shoulder muscles to relieve the muscle spasm and tension.  Follow-up in the " clinic in 3-5 days if not improving.          Disclaimer: This note consists of words and symbols derived from keyboarding and dictation using voice recognition software.  As a result, there may be errors that have gone undetected.  Please consider this when interpreting information found in this note.       Salo Baker MD  02/28/22 3670

## 2022-02-28 NOTE — LETTER
We will        [facility]  Emergency Room  [address]  [place]   [id number]  Tel: [phone]   Fax: [phone]  2022    Stacy Brown  [address] [facility] [address] [phone] (home)     : 1975          To Whom it May Concern:    Stacy Brown was seen in our ER today, 2022. I expect her condition to improve over the next 5 days.  She is work for 2-3 days, but may return to workl, without restriction, when improved. If not improved during the above expected time period,  then I have encouraged her to be rechecked in her clinic for further evaluation.      Please contact me for questions or concerns.    Sincerely,       Rudi Baker MD

## 2022-02-28 NOTE — DISCHARGE INSTRUCTIONS
You have myofascial pain of the right upper back and shoulder.  Leave the Lidoderm patch on for 12 hours.  Take ibuprofen up to 600 mg 2-3 times a day with food for 2 to 3 days.  Use a tennis ball to massage the upper back and shoulder muscles to relieve the muscle spasm and tension.  Take your Zanaflex at nighttime as needed.  Follow-up in the clinic in 3-5 days if not improving.

## 2022-03-02 ENCOUNTER — HOSPITAL ENCOUNTER (EMERGENCY)
Facility: CLINIC | Age: 47
Discharge: HOME | End: 2022-03-02
Attending: EMERGENCY MEDICINE | Admitting: EMERGENCY MEDICINE
Payer: COMMERCIAL

## 2022-03-02 VITALS
TEMPERATURE: 98 F | SYSTOLIC BLOOD PRESSURE: 121 MMHG | OXYGEN SATURATION: 99 % | BODY MASS INDEX: 30.02 KG/M2 | HEART RATE: 64 BPM | DIASTOLIC BLOOD PRESSURE: 77 MMHG | RESPIRATION RATE: 18 BRPM | WEIGHT: 186 LBS

## 2022-03-02 DIAGNOSIS — M62.838 TRAPEZIUS MUSCLE SPASM: ICD-10-CM

## 2022-03-02 PROCEDURE — 20553 NJX 1/MLT TRIGGER POINTS 3/>: CPT | Performed by: EMERGENCY MEDICINE

## 2022-03-02 PROCEDURE — 99284 EMERGENCY DEPT VISIT MOD MDM: CPT | Mod: 25 | Performed by: EMERGENCY MEDICINE

## 2022-03-02 PROCEDURE — 20552 NJX 1/MLT TRIGGER POINT 1/2: CPT | Performed by: EMERGENCY MEDICINE

## 2022-03-02 PROCEDURE — 96372 THER/PROPH/DIAG INJ SC/IM: CPT | Performed by: EMERGENCY MEDICINE

## 2022-03-02 PROCEDURE — 250N000011 HC RX IP 250 OP 636: Performed by: EMERGENCY MEDICINE

## 2022-03-02 RX ORDER — TRIAMCINOLONE ACETONIDE 40 MG/ML
40 INJECTION, SUSPENSION INTRA-ARTICULAR; INTRAMUSCULAR ONCE
Status: COMPLETED | OUTPATIENT
Start: 2022-03-02 | End: 2022-03-02

## 2022-03-02 RX ADMIN — TRIAMCINOLONE ACETONIDE 40 MG: 40 INJECTION, SUSPENSION INTRA-ARTICULAR; INTRAMUSCULAR at 17:07

## 2022-03-02 NOTE — LETTER
March 2, 2022      To Whom It May Concern:      Stacy Brown was seen in our Emergency Department today, 03/02/22. She may return to work on 3/3/22, but I recommend no lifting greater than 20 pounds for the next 5 days.    Sincerely,        Martin Griffith MD

## 2022-03-02 NOTE — ED TRIAGE NOTES
Pt presents with bilateral shoulder pain radiating down arms. Pt was seen Monday and it was just right sided.

## 2022-03-02 NOTE — DISCHARGE INSTRUCTIONS
Continue with heat massage.  May use the Salonpas patches.  You may find your muscle relaxant may provide some benefit.  It was great to meet you, I hope you continue to feel better.

## 2022-03-07 ENCOUNTER — MYC REFILL (OUTPATIENT)
Dept: FAMILY MEDICINE | Facility: CLINIC | Age: 47
End: 2022-03-07
Payer: COMMERCIAL

## 2022-03-07 DIAGNOSIS — I21.4 NSTEMI (NON-ST ELEVATED MYOCARDIAL INFARCTION) (H): ICD-10-CM

## 2022-03-08 RX ORDER — OXYCODONE HYDROCHLORIDE 5 MG/1
5 TABLET ORAL 2 TIMES DAILY PRN
Qty: 50 TABLET | Refills: 0 | Status: SHIPPED | OUTPATIENT
Start: 2022-03-14 | End: 2022-04-01

## 2022-03-08 NOTE — TELEPHONE ENCOUNTER
Zanaflex      Last Written Prescription Date:  2/6/2022  Last Fill Quantity: 90,   # refills: 0  Last Office Visit: 12/14/2021  Future Office visit:    Next 5 appointments (look out 90 days)      Mar 14, 2022  2:20 PM  (Arrive by 2:00 PM)  Provider Visit with Yaima Muse MD  [facility] ([facility] ) [address] [place] [address]  [phone]             Routing refill request to provider for review/approval because:  Drug not on the [facility], [facility] or [facility] refill protocol or controlled substance    Oxycodone      Last Written Prescription Date:  2/12/2022  Last Fill Quantity: 53,   # refills: 0  Last Office Visit: 12/14/2021  Future Office visit:    Next 5 appointments (look out 90 days)      Mar 14, 2022  2:20 PM  (Arrive by 2:00 PM)  Provider Visit with Yaima Muse MD  [facility] ([facility] ) [address] [place] [id number]  [phone]             Routing refill request to provider for review/approval because:  Drug not on the [facility], [facility] or [facility] refill protocol or controlled substance

## 2022-03-14 ENCOUNTER — OFFICE VISIT (OUTPATIENT)
Dept: FAMILY MEDICINE | Facility: CLINIC | Age: 47
End: 2022-03-14
Payer: COMMERCIAL

## 2022-03-14 VITALS
OXYGEN SATURATION: 100 % | HEART RATE: 76 BPM | TEMPERATURE: 97.1 F | RESPIRATION RATE: 16 BRPM | SYSTOLIC BLOOD PRESSURE: 118 MMHG | DIASTOLIC BLOOD PRESSURE: 72 MMHG | WEIGHT: 184.2 LBS | BODY MASS INDEX: 29.73 KG/M2

## 2022-03-14 DIAGNOSIS — E11.65 TYPE 2 DIABETES MELLITUS WITH HYPERGLYCEMIA, WITH LONG-TERM CURRENT USE OF INSULIN (H): Primary | ICD-10-CM

## 2022-03-14 DIAGNOSIS — F32.1 MODERATE MAJOR DEPRESSION (H): ICD-10-CM

## 2022-03-14 DIAGNOSIS — Z87.440 PERSONAL HISTORY OF URINARY TRACT INFECTION: ICD-10-CM

## 2022-03-14 DIAGNOSIS — I21.4 NSTEMI (NON-ST ELEVATED MYOCARDIAL INFARCTION) (H): ICD-10-CM

## 2022-03-14 DIAGNOSIS — Z79.4 TYPE 2 DIABETES MELLITUS WITH HYPERGLYCEMIA, WITH LONG-TERM CURRENT USE OF INSULIN (H): Primary | ICD-10-CM

## 2022-03-14 PROBLEM — M46.1 SACROILIITIS (H): Status: RESOLVED | Noted: 2019-09-09 | Resolved: 2022-03-14

## 2022-03-14 LAB
ALBUMIN UR-MCNC: 30 MG/DL
APPEARANCE UR: ABNORMAL
BILIRUB UR QL STRIP: NEGATIVE
COLOR UR AUTO: YELLOW
GLUCOSE UR STRIP-MCNC: NEGATIVE MG/DL
HBA1C MFR BLD: 7.3 % (ref 0–5.6)
HGB UR QL STRIP: NEGATIVE
HYALINE CASTS: 8 /LPF
KETONES UR STRIP-MCNC: 5 MG/DL
LEUKOCYTE ESTERASE UR QL STRIP: ABNORMAL
MUCOUS THREADS #/AREA URNS LPF: PRESENT /LPF
NITRATE UR QL: NEGATIVE
PH UR STRIP: 5 [PH] (ref 5–7)
RBC URINE: 5 /HPF
SP GR UR STRIP: 1.03 (ref 1–1.03)
SQUAMOUS EPITHELIAL: 7 /HPF
URATE CRY #/AREA URNS HPF: ABNORMAL /HPF
UROBILINOGEN UR STRIP-MCNC: NORMAL MG/DL
WBC URINE: 11 /HPF

## 2022-03-14 PROCEDURE — 83036 HEMOGLOBIN GLYCOSYLATED A1C: CPT | Performed by: FAMILY MEDICINE

## 2022-03-14 PROCEDURE — 87086 URINE CULTURE/COLONY COUNT: CPT | Performed by: FAMILY MEDICINE

## 2022-03-14 PROCEDURE — 36415 COLL VENOUS BLD VENIPUNCTURE: CPT | Performed by: FAMILY MEDICINE

## 2022-03-14 PROCEDURE — 99214 OFFICE O/P EST MOD 30 MIN: CPT | Performed by: FAMILY MEDICINE

## 2022-03-14 PROCEDURE — 81001 URINALYSIS AUTO W/SCOPE: CPT | Performed by: FAMILY MEDICINE

## 2022-03-14 RX ORDER — METOPROLOL TARTRATE 25 MG/1
25 TABLET, FILM COATED ORAL 2 TIMES DAILY
Qty: 180 TABLET | Refills: 3 | Status: SHIPPED | OUTPATIENT
Start: 2022-03-14 | End: 2022-12-20

## 2022-03-14 RX ORDER — INSULIN GLARGINE 100 [IU]/ML
42 INJECTION, SOLUTION SUBCUTANEOUS EVERY MORNING
Qty: 15 ML | Refills: 3 | Status: SHIPPED | OUTPATIENT
Start: 2022-03-14 | End: 2022-07-26

## 2022-03-14 RX ORDER — METFORMIN HCL 500 MG
1000 TABLET, EXTENDED RELEASE 24 HR ORAL 2 TIMES DAILY WITH MEALS
Qty: 360 TABLET | Refills: 3 | Status: ON HOLD | OUTPATIENT
Start: 2022-03-14 | End: 2022-12-15

## 2022-03-14 ASSESSMENT — ANXIETY QUESTIONNAIRES
5. BEING SO RESTLESS THAT IT IS HARD TO SIT STILL: SEVERAL DAYS
1. FEELING NERVOUS, ANXIOUS, OR ON EDGE: SEVERAL DAYS
3. WORRYING TOO MUCH ABOUT DIFFERENT THINGS: NOT AT ALL
2. NOT BEING ABLE TO STOP OR CONTROL WORRYING: NOT AT ALL
GAD7 TOTAL SCORE: 4
4. TROUBLE RELAXING: SEVERAL DAYS
GAD7 TOTAL SCORE: 4
6. BECOMING EASILY ANNOYED OR IRRITABLE: SEVERAL DAYS
7. FEELING AFRAID AS IF SOMETHING AWFUL MIGHT HAPPEN: NOT AT ALL
GAD7 TOTAL SCORE: 4
7. FEELING AFRAID AS IF SOMETHING AWFUL MIGHT HAPPEN: NOT AT ALL

## 2022-03-14 ASSESSMENT — PATIENT HEALTH QUESTIONNAIRE - PHQ9
10. IF YOU CHECKED OFF ANY PROBLEMS, HOW DIFFICULT HAVE THESE PROBLEMS MADE IT FOR YOU TO DO YOUR WORK, TAKE CARE OF THINGS AT HOME, OR GET ALONG WITH OTHER PEOPLE: NOT DIFFICULT AT ALL
SUM OF ALL RESPONSES TO PHQ QUESTIONS 1-9: 5
SUM OF ALL RESPONSES TO PHQ QUESTIONS 1-9: 5

## 2022-03-14 ASSESSMENT — PAIN SCALES - GENERAL: PAINLEVEL: MILD PAIN (3)

## 2022-03-14 NOTE — NURSING NOTE
Health Maintenance Due   Topic Date Due     ANNUAL REVIEW OF HM ORDERS  Never done     ADVANCE CARE PLANNING  Never done     COLORECTAL CANCER SCREENING  Never done     HEPATITIS C SCREENING  Never done     HEPATITIS B IMMUNIZATION (1 of 3 - Risk 3-dose series) Never done     EYE EXAM  04/02/2019     PREVENTIVE CARE VISIT  07/13/2021     MAMMO SCREENING  08/03/2021     INFLUENZA VACCINE (1) 09/01/2021     PHQ-9  03/01/2022     Katty PRATT LPN

## 2022-03-14 NOTE — PROGRESS NOTES
Assessment & Plan       ICD-10-CM    1. Type 2 diabetes mellitus with hyperglycemia, with long-term current use of insulin (H)  E11.65 Hemoglobin A1c    Z79.4 TSH with free T4 reflex     blood glucose (NO BRAND SPECIFIED) test strip     metFORMIN (GLUCOPHAGE-XR) 500 MG 24 hr tablet     Hemoglobin A1c     insulin glargine (BASAGLAR KWIKPEN) 100 UNIT/ML pen     Hemoglobin A1c   2. Personal history of urinary tract infection  Z87.440 UA Macro with Reflex to Micro and Culture - lab collect     UA Macro with Reflex to Micro and Culture - lab collect     Urine Culture   3. NSTEMI (non-ST elevated myocardial infarction) (H)  I21.4 metoprolol tartrate (LOPRESSOR) 25 MG tablet   4. Moderate major depression (H)  F32.1       Will check A1c today and notify her with results.  If improved from last time in December, we will see her back in 6 months and continue on same medications.   If things have not improved or have worsened, will need to increase her insulin and then recheck her in 3 months.    Subjective TSH on her today is that has not been done recently.  Also because she recently had a UTI and wanted to recheck urine analysis today.    Also refilled her glucose test drips and her Metformin and metoprolol as they were both running out of refills.  I congratulated her on her continued abstinence from tobacco and encouraged her to continue smoke-free.    She does have a history of depression, but that has not been an issue lately.  She has been doing very well with work and with a renewed motivation to stay healthy since her heart disease diagnosis.    30 minutes spent on the date of the encounter doing chart review, history and exam, documentation and further activities per the note    See patient instructions:    Patient Instructions   Please increase your insulin to 42 units in the morning, and then I have orders in for a repeat A1c in 3 months.  Please make a lab appointment in mid to late June.  If you prefer, you  can see me in the office at that time.          Return in about 3 months (around 6/14/2022) for diabetes follow up, can be lab only with A1C.    Yaima Muse MD  [facility]JOSE Kumar is a 46 year old who presents for the following health issues    History of Present Illness       Mental Health Follow-up:  Patient presents to follow-up on Depression & Anxiety.Patient's depression since last visit has been:  Worse  The patient is not having other symptoms associated with depression.  Patient's anxiety since last visit has been:  Better  The patient is not having other symptoms associated with anxiety.  Any significant life events: relationship concerns, job concerns and financial concerns  Patient is not feeling anxious or having panic attacks.  Patient has no concerns about alcohol or drug use.       Today's PHQ-9         PHQ-9 Total Score: 5  PHQ-9 Q9 Thoughts of better off dead/self-harm past 2 weeks :   (P) Not at all    How difficult have these problems made it for you to do your work, take care of things at home, or get along with other people: Not difficult at all    Today's BALDOMERO-7 Score: 4    Diabetes:   She presents for follow up of diabetes.  She is checking home blood glucose a few times a month. She checks blood glucose before meals.  Blood glucose is never over 200 and never under 70. She is aware of hypoglycemia symptoms including shakiness. She has no concerns regarding her diabetes at this time.  She is not experiencing numbness or burning in feet, excessive thirst, blurry vision, weight changes or redness, sores or blisters on feet. The patient has not had a diabetic eye exam in the last 12 months.         Hyperlipidemia:  She presents for follow up of hyperlipidemia.  She is taking medication to lower cholesterol. She is not having myalgia or other side effects to statin medications.    Hypertension: She presents for follow up of hypertension.  She  does check blood pressure  regularly outside of the clinic. Outpatient blood pressures have not been over 140/90. She follows a low salt diet.     She eats 2-3 servings of fruits and vegetables daily.She consumes 0 sweetened beverage(s) daily.She exercises with enough effort to increase her heart rate 20 to 29 minutes per day.  She exercises with enough effort to increase her heart rate 4 days per week.   She is taking medications regularly.     She worries that her A1c is going to be a little bit higher lately because she has been to the ER couple of times with pain from injuries, and in December she followed up with orthopedics and they put her on a Medrol Dosepak.  She also recently had some trigger point junctions.  She does not check her blood sugar all that often, rather just when she does not feel well but when she does it runs pretty normal.  She has been taking the increased dose of 40 units of Basaglar in the mornings.  When her sugar did run high from the steroids, she just increased her short acting insulin briefly.  Been working hard on continuing to follow healthy diet.  She continues to be smoke-free for over 1 year.    I have been working on cutting back on her pain medication.  She has been on chronic narcotics over the years for various types of chronic pain.  She is most recently on oxycodone, 5 mg and has 2/day.  I have been recently trying to wean her down to get her back down to 1/day or less.  I have been slowly decreasing the quantity that she received in 1 month.  She says this is going okay, she is managing well.  In September she had a drug screen and tested positive for marijuana.  She denies any intentional use of marijuana but states that she was unknowingly given some cookies that were made with marijuana for a family member who has terminal illness.  She was unaware that they were marijuana cookies.    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  While this is good for her physical exertion and pain, is not good for her paycheck.  However she feels she will be able to manage well.    Review of Systems   Constitutional, HEENT, cardiovascular, pulmonary, gi and gu systems are negative, except as otherwise noted.      Objective    /72 (BP Location: Right arm, Patient Position: Chair, Cuff Size: Adult Large)   Pulse 76   Temp 97.1  F (36.2  C) (Temporal)   Resp 16   Wt 83.6 kg (184 lb 3.2 oz)   LMP 03/07/2021 (Approximate)   SpO2 100%   BMI 29.73 kg/m    Body mass index is 29.73 kg/m .  Physical Exam   Vitals noted.  Patient alert, oriented, and in no acute distress.   Neck:  Supple without lymphadenopathy, JVD or masses.   CV:  RRR without murmur.     Orders Placed This Encounter   Procedures     REVIEW OF HEALTH MAINTENANCE PROTOCOL ORDERS     Hemoglobin A1c     UA Macro with Reflex to Micro and Culture - lab collect     TSH with free T4 reflex     Hemoglobin A1c

## 2022-03-15 ASSESSMENT — PATIENT HEALTH QUESTIONNAIRE - PHQ9: SUM OF ALL RESPONSES TO PHQ QUESTIONS 1-9: 5

## 2022-03-15 ASSESSMENT — ANXIETY QUESTIONNAIRES: GAD7 TOTAL SCORE: 4

## 2022-03-15 NOTE — PATIENT INSTRUCTIONS
Please increase your insulin to 42 units in the morning, and then I have orders in for a repeat A1c in 3 months.  Please make a lab appointment in mid to late June.  If you prefer, you can see me in the office at that time.

## 2022-03-16 LAB — BACTERIA UR CULT: NORMAL

## 2022-03-16 NOTE — RESULT ENCOUNTER NOTE
"Stacy, your urine test shows only \"normal\" bacteria in the urine.   Your A1C test is slightly worse. This could be from the steroids, but I do think a slight increase in your insulin will help. Please increase to 42 units of your basaglar each day.   Let's check you again in 3 months.   Yaima Muse MD "

## 2022-04-01 ENCOUNTER — MYC REFILL (OUTPATIENT)
Dept: FAMILY MEDICINE | Facility: CLINIC | Age: 47
End: 2022-04-01
Payer: COMMERCIAL

## 2022-04-01 DIAGNOSIS — I21.4 NSTEMI (NON-ST ELEVATED MYOCARDIAL INFARCTION) (H): ICD-10-CM

## 2022-04-04 ENCOUNTER — MYC REFILL (OUTPATIENT)
Dept: FAMILY MEDICINE | Facility: CLINIC | Age: 47
End: 2022-04-04
Payer: COMMERCIAL

## 2022-04-04 DIAGNOSIS — I21.4 NSTEMI (NON-ST ELEVATED MYOCARDIAL INFARCTION) (H): ICD-10-CM

## 2022-04-04 NOTE — TELEPHONE ENCOUNTER
Pending Prescriptions:                       Disp   Refills    tiZANidine (ZANAFLEX) 4 MG tablet          90 tab*0        Sig: Take 1 tablet (4 mg) by mouth 3 times daily as needed           for muscle spasms    oxyCODONE (ROXICODONE) 5 MG tablet         50 tab*0        Sig: Take 1 tablet (5 mg) by mouth 2 times daily as needed           for severe pain    Routing refill request to provider for review/approval because:  Drug not on the [facility] refill protocol     Deanne Mace RN

## 2022-04-05 RX ORDER — OXYCODONE HYDROCHLORIDE 5 MG/1
5 TABLET ORAL 2 TIMES DAILY PRN
Qty: 50 TABLET | Refills: 0 | OUTPATIENT
Start: 2022-04-05

## 2022-04-05 RX ORDER — OXYCODONE HYDROCHLORIDE 5 MG/1
5 TABLET ORAL 2 TIMES DAILY PRN
Qty: 47 TABLET | Refills: 0 | Status: SHIPPED | OUTPATIENT
Start: 2022-04-13 | End: 2022-05-02

## 2022-04-05 NOTE — TELEPHONE ENCOUNTER
Zanaflex      Last Written Prescription Date:  3/8/2022  Last Fill Quantity: 90,   # refills: 0  Last Office Visit: 3/14/2022  Future Office visit:       Routing refill request to provider for review/approval because:  Drug not on the [facility], [facility] or [facility] refill protocol or controlled substance    Oxycodone      Last Written Prescription Date:  3/14/2022  Last Fill Quantity: 50,   # refills: 0  Last Office Visit: 3/14/2022  Future Office visit:       Routing refill request to provider for review/approval because:  Drug not on the [facility], [facility] or [facility] refill protocol or controlled substance

## 2022-04-06 NOTE — TELEPHONE ENCOUNTER
I am approving these now although they are not due for filling yet.  Future dates are firm.  Yaima Muse MD

## 2022-04-22 ENCOUNTER — E-VISIT (OUTPATIENT)
Dept: FAMILY MEDICINE | Facility: CLINIC | Age: 47
End: 2022-04-22
Payer: COMMERCIAL

## 2022-04-22 DIAGNOSIS — R19.7 DIARRHEA, UNSPECIFIED TYPE: Primary | ICD-10-CM

## 2022-04-22 PROCEDURE — 99421 OL DIG E/M SVC 5-10 MIN: CPT | Performed by: FAMILY MEDICINE

## 2022-04-22 NOTE — LETTER
[address] [address]  Phone: [phone]  Fax: [phone]    April 24, 2022        Stacy Brown  [address] [place] [facility] [facility] [address]          To whom it may concern:    RE: Stacy Kumar missed work due to medical illness on Friday 4/22/22 and Saturday 4/23/22.Please excuse her from work. She may return on Monday 4/25 without restrictions.     Please contact me for questions or concerns.      Sincerely,        Yaima Muse MD

## 2022-04-25 NOTE — PATIENT INSTRUCTIONS
Diarrhea with Uncertain Cause (Adult)    Diarrhea is when stools are loose and watery. This can be caused by:    Viral infections    Bacterial infections    Food poisoning    Parasites    Irritable bowel syndrome (IBS)    Inflammatory bowel diseases such as ulcerative colitis, Crohn's disease, and celiac disease    Food intolerance, such as to lactose, the sugar found in milk and milk products    Reaction to medicines like antibiotics, laxatives, cancer drugs, and antacids  Along with diarrhea, you may also have:    Abdominal pain and cramping    Nausea and vomiting    Loss of bowel control    Fever and chills    Bloody stools  In some cases, antibiotics may help to treat diarrhea. You may have a stool sample test. This is done to see what is causing your diarrhea, and if antibiotics will help treat it. The results of a stool sample test may take up to 2 days. The healthcare provider may not give you antibiotics until he or she has the stool test results.  Diarrhea can cause dehydration. This is the loss of too much water and other fluids from the body. When this occurs, body fluid must be replaced. This can be done with oral rehydration solutions. Oral rehydration solutions are available at drugstores and grocery stores without a prescription. Sports drinks are not the best choice if you are very dehydrated. They have too much sugar and not enough electrolytes.  Home care  Follow all instructions given by your healthcare provider. Rest at home for the next 24 hours, or until you feel better. Avoid caffeine, tobacco, and alcohol. These can make diarrhea, cramping, and pain worse.  If taking medicines:    Over-the-counter nausea and diarrhea medicines are generally OK unless you experience fever or blood stool. Check with your doctor first in those circumstances.    You may use acetaminophen or NSAID medicines like ibuprofen or naproxen to reduce pain and fever. Don t use these if you have chronic liver or kidney  disease, or ever had a stomach ulcer or gastrointestinal bleeding. Don't use NSAID medicines if you are already taking one for another condition (like arthritis) or are on daily aspirin therapy (such as for heart disease or after a stroke). Talk with your healthcare provider first.    If antibiotics were prescribed, be sure you take them until they are finished. Don t stop taking them even when you feel better. Antibiotics must be taken as a full course.  To prevent the spread of illness:    Remember that washing with soap and water and using alcohol-based  is the best way to prevent the spread of infection. Dry your hands with a single use towel (like a paper towel).    Clean the toilet after each use.    Wash your hands before eating.    Wash your hands before and after preparing food. Keep in mind that people with diarrhea or vomiting should not prepare food for others.    Wash your hands after using cutting boards, countertops, and knives that have been in contact with raw foods.    Wash and then peel fruits and vegetables.    Keep uncooked meats away from cooked and ready-to-eat foods.    Use a food thermometer when cooking. Cook poultry to at least 165 F (74 C). Cook ground meat (beef, veal, pork, lamb) to at least 160 F (71 C). Cook fresh beef, veal, lamb, and pork to at least 145 F (63 C).    Don t eat raw or undercooked eggs (poached or chaec side up), poultry, meat, or unpasteurized milk and juices.  Food and drinks  The main goal while treating vomiting or diarrhea is to prevent dehydration. This is done by taking small amounts of liquids often.    Keep in mind that liquids are more important than food right now.    Drink only small amounts of liquids at a time.    Don t force yourself to eat, especially if you are having cramping, vomiting, or diarrhea. Don t eat large amounts at a time, even if you are hungry.    If you eat, avoid fatty, greasy, spicy, or fried foods.    Don t eat dairy foods  or drink milk if you have diarrhea. These can make diarrhea worse.  During the first 24 hours you can try:    Oral rehydration solutions.  Sports drinks may be used if you are not too dehydrated and are otherwise healthy.    Soft drinks without caffeine    Ginger ale    Water (plain or flavored)    Decaf tea or coffee    Clear broth, consommé, or bouillon    Gelatin, popsicles, or frozen fruit juice bars  The second 24 hours, if you are feeling better, you can add:    Hot cereal, plain toast, bread, rolls, or crackers    Plain noodles, rice, mashed potatoes, chicken noodle soup, or rice soup    Unsweetened canned fruit (no pineapple)    Bananas  As you recover:    Limit fat intake to less than 15 grams per day. Don t eat margarine, butter, oils, mayonnaise, sauces, gravies, fried foods, peanut butter, meat, poultry, or fish.    Limit fiber. Don t eat raw or cooked vegetables, fresh fruits except bananas, or bran cereals.    Limit caffeine and chocolate.    Limit dairy.    Don t use spices or seasonings except salt.    Go back to your normal diet over time, as you feel better and your symptoms improve.    If the symptoms come back, go back to a simple diet or clear liquids.  Follow-up care  Follow up with your healthcare provider, or as advised. If a stool sample was taken or cultures were done, call the healthcare provider for the results as instructed.  Call 911  Call 911 if you have any of these symptoms:    Trouble breathing    Confusion    Extreme drowsiness or trouble walking    Loss of consciousness    Rapid heart rate    Chest pain    Stiff neck    Seizure  When to seek medical advice  Call your healthcare provider right away if any of these occur:    Abdominal pain that gets worse    Constant lower right abdominal pain    Continued vomiting and inability to keep liquids down    Diarrhea more than 5 times a day    Blood in vomit or stool    Dark urine or no urine for 8 hours, dry mouth and tongue, tiredness,  weakness, or dizziness    Drowsiness    New rash    You don t get better in 2 to 3 days    Fever of 100.4 F (38 C) or higher, or as directed by your healthcare provider  Jennifer last reviewed this educational content on 6/1/2018 2000-2021 [employer]. All rights reserved. This information is not intended as a substitute for professional medical care. Always follow your healthcare professional's instructions.

## 2022-05-02 ENCOUNTER — MYC REFILL (OUTPATIENT)
Dept: FAMILY MEDICINE | Facility: CLINIC | Age: 47
End: 2022-05-02
Payer: COMMERCIAL

## 2022-05-02 DIAGNOSIS — I21.4 NSTEMI (NON-ST ELEVATED MYOCARDIAL INFARCTION) (H): ICD-10-CM

## 2022-05-04 NOTE — TELEPHONE ENCOUNTER
Requested Prescriptions   Pending Prescriptions Disp Refills     tiZANidine (ZANAFLEX) 4 MG tablet 90 tablet 0     Sig: Take 1 tablet (4 mg) by mouth 3 times daily as needed for muscle spasms       Last Written Prescription Date:  04/07/2022  Last Fill Quantity: 90,   # refills: 0  Last Office Visit: 04/22/2022  Future Office visit:       Routing refill request to provider for review/approval because:  Drug not on the [facility], [facility] or  [facility] refill protocol or controlled substance              oxyCODONE (ROXICODONE) 5 MG tablet 47 tablet 0     Sig: Take 1 tablet (5 mg) by mouth 2 times daily as needed for severe pain     Last Written Prescription Date:  04/13/2022  Last Fill Quantity: 47,   # refills: 0  Last Office Visit: 04/22/2022  Future Office visit:       Routing refill request to provider for review/approval because:  Drug not on the [facility], [facility] or  [facility] refill protocol or controlled substance

## 2022-05-06 RX ORDER — OXYCODONE HYDROCHLORIDE 5 MG/1
5 TABLET ORAL 2 TIMES DAILY PRN
Qty: 45 TABLET | Refills: 0 | Status: SHIPPED | OUTPATIENT
Start: 2022-05-13 | End: 2022-06-09

## 2022-05-07 ENCOUNTER — APPOINTMENT (OUTPATIENT)
Dept: GENERAL RADIOLOGY | Facility: CLINIC | Age: 47
End: 2022-05-07
Attending: FAMILY MEDICINE
Payer: COMMERCIAL

## 2022-05-07 ENCOUNTER — HOSPITAL ENCOUNTER (EMERGENCY)
Facility: CLINIC | Age: 47
Discharge: HOME | End: 2022-05-07
Attending: FAMILY MEDICINE | Admitting: FAMILY MEDICINE
Payer: COMMERCIAL

## 2022-05-07 VITALS
OXYGEN SATURATION: 97 % | TEMPERATURE: 98.2 F | WEIGHT: 185 LBS | RESPIRATION RATE: 18 BRPM | SYSTOLIC BLOOD PRESSURE: 106 MMHG | DIASTOLIC BLOOD PRESSURE: 66 MMHG | HEART RATE: 77 BPM | BODY MASS INDEX: 29.86 KG/M2

## 2022-05-07 DIAGNOSIS — F41.9 ANXIETY: ICD-10-CM

## 2022-05-07 DIAGNOSIS — R06.00 DYSPNEA, UNSPECIFIED TYPE: ICD-10-CM

## 2022-05-07 DIAGNOSIS — Z63.79 STRESSFUL LIFE EVENT AFFECTING FAMILY: ICD-10-CM

## 2022-05-07 LAB
ANION GAP SERPL CALCULATED.3IONS-SCNC: 13 MMOL/L (ref 3–14)
BASOPHILS # BLD AUTO: 0.1 10E3/UL (ref 0–0.2)
BASOPHILS NFR BLD AUTO: 1 %
BUN SERPL-MCNC: 29 MG/DL (ref 7–30)
CALCIUM SERPL-MCNC: 8.8 MG/DL (ref 8.5–10.1)
CHLORIDE BLD-SCNC: 104 MMOL/L (ref 94–109)
CO2 SERPL-SCNC: 21 MMOL/L (ref 20–32)
CREAT SERPL-MCNC: 0.84 MG/DL (ref 0.52–1.04)
EOSINOPHIL # BLD AUTO: 0 10E3/UL (ref 0–0.7)
EOSINOPHIL NFR BLD AUTO: 0 %
ERYTHROCYTE [DISTWIDTH] IN BLOOD BY AUTOMATED COUNT: 12.9 % (ref 10–15)
GFR SERPL CREATININE-BSD FRML MDRD: 86 ML/MIN/1.73M2
GLUCOSE BLD-MCNC: 253 MG/DL (ref 70–99)
HCT VFR BLD AUTO: 33.8 % (ref 35–47)
HGB BLD-MCNC: 11 G/DL (ref 11.7–15.7)
IMM GRANULOCYTES # BLD: 0.1 10E3/UL
IMM GRANULOCYTES NFR BLD: 0 %
LYMPHOCYTES # BLD AUTO: 1.1 10E3/UL (ref 0.8–5.3)
LYMPHOCYTES NFR BLD AUTO: 10 %
MCH RBC QN AUTO: 27.7 PG (ref 26.5–33)
MCHC RBC AUTO-ENTMCNC: 32.5 G/DL (ref 31.5–36.5)
MCV RBC AUTO: 85 FL (ref 78–100)
MONOCYTES # BLD AUTO: 0.7 10E3/UL (ref 0–1.3)
MONOCYTES NFR BLD AUTO: 6 %
NEUTROPHILS # BLD AUTO: 9.8 10E3/UL (ref 1.6–8.3)
NEUTROPHILS NFR BLD AUTO: 83 %
NRBC # BLD AUTO: 0 10E3/UL
NRBC BLD AUTO-RTO: 0 /100
NT-PROBNP SERPL-MCNC: 104 PG/ML (ref 0–450)
PLATELET # BLD AUTO: 290 10E3/UL (ref 150–450)
POTASSIUM BLD-SCNC: 4.3 MMOL/L (ref 3.4–5.3)
RBC # BLD AUTO: 3.97 10E6/UL (ref 3.8–5.2)
SODIUM SERPL-SCNC: 138 MMOL/L (ref 133–144)
TROPONIN I SERPL HS-MCNC: 16 NG/L
WBC # BLD AUTO: 11.7 10E3/UL (ref 4–11)

## 2022-05-07 PROCEDURE — 85025 COMPLETE CBC W/AUTO DIFF WBC: CPT | Performed by: FAMILY MEDICINE

## 2022-05-07 PROCEDURE — 71045 X-RAY EXAM CHEST 1 VIEW: CPT

## 2022-05-07 PROCEDURE — 84484 ASSAY OF TROPONIN QUANT: CPT | Performed by: FAMILY MEDICINE

## 2022-05-07 PROCEDURE — 93005 ELECTROCARDIOGRAM TRACING: CPT | Performed by: FAMILY MEDICINE

## 2022-05-07 PROCEDURE — 83880 ASSAY OF NATRIURETIC PEPTIDE: CPT | Performed by: FAMILY MEDICINE

## 2022-05-07 PROCEDURE — 99285 EMERGENCY DEPT VISIT HI MDM: CPT | Mod: 25 | Performed by: FAMILY MEDICINE

## 2022-05-07 PROCEDURE — 93010 ELECTROCARDIOGRAM REPORT: CPT | Performed by: FAMILY MEDICINE

## 2022-05-07 PROCEDURE — 82435 ASSAY OF BLOOD CHLORIDE: CPT | Performed by: FAMILY MEDICINE

## 2022-05-07 PROCEDURE — 36415 COLL VENOUS BLD VENIPUNCTURE: CPT | Performed by: FAMILY MEDICINE
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
## 2022-05-08 NOTE — DISCHARGE INSTRUCTIONS
Heart and lung tests were normal which is reassuring.  I suspect that your shortness of breath was due to all of the stress and anxiety your  coming in by ambulance.  I am glad that you are feeling better and hope you continue to do well.  Please return to the ED if you worsen or have any concerns.    Thank you for choosing [facility]. We appreciate the opportunity to meet your urgent medical needs. Please let us know if we could have done anything to make your stay more satisfying.    After discharge, please closely monitor for any new or worsening symptoms. Return to the Emergency Department if you develop any acute worsening signs or symptoms.    If you had lab work, cultures or imaging studies done during your stay, the final results may still be pending. We will call you if your plan of care needs to change. However, if you are not improving as expected, please follow up with your primary care provider or clinic.     Start any prescription medications that were prescribed to you and take them as directed.     Please see additional handouts that may be pertinent to your condition.

## 2022-05-08 NOTE — ED TRIAGE NOTES
Hx quadruple bypass about 14 months ago. States within last hour since  come to ED by ambulance pt has been having sob and some dizziness. Denies chest pain.      Triage Assessment     Row Name 05/07/22 1910       Triage Assessment (Adult)    Airway WDL WDL       Respiratory WDL    Respiratory WDL rhythm/pattern    Rhythm/Pattern, Respiratory shortness of breath;shallow

## 2022-05-09 ENCOUNTER — PATIENT OUTREACH (OUTPATIENT)
Dept: CARE COORDINATION | Facility: CLINIC | Age: 47
End: 2022-05-09
Payer: COMMERCIAL

## 2022-05-09 DIAGNOSIS — Z71.89 OTHER SPECIFIED COUNSELING: ICD-10-CM

## 2022-05-09 NOTE — PROGRESS NOTES
Clinic Care Coordination Contact  [facility]/Voicemail    Clinical Data: Care Coordinator Outreach  Outreach attempted x 1. Left message on patient's voicemail with call back information and requested return call.     Plan:Care Coordinator will try to reach patient again in 1-2 business days.    ANDRESSA Valladares  [phone]  [facility]

## 2022-05-10 NOTE — PROGRESS NOTES
Clinic Care Coordination Contact  [facility]    Clinical Data: Care Coordinator Outreach  Outreach attempted x 2. CHW spoke with patient on second outreach call, but then call dropped. CHW called patient back, call dropped again.     Plan:Care Coordinator will do no further outreaches at this time.    ANDRESSA Valladares  [phone]  [facility]

## 2022-05-16 ENCOUNTER — HOSPITAL ENCOUNTER (EMERGENCY)
Facility: CLINIC | Age: 47
Discharge: HOME | End: 2022-05-16
Attending: PHYSICIAN ASSISTANT | Admitting: PHYSICIAN ASSISTANT
Payer: COMMERCIAL

## 2022-05-16 VITALS
BODY MASS INDEX: 29.86 KG/M2 | DIASTOLIC BLOOD PRESSURE: 67 MMHG | TEMPERATURE: 98.7 F | WEIGHT: 185 LBS | SYSTOLIC BLOOD PRESSURE: 134 MMHG | HEART RATE: 87 BPM | OXYGEN SATURATION: 98 % | RESPIRATION RATE: 19 BRPM

## 2022-05-16 DIAGNOSIS — K04.7 DENTAL ABSCESS: ICD-10-CM

## 2022-05-16 PROCEDURE — 99283 EMERGENCY DEPT VISIT LOW MDM: CPT

## 2022-05-16 PROCEDURE — 99284 EMERGENCY DEPT VISIT MOD MDM: CPT | Performed by: PHYSICIAN ASSISTANT

## 2022-05-16 RX ORDER — CLINDAMYCIN HCL 300 MG
300 CAPSULE ORAL 4 TIMES DAILY
Qty: 40 CAPSULE | Refills: 0 | Status: SHIPPED | OUTPATIENT
Start: 2022-05-16 | End: 2022-05-26

## 2022-05-16 NOTE — LETTER
May 16, 2022      To Whom It May Concern:      Stacy Brown was seen in our Emergency Department today, 05/16/22.  I expect her condition to improve over the next 1-2 days.  She may return to work when improved.  Please excuse her from work on 5/17/2022 due to her medical condition.      Sincerely,            Alexandr Givens PA-C

## 2022-05-16 NOTE — DISCHARGE INSTRUCTIONS
It was a pleasure working with you today!  I hope your condition improves rapidly!     Please start the antibiotics immediately from the pharmacy.  Please also use a heating pad over the painful tooth area for 20 minutes every hour for the next 1-2 days.  Return if you develop fever or significant facial swelling.  It is okay to use your home pain medication.  Call your dentist for a follow-up appointment, as this tooth likely will need to be removed in the future to prevent this from recurring   5

## 2022-05-16 NOTE — ED TRIAGE NOTES
Pt c/o Right sided upper dental pain since Friday night.  Pain worse today up into her cheek and behind her eye.  Also c/o Nausea.      Triage Assessment     Row Name 05/16/22 2905       Triage Assessment (Adult)    Airway WDL WDL       Respiratory WDL    Respiratory WDL WDL       Skin Circulation/Temperature WDL    Skin Circulation/Temperature WDL WDL       Cardiac WDL    Cardiac WDL WDL       Peripheral/Neurovascular WDL    Peripheral Neurovascular WDL WDL       Cognitive/Neuro/Behavioral WDL    Cognitive/Neuro/Behavioral WDL WDL

## 2022-05-17 ENCOUNTER — PATIENT OUTREACH (OUTPATIENT)
Dept: CARE COORDINATION | Facility: CLINIC | Age: 47
End: 2022-05-17
Payer: COMMERCIAL

## 2022-05-17 DIAGNOSIS — Z71.89 OTHER SPECIFIED COUNSELING: ICD-10-CM
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
## 2022-05-17 NOTE — PROGRESS NOTES
Clinic Care Coordination Contact  [facility]/Voicemail       Clinical Data: Care Coordinator Outreach  Outreach attempted x 1.  Left message on patient's voicemail with call back information and requested return call.  Plan: Care Coordinator will try to reach patient again in 1-2 business days.    .Dione GUTIERREZ Community Health Worker  Clinic Care Coordination  [facility]  Phone: [phone]

## 2022-05-18 NOTE — PROGRESS NOTES
Clinic Care Coordination Contact  [facility]: Post-Discharge Note  SITUATION                                                      Admission:    Admission Date: 05/16/22   Reason for Admission: Dental Pain  Discharge:   Discharge Date: 05/16/22  Discharge Diagnosis: Dental abcess    BACKGROUND                                                      Per hospital discharge summary and inpatient provider notes:  Stacy Brown is a 46 year old female who presents for evaluation of right upper tooth discomfort for the past 3 days which has significantly worsened today.  Pain today is rated 9 on a scale of 10 and described as stabbing.  She had some oxycodone from home, and took this 4 hours ago with some improvement.  She even had nausea with one episode of vomiting today.  Pain radiates up behind her eye.  Chills but no fever.  Denies any facial swelling.  No trouble swallowing.  Denies any pain or swelling in her lower jaw or neck.  Has had multiple dental abscess issues in the past and has known poor dentition.  She has not contacted her dentist yet.  Pressure over the area causes the pain to be worse.      ASSESSMENT      Enrollment  Primary Care Care Coordination Status: Declined    Discharge Assessment  How are you doing now that you are home?: not great but better  How are your symptoms? (Red Flag symptoms escalate to triage hotline per guidelines): Improved  Do you feel your condition is stable enough to be safe at home until your provider visit?: Yes  Does the patient have their discharge instructions? : Yes  Does the patient have questions regarding their discharge instructions? : No  Were you started on any new medications or were there changes to any of your previous medications? : Yes  Does the patient have all of their medications?: Yes  Do you have questions regarding any of your medications? : No  Do you have all of your needed medical supplies or equipment (DME)?  (i.e. oxygen tank, CPAP, cane,  etc.): Yes  Discharge follow-up appointment scheduled within 14 calendar days? : No (f/u as needed)  Is patient agreeable to assistance with scheduling? : No      PLAN                                                       Outpatient Plan:  Follow-Ups: Follow up with [facility] (EMERGENCY MEDICINE); As needed, If symptoms worsen        No future appointments.      For any urgent concerns, please contact our 24 hour nurse triage line: [phone])         Dione Valera MA

## 2022-05-31 DIAGNOSIS — I21.4 NSTEMI (NON-ST ELEVATED MYOCARDIAL INFARCTION) (H): ICD-10-CM

## 2022-06-01 NOTE — TELEPHONE ENCOUNTER
Oxycodone      Last Written Prescription Date:  5/13/2022  Last Fill Quantity: 45,   # refills: 0  Last Office Visit: 3/14/2022  Future Office visit:       Routing refill request to provider for review/approval because:  Drug not on the [facility], [facility] or [facility] refill protocol or controlled substance

## 2022-06-09 RX ORDER — OXYCODONE HYDROCHLORIDE 5 MG/1
5 TABLET ORAL 2 TIMES DAILY PRN
Qty: 43 TABLET | Refills: 0 | Status: SHIPPED | OUTPATIENT
Start: 2022-06-12 | End: 2022-07-08

## 2022-06-10 NOTE — TELEPHONE ENCOUNTER
Approved, but not due until 6/12/22. Slowly decreasing qty to get her back down to 1 daily. I am decreasing her qty to 43 this month.   Yaima Muse MD

## 2022-06-21 ENCOUNTER — HOSPITAL ENCOUNTER (EMERGENCY)
Facility: CLINIC | Age: 47
Discharge: HOME | End: 2022-06-21
Attending: EMERGENCY MEDICINE | Admitting: EMERGENCY MEDICINE
Payer: COMMERCIAL

## 2022-06-21 VITALS
HEART RATE: 81 BPM | DIASTOLIC BLOOD PRESSURE: 66 MMHG | OXYGEN SATURATION: 97 % | TEMPERATURE: 98.5 F | BODY MASS INDEX: 30.39 KG/M2 | SYSTOLIC BLOOD PRESSURE: 141 MMHG | WEIGHT: 188.3 LBS | RESPIRATION RATE: 16 BRPM

## 2022-06-21 DIAGNOSIS — L03.90 CELLULITIS, UNSPECIFIED CELLULITIS SITE: ICD-10-CM

## 2022-06-21 PROCEDURE — 99283 EMERGENCY DEPT VISIT LOW MDM: CPT | Performed by: EMERGENCY MEDICINE

## 2022-06-21 PROCEDURE — 99284 EMERGENCY DEPT VISIT MOD MDM: CPT | Performed by: EMERGENCY MEDICINE

## 2022-06-21 RX ORDER — CEPHALEXIN 500 MG/1
500 CAPSULE ORAL 4 TIMES DAILY
Qty: 28 CAPSULE | Refills: 0 | Status: SHIPPED | OUTPATIENT
Start: 2022-06-21 | End: 2022-06-28

## 2022-06-21 NOTE — LETTER
June 21, 2022      To Whom It May Concern:      Stacy Brown was seen in our Emergency Department today, 06/21/22.  I expect her condition to improve over the next 2 days.  She may return to work  when improved.    Sincerely,        Martin Griffith MD

## 2022-06-22 ENCOUNTER — HOSPITAL ENCOUNTER (EMERGENCY)
Facility: CLINIC | Age: 47
Discharge: HOME | End: 2022-06-22
Attending: NURSE PRACTITIONER | Admitting: NURSE PRACTITIONER
Payer: COMMERCIAL

## 2022-06-22 ENCOUNTER — TELEPHONE (OUTPATIENT)
Dept: EMERGENCY MEDICINE | Facility: CLINIC | Age: 47
End: 2022-06-22

## 2022-06-22 ENCOUNTER — APPOINTMENT (OUTPATIENT)
Dept: GENERAL RADIOLOGY | Facility: CLINIC | Age: 47
End: 2022-06-22
Attending: NURSE PRACTITIONER
Payer: COMMERCIAL

## 2022-06-22 ENCOUNTER — PATIENT OUTREACH (OUTPATIENT)
Dept: CARE COORDINATION | Facility: CLINIC | Age: 47
End: 2022-06-22

## 2022-06-22 VITALS
HEART RATE: 59 BPM | OXYGEN SATURATION: 96 % | TEMPERATURE: 97.8 F | RESPIRATION RATE: 18 BRPM | DIASTOLIC BLOOD PRESSURE: 63 MMHG | SYSTOLIC BLOOD PRESSURE: 105 MMHG

## 2022-06-22 DIAGNOSIS — R07.89 CHEST PRESSURE: ICD-10-CM

## 2022-06-22 DIAGNOSIS — R21 RASH AND NONSPECIFIC SKIN ERUPTION: ICD-10-CM

## 2022-06-22 DIAGNOSIS — Z71.89 OTHER SPECIFIED COUNSELING: ICD-10-CM

## 2022-06-22 LAB
ALBUMIN SERPL-MCNC: 3.7 G/DL (ref 3.4–5)
ALP SERPL-CCNC: 74 U/L (ref 40–150)
ALT SERPL W P-5'-P-CCNC: 23 U/L (ref 0–50)
ANION GAP SERPL CALCULATED.3IONS-SCNC: 5 MMOL/L (ref 3–14)
AST SERPL W P-5'-P-CCNC: 17 U/L (ref 0–45)
BASOPHILS # BLD AUTO: 0 10E3/UL (ref 0–0.2)
BASOPHILS NFR BLD AUTO: 1 %
BILIRUB SERPL-MCNC: 0.3 MG/DL (ref 0.2–1.3)
BUN SERPL-MCNC: 18 MG/DL (ref 7–30)
CALCIUM SERPL-MCNC: 8.8 MG/DL (ref 8.5–10.1)
CHLORIDE BLD-SCNC: 108 MMOL/L (ref 94–109)
CO2 SERPL-SCNC: 25 MMOL/L (ref 20–32)
CREAT SERPL-MCNC: 0.73 MG/DL (ref 0.52–1.04)
EOSINOPHIL # BLD AUTO: 0.2 10E3/UL (ref 0–0.7)
EOSINOPHIL NFR BLD AUTO: 3 %
ERYTHROCYTE [DISTWIDTH] IN BLOOD BY AUTOMATED COUNT: 13 % (ref 10–15)
GFR SERPL CREATININE-BSD FRML MDRD: >90 ML/MIN/1.73M2
GLUCOSE BLD-MCNC: 228 MG/DL (ref 70–99)
HCT VFR BLD AUTO: 33.3 % (ref 35–47)
HGB BLD-MCNC: 11 G/DL (ref 11.7–15.7)
IMM GRANULOCYTES # BLD: 0 10E3/UL
IMM GRANULOCYTES NFR BLD: 0 %
LYMPHOCYTES # BLD AUTO: 2.1 10E3/UL (ref 0.8–5.3)
LYMPHOCYTES NFR BLD AUTO: 40 %
MCH RBC QN AUTO: 27.8 PG (ref 26.5–33)
MCHC RBC AUTO-ENTMCNC: 33 G/DL (ref 31.5–36.5)
MCV RBC AUTO: 84 FL (ref 78–100)
MONOCYTES # BLD AUTO: 0.6 10E3/UL (ref 0–1.3)
MONOCYTES NFR BLD AUTO: 11 %
NEUTROPHILS # BLD AUTO: 2.3 10E3/UL (ref 1.6–8.3)
NEUTROPHILS NFR BLD AUTO: 45 %
NRBC # BLD AUTO: 0 10E3/UL
NRBC BLD AUTO-RTO: 0 /100
PLATELET # BLD AUTO: 248 10E3/UL (ref 150–450)
POTASSIUM BLD-SCNC: 4.7 MMOL/L (ref 3.4–5.3)
PROT SERPL-MCNC: 7.2 G/DL (ref 6.8–8.8)
RBC # BLD AUTO: 3.96 10E6/UL (ref 3.8–5.2)
SODIUM SERPL-SCNC: 138 MMOL/L (ref 133–144)
TROPONIN I SERPL HS-MCNC: 4 NG/L
WBC # BLD AUTO: 5.2 10E3/UL (ref 4–11)

## 2022-06-22 PROCEDURE — 93010 ELECTROCARDIOGRAM REPORT: CPT | Performed by: NURSE PRACTITIONER

## 2022-06-22 PROCEDURE — 99285 EMERGENCY DEPT VISIT HI MDM: CPT | Mod: 25 | Performed by: NURSE PRACTITIONER

## 2022-06-22 PROCEDURE — 71046 X-RAY EXAM CHEST 2 VIEWS: CPT

## 2022-06-22 PROCEDURE — 82435 ASSAY OF BLOOD CHLORIDE: CPT | Performed by: NURSE PRACTITIONER

## 2022-06-22 PROCEDURE — 84484 ASSAY OF TROPONIN QUANT: CPT | Performed by: NURSE PRACTITIONER

## 2022-06-22 PROCEDURE — 85025 COMPLETE CBC W/AUTO DIFF WBC: CPT | Performed by: NURSE PRACTITIONER

## 2022-06-22 PROCEDURE — 93005 ELECTROCARDIOGRAM TRACING: CPT | Performed by: NURSE PRACTITIONER

## 2022-06-22 PROCEDURE — 36415 COLL VENOUS BLD VENIPUNCTURE: CPT | Performed by: NURSE PRACTITIONER

## 2022-06-22 PROCEDURE — 82374 ASSAY BLOOD CARBON DIOXIDE: CPT | Performed by: NURSE PRACTITIONER

## 2022-06-22 RX ORDER — BENZOCAINE/MENTHOL 6 MG-10 MG
LOZENGE MUCOUS MEMBRANE 2 TIMES DAILY
Qty: 30 G | Refills: 0 | Status: SHIPPED | OUTPATIENT
Start: 2022-06-22 | End: 2022-11-01

## 2022-06-22 RX ORDER — CLOTRIMAZOLE 1 %
CREAM (GRAM) TOPICAL 2 TIMES DAILY
Qty: 45 G | Refills: 0 | Status: SHIPPED | OUTPATIENT
Start: 2022-06-22 | End: 2022-07-07

## 2022-06-22 NOTE — ED TRIAGE NOTES
Pt presents with raised rash bilateral breasts. Started today.      Triage Assessment     Row Name 06/21/22 1911       Triage Assessment (Adult)    Airway WDL WDL       Respiratory WDL    Respiratory WDL WDL       Skin Circulation/Temperature WDL    Skin Circulation/Temperature WDL WDL       Cardiac WDL    Cardiac WDL WDL       Peripheral/Neurovascular WDL    Peripheral Neurovascular WDL WDL       Cognitive/Neuro/Behavioral WDL    Cognitive/Neuro/Behavioral WDL WDL

## 2022-06-22 NOTE — PROGRESS NOTES
Clinic Care Coordination Contact  [facility]: Post-Discharge Note  SITUATION                                                      Admission:    Admission Date: 06/21/22   Reason for Admission: erythema and soreness in the anterior chest in the region of both breasts medially  Discharge:   Discharge Date: 06/21/22  Discharge Diagnosis: erythema and soreness in the anterior chest in the region of both breasts medially    BACKGROUND                                                      Per hospital discharge summary and inpatient provider notes:    Stacy Brown is a 47 year old female who presents for evaluation of some erythema and soreness in the anterior chest in the region of both breasts medially.  No fever.  No chills.  No trauma to the area.  She denies any significant sun exposure.    ASSESSMENT      Enrollment  Primary Care Care Coordination Status: Declined    Discharge Assessment  How are you doing now that you are home?: I am still having pain.  How are your symptoms? (Red Flag symptoms escalate to triage hotline per guidelines): Unchanged  Do you feel your condition is stable enough to be safe at home until your provider visit?: Yes  Does the patient have their discharge instructions? : Yes  Does the patient have questions regarding their discharge instructions? : No  Were you started on any new medications or were there changes to any of your previous medications? : Yes  Does the patient have all of their medications?: Yes  Do you have questions regarding any of your medications? : No  Discharge follow-up appointment scheduled within 14 calendar days? : No (Pt would like to see if things start improving in the next few days on its own. Pt said she called her clinic and they said they did not have anyting avaliable until Sept. [facility] advised if things worsen she could call again to see if she can get in sooner.)  Is patient agreeable to assistance with scheduling? : No                  PLAN                                                       Outpatient Plan: Follow up with Yaima Muse MD if not improving in 2 days.    No future appointments.      For any urgent concerns, please contact our 24 hour nurse triage line: [phone])         ANDRESSA Barnes  [phone]  [facility]
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
## 2022-06-22 NOTE — ED TRIAGE NOTES
Patient states was seen yesterday and has redness around her breasts. Now having pressure around the redness, not getting better with anti biotics.

## 2022-06-22 NOTE — LETTER
June 22, 2022      To Whom It May Concern:      Stacy Brown was seen in our Emergency Department today, 06/22/22.   No work 6/22/2022 - 6/23/2022.    Sincerely,        JOVITA Hendrix CNP

## 2022-06-23 NOTE — DISCHARGE INSTRUCTIONS
Breast rash:  Continue cephalexin as prescribed.  Start hydrocortisone 1% cream topically to both as breasts twice a day and clotrimazole 1% cream topically to both breasts twice a day.    Chest pain:  Cool compresses.  Tylenol 650 mg every 4-6 hours as needed for pain.    Follow-up in clinic early next week, I will send message for them to fit you in clinic for recheck.

## 2022-06-23 NOTE — TELEPHONE ENCOUNTER
Offered patient an appointment on 06/28/22 at 1050am.  Patient stated that this will not work with her work schedule.  She was unwilling to see a different provider.  Recommended she call with any questions or concerns.

## 2022-06-23 NOTE — TELEPHONE ENCOUNTER
Reason for Call:  Other appointment    Detailed comments:  Ed f/u pt to be seen by Tuesday 6/28 per Dr Sawyer      Phone Number Patient can be reached at: Home number on file [phone] (home)    Best Time: any    Can we leave a detailed message on this number? YES    Call taken on 6/22/2022 at 7:07 PM by Sanjay Resendiz

## 2022-07-04 DIAGNOSIS — I21.4 NSTEMI (NON-ST ELEVATED MYOCARDIAL INFARCTION) (H): ICD-10-CM

## 2022-07-05 NOTE — TELEPHONE ENCOUNTER
tizanidine      Last Written Prescription Date:  5/6/2022  Last Fill Quantity: 90,   # refills: 1  Last Office Visit: 4/22/2022  Future Office visit:       Routing refill request to provider for review/approval because:  Drug not on the [facility], [facility] or [facility] refill protocol or controlled substance    Xavier Prince RN

## 2022-07-08 ENCOUNTER — MYC REFILL (OUTPATIENT)
Dept: CARE COORDINATION | Facility: CLINIC | Age: 47
End: 2022-07-08

## 2022-07-08 DIAGNOSIS — I21.4 NSTEMI (NON-ST ELEVATED MYOCARDIAL INFARCTION) (H): ICD-10-CM

## 2022-07-08 RX ORDER — OXYCODONE HYDROCHLORIDE 5 MG/1
5 TABLET ORAL 2 TIMES DAILY PRN
Qty: 41 TABLET | Refills: 0 | Status: SHIPPED | OUTPATIENT
Start: 2022-07-11 | End: 2022-07-15 | Stop reason: ALTCHOICE

## 2022-07-08 NOTE — TELEPHONE ENCOUNTER
Approved for 7/11/22. Dropping qty down by 2 pills this month, goal to get her slowly down to 1 pill daily over time.   Yaima Muse MD

## 2022-07-08 NOTE — TELEPHONE ENCOUNTER
Requested Prescriptions   Pending Prescriptions Disp Refills     oxyCODONE (ROXICODONE) 5 MG tablet 43 tablet 0     Sig: Take 1 tablet (5 mg) by mouth 2 times daily as needed for severe pain        Last Written Prescription Date:  06/12/2022  Last Fill Quantity: 43,   # refills: 0  Last Office Visit: 03/14/2022  Future Office visit:       Routing refill request to provider for review/approval because:  Drug not on the [facility], [facility] or [facility] refill protocol or controlled substance

## 2022-07-15 ENCOUNTER — APPOINTMENT (OUTPATIENT)
Dept: GENERAL RADIOLOGY | Facility: CLINIC | Age: 47
End: 2022-07-15
Attending: PHYSICIAN ASSISTANT
Payer: OTHER MISCELLANEOUS

## 2022-07-15 ENCOUNTER — HOSPITAL ENCOUNTER (EMERGENCY)
Facility: CLINIC | Age: 47
Discharge: HOME | End: 2022-07-15
Attending: PHYSICIAN ASSISTANT | Admitting: PHYSICIAN ASSISTANT
Payer: OTHER MISCELLANEOUS

## 2022-07-15 VITALS
OXYGEN SATURATION: 100 % | RESPIRATION RATE: 20 BRPM | WEIGHT: 188 LBS | SYSTOLIC BLOOD PRESSURE: 96 MMHG | BODY MASS INDEX: 30.34 KG/M2 | HEART RATE: 68 BPM | DIASTOLIC BLOOD PRESSURE: 65 MMHG | TEMPERATURE: 97.9 F

## 2022-07-15 DIAGNOSIS — S33.5XXA LUMBAR SPRAIN: ICD-10-CM

## 2022-07-15 PROCEDURE — 250N000013 HC RX MED GY IP 250 OP 250 PS 637: Performed by: PHYSICIAN ASSISTANT

## 2022-07-15 PROCEDURE — 96372 THER/PROPH/DIAG INJ SC/IM: CPT | Performed by: PHYSICIAN ASSISTANT

## 2022-07-15 PROCEDURE — 250N000012 HC RX MED GY IP 250 OP 636 PS 637: Performed by: PHYSICIAN ASSISTANT

## 2022-07-15 PROCEDURE — 72100 X-RAY EXAM L-S SPINE 2/3 VWS: CPT

## 2022-07-15 PROCEDURE — 99285 EMERGENCY DEPT VISIT HI MDM: CPT | Performed by: PHYSICIAN ASSISTANT

## 2022-07-15 PROCEDURE — 250N000011 HC RX IP 250 OP 636: Performed by: PHYSICIAN ASSISTANT

## 2022-07-15 RX ORDER — CYCLOBENZAPRINE HCL 5 MG
5 TABLET ORAL ONCE
Status: COMPLETED | OUTPATIENT
Start: 2022-07-15 | End: 2022-07-15

## 2022-07-15 RX ORDER — METHYLPREDNISOLONE 4 MG
TABLET, DOSE PACK ORAL
Qty: 21 TABLET | Refills: 0 | Status: SHIPPED | OUTPATIENT
Start: 2022-07-15 | End: 2022-11-01

## 2022-07-15 RX ORDER — CYCLOBENZAPRINE HCL 10 MG
10 TABLET ORAL 3 TIMES DAILY PRN
Qty: 15 TABLET | Refills: 0 | Status: SHIPPED | OUTPATIENT
Start: 2022-07-15 | End: 2022-07-21

## 2022-07-15 RX ORDER — OXYCODONE HYDROCHLORIDE 5 MG/1
5 TABLET ORAL EVERY 6 HOURS PRN
Qty: 12 TABLET | Refills: 0 | Status: SHIPPED | OUTPATIENT
Start: 2022-07-15 | End: 2022-07-18

## 2022-07-15 RX ADMIN — PREDNISONE 50 MG: 20 TABLET ORAL at 13:30

## 2022-07-15 RX ADMIN — CYCLOBENZAPRINE 5 MG: 5 TABLET, FILM COATED ORAL at 13:31

## 2022-07-15 RX ADMIN — HYDROMORPHONE HYDROCHLORIDE 1 MG: 1 INJECTION, SOLUTION INTRAMUSCULAR; INTRAVENOUS; SUBCUTANEOUS at 13:32

## 2022-07-15 NOTE — DISCHARGE INSTRUCTIONS
It was a pleasure working with you today!  I hope your condition improves rapidly!     Please REST!  Do not bend, lift, or twist for the next 1 week.  Apply ice to the painful area for 15-20 minutes every couple hours until tomorrow.  Then, switch to heat.  Start the methylprednisolone, steroid, to help with the inflammation right away.  Please monitor your blood sugars more closely and stick to a good diet, as the medicine will likely cause the blood sugars to spike while you are on the medication.  It is okay to use the cyclobenzaprine, muscle relaxer, for muscle spasm.  You can take ibuprofen 600 mg every 6 hours need for pain and/or Tylenol 1000 mg every 6 hours as needed for pain.  Reserve the oxycodone for severe breakthrough pain.  Do not drive for 8 hours after taking oxycodone, as this medication can impair your judgment.  The orthopedic department should be calling you hopefully later today to line up a 1 week recheck with the sports medicine physician.

## 2022-07-15 NOTE — ED TRIAGE NOTES
Patient c/o mid low back pain since lifting at work this AM about 1050.     Triage Assessment     Row Name 07/15/22 1236       Triage Assessment (Adult)    Airway WDL WDL       Respiratory WDL    Respiratory WDL WDL
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
## 2022-07-18 NOTE — TELEPHONE ENCOUNTER
SPINE PATIENTS - NEW PROTOCOL PREVISIT    RECORDS RECEIVED FROM: Internal   REASON FOR VISIT: Lumbar sprain    Date of Appt: 07/19/2022   NOTES (FOR ALL VISITS) STATUS DETAILS   OFFICE NOTE from referring provider N/A    OFFICE NOTE from other specialist Internal 12/20/2021 [facility]   DISCHARGE SUMMARY from hospital N/A    DISCHARGE REPORT from ER Internal 07/15/2022 [facility] ED   12/17/2021 [facility] ED    EMG REPORT N/A    MEDICATION LIST N/A    IMAGING  (FOR ALL VISITS)     MRI (HEAD, NECK, SPINE) N/A    XRAY (SPINE) *NEUROSURGERY* Internal 07/15/2022 lumbar spine   CT (HEAD, NECK, SPINE) Internal 12/17/2021 thoracic spine

## 2022-07-19 ENCOUNTER — PRE VISIT (OUTPATIENT)
Dept: NEUROSURGERY | Facility: CLINIC | Age: 47
End: 2022-07-19

## 2022-07-19 ENCOUNTER — OFFICE VISIT (OUTPATIENT)
Dept: NEUROSURGERY | Facility: CLINIC | Age: 47
End: 2022-07-19
Payer: OTHER MISCELLANEOUS

## 2022-07-19 VITALS
WEIGHT: 188 LBS | HEART RATE: 68 BPM | DIASTOLIC BLOOD PRESSURE: 77 MMHG | BODY MASS INDEX: 30.22 KG/M2 | HEIGHT: 66 IN | SYSTOLIC BLOOD PRESSURE: 117 MMHG

## 2022-07-19 DIAGNOSIS — M54.50 ACUTE LEFT-SIDED LOW BACK PAIN WITHOUT SCIATICA: ICD-10-CM

## 2022-07-19 DIAGNOSIS — M79.18 MYOFASCIAL PAIN: ICD-10-CM

## 2022-07-19 DIAGNOSIS — M99.04 SACROILIAC JOINT SOMATIC DYSFUNCTION: Primary | ICD-10-CM

## 2022-07-19 PROCEDURE — 98925 OSTEOPATH MANJ 1-2 REGIONS: CPT | Performed by: PREVENTIVE MEDICINE

## 2022-07-19 PROCEDURE — 99204 OFFICE O/P NEW MOD 45 MIN: CPT | Mod: 25 | Performed by: PREVENTIVE MEDICINE

## 2022-07-19 ASSESSMENT — PAIN SCALES - GENERAL: PAINLEVEL: SEVERE PAIN (7)

## 2022-07-19 NOTE — LETTER
July 19, 2022      Stacy Brown  [address] [facility] [address]          July 19, 2022      [facility]  [address] [place] [address]    To whom it may concern:    Stacy Brown  was seen in the office today for a work injury dated 7/15/2022.          Stacy Brown may return to work only if she can be accommodated fully with the following restrictions on 7/19/2022:      Occasional left from knee-to-chest height only 0 to 20 pounds    Occasional back bending stooping and twisting    Position changes needed for comfort.    She must be able to attend her physical therapy appointments and follow-up with me in 2 weeks.      Sincerely,        Jorge Luis Brooke MD, MPH  Medical Spine  Occupational Medicine

## 2022-07-19 NOTE — LETTER
"    7/19/2022         RE: Stacy Brown  [address] [facility] [address]        Dear Colleague,    Thank you for referring your patient, Stacy Brown, to the [facility] [place]. Please see a copy of my visit note below.        SUBJECTIVE:  HPI:  Stacy Brown  Is a 47 year old female who presents today for new patient evaluation of low back pain and a work comp injury upon referral from the emergency department.  ED note from 7/15/2022 records the following: \"Stacy Brown is a 47 year old female who presents for evaluation of an injury that occurred at work earlier today.  She states that she bent over to  a package weighing 30-40 pounds.  She picked it up to waist level and then transferred it to a cart.  When she was putting the package down in the cart she felt and heard a pop in her low back which was very audible.  Her coworker about 8 feet away heard the pop as well.  She had immediate onset of 9 on a scale of 10 pain which was sharp in nature.  She has had some radiation of the pain down the left thigh down to the level of the mid lower leg.  Does not extend into the foot.  She has had off-and-on numbness/tingling of the medial thigh.  She reports chronic numbness of her left foot from her previous issue, but this is unchanged.  She denies any loss of bowel/bladder function.  No recent fever or chills.  No saddle anesthesia.  No other injuries.  She is here with her .  She did not take anything for symptoms prior to coming to the ED.\"  Her exam showed tender L3-L5 spinous processes as well as bilateral paravertebral muscles, limited lumbosacral range of motion, and intact neurologic function.  Peripheral pulses were normal.  She had full painless range of motion of hips and knees.  SLR was noted to be positive at 45 degrees.    Lumbar x-rays taken that day were normal  She was given a Dilaudid injection and discharged home with a prescription for " "50 mg prednisone pills and cyclobenzaprine.  Diagnosis was lumbar sprain but disc herniation was considered as a possibility for left leg symptoms.  No MRI was done.  Restrictions were given.  Orthopedic  referral was made.  He was advised to follow her blood sugars because of the steroids.     Stacy confirms the above history.  She points to the midline of the lumbosacral spine as the site of the perceived \"pop\" that she heard as well as the site of recurrent pain..  She has intermittent tingling but no true numbness down the posterior left thigh and calf.  There is no true numbness or weakness, bowel or bladder dysfunction, or saddle anesthesia.      SYMPTOMS WORSENED WITH standing and walking    SYMPTOMS IMPROVED WITH a little better with her medications    Pain score and diagram reviewed.  See questionnaire.      ROS: Negative for cancer or long-term steroid use.  Otherwise negative for bowel/bladder dysfunction, balance changes, headache, leg pain/numbness/weakness, fevers, chills, night sweats, unexplained weight loss;  otherwise unremarkable.   See the patient's intake questionnaire from today for details.    Stacy heard me dictate the history and indicated that it was accurate.    Treatment to Date: Medications.  No PT.  No chiropractic.    MEDICATIONS:  Reviewed.    ALLERGIES:  Reviewed.     Reviewed past medical, surgical, and family history.    Pertinent for chronic knee pain, status post NSTEMI and CABG March 2021, tobacco abuse disorder, type 2 diabetes.  She denies any prior low back problems    SOCIAL HX: This is a work comp injury.  She is a  for the [employer].  She was doing her normal job without restrictions at the time of the injury.  She is .  Other than her job, she has a relatively sedentary lifestyle and denies sports hobbies or activities.      OBJECTIVE:    --CONSTITUTIONAL:   No acute distress.  The patient is well nourished and well " groomed.  --PSYCHIATRIC:  Appropriate mood and affect. The patient is awake, alert, oriented to person, place, time and answering questions appropriately with clear speech.  She appears to be uncomfortable.  She transitions reasonably well and moves about the room a bit stiffly and slowly.  BMI is elevated.  --SKIN:  Skin over the face, bilateral lower extremities, and posterior torso is clean, dry, intact without rashes.  2 small nevi with halos noted in her low back.  I showed her  where they are and advised that she follow with a dermatologist-warned.  --RESPIRATORY: Normal respiratory excursion and effort, and no dyspnea.   --GAIT:  is non-antalgic. Flat foot, heel and toe walking:  normal   .  Squat and rise      CHCF and pain.  --STANDING EXAMINATION:    Symmetry of spine/pelvis   unremarkable   .      Range of motion limited hands to mid shins in flexion.  All other ranges full.  All ranges painful but most painful with left sidebending, left rotation, and flexion.   Standing flexion   positive left.    Emma's sign       positive low.     Stork test   negative    .   --NEUROLOGICAL:     ROMBERG, TANDEM WALK, PRONATOR DRIFT:   Normal.   .  SENSATION to light touch is intact in bilateral thighs, lower legs and feet.   REFLEXES:  patellar 2+, and achilles 2+.  Babinski is negative. No clonus.  MANUAL MOTOR TESTING:  L3- S1 Myotomes, Femoral, Obturator, Peroneal and Tibial nerves 5/5   DURAL STRETCH TESTS:  SLR positive for back pain on the left but no leg symptoms.  She said that she had leg symptoms in the emergency department with this maneuver.  Femoral Stretch Test not done.   --PELVIC/HIP JOINTS:                Long Sitting      long too short left.    Hip scour    slightly positive for back pain low.    Hip Impingement      positive left back pain IR.   JAY   negative    .     Piriformis   negative   .   Spring testing positive left SI joint, less so at L5, negative else.      PELVIC  "ALIGNMENT left anterior innominate rotation.   --LUMBAR/GLUTEAL MUSCLES: Tender without triggering left upper outer gluteal muscles.  Otherwise negative.    --ABDOMINAL:  Non-distended.  Nontender  --VASCULAR: Femoral pulses nonpalpable. Lower extremity capillary refill, temperature and color normal.       Procedure note-OMT:  Manual medicine restore normal pelvic alignment.  She felt \"amazingly better\" afterwards.  Long sitting test negative with equal leg lengths.  Spring testing less painful over the left SI joint and minimally painful over L5.  Left gluteal tenderness much better.  Hip provocation maneuvers now negative.  She had improved flexion to the ankles with some pain and still had some pain with left sidebending and left rotation.  Standing flexion was negative      IMAGING: Images and report reviewed.    LUMBAR SPINE TWO TO THREE VIEWS   7/15/2022                                                                   IMPRESSION: Alignment of the lumbar spine is within normal limits. No  loss of vertebral body height. No significant degenerative endplate  changes or loss of intervertebral disc space.         ASSESSMENT: Stacy Brown is a 47 year old female who presents  today for new patient evaluation of:      Low back pain    Work comp injury    Pelvic Joint Dysfunction manifesting as a left anterior innominate rotation    Neurologically intact and improved SLR now negative compared to her emergency department visit      PLAN:  Finish off the last 2 days of the prednisone.  I think she can stop the cyclobenzaprine and when she is done with prednisone transfer to ibuprofen.  She can also start Tylenol now.  I am going to have her work with Nadine Bear in [place] for some PT and pelvic stabilization.  She is to do the Pelvic Joint Dysfunction self correction I taught her each morning.  Work restrictions.  Follow-up with me in 2 weeks.    Advised patient to call or return early if symptoms worsen, or " having problems controlling bladder and bowel function or worsening leg weakness.     Please note: Voice recognition software was used in this dictation.  It may therefore contain typographical errors.    Jorge Luis Brooke MD             Again, thank you for allowing me to participate in the care of your patient.        Sincerely,        Jorge Luis Brooke MD

## 2022-07-19 NOTE — NURSING NOTE
"Reason For Visit:   Chief Complaint   Patient presents with     Consult     Low back pain         Occupation:   Currently working? No.  Work status?  On medical leave.    Sports: n  Activities: walking             /77   Pulse 68   Ht 1.676 m (5' 6\")   Wt 85.3 kg (188 lb)   LMP 03/07/2021 (Approximate)   BMI 30.34 kg/m        Allergies   Allergen Reactions     Amoxicillin Hives     Hydrocodone-Acetaminophen GI Disturbance     Tylenol is ok       Current Outpatient Medications   Medication     acetaminophen (TYLENOL) 325 MG tablet     amLODIPine (NORVASC) 2.5 MG tablet     aspirin (ASA) 325 MG tablet     cyclobenzaprine (FLEXERIL) 10 MG tablet     insulin aspart (NOVOLOG FLEXPEN) 100 UNIT/ML pen     insulin glargine (BASAGLAR KWIKPEN) 100 UNIT/ML pen     lisinopril (ZESTRIL) 2.5 MG tablet     metFORMIN (GLUCOPHAGE-XR) 500 MG 24 hr tablet     methylPREDNISolone (MEDROL DOSEPAK) 4 MG tablet therapy pack     metoprolol tartrate (LOPRESSOR) 25 MG tablet     Multiple Vitamins-Minerals (MULTI-VITAMIN GUMMIES) CHEW     nitroGLYcerin (NITROSTAT) 0.4 MG sublingual tablet     ondansetron (ZOFRAN ODT) 4 MG ODT tab     polyethylene glycol (MIRALAX) 17 GM/Dose powder     rosuvastatin (CRESTOR) 20 MG tablet     senna-docusate (SENOKOT-S/PERICOLACE) 8.6-50 MG tablet     tiZANidine (ZANAFLEX) 4 MG tablet     bismuth subsalicylate (PEPTO-BISMOL MAX STRENGTH) 525 MG/15ML     blood glucose (NO BRAND SPECIFIED) test strip     blood glucose calibration (NO BRAND SPECIFIED) solution     blood glucose monitoring (FREESTYLE) lancets     Blood Glucose Monitoring Suppl (ACCU-CHEK COMPLETE) KIT     hydrocortisone (CORTAID) 1 % external cream     insulin pen needle (31G X 8 MM) 31G X 8 MM miscellaneous     insulin pen needle (NOVOFINE) 32G X 6 MM miscellaneous     No current facility-administered medications for this visit.         Darla Severin-Brown, LPN  "

## 2022-07-19 NOTE — PROGRESS NOTES
"    SUBJECTIVE:  HPI:  Stacy Brown  Is a 47 year old female who presents today for new patient evaluation of low back pain and a work comp injury upon referral from the emergency department.  ED note from 7/15/2022 records the following: \"Stacy Brown is a 47 year old female who presents for evaluation of an injury that occurred at work earlier today.  She states that she bent over to  a package weighing 30-40 pounds.  She picked it up to waist level and then transferred it to a cart.  When she was putting the package down in the cart she felt and heard a pop in her low back which was very audible.  Her coworker about 8 feet away heard the pop as well.  She had immediate onset of 9 on a scale of 10 pain which was sharp in nature.  She has had some radiation of the pain down the left thigh down to the level of the mid lower leg.  Does not extend into the foot.  She has had off-and-on numbness/tingling of the medial thigh.  She reports chronic numbness of her left foot from her previous issue, but this is unchanged.  She denies any loss of bowel/bladder function.  No recent fever or chills.  No saddle anesthesia.  No other injuries.  She is here with her .  She did not take anything for symptoms prior to coming to the ED.\"  Her exam showed tender L3-L5 spinous processes as well as bilateral paravertebral muscles, limited lumbosacral range of motion, and intact neurologic function.  Peripheral pulses were normal.  She had full painless range of motion of hips and knees.  SLR was noted to be positive at 45 degrees.    Lumbar x-rays taken that day were normal  She was given a Dilaudid injection and discharged home with a prescription for 50 mg prednisone pills and cyclobenzaprine.  Diagnosis was lumbar sprain but disc herniation was considered as a possibility for left leg symptoms.  No MRI was done.  Restrictions were given.  Orthopedic  referral was made.  He was advised to follow her " "blood sugars because of the steroids.     Stacy confirms the above history.  She points to the midline of the lumbosacral spine as the site of the perceived \"pop\" that she heard as well as the site of recurrent pain..  She has intermittent tingling but no true numbness down the posterior left thigh and calf.  There is no true numbness or weakness, bowel or bladder dysfunction, or saddle anesthesia.      SYMPTOMS WORSENED WITH standing and walking    SYMPTOMS IMPROVED WITH a little better with her medications    Pain score and diagram reviewed.  See questionnaire.      ROS: Negative for cancer or long-term steroid use.  Otherwise negative for bowel/bladder dysfunction, balance changes, headache, leg pain/numbness/weakness, fevers, chills, night sweats, unexplained weight loss;  otherwise unremarkable.   See the patient's intake questionnaire from today for details.    Stacy heard me dictate the history and indicated that it was accurate.    Treatment to Date: Medications.  No PT.  No chiropractic.    MEDICATIONS:  Reviewed.    ALLERGIES:  Reviewed.     Reviewed past medical, surgical, and family history.    Pertinent for chronic knee pain, status post NSTEMI and CABG March 2021, tobacco abuse disorder, type 2 diabetes.  She denies any prior low back problems    SOCIAL HX: This is a work comp injury.  She is a  for the [employer].  She was doing her normal job without restrictions at the time of the injury.  She is .  Other than her job, she has a relatively sedentary lifestyle and denies sports hobbies or activities.      OBJECTIVE:    --CONSTITUTIONAL:   No acute distress.  The patient is well nourished and well groomed.  --PSYCHIATRIC:  Appropriate mood and affect. The patient is awake, alert, oriented to person, place, time and answering questions appropriately with clear speech.  She appears to be uncomfortable.  She transitions reasonably well and moves about the room a " bit stiffly and slowly.  BMI is elevated.  --SKIN:  Skin over the face, bilateral lower extremities, and posterior torso is clean, dry, intact without rashes.  2 small nevi with halos noted in her low back.  I showed her  where they are and advised that she follow with a dermatologist-warned.  --RESPIRATORY: Normal respiratory excursion and effort, and no dyspnea.   --GAIT:  is non-antalgic. Flat foot, heel and toe walking:  normal   .  Squat and rise      snf and pain.  --STANDING EXAMINATION:    Symmetry of spine/pelvis   unremarkable   .      Range of motion limited hands to mid shins in flexion.  All other ranges full.  All ranges painful but most painful with left sidebending, left rotation, and flexion.   Standing flexion   positive left.    Emma's sign       positive low.     Stork test   negative    .   --NEUROLOGICAL:     ROMBERG, TANDEM WALK, PRONATOR DRIFT:   Normal.   .  SENSATION to light touch is intact in bilateral thighs, lower legs and feet.   REFLEXES:  patellar 2+, and achilles 2+.  Babinski is negative. No clonus.  MANUAL MOTOR TESTING:  L3- S1 Myotomes, Femoral, Obturator, Peroneal and Tibial nerves 5/5   DURAL STRETCH TESTS:  SLR positive for back pain on the left but no leg symptoms.  She said that she had leg symptoms in the emergency department with this maneuver.  Femoral Stretch Test not done.   --PELVIC/HIP JOINTS:                Long Sitting      long too short left.    Hip scour    slightly positive for back pain low.    Hip Impingement      positive left back pain IR.   JAY   negative    .     Piriformis   negative   .   Spring testing positive left SI joint, less so at L5, negative else.      PELVIC ALIGNMENT left anterior innominate rotation.   --LUMBAR/GLUTEAL MUSCLES: Tender without triggering left upper outer gluteal muscles.  Otherwise negative.    --ABDOMINAL:  Non-distended.  Nontender  --VASCULAR: Femoral pulses nonpalpable. Lower extremity capillary refill,  "temperature and color normal.       Procedure note-OMT:  Manual medicine restore normal pelvic alignment.  She felt \"amazingly better\" afterwards.  Long sitting test negative with equal leg lengths.  Spring testing less painful over the left SI joint and minimally painful over L5.  Left gluteal tenderness much better.  Hip provocation maneuvers now negative.  She had improved flexion to the ankles with some pain and still had some pain with left sidebending and left rotation.  Standing flexion was negative      IMAGING: Images and report reviewed.    LUMBAR SPINE TWO TO THREE VIEWS   7/15/2022                                                                   IMPRESSION: Alignment of the lumbar spine is within normal limits. No  loss of vertebral body height. No significant degenerative endplate  changes or loss of intervertebral disc space.         ASSESSMENT: Stacy Brown is a 47 year old female who presents  today for new patient evaluation of:      Low back pain    Work comp injury    Pelvic Joint Dysfunction manifesting as a left anterior innominate rotation    Neurologically intact and improved SLR now negative compared to her emergency department visit      PLAN:  Finish off the last 2 days of the prednisone.  I think she can stop the cyclobenzaprine and when she is done with prednisone transfer to ibuprofen.  She can also start Tylenol now.  I am going to have her work with Nadine Bear in [place] for some PT and pelvic stabilization.  She is to do the Pelvic Joint Dysfunction self correction I taught her each morning.  Work restrictions.  Follow-up with me in 2 weeks.    Advised patient to call or return early if symptoms worsen, or having problems controlling bladder and bowel function or worsening leg weakness.     Please note: Voice recognition software was used in this dictation.  It may therefore contain typographical errors.    Jorge Luis Brooke MD         "

## 2022-07-20 ENCOUNTER — TELEPHONE (OUTPATIENT)
Dept: NEUROSURGERY | Facility: CLINIC | Age: 47
End: 2022-07-20

## 2022-07-20 NOTE — TELEPHONE ENCOUNTER
Due to providers availability, she is unable to start PT until 8/22. Pt is concerned as she felt you wanted her seen a lot sooner than that. Wondering if there's someone else you would suggest?  Ok to leave a detailed message.

## 2022-07-22 ENCOUNTER — HOSPITAL ENCOUNTER (OUTPATIENT)
Dept: PHYSICAL THERAPY | Facility: CLINIC | Age: 47
Setting detail: THERAPIES SERIES
Discharge: HOME | End: 2022-07-22
Attending: PREVENTIVE MEDICINE

## 2022-07-22 DIAGNOSIS — M79.18 MYOFASCIAL PAIN: ICD-10-CM

## 2022-07-22 DIAGNOSIS — M99.04 SACROILIAC JOINT SOMATIC DYSFUNCTION: ICD-10-CM

## 2022-07-22 PROCEDURE — 97161 PT EVAL LOW COMPLEX 20 MIN: CPT | Mod: GP | Performed by: PHYSICAL THERAPIST

## 2022-07-22 PROCEDURE — 97110 THERAPEUTIC EXERCISES: CPT | Mod: GP | Performed by: PHYSICAL THERAPIST

## 2022-07-22 NOTE — TELEPHONE ENCOUNTER
Per Dr. Brooke, another physical therapist pt could see is Willard Sánchez at [facility]. Writer reached out to patient with this information. Pt informed writer that there was a cancellation so she was given an earlier appt to see Nadine Bear today at 5:45pm. No further action needed at this time. She will praveen back if any further concerns should arise.     Jazzy Marshall, RNCC  Neurology/Neurosurgery/PM&R

## 2022-07-23 NOTE — PROGRESS NOTES
"   07/22/22 1600   General Information   Type of Visit Initial OP Ortho PT Evaluation   Start of Care Date 07/22/22   Referring Physician Dr Jorge Luis Brooke   Patient/Family Goals Statement No pain   Orders Per Therapist Evaluation   Orders Comment 12 visits   Date of Order 07/19/22   Certification Required? No   Medical Diagnosis Sacroiliac joint somatic dysfunction (M99.04)     Myofascial pain (M79.18)   Surgical/Medical history reviewed Yes   Precautions/Limitations no known precautions/limitations   Weight-Bearing Status - LUE weight-bearing as tolerated   Weight-Bearing Status - RUE weight-bearing as tolerated   Weight-Bearing Status - LLE weight-bearing as tolerated   Weight-Bearing Status - RLE weight-bearing as tolerated   General Information Comments ER has work restrictrions for no lifting > 20# at this time, waist and above per patient   Body Part(s)   Body Part(s) Lumbar Spine/SI   Presentation and Etiology   Pertinent history of current problem (include personal factors and/or comorbidities that impact the POC) Stacy Brown  Is a 47 year old female who presents today for new patient evaluation of low back pain and a work comp injury upon referral from the emergency department.  ED note from 7/15/2022 records the following: \"Stacy Brown is a 47 year old female who presents for evaluation of an injury that occurred at work on 7/15. She states that she bent over to  a package weighing 30-40 pounds.  She picked it up to waist level and then transferred it to a cart.  When she was putting the package down in the cart she felt and heard a pop in her low back which was very audible.  Her coworker about 8 feet away heard the pop as well. She was given a Dilaudid injection and discharged home with a prescription for 50 mg prednisone pills and cyclobenzaprine.  Diagnosis was lumbar sprain but disc herniation was considered as a possibility for left leg symptoms.  No MRI was done.  Restrictions were " given.    She last saw Dr. Brooke in the orthopedic clinic on 7/19 and was found to have a left anterior innominate rotation, corrected with manual medicine, updated work restrictions and sent to physical therapy for further treatment.  She says her major aggravations are lifting and bending, standing in 1 place greater than 1 hour.  She is better with walking, icing.  She has 1 day left of her steroid pack, taking muscle relaxers only on occasion, narcotics as needed, Tylenol/ibuprofen.  She states her pain is 50% better after correction from Dr. Brooke but has maintained only about a 20% gain since returning to work.  She has no history of back problems, surgical history only positive for tubal ligation. Pertinent for chronic knee pain, status post NSTEMI and CABG March 2021, tobacco abuse disorder, type 2 diabetes.  She denies any prior low back problems,  But notes she has bilateral bursitis in her hips at times. This is a work comp injury.  She is a  for the [employer].  She was doing her normal job without restrictions at the time of the injury.  She is .  Other than her job, she has a relatively sedentary lifestyle and denies sports hobbies or activities.  Her pain ranges from a 3 out of 10 after icing , was a 9 out of 10 in the ER and is currently 6 out of 10, in the left gluteal region.  She says since her manual treatment her left radicular symptoms have abolished.  Her goal of PT is to get back to pain-free status and no work restrictions   Impairments A. Pain;D. Decreased ROM;E. Decreased flexibility;F. Decreased strength and endurance   Progression of symptoms since onset: Improved   Current / Previous Interventions   Diagnostic Tests: X-ray   X-ray Results unremarkable   Prior Level of Function   Functional Level Prior Comment Independent, has worked a  for 19 years   Fall Risk Screen   Fall screen completed by PT   Have you fallen 2 or more times in the  past year? No   Have you fallen and had an injury in the past year? No   Is patient a fall risk? No   Abuse Screen (yes response referral indicated)   Feels Unsafe at Home or Work/School no   Feels Threatened by Someone no   Does Anyone Try to Keep You From Having Contact with Others or Doing Things Outside Your Home? no   Physical Signs of Abuse Present no   System Outcome Measures   Outcome Measures Low Back Pain (see Oswestry and Kylee)   Functional Scales   Functional Scales Other   Lumbar Spine/SI Objective Findings   Gait/Locomotion Slightly narrow base of support for gait   Lumbar/SI Flexibility Comments Gluteal trigger points on the left mild no tenderness to palpation of piriformis bilaterally. she does have tenderness posterior to the greater trochanter at the insertion which I suspect is chronic for her.  Positive Silverio test on the left.   Flexion ROM 25% limited in pain upon rising in the left gluteal region   Extension ROM Only mildly limited initially no change in pain.  Repeated extension does  as she continues does cause increased pain in the gluteal region but no referral pain   Right Side Bending ROM WNL   Left Side Bending ROM Slightly limited to left side bend without increased pain   Pelvic Screen Positive pain relief with SI joint distraction, mildly increased pain with SI joint compression and left, return of left anterior innominate rotation.   Hip Screen Hip range of motion is within normal limits on the right despite IT and tension affecting adduction.  left hip range of motion is within normal limits with endrange tenderness to internal rotation and hip ext limited with hip flexor tightness   Hip Flexion (L2) Strength 5   Hip Abduction Strength 5   Hip Adduction Strength 5   Hip Extension Strength 5   Knee Flexion Strength 5   Knee Extension (L3) Strength 5   Ankle Dorsiflexion (L4) Strength 5   Lumbar/Hip/Knee/Foot Strength Comments Does have weakness of the hip abductors left greater  than right affecting ability to fire glue medius in abduction/extension.   Lumbar/SI Special Tests Comments Poor isolation of the transverse abdominis deep fibers.   Segmental Mobility Mild stiffness to L3 palpation of the lumbar spine, but states springy feels good   Palpation No reports of paresthesias at this time   Sensation Testing Intact lower extremity palpation   Patellar Tendon Reflexes  2+ at the physician's office   Planned Therapy Interventions   Planned Therapy Interventions manual therapy;joint mobilization;stretching;ROM;orthotic fitting/training;neuromuscular re-education   Planned Modality Interventions   Planned Modality Interventions Comments PRN modalities   Clinical Impression   Criteria for Skilled Therapeutic Interventions Met yes, treatment indicated   PT Diagnosis Pelvic joint dysfunction presenting is left innominate anterior rotation with gluteal muscle tenderness, reduced core and hip support, possible component of lumbar strain   Influenced by the following impairments ROM, palpation special tests   Functional limitations due to impairments Lifting, sustained standing, laying prolonged   Clinical Presentation Stable/Uncomplicated   Clinical Presentation Rationale PLOF, improvement with tx, motivated   Clinical Decision Making (Complexity) Low complexity   Therapy Frequency 1 time/week   Predicted Duration of Therapy Intervention (days/wks) 8-12 visits   Risk & Benefits of therapy have been explained Yes   Patient, Family & other staff in agreement with plan of care Yes   Clinical Impression Comments Patient with recurrent left innominate anterior rotation for correction at  on 7/19,  which again corrected well with MET today.  Started on the gluteal protocols which went well, did trial SI belt which patient felt did reduce her pain.  We will trial over the weekend and see if indicated to dispense   Education Assessment   Preferred Learning Style  Listening;Demonstration;Pictures/video;Reading   ORTHO GOALS   PT Ortho Eval Goals 3;1;2   Ortho Goal 1   Goal Identifier MARY KAY   Goal Description Patient will have improvement in symptoms via the MARY KAY reduction from 32% to < 10% for full return to work without restrictions   Target Date 09/16/22   Ortho Goal 2   Goal Identifier Pain   Goal Description Patient will return to work for her full shift and duties without restriction and less than 2 out of 10 pain   Target Date 09/16/22   Ortho Goal 3   Goal Identifier HEP   Goal Description Patient will be independent in comprehensive home exercise program to manage ongoing needs for range of motion and strengthening for discharge in 8 weeks   Target Date 09/16/22   Total Evaluation Time   PT Juarez, Low Complexity Minutes ([id number]) 30

## 2022-07-25 ENCOUNTER — HOSPITAL ENCOUNTER (OUTPATIENT)
Dept: PHYSICAL THERAPY | Facility: CLINIC | Age: 47
Setting detail: THERAPIES SERIES
Discharge: HOME | End: 2022-07-25
Attending: PREVENTIVE MEDICINE
Payer: COMMERCIAL

## 2022-07-25 DIAGNOSIS — Z79.4 TYPE 2 DIABETES MELLITUS WITH HYPERGLYCEMIA, WITH LONG-TERM CURRENT USE OF INSULIN (H): ICD-10-CM

## 2022-07-25 DIAGNOSIS — M99.04 SEGMENTAL DYSFUNCTION OF SACRAL REGION: Primary | ICD-10-CM

## 2022-07-25 DIAGNOSIS — M99.03 SEGMENTAL DYSFUNCTION OF LUMBAR REGION: ICD-10-CM

## 2022-07-25 DIAGNOSIS — M99.06 SEGMENTAL DYSFUNCTION OF LOWER EXTREMITY: ICD-10-CM

## 2022-07-25 DIAGNOSIS — Z95.1 S/P CABG (CORONARY ARTERY BYPASS GRAFT): ICD-10-CM

## 2022-07-25 DIAGNOSIS — E11.65 TYPE 2 DIABETES MELLITUS WITH HYPERGLYCEMIA, WITH LONG-TERM CURRENT USE OF INSULIN (H): ICD-10-CM

## 2022-07-25 PROCEDURE — 97140 MANUAL THERAPY 1/> REGIONS: CPT | Mod: GP | Performed by: PHYSICAL THERAPIST

## 2022-07-25 PROCEDURE — 97110 THERAPEUTIC EXERCISES: CPT | Mod: GP | Performed by: PHYSICAL THERAPIST

## 2022-07-26 RX ORDER — INSULIN GLARGINE 100 [IU]/ML
42 INJECTION, SOLUTION SUBCUTANEOUS EVERY MORNING
Qty: 15 ML | Refills: 0 | Status: SHIPPED | OUTPATIENT
Start: 2022-07-26 | End: 2022-08-31

## 2022-07-26 NOTE — TELEPHONE ENCOUNTER
Routing refill request to provider for review/approval because:  Patient needs to be seen because:  6 month diabetic visit due      Eloy Hernandez,MARCELAN, RN

## 2022-07-26 NOTE — TELEPHONE ENCOUNTER
Medication refilled x1.  Needs appointment for further refills.  Will have staff notify patient.    Carlos Stoner MD

## 2022-07-27 NOTE — PROGRESS NOTES
"  Subjective:  Stacy Brown is a 47 year old female who presents today for follow-up regarding     Low back pain    Work comp injury [employer] 7/15/2022    Pelvic Joint Dysfunction manifesting as a left anterior innominate rotation    Neurologically intact and improved SLR now negative compared to her emergency department visit  She was to finish off her last 2 days of prednisone and stop the cyclobenzaprine and transition to ibuprofen.  PT with Nadine Bear ordered and Pelvic Joint Dysfunction self correction taught.    PRIOR HISTORY from 7/19/2022:  She was seen for evaluation of low back pain and a work comp injury upon referral from the emergency department.  ED note from 7/15/2022 records the following: \"Stacy Brown is a 47 year old female who presents for evaluation of an injury that occurred at work earlier today.  She states that she bent over to  a package weighing 30-40 pounds.  She picked it up to waist level and then transferred it to a cart.  When she was putting the package down in the cart she felt and heard a pop in her low back which was very audible.  Her coworker about 8 feet away heard the pop as well.  She had immediate onset of 9 on a scale of 10 pain which was sharp in nature.  She has had some radiation of the pain down the left thigh down to the level of the mid lower leg.  Does not extend into the foot.  She has had off-and-on numbness/tingling of the medial thigh.  She reports chronic numbness of her left foot from her previous issue, but this is unchanged.  She denies any loss of bowel/bladder function.  No recent fever or chills.  No saddle anesthesia.  No other injuries.  She is here with her .  She did not take anything for symptoms prior to coming to the ED.\"  Her exam showed tender L3-L5 spinous processes as well as bilateral paravertebral muscles, limited lumbosacral range of motion, and intact neurologic function.  Peripheral pulses were normal.  She " "had full painless range of motion of hips and knees.  SLR was noted to be positive at 45 degrees.     Lumbar x-rays taken that day were normal  She was given a Dilaudid injection and discharged home with a prescription for 50 mg prednisone pills and cyclobenzaprine.  Diagnosis was lumbar sprain but disc herniation was considered as a possibility for left leg symptoms.  No MRI was done.  Restrictions were given.  Orthopedic  referral was made.  He was advised to follow her blood sugars because of the steroids.     Stacy confirms the above history.  She points to the midline of the lumbosacral spine as the site of the perceived \"pop\" that she heard as well as the site of recurrent pain..  She has intermittent tingling but no true numbness down the posterior left thigh and calf.  There is no true numbness or weakness, bowel or bladder dysfunction, or saddle anesthesia.       INTERIM HISTORY:    PT staff message as follows: \"Mary Herman seen Stacy 2x. She started on Myofascial stretches and I added in for psoas and ITB, she started the stabilization protocol also. I had her trial SI belt per her preference and felt that was good support for her upright items at work so I put an order in and dispensed for her to use at work. Her innominate was better after I corrected once and you corrected once. Ill keep working with her.\"    Stacy is not interested in being taken off of work but she says that her supervisor today gave her a \"direct order\" to exceed her restrictions by having her lift 2040 and 60 pounds from from the floor, indirect conflict with my work restrictions.  She was on the phone with her  at the time and they are filing a grievance with the employer.  She has spoken HR.  She was told that if she did not do it she was told today she would be fired.    Stacy feels like she is making progress and she is working with her physical therapist who saw her last week.  She does not feel like " "she is \"off\" today.  She is not currently represented by an     Past medical history is reviewed and is unchanged for any new medical diagnoses in the interim.  Pertinent for chronic knee pain, status post NSTEMI and CABG March 2021, tobacco abuse disorder, type 2 diabetes.  She denies any prior low back problems     SOCIAL HX: This is a work comp injury.  She is a  for the [employer].  She was doing her normal job without restrictions at the time of the injury.  She is .  Other than her job, she has a relatively sedentary lifestyle and denies sports hobbies or activities.  See above.  Her employer is requiring her to exceed her restrictions, and violation of my work restrictions.       Objective:       IMAGING: Images and report reviewed.     LUMBAR SPINE TWO TO THREE VIEWS   7/15/2022                                                                   IMPRESSION: Alignment of the lumbar spine is within normal limits. No  loss of vertebral body height. No significant degenerative endplate  changes or loss of intervertebral disc space.      EXAMINATION:    CONSTITUTIONAL:  Vital signs as above.  No acute distress.  The patient is well nourished and well groomed.  Transitions well  PSYCHIATRIC:  The patient is awake, alert, oriented to person, place and time.  The patient is answering questions appropriately with clear speech.  Normal affect.  Able to follow commands  MUSCULOSKELETAL:  Gait is non-antalgic.  The patient is able to heel and toe walk without any difficulty.   Squat and rise normal.   .  Back ROM: Full with some pain on extension.     NEUROLOGICAL:    Motor:  L3-S1 myotomes 5/5     Sensation to light touch is intact in the bilateral lower extremities.    SLR negative    Pelvis: Left posterior sacral torsion.  Innominate level.  Standing flexion and Emma sign negative but stork test positive left.    Procedure note-OMT:  Manual medicine restore normal pelvic " alignment and her spring testing became negative.  I taught her how to do the sacral self correction and she demonstrated it well.  She does have a hard time stabilizing her torso with her left shoulder because of shoulder pain so we modified the technique and I had her  help her.    Assessment     Stacy Brown  is a 47 year old y.o. female who presents today for follow-up regarding     Low back pain    Work comp injury [employer] 7/15/2022.  Reported noncompliance with work restriction, and union grievance pending    Pelvic Joint Dysfunction manifesting as a left anterior innominate rotation previously, today only left posterior sacral torsion    Neurologically intact and improved SLR remains negative compared to her emergency department visit    Plan:  I have no choice but to take her off work.  That is not my choice or the patient's choice, but I am forced to do it because of noncompliance with my restrictions by her report, reportedly witnessed by phone by her  and a grievance has been filed.  I am pleased however that she is getting better and I will plan to see her back in a month at which point hopefully I can return to her normal job duties without restrictions.  During that time, I want her to practice lifting more.  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  He innominate her level today which is good.    Advised patient to call or return early if symptoms worsen, or having problems controlling bladder and bowel function or worsening leg weakness.     Please note: Voice recognition software was used in this dictation.  It may therefore contain typographical errors.  Jorge Luis Brooke MD

## 2022-07-29 ENCOUNTER — TELEPHONE (OUTPATIENT)
Dept: NEUROSURGERY | Facility: CLINIC | Age: 47
End: 2022-07-29

## 2022-07-29 NOTE — TELEPHONE ENCOUNTER
Spoke with Stacy regarding moving up her appt timewith Dr Brooke  from 11:30 to 11:00 am on 8/3. Pt agreed and confirmed of the change.    Thanks  Georgina

## 2022-08-03 ENCOUNTER — OFFICE VISIT (OUTPATIENT)
Dept: NEUROSURGERY | Facility: CLINIC | Age: 47
End: 2022-08-03
Payer: COMMERCIAL

## 2022-08-03 VITALS
BODY MASS INDEX: 30.22 KG/M2 | DIASTOLIC BLOOD PRESSURE: 82 MMHG | SYSTOLIC BLOOD PRESSURE: 115 MMHG | HEIGHT: 66 IN | HEART RATE: 78 BPM | WEIGHT: 188 LBS

## 2022-08-03 DIAGNOSIS — M54.50 ACUTE LEFT-SIDED LOW BACK PAIN WITHOUT SCIATICA: ICD-10-CM

## 2022-08-03 DIAGNOSIS — M99.04 SACROILIAC JOINT SOMATIC DYSFUNCTION: Primary | ICD-10-CM

## 2022-08-03 PROCEDURE — 99214 OFFICE O/P EST MOD 30 MIN: CPT | Mod: 25 | Performed by: PREVENTIVE MEDICINE

## 2022-08-03 PROCEDURE — 98925 OSTEOPATH MANJ 1-2 REGIONS: CPT | Performed by: PREVENTIVE MEDICINE

## 2022-08-03 ASSESSMENT — PAIN SCALES - GENERAL: PAINLEVEL: SEVERE PAIN (7)

## 2022-08-03 NOTE — LETTER
August 3, 2022      Stacy Brown  [address] [facility] [id number]            August 3, 2022      [facility]  [address] [place] [address]    To whom it may concern:    Stacy Brown  was seen in the office today for a work injury dated 7/15/2022.      Please excuse Stacy Brown from work until recheck in 1 month.    Stacy reports that she is being ordered to exceed my restrictions by her supervisor who has threatened that she would be fired if she did not exceed those restrictions.  I am assuming she is telling the truth and for that reason I have no option other than to take her off work in order to allow her to safely recover from her injury at work.    I have also advised Stacy to contact human resources with this letter.    Sincerely,        Jorge Luis Brooke MD, MPH  Medical Spine  Occupational Medicine

## 2022-08-03 NOTE — LETTER
"    8/3/2022         RE: Stacy Brown  [address] [facility] [address]        Dear Colleague,    Thank you for referring your patient, Stacy Brown, to the [facility] [place]. Please see a copy of my visit note below.      Subjective:  Stacy Brown is a 47 year old female who presents today for follow-up regarding     Low back pain    Work comp injury [employer] 7/15/2022    Pelvic Joint Dysfunction manifesting as a left anterior innominate rotation    Neurologically intact and improved SLR now negative compared to her emergency department visit  She was to finish off her last 2 days of prednisone and stop the cyclobenzaprine and transition to ibuprofen.  PT with Nadine Bear ordered and Pelvic Joint Dysfunction self correction taught.    PRIOR HISTORY from 7/19/2022:  She was seen for evaluation of low back pain and a work comp injury upon referral from the emergency department.  ED note from 7/15/2022 records the following: \"Stacy Brown is a 47 year old female who presents for evaluation of an injury that occurred at work earlier today.  She states that she bent over to  a package weighing 30-40 pounds.  She picked it up to waist level and then transferred it to a cart.  When she was putting the package down in the cart she felt and heard a pop in her low back which was very audible.  Her coworker about 8 feet away heard the pop as well.  She had immediate onset of 9 on a scale of 10 pain which was sharp in nature.  She has had some radiation of the pain down the left thigh down to the level of the mid lower leg.  Does not extend into the foot.  She has had off-and-on numbness/tingling of the medial thigh.  She reports chronic numbness of her left foot from her previous issue, but this is unchanged.  She denies any loss of bowel/bladder function.  No recent fever or chills.  No saddle anesthesia.  No other injuries.  She is here with her . " " She did not take anything for symptoms prior to coming to the ED.\"  Her exam showed tender L3-L5 spinous processes as well as bilateral paravertebral muscles, limited lumbosacral range of motion, and intact neurologic function.  Peripheral pulses were normal.  She had full painless range of motion of hips and knees.  SLR was noted to be positive at 45 degrees.     Lumbar x-rays taken that day were normal  She was given a Dilaudid injection and discharged home with a prescription for 50 mg prednisone pills and cyclobenzaprine.  Diagnosis was lumbar sprain but disc herniation was considered as a possibility for left leg symptoms.  No MRI was done.  Restrictions were given.  Orthopedic  referral was made.  He was advised to follow her blood sugars because of the steroids.     Stacy confirms the above history.  She points to the midline of the lumbosacral spine as the site of the perceived \"pop\" that she heard as well as the site of recurrent pain..  She has intermittent tingling but no true numbness down the posterior left thigh and calf.  There is no true numbness or weakness, bowel or bladder dysfunction, or saddle anesthesia.       INTERIM HISTORY:    PT staff message as follows: \"Mary Herman seen Stacy 2x. She started on Myofascial stretches and I added in for psoas and ITB, she started the stabilization protocol also. I had her trial SI belt per her preference and felt that was good support for her upright items at work so I put an order in and dispensed for her to use at work. Her innominate was better after I corrected once and you corrected once. Ill keep working with her.\"    Stacy is not interested in being taken off of work but she says that her supervisor today gave her a \"direct order\" to exceed her restrictions by having her lift 2040 and 60 pounds from from the floor, indirect conflict with my work restrictions.  She was on the phone with her  at the time and they are filing a " "grievance with the employer.  She has spoken HR.  She was told that if she did not do it she was told today she would be fired.    Stacy feels like she is making progress and she is working with her physical therapist who saw her last week.  She does not feel like she is \"off\" today.  She is not currently represented by an     Past medical history is reviewed and is unchanged for any new medical diagnoses in the interim.  Pertinent for chronic knee pain, status post NSTEMI and CABG March 2021, tobacco abuse disorder, type 2 diabetes.  She denies any prior low back problems     SOCIAL HX: This is a work comp injury.  She is a  for the [employer].  She was doing her normal job without restrictions at the time of the injury.  She is .  Other than her job, she has a relatively sedentary lifestyle and denies sports hobbies or activities.  See above.  Her employer is requiring her to exceed her restrictions, and violation of my work restrictions.       Objective:       IMAGING: Images and report reviewed.     LUMBAR SPINE TWO TO THREE VIEWS   7/15/2022                                                                   IMPRESSION: Alignment of the lumbar spine is within normal limits. No  loss of vertebral body height. No significant degenerative endplate  changes or loss of intervertebral disc space.      EXAMINATION:    CONSTITUTIONAL:  Vital signs as above.  No acute distress.  The patient is well nourished and well groomed.  Transitions well  PSYCHIATRIC:  The patient is awake, alert, oriented to person, place and time.  The patient is answering questions appropriately with clear speech.  Normal affect.  Able to follow commands  MUSCULOSKELETAL:  Gait is non-antalgic.  The patient is able to heel and toe walk without any difficulty.   Squat and rise normal.   .  Back ROM: Full with some pain on extension.     NEUROLOGICAL:    Motor:  L3-S1 myotomes 5/5     Sensation to " light touch is intact in the bilateral lower extremities.    SLR negative    Pelvis: Left posterior sacral torsion.  Innominate level.  Standing flexion and Emma sign negative but stork test positive left.    Procedure note-OMT:  Manual medicine restore normal pelvic alignment and her spring testing became negative.  I taught her how to do the sacral self correction and she demonstrated it well.  She does have a hard time stabilizing her torso with her left shoulder because of shoulder pain so we modified the technique and I had her  help her.    Assessment     Stacy Brown  is a 47 year old y.o. female who presents today for follow-up regarding     Low back pain    Work comp injury [employer] 7/15/2022.  Reported noncompliance with work restriction, and union grievance pending    Pelvic Joint Dysfunction manifesting as a left anterior innominate rotation previously, today only left posterior sacral torsion    Neurologically intact and improved SLR remains negative compared to her emergency department visit    Plan:  I have no choice but to take her off work.  That is not my choice or the patient's choice, but I am forced to do it because of noncompliance with my restrictions by her report, reportedly witnessed by phone by her  and a grievance has been filed.  I am pleased however that she is getting better and I will plan to see her back in a month at which point hopefully I can return to her normal job duties without restrictions.  During that time, I want her to practice lifting more.  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  He innominate her level today which is good.    Advised patient to call or return early if symptoms worsen, or having problems controlling bladder and bowel function or worsening leg weakness.     Please note: Voice recognition software was used in this dictation.  It may therefore  contain typographical errors.  Jorge Luis Brooke MD      Again, thank you for allowing me to participate in the care of your patient.        Sincerely,        Jorge Luis Brooke MD

## 2022-08-03 NOTE — NURSING NOTE
"Reason For Visit:   Chief Complaint   Patient presents with     RECHECK     Low back pain          Occupation:   Currently working? No.  Work status?  work resctrictions   Sports: n  Activities: walking           /82   Pulse 78   Ht 1.676 m (5' 6\")   Wt 85.3 kg (188 lb)   LMP 03/07/2021 (Approximate)   BMI 30.34 kg/m        Allergies   Allergen Reactions     Amoxicillin Hives     Hydrocodone-Acetaminophen GI Disturbance     Tylenol is ok       Current Outpatient Medications   Medication     acetaminophen (TYLENOL) 325 MG tablet     amLODIPine (NORVASC) 2.5 MG tablet     aspirin (ASA) 325 MG tablet     bismuth subsalicylate (PEPTO-BISMOL MAX STRENGTH) 525 MG/15ML     insulin aspart (NOVOLOG FLEXPEN) 100 UNIT/ML pen     insulin glargine (BASAGLAR KWIKPEN) 100 UNIT/ML pen     lisinopril (ZESTRIL) 2.5 MG tablet     metFORMIN (GLUCOPHAGE-XR) 500 MG 24 hr tablet     methylPREDNISolone (MEDROL DOSEPAK) 4 MG tablet therapy pack     metoprolol tartrate (LOPRESSOR) 25 MG tablet     ondansetron (ZOFRAN ODT) 4 MG ODT tab     polyethylene glycol (MIRALAX) 17 GM/Dose powder     rosuvastatin (CRESTOR) 20 MG tablet     senna-docusate (SENOKOT-S/PERICOLACE) 8.6-50 MG tablet     tiZANidine (ZANAFLEX) 4 MG tablet     blood glucose (NO BRAND SPECIFIED) test strip     blood glucose calibration (NO BRAND SPECIFIED) solution     blood glucose monitoring (FREESTYLE) lancets     Blood Glucose Monitoring Suppl (ACCU-CHEK COMPLETE) KIT     hydrocortisone (CORTAID) 1 % external cream     insulin pen needle (31G X 8 MM) 31G X 8 MM miscellaneous     insulin pen needle (NOVOFINE) 32G X 6 MM miscellaneous     Multiple Vitamins-Minerals (MULTI-VITAMIN GUMMIES) CHEW     nitroGLYcerin (NITROSTAT) 0.4 MG sublingual tablet     No current facility-administered medications for this visit.         Darla Severin-Brown, LPN  "

## 2022-08-03 NOTE — PATIENT INSTRUCTIONS
"Stacy I am glad to see you improving and I am sorry that your employer is forcing me to take you off work because they are clearly not following your restrictions as you reported to me.  In the meantime, continue to increase your activities of daily living and I would avoid lifting more than 40 pounds and you can lift up to 25 pounds on occasion.  You can now lift from the floor.  Continue working with Nadine and we will see you back in a month.  See the assessment and plan below for further details and do the self correction that I taught you today each morning along with the other 1 I talked to previously.    Assessment     Stacy Brown  is a 47 year old y.o. female who presents today for follow-up regarding   Low back pain  Work comp injury [employer] 7/15/2022.  Reported noncompliance with work restriction, and union grievance pending  Pelvic Joint Dysfunction manifesting as a left anterior innominate rotation previously, today only left posterior sacral torsion  Neurologically intact and improved SLR remains negative compared to her emergency department visit    Plan:  I have no choice but to take her off work.  That is not my choice or the patient's choice, but I am forced to do it because of noncompliance with my restrictions by her report, reportedly witnessed by phone by her  and a grievance has been filed.  I am pleased however that she is getting better and I will plan to see her back in a month at which point hopefully I can return to her normal job duties without restrictions.  During that time, I want her to practice lifting more.  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  He innominate her level today which is good.    SACROILIAC JOINT SELF CORRECTION  The \"affected\" side is the side you hurt on, and the \"unaffected\" side is the opposite side.  If the problem is your Right SI joint, your Right side is " affected and your Left side is unaffected.  The FOUR P'S is the mnemonic to remember.  It stands for Position, Push, Pause, Pull.    1--POSITION  Lay on your unaffected side, with your unaffected leg straight at the hip and knee, as though standing on it.  Turn your shoulders so the shoulder blades are flat on the table, holding on to the side of the table with the affected side arm to keep your shoulders flat.  Bend your affected side hip, keeping the knee straight on that leg, holding the knee in place with the unaffected hand.    2--PUSH  Holding the affected leg at the knee with the unaffected hand, BREATH (!), while pushing the affected leg up to the ceiling.  Hold this for 3-5 seconds    3--PAUSE  Stop pushing but keep the leg where it is, breath and relax for 3-5 seconds.    4--PULL  Using the unaffected hand, gently pull the affected leg from the knee first towards your chin, and then down towards the floor.      You are now in the new Position to REPEAT steps 1 through 4, two to three times, at increased level of stretch each time.

## 2022-08-08 ENCOUNTER — HOSPITAL ENCOUNTER (OUTPATIENT)
Dept: PHYSICAL THERAPY | Facility: CLINIC | Age: 47
Setting detail: THERAPIES SERIES
Discharge: HOME | End: 2022-08-08
Attending: PREVENTIVE MEDICINE
Payer: COMMERCIAL

## 2022-08-08 DIAGNOSIS — M54.9 MECHANICAL BACK PAIN: ICD-10-CM

## 2022-08-08 DIAGNOSIS — G89.4 CHRONIC PAIN SYNDROME: Primary | ICD-10-CM

## 2022-08-08 PROCEDURE — 97110 THERAPEUTIC EXERCISES: CPT | Mod: GP | Performed by: PHYSICAL THERAPIST

## 2022-08-08 PROCEDURE — 97140 MANUAL THERAPY 1/> REGIONS: CPT | Mod: GP | Performed by: PHYSICAL THERAPIST

## 2022-08-10 NOTE — TELEPHONE ENCOUNTER
Requested Prescriptions   Pending Prescriptions Disp Refills     oxyCODONE (ROXICODONE) 5 MG tablet [Pharmacy Med Name: OXYCODONE HCL 5MG TABS] 43 tablet 0     Sig: TAKE 1 TABLET (5 MG) BY MOUTH 2 TIMES DAILY AS NEEDED FOR SEVERE PAIN     Last Written Prescription Date:  07/15/2022  Last Fill Quantity: 12,   # refills: 0  Last Office Visit: 03/14/2022  Future Office visit:       Routing refill request to provider for review/approval because:  Drug not on the [facility], [facility] or [facility] refill protocol or controlled substance

## 2022-08-12 ENCOUNTER — MYC MEDICAL ADVICE (OUTPATIENT)
Dept: FAMILY MEDICINE | Facility: CLINIC | Age: 47
End: 2022-08-12

## 2022-08-12 RX ORDER — OXYCODONE HYDROCHLORIDE 5 MG/1
TABLET ORAL
Qty: 41 TABLET | Refills: 0 | Status: SHIPPED | OUTPATIENT
Start: 2022-08-12 | End: 2022-09-06

## 2022-08-24 NOTE — PROGRESS NOTES
"  Subjective:  Stacy Brown is a 47 year old female who presents today for follow-up regarding     Low back pain    Work comp injury [employer] 7/15/2022.  Reported noncompliance with work restriction, and union grievance pending    Pelvic Joint Dysfunction manifesting as a left anterior innominate rotation previously,8/3/22 only left posterior sacral torsion    Neurologically intact and improved SLR remains negative compared to her emergency department visit  I reluctantly took her off work even though she was improving, due to noncompliance with restrictions.  She has Pelvic Joint Dysfunction and sacral self corrections.       PRIOR HISTORY from 7/19/2022:  She was seen for evaluation of low back pain and a work comp injury upon referral from the emergency department.  ED note from 7/15/2022 records the following: \"Stacy Brown is a 47 year old female who presents for evaluation of an injury that occurred at work earlier today.  She states that she bent over to  a package weighing 30-40 pounds.  She picked it up to waist level and then transferred it to a cart.  When she was putting the package down in the cart she felt and heard a pop in her low back which was very audible.  Her coworker about 8 feet away heard the pop as well.  She had immediate onset of 9 on a scale of 10 pain which was sharp in nature.  She has had some radiation of the pain down the left thigh down to the level of the mid lower leg.  Does not extend into the foot.  She has had off-and-on numbness/tingling of the medial thigh.  She reports chronic numbness of her left foot from her previous issue, but this is unchanged.  She denies any loss of bowel/bladder function.  No recent fever or chills.  No saddle anesthesia.  No other injuries.  She is here with her .  She did not take anything for symptoms prior to coming to the ED.\"  Her exam showed tender L3-L5 spinous processes as well as bilateral paravertebral " "muscles, limited lumbosacral range of motion, and intact neurologic function.  Peripheral pulses were normal.  She had full painless range of motion of hips and knees.  SLR was noted to be positive at 45 degrees.     Lumbar x-rays taken that day were normal  She was given a Dilaudid injection and discharged home with a prescription for 50 mg prednisone pills and cyclobenzaprine.  Diagnosis was lumbar sprain but disc herniation was considered as a possibility for left leg symptoms.  No MRI was done.  Restrictions were given.  Orthopedic  referral was made.  She was advised to follow her blood sugars because of the steroids.     Stacy confirms the above history.  She points to the midline of the lumbosacral spine as the site of the perceived \"pop\" that she heard as well as the site of recurrent pain..  She has intermittent tingling but no true numbness down the posterior left thigh and calf.  There is no true numbness or weakness, bowel or bladder dysfunction, or saddle anesthesia.        Updated 8/3/2022 HISTORY:    PT staff message as follows: \"Mary Herman seen Stacy 2x. She started on Myofascial stretches and I added in for psoas and ITB, she started the stabilization protocol also. I had her trial SI belt per her preference and felt that was good support for her upright items at work so I put an order in and dispensed for her to use at work. Her innominate was better after I corrected once and you corrected once. Ill keep working with her.\"     Stacy is not interested in being taken off of work but she says that her supervisor today gave her a \"direct order\" to exceed her restrictions by having her lift 20, 40 and 60 pounds from from the floor, in direct conflict with my work restrictions.  She was on the phone with her  at the time and they are filing a grievance with the employer.  She has spoken HR.  She was told that if she did not do it she was told today she would be fired.     Stacy " "feels like she is making progress and she is working with her physical therapist who saw her last week.  She does not feel like she is \"off\" today.  She is not currently represented by an     INTERIM HISTORY:    Staff message received from her PT as follows: \"4 total visits with Stacy d/t her family stomach flu illness the last 2 weeks missed a few sessions.   She is 70-80% better, her pain really has remained in the L hip posterior to greater trochanter. [place] ilium and sacrum level, SI belt helpful as needed, working hip ROM along with myofascial protocol stretch and stability work, we reviewed lifting mechanics today She will continue for another few weeks.\"      Stacy confirms that she is making good progress.  Unfortunately, because her house was being foreclosed upon, she and her family and some coworkers had to move her belongings over the weekend and she thinks she may have lifted as much is 40 pounds but her some thinks it was not more than 20.  In any case she is little more sore than she was before the weekend but does not feel like her pelvis is off.  No other radiating symptoms or red flags noted.    Past medical history is reviewed and is unchanged for any new medical diagnoses in the interim.  Pertinent for chronic knee pain, status post NSTEMI and CABG March 2021, tobacco abuse disorder, type 2 diabetes.  She denies any prior low back problems     SOCIAL HX: This is a work comp injury.  She is a  for the [employer].  She was doing her normal job without restrictions at the time of the injury.  She is .  Other than her job, she has a relatively sedentary lifestyle and denies sports hobbies or activities.  See above.  Her employer is requiring her to exceed her restrictions, and violation of my work restrictions.  I took her off work on 8/3/2022 as a result.She remains off work.  The status of her union grievance with the employee remains unknown to " her as there are 6 other people with grievances that are pending.     Objective:        IMAGING: Images and report reviewed.     LUMBAR SPINE TWO TO THREE VIEWS   7/15/2022                                                                   IMPRESSION: Alignment of the lumbar spine is within normal limits. No  loss of vertebral body height. No significant degenerative endplate  changes or loss of intervertebral disc space.         EXAMINATION:    CONSTITUTIONAL:  Vital signs as above.  No acute distress.  The patient is well nourished and well groomed.    PSYCHIATRIC:  The patient is awake, alert, oriented to person, place and time.  The patient is answering questions appropriately with clear speech.  Normal affect.  Able to follow commands  MUSCULOSKELETAL:  Gait is non-antalgic.  The patient is able to heel and toe walk without any difficulty.   Squat and rise normal.   .  Back ROM: Full with minimal pain.  Localized tenderness in the paralumbar musculature bilaterally-mild.     NEUROLOGICAL:    Motor:  L3-S1 myotomes 5/5     Sensation to light touch is intact in the bilateral lower extremities.    SLR negative    Pelvis: Level.  Standing flexion, Emma sign, and stork test normal.    Assessment     Stacy Brown  is a 47 year old y.o. female who presents today for follow-up regarding     Low back pain    Work comp injury [employer] 7/15/2022.  Reported noncompliance with work restriction, and union grievance pending    Pelvic Joint Dysfunction-resolved    Neurologically intact    Minor setback of muscular pain associated with her recent move from her dwelling, and she had no choice other than to do some lifting but try to stay within the restrictions.    Plan:  Finish up PT and follow-up with me in 1 month.  My hope is that her employer will follow her restrictions so that I can safely return her to work at that time but I will probably also order a [employer] strengthening program at that time.  Much depends  on the results of the union grievance and the application for her Worker's Compensation claim.  She currently has an excepted claim and she is getting paid for her time off by her work comp insurer.    Advised patient to call or return early if symptoms worsen, or having problems controlling bladder and bowel function or worsening leg weakness.     Please note: Voice recognition software was used in this dictation.  It may therefore contain typographical errors.  Jorge Luis Brooke MD

## 2022-08-29 ENCOUNTER — HOSPITAL ENCOUNTER (OUTPATIENT)
Dept: PHYSICAL THERAPY | Facility: CLINIC | Age: 47
Setting detail: THERAPIES SERIES
Discharge: HOME | End: 2022-08-29
Attending: PREVENTIVE MEDICINE
Payer: COMMERCIAL

## 2022-08-29 PROCEDURE — 97110 THERAPEUTIC EXERCISES: CPT | Mod: GP | Performed by: PHYSICAL THERAPIST

## 2022-08-29 PROCEDURE — 97530 THERAPEUTIC ACTIVITIES: CPT | Mod: GP | Performed by: PHYSICAL THERAPIST

## 2022-08-29 PROCEDURE — 97140 MANUAL THERAPY 1/> REGIONS: CPT | Mod: GP | Performed by: PHYSICAL THERAPIST

## 2022-08-29 PROCEDURE — 97035 APP MDLTY 1+ULTRASOUND EA 15: CPT | Mod: GP | Performed by: PHYSICAL THERAPIST

## 2022-08-31 ENCOUNTER — MYC REFILL (OUTPATIENT)
Dept: FAMILY MEDICINE | Facility: CLINIC | Age: 47
End: 2022-08-31

## 2022-08-31 DIAGNOSIS — E11.65 TYPE 2 DIABETES MELLITUS WITH HYPERGLYCEMIA, WITH LONG-TERM CURRENT USE OF INSULIN (H): ICD-10-CM

## 2022-08-31 DIAGNOSIS — I21.4 NSTEMI (NON-ST ELEVATED MYOCARDIAL INFARCTION) (H): ICD-10-CM

## 2022-08-31 DIAGNOSIS — Z79.4 TYPE 2 DIABETES MELLITUS WITH HYPERGLYCEMIA, WITH LONG-TERM CURRENT USE OF INSULIN (H): ICD-10-CM

## 2022-09-03 ENCOUNTER — HOSPITAL ENCOUNTER (EMERGENCY)
Facility: CLINIC | Age: 47
Discharge: HOME | End: 2022-09-03
Attending: EMERGENCY MEDICINE | Admitting: EMERGENCY MEDICINE
Payer: COMMERCIAL

## 2022-09-03 ENCOUNTER — APPOINTMENT (OUTPATIENT)
Dept: GENERAL RADIOLOGY | Facility: CLINIC | Age: 47
End: 2022-09-03
Attending: EMERGENCY MEDICINE
Payer: COMMERCIAL

## 2022-09-03 ENCOUNTER — APPOINTMENT (OUTPATIENT)
Dept: CT IMAGING | Facility: CLINIC | Age: 47
End: 2022-09-03
Attending: EMERGENCY MEDICINE
Payer: COMMERCIAL

## 2022-09-03 VITALS
TEMPERATURE: 97.7 F | SYSTOLIC BLOOD PRESSURE: 120 MMHG | WEIGHT: 188.1 LBS | OXYGEN SATURATION: 97 % | HEART RATE: 75 BPM | DIASTOLIC BLOOD PRESSURE: 61 MMHG | BODY MASS INDEX: 30.36 KG/M2 | RESPIRATION RATE: 13 BRPM

## 2022-09-03 DIAGNOSIS — R07.9 CHEST PAIN, UNSPECIFIED TYPE: ICD-10-CM

## 2022-09-03 DIAGNOSIS — E11.65 TYPE 2 DIABETES MELLITUS WITH HYPERGLYCEMIA, WITH LONG-TERM CURRENT USE OF INSULIN (H): ICD-10-CM

## 2022-09-03 DIAGNOSIS — M54.9 MECHANICAL BACK PAIN: ICD-10-CM

## 2022-09-03 DIAGNOSIS — Z79.4 TYPE 2 DIABETES MELLITUS WITH HYPERGLYCEMIA, WITH LONG-TERM CURRENT USE OF INSULIN (H): ICD-10-CM

## 2022-09-03 DIAGNOSIS — I21.4 NSTEMI (NON-ST ELEVATED MYOCARDIAL INFARCTION) (H): ICD-10-CM

## 2022-09-03 DIAGNOSIS — G89.4 CHRONIC PAIN SYNDROME: ICD-10-CM

## 2022-09-03 LAB
ANION GAP SERPL CALCULATED.3IONS-SCNC: 10 MMOL/L (ref 3–14)
APAP SERPL-MCNC: <2 MG/L (ref 10–30)
BASOPHILS # BLD AUTO: 0 10E3/UL (ref 0–0.2)
BASOPHILS NFR BLD AUTO: 0 %
BUN SERPL-MCNC: 26 MG/DL (ref 7–30)
CALCIUM SERPL-MCNC: 9.6 MG/DL (ref 8.5–10.1)
CHLORIDE BLD-SCNC: 105 MMOL/L (ref 94–109)
CO2 SERPL-SCNC: 21 MMOL/L (ref 20–32)
CREAT SERPL-MCNC: 0.97 MG/DL (ref 0.52–1.04)
D DIMER PPP FEU-MCNC: 0.61 UG/ML FEU (ref 0–0.5)
EOSINOPHIL # BLD AUTO: 0 10E3/UL (ref 0–0.7)
EOSINOPHIL NFR BLD AUTO: 0 %
ERYTHROCYTE [DISTWIDTH] IN BLOOD BY AUTOMATED COUNT: 13.4 % (ref 10–15)
GFR SERPL CREATININE-BSD FRML MDRD: 72 ML/MIN/1.73M2
GLUCOSE BLD-MCNC: 272 MG/DL (ref 70–99)
HCT VFR BLD AUTO: 36 % (ref 35–47)
HGB BLD-MCNC: 12.1 G/DL (ref 11.7–15.7)
IMM GRANULOCYTES # BLD: 0 10E3/UL
IMM GRANULOCYTES NFR BLD: 0 %
LYMPHOCYTES # BLD AUTO: 1.2 10E3/UL (ref 0.8–5.3)
LYMPHOCYTES NFR BLD AUTO: 13 %
MCH RBC QN AUTO: 27.8 PG (ref 26.5–33)
MCHC RBC AUTO-ENTMCNC: 33.6 G/DL (ref 31.5–36.5)
MCV RBC AUTO: 83 FL (ref 78–100)
MONOCYTES # BLD AUTO: 0.7 10E3/UL (ref 0–1.3)
MONOCYTES NFR BLD AUTO: 7 %
NEUTROPHILS # BLD AUTO: 7.8 10E3/UL (ref 1.6–8.3)
NEUTROPHILS NFR BLD AUTO: 80 %
NRBC # BLD AUTO: 0 10E3/UL
NRBC BLD AUTO-RTO: 0 /100
PLATELET # BLD AUTO: 310 10E3/UL (ref 150–450)
POTASSIUM BLD-SCNC: 4.6 MMOL/L (ref 3.4–5.3)
RBC # BLD AUTO: 4.35 10E6/UL (ref 3.8–5.2)
SALICYLATES SERPL-MCNC: 0.7 MG/DL
SODIUM SERPL-SCNC: 136 MMOL/L (ref 133–144)
TROPONIN I SERPL HS-MCNC: 5 NG/L
TROPONIN I SERPL HS-MCNC: 6 NG/L
WBC # BLD AUTO: 9.9 10E3/UL (ref 4–11)

## 2022-09-03 PROCEDURE — 250N000013 HC RX MED GY IP 250 OP 250 PS 637: Performed by: EMERGENCY MEDICINE

## 2022-09-03 PROCEDURE — 85049 AUTOMATED PLATELET COUNT: CPT | Performed by: EMERGENCY MEDICINE

## 2022-09-03 PROCEDURE — 93005 ELECTROCARDIOGRAM TRACING: CPT | Performed by: EMERGENCY MEDICINE

## 2022-09-03 PROCEDURE — 99285 EMERGENCY DEPT VISIT HI MDM: CPT | Mod: 25 | Performed by: EMERGENCY MEDICINE

## 2022-09-03 PROCEDURE — 71275 CT ANGIOGRAPHY CHEST: CPT

## 2022-09-03 PROCEDURE — 250N000011 HC RX IP 250 OP 636: Performed by: EMERGENCY MEDICINE

## 2022-09-03 PROCEDURE — 93010 ELECTROCARDIOGRAM REPORT: CPT | Performed by: EMERGENCY MEDICINE

## 2022-09-03 PROCEDURE — 36415 COLL VENOUS BLD VENIPUNCTURE: CPT | Performed by: EMERGENCY MEDICINE

## 2022-09-03 PROCEDURE — 85379 FIBRIN DEGRADATION QUANT: CPT | Performed by: EMERGENCY MEDICINE

## 2022-09-03 PROCEDURE — 250N000009 HC RX 250: Performed by: EMERGENCY MEDICINE

## 2022-09-03 PROCEDURE — 84484 ASSAY OF TROPONIN QUANT: CPT | Mod: 91 | Performed by: EMERGENCY MEDICINE

## 2022-09-03 PROCEDURE — 84484 ASSAY OF TROPONIN QUANT: CPT | Performed by: EMERGENCY MEDICINE

## 2022-09-03 PROCEDURE — 80179 DRUG ASSAY SALICYLATE: CPT | Performed by: EMERGENCY MEDICINE

## 2022-09-03 PROCEDURE — 80143 DRUG ASSAY ACETAMINOPHEN: CPT | Performed by: EMERGENCY MEDICINE

## 2022-09-03 PROCEDURE — 71045 X-RAY EXAM CHEST 1 VIEW: CPT

## 2022-09-03 PROCEDURE — 80048 BASIC METABOLIC PNL TOTAL CA: CPT | Performed by: EMERGENCY MEDICINE

## 2022-09-03 RX ORDER — IOPAMIDOL 755 MG/ML
500 INJECTION, SOLUTION INTRAVASCULAR ONCE
Status: COMPLETED | OUTPATIENT
Start: 2022-09-03 | End: 2022-09-03

## 2022-09-03 RX ORDER — LORAZEPAM 1 MG/1
1 TABLET ORAL ONCE
Status: COMPLETED | OUTPATIENT
Start: 2022-09-03 | End: 2022-09-03

## 2022-09-03 RX ORDER — NITROGLYCERIN 0.4 MG/1
0.4 TABLET SUBLINGUAL EVERY 5 MIN PRN
Status: DISCONTINUED | OUTPATIENT
Start: 2022-09-03 | End: 2022-09-03 | Stop reason: HOSPADM

## 2022-09-03 RX ADMIN — IOPAMIDOL 75 ML: 755 INJECTION, SOLUTION INTRAVENOUS at 14:29

## 2022-09-03 RX ADMIN — SODIUM CHLORIDE 70 ML: 9 INJECTION, SOLUTION INTRAVENOUS at 14:29

## 2022-09-03 RX ADMIN — NITROGLYCERIN 0.4 MG: 0.4 TABLET SUBLINGUAL at 13:48

## 2022-09-03 RX ADMIN — LORAZEPAM 1 MG: 1 TABLET ORAL at 14:25

## 2022-09-03 ASSESSMENT — ACTIVITIES OF DAILY LIVING (ADL)
ADLS_ACUITY_SCORE: 35
ADLS_ACUITY_SCORE: 35

## 2022-09-03 NOTE — ED TRIAGE NOTES
Pt presents with chest pain that started two hours ago while in shower. Nauseated. Pt has history of CABG one year ago. EKG done . Pt states pain is now gone. Pt does mention abdominal pain yesterday. Pt is tender RUQ.      Triage Assessment     Row Name 09/03/22 1302       Triage Assessment (Adult)    Airway WDL WDL       Respiratory WDL    Respiratory WDL WDL       Skin Circulation/Temperature WDL    Skin Circulation/Temperature WDL WDL       Cardiac WDL    Cardiac WDL chest pain       Peripheral/Neurovascular WDL    Peripheral Neurovascular WDL WDL       Cognitive/Neuro/Behavioral WDL    Cognitive/Neuro/Behavioral WDL WDL
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
## 2022-09-06 ENCOUNTER — OFFICE VISIT (OUTPATIENT)
Dept: NEUROSURGERY | Facility: CLINIC | Age: 47
End: 2022-09-06
Payer: COMMERCIAL

## 2022-09-06 VITALS
HEIGHT: 66 IN | HEART RATE: 76 BPM | SYSTOLIC BLOOD PRESSURE: 118 MMHG | WEIGHT: 188 LBS | DIASTOLIC BLOOD PRESSURE: 75 MMHG | BODY MASS INDEX: 30.22 KG/M2

## 2022-09-06 DIAGNOSIS — M99.04 SACROILIAC JOINT SOMATIC DYSFUNCTION: Primary | ICD-10-CM

## 2022-09-06 DIAGNOSIS — M79.18 MYOFASCIAL PAIN: ICD-10-CM

## 2022-09-06 PROCEDURE — 99214 OFFICE O/P EST MOD 30 MIN: CPT | Performed by: PREVENTIVE MEDICINE

## 2022-09-06 RX ORDER — ACETAMINOPHEN 325 MG/1
325 TABLET ORAL EVERY 4 HOURS PRN
Qty: 40 TABLET | Refills: 0 | Status: SHIPPED | OUTPATIENT
Start: 2022-09-06 | End: 2023-07-06

## 2022-09-06 RX ORDER — INSULIN GLARGINE 100 [IU]/ML
42 INJECTION, SOLUTION SUBCUTANEOUS EVERY MORNING
Qty: 15 ML | Refills: 0 | Status: SHIPPED | OUTPATIENT
Start: 2022-09-06 | End: 2022-11-07

## 2022-09-06 RX ORDER — OXYCODONE HYDROCHLORIDE 5 MG/1
TABLET ORAL
Qty: 39 TABLET | Refills: 0 | Status: SHIPPED | OUTPATIENT
Start: 2022-09-11 | End: 2022-10-04

## 2022-09-06 ASSESSMENT — PAIN SCALES - GENERAL: PAINLEVEL: MODERATE PAIN (5)

## 2022-09-06 NOTE — PATIENT INSTRUCTIONS
You are looking pretty good.  I think you are pretty close to letting you try doing more at work once we know that your employer will follow my restrictions.  See me in a month and finish up PT and I might send you for a more aggressive strengthening program at the next visit.  Keep up the good work.    Assessment     Stacy Brown  is a 47 year old y.o. female who presents today for follow-up regarding   Low back pain  Work comp injury [employer] 7/15/2022.  Reported noncompliance with work restriction, and union grievance pending  Pelvic Joint Dysfunction-resolved  Neurologically intact  Minor setback of muscular pain associated with her recent move from her dwelling, and she had no choice other than to do some lifting but try to stay within the restrictions.    Plan:  Finish up PT and follow-up with me in 1 month.  My hope is that her employer will follow her restrictions so that I can safely return her to work at that time but I will probably also order a [employer] strengthening program at that time.  Much depends on the results of the union grievance and the application for her Worker's Compensation claim.  She currently has an excepted claim and she is getting paid for her time off by her work comp insurer.

## 2022-09-06 NOTE — LETTER
"    9/6/2022         RE: Stacy Brown  [address] [facility] [address]        Dear Colleague,    Thank you for referring your patient, Stacy Brown, to the [facility] [place]. Please see a copy of my visit note below.      Subjective:  Stacy Brown is a 47 year old female who presents today for follow-up regarding     Low back pain    Work comp injury [employer] 7/15/2022.  Reported noncompliance with work restriction, and union grievance pending    Pelvic Joint Dysfunction manifesting as a left anterior innominate rotation previously,8/3/22 only left posterior sacral torsion    Neurologically intact and improved SLR remains negative compared to her emergency department visit  I reluctantly took her off work even though she was improving, due to noncompliance with restrictions.  She has Pelvic Joint Dysfunction and sacral self corrections.       PRIOR HISTORY from 7/19/2022:  She was seen for evaluation of low back pain and a work comp injury upon referral from the emergency department.  ED note from 7/15/2022 records the following: \"Stacy Brown is a 47 year old female who presents for evaluation of an injury that occurred at work earlier today.  She states that she bent over to  a package weighing 30-40 pounds.  She picked it up to waist level and then transferred it to a cart.  When she was putting the package down in the cart she felt and heard a pop in her low back which was very audible.  Her coworker about 8 feet away heard the pop as well.  She had immediate onset of 9 on a scale of 10 pain which was sharp in nature.  She has had some radiation of the pain down the left thigh down to the level of the mid lower leg.  Does not extend into the foot.  She has had off-and-on numbness/tingling of the medial thigh.  She reports chronic numbness of her left foot from her previous issue, but this is unchanged.  She denies any loss of bowel/bladder " "function.  No recent fever or chills.  No saddle anesthesia.  No other injuries.  She is here with her .  She did not take anything for symptoms prior to coming to the ED.\"  Her exam showed tender L3-L5 spinous processes as well as bilateral paravertebral muscles, limited lumbosacral range of motion, and intact neurologic function.  Peripheral pulses were normal.  She had full painless range of motion of hips and knees.  SLR was noted to be positive at 45 degrees.     Lumbar x-rays taken that day were normal  She was given a Dilaudid injection and discharged home with a prescription for 50 mg prednisone pills and cyclobenzaprine.  Diagnosis was lumbar sprain but disc herniation was considered as a possibility for left leg symptoms.  No MRI was done.  Restrictions were given.  Orthopedic  referral was made.  She was advised to follow her blood sugars because of the steroids.     Stacy confirms the above history.  She points to the midline of the lumbosacral spine as the site of the perceived \"pop\" that she heard as well as the site of recurrent pain..  She has intermittent tingling but no true numbness down the posterior left thigh and calf.  There is no true numbness or weakness, bowel or bladder dysfunction, or saddle anesthesia.        Updated 8/3/2022 HISTORY:    PT staff message as follows: \"Mary Herman seen Stacy 2x. She started on Myofascial stretches and I added in for psoas and ITB, she started the stabilization protocol also. I had her trial SI belt per her preference and felt that was good support for her upright items at work so I put an order in and dispensed for her to use at work. Her innominate was better after I corrected once and you corrected once. Ill keep working with her.\"     Stacy is not interested in being taken off of work but she says that her supervisor today gave her a \"direct order\" to exceed her restrictions by having her lift 20, 40 and 60 pounds from from the floor, " "in direct conflict with my work restrictions.  She was on the phone with her  at the time and they are filing a grievance with the employer.  She has spoken HR.  She was told that if she did not do it she was told today she would be fired.     Stacy feels like she is making progress and she is working with her physical therapist who saw her last week.  She does not feel like she is \"off\" today.  She is not currently represented by an     INTERIM HISTORY:    Staff message received from her PT as follows: \"4 total visits with Stacy d/t her family stomach flu illness the last 2 weeks missed a few sessions.   She is 70-80% better, her pain really has remained in the L hip posterior to greater trochanter. [place] ilium and sacrum level, SI belt helpful as needed, working hip ROM along with myofascial protocol stretch and stability work, we reviewed lifting mechanics today She will continue for another few weeks.\"      Stacy confirms that she is making good progress.  Unfortunately, because her house was being foreclosed upon, she and her family and some coworkers had to move her belongings over the weekend and she thinks she may have lifted as much is 40 pounds but her some thinks it was not more than 20.  In any case she is little more sore than she was before the weekend but does not feel like her pelvis is off.  No other radiating symptoms or red flags noted.    Past medical history is reviewed and is unchanged for any new medical diagnoses in the interim.  Pertinent for chronic knee pain, status post NSTEMI and CABG March 2021, tobacco abuse disorder, type 2 diabetes.  She denies any prior low back problems     SOCIAL HX: This is a work comp injury.  She is a  for the [employer].  She was doing her normal job without restrictions at the time of the injury.  She is .  Other than her job, she has a relatively sedentary lifestyle and denies sports hobbies " or activities.  See above.  Her employer is requiring her to exceed her restrictions, and violation of my work restrictions.  I took her off work on 8/3/2022 as a result.She remains off work.  The status of her union grievance with the employee remains unknown to her as there are 6 other people with grievances that are pending.     Objective:        IMAGING: Images and report reviewed.     LUMBAR SPINE TWO TO THREE VIEWS   7/15/2022                                                                   IMPRESSION: Alignment of the lumbar spine is within normal limits. No  loss of vertebral body height. No significant degenerative endplate  changes or loss of intervertebral disc space.         EXAMINATION:    CONSTITUTIONAL:  Vital signs as above.  No acute distress.  The patient is well nourished and well groomed.    PSYCHIATRIC:  The patient is awake, alert, oriented to person, place and time.  The patient is answering questions appropriately with clear speech.  Normal affect.  Able to follow commands  MUSCULOSKELETAL:  Gait is non-antalgic.  The patient is able to heel and toe walk without any difficulty.   Squat and rise normal.   .  Back ROM: Full with minimal pain.  Localized tenderness in the paralumbar musculature bilaterally-mild.     NEUROLOGICAL:    Motor:  L3-S1 myotomes 5/5     Sensation to light touch is intact in the bilateral lower extremities.    SLR negative    Pelvis: Level.  Standing flexion, Emma sign, and stork test normal.    Assessment     Stacy Brown  is a 47 year old y.o. female who presents today for follow-up regarding     Low back pain    Work comp injury [employer] 7/15/2022.  Reported noncompliance with work restriction, and union grievance pending    Pelvic Joint Dysfunction-resolved    Neurologically intact    Minor setback of muscular pain associated with her recent move from her dwelling, and she had no choice other than to do some lifting but try to stay within the  restrictions.    Plan:  Finish up PT and follow-up with me in 1 month.  My hope is that her employer will follow her restrictions so that I can safely return her to work at that time but I will probably also order a Med[employer] strengthening program at that time.  Much depends on the results of the union grieharry and the application for her Worker's Compensation claim.  She currently has an excepted claim and she is getting paid for her time off by her work comp insurer.    Advised patient to call or return early if symptoms worsen, or having problems controlling bladder and bowel function or worsening leg weakness.     Please note: Voice recognition software was used in this dictation.  It may therefore contain typographical errors.  Jorge Luis Brooke MD      Again, thank you for allowing me to participate in the care of your patient.        Sincerely,        Jorge Luis Brooke MD

## 2022-09-06 NOTE — NURSING NOTE
"Reason For Visit:   Chief Complaint   Patient presents with     RECHECK     Low back pain            Occupation:   Currently working? No.  Work status?  not working work resctrictions   Sports: n  Activities: walking                  /75   Pulse 76   Ht 1.676 m (5' 6\")   Wt 85.3 kg (188 lb)   LMP 03/07/2021 (Approximate)   BMI 30.34 kg/m        Allergies   Allergen Reactions     Amoxicillin Hives     Hydrocodone-Acetaminophen GI Disturbance     Tylenol is ok       Current Outpatient Medications   Medication     acetaminophen (TYLENOL) 325 MG tablet     amLODIPine (NORVASC) 2.5 MG tablet     aspirin (ASA) 325 MG tablet     bismuth subsalicylate (PEPTO-BISMOL MAX STRENGTH) 525 MG/15ML     blood glucose (NO BRAND SPECIFIED) test strip     blood glucose calibration (NO BRAND SPECIFIED) solution     blood glucose monitoring (FREESTYLE) lancets     Blood Glucose Monitoring Suppl (ACCU-CHEK COMPLETE) KIT     hydrocortisone (CORTAID) 1 % external cream     insulin aspart (NOVOLOG FLEXPEN) 100 UNIT/ML pen     insulin glargine (BASAGLAR KWIKPEN) 100 UNIT/ML pen     insulin pen needle (31G X 8 MM) 31G X 8 MM miscellaneous     insulin pen needle (NOVOFINE) 32G X 6 MM miscellaneous     lisinopril (ZESTRIL) 2.5 MG tablet     metFORMIN (GLUCOPHAGE-XR) 500 MG 24 hr tablet     methylPREDNISolone (MEDROL DOSEPAK) 4 MG tablet therapy pack     metoprolol tartrate (LOPRESSOR) 25 MG tablet     Multiple Vitamins-Minerals (MULTI-VITAMIN GUMMIES) CHEW     nitroGLYcerin (NITROSTAT) 0.4 MG sublingual tablet     ondansetron (ZOFRAN ODT) 4 MG ODT tab     oxyCODONE (ROXICODONE) 5 MG tablet     polyethylene glycol (MIRALAX) 17 GM/Dose powder     rosuvastatin (CRESTOR) 20 MG tablet     senna-docusate (SENOKOT-S/PERICOLACE) 8.6-50 MG tablet     tiZANidine (ZANAFLEX) 4 MG tablet     No current facility-administered medications for this visit.         Darla Severin-Brown, LPN  "

## 2022-09-06 NOTE — TELEPHONE ENCOUNTER
"Basaglar Kwikpen  Tylenol 325  Prescription approved per [facility] Refill Protocol.  Deanne Mace RN    Requested Prescriptions   Pending Prescriptions Disp Refills     acetaminophen (TYLENOL) 325 MG tablet 40 tablet 0       Analgesics (Non-Narcotic Tylenol and ASA Only) Passed - 9/1/2022  7:03 AM        Passed - Recent (12 mo) or future (30 days) visit within the authorizing provider's specialty     Patient has had an office visit with the authorizing provider or a provider within the authorizing providers department within the previous 12 mos or has a future within next 30 days. See \"Patient Info\" tab in inbasket, or \"Choose Columns\" in Meds & Orders section of the refill encounter.              Passed - Patient is 7 months old or older     If patient is a peds patient of the age 7 mos -12 years, ok to refill using weight-based dosing.     If >3g daily and/or sig is not \"prn\", check for liver enzymes. If normal in the last year, ok to refill.  If not, refer to the provider.          Passed - Medication is active on med list           insulin glargine (BASAGLAR KWIKPEN) 100 UNIT/ML pen 15 mL 0     Sig: Inject 42 Units Subcutaneous every morning       Long Acting Insulin Protocol Passed - 9/1/2022  7:03 AM        Passed - Serum creatinine on file in past 12 months     Recent Labs   Lab Test 09/03/22  1315   CR 0.97       Ok to refill medication if creatinine is low          Passed - HgbA1C in past 3 or 6 months     If HgbA1C is 8 or greater, it needs to be on file within the past 3 months.  If less than 8, must be on file within the past 6 months.     Recent Labs   Lab Test 03/14/22  1534   A1C 7.3*             Passed - Medication is active on med list        Passed - Patient is age 18 or older        Passed - Recent (6 mo) or future (30 days) visit within the authorizing provider's specialty     Patient had office visit in the last 6 months or has a visit in the next 30 days with authorizing provider or within the " "authorizing provider's specialty.  See \"Patient Info\" tab in inbasket, or \"Choose Columns\" in Meds & Orders section of the refill encounter.                 "

## 2022-09-06 NOTE — LETTER
September 6, 2022      Stacy Brown  [address] [facility] [address]          [facility]  [address] [place] [address]    To whom it may concern:    Stacy Brown  was seen in the office today for a work injury dated 7/15/2022.      Please excuse Stacy Brown from work until recheck in 1 month.    Stacy reports that she is being ordered to exceed my restrictions by her supervisor who has threatened that she would be fired if she did not exceed those restrictions.  I am assuming she is telling the truth and for that reason I have no option other than to take her off work in order to allow her to safely recover from her injury at work.    I have also advised Stacy to contact human resources with this letter.    Sincerely,        Jorge Luis Brooke MD, MPH  Medical Spine  Occupational Medicine

## 2022-09-06 NOTE — TELEPHONE ENCOUNTER
Routing refill request to provider for review/approval because:    Requested Prescriptions   Pending Prescriptions Disp Refills    tiZANidine (ZANAFLEX) 4 MG tablet [Pharmacy Med Name: TIZANIDINE HCL 4MG TABS] 90 tablet 1     Sig: TAKE 1 TABLET (4 MG) BY MOUTH 3 TIMES DAILY AS NEEDED FOR MUSCLE SPASMS        There is no refill protocol information for this order        oxyCODONE (ROXICODONE) 5 MG tablet [Pharmacy Med Name: OXYCODONE HCL 5MG TABS] 41 tablet 0     Sig: TAKE 1 TABLET (5 MG) BY MOUTH 2 TIMES DAILY AS NEEDED FOR SEVERE PAIN (MUST LAST 30 DAYS)        There is no refill protocol information for this order

## 2022-09-07 ENCOUNTER — HOSPITAL ENCOUNTER (OUTPATIENT)
Dept: PHYSICAL THERAPY | Facility: CLINIC | Age: 47
Setting detail: THERAPIES SERIES
Discharge: HOME | End: 2022-09-07
Attending: PREVENTIVE MEDICINE
Payer: COMMERCIAL

## 2022-09-07 PROCEDURE — 97110 THERAPEUTIC EXERCISES: CPT | Mod: GP | Performed by: PHYSICAL THERAPIST

## 2022-09-07 RX ORDER — INSULIN GLARGINE 100 [IU]/ML
42 INJECTION, SOLUTION SUBCUTANEOUS EVERY MORNING
Qty: 15 ML | Refills: 0 | OUTPATIENT
Start: 2022-09-07

## 2022-09-14 ENCOUNTER — TELEPHONE (OUTPATIENT)
Dept: NEUROSURGERY | Facility: CLINIC | Age: 47
End: 2022-09-14

## 2022-09-14 NOTE — TELEPHONE ENCOUNTER
SUBJECTIVE, continued:    I have reviewed and agree with the notes made by the Chiropractic Technician.   Mild neck pain and headache or still an intermittent issue when she request care and evaluation of her cervical spine as a result today.  Pain levels are 1-2/10 with significant stiffness.  There is no radiation to her arms.    OBJECTIVE FINDINGS:  Examination revealed:  Examination revealed:  (Cervical spine) Cervical spine facet joint function is within normal limits except for her suboccipital and upper to mid cervical facet joints that exhibited limited passive range of motion and segmental restriction with tenderness upon palpation. The following muscles were examined for normal flexibility and tone; right and left paraspinal muscles;  right and left upper trapezius muscle: right and left scalene muscle; right and left levator scapulae muscle;  right and left suboccipital muscle; these muscles were within normal limits except for her suboccipital and left cervical paraspinal to left levator scapulae muscles that exhibited limited flexibility and were hypertonic at rest.    Cervical range of motion was generally within normal limits.    (Lumbar, sacral and pelvic spine) Lumbar spine facet joint function is within normal limits except for her lumbosacral facet joints that exhibited limited passive range of motion and segmental restriction and tenderness on palpation;  sacroiliac joint function is with normal limits except for her right sacroiliac joint that exhibited limited passive range of motion and joint restriction;  the following muscles were examined for flexibility and tone at rest: right and left piriformis muscles; right and left hamstring muscles; right and left gluteus medius muscles and gluteus kate muscles; right and left quadratus lumborum; right and left tensor fasciae latae muscles; right and left quadriceps muscles; right and left lumbar paraspinal muscles. These muscles were found to be  Writer reached out and spoke to patient. Per Dr Brooke, he would like to discuss leave forms for patient before they are sent back in to patient employer. Patient is scheduled on 10/04. Included in the leave forms clinic received from employer, was a letter that was opposite of what was told by patient. Dr Brooke wrote on letter informing employer he will discuss leave with patient before anything is sent in. Writer faxed letter back with provider note on it. Patient verbalized to writer to keep appointment with Dr Brooke on 10/04 instead of rescheduling to sooner date.     Deb Guerra on 9/14/2022 at 1:01 PM   within normal limits except for her right lower lumbar paraspinal and gluteal muscles that exhibited limited flexibility and were hypertonic at rest.    Lumbar range of motion was within normal limits    (Hip) Range of motion is within normal limits bilaterally      (Lower back)   Tenderness to palpation is diminished over right sacroiliac joint.    ASSESSMENT:  1. Somatic dysfunction of sacroiliac joint    2. Somatic dysfunction of sacral region    3. Somatic dysfunction of lumbar region    4. Sacroiliitis (CMS/HCC)    5. Cervical somatic dysfunction    6. Neck stiffness        Complicating Factors/Co-morbidities:  Deconditioning     PLAN:  Patient was evaluated and treated with manipulation to her occiput-C3, and right sacroiliac joint  via diversified manipulation technique, to her right sacroiliac joint  via drop technique, to her lumbar spine  via Dominguez distraction technique to improve function and passive range of motion of involved joints. Patient also treated with contract/relax stretch muscle noted as taut in objective findings to improve flexibility and decrease strain to spinal structures.    Therapeutic Exercise /Rehab /Modalities performed today:  None today    Patient Instructions /Education:  Patient was educated in the nature of condition and likely pain generators as well as a plan of care to resolve symptoms and improve muscular and skeletal function. Patient noted verbal understanding of condition and is agreeable to plan of care. Patient stated understanding of and was in agreement with the discussed instructions.        Goals of Care: Goal of care is to improve muscular and skeletal function and provide symptom relief in 2-3 weeks, 2-4 treatments.     Other treatment options discussed with patient: None     Patient rates her pain post treatment at for  0-1/10 with 10 being the worst.    Plan of care: Patient is advised to see me Thursday, however she will be out of town to Monday.    Patient to  return Monday for continued care and treatment of her condition.    Length of Visit: 15 face to face minutes. Treatment was tolerated without incident

## 2022-09-19 ENCOUNTER — HOSPITAL ENCOUNTER (OUTPATIENT)
Dept: PHYSICAL THERAPY | Facility: CLINIC | Age: 47
Setting detail: THERAPIES SERIES
Discharge: HOME | End: 2022-09-19
Attending: PREVENTIVE MEDICINE
Payer: COMMERCIAL

## 2022-09-19 PROCEDURE — 97110 THERAPEUTIC EXERCISES: CPT | Mod: GP | Performed by: PHYSICAL THERAPIST

## 2022-09-22 ENCOUNTER — OFFICE VISIT (OUTPATIENT)
Dept: NEUROSURGERY | Facility: CLINIC | Age: 47
End: 2022-09-22
Payer: COMMERCIAL

## 2022-09-22 VITALS
SYSTOLIC BLOOD PRESSURE: 143 MMHG | DIASTOLIC BLOOD PRESSURE: 80 MMHG | HEART RATE: 57 BPM | BODY MASS INDEX: 30.22 KG/M2 | WEIGHT: 188 LBS | HEIGHT: 66 IN

## 2022-09-22 DIAGNOSIS — M79.18 MYOFASCIAL PAIN: ICD-10-CM

## 2022-09-22 DIAGNOSIS — M54.50 ACUTE LEFT-SIDED LOW BACK PAIN WITHOUT SCIATICA: ICD-10-CM

## 2022-09-22 DIAGNOSIS — R29.898 MUSCULAR DECONDITIONING: ICD-10-CM

## 2022-09-22 DIAGNOSIS — M99.04 SACROILIAC JOINT SOMATIC DYSFUNCTION: Primary | ICD-10-CM

## 2022-09-22 PROCEDURE — 99215 OFFICE O/P EST HI 40 MIN: CPT | Performed by: PREVENTIVE MEDICINE

## 2022-09-22 ASSESSMENT — PAIN SCALES - GENERAL: PAINLEVEL: MODERATE PAIN (4)

## 2022-09-22 NOTE — LETTER
"    9/22/2022         RE: Stacy Brown  [address] [facility] [address]        Dear Colleague,    Thank you for referring your patient, Stacy Brown, to the [facility] [place]. Please see a copy of my visit note below.      Subjective:  Stacy Brown is a 47 year old female who presents today for follow-up regarding     Low back pain    Work comp injury [employer] 7/15/2022.  Reported noncompliance with work restriction, and union grievance pending    Pelvic Joint Dysfunction-resolved    Neurologically intact    Minor setback of muscular pain associated with her recent move from her dwelling, and she had no choice other than to do some lifting but try to stay within the restrictions.        PRIOR HISTORY from 7/19/2022:  She was seen for evaluation of low back pain and a work comp injury upon referral from the emergency department.  ED note from 7/15/2022 records the following: \"Stacy Brown is a 47 year old female who presents for evaluation of an injury that occurred at work earlier today.  She states that she bent over to  a package weighing 30-40 pounds.  She picked it up to waist level and then transferred it to a cart.  When she was putting the package down in the cart she felt and heard a pop in her low back which was very audible.  Her coworker about 8 feet away heard the pop as well.  She had immediate onset of 9 on a scale of 10 pain which was sharp in nature.  She has had some radiation of the pain down the left thigh down to the level of the mid lower leg.  Does not extend into the foot.  She has had off-and-on numbness/tingling of the medial thigh.  She reports chronic numbness of her left foot from her previous issue, but this is unchanged.  She denies any loss of bowel/bladder function.  No recent fever or chills.  No saddle anesthesia.  No other injuries.  She is here with her .  She did not take anything for symptoms prior to " "coming to the ED.\"  Her exam showed tender L3-L5 spinous processes as well as bilateral paravertebral muscles, limited lumbosacral range of motion, and intact neurologic function.  Peripheral pulses were normal.  She had full painless range of motion of hips and knees.  SLR was noted to be positive at 45 degrees.     Lumbar x-rays taken that day were normal  She was given a Dilaudid injection and discharged home with a prescription for 50 mg prednisone pills and cyclobenzaprine.  Diagnosis was lumbar sprain but disc herniation was considered as a possibility for left leg symptoms.  No MRI was done.  Restrictions were given.  Orthopedic  referral was made.  She was advised to follow her blood sugars because of the steroids.     Stacy confirms the above history.  She points to the midline of the lumbosacral spine as the site of the perceived \"pop\" that she heard as well as the site of recurrent pain..  She has intermittent tingling but no true numbness down the posterior left thigh and calf.  There is no true numbness or weakness, bowel or bladder dysfunction, or saddle anesthesia.        Updated 8/3/2022 HISTORY:    PT staff message as follows: \"Mary Herman seen Stacy 2x. She started on Myofascial stretches and I added in for psoas and ITB, she started the stabilization protocol also. I had her trial SI belt per her preference and felt that was good support for her upright items at work so I put an order in and dispensed for her to use at work. Her innominate was better after I corrected once and you corrected once. Ill keep working with her.\"     Stacy is not interested in being taken off of work but she says that her supervisor today gave her a \"direct order\" to exceed her restrictions by having her lift 20, 40 and 60 pounds from from the floor, in direct conflict with my work restrictions.  She was on the phone with her  at the time and they are filing a grievance with the employer.  She " "has spoken HR.  She was told that if she did not do it she was told today she would be fired.     Stacy feels like she is making progress and she is working with her physical therapist who saw her last week.  She does not feel like she is \"off\" today.  She is not currently represented by an      Updated 9/6/2022 HISTORY:    Staff message received from her PT as follows: \"4 total visits with Stacy d/t her family stomach flu illness the last 2 weeks missed a few sessions.   She is 70-80% better, her pain really has remained in the L hip posterior to greater trochanter. Everetts ilium and sacrum level, SI belt helpful as needed, working hip ROM along with myofascial protocol stretch and stability work, we reviewed lifting mechanics today She will continue for another few weeks.\"        Stacy confirms that she is making good progress.  Unfortunately, because her house was being foreclosed upon, she and her family and some coworkers had to move her belongings over the weekend and she thinks she may have lifted as much is 40 pounds but her some thinks it was not more than 20.  In any case she is little more sore than she was before the weekend but does not feel like her pelvis is off.  No other radiating symptoms or red flags noted.     INTERIM HISTORY:    Follow-up message from PT as follows: \"Has attended 2 visits since your last visit with her. We needed to do extension based exercise to centralize radicular pain after her flare. We have now been pain free for 4 days with this and will continue this for the next 4-8 weeks to maintain discogenic symptom control especially if she returns to work. She's doing myofascial protocols and I added in quadruped core work.  We did some work with lifting mechanics.  I will  see her biweekly for progression\"    In addition, her employer, [employer], sent me a letter asking for restrictions that they committed to complying with, and disputed the " "patient's report about her employer requiring her to exceed restrictions.  I agreed to first see Stacy early in order to address medically reasonable restrictions , and to give her employer another chance to comply with them appropriately, rather than simply change them without discussing this with my patient..    I did review with Stacy the letter the post office sent me as well as their response to the grievance.  Stacy disputes the accuracy of that response and I will stay out of it.    Medically she was feeling a lot better.  She is doing gluteal strengthening exercises and that causing some expected achiness in those muscles but overall she is feeling \"a lot better\".  She is willing to go back to work within the restrictions that I gave her provided that her employer stays within those restrictions and does not threaten her to exceed them.  Her only current symptoms are a little bit of achiness in the but without any referred symptoms down her legs bowel or bladder dysfunction or saddle anesthesia.  Her therapist has not had to adjust her pelvis in some time.  She \"feels so much better\".    Past medical history is reviewed and is unchanged for any new medical diagnoses in the interim.  Pertinent for chronic knee pain, status post NSTEMI and CABG March 2021, tobacco abuse disorder, type 2 diabetes.  She denies any prior low back problems     SOCIAL HX: This is a work comp injury.  She is a  for the [employer].  She was doing her normal job without restrictions at the time of the injury.  She is .  Other than her job, she has a relatively sedentary lifestyle and denies sports hobbies or activities.  See above.  Her employer is requiring her to exceed her restrictions, and violation of my work restrictions.  I took her off work on 8/3/2022 as a result.She remains off work.  The status of her union grievance with the employee remains unknown to her as there are 6 other " people with grievances that are pending.     Objective:       IMAGING: Images and report reviewed.     LUMBAR SPINE TWO TO THREE VIEWS   7/15/2022                                                                   IMPRESSION: Alignment of the lumbar spine is within normal limits. No  loss of vertebral body height. No significant degenerative endplate  changes or loss of intervertebral disc space.      EXAMINATION:    CONSTITUTIONAL:  Vital signs as above.  No acute distress.  The patient is well nourished and well groomed.  She transitions well and moves fluidly  PSYCHIATRIC:  The patient is awake, alert, oriented to person, place and time.  The patient is answering questions appropriately with clear speech.  Normal affect.  Able to follow commands  MUSCULOSKELETAL:  Gait is non-antalgic.  The patient is able to heel and toe walk without any difficulty.   Squat and rise normal.   She does have a tendency to hesitate on full squat and rise and want to use her hands and I told her that is important for her to be able to do without assistance so she can lift with proper technique..  Back ROM: Full fluid and pain.     NEUROLOGICAL:    Motor:  L3-S1 myotomes 5/5     Sensation to light touch is intact in the bilateral lower extremities.    SLR negative    Pelvis: Neutral alignment.  Spring testing the SI joints negative.  No gluteal tenderness    Assessment     Stacy Brown  is a 47 year old y.o. female who presents today for follow-up regarding     Low back pain    Work comp injury [employer] 7/15/2022.  Reported noncompliance with work restriction, and union grievance pending.  Employer is disputing this.    Pelvic Joint Dysfunction-resolved    Neurologically intact    Minor setback of muscular pain associated with her recent move from her dwelling, and she had no choice other than to do some lifting but try to stay within the restrictions-resolved    Based on PT response, possibility of discogenic source of  pain as well.      Plan:  As I am not a finder of fact, I cannot weigh in as to which version of the events is accurate but I have no reason to disbelieve my patient.  In any case she is willing to try going back to work with graduated removal or restrictions.  I will let her return to work now with a 25 pound lifting limit, raise that to 40 pounds in 2 weeks, and remove all restrictions in 4 weeks.  Follow-up with me in 6 weeks.  In order to mitigate against future recurrences of this back problem, I am also recommending a medic strengthening program which she is willing to undergo if she can get her insurance approval for it.      45 minutes of time spent doing chart review, history and exam, documentation, counseling, education, coordination of care, and other activities as described above.          Advised patient to call or return early if symptoms worsen, or having problems controlling bladder and bowel function or worsening leg weakness.     Please note: Voice recognition software was used in this dictation.  It may therefore contain typographical errors.  Jorge Luis Brooke MD      Again, thank you for allowing me to participate in the care of your patient.        Sincerely,        Jorge Luis Brooke MD

## 2022-09-22 NOTE — LETTER
September 22, 2022      Stacy Brown  [address] [facility] [address]          [facility]  [address] [place] [address]    To whom it may concern:    Stacy Brown  was seen in the office today for a work injury dated 7/15/2022.      She may return to work today with a lifting limit of 25 pounds.  In 2 weeks she may lift up to 40 pounds.  In 4 weeks she may resume all work duties without restrictions.    As you know, there is dispute with respect to past reports of Stacy being required to exceed restrictions.  I am going to put the owners of following the restrictions upon her employer as they have committed to doing so.  Should she will report to me that she is again being required to exceed restrictions, I will have no option other than to take her off of work.  Both Stacy and I are hoping to avoid this.  She is going to come back and see me in 6 weeks.  I have also ordered an aggressive lumbar strengthening program to limit the likelihood of a future injury.    Sincerely,        Jorge Luis Brooke MD, MPH  Medical Spine  Occupational Medicine

## 2022-09-22 NOTE — PATIENT INSTRUCTIONS
Assessment     Stacy Brown  is a 47 year old y.o. female who presents today for follow-up regarding   Low back pain  Work comp injury [employer] 7/15/2022.  Reported noncompliance with work restriction, and union grievance pending.  Employer is disputing this.  Pelvic Joint Dysfunction-resolved  Neurologically intact  Minor setback of muscular pain associated with her recent move from her dwelling, and she had no choice other than to do some lifting but try to stay within the restrictions-resolved  Based on PT response, possibility of discogenic source of pain as well.      Plan:  As I am not a finder of fact, I cannot weigh in as to which version of the events is accurate but I have no reason to disbelieve my patient.  In any case she is willing to try going back to work with graduated removal or restrictions.  I will let her return to work now with a 25 pound lifting limit, raise that to 40 pounds in 2 weeks, and remove all restrictions in 4 weeks.  Follow-up with me in 6 weeks.  In order to mitigate against future recurrences of this back problem, I am also recommending a medic strengthening program which she is willing to undergo if she can get her insurance approval for it.

## 2022-09-22 NOTE — NURSING NOTE
"Reason For Visit:   Chief Complaint   Patient presents with     RECHECK     Sac joint dysfunction follow up          Occupation: [employer]   Currently working? No  Work status?  On medical leave.    Sports: no  Activities: no               BP (!) 143/80 (BP Location: Right arm, Patient Position: Sitting, Cuff Size: Adult Large)   Pulse 57   Ht 1.676 m (5' 6\")   Wt 85.3 kg (188 lb)   LMP 03/07/2021 (Approximate)   BMI 30.34 kg/m        Allergies   Allergen Reactions     Amoxicillin Hives     Hydrocodone-Acetaminophen GI Disturbance     Tylenol is ok       Current Outpatient Medications   Medication     acetaminophen (TYLENOL) 325 MG tablet     amLODIPine (NORVASC) 2.5 MG tablet     aspirin (ASA) 325 MG tablet     bismuth subsalicylate (PEPTO-BISMOL MAX STRENGTH) 525 MG/15ML     blood glucose (NO BRAND SPECIFIED) test strip     blood glucose calibration (NO BRAND SPECIFIED) solution     blood glucose monitoring (FREESTYLE) lancets     Blood Glucose Monitoring Suppl (ACCU-CHEK COMPLETE) KIT     hydrocortisone (CORTAID) 1 % external cream     insulin aspart (NOVOLOG FLEXPEN) 100 UNIT/ML pen     insulin glargine (BASAGLAR KWIKPEN) 100 UNIT/ML pen     insulin pen needle (31G X 8 MM) 31G X 8 MM miscellaneous     insulin pen needle (NOVOFINE) 32G X 6 MM miscellaneous     lisinopril (ZESTRIL) 2.5 MG tablet     metFORMIN (GLUCOPHAGE-XR) 500 MG 24 hr tablet     methylPREDNISolone (MEDROL DOSEPAK) 4 MG tablet therapy pack     metoprolol tartrate (LOPRESSOR) 25 MG tablet     Multiple Vitamins-Minerals (MULTI-VITAMIN GUMMIES) CHEW     ondansetron (ZOFRAN ODT) 4 MG ODT tab     oxyCODONE (ROXICODONE) 5 MG tablet     polyethylene glycol (MIRALAX) 17 GM/Dose powder     rosuvastatin (CRESTOR) 20 MG tablet     senna-docusate (SENOKOT-S/PERICOLACE) 8.6-50 MG tablet     tiZANidine (ZANAFLEX) 4 MG tablet     nitroGLYcerin (NITROSTAT) 0.4 MG sublingual tablet     No current facility-administered medications for " this visit.         Steve Najera MA

## 2022-10-03 ENCOUNTER — HOSPITAL ENCOUNTER (OUTPATIENT)
Dept: PHYSICAL THERAPY | Facility: CLINIC | Age: 47
Setting detail: THERAPIES SERIES
Discharge: HOME | End: 2022-10-03
Attending: PREVENTIVE MEDICINE
Payer: COMMERCIAL

## 2022-10-03 PROCEDURE — 97110 THERAPEUTIC EXERCISES: CPT | Mod: GP | Performed by: PHYSICAL THERAPIST

## 2022-10-03 PROCEDURE — 97530 THERAPEUTIC ACTIVITIES: CPT | Mod: GP | Performed by: PHYSICAL THERAPIST

## 2022-10-04 ENCOUNTER — MYC REFILL (OUTPATIENT)
Dept: FAMILY MEDICINE | Facility: CLINIC | Age: 47
End: 2022-10-04

## 2022-10-04 DIAGNOSIS — M54.9 MECHANICAL BACK PAIN: ICD-10-CM

## 2022-10-04 DIAGNOSIS — G89.4 CHRONIC PAIN SYNDROME: ICD-10-CM

## 2022-10-04 NOTE — TELEPHONE ENCOUNTER
Requested Prescriptions   Pending Prescriptions Disp Refills    oxyCODONE (ROXICODONE) 5 MG tablet 39 tablet 0        There is no refill protocol information for this order           Estephanie Coffman, MARCELAN, RN

## 2022-10-05 RX ORDER — OXYCODONE HYDROCHLORIDE 5 MG/1
TABLET ORAL
Qty: 39 TABLET | Refills: 0 | Status: SHIPPED | OUTPATIENT
Start: 2022-10-11 | End: 2022-11-06

## 2022-11-01 ENCOUNTER — OFFICE VISIT (OUTPATIENT)
Dept: FAMILY MEDICINE | Facility: CLINIC | Age: 47
End: 2022-11-01
Payer: COMMERCIAL

## 2022-11-01 VITALS
DIASTOLIC BLOOD PRESSURE: 70 MMHG | TEMPERATURE: 96.9 F | OXYGEN SATURATION: 100 % | HEART RATE: 76 BPM | RESPIRATION RATE: 16 BRPM | WEIGHT: 187.8 LBS | SYSTOLIC BLOOD PRESSURE: 112 MMHG | BODY MASS INDEX: 30.31 KG/M2

## 2022-11-01 DIAGNOSIS — Z95.1 S/P CABG (CORONARY ARTERY BYPASS GRAFT): ICD-10-CM

## 2022-11-01 DIAGNOSIS — Z79.4 TYPE 2 DIABETES MELLITUS WITH HYPERGLYCEMIA, WITH LONG-TERM CURRENT USE OF INSULIN (H): ICD-10-CM

## 2022-11-01 DIAGNOSIS — S06.0X0D CONCUSSION WITHOUT LOSS OF CONSCIOUSNESS, SUBSEQUENT ENCOUNTER: ICD-10-CM

## 2022-11-01 DIAGNOSIS — E11.65 TYPE 2 DIABETES MELLITUS WITH HYPERGLYCEMIA, WITH LONG-TERM CURRENT USE OF INSULIN (H): ICD-10-CM

## 2022-11-01 DIAGNOSIS — E78.5 HYPERLIPIDEMIA LDL GOAL <100: ICD-10-CM

## 2022-11-01 DIAGNOSIS — F33.1 MAJOR DEPRESSIVE DISORDER, RECURRENT EPISODE, MODERATE (H): Primary | ICD-10-CM

## 2022-11-01 DIAGNOSIS — Z12.11 SCREEN FOR COLON CANCER: ICD-10-CM

## 2022-11-01 DIAGNOSIS — G89.4 CHRONIC PAIN SYNDROME: ICD-10-CM

## 2022-11-01 DIAGNOSIS — Z79.899 ENCOUNTER FOR LONG-TERM (CURRENT) USE OF MEDICATIONS: ICD-10-CM

## 2022-11-01 LAB
CHOLEST SERPL-MCNC: 121 MG/DL
CREAT UR-MCNC: 31 MG/DL
CREAT UR-MCNC: 31 MG/DL
FASTING STATUS PATIENT QL REPORTED: NO
HBA1C MFR BLD: 7.6 % (ref 0–5.6)
HDLC SERPL-MCNC: 51 MG/DL
LDLC SERPL CALC-MCNC: 27 MG/DL
MICROALBUMIN UR-MCNC: 9 MG/L
MICROALBUMIN/CREAT UR: 29.03 MG/G CR (ref 0–25)
NONHDLC SERPL-MCNC: 70 MG/DL
TRIGL SERPL-MCNC: 213 MG/DL

## 2022-11-01 PROCEDURE — 0134A COVID-19,PF,MODERNA BIVALENT: CPT | Performed by: FAMILY MEDICINE

## 2022-11-01 PROCEDURE — 91313 COVID-19,PF,MODERNA BIVALENT: CPT | Performed by: FAMILY MEDICINE

## 2022-11-01 PROCEDURE — 96127 BRIEF EMOTIONAL/BEHAV ASSMT: CPT | Performed by: FAMILY MEDICINE

## 2022-11-01 PROCEDURE — 80307 DRUG TEST PRSMV CHEM ANLYZR: CPT | Performed by: FAMILY MEDICINE

## 2022-11-01 PROCEDURE — 99214 OFFICE O/P EST MOD 30 MIN: CPT | Mod: 25 | Performed by: FAMILY MEDICINE

## 2022-11-01 PROCEDURE — 90471 IMMUNIZATION ADMIN: CPT | Performed by: FAMILY MEDICINE

## 2022-11-01 PROCEDURE — 82043 UR ALBUMIN QUANTITATIVE: CPT | Performed by: FAMILY MEDICINE

## 2022-11-01 PROCEDURE — 80061 LIPID PANEL: CPT | Performed by: FAMILY MEDICINE

## 2022-11-01 PROCEDURE — 36415 COLL VENOUS BLD VENIPUNCTURE: CPT | Performed by: FAMILY MEDICINE

## 2022-11-01 PROCEDURE — 90686 IIV4 VACC NO PRSV 0.5 ML IM: CPT | Performed by: FAMILY MEDICINE

## 2022-11-01 PROCEDURE — 83036 HEMOGLOBIN GLYCOSYLATED A1C: CPT | Performed by: FAMILY MEDICINE

## 2022-11-01 ASSESSMENT — ANXIETY QUESTIONNAIRES
IF YOU CHECKED OFF ANY PROBLEMS ON THIS QUESTIONNAIRE, HOW DIFFICULT HAVE THESE PROBLEMS MADE IT FOR YOU TO DO YOUR WORK, TAKE CARE OF THINGS AT HOME, OR GET ALONG WITH OTHER PEOPLE: VERY DIFFICULT
4. TROUBLE RELAXING: SEVERAL DAYS
GAD7 TOTAL SCORE: 14
6. BECOMING EASILY ANNOYED OR IRRITABLE: NEARLY EVERY DAY
7. FEELING AFRAID AS IF SOMETHING AWFUL MIGHT HAPPEN: MORE THAN HALF THE DAYS
8. IF YOU CHECKED OFF ANY PROBLEMS, HOW DIFFICULT HAVE THESE MADE IT FOR YOU TO DO YOUR WORK, TAKE CARE OF THINGS AT HOME, OR GET ALONG WITH OTHER PEOPLE?: VERY DIFFICULT
GAD7 TOTAL SCORE: 14
7. FEELING AFRAID AS IF SOMETHING AWFUL MIGHT HAPPEN: MORE THAN HALF THE DAYS
5. BEING SO RESTLESS THAT IT IS HARD TO SIT STILL: MORE THAN HALF THE DAYS
GAD7 TOTAL SCORE: 14
3. WORRYING TOO MUCH ABOUT DIFFERENT THINGS: MORE THAN HALF THE DAYS
1. FEELING NERVOUS, ANXIOUS, OR ON EDGE: MORE THAN HALF THE DAYS
2. NOT BEING ABLE TO STOP OR CONTROL WORRYING: MORE THAN HALF THE DAYS

## 2022-11-01 ASSESSMENT — PATIENT HEALTH QUESTIONNAIRE - PHQ9
SUM OF ALL RESPONSES TO PHQ QUESTIONS 1-9: 14
10. IF YOU CHECKED OFF ANY PROBLEMS, HOW DIFFICULT HAVE THESE PROBLEMS MADE IT FOR YOU TO DO YOUR WORK, TAKE CARE OF THINGS AT HOME, OR GET ALONG WITH OTHER PEOPLE: VERY DIFFICULT
SUM OF ALL RESPONSES TO PHQ QUESTIONS 1-9: 14

## 2022-11-01 ASSESSMENT — ENCOUNTER SYMPTOMS: NERVOUS/ANXIOUS: 1

## 2022-11-01 ASSESSMENT — PAIN SCALES - GENERAL: PAINLEVEL: NO PAIN (0)

## 2022-11-01 NOTE — PROGRESS NOTES
Assessment & Plan       ICD-10-CM    1. Major depressive disorder, recurrent episode, moderate (H)  F33.1 Adult Mental Health  Referral     DISCONTINUED: FLUoxetine (PROZAC) 20 MG capsule      2. Type 2 diabetes mellitus with hyperglycemia, with long-term current use of insulin (H)  E11.65 Adult Eye  Referral    Z79.4 Lipid panel reflex to direct LDL Non-fasting     Albumin Random Urine Quantitative with Creat Ratio     HEMOGLOBIN A1C      3. Screen for colon cancer  Z12.11 Colonoscopy Screening  Referral      4. Encounter for long-term (current) use of medications  Z79.899       5. Chronic pain syndrome  G89.4 [id number] - Urine Drug Confirmation Panel (Comprehensive)      6. Hyperlipidemia LDL goal <100  E78.5       7. S/P CABG (coronary artery bypass graft)  Z95.1       8. Concussion without loss of consciousness, subsequent encounter  S06.0X0D          We talked at length about continued to her mental health.  She is interested in going back on medication and I agree to prescribe her Prozac again since that worked well for her in the past.  We will start at 20 mg daily.  After 4 weeks we will increase to 40 mg daily, as that was her previous effective dose.  I also highly recommended counseling and she agrees.  She has a lot going on in her life, many stresses, marital issues, dealing with aging parents, along with her health and financial issues.  We talked about sexual dysfunction and her decreased libido.  We discussed how all these veins are likely intertwined, and ways to deal with her libido.  I encouraged her to consider marital counseling and she is not sure her  will be on board right away, but perhaps over time he will see the effort she is going in and be more interested in counseling together.  We talked about setting up date night, having good communication about their sex life and ways to try to increase her libido through foreplay, romance, or sex therapy.  We  "discussed that her history of concussion/head injury can play a role in her emotional processing of grief and stress.    I am going to see her back in mid-to-late January, and if she needs follow-up between now and then she will see one of my partners.    We also talked about routine screenings and health follow-up that she is due for, get her labs done today for diabetes follow-up.  I placed order for colonoscopy and she will likely do that in the new year once issues with her dad get settled a little bit.    She was given COVID booster and flu shot today.    She is due for eye exam and will make that appointment.  Referral given.    I congratulated her on her continued abstinence from tobacco.    See patient instructions:    Patient Instructions   I am starting you back on fluoxetine, 20 mg daily.  Please take 1 pill daily for the first 4 weeks, then increase to 2 pills daily if needed.    Congratulations on staying away from cigarettes.  Keep up the good work.    I also sent in a referral for counseling, someone should be calling you to set up an appointment.  Please think about asking [facility]ble to go after a little while.    I look forward to seeing her back in January but if you need anything sooner please contact one of my partners for follow-up visit.    Please schedule your eye exam.        40 minutes spent on the date of the encounter doing chart review, history and exam, documentation and further activities per the note     BMI:   Estimated body mass index is 30.31 kg/m  as calculated from the following:    Height as of 9/22/22: 1.676 m (5' 6\").    Weight as of this encounter: 85.2 kg (187 lb 12.8 oz).   Weight management plan: continue working on healthy eating    Return in about 11 weeks (around 1/17/2023) for in person.    Yaima Muse MD  [facility] HospitalJOSE Kumar is a 47 year old, presenting for the following health issues:  Diabetes, Anxiety, and " Depression      Anxiety    History of Present Illness       Reason for visit:  I think i need an antidepressant and maybe anxiety medication    She eats 2-3 servings of fruits and vegetables daily.She consumes 1 sweetened beverage(s) daily.She exercises with enough effort to increase her heart rate 30 to 60 minutes per day.  She exercises with enough effort to increase her heart rate 4 days per week.   She is taking medications regularly.    Today's PHQ-9         PHQ-9 Total Score: 14    PHQ-9 Q9 Thoughts of better off dead/self-harm past 2 weeks :   Not at all    How difficult have these problems made it for you to do your work, take care of things at home, or get along with other people: Very difficult  Today's BALDOMERO-7 Score: 14     Stacy has been through a lot.  This last year has been difficult.  She and her  were wrongfully evicted from their home and also had to get rid of their dog.  They are now renting a small apartment in [place].  Also her aging father had he moved into a nursing home, 100 mile away from his wife because of limited bed availability.  Her mom who is 67 now lives alone at home and needs a lot of help.  This has been a stressor.  She was always her daddy's little girl and is very emotional about this change in his life.  She has not been taking good care of herself as she would like, has not been eating as healthy as she should.  She is overdue for eye exam.  She is overdue for labs.  She has not had colonoscopy.    She is proud that she has not smoked for 20 months coming up this weekend.  She had heart attack and had CABG that was her motivation to quit.    She is concerned about her lack of libido.  She states that her  has very low tolerance for any marital strain.  As soon as she raises her voice a little bit he wants to leave and she knows that they need to work hard on keeping together.  She feels she needs to be back on a depression medication.  She was on Prozac for  many years but quit it about 2 to 3 years ago because she did not feel like she needed it anymore at that time.  She has 2 children, they are both doing relatively well.  Both are working, handling the move fairly well.  She and her  do spend Mondays together every week.  Work is going well for her.    Her bowel movements are better.  She has stopped using MiraLAX, eats a couple grapes every day and that keeps her regular.    Review of Systems   Psychiatric/Behavioral: The patient is nervous/anxious.       Constitutional, HEENT, cardiovascular, pulmonary, gi and gu systems are negative, except as otherwise noted.      Objective    /70   Pulse 76   Temp 96.9  F (36.1  C) (Tympanic)   Resp 16   Wt 85.2 kg (187 lb 12.8 oz)   LMP 03/07/2021 (Approximate)   SpO2 100%   BMI 30.31 kg/m    Body mass index is 30.31 kg/m .  Physical Exam   Vitals noted.  Patient alert, oriented, and in no acute distress.   CV:  RRR without murmur.   Respiratory:  Lungs clear to auscultation bilaterally.     Orders Placed This Encounter   Procedures     INFLUENZA VACCINE IM > 6 MONTHS VALENT IIV4 (AFLURIA/FLUZONE)     COVID-19,PF,MODERNA BIVALENT 18+Yrs     Lipid panel reflex to direct LDL Non-fasting     Albumin Random Urine Quantitative with Creat Ratio     HEMOGLOBIN A1C     Urine Drug Confirmation Panel     Urine Creatinine for Drug Screen Panel     Colonoscopy Screening  Referral     Adult Eye  Referral     Adult Mental Health  Referral

## 2022-11-02 NOTE — PROGRESS NOTES
"  Subjective:  Stacy Brown is a 47 year old female who presents today for follow-up regarding     Low back pain    Work comp injury [employer] 7/15/2022.  Reported noncompliance with work restriction, and union grievance pending.  Employer is disputing this.    Pelvic Joint Dysfunction-resolved    Neurologically intact    Minor setback of muscular pain associated with her recent move from her dwelling, and she had no choice other than to do some lifting but try to stay within the restrictions-resolved    Based on PT response, possibility of discogenic source of pain as well.  She was returned with a graduated removal of restrictions to her normal job by 4 weeks following my 9/22/2022 visit.  [employer] strengthening was recommended pending insurance approval.    PRIOR HISTORY from 7/19/2022:  She was seen for evaluation of low back pain and a work comp injury upon referral from the emergency department.  ED note from 7/15/2022 records the following: \"Stacy Brown is a 47 year old female who presents for evaluation of an injury that occurred at work earlier today.  She states that she bent over to  a package weighing 30-40 pounds.  She picked it up to waist level and then transferred it to a cart.  When she was putting the package down in the cart she felt and heard a pop in her low back which was very audible.  Her coworker about 8 feet away heard the pop as well.  She had immediate onset of 9 on a scale of 10 pain which was sharp in nature.  She has had some radiation of the pain down the left thigh down to the level of the mid lower leg.  Does not extend into the foot.  She has had off-and-on numbness/tingling of the medial thigh.  She reports chronic numbness of her left foot from her previous issue, but this is unchanged.  She denies any loss of bowel/bladder function.  No recent fever or chills.  No saddle anesthesia.  No other injuries.  She is here with her .  She did not take anything " "for symptoms prior to coming to the ED.\"  Her exam showed tender L3-L5 spinous processes as well as bilateral paravertebral muscles, limited lumbosacral range of motion, and intact neurologic function.  Peripheral pulses were normal.  She had full painless range of motion of hips and knees.  SLR was noted to be positive at 45 degrees.     Lumbar x-rays taken that day were normal  She was given a Dilaudid injection and discharged home with a prescription for 50 mg prednisone pills and cyclobenzaprine.  Diagnosis was lumbar sprain but disc herniation was considered as a possibility for left leg symptoms.  No MRI was done.  Restrictions were given.  Orthopedic  referral was made.  She was advised to follow her blood sugars because of the steroids.     Stacy confirms the above history.  She points to the midline of the lumbosacral spine as the site of the perceived \"pop\" that she heard as well as the site of recurrent pain..  She has intermittent tingling but no true numbness down the posterior left thigh and calf.  There is no true numbness or weakness, bowel or bladder dysfunction, or saddle anesthesia.        Updated 8/3/2022 HISTORY:    PT staff message as follows: \"Mary Herman seen Stacy 2x. She started on Myofascial stretches and I added in for psoas and ITB, she started the stabilization protocol also. I had her trial SI belt per her preference and felt that was good support for her upright items at work so I put an order in and dispensed for her to use at work. Her innominate was better after I corrected once and you corrected once. Ill keep working with her.\"     Stacy is not interested in being taken off of work but she says that her supervisor today gave her a \"direct order\" to exceed her restrictions by having her lift 20, 40 and 60 pounds from from the floor, in direct conflict with my work restrictions.  She was on the phone with her  at the time and they are filing a grievance with " "the employer.  She has spoken HR.  She was told that if she did not do it she was told today she would be fired.     Stacy feels like she is making progress and she is working with her physical therapist who saw her last week.  She does not feel like she is \"off\" today.  She is not currently represented by an      Updated 9/6/2022 HISTORY:    Staff message received from her PT as follows: \"4 total visits with Stacy d/t her family stomach flu illness the last 2 weeks missed a few sessions.   She is 70-80% better, her pain really has remained in the L hip posterior to greater trochanter. [place] ilium and sacrum level, SI belt helpful as needed, working hip ROM along with myofascial protocol stretch and stability work, we reviewed lifting mechanics today She will continue for another few weeks.\"        Stacy confirms that she is making good progress.  Unfortunately, because her house was being foreclosed upon, she and her family and some coworkers had to move her belongings over the weekend and she thinks she may have lifted as much is 40 pounds but her some thinks it was not more than 20.  In any case she is little more sore than she was before the weekend but does not feel like her pelvis is off.  No other radiating symptoms or red flags noted.      9/23/2022 HISTORY:    Follow-up message from PT as follows: \"Has attended 2 visits since your last visit with her. We needed to do extension based exercise to centralize radicular pain after her flare. We have now been pain free for 4 days with this and will continue this for the next 4-8 weeks to maintain discogenic symptom control especially if she returns to work. She's doing myofascial protocols and I added in quadruped core work.  We did some work with lifting mechanics.  I will  see her biweekly for progression\"     In addition, her employer, [employer], sent me a letter asking for restrictions that they committed to complying " "with, and disputed the patient's report about her employer requiring her to exceed restrictions.  I agreed to first see Stacy early in order to address medically reasonable restrictions , and to give her employer another chance to comply with them appropriately, rather than simply change them without discussing this with my patient..     I did review with Stacy the letter the post office sent me as well as their response to the grievance.  Stacy disputes the accuracy of that response and I will stay out of it.     Medically she was feeling a lot better.  She is doing gluteal strengthening exercises and that causing some expected achiness in those muscles but overall she is feeling \"a lot better\".  She is willing to go back to work within the restrictions that I gave her provided that her employer stays within those restrictions and does not threaten her to exceed them.  Her only current symptoms are a little bit of achiness in the but without any referred symptoms down her legs bowel or bladder dysfunction or saddle anesthesia.  Her therapist has not had to adjust her pelvis in some time.  She \"feels so much better\".    INTERIM HISTORY:    Stacy feels like she is 100% recovered.  She is tolerated 100% of her normal job duties without difficulty.  She has not needed permanent restrictions.  She has no back pain leg pain numbness tingling weakness bowel or bladder dysfunction or saddle anesthesia.  Workers' compensation is denied her claim and she is disputing that..  Work comp would not cover the medic strengthening that I and Stacy both wanted her to go through.     Past medical history is reviewed and is unchanged for any new medical diagnoses in the interim.  Pertinent for chronic knee pain, status post NSTEMI and CABG March 2021, tobacco abuse disorder, type 2 diabetes.  She denies any prior low back problems     SOCIAL HX: This is a work comp injury.  She is a  for the [employer].  She was doing her normal job without restrictions at the time of the injury.  She is .  Other than her job, she has a relatively sedentary lifestyle and denies sports hobbies or activities.  See above.  Her employer is requiring her to exceed her restrictions, and violation of my work restrictions.  I took her off work on 8/3/2022 as a result.She remains off work.  The status of her union grievance with the employee remains unknown to her as there are 6 other people with grievances that are pending.    Objective:    IMAGING:  Images and report reviewed.     LUMBAR SPINE TWO TO THREE VIEWS   7/15/2022                                                                   IMPRESSION: Alignment of the lumbar spine is within normal limits. No  loss of vertebral body height. No significant degenerative endplate  changes or loss of intervertebral disc space.          EXAMINATION:    CONSTITUTIONAL:  Vital signs as above.  No acute distress.  The patient is well nourished and well groomed.  She transitions well and moves fluidly.  PSYCHIATRIC:  The patient is awake, alert, oriented to person, place and time.  The patient is answering questions appropriately with clear speech.  Normal affect.  Able to follow commands  MUSCULOSKELETAL:  Gait is non-antalgic.  The patient is able to heel and toe walk without any difficulty.   Squat and rise normal.   .  Back ROM: Full fluid and pain.     NEUROLOGICAL:    Motor:  L3-S1 myotomes 5/5     Sensation to light touch is intact in the bilateral lower extremities.    SLR negative    Pelvis: No dysfunction.  Standing flexion, Emma sign, and stork test all negative.    Assessment     Stacy Brown  is a 47 year old y.o. female who presents today for follow-up regarding     Low back pain    Work comp injury [employer] 7/15/2022.  Reported noncompliance with work restriction, and union grievance pending.  Employer is disputing this.    Pelvic Joint  Dysfunction-resolved    Neurologically intact    She is at [employer] with 0% PPD today      Plan:  She requires no follow-up restrictions or further care.  I still would have advised her to go through the medic strengthening program to minimize the chance of recurrent injury, but her work comp carrier declined to cover it.    Advised patient to call or return early if symptoms worsen, or having problems controlling bladder and bowel function or worsening leg weakness.     Please note: Voice recognition software was used in this dictation.  It may therefore contain typographical errors.  Jorge Luis Brooke MD

## 2022-11-02 NOTE — PATIENT INSTRUCTIONS
I am starting you back on fluoxetine, 20 mg daily.  Please take 1 pill daily for the first 4 weeks, then increase to 2 pills daily if needed.    Congratulations on staying away from cigarettes.  Keep up the good work.    I also sent in a referral for counseling, someone should be calling you to set up an appointment.  Please think about asking [employer] to go after a little while.    I look forward to seeing her back in January but if you need anything sooner please contact one of my partners for follow-up visit.    Please schedule your eye exam.

## 2022-11-04 NOTE — RESULT ENCOUNTER NOTE
Stacy, here are your lab results. Your urine protein has increased a little, showing the effects of diabetes on your kidneys. However, your blood test shows that the kidneys are still working well in September when we checked.   Cholesterol looks great.   Your diabetes is worse. We need to get that under better control through diet and exercise. I would like to try you on a new medication for your diabetes but will need to work on making sure it's covered. For now, continue on your same meds and I'll be calling you in the next few days to discuss.   Yaima Muse MD

## 2022-11-06 ENCOUNTER — MYC REFILL (OUTPATIENT)
Dept: FAMILY MEDICINE | Facility: CLINIC | Age: 47
End: 2022-11-06

## 2022-11-06 DIAGNOSIS — G89.4 CHRONIC PAIN SYNDROME: ICD-10-CM

## 2022-11-06 DIAGNOSIS — M54.9 MECHANICAL BACK PAIN: ICD-10-CM

## 2022-11-06 DIAGNOSIS — I21.4 NSTEMI (NON-ST ELEVATED MYOCARDIAL INFARCTION) (H): ICD-10-CM

## 2022-11-07 ENCOUNTER — MYC REFILL (OUTPATIENT)
Dept: FAMILY MEDICINE | Facility: CLINIC | Age: 47
End: 2022-11-07

## 2022-11-07 DIAGNOSIS — E11.65 TYPE 2 DIABETES MELLITUS WITH HYPERGLYCEMIA, WITH LONG-TERM CURRENT USE OF INSULIN (H): ICD-10-CM

## 2022-11-07 DIAGNOSIS — Z79.4 TYPE 2 DIABETES MELLITUS WITH HYPERGLYCEMIA, WITH LONG-TERM CURRENT USE OF INSULIN (H): ICD-10-CM

## 2022-11-07 NOTE — TELEPHONE ENCOUNTER
Pending Prescriptions:                       Disp   Refills    tiZANidine (ZANAFLEX) 4 MG tablet [Pharmac*90 tab*1        Sig: TAKE 1 TABLET (4 MG) BY MOUTH 3 TIMES DAILY AS NEEDED           FOR MUSCLE SPASMS        Routing refill request to provider for review/approval because:  Drug not on the [facility] refill protocol

## 2022-11-08 ENCOUNTER — OFFICE VISIT (OUTPATIENT)
Dept: NEUROSURGERY | Facility: CLINIC | Age: 47
End: 2022-11-08
Payer: COMMERCIAL

## 2022-11-08 ENCOUNTER — MYC REFILL (OUTPATIENT)
Dept: FAMILY MEDICINE | Facility: CLINIC | Age: 47
End: 2022-11-08

## 2022-11-08 ENCOUNTER — E-VISIT (OUTPATIENT)
Dept: FAMILY MEDICINE | Facility: CLINIC | Age: 47
End: 2022-11-08

## 2022-11-08 VITALS
SYSTOLIC BLOOD PRESSURE: 113 MMHG | BODY MASS INDEX: 30.38 KG/M2 | HEART RATE: 71 BPM | WEIGHT: 188.2 LBS | DIASTOLIC BLOOD PRESSURE: 83 MMHG

## 2022-11-08 DIAGNOSIS — R29.898 MUSCULAR DECONDITIONING: ICD-10-CM

## 2022-11-08 DIAGNOSIS — G89.4 CHRONIC PAIN SYNDROME: ICD-10-CM

## 2022-11-08 DIAGNOSIS — M54.9 MECHANICAL BACK PAIN: ICD-10-CM

## 2022-11-08 DIAGNOSIS — M99.04 SACROILIAC JOINT SOMATIC DYSFUNCTION: Primary | ICD-10-CM

## 2022-11-08 DIAGNOSIS — M79.18 MYOFASCIAL PAIN: ICD-10-CM

## 2022-11-08 DIAGNOSIS — K04.7 DENTAL INFECTION: Primary | ICD-10-CM

## 2022-11-08 PROCEDURE — 99214 OFFICE O/P EST MOD 30 MIN: CPT | Performed by: PREVENTIVE MEDICINE

## 2022-11-08 PROCEDURE — 99421 OL DIG E/M SVC 5-10 MIN: CPT | Performed by: FAMILY MEDICINE

## 2022-11-08 RX ORDER — OXYCODONE HYDROCHLORIDE 5 MG/1
TABLET ORAL
Qty: 39 TABLET | Refills: 0 | Status: CANCELLED | OUTPATIENT
Start: 2022-11-08

## 2022-11-08 RX ORDER — CLINDAMYCIN HCL 300 MG
300 CAPSULE ORAL 4 TIMES DAILY
Qty: 40 CAPSULE | Refills: 0 | Status: SHIPPED | OUTPATIENT
Start: 2022-11-08 | End: 2022-11-18

## 2022-11-08 RX ORDER — OXYCODONE HYDROCHLORIDE 5 MG/1
TABLET ORAL
Qty: 39 TABLET | Refills: 0 | Status: SHIPPED | OUTPATIENT
Start: 2022-11-11 | End: 2022-12-20

## 2022-11-08 ASSESSMENT — PAIN SCALES - GENERAL: PAINLEVEL: NO PAIN (0)

## 2022-11-08 NOTE — LETTER
November 8, 2022      Stacy Brown  [address] [facility] [id number]            [facility]  [address] [place] [address]    To whom it may concern:    Stacy Brown  was seen in the office today for a work injury dated 7/15/2022.      She may return to work today with no restrictions.  She is at [employer] with 0% PPD as of today and no follow-up is planned.  I had earlier suggested going to an aggressive strengthening program to minimize the chance of recurrent injury but the work comp insurance would not cover that so she did not go through it.  If they change their mind, I would still recommend that.    Sincerely,        Jorge Luis Brooke MD, MPH  Medical Spine  Occupational Medicine

## 2022-11-08 NOTE — NURSING NOTE
Reason For Visit:   Chief Complaint   Patient presents with     RECHECK       Occupation:   Currently working? No.  Work status?  FT no restrictions  Sports: n  Activities: walking           /83   Pulse 71   Wt 85.4 kg (188 lb 3.2 oz)   LMP 03/07/2021 (Approximate)   BMI 30.38 kg/m        Allergies   Allergen Reactions     Amoxicillin Hives     Hydrocodone-Acetaminophen GI Disturbance     Tylenol is ok       Current Outpatient Medications   Medication     acetaminophen (TYLENOL) 325 MG tablet     amLODIPine (NORVASC) 2.5 MG tablet     aspirin (ASA) 325 MG tablet     blood glucose (NO BRAND SPECIFIED) test strip     blood glucose calibration (NO BRAND SPECIFIED) solution     blood glucose monitoring (FREESTYLE) lancets     Blood Glucose Monitoring Suppl (ACCU-CHEK COMPLETE) KIT     FLUoxetine (PROZAC) 20 MG capsule     insulin aspart (NOVOLOG FLEXPEN) 100 UNIT/ML pen     insulin glargine (BASAGLAR KWIKPEN) 100 UNIT/ML pen     insulin pen needle (31G X 8 MM) 31G X 8 MM miscellaneous     insulin pen needle (NOVOFINE) 32G X 6 MM miscellaneous     lisinopril (ZESTRIL) 2.5 MG tablet     metFORMIN (GLUCOPHAGE-XR) 500 MG 24 hr tablet     metoprolol tartrate (LOPRESSOR) 25 MG tablet     Multiple Vitamins-Minerals (MULTI-VITAMIN GUMMIES) CHEW     nitroGLYcerin (NITROSTAT) 0.4 MG sublingual tablet     oxyCODONE (ROXICODONE) 5 MG tablet     polyethylene glycol (MIRALAX) 17 GM/Dose powder     rosuvastatin (CRESTOR) 20 MG tablet     tiZANidine (ZANAFLEX) 4 MG tablet     No current facility-administered medications for this visit.         Darla Severin-Brown, LPN

## 2022-11-08 NOTE — PATIENT INSTRUCTIONS
I am glad to see you doing so well Stacy.  You do not need to come back and see me and I am very happy with your progress.  Your case is closed as of today and your restrictions are formally removed.  See the assessment and plan below for further details of our discussion today.    Assessment     Stacy Brown  is a 47 year old y.o. female who presents today for follow-up regarding   Low back pain  Work comp injury [employer] 7/15/2022.  Reported noncompliance with work restriction, and union grievance pending.  Employer is disputing this.  Pelvic Joint Dysfunction-resolved  Neurologically intact  She is at [employer] with 0% PPD today      Plan:  She requires no follow-up restrictions or further care.  I still would have advised her to go through the medic strengthening program to minimize the chance of recurrent injury, but her work comp carrier declined to cover it.

## 2022-11-08 NOTE — LETTER
"    11/8/2022         RE: Stacy Brown  [address] [facility] [address]        Dear Colleague,    Thank you for referring your patient, Stacy Brown, to the [facility] [place]. Please see a copy of my visit note below.      Subjective:  Stacy Brown is a 47 year old female who presents today for follow-up regarding     Low back pain    Work comp injury [employer] 7/15/2022.  Reported noncompliance with work restriction, and union grievance pending.  Employer is disputing this.    Pelvic Joint Dysfunction-resolved    Neurologically intact    Minor setback of muscular pain associated with her recent move from her dwelling, and she had no choice other than to do some lifting but try to stay within the restrictions-resolved    Based on PT response, possibility of discogenic source of pain as well.  She was returned with a graduated removal of restrictions to her normal job by 4 weeks following my 9/22/2022 visit.  MedX strengthening was recommended pending insurance approval.    PRIOR HISTORY from 7/19/2022:  She was seen for evaluation of low back pain and a work comp injury upon referral from the emergency department.  ED note from 7/15/2022 records the following: \"Stacy Brown is a 47 year old female who presents for evaluation of an injury that occurred at work earlier today.  She states that she bent over to  a package weighing 30-40 pounds.  She picked it up to waist level and then transferred it to a cart.  When she was putting the package down in the cart she felt and heard a pop in her low back which was very audible.  Her coworker about 8 feet away heard the pop as well.  She had immediate onset of 9 on a scale of 10 pain which was sharp in nature.  She has had some radiation of the pain down the left thigh down to the level of the mid lower leg.  Does not extend into the foot.  She has had off-and-on numbness/tingling of the medial thigh.  She " "reports chronic numbness of her left foot from her previous issue, but this is unchanged.  She denies any loss of bowel/bladder function.  No recent fever or chills.  No saddle anesthesia.  No other injuries.  She is here with her .  She did not take anything for symptoms prior to coming to the ED.\"  Her exam showed tender L3-L5 spinous processes as well as bilateral paravertebral muscles, limited lumbosacral range of motion, and intact neurologic function.  Peripheral pulses were normal.  She had full painless range of motion of hips and knees.  SLR was noted to be positive at 45 degrees.     Lumbar x-rays taken that day were normal  She was given a Dilaudid injection and discharged home with a prescription for 50 mg prednisone pills and cyclobenzaprine.  Diagnosis was lumbar sprain but disc herniation was considered as a possibility for left leg symptoms.  No MRI was done.  Restrictions were given.  Orthopedic  referral was made.  She was advised to follow her blood sugars because of the steroids.     Stacy confirms the above history.  She points to the midline of the lumbosacral spine as the site of the perceived \"pop\" that she heard as well as the site of recurrent pain..  She has intermittent tingling but no true numbness down the posterior left thigh and calf.  There is no true numbness or weakness, bowel or bladder dysfunction, or saddle anesthesia.        Updated 8/3/2022 HISTORY:    PT staff message as follows: \"Mary Herman seen Stacy 2x. She started on Myofascial stretches and I added in for psoas and ITB, she started the stabilization protocol also. I had her trial SI belt per her preference and felt that was good support for her upright items at work so I put an order in and dispensed for her to use at work. Her innominate was better after I corrected once and you corrected once. Ill keep working with her.\"     Stacy is not interested in being taken off of work but she says that her " "supervisor today gave her a \"direct order\" to exceed her restrictions by having her lift 20, 40 and 60 pounds from from the floor, in direct conflict with my work restrictions.  She was on the phone with her  at the time and they are filing a grievance with the employer.  She has spoken HR.  She was told that if she did not do it she was told today she would be fired.     Stacy feels like she is making progress and she is working with her physical therapist who saw her last week.  She does not feel like she is \"off\" today.  She is not currently represented by an      Updated 9/6/2022 HISTORY:    Staff message received from her PT as follows: \"4 total visits with Stacy d/t her family stomach flu illness the last 2 weeks missed a few sessions.   She is 70-80% better, her pain really has remained in the L hip posterior to greater trochanter. [place] ilium and sacrum level, SI belt helpful as needed, working hip ROM along with myofascial protocol stretch and stability work, we reviewed lifting mechanics today She will continue for another few weeks.\"        Stacy confirms that she is making good progress.  Unfortunately, because her house was being foreclosed upon, she and her family and some coworkers had to move her belongings over the weekend and she thinks she may have lifted as much is 40 pounds but her some thinks it was not more than 20.  In any case she is little more sore than she was before the weekend but does not feel like her pelvis is off.  No other radiating symptoms or red flags noted.      9/23/2022 HISTORY:    Follow-up message from PT as follows: \"Has attended 2 visits since your last visit with her. We needed to do extension based exercise to centralize radicular pain after her flare. We have now been pain free for 4 days with this and will continue this for the next 4-8 weeks to maintain discogenic symptom control especially if she returns to work. She's doing myofascial " "protocols and I added in quadruped core work.  We did some work with lifting mechanics.  I will  see her biweekly for progression\"     In addition, her employer, [employer], sent me a letter asking for restrictions that they committed to complying with, and disputed the patient's report about her employer requiring her to exceed restrictions.  I agreed to first see Stacy early in order to address medically reasonable restrictions , and to give her employer another chance to comply with them appropriately, rather than simply change them without discussing this with my patient..     I did review with Stacy the letter the post office sent me as well as their response to the grievance.  Stacy disputes the accuracy of that response and I will stay out of it.     Medically she was feeling a lot better.  She is doing gluteal strengthening exercises and that causing some expected achiness in those muscles but overall she is feeling \"a lot better\".  She is willing to go back to work within the restrictions that I gave her provided that her employer stays within those restrictions and does not threaten her to exceed them.  Her only current symptoms are a little bit of achiness in the but without any referred symptoms down her legs bowel or bladder dysfunction or saddle anesthesia.  Her therapist has not had to adjust her pelvis in some time.  She \"feels so much better\".    INTERIM HISTORY:    Stacy feels like she is 100% recovered.  She is tolerated 100% of her normal job duties without difficulty.  She has not needed permanent restrictions.  She has no back pain leg pain numbness tingling weakness bowel or bladder dysfunction or saddle anesthesia.  Workers' compensation is denied her claim and she is disputing that..  Work comp would not cover the medic strengthening that I and Stacy both wanted her to go through.     Past medical history is reviewed and is unchanged for any new medical diagnoses " in the interim.  Pertinent for chronic knee pain, status post NSTEMI and CABG March 2021, tobacco abuse disorder, type 2 diabetes.  She denies any prior low back problems     SOCIAL HX: This is a work comp injury.  She is a  for the [employer].  She was doing her normal job without restrictions at the time of the injury.  She is .  Other than her job, she has a relatively sedentary lifestyle and denies sports hobbies or activities.  See above.  Her employer is requiring her to exceed her restrictions, and violation of my work restrictions.  I took her off work on 8/3/2022 as a result.She remains off work.  The status of her union grievance with the employee remains unknown to her as there are 6 other people with grievances that are pending.    Objective:    IMAGING:  Images and report reviewed.     LUMBAR SPINE TWO TO THREE VIEWS   7/15/2022                                                                   IMPRESSION: Alignment of the lumbar spine is within normal limits. No  loss of vertebral body height. No significant degenerative endplate  changes or loss of intervertebral disc space.          EXAMINATION:    CONSTITUTIONAL:  Vital signs as above.  No acute distress.  The patient is well nourished and well groomed.  She transitions well and moves fluidly.  PSYCHIATRIC:  The patient is awake, alert, oriented to person, place and time.  The patient is answering questions appropriately with clear speech.  Normal affect.  Able to follow commands  MUSCULOSKELETAL:  Gait is non-antalgic.  The patient is able to heel and toe walk without any difficulty.   Squat and rise normal.   .  Back ROM: Full fluid and pain.     NEUROLOGICAL:    Motor:  L3-S1 myotomes 5/5     Sensation to light touch is intact in the bilateral lower extremities.    SLR negative    Pelvis: No dysfunction.  Standing flexion, Emma sign, and stork test all negative.    Assessment     Stacy NEELA Elliottreyes  is a 47  year old y.o. female who presents today for follow-up regarding     Low back pain    Work comp injury [employer] 7/15/2022.  Reported noncompliance with work restriction, and union grievance pending.  Employer is disputing this.    Pelvic Joint Dysfunction-resolved    Neurologically intact    She is at [employer] with 0% PPD today      Plan:  She requires no follow-up restrictions or further care.  I still would have advised her to go through the medic strengthening program to minimize the chance of recurrent injury, but her work comp carrier declined to cover it.    Advised patient to call or return early if symptoms worsen, or having problems controlling bladder and bowel function or worsening leg weakness.     Please note: Voice recognition software was used in this dictation.  It may therefore contain typographical errors.  Jorge Luis Brooke MD      Again, thank you for allowing me to participate in the care of your patient.        Sincerely,        Jorge Luis Brooke MD

## 2022-11-08 NOTE — TELEPHONE ENCOUNTER
Routing refill request to provider for review/approval because:    Requested Prescriptions   Pending Prescriptions Disp Refills    oxyCODONE (ROXICODONE) 5 MG tablet 39 tablet 0     Sig: TAKE 1 TABLET (5 MG) BY MOUTH 2 TIMES DAILY AS NEEDED FOR SEVERE PAIN (MUST LAST 30 DAYS)       There is no refill protocol information for this order

## 2022-11-09 NOTE — PATIENT INSTRUCTIONS
Thank you for choosing us for your care. I have placed an order for a prescription so that you can start treatment. View your full visit summary for details by clicking on the link below. Your pharmacist will able to address any questions you may have about the medication.     If you're not feeling better within 5-7 days, please schedule an appointment.  You can schedule an appointment right here in [facility], or call [phone]  If the visit is for the same symptoms as your eVisit, we'll refund the cost of your eVisit if seen within seven days.

## 2022-11-09 NOTE — TELEPHONE ENCOUNTER
Requested Prescriptions   Pending Prescriptions Disp Refills     oxyCODONE (ROXICODONE) 5 MG tablet 39 tablet 0     Sig: TAKE 1 TABLET (5 MG) BY MOUTH 2 TIMES DAILY AS NEEDED FOR SEVERE PAIN (MUST LAST 30 DAYS)     Next 5 appointments (look out 90 days)    Jan 16, 2023 10:20 AM  (Arrive by 10:00 AM)  Provider Visit with Yaima Muse MD  [facility] ([facility] ) [address] [place] [id number] [phone]           Routing refill request to provider for review/approval because:  Drug not on the [facility], [facility] or [facility] refill protocol or controlled substance    Script on file for 11/11. Patient informed. Closing encounter.   Clair Ortiz MA

## 2022-11-10 DIAGNOSIS — E11.65 TYPE 2 DIABETES MELLITUS WITH HYPERGLYCEMIA, WITH LONG-TERM CURRENT USE OF INSULIN (H): ICD-10-CM

## 2022-11-10 DIAGNOSIS — Z79.4 TYPE 2 DIABETES MELLITUS WITH HYPERGLYCEMIA, WITH LONG-TERM CURRENT USE OF INSULIN (H): ICD-10-CM

## 2022-11-10 RX ORDER — INSULIN GLARGINE 100 [IU]/ML
42 INJECTION, SOLUTION SUBCUTANEOUS EVERY MORNING
Qty: 15 ML | Refills: 1 | Status: ON HOLD | OUTPATIENT
Start: 2022-11-10 | End: 2022-12-15

## 2022-11-10 NOTE — TELEPHONE ENCOUNTER
Prescription approved per [facility] Refill Protocol.    Estephanie Coffman, BSN, RN

## 2022-11-11 RX ORDER — INSULIN GLARGINE 100 [IU]/ML
42 INJECTION, SOLUTION SUBCUTANEOUS EVERY MORNING
Qty: 15 ML | Refills: 0 | OUTPATIENT
Start: 2022-11-11

## 2022-11-14 PROBLEM — F33.1 MAJOR DEPRESSIVE DISORDER, RECURRENT EPISODE, MODERATE (H): Status: ACTIVE | Noted: 2022-11-14

## 2022-11-20 ENCOUNTER — HEALTH MAINTENANCE LETTER (OUTPATIENT)
Age: 47
End: 2022-11-20

## 2022-11-21 NOTE — PROGRESS NOTES
Outpatient Physical Therapy Discharge Note     Patient: Stacy Brown    : 1975    Beginning/End Dates of Reporting Period:  22-10/3/22    Referring Provider: Jorge Luis Duran Diagnosis: Reduced ROM, pelvic dysfunction, disc contributors to radicular pain, reduced joint moblity and weakness    Assessment: Patient has attended 7 sessions for above noted diagnosis. Treatment has included manual therapy, SI belt, progressive exerciUS, TE, NR. Improvements noted in the following impairments:  range of motion,  strength, MARY KAY.  Responded well to combined program for SI, hips, core and some extension properties for disc management. Functional improvement noted in Activity tolerance, pain.. Patient reports 90% improvements in condition at last office visit with PT 10/3 and was awaiting approval for MedX while continuing core and SI stabilization program, and goals met. Discussed discharge plans with patient, recommended continued home program. Patient is agreeable to discharge, and will follow up with MD with further/future concerns.       Client Self Report:  10/3/2022States back to work x 1 week. Ache in back at end of day in low back, no LE referral pain, resolved with extension based positioning/exercise. 25# restriction has been maintained, will increase to 40# for 2 weeks following then no restrictions. States 90% back to normal at this point, with accommodation.    Objective Measurements:  Objective Measure: Pain  Details: 0/10  Objective Measure: MARY KAY  Details: 4%                 Goal Identifier MARY KAY   Goal Description Patient will have improvement in symptoms via the MARY KAY reduction from 32% to < 10% for full return to work without restrictions   Target Date 22   Date Met   10/3/2022   Progress (detail required for progress note):       Goal Identifier Pain   Goal Description Patient will return to work for her full shift and duties without restriction and less than 2 out of 10 pain   Target  Date 09/16/22   Date Met      Progress (detail required for progress note): 0-2/10 at end of day restrictions in place still     Goal Identifier HEP   Goal Description Patient will be independent in comprehensive home exercise program to manage ongoing needs for range of motion and strengthening for discharge in 8 weeks   Target Date 09/16/22   Date Met      Progress (detail required for progress note):  Met with discharge on 10/3         Plan:  Discharge from therapy.    Discharge:    Reason for Discharge: Patient has met all goals.    Equipment Issued: HEP, SI belt    Discharge Plan: Patient to continue home program.    Thank you for the referral.  Nadine Bear................... PT, DPT, CLT   11/21/2022, 2:02 PM  [phone]

## 2022-11-26 ENCOUNTER — APPOINTMENT (OUTPATIENT)
Dept: ULTRASOUND IMAGING | Facility: CLINIC | Age: 47
End: 2022-11-26
Attending: EMERGENCY MEDICINE
Payer: COMMERCIAL

## 2022-11-26 ENCOUNTER — TELEPHONE (OUTPATIENT)
Dept: EMERGENCY MEDICINE | Facility: CLINIC | Age: 47
End: 2022-11-26

## 2022-11-26 ENCOUNTER — HOSPITAL ENCOUNTER (EMERGENCY)
Facility: CLINIC | Age: 47
Discharge: HOME | End: 2022-11-26
Attending: EMERGENCY MEDICINE | Admitting: EMERGENCY MEDICINE
Payer: COMMERCIAL

## 2022-11-26 VITALS
DIASTOLIC BLOOD PRESSURE: 66 MMHG | TEMPERATURE: 98.6 F | HEART RATE: 60 BPM | RESPIRATION RATE: 16 BRPM | OXYGEN SATURATION: 98 % | HEIGHT: 66 IN | SYSTOLIC BLOOD PRESSURE: 117 MMHG | BODY MASS INDEX: 28.93 KG/M2 | WEIGHT: 180 LBS

## 2022-11-26 DIAGNOSIS — R11.2 NAUSEA AND VOMITING, UNSPECIFIED VOMITING TYPE: ICD-10-CM

## 2022-11-26 DIAGNOSIS — R10.11 ABDOMINAL PAIN, RIGHT UPPER QUADRANT: ICD-10-CM

## 2022-11-26 LAB
ALBUMIN SERPL-MCNC: 3.8 G/DL (ref 3.4–5)
ALP SERPL-CCNC: 61 U/L (ref 40–150)
ALT SERPL W P-5'-P-CCNC: 17 U/L (ref 0–50)
ANION GAP SERPL CALCULATED.3IONS-SCNC: 7 MMOL/L (ref 3–14)
AST SERPL W P-5'-P-CCNC: 11 U/L (ref 0–45)
BASOPHILS # BLD AUTO: 0 10E3/UL (ref 0–0.2)
BASOPHILS NFR BLD AUTO: 1 %
BILIRUB SERPL-MCNC: 0.2 MG/DL (ref 0.2–1.3)
BUN SERPL-MCNC: 24 MG/DL (ref 7–30)
CALCIUM SERPL-MCNC: 9.5 MG/DL (ref 8.5–10.1)
CHLORIDE BLD-SCNC: 108 MMOL/L (ref 94–109)
CO2 SERPL-SCNC: 22 MMOL/L (ref 20–32)
CREAT SERPL-MCNC: 0.76 MG/DL (ref 0.52–1.04)
EOSINOPHIL # BLD AUTO: 0 10E3/UL (ref 0–0.7)
EOSINOPHIL NFR BLD AUTO: 0 %
ERYTHROCYTE [DISTWIDTH] IN BLOOD BY AUTOMATED COUNT: 13 % (ref 10–15)
GFR SERPL CREATININE-BSD FRML MDRD: >90 ML/MIN/1.73M2
GLUCOSE BLD-MCNC: 132 MG/DL (ref 70–99)
HCT VFR BLD AUTO: 38.5 % (ref 35–47)
HGB BLD-MCNC: 12.3 G/DL (ref 11.7–15.7)
IMM GRANULOCYTES # BLD: 0 10E3/UL
IMM GRANULOCYTES NFR BLD: 0 %
LIPASE SERPL-CCNC: 95 U/L (ref 73–393)
LYMPHOCYTES # BLD AUTO: 1.3 10E3/UL (ref 0.8–5.3)
LYMPHOCYTES NFR BLD AUTO: 19 %
MCH RBC QN AUTO: 27.3 PG (ref 26.5–33)
MCHC RBC AUTO-ENTMCNC: 31.9 G/DL (ref 31.5–36.5)
MCV RBC AUTO: 85 FL (ref 78–100)
MONOCYTES # BLD AUTO: 0.4 10E3/UL (ref 0–1.3)
MONOCYTES NFR BLD AUTO: 6 %
NEUTROPHILS # BLD AUTO: 5 10E3/UL (ref 1.6–8.3)
NEUTROPHILS NFR BLD AUTO: 74 %
NRBC # BLD AUTO: 0 10E3/UL
NRBC BLD AUTO-RTO: 0 /100
PLATELET # BLD AUTO: 277 10E3/UL (ref 150–450)
POTASSIUM BLD-SCNC: 4.7 MMOL/L (ref 3.4–5.3)
PROT SERPL-MCNC: 7.1 G/DL (ref 6.8–8.8)
RBC # BLD AUTO: 4.51 10E6/UL (ref 3.8–5.2)
SODIUM SERPL-SCNC: 137 MMOL/L (ref 133–144)
WBC # BLD AUTO: 6.8 10E3/UL (ref 4–11)

## 2022-11-26 PROCEDURE — 36415 COLL VENOUS BLD VENIPUNCTURE: CPT | Performed by: EMERGENCY MEDICINE

## 2022-11-26 PROCEDURE — 80053 COMPREHEN METABOLIC PANEL: CPT | Performed by: EMERGENCY MEDICINE

## 2022-11-26 PROCEDURE — 96361 HYDRATE IV INFUSION ADD-ON: CPT

## 2022-11-26 PROCEDURE — 99285 EMERGENCY DEPT VISIT HI MDM: CPT | Mod: 25

## 2022-11-26 PROCEDURE — 258N000003 HC RX IP 258 OP 636: Performed by: EMERGENCY MEDICINE

## 2022-11-26 PROCEDURE — 96374 THER/PROPH/DIAG INJ IV PUSH: CPT

## 2022-11-26 PROCEDURE — 250N000011 HC RX IP 250 OP 636: Performed by: EMERGENCY MEDICINE

## 2022-11-26 PROCEDURE — 96375 TX/PRO/DX INJ NEW DRUG ADDON: CPT

## 2022-11-26 PROCEDURE — 85025 COMPLETE CBC W/AUTO DIFF WBC: CPT | Performed by: EMERGENCY MEDICINE

## 2022-11-26 PROCEDURE — 99285 EMERGENCY DEPT VISIT HI MDM: CPT | Performed by: EMERGENCY MEDICINE

## 2022-11-26 PROCEDURE — 83690 ASSAY OF LIPASE: CPT | Performed by: EMERGENCY MEDICINE

## 2022-11-26 PROCEDURE — 76705 ECHO EXAM OF ABDOMEN: CPT

## 2022-11-26 RX ORDER — ONDANSETRON 2 MG/ML
4 INJECTION INTRAMUSCULAR; INTRAVENOUS EVERY 30 MIN PRN
Status: DISCONTINUED | OUTPATIENT
Start: 2022-11-26 | End: 2022-11-26 | Stop reason: HOSPADM

## 2022-11-26 RX ORDER — KETOROLAC TROMETHAMINE 30 MG/ML
30 INJECTION, SOLUTION INTRAMUSCULAR; INTRAVENOUS ONCE
Status: COMPLETED | OUTPATIENT
Start: 2022-11-26 | End: 2022-11-26

## 2022-11-26 RX ORDER — SODIUM CHLORIDE 9 MG/ML
INJECTION, SOLUTION INTRAVENOUS CONTINUOUS
Status: DISCONTINUED | OUTPATIENT
Start: 2022-11-26 | End: 2022-11-26 | Stop reason: HOSPADM

## 2022-11-26 RX ORDER — ONDANSETRON 4 MG/1
4 TABLET, ORALLY DISINTEGRATING ORAL EVERY 6 HOURS PRN
Qty: 20 TABLET | Refills: 0 | Status: SHIPPED | OUTPATIENT
Start: 2022-11-26 | End: 2023-01-17

## 2022-11-26 RX ORDER — OXYCODONE HYDROCHLORIDE 5 MG/1
5 TABLET ORAL EVERY 6 HOURS PRN
Qty: 12 TABLET | Refills: 0 | Status: SHIPPED | OUTPATIENT
Start: 2022-11-26 | End: 2022-11-29

## 2022-11-26 RX ORDER — HYDROMORPHONE HYDROCHLORIDE 1 MG/ML
0.5 INJECTION, SOLUTION INTRAMUSCULAR; INTRAVENOUS; SUBCUTANEOUS
Status: COMPLETED | OUTPATIENT
Start: 2022-11-26 | End: 2022-11-26

## 2022-11-26 RX ADMIN — KETOROLAC TROMETHAMINE 30 MG: 30 INJECTION, SOLUTION INTRAMUSCULAR at 10:50

## 2022-11-26 RX ADMIN — ONDANSETRON 4 MG: 2 INJECTION INTRAMUSCULAR; INTRAVENOUS at 10:48

## 2022-11-26 RX ADMIN — HYDROMORPHONE HYDROCHLORIDE 0.5 MG: 1 INJECTION, SOLUTION INTRAMUSCULAR; INTRAVENOUS; SUBCUTANEOUS at 12:05

## 2022-11-26 RX ADMIN — SODIUM CHLORIDE 1000 ML: 9 INJECTION, SOLUTION INTRAVENOUS at 10:47

## 2022-11-26 ASSESSMENT — ACTIVITIES OF DAILY LIVING (ADL): ADLS_ACUITY_SCORE: 35

## 2022-11-26 NOTE — TELEPHONE ENCOUNTER
Reason for Call:  Same Day Appointment, Requested Provider:  Follow up for Gayla Stoner     PCP: Yaima Muse    Reason for visit: Follow up on reoccurring Nausea and Vomiting     Duration of symptoms: Unknown    Have you been treated for this in the past? No    Additional comments: Pt needs to be seen per ER doctor for a follow up from someone in the care team regarding continuing nausea and vomiting spells.     Can we leave a detailed message on this number? YES    Phone number patient can be reached at: Cell number on file:    Telephone Information:   Mobile [phone]       Best Time: Any     Call taken on 11/26/2022 at 12:48 PM by Ana Ramos

## 2022-11-26 NOTE — DISCHARGE INSTRUCTIONS
Your blood work was normal    Your ultrasound showed a slight thickening of the gallbladder wall and some dilation of the gallbladder ducts.  This was present on a CT scan done last year in October.  This may be normal anatomy for you, but may need further investigation with something like an MRCP.  You can talk to your primary doctor about scheduling this if they believe it is necessary or if there is other evaluation they believe is necessary to get to the bottom of your symptoms    You may take 1 tablet of Zofran every 6 hours as needed to help with nausea or vomiting    He may take 1 tablet of oxycodone every 6 hours as needed for severe pain.  Be aware this medication can make you drowsy so do not drive or drink alcohol if taking this medicine

## 2022-11-26 NOTE — ED TRIAGE NOTES
Pt presents with concerns for the past few days has not felt well and needs a doctors note for work.  Pt states that she has abdominal pain that comes and goes, right upper quadrant.  Occasional nausea and dizziness.      Triage Assessment     Row Name 11/26/22 0944       Triage Assessment (Adult)    Airway WDL WDL       Respiratory WDL    Respiratory WDL WDL       Skin Circulation/Temperature WDL    Skin Circulation/Temperature WDL WDL       Cardiac WDL    Cardiac WDL WDL       Peripheral/Neurovascular WDL    Peripheral Neurovascular WDL WDL       Cognitive/Neuro/Behavioral WDL    Cognitive/Neuro/Behavioral WDL WDL
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
## 2022-11-26 NOTE — Clinical Note
Stacy Brown was seen and treated in our emergency department on 11/26/2022.  She may return to work on 11/28/2022.       If you have any questions or concerns, please don't hesitate to call.      Gayla Stoner, DO

## 2022-11-30 NOTE — TELEPHONE ENCOUNTER
"Any available same day slot or \"provider approval required\" is fine.  Schedule for 40 min even if in 20 min slot.  Will have staff notify patient.     Carlos Stoner MD   "

## 2022-12-02 ENCOUNTER — OFFICE VISIT (OUTPATIENT)
Dept: FAMILY MEDICINE | Facility: OTHER | Age: 47
End: 2022-12-02
Payer: COMMERCIAL

## 2022-12-02 VITALS
BODY MASS INDEX: 28.25 KG/M2 | SYSTOLIC BLOOD PRESSURE: 120 MMHG | HEART RATE: 66 BPM | WEIGHT: 180 LBS | TEMPERATURE: 98.1 F | DIASTOLIC BLOOD PRESSURE: 74 MMHG | RESPIRATION RATE: 16 BRPM | HEIGHT: 67 IN | OXYGEN SATURATION: 98 %

## 2022-12-02 DIAGNOSIS — Z12.11 SCREEN FOR COLON CANCER: ICD-10-CM

## 2022-12-02 DIAGNOSIS — K80.50 GALL BLADDER PAIN: ICD-10-CM

## 2022-12-02 DIAGNOSIS — R10.13 EPIGASTRIC PAIN: ICD-10-CM

## 2022-12-02 DIAGNOSIS — K80.50 RECURRENT BILIARY COLIC: Primary | ICD-10-CM

## 2022-12-02 DIAGNOSIS — K81.9 GALL BLADDER INFLAMMATION: ICD-10-CM

## 2022-12-02 PROCEDURE — 99213 OFFICE O/P EST LOW 20 MIN: CPT | Mod: 95 | Performed by: FAMILY MEDICINE

## 2022-12-02 RX ORDER — OXYCODONE HYDROCHLORIDE 5 MG/1
5 TABLET ORAL EVERY 6 HOURS PRN
Qty: 6 TABLET | Refills: 0 | Status: SHIPPED | OUTPATIENT
Start: 2022-12-02 | End: 2022-12-05

## 2022-12-02 RX ORDER — PANTOPRAZOLE SODIUM 20 MG/1
20 TABLET, DELAYED RELEASE ORAL DAILY
Qty: 30 TABLET | Refills: 0 | Status: SHIPPED | OUTPATIENT
Start: 2022-12-02 | End: 2023-01-17

## 2022-12-02 ASSESSMENT — PATIENT HEALTH QUESTIONNAIRE - PHQ9
10. IF YOU CHECKED OFF ANY PROBLEMS, HOW DIFFICULT HAVE THESE PROBLEMS MADE IT FOR YOU TO DO YOUR WORK, TAKE CARE OF THINGS AT HOME, OR GET ALONG WITH OTHER PEOPLE: SOMEWHAT DIFFICULT
SUM OF ALL RESPONSES TO PHQ QUESTIONS 1-9: 5
SUM OF ALL RESPONSES TO PHQ QUESTIONS 1-9: 5

## 2022-12-02 ASSESSMENT — PAIN SCALES - GENERAL: PAINLEVEL: SEVERE PAIN (6)

## 2022-12-02 NOTE — LETTER
[facility]  [address]  [place] [address]  Phone: [phone]    December 2, 2022        Stacy Brown  [address] [place] [address]          To whom it may concern:    RE: Stacy Brown    Patient was seen and treated today at our clinic. Please excuse her from work through 12/8/22 due to her illness.     Please contact me for questions or concerns.      Sincerely,      Marcia Loemli MD

## 2022-12-02 NOTE — PROGRESS NOTES
"  Assessment & Plan       Recurrent biliary colic/Gall bladder inflammation/Gall bladder pain    -Symptoms consistent with biliary colic that seems to be getting worse to a point she is not able to return to work.referral placed to see surgery to discuss possible cholecystectomy  -short pain prescription sent for symptomatic relief for acute pain. She will reach out to her PCP for a discussion regarding an increase her pain regimen to cover her current episode.   Start pantoprazole for gastric protection  -work note given to excuse her from work  - Adult General Surg Referral; Future  - oxyCODONE (ROXICODONE) 5 MG tablet; Take 1 tablet (5 mg) by mouth every 6 hours as needed for severe pain (7-10)       MED REC REQUIRED  Post Medication Reconciliation Status:       See Patient Instructions    Marcia Lomeli MD  [facility] IRA Kumar is a 47 year old accompanied by her spouse, presenting for the following health issues:  ER F/U        HPI     ED/UC Followup:    Facility:  [facility] ED  Date of visit: 11/26/22  Reason for visit: ABD Pain  Current Status: Still having pain, nausea, vomited once this morning   Works for [employer] and has not been able to work due to her symptoms . Mostly pain, nauseous , vomting and feeling weak . Pain worsening with food intake especially fatty food.       Review of Systems   Constitutional, HEENT, cardiovascular, pulmonary, GI, , musculoskeletal, neuro, skin, endocrine and psych systems are negative, except as otherwise noted.      Objective    /74 (Cuff Size: Adult Regular)   Pulse 66   Temp 98.1  F (36.7  C) (Oral)   Resp 16   Ht 1.689 m (5' 6.5\")   Wt 81.6 kg (180 lb)   LMP 03/07/2021 (Approximate)   SpO2 98%   BMI 28.62 kg/m    Body mass index is 28.62 kg/m .  Physical Exam   GENERAL: healthy, alert and no distress  NECK: no adenopathy, no asymmetry, masses, or scars and thyroid normal to palpation  RESP: lungs clear to " auscultation - no rales, rhonchi or wheezes  CV: regular rate and rhythm, normal S1 S2, no S3 or S4, no murmur, click or rub, no peripheral edema and peripheral pulses strong  ABDOMEN: tenderness RUQ and bowel sounds normal          Answers for HPI/ROS submitted by the patient on 12/2/2022  If you checked off any problems, how difficult have these problems made it for you to do your work, take care of things at home, or get along with other people?: Somewhat difficult  PHQ9 TOTAL SCORE: 5

## 2022-12-07 ENCOUNTER — OFFICE VISIT (OUTPATIENT)
Dept: SURGERY | Facility: OTHER | Age: 47
End: 2022-12-07
Payer: COMMERCIAL

## 2022-12-07 ENCOUNTER — TELEPHONE (OUTPATIENT)
Dept: FAMILY MEDICINE | Facility: CLINIC | Age: 47
End: 2022-12-07

## 2022-12-07 VITALS
HEIGHT: 66 IN | WEIGHT: 181 LBS | BODY MASS INDEX: 29.09 KG/M2 | DIASTOLIC BLOOD PRESSURE: 82 MMHG | TEMPERATURE: 96.6 F | SYSTOLIC BLOOD PRESSURE: 128 MMHG

## 2022-12-07 DIAGNOSIS — K80.50 BILIARY COLIC: Primary | ICD-10-CM

## 2022-12-07 PROCEDURE — 99244 OFF/OP CNSLTJ NEW/EST MOD 40: CPT | Performed by: SURGERY

## 2022-12-07 NOTE — TELEPHONE ENCOUNTER
Patient was seen 12-02-22 with Dr Lomeli and was excused from work until 12-08-22 for gall bladder issues.  She saw Dr Diop today and needs to schedule a HIDA Scan.  She is wondering if Dr Lomeli will extent her work restrictions until she has that test.  She will call when she gets home to schedule it.  Please advise for extended work excuse, she would like to  when done. Thank you

## 2022-12-07 NOTE — PROGRESS NOTES
Patient seen in consultation for postprandial right upper quadrant abdominal pain by Yaima Muse    HPI:  Patient is a 47 year old female with postprandial right upper quadrant abdominal pain which seems to be getting more severe over time.  She has had multiple ER visits for this.  There have been minor abnormalities in lab evaluation and ultrasound showing possible gallbladder wall thickening however no other signs of acute cholecystitis have been found.  Her only risk factor for gastritis seems to be caffeine intake.  She has history of laparoscopic tubal ligation.  She does endorse postprandial nature to her pain and states that the pain even occurs with bland foods.    Review Of Systems    Skin: negative  Ears/Nose/Throat: negative  Respiratory: No shortness of breath, dyspnea on exertion, cough, or hemoptysis  Cardiovascular: negative  Gastrointestinal: as above  Genitourinary: negative  Musculoskeletal: negative  Neurologic: negative  Hematologic/Lymphatic/Immunologic: negative  Endocrine: negative      Past Medical History:   Diagnosis Date     Knee pain, chronic      Mixed hyperlipidemia      NSTEMI (non-ST elevated myocardial infarction) (H) 3/5/2021     S/P CABG (coronary artery bypass graft) 3/16/2021     Tobacco abuse disorder 11/21/2017     Type II or unspecified type diabetes mellitus without mention of complication, not stated as uncontrolled 08/16/2002    diagnosed 8/16/02, started insulin 2006       Past Surgical History:   Procedure Laterality Date     BYPASS GRAFT ARTERY CORONARY N/A 3/9/2021    Procedure: CORONARY ARTERY BYPASS GRAFT X 4 (LIMA - LAD, SV - RPL, SV - PDA,  RA - OM) LEFT RADIAL ENDOARTERY HARVEST AND BILATERAL LEG ENDOVEIN HARVEST (ON CARDIOPULMONARY PUMP OXYGENATOR ; INTRAOPERATIVE TRANSESOPHAGEAL ECHOCARDIOGRAM BY ANESTHESIOLOGIST DR. NEL AVILA)   ;  Surgeon: Kunal Selby MD;  Location: [facility] OR     CV HEART CATHETERIZATION WITH POSSIBLE  INTERVENTION N/A 3/8/2021    Procedure: Heart Catheterization with Possible Intervention;  Surgeon: Vadim Kamara MD;  Location:  HEART CARDIAC CATH LAB     HC OPEN TX METATARSAL FRACTURE  age 12    softball injury,open fracture left foot     HC TOOTH EXTRACTION W/FORCEP      Extract wisdom teeth     INJECT JOINT SACROILIAC Left 2018    Procedure: INJECT JOINT SACROILIAC;  INJECT JOINT SACROILIAC LEFT;  Surgeon: Alan Marshall MD;  Location: [facility]     OPERATIVE HYSTEROSCOPY WITH MORCELLATOR N/A 2018    Procedure: OPERATIVE HYSTEROSCOPY WITH MORCELLATOR (MYOSURE);  Exam under anesthesia, operative hysteroscopy, polypectomy, D & C;  Surgeon: Sindhu Peterson DO;  Location: [facility] OR     TUBAL LIGATION  2006     ZZC STABISM SURG,PREV EYE SURG,NOT [facility]      Right       Family History   Problem Relation Age of Onset     Allergies Mother      Lipids Father         cholesterol     Diabetes Maternal Grandmother      Hypertension Maternal Grandmother      Heart Disease Maternal Grandmother         Bypass     Cancer Maternal Grandfather         Lung - metastatic     Alzheimer Disease Paternal Grandmother      Heart Disease Paternal Grandmother         valve replacement     Cerebrovascular Disease Paternal Grandfather      Anesthesia Reaction No family hx of        Social History     Socioeconomic History     Marital status:      Spouse name: Humble     Number of children: 2     Years of education: 14     Highest education level: Not on file   Occupational History     Occupation: Post Office     Employer: [employer]   Tobacco Use     Smoking status: Former     Packs/day: 0.50     Years: 6.00     Pack years: 3.00     Types: Cigarettes     Quit date: 3/5/2021     Years since quittin.7     Smokeless tobacco: Never   Vaping Use     Vaping Use: Never used   Substance and Sexual Activity     Alcohol use: Yes     Alcohol/week: 0.0 standard drinks     Comment: once a month     Drug use: No      Sexual activity: Not Currently     Partners: Male     Birth control/protection: Surgical   Other Topics Concern      Service No     Blood Transfusions No     Caffeine Concern Yes     Comment: Diet Pepsi/at least 20 ounces/day - advised not more than 16 ounces/day     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 Yes   Social History Narrative     and lives at home in [place] with , Humble, and their daughter and son.     Social Determinants of Health     Financial Resource Strain: Not on file   Food Insecurity: Not on file   Transportation Needs: Not on file   Physical Activity: Not on file   Stress: Not on file   Social Connections: Not on file   Intimate Partner Violence: Not on file   Housing Stability: Not on file       Current Outpatient Medications   Medication Sig Dispense Refill     acetaminophen (TYLENOL) 325 MG tablet Take 1 tablet (325 mg) by mouth every 4 hours as needed for mild pain TAKE TWO TABLETS BY MOUTH EVERY 4 HOURS AS NEEDED FOR MILD PAIN 40 tablet 0     amLODIPine (NORVASC) 2.5 MG tablet Take 1 tablet (2.5 mg) by mouth daily 90 tablet 3     aspirin (ASA) 325 MG tablet 1 tablet (325 mg) by Oral or NG Tube route daily (Patient taking differently: Take 325 mg by mouth daily) 90 tablet 3     blood glucose (NO BRAND SPECIFIED) test strip Use to test blood sugar up to 4 times daily or as directed. To accompany: Blood Glucose Monitor Brands: per insurance. 200 strip 6     blood glucose calibration (NO BRAND SPECIFIED) solution To accompany: Blood Glucose Monitor Brands: per insurance. 1 Bottle 3     blood glucose monitoring  (FREESTYLE) lancets Use to test blood sugars 1-2 times daily or as directed, per patients glucose meter. 3 Box 3     Blood Glucose Monitoring Suppl (ACCU-CHEK COMPLETE) KIT 1 Device daily 1 Device 0     FLUoxetine (PROZAC) 20 MG capsule Take 1 tablet daily for 4 weeks, then increase to 2 pills daily 150 capsule 1     insulin aspart (NOVOLOG FLEXPEN) 100 UNIT/ML pen Novolog Flexpen. Inject 1 units per 10 gram carb unit before breakfast, lunch and dinner, up to 15 units per meal. 45 mL 3     insulin glargine (BASAGLAR KWIKPEN) 100 UNIT/ML pen Inject 42 Units Subcutaneous every morning 15 mL 1     insulin pen needle (31G X 8 MM) 31G X 8 MM miscellaneous 1 Box of 100 insulin pen needles to be dispensed with every insulin pen prescription 100 each 0     insulin pen needle (NOVOFINE) 32G X 6 MM miscellaneous Use once daily or as directed. 100 each 3     lisinopril (ZESTRIL) 2.5 MG tablet Take 1 tablet (2.5 mg) by mouth daily 90 tablet 3     metFORMIN (GLUCOPHAGE-XR) 500 MG 24 hr tablet Take 2 tablets (1,000 mg) by mouth 2 times daily (with meals) . 360 tablet 3     metoprolol tartrate (LOPRESSOR) 25 MG tablet Take 1 tablet (25 mg) by mouth 2 times daily 180 tablet 3     Multiple Vitamins-Minerals (MULTI-VITAMIN GUMMIES) CHEW Take 1 chew tab by mouth daily        nitroGLYcerin (NITROSTAT) 0.4 MG sublingual tablet For chest pain place 1 tablet under the tongue every 5 minutes for 3 doses. If symptoms persist 5 minutes after 1st dose call 911. 25 tablet 0     ondansetron (ZOFRAN ODT) 4 MG ODT tab Take 1 tablet (4 mg) by mouth every 6 hours as needed for nausea or vomiting (Patient not taking: Reported on 12/2/2022) 20 tablet 0     oxyCODONE (ROXICODONE) 5 MG tablet TAKE 1 TABLET (5 MG) BY MOUTH 2 TIMES DAILY AS NEEDED FOR SEVERE PAIN (MUST LAST 30 DAYS) 39 tablet 0     pantoprazole (PROTONIX) 20 MG EC tablet Take 1 tablet (20 mg) by mouth daily 30 tablet 0     polyethylene glycol (MIRALAX) 17 GM/Dose powder Take 17 g by  "mouth daily (Patient not taking: Reported on 12/2/2022) 510 g 0     rosuvastatin (CRESTOR) 20 MG tablet Take 1 tablet (20 mg) by mouth daily 90 tablet 3     tiZANidine (ZANAFLEX) 4 MG tablet TAKE 1 TABLET (4 MG) BY MOUTH 3 TIMES DAILY AS NEEDED FOR MUSCLE SPASMS 90 tablet 1       Medications and history reviewed    Physical exam:  Vitals: /82   Temp (!) 96.6  F (35.9  C) (Temporal)   Ht 1.676 m (5' 6\")   Wt 82.1 kg (181 lb)   LMP 03/07/2021 (Approximate)   BMI 29.21 kg/m    BMI= Body mass index is 29.21 kg/m .    Constitutional: Healthy, alert, non-distressed   Head: Normo-cephalic, atraumatic, no lesions, masses or tenderness   Cardiovascular: RRR, no new murmurs, +S1, +S2 heart sounds, no clicks, rubs or gallops   Respiratory: CTAB, no rales, rhonchi or wheezing, equal chest rise, good respiratory effort   Gastrointestinal: Soft, non distended, no rebound rigidity or guarding, no masses or hernias palpated   : Deferred  Musculoskeletal: Moves all extremities, normal  strength, no deformities noted   Skin: No suspicious lesions or rashes   Psychiatric: Mentation appears normal, affect appropriate   Hematologic/Lymphatic/Immunologic: Normal cervical and supraclavicular lymph nodes   Patient able to get up on table without difficulty.    Labs show:  No results found for this or any previous visit (from the past 24 hour(s)).    Imaging shows:  Recent Results (from the past 744 hour(s))   US Abdomen Limited (RUQ)    Narrative    EXAM: ULTRASOUND ABDOMEN LIMITED  LOCATION: [facility]  DATE/TIME: 11/26/2022, 12:00 PM    INDICATION: Right upper quadrant pain. Nausea.  COMPARISON: None.  TECHNIQUE: Limited abdominal ultrasound.    FINDINGS:    GALLBLADDER: No shadowing gallstones are identified. There is diffuse gallbladder wall thickening, measuring 0.4 cm. No pericholecystic fluid. The sonographer reports a negative sonographic Garcia's sign.    BILE DUCTS: There is mild " intrahepatic and extrahepatic biliary dilatation. The common duct measures 10 mm. Portions of the common duct could not be visualized due to overlying bowel gas.     LIVER: There is diffuse fatty infiltration of the liver. No focal hepatic masses are identified.    RIGHT KIDNEY: Unremarkable. No hydronephrosis.    PANCREAS: The visualized portions are normal.    No ascites.      Impression    IMPRESSION:  1.  Diffuse gallbladder wall thickening. No other sonographic evidence for cholecystitis.  2.  There is mild intrahepatic and extrahepatic biliary dilatation, with no definite cause identified. If there is clinical suspicion for choledocholithiasis, consider MRCP for further evaluation.  3.  Hepatic steatosis.            Assessment:     ICD-10-CM    1. Biliary colic  K80.50 NM Hepatobiliary Scan w GB EF        Plan: Stacy's symptoms are consistent with biliary colic and likely chronic cholecystitis.  We will confirm this with HIDA scan.  We did discuss surgery should the HIDA scan confirmed chronic cholecystitis Discussed existing imaging findings and what to expect with surgery. Risks of bleeding, infection, anesthesia complications, conversion to open, injury to nearby structures and bile duct injury all discussed.   We went over my discharge instructions and post-op restrictions.  Patient questions answered.  I will follow-up after HIDA scan is complete.  We did also discuss should the HIDA scan showed normal gallbladder function neck step would likely be gastroenterological consultation.    45 minutes spent on the date of the encounter doing chart review, history and exam, documentation and further activities per the note    Robert Diop, DO

## 2022-12-07 NOTE — LETTER
12/7/2022         RE: Stacy Brown  [address] [facility] [address]        Dear Colleague,    Thank you for referring your patient, Stacy Brown, to the [facility]. Please see a copy of my visit note below.    Patient seen in consultation for postprandial right upper quadrant abdominal pain by Yaima Muse    HPI:  Patient is a 47 year old female with postprandial right upper quadrant abdominal pain which seems to be getting more severe over time.  She has had multiple ER visits for this.  There have been minor abnormalities in lab evaluation and ultrasound showing possible gallbladder wall thickening however no other signs of acute cholecystitis have been found.  Her only risk factor for gastritis seems to be caffeine intake.  She has history of laparoscopic tubal ligation.  She does endorse postprandial nature to her pain and states that the pain even occurs with bland foods.    Review Of Systems    Skin: negative  Ears/Nose/Throat: negative  Respiratory: No shortness of breath, dyspnea on exertion, cough, or hemoptysis  Cardiovascular: negative  Gastrointestinal: as above  Genitourinary: negative  Musculoskeletal: negative  Neurologic: negative  Hematologic/Lymphatic/Immunologic: negative  Endocrine: negative      Past Medical History:   Diagnosis Date     Knee pain, chronic      Mixed hyperlipidemia      NSTEMI (non-ST elevated myocardial infarction) (H) 3/5/2021     S/P CABG (coronary artery bypass graft) 3/16/2021     Tobacco abuse disorder 11/21/2017     Type II or unspecified type diabetes mellitus without mention of complication, not stated as uncontrolled 08/16/2002    diagnosed 8/16/02, started insulin 2006       Past Surgical History:   Procedure Laterality Date     BYPASS GRAFT ARTERY CORONARY N/A 3/9/2021    Procedure: CORONARY ARTERY BYPASS GRAFT X 4 (LIMA - LAD, SV - RPL, SV - PDA,  RA - OM) LEFT RADIAL ENDOARTERY HARVEST AND BILATERAL LEG  ENDOVEIN HARVEST (ON CARDIOPULMONARY PUMP OXYGENATOR ; INTRAOPERATIVE TRANSESOPHAGEAL ECHOCARDIOGRAM BY ANESTHESIOLOGIST DR. NEL AVILA)   ;  Surgeon: Kunal Selby MD;  Location:  OR     CV HEART CATHETERIZATION WITH POSSIBLE INTERVENTION N/A 3/8/2021    Procedure: Heart Catheterization with Possible Intervention;  Surgeon: Vadim Kamara MD;  Location:  HEART CARDIAC CATH LAB     HC OPEN TX METATARSAL FRACTURE  age 12    softball injury,open fracture left foot     HC TOOTH EXTRACTION W/FORCEP      Extract wisdom teeth     INJECT JOINT SACROILIAC Left 1/11/2018    Procedure: INJECT JOINT SACROILIAC;  INJECT JOINT SACROILIAC LEFT;  Surgeon: Alan Marshall MD;  Location:  OR     OPERATIVE HYSTEROSCOPY WITH MORCELLATOR N/A 7/24/2018    Procedure: OPERATIVE HYSTEROSCOPY WITH MORCELLATOR (MYOSURE);  Exam under anesthesia, operative hysteroscopy, polypectomy, D & C;  Surgeon: Sindhu Peterson DO;  Location: [facility] OR     TUBAL LIGATION  7/27/2006     ZZC STABISM SURG,PREV EYE SURG,NOT [facility]      Right       Family History   Problem Relation Age of Onset     Allergies Mother      Lipids Father         cholesterol     Diabetes Maternal Grandmother      Hypertension Maternal Grandmother      Heart Disease Maternal Grandmother         Bypass     Cancer Maternal Grandfather         Lung - metastatic     Alzheimer Disease Paternal Grandmother      Heart Disease Paternal Grandmother         valve replacement     Cerebrovascular Disease Paternal Grandfather      Anesthesia Reaction No family hx of        Social History     Socioeconomic History     Marital status:      Spouse name: Humble     Number of children: 2     Years of education: 14     Highest education level: Not on file   Occupational History     Occupation: Post Office     Employer: [employer]   Tobacco Use     Smoking status: Former     Packs/day: 0.50     Years: 6.00     Pack years: 3.00     Types: Cigarettes     Quit  date: 3/5/2021     Years since quittin.7     Smokeless tobacco: Never   Vaping Use     Vaping Use: Never used   Substance and Sexual Activity     Alcohol use: Yes     Alcohol/week: 0.0 standard drinks     Comment: once a month     Drug use: No     Sexual activity: Not Currently     Partners: Male     Birth control/protection: Surgical   Other Topics Concern      Service No     Blood Transfusions No     Caffeine Concern Yes     Comment: Diet Pepsi/at least 20 ounces/day - advised not more than 16 ounces/day     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 Yes   Social History Narrative     and lives at home in [place] with , Humble, and their daughter and son.     Social Determinants of Health     Financial Resource Strain: Not on file   Food Insecurity: Not on file   Transportation Needs: Not on file   Physical Activity: Not on file   Stress: Not on file   Social Connections: Not on file   Intimate Partner Violence: Not on file   Housing Stability: Not on file       Current Outpatient Medications   Medication Sig Dispense Refill     acetaminophen (TYLENOL) 325 MG tablet Take 1 tablet (325 mg) by mouth every 4 hours as needed for mild pain TAKE TWO TABLETS BY MOUTH EVERY 4 HOURS AS NEEDED FOR MILD PAIN 40 tablet 0     amLODIPine (NORVASC) 2.5 MG tablet Take 1 tablet (2.5 mg) by mouth daily 90 tablet 3     aspirin (ASA) 325 MG tablet 1 tablet (325 mg) by Oral or NG Tube route daily (Patient taking differently: Take 325 mg by mouth daily) 90 tablet 3     blood glucose (NO BRAND SPECIFIED) test strip Use to test blood sugar up to 4 times daily  or as directed. To accompany: Blood Glucose Monitor Brands: per insurance. 200 strip 6     blood glucose calibration (NO BRAND SPECIFIED) solution To accompany: Blood Glucose Monitor Brands: per insurance. 1 Bottle 3     blood glucose monitoring (FREESTYLE) lancets Use to test blood sugars 1-2 times daily or as directed, per patients glucose meter. 3 Box 3     Blood Glucose Monitoring Suppl (ACCU-CHEK COMPLETE) KIT 1 Device daily 1 Device 0     FLUoxetine (PROZAC) 20 MG capsule Take 1 tablet daily for 4 weeks, then increase to 2 pills daily 150 capsule 1     insulin aspart (NOVOLOG FLEXPEN) 100 UNIT/ML pen Novolog Flexpen. Inject 1 units per 10 gram carb unit before breakfast, lunch and dinner, up to 15 units per meal. 45 mL 3     insulin glargine (BASAGLAR KWIKPEN) 100 UNIT/ML pen Inject 42 Units Subcutaneous every morning 15 mL 1     insulin pen needle (31G X 8 MM) 31G X 8 MM miscellaneous 1 Box of 100 insulin pen needles to be dispensed with every insulin pen prescription 100 each 0     insulin pen needle (NOVOFINE) 32G X 6 MM miscellaneous Use once daily or as directed. 100 each 3     lisinopril (ZESTRIL) 2.5 MG tablet Take 1 tablet (2.5 mg) by mouth daily 90 tablet 3     metFORMIN (GLUCOPHAGE-XR) 500 MG 24 hr tablet Take 2 tablets (1,000 mg) by mouth 2 times daily (with meals) . 360 tablet 3     metoprolol tartrate (LOPRESSOR) 25 MG tablet Take 1 tablet (25 mg) by mouth 2 times daily 180 tablet 3     Multiple Vitamins-Minerals (MULTI-VITAMIN GUMMIES) CHEW Take 1 chew tab by mouth daily        nitroGLYcerin (NITROSTAT) 0.4 MG sublingual tablet For chest pain place 1 tablet under the tongue every 5 minutes for 3 doses. If symptoms persist 5 minutes after 1st dose call 911. 25 tablet 0     ondansetron (ZOFRAN ODT) 4 MG ODT tab Take 1 tablet (4 mg) by mouth every 6 hours as needed for nausea or vomiting (Patient not taking: Reported on 12/2/2022) 20 tablet 0     oxyCODONE (ROXICODONE) 5 MG tablet TAKE 1 TABLET (5  "MG) BY MOUTH 2 TIMES DAILY AS NEEDED FOR SEVERE PAIN (MUST LAST 30 DAYS) 39 tablet 0     pantoprazole (PROTONIX) 20 MG EC tablet Take 1 tablet (20 mg) by mouth daily 30 tablet 0     polyethylene glycol (MIRALAX) 17 GM/Dose powder Take 17 g by mouth daily (Patient not taking: Reported on 12/2/2022) 510 g 0     rosuvastatin (CRESTOR) 20 MG tablet Take 1 tablet (20 mg) by mouth daily 90 tablet 3     tiZANidine (ZANAFLEX) 4 MG tablet TAKE 1 TABLET (4 MG) BY MOUTH 3 TIMES DAILY AS NEEDED FOR MUSCLE SPASMS 90 tablet 1       Medications and history reviewed    Physical exam:  Vitals: /82   Temp (!) 96.6  F (35.9  C) (Temporal)   Ht 1.676 m (5' 6\")   Wt 82.1 kg (181 lb)   LMP 03/07/2021 (Approximate)   BMI 29.21 kg/m    BMI= Body mass index is 29.21 kg/m .    Constitutional: Healthy, alert, non-distressed   Head: Normo-cephalic, atraumatic, no lesions, masses or tenderness   Cardiovascular: RRR, no new murmurs, +S1, +S2 heart sounds, no clicks, rubs or gallops   Respiratory: CTAB, no rales, rhonchi or wheezing, equal chest rise, good respiratory effort   Gastrointestinal: Soft, non distended, no rebound rigidity or guarding, no masses or hernias palpated   : Deferred  Musculoskeletal: Moves all extremities, normal  strength, no deformities noted   Skin: No suspicious lesions or rashes   Psychiatric: Mentation appears normal, affect appropriate   Hematologic/Lymphatic/Immunologic: Normal cervical and supraclavicular lymph nodes   Patient able to get up on table without difficulty.    Labs show:  No results found for this or any previous visit (from the past 24 hour(s)).    Imaging shows:  Recent Results (from the past 744 hour(s))   US Abdomen Limited (RUQ)    Narrative    EXAM: ULTRASOUND ABDOMEN LIMITED  LOCATION: [facility]  DATE/TIME: 11/26/2022, 12:00 PM    INDICATION: Right upper quadrant pain. Nausea.  COMPARISON: None.  TECHNIQUE: Limited abdominal " ultrasound.    FINDINGS:    GALLBLADDER: No shadowing gallstones are identified. There is diffuse gallbladder wall thickening, measuring 0.4 cm. No pericholecystic fluid. The sonographer reports a negative sonographic Garcia's sign.    BILE DUCTS: There is mild intrahepatic and extrahepatic biliary dilatation. The common duct measures 10 mm. Portions of the common duct could not be visualized due to overlying bowel gas.     LIVER: There is diffuse fatty infiltration of the liver. No focal hepatic masses are identified.    RIGHT KIDNEY: Unremarkable. No hydronephrosis.    PANCREAS: The visualized portions are normal.    No ascites.      Impression    IMPRESSION:  1.  Diffuse gallbladder wall thickening. No other sonographic evidence for cholecystitis.  2.  There is mild intrahepatic and extrahepatic biliary dilatation, with no definite cause identified. If there is clinical suspicion for choledocholithiasis, consider MRCP for further evaluation.  3.  Hepatic steatosis.            Assessment:     ICD-10-CM    1. Biliary colic  K80.50 NM Hepatobiliary Scan w GB EF        Plan: Stacy's symptoms are consistent with biliary colic and likely chronic cholecystitis.  We will confirm this with HIDA scan.  We did discuss surgery should the HIDA scan confirmed chronic cholecystitis Discussed existing imaging findings and what to expect with surgery. Risks of bleeding, infection, anesthesia complications, conversion to open, injury to nearby structures and bile duct injury all discussed.   We went over my discharge instructions and post-op restrictions.  Patient questions answered.  I will follow-up after HIDA scan is complete.  We did also discuss should the HIDA scan showed normal gallbladder function neck step would likely be gastroenterological consultation.    45 minutes spent on the date of the encounter doing chart review, history and exam, documentation and further activities per the note    Robert Diop,  DO        Again, thank you for allowing me to participate in the care of your patient.        Sincerely,        Robert Diop, DO

## 2022-12-08 ENCOUNTER — HOSPITAL ENCOUNTER (EMERGENCY)
Facility: CLINIC | Age: 47
Discharge: HOME | End: 2022-12-08
Attending: EMERGENCY MEDICINE | Admitting: EMERGENCY MEDICINE
Payer: COMMERCIAL

## 2022-12-08 ENCOUNTER — MYC MEDICAL ADVICE (OUTPATIENT)
Dept: FAMILY MEDICINE | Facility: CLINIC | Age: 47
End: 2022-12-08

## 2022-12-08 VITALS
SYSTOLIC BLOOD PRESSURE: 131 MMHG | BODY MASS INDEX: 29.57 KG/M2 | HEIGHT: 66 IN | DIASTOLIC BLOOD PRESSURE: 93 MMHG | RESPIRATION RATE: 20 BRPM | HEART RATE: 55 BPM | TEMPERATURE: 97.4 F | WEIGHT: 184 LBS | OXYGEN SATURATION: 98 %

## 2022-12-08 DIAGNOSIS — I21.4 NSTEMI (NON-ST ELEVATED MYOCARDIAL INFARCTION) (H): ICD-10-CM

## 2022-12-08 DIAGNOSIS — K80.50 BILIARY COLIC: ICD-10-CM

## 2022-12-08 DIAGNOSIS — R10.11 RUQ ABDOMINAL PAIN: ICD-10-CM

## 2022-12-08 LAB
ALBUMIN SERPL-MCNC: 3.6 G/DL (ref 3.4–5)
ALP SERPL-CCNC: 62 U/L (ref 40–150)
ALT SERPL W P-5'-P-CCNC: 20 U/L (ref 0–50)
ANION GAP SERPL CALCULATED.3IONS-SCNC: 4 MMOL/L (ref 3–14)
AST SERPL W P-5'-P-CCNC: 10 U/L (ref 0–45)
BASOPHILS # BLD AUTO: 0 10E3/UL (ref 0–0.2)
BASOPHILS NFR BLD AUTO: 1 %
BILIRUB SERPL-MCNC: 0.2 MG/DL (ref 0.2–1.3)
BUN SERPL-MCNC: 16 MG/DL (ref 7–30)
CALCIUM SERPL-MCNC: 9.1 MG/DL (ref 8.5–10.1)
CHLORIDE BLD-SCNC: 108 MMOL/L (ref 94–109)
CO2 SERPL-SCNC: 29 MMOL/L (ref 20–32)
CREAT SERPL-MCNC: 0.77 MG/DL (ref 0.52–1.04)
EOSINOPHIL # BLD AUTO: 0.1 10E3/UL (ref 0–0.7)
EOSINOPHIL NFR BLD AUTO: 2 %
ERYTHROCYTE [DISTWIDTH] IN BLOOD BY AUTOMATED COUNT: 13.2 % (ref 10–15)
GFR SERPL CREATININE-BSD FRML MDRD: >90 ML/MIN/1.73M2
GLUCOSE BLD-MCNC: 74 MG/DL (ref 70–99)
HCT VFR BLD AUTO: 34.1 % (ref 35–47)
HGB BLD-MCNC: 11.1 G/DL (ref 11.7–15.7)
IMM GRANULOCYTES # BLD: 0 10E3/UL
IMM GRANULOCYTES NFR BLD: 0 %
LIPASE SERPL-CCNC: 95 U/L (ref 73–393)
LYMPHOCYTES # BLD AUTO: 1 10E3/UL (ref 0.8–5.3)
LYMPHOCYTES NFR BLD AUTO: 24 %
MCH RBC QN AUTO: 27.2 PG (ref 26.5–33)
MCHC RBC AUTO-ENTMCNC: 32.6 G/DL (ref 31.5–36.5)
MCV RBC AUTO: 84 FL (ref 78–100)
MONOCYTES # BLD AUTO: 0.5 10E3/UL (ref 0–1.3)
MONOCYTES NFR BLD AUTO: 12 %
NEUTROPHILS # BLD AUTO: 2.4 10E3/UL (ref 1.6–8.3)
NEUTROPHILS NFR BLD AUTO: 61 %
NRBC # BLD AUTO: 0 10E3/UL
NRBC BLD AUTO-RTO: 0 /100
PLATELET # BLD AUTO: 201 10E3/UL (ref 150–450)
POTASSIUM BLD-SCNC: 3.8 MMOL/L (ref 3.4–5.3)
PROT SERPL-MCNC: 7 G/DL (ref 6.8–8.8)
RBC # BLD AUTO: 4.08 10E6/UL (ref 3.8–5.2)
SODIUM SERPL-SCNC: 141 MMOL/L (ref 133–144)
WBC # BLD AUTO: 4 10E3/UL (ref 4–11)

## 2022-12-08 PROCEDURE — 250N000013 HC RX MED GY IP 250 OP 250 PS 637: Performed by: EMERGENCY MEDICINE

## 2022-12-08 PROCEDURE — 83690 ASSAY OF LIPASE: CPT | Performed by: EMERGENCY MEDICINE

## 2022-12-08 PROCEDURE — 80053 COMPREHEN METABOLIC PANEL: CPT | Performed by: EMERGENCY MEDICINE

## 2022-12-08 PROCEDURE — 99284 EMERGENCY DEPT VISIT MOD MDM: CPT | Performed by: EMERGENCY MEDICINE

## 2022-12-08 PROCEDURE — 85025 COMPLETE CBC W/AUTO DIFF WBC: CPT | Performed by: EMERGENCY MEDICINE

## 2022-12-08 PROCEDURE — 36415 COLL VENOUS BLD VENIPUNCTURE: CPT | Performed by: EMERGENCY MEDICINE

## 2022-12-08 RX ORDER — PROMETHAZINE HYDROCHLORIDE 25 MG/1
12.5 TABLET ORAL ONCE
Status: COMPLETED | OUTPATIENT
Start: 2022-12-08 | End: 2022-12-08

## 2022-12-08 RX ORDER — PROMETHAZINE HYDROCHLORIDE 25 MG/1
12.5 TABLET ORAL EVERY 6 HOURS PRN
Qty: 15 TABLET | Refills: 0 | Status: SHIPPED | OUTPATIENT
Start: 2022-12-08 | End: 2022-12-20

## 2022-12-08 RX ORDER — HYDROMORPHONE HYDROCHLORIDE 2 MG/1
2 TABLET ORAL EVERY 6 HOURS PRN
Qty: 10 TABLET | Refills: 0 | Status: ON HOLD | OUTPATIENT
Start: 2022-12-08 | End: 2022-12-15

## 2022-12-08 RX ORDER — HYDROMORPHONE HYDROCHLORIDE 2 MG/1
2 TABLET ORAL ONCE
Status: COMPLETED | OUTPATIENT
Start: 2022-12-08 | End: 2022-12-08

## 2022-12-08 RX ADMIN — HYDROMORPHONE HYDROCHLORIDE 2 MG: 2 TABLET ORAL at 15:52

## 2022-12-08 RX ADMIN — PROMETHAZINE HYDROCHLORIDE 12.5 MG: 25 TABLET ORAL at 15:52

## 2022-12-08 ASSESSMENT — ACTIVITIES OF DAILY LIVING (ADL): ADLS_ACUITY_SCORE: 35

## 2022-12-08 NOTE — DISCHARGE INSTRUCTIONS
Follow-up for your HIDA scan as scheduled    You may use the Phenergan every 6 hours as needed to help with nausea or vomiting    If the oxycodone that you have already been prescribed is not helpful, you may instead use the Dilaudid every 6 hours as needed for severe pain.  This medicine can make you drowsy so do not drive or drink alcohol if taking this medicine    Try to continue on a low-fat diet so if this is your gallbladder it is not continually irritated    Follow-up with surgery as recommended
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
## 2022-12-08 NOTE — TELEPHONE ENCOUNTER
Pt is looking for a note to extend her work leave.   recommended yesterday that she reach out to her surgeon.

## 2022-12-08 NOTE — TELEPHONE ENCOUNTER
I cannot extend the restriction any further than 12/8/2022. It would upto the surgeon if they recommend her to be out of work until after the dexa scan
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
## 2022-12-08 NOTE — Clinical Note
Stacy Brown was seen and treated in our emergency department on 12/8/2022.  She may return to work on 12/15/2022.       If you have any questions or concerns, please don't hesitate to call.      Gayla Stoner, DO

## 2022-12-11 ENCOUNTER — APPOINTMENT (OUTPATIENT)
Dept: GENERAL RADIOLOGY | Facility: CLINIC | Age: 47
End: 2022-12-11
Attending: EMERGENCY MEDICINE
Payer: COMMERCIAL

## 2022-12-11 ENCOUNTER — HOSPITAL ENCOUNTER (INPATIENT)
Facility: CLINIC | Age: 47
LOS: 4 days | Discharge: HOME | End: 2022-12-15
Attending: EMERGENCY MEDICINE | Admitting: PEDIATRICS
Payer: COMMERCIAL

## 2022-12-11 DIAGNOSIS — Z11.52 ENCOUNTER FOR SCREENING LABORATORY TESTING FOR SEVERE ACUTE RESPIRATORY SYNDROME CORONAVIRUS 2 (SARS-COV-2): ICD-10-CM

## 2022-12-11 DIAGNOSIS — J21.0 RSV BRONCHIOLITIS: ICD-10-CM

## 2022-12-11 DIAGNOSIS — I21.4 NSTEMI (NON-ST ELEVATED MYOCARDIAL INFARCTION) (H): ICD-10-CM

## 2022-12-11 DIAGNOSIS — J18.9 COMMUNITY ACQUIRED PNEUMONIA, UNSPECIFIED LATERALITY: Primary | ICD-10-CM

## 2022-12-11 DIAGNOSIS — E11.65 TYPE 2 DIABETES MELLITUS WITH HYPERGLYCEMIA, WITH LONG-TERM CURRENT USE OF INSULIN (H): ICD-10-CM

## 2022-12-11 DIAGNOSIS — Z87.891 PERSONAL HISTORY OF TOBACCO USE, PRESENTING HAZARDS TO HEALTH: ICD-10-CM

## 2022-12-11 DIAGNOSIS — Z79.4 TYPE 2 DIABETES MELLITUS WITH HYPERGLYCEMIA, WITH LONG-TERM CURRENT USE OF INSULIN (H): ICD-10-CM

## 2022-12-11 PROBLEM — J96.01 ACUTE RESPIRATORY FAILURE WITH HYPOXIA (H): Status: ACTIVE | Noted: 2022-12-11

## 2022-12-11 PROBLEM — R50.9 FEVER: Status: ACTIVE | Noted: 2022-12-11

## 2022-12-11 PROBLEM — I25.2 HISTORY OF NON-ST ELEVATION MYOCARDIAL INFARCTION (NSTEMI): Status: ACTIVE | Noted: 2022-12-11

## 2022-12-11 PROBLEM — R91.8 PULMONARY INFILTRATES: Status: ACTIVE | Noted: 2022-12-11

## 2022-12-11 PROBLEM — T50.905A PLATELET DYSFUNCTION DUE TO DRUGS: Status: ACTIVE | Noted: 2022-12-11

## 2022-12-11 PROBLEM — D69.59 PLATELET DYSFUNCTION DUE TO DRUGS: Status: ACTIVE | Noted: 2022-12-11

## 2022-12-11 PROBLEM — I25.10 CORONARY ARTERY DISEASE INVOLVING NATIVE CORONARY ARTERY OF NATIVE HEART WITHOUT ANGINA PECTORIS: Status: ACTIVE | Noted: 2022-12-11

## 2022-12-11 LAB
ALBUMIN SERPL-MCNC: 3.4 G/DL (ref 3.4–5)
ALP SERPL-CCNC: 60 U/L (ref 40–150)
ALT SERPL W P-5'-P-CCNC: 16 U/L (ref 0–50)
ANION GAP SERPL CALCULATED.3IONS-SCNC: 7 MMOL/L (ref 3–14)
AST SERPL W P-5'-P-CCNC: 9 U/L (ref 0–45)
BASE EXCESS BLDV CALC-SCNC: 1.3 MMOL/L (ref -7.7–1.9)
BASOPHILS # BLD AUTO: 0 10E3/UL (ref 0–0.2)
BASOPHILS NFR BLD AUTO: 0 %
BILIRUB SERPL-MCNC: 0.3 MG/DL (ref 0.2–1.3)
BUN SERPL-MCNC: 23 MG/DL (ref 7–30)
CALCIUM SERPL-MCNC: 9 MG/DL (ref 8.5–10.1)
CHLORIDE BLD-SCNC: 105 MMOL/L (ref 94–109)
CO2 SERPL-SCNC: 27 MMOL/L (ref 20–32)
CREAT SERPL-MCNC: 0.74 MG/DL (ref 0.52–1.04)
EOSINOPHIL # BLD AUTO: 0 10E3/UL (ref 0–0.7)
EOSINOPHIL NFR BLD AUTO: 0 %
ERYTHROCYTE [DISTWIDTH] IN BLOOD BY AUTOMATED COUNT: 13.3 % (ref 10–15)
FLUAV RNA SPEC QL NAA+PROBE: NEGATIVE
FLUBV RNA RESP QL NAA+PROBE: NEGATIVE
GFR SERPL CREATININE-BSD FRML MDRD: >90 ML/MIN/1.73M2
GLUCOSE BLD-MCNC: 144 MG/DL (ref 70–99)
GLUCOSE BLDC GLUCOMTR-MCNC: 120 MG/DL (ref 70–99)
GLUCOSE BLDC GLUCOMTR-MCNC: 126 MG/DL (ref 70–99)
HCO3 BLDV-SCNC: 28 MMOL/L (ref 21–28)
HCT VFR BLD AUTO: 34.7 % (ref 35–47)
HGB BLD-MCNC: 11.2 G/DL (ref 11.7–15.7)
IMM GRANULOCYTES # BLD: 0.1 10E3/UL
IMM GRANULOCYTES NFR BLD: 1 %
LACTATE SERPL-SCNC: 1.8 MMOL/L (ref 0.7–2)
LYMPHOCYTES # BLD AUTO: 0.6 10E3/UL (ref 0.8–5.3)
LYMPHOCYTES NFR BLD AUTO: 5 %
MCH RBC QN AUTO: 27 PG (ref 26.5–33)
MCHC RBC AUTO-ENTMCNC: 32.3 G/DL (ref 31.5–36.5)
MCV RBC AUTO: 84 FL (ref 78–100)
MONOCYTES # BLD AUTO: 0.8 10E3/UL (ref 0–1.3)
MONOCYTES NFR BLD AUTO: 7 %
NEUTROPHILS # BLD AUTO: 10.3 10E3/UL (ref 1.6–8.3)
NEUTROPHILS NFR BLD AUTO: 87 %
NRBC # BLD AUTO: 0 10E3/UL
NRBC BLD AUTO-RTO: 0 /100
O2/TOTAL GAS SETTING VFR VENT: 28 %
PCO2 BLDV: 50 MM HG (ref 40–50)
PH BLDV: 7.35 [PH] (ref 7.32–7.43)
PLATELET # BLD AUTO: 204 10E3/UL (ref 150–450)
PO2 BLDV: 29 MM HG (ref 25–47)
POTASSIUM BLD-SCNC: 4.3 MMOL/L (ref 3.4–5.3)
PROCALCITONIN SERPL IA-MCNC: 1.4 NG/ML
PROT SERPL-MCNC: 7.2 G/DL (ref 6.8–8.8)
RBC # BLD AUTO: 4.15 10E6/UL (ref 3.8–5.2)
RSV RNA SPEC NAA+PROBE: POSITIVE
SARS-COV-2 RNA RESP QL NAA+PROBE: NEGATIVE
SODIUM SERPL-SCNC: 139 MMOL/L (ref 133–144)
WBC # BLD AUTO: 11.8 10E3/UL (ref 4–11)

## 2022-12-11 PROCEDURE — 250N000013 HC RX MED GY IP 250 OP 250 PS 637: Performed by: PEDIATRICS

## 2022-12-11 PROCEDURE — 87040 BLOOD CULTURE FOR BACTERIA: CPT | Performed by: PEDIATRICS

## 2022-12-11 PROCEDURE — C9803 HOPD COVID-19 SPEC COLLECT: HCPCS | Performed by: EMERGENCY MEDICINE

## 2022-12-11 PROCEDURE — 83605 ASSAY OF LACTIC ACID: CPT | Performed by: EMERGENCY MEDICINE

## 2022-12-11 PROCEDURE — 36415 COLL VENOUS BLD VENIPUNCTURE: CPT | Performed by: PEDIATRICS

## 2022-12-11 PROCEDURE — 250N000011 HC RX IP 250 OP 636: Performed by: PEDIATRICS

## 2022-12-11 PROCEDURE — 36415 COLL VENOUS BLD VENIPUNCTURE: CPT | Performed by: EMERGENCY MEDICINE

## 2022-12-11 PROCEDURE — 71045 X-RAY EXAM CHEST 1 VIEW: CPT

## 2022-12-11 PROCEDURE — 82803 BLOOD GASES ANY COMBINATION: CPT | Performed by: EMERGENCY MEDICINE

## 2022-12-11 PROCEDURE — 84145 PROCALCITONIN (PCT): CPT | Performed by: EMERGENCY MEDICINE

## 2022-12-11 PROCEDURE — 99285 EMERGENCY DEPT VISIT HI MDM: CPT | Mod: CS | Performed by: EMERGENCY MEDICINE

## 2022-12-11 PROCEDURE — 99285 EMERGENCY DEPT VISIT HI MDM: CPT | Mod: CS,25 | Performed by: EMERGENCY MEDICINE

## 2022-12-11 PROCEDURE — 99223 1ST HOSP IP/OBS HIGH 75: CPT | Mod: AI | Performed by: PEDIATRICS

## 2022-12-11 PROCEDURE — 250N000011 HC RX IP 250 OP 636: Performed by: EMERGENCY MEDICINE

## 2022-12-11 PROCEDURE — 258N000002 HC RX IP 258 OP 250: Performed by: PEDIATRICS

## 2022-12-11 PROCEDURE — 96374 THER/PROPH/DIAG INJ IV PUSH: CPT | Performed by: EMERGENCY MEDICINE

## 2022-12-11 PROCEDURE — 87637 SARSCOV2&INF A&B&RSV AMP PRB: CPT | Performed by: EMERGENCY MEDICINE

## 2022-12-11 PROCEDURE — 85014 HEMATOCRIT: CPT | Performed by: EMERGENCY MEDICINE

## 2022-12-11 PROCEDURE — 80053 COMPREHEN METABOLIC PANEL: CPT | Performed by: EMERGENCY MEDICINE

## 2022-12-11 PROCEDURE — 120N000001 HC R&B MED SURG/OB

## 2022-12-11 RX ORDER — ONDANSETRON 2 MG/ML
4 INJECTION INTRAMUSCULAR; INTRAVENOUS EVERY 6 HOURS PRN
Status: DISCONTINUED | OUTPATIENT
Start: 2022-12-11 | End: 2022-12-15 | Stop reason: HOSPADM

## 2022-12-11 RX ORDER — IPRATROPIUM BROMIDE AND ALBUTEROL SULFATE 2.5; .5 MG/3ML; MG/3ML
3 SOLUTION RESPIRATORY (INHALATION) EVERY 4 HOURS PRN
Status: DISCONTINUED | OUTPATIENT
Start: 2022-12-11 | End: 2022-12-15 | Stop reason: HOSPADM

## 2022-12-11 RX ORDER — NAPROXEN 250 MG/1
500 TABLET ORAL EVERY 12 HOURS PRN
Status: DISCONTINUED | OUTPATIENT
Start: 2022-12-11 | End: 2022-12-12

## 2022-12-11 RX ORDER — SODIUM CHLORIDE 450 MG/100ML
INJECTION, SOLUTION INTRAVENOUS CONTINUOUS
Status: DISCONTINUED | OUTPATIENT
Start: 2022-12-11 | End: 2022-12-14

## 2022-12-11 RX ORDER — NALOXONE HYDROCHLORIDE 0.4 MG/ML
0.4 INJECTION, SOLUTION INTRAMUSCULAR; INTRAVENOUS; SUBCUTANEOUS
Status: DISCONTINUED | OUTPATIENT
Start: 2022-12-11 | End: 2022-12-15 | Stop reason: HOSPADM

## 2022-12-11 RX ORDER — DEXTROSE MONOHYDRATE 25 G/50ML
25-50 INJECTION, SOLUTION INTRAVENOUS
Status: DISCONTINUED | OUTPATIENT
Start: 2022-12-11 | End: 2022-12-15 | Stop reason: HOSPADM

## 2022-12-11 RX ORDER — ACETAMINOPHEN 325 MG/1
650 TABLET ORAL EVERY 4 HOURS PRN
Status: DISCONTINUED | OUTPATIENT
Start: 2022-12-11 | End: 2022-12-11

## 2022-12-11 RX ORDER — ASCORBIC ACID 100 MG
1 TABLET,CHEWABLE ORAL DAILY
COMMUNITY

## 2022-12-11 RX ORDER — TIZANIDINE 2 MG/1
4 TABLET ORAL 3 TIMES DAILY PRN
Status: DISCONTINUED | OUTPATIENT
Start: 2022-12-11 | End: 2022-12-15 | Stop reason: HOSPADM

## 2022-12-11 RX ORDER — HYDROMORPHONE HCL IN WATER/PF 6 MG/30 ML
0.2 PATIENT CONTROLLED ANALGESIA SYRINGE INTRAVENOUS
Status: DISCONTINUED | OUTPATIENT
Start: 2022-12-11 | End: 2022-12-11

## 2022-12-11 RX ORDER — NALOXONE HYDROCHLORIDE 0.4 MG/ML
0.2 INJECTION, SOLUTION INTRAMUSCULAR; INTRAVENOUS; SUBCUTANEOUS
Status: DISCONTINUED | OUTPATIENT
Start: 2022-12-11 | End: 2022-12-12

## 2022-12-11 RX ORDER — PROCHLORPERAZINE 25 MG
25 SUPPOSITORY, RECTAL RECTAL EVERY 12 HOURS PRN
Status: DISCONTINUED | OUTPATIENT
Start: 2022-12-11 | End: 2022-12-15 | Stop reason: HOSPADM

## 2022-12-11 RX ORDER — NALOXONE HYDROCHLORIDE 0.4 MG/ML
0.4 INJECTION, SOLUTION INTRAMUSCULAR; INTRAVENOUS; SUBCUTANEOUS
Status: DISCONTINUED | OUTPATIENT
Start: 2022-12-11 | End: 2022-12-12

## 2022-12-11 RX ORDER — ONDANSETRON 4 MG/1
4 TABLET, ORALLY DISINTEGRATING ORAL EVERY 6 HOURS PRN
Status: DISCONTINUED | OUTPATIENT
Start: 2022-12-11 | End: 2022-12-15 | Stop reason: HOSPADM

## 2022-12-11 RX ORDER — HYDROMORPHONE HYDROCHLORIDE 2 MG/1
2 TABLET ORAL EVERY 6 HOURS PRN
Status: DISCONTINUED | OUTPATIENT
Start: 2022-12-11 | End: 2022-12-12

## 2022-12-11 RX ORDER — LORAZEPAM 2 MG/ML
0.5 INJECTION INTRAMUSCULAR EVERY 4 HOURS PRN
Status: DISCONTINUED | OUTPATIENT
Start: 2022-12-11 | End: 2022-12-15 | Stop reason: HOSPADM

## 2022-12-11 RX ORDER — ACETAMINOPHEN 325 MG/1
975 TABLET ORAL EVERY 6 HOURS PRN
Status: DISCONTINUED | OUTPATIENT
Start: 2022-12-11 | End: 2022-12-15 | Stop reason: HOSPADM

## 2022-12-11 RX ORDER — HYDROCODONE BITARTRATE AND ACETAMINOPHEN 5; 325 MG/1; MG/1
1-2 TABLET ORAL EVERY 4 HOURS PRN
Status: DISCONTINUED | OUTPATIENT
Start: 2022-12-11 | End: 2022-12-11

## 2022-12-11 RX ORDER — PANTOPRAZOLE SODIUM 20 MG/1
20 TABLET, DELAYED RELEASE ORAL DAILY
Status: DISCONTINUED | OUTPATIENT
Start: 2022-12-11 | End: 2022-12-15 | Stop reason: HOSPADM

## 2022-12-11 RX ORDER — NALOXONE HYDROCHLORIDE 0.4 MG/ML
0.2 INJECTION, SOLUTION INTRAMUSCULAR; INTRAVENOUS; SUBCUTANEOUS
Status: DISCONTINUED | OUTPATIENT
Start: 2022-12-11 | End: 2022-12-15 | Stop reason: HOSPADM

## 2022-12-11 RX ORDER — LIDOCAINE 40 MG/G
CREAM TOPICAL
Status: DISCONTINUED | OUTPATIENT
Start: 2022-12-11 | End: 2022-12-15 | Stop reason: HOSPADM

## 2022-12-11 RX ORDER — PROCHLORPERAZINE MALEATE 5 MG
10 TABLET ORAL EVERY 6 HOURS PRN
Status: DISCONTINUED | OUTPATIENT
Start: 2022-12-11 | End: 2022-12-15 | Stop reason: HOSPADM

## 2022-12-11 RX ORDER — CEFTRIAXONE 2 G/1
2 INJECTION, POWDER, FOR SOLUTION INTRAMUSCULAR; INTRAVENOUS EVERY 24 HOURS
Status: DISCONTINUED | OUTPATIENT
Start: 2022-12-11 | End: 2022-12-14

## 2022-12-11 RX ORDER — AZITHROMYCIN 500 MG/1
500 INJECTION, POWDER, LYOPHILIZED, FOR SOLUTION INTRAVENOUS EVERY 24 HOURS
Status: COMPLETED | OUTPATIENT
Start: 2022-12-11 | End: 2022-12-11

## 2022-12-11 RX ORDER — ENOXAPARIN SODIUM 100 MG/ML
40 INJECTION SUBCUTANEOUS EVERY 24 HOURS
Status: DISCONTINUED | OUTPATIENT
Start: 2022-12-11 | End: 2022-12-15 | Stop reason: HOSPADM

## 2022-12-11 RX ORDER — KETOROLAC TROMETHAMINE 15 MG/ML
15 INJECTION, SOLUTION INTRAMUSCULAR; INTRAVENOUS ONCE
Status: COMPLETED | OUTPATIENT
Start: 2022-12-11 | End: 2022-12-11

## 2022-12-11 RX ORDER — NICOTINE POLACRILEX 4 MG
15-30 LOZENGE BUCCAL
Status: DISCONTINUED | OUTPATIENT
Start: 2022-12-11 | End: 2022-12-15 | Stop reason: HOSPADM

## 2022-12-11 RX ORDER — AMLODIPINE BESYLATE 2.5 MG/1
2.5 TABLET ORAL DAILY
Qty: 90 TABLET | Refills: 3 | Status: SHIPPED | OUTPATIENT
Start: 2022-12-11 | End: 2024-01-26 | Stop reason: SINTOL

## 2022-12-11 RX ADMIN — ONDANSETRON 4 MG: 2 INJECTION INTRAMUSCULAR; INTRAVENOUS at 16:18

## 2022-12-11 RX ADMIN — ENOXAPARIN SODIUM 40 MG: 40 INJECTION SUBCUTANEOUS at 18:09

## 2022-12-11 RX ADMIN — KETOROLAC TROMETHAMINE 15 MG: 15 INJECTION, SOLUTION INTRAMUSCULAR; INTRAVENOUS at 11:19

## 2022-12-11 RX ADMIN — FLUOXETINE 20 MG: 20 CAPSULE ORAL at 17:44

## 2022-12-11 RX ADMIN — HYDROMORPHONE HYDROCHLORIDE 2 MG: 2 TABLET ORAL at 18:17

## 2022-12-11 RX ADMIN — CEFTRIAXONE SODIUM 2 G: 2 INJECTION, POWDER, FOR SOLUTION INTRAMUSCULAR; INTRAVENOUS at 18:08

## 2022-12-11 RX ADMIN — TIZANIDINE 4 MG: 2 TABLET ORAL at 20:07

## 2022-12-11 RX ADMIN — PANTOPRAZOLE SODIUM 20 MG: 20 TABLET, DELAYED RELEASE ORAL at 17:44

## 2022-12-11 RX ADMIN — SODIUM CHLORIDE: 4.5 INJECTION, SOLUTION INTRAVENOUS at 18:08

## 2022-12-11 RX ADMIN — ACETAMINOPHEN 975 MG: 325 TABLET, FILM COATED ORAL at 20:07

## 2022-12-11 RX ADMIN — AZITHROMYCIN MONOHYDRATE 500 MG: 500 INJECTION, POWDER, LYOPHILIZED, FOR SOLUTION INTRAVENOUS at 18:50

## 2022-12-11 ASSESSMENT — ACTIVITIES OF DAILY LIVING (ADL)
DRESSING/BATHING_DIFFICULTY: NO
DIFFICULTY_EATING/SWALLOWING: NO
ADLS_ACUITY_SCORE: 35
ADLS_ACUITY_SCORE: 35
ADLS_ACUITY_SCORE: 20
ADLS_ACUITY_SCORE: 24
FALL_HISTORY_WITHIN_LAST_SIX_MONTHS: NO
CONCENTRATING,_REMEMBERING_OR_MAKING_DECISIONS_DIFFICULTY: NO
HEARING_DIFFICULTY_OR_DEAF: NO
ADLS_ACUITY_SCORE: 24
ADLS_ACUITY_SCORE: 24
DIFFICULTY_COMMUNICATING: NO
CHANGE_IN_FUNCTIONAL_STATUS_SINCE_ONSET_OF_CURRENT_ILLNESS/INJURY: NO
DOING_ERRANDS_INDEPENDENTLY_DIFFICULTY: NO
VISION_MANAGEMENT: GLASSES
WEAR_GLASSES_OR_BLIND: YES
ADLS_ACUITY_SCORE: 35
WALKING_OR_CLIMBING_STAIRS_DIFFICULTY: NO
TOILETING_ISSUES: NO

## 2022-12-11 NOTE — ED TRIAGE NOTES
Patient c/o short of breath since last night. Seen in ED on 12/8/22 for gall bladder pain.      Triage Assessment     Row Name 12/11/22 1031       Triage Assessment (Adult)    Airway WDL WDL       Respiratory WDL    Respiratory WDL cough    Cough Frequency infrequent    Cough Type weak
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
## 2022-12-11 NOTE — H&P
[facility]    History and Physical - Hospitalist Service       Date of Admission:  12/11/2022    Assessment & Plan      Stacy Brown is a 47 year old female with type 2 diabetes treated with insulin, hypertension, coronary artery disease with CABG in March 2021, depression, chronic pain treated regularly with opiates, and recent diagnosis of gallbladder problems for which HIDA scan is planned on December 14 presented with 1 day history of worsening cough and shortness of breath.  She tested positive for RSV infection and was severely hypoxic in the ER concerning for acute hypoxic respiratory failure.  Radiographic findings were worrisome for bilateral infiltrates which could be due to RSV pneumonia but could also be due to superimposed bacterial pneumonia.  She has now developed fever with borderline leukocytosis concerning for possible early SIRS.  She has risk factors for bacterial infection and sepsis including diabetes mellitus.  She is at high risk for an adverse outcome including worsening pneumonia, respiratory failure, and/or bacterial infection with sepsis, so hospitalization is considered medically necessary.  Based on the presently available information, hospitalization for at least 2 midnights is anticipated.      Community-acquired pneumonia with acute hypoxic respiratory failure  Fever with possible early SIRS and sepsis  RSV infection  Persistent nausea with recurrent vomiting  Rapid viral testing positive for RSV today.  Chest x-ray demonstrating right midlung and bibasilar infiltrates.  She presented with severe hypoxia worrisome for acute hypoxic respiratory failure.  She now has fever and had WBC 11.8.  SIRS and bacterial infection with sepsis are possible.  She has had recurrent vomiting increasing risk for aspiration although has no history to suggest specific aspiration event today.  Respiratory status has improved with low-flow oxygen supplementation.  She  has not had wheezing and is not known to have underlying asthma or COPD and is no longer a smoker.  She is not tolerating oral intake well today due to persistent nausea and recurrent vomiting which could be due to her acute infectious illness.  -Admit to inpatient  -Follow serial procalcitonin  -Obtain blood cultures and check urine for pneumococcal antigen and sputum culture if able  -Start ceftriaxone and azithromycin empirically while awaiting other test results  -Titrate oxygen supplementation to keep saturations 90% and higher, escalate to high flow nasal cannula and/or noninvasive positive pressure ventilation if necessary  -Treat fever with antipyretics  -Start IV fluids without dextrose until able to tolerate adequate oral intake    Type 2 diabetes treated with insulin  Most recent A1c 7.6 on November 1.  Normally takes glargine insulin in the morning but has not taken it today and blood sugar has ranged 120 to 144 in the hospital today.  She has had recurrent vomiting and poor oral intake today due to acute illness and is not likely to take normal oral intake at this time.  She also takes metformin chronically and uses NovoLog insulin at home as needed for hyperglycemia.  -Moderate carbohydrate diet as tolerated  -Withhold basal insulin until oral intake improves  -Continue NovoLog correction scale  -Hold chronic metformin for now due to GI upset    Biliary colic with suspected chronic cholecystitis  Since November there has been concern for recurring episodes of biliary colic.  She was evaluated by general surgeon on December 7 who raised concern for chronic cholecystitis based on clinical course and previous ultrasound results demonstrating gallbladder wall thickening.  Outpatient HIDA scan is planned for December 14.  Over the last 24 hours, patient has had recurrent nausea and vomiting but not any worsening right upper abdominal pain.  She does not think her active GI symptoms are due to her  gallbladder problems because she feels differently than she had previously with her gallbladder problems.  LFTs are normal today.  She has not had gallstones on previous imaging studies.  -Follow LFTs  -May advance diet as tolerated  -Anticipate outpatient follow-up with surgeon as scheduled, may need to investigate whether to perform HIDA scan during this hospitalization or reschedule it depending upon her length of stay for this acute illness    Hypertension  Chronic and normally treated with a combination of metoprolol, lisinopril, and amlodipine none of which she has taken today.  She presently is normotensive with blood pressures in the low normal range and there is concern for SIRS and possible sepsis which put her at risk for hypotension as does her inability to tolerate oral intake with nausea and repeated vomiting.  -Hold chronic metoprolol, lisinopril, and amlodipine for now    CAD with non-ST elevation MI and previous CABG March 2021  Platelet dysfunction due to chronic aspirin therapy  She has not had recent angina.  She is normally taking aspirin, beta-blocker, and statin for antianginal therapy.  She does not endorse symptoms today worrisome for an acute coronary syndrome.  Aspirin causes chronic platelet dysfunction that increases her bleeding risk, but active bleeding is not suspected at this time.  -Resume chronic aspirin and statin once she can reliably tolerate oral intake including her medications    Chronic pain  She reports chronic musculoskeletal pain for which she takes oxycodone in the evening every night.  She reports she was prescribed Dilaudid for treatment of abdominal pain from her gallbladder problems which she has been taking every morning.  -For now, will forego use of oxycodone and use only oral Dilaudid every 6 hours as needed for acute breakthrough pain  -Can add IV Dilaudid as needed if not able to tolerate oral Dilaudid due to nausea and vomiting  -May use acetaminophen and/or  naproxen as needed for acute pain    Major depression, recurrent, moderate  Followed by PCP for depression and normally taking fluoxetine chronically.  -Continue fluoxetine    Obesity  Chronic with BMI 30.  Obesity likely contributes to medical problems including type II diabetes.  -No acute intervention anticipated during hospitalization       Diet: Moderate Consistent Carb (60 g CHO per Meal) Diet    DVT Prophylaxis: Enoxaparin (Lovenox) SQ  Schultz Catheter: Not present  Central Lines: None  Cardiac Monitoring: None  Code Status:  Full resuscitation    Clinically Significant Risk Factors Present on Admission                         Disposition Plan      Expected Discharge Date: 12/13/2022                The patient's care was discussed with the Patient.    Ayden Hyde MD  Hospitalist Service  [facility]  Securely message with the Vocera Web Console (learn more here)  Text page via [facility] Paging/Directory         ______________________________________________________________________    Chief Complaint   Shortness of breath    History is obtained from the patient, electronic health record and emergency department physician    History of Present Illness   Stacy Brown is a 47 year old female who was in her usual health until yesterday when she awoke with a sore throat.  She felt okay in the morning and was out shopping during the day.  By mid afternoon, she developed severe coughing that was unrelenting.  She tried to lie down which did not seem to help.  She then developed generalized body aches.  She took her normal dose of oxycodone last evening which did not seem to help.  When she awoke this morning, she continued to have cough which has been mostly dry.  She started to feel increasingly short of breath which rapidly progressed and she became so severely short of breath that they called 911.  Dr. Medina reports that the patient was severely hypoxic in the ER and was started  on oxygen supplementation.  Patient says that her breathing is improved after starting oxygen supplementation although she continues to cough.  She has been nauseated all day today and has repeatedly vomited.  However, most of her vomiting seems to coincide with severe coughing spells.  She is now seen in her hospital room for evaluation.    Review of Systems    The 10 point Review of Systems is negative other than noted in the HPI or here.     Past Medical History    I have reviewed this patient's medical history and updated it with pertinent information if needed.   Past Medical History:   Diagnosis Date     Knee pain, chronic      Mixed hyperlipidemia      NSTEMI (non-ST elevated myocardial infarction) (H) 3/5/2021     S/P CABG (coronary artery bypass graft) 3/16/2021     Tobacco abuse disorder 11/21/2017     Type II or unspecified type diabetes mellitus without mention of complication, not stated as uncontrolled 08/16/2002    diagnosed 8/16/02, started insulin 2006       Past Surgical History   I have reviewed this patient's surgical history and updated it with pertinent information if needed.  Past Surgical History:   Procedure Laterality Date     BYPASS GRAFT ARTERY CORONARY N/A 3/9/2021    Procedure: CORONARY ARTERY BYPASS GRAFT X 4 (LIMA - LAD, SV - RPL, SV - PDA,  RA - OM) LEFT RADIAL ENDOARTERY HARVEST AND BILATERAL LEG ENDOVEIN HARVEST (ON CARDIOPULMONARY PUMP OXYGENATOR ; INTRAOPERATIVE TRANSESOPHAGEAL ECHOCARDIOGRAM BY ANESTHESIOLOGIST DR. NEL AVILA)   ;  Surgeon: Kunal Selby MD;  Location:  OR     CV HEART CATHETERIZATION WITH POSSIBLE INTERVENTION N/A 3/8/2021    Procedure: Heart Catheterization with Possible Intervention;  Surgeon: Vadim Kamara MD;  Location:  HEART CARDIAC CATH LAB     HC OPEN TX METATARSAL FRACTURE  age 12    softball injury,open fracture left foot     HC TOOTH EXTRACTION W/FORCEP      Extract wisdom teeth     INJECT JOINT SACROILIAC Left 1/11/2018     Procedure: INJECT JOINT SACROILIAC;  INJECT JOINT SACROILIAC LEFT;  Surgeon: Alan Marshall MD;  Location: [facility] OR     OPERATIVE HYSTEROSCOPY WITH MORCELLATOR N/A 2018    Procedure: OPERATIVE HYSTEROSCOPY WITH MORCELLATOR (MYOSURE);  Exam under anesthesia, operative hysteroscopy, polypectomy, D & C;  Surgeon: Sindhu Peterson DO;  Location: [facility] OR     TUBAL LIGATION  2006     ZZC STABISM SURG,PREV EYE SURG,NOT MUSC      Right       Social History   I have reviewed this patient's social history and updated it with pertinent information if needed.  Social History     Tobacco Use     Smoking status: Former     Packs/day: 0.50     Years: 6.00     Pack years: 3.00     Types: Cigarettes     Quit date: 3/5/2021     Years since quittin.7     Smokeless tobacco: Never   Vaping Use     Vaping Use: Never used   Substance Use Topics     Alcohol use: Yes     Alcohol/week: 0.0 standard drinks     Comment: once a month     Drug use: No     She normally works as a  for the [employer].  She lives at home with her family including her 16-year-old son who has been sick lately with cold symptoms.    Family History   I have reviewed this patient's family history and updated it with pertinent information if needed.  Family History   Problem Relation Age of Onset     Allergies Mother      Lipids Father         cholesterol     Diabetes Maternal Grandmother      Hypertension Maternal Grandmother      Heart Disease Maternal Grandmother         Bypass     Cancer Maternal Grandfather         Lung - metastatic     Alzheimer Disease Paternal Grandmother      Heart Disease Paternal Grandmother         valve replacement     Cerebrovascular Disease Paternal Grandfather      Anesthesia Reaction No family hx of        Prior to Admission Medications   Prior to Admission Medications   Prescriptions Last Dose Informant Patient Reported? Taking?   Ascorbic Acid (VITAMIN C) 100 MG CHEW 12/10/2022 at 0800  Yes Yes    Blood Glucose Monitoring Suppl (ACCU-CHEK COMPLETE) KIT  Self No No   Si Device daily   FLUoxetine (PROZAC) 20 MG capsule 12/10/2022  No Yes   Sig: Take 1 tablet daily for 4 weeks, then increase to 2 pills daily   HYDROmorphone (DILAUDID) 2 MG tablet 12/10/2022 at 2100  No Yes   Sig: Take 1 tablet (2 mg) by mouth every 6 hours as needed for severe pain (7-10)   Multiple Vitamins-Minerals (MULTI-VITAMIN GUMMIES) CHEW Past Week Self Yes Yes   Sig: Take 1 chew tab by mouth daily    acetaminophen (TYLENOL) 325 MG tablet 12/10/2022  No Yes   Sig: Take 1 tablet (325 mg) by mouth every 4 hours as needed for mild pain TAKE TWO TABLETS BY MOUTH EVERY 4 HOURS AS NEEDED FOR MILD PAIN   amLODIPine (NORVASC) 2.5 MG tablet 12/10/2022 at 1200  No Yes   Sig: TAKE 1 TABLET (2.5 MG) BY MOUTH DAILY   aspirin (ASA) 325 MG tablet 12/10/2022 at 0800  No Yes   Si tablet (325 mg) by Oral or NG Tube route daily   Patient taking differently: Take 325 mg by mouth daily   blood glucose (NO BRAND SPECIFIED) test strip   No No   Sig: Use to test blood sugar up to 4 times daily or as directed. To accompany: Blood Glucose Monitor Brands: per insurance.   blood glucose calibration (NO BRAND SPECIFIED) solution  Self No No   Sig: To accompany: Blood Glucose Monitor Brands: per insurance.   blood glucose monitoring (FREESTYLE) lancets  Self No No   Sig: Use to test blood sugars 1-2 times daily or as directed, per patients glucose meter.   insulin aspart (NOVOLOG FLEXPEN) 100 UNIT/ML pen 12/10/2022 at 1200  No Yes   Sig: Novolog Flexpen. Inject 1 units per 10 gram carb unit before breakfast, lunch and dinner, up to 15 units per meal.   insulin glargine (BASAGLAR KWIKPEN) 100 UNIT/ML pen 12/10/2022 at 0800  No Yes   Sig: Inject 42 Units Subcutaneous every morning   insulin pen needle (31G X 8 MM) 31G X 8 MM miscellaneous  Self No No   Si Box of 100 insulin pen needles to be dispensed with every insulin pen prescription   insulin pen  needle (NOVOFINE) 32G X 6 MM miscellaneous  Self No No   Sig: Use once daily or as directed.   lisinopril (ZESTRIL) 2.5 MG tablet 12/10/2022 at 0800  No Yes   Sig: Take 1 tablet (2.5 mg) by mouth daily   metFORMIN (GLUCOPHAGE-XR) 500 MG 24 hr tablet 12/10/2022 at 2100  No Yes   Sig: Take 2 tablets (1,000 mg) by mouth 2 times daily (with meals) .   metoprolol tartrate (LOPRESSOR) 25 MG tablet 12/10/2022 at 2100  No Yes   Sig: Take 1 tablet (25 mg) by mouth 2 times daily   nitroGLYcerin (NITROSTAT) 0.4 MG sublingual tablet More than a month Self No Yes   Sig: For chest pain place 1 tablet under the tongue every 5 minutes for 3 doses. If symptoms persist 5 minutes after 1st dose call 911.   ondansetron (ZOFRAN ODT) 4 MG ODT tab Past Week  No Yes   Sig: Take 1 tablet (4 mg) by mouth every 6 hours as needed for nausea or vomiting   oxyCODONE (ROXICODONE) 5 MG tablet 12/11/2022 at 0700  No Yes   Sig: TAKE 1 TABLET (5 MG) BY MOUTH 2 TIMES DAILY AS NEEDED FOR SEVERE PAIN (MUST LAST 30 DAYS)   pantoprazole (PROTONIX) 20 MG EC tablet 12/10/2022 at 0900  No Yes   Sig: Take 1 tablet (20 mg) by mouth daily   polyethylene glycol (MIRALAX) 17 GM/Dose powder More than a month Self No Yes   Sig: Take 17 g by mouth daily   promethazine (PHENERGAN) 25 MG tablet More than a month  No Yes   Sig: Take 0.5 tablets (12.5 mg) by mouth every 6 hours as needed for nausea or vomiting   rosuvastatin (CRESTOR) 20 MG tablet 12/10/2022 at 0800  No Yes   Sig: Take 1 tablet (20 mg) by mouth daily   tiZANidine (ZANAFLEX) 4 MG tablet 12/10/2022 at 2100  No Yes   Sig: TAKE 1 TABLET (4 MG) BY MOUTH 3 TIMES DAILY AS NEEDED FOR MUSCLE SPASMS      Facility-Administered Medications: None     Allergies   Allergies   Allergen Reactions     Amoxicillin Hives     Hydrocodone-Acetaminophen GI Disturbance     Tylenol is ok       Physical Exam   Vital Signs: Temp: (!) 100.5  F (38.1  C) Temp src: Oral BP: 129/60 Pulse: 76   Resp: 18 SpO2: 96 % O2 Device: Nasal  cannula Oxygen Delivery: 1 LPM   Patient Vitals for the past 24 hrs:   BP Temp Temp src Pulse Resp SpO2 Weight   12/11/22 1425 129/60 (!) 100.5  F (38.1  C) Oral 76 18 96 % --   12/11/22 1230 95/46 99.9  F (37.7  C) Oral 69 18 93 % --   12/11/22 1200 (!) 115/94 -- -- 69 -- 93 % --   12/11/22 1130 109/54 -- -- 75 -- 94 % --   12/11/22 1100 122/70 -- -- 81 -- 94 % --   12/11/22 1035 113/54 -- -- 76 -- 94 % --   12/11/22 1030 98/73 98.8  F (37.1  C) Oral 83 -- (!) 88 % 85.3 kg (188 lb)     Weight: 188 lbs 0 oz Body mass index is 30.34 kg/m .  Wt Readings from Last 4 Encounters:   12/11/22 85.3 kg (188 lb)   12/08/22 83.5 kg (184 lb)   12/07/22 82.1 kg (181 lb)   12/02/22 81.6 kg (180 lb)     General Appearance: Ill-appearing woman without signs of acute distress while lying on her left side in bed  Eyes: Anicteric sclerae, clear conjunctivae  HEENT: No signs of recent head injury, clear ear canals and nares aside from nasal cannula in place in the nares, somewhat dry lips with otherwise normal oropharynx  Neck: Trachea midline, no stridor, symmetric, nontender  Chest: No gross anomalies, symmetric excursion  Respiratory: Normal respiratory effort, diminished breath sounds, clear lung fields  Cardiovascular: Regular rate and rhythm, good radial pulse, brisk capillary refill, no peripheral edema  GI: Nondistended abdomen, obese, soft, tenderness present but seems to be musculoskeletal tenderness of the anterior abdominal wall  Lymph/Hematologic: No cervical or supraclavicular lymphadenopathy  Skin: Flushed cheeks with otherwise normal skin color, no rash  Musculoskeletal: No gross limb anomalies  Neurologic: Alert and maintains wakefulness and attention, oriented, no confusion, able to follow simple instructions, able to purposefully and symmetrically move her limbs  Psychiatric: Somewhat flat affect with otherwise normal mood for the circumstances    Data   Data reviewed today: I reviewed all medications, new labs and  imaging results over the last 24 hours. I personally reviewed no images or EKG's today.    Recent Labs   Lab 12/11/22  1613 12/11/22  1107 12/08/22  1640   WBC  --  11.8* 4.0   HGB  --  11.2* 11.1*   MCV  --  84 84   PLT  --  204 201   NA  --  139 141   POTASSIUM  --  4.3 3.8   CHLORIDE  --  105 108   CO2  --  27 29   BUN  --  23 16   CR  --  0.74 0.77   ANIONGAP  --  7 4   LUNA  --  9.0 9.1   * 144* 74   ALBUMIN  --  3.4 3.6   PROTTOTAL  --  7.2 7.0   BILITOTAL  --  0.3 0.2   ALKPHOS  --  60 62   ALT  --  16 20   AST  --  9 10   LIPASE  --   --  95     Lactic acid 1.8 in the ER which is normal    Venous Blood Gas  Recent Labs   Lab 12/11/22  1107   PHV 7.35   PCO2V 50   PO2V 29   HCO3V 28   RUFINA 1.3   O2PER 28     Recent Results (from the past 24 hour(s))   XR Chest Port 1 View    Narrative    EXAM: XR CHEST PORT 1 VIEW  LOCATION: [facility]  DATE/TIME: 12/11/2022 11:17 AM    INDICATION: sob cough  COMPARISON: Chest x-ray and CT chest 09/03/2022      Impression    IMPRESSION: Post open-heart surgery. Mild patchy right midlung and bibasilar pulmonary opacities suspicious for pneumonic infiltrates. No pleural effusion. Normal heart size.

## 2022-12-11 NOTE — TELEPHONE ENCOUNTER
Prescription approved per [facility] Refill Protocol.    Eloy Hernandez,BSN, RN

## 2022-12-11 NOTE — LETTER
[address] MEDICAL SURGICAL  [address] [place] [address]  Phone: [phone]    December 15, 2022        Stacy Brown  [address] [place] [address]          To whom it may concern:    RE: Stacy Kumar has been hospitalized from 12/11/22 through 12/15/22 and her absence from work should be excused.  She will need ongoing time for recovery following discharge and will not be able to work from 12/16/22 through 12/20/22 but has an appointment on 12/20/22 to evaluate her ongoing recovery and reassess her ability to return to work and if any restrictions are needed.      Please contact me for questions or concerns.      Sincerely,        Xuan Stoner MD

## 2022-12-12 PROBLEM — D64.9 ANEMIA, UNSPECIFIED TYPE: Status: ACTIVE | Noted: 2022-12-12

## 2022-12-12 PROBLEM — R11.2 NAUSEA WITH VOMITING: Status: ACTIVE | Noted: 2022-12-12

## 2022-12-12 PROBLEM — E88.09 HYPOALBUMINEMIA: Status: ACTIVE | Noted: 2022-12-12

## 2022-12-12 PROBLEM — J18.9 COMMUNITY ACQUIRED PNEUMONIA: Status: ACTIVE | Noted: 2022-12-11

## 2022-12-12 PROBLEM — A41.9 SEPSIS (H): Status: ACTIVE | Noted: 2022-12-11

## 2022-12-12 LAB
ALBUMIN SERPL-MCNC: 3.2 G/DL (ref 3.4–5)
ALP SERPL-CCNC: 57 U/L (ref 40–150)
ALT SERPL W P-5'-P-CCNC: 16 U/L (ref 0–50)
ANION GAP SERPL CALCULATED.3IONS-SCNC: 6 MMOL/L (ref 3–14)
AST SERPL W P-5'-P-CCNC: 14 U/L (ref 0–45)
BASE EXCESS BLDV CALC-SCNC: 1.1 MMOL/L (ref -7.7–1.9)
BILIRUB SERPL-MCNC: 0.3 MG/DL (ref 0.2–1.3)
BUN SERPL-MCNC: 21 MG/DL (ref 7–30)
CALCIUM SERPL-MCNC: 9.1 MG/DL (ref 8.5–10.1)
CHLORIDE BLD-SCNC: 106 MMOL/L (ref 94–109)
CO2 SERPL-SCNC: 28 MMOL/L (ref 20–32)
CREAT SERPL-MCNC: 0.64 MG/DL (ref 0.52–1.04)
CRP SERPL-MCNC: 223 MG/L
ERYTHROCYTE [DISTWIDTH] IN BLOOD BY AUTOMATED COUNT: 13.6 % (ref 10–15)
GFR SERPL CREATININE-BSD FRML MDRD: >90 ML/MIN/1.73M2
GLUCOSE BLD-MCNC: 151 MG/DL (ref 70–99)
GLUCOSE BLDC GLUCOMTR-MCNC: 109 MG/DL (ref 70–99)
GLUCOSE BLDC GLUCOMTR-MCNC: 112 MG/DL (ref 70–99)
GLUCOSE BLDC GLUCOMTR-MCNC: 137 MG/DL (ref 70–99)
GLUCOSE BLDC GLUCOMTR-MCNC: 158 MG/DL (ref 70–99)
GLUCOSE BLDC GLUCOMTR-MCNC: 97 MG/DL (ref 70–99)
HCO3 BLDV-SCNC: 28 MMOL/L (ref 21–28)
HCT VFR BLD AUTO: 32.7 % (ref 35–47)
HGB BLD-MCNC: 10.2 G/DL (ref 11.7–15.7)
MCH RBC QN AUTO: 27.1 PG (ref 26.5–33)
MCHC RBC AUTO-ENTMCNC: 31.2 G/DL (ref 31.5–36.5)
MCV RBC AUTO: 87 FL (ref 78–100)
O2/TOTAL GAS SETTING VFR VENT: 23 %
PCO2 BLDV: 53 MM HG (ref 40–50)
PH BLDV: 7.33 [PH] (ref 7.32–7.43)
PLATELET # BLD AUTO: 189 10E3/UL (ref 150–450)
PO2 BLDV: 31 MM HG (ref 25–47)
POTASSIUM BLD-SCNC: 4 MMOL/L (ref 3.4–5.3)
PROCALCITONIN SERPL IA-MCNC: 0.99 NG/ML
PROT SERPL-MCNC: 7.2 G/DL (ref 6.8–8.8)
RBC # BLD AUTO: 3.77 10E6/UL (ref 3.8–5.2)
SODIUM SERPL-SCNC: 140 MMOL/L (ref 133–144)
WBC # BLD AUTO: 7.8 10E3/UL (ref 4–11)

## 2022-12-12 PROCEDURE — 36415 COLL VENOUS BLD VENIPUNCTURE: CPT | Performed by: PEDIATRICS

## 2022-12-12 PROCEDURE — 80053 COMPREHEN METABOLIC PANEL: CPT | Performed by: PEDIATRICS

## 2022-12-12 PROCEDURE — 85027 COMPLETE CBC AUTOMATED: CPT | Performed by: PEDIATRICS

## 2022-12-12 PROCEDURE — 99232 SBSQ HOSP IP/OBS MODERATE 35: CPT | Performed by: PEDIATRICS

## 2022-12-12 PROCEDURE — 250N000013 HC RX MED GY IP 250 OP 250 PS 637: Performed by: PEDIATRICS

## 2022-12-12 PROCEDURE — 250N000011 HC RX IP 250 OP 636: Performed by: PEDIATRICS

## 2022-12-12 PROCEDURE — 82803 BLOOD GASES ANY COMBINATION: CPT | Performed by: PEDIATRICS

## 2022-12-12 PROCEDURE — 84145 PROCALCITONIN (PCT): CPT | Performed by: PEDIATRICS

## 2022-12-12 PROCEDURE — 86140 C-REACTIVE PROTEIN: CPT | Performed by: PEDIATRICS

## 2022-12-12 PROCEDURE — 258N000002 HC RX IP 258 OP 250: Performed by: PEDIATRICS

## 2022-12-12 PROCEDURE — 258N000003 HC RX IP 258 OP 636: Performed by: PEDIATRICS

## 2022-12-12 PROCEDURE — 120N000001 HC R&B MED SURG/OB

## 2022-12-12 RX ORDER — NAPROXEN 250 MG/1
500 TABLET ORAL EVERY 12 HOURS PRN
Status: DISCONTINUED | OUTPATIENT
Start: 2022-12-12 | End: 2022-12-15 | Stop reason: HOSPADM

## 2022-12-12 RX ORDER — DEXTROMETHORPHAN POLISTIREX 30 MG/5ML
30 SUSPENSION ORAL EVERY 12 HOURS SCHEDULED
Status: DISCONTINUED | OUTPATIENT
Start: 2022-12-12 | End: 2022-12-15 | Stop reason: HOSPADM

## 2022-12-12 RX ORDER — HYDROMORPHONE HYDROCHLORIDE 2 MG/1
2 TABLET ORAL EVERY 4 HOURS PRN
Status: DISCONTINUED | OUTPATIENT
Start: 2022-12-12 | End: 2022-12-15 | Stop reason: HOSPADM

## 2022-12-12 RX ORDER — BENZONATATE 100 MG/1
100 CAPSULE ORAL 3 TIMES DAILY PRN
Status: DISCONTINUED | OUTPATIENT
Start: 2022-12-12 | End: 2022-12-15 | Stop reason: HOSPADM

## 2022-12-12 RX ADMIN — AZITHROMYCIN MONOHYDRATE 250 MG: 500 INJECTION, POWDER, LYOPHILIZED, FOR SOLUTION INTRAVENOUS at 09:52

## 2022-12-12 RX ADMIN — FLUOXETINE 20 MG: 20 CAPSULE ORAL at 09:02

## 2022-12-12 RX ADMIN — HYDROMORPHONE HYDROCHLORIDE 2 MG: 2 TABLET ORAL at 12:17

## 2022-12-12 RX ADMIN — SODIUM CHLORIDE: 4.5 INJECTION, SOLUTION INTRAVENOUS at 10:00

## 2022-12-12 RX ADMIN — DEXTROMETHORPHAN POLISTIREX 30 MG: 30 SUSPENSION ORAL at 18:29

## 2022-12-12 RX ADMIN — LORAZEPAM 0.5 MG: 2 INJECTION INTRAMUSCULAR; INTRAVENOUS at 18:26

## 2022-12-12 RX ADMIN — ONDANSETRON 4 MG: 2 INJECTION INTRAMUSCULAR; INTRAVENOUS at 05:17

## 2022-12-12 RX ADMIN — HYDROMORPHONE HYDROCHLORIDE 2 MG: 2 TABLET ORAL at 22:07

## 2022-12-12 RX ADMIN — ENOXAPARIN SODIUM 40 MG: 40 INJECTION SUBCUTANEOUS at 16:52

## 2022-12-12 RX ADMIN — ACETAMINOPHEN 975 MG: 325 TABLET, FILM COATED ORAL at 23:55

## 2022-12-12 RX ADMIN — TIZANIDINE 4 MG: 2 TABLET ORAL at 05:50

## 2022-12-12 RX ADMIN — NAPROXEN 500 MG: 250 TABLET ORAL at 16:52

## 2022-12-12 RX ADMIN — ACETAMINOPHEN 975 MG: 325 TABLET, FILM COATED ORAL at 11:04

## 2022-12-12 RX ADMIN — PANTOPRAZOLE SODIUM 20 MG: 20 TABLET, DELAYED RELEASE ORAL at 09:02

## 2022-12-12 RX ADMIN — CEFTRIAXONE SODIUM 2 G: 2 INJECTION, POWDER, FOR SOLUTION INTRAMUSCULAR; INTRAVENOUS at 17:01

## 2022-12-12 RX ADMIN — HYDROMORPHONE HYDROCHLORIDE 2 MG: 2 TABLET ORAL at 00:37

## 2022-12-12 RX ADMIN — TIZANIDINE 4 MG: 2 TABLET ORAL at 16:52

## 2022-12-12 ASSESSMENT — ACTIVITIES OF DAILY LIVING (ADL)
ADLS_ACUITY_SCORE: 26

## 2022-12-12 NOTE — PLAN OF CARE
Goal Outcome Evaluation:      Plan of Care Reviewed With: patient    Overall Patient Progress: no changeOverall Patient Progress: no change    Outcome Evaluation: Patient alert and oriented. Vital signs stable, Blood pressure 172/78. IV fluids infusing. Othrostatics preformed, see flowsheets. Blood sugars stable. No appepite. Nausea and vomitng. PRN tylenol and dialudad for headache and abdominal pain. SBA to bathroom.

## 2022-12-12 NOTE — PROGRESS NOTES
[facility]    Medicine Progress Note - Hospitalist Service    Date of Admission:  12/11/2022    Assessment & Plan      Stacy Brown is a 47 year old female with type 2 diabetes treated with insulin, hypertension, coronary artery disease with CABG in March 2021, depression, chronic pain treated regularly with opiates, and recent diagnosis of gallbladder problems for which HIDA scan is planned on December 14 presented with 1 day history of worsening cough and shortness of breath.  She tested positive for RSV infection and was severely hypoxic in the ER concerning for acute hypoxic respiratory failure.  Bilateral radiographic infiltrates could be due to RSV pneumonia, but there is increased concern for superimposed bacterial pneumonia.  She has had fever with borderline tachycardia and leukocytosis concerning for SIRS and sepsis.  She has risk factors for bacterial infection and sepsis including diabetes mellitus.  Procalcitonin was significantly elevated also increasing suspicion for bacterial infection.  She appears to be stabilizing overall.      Community-acquired pneumonia with acute hypoxic and hypercapnic respiratory failure  Probable sepsis  RSV infection  Persistent nausea with recurrent vomiting  Hypoalbuminemia  Rapid viral testing positive for RSV.  Chest x-ray demonstrated right midlung and bibasilar infiltrates.  She presented with severe hypoxia worrisome for acute hypoxic respiratory failure.  Blood gas demonstrated mild hypercapnia with normal pH. She had fever, heart rate to , and initial WBC 11.8.  Procalcitonin was elevated at 1.40 which is moderate risk for systemic infection.  Bacterial infection with sepsis superimposed upon RSV appears likely.  She has had recurrent vomiting increasing risk for aspiration although has no history to suggest specific aspiration event.  Respiratory status has improved with low-flow oxygen supplementation.  She has not had  wheezing and is not known to have underlying asthma or COPD and is no longer a smoker.  She is not tolerating oral intake due to persistent nausea and recurrent vomiting which could be due to her acute infectious illness.  Fever and leukocytosis are improved today.  Serum albumin dropped from 3.4 at admission to 3.2 today probably due to inadequate oral nutritional intake.  -Follow serial procalcitonin for trend until decreasing  -Follow-up blood cultures and check urine for pneumococcal antigen and sputum culture if able  -Continue ceftriaxone and azithromycin empirically while awaiting other test results, using IV medication due to her inability to tolerate oral medication reliably at this time  -Titrate oxygen supplementation to keep saturations 90% and higher, escalate to high flow nasal cannula and/or noninvasive positive pressure ventilation if necessary  -Treat fever with antipyretics  -Continue IV fluids without dextrose but decrease rate today    Type 2 diabetes treated with insulin  Most recent A1c 7.6 on November 1.  Normally takes glargine insulin in the morning but has not taken it since 12/10 and blood sugar has been only mildly to moderately elevated during hospitalization.  She has had recurrent vomiting and poor oral intake due to acute illness and is not likely to take normal oral intake today.  She also takes metformin chronically and uses NovoLog insulin at home as needed for hyperglycemia.  -Moderate carbohydrate diet as tolerated  -Continue to withhold basal glargine insulin until oral intake improves  -Continue NovoLog correction scale  -Hold chronic metformin for now due to GI upset    Biliary colic with suspected chronic cholecystitis  Since November there has been concern for recurring episodes of biliary colic.  She was evaluated by general surgeon on December 7 who raised concern for chronic cholecystitis based on clinical course and previous ultrasound results demonstrating gallbladder  wall thickening.  Outpatient HIDA scan is planned for December 14.  With this acute respiratory infection, patient has had recurrent nausea and vomiting but not any worsening right upper abdominal pain.  She does not think her active GI symptoms are due to her gallbladder problems.  LFTs were normal at admission.  She has not had gallstones on previous imaging studies.  -Follow LFTs  -May advance diet as tolerated  -Anticipate outpatient follow-up with surgeon as scheduled, will reschedule HIDA scan for outpatient follow-up after recovery from this acute illness as long as there are no concerns for active cholecystitis or biliary colic during hospitalization     Anemia, unspecified type  Presented with hemoglobin 11 with hemoglobin 12 her most recent baseline and has had 1 g drop in hemoglobin overnight.  Active bleeding not clinically evident.  Hemodilution is possible.  Hemolysis is not suspected based on lab results.  -Follow hemoglobin  -Check stool for occult blood if anemia worsens    Hypertension  Chronic and normally treated with a combination of metoprolol, lisinopril, and amlodipine none of which she has taken since 12/10.  Blood pressure continues to fluctuate from the low normotensive readings to moderately elevated blood pressure readings.  There is increased concern for sepsis which put her at risk for hypotension as does her inability to tolerate oral intake with nausea and repeated vomiting.  -Hold chronic metoprolol, lisinopril, and amlodipine until blood pressure trends upward consistently, other signs of sepsis are resolving, and she is able to tolerate oral intake including medications    CAD with non-ST elevation MI and previous CABG March 2021  Platelet dysfunction due to chronic aspirin therapy  She has not had recent angina.  She is normally taking aspirin, beta-blocker, and statin for antianginal therapy.  She does not endorse symptoms worrisome for an acute coronary syndrome.  Aspirin  causes chronic platelet dysfunction that increases her bleeding risk, but active bleeding is not suspected.  -Resume chronic aspirin and statin once she can reliably tolerate oral intake including her medications    Chronic pain  She reports chronic musculoskeletal pain for which she takes oxycodone in the evening every night.  She reports she was prescribed Dilaudid for treatment of abdominal pain from her gallbladder problems which she has been taking every morning.  She is now having new acute pain due to acute infectious illness.  -For now, will forego use of oxycodone and use only oral Dilaudid as needed for acute breakthrough pain, increase frequency to every 4 hours as needed  -Can add IV Dilaudid as needed if not able to tolerate oral Dilaudid due to nausea and vomiting  -May use acetaminophen and/or naproxen as needed for acute pain    Major depression, recurrent, moderate  Followed by PCP for depression and normally taking fluoxetine chronically.  -Continue fluoxetine    Obesity  Chronic with BMI 30.  Obesity likely contributes to medical problems including type II diabetes.  -No acute intervention anticipated during hospitalization       Diet: Moderate Consistent Carb (60 g CHO per Meal) Diet    DVT Prophylaxis: Enoxaparin (Lovenox) SQ  Schultz Catheter: Not present  Central Lines: None  Cardiac Monitoring: None  Code Status: Full Code      Disposition Plan   Anticipate discharge home once medically stable, probably several days        The patient's care was discussed with the Care Coordinator/ and Patient.    Ayden Hyde MD  Hospitalist Service  [facility]  Securely message with the Vocera Web Console (learn more here)  Text page via [facility] Paging/Directory         Clinically Significant Risk Factors Present on Admission                         ______________________________________________________________________    Interval History   Patient reports that  she feels awful.  She aches all over.  The muscles in her abdomen are very sore which she attributes to ongoing coughing but also her repeated retching and vomiting.  Cough is about the same, somewhat loose but generally nonproductive.  She denies shortness of breath.  She does not have any appetite and continues to have nausea but has not vomited today.  She is not having any diarrhea.  She found it difficult to have a bowel movement today because her abdomen was so sore she had trouble pushing during her attempt to have a bowel movement.  She had fever as high as 100.5 yesterday but defervesced overnight.  Heart rate and blood pressure have fluctuated but generally normal heart rate and blood pressure ranging from low normotensive range to moderately hypertensive.  She has had adequate urine output overnight.  Tylenol sometimes helps her myalgias.  Dilaudid has been helpful for her abdominal pain.  She does not feel like she is having abdominal pain similar to the pain that she had when her gallbladder was giving her problems recently.  She has not noticed any bleeding.    Data reviewed today: I reviewed all medications, new labs and imaging results over the last 24 hours. I personally reviewed no images or EKG's today.    Physical Exam   Vital Signs: Temp: 99.1  F (37.3  C) Temp src: Oral BP: 118/58 Pulse: 76   Resp: 18 SpO2: 94 % O2 Device: Nasal cannula Oxygen Delivery: 2 LPM   Patient Vitals for the past 24 hrs:   BP Temp Temp src Pulse Resp SpO2   12/12/22 0741 118/58 99.1  F (37.3  C) Oral 76 18 94 %   12/12/22 0632 (!) (P) 168/68 -- -- (P) 114 -- (P) 93 %   12/12/22 0355 (!) 158/68 96.9  F (36.1  C) Oral 84 20 96 %   12/12/22 0231 -- -- -- -- -- 94 %   12/12/22 0200 -- -- -- -- -- 97 %   12/12/22 0100 -- -- -- -- -- 97 %   12/11/22 2356 107/61 98.7  F (37.1  C) Oral 76 18 95 %   12/11/22 2335 -- -- -- -- -- 94 %   12/11/22 2300 -- -- -- -- -- (!) 89 %   12/11/22 2213 -- 99.1  F (37.3  C) Oral -- -- --    12/11/22 2200 -- -- -- -- -- (!) 87 %   12/11/22 1901 (!) 171/76 (!) 100.5  F (38.1  C) Oral 94 18 91 %   12/11/22 1425 129/60 (!) 100.5  F (38.1  C) Oral 76 18 96 %   12/11/22 1230 95/46 99.9  F (37.7  C) Oral 69 18 93 %   12/11/22 1200 (!) 115/94 -- -- 69 -- 93 %   12/11/22 1130 109/54 -- -- 75 -- 94 %   12/11/22 1100 122/70 -- -- 81 -- 94 %     Weight: 188 lbs 0 oz     Intake/Output Summary (Last 24 hours) at 12/12/2022 1036  Last data filed at 12/12/2022 0355  Gross per 24 hour   Intake 100 ml   Output 800 ml   Net -700 ml     General Appearance: Ill-appearing woman without signs of acute distress while resting in bed  Respiratory: Normal respiratory effort, inspiratory crackles present at the bases bilaterally, no wheezing, good air movement although she appears to guard against taking deep breaths because it precipitates cough  Cardiovascular: Regular rate and rhythm, palpable radial pulse, normal capillary refill  GI: Soreness of the anterior abdominal wall muscles bilaterally, hypoactive bowel sounds  Skin: No rash  Neuro: Alert and maintains wakefulness and attention, no confusion    Data   Recent Labs   Lab 12/12/22  0738 12/12/22  0650 12/12/22  0208 12/11/22  1613 12/11/22  1107 12/08/22  1640   WBC  --  7.8  --   --  11.8* 4.0   HGB  --  10.2*  --   --  11.2* 11.1*   MCV  --  87  --   --  84 84   PLT  --  189  --   --  204 201   NA  --  140  --   --  139 141   POTASSIUM  --  4.0  --   --  4.3 3.8   CHLORIDE  --  106  --   --  105 108   CO2  --  28  --   --  27 29   BUN  --  21  --   --  23 16   CR  --  0.64  --   --  0.74 0.77   ANIONGAP  --  6  --   --  7 4   LUNA  --  9.1  --   --  9.0 9.1   * 151* 109*   < > 144* 74   ALBUMIN  --  3.2*  --   --  3.4 3.6   PROTTOTAL  --  7.2  --   --  7.2 7.0   BILITOTAL  --  0.3  --   --  0.3 0.2   ALKPHOS  --  57  --   --  60 62   ALT  --  16  --   --  16 20   AST  --  14  --   --  9 10   LIPASE  --   --   --   --   --  95    < > = values in this interval  not displayed.     Blood sugars have ranged 109-158 so far  Procalcitonin yesterday was 1.40 which is moderate risk for systemic infection, repeat procalcitonin today is pending  Blood cultures are negative so far    Recent Results (from the past 24 hour(s))   XR Chest Port 1 View    Narrative    EXAM: XR CHEST PORT 1 VIEW  LOCATION: [facility]  DATE/TIME: 12/11/2022 11:17 AM    INDICATION: sob cough  COMPARISON: Chest x-ray and CT chest 09/03/2022      Impression    IMPRESSION: Post open-heart surgery. Mild patchy right midlung and bibasilar pulmonary opacities suspicious for pneumonic infiltrates. No pleural effusion. Normal heart size.     Medications     NaCl 100 mL/hr at 12/12/22 1000       azithromycin  250 mg Intravenous Q24H     cefTRIAXone  2 g Intravenous Q24H     enoxaparin ANTICOAGULANT  40 mg Subcutaneous Q24H     FLUoxetine  20 mg Oral Daily     insulin aspart   Subcutaneous Daily with breakfast     insulin aspart  1-7 Units Subcutaneous TID AC     insulin aspart  1-5 Units Subcutaneous At Bedtime     pantoprazole  20 mg Oral Daily     sodium chloride (PF)  3 mL Intracatheter Q8H

## 2022-12-12 NOTE — PLAN OF CARE
Goal Outcome Evaluation:      Plan of Care Reviewed With: patient    Overall Patient Progress: no changeOverall Patient Progress: no change    Outcome Evaluation: patient frequently vomiting due to coughing/gagging, dribbling with coughing, lung sounds diminished with crackles in bases, low grade temp, on 1-2L oxygen per NC, anxiety over being sick

## 2022-12-12 NOTE — PLAN OF CARE
Goal Outcome Evaluation:      Plan of Care Reviewed With: patient    Overall Patient Progress: no changeOverall Patient Progress: no change    Outcome Evaluation: Pt conrinues to gag on phelgm, producing emesis.  1L O2 overnight, desat with ambulation.  Afebrile overnight.  PO Dilaudid PO Zanaflex given for Pain.  Pt unable to produce sputum Cx, due to nausea, SBA to BR. -126 overnight, no unsulin given.    BP (!) 158/68   Pulse 84   Temp 96.9  F (36.1  C) (Oral)   Resp 20   Wt 85.3 kg (188 lb)   LMP 03/07/2021 (Approximate)   SpO2 96%   BMI 30.34 kg/m

## 2022-12-12 NOTE — PROVIDER NOTIFICATION
Received call from [facility] @ 2300 regarding strep pneumo test that was ordered. There is a reagent shortage so they are unable to perform the test at this time. Dr. Salmon notified of the change at 2301.

## 2022-12-12 NOTE — CONSULTS
Care Management Initial Consult    General Information  Assessment completed with: Patient,    Type of CM/SW Visit: Initial Assessment    Primary Care Provider verified and updated as needed: Yes   Readmission within the last 30 days:        Reason for Consult: other (see comments) (High Risk)  Advance Care Planning: Advance Care Planning Reviewed: no concerns identified        Communication Assessment  Patient's communication style: spoken language (English or Bilingual)    Hearing Difficulty or Deaf: no   Wear Glasses or Blind: yes    Cognitive  Cognitive/Neuro/Behavioral: WDL                    Living Environment:   People in home: child(valencia), adult, spouse     Current living Arrangements: house      Able to return to prior arrangements: yes     Family/Social Support:  Care provided by: self  Provides care for: no one  Marital Status:   , Children  [place]       Description of Support System: Supportive, Involved    Support Assessment: Adequate family and caregiver support    Current Resources:   Patient receiving home care services: No     Community Resources: None  Equipment currently used at home: none  Supplies currently used at home: None    Employment/Financial:  Employment Status: employed full-time        Financial Concerns:         Lifestyle & Psychosocial Needs:  Social Determinants of Health     Tobacco Use: Medium Risk     Smoking Tobacco Use: Former     Smokeless Tobacco Use: Never     Passive Exposure: Not on file   Alcohol Use: Not on file   Financial Resource Strain: Not on file   Food Insecurity: Not on file   Transportation Needs: Not on file   Physical Activity: Not on file   Stress: Not on file   Social Connections: Not on file   Intimate Partner Violence: Not on file   Depression: Not at risk     PHQ-2 Score: 1   Housing Stability: Not on file     Functional Status:  Prior to admission patient needed assistance:   Dependent ADLs:: Independent        Mental Health Status:           Chemical Dependency Status:              Values/Beliefs:  Spiritual, Cultural Beliefs, Jew Practices, Values that affect care: no               Additional Information:  Referral received for High Risk score.  Initial assessment completed with patient.  Patient lives with her , in their own home.  At baseline, patient is independent with all ADLs.  Patient ambulates independently, drives, and works full time as a .  Patient does not receive any in-home services. Patient does not use home O2.    Patient states she is not feeling well today.  She patient denies any discharge needs.    PCP confirmed with patient, Dr. Yaima Muse.  High Risk for re-admission.  [facility] task request sent.    DIAZ Escalona  [facility] [phone]/ [facility] [phone]  Care Management

## 2022-12-12 NOTE — PROGRESS NOTES
S-(situation): Patient arrives to room 251 via cart from ED    B-(background): pneumonia, RSV    A-(assessment): lung sounds diminshed with crackles in right base, coughing up clear phlegm, on 1L oxygen per NC, low grade temp, voimitting secondary to coughing up phlegm    R-(recommendations): Orders reviewed with patient. Will monitor patient per MD orders.     Inpatient nursing criteria listed below were met:    Health care directives status obtained and documented: Yes  SCD's Documented: Yes  Skin issues/needs documented:NA  Isolation addressed and Signage used: Yes  Fall Prevention: Care plan updated Yes Education given and documented NA  Care Plan initiated and Co-Morbidities added: Yes  Education Assessment documented:Yes  Admission Education Documented: Yes  If present CAUTI/CLABI Education done: NA  New medication patient education completed and documented (Possible Side Effects of Common Medications handout): Yes  Allergies Reviewed: Yes  Admission Medication Reconciliation completed: Yes  Home medications if not able to send immediately home with family stored here: NA  Reminder note placed in discharge instructions regarding home meds: NA  Individualized care needs/preferences addressed and charted: Yes  Provider Notified that patient has arrived to the unit: Yes

## 2022-12-12 NOTE — PROGRESS NOTES
Pt with new onset dizziness while using bathroom.  Denies chest pain,  Vitals unchanged, MD notified and instructions to continue to monitor.

## 2022-12-13 LAB
ALBUMIN SERPL-MCNC: 2.9 G/DL (ref 3.4–5)
ALP SERPL-CCNC: 49 U/L (ref 40–150)
ALT SERPL W P-5'-P-CCNC: 14 U/L (ref 0–50)
ANION GAP SERPL CALCULATED.3IONS-SCNC: 4 MMOL/L (ref 3–14)
AST SERPL W P-5'-P-CCNC: 13 U/L (ref 0–45)
BASE EXCESS BLDV CALC-SCNC: 2 MMOL/L (ref -7.7–1.9)
BILIRUB SERPL-MCNC: 0.2 MG/DL (ref 0.2–1.3)
BUN SERPL-MCNC: 21 MG/DL (ref 7–30)
CALCIUM SERPL-MCNC: 8.8 MG/DL (ref 8.5–10.1)
CHLORIDE BLD-SCNC: 109 MMOL/L (ref 94–109)
CO2 SERPL-SCNC: 27 MMOL/L (ref 20–32)
CREAT SERPL-MCNC: 0.68 MG/DL (ref 0.52–1.04)
CRP SERPL-MCNC: 92.5 MG/L (ref 0–8)
GFR SERPL CREATININE-BSD FRML MDRD: >90 ML/MIN/1.73M2
GLUCOSE BLD-MCNC: 92 MG/DL (ref 70–99)
GLUCOSE BLDC GLUCOMTR-MCNC: 110 MG/DL (ref 70–99)
GLUCOSE BLDC GLUCOMTR-MCNC: 133 MG/DL (ref 70–99)
GLUCOSE BLDC GLUCOMTR-MCNC: 81 MG/DL (ref 70–99)
GLUCOSE BLDC GLUCOMTR-MCNC: 83 MG/DL (ref 70–99)
GLUCOSE BLDC GLUCOMTR-MCNC: 90 MG/DL (ref 70–99)
HCO3 BLDV-SCNC: 28 MMOL/L (ref 21–28)
HGB BLD-MCNC: 9.5 G/DL (ref 11.7–15.7)
O2/TOTAL GAS SETTING VFR VENT: 28 %
PCO2 BLDV: 47 MM HG (ref 40–50)
PH BLDV: 7.38 [PH] (ref 7.32–7.43)
PO2 BLDV: 67 MM HG (ref 25–47)
POTASSIUM BLD-SCNC: 3.9 MMOL/L (ref 3.4–5.3)
PROT SERPL-MCNC: 6.5 G/DL (ref 6.8–8.8)
SODIUM SERPL-SCNC: 140 MMOL/L (ref 133–144)

## 2022-12-13 PROCEDURE — 86140 C-REACTIVE PROTEIN: CPT | Performed by: PEDIATRICS

## 2022-12-13 PROCEDURE — 250N000011 HC RX IP 250 OP 636: Performed by: PEDIATRICS

## 2022-12-13 PROCEDURE — 258N000003 HC RX IP 258 OP 636: Performed by: PEDIATRICS

## 2022-12-13 PROCEDURE — 250N000013 HC RX MED GY IP 250 OP 250 PS 637: Performed by: PEDIATRICS

## 2022-12-13 PROCEDURE — 82803 BLOOD GASES ANY COMBINATION: CPT | Performed by: PEDIATRICS

## 2022-12-13 PROCEDURE — 80053 COMPREHEN METABOLIC PANEL: CPT | Performed by: PEDIATRICS

## 2022-12-13 PROCEDURE — 36415 COLL VENOUS BLD VENIPUNCTURE: CPT | Performed by: PEDIATRICS

## 2022-12-13 PROCEDURE — 250N000013 HC RX MED GY IP 250 OP 250 PS 637: Performed by: FAMILY MEDICINE

## 2022-12-13 PROCEDURE — 99232 SBSQ HOSP IP/OBS MODERATE 35: CPT | Performed by: FAMILY MEDICINE

## 2022-12-13 PROCEDURE — 120N000001 HC R&B MED SURG/OB

## 2022-12-13 PROCEDURE — 85018 HEMOGLOBIN: CPT | Performed by: PEDIATRICS

## 2022-12-13 RX ORDER — LISINOPRIL 2.5 MG/1
2.5 TABLET ORAL DAILY
Status: DISCONTINUED | OUTPATIENT
Start: 2022-12-13 | End: 2022-12-15 | Stop reason: HOSPADM

## 2022-12-13 RX ORDER — AMLODIPINE BESYLATE 2.5 MG/1
2.5 TABLET ORAL DAILY
Status: DISCONTINUED | OUTPATIENT
Start: 2022-12-13 | End: 2022-12-15 | Stop reason: HOSPADM

## 2022-12-13 RX ORDER — ROSUVASTATIN CALCIUM 20 MG/1
20 TABLET, COATED ORAL DAILY
Status: DISCONTINUED | OUTPATIENT
Start: 2022-12-14 | End: 2022-12-15 | Stop reason: HOSPADM

## 2022-12-13 RX ORDER — METOPROLOL TARTRATE 25 MG/1
25 TABLET, FILM COATED ORAL 2 TIMES DAILY
Status: DISCONTINUED | OUTPATIENT
Start: 2022-12-13 | End: 2022-12-15 | Stop reason: HOSPADM

## 2022-12-13 RX ADMIN — ENOXAPARIN SODIUM 40 MG: 40 INJECTION SUBCUTANEOUS at 16:39

## 2022-12-13 RX ADMIN — FLUOXETINE 20 MG: 20 CAPSULE ORAL at 08:32

## 2022-12-13 RX ADMIN — AZITHROMYCIN MONOHYDRATE 250 MG: 500 INJECTION, POWDER, LYOPHILIZED, FOR SOLUTION INTRAVENOUS at 08:32

## 2022-12-13 RX ADMIN — LISINOPRIL 2.5 MG: 2.5 TABLET ORAL at 15:06

## 2022-12-13 RX ADMIN — BENZONATATE 100 MG: 100 CAPSULE ORAL at 16:36

## 2022-12-13 RX ADMIN — CEFTRIAXONE SODIUM 2 G: 2 INJECTION, POWDER, FOR SOLUTION INTRAMUSCULAR; INTRAVENOUS at 16:36

## 2022-12-13 RX ADMIN — HYDROMORPHONE HYDROCHLORIDE 2 MG: 2 TABLET ORAL at 10:30

## 2022-12-13 RX ADMIN — AMLODIPINE BESYLATE 2.5 MG: 2.5 TABLET ORAL at 15:06

## 2022-12-13 RX ADMIN — TIZANIDINE 4 MG: 2 TABLET ORAL at 16:43

## 2022-12-13 RX ADMIN — DEXTROMETHORPHAN POLISTIREX 30 MG: 30 SUSPENSION ORAL at 20:55

## 2022-12-13 RX ADMIN — LORAZEPAM 0.5 MG: 2 INJECTION INTRAMUSCULAR; INTRAVENOUS at 04:51

## 2022-12-13 RX ADMIN — ACETAMINOPHEN 975 MG: 325 TABLET, FILM COATED ORAL at 08:32

## 2022-12-13 RX ADMIN — ACETAMINOPHEN 975 MG: 325 TABLET, FILM COATED ORAL at 20:55

## 2022-12-13 RX ADMIN — BENZONATATE 100 MG: 100 CAPSULE ORAL at 04:55

## 2022-12-13 RX ADMIN — METOPROLOL TARTRATE 25 MG: 25 TABLET, FILM COATED ORAL at 20:55

## 2022-12-13 RX ADMIN — HYDROMORPHONE HYDROCHLORIDE 2 MG: 2 TABLET ORAL at 04:55

## 2022-12-13 RX ADMIN — PANTOPRAZOLE SODIUM 20 MG: 20 TABLET, DELAYED RELEASE ORAL at 08:32

## 2022-12-13 RX ADMIN — TIZANIDINE 4 MG: 2 TABLET ORAL at 22:24

## 2022-12-13 RX ADMIN — DEXTROMETHORPHAN POLISTIREX 30 MG: 30 SUSPENSION ORAL at 08:31

## 2022-12-13 ASSESSMENT — ACTIVITIES OF DAILY LIVING (ADL)
ADLS_ACUITY_SCORE: 27
ADLS_ACUITY_SCORE: 27
ADLS_ACUITY_SCORE: 26
ADLS_ACUITY_SCORE: 27
ADLS_ACUITY_SCORE: 26
ADLS_ACUITY_SCORE: 27
ADLS_ACUITY_SCORE: 27
ADLS_ACUITY_SCORE: 26
ADLS_ACUITY_SCORE: 27
ADLS_ACUITY_SCORE: 26

## 2022-12-13 NOTE — PROGRESS NOTES
[facility]    Medicine Progress Note - Hospitalist Service    Date of Admission:  12/11/2022    Assessment & Plan      Stacy Brown is a 47 year old female with type 2 diabetes treated with insulin, hypertension, coronary artery disease with CABG in March 2021, depression, chronic pain treated regularly with opiates, and recent diagnosis of gallbladder problems for which outpatient HIDA scan is recommended who presented with 1 day history of worsening cough and shortness of breath and was found to have acute respiratory failure with hypoxia and RSV infection identified and pneumonia present on imaging.  Patient was hospitalized for ongoing management with increasing concern for superimposed bacterial pneumonia and patient has been started on Rocephin and azithromycin.  Patient has progressive slow clinical improvement with ongoing poor appetite, minimal energy, and ongoing oxygen supplementation needs and requires ongoing inpatient management    Community-acquired pneumonia with acute hypoxic and hypercapnic respiratory failure  Probable sepsis  RSV infection  Persistent nausea with recurrent vomiting  Hypoalbuminemia  Assessment:  Patient presented with severe hypoxia worrisome for acute hypoxic respiratory failure.  Blood gas demonstrated mild hypercapnia with normal pH. She had fever, heart rate to , and initial WBC 11.8.  Procalcitonin was elevated at 1.40 which is moderate risk for systemic infection.  Rapid viral testing positive for RSV.  Chest x-ray demonstrated right midlung and bibasilar infiltrates. Bacterial infection with sepsis superimposed upon RSV appears likely. Respiratory status has improved with low-flow oxygen supplementation.  She has not had wheezing and is not known to have underlying asthma or COPD and is no longer a smoker.  She is not tolerating oral intake due to persistent nausea and recurrent vomiting which could be due to her acute infectious  illness.  Fever and leukocytosis are improving and patient with weaning down on O2 supplementation need. Procalcitonin trending downward  Plan:  -Continue ceftriaxone and azithromycin empirically - continue using IV medication due to her inability to tolerate oral medication reliably at this time  -Titrate oxygen supplementation to keep saturations 90% and higher  -Treat fever with antipyretics  -Continue IV fluids without dextrose and encourage antiemetics and increased PO intake as tolerated    Type 2 diabetes treated with insulin  Assessment:  Most recent A1c 7.6 on November 1.  Normally takes metformin as well as glargine insulin in the morning but has not taken it since 12/10 and blood sugar has been only mildly to moderately elevated during hospitalization.  She has had recurrent vomiting and poor oral intake due to acute illness and continues to have minimal oral intake today.    Plan:  -Continue Moderate carbohydrate diet as tolerated  -Continue to withhold basal glargine insulin until oral intake improves  -Continue NovoLog correction scale  -Hold chronic metformin for now due to GI upset    Biliary colic with suspected chronic cholecystitis  Assessment:  Since November there has been concern for recurring episodes of biliary colic.  She was evaluated by general surgeon on December 7 who raised concern for chronic cholecystitis based on clinical course and previous ultrasound results demonstrating gallbladder wall thickening.  Outpatient HIDA scan is planned and has been rescheduled due to this hospital stay.  With this acute respiratory infection, patient has had recurrent nausea and vomiting but not any worsening right upper abdominal pain.  She does not think her active GI symptoms are due to her gallbladder problems.  LFTs were normal at admission.  She has not had gallstones on previous imaging studies.  Plan:  -Follow LFTs  -Advance diet as tolerated  -Anticipate outpatient follow-up with surgeon as  scheduled, will reschedule HIDA scan for outpatient follow-up after recovery from this acute illness as long as there are no concerns for active cholecystitis or biliary colic during hospitalization     Anemia, unspecified type  Assessment:  Patient has baseline anemia in the 12 range but worsened to the mid 9 range following fluid resuscitation earlier in this stay.  No concern for active bleeding or hemolysis  Plan:  -Follow hemoglobin  -Check stool for occult blood if anemia worsens    Hypertension  Chronic and normally treated with a combination of metoprolol, lisinopril, and amlodipine none of which she has taken since 12/10. Blood pressures have now become persistently hypertensive  Plan:  -restart chronic metoprolol, lisinopril, and amlodipine and monitor for improvement of blood pressures going forward    CAD with non-ST elevation MI and previous CABG March 2021  Platelet dysfunction due to chronic aspirin therapy  Assessment:  She is normally taking aspirin, beta-blocker, and statin for antianginal therapy and has not had any symptoms concerning for angina recently.    Plan:  -Resume statin   -Hold ASA for now until hemoglobin has been confirmed to have stabilized     Chronic pain  Assessment:  Patient reports chronic musculoskeletal pain for which she takes oxycodone in the evening every night.  She reports she was prescribed Dilaudid for treatment of abdominal pain from her gallbladder problems which she has been taking every morning.  She is now having new acute pain due to acute infectious illness.  Plan:    -Continue oral Dilaudid as needed for acute breakthrough pain   -May use acetaminophen and/or naproxen as well as Zanaflex as needed for acute pain    Major depression, recurrent, moderate  Assessment:  Followed by PCP for depression with recent worsening and normally taking fluoxetine chronically.  Plan:  -Continue fluoxetine    Obesity  Assessment:  Chronic with BMI 30.  Obesity likely contributes  "to medical problems including type II diabetes.  Plan:  -No acute intervention anticipated during hospitalization       Diet: Moderate Consistent Carb (60 g CHO per Meal) Diet    DVT Prophylaxis: Enoxaparin (Lovenox) SQ  Schultz Catheter: Not present  Central Lines: None  Cardiac Monitoring: None  Code Status: Full Code      Disposition Plan   Anticipate discharge home in 2-4 days once patient is off oxygen, eating and drinking well     The patient's care was discussed with the Bedside Nurse and Patient.    Xuan Stoner MD  Hospitalist Service  [facility]  Securely message with the Vocera Web Console (learn more here)  Text page via [facility] Paging/Directory         Clinically Significant Risk Factors              # Hypoalbuminemia: Lowest albumin = 2.9 g/dL at 12/13/2022  6:09 AM, will monitor as appropriate          # DMII: A1C = 7.6 % (Ref range: 0.0 - 5.6 %) within past 3 months, PRESENT ON ADMISSION  # Obesity: Estimated body mass index is 30.34 kg/m  as calculated from the following:    Height as of 12/8/22: 1.676 m (5' 6\").    Weight as of this encounter: 85.3 kg (188 lb)., PRESENT ON ADMISSION         ______________________________________________________________________    Interval History   Patient afebrile, blood pressure now persistently hypertensive.  Has weaned down to 1L and can tolerate short period of time on RA but even persistent talking caused desaturation.  Patient reports food tasting good for the first time since her illness began with less nausea and no vomiting.  Still very poor energy.  No new symptoms.  No new nursing concerns.     Data reviewed today: I reviewed all medications, new labs and imaging results over the last 24 hours.    Physical Exam   Vital Signs: Temp: 97.7  F (36.5  C) Temp src: Oral BP: (!) 168/81 Pulse: 84   Resp: 18 SpO2: 94 % O2 Device: Nasal cannula Oxygen Delivery: 1 LPM  Weight: 188 lbs 0 oz  Constitutional: awake, alert, " cooperative, no apparent distress at rest, and appears stated age  Respiratory: No increased work of breathing, decreased air exchange, ongoing inspiratory crackles noted in bilateral bases, now wheezing  Cardiovascular: RRR without murmur  GI: bowel sounds present, abdomen obese and soft, mild tenderness due to muscular discomfort noted on exam but no involuntary guarding present  Skin: no redness, warmth, or swelling and no rashes  Musculoskeletal: no lower extremity pitting edema present  Neurologic: Awake, alert, oriented to name, place and situation     Data   Recent Labs   Lab 12/13/22 2054 12/13/22  1702 12/13/22  1142 12/13/22  0804 12/13/22  0609 12/12/22  0738 12/12/22  0650 12/11/22  1613 12/11/22  1107 12/08/22  1640   WBC  --   --   --   --   --   --  7.8  --  11.8* 4.0   HGB  --   --   --   --  9.5*  --  10.2*  --  11.2* 11.1*   MCV  --   --   --   --   --   --  87  --  84 84   PLT  --   --   --   --   --   --  189  --  204 201   NA  --   --   --   --  140  --  140  --  139 141   POTASSIUM  --   --   --   --  3.9  --  4.0  --  4.3 3.8   CHLORIDE  --   --   --   --  109  --  106  --  105 108   CO2  --   --   --   --  27  --  28  --  27 29   BUN  --   --   --   --  21  --  21  --  23 16   CR  --   --   --   --  0.68  --  0.64  --  0.74 0.77   ANIONGAP  --   --   --   --  4  --  6  --  7 4   LUNA  --   --   --   --  8.8  --  9.1  --  9.0 9.1   * 133* 90   < > 92   < > 151*   < > 144* 74   ALBUMIN  --   --   --   --  2.9*  --  3.2*  --  3.4 3.6   PROTTOTAL  --   --   --   --  6.5*  --  7.2  --  7.2 7.0   BILITOTAL  --   --   --   --  0.2  --  0.3  --  0.3 0.2   ALKPHOS  --   --   --   --  49  --  57  --  60 62   ALT  --   --   --   --  14  --  16  --  16 20   AST  --   --   --   --  13  --  14  --  9 10   LIPASE  --   --   --   --   --   --   --   --   --  95    < > = values in this interval not displayed.

## 2022-12-13 NOTE — PLAN OF CARE
"Goal Outcome Evaluation:      Plan of Care Reviewed With: patient               Patient continues with emesis, but appears mostly to be phlegm related, \"mucusy\" in nature,not the color of bile.  Have given Naproxyn for rib/chest discomfort for coughing, will try Delsym at 1850.  Also gave IV Ativan and Zanaflex to help relax her so maybe the coughing will not produce so much phlegm.  No appetite for supper due to coughing up phlegm.  Incontinent in pad with coughing as well, has a pull up brief on. Continues on IV Rocephin and Zithromax. IV fluids at 50 ml/hour infusing.  Not sure how much or if Zanaflex/ Naproxen absorbed before she had emesis with phlegm, that is why we tried the Ativan IV as well.   "

## 2022-12-13 NOTE — PLAN OF CARE
Goal Outcome Evaluation:           Overall Patient Progress: no changeOverall Patient Progress: no change    Outcome Evaluation: Pt is A & O x 4, hypertensive in 170s-160s, lisinopril and amlopine restarted this shift. Pt weaned to RA this morning for a few hours but placed back on 1 L NC as pt desats to low 80s while sleeping. Productive cough remains, lung sound coarse throughout. Tylenol and diaudid given x 1 for headache and chest discomfort from coughing. Up to bathroom via SBA. Pt refused breakfast and lunch, denies nausea today. Blood sugars of 81 and 90.

## 2022-12-13 NOTE — PLAN OF CARE
Goal Outcome Evaluation:      Plan of Care Reviewed With: patient          Outcome Evaluation: Patient is alert and oriented. Fluids infusing. C/o HA-PRN Tylenol given. No nausea or vomiting this shift. SBA to bathroom.

## 2022-12-14 LAB
ALBUMIN SERPL-MCNC: 3 G/DL (ref 3.4–5)
ALP SERPL-CCNC: 195 U/L (ref 40–150)
ALT SERPL W P-5'-P-CCNC: 64 U/L (ref 0–50)
ANION GAP SERPL CALCULATED.3IONS-SCNC: 5 MMOL/L (ref 3–14)
AST SERPL W P-5'-P-CCNC: 68 U/L (ref 0–45)
BASE EXCESS BLDV CALC-SCNC: 3 MMOL/L (ref -7.7–1.9)
BILIRUB SERPL-MCNC: 0.3 MG/DL (ref 0.2–1.3)
BUN SERPL-MCNC: 12 MG/DL (ref 7–30)
CALCIUM SERPL-MCNC: 9 MG/DL (ref 8.5–10.1)
CHLORIDE BLD-SCNC: 105 MMOL/L (ref 94–109)
CO2 SERPL-SCNC: 28 MMOL/L (ref 20–32)
CREAT SERPL-MCNC: 0.56 MG/DL (ref 0.52–1.04)
CRP SERPL-MCNC: 58 MG/L (ref 0–8)
ERYTHROCYTE [DISTWIDTH] IN BLOOD BY AUTOMATED COUNT: 13.1 % (ref 10–15)
GFR SERPL CREATININE-BSD FRML MDRD: >90 ML/MIN/1.73M2
GLUCOSE BLD-MCNC: 120 MG/DL (ref 70–99)
GLUCOSE BLDC GLUCOMTR-MCNC: 118 MG/DL (ref 70–99)
GLUCOSE BLDC GLUCOMTR-MCNC: 131 MG/DL (ref 70–99)
GLUCOSE BLDC GLUCOMTR-MCNC: 153 MG/DL (ref 70–99)
GLUCOSE BLDC GLUCOMTR-MCNC: 189 MG/DL (ref 70–99)
GLUCOSE BLDC GLUCOMTR-MCNC: 220 MG/DL (ref 70–99)
HCO3 BLDV-SCNC: 28 MMOL/L (ref 21–28)
HCT VFR BLD AUTO: 29.4 % (ref 35–47)
HGB BLD-MCNC: 9.6 G/DL (ref 11.7–15.7)
MCH RBC QN AUTO: 27 PG (ref 26.5–33)
MCHC RBC AUTO-ENTMCNC: 32.7 G/DL (ref 31.5–36.5)
MCV RBC AUTO: 83 FL (ref 78–100)
O2/TOTAL GAS SETTING VFR VENT: 24 %
PCO2 BLDV: 45 MM HG (ref 40–50)
PH BLDV: 7.41 [PH] (ref 7.32–7.43)
PLATELET # BLD AUTO: 243 10E3/UL (ref 150–450)
PO2 BLDV: 51 MM HG (ref 25–47)
POTASSIUM BLD-SCNC: 3.5 MMOL/L (ref 3.4–5.3)
PROT SERPL-MCNC: 6.9 G/DL (ref 6.8–8.8)
RBC # BLD AUTO: 3.56 10E6/UL (ref 3.8–5.2)
SODIUM SERPL-SCNC: 138 MMOL/L (ref 133–144)
WBC # BLD AUTO: 5 10E3/UL (ref 4–11)

## 2022-12-14 PROCEDURE — 80053 COMPREHEN METABOLIC PANEL: CPT | Performed by: FAMILY MEDICINE

## 2022-12-14 PROCEDURE — 250N000012 HC RX MED GY IP 250 OP 636 PS 637: Performed by: PEDIATRICS

## 2022-12-14 PROCEDURE — 120N000001 HC R&B MED SURG/OB

## 2022-12-14 PROCEDURE — 250N000009 HC RX 250: Performed by: PEDIATRICS

## 2022-12-14 PROCEDURE — 86140 C-REACTIVE PROTEIN: CPT | Performed by: FAMILY MEDICINE

## 2022-12-14 PROCEDURE — 258N000003 HC RX IP 258 OP 636: Performed by: PEDIATRICS

## 2022-12-14 PROCEDURE — 250N000011 HC RX IP 250 OP 636: Performed by: PEDIATRICS

## 2022-12-14 PROCEDURE — 99232 SBSQ HOSP IP/OBS MODERATE 35: CPT | Performed by: FAMILY MEDICINE

## 2022-12-14 PROCEDURE — 250N000013 HC RX MED GY IP 250 OP 250 PS 637: Performed by: PEDIATRICS

## 2022-12-14 PROCEDURE — 85027 COMPLETE CBC AUTOMATED: CPT | Performed by: FAMILY MEDICINE

## 2022-12-14 PROCEDURE — 250N000013 HC RX MED GY IP 250 OP 250 PS 637: Performed by: FAMILY MEDICINE

## 2022-12-14 PROCEDURE — 36415 COLL VENOUS BLD VENIPUNCTURE: CPT | Performed by: FAMILY MEDICINE

## 2022-12-14 PROCEDURE — 82803 BLOOD GASES ANY COMBINATION: CPT | Performed by: FAMILY MEDICINE

## 2022-12-14 PROCEDURE — 258N000002 HC RX IP 258 OP 250: Performed by: PEDIATRICS

## 2022-12-14 RX ORDER — AZITHROMYCIN 250 MG/1
250 TABLET, FILM COATED ORAL ONCE
Status: COMPLETED | OUTPATIENT
Start: 2022-12-15 | End: 2022-12-15

## 2022-12-14 RX ORDER — CEFDINIR 300 MG/1
300 CAPSULE ORAL 2 TIMES DAILY
Status: DISCONTINUED | OUTPATIENT
Start: 2022-12-14 | End: 2022-12-15 | Stop reason: HOSPADM

## 2022-12-14 RX ORDER — METFORMIN HCL 500 MG
500 TABLET, EXTENDED RELEASE 24 HR ORAL 2 TIMES DAILY WITH MEALS
Status: DISCONTINUED | OUTPATIENT
Start: 2022-12-14 | End: 2022-12-15 | Stop reason: HOSPADM

## 2022-12-14 RX ADMIN — TIZANIDINE 4 MG: 2 TABLET ORAL at 12:35

## 2022-12-14 RX ADMIN — METOPROLOL TARTRATE 25 MG: 25 TABLET, FILM COATED ORAL at 20:04

## 2022-12-14 RX ADMIN — ENOXAPARIN SODIUM 40 MG: 40 INJECTION SUBCUTANEOUS at 17:25

## 2022-12-14 RX ADMIN — CEFDINIR 300 MG: 300 CAPSULE ORAL at 20:04

## 2022-12-14 RX ADMIN — BENZONATATE 100 MG: 100 CAPSULE ORAL at 00:28

## 2022-12-14 RX ADMIN — LISINOPRIL 2.5 MG: 2.5 TABLET ORAL at 08:15

## 2022-12-14 RX ADMIN — SODIUM CHLORIDE: 4.5 INJECTION, SOLUTION INTRAVENOUS at 00:36

## 2022-12-14 RX ADMIN — AMLODIPINE BESYLATE 2.5 MG: 2.5 TABLET ORAL at 08:16

## 2022-12-14 RX ADMIN — PANTOPRAZOLE SODIUM 20 MG: 20 TABLET, DELAYED RELEASE ORAL at 08:15

## 2022-12-14 RX ADMIN — AZITHROMYCIN MONOHYDRATE 250 MG: 500 INJECTION, POWDER, LYOPHILIZED, FOR SOLUTION INTRAVENOUS at 08:15

## 2022-12-14 RX ADMIN — HYDROMORPHONE HYDROCHLORIDE 2 MG: 2 TABLET ORAL at 08:16

## 2022-12-14 RX ADMIN — Medication 1 MG: at 21:38

## 2022-12-14 RX ADMIN — DEXTROMETHORPHAN POLISTIREX 30 MG: 30 SUSPENSION ORAL at 08:15

## 2022-12-14 RX ADMIN — DEXTROMETHORPHAN POLISTIREX 30 MG: 30 SUSPENSION ORAL at 20:05

## 2022-12-14 RX ADMIN — METFORMIN HYDROCHLORIDE 500 MG: 500 TABLET, EXTENDED RELEASE ORAL at 17:25

## 2022-12-14 RX ADMIN — HYDROMORPHONE HYDROCHLORIDE 2 MG: 2 TABLET ORAL at 12:35

## 2022-12-14 RX ADMIN — HYDROMORPHONE HYDROCHLORIDE 2 MG: 2 TABLET ORAL at 00:44

## 2022-12-14 RX ADMIN — BENZONATATE 100 MG: 100 CAPSULE ORAL at 20:04

## 2022-12-14 RX ADMIN — METOPROLOL TARTRATE 25 MG: 25 TABLET, FILM COATED ORAL at 08:15

## 2022-12-14 RX ADMIN — INSULIN ASPART 1 UNITS: 100 INJECTION, SOLUTION INTRAVENOUS; SUBCUTANEOUS at 17:25

## 2022-12-14 RX ADMIN — TIZANIDINE 4 MG: 2 TABLET ORAL at 21:38

## 2022-12-14 RX ADMIN — HYDROMORPHONE HYDROCHLORIDE 2 MG: 2 TABLET ORAL at 21:38

## 2022-12-14 RX ADMIN — ROSUVASTATIN CALCIUM 20 MG: 20 TABLET, FILM COATED ORAL at 08:16

## 2022-12-14 RX ADMIN — FLUOXETINE 20 MG: 20 CAPSULE ORAL at 08:16

## 2022-12-14 RX ADMIN — INSULIN ASPART 1 UNITS: 100 INJECTION, SOLUTION INTRAVENOUS; SUBCUTANEOUS at 12:26

## 2022-12-14 RX ADMIN — IPRATROPIUM BROMIDE AND ALBUTEROL SULFATE 3 ML: 2.5; .5 SOLUTION RESPIRATORY (INHALATION) at 00:44

## 2022-12-14 RX ADMIN — BENZONATATE 100 MG: 100 CAPSULE ORAL at 08:16

## 2022-12-14 ASSESSMENT — ACTIVITIES OF DAILY LIVING (ADL)
ADLS_ACUITY_SCORE: 27

## 2022-12-14 NOTE — PLAN OF CARE
Goal Outcome Evaluation:      Plan of Care Reviewed With: patient    Overall Patient Progress: no changeOverall Patient Progress: no change    Outcome Evaluation: Patient reports feeling a tiny bit better today. She needs significant encouragement to get out of bed and do things for herself. Pt sat up in the recliner for a couple hours this evening. Oxygen was weaned off this afternoon. Sats are in the mid to upper 90's while awake but drop into the 83-86 range while sleeping. Zanaflex given for abdominal/chest discomfort from coughing. She has refused to try to eat anything other than popcicles. No nausea today.

## 2022-12-14 NOTE — PLAN OF CARE
Goal Outcome Evaluation:      Plan of Care Reviewed With: patient    Overall Patient Progress: improvingOverall Patient Progress: improving    Outcome Evaluation: Saline locked. IV antibiotics shifted to oral. No complaint of pain. SBA to bathroom with walker. A and Ox 4. VSS and afebrile.

## 2022-12-14 NOTE — PROGRESS NOTES
[facility]    Medicine Progress Note - Hospitalist Service    Date of Admission:  12/11/2022    Assessment & Plan   Stacy Brown is a 47 year old female with type 2 diabetes treated with insulin, hypertension, coronary artery disease with CABG in March 2021, depression, chronic pain treated regularly with opiates, and recent diagnosis of gallbladder problems for which outpatient HIDA scan is recommended who presented with 1 day history of worsening cough and shortness of breath and was found to have acute respiratory failure with hypoxia and RSV infection identified and pneumonia present on imaging.  Patient was hospitalized for ongoing management with increasing concern for superimposed bacterial pneumonia and patient has been started on Rocephin and azithromycin.  Patient has progressive clinical improvement with notable improvement in the past 24 hours and patient now weaned to room air with improvement of energy and appetite and hopeful discharge home in next 1 to 2 days if she continues to clinically improve.    Community-acquired pneumonia with acute hypoxic and hypercapnic respiratory failure  Probable sepsis  RSV infection  Persistent nausea with recurrent vomiting  Hypoalbuminemia  Assessment:  Patient presented with acute hypoxic respiratory failure with fever, heart rate to , and initial WBC 11.8.  Procalcitonin was elevated at 1.40 which is moderate risk for systemic infection.  Rapid viral testing positive for RSV.  Chest x-ray demonstrated right midlung and bibasilar infiltrates. Bacterial infection with sepsis superimposed upon RSV suspected.  Fever and leukocytosis are improving and patient with weaning down on O2 supplementation need was weaned to room air today. Procalcitonin trending downward  Plan:  -Transition to oral Omnicef and azithromycin given patient's improvement in oral intake and resolution of nausea  -Continue to monitor oximetry and supplement  with oxygen if needed  -Treat fever with antipyretics  -Saline lock IV fluids and monitor for adequate oral intake with p.o. intake alone    Type 2 diabetes treated with insulin  Assessment:  Most recent A1c 7.6 on November 1.  Normally takes metformin as well as glargine insulin in the morning but has not taken it since 12/10 and blood sugar has been only mildly to moderately elevated during hospitalization so home regimen had continued to be held.  Oral intake has significantly improved with blood sugars now trending upward  Plan:  -Continue Moderate carbohydrate diet as tolerated  -We will restart metformin at half of normal dosing (500 mg twice daily)  -We will restart Lantus at half of normal home regimen (21 units daily) tomorrow morning if oral intake continues to improve  -Continue medium resistance NovoLog correction scale    Biliary colic with suspected chronic cholecystitis  Assessment:  Since November there has been concern for recurring episodes of biliary colic.  She was evaluated by general surgeon on December 7 who raised concern for chronic cholecystitis based on clinical course and previous ultrasound results demonstrating gallbladder wall thickening.  Outpatient HIDA scan is planned and has been rescheduled due to this hospital stay.  With this acute respiratory infection, patient has had recurrent nausea and vomiting but not any worsening right upper abdominal pain.    Plan:  -Follow LFTs  -Advance diet as tolerated -patient is aware that she may start having some recurrent symptoms with increased oral intake if her gallbladder is malfunctioning  -Anticipate outpatient follow-up with surgeon as scheduled, will reschedule HIDA scan for outpatient follow-up after recovery from this acute illness as long as there are no concerns for active cholecystitis or biliary colic during hospitalization     Anemia, unspecified type  Assessment:  Patient has baseline anemia in the 12 range but worsened to the  mid 9 range following fluid resuscitation earlier in this stay.  No concern for active bleeding or hemolysis  Plan:  -Follow hemoglobin  -Check stool for occult blood if anemia worsens    Hypertension  Chronic and normally treated with a combination of metoprolol, lisinopril, and amlodipine none of which she has taken since 12/10. Blood pressures have now become persistently hypertensive and home regimen was restarted on the evening of 12/13/2022  Plan:  -restart chronic metoprolol, lisinopril, and amlodipine and monitor for improvement of blood pressures going forward    CAD with non-ST elevation MI and previous CABG March 2021  Platelet dysfunction due to chronic aspirin therapy  Assessment:  She is normally taking aspirin, beta-blocker, and statin for antianginal therapy and has not had any symptoms concerning for angina recently.    Plan:  -Continue home statin and beta-blocker  -Hold ASA for now until hemoglobin has been confirmed to have stabilized     Chronic pain  Assessment:  Patient reports chronic musculoskeletal pain for which she takes oxycodone in the evening every night.  She reports she was prescribed Dilaudid for treatment of abdominal pain from her gallbladder problems which she has been taking every morning.  She is now having new acute pain secondary to soreness from coughing due to acute infectious illness.  Plan:    -Continue oral Dilaudid as needed for acute breakthrough pain   -May use acetaminophen and/or naproxen as well as Zanaflex as needed for acute pain    Major depression, recurrent, moderate  Assessment:  Followed by PCP for depression with recent worsening and normally taking fluoxetine chronically.  Plan:  -Continue fluoxetine    Obesity  Assessment:  Chronic with BMI 30.  Obesity likely contributes to medical problems including type II diabetes.  Plan:  -No acute intervention anticipated during hospitalization       Diet: Moderate Consistent Carb (60 g CHO per Meal) Diet    DVT  "Prophylaxis: Enoxaparin (Lovenox) SQ  Schultz Catheter: Not present  Central Lines: None  Cardiac Monitoring: None  Code Status: Full Code      Disposition Plan      Expected Discharge Date: 12/15/2022      Destination: home with family  Discharge Comments: o2        The patient's care was discussed with the Bedside Nurse and Patient.    Xuan Stoner MD  Hospitalist Service  [facility]  Securely message with the Vocera Web Console (learn more here)  Text page via [employer] Paging/Directory         Clinically Significant Risk Factors              # Hypoalbuminemia: Lowest albumin = 2.9 g/dL at 12/13/2022  6:09 AM, will monitor as appropriate          # DMII: A1C = 7.6 % (Ref range: 0.0 - 5.6 %) within past 3 months, PRESENT ON ADMISSION  # Obesity: Estimated body mass index is 30.34 kg/m  as calculated from the following:    Height as of 12/8/22: 1.676 m (5' 6\").    Weight as of this encounter: 85.3 kg (188 lb)., PRESENT ON ADMISSION         ______________________________________________________________________    Interval History   Patient has been afebrile, on 1L overnight but on RA since this morning and patient has been able to ambulate and take a short nap without needing O2 supplementation restarted.  Appetite is also starting to improve with patient having adequate fluid intake but still not very well on solids.  Energy level is also improving.  Patient is amazed at her progress in the last 24 hours.  No new nursing concerns.    Data reviewed today: I reviewed all medications, new labs and imaging results over the last 24 hours.    Physical Exam   Vital Signs: Temp: 98.8  F (37.1  C) Temp src: Oral BP: (!) 177/72 Pulse: 66   Resp: 20 SpO2: 95 % O2 Device: None (Room air) Oxygen Delivery: 1 LPM  Weight: 188 lbs 0 oz  Constitutional: awake, alert, cooperative, no apparent distress and has notable more energy, and appears stated age  Respiratory: No increased work of " breathing, slightly decreased breath sounds diffusely but improved from yesterday with reduction of crackles and no wheezing  Cardiovascular: Regular rate and rhythm without murmur  GI: Bowel sounds present, abdomen is soft, nontender, nondistended  Skin: no redness, warmth, or swelling and no rashes  Musculoskeletal: no lower extremity pitting edema present  Neurologic: Awake, alert, oriented to name, place and situation    Data   Recent Labs   Lab  12/14/22  1133 12/14/22  0809 12/14/22  0616 12/13/22  0804 12/13/22  0609 12/12/22  0738 12/12/22  0650 12/11/22  1613 12/11/22  1107 12/08/22  1640   WBC   --   --  5.0  --   --   --  7.8  --  11.8* 4.0   HGB   --   --  9.6*  --  9.5*  --  10.2*  --  11.2* 11.1*   MCV   --   --  83  --   --   --  87  --  84 84   PLT   --   --  243  --   --   --  189  --  204 201   NA   --   --  138  --  140  --  140  --  139 141   POTASSIUM   --   --  3.5  --  3.9  --  4.0  --  4.3 3.8   CHLORIDE   --   --  105  --  109  --  106  --  105 108   CO2   --   --  28  --  27  --  28  --  27 29   BUN   --   --  12  --  21  --  21  --  23 16   CR   --   --  0.56  --  0.68  --  0.64  --  0.74 0.77   ANIONGAP   --   --  5  --  4  --  6  --  7 4   LUNA   --   --  9.0  --  8.8  --  9.1  --  9.0 9.1   GLC  153* 131* 120*   < > 92   < > 151*   < > 144* 74   ALBUMIN   --   --  3.0*  --  2.9*  --  3.2*  --  3.4 3.6   PROTTOTAL   --   --  6.9  --  6.5*  --  7.2  --  7.2 7.0   BILITOTAL   --   --  0.3  --  0.2  --  0.3  --  0.3 0.2   ALKPHOS   --   --  195*  --  49  --  57  --  60 62   ALT   --   --  64*  --  14  --  16  --  16 20   AST   --   --  68*  --  13  --  14  --  9 10   LIPASE   --   --   --   --   --   --   --   --   --  95    < > = values in this interval not displayed.

## 2022-12-14 NOTE — PLAN OF CARE
Goal Outcome Evaluation:      Plan of Care Reviewed With: patient    Overall Patient Progress: no changeOverall Patient Progress: no change    Outcome Evaluation: vss. Pt has coarse lung sounds with inspiratory/expiratory wheezes. PRN Duoneb given. Pt then stated that she felt jittery after neb and felt more wheezy though lungs initially sounded less wheezy. Dilaudid givne for abdoinal/chest pain from coughing and Tessalon given for cough. 1L nc with O2 sats mid 90s. Pt has ambulated to the bathroom and tolerates activity well.

## 2022-12-15 VITALS
HEART RATE: 62 BPM | DIASTOLIC BLOOD PRESSURE: 68 MMHG | TEMPERATURE: 98.6 F | BODY MASS INDEX: 30.34 KG/M2 | RESPIRATION RATE: 18 BRPM | WEIGHT: 188 LBS | SYSTOLIC BLOOD PRESSURE: 139 MMHG | OXYGEN SATURATION: 94 %

## 2022-12-15 LAB
ALBUMIN SERPL-MCNC: 2.9 G/DL (ref 3.4–5)
ALP SERPL-CCNC: 163 U/L (ref 40–150)
ALT SERPL W P-5'-P-CCNC: 40 U/L (ref 0–50)
ANION GAP SERPL CALCULATED.3IONS-SCNC: 6 MMOL/L (ref 3–14)
AST SERPL W P-5'-P-CCNC: 21 U/L (ref 0–45)
BASE EXCESS BLDV CALC-SCNC: 4.7 MMOL/L (ref -7.7–1.9)
BILIRUB SERPL-MCNC: 0.2 MG/DL (ref 0.2–1.3)
BUN SERPL-MCNC: 9 MG/DL (ref 7–30)
CALCIUM SERPL-MCNC: 9.6 MG/DL (ref 8.5–10.1)
CHLORIDE BLD-SCNC: 107 MMOL/L (ref 94–109)
CO2 SERPL-SCNC: 25 MMOL/L (ref 20–32)
CREAT SERPL-MCNC: 0.53 MG/DL (ref 0.52–1.04)
GFR SERPL CREATININE-BSD FRML MDRD: >90 ML/MIN/1.73M2
GLUCOSE BLD-MCNC: 183 MG/DL (ref 70–99)
GLUCOSE BLDC GLUCOMTR-MCNC: 178 MG/DL (ref 70–99)
GLUCOSE BLDC GLUCOMTR-MCNC: 180 MG/DL (ref 70–99)
GLUCOSE BLDC GLUCOMTR-MCNC: 181 MG/DL (ref 70–99)
GLUCOSE BLDC GLUCOMTR-MCNC: 182 MG/DL (ref 70–99)
HCO3 BLDV-SCNC: 29 MMOL/L (ref 21–28)
HGB BLD-MCNC: 10.6 G/DL (ref 11.7–15.7)
O2/TOTAL GAS SETTING VFR VENT: 21 %
PCO2 BLDV: 40 MM HG (ref 40–50)
PH BLDV: 7.46 [PH] (ref 7.32–7.43)
PO2 BLDV: 57 MM HG (ref 25–47)
POTASSIUM BLD-SCNC: 3.8 MMOL/L (ref 3.4–5.3)
PROT SERPL-MCNC: 7.2 G/DL (ref 6.8–8.8)
SODIUM SERPL-SCNC: 138 MMOL/L (ref 133–144)

## 2022-12-15 PROCEDURE — 80053 COMPREHEN METABOLIC PANEL: CPT | Performed by: FAMILY MEDICINE

## 2022-12-15 PROCEDURE — 82803 BLOOD GASES ANY COMBINATION: CPT | Performed by: FAMILY MEDICINE

## 2022-12-15 PROCEDURE — 36415 COLL VENOUS BLD VENIPUNCTURE: CPT | Performed by: FAMILY MEDICINE

## 2022-12-15 PROCEDURE — 250N000012 HC RX MED GY IP 250 OP 636 PS 637: Performed by: FAMILY MEDICINE

## 2022-12-15 PROCEDURE — 250N000013 HC RX MED GY IP 250 OP 250 PS 637: Performed by: FAMILY MEDICINE

## 2022-12-15 PROCEDURE — 85018 HEMOGLOBIN: CPT | Performed by: FAMILY MEDICINE

## 2022-12-15 PROCEDURE — 99239 HOSP IP/OBS DSCHRG MGMT >30: CPT | Performed by: FAMILY MEDICINE

## 2022-12-15 PROCEDURE — 250N000013 HC RX MED GY IP 250 OP 250 PS 637: Performed by: PEDIATRICS

## 2022-12-15 RX ORDER — METFORMIN HCL 500 MG
TABLET, EXTENDED RELEASE 24 HR ORAL
Qty: 360 TABLET | Refills: 3 | Status: SHIPPED | OUTPATIENT
Start: 2022-12-15 | End: 2022-12-20

## 2022-12-15 RX ORDER — ASPIRIN 325 MG
325 TABLET ORAL DAILY
COMMUNITY
Start: 2022-12-15 | End: 2023-07-06

## 2022-12-15 RX ORDER — CEFDINIR 300 MG/1
300 CAPSULE ORAL 2 TIMES DAILY
Qty: 12 CAPSULE | Refills: 0 | Status: SHIPPED | OUTPATIENT
Start: 2022-12-15 | End: 2022-12-20

## 2022-12-15 RX ORDER — DEXTROMETHORPHAN POLISTIREX 30 MG/5ML
30 SUSPENSION ORAL EVERY 12 HOURS
Qty: 148 ML | Refills: 0 | Status: SHIPPED | OUTPATIENT
Start: 2022-12-15 | End: 2023-01-17

## 2022-12-15 RX ORDER — INSULIN GLARGINE 100 [IU]/ML
INJECTION, SOLUTION SUBCUTANEOUS
Qty: 15 ML | Refills: 1 | DISCHARGE
Start: 2022-12-15 | End: 2023-01-17

## 2022-12-15 RX ORDER — BENZONATATE 100 MG/1
100 CAPSULE ORAL 3 TIMES DAILY PRN
Qty: 30 CAPSULE | Refills: 0 | Status: SHIPPED | OUTPATIENT
Start: 2022-12-15 | End: 2022-12-20

## 2022-12-15 RX ADMIN — INSULIN ASPART 1 UNITS: 100 INJECTION, SOLUTION INTRAVENOUS; SUBCUTANEOUS at 12:56

## 2022-12-15 RX ADMIN — INSULIN ASPART 1 UNITS: 100 INJECTION, SOLUTION INTRAVENOUS; SUBCUTANEOUS at 17:34

## 2022-12-15 RX ADMIN — PANTOPRAZOLE SODIUM 20 MG: 20 TABLET, DELAYED RELEASE ORAL at 08:55

## 2022-12-15 RX ADMIN — INSULIN GLARGINE 21 UNITS: 100 INJECTION, SOLUTION SUBCUTANEOUS at 09:58

## 2022-12-15 RX ADMIN — DEXTROMETHORPHAN POLISTIREX 30 MG: 30 SUSPENSION ORAL at 08:58

## 2022-12-15 RX ADMIN — FLUOXETINE 20 MG: 20 CAPSULE ORAL at 08:55

## 2022-12-15 RX ADMIN — HYDROMORPHONE HYDROCHLORIDE 2 MG: 2 TABLET ORAL at 14:26

## 2022-12-15 RX ADMIN — BENZONATATE 100 MG: 100 CAPSULE ORAL at 06:06

## 2022-12-15 RX ADMIN — INSULIN ASPART 1 UNITS: 100 INJECTION, SOLUTION INTRAVENOUS; SUBCUTANEOUS at 09:04

## 2022-12-15 RX ADMIN — AZITHROMYCIN 250 MG: 250 TABLET, FILM COATED ORAL at 08:55

## 2022-12-15 RX ADMIN — TIZANIDINE 4 MG: 2 TABLET ORAL at 18:05

## 2022-12-15 RX ADMIN — BENZONATATE 100 MG: 100 CAPSULE ORAL at 14:26

## 2022-12-15 RX ADMIN — METFORMIN HYDROCHLORIDE 500 MG: 500 TABLET, EXTENDED RELEASE ORAL at 08:55

## 2022-12-15 RX ADMIN — HYDROMORPHONE HYDROCHLORIDE 2 MG: 2 TABLET ORAL at 08:55

## 2022-12-15 RX ADMIN — METOPROLOL TARTRATE 25 MG: 25 TABLET, FILM COATED ORAL at 08:55

## 2022-12-15 RX ADMIN — CEFDINIR 300 MG: 300 CAPSULE ORAL at 08:55

## 2022-12-15 RX ADMIN — AMLODIPINE BESYLATE 2.5 MG: 2.5 TABLET ORAL at 08:55

## 2022-12-15 RX ADMIN — METFORMIN HYDROCHLORIDE 500 MG: 500 TABLET, EXTENDED RELEASE ORAL at 17:32

## 2022-12-15 RX ADMIN — LISINOPRIL 2.5 MG: 2.5 TABLET ORAL at 08:54

## 2022-12-15 RX ADMIN — ACETAMINOPHEN 975 MG: 325 TABLET, FILM COATED ORAL at 06:05

## 2022-12-15 RX ADMIN — ROSUVASTATIN CALCIUM 20 MG: 20 TABLET, FILM COATED ORAL at 08:55

## 2022-12-15 ASSESSMENT — ACTIVITIES OF DAILY LIVING (ADL)
ADLS_ACUITY_SCORE: 27
ADLS_ACUITY_SCORE: 26
ADLS_ACUITY_SCORE: 27

## 2022-12-15 NOTE — PLAN OF CARE
Goal Outcome Evaluation:      Plan of Care Reviewed With: patient    Overall Patient Progress: improvingOverall Patient Progress: improving    Outcome Evaluation: Pt A&O x4. Ambulating independently. Saline locked. Crackles in lower lobes, on RA. VSS. Denies pain at this time. Pt requested minimal disruptions through night. Cares clustered to promote sleep. /65 (BP Location: Right arm)   Pulse 60   Temp 98.4  F (36.9  C) (Oral)   Resp 18   Wt 85.3 kg (188 lb)   LMP 03/07/2021 (Approximate)   SpO2 95%   BMI 30.34 kg/m

## 2022-12-15 NOTE — DISCHARGE SUMMARY
[facility]  Hospitalist Discharge Summary      Date of Admission:  12/11/2022  Date of Discharge:  12/15/2022  Discharging Provider: Xuan Stoner MD  Discharge Service: Hospitalist Service    Discharge Diagnoses   Principal Problem:    Community acquired pneumonia-bilateral infiltrates  Active Problems:    Type 2 diabetes mellitus with hyperglycemia, with long-term current use of insulin (H)    Chronic pain syndrome    Overweight    Benign essential hypertension    S/P CABG (coronary artery bypass graft)    Major depressive disorder, recurrent episode, moderate (H)    RSV bronchiolitis    History of non-ST elevation myocardial infarction (NSTEMI) March 2021    Acute respiratory failure with hypoxia (H)    Sepsis (H)-fever, borderline tachycardia/leukocytosis    Platelet dysfunction due to drugs-ASA    Coronary artery disease involving native coronary artery of native heart without angina pectoris    Hypoalbuminemia    Nausea with vomiting    Anemia, unspecified type    Follow-ups Needed After Discharge   Follow-up Appointments     Follow-up and recommended labs and tests       Follow up for HIDA scan and appointment with Dr. Carlos Stoner on   12/20/22 as scheduled for further evaluation of your abdominal pain and   hospital follow- up.           Discharge Disposition   Discharged to home  Condition at discharge: Stable    Hospital Course   Stacy Brown is a 47 year old female with type 2 diabetes treated with insulin, hypertension, coronary artery disease with CABG in March 2021, depression, chronic pain treated regularly with opiates, and recent diagnosis of gallbladder problems for which outpatient HIDA scan is recommended who presented with 1 day history of worsening cough and shortness of breath and was found to have acute respiratory failure with hypoxia and RSV infection identified and pneumonia present on imaging.  Patient was hospitalized for ongoing  management with increasing concern for superimposed bacterial pneumonia and patient has been started on Rocephin and azithromycin.  Patient has progressive clinical improvement and has been weaned off O2 supplementation for over 24 hours.  She is discharged home with oral antibiotics and close clinical follow-up to ensure ongoing improvement.    Community-acquired pneumonia with acute hypoxic and hypercapnic respiratory failure  Probable sepsis  RSV infection  Persistent nausea with recurrent vomiting  Hypoalbuminemia  Assessment:  Patient presented with acute hypoxic respiratory failure with fever, heart rate to , and initial WBC 11.8.  Procalcitonin was elevated at 1.40 which is moderate risk for systemic infection.  Rapid viral testing positive for RSV.  Chest x-ray demonstrated right midlung and bibasilar infiltrates. Bacterial infection with sepsis superimposed upon RSV suspected.  Fever and leukocytosis are resolved and patient has been on room air for over 24 hours with ongoing improvement of her energy and appetite. Procalcitonin trending downward  Plan:  -Patient has completed azithromycin course during this hospitalization  -Patient will continue Omnicef at time of discharge for completion of 10-day total antibiotic course  -Letter given to patient to excuse her absence from work and she will remain off work until her clinic appointment early next week at which time her ability to return to work and if any restrictions are necessary will be reevaluated.    Type 2 diabetes treated with insulin  Assessment:  Most recent A1c 7.6 on November 1.  Normally takes metformin as well as glargine insulin in the morning but were held initially due to very poor oral intake and currently are on half normal dosing as her appetite continues to be poor with overall adequate blood sugar control  Plan:  -Continue Moderate carbohydrate diet as tolerated at time of discharge  -Continue t metformin at half of normal  dosing (500 mg twice daily) the patient will increase to previous dosing once her appetite has returned to baseline  -Continue Lantus at half of normal home regimen (21 units daily) at time of discharge the patient will increase to previous dosing once her appetite has returned to baseline    Biliary colic with suspected chronic cholecystitis  Assessment:  Since November there has been concern for recurring episodes of biliary colic.  She was evaluated by general surgeon on December 7 who raised concern for chronic cholecystitis based on clinical course and previous ultrasound results demonstrating gallbladder wall thickening.  Outpatient HIDA scan is planned and has been rescheduled due to this hospital stay to 12/20/22.  With this acute respiratory infection, patient has had recurrent nausea and vomiting but not any worsening right upper abdominal pain.    Plan:  -Anticipate outpatient follow-up with surgeon as scheduled, reschedule HIDA scan to 12/20/22    Anemia, unspecified type  Assessment:  Patient has baseline anemia in the 12 range but worsened to the mid 9 range following fluid resuscitation earlier in this stay.  No concern for active bleeding or hemolysis and likely is partially hemodilution all  Plan:  -Follow hemoglobin to return to previous baseline following discharge    Hypertension  Chronic and normally treated with a combination of metoprolol, lisinopril, and amlodipine none of which she has taken since 12/10. Blood pressures became persistently hypertensive as she improved and home regimen was restarted on the evening of 12/13/2022 with blood pressures trending downward at time discharge  Plan:  -Patient to continue chronic metoprolol, lisinopril, and amlodipine without change    CAD with non-ST elevation MI and previous CABG March 2021  Platelet dysfunction due to chronic aspirin therapy  Assessment:  She is normally taking aspirin, beta-blocker, and statin for antianginal therapy and has not  had any symptoms concerning for angina recently.    Plan:  -Continue home statin and beta-blocker and statin at time of discharge    Chronic pain  Assessment:  Patient reports chronic musculoskeletal pain for which she takes oxycodone in the evening every night.  She is now having new acute pain secondary to soreness from coughing due to acute infectious illness.  Plan:    -Patient will continue with oxycodone as needed in the home environment  -May use acetaminophen and/or naproxen as well as Zanaflex as needed for acute pain    Major depression, recurrent, moderate  Assessment:  Followed by PCP for depression with recent worsening and normally taking fluoxetine chronically.  Plan:  -Continue fluoxetine at time of discharge    Obesity  Assessment:  Chronic with BMI 30.  Obesity likely contributes to medical problems including type II diabetes.  Plan:  -No acute intervention but patient would benefit from weight loss going forward      Consultations This Hospital Stay   CARE MANAGEMENT / SOCIAL WORK IP CONSULT    Code Status   Full Code    Time Spent on this Encounter   I, Xuan Stoner MD, personally saw the patient today and spent greater than 30 minutes discharging this patient.       Xuan Stoner MD  [address] SURGICAL  [address] [place] [address]  Phone: [phone]  ______________________________________________________________________    Physical Exam   Vital Signs: Temp: 98.6  F (37  C) Temp src: Oral BP: 139/68 Pulse: 62   Resp: 18 SpO2: 94 % O2 Device: None (Room air)    Weight: 188 lbs 0 oz  Constitutional: awake, alert, cooperative, no apparent distress, and appears stated age  Respiratory: No increased work of breathing, diminished but significantly improved air exchange, clear to auscultation bilaterally, no crackles or wheezing  Cardiovascular: RRR without murmur  GI: Bowel sounds present, abdomen is soft and  nontender  Musculoskeletal: no lower extremity pitting edema present  Neurologic: Awake, alert, oriented to name, place and  situation       Primary Care Physician   Yaima Muse    Discharge Orders      Reason for your hospital stay    RSV bronchiolitis and bacterial pneumonia     Activity    Your activity upon discharge: activity as tolerated     Follow-up and recommended labs and tests     Follow up for HIDA scan and appointment with Dr. Carlos Stoner on 12/20/22 as scheduled for further evaluation of your abdominal pain and hospital follow- up.     Diet    Follow this diet upon discharge:     Moderate Consistent Carb (60 g CHO per Meal) Diet       Significant Results and Procedures   Results for orders placed or performed during the hospital encounter of 12/11/22   XR Chest Port 1 View    Narrative    EXAM: XR CHEST PORT 1 VIEW  LOCATION: [facility]  DATE/TIME: 12/11/2022 11:17 AM    INDICATION: sob cough  COMPARISON: Chest x-ray and CT chest 09/03/2022      Impression    IMPRESSION: Post open-heart surgery. Mild patchy right midlung and bibasilar pulmonary opacities suspicious for pneumonic infiltrates. No pleural effusion. Normal heart size.       Discharge Medications   Current Discharge Medication List      START taking these medications    Details   benzonatate (TESSALON) 100 MG capsule Take 1 capsule (100 mg) by mouth 3 times daily as needed for cough  Qty: 30 capsule, Refills: 0    Associated Diagnoses: RSV bronchiolitis; Community acquired pneumonia, unspecified laterality      cefdinir (OMNICEF) 300 MG capsule Take 1 capsule (300 mg) by mouth 2 times daily for 6 days  Qty: 12 capsule, Refills: 0    Associated Diagnoses: Community acquired pneumonia, unspecified laterality      dextromethorphan (DELSYM) 30 MG/5ML liquid Take 5 mLs (30 mg) by mouth every 12 hours  Qty: 148 mL, Refills: 0    Associated Diagnoses: RSV bronchiolitis; Community acquired  pneumonia, unspecified laterality         CONTINUE these medications which have CHANGED    Details   aspirin (ASA) 325 MG tablet Take 1 tablet (325 mg) by mouth daily    Associated Diagnoses: NSTEMI (non-ST elevated myocardial infarction) (H)      insulin glargine (BASAGLAR KWIKPEN) 100 UNIT/ML pen Take 21 units subcutaneously daily until your appetite returns to baseline, then return to 42 units daily.  Qty: 15 mL, Refills: 1    Comments: If Basaglar is not covered by insurance, may substitute Lantus or Semglee or other insulin glargine product per insurance preference at same dose and frequency.    Associated Diagnoses: Type 2 diabetes mellitus with hyperglycemia, with long-term current use of insulin (H)      metFORMIN (GLUCOPHAGE XR) 500 MG 24 hr tablet Take 1 tablet by mouth twice a day until your appetite is back to baseline, then return to 2 tablets twice a day.  Qty: 360 tablet, Refills: 3    Associated Diagnoses: Type 2 diabetes mellitus with hyperglycemia, with long-term current use of insulin (H)         CONTINUE these medications which have NOT CHANGED    Details   acetaminophen (TYLENOL) 325 MG tablet Take 1 tablet (325 mg) by mouth every 4 hours as needed for mild pain TAKE TWO TABLETS BY MOUTH EVERY 4 HOURS AS NEEDED FOR MILD PAIN  Qty: 40 tablet, Refills: 0    Associated Diagnoses: NSTEMI (non-ST elevated myocardial infarction) (H)      amLODIPine (NORVASC) 2.5 MG tablet TAKE 1 TABLET (2.5 MG) BY MOUTH DAILY  Qty: 90 tablet, Refills: 3    Associated Diagnoses: NSTEMI (non-ST elevated myocardial infarction) (H)      Ascorbic Acid (VITAMIN C) 100 MG CHEW       FLUoxetine (PROZAC) 20 MG capsule Take 1 tablet daily for 4 weeks, then increase to 2 pills daily  Qty: 150 capsule, Refills: 1    Associated Diagnoses: Major depressive disorder, recurrent episode, moderate (H)      insulin aspart (NOVOLOG FLEXPEN) 100 UNIT/ML pen Novolog Flexpen. Inject 1 units per 10 gram carb unit before breakfast, lunch and  dinner, up to 15 units per meal.  Qty: 45 mL, Refills: 3    Associated Diagnoses: Type 2 diabetes mellitus with hyperglycemia, with long-term current use of insulin (H)      lisinopril (ZESTRIL) 2.5 MG tablet Take 1 tablet (2.5 mg) by mouth daily  Qty: 90 tablet, Refills: 3    Associated Diagnoses: NSTEMI (non-ST elevated myocardial infarction) (H)      metoprolol tartrate (LOPRESSOR) 25 MG tablet Take 1 tablet (25 mg) by mouth 2 times daily  Qty: 180 tablet, Refills: 3    Comments: Don't fill now, will call if needed.  Associated Diagnoses: NSTEMI (non-ST elevated myocardial infarction) (H)      Multiple Vitamins-Minerals (MULTI-VITAMIN GUMMIES) CHEW Take 1 chew tab by mouth daily       nitroGLYcerin (NITROSTAT) 0.4 MG sublingual tablet For chest pain place 1 tablet under the tongue every 5 minutes for 3 doses. If symptoms persist 5 minutes after 1st dose call 911.  Qty: 25 tablet, Refills: 0    Associated Diagnoses: Hyperlipidemia LDL goal <100; Type 2 diabetes mellitus with hyperglycemia, with long-term current use of insulin (H)      ondansetron (ZOFRAN ODT) 4 MG ODT tab Take 1 tablet (4 mg) by mouth every 6 hours as needed for nausea or vomiting  Qty: 20 tablet, Refills: 0      oxyCODONE (ROXICODONE) 5 MG tablet TAKE 1 TABLET (5 MG) BY MOUTH 2 TIMES DAILY AS NEEDED FOR SEVERE PAIN (MUST LAST 30 DAYS)  Qty: 39 tablet, Refills: 0    Comments: OK to fill on or after 11/11/22  Associated Diagnoses: Chronic pain syndrome; Mechanical back pain      pantoprazole (PROTONIX) 20 MG EC tablet Take 1 tablet (20 mg) by mouth daily  Qty: 30 tablet, Refills: 0    Associated Diagnoses: Recurrent biliary colic      polyethylene glycol (MIRALAX) 17 GM/Dose powder Take 17 g by mouth daily  Qty: 510 g, Refills: 0    Associated Diagnoses: NSTEMI (non-ST elevated myocardial infarction) (H)      promethazine (PHENERGAN) 25 MG tablet Take 0.5 tablets (12.5 mg) by mouth every 6 hours as needed for nausea or vomiting  Qty: 15 tablet,  Refills: 0      rosuvastatin (CRESTOR) 20 MG tablet Take 1 tablet (20 mg) by mouth daily  Qty: 90 tablet, Refills: 3    Associated Diagnoses: NSTEMI (non-ST elevated myocardial infarction) (H)      tiZANidine (ZANAFLEX) 4 MG tablet TAKE 1 TABLET (4 MG) BY MOUTH 3 TIMES DAILY AS NEEDED FOR MUSCLE SPASMS  Qty: 90 tablet, Refills: 1    Associated Diagnoses: NSTEMI (non-ST elevated myocardial infarction) (H)      blood glucose (NO BRAND SPECIFIED) test strip Use to test blood sugar up to 4 times daily or as directed. To accompany: Blood Glucose Monitor Brands: per insurance.  Qty: 200 strip, Refills: 6    Associated Diagnoses: Type 2 diabetes mellitus with hyperglycemia, with long-term current use of insulin (H)      blood glucose calibration (NO BRAND SPECIFIED) solution To accompany: Blood Glucose Monitor Brands: per insurance.  Qty: 1 Bottle, Refills: 3    Associated Diagnoses: Type 2 diabetes mellitus with hyperglycemia, with long-term current use of insulin (H)      blood glucose monitoring (FREESTYLE) lancets Use to test blood sugars 1-2 times daily or as directed, per patients glucose meter.  Qty: 3 Box, Refills: 3    Associated Diagnoses: Type 2 diabetes mellitus with hyperglycemia, with long-term current use of insulin (H)      Blood Glucose Monitoring Suppl (ACCU-CHEK COMPLETE) KIT 1 Device daily  Qty: 1 Device, Refills: 0    Associated Diagnoses: Type 2 diabetes, HbA1c goal < 7% (H)      !! insulin pen needle (31G X 8 MM) 31G X 8 MM miscellaneous 1 Box of 100 insulin pen needles to be dispensed with every insulin pen prescription  Qty: 100 each, Refills: 0    Associated Diagnoses: Type 2 diabetes mellitus with hyperglycemia, with long-term current use of insulin (H)      !! insulin pen needle (NOVOFINE) 32G X 6 MM miscellaneous Use once daily or as directed.  Qty: 100 each, Refills: 3    Associated Diagnoses: Type 2 diabetes mellitus with hyperglycemia, with long-term current use of insulin (H)       !! -  Potential duplicate medications found. Please discuss with provider.      STOP taking these medications       HYDROmorphone (DILAUDID) 2 MG tablet Comments:   Reason for Stopping:             Allergies   Allergies   Allergen Reactions     Amoxicillin Hives     Hydrocodone-Acetaminophen GI Disturbance     Tylenol is ok

## 2022-12-15 NOTE — PLAN OF CARE
Goal Outcome Evaluation: progressing    VSS ex elevated BP which improved with ninoska metoprolol. A & O x4. Pain of 4/10 that increases to 8/10 with coughing. Tessalon pearls given PRN as well as dilaudid, zanaflex and melatonin before bed as patient is really hoping to sleep. LS wheezy, cough frequent, non productive. 95% on RA. Indep in room.  at HS. Poor appetite but took sherbet snack. Continue with plan of care

## 2022-12-16 ENCOUNTER — PATIENT OUTREACH (OUTPATIENT)
Dept: CARE COORDINATION | Facility: CLINIC | Age: 47
End: 2022-12-16

## 2022-12-16 LAB
BACTERIA BLD CULT: NO GROWTH
BACTERIA BLD CULT: NO GROWTH

## 2022-12-16 NOTE — PROGRESS NOTES
CHW talked to patient for hospital discharge/follow-up and was unable to offer Clinic Care Coordination to patient who is established within the [facility] system and is eligible for CCC. CHW created a new program for Primary Care-Care Coordination.    Clinic Care Coordination Contact  USHA Porter: Post-Discharge Note  SITUATION                                                      Admission:    Admission Date: 12/11/22   Reason for Admission: Shortness of breath  Discharge:   Discharge Date: 12/15/22  Discharge Diagnosis: Community-acquired pneumonia with acute hypoxic respiratory failure, Fever with possible early SIRS and sepsis, RSV infection, Persistent nausea with recurrent vomiting    BACKGROUND                                                      Per hospital discharge summary and inpatient provider notes:    Stacy Brown is a 47 year old female who was in her usual health until yesterday when she awoke with a sore throat.  She felt okay in the morning and was out shopping during the day.  By mid afternoon, she developed severe coughing that was unrelenting.  She tried to lie down which did not seem to help.  She then developed generalized body aches.  She took her normal dose of oxycodone last evening which did not seem to help.  When she awoke this morning, she continued to have cough which has been mostly dry.  She started to feel increasingly short of breath which rapidly progressed and she became so severely short of breath that they called 911.  Dr. Medina reports that the patient was severely hypoxic in the ER and was started on oxygen supplementation.  Patient says that her breathing is improved after starting oxygen supplementation although she continues to cough.  She has been nauseated all day today and has repeatedly vomited.  However, most of her vomiting seems to coincide with severe coughing spells.  She is now seen in her hospital room for evaluation.    ASSESSMENT      Discharge  "Assessment  How are you doing now that you are home?: \"I'm alright, doing okay. I'm exhausted from being in the hospital but much better than I was on Sunday that's for sure.\"  How are your symptoms? (Red Flag symptoms escalate to triage hotline per guidelines): Improved  Do you feel your condition is stable enough to be safe at home until your provider visit?: Yes  Does the patient have their discharge instructions? : Yes  Does the patient have questions regarding their discharge instructions? : No  Were you started on any new medications or were there changes to any of your previous medications? : Yes  Does the patient have all of their medications?: Yes  Do you have questions regarding any of your medications? : No  Do you have all of your needed medical supplies or equipment (DME)?  (i.e. oxygen tank, CPAP, cane, etc.): Yes  Discharge follow-up appointment scheduled within 14 calendar days? : Yes  Discharge Follow Up Appointment Date: 12/20/22  Discharge Follow Up Appointment Scheduled with?: Primary Care Provider    Post-op (CHW CTA Only)  If the patient had a surgery or procedure, do they have any questions for a nurse?: No    PLAN                                                      Outpatient Plan:      Follow-up and recommended labs and tests  Follow up for HIDA scan and appointment with Dr. Carlos Stoner on 12/20/22 as scheduled for further evaluation of your abdominal pain and hospital follow- up.    Future Appointments   Date Time Provider Department Center   12/20/2022 12:30 PM [facility]   12/20/2022  2:40 PM Carlos Stoner MD [facility]   1/16/2023 10:20 AM Yaima Muse MD [facility]         For any urgent concerns, please contact our 24 hour nurse triage line: [phone] ([phone])         ANDRESSA Valladares  [phone]  Connected Care Resource [facility]              "

## 2022-12-16 NOTE — PLAN OF CARE
S-(situation): Patient discharged to home via ambulation with spouse.    B-(background): RSV, pneumonia    A-(assessment): VSS. Lung sounds with crackles, improved after coughing. Dry NPC. Up independently in room. Receiving PO Dilaudid for abd discomfort from coughing. Fair appetite.    R-(recommendations): Discharge instructions reviewed with pt. Listed belongings gathered and returned to patient.          Discharge Nursing Criteria:     Care Plan and Patient education resolved: Yes    New Medications- pt has been educated about purpose and side effects: Yes    Vaccines  Influenza status verified at discharge:  Yes    MISC  Prescriptions if needed, hard copies sent with patient  Yes  Home medications returned to patient: NA  Medication Bin checked and emptied on discharge Yes  Patient reports post-discharge pain management plan is effective: Yes

## 2022-12-19 ENCOUNTER — PATIENT OUTREACH (OUTPATIENT)
Dept: CARE COORDINATION | Facility: CLINIC | Age: 47
End: 2022-12-19

## 2022-12-19 NOTE — PROGRESS NOTES
Clinic Care Coordination Contact  [facility]/Voicemail    Clinical Data: Care Coordination Outreach  Outreach attempted x 1.  Left message on patient's voicemail with call back information and requested return call.  Plan: CHW will try to reach patient again in 1-2 business days.    Referral notes:  Please call and offer care coordination .  Please see note in [facility] discharge assessment - wait until Monday 12/19 to call as [facility] just called today.  If medical Angela - if non medical - Kristina LIZ  Community Health Worker  [facility] Care Coordination  [facility]  [email or web address]   Office: [phone]

## 2022-12-20 ENCOUNTER — OFFICE VISIT (OUTPATIENT)
Dept: FAMILY MEDICINE | Facility: CLINIC | Age: 47
End: 2022-12-20
Payer: COMMERCIAL

## 2022-12-20 VITALS
HEART RATE: 66 BPM | OXYGEN SATURATION: 98 % | DIASTOLIC BLOOD PRESSURE: 70 MMHG | TEMPERATURE: 97.5 F | WEIGHT: 181 LBS | BODY MASS INDEX: 29.21 KG/M2 | SYSTOLIC BLOOD PRESSURE: 120 MMHG

## 2022-12-20 DIAGNOSIS — J18.9 COMMUNITY ACQUIRED PNEUMONIA, UNSPECIFIED LATERALITY: ICD-10-CM

## 2022-12-20 DIAGNOSIS — R11.2 NAUSEA AND VOMITING, UNSPECIFIED VOMITING TYPE: ICD-10-CM

## 2022-12-20 DIAGNOSIS — A41.9 SEPSIS WITH ACUTE ORGAN DYSFUNCTION, DUE TO UNSPECIFIED ORGANISM, UNSPECIFIED TYPE, UNSPECIFIED WHETHER SEPTIC SHOCK PRESENT: Primary | ICD-10-CM

## 2022-12-20 DIAGNOSIS — M54.9 MECHANICAL BACK PAIN: ICD-10-CM

## 2022-12-20 DIAGNOSIS — I21.4 NSTEMI (NON-ST ELEVATED MYOCARDIAL INFARCTION) (H): ICD-10-CM

## 2022-12-20 DIAGNOSIS — G89.4 CHRONIC PAIN SYNDROME: ICD-10-CM

## 2022-12-20 DIAGNOSIS — E11.65 TYPE 2 DIABETES MELLITUS WITH HYPERGLYCEMIA, WITH LONG-TERM CURRENT USE OF INSULIN (H): ICD-10-CM

## 2022-12-20 DIAGNOSIS — J96.01 ACUTE RESPIRATORY FAILURE WITH HYPOXIA (H): ICD-10-CM

## 2022-12-20 DIAGNOSIS — R65.20 SEPSIS WITH ACUTE ORGAN DYSFUNCTION, DUE TO UNSPECIFIED ORGANISM, UNSPECIFIED TYPE, UNSPECIFIED WHETHER SEPTIC SHOCK PRESENT: Primary | ICD-10-CM

## 2022-12-20 DIAGNOSIS — Z79.4 TYPE 2 DIABETES MELLITUS WITH HYPERGLYCEMIA, WITH LONG-TERM CURRENT USE OF INSULIN (H): ICD-10-CM

## 2022-12-20 PROCEDURE — 99496 TRANSJ CARE MGMT HIGH F2F 7D: CPT | Performed by: FAMILY MEDICINE

## 2022-12-20 RX ORDER — OXYCODONE HYDROCHLORIDE 5 MG/1
TABLET ORAL
Qty: 39 TABLET | Refills: 0 | Status: SHIPPED | OUTPATIENT
Start: 2022-12-20 | End: 2023-01-17

## 2022-12-20 RX ORDER — LISINOPRIL 2.5 MG/1
2.5 TABLET ORAL DAILY
Qty: 90 TABLET | Refills: 4 | Status: SHIPPED | OUTPATIENT
Start: 2022-12-20 | End: 2023-09-15

## 2022-12-20 RX ORDER — ROSUVASTATIN CALCIUM 20 MG/1
20 TABLET, COATED ORAL DAILY
Qty: 90 TABLET | Refills: 4 | Status: SHIPPED | OUTPATIENT
Start: 2022-12-20 | End: 2023-09-15

## 2022-12-20 RX ORDER — METOPROLOL TARTRATE 25 MG/1
25 TABLET, FILM COATED ORAL 2 TIMES DAILY
Qty: 180 TABLET | Refills: 4 | Status: SHIPPED | OUTPATIENT
Start: 2022-12-20 | End: 2023-09-15

## 2022-12-20 RX ORDER — METFORMIN HCL 500 MG
1000 TABLET, EXTENDED RELEASE 24 HR ORAL 2 TIMES DAILY WITH MEALS
Qty: 360 TABLET | Refills: 4 | Status: SHIPPED | OUTPATIENT
Start: 2022-12-20 | End: 2023-09-15

## 2022-12-20 ASSESSMENT — PAIN SCALES - GENERAL: PAINLEVEL: MODERATE PAIN (4)

## 2022-12-20 NOTE — PROGRESS NOTES
Clinic Care Coordination Contact  Community Health Worker Initial Outreach       ** CHW spoke to patient briefly who requested a call back tomorrow (12/21) as she has an appointment with her PCP today.    ANDRESSA Nava  [facility]  Clinic Care Coordination  [facility]  [phone]

## 2022-12-20 NOTE — LETTER
December 20, 2022    To Whom it may Concern:    Stacy Brown was seen today in clinic.  She is unable to return to work for at least a month.  She will be reevaluated at that time for further evaluation.    If you have any further questions, please contact me at the number above.    Sincerely,        Carlso Stoner MD

## 2022-12-20 NOTE — PROGRESS NOTES
Assessment & Plan     ASSESSMENT/ORDERS:    ICD-10-CM    1. Sepsis with acute organ dysfunction, due to unspecified organism, unspecified type, unspecified whether septic shock present (H)  A41.9     R65.20       2. Type 2 diabetes mellitus with hyperglycemia, with long-term current use of insulin (H)  E11.65 metFORMIN (GLUCOPHAGE XR) 500 MG 24 hr tablet    Z79.4       3. Chronic pain syndrome  G89.4 oxyCODONE (ROXICODONE) 5 MG tablet      4. Mechanical back pain  M54.9 oxyCODONE (ROXICODONE) 5 MG tablet      5. NSTEMI (non-ST elevated myocardial infarction) (H)  I21.4 rosuvastatin (CRESTOR) 20 MG tablet     metoprolol tartrate (LOPRESSOR) 25 MG tablet     lisinopril (ZESTRIL) 2.5 MG tablet      6. Community acquired pneumonia, unspecified laterality  J18.9       7. Acute respiratory failure with hypoxia (H)  J96.01       8. Nausea and vomiting, unspecified vomiting type  R11.2         PLAN:  1.  Restarted on metformin for diabetes management.   2.  Refilled pain management medications.  Has appointment with primary care provider next month for medication review.  3.  Discussed slow process for pneumonia recovery.     4. Highly complicated patient due to severity of disease, comorbid conditions and current stage of early recovery from spesis.  5.  Follow-up with surgery on HIDA scan rescheduling and further workup of abdominal issues and nausea/vomiting.            MED REC REQUIRED  Post Medication Reconciliation Status: discharge medications reconciled and changed, per note/orders      Return in about 1 month (around 1/20/2023) for medication recheck with Dr. Muse.    Carlos Stoner MD  [facility]    Kwadwo Kumar is a 47 year old, presenting for the following health issues:  Hospital F/U      HPI     Post Discharge Outreach 12/16/2022   Admission Date 12/11/2022   Reason for Admission Shortness of breath   Discharge Date 12/15/2022   Discharge Diagnosis  "Community-acquired pneumonia with acute hypoxic respiratory failure, Fever with possible early SIRS and sepsis, RSV infection, Persistent nausea with recurrent vomiting   How are you doing now that you are home? \"I'm alright, doing okay. I'm exhausted from being in the hospital but much better than I was on Sunday that's for sure.\"   How are your symptoms? (Red Flag symptoms escalate to triage hotline per guidelines) Improved   Do you feel your condition is stable enough to be safe at home until your provider visit? Yes   Does the patient have their discharge instructions?  Yes   Does the patient have questions regarding their discharge instructions?  No   Were you started on any new medications or were there changes to any of your previous medications?  Yes   Does the patient have all of their medications? Yes   Do you have questions regarding any of your medications?  No   Do you have all of your needed medical supplies or equipment (DME)?  (i.e. oxygen tank, CPAP, cane, etc.) Yes   Discharge follow-up appointment scheduled within 14 calendar days?  Yes   Discharge Follow Up Appointment Date 12/20/2022   Discharge Follow Up Appointment Scheduled with? Primary Care Provider     Hospital Follow-up Visit:    Hospital/Nursing Home/IP Rehab Facility: [facility]  Date of Admission: 12/11/2022  Date of Discharge: 12/15/2022  Reason(s) for Admission: Community acquired pneumonia-bilateral infiltrates    Was your hospitalization related to COVID-19? No   Problems taking medications regularly:  None  Medication changes since discharge: None  Problems adhering to non-medication therapy:  None    Summary of hospitalization:  [facility] discharge summary reviewed  Diagnostic Tests/Treatments reviewed.  Follow up needed: none  Other Healthcare Providers Involved in Patient s Care:         Surgical follow-up appointment - general surgery for continued abdominal pain/nausea " management  Update since discharge: improved.         Plan of care communicated with patient and family             Slowly better, but still having some breathing/coughing issues.  Tolerating half dose metformin.  Blood sugars okay.  Advancing insulin.      Has plans to follow-up with general surgery.    History of chronic low back pain.  Primary care provider is out of office.  Needs medications refilled.    Review of Systems         Objective    /70   Pulse 66   Temp 97.5  F (36.4  C) (Temporal)   Wt 82.1 kg (181 lb)   LMP 03/07/2021 (Approximate)   SpO2 98%   BMI 29.21 kg/m    Body mass index is 29.21 kg/m .  Physical Exam  Constitutional:       Appearance: She is well-developed.   Cardiovascular:      Rate and Rhythm: Normal rate and regular rhythm.      Heart sounds: Normal heart sounds, S1 normal and S2 normal. No murmur heard.  Pulmonary:      Effort: Pulmonary effort is normal. No respiratory distress.      Breath sounds: Decreased air movement present. Rales present. No wheezing or rhonchi.   Neurological:      Mental Status: She is alert.

## 2022-12-21 ENCOUNTER — PATIENT OUTREACH (OUTPATIENT)
Dept: CARE COORDINATION | Facility: CLINIC | Age: 47
End: 2022-12-21

## 2022-12-21 NOTE — PROGRESS NOTES
Clinic Care Coordination Contact  [facility]/Voicemail       Clinical Data: Care Coordinator Outreach  Outreach attempted x 1.  Left message on patient's voicemail with call back information and requested return call.  Plan: Care Coordinator will try to reach patient again in 1-2 business days.    ANDRESSA Nava  [facility] Care Coordination  [facility]  [phone]

## 2022-12-22 NOTE — PROGRESS NOTES
Clinic Care Coordination Contact  [facility]/Voicemail       Clinical Data: Care Coordinator Outreach  Outreach attempted x 2.  Left message on patient's voicemail with call back information and requested return call.  Plan: Care Coordinator will send care coordination introduction letter with care coordinator contact information and explanation of care coordination services via [employer]. Care Coordinator will do no further outreaches at this time.    ANDRESSA Nava  [facility] Care Coordination  [facility]  [phone]

## 2023-01-03 DIAGNOSIS — I21.4 NSTEMI (NON-ST ELEVATED MYOCARDIAL INFARCTION) (H): ICD-10-CM

## 2023-01-04 ENCOUNTER — HOSPITAL ENCOUNTER (OUTPATIENT)
Dept: NUCLEAR MEDICINE | Facility: CLINIC | Age: 48
Setting detail: NUCLEAR MEDICINE
Discharge: HOME | End: 2023-01-04
Attending: SURGERY | Admitting: SURGERY
Payer: COMMERCIAL

## 2023-01-04 DIAGNOSIS — K80.50 BILIARY COLIC: ICD-10-CM

## 2023-01-04 PROCEDURE — A9537 TC99M MEBROFENIN: HCPCS | Performed by: SURGERY

## 2023-01-04 PROCEDURE — 78227 HEPATOBIL SYST IMAGE W/DRUG: CPT

## 2023-01-04 PROCEDURE — 343N000001 HC RX 343: Performed by: SURGERY

## 2023-01-04 PROCEDURE — 250N000011 HC RX IP 250 OP 636: Performed by: SURGERY

## 2023-01-04 PROCEDURE — 258N000003 HC RX IP 258 OP 636: Performed by: SURGERY

## 2023-01-04 RX ORDER — KIT FOR THE PREPARATION OF TECHNETIUM TC 99M MEBROFENIN 45 MG/10ML
5 INJECTION, POWDER, LYOPHILIZED, FOR SOLUTION INTRAVENOUS ONCE
Status: COMPLETED | OUTPATIENT
Start: 2023-01-04 | End: 2023-01-04

## 2023-01-04 RX ADMIN — MEBROFENIN 5 MILLICURIE: 45 INJECTION, POWDER, LYOPHILIZED, FOR SOLUTION INTRAVENOUS at 11:30

## 2023-01-04 RX ADMIN — SODIUM CHLORIDE 1.6 MCG: 9 INJECTION INTRAMUSCULAR; INTRAVENOUS; SUBCUTANEOUS at 12:31

## 2023-01-04 NOTE — TELEPHONE ENCOUNTER
Requested Prescriptions   Pending Prescriptions Disp Refills     tiZANidine (ZANAFLEX) 4 MG tablet [Pharmacy Med Name: TIZANIDINE HCL 4MG TABS] 90 tablet 1     Sig: TAKE ONE TABLET BY MOUTH THREE TIMES A DAY AS NEEDED FOR MUSCLE SPASMS      Next 5 appointments (look out 90 days)    Jan 16, 2023 10:20 AM  (Arrive by 10:00 AM)  Provider Visit with Yaima Muse MD  [facility] ([facility] [address] [place] [id number] [phone]           Routing refill request to provider for review/approval because:  Drug not on the G, P or [facility] refill protocol or controlled substance

## 2023-01-17 ENCOUNTER — OFFICE VISIT (OUTPATIENT)
Dept: FAMILY MEDICINE | Facility: CLINIC | Age: 48
End: 2023-01-17
Payer: COMMERCIAL

## 2023-01-17 VITALS
RESPIRATION RATE: 18 BRPM | OXYGEN SATURATION: 96 % | SYSTOLIC BLOOD PRESSURE: 120 MMHG | TEMPERATURE: 97.5 F | HEART RATE: 86 BPM | WEIGHT: 185.25 LBS | BODY MASS INDEX: 29.77 KG/M2 | DIASTOLIC BLOOD PRESSURE: 70 MMHG | HEIGHT: 66 IN

## 2023-01-17 DIAGNOSIS — F33.1 MAJOR DEPRESSIVE DISORDER, RECURRENT EPISODE, MODERATE (H): ICD-10-CM

## 2023-01-17 DIAGNOSIS — Z12.31 ENCOUNTER FOR SCREENING MAMMOGRAM FOR BREAST CANCER: ICD-10-CM

## 2023-01-17 DIAGNOSIS — K80.50 RECURRENT BILIARY COLIC: Primary | ICD-10-CM

## 2023-01-17 DIAGNOSIS — Z79.4 TYPE 2 DIABETES MELLITUS WITH HYPERGLYCEMIA, WITH LONG-TERM CURRENT USE OF INSULIN (H): ICD-10-CM

## 2023-01-17 DIAGNOSIS — Z12.11 COLON CANCER SCREENING: ICD-10-CM

## 2023-01-17 DIAGNOSIS — M54.9 MECHANICAL BACK PAIN: ICD-10-CM

## 2023-01-17 DIAGNOSIS — E11.65 TYPE 2 DIABETES MELLITUS WITH HYPERGLYCEMIA, WITH LONG-TERM CURRENT USE OF INSULIN (H): ICD-10-CM

## 2023-01-17 DIAGNOSIS — G89.4 CHRONIC PAIN SYNDROME: ICD-10-CM

## 2023-01-17 PROCEDURE — 99214 OFFICE O/P EST MOD 30 MIN: CPT | Performed by: FAMILY MEDICINE

## 2023-01-17 RX ORDER — OXYCODONE HYDROCHLORIDE 5 MG/1
TABLET ORAL
Qty: 45 TABLET | Refills: 0 | Status: SHIPPED | OUTPATIENT
Start: 2023-01-17 | End: 2023-02-04

## 2023-01-17 RX ORDER — INSULIN GLARGINE 100 [IU]/ML
42 INJECTION, SOLUTION SUBCUTANEOUS EVERY MORNING
Qty: 45 ML | Refills: 0 | Status: SHIPPED | OUTPATIENT
Start: 2023-01-17 | End: 2023-05-21

## 2023-01-17 ASSESSMENT — PAIN SCALES - GENERAL: PAINLEVEL: SEVERE PAIN (7)

## 2023-01-17 NOTE — PROGRESS NOTES
Assessment & Plan     ASSESSMENT/ORDERS:    ICD-10-CM    1. Recurrent biliary colic  K80.50       2. Chronic pain syndrome  G89.4 oxyCODONE (ROXICODONE) 5 MG tablet      3. Mechanical back pain  M54.9 tiZANidine (ZANAFLEX) 4 MG tablet     oxyCODONE (ROXICODONE) 5 MG tablet      4. Type 2 diabetes mellitus with hyperglycemia, with long-term current use of insulin (H)  E11.65 insulin glargine (BASAGLAR KWIKPEN) 100 UNIT/ML pen    Z79.4       5. Major depressive disorder, recurrent episode, moderate (H)  F33.1 FLUoxetine (PROZAC) 20 MG capsule      6. Colon cancer screening  Z12.11 Fecal colorectal cancer screen (FIT)      7. Encounter for screening mammogram for breast cancer  Z12.31 MA Screen Bilateral w/Eris        PLAN:  1.  Adjusted oxycodone pain management for 1/day for her bad dog bite and then 15 extra for up to every other day extra pain management for the biliary colic.  Has appointment with surgery tomorrow to discuss probable surgery (per her) and so will schedule preoperative             MED REC REQUIRED  Post Medication Reconciliation Status: discharge medications reconciled and changed, per note/orders      Return in about 2 weeks (around 1/31/2023) for preop visit.    Carlos Stoner MD  [facility] ARIANNA Kumar is a 47 year old, presenting for the following health issues:  Hospital F/U      History of Present Illness       Reason for visit:  Follow up from hospital stay in december    She eats 2-3 servings of fruits and vegetables daily.She consumes 2 sweetened beverage(s) daily.She exercises with enough effort to increase her heart rate 30 to 60 minutes per day.  She exercises with enough effort to increase her heart rate 4 days per week.   She is taking medications regularly.         Hospital Follow-up Visit:    Hospital/Nursing Home/IP Rehab Facility: [facility]  Date of Admission: 12/11/2022  Date of Discharge: 12/15/2022  Reason(s)  for Admission: Pneumonia/ RSV    Was your hospitalization related to COVID-19? No   Problems taking medications regularly:  None  Medication changes since discharge:   START taking these medications     Details   benzonatate (TESSALON) 100 MG capsule Take 1 capsule (100 mg) by mouth 3 times daily as needed for cough  Qty: 30 capsule, Refills: 0     Associated Diagnoses: RSV bronchiolitis; Community acquired pneumonia, unspecified laterality       cefdinir (OMNICEF) 300 MG capsule Take 1 capsule (300 mg) by mouth 2 times daily for 6 days  Qty: 12 capsule, Refills: 0     Associated Diagnoses: Community acquired pneumonia, unspecified laterality       dextromethorphan (DELSYM) 30 MG/5ML liquid Take 5 mLs (30 mg) by mouth every 12 hours  Qty: 148 mL, Refills: 0     Associated Diagnoses: RSV bronchiolitis; Community acquired pneumonia, unspecified laterality                 CONTINUE these medications which have CHANGED     Details   aspirin (ASA) 325 MG tablet Take 1 tablet (325 mg) by mouth daily     Associated Diagnoses: NSTEMI (non-ST elevated myocardial infarction) (H)       insulin glargine (BASAGLAR KWIKPEN) 100 UNIT/ML pen Take 21 units subcutaneously daily until your appetite returns to baseline, then return to 42 units daily.  Qty: 15 mL, Refills: 1     Comments: If Basaglar is not covered by insurance, may substitute Lantus or Semglee or other insulin glargine product per insurance preference at same dose and frequency.    Associated Diagnoses: Type 2 diabetes mellitus with hyperglycemia, with long-term current use of insulin (H)       metFORMIN (GLUCOPHAGE XR) 500 MG 24 hr tablet Take 1 tablet by mouth twice a day until your appetite is back to baseline, then return to 2 tablets twice a day.  Qty: 360 tablet, Refills: 3     Associated Diagnoses: Type 2 diabetes mellitus with hyperglycemia, with long-term current use of insulin (H)     STOP taking these medications         HYDROmorphone (DILAUDID) 2 MG tablet  "Comments:   Reason for Stopping:              Problems adhering to non-medication therapy:  None    Summary of hospitalization:  [facility] discharge summary reviewed  Diagnostic Tests/Treatments reviewed.  Follow up needed: none  Other Healthcare Providers Involved in Patient s Care:         Surgical follow-up appointment - for biliary colic tomorow  Update since discharge: stable.         Plan of care communicated with patient and family               Awaiting surgical recommendations for biliary colic.  Pain management has been rough.  Has 1 oxycodone/day from primary care provider for history of dog bite.  Needs more pain management for her biliary colic.      Back pain stable but on tizanidine for management and needs refills.    History of diabetes, stable.    Lab Results   Component Value Date    A1C 7.6 11/01/2022    A1C 7.3 03/14/2022    A1C 7.2 12/14/2021    A1C 7.6 09/01/2021    A1C 10.1 03/05/2021    A1C 12.1 10/29/2020    A1C 9.8 12/18/2019    A1C 10.9 08/11/2019    A1C 8.9 05/01/2019      depression stable and on medication.    Review of Systems         Objective    /70   Pulse 86   Temp 97.5  F (36.4  C) (Temporal)   Resp 18   Ht 1.681 m (5' 6.2\")   Wt 84 kg (185 lb 4 oz)   LMP 03/07/2021 (Approximate)   SpO2 96%   BMI 29.72 kg/m    Body mass index is 29.72 kg/m .  Physical Exam  Constitutional:       Appearance: She is well-developed.   Cardiovascular:      Rate and Rhythm: Normal rate and regular rhythm.      Heart sounds: Normal heart sounds, S1 normal and S2 normal. No murmur heard.  Pulmonary:      Effort: Pulmonary effort is normal. No respiratory distress.      Breath sounds: Normal breath sounds. No wheezing, rhonchi or rales.   Abdominal:      Tenderness: There is abdominal tenderness in the right upper quadrant.   Neurological:      Mental Status: She is alert.                            "

## 2023-01-18 ENCOUNTER — OFFICE VISIT (OUTPATIENT)
Dept: SURGERY | Facility: OTHER | Age: 48
End: 2023-01-18
Payer: COMMERCIAL

## 2023-01-18 ENCOUNTER — TELEPHONE (OUTPATIENT)
Dept: SURGERY | Facility: CLINIC | Age: 48
End: 2023-01-18

## 2023-01-18 VITALS
WEIGHT: 188 LBS | BODY MASS INDEX: 30.22 KG/M2 | SYSTOLIC BLOOD PRESSURE: 128 MMHG | DIASTOLIC BLOOD PRESSURE: 78 MMHG | HEIGHT: 66 IN | TEMPERATURE: 97.2 F

## 2023-01-18 DIAGNOSIS — K80.50 BILIARY COLIC: ICD-10-CM

## 2023-01-18 DIAGNOSIS — K82.8 BILIARY DYSKINESIA: Primary | ICD-10-CM

## 2023-01-18 PROCEDURE — 99213 OFFICE O/P EST LOW 20 MIN: CPT | Performed by: SURGERY

## 2023-01-18 NOTE — PROGRESS NOTES
General Surgery Follow Up    Pt returns for follow up visit for HIDA scan results    HPI:  Stacy returns to discuss HIDA scan results.  HIDA scan recently performed showing gallbladder ejection fraction of 94%.  She endorses reproduction of pretest pain during cholecystokinin injection.  She also endorses ongoing postprandial pain.  She also has heartburn symptoms.    Review Of Systems    Skin: negative  Ears/Nose/Throat: negative  Respiratory: No shortness of breath, dyspnea on exertion, cough, or hemoptysis  Cardiovascular: negative  Gastrointestinal: as above  Genitourinary: negative  Musculoskeletal: negative  Neurologic: negative  Hematologic/Lymphatic/Immunologic: negative  Endocrine: negative      Past Medical History:   Diagnosis Date     Knee pain, chronic      Mixed hyperlipidemia      NSTEMI (non-ST elevated myocardial infarction) (H) 3/5/2021     S/P CABG (coronary artery bypass graft) 3/16/2021     Tobacco abuse disorder 11/21/2017     Type II or unspecified type diabetes mellitus without mention of complication, not stated as uncontrolled 08/16/2002    diagnosed 8/16/02, started insulin 2006       Past Surgical History:   Procedure Laterality Date     BYPASS GRAFT ARTERY CORONARY N/A 3/9/2021    Procedure: CORONARY ARTERY BYPASS GRAFT X 4 (LIMA - LAD, SV - RPL, SV - PDA,  RA - OM) LEFT RADIAL ENDOARTERY HARVEST AND BILATERAL LEG ENDOVEIN HARVEST (ON CARDIOPULMONARY PUMP OXYGENATOR ; INTRAOPERATIVE TRANSESOPHAGEAL ECHOCARDIOGRAM BY ANESTHESIOLOGIST DR. NEL AVILA)   ;  Surgeon: Kunal Selby MD;  Location:  OR     CV HEART CATHETERIZATION WITH POSSIBLE INTERVENTION N/A 3/8/2021    Procedure: Heart Catheterization with Possible Intervention;  Surgeon: Vadim Kamara MD;  Location:  HEART CARDIAC CATH LAB     HC OPEN TX METATARSAL FRACTURE  age 12    softball injury,open fracture left foot     HC TOOTH EXTRACTION W/FORCEP      Extract wisdom teeth     INJECT JOINT SACROILIAC Left  2018    Procedure: INJECT JOINT SACROILIAC;  INJECT JOINT SACROILIAC LEFT;  Surgeon: Alan Marshall MD;  Location: [facility] OR     OPERATIVE HYSTEROSCOPY WITH MORCELLATOR N/A 2018    Procedure: OPERATIVE HYSTEROSCOPY WITH MORCELLATOR (MYOSURE);  Exam under anesthesia, operative hysteroscopy, polypectomy, D & C;  Surgeon: Sindhu Peterson DO;  Location: [facility] OR     TUBAL LIGATION  2006     ZC STABISM SURG,PREV EYE SURG,NOT MUSC      Right       Social History     Socioeconomic History     Marital status:      Spouse name: Humble     Number of children: 2     Years of education: 14     Highest education level: Not on file   Occupational History     Occupation: Post Office     Employer: [employer]   Tobacco Use     Smoking status: Former     Packs/day: 0.50     Years: 6.00     Pack years: 3.00     Types: Cigarettes     Quit date: 3/5/2021     Years since quittin.8     Smokeless tobacco: Never   Vaping Use     Vaping Use: Never used   Substance and Sexual Activity     Alcohol use: Yes     Alcohol/week: 0.0 standard drinks     Comment: once a month     Drug use: No     Sexual activity: Not Currently     Partners: Male     Birth control/protection: Surgical   Other Topics Concern      Service No     Blood Transfusions No     Caffeine Concern Yes     Comment: Diet Pepsi/at least 20 ounces/day - advised not more than 16 ounces/day     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 Yes   Social History Narrative     and lives at home in [place] with , Humble, and their daughter and son.     Social Determinants of Health      Financial Resource Strain: Not on file   Food Insecurity: Not on file   Transportation Needs: Not on file   Physical Activity: Not on file   Stress: Not on file   Social Connections: Not on file   Intimate Partner Violence: Not on file   Housing Stability: Not on file       Current Outpatient Medications   Medication Sig Dispense Refill     acetaminophen (TYLENOL) 325 MG tablet Take 1 tablet (325 mg) by mouth every 4 hours as needed for mild pain TAKE TWO TABLETS BY MOUTH EVERY 4 HOURS AS NEEDED FOR MILD PAIN 40 tablet 0     amLODIPine (NORVASC) 2.5 MG tablet TAKE 1 TABLET (2.5 MG) BY MOUTH DAILY 90 tablet 3     Ascorbic Acid (VITAMIN C) 100 MG CHEW        aspirin (ASA) 325 MG tablet Take 1 tablet (325 mg) by mouth daily       blood glucose (NO BRAND SPECIFIED) test strip Use to test blood sugar up to 4 times daily or as directed. To accompany: Blood Glucose Monitor Brands: per insurance. 200 strip 6     blood glucose calibration (NO BRAND SPECIFIED) solution To accompany: Blood Glucose Monitor Brands: per insurance. 1 Bottle 3     blood glucose monitoring (FREESTYLE) lancets Use to test blood sugars 1-2 times daily or as directed, per patients glucose meter. 3 Box 3     Blood Glucose Monitoring Suppl (ACCU-CHEK COMPLETE) KIT 1 Device daily 1 Device 0     FLUoxetine (PROZAC) 20 MG capsule Take 1 capsule (20 mg) by mouth daily 90 capsule 1     insulin aspart (NOVOLOG FLEXPEN) 100 UNIT/ML pen Novolog Flexpen. Inject 1 units per 10 gram carb unit before breakfast, lunch and dinner, up to 15 units per meal. 45 mL 3     insulin glargine (BASAGLAR KWIKPEN) 100 UNIT/ML pen Inject 42 Units Subcutaneous every morning 45 mL 0     insulin pen needle (31G X 8 MM) 31G X 8 MM miscellaneous 1 Box of 100 insulin pen needles to be dispensed with every insulin pen prescription 100 each 0     insulin pen needle (NOVOFINE) 32G X 6 MM miscellaneous Use once daily or as directed. 100 each 3     lisinopril (ZESTRIL) 2.5 MG tablet Take  "1 tablet (2.5 mg) by mouth daily 90 tablet 4     metFORMIN (GLUCOPHAGE XR) 500 MG 24 hr tablet Take 2 tablets (1,000 mg) by mouth 2 times daily (with meals) 360 tablet 4     metoprolol tartrate (LOPRESSOR) 25 MG tablet Take 1 tablet (25 mg) by mouth 2 times daily 180 tablet 4     Multiple Vitamins-Minerals (MULTI-VITAMIN GUMMIES) CHEW Take 1 chew tab by mouth daily        nitroGLYcerin (NITROSTAT) 0.4 MG sublingual tablet For chest pain place 1 tablet under the tongue every 5 minutes for 3 doses. If symptoms persist 5 minutes after 1st dose call 911. 25 tablet 0     oxyCODONE (ROXICODONE) 5 MG tablet TAKE 1 TABLET (5 MG) BY MOUTH 2 TIMES DAILY AS NEEDED FOR SEVERE PAIN (MUST LAST 30 DAYS) 45 tablet 0     rosuvastatin (CRESTOR) 20 MG tablet Take 1 tablet (20 mg) by mouth daily 90 tablet 4     tiZANidine (ZANAFLEX) 4 MG tablet Take 1 tablet (4 mg) by mouth 3 times daily 270 tablet 4       Medications and history reviewed    Physical exam:  Vitals: /78   Temp 97.2  F (36.2  C) (Temporal)   Ht 1.681 m (5' 6.2\")   Wt 85.3 kg (188 lb)   LMP 03/07/2021 (Approximate)   BMI 30.16 kg/m    BMI= Body mass index is 30.16 kg/m .    Constitutional: Healthy, alert, non-distressed   Head: Normo-cephalic, atraumatic, no lesions, masses or tenderness   Cardiovascular: RRR, no new murmurs, +S1, +S2 heart sounds, no clicks, rubs or gallops   Respiratory: CTAB, no rales, rhonchi or wheezing, equal chest rise, good respiratory effort   Gastrointestinal: Soft, non-tender, non distended, no rebound rigidity or guarding, no masses or hernias palpated   : Deferred  Musculoskeletal: Moves all extremities, normal  strength, no deformities noted   Skin: No suspicious lesions or rashes   Psychiatric: Mentation appears normal, affect appropriate   Hematologic/Lymphatic/Immunologic: Normal cervical and supraclavicular lymph nodes   Patient able to get up on table without difficulty.      Labs show:  None new    Imaging " 61.2 shows:  Recent Results (from the past 744 hour(s))   NM Hepatobiliary Scan w GB EF    Narrative    NUCLEAR MEDICINE HEPATOBILIARY SCAN WITH GB EF January 4, 2023 1:07 PM      HISTORY: Post-prandial right upper quadrant abdominal pain. Biliary  colic.    TECHNIQUE: 5.0 mCi of technetium 99m labeled Mebrofenin were  intravenously given while dynamically imaging the right upper abdomen.  Approximately one hour later, 1.6 mcg of cholecystokinin (CCK) was  intravenously given over 12 minutes while evaluating the gallbladder  ejection fraction.      COMPARISON:   Nuclear Study: None.    Other Relevant Studies: Limited abdominal ultrasound 11/26/2022, CT  chest dated 9/3/2022.    FINDINGS:  There is normal, homogeneous uptake of radiotracer in the  liver.  The gallbladder is seen by the 30 minute image and the small  bowel is seen by the 10 minute image.    After the administration of CCK, a gallbladder ejection fraction of  94% was measured. The patient described 6/10 severity abdominal pain  (similar to usual symptoms) and 0/10 severity nausea with the  cholecystokinin infusion.        Impression    IMPRESSION:  1. No common bile or cystic duct obstruction is seen.  There is a  normal gallbladder ejection fraction.   2. Patient experienced 6/10 severity abdominal pain (similar to usual  symptoms) with cholecystokinin infusion.    DARWIN CAICEDO MD         SYSTEM ID:  [id number]         Assessment:     ICD-10-CM    1. Biliary dyskinesia  K82.8       2. Biliary colic  K80.50         Plan: We discussed biliary dyskinesia specifically hyperkinetic subtype.  We discussed how occasionally an overactive gallbladder can cause the symptoms she is describing.  We also thoroughly discussed other etiologies for postprandial upper abdominal pain with nausea including GERD, gastritis, peptic ulcer disease, motility issues etc.  She understands while I believe she will benefit from cholecystectomy there is a chance her symptoms will not  improve should 1 of these other etiologies be the primary source of her gastrointestinal complaints.  After our thorough discussion I recommend laparoscopic cholecystectomy possible open for her biliary dyskinesia.    Discussed imaging findings and what to expect with surgery. Risks of bleeding, infection, anesthesia complications, conversion to open, injury to nearby structures and bile duct injury all discussed.   We went over my discharge instructions and post-op restrictions.  Patient questions answered and we will schedule the procedure.      Robert Diop, DO

## 2023-01-18 NOTE — LETTER
1/18/2023         RE: Stacy Brown  [address] [facility] [address]        Dear Colleague,    Thank you for referring your patient, Stacy Brown, to the [facility]. Please see a copy of my visit note below.    General Surgery Follow Up    Pt returns for follow up visit for HIDA scan results    HPI:  Stacy returns to discuss HIDA scan results.  HIDA scan recently performed showing gallbladder ejection fraction of 94%.  She endorses reproduction of pretest pain during cholecystokinin injection.  She also endorses ongoing postprandial pain.  She also has heartburn symptoms.    Review Of Systems    Skin: negative  Ears/Nose/Throat: negative  Respiratory: No shortness of breath, dyspnea on exertion, cough, or hemoptysis  Cardiovascular: negative  Gastrointestinal: as above  Genitourinary: negative  Musculoskeletal: negative  Neurologic: negative  Hematologic/Lymphatic/Immunologic: negative  Endocrine: negative      Past Medical History:   Diagnosis Date     Knee pain, chronic      Mixed hyperlipidemia      NSTEMI (non-ST elevated myocardial infarction) (H) 3/5/2021     S/P CABG (coronary artery bypass graft) 3/16/2021     Tobacco abuse disorder 11/21/2017     Type II or unspecified type diabetes mellitus without mention of complication, not stated as uncontrolled 08/16/2002    diagnosed 8/16/02, started insulin 2006       Past Surgical History:   Procedure Laterality Date     BYPASS GRAFT ARTERY CORONARY N/A 3/9/2021    Procedure: CORONARY ARTERY BYPASS GRAFT X 4 (LIMA - LAD, SV - RPL, SV - PDA,  RA - OM) LEFT RADIAL ENDOARTERY HARVEST AND BILATERAL LEG ENDOVEIN HARVEST (ON CARDIOPULMONARY PUMP OXYGENATOR ; INTRAOPERATIVE TRANSESOPHAGEAL ECHOCARDIOGRAM BY ANESTHESIOLOGIST DR. NEL AVILA)   ;  Surgeon: Kunal Selby MD;  Location: [facility] OR     CV HEART CATHETERIZATION WITH POSSIBLE INTERVENTION N/A 3/8/2021    Procedure: Heart Catheterization with Possible  Intervention;  Surgeon: Vadim Kamara MD;  Location:  HEART CARDIAC CATH LAB     HC OPEN TX METATARSAL FRACTURE  age 12    softball injury,open fracture left foot     HC TOOTH EXTRACTION W/FORCEP      Extract wisdom teeth     INJECT JOINT SACROILIAC Left 2018    Procedure: INJECT JOINT SACROILIAC;  INJECT JOINT SACROILIAC LEFT;  Surgeon: Alan Marshall MD;  Location: [facility] OR     OPERATIVE HYSTEROSCOPY WITH MORCELLATOR N/A 2018    Procedure: OPERATIVE HYSTEROSCOPY WITH MORCELLATOR (MYOSURE);  Exam under anesthesia, operative hysteroscopy, polypectomy, D & C;  Surgeon: Sindhu Peterson DO;  Location: [facility] OR     TUBAL LIGATION  2006     ZZC STABISM SURG,PREV EYE SURG,NOT MUSC      Right       Social History     Socioeconomic History     Marital status:      Spouse name: Humble     Number of children: 2     Years of education: 14     Highest education level: Not on file   Occupational History     Occupation: [employer]     Employer: [employer]   Tobacco Use     Smoking status: Former     Packs/day: 0.50     Years: 6.00     Pack years: 3.00     Types: Cigarettes     Quit date: 3/5/2021     Years since quittin.8     Smokeless tobacco: Never   Vaping Use     Vaping Use: Never used   Substance and Sexual Activity     Alcohol use: Yes     Alcohol/week: 0.0 standard drinks     Comment: once a month     Drug use: No     Sexual activity: Not Currently     Partners: Male     Birth control/protection: Surgical   Other Topics Concern      Service No     Blood Transfusions No     Caffeine Concern Yes     Comment: Diet Pepsi/at least 20 ounces/day - advised not more than 16 ounces/day     Occupational Exposure Yes     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 Yes   Social History Narrative     and lives at home in [place] with , Humble, and their daughter and son.     Social Determinants of Health     Financial Resource Strain: Not on file   Food Insecurity: Not on file   Transportation Needs: Not on file   Physical Activity: Not on file   Stress: Not on file   Social Connections: Not on file   Intimate Partner Violence: Not on file   Housing Stability: Not on file       Current Outpatient Medications   Medication Sig Dispense Refill     acetaminophen (TYLENOL) 325 MG tablet Take 1 tablet (325 mg) by mouth every 4 hours as needed for mild pain TAKE TWO TABLETS BY MOUTH EVERY 4 HOURS AS NEEDED FOR MILD PAIN 40 tablet 0     amLODIPine (NORVASC) 2.5 MG tablet TAKE 1 TABLET (2.5 MG) BY MOUTH DAILY 90 tablet 3     Ascorbic Acid (VITAMIN C) 100 MG CHEW        aspirin (ASA) 325 MG tablet Take 1 tablet (325 mg) by mouth daily       blood glucose (NO BRAND SPECIFIED) test strip Use to test blood sugar up to 4 times daily or as directed. To accompany: Blood Glucose Monitor Brands: per insurance. 200 strip 6     blood glucose calibration (NO BRAND SPECIFIED) solution To accompany: Blood Glucose Monitor Brands: per insurance. 1 Bottle 3     blood glucose monitoring (FREESTYLE) lancets Use to test blood sugars 1-2 times daily or as directed, per patients glucose meter. 3 Box 3     Blood Glucose Monitoring Suppl (ACCU-CHEK COMPLETE) KIT 1 Device daily 1 Device 0     FLUoxetine (PROZAC) 20 MG capsule Take 1 capsule (20 mg) by mouth daily 90 capsule 1     insulin aspart (NOVOLOG FLEXPEN) 100 UNIT/ML pen Novolog Flexpen. Inject 1 units per 10 gram carb unit before breakfast, lunch and dinner, up to 15 units per meal. 45 mL 3     insulin glargine (BASAGLAR KWIKPEN) 100 UNIT/ML pen Inject 42 Units Subcutaneous every morning 45 mL 0     insulin pen needle (31G X 8 MM) 31G X  "8 MM miscellaneous 1 Box of 100 insulin pen needles to be dispensed with every insulin pen prescription 100 each 0     insulin pen needle (NOVOFINE) 32G X 6 MM miscellaneous Use once daily or as directed. 100 each 3     lisinopril (ZESTRIL) 2.5 MG tablet Take 1 tablet (2.5 mg) by mouth daily 90 tablet 4     metFORMIN (GLUCOPHAGE XR) 500 MG 24 hr tablet Take 2 tablets (1,000 mg) by mouth 2 times daily (with meals) 360 tablet 4     metoprolol tartrate (LOPRESSOR) 25 MG tablet Take 1 tablet (25 mg) by mouth 2 times daily 180 tablet 4     Multiple Vitamins-Minerals (MULTI-VITAMIN GUMMIES) CHEW Take 1 chew tab by mouth daily        nitroGLYcerin (NITROSTAT) 0.4 MG sublingual tablet For chest pain place 1 tablet under the tongue every 5 minutes for 3 doses. If symptoms persist 5 minutes after 1st dose call 911. 25 tablet 0     oxyCODONE (ROXICODONE) 5 MG tablet TAKE 1 TABLET (5 MG) BY MOUTH 2 TIMES DAILY AS NEEDED FOR SEVERE PAIN (MUST LAST 30 DAYS) 45 tablet 0     rosuvastatin (CRESTOR) 20 MG tablet Take 1 tablet (20 mg) by mouth daily 90 tablet 4     tiZANidine (ZANAFLEX) 4 MG tablet Take 1 tablet (4 mg) by mouth 3 times daily 270 tablet 4       Medications and history reviewed    Physical exam:  Vitals: /78   Temp 97.2  F (36.2  C) (Temporal)   Ht 1.681 m (5' 6.2\")   Wt 85.3 kg (188 lb)   LMP 03/07/2021 (Approximate)   BMI 30.16 kg/m    BMI= Body mass index is 30.16 kg/m .    Constitutional: Healthy, alert, non-distressed   Head: Normo-cephalic, atraumatic, no lesions, masses or tenderness   Cardiovascular: RRR, no new murmurs, +S1, +S2 heart sounds, no clicks, rubs or gallops   Respiratory: CTAB, no rales, rhonchi or wheezing, equal chest rise, good respiratory effort   Gastrointestinal: Soft, non-tender, non distended, no rebound rigidity or guarding, no masses or hernias palpated   : Deferred  Musculoskeletal: Moves all extremities, normal  strength, no deformities noted   Skin: No suspicious " lesions or rashes   Psychiatric: Mentation appears normal, affect appropriate   Hematologic/Lymphatic/Immunologic: Normal cervical and supraclavicular lymph nodes   Patient able to get up on table without difficulty.      Labs show:  None new    Imaging shows:  Recent Results (from the past 744 hour(s))   NM Hepatobiliary Scan w GB EF    Narrative    NUCLEAR MEDICINE HEPATOBILIARY SCAN WITH GB EF January 4, 2023 1:07 PM      HISTORY: Post-prandial right upper quadrant abdominal pain. Biliary  colic.    TECHNIQUE: 5.0 mCi of technetium 99m labeled Mebrofenin were  intravenously given while dynamically imaging the right upper abdomen.  Approximately one hour later, 1.6 mcg of cholecystokinin (CCK) was  intravenously given over 12 minutes while evaluating the gallbladder  ejection fraction.      COMPARISON:   Nuclear Study: None.    Other Relevant Studies: Limited abdominal ultrasound 11/26/2022, CT  chest dated 9/3/2022.    FINDINGS:  There is normal, homogeneous uptake of radiotracer in the  liver.  The gallbladder is seen by the 30 minute image and the small  bowel is seen by the 10 minute image.    After the administration of CCK, a gallbladder ejection fraction of  94% was measured. The patient described 6/10 severity abdominal pain  (similar to usual symptoms) and 0/10 severity nausea with the  cholecystokinin infusion.        Impression    IMPRESSION:  1. No common bile or cystic duct obstruction is seen.  There is a  normal gallbladder ejection fraction.   2. Patient experienced 6/10 severity abdominal pain (similar to usual  symptoms) with cholecystokinin infusion.    DARWIN CAICEDO MD         SYSTEM ID:  [id number]         Assessment:     ICD-10-CM    1. Biliary dyskinesia  K82.8       2. Biliary colic  K80.50         Plan: We discussed biliary dyskinesia specifically hyperkinetic subtype.  We discussed how occasionally an overactive gallbladder can cause the symptoms she is describing.  We also thoroughly  discussed other etiologies for postprandial upper abdominal pain with nausea including GERD, gastritis, peptic ulcer disease, motility issues etc.  She understands while I believe she will benefit from cholecystectomy there is a chance her symptoms will not improve should 1 of these other etiologies be the primary source of her gastrointestinal complaints.  After our thorough discussion I recommend laparoscopic cholecystectomy possible open for her biliary dyskinesia.    Discussed imaging findings and what to expect with surgery. Risks of bleeding, infection, anesthesia complications, conversion to open, injury to nearby structures and bile duct injury all discussed.   We went over my discharge instructions and post-op restrictions.  Patient questions answered and we will schedule the procedure.      Robert Diop, DO        Again, thank you for allowing me to participate in the care of your patient.        Sincerely,        Robert Diop, DO

## 2023-01-18 NOTE — TELEPHONE ENCOUNTER
Type of surgery: CHOLECYSTECTOMY, LAPAROSCOPIC, possible open  Location of surgery: [facility]  Date and time of surgery: 2/13  Surgeon: Jadon  Pre-Op Appt Date: 1/30  Post-Op Appt Date: 3/1   Packet sent out: Yes  Pre-cert/Authorization completed:  Not Applicable  Date: na

## 2023-01-23 ENCOUNTER — MYC MEDICAL ADVICE (OUTPATIENT)
Dept: FAMILY MEDICINE | Facility: CLINIC | Age: 48
End: 2023-01-23
Payer: COMMERCIAL

## 2023-01-24 NOTE — TELEPHONE ENCOUNTER
See MyChart. Patient has surgery scheduled 2/13. Is this something you would like patient to be triaged for? She also has another appointment with you coming up on 1/30/23 for a pre op.    MARCELA AlbarranN, RN

## 2023-01-24 NOTE — TELEPHONE ENCOUNTER
This should be RN triaged.  May even need to reach out to surgeon to see if procedure can be at all expedited.     Carlos Stoner MD

## 2023-01-25 ENCOUNTER — NURSE TRIAGE (OUTPATIENT)
Dept: FAMILY MEDICINE | Facility: CLINIC | Age: 48
End: 2023-01-25
Payer: COMMERCIAL

## 2023-01-25 NOTE — TELEPHONE ENCOUNTER
Patient states her back pain is getting worse with the lifting she is doing at work.  She states she does not have as much pain when she does not have to lift weight.    She was just lifting this morning ans is at a 6 out of 10.  Her pain is in her abdomen on the right side.    Patient can lift about 25 pounds without additional pain.  She has only Sunday off between now and then.    She has an appointment on Monday.    Please advise.    Jessica Méndez RN on 1/25/2023 at 8:45 AM      Reason for Disposition    MODERATE back pain (e.g., interferes with normal activities) and present > 3 days    Additional Information    Negative: Passed out (i.e., fainted, collapsed and was not responding)    Negative: Shock suspected (e.g., cold/pale/clammy skin, too weak to stand, low BP, rapid pulse)    Negative: Sounds like a life-threatening emergency to the triager    Negative: Major injury to the back (e.g., MVA, fall > 10 feet or 3 meters, penetrating injury, etc.)    Negative: Pain in the upper back over the ribs (rib cage) that radiates (travels) into the chest    Negative: Pain in the upper back over the ribs (rib cage) and worsened by coughing (or clearly increases with breathing)    Negative: Back pain during pregnancy    Negative: SEVERE back pain of sudden onset and age > 60 years    Negative: SEVERE abdominal pain (e.g., excruciating)    Negative: Abdominal pain and age > 60 years    Negative: Unable to urinate (or only a few drops) and bladder feels very full    Negative: Loss of bladder or bowel control (urine or bowel incontinence; wetting self, leaking stool) of new-onset    Negative: Numbness (loss of sensation) in groin or rectal area    Negative: Pain radiates into groin, scrotum    Negative: Blood in urine (red, pink, or tea-colored)    Negative: Vomiting and pain over lower ribs of back (i.e., flank - kidney area)    Negative: Weakness of a leg or foot (e.g., unable to bear weight, dragging foot)    Negative:  Patient sounds very sick or weak to the triager    Negative: Fever > 100.4 F (38.0 C) and flank pain    Negative: Pain or burning with passing urine (urination)    Negative: SEVERE back pain (e.g., excruciating, unable to do any normal activities) and not improved after pain medicine and CARE ADVICE    Negative: Numbness in an arm or hand (i.e., loss of sensation) and upper back pain    Negative: Numbness in a leg or foot (i.e., loss of sensation)    Negative: High-risk adult (e.g., history of cancer, history of HIV, or history of IV Drug Use)    Negative: Soft tissue infection (e.g., abscess, cellulitis) or other serious infection (e.g., bacteremia) in last 2 weeks    Negative: Painful rash with multiple small blisters grouped together (i.e., dermatomal distribution or 'band' or 'stripe')    Negative: Pain radiates into the thigh or further down the leg, and in both legs    Negative: Age > 50 and no history of prior similar back pain    Protocols used: BACK PAIN-A-[place]

## 2023-01-28 NOTE — TELEPHONE ENCOUNTER
Just now saw this on weekend before patient's appointment as I was out of office at time of message being sent.  Will now wait until patient's appointment.    Carlos Stoner MD

## 2023-01-30 ENCOUNTER — OFFICE VISIT (OUTPATIENT)
Dept: FAMILY MEDICINE | Facility: CLINIC | Age: 48
End: 2023-01-30
Payer: COMMERCIAL

## 2023-01-30 VITALS
TEMPERATURE: 96.8 F | RESPIRATION RATE: 20 BRPM | DIASTOLIC BLOOD PRESSURE: 66 MMHG | BODY MASS INDEX: 30.22 KG/M2 | HEART RATE: 70 BPM | HEIGHT: 66 IN | SYSTOLIC BLOOD PRESSURE: 116 MMHG | OXYGEN SATURATION: 97 % | WEIGHT: 188 LBS

## 2023-01-30 DIAGNOSIS — Z95.1 S/P CABG (CORONARY ARTERY BYPASS GRAFT): ICD-10-CM

## 2023-01-30 DIAGNOSIS — E11.65 TYPE 2 DIABETES MELLITUS WITH HYPERGLYCEMIA, WITH LONG-TERM CURRENT USE OF INSULIN (H): ICD-10-CM

## 2023-01-30 DIAGNOSIS — G89.4 CHRONIC PAIN SYNDROME: ICD-10-CM

## 2023-01-30 DIAGNOSIS — Z12.11 COLON CANCER SCREENING: ICD-10-CM

## 2023-01-30 DIAGNOSIS — I25.10 CORONARY ARTERY DISEASE INVOLVING NATIVE CORONARY ARTERY OF NATIVE HEART WITHOUT ANGINA PECTORIS: ICD-10-CM

## 2023-01-30 DIAGNOSIS — F43.10 PTSD (POST-TRAUMATIC STRESS DISORDER): ICD-10-CM

## 2023-01-30 DIAGNOSIS — Z79.4 TYPE 2 DIABETES MELLITUS WITH HYPERGLYCEMIA, WITH LONG-TERM CURRENT USE OF INSULIN (H): ICD-10-CM

## 2023-01-30 DIAGNOSIS — D64.9 ANEMIA, UNSPECIFIED TYPE: ICD-10-CM

## 2023-01-30 DIAGNOSIS — I10 BENIGN ESSENTIAL HYPERTENSION: ICD-10-CM

## 2023-01-30 DIAGNOSIS — Z01.818 PREOP GENERAL PHYSICAL EXAM: Primary | ICD-10-CM

## 2023-01-30 DIAGNOSIS — K80.50 RECURRENT BILIARY COLIC: ICD-10-CM

## 2023-01-30 PROBLEM — I21.4 NSTEMI (NON-ST ELEVATED MYOCARDIAL INFARCTION) (H): Status: RESOLVED | Noted: 2021-03-05 | Resolved: 2023-01-30

## 2023-01-30 LAB
ALBUMIN SERPL BCG-MCNC: 4.3 G/DL (ref 3.5–5.2)
ALP SERPL-CCNC: 81 U/L (ref 35–104)
ALT SERPL W P-5'-P-CCNC: 14 U/L (ref 10–35)
ANION GAP SERPL CALCULATED.3IONS-SCNC: 14 MMOL/L (ref 7–15)
AST SERPL W P-5'-P-CCNC: 16 U/L (ref 10–35)
BILIRUB SERPL-MCNC: <0.2 MG/DL
BUN SERPL-MCNC: 24.1 MG/DL (ref 6–20)
CALCIUM SERPL-MCNC: 9.4 MG/DL (ref 8.6–10)
CHLORIDE SERPL-SCNC: 102 MMOL/L (ref 98–107)
CREAT SERPL-MCNC: 0.98 MG/DL (ref 0.51–0.95)
DEPRECATED HCO3 PLAS-SCNC: 21 MMOL/L (ref 22–29)
GFR SERPL CREATININE-BSD FRML MDRD: 71 ML/MIN/1.73M2
GLUCOSE SERPL-MCNC: 296 MG/DL (ref 70–99)
HGB BLD-MCNC: 11.3 G/DL (ref 11.7–15.7)
POTASSIUM SERPL-SCNC: 4.5 MMOL/L (ref 3.4–5.3)
PROT SERPL-MCNC: 7.1 G/DL (ref 6.4–8.3)
SODIUM SERPL-SCNC: 137 MMOL/L (ref 136–145)

## 2023-01-30 PROCEDURE — 99214 OFFICE O/P EST MOD 30 MIN: CPT | Performed by: FAMILY MEDICINE

## 2023-01-30 PROCEDURE — 36415 COLL VENOUS BLD VENIPUNCTURE: CPT | Performed by: FAMILY MEDICINE

## 2023-01-30 PROCEDURE — 93000 ELECTROCARDIOGRAM COMPLETE: CPT | Performed by: FAMILY MEDICINE

## 2023-01-30 PROCEDURE — 80053 COMPREHEN METABOLIC PANEL: CPT | Performed by: FAMILY MEDICINE

## 2023-01-30 PROCEDURE — 85018 HEMOGLOBIN: CPT | Performed by: FAMILY MEDICINE

## 2023-01-30 NOTE — PATIENT INSTRUCTIONS
For informational purposes only. Not to replace the advice of your health care provider. Copyright   2003,  [facility]. All rights reserved. Clinically reviewed by Louisa Isaacs MD. [employer] [id number] - REV .  Preparing for Your Surgery  Getting started  A nurse will call you to review your health history and instructions. They will give you an arrival time based on your scheduled surgery time. Please be ready to share:  Your doctor's clinic name and phone number  Your medical, surgical, and anesthesia history  A list of allergies and sensitivities  A list of medicines, including herbal treatments and over-the-counter drugs  Whether the patient has a legal guardian (ask how to send us the papers in advance)  Please tell us if you're pregnant--or if there's any chance you might be pregnant. Some surgeries may injure a fetus (unborn baby), so they require a pregnancy test. Surgeries that are safe for a fetus don't always need a test, and you can choose whether to have one.   If you have a child who's having surgery, please ask for a copy of Preparing for Your Child's Surgery.    Preparing for surgery  Within 10 to 30 days of surgery: Have a pre-op exam (sometimes called an H&P, or History and Physical). This can be done at a clinic or pre-operative center.  If you're having a , you may not need this exam. Talk to your care team.  At your pre-op exam, talk to your care team about all medicines you take. If you need to stop any medicines before surgery, ask when to start taking them again.  We do this for your safety. Many medicines can make you bleed too much during surgery. Some change how well surgery (anesthesia) drugs work.  Call your insurance company to let them know you're having surgery. (If you don't have insurance, call [phone].)  Call your clinic if there's any change in your health. This includes signs of a cold or flu (sore throat, runny nose, cough, rash, fever). It  also includes a scrape or scratch near the surgery site.  If you have questions on the day of surgery, call your hospital or surgery center.  Eating and drinking guidelines  For your safety: Unless your surgeon tells you otherwise, follow the guidelines below.  Eat and drink as usual until 8 hours before you arrive for surgery. After that, no food or milk.  Drink clear liquids until 2 hours before you arrive. These are liquids you can see through, like water, Gatorade, and Propel Water. They also include plain black coffee and tea (no cream or milk), candy, and breath mints. You can spit out gum when you arrive.  If you drink alcohol: Stop drinking it the night before surgery.  If your care team tells you to take medicine on the morning of surgery, it's okay to take it with a sip of water.  Preventing infection  Shower or bathe the night before and morning of your surgery. Follow the instructions your clinic gave you. (If no instructions, use regular soap.)  Don't shave or clip hair near your surgery site. We'll remove the hair if needed.  Don't smoke or vape the morning of surgery. You may chew nicotine gum up to 2 hours before surgery. A nicotine patch is okay.  Note: Some surgeries require you to completely quit smoking and nicotine. Check with your surgeon.  Your care team will make every effort to keep you safe from infection. We will:  Clean our hands often with soap and water (or an alcohol-based hand rub).  Clean the skin at your surgery site with a special soap that kills germs.  Give you a special gown to keep you warm. (Cold raises the risk of infection.)  Wear special hair covers, masks, gowns and gloves during surgery.  Give antibiotic medicine, if prescribed. Not all surgeries need antibiotics.  What to bring on the day of surgery  Photo ID and insurance card  Copy of your health care directive, if you have one  Glasses and hearing aids (bring cases)  You can't wear contacts during surgery  Inhaler and  eye drops, if you use them (tell us about these when you arrive)  CPAP machine or breathing device, if you use them  A few personal items, if spending the night  If you have . . .  A pacemaker, ICD (cardiac defibrillator) or other implant: Bring the ID card.  An implanted stimulator: Bring the remote control.  A legal guardian: Bring a copy of the certified (court-stamped) guardianship papers.  Please remove any jewelry, including body piercings. Leave jewelry and other valuables at home.  If you're going home the day of surgery  You must have a responsible adult drive you home. They should stay with you overnight as well.  If you don't have someone to stay with you, and you aren't safe to go home alone, we may keep you overnight. Insurance often won't pay for this.  After surgery  If it's hard to control your pain or you need more pain medicine, please call your surgeon's office.  Questions?   If you have any questions for your care team, list them here: _________________________________________________________________________________________________________________________________________________________________________ ____________________________________ ____________________________________ ____________________________________    For informational purposes only. Not to replace the advice of your health care provider. Copyright   2003, 2019 [employer] [facility]. All rights reserved. Clinically reviewed by Louisa Isaacs MD. [employer] [id number] - REV 12/22.  Preparing for Your Surgery  Getting started  A nurse will call you to review your health history and instructions. They will give you an arrival time based on your scheduled surgery time. Please be ready to share:  Your doctor's clinic name and phone number  Your medical, surgical, and anesthesia history  A list of allergies and sensitivities  A list of medicines, including herbal treatments and over-the-counter drugs  Whether the patient has a legal guardian  (ask how to send us the papers in advance)  Please tell us if you're pregnant--or if there's any chance you might be pregnant. Some surgeries may injure a fetus (unborn baby), so they require a pregnancy test. Surgeries that are safe for a fetus don't always need a test, and you can choose whether to have one.   If you have a child who's having surgery, please ask for a copy of Preparing for Your Child's Surgery.    Preparing for surgery  Within 10 to 30 days of surgery: Have a pre-op exam (sometimes called an H&P, or History and Physical). This can be done at a clinic or pre-operative center.  If you're having a , you may not need this exam. Talk to your care team.  At your pre-op exam, talk to your care team about all medicines you take. If you need to stop any medicines before surgery, ask when to start taking them again.  We do this for your safety. Many medicines can make you bleed too much during surgery. Some change how well surgery (anesthesia) drugs work.  Call your insurance company to let them know you're having surgery. (If you don't have insurance, call [phone].)  Call your clinic if there's any change in your health. This includes signs of a cold or flu (sore throat, runny nose, cough, rash, fever). It also includes a scrape or scratch near the surgery site.  If you have questions on the day of surgery, call your hospital or surgery center.  Eating and drinking guidelines  For your safety: Unless your surgeon tells you otherwise, follow the guidelines below.  Eat and drink as usual until 8 hours before you arrive for surgery. After that, no food or milk.  Drink clear liquids until 2 hours before you arrive. These are liquids you can see through, like water, Gatorade, and Propel Water. They also include plain black coffee and tea (no cream or milk), candy, and breath mints. You can spit out gum when you arrive.  If you drink alcohol: Stop drinking it the night before surgery.  If your care  team tells you to take medicine on the morning of surgery, it's okay to take it with a sip of water.  Preventing infection  Shower or bathe the night before and morning of your surgery. Follow the instructions your clinic gave you. (If no instructions, use regular soap.)  Don't shave or clip hair near your surgery site. We'll remove the hair if needed.  Don't smoke or vape the morning of surgery. You may chew nicotine gum up to 2 hours before surgery. A nicotine patch is okay.  Note: Some surgeries require you to completely quit smoking and nicotine. Check with your surgeon.  Your care team will make every effort to keep you safe from infection. We will:  Clean our hands often with soap and water (or an alcohol-based hand rub).  Clean the skin at your surgery site with a special soap that kills germs.  Give you a special gown to keep you warm. (Cold raises the risk of infection.)  Wear special hair covers, masks, gowns and gloves during surgery.  Give antibiotic medicine, if prescribed. Not all surgeries need antibiotics.  What to bring on the day of surgery  Photo ID and insurance card  Copy of your health care directive, if you have one  Glasses and hearing aids (bring cases)  You can't wear contacts during surgery  Inhaler and eye drops, if you use them (tell us about these when you arrive)  CPAP machine or breathing device, if you use them  A few personal items, if spending the night  If you have . . .  A pacemaker, ICD (cardiac defibrillator) or other implant: Bring the ID card.  An implanted stimulator: Bring the remote control.  A legal guardian: Bring a copy of the certified (court-stamped) guardianship papers.  Please remove any jewelry, including body piercings. Leave jewelry and other valuables at home.  If you're going home the day of surgery  You must have a responsible adult drive you home. They should stay with you overnight as well.  If you don't have someone to stay with you, and you aren't safe to  go home alone, we may keep you overnight. Insurance often won't pay for this.  After surgery  If it's hard to control your pain or you need more pain medicine, please call your surgeon's office.  Questions?   If you have any questions for your care team, list them here: _________________________________________________________________________________________________________________________________________________________________________ ____________________________________ ____________________________________ ____________________________________    How to Take Your Medication Before Surgery  - Take all of your medications before surgery except as noted below  - HOLD (do not take) your METFORMIN on the morning of surgery.  - HOLD (do not take) Aspirin for 7-10 days  - HOLD (do not take) short acting insulin while fsting  -take 33 units of long acting insulin on AM of surgery (instead of 42 units usual dose)  -hold lisinopril for 24 hours prior to surgery
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
## 2023-01-30 NOTE — LETTER
January 30, 2023    To Whom it may Concern:    Stacy Brown was seen today in clinic.  She is unable to work due to her current health condition.  She will be cleared to return to work pending her surgeon's recommendations.    If you have any further questions, please contact me at the number above.    Sincerely,        Carlos Stoner MD
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
## 2023-02-09 ENCOUNTER — ANESTHESIA EVENT (OUTPATIENT)
Dept: SURGERY | Facility: CLINIC | Age: 48
End: 2023-02-09
Payer: COMMERCIAL

## 2023-02-09 ASSESSMENT — LIFESTYLE VARIABLES: TOBACCO_USE: 1

## 2023-02-09 NOTE — ANESTHESIA PREPROCEDURE EVALUATION
Anesthesia Pre-Procedure Evaluation    Patient: Stacy Brown   MRN: [id number] : 1975        Procedure : Procedure(s):  CHOLECYSTECTOMY, LAPAROSCOPIC, possible open          Past Medical History:   Diagnosis Date     Knee pain, chronic      Mixed hyperlipidemia      NSTEMI (non-ST elevated myocardial infarction) (H) 3/5/2021     S/P CABG (coronary artery bypass graft) 3/16/2021     Tobacco abuse disorder 2017     Type II or unspecified type diabetes mellitus without mention of complication, not stated as uncontrolled 2002    diagnosed 02, started insulin       Past Surgical History:   Procedure Laterality Date     BYPASS GRAFT ARTERY CORONARY N/A 3/9/2021    Procedure: CORONARY ARTERY BYPASS GRAFT X 4 (LIMA - LAD, SV - RPL, SV - PDA,  RA - OM) LEFT RADIAL ENDOARTERY HARVEST AND BILATERAL LEG ENDOVEIN HARVEST (ON CARDIOPULMONARY PUMP OXYGENATOR ; INTRAOPERATIVE TRANSESOPHAGEAL ECHOCARDIOGRAM BY ANESTHESIOLOGIST DR. NEL AVILA)   ;  Surgeon: Kunal Selby MD;  Location:  OR     CV HEART CATHETERIZATION WITH POSSIBLE INTERVENTION N/A 3/8/2021    Procedure: Heart Catheterization with Possible Intervention;  Surgeon: Vadim Kamara MD;  Location:  HEART CARDIAC CATH LAB     HC OPEN TX METATARSAL FRACTURE  age 12    softball injury,open fracture left foot     HC TOOTH EXTRACTION W/FORCEP      Extract wisdom teeth     INJECT JOINT SACROILIAC Left 2018    Procedure: INJECT JOINT SACROILIAC;  INJECT JOINT SACROILIAC LEFT;  Surgeon: Alan Marshall MD;  Location:  OR     OPERATIVE HYSTEROSCOPY WITH MORCELLATOR N/A 2018    Procedure: OPERATIVE HYSTEROSCOPY WITH MORCELLATOR (MYOSURE);  Exam under anesthesia, operative hysteroscopy, polypectomy, D & C;  Surgeon: Sindhu Peterson DO;  Location: [facility] OR     TUBAL LIGATION  2006     ZZC STABISM SURG,PREV EYE SURG,NOT MUSC      Right      Allergies   Allergen Reactions     Amoxicillin Hives      Hydrocodone-Acetaminophen GI Disturbance     Tylenol is ok      Social History     Tobacco Use     Smoking status: Former     Packs/day: 0.50     Years: 6.00     Pack years: 3.00     Types: Cigarettes     Quit date: 3/5/2021     Years since quittin.9     Smokeless tobacco: Never   Substance Use Topics     Alcohol use: Yes     Alcohol/week: 0.0 standard drinks     Comment: once a month      Wt Readings from Last 1 Encounters:   23 85.3 kg (188 lb)        Anesthesia Evaluation   Pt has had prior anesthetic. Type: General.        ROS/MED HX  ENT/Pulmonary:     (+) tobacco use, Past use,     Neurologic:  - neg neurologic ROS     Cardiovascular:     (+) Dyslipidemia hypertension--CAD -past MI CABG (X4)-date: 3/21 . -Previous cardiac testing   Echo: Date: 3/21 Results:  Interpretation Summary     1. The left ventricle is normal in structure, function and size. The visual  ejection fraction is estimated at 60%.  2. The right ventricle is normal in structure, function and size.  3. No valve disease.  Stress Test: Date: Results:    ECG Reviewed: Date:  Results:  Unable to view EKG but per H/P - NSR  Cath: Date: Results:      METS/Exercise Tolerance:     Hematologic:     (+) anemia,     Musculoskeletal:       GI/Hepatic:  - neg GI/hepatic ROS     Renal/Genitourinary:  - neg Renal ROS     Endo:     (+) type II DM, Last HgA1c: 7.6, date: , Using insulin, - not using insulin pump. Obesity,     Psychiatric/Substance Use:     (+) psychiatric history other (comment) and depression (PTSD)     Infectious Disease:  - neg infectious disease ROS     Malignancy:  - neg malignancy ROS     Other:  - neg other ROS    (+) , H/O Chronic Pain,        Physical Exam    Airway  airway exam normal      Mallampati: II   TM distance: > 3 FB   Neck ROM: full   Mouth opening: > 3 cm    Respiratory Devices and Support         Dental           Cardiovascular   cardiovascular exam normal          Pulmonary   pulmonary exam normal                 OUTSIDE LABS:  CBC:   Lab Results   Component Value Date    WBC 5.0 12/14/2022    WBC 7.8 12/12/2022    HGB 11.3 (L) 01/30/2023    HGB 10.6 (L) 12/15/2022    HCT 29.4 (L) 12/14/2022    HCT 32.7 (L) 12/12/2022     12/14/2022     12/12/2022     BMP:   Lab Results   Component Value Date     01/30/2023     12/15/2022    POTASSIUM 4.5 01/30/2023    POTASSIUM 3.8 12/15/2022    CHLORIDE 102 01/30/2023    CHLORIDE 107 12/15/2022    CO2 21 (L) 01/30/2023    CO2 25 12/15/2022    BUN 24.1 (H) 01/30/2023    BUN 9 12/15/2022    CR 0.98 (H) 01/30/2023    CR 0.53 12/15/2022     (H) 01/30/2023     (H) 12/15/2022     COAGS:   Lab Results   Component Value Date    PTT 27 03/09/2021    INR 1.21 (H) 03/09/2021    FIBR 325 03/09/2021     POC:   Lab Results   Component Value Date     (H) 03/16/2021    HCG Negative 01/09/2019    HCGS Negative 11/29/2013     HEPATIC:   Lab Results   Component Value Date    ALBUMIN 4.3 01/30/2023    PROTTOTAL 7.1 01/30/2023    ALT 14 01/30/2023    AST 16 01/30/2023    ALKPHOS 81 01/30/2023    BILITOTAL <0.2 01/30/2023     OTHER:   Lab Results   Component Value Date    PH 7.37 03/09/2021    LACT 1.8 12/11/2022    A1C 7.6 (H) 11/01/2022    LUNA 9.4 01/30/2023    PHOS 2.5 03/11/2021    MAG 2.4 (H) 03/12/2021    LIPASE 95 12/08/2022    AMYLASE 65 11/17/2011    TSH 1.33 03/05/2021    CRP 58.0 (H) 12/14/2022    SED 13 11/10/2012       Anesthesia Plan    ASA Status:  3      Anesthesia Type: General.     - Airway: ETT   Induction: Propofol, Intravenous.   Maintenance: Balanced.        Consents    Anesthesia Plan(s) and associated risks, benefits, and realistic alternatives discussed. Questions answered and patient/representative(s) expressed understanding.     - Discussed: Risks, Benefits and Alternatives for BOTH SEDATION and the PROCEDURE were discussed     - Discussed with:  Patient      - Extended Intubation/Ventilatory Support Discussed: No.      -  Patient is DNR/DNI Status: No    Use of blood products discussed: Yes.     - Discussed with: Patient.     - Consented: consented to blood products            Reason for refusal: other.     Postoperative Care    Pain management: Multi-modal analgesia, IV analgesics, Oral pain medications.   PONV prophylaxis: Ondansetron (or other 5HT-3), Background Propofol Infusion, Dexamethasone or Solumedrol     Comments:    Other Comments: The risks and benefits of anesthesia, and the alternatives where applicable, have been discussed with the patient, and they wish to proceed.            Efe Vallejo

## 2023-02-10 ENCOUNTER — APPOINTMENT (OUTPATIENT)
Dept: CT IMAGING | Facility: CLINIC | Age: 48
End: 2023-02-10
Attending: EMERGENCY MEDICINE
Payer: COMMERCIAL

## 2023-02-10 ENCOUNTER — HOSPITAL ENCOUNTER (EMERGENCY)
Facility: CLINIC | Age: 48
Discharge: HOME | End: 2023-02-10
Attending: EMERGENCY MEDICINE | Admitting: EMERGENCY MEDICINE
Payer: COMMERCIAL

## 2023-02-10 VITALS
DIASTOLIC BLOOD PRESSURE: 81 MMHG | HEART RATE: 54 BPM | TEMPERATURE: 97.6 F | WEIGHT: 188 LBS | SYSTOLIC BLOOD PRESSURE: 110 MMHG | BODY MASS INDEX: 30.34 KG/M2 | OXYGEN SATURATION: 98 % | RESPIRATION RATE: 15 BRPM

## 2023-02-10 DIAGNOSIS — K80.50 RECURRENT BILIARY COLIC: ICD-10-CM

## 2023-02-10 LAB
ALBUMIN SERPL BCG-MCNC: 4.3 G/DL (ref 3.5–5.2)
ALP SERPL-CCNC: 96 U/L (ref 35–104)
ALT SERPL W P-5'-P-CCNC: 21 U/L (ref 10–35)
ANION GAP SERPL CALCULATED.3IONS-SCNC: 14 MMOL/L (ref 7–15)
AST SERPL W P-5'-P-CCNC: 23 U/L (ref 10–35)
BASOPHILS # BLD AUTO: 0.1 10E3/UL (ref 0–0.2)
BASOPHILS NFR BLD AUTO: 1 %
BILIRUB SERPL-MCNC: 0.2 MG/DL
BUN SERPL-MCNC: 21.7 MG/DL (ref 6–20)
CALCIUM SERPL-MCNC: 9.4 MG/DL (ref 8.6–10)
CHLORIDE SERPL-SCNC: 99 MMOL/L (ref 98–107)
CREAT SERPL-MCNC: 0.71 MG/DL (ref 0.51–0.95)
DEPRECATED HCO3 PLAS-SCNC: 22 MMOL/L (ref 22–29)
EOSINOPHIL # BLD AUTO: 0.1 10E3/UL (ref 0–0.7)
EOSINOPHIL NFR BLD AUTO: 1 %
ERYTHROCYTE [DISTWIDTH] IN BLOOD BY AUTOMATED COUNT: 13.9 % (ref 10–15)
GFR SERPL CREATININE-BSD FRML MDRD: >90 ML/MIN/1.73M2
GLUCOSE SERPL-MCNC: 87 MG/DL (ref 70–99)
HCT VFR BLD AUTO: 35 % (ref 35–47)
HGB BLD-MCNC: 11.4 G/DL (ref 11.7–15.7)
IMM GRANULOCYTES # BLD: 0 10E3/UL
IMM GRANULOCYTES NFR BLD: 0 %
LIPASE SERPL-CCNC: 25 U/L (ref 13–60)
LYMPHOCYTES # BLD AUTO: 1.8 10E3/UL (ref 0.8–5.3)
LYMPHOCYTES NFR BLD AUTO: 18 %
MCH RBC QN AUTO: 26.8 PG (ref 26.5–33)
MCHC RBC AUTO-ENTMCNC: 32.6 G/DL (ref 31.5–36.5)
MCV RBC AUTO: 82 FL (ref 78–100)
MONOCYTES # BLD AUTO: 0.8 10E3/UL (ref 0–1.3)
MONOCYTES NFR BLD AUTO: 8 %
NEUTROPHILS # BLD AUTO: 7.3 10E3/UL (ref 1.6–8.3)
NEUTROPHILS NFR BLD AUTO: 72 %
NRBC # BLD AUTO: 0 10E3/UL
NRBC BLD AUTO-RTO: 0 /100
PLATELET # BLD AUTO: 297 10E3/UL (ref 150–450)
POTASSIUM SERPL-SCNC: 4.6 MMOL/L (ref 3.4–5.3)
PROT SERPL-MCNC: 7.3 G/DL (ref 6.4–8.3)
RBC # BLD AUTO: 4.26 10E6/UL (ref 3.8–5.2)
SODIUM SERPL-SCNC: 135 MMOL/L (ref 136–145)
WBC # BLD AUTO: 10.1 10E3/UL (ref 4–11)

## 2023-02-10 PROCEDURE — 96374 THER/PROPH/DIAG INJ IV PUSH: CPT | Mod: 59 | Performed by: EMERGENCY MEDICINE

## 2023-02-10 PROCEDURE — 250N000011 HC RX IP 250 OP 636: Performed by: EMERGENCY MEDICINE

## 2023-02-10 PROCEDURE — 96375 TX/PRO/DX INJ NEW DRUG ADDON: CPT | Mod: 59 | Performed by: EMERGENCY MEDICINE

## 2023-02-10 PROCEDURE — 99284 EMERGENCY DEPT VISIT MOD MDM: CPT | Performed by: EMERGENCY MEDICINE

## 2023-02-10 PROCEDURE — 250N000009 HC RX 250: Performed by: EMERGENCY MEDICINE

## 2023-02-10 PROCEDURE — 85025 COMPLETE CBC W/AUTO DIFF WBC: CPT | Performed by: EMERGENCY MEDICINE

## 2023-02-10 PROCEDURE — 96376 TX/PRO/DX INJ SAME DRUG ADON: CPT | Mod: 59 | Performed by: EMERGENCY MEDICINE

## 2023-02-10 PROCEDURE — 80053 COMPREHEN METABOLIC PANEL: CPT | Performed by: EMERGENCY MEDICINE

## 2023-02-10 PROCEDURE — 96361 HYDRATE IV INFUSION ADD-ON: CPT | Performed by: EMERGENCY MEDICINE

## 2023-02-10 PROCEDURE — 74177 CT ABD & PELVIS W/CONTRAST: CPT

## 2023-02-10 PROCEDURE — 99285 EMERGENCY DEPT VISIT HI MDM: CPT | Mod: 25 | Performed by: EMERGENCY MEDICINE

## 2023-02-10 PROCEDURE — 83690 ASSAY OF LIPASE: CPT | Performed by: EMERGENCY MEDICINE

## 2023-02-10 PROCEDURE — 36415 COLL VENOUS BLD VENIPUNCTURE: CPT | Performed by: EMERGENCY MEDICINE

## 2023-02-10 PROCEDURE — 258N000003 HC RX IP 258 OP 636: Performed by: EMERGENCY MEDICINE

## 2023-02-10 RX ORDER — SODIUM CHLORIDE 9 MG/ML
INJECTION, SOLUTION INTRAVENOUS CONTINUOUS
Status: DISCONTINUED | OUTPATIENT
Start: 2023-02-10 | End: 2023-02-10 | Stop reason: HOSPADM

## 2023-02-10 RX ORDER — METOCLOPRAMIDE 5 MG/1
5 TABLET ORAL 3 TIMES DAILY PRN
Qty: 12 TABLET | Refills: 0 | Status: SHIPPED | OUTPATIENT
Start: 2023-02-10 | End: 2023-07-06

## 2023-02-10 RX ORDER — ONDANSETRON 2 MG/ML
4 INJECTION INTRAMUSCULAR; INTRAVENOUS EVERY 30 MIN PRN
Status: DISCONTINUED | OUTPATIENT
Start: 2023-02-10 | End: 2023-02-10 | Stop reason: HOSPADM

## 2023-02-10 RX ORDER — IOPAMIDOL 755 MG/ML
500 INJECTION, SOLUTION INTRAVASCULAR ONCE
Status: COMPLETED | OUTPATIENT
Start: 2023-02-10 | End: 2023-02-10

## 2023-02-10 RX ORDER — HYDROMORPHONE HYDROCHLORIDE 1 MG/ML
0.5 INJECTION, SOLUTION INTRAMUSCULAR; INTRAVENOUS; SUBCUTANEOUS
Status: DISCONTINUED | OUTPATIENT
Start: 2023-02-10 | End: 2023-02-10 | Stop reason: HOSPADM

## 2023-02-10 RX ORDER — OXYCODONE HYDROCHLORIDE 5 MG/1
5 TABLET ORAL EVERY 6 HOURS PRN
Qty: 12 TABLET | Refills: 0 | Status: SHIPPED | OUTPATIENT
Start: 2023-02-10 | End: 2023-02-13

## 2023-02-10 RX ADMIN — HYDROMORPHONE HYDROCHLORIDE 0.5 MG: 1 INJECTION, SOLUTION INTRAMUSCULAR; INTRAVENOUS; SUBCUTANEOUS at 18:43

## 2023-02-10 RX ADMIN — IOPAMIDOL 90 ML: 755 INJECTION, SOLUTION INTRAVENOUS at 17:04

## 2023-02-10 RX ADMIN — ONDANSETRON 4 MG: 2 INJECTION INTRAMUSCULAR; INTRAVENOUS at 16:27

## 2023-02-10 RX ADMIN — SODIUM CHLORIDE 60 ML: 9 INJECTION, SOLUTION INTRAVENOUS at 17:04

## 2023-02-10 RX ADMIN — SODIUM CHLORIDE 1000 ML: 9 INJECTION, SOLUTION INTRAVENOUS at 16:21

## 2023-02-10 RX ADMIN — HYDROMORPHONE HYDROCHLORIDE 0.5 MG: 1 INJECTION, SOLUTION INTRAMUSCULAR; INTRAVENOUS; SUBCUTANEOUS at 16:31

## 2023-02-10 ASSESSMENT — ACTIVITIES OF DAILY LIVING (ADL): ADLS_ACUITY_SCORE: 35

## 2023-02-10 NOTE — ED TRIAGE NOTES
Pt here with abdominal pain.  Scheduled to have gallbladder removed on Monday.         Triage Assessment     Row Name 02/10/23 1440       Triage Assessment (Adult)    Airway WDL WDL       Respiratory WDL    Respiratory WDL WDL
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
## 2023-02-11 NOTE — DISCHARGE INSTRUCTIONS
Your CT scan did not show any sign of bowel blockage, you had a normal appendix, and gallbladder appears unchanged.  Do not see any reason for your worsening or change in pain    You may take an additional tablet of oxycodone (total of 10 mg by mouth) every 6 hours as needed to help with pain    You may use your previously prescribed Zofran or the newly prescribed Reglan to help with nausea or vomiting    Hopefully this will help with your symptoms enough to get you through the weekend into surgery planned for Monday at 10 AM    If you develop a fever or have any acutely worsening symptoms, do not hesitate to return to the emergency room for evaluation

## 2023-02-12 NOTE — RESULT ENCOUNTER NOTE
Stacy,  Your results look fine for your upcoming surgery.  Please let me know if you have any questions.    Sincerely,  Dr. Stoner

## 2023-02-13 ENCOUNTER — HOSPITAL ENCOUNTER (OUTPATIENT)
Facility: CLINIC | Age: 48
Discharge: HOME | End: 2023-02-13
Attending: SURGERY | Admitting: SURGERY
Payer: COMMERCIAL

## 2023-02-13 ENCOUNTER — ANESTHESIA (OUTPATIENT)
Dept: SURGERY | Facility: CLINIC | Age: 48
End: 2023-02-13
Payer: COMMERCIAL

## 2023-02-13 VITALS
HEART RATE: 63 BPM | RESPIRATION RATE: 12 BRPM | DIASTOLIC BLOOD PRESSURE: 66 MMHG | OXYGEN SATURATION: 92 % | TEMPERATURE: 98.3 F | SYSTOLIC BLOOD PRESSURE: 144 MMHG

## 2023-02-13 DIAGNOSIS — Z90.49 S/P LAPAROSCOPIC CHOLECYSTECTOMY: Primary | ICD-10-CM

## 2023-02-13 LAB
GLUCOSE BLDC GLUCOMTR-MCNC: 221 MG/DL (ref 70–99)
GLUCOSE BLDC GLUCOMTR-MCNC: 273 MG/DL (ref 70–99)
GLUCOSE BLDC GLUCOMTR-MCNC: 286 MG/DL (ref 70–99)

## 2023-02-13 PROCEDURE — 88304 TISSUE EXAM BY PATHOLOGIST: CPT | Mod: TC | Performed by: SURGERY

## 2023-02-13 PROCEDURE — 250N000009 HC RX 250: Performed by: NURSE ANESTHETIST, CERTIFIED REGISTERED

## 2023-02-13 PROCEDURE — 272N000001 HC OR GENERAL SUPPLY STERILE: Performed by: SURGERY

## 2023-02-13 PROCEDURE — 250N000009 HC RX 250: Performed by: SURGERY

## 2023-02-13 PROCEDURE — 250N000011 HC RX IP 250 OP 636: Performed by: NURSE ANESTHETIST, CERTIFIED REGISTERED

## 2023-02-13 PROCEDURE — 47562 LAPAROSCOPIC CHOLECYSTECTOMY: CPT | Performed by: SURGERY

## 2023-02-13 PROCEDURE — 710N000012 HC RECOVERY PHASE 2, PER MINUTE: Performed by: SURGERY

## 2023-02-13 PROCEDURE — 999N000141 HC STATISTIC PRE-PROCEDURE NURSING ASSESSMENT: Performed by: SURGERY

## 2023-02-13 PROCEDURE — 370N000017 HC ANESTHESIA TECHNICAL FEE, PER MIN: Performed by: SURGERY

## 2023-02-13 PROCEDURE — 710N000010 HC RECOVERY PHASE 1, LEVEL 2, PER MIN: Performed by: SURGERY

## 2023-02-13 PROCEDURE — 82962 GLUCOSE BLOOD TEST: CPT | Mod: 91

## 2023-02-13 PROCEDURE — 250N000012 HC RX MED GY IP 250 OP 636 PS 637: Performed by: NURSE ANESTHETIST, CERTIFIED REGISTERED

## 2023-02-13 PROCEDURE — 258N000003 HC RX IP 258 OP 636: Performed by: NURSE ANESTHETIST, CERTIFIED REGISTERED

## 2023-02-13 PROCEDURE — 250N000013 HC RX MED GY IP 250 OP 250 PS 637: Performed by: NURSE ANESTHETIST, CERTIFIED REGISTERED

## 2023-02-13 PROCEDURE — 88304 TISSUE EXAM BY PATHOLOGIST: CPT | Mod: 26 | Performed by: PATHOLOGY

## 2023-02-13 PROCEDURE — 360N000076 HC SURGERY LEVEL 3, PER MIN: Performed by: SURGERY

## 2023-02-13 PROCEDURE — 250N000011 HC RX IP 250 OP 636: Performed by: SURGERY

## 2023-02-13 PROCEDURE — 250N000025 HC SEVOFLURANE, PER MIN: Performed by: SURGERY

## 2023-02-13 RX ORDER — EPHEDRINE SULFATE 50 MG/ML
INJECTION, SOLUTION INTRAMUSCULAR; INTRAVENOUS; SUBCUTANEOUS PRN
Status: DISCONTINUED | OUTPATIENT
Start: 2023-02-13 | End: 2023-02-13

## 2023-02-13 RX ORDER — OXYCODONE AND ACETAMINOPHEN 5; 325 MG/1; MG/1
1-2 TABLET ORAL EVERY 4 HOURS PRN
Qty: 12 TABLET | Refills: 0 | Status: SHIPPED | OUTPATIENT
Start: 2023-02-13 | End: 2023-03-01

## 2023-02-13 RX ORDER — NICOTINE POLACRILEX 4 MG
15-30 LOZENGE BUCCAL
Status: DISCONTINUED | OUTPATIENT
Start: 2023-02-13 | End: 2023-02-13 | Stop reason: HOSPADM

## 2023-02-13 RX ORDER — DEXAMETHASONE SODIUM PHOSPHATE 10 MG/ML
INJECTION, SOLUTION INTRAMUSCULAR; INTRAVENOUS PRN
Status: DISCONTINUED | OUTPATIENT
Start: 2023-02-13 | End: 2023-02-13

## 2023-02-13 RX ORDER — OXYCODONE HYDROCHLORIDE 5 MG/1
10 TABLET ORAL EVERY 4 HOURS PRN
Status: DISCONTINUED | OUTPATIENT
Start: 2023-02-13 | End: 2023-02-13 | Stop reason: HOSPADM

## 2023-02-13 RX ORDER — HYDROMORPHONE HYDROCHLORIDE 1 MG/ML
0.2 INJECTION, SOLUTION INTRAMUSCULAR; INTRAVENOUS; SUBCUTANEOUS EVERY 5 MIN PRN
Status: DISCONTINUED | OUTPATIENT
Start: 2023-02-13 | End: 2023-02-13 | Stop reason: HOSPADM

## 2023-02-13 RX ORDER — OXYCODONE AND ACETAMINOPHEN 5; 325 MG/1; MG/1
2 TABLET ORAL
Status: DISCONTINUED | OUTPATIENT
Start: 2023-02-13 | End: 2023-02-13 | Stop reason: HOSPADM

## 2023-02-13 RX ORDER — KETAMINE HYDROCHLORIDE 10 MG/ML
INJECTION INTRAMUSCULAR; INTRAVENOUS PRN
Status: DISCONTINUED | OUTPATIENT
Start: 2023-02-13 | End: 2023-02-13

## 2023-02-13 RX ORDER — OXYCODONE HYDROCHLORIDE 5 MG/1
5 TABLET ORAL EVERY 4 HOURS PRN
Status: DISCONTINUED | OUTPATIENT
Start: 2023-02-13 | End: 2023-02-13 | Stop reason: HOSPADM

## 2023-02-13 RX ORDER — FENTANYL CITRATE 50 UG/ML
50 INJECTION, SOLUTION INTRAMUSCULAR; INTRAVENOUS EVERY 5 MIN PRN
Status: DISCONTINUED | OUTPATIENT
Start: 2023-02-13 | End: 2023-02-13 | Stop reason: HOSPADM

## 2023-02-13 RX ORDER — BUPIVACAINE HYDROCHLORIDE AND EPINEPHRINE 2.5; 5 MG/ML; UG/ML
INJECTION, SOLUTION INFILTRATION; PERINEURAL PRN
Status: DISCONTINUED | OUTPATIENT
Start: 2023-02-13 | End: 2023-02-13 | Stop reason: HOSPADM

## 2023-02-13 RX ORDER — CLINDAMYCIN PHOSPHATE 900 MG/50ML
900 INJECTION, SOLUTION INTRAVENOUS
Status: COMPLETED | OUTPATIENT
Start: 2023-02-13 | End: 2023-02-13

## 2023-02-13 RX ORDER — DIMENHYDRINATE 50 MG/ML
25 INJECTION, SOLUTION INTRAMUSCULAR; INTRAVENOUS EVERY 6 HOURS PRN
Status: DISCONTINUED | OUTPATIENT
Start: 2023-02-13 | End: 2023-02-13 | Stop reason: HOSPADM

## 2023-02-13 RX ORDER — LIDOCAINE 40 MG/G
CREAM TOPICAL
Status: DISCONTINUED | OUTPATIENT
Start: 2023-02-13 | End: 2023-02-13 | Stop reason: HOSPADM

## 2023-02-13 RX ORDER — NALOXONE HYDROCHLORIDE 0.4 MG/ML
0.4 INJECTION, SOLUTION INTRAMUSCULAR; INTRAVENOUS; SUBCUTANEOUS
Status: DISCONTINUED | OUTPATIENT
Start: 2023-02-13 | End: 2023-02-13 | Stop reason: HOSPADM

## 2023-02-13 RX ORDER — ACETAMINOPHEN 325 MG/1
975 TABLET ORAL ONCE
Status: COMPLETED | OUTPATIENT
Start: 2023-02-13 | End: 2023-02-13

## 2023-02-13 RX ORDER — PROPOFOL 10 MG/ML
INJECTION, EMULSION INTRAVENOUS CONTINUOUS PRN
Status: DISCONTINUED | OUTPATIENT
Start: 2023-02-13 | End: 2023-02-13

## 2023-02-13 RX ORDER — ONDANSETRON 2 MG/ML
4 INJECTION INTRAMUSCULAR; INTRAVENOUS EVERY 30 MIN PRN
Status: DISCONTINUED | OUTPATIENT
Start: 2023-02-13 | End: 2023-02-13 | Stop reason: HOSPADM

## 2023-02-13 RX ORDER — HYDROMORPHONE HYDROCHLORIDE 1 MG/ML
0.4 INJECTION, SOLUTION INTRAMUSCULAR; INTRAVENOUS; SUBCUTANEOUS EVERY 5 MIN PRN
Status: DISCONTINUED | OUTPATIENT
Start: 2023-02-13 | End: 2023-02-13 | Stop reason: HOSPADM

## 2023-02-13 RX ORDER — PROPOFOL 10 MG/ML
INJECTION, EMULSION INTRAVENOUS PRN
Status: DISCONTINUED | OUTPATIENT
Start: 2023-02-13 | End: 2023-02-13

## 2023-02-13 RX ORDER — CLINDAMYCIN PHOSPHATE 900 MG/50ML
900 INJECTION, SOLUTION INTRAVENOUS SEE ADMIN INSTRUCTIONS
Status: DISCONTINUED | OUTPATIENT
Start: 2023-02-13 | End: 2023-02-13 | Stop reason: HOSPADM

## 2023-02-13 RX ORDER — DEXTROSE MONOHYDRATE 25 G/50ML
25-50 INJECTION, SOLUTION INTRAVENOUS
Status: DISCONTINUED | OUTPATIENT
Start: 2023-02-13 | End: 2023-02-13 | Stop reason: HOSPADM

## 2023-02-13 RX ORDER — SODIUM CHLORIDE, SODIUM LACTATE, POTASSIUM CHLORIDE, CALCIUM CHLORIDE 600; 310; 30; 20 MG/100ML; MG/100ML; MG/100ML; MG/100ML
INJECTION, SOLUTION INTRAVENOUS CONTINUOUS
Status: DISCONTINUED | OUTPATIENT
Start: 2023-02-13 | End: 2023-02-13 | Stop reason: HOSPADM

## 2023-02-13 RX ORDER — NALOXONE HYDROCHLORIDE 0.4 MG/ML
0.2 INJECTION, SOLUTION INTRAMUSCULAR; INTRAVENOUS; SUBCUTANEOUS
Status: DISCONTINUED | OUTPATIENT
Start: 2023-02-13 | End: 2023-02-13 | Stop reason: HOSPADM

## 2023-02-13 RX ORDER — ONDANSETRON 4 MG/1
4 TABLET, ORALLY DISINTEGRATING ORAL EVERY 30 MIN PRN
Status: DISCONTINUED | OUTPATIENT
Start: 2023-02-13 | End: 2023-02-13 | Stop reason: HOSPADM

## 2023-02-13 RX ORDER — KETOROLAC TROMETHAMINE 30 MG/ML
INJECTION, SOLUTION INTRAMUSCULAR; INTRAVENOUS PRN
Status: DISCONTINUED | OUTPATIENT
Start: 2023-02-13 | End: 2023-02-13

## 2023-02-13 RX ORDER — FENTANYL CITRATE 50 UG/ML
25 INJECTION, SOLUTION INTRAMUSCULAR; INTRAVENOUS EVERY 5 MIN PRN
Status: DISCONTINUED | OUTPATIENT
Start: 2023-02-13 | End: 2023-02-13 | Stop reason: HOSPADM

## 2023-02-13 RX ORDER — GLYCOPYRROLATE 0.2 MG/ML
INJECTION, SOLUTION INTRAMUSCULAR; INTRAVENOUS PRN
Status: DISCONTINUED | OUTPATIENT
Start: 2023-02-13 | End: 2023-02-13

## 2023-02-13 RX ORDER — FENTANYL CITRATE 50 UG/ML
INJECTION, SOLUTION INTRAMUSCULAR; INTRAVENOUS PRN
Status: DISCONTINUED | OUTPATIENT
Start: 2023-02-13 | End: 2023-02-13

## 2023-02-13 RX ORDER — LIDOCAINE HYDROCHLORIDE 20 MG/ML
INJECTION, SOLUTION INFILTRATION; PERINEURAL PRN
Status: DISCONTINUED | OUTPATIENT
Start: 2023-02-13 | End: 2023-02-13

## 2023-02-13 RX ORDER — ONDANSETRON 2 MG/ML
INJECTION INTRAMUSCULAR; INTRAVENOUS PRN
Status: DISCONTINUED | OUTPATIENT
Start: 2023-02-13 | End: 2023-02-13

## 2023-02-13 RX ADMIN — HYDROMORPHONE HYDROCHLORIDE 0.5 MG: 1 INJECTION, SOLUTION INTRAMUSCULAR; INTRAVENOUS; SUBCUTANEOUS at 12:58

## 2023-02-13 RX ADMIN — HYDROMORPHONE HYDROCHLORIDE 0.4 MG: 1 INJECTION, SOLUTION INTRAMUSCULAR; INTRAVENOUS; SUBCUTANEOUS at 14:01

## 2023-02-13 RX ADMIN — GLYCOPYRROLATE 0.1 MG: 0.2 INJECTION, SOLUTION INTRAMUSCULAR; INTRAVENOUS at 12:03

## 2023-02-13 RX ADMIN — KETOROLAC TROMETHAMINE 15 MG: 30 INJECTION, SOLUTION INTRAMUSCULAR at 12:58

## 2023-02-13 RX ADMIN — FENTANYL CITRATE 50 MCG: 50 INJECTION, SOLUTION INTRAMUSCULAR; INTRAVENOUS at 12:09

## 2023-02-13 RX ADMIN — PROPOFOL 200 MG: 10 INJECTION, EMULSION INTRAVENOUS at 11:43

## 2023-02-13 RX ADMIN — DEXMEDETOMIDINE HYDROCHLORIDE 5 MCG: 100 INJECTION, SOLUTION INTRAVENOUS at 11:43

## 2023-02-13 RX ADMIN — INSULIN ASPART 3 UNITS: 100 INJECTION, SOLUTION INTRAVENOUS; SUBCUTANEOUS at 13:42

## 2023-02-13 RX ADMIN — HYDROMORPHONE HYDROCHLORIDE 0.4 MG: 1 INJECTION, SOLUTION INTRAMUSCULAR; INTRAVENOUS; SUBCUTANEOUS at 13:49

## 2023-02-13 RX ADMIN — PROPOFOL 100 MCG/KG/MIN: 10 INJECTION, EMULSION INTRAVENOUS at 11:44

## 2023-02-13 RX ADMIN — SODIUM CHLORIDE, POTASSIUM CHLORIDE, SODIUM LACTATE AND CALCIUM CHLORIDE: 600; 310; 30; 20 INJECTION, SOLUTION INTRAVENOUS at 10:53

## 2023-02-13 RX ADMIN — ONDANSETRON 4 MG: 2 INJECTION INTRAMUSCULAR; INTRAVENOUS at 11:55

## 2023-02-13 RX ADMIN — DIMENHYDRINATE 25 MG: 50 INJECTION, SOLUTION INTRAMUSCULAR; INTRAVENOUS at 14:24

## 2023-02-13 RX ADMIN — HYDROMORPHONE HYDROCHLORIDE 0.4 MG: 1 INJECTION, SOLUTION INTRAMUSCULAR; INTRAVENOUS; SUBCUTANEOUS at 13:54

## 2023-02-13 RX ADMIN — CLINDAMYCIN PHOSPHATE 900 MG: 900 INJECTION, SOLUTION INTRAVENOUS at 10:52

## 2023-02-13 RX ADMIN — FENTANYL CITRATE 50 MCG: 50 INJECTION INTRAMUSCULAR; INTRAVENOUS at 13:44

## 2023-02-13 RX ADMIN — DEXMEDETOMIDINE HYDROCHLORIDE 5 MCG: 100 INJECTION, SOLUTION INTRAVENOUS at 12:01

## 2023-02-13 RX ADMIN — ROCURONIUM BROMIDE 50 MG: 50 INJECTION, SOLUTION INTRAVENOUS at 11:43

## 2023-02-13 RX ADMIN — SUGAMMADEX 200 MG: 100 INJECTION, SOLUTION INTRAVENOUS at 13:02

## 2023-02-13 RX ADMIN — Medication 5 MG: at 11:57

## 2023-02-13 RX ADMIN — Medication 20 MG: at 12:00

## 2023-02-13 RX ADMIN — OXYCODONE HYDROCHLORIDE 5 MG: 5 TABLET ORAL at 15:30

## 2023-02-13 RX ADMIN — LIDOCAINE HYDROCHLORIDE 1 ML: 10 INJECTION, SOLUTION EPIDURAL; INFILTRATION; INTRACAUDAL; PERINEURAL at 10:54

## 2023-02-13 RX ADMIN — PHENYLEPHRINE HYDROCHLORIDE 100 MCG: 10 INJECTION INTRAVENOUS at 12:26

## 2023-02-13 RX ADMIN — FENTANYL CITRATE 50 MCG: 50 INJECTION INTRAMUSCULAR; INTRAVENOUS at 13:38

## 2023-02-13 RX ADMIN — ONDANSETRON 4 MG: 2 INJECTION INTRAMUSCULAR; INTRAVENOUS at 13:34

## 2023-02-13 RX ADMIN — FENTANYL CITRATE 50 MCG: 50 INJECTION, SOLUTION INTRAMUSCULAR; INTRAVENOUS at 11:43

## 2023-02-13 RX ADMIN — ACETAMINOPHEN 975 MG: 325 TABLET ORAL at 14:02

## 2023-02-13 RX ADMIN — MIDAZOLAM 2 MG: 1 INJECTION INTRAMUSCULAR; INTRAVENOUS at 11:38

## 2023-02-13 RX ADMIN — LIDOCAINE HYDROCHLORIDE 50 MG: 20 INJECTION, SOLUTION INFILTRATION; PERINEURAL at 11:43

## 2023-02-13 RX ADMIN — DEXAMETHASONE SODIUM PHOSPHATE 4 MG: 10 INJECTION, SOLUTION INTRAMUSCULAR; INTRAVENOUS at 11:55

## 2023-02-13 ASSESSMENT — ACTIVITIES OF DAILY LIVING (ADL)
ADLS_ACUITY_SCORE: 35

## 2023-02-13 NOTE — ANESTHESIA PROCEDURE NOTES
Airway       Patient location during procedure: OR       Procedure Start/Stop Times: 2/13/2023 11:46 AM  Staff -        CRNA: Faye Lagos APRN CRNA       Other Anesthesia Staff: Efe Vallejo       Performed By: SRNAIndications and Patient Condition       Indications for airway management: moose-procedural       Induction type:intravenous       Mask difficulty assessment: 2 - vent by mask + OA or adjuvant +/- NMBA    Final Airway Details       Final airway type: endotracheal airway       Successful airway: ETT - single  Endotracheal Airway Details        ETT size (mm): 6.5       Cuffed: yes       Successful intubation technique: direct laryngoscopy       DL Blade Type: Weber 2       Grade View of Cords: 1       Adjucts: stylet       Position: Right       Measured from: lips       Secured at (cm): 22       Bite block used: None    Post intubation assessment        Placement verified by: capnometry, equal breath sounds and chest rise        Number of attempts at approach: 1       Number of other approaches attempted: 0       Secured with: plastic tape and silk tape       Ease of procedure: easy       Dentition: Intact and Unchanged    Medication(s) Administered   Medication Administration Time: 2/13/2023 11:46 AM

## 2023-02-13 NOTE — OP NOTE
Procedure Date: 2/13/2023     PROCEDURE:  Laparoscopic cholecystectomy     PREOPERATIVE DIAGNOSIS:  biliary dyskinesia     POSTOPERATIVE DIAGNOSIS:  biliary dyskinesia     SURGEON:  Robert Diop DO     ASSISTANT:  None.     COMPLICATIONS:  None immediately apparent.     SPECIMENS:  Gallbladder and contents     ESTIMATED BLOOD LOSS:  10 mL     INDICATIONS FOR PROCEDURE:  Stacy is a 47 year old female with complaints of post-prandial RUQ abdominal pain. HIDA showed elevated GB EF and reproduction of her symptoms.  I offered  laparoscopic cholecystectomy, possible open, for biliary colic with dyskinesia. We discussed the procedure in detail as well as the risks, benefits, alternatives, postoperative care and restrictions.  After our informed discussion, we agreed to proceed with surgery.     DESCRIPTION OF PROCEDURE:  After the informed consent was obtained, the patient was brought from the preoperative holding area to the operating room and placed in supine position.  Anesthesia was induced.  They were prepped and draped in normal sterile fashion.  Timeout was performed.  After correct patient and correct procedure were verified, we began by making a supraumbilical 5 mm incision.  Veress needle was inserted into the peritoneal cavity and the abdomen was insufflated to 15 mmHg.  A 5 mm trocar was inserted.  Camera was inserted.  General survey of the abdomen revealed no gross abnormalities.  The patient was placed in the head up rotated left position.  Two additional 5 mm trocars and an 11 mm trocar were placed in the right subcostal margin and subxiphoid area respectively.  Gallbladder was retracted and elevated superiorly and laterally.  The peritoneum off of Lyndon's pouch was taken down.  I was able to use a curved [place] dissector to circumferentially dissect around the proximal portion of the cystic duct.  This dissection was brought posteriorly until I encountered the cystic artery. This was also  circumferentially dissected. Posterior to this, the cystic plate of the liver was encountered. Two structures were clearly going into the gallbladder, namely the cystic duct and cystic artery. We had therefore achieved the critical view of safety.  The cystic duct was fairy large. These structures were clipped on the stay side x 2 and the specimen side x 1 and transected with EndoShears.  I placed a PDS endo loop onto the cystic duct stump as I was concerned the clip was not completely across the duct. The gallbladder was then removed from the liver bed using hook electrocautery without issue.  This was placed in an EndoCatch bag and brought through the 11 mm incision.  Survey of the operative field revealed no apparent complications.  No blood or bile staining.  The patient's 11 mm fascial defect was then closed using a PMI closure device and an 0 Vicryl suture in an interrupted fashion.  The patient's abdomen was then desufflated.  All ports were removed under direct visualization.  The skin was instilled with 0.25% Marcaine with epinephrine for local anesthesia.  Skin was closed with inverted interrupted 4-0 Monocryl suture and Exofin dressing was applied.  At the completion of the case, all instruments, needles and sponge counts were accounted for, after a correct count.  The patient was then brought to the recovery room in stable condition.        Robert Diop DO

## 2023-02-13 NOTE — OR NURSING
S: Pt here for Laparoscopic Cholecystectomy with Dr. Diop with an elevated blood sugar in pre-op.  B: BGM of 221 in pre-op   A: Pt states her blood sugar was 86 at home this am, she reports taking her Metformin, and holding her insulin this am since her blood sugar was only 86. STEPHANIE Lagos notified.   R: STEPHANIE Lagos advises blood sugar recheck in PACU after procedure.

## 2023-02-13 NOTE — ANESTHESIA POSTPROCEDURE EVALUATION
Patient: Stacy Brown    Procedure: Procedure(s):  CHOLECYSTECTOMY, LAPAROSCOPIC       Anesthesia Type:  General    Note:  Disposition: Outpatient   Postop Pain Control: Uneventful            Sign Out: Well controlled pain   PONV: No   Neuro/Psych: Uneventful            Sign Out: Acceptable/Baseline neuro status   Airway/Respiratory: Uneventful            Sign Out: Acceptable/Baseline resp. status   CV/Hemodynamics: Uneventful            Sign Out: Acceptable CV status   Other NRE: NONE   DID A NON-ROUTINE EVENT OCCUR? No    Event details/Postop Comments:  Pt was happy with anesthesia care.  No complications.  I will follow up with the pt if needed.           Last vitals:  Vitals Value Taken Time   /62 02/13/23 1440   Temp 98.2  F (36.8  C) 02/13/23 1340   Pulse 69 02/13/23 1441   Resp 24 02/13/23 1441   SpO2 96 % 02/13/23 1441   Vitals shown include unvalidated device data.    Electronically Signed By: JOVITA Elena CRNA  February 13, 2023  3:44 PM

## 2023-02-13 NOTE — DISCHARGE INSTRUCTIONS
Follow-up Care:    [facility]    Home Care Following Gallbladder Surgery    Dr. Diop        Care of the Incision:  Surgical glue was used on your incision, keep it dry for 24 hours.  Then you may shower but don t submerge under water for at least 2 weeks.  Gently pat your incision dry with a freshly laundered towel.  Do not touch your incision with bare hands or pick at scabs.  Leave your incision open to air.  Cover it only if draining, clothing rubs or irritates it.    Activity:  Gradually increase your activity.  Walk short distances several times each day and increase the distance as your strength allows.  To promote circulation, do not cross your legs while sitting.  No strenuous lifting or straining for 2-3 weeks.  Do not lift anything over 10-20 pounds until your doctor approves an increase.  Return to work will be determined by the type of work you do and should be discussed with your physician.  Do not drive or operate equipment while taking prescription pain medicines.  You may drive 1 week after surgery if you have stopped taking prescription pain medicines and can react quickly enough to make an emergency stop if necessary.    Diet:  Continue a low fat diet for 1 week then resume usual diet as tolerated.  Drink plenty of water.  Avoid foods that cause constipation.    REMEMBER--most prescription pain pills cause constipation.  Walking, extra fluids, and increased fiber (fresh fruits and vegetables, etc.) are natural remedies for constipation.  You can also take mineral oil, 1-2 Tablespoons per day.  If still constipated you may try a stool softener such as Colace or Miralax.    Call Your Physician if You Have:  Redness, increased swelling or cloudy drainage from your incision.  A temperature of more than 101 degrees F.  Worsening pain in your incision not relieved by your prescription pain pills and/or a short rest.  Jaundice (yellowing of skin or eyes)  Abdominal distention  (stomach getting very large)  Swelling in your legs  Productive cough  Burning with urination  Any questions or concerns about your recovery, please call Business hours [phone]       Follow-up Care:  Make an appointment 2-3 week after your surgery, if not already made.  Call [phone].

## 2023-02-13 NOTE — INTERVAL H&P NOTE
"I have reviewed the surgical (or preoperative) H&P that is linked to this encounter, and examined the patient. There are no significant changes    Clinical Conditions Present on Arrival:  Clinically Significant Risk Factors Present on Admission           # Hyponatremia: Lowest Na = 135 mmol/L in last 30 days, will monitor as appropriate          # Obesity: Estimated body mass index is 30.34 kg/m  as calculated from the following:    Height as of 1/30/23: 1.676 m (5' 6\").    Weight as of 2/10/23: 85.3 kg (188 lb).       "

## 2023-02-13 NOTE — ANESTHESIA CARE TRANSFER NOTE
Patient: Stacy Brown    Procedure: Procedure(s):  CHOLECYSTECTOMY, LAPAROSCOPIC       Diagnosis: Biliary dyskinesia [K82.8]  Biliary colic [K80.50]  Diagnosis Additional Information: No value filed.    Anesthesia Type:   General     Note:    Oropharynx: spontaneously breathing and oral airway in place  Level of Consciousness: drowsy  Oxygen Supplementation: face mask    Independent Airway: airway patency satisfactory and stable  Dentition: dentition unchanged  Vital Signs Stable: post-procedure vital signs reviewed and stable  Report to RN Given: handoff report given  Patient transferred to: PACU    Handoff Report: Identifed the Patient, Identified the Reponsible Provider, Reviewed the pertinent medical history, Discussed the surgical course, Reviewed Intra-OP anesthesia mangement and issues during anesthesia, Set expectations for post-procedure period and Allowed opportunity for questions and acknowledgement of understanding      Vitals:  Vitals Value Taken Time   /74 02/13/23 1320   Temp 98.06  F (36.7  C) 02/13/23 1322   Pulse 71 02/13/23 1322   Resp 16 02/13/23 1322   SpO2 97 % 02/13/23 1322   Vitals shown include unvalidated device data.    Electronically Signed By: JOVITA Cardoso CRNA  February 13, 2023  1:23 PM

## 2023-02-13 NOTE — BRIEF OP NOTE
[facility] Brief Operative Note    Pre-operative diagnosis: Biliary dyskinesia [K82.8]  Biliary colic [K80.50]   Post-operative diagnosis biliary dyskinesia   Procedure: Procedure(s):  CHOLECYSTECTOMY, LAPAROSCOPIC   Surgeon(s): Surgeon(s) and Role:     * Robert Diop DO - Primary   Estimated blood loss: 10ml    Specimens: ID Type Source Tests Collected by Time Destination   1 : Gallbladder and contents Tissue Gallbladder SURGICAL PATHOLOGY EXAM Robert Diop DO 2/13/2023 12:53 PM       Findings: Large cystic duct, no other gross abnormalities

## 2023-02-14 ENCOUNTER — MYC MEDICAL ADVICE (OUTPATIENT)
Dept: SURGERY | Facility: OTHER | Age: 48
End: 2023-02-14
Payer: COMMERCIAL

## 2023-02-15 LAB
PATH REPORT.COMMENTS IMP SPEC: NORMAL
PATH REPORT.COMMENTS IMP SPEC: NORMAL
PATH REPORT.FINAL DX SPEC: NORMAL
PATH REPORT.GROSS SPEC: NORMAL
PATH REPORT.MICROSCOPIC SPEC OTHER STN: NORMAL
PATH REPORT.RELEVANT HX SPEC: NORMAL
PHOTO IMAGE: NORMAL

## 2023-02-19 ENCOUNTER — HOSPITAL ENCOUNTER (EMERGENCY)
Facility: CLINIC | Age: 48
Discharge: HOME | End: 2023-02-19
Attending: FAMILY MEDICINE | Admitting: FAMILY MEDICINE
Payer: COMMERCIAL

## 2023-02-19 ENCOUNTER — APPOINTMENT (OUTPATIENT)
Dept: CT IMAGING | Facility: CLINIC | Age: 48
End: 2023-02-19
Attending: FAMILY MEDICINE
Payer: COMMERCIAL

## 2023-02-19 VITALS
SYSTOLIC BLOOD PRESSURE: 119 MMHG | RESPIRATION RATE: 18 BRPM | DIASTOLIC BLOOD PRESSURE: 80 MMHG | TEMPERATURE: 98.2 F | OXYGEN SATURATION: 96 % | HEART RATE: 72 BPM

## 2023-02-19 DIAGNOSIS — K52.9 COLITIS: ICD-10-CM

## 2023-02-19 LAB
ABO/RH(D): NORMAL
ALBUMIN SERPL BCG-MCNC: 4 G/DL (ref 3.5–5.2)
ALP SERPL-CCNC: 104 U/L (ref 35–104)
ALT SERPL W P-5'-P-CCNC: 16 U/L (ref 10–35)
ANION GAP SERPL CALCULATED.3IONS-SCNC: 12 MMOL/L (ref 7–15)
ANTIBODY SCREEN: NEGATIVE
AST SERPL W P-5'-P-CCNC: 11 U/L (ref 10–35)
BASOPHILS # BLD AUTO: 0 10E3/UL (ref 0–0.2)
BASOPHILS NFR BLD AUTO: 0 %
BILIRUB SERPL-MCNC: 0.3 MG/DL
BUN SERPL-MCNC: 25.8 MG/DL (ref 6–20)
CALCIUM SERPL-MCNC: 9.9 MG/DL (ref 8.6–10)
CHLORIDE SERPL-SCNC: 93 MMOL/L (ref 98–107)
CREAT SERPL-MCNC: 0.78 MG/DL (ref 0.51–0.95)
DEPRECATED HCO3 PLAS-SCNC: 25 MMOL/L (ref 22–29)
EOSINOPHIL # BLD AUTO: 0.3 10E3/UL (ref 0–0.7)
EOSINOPHIL NFR BLD AUTO: 3 %
ERYTHROCYTE [DISTWIDTH] IN BLOOD BY AUTOMATED COUNT: 13.3 % (ref 10–15)
GFR SERPL CREATININE-BSD FRML MDRD: >90 ML/MIN/1.73M2
GLUCOSE SERPL-MCNC: 467 MG/DL (ref 70–99)
HCT VFR BLD AUTO: 35.7 % (ref 35–47)
HGB BLD-MCNC: 11.9 G/DL (ref 11.7–15.7)
HOLD SPECIMEN: NORMAL
IMM GRANULOCYTES # BLD: 0 10E3/UL
IMM GRANULOCYTES NFR BLD: 1 %
LIPASE SERPL-CCNC: 43 U/L (ref 13–60)
LYMPHOCYTES # BLD AUTO: 1.2 10E3/UL (ref 0.8–5.3)
LYMPHOCYTES NFR BLD AUTO: 13 %
MCH RBC QN AUTO: 26.7 PG (ref 26.5–33)
MCHC RBC AUTO-ENTMCNC: 33.3 G/DL (ref 31.5–36.5)
MCV RBC AUTO: 80 FL (ref 78–100)
MONOCYTES # BLD AUTO: 0.9 10E3/UL (ref 0–1.3)
MONOCYTES NFR BLD AUTO: 10 %
NEUTROPHILS # BLD AUTO: 6.5 10E3/UL (ref 1.6–8.3)
NEUTROPHILS NFR BLD AUTO: 73 %
NRBC # BLD AUTO: 0 10E3/UL
NRBC BLD AUTO-RTO: 0 /100
PLATELET # BLD AUTO: 264 10E3/UL (ref 150–450)
POTASSIUM SERPL-SCNC: 4.8 MMOL/L (ref 3.4–5.3)
PROT SERPL-MCNC: 7.2 G/DL (ref 6.4–8.3)
RBC # BLD AUTO: 4.45 10E6/UL (ref 3.8–5.2)
SODIUM SERPL-SCNC: 130 MMOL/L (ref 136–145)
SPECIMEN EXPIRATION DATE: NORMAL
WBC # BLD AUTO: 8.9 10E3/UL (ref 4–11)

## 2023-02-19 PROCEDURE — 86901 BLOOD TYPING SEROLOGIC RH(D): CPT | Performed by: FAMILY MEDICINE

## 2023-02-19 PROCEDURE — 36415 COLL VENOUS BLD VENIPUNCTURE: CPT | Performed by: FAMILY MEDICINE

## 2023-02-19 PROCEDURE — 85025 COMPLETE CBC W/AUTO DIFF WBC: CPT | Performed by: FAMILY MEDICINE

## 2023-02-19 PROCEDURE — 99284 EMERGENCY DEPT VISIT MOD MDM: CPT | Performed by: FAMILY MEDICINE

## 2023-02-19 PROCEDURE — 86850 RBC ANTIBODY SCREEN: CPT | Performed by: FAMILY MEDICINE

## 2023-02-19 PROCEDURE — 99285 EMERGENCY DEPT VISIT HI MDM: CPT | Mod: 25 | Performed by: FAMILY MEDICINE

## 2023-02-19 PROCEDURE — 96374 THER/PROPH/DIAG INJ IV PUSH: CPT | Mod: 59 | Performed by: FAMILY MEDICINE

## 2023-02-19 PROCEDURE — 74177 CT ABD & PELVIS W/CONTRAST: CPT

## 2023-02-19 PROCEDURE — 83690 ASSAY OF LIPASE: CPT | Performed by: FAMILY MEDICINE

## 2023-02-19 PROCEDURE — 250N000011 HC RX IP 250 OP 636: Performed by: FAMILY MEDICINE

## 2023-02-19 PROCEDURE — 250N000009 HC RX 250: Performed by: FAMILY MEDICINE

## 2023-02-19 PROCEDURE — 80053 COMPREHEN METABOLIC PANEL: CPT | Performed by: FAMILY MEDICINE

## 2023-02-19 RX ORDER — IOPAMIDOL 755 MG/ML
500 INJECTION, SOLUTION INTRAVASCULAR ONCE
Status: COMPLETED | OUTPATIENT
Start: 2023-02-19 | End: 2023-02-19

## 2023-02-19 RX ORDER — OXYCODONE HYDROCHLORIDE 5 MG/1
5 TABLET ORAL EVERY 6 HOURS PRN
Qty: 10 TABLET | Refills: 0 | Status: SHIPPED | OUTPATIENT
Start: 2023-02-19 | End: 2023-02-19

## 2023-02-19 RX ORDER — HYDROMORPHONE HYDROCHLORIDE 1 MG/ML
0.5 INJECTION, SOLUTION INTRAMUSCULAR; INTRAVENOUS; SUBCUTANEOUS ONCE
Status: COMPLETED | OUTPATIENT
Start: 2023-02-19 | End: 2023-02-19

## 2023-02-19 RX ORDER — OXYCODONE HYDROCHLORIDE 5 MG/1
5 TABLET ORAL EVERY 6 HOURS PRN
Qty: 10 TABLET | Refills: 0 | Status: SHIPPED | OUTPATIENT
Start: 2023-02-19 | End: 2023-03-01

## 2023-02-19 RX ADMIN — HYDROMORPHONE HYDROCHLORIDE 0.5 MG: 1 INJECTION, SOLUTION INTRAMUSCULAR; INTRAVENOUS; SUBCUTANEOUS at 05:50

## 2023-02-19 RX ADMIN — IOPAMIDOL 90 ML: 755 INJECTION, SOLUTION INTRAVENOUS at 07:01

## 2023-02-19 RX ADMIN — SODIUM CHLORIDE 60 ML: 9 INJECTION, SOLUTION INTRAVENOUS at 07:00

## 2023-02-19 ASSESSMENT — ACTIVITIES OF DAILY LIVING (ADL)
ADLS_ACUITY_SCORE: 35
ADLS_ACUITY_SCORE: 35

## 2023-02-19 NOTE — ED NOTES
Called and advised laboratory staff that provider placed orders for use with previously sent rainbow draw.

## 2023-02-19 NOTE — ED TRIAGE NOTES
Patient presents 6 days post-op cholecystectomy with concerns for bloody stool this AM and lower abdominal cramping. No N/V/D. States stool was ethan red blood, minimal actual stool material. Also concerned about some marks on her abdomen's LUQ.     Triage Assessment     Row Name 02/19/23 8429       Triage Assessment (Adult)    Airway WDL WDL       Respiratory WDL    Respiratory WDL WDL       Skin Circulation/Temperature WDL    Skin Circulation/Temperature WDL WDL       Cardiac WDL    Cardiac WDL WDL       Peripheral/Neurovascular WDL    Peripheral Neurovascular WDL WDL       Cognitive/Neuro/Behavioral WDL    Cognitive/Neuro/Behavioral WDL WDL

## 2023-02-19 NOTE — ED PROVIDER NOTES
Patient was signed out to me at change of shift by Dr. Luis, please see his note for complete details.  CT came back and just shows findings consistent with colitis.  Patient did receive 1 dose of antibiotic with her cholecystectomy the other day but she has not been having any watery diarrhea.  These findings could be related to just a nonspecific colitis possible viral but certainly C. difficile is a concern.  Patient was told if she does start to get any watery diarrhea I want her to follow-up with her doctor to get tested for this.  In the meantime we will just treat conservatively and see if this settles down on its own.  Patient is not actively bleeding, I am not concerned about patient needing to be admitted for an active bleed.  Patient will stick with a bland diet.  Patient requested to get some more oxycodone as she is on chronic oxycodone therapy but her chronic 1 a day is not cutting it.  She was given a few more pills and told any further pills needs to come from her primary care doctor.  Patient will be discharged at this time.     Trevor Zuñiga MD  02/19/23 0145
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
## 2023-02-28 ENCOUNTER — MYC MEDICAL ADVICE (OUTPATIENT)
Dept: FAMILY MEDICINE | Facility: CLINIC | Age: 48
End: 2023-02-28
Payer: COMMERCIAL

## 2023-03-01 ENCOUNTER — OFFICE VISIT (OUTPATIENT)
Dept: SURGERY | Facility: OTHER | Age: 48
End: 2023-03-01
Payer: COMMERCIAL

## 2023-03-01 VITALS
BODY MASS INDEX: 29.54 KG/M2 | WEIGHT: 183 LBS | DIASTOLIC BLOOD PRESSURE: 80 MMHG | TEMPERATURE: 95.2 F | SYSTOLIC BLOOD PRESSURE: 122 MMHG

## 2023-03-01 DIAGNOSIS — Z90.49 S/P LAPAROSCOPIC CHOLECYSTECTOMY: Primary | ICD-10-CM

## 2023-03-01 PROCEDURE — 99024 POSTOP FOLLOW-UP VISIT: CPT | Performed by: SURGERY

## 2023-03-01 ASSESSMENT — PAIN SCALES - GENERAL: PAINLEVEL: MODERATE PAIN (5)

## 2023-03-01 NOTE — TELEPHONE ENCOUNTER
Per verbal ok with ashlie Lopez to schedule in virtual appointment slot on Friday 3/3/23 at 1:40pm.

## 2023-03-01 NOTE — PROGRESS NOTES
General Surgery Follow Up    Pt returns for follow up visit s/p lap clement    HPI:  Doing well.  Tolerating diet.  Feels much better now than preoperatively.      Past Medical History:   Diagnosis Date     Knee pain, chronic      Mixed hyperlipidemia      NSTEMI (non-ST elevated myocardial infarction) (H) 3/5/2021     S/P CABG (coronary artery bypass graft) 3/16/2021     Tobacco abuse disorder 11/21/2017     Type II or unspecified type diabetes mellitus without mention of complication, not stated as uncontrolled 08/16/2002    diagnosed 8/16/02, started insulin 2006       Past Surgical History:   Procedure Laterality Date     BYPASS GRAFT ARTERY CORONARY N/A 3/9/2021    Procedure: CORONARY ARTERY BYPASS GRAFT X 4 (LIMA - LAD, SV - RPL, SV - PDA,  RA - OM) LEFT RADIAL ENDOARTERY HARVEST AND BILATERAL LEG ENDOVEIN HARVEST (ON CARDIOPULMONARY PUMP OXYGENATOR ; INTRAOPERATIVE TRANSESOPHAGEAL ECHOCARDIOGRAM BY ANESTHESIOLOGIST DR. NEL AVILA)   ;  Surgeon: Kunal Selby MD;  Location:  OR     CV HEART CATHETERIZATION WITH POSSIBLE INTERVENTION N/A 3/8/2021    Procedure: Heart Catheterization with Possible Intervention;  Surgeon: Vadim Kamara MD;  Location:  HEART CARDIAC CATH LAB     HC OPEN TX METATARSAL FRACTURE  age 12    softball injury,open fracture left foot     HC TOOTH EXTRACTION W/FORCEP      Extract wisdom teeth     INJECT JOINT SACROILIAC Left 1/11/2018    Procedure: INJECT JOINT SACROILIAC;  INJECT JOINT SACROILIAC LEFT;  Surgeon: Alan Marshall MD;  Location: [facility]     LAPAROSCOPIC CHOLECYSTECTOMY N/A 2/13/2023    Procedure: CHOLECYSTECTOMY, LAPAROSCOPIC;  Surgeon: Robert Diop DO;  Location: [facility]     OPERATIVE HYSTEROSCOPY WITH MORCELLATOR N/A 7/24/2018    Procedure: OPERATIVE HYSTEROSCOPY WITH MORCELLATOR (MYOSURE);  Exam under anesthesia, operative hysteroscopy, polypectomy, D & C;  Surgeon: Sindhu Peterson DO;  Location: [facility] OR     TUBAL LIGATION  7/27/2006      [facility] STABISM SURG,PREV EYE SURG,NOT MUSC      Right       Social History     Socioeconomic History     Marital status:      Spouse name: Humble     Number of children: 2     Years of education: 14     Highest education level: Not on file   Occupational History     Occupation: Post Office     Employer: [employer]   Tobacco Use     Smoking status: Former     Packs/day: 0.50     Years: 6.00     Pack years: 3.00     Types: Cigarettes     Quit date: 3/5/2021     Years since quittin.9     Smokeless tobacco: Never   Vaping Use     Vaping Use: Never used   Substance and Sexual Activity     Alcohol use: Yes     Alcohol/week: 0.0 standard drinks     Comment: once a month     Drug use: No     Sexual activity: Not Currently     Partners: Male     Birth control/protection: Surgical   Other Topics Concern      Service No     Blood Transfusions No     Caffeine Concern Yes     Comment: Diet Pepsi/at least 20 ounces/day - advised not more than 16 ounces/day     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 Yes   Social History Narrative     and lives at home in [place] with , Humble, and their daughter and son.     Social Determinants of Health     Financial Resource Strain: Not on file   Food Insecurity: Not on file   Transportation Needs: Not on file   Physical Activity: Not on file   Stress: Not on file   Social Connections: Not on file   Intimate Partner Violence: Not on file   Housing Stability: Not on file       Current Outpatient Medications   Medication Sig Dispense Refill     acetaminophen (TYLENOL) 325 MG tablet Take 1 tablet (325  mg) by mouth every 4 hours as needed for mild pain TAKE TWO TABLETS BY MOUTH EVERY 4 HOURS AS NEEDED FOR MILD PAIN 40 tablet 0     amLODIPine (NORVASC) 2.5 MG tablet TAKE 1 TABLET (2.5 MG) BY MOUTH DAILY 90 tablet 3     Ascorbic Acid (VITAMIN C) 100 MG CHEW        aspirin (ASA) 325 MG tablet Take 1 tablet (325 mg) by mouth daily       blood glucose (NO BRAND SPECIFIED) test strip Use to test blood sugar up to 4 times daily or as directed. To accompany: Blood Glucose Monitor Brands: per insurance. 200 strip 6     blood glucose calibration (NO BRAND SPECIFIED) solution To accompany: Blood Glucose Monitor Brands: per insurance. 1 Bottle 3     blood glucose monitoring (FREESTYLE) lancets Use to test blood sugars 1-2 times daily or as directed, per patients glucose meter. 3 Box 3     Blood Glucose Monitoring Suppl (ACCU-CHEK COMPLETE) KIT 1 Device daily 1 Device 0     FLUoxetine (PROZAC) 20 MG capsule Take 1 capsule (20 mg) by mouth daily 90 capsule 1     insulin aspart (NOVOLOG FLEXPEN) 100 UNIT/ML pen Novolog Flexpen. Inject 1 units per 10 gram carb unit before breakfast, lunch and dinner, up to 15 units per meal. 45 mL 3     insulin glargine (BASAGLAR KWIKPEN) 100 UNIT/ML pen Inject 42 Units Subcutaneous every morning 45 mL 0     insulin pen needle (31G X 8 MM) 31G X 8 MM miscellaneous 1 Box of 100 insulin pen needles to be dispensed with every insulin pen prescription 100 each 0     insulin pen needle (NOVOFINE) 32G X 6 MM miscellaneous Use once daily or as directed. 100 each 3     lisinopril (ZESTRIL) 2.5 MG tablet Take 1 tablet (2.5 mg) by mouth daily 90 tablet 4     metFORMIN (GLUCOPHAGE XR) 500 MG 24 hr tablet Take 2 tablets (1,000 mg) by mouth 2 times daily (with meals) 360 tablet 4     metoclopramide (REGLAN) 5 MG tablet Take 1 tablet (5 mg) by mouth 3 times daily as needed (Nausea, vomiting) 12 tablet 0     metoprolol tartrate (LOPRESSOR) 25 MG tablet Take 1 tablet (25 mg) by mouth 2 times daily 180 tablet 4      Multiple Vitamins-Minerals (MULTI-VITAMIN GUMMIES) CHEW Take 1 chew tab by mouth daily        oxyCODONE (ROXICODONE) 5 MG tablet TAKE 1 TABLET (5 MG) BY MOUTH 2 TIMES DAILY AS NEEDED FOR SEVERE PAIN (MUST LAST 30 DAYS) 39 tablet 0     rosuvastatin (CRESTOR) 20 MG tablet Take 1 tablet (20 mg) by mouth daily 90 tablet 4     tiZANidine (ZANAFLEX) 4 MG tablet Take 1 tablet (4 mg) by mouth 3 times daily 270 tablet 4     nitroGLYcerin (NITROSTAT) 0.4 MG sublingual tablet For chest pain place 1 tablet under the tongue every 5 minutes for 3 doses. If symptoms persist 5 minutes after 1st dose call 911. (Patient not taking: Reported on 1/30/2023) 25 tablet 0       Medications and history reviewed    Physical exam:  Vitals: /80   Temp (!) 95.2  F (35.1  C) (Temporal)   Wt 83 kg (183 lb)   LMP 03/07/2021 (Approximate)   BMI 29.54 kg/m    BMI= Body mass index is 29.54 kg/m .    HEART: RRR, no new murmurs  LUNGS: CTAB, equal chest rise, good effort  ABD: soft, non tender, non distended  INCISIONS: Clean dry and intact  EXT: APARICIO, no deformities    PATHOLOGY:  Chronic cholecystitis    Assessment:     ICD-10-CM    1. S/P laparoscopic cholecystectomy  Z90.49         Plan: Well.  Pathology reviewed and questions answered.  Follow-up as needed.    Robert Diop,

## 2023-03-01 NOTE — LETTER
3/1/2023         RE: Stacy Brown  [address] [facility] [address]        Dear Colleague,    Thank you for referring your patient, Stacy Brown, to the [facility]. Please see a copy of my visit note below.    General Surgery Follow Up    Pt returns for follow up visit s/p lap clement    HPI:  Doing well.  Tolerating diet.  Feels much better now than preoperatively.      Past Medical History:   Diagnosis Date     Knee pain, chronic      Mixed hyperlipidemia      NSTEMI (non-ST elevated myocardial infarction) (H) 3/5/2021     S/P CABG (coronary artery bypass graft) 3/16/2021     Tobacco abuse disorder 11/21/2017     Type II or unspecified type diabetes mellitus without mention of complication, not stated as uncontrolled 08/16/2002    diagnosed 8/16/02, started insulin 2006       Past Surgical History:   Procedure Laterality Date     BYPASS GRAFT ARTERY CORONARY N/A 3/9/2021    Procedure: CORONARY ARTERY BYPASS GRAFT X 4 (LIMA - LAD, SV - RPL, SV - PDA,  RA - OM) LEFT RADIAL ENDOARTERY HARVEST AND BILATERAL LEG ENDOVEIN HARVEST (ON CARDIOPULMONARY PUMP OXYGENATOR ; INTRAOPERATIVE TRANSESOPHAGEAL ECHOCARDIOGRAM BY ANESTHESIOLOGIST DR. NEL AVILA)   ;  Surgeon: Kunal Selby MD;  Location:  OR     CV HEART CATHETERIZATION WITH POSSIBLE INTERVENTION N/A 3/8/2021    Procedure: Heart Catheterization with Possible Intervention;  Surgeon: Vadim Kamara MD;  Location:  HEART CARDIAC CATH LAB     HC OPEN TX METATARSAL FRACTURE  age 12    softball injury,open fracture left foot     HC TOOTH EXTRACTION W/FORCEP      Extract wisdom teeth     INJECT JOINT SACROILIAC Left 1/11/2018    Procedure: INJECT JOINT SACROILIAC;  INJECT JOINT SACROILIAC LEFT;  Surgeon: Alan Marshall MD;  Location:  OR     LAPAROSCOPIC CHOLECYSTECTOMY N/A 2/13/2023    Procedure: CHOLECYSTECTOMY, LAPAROSCOPIC;  Surgeon: Robert Diop DO;  Location:  OR     OPERATIVE HYSTEROSCOPY  WITH MORCELLATOR N/A 2018    Procedure: OPERATIVE HYSTEROSCOPY WITH MORCELLATOR (MYOSURE);  Exam under anesthesia, operative hysteroscopy, polypectomy, D & C;  Surgeon: Sindhu Peterson DO;  Location: [facility] OR     TUBAL LIGATION  2006     [facility] STABISM SURG,PREV EYE SURG,NOT MUSC      Right       Social History     Socioeconomic History     Marital status:      Spouse name: Humble     Number of children: 2     Years of education: 14     Highest education level: Not on file   Occupational History     Occupation: Post Office     Employer: [employer]   Tobacco Use     Smoking status: Former     Packs/day: 0.50     Years: 6.00     Pack years: 3.00     Types: Cigarettes     Quit date: 3/5/2021     Years since quittin.9     Smokeless tobacco: Never   Vaping Use     Vaping Use: Never used   Substance and Sexual Activity     Alcohol use: Yes     Alcohol/week: 0.0 standard drinks     Comment: once a month     Drug use: No     Sexual activity: Not Currently     Partners: Male     Birth control/protection: Surgical   Other Topics Concern      Service No     Blood Transfusions No     Caffeine Concern Yes     Comment: Diet Pepsi/at least 20 ounces/day - advised not more than 16 ounces/day     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 Yes   Social History Narrative     and lives at home in [place] with , Humble, and their daughter and son.     Social Determinants of Health     Financial Resource Strain: Not on file   Food Insecurity: Not on file   Transportation Needs: Not on file   Physical Activity: Not on file   Stress:  Not on file   Social Connections: Not on file   Intimate Partner Violence: Not on file   Housing Stability: Not on file       Current Outpatient Medications   Medication Sig Dispense Refill     acetaminophen (TYLENOL) 325 MG tablet Take 1 tablet (325 mg) by mouth every 4 hours as needed for mild pain TAKE TWO TABLETS BY MOUTH EVERY 4 HOURS AS NEEDED FOR MILD PAIN 40 tablet 0     amLODIPine (NORVASC) 2.5 MG tablet TAKE 1 TABLET (2.5 MG) BY MOUTH DAILY 90 tablet 3     Ascorbic Acid (VITAMIN C) 100 MG CHEW        aspirin (ASA) 325 MG tablet Take 1 tablet (325 mg) by mouth daily       blood glucose (NO BRAND SPECIFIED) test strip Use to test blood sugar up to 4 times daily or as directed. To accompany: Blood Glucose Monitor Brands: per insurance. 200 strip 6     blood glucose calibration (NO BRAND SPECIFIED) solution To accompany: Blood Glucose Monitor Brands: per insurance. 1 Bottle 3     blood glucose monitoring (FREESTYLE) lancets Use to test blood sugars 1-2 times daily or as directed, per patients glucose meter. 3 Box 3     Blood Glucose Monitoring Suppl (ACCU-CHEK COMPLETE) KIT 1 Device daily 1 Device 0     FLUoxetine (PROZAC) 20 MG capsule Take 1 capsule (20 mg) by mouth daily 90 capsule 1     insulin aspart (NOVOLOG FLEXPEN) 100 UNIT/ML pen Novolog Flexpen. Inject 1 units per 10 gram carb unit before breakfast, lunch and dinner, up to 15 units per meal. 45 mL 3     insulin glargine (BASAGLAR KWIKPEN) 100 UNIT/ML pen Inject 42 Units Subcutaneous every morning 45 mL 0     insulin pen needle (31G X 8 MM) 31G X 8 MM miscellaneous 1 Box of 100 insulin pen needles to be dispensed with every insulin pen prescription 100 each 0     insulin pen needle (NOVOFINE) 32G X 6 MM miscellaneous Use once daily or as directed. 100 each 3     lisinopril (ZESTRIL) 2.5 MG tablet Take 1 tablet (2.5 mg) by mouth daily 90 tablet 4     metFORMIN (GLUCOPHAGE XR) 500 MG 24 hr tablet Take 2 tablets (1,000 mg) by mouth 2 times daily (with  meals) 360 tablet 4     metoclopramide (REGLAN) 5 MG tablet Take 1 tablet (5 mg) by mouth 3 times daily as needed (Nausea, vomiting) 12 tablet 0     metoprolol tartrate (LOPRESSOR) 25 MG tablet Take 1 tablet (25 mg) by mouth 2 times daily 180 tablet 4     Multiple Vitamins-Minerals (MULTI-VITAMIN GUMMIES) CHEW Take 1 chew tab by mouth daily        oxyCODONE (ROXICODONE) 5 MG tablet TAKE 1 TABLET (5 MG) BY MOUTH 2 TIMES DAILY AS NEEDED FOR SEVERE PAIN (MUST LAST 30 DAYS) 39 tablet 0     rosuvastatin (CRESTOR) 20 MG tablet Take 1 tablet (20 mg) by mouth daily 90 tablet 4     tiZANidine (ZANAFLEX) 4 MG tablet Take 1 tablet (4 mg) by mouth 3 times daily 270 tablet 4     nitroGLYcerin (NITROSTAT) 0.4 MG sublingual tablet For chest pain place 1 tablet under the tongue every 5 minutes for 3 doses. If symptoms persist 5 minutes after 1st dose call 911. (Patient not taking: Reported on 1/30/2023) 25 tablet 0       Medications and history reviewed    Physical exam:  Vitals: /80   Temp (!) 95.2  F (35.1  C) (Temporal)   Wt 83 kg (183 lb)   LMP 03/07/2021 (Approximate)   BMI 29.54 kg/m    BMI= Body mass index is 29.54 kg/m .    HEART: RRR, no new murmurs  LUNGS: CTAB, equal chest rise, good effort  ABD: soft, non tender, non distended  INCISIONS: Clean dry and intact  EXT: APARICIO, no deformities    PATHOLOGY:  Chronic cholecystitis    Assessment:     ICD-10-CM    1. S/P laparoscopic cholecystectomy  Z90.49         Plan: Well.  Pathology reviewed and questions answered.  Follow-up as needed.    Robert Diop DO        Again, thank you for allowing me to participate in the care of your patient.        Sincerely,        Robert Diop DO

## 2023-03-01 NOTE — LETTER
March 1, 2023      Stacy Brown  [address] [facility] [address]        To Whom It May Concern:    Stacy Brown was seen in our clinic. She may return to work without restrictions on March 7th, 2023.      Sincerely,        Robert Diop, DO

## 2023-03-02 ENCOUNTER — TELEPHONE (OUTPATIENT)
Dept: FAMILY MEDICINE | Facility: OTHER | Age: 48
End: 2023-03-02
Payer: COMMERCIAL

## 2023-03-02 NOTE — TELEPHONE ENCOUNTER
Forms/Letter Request    Type of form/letter: [employer]  - Federal Employee Program    Have you been seen for this request: N/A    Do we have the form/letter: Yes: Peds Form Box     When is form/letter needed by: 7-10 days    How would you like the form/letter returned: Mail    Patient Notified form requests are processed in 3-5 business days:No    Please complete form and return in the enclosed envelope

## 2023-03-03 ENCOUNTER — VIRTUAL VISIT (OUTPATIENT)
Dept: FAMILY MEDICINE | Facility: CLINIC | Age: 48
End: 2023-03-03
Payer: COMMERCIAL

## 2023-03-03 DIAGNOSIS — S06.0X0D CONCUSSION WITHOUT LOSS OF CONSCIOUSNESS, SUBSEQUENT ENCOUNTER: Primary | ICD-10-CM

## 2023-03-03 DIAGNOSIS — F43.10 PTSD (POST-TRAUMATIC STRESS DISORDER): ICD-10-CM

## 2023-03-03 DIAGNOSIS — F41.9 ANXIETY: ICD-10-CM

## 2023-03-03 PROCEDURE — 99213 OFFICE O/P EST LOW 20 MIN: CPT | Mod: 95 | Performed by: FAMILY MEDICINE
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
## 2023-03-03 NOTE — LETTER
[address] [address]  Phone: [phone]  Fax: [phone]    March 3, 2023        Stacy Brown  [address] [facility] [address]          To whom it may concern:    RE: Stacy Brown    Patient was seen and treated today through our clinic.  For medical reasons, she is allowed to return to work as of Tuesday, March 7, 2023 with the following restrictions:  She may work 1 day/week, up to 10 hours at a time.  During that time she may lift 20 to 40 pounds occasionally and 40 to 70 pounds infrequently.  She may drive without restriction.  She will be reevaluated after her first work shift and further workability determined at that time.    Please contact me for questions or concerns.      Sincerely,        Yaima Muse MD

## 2023-03-08 ENCOUNTER — MYC REFILL (OUTPATIENT)
Dept: FAMILY MEDICINE | Facility: CLINIC | Age: 48
End: 2023-03-08
Payer: COMMERCIAL

## 2023-03-08 DIAGNOSIS — G89.4 CHRONIC PAIN SYNDROME: ICD-10-CM

## 2023-03-08 DIAGNOSIS — M54.9 MECHANICAL BACK PAIN: ICD-10-CM

## 2023-03-08 RX ORDER — OXYCODONE HYDROCHLORIDE 5 MG/1
TABLET ORAL
Qty: 39 TABLET | Refills: 0 | Status: ON HOLD | OUTPATIENT
Start: 2023-03-17 | End: 2023-03-23

## 2023-03-08 NOTE — TELEPHONE ENCOUNTER
Requested Prescriptions   Pending Prescriptions Disp Refills     oxyCODONE (ROXICODONE) 5 MG tablet 39 tablet 0     Sig: TAKE 1 TABLET (5 MG) BY MOUTH 2 TIMES DAILY AS NEEDED FOR SEVERE PAIN (MUST LAST 30 DAYS)         Routing refill request to provider for review/approval because:  Drug not on the [facility], [facility] or [facility] refill protocol or controlled substance

## 2023-03-13 ENCOUNTER — MYC MEDICAL ADVICE (OUTPATIENT)
Dept: FAMILY MEDICINE | Facility: CLINIC | Age: 48
End: 2023-03-13
Payer: COMMERCIAL

## 2023-03-20 NOTE — TELEPHONE ENCOUNTER
"Reason for Call:  FAB for provider    Detailed comments:   Patient has been waiting to go back to work.  She has asked several times for a NEW letter to be written for her.  She works for the [employer] and they will not take he back to work with this letter since it has her work restrictions on it.  The letter needs to read \"without and weight restrictions\" in order for her to go back to work.    If this letter can be written so that she can return to work please call her as soon as it is completed so that she can pick this up.    Phone Number Patient can be reached at:  [phone]    Best Time:   Anytime    Can we leave a detailed message on this number?   Yes    Call taken on 3/20/2023 at 10:14 AM by Tammy Kirk      "

## 2023-03-21 NOTE — TELEPHONE ENCOUNTER
Spoke with Dr. Muse, letter has been completed signed and walked to  for patient to .     Patient was informed via [employer].     Clair Ortiz MA

## 2023-03-22 ENCOUNTER — APPOINTMENT (OUTPATIENT)
Dept: CT IMAGING | Facility: CLINIC | Age: 48
End: 2023-03-22
Attending: EMERGENCY MEDICINE
Payer: COMMERCIAL

## 2023-03-22 ENCOUNTER — APPOINTMENT (OUTPATIENT)
Dept: GENERAL RADIOLOGY | Facility: CLINIC | Age: 48
End: 2023-03-22
Attending: EMERGENCY MEDICINE
Payer: COMMERCIAL

## 2023-03-22 ENCOUNTER — HOSPITAL ENCOUNTER (INPATIENT)
Facility: CLINIC | Age: 48
LOS: 1 days | Discharge: HOME | End: 2023-03-23
Attending: EMERGENCY MEDICINE | Admitting: INTERNAL MEDICINE
Payer: COMMERCIAL

## 2023-03-22 DIAGNOSIS — S82.891A CLOSED FRACTURE OF RIGHT ANKLE, INITIAL ENCOUNTER: ICD-10-CM

## 2023-03-22 LAB
ANION GAP SERPL CALCULATED.3IONS-SCNC: 14 MMOL/L (ref 7–15)
BASOPHILS # BLD AUTO: 0.1 10E3/UL (ref 0–0.2)
BASOPHILS NFR BLD AUTO: 1 %
BUN SERPL-MCNC: 22.3 MG/DL (ref 6–20)
CALCIUM SERPL-MCNC: 9.6 MG/DL (ref 8.6–10)
CHLORIDE SERPL-SCNC: 99 MMOL/L (ref 98–107)
CREAT SERPL-MCNC: 0.72 MG/DL (ref 0.51–0.95)
DEPRECATED HCO3 PLAS-SCNC: 23 MMOL/L (ref 22–29)
EOSINOPHIL # BLD AUTO: 0 10E3/UL (ref 0–0.7)
EOSINOPHIL NFR BLD AUTO: 0 %
ERYTHROCYTE [DISTWIDTH] IN BLOOD BY AUTOMATED COUNT: 13.7 % (ref 10–15)
GFR SERPL CREATININE-BSD FRML MDRD: >90 ML/MIN/1.73M2
GLUCOSE BLDC GLUCOMTR-MCNC: 260 MG/DL (ref 70–99)
GLUCOSE SERPL-MCNC: 279 MG/DL (ref 70–99)
HCT VFR BLD AUTO: 33.8 % (ref 35–47)
HGB BLD-MCNC: 11.3 G/DL (ref 11.7–15.7)
IMM GRANULOCYTES # BLD: 0.1 10E3/UL
IMM GRANULOCYTES NFR BLD: 0 %
LYMPHOCYTES # BLD AUTO: 2 10E3/UL (ref 0.8–5.3)
LYMPHOCYTES NFR BLD AUTO: 16 %
MCH RBC QN AUTO: 26.6 PG (ref 26.5–33)
MCHC RBC AUTO-ENTMCNC: 33.4 G/DL (ref 31.5–36.5)
MCV RBC AUTO: 80 FL (ref 78–100)
MONOCYTES # BLD AUTO: 1 10E3/UL (ref 0–1.3)
MONOCYTES NFR BLD AUTO: 8 %
NEUTROPHILS # BLD AUTO: 9.2 10E3/UL (ref 1.6–8.3)
NEUTROPHILS NFR BLD AUTO: 75 %
NRBC # BLD AUTO: 0 10E3/UL
NRBC BLD AUTO-RTO: 0 /100
PLATELET # BLD AUTO: 292 10E3/UL (ref 150–450)
POTASSIUM SERPL-SCNC: 4.5 MMOL/L (ref 3.4–5.3)
RBC # BLD AUTO: 4.25 10E6/UL (ref 3.8–5.2)
SODIUM SERPL-SCNC: 136 MMOL/L (ref 136–145)
WBC # BLD AUTO: 12.4 10E3/UL (ref 4–11)

## 2023-03-22 PROCEDURE — 80048 BASIC METABOLIC PNL TOTAL CA: CPT | Performed by: EMERGENCY MEDICINE

## 2023-03-22 PROCEDURE — 73700 CT LOWER EXTREMITY W/O DYE: CPT | Mod: RT

## 2023-03-22 PROCEDURE — 85025 COMPLETE CBC W/AUTO DIFF WBC: CPT | Performed by: EMERGENCY MEDICINE

## 2023-03-22 PROCEDURE — 73610 X-RAY EXAM OF ANKLE: CPT | Mod: RT

## 2023-03-22 PROCEDURE — 250N000011 HC RX IP 250 OP 636: Performed by: INTERNAL MEDICINE

## 2023-03-22 PROCEDURE — 250N000011 HC RX IP 250 OP 636: Performed by: EMERGENCY MEDICINE

## 2023-03-22 PROCEDURE — 250N000013 HC RX MED GY IP 250 OP 250 PS 637: Performed by: INTERNAL MEDICINE

## 2023-03-22 PROCEDURE — 99285 EMERGENCY DEPT VISIT HI MDM: CPT | Performed by: EMERGENCY MEDICINE

## 2023-03-22 PROCEDURE — 250N000012 HC RX MED GY IP 250 OP 636 PS 637: Performed by: INTERNAL MEDICINE

## 2023-03-22 PROCEDURE — 99285 EMERGENCY DEPT VISIT HI MDM: CPT | Mod: 25 | Performed by: EMERGENCY MEDICINE

## 2023-03-22 PROCEDURE — 96374 THER/PROPH/DIAG INJ IV PUSH: CPT | Mod: 59 | Performed by: EMERGENCY MEDICINE

## 2023-03-22 PROCEDURE — 36415 COLL VENOUS BLD VENIPUNCTURE: CPT | Performed by: EMERGENCY MEDICINE

## 2023-03-22 PROCEDURE — 73590 X-RAY EXAM OF LOWER LEG: CPT | Mod: RT

## 2023-03-22 PROCEDURE — 99222 1ST HOSP IP/OBS MODERATE 55: CPT | Mod: AI | Performed by: INTERNAL MEDICINE

## 2023-03-22 PROCEDURE — 120N000001 HC R&B MED SURG/OB

## 2023-03-22 PROCEDURE — 96372 THER/PROPH/DIAG INJ SC/IM: CPT | Performed by: EMERGENCY MEDICINE

## 2023-03-22 RX ORDER — TIZANIDINE 2 MG/1
4 TABLET ORAL 3 TIMES DAILY PRN
Status: DISCONTINUED | OUTPATIENT
Start: 2023-03-22 | End: 2023-03-23 | Stop reason: HOSPADM

## 2023-03-22 RX ORDER — ENOXAPARIN SODIUM 100 MG/ML
40 INJECTION SUBCUTANEOUS EVERY 24 HOURS
Status: DISCONTINUED | OUTPATIENT
Start: 2023-03-22 | End: 2023-03-22

## 2023-03-22 RX ORDER — ONDANSETRON 2 MG/ML
4 INJECTION INTRAMUSCULAR; INTRAVENOUS EVERY 6 HOURS PRN
Status: DISCONTINUED | OUTPATIENT
Start: 2023-03-22 | End: 2023-03-23 | Stop reason: HOSPADM

## 2023-03-22 RX ORDER — ROSUVASTATIN CALCIUM 20 MG/1
20 TABLET, COATED ORAL DAILY
Status: DISCONTINUED | OUTPATIENT
Start: 2023-03-23 | End: 2023-03-23 | Stop reason: HOSPADM

## 2023-03-22 RX ORDER — ACETAMINOPHEN 650 MG/1
650 SUPPOSITORY RECTAL EVERY 4 HOURS PRN
Status: DISCONTINUED | OUTPATIENT
Start: 2023-03-22 | End: 2023-03-23 | Stop reason: HOSPADM

## 2023-03-22 RX ORDER — HEPARIN SODIUM 5000 [USP'U]/ML
5000 INJECTION, SOLUTION INTRAVENOUS; SUBCUTANEOUS ONCE
Status: DISCONTINUED | OUTPATIENT
Start: 2023-03-22 | End: 2023-03-22

## 2023-03-22 RX ORDER — LIDOCAINE 40 MG/G
CREAM TOPICAL
Status: DISCONTINUED | OUTPATIENT
Start: 2023-03-22 | End: 2023-03-23 | Stop reason: HOSPADM

## 2023-03-22 RX ORDER — ACETAMINOPHEN 325 MG/1
650 TABLET ORAL EVERY 4 HOURS PRN
Status: DISCONTINUED | OUTPATIENT
Start: 2023-03-22 | End: 2023-03-23 | Stop reason: HOSPADM

## 2023-03-22 RX ORDER — NICOTINE POLACRILEX 4 MG
15-30 LOZENGE BUCCAL
Status: DISCONTINUED | OUTPATIENT
Start: 2023-03-22 | End: 2023-03-23 | Stop reason: HOSPADM

## 2023-03-22 RX ORDER — NALOXONE HYDROCHLORIDE 0.4 MG/ML
0.4 INJECTION, SOLUTION INTRAMUSCULAR; INTRAVENOUS; SUBCUTANEOUS
Status: DISCONTINUED | OUTPATIENT
Start: 2023-03-22 | End: 2023-03-23 | Stop reason: HOSPADM

## 2023-03-22 RX ORDER — METOPROLOL TARTRATE 25 MG/1
25 TABLET, FILM COATED ORAL 2 TIMES DAILY
Status: DISCONTINUED | OUTPATIENT
Start: 2023-03-22 | End: 2023-03-23 | Stop reason: HOSPADM

## 2023-03-22 RX ORDER — HYDROMORPHONE HYDROCHLORIDE 1 MG/ML
0.5 INJECTION, SOLUTION INTRAMUSCULAR; INTRAVENOUS; SUBCUTANEOUS
Status: DISCONTINUED | OUTPATIENT
Start: 2023-03-22 | End: 2023-03-22

## 2023-03-22 RX ORDER — NALOXONE HYDROCHLORIDE 0.4 MG/ML
0.2 INJECTION, SOLUTION INTRAMUSCULAR; INTRAVENOUS; SUBCUTANEOUS
Status: DISCONTINUED | OUTPATIENT
Start: 2023-03-22 | End: 2023-03-23 | Stop reason: HOSPADM

## 2023-03-22 RX ORDER — ONDANSETRON 4 MG/1
4 TABLET, ORALLY DISINTEGRATING ORAL ONCE
Status: COMPLETED | OUTPATIENT
Start: 2023-03-22 | End: 2023-03-22

## 2023-03-22 RX ORDER — DEXTROSE MONOHYDRATE 25 G/50ML
25-50 INJECTION, SOLUTION INTRAVENOUS
Status: DISCONTINUED | OUTPATIENT
Start: 2023-03-22 | End: 2023-03-23 | Stop reason: HOSPADM

## 2023-03-22 RX ORDER — AMLODIPINE BESYLATE 2.5 MG/1
2.5 TABLET ORAL DAILY
Status: DISCONTINUED | OUTPATIENT
Start: 2023-03-23 | End: 2023-03-23 | Stop reason: HOSPADM

## 2023-03-22 RX ORDER — HYDROMORPHONE HYDROCHLORIDE 1 MG/ML
0.3 INJECTION, SOLUTION INTRAMUSCULAR; INTRAVENOUS; SUBCUTANEOUS
Status: DISCONTINUED | OUTPATIENT
Start: 2023-03-22 | End: 2023-03-23 | Stop reason: HOSPADM

## 2023-03-22 RX ORDER — KETOROLAC TROMETHAMINE 15 MG/ML
15 INJECTION, SOLUTION INTRAMUSCULAR; INTRAVENOUS EVERY 6 HOURS PRN
Status: DISCONTINUED | OUTPATIENT
Start: 2023-03-22 | End: 2023-03-23 | Stop reason: HOSPADM

## 2023-03-22 RX ORDER — LISINOPRIL 2.5 MG/1
2.5 TABLET ORAL DAILY
Status: DISCONTINUED | OUTPATIENT
Start: 2023-03-23 | End: 2023-03-23 | Stop reason: HOSPADM

## 2023-03-22 RX ORDER — HEPARIN SODIUM 5000 [USP'U]/.5ML
5000 INJECTION, SOLUTION INTRAVENOUS; SUBCUTANEOUS ONCE
Status: COMPLETED | OUTPATIENT
Start: 2023-03-22 | End: 2023-03-22

## 2023-03-22 RX ADMIN — HYDROMORPHONE HYDROCHLORIDE 1 MG: 1 INJECTION, SOLUTION INTRAMUSCULAR; INTRAVENOUS; SUBCUTANEOUS at 16:34

## 2023-03-22 RX ADMIN — KETOROLAC TROMETHAMINE 15 MG: 15 INJECTION, SOLUTION INTRAMUSCULAR; INTRAVENOUS at 22:10

## 2023-03-22 RX ADMIN — INSULIN ASPART 2 UNITS: 100 INJECTION, SOLUTION INTRAVENOUS; SUBCUTANEOUS at 22:46

## 2023-03-22 RX ADMIN — ONDANSETRON 4 MG: 4 TABLET, ORALLY DISINTEGRATING ORAL at 16:33

## 2023-03-22 RX ADMIN — HYDROMORPHONE HYDROCHLORIDE 0.5 MG: 1 INJECTION, SOLUTION INTRAMUSCULAR; INTRAVENOUS; SUBCUTANEOUS at 18:54

## 2023-03-22 RX ADMIN — HEPARIN SODIUM 5000 UNITS: 10000 INJECTION, SOLUTION INTRAVENOUS; SUBCUTANEOUS at 23:36

## 2023-03-22 RX ADMIN — METOPROLOL TARTRATE 25 MG: 25 TABLET, FILM COATED ORAL at 23:35

## 2023-03-22 RX ADMIN — HYDROMORPHONE HYDROCHLORIDE 0.3 MG: 1 INJECTION, SOLUTION INTRAMUSCULAR; INTRAVENOUS; SUBCUTANEOUS at 23:42

## 2023-03-22 RX ADMIN — TIZANIDINE 4 MG: 2 TABLET ORAL at 23:35

## 2023-03-22 ASSESSMENT — ACTIVITIES OF DAILY LIVING (ADL)
VISION_MANAGEMENT: GLASSES
ADLS_ACUITY_SCORE: 20
DIFFICULTY_COMMUNICATING: NO
DOING_ERRANDS_INDEPENDENTLY_DIFFICULTY: NO
TOILETING_ISSUES: NO
FALL_HISTORY_WITHIN_LAST_SIX_MONTHS: YES
CHANGE_IN_FUNCTIONAL_STATUS_SINCE_ONSET_OF_CURRENT_ILLNESS/INJURY: YES
DIFFICULTY_EATING/SWALLOWING: NO
ADLS_ACUITY_SCORE: 35
ADLS_ACUITY_SCORE: 35
HEARING_DIFFICULTY_OR_DEAF: NO
ADLS_ACUITY_SCORE: 35
WEAR_GLASSES_OR_BLIND: YES
WALKING_OR_CLIMBING_STAIRS_DIFFICULTY: NO
NUMBER_OF_TIMES_PATIENT_HAS_FALLEN_WITHIN_LAST_SIX_MONTHS: 1
CONCENTRATING,_REMEMBERING_OR_MAKING_DECISIONS_DIFFICULTY: NO
DRESSING/BATHING_DIFFICULTY: NO
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
## 2023-03-22 NOTE — ED TRIAGE NOTES
Patient fell on ice, c/o R ankle pain. Went to  and they sent here since they couldn't palpate pulses. Foot is numb. Swelling and bruising noted.      Triage Assessment     Row Name 03/22/23 1545       Triage Assessment (Adult)    Airway WDL WDL       Respiratory WDL    Respiratory WDL WDL       Cardiac WDL    Cardiac WDL WDL

## 2023-03-23 ENCOUNTER — APPOINTMENT (OUTPATIENT)
Dept: PHYSICAL THERAPY | Facility: CLINIC | Age: 48
End: 2023-03-23
Attending: NURSE PRACTITIONER
Payer: COMMERCIAL

## 2023-03-23 VITALS
TEMPERATURE: 98.9 F | SYSTOLIC BLOOD PRESSURE: 144 MMHG | WEIGHT: 183.44 LBS | BODY MASS INDEX: 29.48 KG/M2 | HEIGHT: 66 IN | HEART RATE: 68 BPM | RESPIRATION RATE: 20 BRPM | OXYGEN SATURATION: 99 % | DIASTOLIC BLOOD PRESSURE: 71 MMHG

## 2023-03-23 DIAGNOSIS — S82.891A CLOSED FRACTURE OF RIGHT ANKLE, INITIAL ENCOUNTER: Primary | ICD-10-CM
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
## 2023-03-23 LAB
ERYTHROCYTE [DISTWIDTH] IN BLOOD BY AUTOMATED COUNT: 14 % (ref 10–15)
GLUCOSE BLDC GLUCOMTR-MCNC: 184 MG/DL (ref 70–99)
GLUCOSE BLDC GLUCOMTR-MCNC: 244 MG/DL (ref 70–99)
GLUCOSE BLDC GLUCOMTR-MCNC: 267 MG/DL (ref 70–99)
GLUCOSE BLDC GLUCOMTR-MCNC: 275 MG/DL (ref 70–99)
HCT VFR BLD AUTO: 37.3 % (ref 35–47)
HGB BLD-MCNC: 12.1 G/DL (ref 11.7–15.7)
HOLD SPECIMEN: NORMAL
MCH RBC QN AUTO: 26.7 PG (ref 26.5–33)
MCHC RBC AUTO-ENTMCNC: 32.4 G/DL (ref 31.5–36.5)
MCV RBC AUTO: 82 FL (ref 78–100)
PLATELET # BLD AUTO: 262 10E3/UL (ref 150–450)
RBC # BLD AUTO: 4.53 10E6/UL (ref 3.8–5.2)
WBC # BLD AUTO: 8.1 10E3/UL (ref 4–11)

## 2023-03-23 PROCEDURE — 97530 THERAPEUTIC ACTIVITIES: CPT | Mod: GP | Performed by: PHYSICAL THERAPIST

## 2023-03-23 PROCEDURE — 97161 PT EVAL LOW COMPLEX 20 MIN: CPT | Mod: GP | Performed by: PHYSICAL THERAPIST

## 2023-03-23 PROCEDURE — 250N000012 HC RX MED GY IP 250 OP 636 PS 637: Performed by: INTERNAL MEDICINE

## 2023-03-23 PROCEDURE — 36415 COLL VENOUS BLD VENIPUNCTURE: CPT | Performed by: INTERNAL MEDICINE

## 2023-03-23 PROCEDURE — 99239 HOSP IP/OBS DSCHRG MGMT >30: CPT | Performed by: NURSE PRACTITIONER

## 2023-03-23 PROCEDURE — 85027 COMPLETE CBC AUTOMATED: CPT | Performed by: INTERNAL MEDICINE

## 2023-03-23 PROCEDURE — 250N000013 HC RX MED GY IP 250 OP 250 PS 637: Performed by: NURSE PRACTITIONER

## 2023-03-23 PROCEDURE — 250N000013 HC RX MED GY IP 250 OP 250 PS 637: Performed by: INTERNAL MEDICINE

## 2023-03-23 PROCEDURE — 250N000011 HC RX IP 250 OP 636: Performed by: INTERNAL MEDICINE

## 2023-03-23 RX ORDER — METFORMIN HCL 500 MG
1000 TABLET, EXTENDED RELEASE 24 HR ORAL 2 TIMES DAILY WITH MEALS
Status: DISCONTINUED | OUTPATIENT
Start: 2023-03-23 | End: 2023-03-23 | Stop reason: HOSPADM

## 2023-03-23 RX ORDER — OXYCODONE HYDROCHLORIDE 5 MG/1
5 TABLET ORAL EVERY 6 HOURS PRN
Qty: 20 TABLET | Refills: 0 | Status: SHIPPED | OUTPATIENT
Start: 2023-03-23 | End: 2023-03-26

## 2023-03-23 RX ADMIN — METOPROLOL TARTRATE 25 MG: 25 TABLET, FILM COATED ORAL at 09:35

## 2023-03-23 RX ADMIN — LISINOPRIL 2.5 MG: 2.5 TABLET ORAL at 09:35

## 2023-03-23 RX ADMIN — ASPIRIN 325 MG: 325 TABLET, COATED ORAL at 09:35

## 2023-03-23 RX ADMIN — HYDROMORPHONE HYDROCHLORIDE 0.3 MG: 1 INJECTION, SOLUTION INTRAMUSCULAR; INTRAVENOUS; SUBCUTANEOUS at 15:40

## 2023-03-23 RX ADMIN — AMLODIPINE BESYLATE 2.5 MG: 2.5 TABLET ORAL at 09:35

## 2023-03-23 RX ADMIN — INSULIN GLARGINE 20 UNITS: 100 INJECTION, SOLUTION SUBCUTANEOUS at 09:35

## 2023-03-23 RX ADMIN — KETOROLAC TROMETHAMINE 15 MG: 15 INJECTION, SOLUTION INTRAMUSCULAR; INTRAVENOUS at 13:35

## 2023-03-23 RX ADMIN — ROSUVASTATIN CALCIUM 20 MG: 20 TABLET, FILM COATED ORAL at 09:35

## 2023-03-23 RX ADMIN — HYDROMORPHONE HYDROCHLORIDE 0.3 MG: 1 INJECTION, SOLUTION INTRAMUSCULAR; INTRAVENOUS; SUBCUTANEOUS at 07:59

## 2023-03-23 RX ADMIN — KETOROLAC TROMETHAMINE 15 MG: 15 INJECTION, SOLUTION INTRAMUSCULAR; INTRAVENOUS at 05:39

## 2023-03-23 RX ADMIN — METFORMIN HYDROCHLORIDE 1000 MG: 500 TABLET, EXTENDED RELEASE ORAL at 12:40

## 2023-03-23 RX ADMIN — FLUOXETINE 20 MG: 20 CAPSULE ORAL at 09:35

## 2023-03-23 RX ADMIN — HYDROMORPHONE HYDROCHLORIDE 0.3 MG: 1 INJECTION, SOLUTION INTRAMUSCULAR; INTRAVENOUS; SUBCUTANEOUS at 11:29

## 2023-03-23 ASSESSMENT — ACTIVITIES OF DAILY LIVING (ADL)
ADLS_ACUITY_SCORE: 20
ADLS_ACUITY_SCORE: 22

## 2023-03-23 NOTE — TELEPHONE ENCOUNTER
DIAGNOSIS: Surgical fixation of right, posterior ankle fracture   APPOINTMENT DATE: 03/28/2023   NOTES STATUS DETAILS   DISCHARGE SUMMARY from hospital Internal 03/22/2023 - [facility] ED   MEDICATION LIST Internal    LABS     CT SCAN Internal    XRAYS (IMAGES & REPORTS) Internal

## 2023-03-23 NOTE — PROGRESS NOTES
S-(situation): Patient arrives to room 258 via cart from ED    B-(background): patient slipped on ice; CT shows ankle fracture    A-(assessment): Alert and oriented. Reports pain of a 7/10 in right ankle. Bedrest    R-(recommendations): Orders reviewed with patient. Will monitor patient per MD orders.     Inpatient nursing criteria listed below were met:    Health care directives status obtained and documented: no papers, FULL CODE  VTE ordered/documented: Yes  Skin issues/needs documented:Yes  Isolation addressed and Signage used: NA  Fall Prevention: Care plan updated Yes Education given and documented Yes  Care Plan initiated and Co-Morbidities added: Yes  Education Assessment documented:Yes  Admission Education Documented: Yes  If present CAUTI/CLABI Education done: NA  New medication patient education completed and documented (Possible Side Effects of Common Medications handout): Yes  Allergies Reviewed: Yes  Admission Medication Reconciliation completed: Yes  Home medications if not able to send immediately home with family stored here: na  Reminder note placed in discharge instructions regarding home meds: NA  Individualized care needs/preferences addressed and charted: Yes  Provider Notified that patient has arrived to the unit: Yes

## 2023-03-23 NOTE — PROGRESS NOTES
Referral placed for surgical consult with Dr. Riley for ORIF of right ankle.     Herminia Vicente MS, ATC  Certified Athletic Trainer

## 2023-03-23 NOTE — PROGRESS NOTES
Due to complex ankle fracture, it is best to be seen at [facility] for this repair.  Dr Riley has reviewed scan and will see her.  Ok to discharge after Physical Therapy.  Ortho  placed for [facility] referral.

## 2023-03-23 NOTE — DISCHARGE SUMMARY
[facility]  Hospitalist Discharge Summary      Date of Admission:  3/22/2023  Date of Discharge:  3/23/2023  Discharging Provider: JOVITA Hutchinson CNP  Discharge Service: Hospitalist Service    Discharge Diagnoses   Complex Right ankle fracture. Medial and posterior malleoli  -splinted  -Per Orthopedics, surgery cannot be performed here  -Consult Dr Hammonds-Podiatry. Recommend F/U Dr Riley [facility]  -Will ask PT to see to instruct on walker use as she will be non-weight bearing.   - at home. He is disable and can provide a little help.   -2 sons at home both are out of the house during the day school and work      DM2  -last A1C 7.6  Restart Home DM medication regimen      HTN  -cont amlodipine, lisinopril, metoprolol     CAD s/p CABG (3/2021)  -cont ASA, metoprolol, statin     Chronic pain  -cont prn meds     MDD  -cont fluoxetine     Anemia  -stable    Follow-ups Needed After Discharge   Follow-up Appointments     Follow-up and recommended labs and tests       Follow up with primary care provider, Yaima Muse, within   7 days for hospital follow- up.  No follow up labs or test are needed.             Unresulted Labs Ordered in the Past 30 Days of this Admission     No orders found for last 31 day(s).      Discharge Disposition   Discharged to home  Condition at discharge: Stable      Hospital Course   Stacy Brown is a 47 year old female who presents to the emergency room with ankle injury.  She was back at work for the first time since gallbladder surgery, and slipped and fell on the ice.  She twisted her right ankle, has severe pain, swelling, and bruising.  She initially went to urgent care, where they told her that it was likely broken, and applied an ice pack, but did not do any imaging.  They sent her to the ER for further evaluation as they stated they could not feel a pulse and the patient's foot was going numb.  She did not injure her knee on the  right, did not hit her head in this fall.     X-ray of the right ankle shows acute fractures medial and posterior malleoli, no fibular fx. Tib/fib x-ray shows mildly displaced oblique fracture of proximal fibular shaft. CT right ankle shows complex right ankle fracture primarily involving medial and posterior malleolus of the distal tibia with intra-articular extension. Possible small avulsive fracture of the talus       She was admitted to the Hospitalist service. NPO at midnight. Heparin 5000 units SCx1. Ortho consult.   Dilaudid PRN pain      Pt was seen in consultation by Dr Knox-Orthopedic services. Dr Knox recommends F/O as OP with Dr Riley at [facility] as she has a complex ankle fracture. I spoke with Dr Hammonds via phone. Dr Hammonds concurs, she needs referral to [facility]   Appointment request was placed.    Pt was seen by PT and recommended for walker to assist with mobility as she will be non-weight bearing     Discharge plan: Will dismiss to home. She will follow up with Dr Riley at [facility] for definitive care. Surgical intervention will occur after swelling has subsided.     Complications: None    Pending results: None    Condition: Stable          Consultations This Hospital Stay   PHYSICAL THERAPY ADULT IP CONSULT  PHYSICAL THERAPY ADULT IP CONSULT    Code Status   Full Code    Time Spent on this Encounter   I, JOVITA Hutchinson CNP, personally saw the patient today and spent greater than 30 minutes discharging this patient.       JOVITA Hutchinson CNP  [address] MEDICAL SURGICAL  [address] [place] [address]  Phone: [phone]  ______________________________________________________________________    Physical Exam   Vital Signs: Temp: 98.9  F (37.2  C) Temp src: Oral BP: (!) 144/71 Pulse: 68   Resp: 20 SpO2: 99 % O2 Device: None (Room air)    Weight: 183 lbs 7 oz    Constitutional: 48 yo female in bed, on RA and not in distress  Eyes: sclera clear and conjunctiva  normal  Hematologic / Lymphatic: No bleeding or bruising   Respiratory: No increased work of breathing, good air exchange, clear to auscultation bilaterally, no crackles or wheezing  Cardiovascular: Normal apical impulse, regular rate and rhythm, normal S1 and S2, no S3 or S4, and no murmur noted  GI: normal bowel sounds, soft, non-distended and non-tender  Skin: no bruising or bleeding, normal skin color, texture, turgor, no redness, warmth, or swelling and no rashes  Musculoskeletal: Right leg in splint. Distal CMS intact  Neurologic: A&Ox4, APARICIO, no focal deficits   Neuropsychiatric: General: normal, calm and normal eye contact  Level of consciousness: alert / normal  Affect: normal  Orientation: oriented to self, place, time and situation  Memory and insight: normal, memory for past and recent events intact and thought process normal         Primary Care Physician   Yaima Muse    Discharge Orders      Reason for your hospital stay    Fall with complex ankle fracture     Follow-up and recommended labs and tests     Follow up with primary care provider, Yaima Muse, within 7 days for hospital follow- up.  No follow up labs or test are needed.     Activity    Your activity upon discharge: activity as tolerated use walker at all times     Walker Order for DME - ONLY FOR DME    I, the undersigned, certify that the above prescribed supplies are medically necessary for this patient and is both reasonable and necessary in reference to accepted standards of medical and necessary in reference to accepted standards of medical practice in the treatment of this patient's condition and is not prescribed as a convenience.      Diet    Follow this diet upon discharge: Regular       Significant Results and Procedures     Discharge Medications   Current Discharge Medication List      CONTINUE these medications which have CHANGED    Details   oxyCODONE (ROXICODONE) 5 MG tablet Take 1 tablet (5 mg)  by mouth every 6 hours as needed for pain  Qty: 20 tablet, Refills: 0    Associated Diagnoses: Closed fracture of right ankle, initial encounter         CONTINUE these medications which have NOT CHANGED    Details   acetaminophen (TYLENOL) 325 MG tablet Take 1 tablet (325 mg) by mouth every 4 hours as needed for mild pain TAKE TWO TABLETS BY MOUTH EVERY 4 HOURS AS NEEDED FOR MILD PAIN  Qty: 40 tablet, Refills: 0    Associated Diagnoses: NSTEMI (non-ST elevated myocardial infarction) (H)      amLODIPine (NORVASC) 2.5 MG tablet TAKE 1 TABLET (2.5 MG) BY MOUTH DAILY  Qty: 90 tablet, Refills: 3    Associated Diagnoses: NSTEMI (non-ST elevated myocardial infarction) (H)      Ascorbic Acid (VITAMIN C) 100 MG CHEW Take 1 chew tab by mouth daily      aspirin (ASA) 325 MG tablet Take 1 tablet (325 mg) by mouth daily    Associated Diagnoses: NSTEMI (non-ST elevated myocardial infarction) (H)      blood glucose (NO BRAND SPECIFIED) test strip Use to test blood sugar up to 4 times daily or as directed. To accompany: Blood Glucose Monitor Brands: per insurance.  Qty: 200 strip, Refills: 6    Associated Diagnoses: Type 2 diabetes mellitus with hyperglycemia, with long-term current use of insulin (H)      FLUoxetine (PROZAC) 20 MG capsule Take 1 capsule (20 mg) by mouth daily  Qty: 90 capsule, Refills: 1    Comments: Update prescription and hold until patient calls.  Associated Diagnoses: Major depressive disorder, recurrent episode, moderate (H)      insulin aspart (NOVOLOG FLEXPEN) 100 UNIT/ML pen Novolog Flexpen. Inject 1 units per 10 gram carb unit before breakfast, lunch and dinner, up to 15 units per meal.  Qty: 45 mL, Refills: 3    Associated Diagnoses: Type 2 diabetes mellitus with hyperglycemia, with long-term current use of insulin (H)      insulin glargine (BASAGLAR KWIKPEN) 100 UNIT/ML pen Inject 42 Units Subcutaneous every morning  Qty: 45 mL, Refills: 0    Comments: If Basaglar is not covered by insurance, may  substitute Lantus or Semglee or other insulin glargine product per insurance preference at same dose and frequency.    Associated Diagnoses: Type 2 diabetes mellitus with hyperglycemia, with long-term current use of insulin (H)      lisinopril (ZESTRIL) 2.5 MG tablet Take 1 tablet (2.5 mg) by mouth daily  Qty: 90 tablet, Refills: 4    Associated Diagnoses: NSTEMI (non-ST elevated myocardial infarction) (H)      metFORMIN (GLUCOPHAGE XR) 500 MG 24 hr tablet Take 2 tablets (1,000 mg) by mouth 2 times daily (with meals)  Qty: 360 tablet, Refills: 4    Comments: Profile  Associated Diagnoses: Type 2 diabetes mellitus with hyperglycemia, with long-term current use of insulin (H)      metoclopramide (REGLAN) 5 MG tablet Take 1 tablet (5 mg) by mouth 3 times daily as needed (Nausea, vomiting)  Qty: 12 tablet, Refills: 0      metoprolol tartrate (LOPRESSOR) 25 MG tablet Take 1 tablet (25 mg) by mouth 2 times daily  Qty: 180 tablet, Refills: 4    Associated Diagnoses: NSTEMI (non-ST elevated myocardial infarction) (H)      Multiple Vitamins-Minerals (MULTI-VITAMIN GUMMIES) CHEW Take 1 chew tab by mouth daily       rosuvastatin (CRESTOR) 20 MG tablet Take 1 tablet (20 mg) by mouth daily  Qty: 90 tablet, Refills: 4    Associated Diagnoses: NSTEMI (non-ST elevated myocardial infarction) (H)      tiZANidine (ZANAFLEX) 4 MG tablet Take 1 tablet (4 mg) by mouth 3 times daily  Qty: 270 tablet, Refills: 4    Associated Diagnoses: Mechanical back pain      blood glucose calibration (NO BRAND SPECIFIED) solution To accompany: Blood Glucose Monitor Brands: per insurance.  Qty: 1 Bottle, Refills: 3    Associated Diagnoses: Type 2 diabetes mellitus with hyperglycemia, with long-term current use of insulin (H)      blood glucose monitoring (FREESTYLE) lancets Use to test blood sugars 1-2 times daily or as directed, per patients glucose meter.  Qty: 3 Box, Refills: 3    Associated Diagnoses: Type 2 diabetes mellitus with hyperglycemia,  with long-term current use of insulin (H)      Blood Glucose Monitoring Suppl (ACCU-CHEK COMPLETE) KIT 1 Device daily  Qty: 1 Device, Refills: 0    Associated Diagnoses: Type 2 diabetes, HbA1c goal < 7% (H)      !! insulin pen needle (31G X 8 MM) 31G X 8 MM miscellaneous 1 Box of 100 insulin pen needles to be dispensed with every insulin pen prescription  Qty: 100 each, Refills: 0    Associated Diagnoses: Type 2 diabetes mellitus with hyperglycemia, with long-term current use of insulin (H)      !! insulin pen needle (NOVOFINE) 32G X 6 MM miscellaneous Use once daily or as directed.  Qty: 100 each, Refills: 3    Associated Diagnoses: Type 2 diabetes mellitus with hyperglycemia, with long-term current use of insulin (H)      nitroGLYcerin (NITROSTAT) 0.4 MG sublingual tablet For chest pain place 1 tablet under the tongue every 5 minutes for 3 doses. If symptoms persist 5 minutes after 1st dose call 911.  Qty: 25 tablet, Refills: 0    Associated Diagnoses: Hyperlipidemia LDL goal <100; Type 2 diabetes mellitus with hyperglycemia, with long-term current use of insulin (H)       !! - Potential duplicate medications found. Please discuss with provider.        Allergies   Allergies   Allergen Reactions     Amoxicillin Hives     Hydrocodone-Acetaminophen GI Disturbance     Tylenol is ok

## 2023-03-23 NOTE — H&P
[facility]    History and Physical - Hospitalist Service       Date of Admission:  3/22/2023    Assessment & Plan    Right ankle fracture  -splinted  -prn meds for pain  -heparin 5000 units subcut x 1 tonight  -Ortho to eval tomorrow; OR in evening 3/23    DM  -last A1C 7.6  -half-dose of Lantus in AM  -hold metformin    HTN  -cont amlodipine, lisinopril, metoprolol    CAD s/p CABG (3/2021)  -cont ASA, metoprolol, statin    Chronic pain  -cont prn meds    MDD  -cont fluoxetine    Anemia  -stable    Diet:  mod  carb  DVT Prophylaxis:   Schultz Catheter: Not present  Lines: None   Code Status:  FULL    Clinically Significant Risk Factors Present on Admission      # Hypertension: home medication list includes antihypertensive(s)     # DMII: A1C = N/A within past 6 months            Disposition Plan      Expected Discharge Date: 03/24/2023                The patient's care was discussed with the patient.        MELINA SEAMAN MD  [facility]  Securely message with the Vocera Web Console (learn more here)  Text page via [employer] Paging/Directory      Visit/Communication Style   Virtual (Video) communication was used to evaluate Stacy.  Stacy consented to the use of video communication: yes  Video START time: 2140, 3/22/2023  Video STOP time: 2149, 3/22/2023   Patient's location: [facility]   Provider's location during the visit: [facility] Tele-medicine site        ______________________________________________________________________    Chief Complaint   Ankle pain    History of Present Illness   47yoF with DM, HTN, CAD s/p CABG, MDD, and chronic pain presented to the ED with right ankle pain, swelling and bruising after a slip and fall on ice earlier today.  She is a .    Review of Systems    General: negative for fever, chills, sweats, weakness  Eyes: negative for blurred vision, loss of vision  Ear Nose and  Throat: negative for pharyngitis, speech or swallowing difficulties  Respiratory:  negative for sputum production, wheezing, METZGER, pleuritic pain, sob or cough  Cardiology:  negative for chest pain, palpitations, orthopnea, PND, edema, syncope   Gastrointestinal: negative for abdominal pain, nausea, vomiting, diarrhea, constipation, hematemesis, melena or hematochezia  Genitourinary: negative for frequency, urgency, dysuria, hematuria   Neurological: negative for focal weakness, paresthesia    Past Medical History    I have reviewed this patient's medical history and updated it with pertinent information if needed.   Past Medical History:   Diagnosis Date     Knee pain, chronic      Mixed hyperlipidemia      NSTEMI (non-ST elevated myocardial infarction) (H) 3/5/2021     S/P CABG (coronary artery bypass graft) 3/16/2021     Tobacco abuse disorder 11/21/2017     Type II or unspecified type diabetes mellitus without mention of complication, not stated as uncontrolled 08/16/2002    diagnosed 8/16/02, started insulin 2006       Past Surgical History   I have reviewed this patient's surgical history and updated it with pertinent information if needed.  Past Surgical History:   Procedure Laterality Date     BYPASS GRAFT ARTERY CORONARY N/A 3/9/2021    Procedure: CORONARY ARTERY BYPASS GRAFT X 4 (LIMA - LAD, SV - RPL, SV - PDA,  RA - OM) LEFT RADIAL ENDOARTERY HARVEST AND BILATERAL LEG ENDOVEIN HARVEST (ON CARDIOPULMONARY PUMP OXYGENATOR ; INTRAOPERATIVE TRANSESOPHAGEAL ECHOCARDIOGRAM BY ANESTHESIOLOGIST DR. NEL AVILA)   ;  Surgeon: Kunal Selby MD;  Location:  OR     CV HEART CATHETERIZATION WITH POSSIBLE INTERVENTION N/A 3/8/2021    Procedure: Heart Catheterization with Possible Intervention;  Surgeon: Vadim Kamara MD;  Location:  HEART CARDIAC CATH LAB     HC OPEN TX METATARSAL FRACTURE  age 12    softball injury,open fracture left foot     HC TOOTH EXTRACTION W/FORCEP      Extract wisdom  teeth     INJECT JOINT SACROILIAC Left 2018    Procedure: INJECT JOINT SACROILIAC;  INJECT JOINT SACROILIAC LEFT;  Surgeon: Alan Marshall MD;  Location: [facility] OR     LAPAROSCOPIC CHOLECYSTECTOMY N/A 2023    Procedure: CHOLECYSTECTOMY, LAPAROSCOPIC;  Surgeon: Robert Diop, ;  Location: [facility] OR     OPERATIVE HYSTEROSCOPY WITH MORCELLATOR N/A 2018    Procedure: OPERATIVE HYSTEROSCOPY WITH MORCELLATOR (MYOSURE);  Exam under anesthesia, operative hysteroscopy, polypectomy, D & C;  Surgeon: Sindhu Peterson DO;  Location: [facility] OR     TUBAL LIGATION  2006     ZZC STABISM SURG,PREV EYE SURG,NOT MUSC      Right       Social History   I have reviewed this patient's social history and updated it with pertinent information if needed.  Social History     Tobacco Use     Smoking status: Former     Packs/day: 0.50     Years: 6.00     Pack years: 3.00     Types: Cigarettes     Quit date: 3/5/2021     Years since quittin.0     Smokeless tobacco: Never   Vaping Use     Vaping Use: Never used   Substance Use Topics     Alcohol use: Yes     Alcohol/week: 0.0 standard drinks     Comment: once a month     Drug use: No       Family History   I have reviewed this patient's family history and updated it with pertinent information if needed.  Family History   Problem Relation Age of Onset     Allergies Mother      Lipids Father         cholesterol     Diabetes Maternal Grandmother      Hypertension Maternal Grandmother      Heart Disease Maternal Grandmother         Bypass     Cancer Maternal Grandfather         Lung - metastatic     Alzheimer Disease Paternal Grandmother      Heart Disease Paternal Grandmother         valve replacement     Cerebrovascular Disease Paternal Grandfather      Anesthesia Reaction No family hx of      Colon Cancer No family hx of        Prior to Admission Medications   Prior to Admission Medications   Prescriptions Last Dose Informant Patient Reported? Taking?   Ascorbic Acid  (VITAMIN C) 100 MG CHEW 3/22/2023 at 0530 Self Yes Yes   Sig: Take 1 chew tab by mouth daily   Blood Glucose Monitoring Suppl (ACCU-CHEK COMPLETE) KIT  Self No No   Si Device daily   FLUoxetine (PROZAC) 20 MG capsule 3/22/2023 at 0530 Self No Yes   Sig: Take 1 capsule (20 mg) by mouth daily   Multiple Vitamins-Minerals (MULTI-VITAMIN GUMMIES) CHEW 3/22/2023 at 0530 Self Yes Yes   Sig: Take 1 chew tab by mouth daily    acetaminophen (TYLENOL) 325 MG tablet More than a month Self No Yes   Sig: Take 1 tablet (325 mg) by mouth every 4 hours as needed for mild pain TAKE TWO TABLETS BY MOUTH EVERY 4 HOURS AS NEEDED FOR MILD PAIN   amLODIPine (NORVASC) 2.5 MG tablet 3/22/2023 at 0530 Self No Yes   Sig: TAKE 1 TABLET (2.5 MG) BY MOUTH DAILY   aspirin (ASA) 325 MG tablet 3/22/2023 at 0530 Self Yes Yes   Sig: Take 1 tablet (325 mg) by mouth daily   blood glucose (NO BRAND SPECIFIED) test strip 3/21/2023 Self No Yes   Sig: Use to test blood sugar up to 4 times daily or as directed. To accompany: Blood Glucose Monitor Brands: per insurance.   blood glucose calibration (NO BRAND SPECIFIED) solution  Self No No   Sig: To accompany: Blood Glucose Monitor Brands: per insurance.   blood glucose monitoring (FREESTYLE) lancets  Self No No   Sig: Use to test blood sugars 1-2 times daily or as directed, per patients glucose meter.   insulin aspart (NOVOLOG FLEXPEN) 100 UNIT/ML pen 3/21/2023 at supper Self No Yes   Sig: Novolog Flexpen. Inject 1 units per 10 gram carb unit before breakfast, lunch and dinner, up to 15 units per meal.   insulin glargine (BASAGLAR KWIKPEN) 100 UNIT/ML pen 3/22/2023 at 0530 Self No Yes   Sig: Inject 42 Units Subcutaneous every morning   insulin pen needle (31G X 8 MM) 31G X 8 MM miscellaneous  Self No No   Si Box of 100 insulin pen needles to be dispensed with every insulin pen prescription   insulin pen needle (NOVOFINE) 32G X 6 MM miscellaneous  Self No No   Sig: Use once daily or as directed.    lisinopril (ZESTRIL) 2.5 MG tablet 3/22/2023 at 0530 Self No Yes   Sig: Take 1 tablet (2.5 mg) by mouth daily   metFORMIN (GLUCOPHAGE XR) 500 MG 24 hr tablet 3/22/2023 at 0530 Self No Yes   Sig: Take 2 tablets (1,000 mg) by mouth 2 times daily (with meals)   metoclopramide (REGLAN) 5 MG tablet Past Month Self No Yes   Sig: Take 1 tablet (5 mg) by mouth 3 times daily as needed (Nausea, vomiting)   metoprolol tartrate (LOPRESSOR) 25 MG tablet 3/22/2023 at 0530 Self No Yes   Sig: Take 1 tablet (25 mg) by mouth 2 times daily   nitroGLYcerin (NITROSTAT) 0.4 MG sublingual tablet  Self No No   Sig: For chest pain place 1 tablet under the tongue every 5 minutes for 3 doses. If symptoms persist 5 minutes after 1st dose call 911.   oxyCODONE (ROXICODONE) 5 MG tablet 3/21/2023 at  Self No Yes   Sig: TAKE 1 TABLET (5 MG) BY MOUTH 2 TIMES DAILY AS NEEDED FOR SEVERE PAIN (MUST LAST 30 DAYS)   Patient taking differently: Take 5 mg by mouth daily as needed for severe pain (7-10) TAKE 1 TABLET (5 MG) BY MOUTH 2 TIMES DAILY AS NEEDED FOR SEVERE PAIN (MUST LAST 30 DAYS)   rosuvastatin (CRESTOR) 20 MG tablet 3/22/2023 at 0530 Self No Yes   Sig: Take 1 tablet (20 mg) by mouth daily   tiZANidine (ZANAFLEX) 4 MG tablet 3/21/2023 at  Self No Yes   Sig: Take 1 tablet (4 mg) by mouth 3 times daily      Facility-Administered Medications: None     Allergies   Allergies   Allergen Reactions     Amoxicillin Hives     Hydrocodone-Acetaminophen GI Disturbance     Tylenol is ok       Physical Exam   Vital Signs: Temp: 98.8  F (37.1  C) Temp src: Oral BP: (!) 158/90 Pulse: 97   Resp: 18 SpO2: 98 % O2 Device: None (Room air)    Weight: 185 lbs 0 oz    Gen:  Well-developed, well-nourished, in no acute distress, lying semi-supine in hospital stretcher  HEENT:  Anicteric sclera, PER, hearing intact to voice  Resp:  No accessory muscle use, breath sounds clear; no wheezes no rales no rhonchi  Card:  No murmur, normal S1, S2   Abd:  Soft per RN  exam, no TTP, non-distended, normoactive bowel sounds are present  Musc:  Normal strength and movement of the major muscle groups without obvious deformity  Psych:  Good insight, oriented to person, place and time, not anxious, not agitated  Extr: right leg in splint; good cap refill in toes    Data     Recent Labs   Lab 03/22/23  1857   WBC 12.4*   HGB 11.3*   MCV 80         POTASSIUM 4.5   CHLORIDE 99   CO2 23   BUN 22.3*   CR 0.72   ANIONGAP 14   LUNA 9.6   *         Recent Results (from the past 24 hour(s))   Ankle XR, G/E 3 views, right    Narrative    EXAM: XR ANKLE RIGHT G/E 3 VIEWS  LOCATION: [facility]  DATE/TIME: 3/22/2023 4:59 PM    INDICATION: Twisting injury, swelling, pain  COMPARISON: None.      Impression    IMPRESSION: Acute fractures of the medial and posterior malleoli of the distal tibia. No fibular fracture visualized. Recommend additional radiographs of the proximal tibia and fibula to exclude a Maisonneuve fracture. Normal joint alignment. Ankle   mortise is symmetric. No fracture in the hindfoot otherwise. Small plantar and Achilles calcaneal enthesophytes.   XR Tibia and Fibula Right 2 Views    Narrative    EXAM: XR TIBIA AND FIBULA RIGHT 2 VIEWS  LOCATION: [facility]  DATE/TIME: 3/22/2023 5:38 PM    INDICATION: Ankle fracture.  COMPARISON: Same-day ankle radiograph.      Impression    IMPRESSION: Mildly displaced oblique fracture of the proximal fibular shaft.     Cortically-based fracture along the distal tibia near the superior margin of the medial malleolus with additional mildly distracted oblique fracture through the medial malleolus itself. Distracted intra-articular posterior malleolus fracture.   CT Ankle Right w/o Contrast    Narrative    EXAM: CT ANKLE RIGHT W/O CONTRAST  LOCATION: [facility]  DATE/TIME: 3/22/2023 5:48 PM    INDICATION: Abnormal ankle fracture  pattern.  COMPARISON: Same-day radiograph.  TECHNIQUE: Noncontrast. Axial, sagittal and coronal thin-section reconstruction. Dose reduction techniques were used.     FINDINGS:     BONES:  -Complex distal tibial fracture involving the medial and posterior malleolus. There is comminution and mild depression of the posterior malleolus fracture at the articular surface of the tibiotalar joint where there is up to 4 mm of articular surface   distraction and 3 mm articular surface depression. The medial malleolus fracture is coronal in orientation with a few small cortically-based avulsive fracture fragments near the superficial deltoid attachment site, for example on coronal image 42. There   is up to 3 mm of fracture fragment distraction.    -Evidence of prior distal syndesmotic injury with tiny fracture fragment seen within the syndesmosis at the expected location of the AITFL, for example on axial image 40. Additional smaller fracture fragments within the syndesmosis which is asymmetric.    -Punctate bone fragment adjacent to the lateral process of the talus could reflect a small avulsive ATFL injury.    -No additional fracture about the ankle. Joint spaces are maintained.    SOFT TISSUES:  -Soft tissue swelling and blood products about the fracture sites. No large hematoma.      Impression    IMPRESSION:  1.  Complex ankle fracture as described, primarily involving the medial and posterior malleolus of the distal tibia with intra-articular extension  2.  Evidence of prior distal syndesmotic injury with small fracture fragments within the syndesmosis and at the expected location of the AITFL.  3.  Maisonneuve fracture along the proximal fibular shaft is outside the field-of-view.  4.  Punctate bone fragment adjacent to lateral process of the talus could reflect a small avulsive ATFL injury.

## 2023-03-23 NOTE — PROGRESS NOTES
[facility]    Medicine Progress Note - Hospitalist Service    Date of Admission:  3/22/2023    Assessment & Plan   Stacy Brown is a 47 year old female who presents to the emergency room with ankle injury.  She was back at work for the first time since gallbladder surgery, and slipped and fell on the ice.  She twisted her right ankle, has severe pain, swelling, and bruising.  She initially went to urgent care, where they told her that it was likely broken, and applied an ice pack, but did not do any imaging.  They sent her to the ER for further evaluation as they stated they could not feel a pulse and the patient's foot was going numb.  She did not injure her knee on the right, did not hit her head in this fall.    X-ray of the right ankle shows acute fractures medial and posterior malleoli, no fibular fx. Tib/fib x-ray shows mildly displaced oblique fracture of proximal fibular shaft. CT right ankle shows complex right ankle fracture primarily involving medial and posterior malleolus of the distal tibia with intra-articular extension. Possible small avulsive fracture of the talus           Complex Right ankle fracture. Medial and posterior malleoli  -splinted  -prn meds for pain  -Heparin DVT prophylaxis  -Per Orthopedics, surgery cannot be performed here  -Consult Dr Hammonds-Podiatry  -Will ask PT to see to instruct on walker use as she will be non-weight bearing.   - at home. He is disable and can provide a little help.   -2 sons at home both are out of the house during the day school and work      DM2  -last A1C 7.6  -Continue with Home Lantus 20 units   -Restart Metformin 1000 mg Bid   -Low intensity correction insulin  -Carb controlled diet.       HTN  -cont amlodipine, lisinopril, metoprolol     CAD s/p CABG (3/2021)  -cont ASA, metoprolol, statin     Chronic pain  -cont prn meds     MDD  -cont fluoxetine     Anemia  -stable    Spoke with Dr Hammonds-Podiatry service. He  "stated the fracture this pt has is not something he would be able to manage here.  Dr Knox advises pt be seen at [facility] Dr Riley. Referral has been made.   Pt was evaluated by PT. She will use walker for mobility assist as she is NWB on RLE          Diet: NPO per Anesthesia Guidelines for Procedure/Surgery Except for: Meds    DVT Prophylaxis: Enoxaparin (Lovenox) SQ  Schultz Catheter: Not present  Lines: None     Cardiac Monitoring: None  Code Status: Full Code      Clinically Significant Risk Factors Present on Admission                  # Hypertension: home medication list includes antihypertensive(s)     # DMII: A1C = N/A within past 6 months    # Overweight: Estimated body mass index is 29.61 kg/m  as calculated from the following:    Height as of this encounter: 1.676 m (5' 6\").    Weight as of this encounter: 83.2 kg (183 lb 7 oz).           Disposition Plan      Expected Discharge Date: 03/24/2023                The patient's care was discussed with the Attending Physician, Dr. Hyde  and Patient.    JOVITA Hutchinson [facility]  Hospitalist Service  [facility]  Securely message with [employer] (more info)  Text page via [facility] Paging/Directory   ______________________________________________________________________    Interval History   Reviewed ED Physician notes, adm H&P    Physical Exam   Vital Signs: Temp: 96.8  F (36  C) Temp src: Oral BP: 122/72 Pulse: 89   Resp: 20 SpO2: 99 % O2 Device: None (Room air)    Weight: 183 lbs 7 oz    Constitutional: 48 yo female in bed, on RA and not in distress  Eyes: sclera clear and conjunctiva normal  Hematologic / Lymphatic: No bleeding or bruising   Respiratory: No increased work of breathing, good air exchange, clear to auscultation bilaterally, no crackles or wheezing  Cardiovascular: Normal apical impulse, regular rate and rhythm, normal S1 and S2, no S3 or S4, and no murmur noted  GI: normal bowel sounds, soft, non-distended and " non-tender  Skin: no bruising or bleeding, normal skin color, texture, turgor, no redness, warmth, or swelling and no rashes  Musculoskeletal: Right leg in splint. Distal CMS intact  Neurologic: A&Ox4, APARICIO, no focal deficits   Neuropsychiatric: General: normal, calm and normal eye contact  Level of consciousness: alert / normal  Affect: normal  Orientation: oriented to self, place, time and situation  Memory and insight: normal, memory for past and recent events intact and thought process normal    Medical Decision Making       45 MINUTES SPENT BY ME on the date of service doing chart review, history, exam, documentation & further activities per the note.      Data     I have personally reviewed the following data over the past 24 hrs:    8.1  \   12.1   / 262     136 99 22.3 (H) /  267 (H)   4.5 23 0.72 \       Imaging results reviewed over the past 24 hrs:   Recent Results (from the past 24 hour(s))   Ankle XR, G/E 3 views, right    Narrative    EXAM: XR ANKLE RIGHT G/E 3 VIEWS  LOCATION: [facility]  DATE/TIME: 3/22/2023 4:59 PM    INDICATION: Twisting injury, swelling, pain  COMPARISON: None.      Impression    IMPRESSION: Acute fractures of the medial and posterior malleoli of the distal tibia. No fibular fracture visualized. Recommend additional radiographs of the proximal tibia and fibula to exclude a Maisonneuve fracture. Normal joint alignment. Ankle   mortise is symmetric. No fracture in the hindfoot otherwise. Small plantar and Achilles calcaneal enthesophytes.   XR Tibia and Fibula Right 2 Views    Narrative    EXAM: XR TIBIA AND FIBULA RIGHT 2 VIEWS  LOCATION: [facility]  DATE/TIME: 3/22/2023 5:38 PM    INDICATION: Ankle fracture.  COMPARISON: Same-day ankle radiograph.      Impression    IMPRESSION: Mildly displaced oblique fracture of the proximal fibular shaft.     Cortically-based fracture along the distal tibia near the superior margin  of the medial malleolus with additional mildly distracted oblique fracture through the medial malleolus itself. Distracted intra-articular posterior malleolus fracture.   CT Ankle Right w/o Contrast    Narrative    EXAM: CT ANKLE RIGHT W/O CONTRAST  LOCATION: [facility]  DATE/TIME: 3/22/2023 5:48 PM    INDICATION: Abnormal ankle fracture pattern.  COMPARISON: Same-day radiograph.  TECHNIQUE: Noncontrast. Axial, sagittal and coronal thin-section reconstruction. Dose reduction techniques were used.     FINDINGS:     BONES:  -Complex distal tibial fracture involving the medial and posterior malleolus. There is comminution and mild depression of the posterior malleolus fracture at the articular surface of the tibiotalar joint where there is up to 4 mm of articular surface   distraction and 3 mm articular surface depression. The medial malleolus fracture is coronal in orientation with a few small cortically-based avulsive fracture fragments near the superficial deltoid attachment site, for example on coronal image 42. There   is up to 3 mm of fracture fragment distraction.    -Evidence of prior distal syndesmotic injury with tiny fracture fragment seen within the syndesmosis at the expected location of the AITFL, for example on axial image 40. Additional smaller fracture fragments within the syndesmosis which is asymmetric.    -Punctate bone fragment adjacent to the lateral process of the talus could reflect a small avulsive ATFL injury.    -No additional fracture about the ankle. Joint spaces are maintained.    SOFT TISSUES:  -Soft tissue swelling and blood products about the fracture sites. No large hematoma.      Impression    IMPRESSION:  1.  Complex ankle fracture as described, primarily involving the medial and posterior malleolus of the distal tibia with intra-articular extension  2.  Evidence of prior distal syndesmotic injury with small fracture fragments within the syndesmosis  and at the expected location of the AITFL.  3.  Maisonneuve fracture along the proximal fibular shaft is outside the field-of-view.  4.  Punctate bone fragment adjacent to lateral process of the talus could reflect a small avulsive ATFL injury.

## 2023-03-23 NOTE — PROGRESS NOTES
03/23/23 3429   Appointment Info   Signing Clinician's Name / Credentials (PT) Sinai De La Cruz PT, DPT, ATC, LAT and Marylou Magana SPT   Student Supervision Direct Patient Contact Provided;Therapy services provided with the co-signing licensed therapist guiding and directing the services, and providing the skilled judgement and assessment throughout the session;Direct supervision provided   Rehab Comments (PT) f/u with Dr. Riley 3/28       Present no   Living Environment   People in Home child(valencia), adult;child(valencia), dependent;spouse   Current Living Arrangements house   Home Accessibility stairs to enter home   Number of Stairs, Main Entrance 3  (platform walkway steps)   Transportation Anticipated family or friend will provide  (large truck with running boards)   Living Environment Comments adjustable bed, walk in shower, previously had DME set up took it down but family is working to re-establish   Self-Care   Usual Activity Tolerance moderate   Current Activity Tolerance good   Regular Exercise No   Equipment Currently Used at Home none   Fall history within last six months yes   Number of times patient has fallen within last six months 1   Activity/Exercise/Self-Care Comment family able to support as needed. IND previously   General Information   Onset of Illness/Injury or Date of Surgery 03/22/23   Referring Physician Taiwo HUSSEIN, CNP   Patient/Family Therapy Goals Statement (PT) go home and go to surgery nect week   Pertinent History of Current Problem (include personal factors and/or comorbidities that impact the POC) Patient is a 47 year old female, status post right ankle trimalleolar ankle fracture with oblique fibula fracture, per surgical team patient to discharge home and follow up with specialist once swelling is improved. Patient currently in a right ankle splint. Patient with recent gallbladder surgery, previous medical history of DM type 2, HTN, CAD, chronic pain,  depression, anemia.   Existing Precautions/Restrictions weight bearing   Weight-Bearing Status - LUE full weight-bearing   Weight-Bearing Status - RUE full weight-bearing   Weight-Bearing Status - LLE full weight-bearing   Weight-Bearing Status - RLE nonweight-bearing   General Observations PT eval and treat walker training for NWB foot. Activity orders: NWB RLE   Cognition   Affect/Mental Status (Cognition) WFL   Orientation Status (Cognition) oriented x 4   Follows Commands (Cognition) WFL   Pain Assessment   Patient Currently in Pain Yes, see Vital Sign flowsheet   Integumentary/Edema   Integumentary/Edema Comments RLE splint in place   Posture    Posture Not impaired   Range of Motion (ROM)   ROM Comment no ankle ROM, right hip and knee WFL   Strength (Manual Muscle Testing)   Strength (Manual Muscle Testing) Able to perform L SLR;Able to perform R SLR   Strength Comments UE suffcient for walker mobility however unable to complete UE press up/dip   Bed Mobility   Bed Mobility supine-sit;sit-supine   Supine-Sit [place] (Bed Mobility) independent   Sit-Supine [place] (Bed Mobility) independent   Transfers   Transfers sit-stand transfer;toilet transfer   Maintains Weight-bearing Status (Transfers) able to maintain   Sit-Stand Transfer   Sit-Stand [place] (Transfers) modified independence   Assistive Device (Sit-Stand Transfers) walker, front-wheeled   Toilet Transfer   [place] Level (Toilet Transfer) modified independence   Assistive Device (Toilet Transfer) grab bars/safety frame   Type (Toilet Transfer) stand-sit;sit-stand   Gait/Stairs (Locomotion)   [place] Level (Gait) supervision   Assistive Device (Gait) walker, front-wheeled   Distance in Feet 3   Pattern (Gait) 2-point   Deviations/Abnormal Patterns (Gait)   (hop to pattern)   Maintains Weight-bearing Status (Gait) able to maintain   Balance   Balance Comments good static standing stability with NWB   Sensory Examination   Sensory  Perception Comments RLE hypersensitive   Coordination   Coordination no deficits were identified   Muscle Tone   Muscle Tone Comments spasms in RLE   Clinical Impression   Criteria for Skilled Therapeutic Intervention Yes, treatment indicated   PT Diagnosis (PT) impaired mobility, pain   Influenced by the following impairments right leg and ankle fractures   Functional limitations due to impairments use of walker, assistance for stairs and long distance ambulation   Clinical Presentation (PT Evaluation Complexity) Evolving/Changing   Clinical Presentation Rationale acute fractures, severe pain, clinical judgement, medical status   Clinical Decision Making (Complexity) moderate complexity   Planned Therapy Interventions (PT) gait training;progressive activity/exercise;home program guidelines;stair training   Anticipated Equipment Needs at Discharge (PT) walker, rolling   Risk & Benefits of therapy have been explained evaluation/treatment results reviewed;participants included;patient   Clinical Impression Comments Patient presents with functional mobility limited by right ankle fractures and NWB status. Patient provided a walker this date and able to complete mobility without significant assistance. Patient comfortable with discharging home this date. Provided education regarding elevation, pain control, safe mobility in the home and global management. No additional inpatient PT needs.   PT Total Evaluation Time   PT Eval, Low Complexity Minutes (87631) 10   Physical Therapy Goals   PT Frequency One time eval and treatment only   PT Predicted Duration/Target Date for Goal Attainment 03/23/23   PT Goals Gait;Stairs   PT: Gait Modified independent;Rolling walker;10 feet   PT: Stairs Moderate assist;1 stair;Assistive device   Interventions   Interventions Quick Adds Therapeutic Activity   Therapeutic Activity   Therapeutic Activities: dynamic activities to improve functional performance Minutes (22679) 25   Symptoms  Noted During/After Treatment Fatigue;Increased pain   Treatment Detail/Skilled Intervention PT: patient lying in bed, agreeable to PT. Patient educated in RLE elevation, swelling management, surgical outcomes with swelling control. Significant time discussing home set up, safe mobility, use of rolling chair for mobility in the home when fatigued, DME set up and safety concerns. Reviewed how family can support into and out of car and up stairs. Hop to mobility from EOB to into bathroom, to bed and to platform step and back to bed. Educated in hop to pattern and UE support. Provided front wheeled walker, completed purchase paperwork, instructed in adjustments and provided her copy. Patient instructed in UE press up hop up retro onto stair and hop up onto platform step with walker placed on surface. Instructed in walker support for press up at toilet in place of grab bars and unable to complete tricep press up. Attempted each with anxiety and fear, able to complete elbow extension retro hop up onto step with MIN assist for stability and hop down with CGA for safety and good control.  Patient returned to bed, agreeable to DC home and no questions for PT. set up and demonstrated toe above nose elevation.   PT Discharge Planning   PT Plan no IP PT needs   PT Discharge Recommendation (DC Rec) home with assist   PT Rationale for DC Rec Patient from home with family, IND at baseline, stairs to enter and none within the home. Provided a walker this date and able to complete mobility without significant assistance. Patient comfortable with discharging home this date. Provided education regarding elevation, pain control, safe mobility in the home and global management. No additional inpatient PT needs.   PT Brief overview of current status IND bed mobility. MOD IND sit to/from stand with walker with NWB RLE. MOD IND toilet transfer with railings and IND pericares. MIN assist hop retro up 1 platform step and CGA hop down. Hop to  gait 10' in room with walker.   Total Session Time   Timed Code Treatment Minutes 25   Total Session Time (sum of timed and untimed services) 35     Thank you for your referral.  Sinai De La Cruz, PT, DPT, ATC, LAT    [facility]  O: [phone]  E: [email or web address]

## 2023-03-23 NOTE — PLAN OF CARE
Goal Outcome Evaluation:    Alert and oriented x4. VSS. Blood sugar at HS was 260; sliding scale insulin given per orders. PRN toradol and dilaudid given for pain. Patient reports these were effective. Right leg elevated on pillow in bed. Patient came to floor with right leg wrapped with ace bandage and some form of splint. Plan for possible surgery tomorrow. Strict bedrest orders.

## 2023-03-23 NOTE — DISCHARGE INSTRUCTIONS
Follow up with Dr Riley-Orthopedic Surgeon re; your ankle. [address] [facility] [phone]    Non-weight bearing on your right leg. Use walker for to assist with mobility     Return to the Emergency Department for any issues

## 2023-03-23 NOTE — PLAN OF CARE
Goal Outcome Evaluation:      Plan of Care Reviewed With: patient    Overall Patient Progress: improvingOverall Patient Progress: improving    Outcome Evaluation: Pt A&Ox4. VSS on RA other than some slight hypertension that correlates with pain. IV dilaudid x3, toradol x1 today for 8/10 pain. Will use PRN oxycodone and tylenol at home. Pt educated on the importance of alternating meds and side effects associated with narcotics. Will see [facility] specialist on Tuesday for consult.    S-(situation): Patient discharged to home via wheelchair with .     B-(background): R ankle fx    A-(assessment): See above.     R-(recommendations): Discharge instructions reviewed with pt. Listed belongings gathered and returned to patient.          Discharge Nursing Criteria:     Care Plan and Patient education resolved: Yes    New Medications- pt has been educated about purpose and side effects: Yes    Vaccines  Influenza status verified at discharge:  Yes    MISC  Prescriptions if needed, hard copies sent with patient  NA  Home medications returned to patient: NA  Medication Bin checked and emptied on discharge Yes  Patient reports post-discharge pain management plan is effective: Yes

## 2023-03-26 ENCOUNTER — HOSPITAL ENCOUNTER (EMERGENCY)
Facility: CLINIC | Age: 48
Discharge: HOME | End: 2023-03-26
Attending: FAMILY MEDICINE | Admitting: FAMILY MEDICINE
Payer: OTHER MISCELLANEOUS

## 2023-03-26 VITALS
RESPIRATION RATE: 20 BRPM | BODY MASS INDEX: 29.52 KG/M2 | TEMPERATURE: 97.8 F | HEART RATE: 67 BPM | SYSTOLIC BLOOD PRESSURE: 118 MMHG | HEIGHT: 66 IN | DIASTOLIC BLOOD PRESSURE: 57 MMHG | OXYGEN SATURATION: 95 % | WEIGHT: 183.7 LBS

## 2023-03-26 DIAGNOSIS — S82.891A ANKLE FRACTURE, RIGHT, CLOSED, INITIAL ENCOUNTER: ICD-10-CM

## 2023-03-26 DIAGNOSIS — S82.891A CLOSED FRACTURE OF RIGHT ANKLE, INITIAL ENCOUNTER: ICD-10-CM

## 2023-03-26 PROCEDURE — 29515 APPLICATION SHORT LEG SPLINT: CPT | Mod: RT | Performed by: FAMILY MEDICINE

## 2023-03-26 PROCEDURE — 250N000013 HC RX MED GY IP 250 OP 250 PS 637: Performed by: FAMILY MEDICINE

## 2023-03-26 PROCEDURE — 99283 EMERGENCY DEPT VISIT LOW MDM: CPT | Mod: 25 | Performed by: FAMILY MEDICINE

## 2023-03-26 PROCEDURE — 99284 EMERGENCY DEPT VISIT MOD MDM: CPT | Mod: 25 | Performed by: FAMILY MEDICINE

## 2023-03-26 RX ORDER — OXYCODONE HYDROCHLORIDE 5 MG/1
10 TABLET ORAL ONCE
Status: COMPLETED | OUTPATIENT
Start: 2023-03-26 | End: 2023-03-26

## 2023-03-26 RX ORDER — OXYCODONE HYDROCHLORIDE 5 MG/1
5-10 TABLET ORAL EVERY 6 HOURS PRN
Qty: 12 TABLET | Refills: 0 | Status: SHIPPED | OUTPATIENT
Start: 2023-03-26 | End: 2023-06-19

## 2023-03-26 RX ADMIN — OXYCODONE HYDROCHLORIDE 10 MG: 5 TABLET ORAL at 02:06

## 2023-03-26 ASSESSMENT — ACTIVITIES OF DAILY LIVING (ADL): ADLS_ACUITY_SCORE: 35

## 2023-03-26 ASSESSMENT — ENCOUNTER SYMPTOMS: ARTHRALGIAS: 1

## 2023-03-26 NOTE — DISCHARGE INSTRUCTIONS
Please read and follow the handout(s) instructions. Return, if needed, for increased or worsening symptoms and as directed by the handout(s).    I suspect the abdominal wall pain is from the use of the walker and some strain to the abdominal wall muscles. Ice for 10-15 minutes every 1-2 hours can help this. Your abdominal is otherwise soft to pushing on it which is good.
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
## 2023-03-26 NOTE — ED TRIAGE NOTES
Recent fracture in Rt ankle/leg; now with worsening pain not managed with home medications. CMS intact around cast.  Intermittent abd pain after Gallbladder surgery.     Triage Assessment     Row Name 03/26/23 0144       Triage Assessment (Adult)    Airway WDL WDL       Respiratory WDL    Respiratory WDL WDL       Skin Circulation/Temperature WDL    Skin Circulation/Temperature WDL WDL       Cardiac WDL    Cardiac WDL WDL       Peripheral/Neurovascular WDL    Peripheral Neurovascular WDL WDL       Cognitive/Neuro/Behavioral WDL    Cognitive/Neuro/Behavioral WDL WDL

## 2023-03-27 ENCOUNTER — TELEPHONE (OUTPATIENT)
Dept: FAMILY MEDICINE | Facility: CLINIC | Age: 48
End: 2023-03-27

## 2023-03-27 ENCOUNTER — TELEPHONE (OUTPATIENT)
Dept: OTHER | Facility: CLINIC | Age: 48
End: 2023-03-27
Payer: COMMERCIAL

## 2023-03-27 NOTE — TELEPHONE ENCOUNTER
Patient is calling in severe pain.    She stated she fell on 3/22/2023 at work, and was in the ED for assessment.    She stated she was back into the hospital yesterday because the pain was so intense she could not keep it in under control.    Patient verbalized that Dr. Len SANABRIA had referred patient to Dr. Osvaldo MD at the orthopedic clinic in [place] due to the complex fracture patient has to her ankle.  She stated she only has a splint from the hospital on at this time and she does not see Dr. Osvaldo MD until tomorrow afternoon.    Patient stated she does not know what to do with all the pain she is in, she stated she does have a walker, but is too afraid to use it even when going to the bathroom, she is afraid she will fall.    Patient is requesting referral to Rehab facility or hospitalization prior to her scheduled visit tomorrow because of the pain and the assistance she needs to complete any daily activities.  Patient stated hydrocodone has not helped at all, and Dilaudid only helped for about 5 hours at a time.  She stated if PCP can not facilitate a hospital or facility stay, could she help with pain medication?    Advised patient to seek emergency care for severe pain per protocol.  Patient stated she wanted to see if PCP could place her in the hospital or a facility for care before going to the hospital again.    Advised patient again, prior to hearing back from PCP to seek emergency care per protocol for severe pain.  Patient stated understanding.      Deanne Mace RN

## 2023-03-27 NOTE — TELEPHONE ENCOUNTER
Pt called w/ c/o continued right ankle swelling and pain. Pt sustained a right ankle fracture on 3/22.  Pt was recently seen in the ED ytd for right ankle pain. Pt denies any new trauma or injuries to the leg. Pt reports that she has been attempting to keep the leg elevated as much as positive. Pt encouraged to try and keep foot elevated above the level of the heart as much as possible to help combat the swelling and this would help with the pain. Pt also recommended to do intermittent icing of the right ankle. Pt instructed to come to the ED for urgent care if pain continued to worsen or she started developing numbness/tingling in right lower leg.      Thank you,    Darin Yin MD   PGY1  Department of Orthopaedic Surgery

## 2023-03-28 ENCOUNTER — OFFICE VISIT (OUTPATIENT)
Dept: ORTHOPEDICS | Facility: CLINIC | Age: 48
End: 2023-03-28
Payer: OTHER MISCELLANEOUS

## 2023-03-28 ENCOUNTER — MYC MEDICAL ADVICE (OUTPATIENT)
Dept: ORTHOPEDICS | Facility: CLINIC | Age: 48
End: 2023-03-28

## 2023-03-28 ENCOUNTER — TELEPHONE (OUTPATIENT)
Dept: ORTHOPEDICS | Facility: CLINIC | Age: 48
End: 2023-03-28

## 2023-03-28 ENCOUNTER — PRE VISIT (OUTPATIENT)
Dept: ORTHOPEDICS | Facility: CLINIC | Age: 48
End: 2023-03-28

## 2023-03-28 DIAGNOSIS — S82.891A CLOSED FRACTURE OF RIGHT ANKLE, INITIAL ENCOUNTER: ICD-10-CM
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
## 2023-03-28 PROCEDURE — 99204 OFFICE O/P NEW MOD 45 MIN: CPT | Performed by: ORTHOPAEDIC SURGERY

## 2023-03-28 RX ORDER — ASPIRIN 325 MG
1 TABLET ORAL DAILY
COMMUNITY
Start: 2022-12-15 | End: 2023-04-21

## 2023-03-28 RX ORDER — ACETAMINOPHEN 325 MG/1
325-650 TABLET ORAL
COMMUNITY
Start: 2022-09-06 | End: 2023-04-21

## 2023-03-28 RX ORDER — OXYCODONE HYDROCHLORIDE 5 MG/1
5-10 TABLET ORAL EVERY 6 HOURS PRN
Qty: 35 TABLET | Refills: 0 | Status: CANCELLED | OUTPATIENT
Start: 2023-03-28

## 2023-03-28 NOTE — LETTER
3/28/2023         RE: Stacy Brown  [address] [employer] [place] [address]        Dear Colleague,    Thank you for referring your patient, Stacy Brown, to the [facility] [place]. Please see a copy of my visit note below.    CHIEF COMPLAINT:  Right distal tibia intra-articular fracture sustained on 03/22/2023.    HISTORY OF PRESENT ILLNESS:  Mrs. Brown is a 47-year-old female who presents in the company of her  for evaluation of her right ankle injury.  The patient reports is a worker's compensation injury.    She reports to have slipped on some ice on March 22 of this year and to have sustained an acute onset of pain.  Eventually, plain x-rays and a CT scan were obtained.  She was diagnosed with a distal tibia intra-articular fracture.  The patient was evaluated by Dr. Knox who after reviewing the studies recommended her to be cared for by us.    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  She also carries a history of diabetes with a last A1c of 7.8, which was drawn 11/2022.  Prior to that, she had very good A1c's in the low 7s, but towards 2021, she was pretty high, in the 10s.    Presents today for discussion of treatment options.    We reviewed today her past medical and surgical history, current medications and drug allergies.    PHYSICAL EXAMINATION:  On today's visit, she presents as a pleasant female in no apparent distress with a weight of 183 pounds, height of 5 feet 6 inches.    On today's visit, she presented with a large amount of swelling through the foot and ankle.  There is some ecchymosis along the medial aspect of the tibia.  The skin is not wrinkleable.  Range of motion was not tested secondary to pain.  There are palpable pulses in the opposite leg as the patient presents with too much swelling to appreciate any pulses.    There are no fracture blisters or skin abrasions.    IMAGING:  Plain x-rays and CT scan were reviewed today, which  are significant for showing a distal tibia intraarticular fracture on the right lower extremity.  Lateral malleolus is intact.    ASSESSMENT:  Right distal tibia intra-articular fracture.    PLAN:  Discussed with the patient and her  that at this point, we strongly recommend to undergo open reduction and internal fixation of the right tibia.  I discussed with her the most likely postoperative course and complications from such intervention, which include but are not limited to infection, bleeding, nerve damage, residual pain, nonunion, stiffness, and osteoarthritis.    I also discussed with them the importance of maintaining the sugars under control as she is very likely to sustain an infection if the sugars are not well controlled.    The patient requests also to be placed in a transitional care unit after surgery, something that hopefully being worker's comp it will be easier to accomplish  than on a regular basis, which is quite hard to do.  We encouraged her to proceed with elevation as much as possible.  She was placed today in a brand new splint.  She was given a new prescription for oxycodone, given the fact that 2 tablets every 4 hours is not controlling her pain well enough.    All questions were answered.    TT:  30 minutes.        Teaching Flowsheet   Relevant Diagnosis: R ankle ORIF  Teaching Topic: Pre-operative teaching     RN Note: Pre-op teaching was done in clinic in the presence of the patient and her . Pt is calm and cooperative, asking questions appropriately. Pt was instructed on surgical packet requirements including H&P, NPO, responsible , and pre-surgical scrub. Pt verbalized understanding. Scrub and packet given to pt. Ashli to schedule surgery next week and virtual pack appointment prior to surgery.    Patient lives with her  and 2 children, a 16 and 23 year-old. Her  is on disability because of his back and she worries about him taking care of her. Her  kids are at work and school during the day. She requested a knee scooter prior to surgery to help getting around the house.   Order placed.    Encourage patient to ice and elevate extremity prior to surgery to help with pain and swelling. Patient has been taking Oxycodone 10 mg every 6 hours. Prior to fracture patient took 5 mg of Oxycodone nightly for her knee that is prescribed by her PCP. Discussed weaning of narcotics to baseline use after surgery. Prescription for Oxycodone sent to [facility] in [place] to manage pain until surgery.     Person(s) involved in teaching:   Patient and      Motivation Level:  Asks Questions: Yes  Eager to Learn: Yes  Cooperative: Yes  Receptive (willing/able to accept information): Yes  Any cultural factors/[employer]aritan beliefs that may influence understanding or compliance? No     Patient and spouse demonstrates understanding of the following:  Reason for the appointment, diagnosis and treatment plan: Yes  Knowledge of proper use of medications and conditions for which they are ordered (with special attention to potential side effects or drug interactions): Yes  Which situations necessitate calling provider and whom to contact: Yes    Proper use and care of (medical equip, care aids, etc.): Yes  Nutritional needs and diet plan: Yes  Pain management techniques: Yes  Wound Care: Yes  How and/when to access community resources: Yes    Tara Holter, RNCC Fernando Alberto Pena, MD

## 2023-03-28 NOTE — PROGRESS NOTES
Teaching Flowsheet   Relevant Diagnosis: R ankle ORIF  Teaching Topic: Pre-operative teaching     RN Note: Pre-op teaching was done in clinic in the presence of the patient and her . Pt is calm and cooperative, asking questions appropriately. Pt was instructed on surgical packet requirements including H&P, NPO, responsible , and pre-surgical scrub. Pt verbalized understanding. Scrub and packet given to pt. Ashli to schedule surgery next week and virtual pack appointment prior to surgery.    Patient lives with her  and 2 children, a 16 and 23 year-old. Her  is on disability because of his back and she worries about him taking care of her. Her kids are at work and school during the day. She requested a knee scooter prior to surgery to help getting around the house.   Order placed.    Encourage patient to ice and elevate extremity prior to surgery to help with pain and swelling. Patient has been taking Oxycodone 10 mg every 6 hours. Prior to fracture patient took 5 mg of Oxycodone nightly for her knee that is prescribed by her PCP. Discussed weaning of narcotics to baseline use after surgery. Prescription for Oxycodone sent to [facility] in [place] to manage pain until surgery.     Person(s) involved in teaching:   Patient and      Motivation Level:  Asks Questions: Yes  Eager to Learn: Yes  Cooperative: Yes  Receptive (willing/able to accept information): Yes  Any cultural factors/Pentecostal beliefs that may influence understanding or compliance? No     Patient and spouse demonstrates understanding of the following:  Reason for the appointment, diagnosis and treatment plan: Yes  Knowledge of proper use of medications and conditions for which they are ordered (with special attention to potential side effects or drug interactions): Yes  Which situations necessitate calling provider and whom to contact: Yes    Proper use and care of (medical equip, care aids, etc.):  Yes  Nutritional needs and diet plan: Yes  Pain management techniques: Yes  Wound Care: Yes  How and/when to access community resources: Yes    Tara Holter, RNCC

## 2023-03-28 NOTE — NURSING NOTE
Reason For Visit:   Chief Complaint   Patient presents with     Consult     . Slip and fall on ice 3/22. Initial ED visit and 2nd ED visit for pain 3/26. Ankle and prox fib fracture.       LMP 03/07/2021 (Approximate)          Dodie Olivares ATC

## 2023-03-28 NOTE — TELEPHONE ENCOUNTER
[facility] Call Center    Phone Message    May a detailed message be left on voicemail: yes     Reason for Call: Other: Pt is calling about a prescription for pain medication that was supposed to be sent to [facility], is requesting prescription be sent right away, they close at 5:00pm     Action Taken: Other: [facility]    Travel Screening: Not Applicable

## 2023-03-28 NOTE — PROGRESS NOTES
CHIEF COMPLAINT:  Right distal tibia intra-articular fracture sustained on 03/22/2023.    HISTORY OF PRESENT ILLNESS:  Mrs. Brown is a 47-year-old female who presents in the company of her  for evaluation of her right ankle injury.  The patient reports is a worker's compensation injury.    She reports to have slipped on some ice on March 22 of this year and to have sustained an acute onset of pain.  Eventually, plain x-rays and a CT scan were obtained.  She was diagnosed with a distal tibia intra-articular fracture.  The patient was evaluated by Dr. Knox who after reviewing the studies recommended her to be cared for by us.    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  She also carries a history of diabetes with a last A1c of 7.8, which was drawn 11/2022.  Prior to that, she had very good A1c's in the low 7s, but towards 2021, she was pretty high, in the 10s.    Presents today for discussion of treatment options.    We reviewed today her past medical and surgical history, current medications and drug allergies.    PHYSICAL EXAMINATION:  On today's visit, she presents as a pleasant female in no apparent distress with a weight of 183 pounds, height of 5 feet 6 inches.    On today's visit, she presented with a large amount of swelling through the foot and ankle.  There is some ecchymosis along the medial aspect of the tibia.  The skin is not wrinkleable.  Range of motion was not tested secondary to pain.  There are palpable pulses in the opposite leg as the patient presents with too much swelling to appreciate any pulses.    There are no fracture blisters or skin abrasions.    IMAGING:  Plain x-rays and CT scan were reviewed today, which are significant for showing a distal tibia intraarticular fracture on the right lower extremity.  Lateral malleolus is intact.    ASSESSMENT:  Right distal tibia intra-articular fracture.    PLAN:  Discussed with the patient and her  that at this point, we  strongly recommend to undergo open reduction and internal fixation of the right tibia.  I discussed with her the most likely postoperative course and complications from such intervention, which include but are not limited to infection, bleeding, nerve damage, residual pain, nonunion, stiffness, and osteoarthritis.    I also discussed with them the importance of maintaining the sugars under control as she is very likely to sustain an infection if the sugars are not well controlled.    The patient requests also to be placed in a transitional care unit after surgery, something that hopefully being worker's comp it will be easier to accomplish  than on a regular basis, which is quite hard to do.  We encouraged her to proceed with elevation as much as possible.  She was placed today in a brand new splint.  She was given a new prescription for oxycodone, given the fact that 2 tablets every 4 hours is not controlling her pain well enough.    All questions were answered.    TT:  30 minutes.

## 2023-03-29 RX ORDER — OXYCODONE HYDROCHLORIDE 5 MG/1
5-10 TABLET ORAL EVERY 6 HOURS PRN
Qty: 15 TABLET | Refills: 0 | Status: SHIPPED | OUTPATIENT
Start: 2023-03-29 | End: 2023-03-30

## 2023-03-30 ENCOUNTER — MYC REFILL (OUTPATIENT)
Dept: ORTHOPEDICS | Facility: CLINIC | Age: 48
End: 2023-03-30
Payer: COMMERCIAL

## 2023-03-30 DIAGNOSIS — S82.891A CLOSED FRACTURE OF RIGHT ANKLE, INITIAL ENCOUNTER: ICD-10-CM

## 2023-03-30 NOTE — TELEPHONE ENCOUNTER
Patient being seen by Dr. Riley for a right distal tibia fracture. Perioperative schedulers working on scheduling surgery next week. She is taking 2 tablets of Oxycodone 5 mg every 6 hours. Discussed adding Tylenol for the pain. Encouraged to ice and elevate leg as much as possible. Refill routed to Dr. Riley to sign.     Tara Holter, LETICC

## 2023-03-31 ENCOUNTER — HOSPITAL ENCOUNTER (EMERGENCY)
Facility: CLINIC | Age: 48
Discharge: HOME | End: 2023-04-01
Attending: FAMILY MEDICINE | Admitting: FAMILY MEDICINE
Payer: OTHER MISCELLANEOUS

## 2023-03-31 ENCOUNTER — NURSE TRIAGE (OUTPATIENT)
Dept: NURSING | Facility: CLINIC | Age: 48
End: 2023-03-31
Payer: COMMERCIAL

## 2023-03-31 ENCOUNTER — TELEPHONE (OUTPATIENT)
Dept: ORTHOPEDICS | Facility: CLINIC | Age: 48
End: 2023-03-31
Payer: COMMERCIAL

## 2023-03-31 ENCOUNTER — TELEPHONE (OUTPATIENT)
Dept: OTHER | Facility: CLINIC | Age: 48
End: 2023-03-31
Payer: COMMERCIAL
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
## 2023-03-31 DIAGNOSIS — R52 UNCONTROLLED PAIN: ICD-10-CM

## 2023-03-31 DIAGNOSIS — S82.891A ANKLE FRACTURE, RIGHT, CLOSED, INITIAL ENCOUNTER: Primary | ICD-10-CM

## 2023-03-31 DIAGNOSIS — S82.842P ANKLE FRACTURE, BIMALLEOLAR, CLOSED, LEFT, WITH MALUNION, SUBSEQUENT ENCOUNTER: ICD-10-CM

## 2023-03-31 PROCEDURE — 96361 HYDRATE IV INFUSION ADD-ON: CPT | Performed by: FAMILY MEDICINE

## 2023-03-31 PROCEDURE — 250N000011 HC RX IP 250 OP 636: Performed by: FAMILY MEDICINE

## 2023-03-31 PROCEDURE — 99285 EMERGENCY DEPT VISIT HI MDM: CPT | Mod: 25 | Performed by: FAMILY MEDICINE

## 2023-03-31 PROCEDURE — 99284 EMERGENCY DEPT VISIT MOD MDM: CPT | Performed by: FAMILY MEDICINE

## 2023-03-31 PROCEDURE — 258N000003 HC RX IP 258 OP 636: Performed by: FAMILY MEDICINE

## 2023-03-31 PROCEDURE — 96375 TX/PRO/DX INJ NEW DRUG ADDON: CPT | Performed by: FAMILY MEDICINE

## 2023-03-31 PROCEDURE — 96374 THER/PROPH/DIAG INJ IV PUSH: CPT | Performed by: FAMILY MEDICINE

## 2023-03-31 RX ORDER — OXYCODONE HYDROCHLORIDE 5 MG/1
5 TABLET ORAL EVERY 6 HOURS PRN
Qty: 10 TABLET | Refills: 0 | Status: SHIPPED | OUTPATIENT
Start: 2023-03-31 | End: 2023-06-19

## 2023-03-31 RX ORDER — ONDANSETRON 2 MG/ML
4 INJECTION INTRAMUSCULAR; INTRAVENOUS EVERY 30 MIN PRN
Status: DISCONTINUED | OUTPATIENT
Start: 2023-03-31 | End: 2023-04-01 | Stop reason: HOSPADM

## 2023-03-31 RX ORDER — HYDROMORPHONE HYDROCHLORIDE 1 MG/ML
0.5 INJECTION, SOLUTION INTRAMUSCULAR; INTRAVENOUS; SUBCUTANEOUS ONCE
Status: COMPLETED | OUTPATIENT
Start: 2023-03-31 | End: 2023-03-31

## 2023-03-31 RX ORDER — HYDROMORPHONE HYDROCHLORIDE 2 MG/1
2 TABLET ORAL ONCE
Status: COMPLETED | OUTPATIENT
Start: 2023-03-31 | End: 2023-04-01

## 2023-03-31 RX ORDER — HYDROMORPHONE HYDROCHLORIDE 2 MG/1
1 TABLET ORAL EVERY 6 HOURS PRN
Qty: 12 TABLET | Refills: 0 | Status: SHIPPED | OUTPATIENT
Start: 2023-03-31 | End: 2023-04-04

## 2023-03-31 RX ADMIN — HYDROMORPHONE HYDROCHLORIDE 0.5 MG: 1 INJECTION, SOLUTION INTRAMUSCULAR; INTRAVENOUS; SUBCUTANEOUS at 23:04

## 2023-03-31 RX ADMIN — SODIUM CHLORIDE 1000 ML: 9 INJECTION, SOLUTION INTRAVENOUS at 23:03

## 2023-03-31 RX ADMIN — ONDANSETRON 4 MG: 2 INJECTION INTRAMUSCULAR; INTRAVENOUS at 23:04

## 2023-03-31 ASSESSMENT — ACTIVITIES OF DAILY LIVING (ADL): ADLS_ACUITY_SCORE: 35

## 2023-03-31 NOTE — TELEPHONE ENCOUNTER
Orthopaedic Surgery Telehealth Call     DATE OF SERVICE:  3/30/2023    Orthopedic Surgery resident on overnight call contacted via telehealth line to advise Stacy Brown on symptoms related to her right ankle on 3/30/23.      Briefly, Stacy Brown has a right distal tibia intra-articular fracture sustained in a slip and fall on ice on 3/22. She was last seen in clinic by Dr. Riley on 3/28. She informed me that at that time he felt her swelling was too great to proceed with surgery at this time and she was placed in a new splint with recommendations to aggressively ice and elevate extremity to manage swelling.     She called reporting that she feels that her foot is cold, inside of the splint. Feels a cold sensation in the arch of her foot and around her ankle. Her concern is that the splint is too tight and might be compressing and cutting off circulation. She reports that her pain is actually doing quite well -- now a 4/10 and has been a 6-7/10 previously. She has been elevating religiously and icing on and off. States she feels like the splint has more room inside than when initially put on    She reports that her toes have normal color and sensation. Denies new numbness in toes, has history of neuropathy. Is able to move her toes normally.       Based on the limited information provided, I recommended the followin year old female with Right distal tibia intra-articular fracture      I advised her that if her toes have normal color, sensation, and movement and that her pain is well controlled it is unlikely to be compressing too much. If she does develop increasing pain, difficulty moving toes, or notices color or sensation changes she may remove the ace wrap and carefully open the splint in the front a bit to get more room. If her concerns continue through into the morning, I advised she may call into clinic to seek further recommendations.     Patient expressed understanding and agreed with the above  plan.    Being this a phone encounter, I did not personally see or examine the patient at this time.     Orthopedic Surgery did not see nor was involved with the physical examination of the patient for this encounter.     --  Dell Sawyer MD  Orthopedic Surgery PGY-2

## 2023-03-31 NOTE — TELEPHONE ENCOUNTER
[facility] Call Center    Phone Message    May a detailed message be left on voicemail: yes     Reason for Call: Medication Refill Request    Has the patient contacted the pharmacy for the refill? Yes   Name of medication being requested: Oxycodone  Provider who prescribed the medication: Dr. Riley  [facility]  Date medication is needed: asap, today   Patient stated that she's been throwing up and crying her eyes out. She does not want to go in to the weekend without the pain medication.      Action Taken: Message routed to:  [facility] Orthopedics    Travel Screening: Not Applicable

## 2023-03-31 NOTE — TELEPHONE ENCOUNTER
[facility] Call Center    Phone Message    May a detailed message be left on voicemail: yes     Reason for Call: Other: pt asking for medication to be sent to [facility] in [place]        Action Taken: Message routed to:  [facility] UMP: Dr. Riley    Travel Screening: Not Applicable

## 2023-03-31 NOTE — TELEPHONE ENCOUNTER
[facility] Call Center    Phone Message    May a detailed message be left on voicemail: yes     Reason for Call: Medication Refill Request    Has the patient contacted the pharmacy for the refill? Yes   Name of medication being requested: oxycodone  Provider who prescribed the medication: Dr. Riley  Pharmacy: [facility]  Date medication is needed: TODAY     Patient was advised by Dr. Riley to take 1-2 tabs every 4 hours or as needed for pain; however, the prescription only allowed 1 tab every 6 hours.    Patient took her last pill last night @ midnight and has no medication.    Patient said the pills were working when she took 1-2 every 4 hours.    Action Taken: Message routed to:  [facility] other    Travel Screening: Not Applicable

## 2023-03-31 NOTE — TELEPHONE ENCOUNTER
"S: FX ankle & pain.    B: 3/22 R ankle FX. medial and posterior malleoli. R Tib/fib  FX mildly displaced oblique FX of proximal fibular shaft.    3/28 saw ortho Dr. Riley.  Will require ORIF of R ankle.    Symptoms:    Rates pain \"10\" when not taking pain meds    Rates pain \"5\" after pain meds taken    Kept R ankle elevated above level of her heart. CWMS intact.   Using a walker NWB, Does have DX of chronic pan syndrome.    A: Writer page on call Dr. Gordon  He asked writer to pend Oxycodone for him,.  He is willing to reorder 10 pill.  Monday check in with ortho or PCP.   Writer called patient back.    R:  Writer called patient back. Continue to keep elevated, NWB, ice and take pain medication as ordered.  Monday contact ortho about rolling knee walker and pain med refill if needed.    Sharri Stoll RN, [facility] Triage Nurse Advisor    Reason for Disposition    [1] Prescription refill request for ESSENTIAL medicine (i.e., likelihood of harm to patient if not taken) AND [2] triager unable to refill per department policy    Protocols used: MEDICATION REFILL AND RENEWAL CALL-A-AH      "

## 2023-04-01 VITALS
DIASTOLIC BLOOD PRESSURE: 83 MMHG | HEART RATE: 90 BPM | BODY MASS INDEX: 29.54 KG/M2 | OXYGEN SATURATION: 99 % | RESPIRATION RATE: 18 BRPM | TEMPERATURE: 97.5 F | WEIGHT: 183 LBS | SYSTOLIC BLOOD PRESSURE: 175 MMHG

## 2023-04-01 PROCEDURE — 250N000013 HC RX MED GY IP 250 OP 250 PS 637: Performed by: FAMILY MEDICINE

## 2023-04-01 RX ADMIN — HYDROMORPHONE HYDROCHLORIDE 2 MG: 2 TABLET ORAL at 00:02

## 2023-04-01 NOTE — ED PROVIDER NOTES
ED Provider Note     Stacy Brown  [id number]  March 31, 2023      CC:     Chief Complaint   Patient presents with     Medication Refill     Ankle Pain          History is obtained from patient.  She is accompanied by her .    HPI: Stacy Brown is a 47 year old female presenting with acute persistent pain in the left ankle from a complex ankle fracture that she sustained on March 23.  Patient states that she has been taking her pain medication as prescribed and ran out of her medications at midnight last night.  She had been on pain medication previously for her biliary colic, and finally had her gallbladder surgery in mid February.  She had just returned to work when she slipped and fell on the ice, and sustained fractures involving the medial and posterior malleolus with intra-articular extension.  Report of her CT scan is noted below.    IMPRESSION:  1.  Complex ankle fracture as described, primarily involving the medial and posterior malleolus of the distal tibia with intra-articular extension  2.  Evidence of prior distal syndesmotic injury with small fracture fragments within the syndesmosis and at the expected location of the AITFL.  3.  Maisonneuve fracture along the proximal fibular shaft is outside the field-of-view.  4.  Punctate bone fragment adjacent to lateral process of the talus could reflect a small avulsive ATFL injury.    Patient's [place] prescription monitoring profile indicates that she has had about 95 oxycodone pills in the last 8 days.  Patient states that her pain is uncontrolled at this time and she is having some nausea and vomiting.  The patient's , Humble, states that she has been doing well not ambulating much.          PMH/Problem List:   Past Medical History:   Diagnosis Date     Knee pain, chronic      Mixed hyperlipidemia      NSTEMI (non-ST elevated myocardial infarction) (H) 3/5/2021     S/P CABG  (coronary artery bypass graft) 3/16/2021     Tobacco abuse disorder 11/21/2017     Type II or unspecified type diabetes mellitus without mention of complication, not stated as uncontrolled 08/16/2002       PSH:   Past Surgical History:   Procedure Laterality Date     BYPASS GRAFT ARTERY CORONARY N/A 3/9/2021    Procedure: CORONARY ARTERY BYPASS GRAFT X 4 (LIMA - LAD, SV - RPL, SV - PDA,  RA - OM) LEFT RADIAL ENDOARTERY HARVEST AND BILATERAL LEG ENDOVEIN HARVEST (ON CARDIOPULMONARY PUMP OXYGENATOR ; INTRAOPERATIVE TRANSESOPHAGEAL ECHOCARDIOGRAM BY ANESTHESIOLOGIST DR. NEL AVILA)   ;  Surgeon: Kunal Selby MD;  Location:  OR     CV HEART CATHETERIZATION WITH POSSIBLE INTERVENTION N/A 3/8/2021    Procedure: Heart Catheterization with Possible Intervention;  Surgeon: Vadim Kamara MD;  Location:  HEART CARDIAC CATH LAB     HC OPEN TX METATARSAL FRACTURE  age 12    softball injury,open fracture left foot     HC TOOTH EXTRACTION W/FORCEP      Extract wisdom teeth     INJECT JOINT SACROILIAC Left 1/11/2018    Procedure: INJECT JOINT SACROILIAC;  INJECT JOINT SACROILIAC LEFT;  Surgeon: Alan Marshall MD;  Location: [facility] OR     LAPAROSCOPIC CHOLECYSTECTOMY N/A 2/13/2023    Procedure: CHOLECYSTECTOMY, LAPAROSCOPIC;  Surgeon: Robert Diop DO;  Location: [facility] OR     OPERATIVE HYSTEROSCOPY WITH MORCELLATOR N/A 7/24/2018    Procedure: OPERATIVE HYSTEROSCOPY WITH MORCELLATOR (MYOSURE);  Exam under anesthesia, operative hysteroscopy, polypectomy, D & C;  Surgeon: Sindhu Peterson DO;  Location: [facility] OR     TUBAL LIGATION  7/27/2006     [facility] STABISM SURG,PREV EYE SURG,NOT [facility]      Right       MEDS: No current facility-administered medications on file prior to encounter.  acetaminophen (TYLENOL) 325 MG tablet, Take 325-650 mg by mouth  acetaminophen (TYLENOL) 325 MG tablet, Take 1 tablet (325 mg) by mouth every 4 hours as needed for mild pain TAKE TWO TABLETS BY MOUTH EVERY 4 HOURS AS NEEDED  FOR MILD PAIN  amLODIPine (NORVASC) 2.5 MG tablet, TAKE 1 TABLET (2.5 MG) BY MOUTH DAILY  Ascorbic Acid (VITAMIN C) 100 MG CHEW, Take 1 chew tab by mouth daily  aspirin (ASA) 325 MG tablet, Take 1 tablet by mouth daily  aspirin (ASA) 325 MG tablet, Take 1 tablet (325 mg) by mouth daily  blood glucose (NO BRAND SPECIFIED) test strip, Use to test blood sugar up to 4 times daily or as directed. To accompany: Blood Glucose Monitor Brands: per insurance.  blood glucose calibration (NO BRAND SPECIFIED) solution, To accompany: Blood Glucose Monitor Brands: per insurance.  blood glucose monitoring (FREESTYLE) lancets, Use to test blood sugars 1-2 times daily or as directed, per patients glucose meter.  Blood Glucose Monitoring Suppl (ACCU-CHEK COMPLETE) KIT, 1 Device daily  FLUoxetine (PROZAC) 20 MG capsule, Take 1 capsule (20 mg) by mouth daily  insulin aspart (NOVOLOG FLEXPEN) 100 UNIT/ML pen, Novolog Flexpen. Inject 1 units per 10 gram carb unit before breakfast, lunch and dinner, up to 15 units per meal.  insulin glargine (BASAGLAR KWIKPEN) 100 UNIT/ML pen, Inject 42 Units Subcutaneous every morning  insulin pen needle (31G X 8 MM) 31G X 8 MM miscellaneous, 1 Box of 100 insulin pen needles to be dispensed with every insulin pen prescription  insulin pen needle (NOVOFINE) 32G X 6 MM miscellaneous, Use once daily or as directed.  lisinopril (ZESTRIL) 2.5 MG tablet, Take 1 tablet (2.5 mg) by mouth daily  metFORMIN (GLUCOPHAGE XR) 500 MG 24 hr tablet, Take 2 tablets (1,000 mg) by mouth 2 times daily (with meals)  metoclopramide (REGLAN) 5 MG tablet, Take 1 tablet (5 mg) by mouth 3 times daily as needed (Nausea, vomiting)  metoprolol tartrate (LOPRESSOR) 25 MG tablet, Take 1 tablet (25 mg) by mouth 2 times daily  Multiple Vitamins-Minerals (MULTI-VITAMIN GUMMIES) CHEW, Take 1 chew tab by mouth daily   nitroGLYcerin (NITROSTAT) 0.4 MG sublingual tablet, For chest pain place 1 tablet under the tongue every 5 minutes for 3  doses. If symptoms persist 5 minutes after 1st dose call 911.  oxyCODONE (ROXICODONE) 5 MG tablet, Take 1 tablet (5 mg) by mouth every 6 hours as needed for severe pain (7-10)  oxyCODONE (ROXICODONE) 5 MG tablet, Take 1-2 tablets (5-10 mg) by mouth every 6 hours as needed for pain  oxyCODONE (ROXICODONE) 5 MG tablet, Take 1-2 tablets (5-10 mg) by mouth every 6 hours as needed for pain  rosuvastatin (CRESTOR) 20 MG tablet, Take 1 tablet (20 mg) by mouth daily  tiZANidine (ZANAFLEX) 4 MG tablet, Take 1 tablet (4 mg) by mouth 3 times daily        Allergies: Amoxicillin and Hydrocodone-acetaminophen    Triage and nursing notes were reviewed.    ROS: All other systems were reviewed and are negative    Physical Exam:  Vitals:    03/31/23 2132   BP: (!) 110/93   Pulse: (!) 127   Resp: 20   Temp: 97.5  F (36.4  C)   SpO2: 99%   Weight: 83 kg (183 lb)     GENERAL APPEARANCE: Alert, actively dry heaving and vomiting; patient is laying on her right decubitus position; left leg is propped up on a pillow  HEAD: atraumatic  EYES: PERRL, EOMI  HENT: Normal external exam  RESP: Normal respiratory effort  EXT: Left leg is in a posterior Ortho-Glass splint.  Toes are slightly cool but good color  SKIN: no rash; as above  NEURO: mentation and speech normal; no focal deficits    Labs/Imaging Results:  No results found. However, due to the size of the patient record, not all encounters were searched. Please check Results Review for a complete set of results.        Impression:  Final diagnoses:   Uncontrolled pain   Ankle fracture, bimalleolar, closed, left, with malunion, subsequent encounter   Work related injury         ED Course & Medical Decision Making (Plan):  Stacy Brown is a 47 year old female with a known complex left ankle fracture involving bimalleolar and intraventricular extension.  Patient is currently splinted, and approximately 7 days after her injury.  She presents with pain that is out of control despite taking  her oxycodone.  She may have developed a tolerance as she had been taking it regularly for her biliary colic.  She finally had her gallbladder surgery in mid February, and sustained this ankle fracture at work on March 23.  She has not yet had her surgery.  Based on her [place] prescription monitoring profile, she has had almost 100 tablets of oxycodone in the last 8 days.  She states that she took her last dose at midnight.  Vital signs reveal a temp of 97.5, blood pressure 110/93, heart rate of 127, respiratory rate of 20 with 99% oxygen saturation.    Patient reports that her active nausea and vomiting is from her pain.  There is concern for possible acute opiate withdrawal since it has been almost 20 hours since her last dose of pain medication.      Medications   ondansetron (ZOFRAN) injection 4 mg (4 mg Intravenous $Given 3/31/23 2304)   HYDROmorphone (DILAUDID) tablet 2 mg (has no administration in time range)   0.9% sodium chloride BOLUS (0 mLs Intravenous Stopped 3/31/23 2356)   HYDROmorphone (PF) (DILAUDID) injection 0.5 mg (0.5 mg Intravenous $Given 3/31/23 2304)      Patient reports that her pain is much improved.  She would like to try to go home.  I discussed with her that she is likely developing a tolerance to the oxycodone.  She might have had some opiate withdrawal symptoms presenting with tachycardia and vomiting.  Patient will take Dilaudid over the weekend, 1 mg every 6 hours.  Follow-up with her primary care provider or with Dr. Gracia on Monday for further pain management prior to her anticipated ankle surgery.  Patient will return to the emergency department this weekend if she develops new or worsening symptoms.        Written after-visit summary and instructions were given at the time of discharge.        Discharge Instructions:  You are tolerant to oxycodone.  Take Dilaudid 1 mg every 6 hours as needed for moderate to severe pain.  Take ibuprofen/Tylenol as needed for mild to moderate  pain.  Follow-up with your primary care provider or with Dr. Gracia on Monday for further pain management.        Disclaimer: This note consists of words and symbols derived from keyboarding and dictation using voice recognition software.  As a result, there may be errors that have gone undetected.  Please consider this when interpreting information found in this note.       Salo Baker MD  04/01/23 0001

## 2023-04-01 NOTE — ED TRIAGE NOTES
Pt presents with severe right ankle pain.Pt had fractured ankle on 03/22/23. 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 Pt is in splint.  Pt states she took her last oxycodone for pain. Pt states she took her last medication last nite at approx midnite.  Pt states she sent request for refill however not done.      Triage Assessment       Row Name 03/31/23 4248       Triage Assessment (Adult)    Airway WDL WDL       Respiratory WDL    Respiratory WDL WDL       Skin Circulation/Temperature WDL    Skin Circulation/Temperature WDL WDL       Cardiac WDL    Cardiac WDL WDL       Peripheral/Neurovascular WDL    Peripheral Neurovascular WDL WDL       Cognitive/Neuro/Behavioral WDL    Cognitive/Neuro/Behavioral WDL WDL

## 2023-04-01 NOTE — DISCHARGE INSTRUCTIONS
You are tolerant to oxycodone.  Take Dilaudid 1 mg every 6 hours as needed for moderate to severe pain.  Take ibuprofen/Tylenol as needed for mild to moderate pain.  Follow-up with your primary care provider or with Dr. Gracia on Monday for further pain management.

## 2023-04-01 NOTE — TELEPHONE ENCOUNTER
Stacy calls and says that she has a broken ankle and broken leg and needs her pain medication. Pt. Says that she was speaking to another nurse who was supposed to speak to a Dr. RN then checked [facility] and saw that a nurse spoke to Dr. Gordon. That Dr. Had that nurse pend Oxycodone for him. RN saw that the Oxycodone is pending in Epic. RN told this to pt. And told pt. That this nurse will page an on-call Dr. Pt. Says that she does not want this because her pharmacy closes at 8 pm and it is now closed. RN then told pt. that pt. Can have someone take her to the ER and see if the Dr. Will get her some pain medicaton. RN also told pt. That pt. Can call back in the am, when her pharmacy is open, To see if the Dr. Can order the pain medication. Pt. Says that she is not sure what she will do. COVID 19 Nurse Triage Plan/Patient Instructions    Please be aware that novel coronavirus (COVID-19) may be circulating in the community. If you develop symptoms such as fever, cough, or SOB or if you have concerns about the presence of another infection including coronavirus (COVID-19), please contact your health care provider or visit [email or web address].     Disposition/Instructions    Home care recommended. Follow home care protocol based instructions.    Thank you for taking steps to prevent the spread of this virus.  o Limit your contact with others.  o Wear a simple mask to cover your cough.  o Wash your hands well and often.    Resources    [facility]: About COVID-19: [email or web address]    CDC: What to Do If You're Sick: www.cdc.gov/coronavirus/2019-ncov/about/steps-when-sick.html    CDC: Ending Home Isolation: www.cdc.gov/coronavirus/2019-ncov/hcp/disposition-in-home-patients.html     CDC: Caring for Someone: www.cdc.gov/coronavirus/2019-ncov/if-you-are-sick/care-for-someone.html     [facility]: Interim Guidance for Hospital Discharge to Home:  www.health.[facility].[place]/diseases/coronavirus/hcp/hospdischarge.pdf    [facility] clinical trials (COVID-19 research studies): [email or web address]/umn-clinical-trials     Below are the COVID-19 hotlines at the [place] Department of Health ([facility]). Interpreters are available.   o For health questions: Call 519-455-3624 or 1-325.409.5068 (7 a.m. to 7 p.m.)  o For questions about schools and childcare: Call 640-252-1368 or 1-457.324.4566 (7 a.m. to 7 p.m.)                     Reason for Disposition    [1] Follow-up call to recent contact AND [2] information only call, no triage required    Additional Information    Negative: [1] Caller is not with the adult (patient) AND [2] reporting urgent symptoms    Negative: Lab result questions    Negative: Medication questions    Negative: Caller can't be reached by phone    Negative: Caller has already spoken to PCP or another triager    Negative: RN needs further essential information from caller in order to complete triage    Negative: Requesting regular office appointment    Negative: [1] Caller requesting NON-URGENT health information AND [2] PCP's office is the best resource    Negative: Health Information question, no triage required and triager able to answer question    Negative: General information question, no triage required and triager able to answer question    Negative: Question about upcoming scheduled test, no triage required and triager able to answer question    Negative: [1] Caller is not with the adult (patient) AND [2] probable NON-URGENT symptoms    Protocols used: INFORMATION ONLY CALL - NO TRIAGE-A-

## 2023-04-02 RX ORDER — OXYCODONE HYDROCHLORIDE 5 MG/1
5-10 TABLET ORAL EVERY 4 HOURS PRN
Qty: 40 TABLET | Refills: 0 | Status: SHIPPED | OUTPATIENT
Start: 2023-04-02 | End: 2023-04-21

## 2023-04-03 ENCOUNTER — TELEPHONE (OUTPATIENT)
Dept: ORTHOPEDICS | Facility: CLINIC | Age: 48
End: 2023-04-03
Payer: COMMERCIAL

## 2023-04-03 DIAGNOSIS — M25.572 PAIN IN JOINT, ANKLE AND FOOT, LEFT: Primary | ICD-10-CM

## 2023-04-05 NOTE — TELEPHONE ENCOUNTER
Clearly this did not happen as I am only  Just now seeing this message. Please get update, I don't see that she went in to see Dr. Riley.   I cannot facilitate hospital admission without ED visit, but possibly now she doesn't need it any longer.   Yaima Muse MD

## 2023-04-06 PROBLEM — F41.9 ANXIETY: Status: ACTIVE | Noted: 2023-04-06

## 2023-04-07 NOTE — TELEPHONE ENCOUNTER
Patient was seen in the ED for this on 3/27 & 3/31. She did also see Dr. Riley on 3/28 in person. Could you take another look at the encounters and readdress this please.

## 2023-04-11 ENCOUNTER — MYC REFILL (OUTPATIENT)
Dept: ORTHOPEDICS | Facility: CLINIC | Age: 48
End: 2023-04-11
Payer: COMMERCIAL

## 2023-04-11 DIAGNOSIS — Z98.890 STATUS POST SURGERY: Primary | ICD-10-CM

## 2023-04-11 RX ORDER — HYDROMORPHONE HYDROCHLORIDE 2 MG/1
1 TABLET ORAL EVERY 8 HOURS PRN
Qty: 5 TABLET | Refills: 0 | Status: SHIPPED | OUTPATIENT
Start: 2023-04-11 | End: 2023-07-06

## 2023-04-11 NOTE — TELEPHONE ENCOUNTER
Patient requesting refill of Dilaudid. She had an ORIF with Dr. Riley 4/5. She is taking 1 mg every 6 to 7 hours. She is taking Tylenol and Hydroxyzine. She is elevating her foot over 21 hours out of the day.    Discussed weaning post-operatively. Patient verbalizes understanding and agrees to take 1 mg every 8 hours and to increase time between doses. Routed to Dr. Riley to sign.     Tara Holter, RNCC

## 2023-04-13 ENCOUNTER — TELEPHONE (OUTPATIENT)
Dept: FAMILY MEDICINE | Facility: CLINIC | Age: 48
End: 2023-04-13

## 2023-04-13 NOTE — TELEPHONE ENCOUNTER
Stacy is looking for a refill on her Oxycodone 5mg, the twice daily prescription is deactivated due to recent emergency room prescriptions.    Thank you

## 2023-04-15 ENCOUNTER — NURSE TRIAGE (OUTPATIENT)
Dept: NURSING | Facility: CLINIC | Age: 48
End: 2023-04-15
Payer: COMMERCIAL

## 2023-04-15 NOTE — TELEPHONE ENCOUNTER
Nurse Triage SBAR    Is this a 2nd Level Triage? YES, LICENSED PRACTITIONER REVIEW IS REQUIRED    Situation: Fracture in ankle surgery 4/5 now tingling in toes and ankle and heavy feeling.         Assessment: Started feeling tingling and heaviness in ankle and toes, is able to wiggle, warm to touch. Unable to press on toenails to check circulation. Pain is 2/10, no fever.       Protocol Recommended Disposition:   Call PCP Now, Home Care    Recommendation: Rest, ice, elevate as best can. Take    Tylenol and  Ibuprofen when needed.   If pain gets out of control and can not move toes  See in ED or call triage again.     If symptoms persists over weekend should call and be seen sooner than scheduled post of appointment of 4/24    Paged to provider  Paged on call Venkat Stafford at 1:45  Received call back at 1:46 from Orthopedic nurse she stated on call is in surgery and will be out soon to call back.   Received call back from on call at 2:16, received recommendations  Called patient back at 2:21 agreed with the plan.    Jazmin Clark RN on 4/15/2023 at 2:22 PM            Reason for Disposition    Other post-op symptom or question    [1] Caller has URGENT question AND [2] triager unable to answer question    Additional Information    Negative: Sounds like a life-threatening emergency to the triager    Negative: Chest pain    Negative: Difficulty breathing    Negative: Acting confused (e.g., disoriented, slurred speech) or excessively sleepy    Negative: Post-Op tonsil and adenoid surgery, symptoms or questions about    Negative: [1] Pain or burning with passing urine (urination) AND [2] male    Negative: [1] Pain or burning with passing urine (urination) AND [2] female    Negative: Constipation    Negative: New or worsening leg (calf, thigh) pain    Negative: New or worsening leg swelling    Negative: Dizziness is severe, or persists > 24 hours after surgery    Negative: Pain, redness, swelling, or pus at IV Site     Negative: Symptoms arising from use of a urinary catheter (e.g., coude, Schultz)    Negative: Cast problems or questions    Negative: Medication question    Negative: [1] Widespread rash AND [2] bright red, sunburn-like    Negative: [1] SEVERE headache AND [2] after spinal (epidural) anesthesia    Negative: [1] Vomiting AND [2] persists > 4 hours    Negative: [1] Vomiting AND [2] abdomen looks much more swollen than usual    Negative: [1] Drinking very little AND [2] dehydration suspected (e.g., no urine > 12 hours, very dry mouth, very lightheaded)    Negative: Patient sounds very sick or weak to the triager    Negative: Sounds like a serious complication to the triager    Negative: Fever > 100.4 F (38.0 C)    Negative: [1] SEVERE post-op pain (e.g., excruciating, pain scale 8-10) AND [2] not controlled with pain medications    Negative: [1] Caller has URGENT question AND [2] triager unable to answer question    Negative: [1] Headache AND [2] after spinal (epidural) anesthesia AND [3] not severe    Negative: Fever present > 3 days (72 hours)    Negative: [1] MILD-MODERATE post-op pain (e.g., pain scale 1-7) AND [2] not controlled with pain medications    Negative: [1] Caller has NON-URGENT question AND [2] triager unable to answer question    Negative: [1] Major abdominal surgical incision AND [2] wound gaping open AND [3] visible internal organs    Negative: Sounds like a life-threatening emergency to the triager    Negative: Patient has a Negative Pressure Wound Therapy device    Negative: Patient is followed by a wound clinic or wound specialist for this wound    Negative: [1] Bleeding from incision AND [2] won't stop after 10 minutes of direct pressure    Negative: [1] Bleeding (more than a few drops) from incision AND [2] blood vessel surgery (e.g., carotid endarterectomy, femoral bypass graft, kidney dialysis fistula, tracheostomy)    Negative: [1] Widespread rash AND [2] bright red, sunburn-like    Negative:  Severe pain in the incision    Negative: [1] Incision gaping open AND [2] < 48 hours since wound re-opened    Negative: [1] Incision gaping open AND [2] length of opening > 2 inches (5 cm)    Negative: Patient sounds very sick or weak to the triager    Negative: Sounds like a serious complication to the triager    Negative: Fever > 100.4 F (38.0 C)    Negative: [1] Incision looks infected (spreading redness, pain) AND [2] fever > 99.5 F (37.5 C)    Negative: [1] Incision looks infected (spreading redness, pain) AND [2] large red area (> 2 in. or 5 cm)    Negative: [1] Incision looks infected (spreading redness, pain) AND [2] face wound    Negative: [1] Red streak runs from the incision AND [2] longer than 1 inch (2.5 cm)    Negative: [1] Pus or bad-smelling fluid draining from incision AND [2] no fever    Negative: Surgical incision symptoms and questions    Negative: General activity, questions about    Negative: Resuming driving, questions about    Negative: Resuming sexual relations, questions about    Negative: Getting the incision wet, questions about    Negative: Throat pain after surgery, questions about    Negative: [1] Vomiting AND [2] present < 4 hours    Negative: Serious injury with multiple fractures (broken bones)    Negative: [1] Major bleeding (e.g., actively dripping or spurting) AND [2] can't be stopped    Negative: Amputation    Negative: Looks like a dislocated joint (very crooked or deformed)    Negative: Sounds like a life-threatening emergency to the triager    Negative: Wound looks infected    Negative: Caused by an animal bite    Negative: Caused by a human bite    Negative: Puncture wound of foot    Negative: Toe injury is main concern    Negative: Cast problems or questions    Negative: Bullet wound, stabbed by knife, or other serious penetrating wound    Negative: Skin is split open or gaping (or length > 1/2 inch or 12 mm)    Negative: [1] Bleeding AND [2] won't stop after 10 minutes of  direct pressure (using correct technique)    Negative: [1] Dirt in the wound AND [2] not removed with 15 minutes of scrubbing    Negative: Can't stand (bear weight) or walk    Negative: [1] Numbness (new loss of sensation) of toe(s) AND [2] present now    Negative: Sounds like a serious injury to the triager    Negative: [1] SEVERE pain AND [2] not improved 2 hours after pain medicine/ice packs    Negative: Suspicious history for the injury    Negative: [1] Limp when walking AND [2] due to a twisted ankle or foot    Negative: [1] Limp when walking AND [2] due to a direct blow or crushing injury    Negative: Large swelling or bruise (> 2 inches or 5 cm)    Negative: Diabetes mellitus (Exception: small cut or scrape)    Negative: [1] High-risk adult (e.g., age > 60 years, osteoporosis, chronic steroid use) AND [2] limping    Negative: [1] No prior tetanus shots (or is not fully vaccinated) AND [2] any wound (e.g., cut, scrape)    Negative: [1] HIV positive or severe immunodeficiency (severely weak immune system) AND [2] DIRTY cut    Negative: [1] Post-op pain AND [2] not controlled with pain medications    Negative: Dressing soaked with blood or body fluid (e.g., drainage)    Negative: [1] Scant bleeding (e.g., few drops) from incision AND [2] blood vessel surgery (e.g., carotid endarterectomy, femoral bypass graft, kidney dialysis fistula    Negative: [1] Raised bruise and [2] size > 2 inches (5 cm) and expanding    Protocols used: POST-OP SYMPTOMS AND [id number], POST-OP INCISION SYMPTOMS AND [id number], ANKLE AND FOOT [id number]-[id number]

## 2023-04-17 NOTE — TELEPHONE ENCOUNTER
FYI - Status Update    Who is Calling: patient    Update: patient needing refill on pain medication today. Please see message below.    Does caller want a call/response back: Yes     Could we send this information to you in [employer] or would you prefer to receive a phone call?:   Patient would prefer a phone call   Okay to leave a detailed message?: Yes at Home number on file [phone] (home)

## 2023-04-19 ENCOUNTER — DOCUMENTATION ONLY (OUTPATIENT)
Dept: ORTHOPEDICS | Facility: CLINIC | Age: 48
End: 2023-04-19
Payer: COMMERCIAL

## 2023-04-19 NOTE — PROGRESS NOTES
Received Completed forms Yes   Faxed Forms Mailed to patients home address per pt's request.    Sent to HIM (Date) 4/19/23

## 2023-04-20 ENCOUNTER — MYC MEDICAL ADVICE (OUTPATIENT)
Dept: FAMILY MEDICINE | Facility: CLINIC | Age: 48
End: 2023-04-20
Payer: COMMERCIAL

## 2023-04-20 ENCOUNTER — TELEPHONE (OUTPATIENT)
Dept: ORTHOPEDICS | Facility: CLINIC | Age: 48
End: 2023-04-20
Payer: COMMERCIAL

## 2023-04-20 DIAGNOSIS — S82.891A CLOSED FRACTURE OF RIGHT ANKLE, INITIAL ENCOUNTER: ICD-10-CM

## 2023-04-20 NOTE — TELEPHONE ENCOUNTER
[facility] Call Center    Phone Message    May a detailed message be left on voicemail: yes     Reason for Call: Other: Patient called stated that  is requesting a Work ability report and a Medical Narrative to be faxed to [phone]. Without the information, the patient will not get paid. Please call and advise     Action Taken: Other: [facility]    Travel Screening: Not Applicable

## 2023-04-20 NOTE — TELEPHONE ENCOUNTER
Called pt back, confirmed forms are in the mail and she will receive in a few days. No further questions.

## 2023-04-21 RX ORDER — OXYCODONE HYDROCHLORIDE 5 MG/1
5 TABLET ORAL EVERY 6 HOURS PRN
Qty: 40 TABLET | Refills: 0 | Status: SHIPPED | OUTPATIENT
Start: 2023-04-21 | End: 2023-05-17

## 2023-04-24 ENCOUNTER — OFFICE VISIT (OUTPATIENT)
Dept: ORTHOPEDICS | Facility: CLINIC | Age: 48
End: 2023-04-24
Payer: COMMERCIAL

## 2023-04-24 DIAGNOSIS — S82.891A CLOSED FRACTURE OF RIGHT ANKLE, INITIAL ENCOUNTER: ICD-10-CM

## 2023-04-24 DIAGNOSIS — Z98.890 STATUS POST SURGERY: Primary | ICD-10-CM

## 2023-04-24 PROCEDURE — 99207 PR NO CHARGE NURSE ONLY: CPT

## 2023-04-24 NOTE — PROGRESS NOTES
Reason for visit:    Stacy Brown came in to the clinic for a two week post op check.    Her surgery was done 4/5/2023 by Dr Riley.  She had a Right distal tibia ORIF.     Assessment:    Stacy came into the clinic in a CAM boot Non-WB, using a rolling knee scooter, accompanied by her .     The Surgical wounds were exposed and found to be well-healed; so the sutures were removed. Skin was c/d/i. Steri strips were applied. Minimal to no swelling noted. Stacy reports she is doing very well and elevated as instructed.    Plan:     She was placed back into her CAM walker.  She was told to remain Non-WB. She may remove the boot for resting, sitting, sleeping and hygiene.   An order will be placed for physical therapy which she may begin right away.     She has an appointment to see Dr. Riley at 6 weeks post op and at that time Dr. Riley will determine further restrictions.    She has our phone number and will call with questions or problems.    Dodie Davila PA-C is the overseeing provider on site today.

## 2023-04-25 ENCOUNTER — TELEPHONE (OUTPATIENT)
Dept: ORTHOPEDICS | Facility: CLINIC | Age: 48
End: 2023-04-25
Payer: COMMERCIAL

## 2023-04-25 NOTE — TELEPHONE ENCOUNTER
Patient call and said that her work needs a workability form with any restrictions for patient, please fax to [phone]. This is time sensitive, as this will affect patient getting paid this week by her employer.

## 2023-04-26 NOTE — TELEPHONE ENCOUNTER
Confirmed with Rama, clinic specialist. Called pt and informed her that we faxed the form back.         -EDY Hendrix- Orthopedics

## 2023-05-05 NOTE — TELEPHONE ENCOUNTER
Refill was given on 4/20/2023 for pain medication.  Will close this encounter at this time.    Deanne Mace RN

## 2023-05-10 ENCOUNTER — DOCUMENTATION ONLY (OUTPATIENT)
Dept: ORTHOPEDICS | Facility: CLINIC | Age: 48
End: 2023-05-10
Payer: COMMERCIAL

## 2023-05-10 ENCOUNTER — THERAPY VISIT (OUTPATIENT)
Dept: PHYSICAL THERAPY | Facility: CLINIC | Age: 48
End: 2023-05-10
Attending: ORTHOPAEDIC SURGERY
Payer: OTHER MISCELLANEOUS

## 2023-05-10 DIAGNOSIS — Z98.890 STATUS POST SURGERY: ICD-10-CM

## 2023-05-10 DIAGNOSIS — S82.891A CLOSED FRACTURE OF RIGHT ANKLE, INITIAL ENCOUNTER: Primary | ICD-10-CM

## 2023-05-10 PROCEDURE — 97110 THERAPEUTIC EXERCISES: CPT | Mod: GP | Performed by: PHYSICAL THERAPIST

## 2023-05-10 PROCEDURE — 97016 VASOPNEUMATIC DEVICE THERAPY: CPT | Mod: GP | Performed by: PHYSICAL THERAPIST

## 2023-05-10 PROCEDURE — 97161 PT EVAL LOW COMPLEX 20 MIN: CPT | Mod: GP | Performed by: PHYSICAL THERAPIST

## 2023-05-10 NOTE — PROGRESS NOTES
Physical Therapy Initial Evaluation  Subjective:  The history is provided by the patient and medical records. No  was used.   Patient Health History  Stacy Brown being seen for R ankle/leg.     Problem began: 3/22/2023 (DOI; referral: 4/24/23).   Problem occurred: slipped on ice   Pain is reported as 5/10 on pain scale.  General health as reported by patient is good.  Pertinent medical history includes: diabetes, depression, high blood pressure, overweight and heart problems.   Red flags:  Pain at rest/night (Pt reports this as new since the injury- pain).  Medical allergies: other. Other medical allergies details: Amoxicilin .   Surgeries include:  Heart surgery (quad. bypass 3-9-21).    Current medications:  Anti-depressants, high blood pressure medication, muscle relaxants and pain medication.    Current occupation is rural .   Primary job tasks include:  Lifting/carrying, driving, prolonged sitting, pushing/pulling and prolonged standing.                  Therapist Generated HPI Evaluation  Problem details: Pt reports falling on ice during her shift on 3/22 and was brought to the ED where she was issued a 2ww to use to allow edema to reduce before surgical intervention. OP surgery [facility] on 4/5 (next MD appt. 5/23). Given knee scooter to use immediately after surgery as Pt is NWB.  .         Type of problem:  Right ankle.    This is a new condition.  Condition occurred with:  A fall/slip.  Where condition occurred: at work.  Patient reports pain:  Medial and lateral (medial constant, lateral intermittent).  Pain is described as aching (dull, constant. Shooting pain in the toes with extended use of knee scooter.) and is constant.  Pain radiates to:  Foot and ankle. Pain is the same all the time.  Since onset symptoms are gradually improving.  Associated symptoms:  Edema, numbness and other (numbness on the dorsum of the foot. at times. Mild bruising around medial  malleolus. ). Symptoms are exacerbated by activity (extended use of knee scooter)  and relieved by ice, analgesics and rest (previous oxy prescription for nerve pain from another on-the-job injury (dog bite)- only takes when pain too intense, but dpes improve).  Special tests included:  X-ray.  Previous treatment includes physical therapy. Improved with treatment: IP training for walker.  Restrictions due to condition include:  Currently not working due to present treatment.  Barriers include:  No railing on stairs (help from spouse for all transfers, mobility, stairs, etc.).                        Objective:    Gait:  Knee scooter    Gait Type:  Not assessed   Weight Bearing Status:  NWB   Assistive Devices:  CAM            Ankle/Foot Evaluation  ROM:    AROM:    Dorsiflexion:  Left:   15*  Right:   Lacking 5*  Plantarflexion:  Left:  50*    Right:  11*  Inversion:  Left:  14*     Right:  0* ++ pain  Eversion:  10*     Right:  5*        Strength is not assessed (d/t pain).      PALPATION:     Right ankle tenderness present at:   medial malleolus and lateral malleolus  EDEMA:   Left ankle edema present at: 53.0cm  Right ankle edema present at:  53.5cm      Figure 8 left: 53.0cmFigure 8 right: 53.5cm                                                      General     ROS    Assessment/Plan:    Patient is a 47 year old female with right side ankle complaints.    Patient has the following significant findings with corresponding treatment plan.                Diagnosis 1:  Ankle pain S/p ORIF R distal tibia  Pain -  hot/cold therapy, US, electric stimulation, manual therapy, splint/taping/bracing/orthotics, self management, education and home program  Decreased ROM/flexibility - manual therapy, therapeutic exercise, therapeutic activity and home program  Decreased joint mobility - manual therapy, therapeutic exercise, therapeutic activity and home program  Decreased strength - therapeutic exercise, therapeutic activities  and home program  Impaired balance - neuro re-education, therapeutic activities, adaptive equipment/assistive device and home program  Decreased proprioception - neuro re-education, therapeutic activities and home program  Edema - vasopneumatics, cold therapy and self management/home program  Impaired gait - gait training, assistive devices and home program  Impaired muscle performance - electric stimulation, neuro re-education and home program  Decreased function - therapeutic activities and home program    Therapy Evaluation Codes:   1) History comprised of:   Personal factors that impact the plan of care:      Past/current experiences, Social history/culture and Work status.    Comorbidity factors that impact the plan of care are:      Diabetes, Depression, Heart problems, High blood pressure, Overweight and Pain at night/rest.     Medications impacting care: Anti-depressant, High blood pressure, Muscle relaxant and Pain.  2) Examination of Body Systems comprised of:   Body structures and functions that impact the plan of care:      Ankle.   Activity limitations that impact the plan of care are:      Driving, Dressing, Jumping, Lifting, Running, Sitting, Sports, Squatting/kneeling, Stairs, Standing, Walking, Working and Sleeping.  3) Clinical presentation characteristics are:   Stable/Uncomplicated.  4) Decision-Making    Low complexity using standardized patient assessment instrument and/or measureable assessment of functional outcome.  Cumulative Therapy Evaluation is: Low complexity.    Previous and current functional limitations:  (See Goal Flow Sheet for this information)    Short term and Long term goals: (See Goal Flow Sheet for this information)     Communication ability:  Patient appears to be able to clearly communicate and understand verbal and written communication and follow directions correctly.  Treatment Explanation - The following has been discussed with the patient:   RX ordered/plan of  care  Anticipated outcomes  Possible risks and side effects  This patient would benefit from PT intervention to resume normal activities.   Rehab potential is good.    Frequency:  1 X week, once daily  Duration:  for 12 weeks  Discharge Plan:  Achieve all LTG.  Independent in home treatment program.  Reach maximal therapeutic benefit.    Please refer to the daily flowsheet for treatment today, total treatment time and time spent performing 1:1 timed codes.     Zachariah Montes, SPT  Amanda Hilligoss, TIMOTHYT

## 2023-05-10 NOTE — PROGRESS NOTES
Received Completed forms Yes   Faxed Forms Faxed To: [facility]  Fax Number: [phone]   Sent to HIM (Date) 5/9/23

## 2023-05-13 NOTE — TELEPHONE ENCOUNTER
----- Message from Yaima Muse MD sent at 4/17/2017  9:45 AM CDT -----  Please notify Stacy of the following:   Her diabetes control is just a little better than last time, but still not near her goal. She does need to take her medication (oral AND insulin) every day as prescribed.   She also needs to be on a cholesterol lowering medication, as her cholesterol is very high. I am sending in a Rx for that, as well.   She needs to take her iron pills, as her iron is extremely low.  She said she has some at home, I would like her to take one twice daily, and then I'd like to see her back in a month to see how she is doing with taking her medications.   I also would like her to see Jemma for a diabetes ed refresher.   I am sending in a Rx for her iron pills, too, just for when she runs out.   If she has any questions or concerns, let me know.   Yaima Muse MD  
Called pt and left to call back.  Nadine Recinos MA     
Left  to have patient call clinic back.    Cammy Martinez, CMA    
Let her know that I called in some antibiotics for her.  I still recommend she get in to see the dentist.   Yaima Muse MD   
Pt advised on MD notes as written and states understanding.   Anna Weldon, CMA   
Pt called back and was informed of the results and below msg. She stated that she has a tooth that is infected and wanted to know if  would send her in a rx without having to be seen. She does not have an appt with a dentist at this time. She stated that we can leave a detailed msg on her cell phone if do or don't send in a med for her. She uses [employer].  Nadine Recinos MA     
98.5

## 2023-05-16 DIAGNOSIS — S82.891A CLOSED FRACTURE OF RIGHT ANKLE, INITIAL ENCOUNTER: ICD-10-CM

## 2023-05-17 ENCOUNTER — THERAPY VISIT (OUTPATIENT)
Dept: PHYSICAL THERAPY | Facility: CLINIC | Age: 48
End: 2023-05-17
Payer: OTHER MISCELLANEOUS

## 2023-05-17 DIAGNOSIS — S82.891A CLOSED FRACTURE OF RIGHT ANKLE: ICD-10-CM

## 2023-05-17 DIAGNOSIS — R60.0 LOCALIZED EDEMA: ICD-10-CM

## 2023-05-17 DIAGNOSIS — R26.9 ABNORMAL GAIT: ICD-10-CM

## 2023-05-17 DIAGNOSIS — M25.571 PAIN IN JOINT INVOLVING ANKLE AND FOOT, RIGHT: Primary | ICD-10-CM

## 2023-05-17 DIAGNOSIS — Z98.890 STATUS POST SURGERY: ICD-10-CM

## 2023-05-17 PROCEDURE — 97110 THERAPEUTIC EXERCISES: CPT | Mod: GP | Performed by: PHYSICAL THERAPIST

## 2023-05-17 PROCEDURE — 97140 MANUAL THERAPY 1/> REGIONS: CPT | Mod: GP | Performed by: PHYSICAL THERAPIST

## 2023-05-17 PROCEDURE — 97016 VASOPNEUMATIC DEVICE THERAPY: CPT | Mod: GP | Performed by: PHYSICAL THERAPIST

## 2023-05-17 RX ORDER — OXYCODONE HYDROCHLORIDE 5 MG/1
5 TABLET ORAL DAILY PRN
Qty: 30 TABLET | Refills: 0 | Status: SHIPPED | OUTPATIENT
Start: 2023-05-17 | End: 2023-06-15

## 2023-05-17 NOTE — TELEPHONE ENCOUNTER
I want her weaning her pain meds back down again. It has been long enough since surgery that she should not be continuing on them regularly anymore. I would like to see her in follow up as well, please schedule next available.  Yaima Muse MD

## 2023-05-17 NOTE — TELEPHONE ENCOUNTER
Patient informed via mychart to schedule. 5/22 reminder if not read to send letter.     Closing encounter.   Clair Ortiz MA

## 2023-05-21 ENCOUNTER — MYC REFILL (OUTPATIENT)
Dept: FAMILY MEDICINE | Facility: CLINIC | Age: 48
End: 2023-05-21
Payer: COMMERCIAL

## 2023-05-21 DIAGNOSIS — E11.65 TYPE 2 DIABETES MELLITUS WITH HYPERGLYCEMIA, WITH LONG-TERM CURRENT USE OF INSULIN (H): ICD-10-CM

## 2023-05-21 DIAGNOSIS — Z79.4 TYPE 2 DIABETES MELLITUS WITH HYPERGLYCEMIA, WITH LONG-TERM CURRENT USE OF INSULIN (H): ICD-10-CM

## 2023-05-22 DIAGNOSIS — Z98.890 STATUS POST SURGERY: Primary | ICD-10-CM

## 2023-05-23 ENCOUNTER — ANCILLARY PROCEDURE (OUTPATIENT)
Dept: GENERAL RADIOLOGY | Facility: CLINIC | Age: 48
End: 2023-05-23
Attending: ORTHOPAEDIC SURGERY
Payer: COMMERCIAL

## 2023-05-23 ENCOUNTER — ANCILLARY PROCEDURE (OUTPATIENT)
Dept: GENERAL RADIOLOGY | Facility: CLINIC | Age: 48
End: 2023-05-23
Attending: ORTHOPAEDIC SURGERY
Payer: OTHER MISCELLANEOUS

## 2023-05-23 ENCOUNTER — OFFICE VISIT (OUTPATIENT)
Dept: ORTHOPEDICS | Facility: CLINIC | Age: 48
End: 2023-05-23
Payer: OTHER MISCELLANEOUS

## 2023-05-23 DIAGNOSIS — Z98.890 STATUS POST SURGERY: Primary | ICD-10-CM

## 2023-05-23 DIAGNOSIS — Z98.890 STATUS POST SURGERY: ICD-10-CM

## 2023-05-23 PROCEDURE — 99024 POSTOP FOLLOW-UP VISIT: CPT | Performed by: ORTHOPAEDIC SURGERY

## 2023-05-23 PROCEDURE — 73610 X-RAY EXAM OF ANKLE: CPT | Mod: RT | Performed by: RADIOLOGY

## 2023-05-23 PROCEDURE — 73590 X-RAY EXAM OF LOWER LEG: CPT | Mod: RT | Performed by: RADIOLOGY

## 2023-05-23 RX ORDER — INSULIN GLARGINE 100 [IU]/ML
42 INJECTION, SOLUTION SUBCUTANEOUS EVERY MORNING
Qty: 45 ML | Refills: 1 | Status: SHIPPED | OUTPATIENT
Start: 2023-05-23 | End: 2023-09-15

## 2023-05-23 NOTE — TELEPHONE ENCOUNTER
Prescription approved per [facility] Refill Protocol.  Jessica Méndez RN on 5/23/2023 at 3:08 PM

## 2023-05-23 NOTE — PROGRESS NOTES
Chief complaint status post right distal tibia open reduction internal fixation performed on April 5, 2023    Mrs. Brown presents today for further follow-up in the company of her .  Patient reports to be compliant reports to be doing well she has attended 2 sessions of physical therapy    On today's exam she presents with combined plantar dorsiflexion of approximately 25 degrees.  Presents with minimum swelling there is a well-healed surgical incision there is no pain on palpation forefoot exam is unremarkable    3 views of the right ankle were reviewed today which are significant for showing excellent consolidation of the fracture site hardware is intact and in place alignment is excellent his ankle syndesmosis is congruent    Assessment is status post right distal tibia open reduction internal fixation    Plan discussed with the patient and her  that she is making excellent progress and cannot proceed with weightbearing as tolerated and progressively wean herself off the knee scooter and the cam walker    She will proceed with physical therapy without restrictions she will follow-up in 6 weeks from now and at that time 3 views of the right ankle will be obtained    Into the visit she was given a prescription for physical therapy and a workability form.

## 2023-05-23 NOTE — NURSING NOTE
Reason For Visit:   Chief Complaint   Patient presents with     RECHECK     6 weeks s/p right distal tibia ORIF DOS 4/5/23. XR today       LMP 03/07/2021 (Approximate)          Dodie Olivares ATC

## 2023-05-23 NOTE — LETTER
5/23/2023         RE: Stacy Brown  [address] [facility] [address]        Dear Colleague,    Thank you for referring your patient, Stacy Brown, to the [facility] [place]. Please see a copy of my visit note below.    Chief complaint status post right distal tibia open reduction internal fixation performed on April 5, 2023    Mrs. Brown presents today for further follow-up in the company of her .  Patient reports to be compliant reports to be doing well she has attended 2 sessions of physical therapy    On today's exam she presents with combined plantar dorsiflexion of approximately 25 degrees.  Presents with minimum swelling there is a well-healed surgical incision there is no pain on palpation forefoot exam is unremarkable    3 views of the right ankle were reviewed today which are significant for showing excellent consolidation of the fracture site hardware is intact and in place alignment is excellent his ankle syndesmosis is congruent    Assessment is status post right distal tibia open reduction internal fixation    Plan discussed with the patient and her  that she is making excellent progress and cannot proceed with weightbearing as tolerated and progressively wean herself off the knee scooter and the cam walker    She will proceed with physical therapy without restrictions she will follow-up in 6 weeks from now and at that time 3 views of the right ankle will be obtained    Into the visit she was given a prescription for physical therapy and a workability form.        Carlos Riley MD

## 2023-05-23 NOTE — LETTER
Return to Work  May 23, 2023     Seen today: Yes    Patient:  Stacy Brown  :   1975  MRN:     [id number]  Physician: CARLOS FUENTEStrenareyes may return to work.      The next clinic appointment is scheduled for (date/time) 6 weeks.    Patient limitations:  Seated work only    Electronically signed by Carlos Fuentes MD

## 2023-05-24 ENCOUNTER — THERAPY VISIT (OUTPATIENT)
Dept: PHYSICAL THERAPY | Facility: CLINIC | Age: 48
End: 2023-05-24
Attending: ORTHOPAEDIC SURGERY
Payer: OTHER MISCELLANEOUS

## 2023-05-24 ENCOUNTER — TELEPHONE (OUTPATIENT)
Dept: ORTHOPEDICS | Facility: CLINIC | Age: 48
End: 2023-05-24
Payer: COMMERCIAL

## 2023-05-24 DIAGNOSIS — R26.9 ABNORMAL GAIT: ICD-10-CM

## 2023-05-24 DIAGNOSIS — S82.891A CLOSED FRACTURE OF RIGHT ANKLE, INITIAL ENCOUNTER: ICD-10-CM

## 2023-05-24 DIAGNOSIS — Z98.890 STATUS POST SURGERY: ICD-10-CM

## 2023-05-24 DIAGNOSIS — R60.0 LOCALIZED EDEMA: ICD-10-CM

## 2023-05-24 DIAGNOSIS — M25.571 PAIN IN JOINT INVOLVING ANKLE AND FOOT, RIGHT: Primary | ICD-10-CM

## 2023-05-24 PROCEDURE — 97140 MANUAL THERAPY 1/> REGIONS: CPT | Mod: GP | Performed by: PHYSICAL THERAPIST

## 2023-05-24 PROCEDURE — 97116 GAIT TRAINING THERAPY: CPT | Mod: GP | Performed by: PHYSICAL THERAPIST

## 2023-05-24 PROCEDURE — 97110 THERAPEUTIC EXERCISES: CPT | Mod: GP | Performed by: PHYSICAL THERAPIST

## 2023-05-24 NOTE — TELEPHONE ENCOUNTER
Called patient back and LVM explaining that we are not allowed to enter a web site under a patient's profile, but we can email her the letter so she can upload it. Provided clinic number for call back.

## 2023-05-24 NOTE — TELEPHONE ENCOUNTER
[facility] Call Center    Phone Message    May a detailed message be left on voicemail: yes     Reason for Call: Other: Work Comp called to see if you could upload the letter to release patient back to work.  Send it to [email or web address] and enter patients  and injury date of 3/22/23 then click upload.  Thank you.     Action Taken: Other: [phone]    Travel Screening: Not Applicable

## 2023-05-31 ENCOUNTER — THERAPY VISIT (OUTPATIENT)
Dept: PHYSICAL THERAPY | Facility: CLINIC | Age: 48
End: 2023-05-31
Attending: ORTHOPAEDIC SURGERY
Payer: OTHER MISCELLANEOUS

## 2023-05-31 DIAGNOSIS — R26.9 ABNORMAL GAIT: ICD-10-CM

## 2023-05-31 DIAGNOSIS — S82.891A CLOSED FRACTURE OF RIGHT ANKLE, INITIAL ENCOUNTER: ICD-10-CM

## 2023-05-31 DIAGNOSIS — M25.571 PAIN IN JOINT INVOLVING ANKLE AND FOOT, RIGHT: ICD-10-CM

## 2023-05-31 DIAGNOSIS — Z98.890 STATUS POST SURGERY: ICD-10-CM

## 2023-05-31 DIAGNOSIS — R60.0 LOCALIZED EDEMA: Primary | ICD-10-CM

## 2023-05-31 PROCEDURE — 97110 THERAPEUTIC EXERCISES: CPT | Mod: GP | Performed by: PHYSICAL THERAPIST

## 2023-05-31 PROCEDURE — 97116 GAIT TRAINING THERAPY: CPT | Mod: GP | Performed by: PHYSICAL THERAPIST

## 2023-05-31 PROCEDURE — 97140 MANUAL THERAPY 1/> REGIONS: CPT | Mod: GP | Performed by: PHYSICAL THERAPIST

## 2023-05-31 PROCEDURE — 97016 VASOPNEUMATIC DEVICE THERAPY: CPT | Mod: GP | Performed by: PHYSICAL THERAPIST

## 2023-06-01 ENCOUNTER — HEALTH MAINTENANCE LETTER (OUTPATIENT)
Age: 48
End: 2023-06-01

## 2023-06-01 NOTE — ADDENDUM NOTE
DISCHARGE SUMMARY  To be completed within 30 days of last clinical contact    @Emily Kim : 1993 MRN: [id number]    Date of Intake: 23 Date of Last Session: 2023    Chief Complaint: mood management and healthy habits prior to potential bariatric surgery    Admission Diagnosis: MDD, AMANDEEP, Bipolar Depression    D/C Diagnosis Codes: Generalized anxiety disorder  (primary encounter diagnosis)  Bipolar depression (CMD)  Attention deficit hyperactivity disorder (ADHD), predominantly inattentive type    The above named client was discharged from  care on 2023 for the following reason(s): Patient Has Completed Treatment (mutual agreement)    Treatment Provided: (check all that apply) Individual    Summary of Client Progress Toward Goals in the Treatment Plan: Client was worried about overeating habits and once she made a change to eating more frequent meals, she found she was able to be more consistent and no longer have the concern. She is stable on her medications and takes them regularly. She denied major problems managing stressors. She has good support from her  and a sister-in-law who also had bariatric surgery with success.     Current Outpatient Medications   Medication Sig Dispense Refill   • galcanezumab-gnlm (Emgality) 120 MG/ML injection Inject 2 mLs into the skin 1 time for 1 dose. Inject initial 2 ML (2 syringes) for loading dose and then 1 ML  (1 syringe) monthly thereafter. Loading dose is 2 self auto injectors, thereafter it is 1 self auto injector every 30 days. Two separate prescriptions will be done. One for loading dose and one for refills. 2 mL 0   • galcanezumab-gnlm (Emgality) 120 MG/ML injection Inject 1 mL into the skin every 30 days. Inject initial 2 ML (2 syringes) for loading dose and then 1 ML  (1 syringe) monthly thereafter. Loading dose is 2 self auto injectors, thereafter it is 1 self auto injector every 30 days. Two separate prescriptions will be done.  Addended by: MICHA ALAS on: 5/9/2017 03:16 PM     Modules accepted: Orders     One for loading dose and one for refills. 1 mL 11   • ERENumab-aooe (Aimovig) 140 MG/ML injection Inject 1 mL into the skin every 30 days. 1 mL 11   • rimegepant sulfate (Nurtec) 75 MG disintegrating tablet Take 1 tablet by mouth daily as needed for Migraine. Max 75 mg/24 hours. 8 tablet 3   • pregabalin (LYRICA) 75 MG capsule Take 1 capsule by mouth in the morning and 1 capsule in the evening. 60 capsule 2   • ibuprofen (MOTRIN) 800 MG tablet Take 1 tablet by mouth 3 times daily as needed for Pain. 90 tablet 2   • Semaglutide-Weight Management (WEGOVY) 0.25 MG/0.5ML Solution Auto-injector Inject 0.25 mg into the skin every 7 days. 2 mL 0   • liraglutide - weight management (Saxenda) 18 MG/3ML pen-injector START 0.6 MG DAILY FOR 1 WEEK THEN INCREASE DOSE BY 0.6 WEEKLY TO MAX DOSE OF 3 MG DAILY 15 mL 1   • SUMAtriptan (Imitrex) 50 MG tablet Take 1 tablet by mouth at onset of migraine. 9 tablet 0   • naltrexone 1.5 mg capsule Take 1 capsule by mouth daily for 30 days, then 2 capsules by mouth daily for 30 days, then 3 capsules daily thereafter 270 g 0   • Baclofen 5 MG tablet Take 1 tablet by mouth 3 times daily as needed (muscle spasms). 90 tablet 3   • Insulin Pen Needle (B-D U/F PEN NEEDLE 5/16\") 31G X 8 MM Misc Use to inject Saxenda 1 times daily. Remove needle cover(s) to expose needle before injecting. 100 each 3   • metroNIDAZOLE (FLAGYL) 500 MG tablet TAKE 1 TABLET BY MOUTH TWICE DAILY 14 tablet 0   • buPROPion XL (WELLBUTRIN XL) 300 MG 24 hr tablet Take 1 tablet by mouth daily. 90 tablet 1   • diclofenac (VOLTAREN) 1 % gel Apply 4 g topically 4 times daily as needed (right knee pain). 300 g 1   • ondansetron (Zofran ODT) 4 MG disintegrating tablet Place 1 tablet onto the tongue every 6 hours as needed for Nausea. 10 tablet 0   • metroNIDAZOLE (Metrogel) 1 % gel Apply topically daily. 60 g 2   • busPIRone (BUSPAR) 15 MG tablet TAKE ONE-HALF TABLET BY MOUTH THREE TIMES DAILY AS NEEDED FOR ANXIETY 90 tablet 0      No current facility-administered medications for this visit.       Discharge Recommendations: (Include names and addresses of facilities, persons or programs the patient was referred to following discharge.) Continue following up with the bariatric team.     Ongoing medical issues that have impacted treatment: none    Remaining Discharge Needs: (Include treatment issues not addressed.) Client is welcome to return to therapy in the future writer writer if he/she would determine it to be helpful.       Jamie Romero LPC, [facility], [facility] Date: 6/1/2023   Time: 1:17 PM

## 2023-06-09 ENCOUNTER — THERAPY VISIT (OUTPATIENT)
Dept: PHYSICAL THERAPY | Facility: CLINIC | Age: 48
End: 2023-06-09
Payer: OTHER MISCELLANEOUS

## 2023-06-09 DIAGNOSIS — R60.0 LOCALIZED EDEMA: Primary | ICD-10-CM

## 2023-06-09 DIAGNOSIS — R26.9 ABNORMAL GAIT: ICD-10-CM

## 2023-06-09 DIAGNOSIS — M25.571 PAIN IN JOINT INVOLVING ANKLE AND FOOT, RIGHT: ICD-10-CM

## 2023-06-09 DIAGNOSIS — Z98.890 STATUS POST SURGERY: ICD-10-CM

## 2023-06-09 DIAGNOSIS — S82.891A CLOSED FRACTURE OF RIGHT ANKLE, INITIAL ENCOUNTER: ICD-10-CM

## 2023-06-09 PROCEDURE — 97016 VASOPNEUMATIC DEVICE THERAPY: CPT | Mod: GP | Performed by: PHYSICAL THERAPIST

## 2023-06-09 PROCEDURE — 97110 THERAPEUTIC EXERCISES: CPT | Mod: GP | Performed by: PHYSICAL THERAPIST

## 2023-06-09 PROCEDURE — 97140 MANUAL THERAPY 1/> REGIONS: CPT | Mod: GP | Performed by: PHYSICAL THERAPIST

## 2023-06-14 ENCOUNTER — MYC REFILL (OUTPATIENT)
Dept: FAMILY MEDICINE | Facility: CLINIC | Age: 48
End: 2023-06-14
Payer: COMMERCIAL

## 2023-06-14 DIAGNOSIS — S82.891A CLOSED FRACTURE OF RIGHT ANKLE, INITIAL ENCOUNTER: ICD-10-CM

## 2023-06-14 NOTE — TELEPHONE ENCOUNTER
Pending Prescriptions:                       Disp   Refills    oxyCODONE (ROXICODONE) 5 MG tablet         30 tab*0        Sig: Take 1 tablet (5 mg) by mouth daily as needed for           severe pain      Routing refill request to provider for review/approval because:  Drug not on the [facility] refill protocol     Jessica Méndez RN on 6/14/2023 at 1:18 PM

## 2023-06-15 ENCOUNTER — MYC REFILL (OUTPATIENT)
Dept: FAMILY MEDICINE | Facility: CLINIC | Age: 48
End: 2023-06-15
Payer: COMMERCIAL

## 2023-06-15 DIAGNOSIS — S82.891A CLOSED FRACTURE OF RIGHT ANKLE, INITIAL ENCOUNTER: ICD-10-CM

## 2023-06-15 RX ORDER — OXYCODONE HYDROCHLORIDE 5 MG/1
5 TABLET ORAL DAILY PRN
Qty: 30 TABLET | Refills: 0 | Status: SHIPPED | OUTPATIENT
Start: 2023-06-15 | End: 2023-07-11

## 2023-06-15 NOTE — TELEPHONE ENCOUNTER
oxyCODONE (ROXICODONE) 5 MG tablet        Last Written Prescription Date:  5/17/23  Last Fill Quantity: 30,   # refills: 0  Last Office Visit: 3/3/23  Future Office visit:       Routing refill request to provider for review/approval because:  Drug not on the [facility] or [facility] refill protocol or controlled substance

## 2023-06-16 ENCOUNTER — THERAPY VISIT (OUTPATIENT)
Dept: PHYSICAL THERAPY | Facility: CLINIC | Age: 48
End: 2023-06-16
Payer: OTHER MISCELLANEOUS

## 2023-06-16 DIAGNOSIS — Z98.890 STATUS POST SURGERY: ICD-10-CM

## 2023-06-16 DIAGNOSIS — M25.571 PAIN IN JOINT INVOLVING ANKLE AND FOOT, RIGHT: ICD-10-CM

## 2023-06-16 DIAGNOSIS — R60.0 LOCALIZED EDEMA: Primary | ICD-10-CM

## 2023-06-16 DIAGNOSIS — S82.891A CLOSED FRACTURE OF RIGHT ANKLE, INITIAL ENCOUNTER: ICD-10-CM

## 2023-06-16 DIAGNOSIS — S82.891A CLOSED FRACTURE OF RIGHT ANKLE: ICD-10-CM

## 2023-06-16 DIAGNOSIS — R26.9 ABNORMAL GAIT: ICD-10-CM

## 2023-06-16 DIAGNOSIS — Z98.890 STATUS POST SURGERY: Primary | ICD-10-CM

## 2023-06-16 PROCEDURE — 97140 MANUAL THERAPY 1/> REGIONS: CPT | Mod: GP | Performed by: PHYSICAL THERAPIST

## 2023-06-16 PROCEDURE — 97016 VASOPNEUMATIC DEVICE THERAPY: CPT | Mod: GP | Performed by: PHYSICAL THERAPIST

## 2023-06-16 PROCEDURE — 97110 THERAPEUTIC EXERCISES: CPT | Mod: GP | Performed by: PHYSICAL THERAPIST

## 2023-06-19 RX ORDER — OXYCODONE HYDROCHLORIDE 5 MG/1
5 TABLET ORAL DAILY PRN
Qty: 30 TABLET | Refills: 0 | OUTPATIENT
Start: 2023-06-19

## 2023-06-22 ENCOUNTER — THERAPY VISIT (OUTPATIENT)
Dept: PHYSICAL THERAPY | Facility: CLINIC | Age: 48
End: 2023-06-22
Payer: OTHER MISCELLANEOUS

## 2023-06-22 DIAGNOSIS — Z98.890 STATUS POST SURGERY: ICD-10-CM

## 2023-06-22 DIAGNOSIS — M25.571 PAIN IN JOINT INVOLVING ANKLE AND FOOT, RIGHT: ICD-10-CM

## 2023-06-22 DIAGNOSIS — R60.0 LOCALIZED EDEMA: Primary | ICD-10-CM

## 2023-06-22 DIAGNOSIS — S82.891A CLOSED FRACTURE OF RIGHT ANKLE: ICD-10-CM

## 2023-06-22 DIAGNOSIS — R26.9 ABNORMAL GAIT: ICD-10-CM

## 2023-06-22 PROCEDURE — 97110 THERAPEUTIC EXERCISES: CPT | Mod: GP | Performed by: PHYSICAL THERAPIST

## 2023-06-22 PROCEDURE — 97112 NEUROMUSCULAR REEDUCATION: CPT | Mod: GP | Performed by: PHYSICAL THERAPIST

## 2023-06-22 PROCEDURE — 97140 MANUAL THERAPY 1/> REGIONS: CPT | Mod: GP | Performed by: PHYSICAL THERAPIST

## 2023-06-22 PROCEDURE — 97116 GAIT TRAINING THERAPY: CPT | Mod: GP | Performed by: PHYSICAL THERAPIST

## 2023-06-29 ENCOUNTER — THERAPY VISIT (OUTPATIENT)
Dept: PHYSICAL THERAPY | Facility: CLINIC | Age: 48
End: 2023-06-29
Payer: OTHER MISCELLANEOUS

## 2023-06-29 DIAGNOSIS — Z98.890 STATUS POST SURGERY: ICD-10-CM

## 2023-06-29 DIAGNOSIS — S82.891D CLOSED FRACTURE OF RIGHT ANKLE WITH ROUTINE HEALING, SUBSEQUENT ENCOUNTER: ICD-10-CM

## 2023-06-29 DIAGNOSIS — R26.9 ABNORMAL GAIT: ICD-10-CM

## 2023-06-29 DIAGNOSIS — Z98.890 STATUS POST SURGERY: Primary | ICD-10-CM

## 2023-06-29 DIAGNOSIS — M25.571 PAIN IN JOINT INVOLVING ANKLE AND FOOT, RIGHT: ICD-10-CM

## 2023-06-29 DIAGNOSIS — M25.572 PAIN IN JOINT, ANKLE AND FOOT, LEFT: ICD-10-CM

## 2023-06-29 DIAGNOSIS — S82.891A CLOSED FRACTURE OF RIGHT ANKLE, INITIAL ENCOUNTER: ICD-10-CM

## 2023-06-29 DIAGNOSIS — R60.0 LOCALIZED EDEMA: Primary | ICD-10-CM

## 2023-06-29 PROCEDURE — 97140 MANUAL THERAPY 1/> REGIONS: CPT | Mod: GP | Performed by: PHYSICAL THERAPIST

## 2023-06-29 PROCEDURE — 97110 THERAPEUTIC EXERCISES: CPT | Mod: GP | Performed by: PHYSICAL THERAPIST

## 2023-07-06 ENCOUNTER — APPOINTMENT (OUTPATIENT)
Dept: GENERAL RADIOLOGY | Facility: CLINIC | Age: 48
End: 2023-07-06
Attending: NURSE PRACTITIONER
Payer: COMMERCIAL

## 2023-07-06 ENCOUNTER — APPOINTMENT (OUTPATIENT)
Dept: CT IMAGING | Facility: CLINIC | Age: 48
End: 2023-07-06
Attending: NURSE PRACTITIONER
Payer: COMMERCIAL

## 2023-07-06 ENCOUNTER — HOSPITAL ENCOUNTER (EMERGENCY)
Facility: CLINIC | Age: 48
Discharge: HOME | End: 2023-07-06
Attending: NURSE PRACTITIONER | Admitting: NURSE PRACTITIONER
Payer: COMMERCIAL

## 2023-07-06 VITALS
TEMPERATURE: 97 F | DIASTOLIC BLOOD PRESSURE: 64 MMHG | OXYGEN SATURATION: 97 % | RESPIRATION RATE: 27 BRPM | HEART RATE: 94 BPM | SYSTOLIC BLOOD PRESSURE: 118 MMHG

## 2023-07-06 DIAGNOSIS — R10.13 EPIGASTRIC PAIN: ICD-10-CM

## 2023-07-06 DIAGNOSIS — R11.2 NAUSEA AND VOMITING, UNSPECIFIED VOMITING TYPE: ICD-10-CM

## 2023-07-06 LAB
ALBUMIN SERPL BCG-MCNC: 4.9 G/DL (ref 3.5–5.2)
ALBUMIN UR-MCNC: 30 MG/DL
ALP SERPL-CCNC: 128 U/L (ref 35–104)
ALT SERPL W P-5'-P-CCNC: 28 U/L (ref 0–50)
ANION GAP SERPL CALCULATED.3IONS-SCNC: 19 MMOL/L (ref 7–15)
APPEARANCE UR: CLEAR
AST SERPL W P-5'-P-CCNC: 24 U/L (ref 0–45)
BACTERIA #/AREA URNS HPF: ABNORMAL /HPF
BASOPHILS # BLD AUTO: 0.1 10E3/UL (ref 0–0.2)
BASOPHILS NFR BLD AUTO: 1 %
BILIRUB SERPL-MCNC: 0.3 MG/DL
BILIRUB UR QL STRIP: NEGATIVE
BUN SERPL-MCNC: 25.1 MG/DL (ref 6–20)
CALCIUM SERPL-MCNC: 10.4 MG/DL (ref 8.6–10)
CHLORIDE SERPL-SCNC: 101 MMOL/L (ref 98–107)
COLOR UR AUTO: YELLOW
CREAT SERPL-MCNC: 0.83 MG/DL (ref 0.51–0.95)
DEPRECATED HCO3 PLAS-SCNC: 21 MMOL/L (ref 22–29)
EOSINOPHIL # BLD AUTO: 0 10E3/UL (ref 0–0.7)
EOSINOPHIL NFR BLD AUTO: 0 %
ERYTHROCYTE [DISTWIDTH] IN BLOOD BY AUTOMATED COUNT: 14.8 % (ref 10–15)
GFR SERPL CREATININE-BSD FRML MDRD: 86 ML/MIN/1.73M2
GLUCOSE SERPL-MCNC: 135 MG/DL (ref 70–99)
GLUCOSE UR STRIP-MCNC: NEGATIVE MG/DL
HCT VFR BLD AUTO: 39.9 % (ref 35–47)
HGB BLD-MCNC: 13.2 G/DL (ref 11.7–15.7)
HGB UR QL STRIP: ABNORMAL
HYALINE CASTS: 3 /LPF
IMM GRANULOCYTES # BLD: 0 10E3/UL
IMM GRANULOCYTES NFR BLD: 0 %
KETONES UR STRIP-MCNC: 80 MG/DL
LEUKOCYTE ESTERASE UR QL STRIP: NEGATIVE
LYMPHOCYTES # BLD AUTO: 1.4 10E3/UL (ref 0.8–5.3)
LYMPHOCYTES NFR BLD AUTO: 19 %
MCH RBC QN AUTO: 26 PG (ref 26.5–33)
MCHC RBC AUTO-ENTMCNC: 33.1 G/DL (ref 31.5–36.5)
MCV RBC AUTO: 79 FL (ref 78–100)
MONOCYTES # BLD AUTO: 0.5 10E3/UL (ref 0–1.3)
MONOCYTES NFR BLD AUTO: 7 %
MUCOUS THREADS #/AREA URNS LPF: PRESENT /LPF
NEUTROPHILS # BLD AUTO: 5.7 10E3/UL (ref 1.6–8.3)
NEUTROPHILS NFR BLD AUTO: 73 %
NITRATE UR QL: NEGATIVE
NRBC # BLD AUTO: 0 10E3/UL
NRBC BLD AUTO-RTO: 0 /100
PH UR STRIP: 5 [PH] (ref 5–7)
PLATELET # BLD AUTO: 345 10E3/UL (ref 150–450)
POTASSIUM SERPL-SCNC: 4.5 MMOL/L (ref 3.4–5.3)
PROT SERPL-MCNC: 8.1 G/DL (ref 6.4–8.3)
RBC # BLD AUTO: 5.08 10E6/UL (ref 3.8–5.2)
RBC URINE: 1 /HPF
SODIUM SERPL-SCNC: 141 MMOL/L (ref 136–145)
SP GR UR STRIP: 1.01 (ref 1–1.03)
SQUAMOUS EPITHELIAL: 1 /HPF
TROPONIN T SERPL HS-MCNC: 19 NG/L
TROPONIN T SERPL HS-MCNC: 19 NG/L
UROBILINOGEN UR STRIP-MCNC: NORMAL MG/DL
WBC # BLD AUTO: 7.7 10E3/UL (ref 4–11)
WBC URINE: 1 /HPF

## 2023-07-06 PROCEDURE — 36415 COLL VENOUS BLD VENIPUNCTURE: CPT | Performed by: NURSE PRACTITIONER

## 2023-07-06 PROCEDURE — 258N000003 HC RX IP 258 OP 636: Performed by: NURSE PRACTITIONER

## 2023-07-06 PROCEDURE — 99284 EMERGENCY DEPT VISIT MOD MDM: CPT | Mod: 25 | Performed by: NURSE PRACTITIONER

## 2023-07-06 PROCEDURE — 96376 TX/PRO/DX INJ SAME DRUG ADON: CPT | Performed by: NURSE PRACTITIONER

## 2023-07-06 PROCEDURE — 250N000011 HC RX IP 250 OP 636: Performed by: NURSE PRACTITIONER

## 2023-07-06 PROCEDURE — 81001 URINALYSIS AUTO W/SCOPE: CPT | Performed by: NURSE PRACTITIONER

## 2023-07-06 PROCEDURE — 96374 THER/PROPH/DIAG INJ IV PUSH: CPT | Mod: 59 | Performed by: NURSE PRACTITIONER

## 2023-07-06 PROCEDURE — 250N000009 HC RX 250: Performed by: NURSE PRACTITIONER

## 2023-07-06 PROCEDURE — 80053 COMPREHEN METABOLIC PANEL: CPT | Performed by: NURSE PRACTITIONER

## 2023-07-06 PROCEDURE — 84484 ASSAY OF TROPONIN QUANT: CPT | Performed by: NURSE PRACTITIONER

## 2023-07-06 PROCEDURE — 96375 TX/PRO/DX INJ NEW DRUG ADDON: CPT | Performed by: NURSE PRACTITIONER

## 2023-07-06 PROCEDURE — 71046 X-RAY EXAM CHEST 2 VIEWS: CPT

## 2023-07-06 PROCEDURE — 93005 ELECTROCARDIOGRAM TRACING: CPT | Performed by: NURSE PRACTITIONER

## 2023-07-06 PROCEDURE — 99285 EMERGENCY DEPT VISIT HI MDM: CPT | Mod: 25 | Performed by: NURSE PRACTITIONER

## 2023-07-06 PROCEDURE — 85025 COMPLETE CBC W/AUTO DIFF WBC: CPT | Performed by: NURSE PRACTITIONER

## 2023-07-06 PROCEDURE — 93010 ELECTROCARDIOGRAM REPORT: CPT | Performed by: NURSE PRACTITIONER

## 2023-07-06 PROCEDURE — 96361 HYDRATE IV INFUSION ADD-ON: CPT | Performed by: NURSE PRACTITIONER

## 2023-07-06 PROCEDURE — 74177 CT ABD & PELVIS W/CONTRAST: CPT

## 2023-07-06 RX ORDER — LORAZEPAM 2 MG/ML
1 INJECTION INTRAMUSCULAR ONCE
Status: COMPLETED | OUTPATIENT
Start: 2023-07-06 | End: 2023-07-06

## 2023-07-06 RX ORDER — IOPAMIDOL 755 MG/ML
500 INJECTION, SOLUTION INTRAVASCULAR ONCE
Status: COMPLETED | OUTPATIENT
Start: 2023-07-06 | End: 2023-07-06

## 2023-07-06 RX ORDER — KETOROLAC TROMETHAMINE 15 MG/ML
15 INJECTION, SOLUTION INTRAMUSCULAR; INTRAVENOUS ONCE
Status: COMPLETED | OUTPATIENT
Start: 2023-07-06 | End: 2023-07-06

## 2023-07-06 RX ORDER — HYDROMORPHONE HYDROCHLORIDE 1 MG/ML
0.5 INJECTION, SOLUTION INTRAMUSCULAR; INTRAVENOUS; SUBCUTANEOUS EVERY 30 MIN PRN
Status: COMPLETED | OUTPATIENT
Start: 2023-07-06 | End: 2023-07-06

## 2023-07-06 RX ORDER — ONDANSETRON 2 MG/ML
4 INJECTION INTRAMUSCULAR; INTRAVENOUS EVERY 30 MIN PRN
Status: COMPLETED | OUTPATIENT
Start: 2023-07-06 | End: 2023-07-06

## 2023-07-06 RX ORDER — DIPHENHYDRAMINE HYDROCHLORIDE 50 MG/ML
25 INJECTION INTRAMUSCULAR; INTRAVENOUS ONCE
Status: COMPLETED | OUTPATIENT
Start: 2023-07-06 | End: 2023-07-06

## 2023-07-06 RX ORDER — SODIUM CHLORIDE 9 MG/ML
1000 INJECTION, SOLUTION INTRAVENOUS CONTINUOUS
Status: DISCONTINUED | OUTPATIENT
Start: 2023-07-06 | End: 2023-07-07 | Stop reason: HOSPADM

## 2023-07-06 RX ADMIN — HYDROMORPHONE HYDROCHLORIDE 0.5 MG: 1 INJECTION, SOLUTION INTRAMUSCULAR; INTRAVENOUS; SUBCUTANEOUS at 19:24

## 2023-07-06 RX ADMIN — IOPAMIDOL 90 ML: 755 INJECTION, SOLUTION INTRAVENOUS at 18:39

## 2023-07-06 RX ADMIN — SODIUM CHLORIDE 1000 ML: 9 INJECTION, SOLUTION INTRAVENOUS at 21:14

## 2023-07-06 RX ADMIN — KETOROLAC TROMETHAMINE 15 MG: 15 INJECTION, SOLUTION INTRAMUSCULAR; INTRAVENOUS at 22:50

## 2023-07-06 RX ADMIN — HYDROMORPHONE HYDROCHLORIDE 0.5 MG: 1 INJECTION, SOLUTION INTRAMUSCULAR; INTRAVENOUS; SUBCUTANEOUS at 21:01

## 2023-07-06 RX ADMIN — ONDANSETRON 4 MG: 2 INJECTION INTRAMUSCULAR; INTRAVENOUS at 18:12

## 2023-07-06 RX ADMIN — ONDANSETRON 4 MG: 2 INJECTION INTRAMUSCULAR; INTRAVENOUS at 19:19

## 2023-07-06 RX ADMIN — LORAZEPAM 1 MG: 2 INJECTION INTRAMUSCULAR; INTRAVENOUS at 21:17

## 2023-07-06 RX ADMIN — DIPHENHYDRAMINE HYDROCHLORIDE 25 MG: 50 INJECTION, SOLUTION INTRAMUSCULAR; INTRAVENOUS at 21:16

## 2023-07-06 RX ADMIN — ONDANSETRON 4 MG: 2 INJECTION INTRAMUSCULAR; INTRAVENOUS at 20:59

## 2023-07-06 RX ADMIN — SODIUM CHLORIDE 60 ML: 9 INJECTION, SOLUTION INTRAVENOUS at 18:38

## 2023-07-06 RX ADMIN — SODIUM CHLORIDE 1000 ML: 9 INJECTION, SOLUTION INTRAVENOUS at 18:11

## 2023-07-06 RX ADMIN — HYDROMORPHONE HYDROCHLORIDE 0.5 MG: 1 INJECTION, SOLUTION INTRAMUSCULAR; INTRAVENOUS; SUBCUTANEOUS at 18:15

## 2023-07-06 ASSESSMENT — ENCOUNTER SYMPTOMS
VOMITING: 1
DIARRHEA: 0
SHORTNESS OF BREATH: 0
CHILLS: 1
BACK PAIN: 0
BLOOD IN STOOL: 0
FREQUENCY: 0
DYSURIA: 0
HEADACHES: 1
COUGH: 0
ABDOMINAL PAIN: 1
NAUSEA: 1
CONSTIPATION: 0

## 2023-07-06 ASSESSMENT — ACTIVITIES OF DAILY LIVING (ADL)
ADLS_ACUITY_SCORE: 35

## 2023-07-06 NOTE — LETTER
July 6, 2023      To Whom It May Concern:      Stacy Brown was seen in our Emergency Department today, 07/06/23.    No work 7/6/2023 - 7/8/2023.    Sincerely,        JOVITA Hendrix CNP

## 2023-07-06 NOTE — ED TRIAGE NOTES
Her with abd pain that started earlier today. Developed chest pain on the way to the ER     Triage Assessment     Row Name 07/06/23 [id number]       Triage Assessment (Adult)    Airway WDL WDL       Skin Circulation/Temperature WDL    Skin Circulation/Temperature WDL WDL       Cardiac WDL    Cardiac WDL chest pain       Chest Pain Assessment    Chest Pain Location substernal    Character sharp;pressure
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
## 2023-07-07 NOTE — DISCHARGE INSTRUCTIONS
Unclear cause for your nausea, vomiting, and epigastric pain.    Rest tonight.  Stick with clear fluids and ice chips overnight.  Advance diet tomorrow slowly as tolerated.  Return for fevers, return of vomiting-unable to keep fluids down, abdominal pain, chest pain or any other symptoms of concern.

## 2023-07-07 NOTE — MEDICATION SCRIBE - ADMISSION MEDICATION HISTORY
Medication Scribe Admission Medication History    Admission medication history is complete. The information provided in this note is only as accurate as the sources available at the time of the update.    Medication reconciliation/reorder completed by provider prior to medication history? No    Information Source(s): Family member  [place] via in-person    Pertinent Information:     Changes made to PTA medication list:    Added: None    Deleted: Acetaminophen 325 mg     Aspirin 325 mg     Hydromorphone 2 mg (2 separate entries)     Hydroxyzine 25 mg     Metoclopramide 5 mg   Deleted per patient's request; no longer taking     Changed: None    Medication Affordability:  Not including over the counter (OTC) medications, was there a time in the past 3 months when you did not take your medications as prescribed because of cost?: No    Allergies reviewed with patient and updates made in EHR: yes    Medication History Completed By: EL PELLETIER 7/6/2023 7:21 PM    Prior to Admission medications    Medication Sig Last Dose Taking? Auth Provider Long Term End Date   amLODIPine (NORVASC) 2.5 MG tablet TAKE 1 TABLET (2.5 MG) BY MOUTH DAILY 7/5/2023 at am Yes Yaima Muse MD Yes    Ascorbic Acid (VITAMIN C) 100 MG CHEW Take 1 chew tab by mouth daily Past Month Yes Reported, Patient     blood glucose (NO BRAND SPECIFIED) test strip Use to test blood sugar up to 4 times daily or as directed. To accompany: Blood Glucose Monitor Brands: per insurance. 7/5/2023 at am Yes Yaima Muse MD     blood glucose monitoring (FREESTYLE) lancets Use to test blood sugars 1-2 times daily or as directed, per patients glucose meter. 7/5/2023 Yes Yaima Muse MD     FLUoxetine (PROZAC) 20 MG capsule Take 1 capsule (20 mg) by mouth daily 7/5/2023 at am Yes Carlos Stoner MD Yes    insulin aspart (NOVOLOG FLEXPEN) 100 UNIT/ML pen Novolog Flexpen. Inject 1 units per 10 gram carb unit  before breakfast, lunch and dinner, up to 15 units per meal. 7/5/2023 at supper Yes Yaima Muse MD Yes    insulin glargine (BASAGLAR KWIKPEN) 100 UNIT/ML pen Inject 42 Units Subcutaneous every morning 7/5/2023 at am Yes Carlos Stoner MD Yes    lisinopril (ZESTRIL) 2.5 MG tablet Take 1 tablet (2.5 mg) by mouth daily 7/5/2023 at am Yes Carlos Stoner MD Yes    metFORMIN (GLUCOPHAGE XR) 500 MG 24 hr tablet Take 2 tablets (1,000 mg) by mouth 2 times daily (with meals) 7/5/2023 at supper Yes Carlos Stoner MD Yes    metoprolol tartrate (LOPRESSOR) 25 MG tablet Take 1 tablet (25 mg) by mouth 2 times daily 7/5/2023 at hs Yes Carlos Stoner MD Yes    Multiple Vitamins-Minerals (MULTI-VITAMIN GUMMIES) CHEW Take 1 chew tab by mouth daily  7/5/2023 at am Yes Reported, Patient     oxyCODONE (ROXICODONE) 5 MG tablet Take 1 tablet (5 mg) by mouth daily as needed for severe pain 7/5/2023 at evening Yes Leslie Edwards PA-C     rosuvastatin (CRESTOR) 20 MG tablet Take 1 tablet (20 mg) by mouth daily 7/5/2023 at am Yes Carlos Stoner MD Yes    tiZANidine (ZANAFLEX) 4 MG tablet Take 1 tablet (4 mg) by mouth 3 times daily 7/5/2023 at hs Yes Carlos Stoner MD     blood glucose calibration (NO BRAND SPECIFIED) solution To accompany: Blood Glucose Monitor Brands: per insurance.   Yaima Muse MD     Blood Glucose Monitoring Suppl (ACCU-CHEK COMPLETE) KIT 1 Device daily   Yaima Muse MD     insulin pen needle (31G X 8 MM) 31G X 8 MM miscellaneous 1 Box of 100 insulin pen needles to be dispensed with every insulin pen prescription   Jacob Chavez MD     insulin pen needle (NOVOFINE) 32G X 6 MM miscellaneous Use once daily or as directed.   Yaima Muse MD     nitroGLYcerin (NITROSTAT) 0.4 MG sublingual tablet For chest pain place 1 tablet under the tongue every 5 minutes for 3 doses. If symptoms persist 5 minutes  after 1st dose call 911.   Yaima Muse MD Yes

## 2023-07-11 ENCOUNTER — OFFICE VISIT (OUTPATIENT)
Dept: ORTHOPEDICS | Facility: CLINIC | Age: 48
End: 2023-07-11
Payer: OTHER MISCELLANEOUS

## 2023-07-11 ENCOUNTER — ANCILLARY PROCEDURE (OUTPATIENT)
Dept: GENERAL RADIOLOGY | Facility: CLINIC | Age: 48
End: 2023-07-11
Attending: ORTHOPAEDIC SURGERY
Payer: OTHER MISCELLANEOUS

## 2023-07-11 ENCOUNTER — MYC REFILL (OUTPATIENT)
Dept: FAMILY MEDICINE | Facility: CLINIC | Age: 48
End: 2023-07-11
Payer: COMMERCIAL

## 2023-07-11 DIAGNOSIS — M25.571 PAIN IN JOINT, ANKLE AND FOOT, RIGHT: Primary | ICD-10-CM

## 2023-07-11 DIAGNOSIS — Z98.890 STATUS POST SURGERY: ICD-10-CM

## 2023-07-11 DIAGNOSIS — S82.891A CLOSED FRACTURE OF RIGHT ANKLE, INITIAL ENCOUNTER: ICD-10-CM

## 2023-07-11 PROCEDURE — 99213 OFFICE O/P EST LOW 20 MIN: CPT | Performed by: ORTHOPAEDIC SURGERY

## 2023-07-11 PROCEDURE — 73610 X-RAY EXAM OF ANKLE: CPT | Mod: RT | Performed by: RADIOLOGY

## 2023-07-11 NOTE — PROGRESS NOTES
Chief complaint status post right distal tibia open reduction internal fixation performed on April 5, 2023    Stacy Brown presents today for further evaluation in the company of her .  Reports to still have some pain and discomfort continues working with physical therapy.  Patient is concerned about her inability to return to work full-time with no restrictions    Into the visit she presents with a fair amount of his stiffness across the right ankle with combined plantar and dorsiflexion of probably 20 to 25 degrees there is well-healed surgical incisions there is no swelling    3 views of the right ankle were reviewed today which are significant for showing excellent alignment of the fracture which is well-healed with hardware intact and in place    Assessment: Status post right distal tibia open reduction internal fixation    Plan: Discussed with patient and her  that she has to proceed with physical therapy quite intensively noted to improve her range of motion.  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    Also discussed with the patient that we cannot change the work restrictions on the phone and we do need to evaluate her for that purpose    Patient will follow up on as needed basis or in 3 months from now she is not happy the function of her ankle    TT 20 minutes

## 2023-07-11 NOTE — NURSING NOTE
Reason For Visit:   Chief Complaint   Patient presents with     RECHECK     status post right distal tibia open reduction internal fixation performed on April 5, 2023       LMP 03/07/2021 (Approximate)          Dodie Olivares ATC

## 2023-07-11 NOTE — LETTER
7/11/2023         RE: Stacy Brown  [address] [facility] [address]        Dear Colleague,    Thank you for referring your patient, Stacy Brown, to the [facility] [place]. Please see a copy of my visit note below.    Chief complaint status post right distal tibia open reduction internal fixation performed on April 5, 2023    Stacy Brown presents today for further evaluation in the company of her .  Reports to still have some pain and discomfort continues working with physical therapy.  Patient is concerned about her inability to return to work full-time with no restrictions    Into the visit she presents with a fair amount of his stiffness across the right ankle with combined plantar and dorsiflexion of probably 20 to 25 degrees there is well-healed surgical incisions there is no swelling    3 views of the right ankle were reviewed today which are significant for showing excellent alignment of the fracture which is well-healed with hardware intact and in place    Assessment: Status post right distal tibia open reduction internal fixation    Plan: Discussed with patient and her  that she has to proceed with physical therapy quite intensively noted to improve her range of motion.  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    Also discussed with the patient that we cannot change the work restrictions on the phone and we do need to evaluate her for that purpose    Patient will follow up on as needed basis or in 3 months from now she is not happy the function of her ankle    TT 20 minutes    Sincerely,        Carlos Riley MD

## 2023-07-11 NOTE — LETTER
Return to Work  2023     Seen today: Yes    Patient:  Stacy Brown  :   1975  MRN:     [id number]  Physician: CARLOS FUENTES    Stacy Brown may return to work on Date: 2023.      The next clinic appointment is scheduled for (date/time) PRN.    Patient limitations:  Seated work only. 6 hour work day, seated, for 2-3 weeks.  Then, 8 hour work days, seated, for 2 weeks. Patient should then call physicians office for updated workability.      Electronically signed by Carlos Fuentes MD

## 2023-07-11 NOTE — TELEPHONE ENCOUNTER
Requested Prescriptions   Pending Prescriptions Disp Refills     oxyCODONE (ROXICODONE) 5 MG tablet 30 tablet 0     Sig: Take 1 tablet (5 mg) by mouth daily as needed for severe pain        Next 5 appointments (look out 90 days)    Sep 15, 2023  8:00 AM  (Arrive by 7:40 AM)  Provider Visit with Yaima Muse MD  [facility] ([facility] ) [address] [place] [address] [phone] [phone]           Routing refill request to provider for review/approval because:  Drug not on the [facility], [facility] or [facility] refill protocol or controlled substance

## 2023-07-13 ENCOUNTER — THERAPY VISIT (OUTPATIENT)
Dept: PHYSICAL THERAPY | Facility: CLINIC | Age: 48
End: 2023-07-13
Payer: OTHER MISCELLANEOUS

## 2023-07-13 DIAGNOSIS — S82.891D CLOSED FRACTURE OF RIGHT ANKLE WITH ROUTINE HEALING, SUBSEQUENT ENCOUNTER: ICD-10-CM

## 2023-07-13 DIAGNOSIS — R60.0 LOCALIZED EDEMA: Primary | ICD-10-CM

## 2023-07-13 DIAGNOSIS — Z98.890 STATUS POST SURGERY: ICD-10-CM

## 2023-07-13 DIAGNOSIS — M25.571 PAIN IN JOINT INVOLVING ANKLE AND FOOT, RIGHT: ICD-10-CM

## 2023-07-13 DIAGNOSIS — R26.9 ABNORMAL GAIT: ICD-10-CM

## 2023-07-13 PROCEDURE — 97140 MANUAL THERAPY 1/> REGIONS: CPT | Mod: GP | Performed by: PHYSICAL THERAPIST

## 2023-07-13 PROCEDURE — 97112 NEUROMUSCULAR REEDUCATION: CPT | Mod: GP | Performed by: PHYSICAL THERAPIST

## 2023-07-13 PROCEDURE — 97110 THERAPEUTIC EXERCISES: CPT | Mod: GP | Performed by: PHYSICAL THERAPIST

## 2023-07-13 RX ORDER — OXYCODONE HYDROCHLORIDE 5 MG/1
5 TABLET ORAL DAILY PRN
Qty: 30 TABLET | Refills: 0 | Status: SHIPPED | OUTPATIENT
Start: 2023-07-13 | End: 2023-08-06

## 2023-07-13 NOTE — PROGRESS NOTES
PLAN  Continue therapy per current plan of care.    Beginning/End Dates of Progress Note Reporting Period:  05/10/23 to 07/13/2023    Referring Provider:  Carlos Riley       07/13/23 0500   Appointment Info   Signing clinician's name / credentials Amanda Hilligoss, DPT   Total/Authorized Visits 12 (E&T)   Visits Used 9   Medical Diagnosis Status post surgery  Closed fracture of right ankle   PT Tx Diagnosis Right ankle pain, localized edema, abnormal gait   Precautions/Limitations WBAT, as of 5/23/23- no restrictions   Progress Note/Certification   Onset of illness/injury or Date of Surgery 04/05/23  (date of surgery)   Therapy Frequency 1x/week   Predicted Duration x12 weeks   Progress Note Due Date 07/08/23   Progress Note Completed Date 05/10/23       Present No   GOALS   PT Goals 2   PT Goal 1   Goal Identifier Ambulation   Goal Description Patient will be able to ambulate w/ 1 crutch in boot x10 minutes   Rationale to maximize safety and independence within the home;to maximize safety and independence within the community   Goal Progress met   Target Date 06/07/23   Date Met 05/31/23   PT Goal 2   Goal Identifier Ambulation (long term)   Goal Description Pt will be able to ambulate w/o boot, w/o AD, w/o abnormal gait x 20 min   Rationale to maximize safety and independence within the home;to maximize safety and independence within the community   Goal Progress ambulating w/o boot w/o crutches, 10-15 min   Target Date 08/02/23   Subjective Report   Subjective Report Pt had to miss last week's visit due to illness. Saw MD 7/11/23, and was cleared to drive. 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 Feels a lot of soreness today due to more activity yesterday. Feels swelling has been more manageable.   Objective Measures   Objective Measures Objective Measure 1;Objective Measure 2;Objective Measure 3;Objective  Measure 4   Objective Measure 1   Objective Measure AROM   Details DF lacking 5, PF 55,Inv 20 +pain, Ev 10; PROM standing w/ foot on chair for OP: DF 7   Objective Measure 2   Objective Measure Gait   Details Ambulating w/o boot today, increased ARNOLD (improved from previously)   Treatment Interventions (PT)   Interventions Therapeutic Procedure/Exercise;Manual Therapy;Neuromuscular Re-education;Gait Training   Therapeutic Procedure/Exercise   Therapeutic Procedures: strength, endurance, ROM, flexibillity minutes (18101) 13   Therapeutic Procedures Ther Proc 2;Ther Proc 3;Ther Proc 4   Ther Proc 1 Theraband ankle exercises against RTB   Ther Proc 1 - Details HEP   Ther Proc 2 PROM DF, Inf, EV   Ther Proc 2 - Details x 4 min total   Ther Proc 3 Standing DF using chair   Ther Proc 3 - Details x10   Ther Proc 4 Bike, seat 2   Ther Proc 4 - Details x7 min, resistance 7   Skilled Intervention Verbal, manual, and visual cuing for form of theraband exercises   Patient Response/Progress AROM still limited   Neuromuscular Re-education   Neuromuscular re-ed of mvmt, balance, coord, kinesthetic sense, posture, proprioception minutes (43929) 12   Neuro Re-ed 1 Gait drills   Neuro Re-ed 1 - Details tandem gait x 30' x2; heel scuffs x30' x 2,   Neuro Re-ed 2 Sidesteps   Neuro Re-ed 2 - Details against RTB w/ band around forefoot x 30' B   Neuro Re-ed 3 SLS   Neuro Re-ed 3 - Details 1 min R   Skilled Intervention progression of balance and gait exercises based on patient response   Patient Response/Progress improved stability w/ tandem gait   Manual Therapy   Manual Therapy: Mobilization, MFR, MLD, friction massage minutes (61520) 12   Manual Therapy 1 STM   Manual Therapy 1 - Details fascial pull lateral ankle x 8 min   Manual Therapy 2 Joint mobilization   Manual Therapy 2 - Details moderate mobilization rear foot varus/valgus x  4 min   Skilled Intervention Modification of technique and intensity based on pt tolerance and  tissue/joint response   Patient Response/Progress continues to have joint mobility limitations, able to relax easier than previously   Education   Learner/Method Patient;Significant Other;Listening;No Barriers to Learning   Plan   Home program Focus on prolonged stretch during ROM   Updates to plan of care continue current Plan   Plan for next session Progress WB/ balance drills, continue ROM   Comments   Comments WC DOI 3/22/23, DOS 4/5/23   Total Session Time   Timed Code Treatment Minutes 37   Total Treatment Time (sum of timed and untimed services) 37

## 2023-07-18 NOTE — ANESTHESIA PREPROCEDURE EVALUATION
Anesthesia Pre-Procedure Evaluation    Patient: Stacy Brown   MRN: [id number] : 1975        Preoperative Diagnosis: Coronary artery disease [I25.10]   Procedure : Procedure(s):  CORONARY ARTERY BYPASS GRAFT (CABG) WITH LEFT RADIAL ARTERY HARVEST     Past Medical History:   Diagnosis Date     Knee pain, chronic      Mixed hyperlipidemia      Type II or unspecified type diabetes mellitus without mention of complication, not stated as uncontrolled 2002    diagnosed 02, started insulin       Past Surgical History:   Procedure Laterality Date     C STABISM SURG,PREV EYE SURG,NOT MUSC      Right     HC OPEN TX METATARSAL FRACTURE  age 12    softball injury,open fracture left foot     HC TOOTH EXTRACTION W/FORCEP      Extract wisdom teeth     INJECT JOINT SACROILIAC Left 2018    Procedure: INJECT JOINT SACROILIAC;  INJECT JOINT SACROILIAC LEFT;  Surgeon: Alan Marshall MD;  Location: [facility] OR     OPERATIVE HYSTEROSCOPY WITH MORCELLATOR N/A 2018    Procedure: OPERATIVE HYSTEROSCOPY WITH MORCELLATOR (MYOSURE);  Exam under anesthesia, operative hysteroscopy, polypectomy, D & C;  Surgeon: Sindhu Peterson DO;  Location: [facility] OR     TUBAL LIGATION  2006      Allergies   Allergen Reactions     Amoxicillin Hives     Hydrocodone-Acetaminophen GI Disturbance     Tylenol is ok      Social History     Tobacco Use     Smoking status: Current Every Day Smoker     Packs/day: 0.50     Years: 6.00     Pack years: 3.00     Last attempt to quit: 2010     Years since quitting: 10.4     Smokeless tobacco: Never Used   Substance Use Topics     Alcohol use: Yes     Alcohol/week: 0.0 standard drinks     Comment: once a month      Wt Readings from Last 1 Encounters:   21 80.2 kg (176 lb 12.8 oz)        Anesthesia Evaluation            ROS/MED HX  ENT/Pulmonary:     (+) tobacco use, Current use, 1 packs/day,     Neurologic:       Cardiovascular:     (+) Dyslipidemia hypertension--CAD -past MI  --Taking blood thinners Previous cardiac testing   Echo: Date: Results:  Interpretation Summary    1. The left ventricle is normal in structure, function and size. The visual ejection fraction is estimated at 60%.  2. The right ventricle is normal in structure, function and size.  3. No valve disease.     No previous echo for comparison.    Left Ventricle  The left ventricle is normal in structure, function and size. There is normal  left ventricular wall thickness. The visual ejection fraction is estimated at  60%. Left ventricular diastolic function is normal. Normal left ventricular  wall motion.     Right Ventricle  The right ventricle is normal in structure, function and size.     Atria  Normal left atrial size. Right atrial size is normal. There is no atrial shunt  seen.     Mitral Valve  The mitral valve is normal in structure and function.        Tricuspid Valve  No tricuspid regurgitation. Right ventricular systolic pressure could not be  approximated due to inadequate tricuspid regurgitation.     Aortic Valve  The aortic valve is normal in structure and function.     Pulmonic Valve  The pulmonic valve is normal in structure and function.     Vessels  Normal ascending, transverse (arch), and descending aorta. The inferior vena  cava was normal in size with preserved respiratory variability.     Pericardium  There is no pericardial effusion.        Rhythm  Sinus rhythm was noted.    MMode/2D Measurements & Calculations  IVSd: 0.82 cm     LVIDd: 4.5 cm  LVIDs: 3.4 cm  LVPWd: 1.0 cm  FS: 24.9 %  LV mass(C)d: 139.6 grams  LV mass(C)dI: 73.7 grams/m2  LA dimension: 3.2 cm  asc Aorta Diam: 2.6 cm  LA Volume (BP): 16.6 ml  LA Volume Index (BP): 8.8 ml/m2  RWT: 0.46           Doppler Measurements & Calculations  MV E max taya: 73.3 cm/sec  MV A max taya: 57.2 cm/sec  MV E/A: 1.3  MV dec time: 0.19 sec  Ao V2 max: 140.4 cm/sec  Ao max P.0 mmHg  LV V1 max PG: 3.7 mmHg  LV V1 max: 95.6 cm/sec  PA acc time: 0.16  sec  AV Dejuan Ratio (DI): 0.68  E/E' av.7  Lateral E/e': 7.2  Medial E/e': 10.1  Stress Test: Date: Results:    ECG Reviewed: Date: Results:    Cath: Date: Results:  Severe multivessel coronary artery disease involving the prox-mid LAD, mid LCx, mid RCA, proximal rPL    METS/Exercise Tolerance:     Hematologic:       Musculoskeletal:       GI/Hepatic:       Renal/Genitourinary:       Endo:     (+) type II DM, Last HgA1c: 10.1, Using insulin, Obesity,     Psychiatric/Substance Use:     (+) H/O chronic opiod use .     Infectious Disease:       Malignancy:       Other:      (+) , H/O Chronic Pain,           OUTSIDE LABS:  CBC:   Lab Results   Component Value Date    WBC 5.2 2021    WBC 7.2 2021    HGB 13.6 2021    HGB 15.0 2021    HCT 40.9 2021    HCT 44.3 2021     2021     2021     BMP:   Lab Results   Component Value Date     2021     (L) 10/29/2020    POTASSIUM 4.2 2021    POTASSIUM 4.1 2021    CHLORIDE 99 2021    CHLORIDE 92 (L) 10/29/2020    CO2 27 2021    CO2 23 10/29/2020    BUN 12 2021    BUN 15 10/29/2020    CR 0.50 (L) 2021    CR 0.59 10/29/2020     (H) 2021     (H) 10/29/2020     COAGS:   Lab Results   Component Value Date    PTT 22 2021     POC:   Lab Results   Component Value Date     (H) 2021    HCG Negative 2019    HCGS Negative 2013     HEPATIC:   Lab Results   Component Value Date    ALBUMIN 3.5 2021    PROTTOTAL 6.8 2021    ALT 30 2021    AST 26 2021    ALKPHOS 78 2021    BILITOTAL 0.2 2021     OTHER:   Lab Results   Component Value Date    LACT 3.0 (H) 10/29/2020    A1C 10.1 (H) 2021    LUNA 9.5 2021    MAG 2.0 2013    LIPASE 83 2021    AMYLASE 65 2011    TSH 1.33 2021    CRP <2.9 10/22/2014    SED 13 11/10/2012       Anesthesia Plan    ASA Status:  4   NPO Status:   Will be NPO Appropriate at ... 3/9/2021 12:00 AM   Anesthesia Type: General.     - Airway: ETT   Induction: Intravenous, Propofol.   Maintenance: Inhalation.   Techniques and Equipment:     - Lines/Monitors: Arterial Line, Central Line, CVP, YASH            YASH Absolute Contra-indication: NONE            YASH Relative Contra-indication: NONE     - Blood: T&C, Blood in Room     - Drips/Meds: Fentanyl, Phenylephrine, Epinephrine, Ketamine     Consents    Anesthesia Plan(s) and associated risks, benefits, and realistic alternatives discussed. Questions answered and patient/representative(s) expressed understanding.     - Discussed with:  Patient      - Extended Intubation/Ventilatory Support Discussed: Yes.      - Patient is DNR/DNI Status: No    Use of blood products discussed: Yes.     - Discussed with: Patient.     - Consented: consented to blood products     Postoperative Care    Pain management: IV analgesics, Multi-modal analgesia.   PONV prophylaxis: Ondansetron (or other 5HT-3)     Comments:    Arterial line, Central line (no swan), YASH    Plan ketamine, fentanyl, propofol, rocuronium induction    Hydromorphone at end of case (history of chronic pain), ketamine up front and post bypass                Rakesh Lopez DO   ~ one year ago

## 2023-07-20 ENCOUNTER — THERAPY VISIT (OUTPATIENT)
Dept: PHYSICAL THERAPY | Facility: CLINIC | Age: 48
End: 2023-07-20
Payer: OTHER MISCELLANEOUS

## 2023-07-20 DIAGNOSIS — R60.0 LOCALIZED EDEMA: Primary | ICD-10-CM

## 2023-07-20 DIAGNOSIS — R26.9 ABNORMAL GAIT: ICD-10-CM

## 2023-07-20 DIAGNOSIS — S82.891D CLOSED FRACTURE OF RIGHT ANKLE WITH ROUTINE HEALING, SUBSEQUENT ENCOUNTER: ICD-10-CM

## 2023-07-20 DIAGNOSIS — M25.571 PAIN IN JOINT INVOLVING ANKLE AND FOOT, RIGHT: ICD-10-CM

## 2023-07-20 DIAGNOSIS — Z98.890 STATUS POST SURGERY: ICD-10-CM

## 2023-07-20 PROCEDURE — 97530 THERAPEUTIC ACTIVITIES: CPT | Mod: GP | Performed by: PHYSICAL THERAPIST

## 2023-07-20 PROCEDURE — 97110 THERAPEUTIC EXERCISES: CPT | Mod: GP | Performed by: PHYSICAL THERAPIST

## 2023-07-20 PROCEDURE — 97140 MANUAL THERAPY 1/> REGIONS: CPT | Mod: GP | Performed by: PHYSICAL THERAPIST

## 2023-07-22 ENCOUNTER — NURSE TRIAGE (OUTPATIENT)
Dept: NURSING | Facility: CLINIC | Age: 48
End: 2023-07-22
Payer: COMMERCIAL

## 2023-07-22 NOTE — TELEPHONE ENCOUNTER
Nurse Triage SBAR    Situation:   Scar changed    Background:   -Patient calling, It is okay to leave a detailed message at this number.     Assessment:   -has 4x bipass surgery 2 yeasrs ago  -starting  3-4 days ago the scar it has become red, swollen, and itchy  -pain with tourch  -3/4 inches large now, getting bigger  -no drainage  -no fever    Recommendation:   See PCP within 24 Hours  -patient declines this disposition  -Warm transferred to central scheduling to make an appointment  -if nothing is available go to     ELI BHAGAT RN on 7/22/2023 at 4:44 PM    Additional Information    Negative: [1] Major abdominal surgical incision AND [2] wound gaping open AND [3] visible internal organs    Negative: Sounds like a life-threatening emergency to the triager    Negative: [1] Bleeding from incision AND [2] won't stop after 10 minutes of direct pressure    Negative: Severe pain in the incision    Negative: [1] Widespread rash AND [2] bright red, sunburn-like    Negative: [1] Incision gaping open AND [2] < 48 hours since wound re-opened    Negative: [1] Incision gaping open AND [2] length of opening > 2 inches (5 cm)    Negative: Patient sounds very sick or weak to the triager    Negative: Fever > 100.4 F (38.0 C)    Negative: [1] Incision looks infected (spreading redness, pain) AND [2] fever > 99.5 F (37.5 C)    Negative: [1] Incision looks infected (spreading redness, pain) AND [2] large red area (> 2 in. or 5 cm)    Negative: [1] Incision looks infected (spreading redness, pain) AND [2] face wound    Negative: Sounds like a serious complication to the triager    Negative: [1] Red streak runs from the incision AND [2] longer than 1 inch (2.5 cm)    Negative: [1] Pus or bad-smelling fluid draining from incision AND [2] no fever    Negative: [1] Post-op pain AND [2] not controlled with pain medications    Negative: Dressing soaked with blood or body fluid (e.g., drainage)    Negative: [1] Raised bruise and [2]  size > 2 inches (5 cm) and expanding    Negative: [1] Caller has URGENT question AND [2] triager unable to answer question    [1] INCREASING pain in incision AND [2] > 2 days (48 hours) since surgery    Protocols used: POST-OP INCISION SYMPTOMS-A-AH

## 2023-07-25 ASSESSMENT — ANXIETY QUESTIONNAIRES
3. WORRYING TOO MUCH ABOUT DIFFERENT THINGS: SEVERAL DAYS
GAD7 TOTAL SCORE: 6
IF YOU CHECKED OFF ANY PROBLEMS ON THIS QUESTIONNAIRE, HOW DIFFICULT HAVE THESE PROBLEMS MADE IT FOR YOU TO DO YOUR WORK, TAKE CARE OF THINGS AT HOME, OR GET ALONG WITH OTHER PEOPLE: SOMEWHAT DIFFICULT
1. FEELING NERVOUS, ANXIOUS, OR ON EDGE: SEVERAL DAYS
7. FEELING AFRAID AS IF SOMETHING AWFUL MIGHT HAPPEN: NOT AT ALL
4. TROUBLE RELAXING: SEVERAL DAYS
GAD7 TOTAL SCORE: 6
2. NOT BEING ABLE TO STOP OR CONTROL WORRYING: SEVERAL DAYS
5. BEING SO RESTLESS THAT IT IS HARD TO SIT STILL: NOT AT ALL
6. BECOMING EASILY ANNOYED OR IRRITABLE: MORE THAN HALF THE DAYS

## 2023-07-25 ASSESSMENT — PATIENT HEALTH QUESTIONNAIRE - PHQ9
10. IF YOU CHECKED OFF ANY PROBLEMS, HOW DIFFICULT HAVE THESE PROBLEMS MADE IT FOR YOU TO DO YOUR WORK, TAKE CARE OF THINGS AT HOME, OR GET ALONG WITH OTHER PEOPLE: SOMEWHAT DIFFICULT
SUM OF ALL RESPONSES TO PHQ QUESTIONS 1-9: 8
SUM OF ALL RESPONSES TO PHQ QUESTIONS 1-9: 8

## 2023-07-26 ASSESSMENT — PATIENT HEALTH QUESTIONNAIRE - PHQ9
SUM OF ALL RESPONSES TO PHQ QUESTIONS 1-9: 5
SUM OF ALL RESPONSES TO PHQ QUESTIONS 1-9: 5
10. IF YOU CHECKED OFF ANY PROBLEMS, HOW DIFFICULT HAVE THESE PROBLEMS MADE IT FOR YOU TO DO YOUR WORK, TAKE CARE OF THINGS AT HOME, OR GET ALONG WITH OTHER PEOPLE: SOMEWHAT DIFFICULT

## 2023-07-27 ENCOUNTER — THERAPY VISIT (OUTPATIENT)
Dept: PHYSICAL THERAPY | Facility: CLINIC | Age: 48
End: 2023-07-27
Payer: OTHER MISCELLANEOUS

## 2023-07-27 DIAGNOSIS — S82.891D CLOSED FRACTURE OF RIGHT ANKLE WITH ROUTINE HEALING, SUBSEQUENT ENCOUNTER: ICD-10-CM

## 2023-07-27 DIAGNOSIS — M25.571 PAIN IN JOINT INVOLVING ANKLE AND FOOT, RIGHT: ICD-10-CM

## 2023-07-27 DIAGNOSIS — R26.9 ABNORMAL GAIT: ICD-10-CM

## 2023-07-27 DIAGNOSIS — Z98.890 STATUS POST SURGERY: ICD-10-CM

## 2023-07-27 DIAGNOSIS — R60.0 LOCALIZED EDEMA: Primary | ICD-10-CM

## 2023-07-27 PROCEDURE — 97110 THERAPEUTIC EXERCISES: CPT | Mod: GP | Performed by: PHYSICAL THERAPIST

## 2023-07-27 PROCEDURE — 97140 MANUAL THERAPY 1/> REGIONS: CPT | Mod: GP | Performed by: PHYSICAL THERAPIST

## 2023-08-01 ENCOUNTER — OFFICE VISIT (OUTPATIENT)
Dept: FAMILY MEDICINE | Facility: CLINIC | Age: 48
End: 2023-08-01
Payer: COMMERCIAL

## 2023-08-01 VITALS
DIASTOLIC BLOOD PRESSURE: 48 MMHG | HEIGHT: 66 IN | RESPIRATION RATE: 18 BRPM | OXYGEN SATURATION: 100 % | HEART RATE: 65 BPM | BODY MASS INDEX: 28.63 KG/M2 | WEIGHT: 178.13 LBS | SYSTOLIC BLOOD PRESSURE: 115 MMHG | TEMPERATURE: 97.6 F

## 2023-08-01 DIAGNOSIS — N30.00 ACUTE CYSTITIS WITHOUT HEMATURIA: ICD-10-CM

## 2023-08-01 DIAGNOSIS — L91.0 KELOID SCAR: Primary | ICD-10-CM

## 2023-08-01 LAB
ALBUMIN UR-MCNC: 30 MG/DL
APPEARANCE UR: ABNORMAL
BACTERIA #/AREA URNS HPF: ABNORMAL /HPF
BILIRUB UR QL STRIP: NEGATIVE
COLOR UR AUTO: YELLOW
GLUCOSE UR STRIP-MCNC: 50 MG/DL
HGB UR QL STRIP: ABNORMAL
HYALINE CASTS: 35 /LPF
KETONES UR STRIP-MCNC: NEGATIVE MG/DL
LEUKOCYTE ESTERASE UR QL STRIP: ABNORMAL
MUCOUS THREADS #/AREA URNS LPF: PRESENT /LPF
NITRATE UR QL: NEGATIVE
PH UR STRIP: 5 [PH] (ref 5–7)
RBC URINE: 7 /HPF
SP GR UR STRIP: 1.02 (ref 1–1.03)
SQUAMOUS EPITHELIAL: <1 /HPF
UROBILINOGEN UR STRIP-MCNC: NORMAL MG/DL
WBC URINE: 95 /HPF

## 2023-08-01 PROCEDURE — 99213 OFFICE O/P EST LOW 20 MIN: CPT | Performed by: FAMILY MEDICINE

## 2023-08-01 PROCEDURE — 87086 URINE CULTURE/COLONY COUNT: CPT | Performed by: FAMILY MEDICINE

## 2023-08-01 PROCEDURE — 87186 SC STD MICRODIL/AGAR DIL: CPT | Performed by: FAMILY MEDICINE

## 2023-08-01 PROCEDURE — 81001 URINALYSIS AUTO W/SCOPE: CPT | Performed by: FAMILY MEDICINE

## 2023-08-01 RX ORDER — SULFAMETHOXAZOLE/TRIMETHOPRIM 800-160 MG
1 TABLET ORAL 2 TIMES DAILY
Qty: 6 TABLET | Refills: 0 | Status: SHIPPED | OUTPATIENT
Start: 2023-08-01 | End: 2023-08-04

## 2023-08-01 NOTE — PROGRESS NOTES
"  Assessment & Plan     ASSESSMENT/ORDERS:    ICD-10-CM    1. Keloid scar  L91.0 Adult Dermatology Referral      2. Acute cystitis without hematuria  N30.00 UA Macroscopic with reflex to Microscopic and Culture - Lab Collect     sulfamethoxazole-trimethoprim (BACTRIM DS) 800-160 MG tablet        PLAN:  1.  Original wound on chest is over 2 years old, but given incisional scar is wider and thinner than typical, a worsening keloid scar is likely high on the differential diagnosis for lesion.  However, I am not entirierly certain that this is the final diagnosis.  I recommended patient be seen by dermatology for second opinion as well as treatment options if it is a keloid (or other options for alternative diagnosis).  2.  Bactrim for urinary tract infection symptoms and will have her do UA on way out today in event that symptoms do not resolve.              BMI:   Estimated body mass index is 28.75 kg/m  as calculated from the following:    Height as of this encounter: 1.676 m (5' 6\").    Weight as of this encounter: 80.8 kg (178 lb 2 oz).           Carlos Stoner MD  [facility] ARIANNA Kumar is a 48 year old, presenting for the following health issues:  RECHECK (scar)      8/1/2023     1:18 PM   Additional Questions   Roomed by Irene HAAS MA       History of Present Illness       Reason for visit:  Redness and pain in my chest scar  Symptom onset:  1-2 weeks ago  Symptoms include:  Pain and redness  Symptom intensity:  Mild  Symptom progression:  Worsening  Had these symptoms before:  No  What makes it worse:  Pushing on it or touching it  What makes it better:  Leave it alone    She eats 2-3 servings of fruits and vegetables daily.She consumes 1 sweetened beverage(s) daily.She exercises with enough effort to increase her heart rate 10 to 19 minutes per day.  She exercises with enough effort to increase her heart rate 3 or less days per week.   She is taking medications " "regularly.           Had surgery 3/09/2021, open heart for coronary artery bypass graft x4.  Scar has consistently gotten bigger 3-4 weeks ago.  Bulge on anterior middle part of scar started same time as inferior part started to separate.  Lesion plateaued in size about a week ago.  No drainage, discharge or crust.    Having pressure in pelvis for past 2 days.  No dysruria, hematuria. No fevers, chills, nausea, or vomiting.    Review of Systems         Objective    /48   Pulse 65   Temp 97.6  F (36.4  C) (Temporal)   Resp 18   Ht 1.676 m (5' 6\")   Wt 80.8 kg (178 lb 2 oz)   LMP 03/07/2021 (Approximate)   SpO2 100%   BMI 28.75 kg/m    Body mass index is 28.75 kg/m .  Physical Exam  Constitutional:       Appearance: Normal appearance. She is well-developed.   Cardiovascular:      Rate and Rhythm: Normal rate and regular rhythm.      Heart sounds: Normal heart sounds, S1 normal and S2 normal. No murmur heard.  Pulmonary:      Effort: Pulmonary effort is normal. No respiratory distress.      Breath sounds: Normal breath sounds. No wheezing, rhonchi or rales.   Abdominal:      General: Bowel sounds are normal.      Palpations: Abdomen is soft.      Tenderness: There is abdominal tenderness in the suprapubic area. There is no right CVA tenderness or left CVA tenderness.   Neurological:      Mental Status: She is alert.                              "

## 2023-08-01 NOTE — RESULT ENCOUNTER NOTE
Stacy,  Your results are consistent with urinary tract infection.  We will await urine culture results.  Please let me know if you have any questions.    Sincerely,  Dr. Stoner

## 2023-08-02 LAB — BACTERIA UR CULT: ABNORMAL

## 2023-08-03 NOTE — RESULT ENCOUNTER NOTE
Stacy,  Your results of your urine culture show that antibiotic given should be working to help urinary tract infection.  Please let me know if you have any questions of if you are not feeling better.    Sincerely,  Dr. Stoner

## 2023-08-04 ENCOUNTER — OFFICE VISIT (OUTPATIENT)
Dept: FAMILY MEDICINE | Facility: CLINIC | Age: 48
End: 2023-08-04
Payer: COMMERCIAL

## 2023-08-04 ENCOUNTER — MYC MEDICAL ADVICE (OUTPATIENT)
Dept: FAMILY MEDICINE | Facility: CLINIC | Age: 48
End: 2023-08-04

## 2023-08-04 VITALS
BODY MASS INDEX: 27.7 KG/M2 | SYSTOLIC BLOOD PRESSURE: 94 MMHG | RESPIRATION RATE: 15 BRPM | DIASTOLIC BLOOD PRESSURE: 58 MMHG | OXYGEN SATURATION: 100 % | HEIGHT: 67 IN | HEART RATE: 60 BPM | WEIGHT: 176.5 LBS | TEMPERATURE: 97.2 F

## 2023-08-04 DIAGNOSIS — N76.0 BACTERIAL VAGINOSIS: ICD-10-CM

## 2023-08-04 DIAGNOSIS — B96.89 BACTERIAL VAGINOSIS: ICD-10-CM

## 2023-08-04 DIAGNOSIS — N30.00 ACUTE CYSTITIS WITHOUT HEMATURIA: ICD-10-CM

## 2023-08-04 DIAGNOSIS — R30.0 DYSURIA: Primary | ICD-10-CM

## 2023-08-04 LAB
ALBUMIN UR-MCNC: ABNORMAL MG/DL
APPEARANCE UR: CLEAR
BACTERIA #/AREA URNS HPF: ABNORMAL /HPF
BILIRUB UR QL STRIP: ABNORMAL
CLUE CELLS: PRESENT
COLOR UR AUTO: YELLOW
GLUCOSE UR STRIP-MCNC: NEGATIVE MG/DL
HGB UR QL STRIP: NEGATIVE
KETONES UR STRIP-MCNC: NEGATIVE MG/DL
LEUKOCYTE ESTERASE UR QL STRIP: ABNORMAL
NITRATE UR QL: NEGATIVE
PH UR STRIP: 5.5 [PH] (ref 5–7)
RBC #/AREA URNS AUTO: ABNORMAL /HPF
SP GR UR STRIP: >=1.03 (ref 1–1.03)
SQUAMOUS #/AREA URNS AUTO: ABNORMAL /LPF
TRICHOMONAS, WET PREP: ABNORMAL
UROBILINOGEN UR STRIP-ACNC: 1 E.U./DL
WBC #/AREA URNS AUTO: ABNORMAL /HPF
WBC'S/HIGH POWER FIELD, WET PREP: ABNORMAL
YEAST, WET PREP: ABNORMAL

## 2023-08-04 PROCEDURE — 87210 SMEAR WET MOUNT SALINE/INK: CPT | Performed by: FAMILY MEDICINE

## 2023-08-04 PROCEDURE — 87086 URINE CULTURE/COLONY COUNT: CPT | Performed by: FAMILY MEDICINE

## 2023-08-04 PROCEDURE — 99214 OFFICE O/P EST MOD 30 MIN: CPT | Performed by: FAMILY MEDICINE

## 2023-08-04 PROCEDURE — 81001 URINALYSIS AUTO W/SCOPE: CPT | Performed by: FAMILY MEDICINE

## 2023-08-04 RX ORDER — PHENAZOPYRIDINE HYDROCHLORIDE 100 MG/1
100 TABLET, FILM COATED ORAL 3 TIMES DAILY PRN
Qty: 9 TABLET | Refills: 0 | Status: SHIPPED | OUTPATIENT
Start: 2023-08-04 | End: 2023-08-07

## 2023-08-04 RX ORDER — METRONIDAZOLE 7.5 MG/G
1 GEL VAGINAL AT BEDTIME
Qty: 25 G | Refills: 0 | Status: SHIPPED | OUTPATIENT
Start: 2023-08-04 | End: 2023-08-09

## 2023-08-04 RX ORDER — NITROFURANTOIN 25; 75 MG/1; MG/1
100 CAPSULE ORAL 2 TIMES DAILY
Qty: 14 CAPSULE | Refills: 0 | Status: SHIPPED | OUTPATIENT
Start: 2023-08-04 | End: 2023-08-11

## 2023-08-04 ASSESSMENT — PAIN SCALES - GENERAL: PAINLEVEL: MODERATE PAIN (4)

## 2023-08-04 NOTE — LETTER
August 4, 2023      Stacy Brown  [address] [facility] [id number]        To Whom It May Concern:    Stacy Brown was seen in our clinic. She will be out of the work due to medical reasons 8/2/2023-8/5/2023      Sincerely,        Phil Moise MD

## 2023-08-04 NOTE — TELEPHONE ENCOUNTER
RN did call and speak with patient.  Today is day 3 of antibiotics. She Is not feeling any better.  Some blood in urine. Intense pressure when voiding.  Is able to void.  Denies fevers.  Patient would like to be seen again.  Open appointment in [place], patient scheduled per request. Denies any other questions or concerns at this time.

## 2023-08-04 NOTE — PROGRESS NOTES
Assessment & Plan     Dysuria  - UA with Microscopic reflex to Culture - lab collect; Future  - Wet prep - lab collect; Future  - UA with Microscopic reflex to Culture - lab collect  - Wet prep - lab collect  - UA Microscopic with Reflex to Culture    Bacterial vaginosis    - metroNIDAZOLE (METROGEL) 0.75 % vaginal gel; Place 1 applicator (5 g) vaginally At Bedtime for 5 days    Acute cystitis without hematuria  Recent diagnosis and treated appropriately with bactrim which is sensitive.  As patient is still symptomatic, her antibiotics was switched to Nitrofurantoin.  - Urine Culture Aerobic Bacterial - lab collect  - nitroFURantoin macrocrystal-monohydrate (MACROBID) 100 MG capsule; Take 1 capsule (100 mg) by mouth 2 times daily for 7 days  - phenazopyridine (PYRIDIUM) 100 MG tablet; Take 1 tablet (100 mg) by mouth 3 times daily as needed for urinary tract discomfort or pain        Phil Moise MD  [facility] MONSTER Kumar is a 48 year old, presenting for the following health issues:  UTI        8/4/2023    10:23 AM   Additional Questions   Roomed by Ame AGUILAR   Accompanied by None         8/4/2023    10:23 AM   Patient Reported Additional Medications   Patient reports taking the following new medications NA     Needs note for work     UTI         Genitourinary - Female  Onset/Duration: 7/30/23  Description:   Painful urination (Dysuria): YES           Frequency: YES  Blood in urine (Hematuria): No  Delay in urine (Hesitency): YES  Intensity: 4/10  Progression of Symptoms:  improving slightly   Accompanying Signs & Symptoms:  Fever/chills: No  Flank pain: YES  Nausea and vomiting: No  Vaginal symptoms: none  Abdominal/Pelvic Pain: YES  History:   History of frequent UTI s: No  History of kidney stones: YES  Sexually Active: No  Possibility of pregnancy: No  Precipitating or alleviating factors: laying down   Therapies tried and outcome:  bactrim           Review of Systems  "  Constitutional, HEENT, cardiovascular, pulmonary, GI, , musculoskeletal, neuro, skin, endocrine and psych systems are negative, except as otherwise noted.      Objective    BP 94/58 (BP Location: Left arm, Patient Position: Chair, Cuff Size: Adult Regular)   Pulse 60   Temp 97.2  F (36.2  C) (Temporal)   Resp 15   Ht 1.69 m (5' 6.54\")   Wt 80.1 kg (176 lb 8 oz)   LMP 03/07/2021 (Approximate)   SpO2 100%   BMI 28.03 kg/m    Body mass index is 28.03 kg/m .  Physical Exam   GENERAL: healthy, alert and no distress  NECK: no adenopathy, no asymmetry, masses, or scars and thyroid normal to palpation  RESP: lungs clear to auscultation - no rales, rhonchi or wheezes  CV: regular rate and rhythm, normal S1 S2, no S3 or S4, no murmur, click or rub, no peripheral edema and peripheral pulses strong  ABDOMEN: soft, nontender, no hepatosplenomegaly, no masses and bowel sounds normal  Reproducible pain right flank.  Results for orders placed or performed in visit on 08/04/23   UA with Microscopic reflex to Culture - lab collect     Status: Abnormal    Specimen: Urine, Clean Catch   Result Value Ref Range    Color Urine Yellow Colorless, Straw, Light Yellow, Yellow    Appearance Urine Clear Clear    Glucose Urine Negative Negative mg/dL    Bilirubin Urine Small (A) Negative    Ketones Urine Negative Negative mg/dL    Specific Gravity Urine >=1.030 1.003 - 1.035    Blood Urine Negative Negative    pH Urine 5.5 5.0 - 7.0    Protein Albumin Urine Trace (A) Negative mg/dL    Urobilinogen Urine 1.0 0.2, 1.0 E.U./dL    Nitrite Urine Negative Negative    Leukocyte Esterase Urine Trace (A) Negative   UA Microscopic with Reflex to Culture     Status: Abnormal   Result Value Ref Range    Bacteria Urine None Seen None Seen /HPF    RBC Urine None Seen 0-2 /HPF /HPF    WBC Urine 0-5 0-5 /HPF /HPF    Squamous Epithelials Urine Few (A) None Seen /LPF    Narrative    Urine Culture not indicated   Wet prep - lab collect     Status: " Abnormal    Specimen: Vagina; Swab   Result Value Ref Range    Trichomonas Absent Absent    Yeast Absent Absent    Clue Cells Present (A) Absent    WBCs/high power field 1+ (A) None

## 2023-08-05 LAB — BACTERIA UR CULT: NORMAL

## 2023-08-06 ENCOUNTER — MYC REFILL (OUTPATIENT)
Dept: FAMILY MEDICINE | Facility: CLINIC | Age: 48
End: 2023-08-06
Payer: COMMERCIAL

## 2023-08-06 DIAGNOSIS — S82.891A CLOSED FRACTURE OF RIGHT ANKLE, INITIAL ENCOUNTER: ICD-10-CM

## 2023-08-08 ENCOUNTER — TELEPHONE (OUTPATIENT)
Dept: FAMILY MEDICINE | Facility: CLINIC | Age: 48
End: 2023-08-08
Payer: COMMERCIAL

## 2023-08-08 RX ORDER — OXYCODONE HYDROCHLORIDE 5 MG/1
5 TABLET ORAL DAILY PRN
Qty: 30 TABLET | Refills: 0 | Status: SHIPPED | OUTPATIENT
Start: 2023-08-12 | End: 2023-09-01

## 2023-08-08 NOTE — TELEPHONE ENCOUNTER
The new script we received for Oxycodone 5mg has a fill date of 8/12/23 on it, we are not open on the weekend. Can we fill the medication on 8/11/23 instead?      On behalf of [facility]  Thank You~  Daysi Cruz, [facility]

## 2023-08-11 ENCOUNTER — THERAPY VISIT (OUTPATIENT)
Dept: PHYSICAL THERAPY | Facility: CLINIC | Age: 48
End: 2023-08-11
Payer: OTHER MISCELLANEOUS

## 2023-08-11 DIAGNOSIS — R26.9 ABNORMAL GAIT: ICD-10-CM

## 2023-08-11 DIAGNOSIS — Z98.890 STATUS POST SURGERY: ICD-10-CM

## 2023-08-11 DIAGNOSIS — S82.891D CLOSED FRACTURE OF RIGHT ANKLE WITH ROUTINE HEALING, SUBSEQUENT ENCOUNTER: ICD-10-CM

## 2023-08-11 DIAGNOSIS — R60.0 LOCALIZED EDEMA: Primary | ICD-10-CM

## 2023-08-11 DIAGNOSIS — M25.571 PAIN IN JOINT INVOLVING ANKLE AND FOOT, RIGHT: ICD-10-CM

## 2023-08-11 PROCEDURE — 97110 THERAPEUTIC EXERCISES: CPT | Mod: GP | Performed by: PHYSICAL THERAPIST

## 2023-08-11 PROCEDURE — 97140 MANUAL THERAPY 1/> REGIONS: CPT | Mod: GP | Performed by: PHYSICAL THERAPIST

## 2023-08-11 NOTE — TELEPHONE ENCOUNTER
Message handled by Nurse Triage with [facility] - provider name: Dr. Muse .    PCP has given verbal ok for patient to fill early today due to pharmacy being closed. RN called pharmacy and relayed this message.    MARCELA AlbarranN, RN

## 2023-08-11 NOTE — PROGRESS NOTES
PLAN  Continue therapy per current plan of care.  Frequency changed to 2x/month x 2 months    Beginning/End Dates of Progress Note Reporting Period:  07/13/23 to 08/11/2023    Referring Provider:  Carlos Riley         08/11/23 0500   Appointment Info   Signing clinician's name / credentials Amanda Hilligoss, DPT   Total/Authorized Visits 16 (E&T- see PN 8/11/23)   Visits Used 12   Medical Diagnosis Status post surgery  Closed fracture of right ankle   PT Tx Diagnosis Right ankle pain, localized edema, abnormal gait   Precautions/Limitations WBAT, as of 5/23/23- no restrictions   Progress Note/Certification   Onset of illness/injury or Date of Surgery 04/05/23  (date of surgery)   Therapy Frequency 2x/month   Predicted Duration x 2 months   Progress Note Due Date 09/11/23   Progress Note Completed Date 07/13/23       Present No   GOALS   PT Goals 2   PT Goal 1   Goal Identifier Ambulation   Goal Description Patient will be able to ambulate w/ 1 crutch in boot x10 minutes   Rationale to maximize safety and independence within the home;to maximize safety and independence within the community   Goal Progress met   Target Date 06/07/23   Date Met 05/31/23   PT Goal 2   Goal Identifier Ambulation (long term)   Goal Description Pt will be able to ambulate w/o boot, w/o AD, w/o abnormal gait x 20 min   Rationale to maximize safety and independence within the home;to maximize safety and independence within the community   Goal Progress ambulating w/o boot w/o crutches, 30 min w/ increased ARNOLD and decreased stride length   Target Date 08/02/23   Subjective Report   Subjective Report Pt notes she still has a lot of stiffness and pain. Pain is worst in medial malloli and into joint.  Has been able to walk for 30 min while grocery shopping, but had to ice. Is up standing for about half of her shift (8 hour shifts). Will be going up to 40# of lifting starting 8/15/23.   Objective Measures  "  Objective Measures Objective Measure 1;Objective Measure 2;Objective Measure 3;Objective Measure 4   Objective Measure 1   Objective Measure AROM   Details DF (KE) 0, (KF) 5, PF 50; Inv 28, Ev 10   Objective Measure 2   Objective Measure Gait   Details Ambulating w/o boot, ARNOLD WNL, slight decreas in stride length, toe out on R   Objective Measure 3   Objective Measure Edema   Details Figure 8: 49 cm B   Treatment Interventions (PT)   Interventions Therapeutic Procedure/Exercise;Therapeutic Activity;Manual Therapy   Therapeutic Procedure/Exercise   Therapeutic Procedures: strength, endurance, ROM, flexibillity minutes (66739) 23   Therapeutic Procedures Ther Proc 2;Ther Proc 3;Ther Proc 4;Ther Proc 5   Ther Proc 1 Toe raises   Ther Proc 1 - Details off 2\" step for focus on eccentric step x 10 gastroc, x 10 soleus   Ther Proc 2 AROM PF, DF, Inv, Ev   Ther Proc 2 - Details x2-3 ea. direction (continue alphabets as needed for HEP)   Ther Proc 3 Standing DF using chair   Ther Proc 3 - Details HEP   Ther Proc 4 Bike, seat 2   Ther Proc 4 - Details x6 min, resistance 7.0   Ther Proc 5 Towel stretch for DF/calf stretch   Ther Proc 5 - Details HEP   Skilled Intervention Progression to WB calf strengthening w/ focus and edu re: eccentric loading   Patient Response/Progress AROM improved since last visit   Manual Therapy   Manual Therapy: Mobilization, MFR, MLD, friction massage minutes (96677) 16   Manual Therapy 1 STM   Manual Therapy 1 - Details fascial pull medial ankle x 4 min; anterior ankle fascial pull x 4 min   Manual Therapy 2 Joint mobilization   Manual Therapy 2 - Details moderate mobilization rear foot varus/valgus x  4 min   Manual Therapy 3 Scar massage   Manual Therapy 3 - Details x 4 min medial ankle   Skilled Intervention Modification of technique and intensity based on pt tolerance and tissue/joint response   Patient Response/Progress tenderness medial ankle today, improves by end of MT   Education "   Learner/Method Patient;Significant Other;Listening;No Barriers to Learning   Plan   Home program progress to WB toe raises   Updates to plan of care decrease frequency to every other week   Plan for next session Progress WB/ balance drills, continue ROM   Comments   Comments WC DOI 3/22/23, DOS 4/5/23   Total Session Time   Timed Code Treatment Minutes 39   Total Treatment Time (sum of timed and untimed services) 39

## 2023-08-23 ENCOUNTER — MYC MEDICAL ADVICE (OUTPATIENT)
Dept: FAMILY MEDICINE | Facility: CLINIC | Age: 48
End: 2023-08-23
Payer: COMMERCIAL

## 2023-09-01 ENCOUNTER — MYC REFILL (OUTPATIENT)
Dept: FAMILY MEDICINE | Facility: CLINIC | Age: 48
End: 2023-09-01
Payer: COMMERCIAL

## 2023-09-01 DIAGNOSIS — S82.891A CLOSED FRACTURE OF RIGHT ANKLE, INITIAL ENCOUNTER: ICD-10-CM

## 2023-09-01 RX ORDER — OXYCODONE HYDROCHLORIDE 5 MG/1
5 TABLET ORAL DAILY PRN
Qty: 30 TABLET | Refills: 0 | Status: SHIPPED | OUTPATIENT
Start: 2023-09-11 | End: 2023-10-06

## 2023-09-08 ENCOUNTER — THERAPY VISIT (OUTPATIENT)
Dept: PHYSICAL THERAPY | Facility: CLINIC | Age: 48
End: 2023-09-08
Payer: OTHER MISCELLANEOUS

## 2023-09-08 DIAGNOSIS — S82.891D CLOSED FRACTURE OF RIGHT ANKLE WITH ROUTINE HEALING, SUBSEQUENT ENCOUNTER: ICD-10-CM

## 2023-09-08 DIAGNOSIS — R26.9 ABNORMAL GAIT: ICD-10-CM

## 2023-09-08 DIAGNOSIS — M25.571 PAIN IN JOINT INVOLVING ANKLE AND FOOT, RIGHT: ICD-10-CM

## 2023-09-08 DIAGNOSIS — Z98.890 STATUS POST SURGERY: ICD-10-CM

## 2023-09-08 DIAGNOSIS — R60.0 LOCALIZED EDEMA: Primary | ICD-10-CM

## 2023-09-08 PROCEDURE — 97112 NEUROMUSCULAR REEDUCATION: CPT | Mod: GP | Performed by: PHYSICAL THERAPIST

## 2023-09-08 PROCEDURE — 97110 THERAPEUTIC EXERCISES: CPT | Mod: GP | Performed by: PHYSICAL THERAPIST

## 2023-09-08 PROCEDURE — 97140 MANUAL THERAPY 1/> REGIONS: CPT | Mod: GP | Performed by: PHYSICAL THERAPIST

## 2023-09-15 ENCOUNTER — OFFICE VISIT (OUTPATIENT)
Dept: FAMILY MEDICINE | Facility: CLINIC | Age: 48
End: 2023-09-15
Payer: COMMERCIAL

## 2023-09-15 VITALS
RESPIRATION RATE: 16 BRPM | DIASTOLIC BLOOD PRESSURE: 64 MMHG | SYSTOLIC BLOOD PRESSURE: 116 MMHG | BODY MASS INDEX: 28.77 KG/M2 | OXYGEN SATURATION: 99 % | HEART RATE: 58 BPM | WEIGHT: 179 LBS | HEIGHT: 66 IN | TEMPERATURE: 97.3 F

## 2023-09-15 DIAGNOSIS — G89.4 CHRONIC PAIN SYNDROME: ICD-10-CM

## 2023-09-15 DIAGNOSIS — I21.4 NSTEMI (NON-ST ELEVATED MYOCARDIAL INFARCTION) (H): ICD-10-CM

## 2023-09-15 DIAGNOSIS — E11.65 TYPE 2 DIABETES MELLITUS WITH HYPERGLYCEMIA, WITH LONG-TERM CURRENT USE OF INSULIN (H): Primary | ICD-10-CM

## 2023-09-15 DIAGNOSIS — E78.5 HYPERLIPIDEMIA LDL GOAL <100: ICD-10-CM

## 2023-09-15 DIAGNOSIS — Z12.11 SPECIAL SCREENING FOR MALIGNANT NEOPLASMS, COLON: ICD-10-CM

## 2023-09-15 DIAGNOSIS — Z79.4 TYPE 2 DIABETES MELLITUS WITH HYPERGLYCEMIA, WITH LONG-TERM CURRENT USE OF INSULIN (H): Primary | ICD-10-CM

## 2023-09-15 DIAGNOSIS — F33.1 MAJOR DEPRESSIVE DISORDER, RECURRENT EPISODE, MODERATE (H): ICD-10-CM

## 2023-09-15 LAB
AMPHETAMINES UR QL SCN: ABNORMAL
BARBITURATES UR QL SCN: ABNORMAL
BENZODIAZ UR QL SCN: ABNORMAL
BZE UR QL SCN: ABNORMAL
CANNABINOIDS UR QL SCN: ABNORMAL
FENTANYL UR QL: ABNORMAL
HBA1C MFR BLD: 8 %
OPIATES UR QL SCN: ABNORMAL
OXYCODONE UR QL: NORMAL
PCP QUAL URINE (ROCHE): ABNORMAL

## 2023-09-15 PROCEDURE — 99214 OFFICE O/P EST MOD 30 MIN: CPT | Performed by: FAMILY MEDICINE

## 2023-09-15 PROCEDURE — 36415 COLL VENOUS BLD VENIPUNCTURE: CPT | Performed by: FAMILY MEDICINE

## 2023-09-15 PROCEDURE — 80307 DRUG TEST PRSMV CHEM ANLYZR: CPT | Performed by: FAMILY MEDICINE

## 2023-09-15 PROCEDURE — 99207 PR FOOT EXAM NO CHARGE: CPT | Performed by: FAMILY MEDICINE

## 2023-09-15 PROCEDURE — G0480 DRUG TEST DEF 1-7 CLASSES: HCPCS | Mod: 59 | Performed by: FAMILY MEDICINE

## 2023-09-15 PROCEDURE — 83036 HEMOGLOBIN GLYCOSYLATED A1C: CPT | Performed by: FAMILY MEDICINE

## 2023-09-15 RX ORDER — INSULIN ASPART 100 [IU]/ML
INJECTION, SOLUTION INTRAVENOUS; SUBCUTANEOUS
Qty: 15 ML | Refills: 3 | Status: SHIPPED | OUTPATIENT
Start: 2023-09-15 | End: 2024-08-05

## 2023-09-15 RX ORDER — METFORMIN HCL 500 MG
1000 TABLET, EXTENDED RELEASE 24 HR ORAL 2 TIMES DAILY WITH MEALS
Qty: 372 TABLET | Refills: 3 | Status: SHIPPED | OUTPATIENT
Start: 2023-09-15

## 2023-09-15 RX ORDER — ROSUVASTATIN CALCIUM 20 MG/1
20 TABLET, COATED ORAL DAILY
Qty: 93 TABLET | Refills: 3 | Status: SHIPPED | OUTPATIENT
Start: 2023-09-15

## 2023-09-15 RX ORDER — LISINOPRIL 2.5 MG/1
2.5 TABLET ORAL DAILY
Qty: 93 TABLET | Refills: 3 | Status: SHIPPED | OUTPATIENT
Start: 2023-09-15 | End: 2023-12-12

## 2023-09-15 RX ORDER — METOPROLOL TARTRATE 25 MG/1
25 TABLET, FILM COATED ORAL 2 TIMES DAILY
Qty: 186 TABLET | Refills: 3 | Status: SHIPPED | OUTPATIENT
Start: 2023-09-15

## 2023-09-15 RX ORDER — NITROGLYCERIN 0.4 MG/1
TABLET SUBLINGUAL
Qty: 25 TABLET | Refills: 0 | Status: SHIPPED | OUTPATIENT
Start: 2023-09-15

## 2023-09-15 RX ORDER — INSULIN GLARGINE 100 [IU]/ML
42 INJECTION, SOLUTION SUBCUTANEOUS EVERY MORNING
Qty: 45 ML | Refills: 1 | Status: SHIPPED | OUTPATIENT
Start: 2023-09-15 | End: 2024-01-26

## 2023-09-15 ASSESSMENT — PATIENT HEALTH QUESTIONNAIRE - PHQ9
SUM OF ALL RESPONSES TO PHQ QUESTIONS 1-9: 6
SUM OF ALL RESPONSES TO PHQ QUESTIONS 1-9: 6
10. IF YOU CHECKED OFF ANY PROBLEMS, HOW DIFFICULT HAVE THESE PROBLEMS MADE IT FOR YOU TO DO YOUR WORK, TAKE CARE OF THINGS AT HOME, OR GET ALONG WITH OTHER PEOPLE: SOMEWHAT DIFFICULT

## 2023-09-15 ASSESSMENT — PAIN SCALES - GENERAL: PAINLEVEL: MODERATE PAIN (4)

## 2023-09-15 NOTE — LETTER
Opioid / Opioid Plus Controlled Substance Agreement    This is an agreement between you and your provider about the safe and appropriate use of controlled substance/opioids prescribed by your care team. Controlled substances are medicines that can cause physical and mental dependence (abuse).    There are strict laws about having and using these medicines. We here at [facility] are committing to working with you in your efforts to get better. To support you in this work, we ll help you schedule regular office appointments for medicine refills. If we must cancel or change your appointment for any reason, we ll make sure you have enough medicine to last until your next appointment.     As a Provider, I will:  Listen carefully to your concerns and treat you with respect.   Recommend a treatment plan that I believe is in your best interest. This plan may involve therapies other than opioid pain medication.   Talk with you often about the possible benefits, and the risk of harm of any medicine that we prescribe for you.   Provide a plan on how to taper (discontinue or go off) using this medicine if the decision is made to stop its use.    As a Patient, I understand that opioid(s):   Are a controlled substance prescribed by my care team to help me function or work and manage my condition(s).   Are strong medicines and can cause serious side effects such as:  Drowsiness, which can seriously affect my driving ability  A lower breathing rate, enough to cause death  Harm to my thinking ability   Depression   Abuse of and addiction to this medicine  Need to be taken exactly as prescribed. Combining opioids with certain medicines or chemicals (such as illegal drugs, sedatives, sleeping pills, and benzodiazepines) can be dangerous or even fatal. If I stop opioids suddenly, I may have severe withdrawal symptoms.  Do not work for all types of pain nor for all patients. If they re not helpful, I may be asked to stop  them.        The risks, benefits and side effects of these medicine(s) were explained to me. I agree that:  I will take part in other treatments as advised by my care team. This may be psychiatry or counseling, physical therapy, behavioral therapy, group treatment or a referral to a specialist.     I will keep all my appointments. I understand that this is part of the monitoring of opioids. My care team may require an office visit for EVERY opioid/controlled substance refill. If I miss appointments or don t follow instructions, my care team may stop my medicine.    I will take my medicines as prescribed. I will not change the dose or schedule unless my care team tells me to. There will be no refills if I run out early.     I may be asked to come to the clinic and complete a urine drug test or complete a pill count at any time. If I don t give a urine sample or participate in a pill count, the care team may stop my medicine.    I will only receive prescriptions from this clinic for chronic pain. If I am treated by another provider for acute pain issues, I will tell them that I am taking opioid pain medication for chronic pain and that I have a treatment agreement with this provider. I will inform my [facility] care team within one business day if I am given a prescription for any pain medication by another healthcare provider. My [facility] care team can contact other providers and pharmacists about my use of any medicines.    It is up to me to make sure that I don t run out of my medicines on weekends or holidays. If my care team is willing to refill my opioid prescription without a visit, I must request refills only during office hours. Refills may take up to 3 business days to process. I will use one pharmacy to fill all my opioid and other controlled substance prescriptions. I will notify the clinic about any changes to my insurance or medication availability.    I am responsible for my  prescriptions. If the medicine/prescription is lost, stolen or destroyed, it will not be replaced. I also agree not to share controlled substance medicines with anyone.    I am aware I should not use any illegal or recreational drugs. I agree not to drink alcohol unless my care team says I can.       If I enroll in the [place] [facility] program, I will tell my care team prior to my next refill.     I will tell my care team right away if I become pregnant, have a new medical problem treated outside of my regular clinic, or have a change in my medications.    I understand that this medicine can affect my thinking, judgment and reaction time. Alcohol and drugs affect the brain and body, which can affect the safety of my driving. Being under the influence of alcohol or drugs can affect my decision-making, behaviors, personal safety, and the safety of others. Driving while impaired (DWI) can occur if a person is driving, operating, or in physical control of a car, motorcycle, boat, snowmobile, ATV, motorbike, off-road vehicle, or any other motor vehicle ([place] Statute 169A.20). I understand the risk if I choose to drive or operate any vehicle or machinery.    I understand that if I do not follow any of the conditions above, my prescriptions or treatment may be stopped or changed.          Opioids  What You Need to Know    What are opioids?   Opioids are pain medicines that must be prescribed by a doctor. They are also known as narcotics.     Examples are:   morphine (MS Contin, Anisha)  oxycodone (Oxycontin)  oxycodone and acetaminophen (Percocet)  hydrocodone and acetaminophen (Vicodin, Norco)   fentanyl patch (Duragesic)   hydromorphone (Dilaudid)   methadone  codeine (Tylenol #3)     What do opioids do well?   Opioids are best for severe short-term pain such as after a surgery or injury. They may work well for cancer pain. They may help some people with long-lasting (chronic) pain.     What do opioids NOT do  well?   Opioids never get rid of pain entirely, and they don t work well for most patients with chronic pain. Opioids don t reduce swelling, one of the causes of pain.                                    Other ways to manage chronic pain and improve function include:     Treat the health problem that may be causing pain  Anti-inflammation medicines, which reduce swelling and tenderness, such as ibuprofen (Advil, Motrin) or naproxen (Aleve)  Acetaminophen (Tylenol)  Antidepressants and anti-seizure medicines, especially for nerve pain  Topical treatments such as patches or creams  Injections or nerve blocks  Chiropractic or osteopathic treatment  Acupuncture, massage, deep breathing, meditation, visual imagery, aromatherapy  Use heat or ice at the pain site  Physical therapy   Exercise  Stop smoking  Take part in therapy       Risks and side effects     Talk to your doctor before you start or decide to keep taking opioids. Possible side effects include:    Lowering your breathing rate enough to cause death  Overdose, including death, especially if taking higher than prescribed doses  Worse depression symptoms; less pleasure in things you usually enjoy  Feeling tired or sluggish  Slower thoughts or cloudy thinking  Being more sensitive to pain over time; pain is harder to control  Trouble sleeping or restless sleep  Changes in hormone levels (for example, less testosterone)  Changes in sex drive or ability to have sex  Constipation  Unsafe driving  Itching and sweating  Dizziness  Nausea, throwing up and dry mouth    What else should I know about opioids?    Opioids may lead to dependence, tolerance, or addiction.    Dependence means that if you stop or reduce the medicine too quickly, you will have withdrawal symptoms. These include loose poop (diarrhea), jitters, flu-like symptoms, nervousness and tremors. Dependence is not the same as addiction.                     Tolerance means needing higher doses over time to  get the same effect. This may increase the chance of serious side effects.    Addiction is when people improperly use a substance that harms their body, their mind or their relations with others. Use of opiates can cause a relapse of addiction if you have a history of drug or alcohol abuse.    People who have used opioids for a long time may have a lower quality of life, worse depression, higher levels of pain and more visits to doctors.    You can overdose on opioids. Take these steps to lower your risk of overdose:    Recognize the signs:  Signs of overdose include decrease or loss of consciousness (blackout), slowed breathing, trouble waking up and blue lips. If someone is worried about overdose, they should call 911.    Talk to your doctor about Narcan (naloxone).   If you are at risk for overdose, you may be given a prescription for Narcan. This medicine very quickly reverses the effects of opioids.   If you overdose, a friend or family member can give you Narcan while waiting for the ambulance. They need to know the signs of overdose and how to give Narcan.     Don't use alcohol or street drugs.   Taking them with opioids can cause death.    Do not take any of these medicines unless your doctor says it s OK. Taking these with opioids can cause death:  Benzodiazepines, such as lorazepam (Ativan), alprazolam (Xanax) or diazepam (Valium)  Muscle relaxers, such as cyclobenzaprine (Flexeril)  Sleeping pills like zolpidem (Ambien)   Other opioids      How to keep you and other people safe while taking opioids:    Never share your opioids with others.  Opioid medicines are regulated by the Drug Enforcement Agency (T[employer]). Selling or sharing medications is a criminal act.    2. Be sure to store opioids in a secure place, locked up if possible. Young children can easily swallow them and overdose.    3. When you are traveling with your medicines, keep them in the original bottles. If you use a pill box, be sure you also  carry a copy of your medicine list from your clinic or pharmacy.    4. Safe disposal of opioids    Most pharmacies have places to get rid of medicine, called disposal kiosks. Medicine disposal options are also available in every [facility]. Search your county and  medication disposal  to find more options. You can find more details at:  https://[email or web address]./living-green/managing-unwanted-medications     I agree that my provider, clinic care team, and pharmacy may work with any city, state or federal law enforcement agency that investigates the misuse, sale, or other diversion of my controlled medicine. I will allow my provider to discuss my care with, or share a copy of, this agreement with any other treating provider, pharmacy or emergency room where I receive care.    I have read this agreement and have asked questions about anything I did not understand.    _______________________________________________________  Patient Signature - Stacy Brown _____________________                   Date     _______________________________________________________  Provider Signature - Yaima Muse MD   _____________________                   Date     _______________________________________________________  Witness Signature (required if provider not present while patient signing)   _____________________                   Date

## 2023-09-15 NOTE — PROGRESS NOTES
Assessment & Plan       ICD-10-CM    1. Type 2 diabetes mellitus with hyperglycemia, with long-term current use of insulin (H)  E11.65 Hemoglobin A1c    Z79.4 insulin aspart (NOVOLOG FLEXPEN) 100 UNIT/ML pen     insulin glargine (BASAGLAR KWIKPEN) 100 UNIT/ML pen     insulin pen needle (31G X 8 MM) 31G X 8 MM miscellaneous     insulin pen needle (NOVOFINE) 32G X 6 MM miscellaneous     metFORMIN (GLUCOPHAGE XR) 500 MG 24 hr tablet     nitroGLYcerin (NITROSTAT) 0.4 MG sublingual tablet     FOOT EXAM     aspirin (ASA) 325 MG EC tablet      2. NSTEMI (non-ST elevated myocardial infarction) (H)  I21.4 lisinopril (ZESTRIL) 2.5 MG tablet     metoprolol tartrate (LOPRESSOR) 25 MG tablet     rosuvastatin (CRESTOR) 20 MG tablet     aspirin (ASA) 325 MG EC tablet      3. Major depressive disorder, recurrent episode, moderate (H)  F33.1 FLUoxetine (PROZAC) 20 MG capsule      4. Hyperlipidemia LDL goal <100  E78.5 nitroGLYcerin (NITROSTAT) 0.4 MG sublingual tablet      5. Chronic pain syndrome  G89.4 Urine Drugs of Abuse Screen Oxycodone Urine Qualitative [id number]; Yes     Urine Drugs of Abuse Screen Oxycodone Urine Qualitative [id number]; Yes     THC Confirmation Quantitative Urine      6. Special screening for malignant neoplasms, colon  Z12.11 Colonoscopy Screening  Referral           For her diabetes, see lab orders today. Will notify with A1C results and adjust therapy as needed. It appears she is keeping her glucoses fairly well controlled but she is working so many hours and is under a great deal of stress that she may not have adequate time for healthy meal preparation and exercise.     Refills given.     For her chronic pain, we did renew her pain medication contract and collected urine for drug testing today. She denies any illicit drug use and is compliant with her pain medication. Will continue to fill but would like her to limit use as able.     She is going to wait on her flu shot and get at the pharmacy.  "    She will be due for labs in November, is not fasting today, for lipids.   Continue PT for her ankle and schedule orthopedics follow up in October.     Is due for routine colonoscopy, will place orders.     We had a long discussion about lifestyle and her job. She is being mandated to work overtime, and it is not good for her health. She needs to be able to focus on healthy eating, adequate sleep, and proper exercise as well as glucose monitoring, medication administration (insulin). Her current job does not allow her to reduce her work hours. I offered her occupational/vocational rehab to see if she could do something different. She declines for now, but will have a discussion with her spouse and consider her options.     She has remained abstinent from tobacco, I congratulated her on this.   Will make her eye appt.        BMI:   Estimated body mass index is 28.89 kg/m  as calculated from the following:    Height as of this encounter: 1.676 m (5' 6\").    Weight as of this encounter: 81.2 kg (179 lb).   Weight management plan: Discussed healthy diet and exercise guidelines      Yaima Muse MD  [facility]    Kwadwo Kumar is a 48 year old, presenting for the following health issues:  Recheck Medication        9/15/2023     7:32 AM   Additional Questions   Roomed by Leslie PEREZ       History of Present Illness       Mental Health Follow-up:  Patient presents to follow-up on Depression.Patient's depression since last visit has been:  Worse  The patient is not having other symptoms associated with depression.      Any significant life events: job concerns and financial concerns  Patient is not feeling anxious or having panic attacks.  Patient has no concerns about alcohol or drug use.    Diabetes:   She presents for follow up of diabetes.  She is checking home blood glucose a few times a month.   She checks blood glucose before meals.  Blood glucose is never over 200 and " never under 70. She is aware of hypoglycemia symptoms including none.    She has no concerns regarding her diabetes at this time.   She is not experiencing numbness or burning in feet, excessive thirst, blurry vision, weight changes or redness, sores or blisters on feet. The patient has not had a diabetic eye exam in the last 12 months.          She eats 2-3 servings of fruits and vegetables daily.She consumes 1 sweetened beverage(s) daily.She exercises with enough effort to increase her heart rate 30 to 60 minutes per day.  She exercises with enough effort to increase her heart rate 3 or less days per week.   She is taking medications regularly.       Right ankle still hurts quite a bit, doing PT once a week. Was seen by ortho and told to follow up in October or as needed. Has no appt made.   She takes oxycodone on average once daily. This is for chronic pain as well as now her new ankle pain.   She is working 6 days per week. By the end of the week her ankle is hurting too much to keep working, brings her to tears.her employer ([employer]) will not allow fewer hours or reduced workload.   They have moved again, now in an apartment in [place]. Her son Alexandre lives with them, dtr Junie lives in same building.   She is still not smoking. She is taking asa 325 mg daily.   She is due for diabetic eye exam, will be making appt.   She is not fasting today, will be due for lipids in November.     She is checking her blood sugar 1-2 times daily. 88 this am. Afternoons occasionally higher, yesterday 136.   Using her insulin 42 units basaglar in the am and 4-6 units at dinner time.     She has not needing NTG. Has a history of NSTEMI.     She is taking tizanidine 1 in the afternoon and 2 at hs.     Is due for colonoscopy.     Review of Systems   Constitutional, HEENT, cardiovascular, pulmonary, gi and gu systems are negative, except as otherwise noted.      Objective    /64   Pulse 58   Temp 97.3  F (36.3  C) (Tympanic)    "Resp 16   Ht 1.676 m (5' 6\")   Wt 81.2 kg (179 lb)   LMP 03/07/2021 (Approximate)   SpO2 99%   BMI 28.89 kg/m    Body mass index is 28.89 kg/m .  Physical Exam   Vitals noted.  Patient alert, oriented, and in no acute distress.   Neck:  Supple without lymphadenopathy, JVD or masses. No bruits.   CV:  RRR without murmur.   Respiratory:  Lungs clear to auscultation bilaterally.   Diabetic foot exam is normal to monofilament testing.               "

## 2023-09-18 RX ORDER — ASPIRIN 325 MG
325 TABLET, DELAYED RELEASE (ENTERIC COATED) ORAL DAILY
Status: ON HOLD | COMMUNITY
Start: 2023-09-18 | End: 2024-03-30

## 2023-09-18 NOTE — RESULT ENCOUNTER NOTE
Stacy, your test for diabetes is slightly worse today. Are you able to use your insulin with all meals instead of just supper? I think you likely need it. I'm going to recommend you get back in with the diabetes educator and touch base with your numbers again. Let's get you down closer to 7 for better control.   Also your urine test shows marijuana, and no oxycodone. I know you stated it has been a few days since you took the oxycodone so I'm not too concerned about that.  However I do recommend you stay away from marijuana if you are on pain medications.   I know marijuana is legal so I can't tell you not to use it but I don't recommend the combination.   Yaima Muse MD

## 2023-09-20 LAB
CANNABINOIDS UR CFM-MCNC: 75 NG/ML
CARBOXYTHC/CREAT UR: 129 NG/MG CREAT

## 2023-09-20 NOTE — RESULT ENCOUNTER NOTE
Stacy, this test confirms the presence of THC/marijuana in your urine. Please abstain from all use of recreational drugs for health reasons.   Yaima Muse MD

## 2023-09-22 ENCOUNTER — THERAPY VISIT (OUTPATIENT)
Dept: PHYSICAL THERAPY | Facility: CLINIC | Age: 48
End: 2023-09-22
Payer: OTHER MISCELLANEOUS

## 2023-09-22 DIAGNOSIS — R26.9 ABNORMAL GAIT: ICD-10-CM

## 2023-09-22 DIAGNOSIS — Z98.890 STATUS POST SURGERY: ICD-10-CM

## 2023-09-22 DIAGNOSIS — S82.891D CLOSED FRACTURE OF RIGHT ANKLE WITH ROUTINE HEALING, SUBSEQUENT ENCOUNTER: ICD-10-CM

## 2023-09-22 DIAGNOSIS — M25.571 PAIN IN JOINT INVOLVING ANKLE AND FOOT, RIGHT: ICD-10-CM

## 2023-09-22 DIAGNOSIS — R60.0 LOCALIZED EDEMA: Primary | ICD-10-CM

## 2023-09-22 PROCEDURE — 97140 MANUAL THERAPY 1/> REGIONS: CPT | Mod: GP | Performed by: PHYSICAL THERAPIST

## 2023-09-22 PROCEDURE — 97110 THERAPEUTIC EXERCISES: CPT | Mod: GP | Performed by: PHYSICAL THERAPIST

## 2023-10-03 ENCOUNTER — THERAPY VISIT (OUTPATIENT)
Dept: PHYSICAL THERAPY | Facility: CLINIC | Age: 48
End: 2023-10-03
Payer: OTHER MISCELLANEOUS

## 2023-10-03 DIAGNOSIS — R26.9 ABNORMAL GAIT: ICD-10-CM

## 2023-10-03 DIAGNOSIS — Z98.890 STATUS POST SURGERY: ICD-10-CM

## 2023-10-03 DIAGNOSIS — M25.571 PAIN IN JOINT INVOLVING ANKLE AND FOOT, RIGHT: ICD-10-CM

## 2023-10-03 DIAGNOSIS — R60.0 LOCALIZED EDEMA: Primary | ICD-10-CM

## 2023-10-03 DIAGNOSIS — S82.891A CLOSED FRACTURE OF RIGHT ANKLE: ICD-10-CM

## 2023-10-03 PROCEDURE — 97140 MANUAL THERAPY 1/> REGIONS: CPT | Mod: GP | Performed by: PHYSICAL THERAPY ASSISTANT

## 2023-10-03 PROCEDURE — 97110 THERAPEUTIC EXERCISES: CPT | Mod: GP | Performed by: PHYSICAL THERAPY ASSISTANT

## 2023-10-05 ENCOUNTER — MYC REFILL (OUTPATIENT)
Dept: FAMILY MEDICINE | Facility: CLINIC | Age: 48
End: 2023-10-05
Payer: COMMERCIAL

## 2023-10-05 DIAGNOSIS — S82.891A CLOSED FRACTURE OF RIGHT ANKLE, INITIAL ENCOUNTER: ICD-10-CM

## 2023-10-05 NOTE — PROGRESS NOTES
PLAN  Continue therapy per current plan of care.  2x/month x 2 months    Beginning/End Dates of Progress Note Reporting Period:  8/11/23 to 10/03/2023    Referring Provider:  Carlos Riley       10/03/23 0500   Appointment Info   Signing clinician's name / credentials Socorro Oliva PTA   Total/Authorized Visits 16 ([employer]- see PN 8/11/23, care plan updated with PN 10/3/23 requesting 4 add't sessions)   Visits Used 15   Medical Diagnosis Status post surgery  Closed fracture of right ankle   PT Tx Diagnosis Right ankle pain, localized edema, abnormal gait   Precautions/Limitations WBAT, as of 5/23/23- no restrictions   Progress Note/Certification   Onset of illness/injury or Date of Surgery 04/05/23  (date of surgery)   Therapy Frequency 2x/month   Predicted Duration x 2 months   Progress Note Completed Date 10/03/23       Present No   PT Goal 1   Goal Identifier Ambulation   Goal Description Patient will be able to ambulate w/ 1 crutch in boot x10 minutes   Rationale to maximize safety and independence within the home;to maximize safety and independence within the community   Goal Progress met   Target Date 06/07/23   Date Met 05/31/23   PT Goal 2   Goal Identifier Ambulation (long term)   Goal Description Pt will be able to ambulate w/o boot, w/o AD, w/o abnormal gait x 20 min   Rationale to maximize safety and independence within the home;to maximize safety and independence within the community   Goal Progress met   Target Date 10/09/23   Date Met 09/08/23   PT Goal 3   Goal Identifier Standing during work   Goal Description Pt will be able to stand and walk as required for job duties w/ pain to 2/10 by end of day to be able to complete work tasks safely   Rationale to maximize safety and independence within the home;to maximize safety and independence within the community   Goal Progress Pt able to get through job tasks at work w/ 4-5/10 pain at end of day; extend goal as  "progressing   Target Date 12/08/23   Subjective Report   Subjective Report Pt has been seen for 15 PT sessions, reports \"good days/bad days\" with R ankle symptoms. Past few days more painful possibly due to extra activity with moving. Using ice does reduce symptoms. PL up to 4/10. Pt is working without restrictions as a , will have fatigue and increased pain up to 5/10 at the end of the work shift. Pt is focused on improving balance and reducing distal medial ankle pain.   Objective Measures   Objective Measures Objective Measure 1;Objective Measure 2;Objective Measure 3;Objective Measure 4   Objective Measure 1   Objective Measure AROM   Details AROM DF 5, PF 45, Eversion 20, inversion 30   Objective Measure 2   Objective Measure Gait   Details Ambulating w/o boot, ARNOLD WNL, slight decreas in stride length, toe out on R   Objective Measure 3   Objective Measure Heyo balance test eyes open   Details L 15\", R 7\"   Objective Measure 4   Objective Measure MMT R ankle   Details DF 5/5, PF 5/5 (single leg calf raise equal at 20 reps although more effort needed to control R LE along with mild medial ankle pain), Inversion 4/5+pain, Eversion 5/5   Treatment Interventions (PT)   Interventions Therapeutic Procedure/Exercise;Therapeutic Activity;Manual Therapy   Therapeutic Procedure/Exercise   Therapeutic Procedures: strength, endurance, ROM, flexibillity minutes (99289) 25   Therapeutic Procedures Ther Proc 2;Ther Proc 3;Ther Proc 4;Ther Proc 5   Ther Proc 1 SL toe raises standing   Ther Proc 1 - Details 20 reps each gastroc and soleus   Ther Proc 3 manual DF stretch   Ther Proc 3 - Details 5 x 30\"   Ther Proc 4 Bike, seat 2   Ther Proc 4 - Details already warmed up from work activity   Ther Proc 5 verbal review of full HEP, encouraged focus on balance exercise   Skilled Intervention guided progression through increased strenghtening   Patient Response/Progress strength progressing   Manual Therapy   Manual " Therapy: Mobilization, MFR, MLD, friction massage minutes (86621) 14   Manual Therapy 1 STM   Manual Therapy 1 - Details scar massage focus on lateral ankle x 7 min   Manual Therapy 2 Joint mobilization   Manual Therapy 2 - Details moderate mobilization rear foot varus/valgus x  4 min, talocrural distraction x 3 min   Skilled Intervention Modification of technique and intensity based on pt tolerance and tissue/joint response   Patient Response/Progress DF motion improved from 5 to 10 degrees at end of session   Education   Learner/Method Patient;Significant Other;Listening;No Barriers to Learning   Plan   Home program updated PTRX   Updates to plan of care PN  (written in collaboration with Amanda Hilligoss, PT)   Plan for next session Progress WB/ balance drills and strengthening, continue scar massage and joint mobilization if needed   Comments   Comments WC DOI 3/22/23, DOS 4/5/23   Total Session Time   Timed Code Treatment Minutes 39   Total Treatment Time (sum of timed and untimed services) 39

## 2023-10-06 ENCOUNTER — MYC REFILL (OUTPATIENT)
Dept: FAMILY MEDICINE | Facility: CLINIC | Age: 48
End: 2023-10-06
Payer: COMMERCIAL

## 2023-10-06 DIAGNOSIS — S82.891A CLOSED FRACTURE OF RIGHT ANKLE, INITIAL ENCOUNTER: ICD-10-CM

## 2023-10-06 RX ORDER — OXYCODONE HYDROCHLORIDE 5 MG/1
5 TABLET ORAL DAILY PRN
Qty: 30 TABLET | Refills: 0 | OUTPATIENT
Start: 2023-10-06

## 2023-10-11 RX ORDER — OXYCODONE HYDROCHLORIDE 5 MG/1
5 TABLET ORAL DAILY PRN
Qty: 30 TABLET | Refills: 0 | Status: SHIPPED | OUTPATIENT
Start: 2023-10-11 | End: 2023-11-06

## 2023-10-17 ENCOUNTER — E-VISIT (OUTPATIENT)
Dept: FAMILY MEDICINE | Facility: CLINIC | Age: 48
End: 2023-10-17
Payer: COMMERCIAL

## 2023-10-17 DIAGNOSIS — M54.50 ACUTE LOW BACK PAIN WITHOUT SCIATICA, UNSPECIFIED BACK PAIN LATERALITY: Primary | ICD-10-CM

## 2023-10-17 PROCEDURE — 99421 OL DIG E/M SVC 5-10 MIN: CPT | Performed by: FAMILY MEDICINE

## 2023-10-17 NOTE — LETTER
October 17, 2023      Stacy Brown  [address] [address] [place] [address]  [address] [place] [id number]        To Whom It May Concern:    Stacy Brown  was seen on 10/17/23.  Please excuse her from work since Saturday 10/14/23 and until her symptoms have improved, due to flu-like illness. She may return to work without restrictions when her symptoms are resolved.         Sincerely,        Yaima Muse MD

## 2023-10-19 NOTE — TELEPHONE ENCOUNTER
Panel Management Review      Patient has the following on her problem list:     Depression / Dysthymia review  PHQ-9 SCORE 8/10/2015 12/28/2015 6/10/2016   Total Score 17 - -   Total Score - 11 7      Patient is due for:  PHQ9    Diabetes    ASA: Not Required     Last A1C  A1C      9.8   6/10/2016  A1C     10.5   12/28/2015  A1C     11.5   8/10/2015  A1C     10.5   7/21/2014  A1C     10.2   3/31/2014  A1C tested: Failed    Last LDL:    CHOL      224   8/10/2015  HDL       38   8/10/2015  LDL      135   12/28/2015  LDL      122   8/10/2015  TRIG      321   8/10/2015  CHOLHDLRATIO      5.9   8/10/2015  No results found for this basename: nhdl    Is the patient on a Statin? YES             Is the patient on Aspirin? NO    Medications     HMG CoA Reductase Inhibitors    simvastatin (ZOCOR) 10 MG tablet          Last three blood pressure readings:  BP Readings from Last 3 Encounters:   09/19/16 122/78   09/16/16 137/67   06/10/16 124/72       Date of last diabetes office visit: 6/10/16     Tobacco History:     History   Smoking status     Former Smoker -- 6 years     Quit date: 09/22/2010   Smokeless tobacco     Never Used             Composite cancer screening  Chart review shows that this patient is due/due soon for the following None  Summary:    Patient is due/failing the following:   A1C and PHQ9    Action needed:   Patient needs office visit for dm/depression.    Type of outreach:    Phone, left message for patient to call back.     Questions for provider review:    None                                                                                                                                    Fanta ARIAS MA       Chart routed to  .           Clindamycin 300 mg po tid for 7 days - come in next week if it is still bothering her

## 2023-10-24 ENCOUNTER — THERAPY VISIT (OUTPATIENT)
Dept: PHYSICAL THERAPY | Facility: CLINIC | Age: 48
End: 2023-10-24
Payer: OTHER MISCELLANEOUS

## 2023-10-24 DIAGNOSIS — R26.9 ABNORMAL GAIT: ICD-10-CM

## 2023-10-24 DIAGNOSIS — Z98.890 STATUS POST SURGERY: ICD-10-CM

## 2023-10-24 DIAGNOSIS — R60.0 LOCALIZED EDEMA: Primary | ICD-10-CM

## 2023-10-24 DIAGNOSIS — M25.571 PAIN IN JOINT INVOLVING ANKLE AND FOOT, RIGHT: ICD-10-CM

## 2023-10-24 DIAGNOSIS — S82.891A CLOSED FRACTURE OF RIGHT ANKLE: ICD-10-CM

## 2023-10-24 PROCEDURE — 97110 THERAPEUTIC EXERCISES: CPT | Mod: GP | Performed by: PHYSICAL THERAPY ASSISTANT

## 2023-10-24 PROCEDURE — 97140 MANUAL THERAPY 1/> REGIONS: CPT | Mod: GP | Performed by: PHYSICAL THERAPY ASSISTANT

## 2023-11-03 ENCOUNTER — THERAPY VISIT (OUTPATIENT)
Dept: PHYSICAL THERAPY | Facility: CLINIC | Age: 48
End: 2023-11-03
Payer: OTHER MISCELLANEOUS

## 2023-11-03 DIAGNOSIS — S82.891D CLOSED FRACTURE OF RIGHT ANKLE WITH ROUTINE HEALING, SUBSEQUENT ENCOUNTER: ICD-10-CM

## 2023-11-03 DIAGNOSIS — Z98.890 STATUS POST SURGERY: ICD-10-CM

## 2023-11-03 DIAGNOSIS — R60.0 LOCALIZED EDEMA: Primary | ICD-10-CM

## 2023-11-03 DIAGNOSIS — M25.571 PAIN IN JOINT INVOLVING ANKLE AND FOOT, RIGHT: ICD-10-CM

## 2023-11-03 DIAGNOSIS — R26.9 ABNORMAL GAIT: ICD-10-CM

## 2023-11-03 PROCEDURE — 97112 NEUROMUSCULAR REEDUCATION: CPT | Mod: GP | Performed by: PHYSICAL THERAPIST

## 2023-11-03 PROCEDURE — 97530 THERAPEUTIC ACTIVITIES: CPT | Mod: GP | Performed by: PHYSICAL THERAPIST

## 2023-11-03 PROCEDURE — 97110 THERAPEUTIC EXERCISES: CPT | Mod: GP | Performed by: PHYSICAL THERAPIST

## 2023-11-03 PROCEDURE — 97140 MANUAL THERAPY 1/> REGIONS: CPT | Mod: GP | Performed by: PHYSICAL THERAPIST

## 2023-11-06 ENCOUNTER — MYC REFILL (OUTPATIENT)
Dept: FAMILY MEDICINE | Facility: CLINIC | Age: 48
End: 2023-11-06
Payer: COMMERCIAL

## 2023-11-06 DIAGNOSIS — S82.891A CLOSED FRACTURE OF RIGHT ANKLE, INITIAL ENCOUNTER: ICD-10-CM

## 2023-11-06 DIAGNOSIS — F33.1 MAJOR DEPRESSIVE DISORDER, RECURRENT EPISODE, MODERATE (H): ICD-10-CM

## 2023-11-07 RX ORDER — OXYCODONE HYDROCHLORIDE 5 MG/1
5 TABLET ORAL DAILY PRN
Qty: 30 TABLET | Refills: 0 | Status: SHIPPED | OUTPATIENT
Start: 2023-11-10 | End: 2023-12-04

## 2023-11-14 ENCOUNTER — MYC MEDICAL ADVICE (OUTPATIENT)
Dept: FAMILY MEDICINE | Facility: CLINIC | Age: 48
End: 2023-11-14

## 2023-11-14 ENCOUNTER — E-VISIT (OUTPATIENT)
Dept: FAMILY MEDICINE | Facility: CLINIC | Age: 48
End: 2023-11-14
Payer: COMMERCIAL

## 2023-11-14 DIAGNOSIS — F41.9 ANXIETY: ICD-10-CM

## 2023-11-14 DIAGNOSIS — F43.10 PTSD (POST-TRAUMATIC STRESS DISORDER): Primary | ICD-10-CM

## 2023-11-14 PROCEDURE — 99422 OL DIG E/M SVC 11-20 MIN: CPT | Performed by: FAMILY MEDICINE

## 2023-11-14 NOTE — LETTER
November 15, 2023      Stacy Brown  [address] [address] [place] [address]  [address] [place] [id number]        To Whom It May Concern:    Stacy Brown  was seen on Tuesday 11/14/23.  Please excuse her  until Monday 11/20/23 due to illness. She may return to work on Monday 11/20/23 without new restrictions.         Sincerely,            Yaima Muse MD

## 2023-11-15 NOTE — TELEPHONE ENCOUNTER
Patient needs a return to work tomorrow.    Please advise.    Jessica Méndez RN on 11/15/2023 at 12:12 PM

## 2023-11-15 NOTE — TELEPHONE ENCOUNTER
Provider E-Visit time total (minutes): 15    See pended letter and mychart messages to patient. I am waiting on her reply to know if she agrees with plan.   Yaima Muse MD

## 2023-12-01 ENCOUNTER — THERAPY VISIT (OUTPATIENT)
Dept: PHYSICAL THERAPY | Facility: CLINIC | Age: 48
End: 2023-12-01
Payer: OTHER MISCELLANEOUS

## 2023-12-01 DIAGNOSIS — S82.891A CLOSED FRACTURE OF RIGHT ANKLE: ICD-10-CM

## 2023-12-01 DIAGNOSIS — Z98.890 STATUS POST SURGERY: ICD-10-CM

## 2023-12-01 DIAGNOSIS — R60.0 LOCALIZED EDEMA: Primary | ICD-10-CM

## 2023-12-01 DIAGNOSIS — R26.9 ABNORMAL GAIT: ICD-10-CM

## 2023-12-01 DIAGNOSIS — M25.571 PAIN IN JOINT INVOLVING ANKLE AND FOOT, RIGHT: ICD-10-CM

## 2023-12-01 PROCEDURE — 97140 MANUAL THERAPY 1/> REGIONS: CPT | Mod: GP | Performed by: PHYSICAL THERAPY ASSISTANT

## 2023-12-01 PROCEDURE — 97530 THERAPEUTIC ACTIVITIES: CPT | Mod: GP | Performed by: PHYSICAL THERAPY ASSISTANT

## 2023-12-01 PROCEDURE — 97110 THERAPEUTIC EXERCISES: CPT | Mod: GP | Performed by: PHYSICAL THERAPY ASSISTANT

## 2023-12-02 ENCOUNTER — HOSPITAL ENCOUNTER (EMERGENCY)
Facility: CLINIC | Age: 48
Discharge: HOME | End: 2023-12-02
Attending: PHYSICIAN ASSISTANT | Admitting: PHYSICIAN ASSISTANT
Payer: OTHER MISCELLANEOUS

## 2023-12-02 ENCOUNTER — APPOINTMENT (OUTPATIENT)
Dept: GENERAL RADIOLOGY | Facility: CLINIC | Age: 48
End: 2023-12-02
Attending: PHYSICIAN ASSISTANT

## 2023-12-02 VITALS
TEMPERATURE: 98.6 F | HEART RATE: 58 BPM | DIASTOLIC BLOOD PRESSURE: 70 MMHG | SYSTOLIC BLOOD PRESSURE: 131 MMHG | BODY MASS INDEX: 30.54 KG/M2 | RESPIRATION RATE: 18 BRPM | WEIGHT: 189.2 LBS | OXYGEN SATURATION: 99 %

## 2023-12-02 DIAGNOSIS — S99.911A ANKLE INJURY, RIGHT, INITIAL ENCOUNTER: ICD-10-CM

## 2023-12-02 PROCEDURE — 96372 THER/PROPH/DIAG INJ SC/IM: CPT | Performed by: PHYSICIAN ASSISTANT

## 2023-12-02 PROCEDURE — 250N000011 HC RX IP 250 OP 636: Mod: JZ | Performed by: PHYSICIAN ASSISTANT

## 2023-12-02 PROCEDURE — 99284 EMERGENCY DEPT VISIT MOD MDM: CPT | Performed by: PHYSICIAN ASSISTANT

## 2023-12-02 PROCEDURE — 73610 X-RAY EXAM OF ANKLE: CPT | Mod: RT

## 2023-12-02 PROCEDURE — 99284 EMERGENCY DEPT VISIT MOD MDM: CPT

## 2023-12-02 RX ORDER — KETOROLAC TROMETHAMINE 10 MG/1
10 TABLET, FILM COATED ORAL EVERY 6 HOURS PRN
Qty: 20 TABLET | Refills: 0 | Status: SHIPPED | OUTPATIENT
Start: 2023-12-02 | End: 2024-01-19

## 2023-12-02 RX ORDER — KETOROLAC TROMETHAMINE 30 MG/ML
30 INJECTION, SOLUTION INTRAMUSCULAR; INTRAVENOUS ONCE
Status: COMPLETED | OUTPATIENT
Start: 2023-12-02 | End: 2023-12-02

## 2023-12-02 RX ADMIN — KETOROLAC TROMETHAMINE 30 MG: 30 INJECTION, SOLUTION INTRAMUSCULAR; INTRAVENOUS at 12:21

## 2023-12-02 ASSESSMENT — ACTIVITIES OF DAILY LIVING (ADL): ADLS_ACUITY_SCORE: 35

## 2023-12-02 NOTE — ED TRIAGE NOTES
"Patient presents with R ankle pain after walking and hearing pop. Patient had \"shattered\" that ankle last march. Pain 9/10 and unable to bear-weight.     Triage Assessment (Adult)       Row Name 12/02/23 1201          Triage Assessment    Airway WDL WDL        Respiratory WDL    Respiratory WDL WDL        Skin Circulation/Temperature WDL    Skin Circulation/Temperature WDL WDL        Cardiac WDL    Cardiac WDL WDL        Peripheral/Neurovascular WDL    Peripheral Neurovascular WDL WDL        Cognitive/Neuro/Behavioral WDL    Cognitive/Neuro/Behavioral WDL WDL                     "
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
## 2023-12-02 NOTE — DISCHARGE INSTRUCTIONS
Please try to avoid bearing weight on the right ankle  for the next few days and advance weightbearing as tolerated.  Wear the walking boot for additional support.  You can take the Toradol prescribed but take with food as it can be hard on your stomach.    Follow-up with physical therapy and orthopedics for continued management of your ankle injury.  If you do have any worsening concerns please return to the emergency department.

## 2023-12-02 NOTE — Clinical Note
Stacy Brown was seen and treated in our emergency department on 12/2/2023.  She may return to work on 12/09/2023.  Stacy reinjured her right ankle on 12/2/2023. She is incapacitated due to this injury and will not be able to safely perform her duties as a . She is unable to safely ambulate while carrying any objects, and she will not be able to drive a vehicle safely because she will be wearing a walking boot.  She cannot be on her feet for more than 5 minutes at a time and should not be putting full weight on her right foot for the next week. Please excuse her from work for the next week.     If you have any questions or concerns, please don't hesitate to call.      Hannah Lerma PA-C

## 2023-12-03 ENCOUNTER — MYC MEDICAL ADVICE (OUTPATIENT)
Dept: FAMILY MEDICINE | Facility: CLINIC | Age: 48
End: 2023-12-03
Payer: COMMERCIAL

## 2023-12-04 ENCOUNTER — MYC REFILL (OUTPATIENT)
Dept: FAMILY MEDICINE | Facility: CLINIC | Age: 48
End: 2023-12-04
Payer: COMMERCIAL

## 2023-12-04 DIAGNOSIS — S82.891A CLOSED FRACTURE OF RIGHT ANKLE, INITIAL ENCOUNTER: ICD-10-CM

## 2023-12-08 ENCOUNTER — OFFICE VISIT (OUTPATIENT)
Dept: ORTHOPEDICS | Facility: CLINIC | Age: 48
End: 2023-12-08
Payer: OTHER MISCELLANEOUS

## 2023-12-08 VITALS
HEIGHT: 66 IN | SYSTOLIC BLOOD PRESSURE: 161 MMHG | DIASTOLIC BLOOD PRESSURE: 82 MMHG | BODY MASS INDEX: 30.37 KG/M2 | WEIGHT: 189 LBS

## 2023-12-08 DIAGNOSIS — S82.891S CLOSED RIGHT ANKLE FRACTURE, SEQUELA: ICD-10-CM

## 2023-12-08 DIAGNOSIS — M25.471 ANKLE SWELLING, RIGHT: ICD-10-CM

## 2023-12-08 DIAGNOSIS — M25.571 ACUTE RIGHT ANKLE PAIN: Primary | ICD-10-CM

## 2023-12-08 PROCEDURE — 99204 OFFICE O/P NEW MOD 45 MIN: CPT | Performed by: STUDENT IN AN ORGANIZED HEALTH CARE EDUCATION/TRAINING PROGRAM

## 2023-12-08 NOTE — PROGRESS NOTES
"ASSESSMENT & PLAN    Stacy was seen today for pain.    Diagnoses and all orders for this visit:    Acute right ankle pain  -     Orthopedic  Referral; Future    Closed right ankle fracture, sequela  -     Orthopedic  Referral; Future    Ankle swelling, right        48-year-old female with history of right distal tibia fracture s/p ORIF done in April 2023 presents with severe, acute right ankle pain for the past week after hearing a \"pop\" at work and having immediate swelling, however she did not have any new injury at that time.  Since then, she has had significant amount of pain, decreased range of motion, and swelling.  X-ray reveals intact hardware in the distal tibia, and on physical exam today, she does not have any evidence of acute ankle injury such as a sprain or Achilles tendon injury that would explain her acute symptoms.  She does have significantly decreased and painful range of motion in all directions, which she reports is close to her baseline after surgery.    Plan:  - Letter for provided for work today to take the next 2 weeks off due to her severe pain with ambulation as patient is a   - Continue to wear walking boot while ambulating for pain, however I discussed that I would like her to continue to work on her ankle range of motion exercises as tolerated to help prevent stiffness  - At this point, I think she has a follow-up with her orthopedic surgeon for further evaluation and I placed a referral to be seen by Dr. Riley  -Marty Extra Strength (1000mg) up to three times daily as needed for pain    Return if symptoms worsen or fail to improve.      Dr. Ashwini Marcum, DO  [facility]  Sports Medicine     -----  Chief Complaint   Patient presents with    Right Ankle - Pain       SUBJECTIVE  Stacy Brown is a/an 48 year old  female who is seen in consultation at the request of  No ref. provider found  M.D. for evaluation of right ankle " "pain.  Onset was 6 days ago with no injury. 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 Pain is located diffusely medial and laterally. Symptoms are worsened by weight bearing and turning foot in.  She has tried walking boot, walker, icing, heat, and oral ketorolac. Associated symptoms include swelling and radiating pain up and down the leg.    The patient is seen with     Prior injury/Surgical history of affected joint: complex fracture of ankle in 05/23 and had surgery on it  Social History/Occupation: works as a     REVIEW OF SYSTEMS:  Pertinent positives/negative: As stated above in HPI    OBJECTIVE:  BP (!) 161/82   Ht 1.676 m (5' 6\")   Wt 85.7 kg (189 lb)   LMP 03/07/2021 (Approximate)   BMI 30.51 kg/m     General: Alert and in no distress  Skin: no visable rashes  CV: Extremities appear well perfused   Resp: normal respiratory effort, no conversational dyspnea   Psych: normal mood, affect  MSK:  Right ankle exam:    Inspection: Mild swelling  AROM: Significantly limited to plantarflexion, dorsiflexion, inversion, eversion due to stiffness and pain  Tender to palpation: She does not have any pain over either ATFL or deltoid ligament, however has some mild Achilles tenderness, and intra-articular ankle pain with pressure over either malleoli  Motor exam: Limited by pain today, however she is able to activate ankle musculature against resistance to plantarflexion, dorsiflexion, inversion, and eversion  Special tests: No positive tests noted today- for Talar tilt, Anterior drawer, Syndesmosis squeeze, and Garcia test       RADIOLOGY:  Final results and radiologist's interpretation available in the [facility] health record.  Images below were personally reviewed and discussed with the patient in the office today.  My personal interpretation of the performed imaging: X-ray of the right ankle reveals " intact screws in the distal tibia with no evidence of hardware failure as well as mild tibiotalar degenerative changes with an intact ankle mortise      Review of prior external note(s) from - Emergency room

## 2023-12-08 NOTE — PATIENT INSTRUCTIONS
Thank you for choosing [facility]!    DR. SCHERER'S CLINIC LOCATIONS:     [place]  TRIAGE LINE: [phone] [address] APPOINTMENTS: [phone]   [place] [address] RADIOLOGY: [phone]   (Mondays & Tuesdays) HAND THERAPY: [phone]    PHYSICAL THERAPY: [phone]   [place] BILLING QUESTIONS: [phone] [address] FAX: [phone]   [place] [address]    (Thursdays & Fridays)

## 2023-12-08 NOTE — LETTER
"    12/8/2023         RE: Stacy Brown  [address]  [place] [facility] [address]        Dear Colleague,    Thank you for referring your patient, Stacy Brown, to the [facility] [place]. Please see a copy of my visit note below.    ASSESSMENT & PLAN    Stacy was seen today for pain.    Diagnoses and all orders for this visit:    Acute right ankle pain  -     Orthopedic  Referral; Future    Closed right ankle fracture, sequela  -     Orthopedic  Referral; Future    Ankle swelling, right        48-year-old female with history of right distal tibia fracture s/p ORIF done in April 2023 presents with severe, acute right ankle pain for the past week after hearing a \"pop\" at work and having immediate swelling, however she did not have any new injury at that time.  Since then, she has had significant amount of pain, decreased range of motion, and swelling.  X-ray reveals intact hardware in the distal tibia, and on physical exam today, she does not have any evidence of acute ankle injury such as a sprain or Achilles tendon injury that would explain her acute symptoms.  She does have significantly decreased and painful range of motion in all directions, which she reports is close to her baseline after surgery.    Plan:  - Letter for provided for work today to take the next 2 weeks off due to her severe pain with ambulation as patient is a   - Continue to wear walking boot while ambulating for pain, however I discussed that I would like her to continue to work on her ankle range of motion exercises as tolerated to help prevent stiffness  - At this point, I think she has a follow-up with her orthopedic surgeon for further evaluation and I placed a referral to be seen by Dr. Riley  -Tylenol Extra Strength (1000mg) up to three times daily as needed for pain    Return if symptoms worsen or fail to improve.      Dr. Ashwini Marcum, DO  [facility] " "Physicians  Sports Medicine     -----  Chief Complaint   Patient presents with     Right Ankle - Pain       SUBJECTIVE  Stacy Brown is a/an 48 year old  female who is seen in consultation at the request of  No ref. provider found  M.D. for evaluation of right ankle pain.  Onset was 6 days ago with no injury. 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 Pain is located diffusely medial and laterally. Symptoms are worsened by weight bearing and turning foot in.  She has tried walking boot, walker, icing, heat, and oral ketorolac. Associated symptoms include swelling and radiating pain up and down the leg.    The patient is seen with     Prior injury/Surgical history of affected joint: complex fracture of ankle in 05/23 and had surgery on it  Social History/Occupation: works as a     REVIEW OF SYSTEMS:  Pertinent positives/negative: As stated above in HPI    OBJECTIVE:  BP (!) 161/82   Ht 1.676 m (5' 6\")   Wt 85.7 kg (189 lb)   LMP 03/07/2021 (Approximate)   BMI 30.51 kg/m     General: Alert and in no distress  Skin: no visable rashes  CV: Extremities appear well perfused   Resp: normal respiratory effort, no conversational dyspnea   Psych: normal mood, affect  MSK:  Right ankle exam:    Inspection: Mild swelling  AROM: Significantly limited to plantarflexion, dorsiflexion, inversion, eversion due to stiffness and pain  Tender to palpation: She does not have any pain over either ATFL or deltoid ligament, however has some mild Achilles tenderness, and intra-articular ankle pain with pressure over either malleoli  Motor exam: Limited by pain today, however she is able to activate ankle musculature against resistance to plantarflexion, dorsiflexion, inversion, and eversion  Special tests: No positive tests noted today- for Talar tilt, Anterior drawer, Syndesmosis squeeze, and Garcia test   "     RADIOLOGY:  Final results and radiologist's interpretation available in the [facility] health record.  Images below were personally reviewed and discussed with the patient in the office today.  My personal interpretation of the performed imaging: X-ray of the right ankle reveals intact screws in the distal tibia with no evidence of hardware failure as well as mild tibiotalar degenerative changes with an intact ankle mortise      Review of prior external note(s) from - Emergency room             Again, thank you for allowing me to participate in the care of your patient.        Sincerely,        Ashwini Marcum, DO

## 2023-12-10 ENCOUNTER — MYC REFILL (OUTPATIENT)
Dept: FAMILY MEDICINE | Facility: CLINIC | Age: 48
End: 2023-12-10
Payer: COMMERCIAL

## 2023-12-10 DIAGNOSIS — F33.1 MAJOR DEPRESSIVE DISORDER, RECURRENT EPISODE, MODERATE (H): ICD-10-CM

## 2023-12-10 RX ORDER — OXYCODONE HYDROCHLORIDE 5 MG/1
5 TABLET ORAL DAILY PRN
Qty: 30 TABLET | Refills: 0 | Status: SHIPPED | OUTPATIENT
Start: 2023-12-10 | End: 2024-01-01

## 2023-12-12 ENCOUNTER — VIRTUAL VISIT (OUTPATIENT)
Dept: FAMILY MEDICINE | Facility: CLINIC | Age: 48
End: 2023-12-12
Payer: COMMERCIAL

## 2023-12-12 ENCOUNTER — OFFICE VISIT (OUTPATIENT)
Dept: ORTHOPEDICS | Facility: CLINIC | Age: 48
End: 2023-12-12
Attending: STUDENT IN AN ORGANIZED HEALTH CARE EDUCATION/TRAINING PROGRAM
Payer: OTHER MISCELLANEOUS

## 2023-12-12 ENCOUNTER — ANCILLARY PROCEDURE (OUTPATIENT)
Dept: GENERAL RADIOLOGY | Facility: CLINIC | Age: 48
End: 2023-12-12
Attending: ORTHOPAEDIC SURGERY
Payer: OTHER MISCELLANEOUS

## 2023-12-12 DIAGNOSIS — F32.1 MODERATE MAJOR DEPRESSION (H): ICD-10-CM

## 2023-12-12 DIAGNOSIS — M25.571 PAIN IN JOINT, ANKLE AND FOOT, RIGHT: Primary | ICD-10-CM

## 2023-12-12 DIAGNOSIS — Z79.4 TYPE 2 DIABETES MELLITUS WITH HYPERGLYCEMIA, WITH LONG-TERM CURRENT USE OF INSULIN (H): ICD-10-CM

## 2023-12-12 DIAGNOSIS — M25.571 ACUTE RIGHT ANKLE PAIN: ICD-10-CM

## 2023-12-12 DIAGNOSIS — S82.891S CLOSED FRACTURE OF RIGHT ANKLE, SEQUELA: Primary | ICD-10-CM

## 2023-12-12 DIAGNOSIS — Z95.1 S/P CABG (CORONARY ARTERY BYPASS GRAFT): ICD-10-CM

## 2023-12-12 DIAGNOSIS — M25.571 PAIN IN JOINT, ANKLE AND FOOT, RIGHT: ICD-10-CM

## 2023-12-12 DIAGNOSIS — E11.59 TYPE 2 DIABETES MELLITUS WITH OTHER CIRCULATORY COMPLICATION, WITH LONG-TERM CURRENT USE OF INSULIN (H): ICD-10-CM

## 2023-12-12 DIAGNOSIS — E11.65 TYPE 2 DIABETES MELLITUS WITH HYPERGLYCEMIA, WITH LONG-TERM CURRENT USE OF INSULIN (H): ICD-10-CM

## 2023-12-12 DIAGNOSIS — S82.891S CLOSED RIGHT ANKLE FRACTURE, SEQUELA: ICD-10-CM

## 2023-12-12 DIAGNOSIS — I21.4 NSTEMI (NON-ST ELEVATED MYOCARDIAL INFARCTION) (H): ICD-10-CM

## 2023-12-12 DIAGNOSIS — Z98.890 STATUS POST SURGERY: ICD-10-CM

## 2023-12-12 DIAGNOSIS — Z79.4 TYPE 2 DIABETES MELLITUS WITH OTHER CIRCULATORY COMPLICATION, WITH LONG-TERM CURRENT USE OF INSULIN (H): ICD-10-CM

## 2023-12-12 PROCEDURE — 99213 OFFICE O/P EST LOW 20 MIN: CPT | Performed by: ORTHOPAEDIC SURGERY

## 2023-12-12 PROCEDURE — 73610 X-RAY EXAM OF ANKLE: CPT | Mod: RT | Performed by: RADIOLOGY

## 2023-12-12 PROCEDURE — 96127 BRIEF EMOTIONAL/BEHAV ASSMT: CPT | Mod: 95 | Performed by: FAMILY MEDICINE

## 2023-12-12 PROCEDURE — 99215 OFFICE O/P EST HI 40 MIN: CPT | Mod: 95 | Performed by: FAMILY MEDICINE

## 2023-12-12 RX ORDER — LISINOPRIL 5 MG/1
5 TABLET ORAL DAILY
Qty: 90 TABLET | Refills: 3 | Status: SHIPPED | OUTPATIENT
Start: 2023-12-12 | End: 2024-04-24 | Stop reason: DRUGHIGH

## 2023-12-12 RX ORDER — DULOXETIN HYDROCHLORIDE 20 MG/1
CAPSULE, DELAYED RELEASE ORAL
Qty: 60 CAPSULE | Refills: 1 | Status: SHIPPED | OUTPATIENT
Start: 2023-12-12 | End: 2024-07-14

## 2023-12-12 ASSESSMENT — PATIENT HEALTH QUESTIONNAIRE - PHQ9
SUM OF ALL RESPONSES TO PHQ QUESTIONS 1-9: 11
10. IF YOU CHECKED OFF ANY PROBLEMS, HOW DIFFICULT HAVE THESE PROBLEMS MADE IT FOR YOU TO DO YOUR WORK, TAKE CARE OF THINGS AT HOME, OR GET ALONG WITH OTHER PEOPLE: SOMEWHAT DIFFICULT
5. POOR APPETITE OR OVEREATING: MORE THAN HALF THE DAYS
SUM OF ALL RESPONSES TO PHQ QUESTIONS 1-9: 11

## 2023-12-12 ASSESSMENT — ANXIETY QUESTIONNAIRES
IF YOU CHECKED OFF ANY PROBLEMS ON THIS QUESTIONNAIRE, HOW DIFFICULT HAVE THESE PROBLEMS MADE IT FOR YOU TO DO YOUR WORK, TAKE CARE OF THINGS AT HOME, OR GET ALONG WITH OTHER PEOPLE: SOMEWHAT DIFFICULT
7. FEELING AFRAID AS IF SOMETHING AWFUL MIGHT HAPPEN: NOT AT ALL
1. FEELING NERVOUS, ANXIOUS, OR ON EDGE: SEVERAL DAYS
5. BEING SO RESTLESS THAT IT IS HARD TO SIT STILL: SEVERAL DAYS
6. BECOMING EASILY ANNOYED OR IRRITABLE: MORE THAN HALF THE DAYS
GAD7 TOTAL SCORE: 8
GAD7 TOTAL SCORE: 8
2. NOT BEING ABLE TO STOP OR CONTROL WORRYING: SEVERAL DAYS
3. WORRYING TOO MUCH ABOUT DIFFERENT THINGS: SEVERAL DAYS

## 2023-12-12 NOTE — NURSING NOTE
Reason For Visit:   Chief Complaint   Patient presents with    RECHECK     Right ankle ORIF 4/5/2023. 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 Immediate medial pain and swelling. Couldn't walk. Came to [facility] ED. Gave note for no work for one week. Job wants to write her up for unexcused absences. Saw a sports med doc in [place] last Friday due to the work note running out. New xrays done then and today.       LMP 03/07/2021 (Approximate)          Dodie Olivares, ATC

## 2023-12-12 NOTE — LETTER
12/12/2023         RE: Stacy Brown  [address]  [place] [address]        Dear Colleague,    Thank you for referring your patient, Stacy Brown, to the [facility] [place]. Please see a copy of my visit note below.    Chief complaint: Status post right ankle open reduction internal fixation performed on April 5, 2023.  Status post new onset of pain from December 2, 2023    Patient presents today in the company of her  for further evaluation of the ankle.  Patient reports to be very distressed by the fact that she was released to work 7 days a week 8 hours/day as soon as we clear her from the fracture surgery.  Reports that on December 2, 2023 without any history of trauma she is then acute onset of pain.  Since then she reports to be unable to bear any weight and to read required the use of a cam walker and a regular walker.    On today's exam she presents with some stiffness across the ankle there is no effusion there is no pain with outpatient of the peroneal tendons or the Achilles tendon.  The exam is relatively unremarkable.    Plain x-rays of the ankle were reviewed today which are significant for showing to no acute findings with hardware is intact and in place and a slight formation of anterior osteophytes    Assessment: Status post right ankle reduction term fixation with possible flareup of osteoarthritis    Plan: Discussed with patient and her  they would like to proceed with a CT scan as a way to better understand the condition of the ankle joint.    In the meantime she was given work restriction of a sitting job times a month.  Given the fact that this is a Worker's Compensation case we will have to wait for approval to schedule the CT scan.    All questions were answered.    TT 20 minutes        Carlos Riley MD

## 2023-12-12 NOTE — PROGRESS NOTES
Chief complaint: Status post right ankle open reduction internal fixation performed on April 5, 2023.  Status post new onset of pain from December 2, 2023    Patient presents today in the company of her  for further evaluation of the ankle.  Patient reports to be very distressed by the fact that she was released to work 7 days a week 8 hours/day as soon as we clear her from the fracture surgery.  Reports that on December 2, 2023 without any history of trauma she is then acute onset of pain.  Since then she reports to be unable to bear any weight and to read required the use of a cam walker and a regular walker.    On today's exam she presents with some stiffness across the ankle there is no effusion there is no pain with outpatient of the peroneal tendons or the Achilles tendon.  The exam is relatively unremarkable.    Plain x-rays of the ankle were reviewed today which are significant for showing to no acute findings with hardware is intact and in place and a slight formation of anterior osteophytes    Assessment: Status post right ankle reduction term fixation with possible flareup of osteoarthritis    Plan: Discussed with patient and her  they would like to proceed with a CT scan as a way to better understand the condition of the ankle joint.    In the meantime she was given work restriction of a sitting job times a month.  Given the fact that this is a Worker's Compensation case we will have to wait for approval to schedule the CT scan.    All questions were answered.    TT 20 minutes
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
## 2023-12-12 NOTE — LETTER
Return to Work  2023     Seen today: Yes    Patient:  Stacy Brown  :   1975  MRN:     [id number]  Physician: CARLOS FUENTES    Stacy Brown may return to work on Date: 2023.      The next clinic appointment is scheduled for (date/time) pending CT scan.    Patient limitations:  Seated work only      Electronically signed by Carlos Fuentes MD

## 2023-12-12 NOTE — LETTER
December 12, 2023      Stacy Brown  [address]   [place] [address]        To Whom It May Concern:    Stacy Brown was evaluated by me today. For medical reasons, she is restricted to the following:   She may work UP TO 30 hours per week. She may not work any overtime beyond 30 hours.   This restriction will take effect once her current work restrictions for her foot injury are lifted.   Her hourly limit will be in effect for at least the next 6 months. She will be re-evaluated at that time, no later than June 12, 2024.         Sincerely,          Yaima Muse MD

## 2023-12-12 NOTE — LETTER
Return to Work  2023     Seen today: Yes    Patient:  Stacy Brown  :   1975  MRN:     [id number]  Physician: CARLOS FUENTES    Stacy Brown may return to work on Date: 2023.      The next clinic appointment is scheduled for (date/time) pending CT scan.    Patient limitations:  Seated work only for one month      Electronically signed by Carlos Fuentes MD

## 2023-12-13 NOTE — PATIENT INSTRUCTIONS
As we discussed on the phone, I am going to switch your fluoxetine to duloxetine.  For the next week, I want you to take your fluoxetine only every other day.  Then the following week, just take 1 fluoxetine every 3 days or twice a week.  Then you can stop it.    You will be starting duloxetine as soon as you pick it up.  Start with 1 tablet daily in the morning.  Do this for 1 week  For the second week increase it to twice daily 1 in the morning and 1 in the evening.    Stay on the twice daily dose until you are seen back in the clinic in 6 weeks for follow-up.    I have asked the clinic schedulers to call you to schedule with Dr. Stoner for follow-up.    Also for now I want you to increase your lisinopril dose from 2.5 mg daily up to 5 mg daily.  Currently your pills are 2.5 mg.  Take 2 a day until they run out.  When you get the new bottle filled at the pharmacy, they will be stronger pills so go back to taking only 1 a day.    Please make a lab appointment sometime between now and when you come back for follow-up to have your A1c done.  I have orders in the chart for that.    I have placed a letter in your chart for the work restrictions as we discussed.  For the foreseeable future, no more than 30 hours/week.  We will send you a copy as well.    If you have concerns between now and then please call in for follow-up sooner.  I want to see you myself again in 6 months or no later than June.  Then we can readdress your ongoing work restrictions.    Brigitte Paredes!

## 2023-12-15 NOTE — OP NOTE
Anesthesia Pre Eval Note    Anesthesia ROS/Med Hx    Overall Review:  EKG was reviewed     Anesthetic Complication History:  Patient does not have a history of anesthetic complications      Pulmonary Review:    Positive for sleep apnea - CPAP    Neuro/Psych Review:  Patient does not have a neuro/psych history         Cardiovascular Review:     Positive for hyperlipidemia    GI/HEPATIC/RENAL Review:  Patient does not have a GI/hepatic/renalhistory       End/Other Review:  Positive for diabetes  Positive for obesity class II - 35.00 - 39.99  Additional Results:  EKG:  No results found for this or any previous visit (from the past 4464 hour(s)).    ALLERGIES:   -- Grapefruit   (Food Or Med) -- RASH   No results found for: \"WBC\", \"RBC\", \"HGB\", \"HCT\", \"MCV\", \"MCH\", \"MCHC\", \"RDWCV\", \"SODIUM\", \"POTASSIUM\", \"CHLORIDE\", \"CO2\", \"GLUCOSE\", \"BUN\", \"CREATININE\", \"GFRESTIMATE\", \"EGFRNONAFR\", \"GFRA\", \"GFRNA\", \"CALCIUM\", \"HCG\", \"PLT\", \"PTT\", \"INR\"   Past Medical History:  No date: Diabetes mellitus (CMD)  No date: High cholesterol  No date: Sleep apnea      Comment:  cpap  Past Surgical History:  No date: Excision procedure      Comment:  Uvula  No date: Removal of sperm duct(s)  No date: Tonsillectomy   Prior to Admission medications :  Medication atorvastatin (LIPITOR) 10 MG tablet, Sig Take 10 mg by mouth daily. Pt unsure of dose, Start Date , End Date , Taking? Yes, Authorizing Provider Provider, Outside    Medication insulin aspart (NovoLOG FlexPen) 100 UNIT/ML pen-injector, Sig INJECT 35 UNITS SUBCUTANEOUSLY ONCE DAILY IN THE MORNING, 35 UNITS AT NOON AND 35 IN THE EVENING. INJECT BEFORE MEALS. DO ALL THIS FOR 90 DAYS, Start Date 9/1/23, End Date , Taking? Yes, Authorizing Provider Provider, Outside    Medication insulin degludec (Tresiba FlexTouch) 100 UNIT/ML pen-injector, Sig , Start Date 8/21/23, End Date , Taking? Yes, Authorizing Provider Provider, Outside    Medication metformin (GLUCOPHAGE) 1000 MG tablet, Sig Take  CHIEF COMPLAINT:    1.SI joint dysfunction (Sacroilitis) and pain (724.6)   2 Sacral enthesopathy (720.1)    PROCEDURE:   Fluoroscopically-guided injection of the Left  sacroiliac joint with Left moose Sacroiliac joint ligaments infiltration over the sacrum.    PROCEDURE DETAILS: After written informed consent was obtained from the patient, the patient was escorted to the procedure room.  The patient was placed in the prone position.   A time out was conducted to verify patient identity, procedure to be performed, side, site, allergies and any special requirements.  The skin over the lumbosacral region was prepped and draped in normal sterile fashion. Fluoroscopy was used to identify the posteroinferior region of the sacroiliac joint.  The skin was anesthetized with 2 mL of 1% lidocaine with bicarbonate buffer.  Using fluoroscopic guidance, a 22 gauge, 3.5  Quincke spinal needle was advanced into the joint from an inferior approach.  After negative aspiration, 0.5 ml of Omnipaque contrast was injected showing intra-articular spread of contrast without evidence of intravascular spread.  2.0 mL of solution consisting of 40 mg of Depo-Medrol and 1.5 cc of 0.5% marcaine was injected slowly into the joint.   A second injection at the sacroiliac joint ligaments was then performed.  The middle third of the SI Joint was identified with fluoroscopy. After advancing a 22 gauge, 3.5  Quincke spinal needle to these ligaments with intermittent fluoroscopic guidance,  3 mL of solution consisting of  20 mg of DepoMedrol and 2.75 mL of 0.5 Marcaine was injected periligamentous and intramuscular over the sacroiliac joint ligaments.    The patient was monitored with blood pressure and pulse oximetry machines with the assistance of an RN throughout the procedure.  The patient was alert and responsive to questions throughout the procedure.   The patient tolerated the procedure well and was observed in the post-procedural area.  The  1,000 mg by mouth in the morning and 1,000 mg in the evening. Take with meals., Start Date 10/10/23, End Date , Taking? Yes, Authorizing Provider Provider, Outside    Medication blood glucose (OneTouch Verio) test strip, Sig Use to test glucoses three times daily., Start Date 6/29/23, End Date , Taking? Yes, Authorizing Provider Provider, Outside         Patient Vitals for the past 24 hrs:   BP Temp Temp src Pulse Resp SpO2 Height Weight   12/15/23 1138 (!) 157/87 36.7 °C (98.1 °F) Temporal 68 16 97 % 5' 9\" (1.753 m) 118 kg (260 lb 2.3 oz)       Social history reviewed:  Social History     Tobacco Use   Smoking Status Never   Smokeless Tobacco Never        E-Cigarette/Vaping Substances & Devices    E-Cigarette/Vaping Use Never Used     Nicotine No        Social History     Substance and Sexual Activity   Alcohol Use Yes    Comment: occasional           Relevant Problems   No relevant active problems       Physical Exam     Airway   Mallampati: II  TM Distance: >3 FB  Neck ROM: Full  Neck: Non-tender and Able to place in sniff position  TMJ Mobility: Good    Cardiovascular  Cardiovascular exam normal  Cardio Rhythm: Regular  Cardio Rate: Normal    Head Assessment  Head assessment: Normocephalic and Atraumatic    General Assessment  General Assessment: Alert and oriented and No acute distress    Dental Exam  Dental exam normal    Pulmonary Exam  Pulmonary exam normal  Breath sounds clear to auscultation:  Yes  Patient Demonstrates:  Non-labored Breathing    Abdominal Exam  Abdominal exam normal      Anesthesia Plan:    ASA Status: 3  Anesthesia Type: MAC    Induction: Intravenous  Maintenance: TIVA    Post-op Pain Management: Per Surgeon      Checklist  Reviewed: NPO Status, Allergies, Medications, Problem list, Past Med History and Patient Summary  Consent/Risks Discussed Statement:  The proposed anesthetic plan, including its risks and benefits, have been discussed with the Patient along with the risks and benefits  patient was dismissed without apparent complications.     DIAGNOSIS:  1.  Left sacroilitis  2.  Left sacral enthesopathy  PLAN:  1. Performed a Left  Sacroiliac joint injection.  2. Left SI ligament injections over the sacrum   3. The patient was instructed to fill out a pain form reflecting the local anesthetic phase of the injection and follow-up per Dr. Marshall's instructions.     Alan Marshall MD  Diplomate of the American Board of Anesthesiology, Pain Medicine     of alternatives. Questions were encouraged and answered and the patient and/or representative understands and agrees to proceed.        I discussed with the patient (and/or patient's legal representative) the risks and benefits of the proposed anesthesia plan, MAC, which may include services performed by other anesthesia providers.    Alternative anesthesia plans, if available, were reviewed with the patient (and/or patient's legal representative). Discussion has been held with the patient (and/or patient's legal representative) regarding risks of anesthesia, which include Intra-operative Awareness and emergent situations that may require change in anesthesia plan.    The patient (and/or patient's legal representative) has indicated understanding, his/her questions have been answered, and he/she wishes to proceed with the planned anesthetic.    Blood Products: Not Anticipated    Comments  Plan Comments: Thorp block with MAC

## 2023-12-16 NOTE — TELEPHONE ENCOUNTER
Patient is scheduled for surgery with Dr. Riley    Spoke with: Patient    Date of Surgery: 4/5/23    Location: [facility]    Post op: 2 & 6 weeks    Pre op with Provider: Complete    H&P: Completed in ED, emailed to [facility] anesthesia    Additional imaging/appointments: N/A    Surgery packet: Received in clinic     Additional comments: N/A        Ashli Worthy MA on 4/3/2023 at 10:36 AM   None

## 2023-12-19 ENCOUNTER — HOSPITAL ENCOUNTER (OUTPATIENT)
Dept: CT IMAGING | Facility: CLINIC | Age: 48
Discharge: HOME | End: 2023-12-19
Attending: ORTHOPAEDIC SURGERY | Admitting: ORTHOPAEDIC SURGERY
Payer: OTHER MISCELLANEOUS

## 2023-12-19 ENCOUNTER — MYC MEDICAL ADVICE (OUTPATIENT)
Dept: FAMILY MEDICINE | Facility: CLINIC | Age: 48
End: 2023-12-19
Payer: COMMERCIAL

## 2023-12-19 DIAGNOSIS — M25.571 ACUTE RIGHT ANKLE PAIN: ICD-10-CM

## 2023-12-19 DIAGNOSIS — M19.90 ARTHRITIS: ICD-10-CM

## 2023-12-19 DIAGNOSIS — M25.571 ACUTE RIGHT ANKLE PAIN: Primary | ICD-10-CM

## 2023-12-19 PROCEDURE — 73700 CT LOWER EXTREMITY W/O DYE: CPT | Mod: RT

## 2023-12-20 NOTE — TELEPHONE ENCOUNTER
Please call patient and recommend she set up at least a virtual visit with a covering provider to help with ongoing documentation that is needed. She is aware her PCP is out for the next 3 months.     Leslie Edwards PA-C  Covering for Yaima Muse

## 2023-12-20 NOTE — TELEPHONE ENCOUNTER
Patient has been notified to schedule a virtual visit. Waiting on patient reply on when she would like to schedule this,

## 2023-12-26 ENCOUNTER — HOSPITAL ENCOUNTER (OUTPATIENT)
Dept: GENERAL RADIOLOGY | Facility: CLINIC | Age: 48
Discharge: HOME | End: 2023-12-26
Attending: ORTHOPAEDIC SURGERY | Admitting: ORTHOPAEDIC SURGERY
Payer: OTHER MISCELLANEOUS

## 2023-12-26 DIAGNOSIS — M25.571 ACUTE RIGHT ANKLE PAIN: ICD-10-CM

## 2023-12-26 DIAGNOSIS — M19.90 ARTHRITIS: ICD-10-CM

## 2023-12-26 PROCEDURE — 255N000002 HC RX 255 OP 636: Mod: JZ | Performed by: RADIOLOGY

## 2023-12-26 PROCEDURE — 250N000011 HC RX IP 250 OP 636: Mod: JZ | Performed by: RADIOLOGY

## 2023-12-26 PROCEDURE — 250N000009 HC RX 250: Performed by: RADIOLOGY

## 2023-12-26 PROCEDURE — 77002 NEEDLE LOCALIZATION BY XRAY: CPT

## 2023-12-26 RX ORDER — BUPIVACAINE HYDROCHLORIDE 2.5 MG/ML
1 INJECTION, SOLUTION INFILTRATION; PERINEURAL ONCE
Status: COMPLETED | OUTPATIENT
Start: 2023-12-26 | End: 2023-12-26

## 2023-12-26 RX ORDER — LIDOCAINE HYDROCHLORIDE 10 MG/ML
1 INJECTION, SOLUTION EPIDURAL; INFILTRATION; INTRACAUDAL; PERINEURAL ONCE
Status: COMPLETED | OUTPATIENT
Start: 2023-12-26 | End: 2023-12-26

## 2023-12-26 RX ORDER — TRIAMCINOLONE ACETONIDE 40 MG/ML
40 INJECTION, SUSPENSION INTRA-ARTICULAR; INTRAMUSCULAR ONCE
Status: COMPLETED | OUTPATIENT
Start: 2023-12-26 | End: 2023-12-26

## 2023-12-26 RX ORDER — IOPAMIDOL 510 MG/ML
100 INJECTION, SOLUTION INTRAVASCULAR ONCE
Status: COMPLETED | OUTPATIENT
Start: 2023-12-26 | End: 2023-12-26

## 2023-12-26 RX ADMIN — IOPAMIDOL 5 ML: 510 INJECTION, SOLUTION INTRAVASCULAR at 14:40

## 2023-12-26 RX ADMIN — BUPIVACAINE HYDROCHLORIDE 2 ML: 2.5 INJECTION, SOLUTION INFILTRATION; PERINEURAL at 14:40

## 2023-12-26 RX ADMIN — TRIAMCINOLONE ACETONIDE 40 MG: 40 INJECTION, SUSPENSION INTRA-ARTICULAR; INTRAMUSCULAR at 14:40

## 2023-12-26 RX ADMIN — LIDOCAINE HYDROCHLORIDE 3 ML: 10 INJECTION, SOLUTION EPIDURAL; INFILTRATION; INTRACAUDAL; PERINEURAL at 14:32

## 2023-12-27 ENCOUNTER — MYC MEDICAL ADVICE (OUTPATIENT)
Dept: ORTHOPEDICS | Facility: CLINIC | Age: 48
End: 2023-12-27
Payer: COMMERCIAL

## 2024-01-01 ENCOUNTER — MYC REFILL (OUTPATIENT)
Dept: FAMILY MEDICINE | Facility: CLINIC | Age: 49
End: 2024-01-01
Payer: COMMERCIAL

## 2024-01-01 DIAGNOSIS — S82.891A CLOSED FRACTURE OF RIGHT ANKLE, INITIAL ENCOUNTER: ICD-10-CM

## 2024-01-08 RX ORDER — OXYCODONE HYDROCHLORIDE 5 MG/1
5 TABLET ORAL DAILY PRN
Qty: 30 TABLET | Refills: 0 | Status: SHIPPED | OUTPATIENT
Start: 2024-01-09 | End: 2024-01-29

## 2024-01-11 ENCOUNTER — THERAPY VISIT (OUTPATIENT)
Dept: PHYSICAL THERAPY | Facility: CLINIC | Age: 49
End: 2024-01-11
Payer: OTHER MISCELLANEOUS

## 2024-01-11 DIAGNOSIS — Z98.890 STATUS POST SURGERY: ICD-10-CM

## 2024-01-11 DIAGNOSIS — R60.0 LOCALIZED EDEMA: Primary | ICD-10-CM

## 2024-01-11 DIAGNOSIS — R26.9 ABNORMAL GAIT: ICD-10-CM

## 2024-01-11 DIAGNOSIS — S82.891D CLOSED FRACTURE OF RIGHT ANKLE WITH ROUTINE HEALING, SUBSEQUENT ENCOUNTER: ICD-10-CM

## 2024-01-11 PROCEDURE — 97140 MANUAL THERAPY 1/> REGIONS: CPT | Mod: GP | Performed by: PHYSICAL THERAPIST

## 2024-01-11 PROCEDURE — 97530 THERAPEUTIC ACTIVITIES: CPT | Mod: GP | Performed by: PHYSICAL THERAPIST

## 2024-01-11 PROCEDURE — 97110 THERAPEUTIC EXERCISES: CPT | Mod: GP | Performed by: PHYSICAL THERAPIST

## 2024-01-11 NOTE — PROGRESS NOTES
DISCHARGE  Reason for Discharge: Progress has slowed, patient has HEP to work on. Will return to full work duties and contact surgeon in next few weeks if sx do not continue to improve.    Equipment Issued: Lace up brace (patient ordered)    Discharge Plan: Patient to continue home program.    Referring Provider:  Carlos Riley       01/11/24 0500   Appointment Info   Signing clinician's name / credentials Amanda Hilligoss, DPT   Total/Authorized Visits 19 (E&T)   Visits Used 19   Medical Diagnosis Status post surgery  Closed fracture of right ankle   PT Tx Diagnosis Right ankle pain, localized edema, abnormal gait   Precautions/Limitations WBAT, as of 5/23/23- no restrictions   Progress Note/Certification   Onset of illness/injury or Date of Surgery 04/05/23  (date of surgery)   Therapy Frequency 2x/month   Predicted Duration x 2 months   Progress Note Completed Date 10/03/23   Supervision   PT Assistant Visit Number 3       Present No   PT Goal 1   Goal Identifier Ambulation   Goal Description Patient will be able to ambulate w/ 1 crutch in boot x10 minutes   Rationale to maximize safety and independence within the home;to maximize safety and independence within the community   Goal Progress met   Target Date 06/07/23   Date Met 05/31/23   PT Goal 2   Goal Identifier Ambulation (long term)   Goal Description Pt will be able to ambulate w/o boot, w/o AD, w/o abnormal gait x 20 min   Rationale to maximize safety and independence within the home;to maximize safety and independence within the community   Goal Progress met   Target Date 10/09/23   Date Met 09/08/23   PT Goal 3   Goal Identifier Standing during work   Goal Description Pt will be able to stand and walk as required for job duties w/ pain to 2/10 by end of day to be able to complete work tasks safely   Rationale to maximize safety and independence within the home;to maximize safety and independence within the community    Goal Progress Pt able to get through job tasks at work w/ 4-5/10 pain at end of day, no longer getting sharp pains   Target Date 01/19/24   Subjective Report   Subjective Report Pt was at work 12/2/23 walking back to her truck and felt a loud pop. Had trouble WB on her ankle had to stop rout. Has been working w/ Dr. Riley and had an MRI and ultrasound guided cortisone injection 12/26/23 (has been feeling some better since injection). Went back to work doing mostly sitting tasks 12/19/23. Plans to return to work tomorrow for full duties. No longer having sharp pain since cortisone injections, but gets pain to 4-5/10 by end of day. Feels unsure about continuing independently w/ HEP, but will to try this for next few weeks and discuss w/ surgeon if pain w/ work duties is not improving.   Objective Measures   Objective Measures Objective Measure 1;Objective Measure 2;Objective Measure 3;Objective Measure 4   Objective Measure 1   Objective Measure AROM   Details AROM DF(KE)0, (KF) 5, PF 50, eversion 15, inversion 20+pain   Objective Measure 2   Objective Measure Gait   Details Mild increase in ARNOLD, wearing lace up brace on R, otherwise gait WNL   Objective Measure 3   Objective Measure Strength   Details DF, inv, ev all 5/5 B, seated PF 5/5 B; Gastroc/Soleus MMT L , R 3-/5 (slight decrease in height dble leg vs single leg R)   Objective Measure 4   Objective Measure SLS   Details R 8 sec (best of 3)   Treatment Interventions (PT)   Interventions Therapeutic Procedure/Exercise;Therapeutic Activity;Manual Therapy   Therapeutic Procedure/Exercise   Therapeutic Procedures: strength, endurance, ROM, flexibillity minutes (19908) 14   Therapeutic Procedures Ther Proc 2;Ther Proc 3;Ther Proc 4;Ther Proc 5   Ther Proc 2 Theraband   Ther Proc 2 - Details inv and ev x 20 ea against GTB   Ther Proc 3 Passive DF and PF stretch   Ther Proc 3 - Details x 4 min total by PT   Ther Proc 4 calf raises- eccentric R   Ther Proc 4 - Details  "Gastroc off edge of 2\" step x 10   Ther Proc 5 Soleus calf raises   Ther Proc 5 - Details dble leg off 2\" step x10   Skilled Intervention progression to additional strengthening exercises and guidance to work towards independent HEP   Patient Response/Progress PROM PF 55, DF 5   Therapeutic Activity   Therapeutic Activities: dynamic activities to improve functional performance minutes (10655) 10   Ther Act 2 Education re: lace up brace and which strap to put first for focused stabilization and tighter laces to decrease amount of slide in brace (but decreasing tightness immediately if any tingling occurs), edu/demo/trial   Ther Act 2 - Details reviewed wearing brace for work activity, taking off at home, trying to progress out of brace if tolerable   Skilled Intervention guided application of brace to be able to use independently   Patient Response/Progress able to demonstrate correct application of brace   Neuromuscular Re-education   Neuromuscular re-ed of mvmt, balance, coord, kinesthetic sense, posture, proprioception minutes (30729) 2   Neuro Re-ed 1 SLS   Neuro Re-ed 1 - Details x1 min work R + edu re: continuing this daily while brushing teeth   Manual Therapy   Manual Therapy: Mobilization, MFR, MLD, friction massage minutes (17740) 12   Manual Therapy 2 Joint mobilization   Manual Therapy 2 - Details Posterior fibula glide on tibia x 2 min, TC distraction x 6min, Posterior tibial glide x 4 min   Skilled Intervention Modification of technique and intensity based on pt tolerance and tissue/joint response   Patient Response/Progress cotinues to hypomobile rearfoot   Education   Learner/Method Patient;Significant Other;Listening;No Barriers to Learning   Plan   Home program cont PTRX, encouraged brace for work   Updates to plan of care DC   Comments   Comments WC DOI 3/22/23, DOS 4/5/23   Total Session Time   Timed Code Treatment Minutes 38   Total Treatment Time (sum of timed and untimed services) 38     "

## 2024-01-19 ENCOUNTER — HOSPITAL ENCOUNTER (EMERGENCY)
Facility: CLINIC | Age: 49
Discharge: ANOTHER HOSPITAL | End: 2024-01-19
Attending: FAMILY MEDICINE | Admitting: FAMILY MEDICINE
Payer: COMMERCIAL

## 2024-01-19 ENCOUNTER — APPOINTMENT (OUTPATIENT)
Dept: CT IMAGING | Facility: CLINIC | Age: 49
End: 2024-01-19
Attending: FAMILY MEDICINE
Payer: COMMERCIAL

## 2024-01-19 ENCOUNTER — DOCUMENTATION ONLY (OUTPATIENT)
Dept: ORTHOPEDICS | Facility: CLINIC | Age: 49
End: 2024-01-19
Payer: COMMERCIAL

## 2024-01-19 ENCOUNTER — NURSE TRIAGE (OUTPATIENT)
Dept: FAMILY MEDICINE | Facility: CLINIC | Age: 49
End: 2024-01-19
Payer: COMMERCIAL

## 2024-01-19 ENCOUNTER — APPOINTMENT (OUTPATIENT)
Dept: MRI IMAGING | Facility: CLINIC | Age: 49
End: 2024-01-19
Attending: PHYSICIAN ASSISTANT
Payer: COMMERCIAL

## 2024-01-19 VITALS
HEIGHT: 66 IN | BODY MASS INDEX: 28.77 KG/M2 | SYSTOLIC BLOOD PRESSURE: 130 MMHG | DIASTOLIC BLOOD PRESSURE: 78 MMHG | TEMPERATURE: 98.1 F | RESPIRATION RATE: 16 BRPM | WEIGHT: 179 LBS | OXYGEN SATURATION: 99 % | HEART RATE: 75 BPM

## 2024-01-19 DIAGNOSIS — H54.3 VISUAL LOSS, BILATERAL: ICD-10-CM

## 2024-01-19 LAB
ANION GAP SERPL CALCULATED.3IONS-SCNC: 16 MMOL/L (ref 7–15)
APTT PPP: 24 SECONDS (ref 22–38)
BASOPHILS # BLD AUTO: 0 10E3/UL (ref 0–0.2)
BASOPHILS NFR BLD AUTO: 0 %
BUN SERPL-MCNC: 18.4 MG/DL (ref 6–20)
CALCIUM SERPL-MCNC: 10.1 MG/DL (ref 8.6–10)
CHLORIDE SERPL-SCNC: 96 MMOL/L (ref 98–107)
CREAT SERPL-MCNC: 1.09 MG/DL (ref 0.51–0.95)
CRP SERPL-MCNC: <3 MG/L
DEPRECATED HCO3 PLAS-SCNC: 23 MMOL/L (ref 22–29)
EGFRCR SERPLBLD CKD-EPI 2021: 62 ML/MIN/1.73M2
EOSINOPHIL # BLD AUTO: 0 10E3/UL (ref 0–0.7)
EOSINOPHIL NFR BLD AUTO: 0 %
ERYTHROCYTE [DISTWIDTH] IN BLOOD BY AUTOMATED COUNT: 13.3 % (ref 10–15)
ERYTHROCYTE [SEDIMENTATION RATE] IN BLOOD BY WESTERGREN METHOD: 25 MM/HR (ref 0–20)
GLUCOSE BLDC GLUCOMTR-MCNC: 196 MG/DL (ref 70–99)
GLUCOSE BLDC GLUCOMTR-MCNC: 312 MG/DL (ref 70–99)
GLUCOSE SERPL-MCNC: 337 MG/DL (ref 70–99)
HCT VFR BLD AUTO: 37.7 % (ref 35–47)
HGB BLD-MCNC: 13.2 G/DL (ref 11.7–15.7)
IMM GRANULOCYTES # BLD: 0 10E3/UL
IMM GRANULOCYTES NFR BLD: 0 %
INR PPP: 1.02 (ref 0.85–1.15)
LYMPHOCYTES # BLD AUTO: 1.7 10E3/UL (ref 0.8–5.3)
LYMPHOCYTES NFR BLD AUTO: 25 %
MCH RBC QN AUTO: 27.6 PG (ref 26.5–33)
MCHC RBC AUTO-ENTMCNC: 35 G/DL (ref 31.5–36.5)
MCV RBC AUTO: 79 FL (ref 78–100)
MONOCYTES # BLD AUTO: 0.5 10E3/UL (ref 0–1.3)
MONOCYTES NFR BLD AUTO: 7 %
NEUTROPHILS # BLD AUTO: 4.5 10E3/UL (ref 1.6–8.3)
NEUTROPHILS NFR BLD AUTO: 68 %
NRBC # BLD AUTO: 0 10E3/UL
NRBC BLD AUTO-RTO: 0 /100
PLATELET # BLD AUTO: 254 10E3/UL (ref 150–450)
POTASSIUM SERPL-SCNC: 4.3 MMOL/L (ref 3.4–5.3)
RBC # BLD AUTO: 4.78 10E6/UL (ref 3.8–5.2)
SODIUM SERPL-SCNC: 135 MMOL/L (ref 135–145)
TROPONIN T SERPL HS-MCNC: 13 NG/L
WBC # BLD AUTO: 6.8 10E3/UL (ref 4–11)

## 2024-01-19 PROCEDURE — G0425 INPT/ED TELECONSULT30: HCPCS | Mod: G0 | Performed by: PHYSICIAN ASSISTANT

## 2024-01-19 PROCEDURE — 84484 ASSAY OF TROPONIN QUANT: CPT | Performed by: FAMILY MEDICINE

## 2024-01-19 PROCEDURE — 93005 ELECTROCARDIOGRAM TRACING: CPT

## 2024-01-19 PROCEDURE — 96376 TX/PRO/DX INJ SAME DRUG ADON: CPT | Mod: 59

## 2024-01-19 PROCEDURE — 85652 RBC SED RATE AUTOMATED: CPT | Performed by: FAMILY MEDICINE

## 2024-01-19 PROCEDURE — 96375 TX/PRO/DX INJ NEW DRUG ADDON: CPT

## 2024-01-19 PROCEDURE — 70496 CT ANGIOGRAPHY HEAD: CPT

## 2024-01-19 PROCEDURE — 82962 GLUCOSE BLOOD TEST: CPT

## 2024-01-19 PROCEDURE — 85025 COMPLETE CBC W/AUTO DIFF WBC: CPT | Performed by: FAMILY MEDICINE

## 2024-01-19 PROCEDURE — 99291 CRITICAL CARE FIRST HOUR: CPT | Mod: 25

## 2024-01-19 PROCEDURE — 250N000011 HC RX IP 250 OP 636: Performed by: FAMILY MEDICINE

## 2024-01-19 PROCEDURE — 96365 THER/PROPH/DIAG IV INF INIT: CPT | Mod: 59

## 2024-01-19 PROCEDURE — 85610 PROTHROMBIN TIME: CPT | Performed by: FAMILY MEDICINE

## 2024-01-19 PROCEDURE — 70551 MRI BRAIN STEM W/O DYE: CPT

## 2024-01-19 PROCEDURE — 93010 ELECTROCARDIOGRAM REPORT: CPT | Performed by: FAMILY MEDICINE

## 2024-01-19 PROCEDURE — 99285 EMERGENCY DEPT VISIT HI MDM: CPT | Mod: 25 | Performed by: FAMILY MEDICINE

## 2024-01-19 PROCEDURE — 99292 CRITICAL CARE ADDL 30 MIN: CPT

## 2024-01-19 PROCEDURE — 85730 THROMBOPLASTIN TIME PARTIAL: CPT | Performed by: FAMILY MEDICINE

## 2024-01-19 PROCEDURE — 86140 C-REACTIVE PROTEIN: CPT | Performed by: FAMILY MEDICINE

## 2024-01-19 PROCEDURE — 70450 CT HEAD/BRAIN W/O DYE: CPT

## 2024-01-19 PROCEDURE — 36415 COLL VENOUS BLD VENIPUNCTURE: CPT | Performed by: FAMILY MEDICINE

## 2024-01-19 PROCEDURE — 250N000009 HC RX 250: Performed by: FAMILY MEDICINE

## 2024-01-19 PROCEDURE — 80048 BASIC METABOLIC PNL TOTAL CA: CPT | Performed by: FAMILY MEDICINE

## 2024-01-19 RX ORDER — KETOROLAC TROMETHAMINE 30 MG/ML
30 INJECTION, SOLUTION INTRAMUSCULAR; INTRAVENOUS ONCE
Status: COMPLETED | OUTPATIENT
Start: 2024-01-19 | End: 2024-01-19

## 2024-01-19 RX ORDER — IOPAMIDOL 755 MG/ML
500 INJECTION, SOLUTION INTRAVASCULAR ONCE
Status: COMPLETED | OUTPATIENT
Start: 2024-01-19 | End: 2024-01-19

## 2024-01-19 RX ORDER — DIPHENHYDRAMINE HYDROCHLORIDE 50 MG/ML
25 INJECTION INTRAMUSCULAR; INTRAVENOUS ONCE
Status: COMPLETED | OUTPATIENT
Start: 2024-01-19 | End: 2024-01-19

## 2024-01-19 RX ORDER — MAGNESIUM SULFATE 1 G/100ML
1 INJECTION INTRAVENOUS ONCE
Status: COMPLETED | OUTPATIENT
Start: 2024-01-19 | End: 2024-01-19

## 2024-01-19 RX ADMIN — DIPHENHYDRAMINE HYDROCHLORIDE 25 MG: 50 INJECTION, SOLUTION INTRAMUSCULAR; INTRAVENOUS at 16:29

## 2024-01-19 RX ADMIN — PROCHLORPERAZINE EDISYLATE 5 MG: 5 INJECTION INTRAMUSCULAR; INTRAVENOUS at 16:05

## 2024-01-19 RX ADMIN — IOPAMIDOL 67 ML: 755 INJECTION, SOLUTION INTRAVENOUS at 13:47

## 2024-01-19 RX ADMIN — MAGNESIUM SULFATE HEPTAHYDRATE 1 G: 1 INJECTION, SOLUTION INTRAVENOUS at 16:08

## 2024-01-19 RX ADMIN — SODIUM CHLORIDE 100 ML: 9 INJECTION, SOLUTION INTRAVENOUS at 13:47

## 2024-01-19 RX ADMIN — DIPHENHYDRAMINE HYDROCHLORIDE 25 MG: 50 INJECTION, SOLUTION INTRAMUSCULAR; INTRAVENOUS at 16:03

## 2024-01-19 RX ADMIN — KETOROLAC TROMETHAMINE 30 MG: 30 INJECTION, SOLUTION INTRAMUSCULAR at 16:02

## 2024-01-19 ASSESSMENT — ACTIVITIES OF DAILY LIVING (ADL)
ADLS_ACUITY_SCORE: 33
ADLS_ACUITY_SCORE: 35

## 2024-01-19 NOTE — TELEPHONE ENCOUNTER
"Nurse Triage SBAR    Is this a 2nd Level Triage? NO    Situation: Patient called in with sudden onset of vision changes.      Background: Patient states that prior to this she had a twitch in her eye.    Assessment: Patient states she is a  and is out driving, she had to pull over and park because she started having changes in her vision. She states her eyes are \"bouncing\" and she is having trouble seeing. She states her vision is blurry, worse in left eye but is in both eyes. She denies other symptoms such as weakness of the face, arm or leg on one side of the body.     Protocol Recommended Disposition:   Go To ED/UCC Now (Or To Office With PCP Approval)    Recommendation: Per protocol patient was advised she should go to the ED, but writer advised she should not drive. Writer asked patient to call . Patient states she needs to call her work office to report that she needs to go to ED, and she will call her  to come get her so he can take her to the ED now.  Patient states she will not drive.     GERA Alberts, RN      Reason for Disposition   Blurred vision or visual changes and present now and sudden onset or new (e.g., minutes, hours, days)  (Exception: Seeing floaters / black specks OR previously diagnosed migraine headaches with same symptoms.)    Additional Information   Negative: Weakness of the face, arm or leg on one side of the body   Negative: Followed getting substance in the eye   Negative: Foreign body stuck in the eye   Negative: Followed an eye injury   Negative: Followed sun lamp or sun exposure (UV keratitis)   Negative: Yellow or green discharge (pus) in the eye   Negative: Pregnant   Negative: Complete loss of vision in one or both eyes   Negative: SEVERE eye pain   Negative: SEVERE headache   Negative: Double vision    Protocols used: Vision Loss or Change-A-OH    "

## 2024-01-19 NOTE — MEDICATION SCRIBE - ADMISSION MEDICATION HISTORY
Medication Scribe Admission Medication History    Admission medication history is complete. The information provided in this note is only as accurate as the sources available at the time of the update.    Information Source(s): Patient via in-person    Pertinent Information: patient ran out of both insuline; used the last of it yesterday     Changes made to PTA medication list:  Added: None  Deleted: ketorolac 10 mg - not taking; none on hand   Changed: None    Medication Affordability:       Allergies reviewed with patient and updates made in EHR: yes    Medication History Completed By: EL PELLETIER 1/19/2024 4:00 PM    PTA Med List   Medication Sig Note Last Dose    amLODIPine (NORVASC) 2.5 MG tablet TAKE 1 TABLET (2.5 MG) BY MOUTH DAILY  1/19/2024 at am    Ascorbic Acid (VITAMIN C) 100 MG CHEW Take 1 chew tab by mouth daily  1/19/2024 at am    aspirin (ASA) 325 MG EC tablet Take 1 tablet (325 mg) by mouth daily  1/19/2024 at am    blood glucose (NO BRAND SPECIFIED) test strip Use to test blood sugar up to 4 times daily or as directed. To accompany: Blood Glucose Monitor Brands: per insurance. 1/19/2024: Cannot recall the last time blood sugar was checked  Unknown at unknown    blood glucose calibration (NO BRAND SPECIFIED) solution To accompany: Blood Glucose Monitor Brands: per insurance.  Unknown at unknown    blood glucose monitoring (FREESTYLE) lancets Use to test blood sugars 1-2 times daily or as directed, per patients glucose meter.  Unknown at unknown    Blood Glucose Monitoring Suppl (ACCU-CHEK COMPLETE) KIT 1 Device daily  Unknown at unknown    DULoxetine (CYMBALTA) 20 MG capsule Take 1 tablet once daily for 1 week, then increase to 1 tablet twice daily (Patient taking differently: Take 20 mg by mouth daily Take 1 tablet once daily for 1 week, then increase to 1 tablet twice daily)  1/19/2024 at am    insulin aspart (NOVOLOG FLEXPEN) 100 UNIT/ML pen Novolog Flexpen. Inject 1 units per 10 gram carb unit  before dinner, up to 15 units per meal. 1/19/2024: Out of insulin  1/18/2024 at hs    insulin glargine (BASAGLAR KWIKPEN) 100 UNIT/ML pen Inject 42 Units Subcutaneous every morning 1/19/2024: Out of insulin  1/18/2024 at am    insulin pen needle (31G X 8 MM) 31G X 8 MM miscellaneous 1 Box of 100 insulin pen needles to be dispensed with every insulin pen prescription  1/18/2024 at hs    insulin pen needle (NOVOFINE) 32G X 6 MM miscellaneous Use once daily or as directed.  1/18/2024 at am    lisinopril (ZESTRIL) 5 MG tablet Take 1 tablet (5 mg) by mouth daily  1/19/2024 at am    metFORMIN (GLUCOPHAGE XR) 500 MG 24 hr tablet Take 2 tablets (1,000 mg) by mouth 2 times daily (with meals)  1/19/2024 at am    metoprolol tartrate (LOPRESSOR) 25 MG tablet Take 1 tablet (25 mg) by mouth 2 times daily  1/19/2024 at am    Multiple Vitamins-Minerals (MULTI-VITAMIN GUMMIES) CHEW Take 1 chew tab by mouth daily   1/18/2024 at am    oxyCODONE (ROXICODONE) 5 MG tablet Take 1 tablet (5 mg) by mouth daily as needed for severe pain  1/18/2024 at hs    rosuvastatin (CRESTOR) 20 MG tablet Take 1 tablet (20 mg) by mouth daily  1/19/2024 at am    tiZANidine (ZANAFLEX) 4 MG tablet Take 1 tablet (4 mg) by mouth 3 times daily (Patient taking differently: Take 4 mg by mouth 3 times daily as needed for muscle spasms or other (Pain))  1/18/2024 at hs

## 2024-01-19 NOTE — ED TRIAGE NOTES
Pt presents with concerns of sudden vision loss bilaterally.  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  Pt states that at this time she can see a little out of her left eye and nothing out of her right eye. Pt ambulatory, no facial droop, no other concerns.      Triage Assessment (Adult)       Row Name 01/19/24 1250          Triage Assessment    Airway WDL WDL        Respiratory WDL    Respiratory WDL WDL        Skin Circulation/Temperature WDL    Skin Circulation/Temperature WDL WDL        Cardiac WDL    Cardiac WDL WDL

## 2024-01-19 NOTE — ED NOTES
Pt states she can see shadow of  with right eye. Left eye pt able to see more center. Denies nausea and pain.
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
## 2024-01-19 NOTE — PROGRESS NOTES
Received Completed forms Yes   Faxed Forms Mailed to patient at home address:    [id number] [address] [place] [id number]   Sent to HIM (Date) 1/19/24

## 2024-01-19 NOTE — CONSULTS
"[facility]    Stroke Consult Note    Reason for Consult: Stroke Code     Chief Complaint: Vision Loss Both Eyes      HPI  Stacy Brown is a 48 year old right-handed woman who presents to the ER today with visual loss.  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  It went away on its own.  She then started driving and about 1/2 mile away she had a sudden sensation of bilateral complete visual loss, dark shade coming down over both eyes.  She could not see anything at all for couple of minutes.  It was completely black.  Then after couple of minutes some small amount of vision returned but only in the extreme lateral part of the visual field of her left eye only.  Her right eye has not regained any vision whatsoever and it is still 100% black.  She came right to the ER so her last known well time is right around 30 minutes before presentation.  She also has a slight headache, 3-4 out of 10.    When she was going to bed a few nights ago her eyes seemed to be bouncing but that resolved on its own.  She has otherwise not been ill recently and her only recent medication change was that she started on duloxetine about a month ago.  She denies any other focal symptoms whatsoever.  No dysarthria, aphasia, weakness or numbness, trouble with swallowing, or trouble with gait (long as someone is helping her get to where she needs to go).    She has no prior history of any visual loss or any other neurologic problems.  She notes that her father had a problem where he had bleeding in his eyes.  He had visual loss because of that.    Her PMH includes DM2 (and sugar is 300+ today), CAD with prior NSTEMI in 2021, anxiety, depression, chronic pain.    Imaging Findings  CT head unremarkable  CTA head/neck unremarkable  MRI brain (limited hyeracute protocol for stroke) unremarkable for any " "acute stroke    Intravenous Thrombolysis  Not given due to:   - not a stroke based on MRI results. Clinically atypical for stroke as well. MRI diffusion sequence personally reviewed at 3:10 pm with no stroke    Endovascular Treatment  Not initiated due to absence of proximal vessel occlusion    Impression   Bilateral visual loss of unclear etiology. Given the lack of intracranial pathology, central process is not suspected. An ocular process is less likely as well given the simultaneous bilateral nature of the symptoms. The differential includes functional (nonorganic) visual loss, atypical migraine, a toxic/metabolic process, or much less likely bilateral CRAO    Recommendations  - Transfer to a facility with capability of ophthalmology eval and ideally general neurology eval as well.    Case discussed with attending Dr. Thierry Walsh PA-C  Vascular Neurology    To page me or covering stroke neurology team member, click here: [facility]  Choose \"On Call\" tab at top, then select \"NEUROLOGY/ALL SITES\" from middle drop-down box, press Enter, then look for \"stroke\" or \"telestroke\" for your site.  ______________________________________________________    Clinically Significant Risk Factors Present on Admission                # Drug Induced Platelet Defect: home medication list includes an antiplatelet medication   # Hypertension: Noted on problem list     # DMII: A1C = N/A within past 6 months    # Overweight: Estimated body mass index is 28.89 kg/m  as calculated from the following:    Height as of this encounter: 1.676 m (5' 6\").    Weight as of this encounter: 81.2 kg (179 lb).         # Financial/Environmental Concerns:     # History of CABG: noted on surgical history       Past Medical History   Past Medical History:   Diagnosis Date    Knee pain, chronic     Mixed hyperlipidemia     NSTEMI (non-ST elevated myocardial infarction) (H) 3/5/2021    S/P CABG (coronary artery bypass graft) 3/16/2021 "    Tobacco abuse disorder 11/21/2017    Type II or unspecified type diabetes mellitus without mention of complication, not stated as uncontrolled 08/16/2002    diagnosed 8/16/02, started insulin 2006     Past Surgical History   Past Surgical History:   Procedure Laterality Date    BYPASS GRAFT ARTERY CORONARY N/A 3/9/2021    Procedure: CORONARY ARTERY BYPASS GRAFT X 4 (LIMA - LAD, SV - RPL, SV - PDA,  RA - OM) LEFT RADIAL ENDOARTERY HARVEST AND BILATERAL LEG ENDOVEIN HARVEST (ON CARDIOPULMONARY PUMP OXYGENATOR ; INTRAOPERATIVE TRANSESOPHAGEAL ECHOCARDIOGRAM BY ANESTHESIOLOGIST DR. NEL AVILA)   ;  Surgeon: Kunal Selby MD;  Location:  OR    CV HEART CATHETERIZATION WITH POSSIBLE INTERVENTION N/A 3/8/2021    Procedure: Heart Catheterization with Possible Intervention;  Surgeon: Vadim Kamara MD;  Location:  HEART CARDIAC CATH LAB    HC OPEN TX METATARSAL FRACTURE  age 12    softball injury,open fracture left foot    HC TOOTH EXTRACTION W/FORCEP      Extract wisdom teeth    INJECT JOINT SACROILIAC Left 1/11/2018    Procedure: INJECT JOINT SACROILIAC;  INJECT JOINT SACROILIAC LEFT;  Surgeon: Alan Marshall MD;  Location: [facility] OR    LAPAROSCOPIC CHOLECYSTECTOMY N/A 2/13/2023    Procedure: CHOLECYSTECTOMY, LAPAROSCOPIC;  Surgeon: Robert Diop DO;  Location: [facility] OR    OPERATIVE HYSTEROSCOPY WITH MORCELLATOR N/A 7/24/2018    Procedure: OPERATIVE HYSTEROSCOPY WITH MORCELLATOR (MYOSURE);  Exam under anesthesia, operative hysteroscopy, polypectomy, D & C;  Surgeon: Sindhu Peterson DO;  Location: [facility] OR    TUBAL LIGATION  7/27/2006    RUST STABISM SURG,PREV EYE SURG,NOT [facility]      Right     Medications   Home Meds  Prior to Admission medications    Medication Sig Start Date End Date Taking? Authorizing Provider   amLODIPine (NORVASC) 2.5 MG tablet TAKE 1 TABLET (2.5 MG) BY MOUTH DAILY 12/11/22   Yaima Muse MD   Ascorbic Acid (VITAMIN C) 100 MG CHEW Take 1 chew tab by  mouth daily    Reported, Patient   aspirin (ASA) 325 MG EC tablet Take 1 tablet (325 mg) by mouth daily 9/18/23   Yaima Muse MD   blood glucose (NO BRAND SPECIFIED) test strip Use to test blood sugar up to 4 times daily or as directed. To accompany: Blood Glucose Monitor Brands: per insurance. 3/14/22   Yaima Muse MD   blood glucose calibration (NO BRAND SPECIFIED) solution To accompany: Blood Glucose Monitor Brands: per insurance. 8/28/19   Yaima Muse MD   blood glucose monitoring (FREESTYLE) lancets Use to test blood sugars 1-2 times daily or as directed, per patients glucose meter. 4/17/17   Yaima Muse MD   Blood Glucose Monitoring Suppl (ACCU-CHEK COMPLETE) KIT 1 Device daily 8/10/15   Yaima Muse MD   DULoxetine (CYMBALTA) 20 MG capsule Take 1 tablet once daily for 1 week, then increase to 1 tablet twice daily 12/12/23   Yaima Muse MD   insulin aspart (NOVOLOG FLEXPEN) 100 UNIT/ML pen Novolog Flexpen. Inject 1 units per 10 gram carb unit before dinner, up to 15 units per meal. 9/15/23   Yaima Muse MD   insulin glargine (BASAGLAR KWIKPEN) 100 UNIT/ML pen Inject 42 Units Subcutaneous every morning 9/15/23   Yaima Muse MD   insulin pen needle (31G X 8 MM) 31G X 8 MM miscellaneous 1 Box of 100 insulin pen needles to be dispensed with every insulin pen prescription 9/15/23   Yaima Muse MD   insulin pen needle (NOVOFINE) 32G X 6 MM miscellaneous Use once daily or as directed. 9/15/23   Yaima Muse MD   ketorolac (TORADOL) 10 MG tablet Take 1 tablet (10 mg) by mouth every 6 hours as needed for moderate pain 12/2/23   Hannah Lerma PA-C   lisinopril (ZESTRIL) 5 MG tablet Take 1 tablet (5 mg) by mouth daily 12/12/23   Yaima Muse MD   metFORMIN (GLUCOPHAGE XR) 500 MG 24 hr tablet Take 2 tablets (1,000 mg) by  mouth 2 times daily (with meals) 9/15/23   Yaiam Muse MD   metoprolol tartrate (LOPRESSOR) 25 MG tablet Take 1 tablet (25 mg) by mouth 2 times daily 9/15/23   Yaima Muse MD   Multiple Vitamins-Minerals (MULTI-VITAMIN GUMMIES) CHEW Take 1 chew tab by mouth daily     Reported, Patient   nitroGLYcerin (NITROSTAT) 0.4 MG sublingual tablet For chest pain place 1 tablet under the tongue every 5 minutes for 3 doses. If symptoms persist 5 minutes after 1st dose call 911. 9/15/23   Yaima Muse MD   oxyCODONE (ROXICODONE) 5 MG tablet Take 1 tablet (5 mg) by mouth daily as needed for severe pain 1/9/24   Leslie Edwards, GUILLE   rosuvastatin (CRESTOR) 20 MG tablet Take 1 tablet (20 mg) by mouth daily 9/15/23   Yaima Muse MD   tiZANidine (ZANAFLEX) 4 MG tablet Take 1 tablet (4 mg) by mouth 3 times daily 1/17/23   Carlos Stoner MD       Scheduled Meds   diphenhydrAMINE  25 mg Intravenous Once    ketorolac  30 mg Intravenous Once    magnesium sulfate  1 g Intravenous Once    prochlorperazine  5 mg Intravenous Once       Infusion Meds      PRN Meds      Allergies   Allergies   Allergen Reactions    Amoxicillin Hives    Hydrocodone-Acetaminophen GI Disturbance     Tylenol is ok     Family History   Family History   Problem Relation Age of Onset    Allergies Mother     Lipids Father         cholesterol    Diabetes Maternal Grandmother     Hypertension Maternal Grandmother     Heart Disease Maternal Grandmother         Bypass    Cancer Maternal Grandfather         Lung - metastatic    Alzheimer Disease Paternal Grandmother     Heart Disease Paternal Grandmother         valve replacement    Cerebrovascular Disease Paternal Grandfather     Anesthesia Reaction No family hx of     Colon Cancer No family hx of      PHYSICAL EXAMINATION  Pulse:  [71-92] 71  Resp:  [14-26] 26  BP: (158-160)/() 158/75  SpO2:  [100 %] 100 %     General:  patient lying in  bed without any acute distress    HEENT:  normocephalic/atraumatic  Pulmonary:  no respiratory distress    Neurologic  Mental Status:  alert, oriented x 3, follows commands, speech clear and fluent  Cranial Nerves:  PERRL. reports no vision in R eye, and no vision in L eye except for the far lateral part (much farther over to the L than midline). Color vision intact there. EOMI except for some medial deviation of right eye when looking upward. Facial sensation intact and symmetric (tested by nurse), facial movements symmetric, hearing not formally tested but intact to conversation, no dysarthria, shoulder shrug equal bilaterally, tongue protrusion midline  Motor:  no abnormal movements, able to move all limbs antigravity spontaneously with no signs of hemiparesis observed, no pronator drift  Reflexes:  unable to test (telestroke)  Sensory:  light touch sensation intact and symmetric throughout upper and lower extremities (assessed by nurse), no extinction on double simultaneous stimulation (assessed by nurse)  Coordination:  normal finger-to-nose and heel-to-shin bilaterally without dysmetria, rapid alternating movements symmetric  Station/Gait:  unable to test due to telestroke        Stroke Scales    NIHSS  1a. Level of Consciousness 0-->Alert, keenly responsive   1b. LOC Questions 0-->Answers both questions correctly   1c. LOC Commands 0-->Performs both tasks correctly   2.   Best Gaze 0-->Normal   3.   Visual 3-->Bilateral hemianopia (blind including cortical blindness)   4.   Facial Palsy 0-->Normal symmetrical movements   5a. Motor Arm, Left 0-->No drift, limb holds 90 (or 45) degrees for full 10 secs   5b. Motor Arm, Right 0-->No drift, limb holds 90 (or 45) degrees for full 10 secs   6a. Motor Leg, Left 0-->No drift, leg holds 30 degree position for full 5 secs   6b. Motor Leg, right 0-->No drift, leg holds 30 degree position for full 5 secs   7.   Limb Ataxia 0-->Absent   8.   Sensory 0-->Normal, no sensory  "loss   9.   Best Language 0-->No aphasia, normal   10. Dysarthria 0-->Normal   11. Extinction and Inattention  0-->No abnormality   Total 3 (01/19/24 1340)     Imaging  I personally reviewed all imaging; relevant findings per HPI.     Lab Results Data   CBC  Recent Labs   Lab 01/19/24  1338   WBC 6.8   RBC 4.78   HGB 13.2   HCT 37.7        Basic Metabolic Panel    Recent Labs   Lab 01/19/24  1338 01/19/24  1312     --    POTASSIUM 4.3  --    CHLORIDE 96*  --    CO2 23  --    BUN 18.4  --    CR 1.09*  --    * 312*   LUNA 10.1*  --      Liver Panel  No results for input(s): \"PROTTOTAL\", \"ALBUMIN\", \"BILITOTAL\", \"ALKPHOS\", \"AST\", \"ALT\", \"BILIDIRECT\" in the last 168 hours.  INR    Recent Labs   Lab Test 01/19/24  1338 03/09/21  1820 03/09/21  1642   INR 1.02 1.21* 1.38*      Lipid Profile    Recent Labs   Lab Test 11/01/22  1537 09/01/21  0909 03/06/21  0746   CHOL 121 176 261*   HDL 51 51 43*   LDL 27 72 178*   TRIG 213* 266* 201*     A1C    Recent Labs   Lab Test 09/15/23  0844 11/01/22  1537 03/14/22  1534   A1C 8.0* 7.6* 7.3*         Stroke Code Data Data   Stroke Code Data  (for stroke code with tele)  Stroke code activated 01/19/24  1310   First stroke provider response 01/19/24  1311   Video start time 01/19/24  1340   Video end time 01/19/24  1357   Last known normal 01/19/24  1230   Time of discovery (or onset of symptoms)  01/19/24  1245   Head CT read by Stroke Neuro Provider 01/19/24  1322 (cta not available til 1336)   Was stroke code de-escalated? Yes  01/19/24  1530     Telestroke Service Details  Type of service telemedicine diagnostic assessment of acute neurological changes   Reason telemedicine is appropriate patient requires assessment with a specialist for diagnosis and treatment of neurological symptoms   Mode of transmission secure interactive audio and video communication per [place]   Originating site (patient location) [facility]    Distant site " (provider location) [facility] [place]       I personally examined and evaluated the patient today. At the time of my evaluation and management the patient was in critical condition today due to possible stroke and visual loss. I personally managed nihss exam. Key decisions made today included no tnk. I spent a total of 60 minutes providing critical care services, evaluating the patient, directing care and reviewing laboratory values and radiologic reports.

## 2024-01-24 ENCOUNTER — TRANSFERRED RECORDS (OUTPATIENT)
Dept: MULTI SPECIALTY CLINIC | Facility: CLINIC | Age: 49
End: 2024-01-24
Payer: COMMERCIAL

## 2024-01-24 LAB — RETINOPATHY: NORMAL

## 2024-01-26 ENCOUNTER — OFFICE VISIT (OUTPATIENT)
Dept: FAMILY MEDICINE | Facility: CLINIC | Age: 49
End: 2024-01-26
Payer: COMMERCIAL

## 2024-01-26 ENCOUNTER — VIRTUAL VISIT (OUTPATIENT)
Dept: EDUCATION SERVICES | Facility: CLINIC | Age: 49
End: 2024-01-26
Payer: COMMERCIAL

## 2024-01-26 ENCOUNTER — TELEPHONE (OUTPATIENT)
Dept: EDUCATION SERVICES | Facility: CLINIC | Age: 49
End: 2024-01-26

## 2024-01-26 VITALS
BODY MASS INDEX: 28.49 KG/M2 | HEART RATE: 76 BPM | OXYGEN SATURATION: 97 % | WEIGHT: 181.5 LBS | SYSTOLIC BLOOD PRESSURE: 112 MMHG | DIASTOLIC BLOOD PRESSURE: 72 MMHG | HEIGHT: 67 IN | RESPIRATION RATE: 18 BRPM | TEMPERATURE: 98.2 F

## 2024-01-26 DIAGNOSIS — G43.919 INTRACTABLE MIGRAINE WITHOUT STATUS MIGRAINOSUS, UNSPECIFIED MIGRAINE TYPE: ICD-10-CM

## 2024-01-26 DIAGNOSIS — I95.1 ORTHOSTATIC HYPOTENSION: ICD-10-CM

## 2024-01-26 DIAGNOSIS — Z79.4 TYPE 2 DIABETES MELLITUS WITH HYPERGLYCEMIA, WITH LONG-TERM CURRENT USE OF INSULIN (H): ICD-10-CM

## 2024-01-26 DIAGNOSIS — Z56.6 STRESS AT WORK: ICD-10-CM

## 2024-01-26 DIAGNOSIS — H53.9 VISION CHANGES: Primary | ICD-10-CM

## 2024-01-26 DIAGNOSIS — Z79.4 TYPE 2 DIABETES MELLITUS WITH OTHER CIRCULATORY COMPLICATION, WITH LONG-TERM CURRENT USE OF INSULIN (H): ICD-10-CM

## 2024-01-26 DIAGNOSIS — E11.59 TYPE 2 DIABETES MELLITUS WITH OTHER CIRCULATORY COMPLICATION, WITH LONG-TERM CURRENT USE OF INSULIN (H): ICD-10-CM

## 2024-01-26 DIAGNOSIS — R42 DIZZINESS: ICD-10-CM

## 2024-01-26 DIAGNOSIS — E11.65 TYPE 2 DIABETES MELLITUS WITH HYPERGLYCEMIA, WITH LONG-TERM CURRENT USE OF INSULIN (H): ICD-10-CM

## 2024-01-26 DIAGNOSIS — R11.0 NAUSEA: ICD-10-CM

## 2024-01-26 DIAGNOSIS — E11.65 TYPE 2 DIABETES MELLITUS WITH HYPERGLYCEMIA, WITH LONG-TERM CURRENT USE OF INSULIN (H): Primary | ICD-10-CM

## 2024-01-26 DIAGNOSIS — Z79.4 TYPE 2 DIABETES MELLITUS WITH HYPERGLYCEMIA, WITH LONG-TERM CURRENT USE OF INSULIN (H): Primary | ICD-10-CM

## 2024-01-26 DIAGNOSIS — I10 BENIGN ESSENTIAL HYPERTENSION: ICD-10-CM

## 2024-01-26 PROBLEM — H54.7 VISION LOSS: Status: ACTIVE | Noted: 2024-01-20

## 2024-01-26 PROCEDURE — G0108 DIAB MANAGE TRN  PER INDIV: HCPCS | Mod: 95 | Performed by: NUTRITIONIST

## 2024-01-26 PROCEDURE — 99215 OFFICE O/P EST HI 40 MIN: CPT

## 2024-01-26 RX ORDER — ONDANSETRON 4 MG/1
4 TABLET, FILM COATED ORAL EVERY 8 HOURS PRN
Qty: 15 TABLET | Refills: 0 | Status: ON HOLD | OUTPATIENT
Start: 2024-01-26 | End: 2024-03-29

## 2024-01-26 RX ORDER — BLOOD-GLUCOSE SENSOR
1 EACH MISCELLANEOUS
Qty: 2 EACH | Refills: 5 | Status: SHIPPED | OUTPATIENT
Start: 2024-01-26 | End: 2024-07-19

## 2024-01-26 RX ORDER — INSULIN GLARGINE 100 [IU]/ML
42 INJECTION, SOLUTION SUBCUTANEOUS EVERY MORNING
Qty: 45 ML | Refills: 1 | Status: SHIPPED | OUTPATIENT
Start: 2024-01-26

## 2024-01-26 SDOH — HEALTH STABILITY - MENTAL HEALTH: OTHER PHYSICAL AND MENTAL STRAIN RELATED TO WORK: Z56.6

## 2024-01-26 ASSESSMENT — PATIENT HEALTH QUESTIONNAIRE - PHQ9
SUM OF ALL RESPONSES TO PHQ QUESTIONS 1-9: 6
10. IF YOU CHECKED OFF ANY PROBLEMS, HOW DIFFICULT HAVE THESE PROBLEMS MADE IT FOR YOU TO DO YOUR WORK, TAKE CARE OF THINGS AT HOME, OR GET ALONG WITH OTHER PEOPLE: SOMEWHAT DIFFICULT
SUM OF ALL RESPONSES TO PHQ QUESTIONS 1-9: 6

## 2024-01-26 ASSESSMENT — PAIN SCALES - GENERAL: PAINLEVEL: MODERATE PAIN (5)

## 2024-01-26 NOTE — TELEPHONE ENCOUNTER
Zully would benefit from using Nancy 3 sensors as she has not been monitoring blood glucose levels for the past few weeks and prior to that she was doing it sporadically.  She is in agreement to use.  Pended order.    Kristina Stover, RD, LD, [facility]

## 2024-01-26 NOTE — PROGRESS NOTES
Diabetes Self-Management Education & Support    Presents for: Individual review    Type of service:  Video Visit    If the video visit is dropped, the video visit invitation should be resent by: Text to cell phone: [phone]    Originating Location (pt. Location): Home  Distant Location (provider location): Offsite  Mode of Communication:  Video Conference via "[employer]    Video Start Time:  3:07 PM  Video End Time (time video stopped): 3:38 PM    How would patient like to obtain AVS? MyChart      ASSESSMENT:  Zully met with her PCP today and was encouraged to make appointment with [facility].  She was unclear about the reasoning for the meeting, during assessment found that she is not currently monitoring her blood glucose and states she stopped a few weeks ago.  She is taking her diabetes medications, Novolog is prescribed at an insulin to carbohydrate ratio of 1:10, based on our discussion she was not using this appropriately.  She would benefit from using a CGM, order pended for PCP today.  We will meet again in 3 weeks to review of the data and if needed adjust medications.      Patient's most recent   Lab Results   Component Value Date    A1C 8.0 09/15/2023    A1C 10.1 03/05/2021     is not meeting goal of <7.0    Diabetes knowledge and skills assessment:   Patient is knowledgeable in diabetes management concepts related to: Being Active, Monitoring, Taking Medication, and Healthy Coping    Continue education with the following diabetes management concepts: Healthy Eating, Problem Solving, and Reducing Risks    Based on learning assessment above, most appropriate setting for further diabetes education would be: Individual setting.      PLAN    1) Count carbohydrate for soda in the morning and dose Novolog accordingly  2) Look at food labels to get correct amount of carbohydrate in foods and Myplate handout to get carbohydrate amounts from foods without labels.  3) Call [facility] if unable to get the CGM    "  Topics to cover at upcoming visits: Healthy Eating, Problem Solving, and Reducing Risks    Follow-up: 2/16/24    See Care Plan for co-developed, patient-state behavior change goals.  AVS provided for patient today.    Education Materials Provided:  Carbohydrate Counting and My Plate Planner      SUBJECTIVE/OBJECTIVE:  Presents for: Individual review  Accompanied by: Self  Diabetes education in the past 24mo: No  Diabetes type: Type 2  Disease course: Stable  How confident are you filling out medical forms by yourself:: Not Assessed  Transportation concerns: No  Difficulty affording diabetes medication?: No  Difficulty affording diabetes testing supplies?: No  Other concerns:: None  Cultural Influences/Ethnic Background:  Not  or     Diabetes Symptoms & Complications:  Diabetes Related Symptoms: None  Weight trend: Stable  Symptom course: Worsening  Disease course: Stable  Complications assessed today?: Yes  Autonomic neuropathy: No  CVA: No  Heart disease: Yes  Nephropathy: No  Peripheral neuropathy: No  Peripheral Vascular Disease: No  Retinopathy: No  Sexual dysfunction: No    Patient Problem List and Family Medical History reviewed for relevant medical history, current medical status, and diabetes risk factors.    Vitals:  LMP 03/07/2021 (Approximate)   Estimated body mass index is 28.83 kg/m  as calculated from the following:    Height as of an earlier encounter on 1/26/24: 1.69 m (5' 6.54\").    Weight as of an earlier encounter on 1/26/24: 82.3 kg (181 lb 8 oz).   Last 3 BP:   BP Readings from Last 3 Encounters:   01/26/24 112/72   01/19/24 130/78   12/08/23 (!) 161/82       History   Smoking Status    Former    Packs/day: 0.50    Years: 6.00    Types: Cigarettes    Quit date: 3/5/2021   Smokeless Tobacco    Never       Labs:  Lab Results   Component Value Date    A1C 8.0 09/15/2023    A1C 10.1 03/05/2021     Lab Results   Component Value Date     01/19/2024     12/15/2022    GLC " "207 06/24/2021     Lab Results   Component Value Date    LDL 27 11/01/2022     03/06/2021     HDL Cholesterol   Date Value Ref Range Status   03/06/2021 43 (L) >49 mg/dL Final     Direct Measure HDL   Date Value Ref Range Status   11/01/2022 51 >=50 mg/dL Final   ]  GFR Estimate   Date Value Ref Range Status   01/19/2024 62 >60 mL/min/1.73m2 Final   06/24/2021 >90 >60 mL/min/[1.73_m2] Final     Comment:     Non  GFR Calc  Starting 12/18/2018, serum creatinine based estimated GFR (eGFR) will be   calculated using the Chronic Kidney Disease Epidemiology Collaboration   (CKD-EPI) equation.       GFR Estimate If Black   Date Value Ref Range Status   06/24/2021 >90 >60 mL/min/[1.73_m2] Final     Comment:      GFR Calc  Starting 12/18/2018, serum creatinine based estimated GFR (eGFR) will be   calculated using the Chronic Kidney Disease Epidemiology Collaboration   (CKD-EPI) equation.       Lab Results   Component Value Date    CR 1.09 01/19/2024    CR 0.52 06/24/2021     No results found for: \"MICROALBUMIN\"    Healthy Eating:  Healthy Eating Assessed Today: Yes  Meal planning/habits: Avoiding sweets, Carb counting, Low carb, Smaller portions  Meals include: Lunch, Dinner  Breakfast: 5:30 12 ounce can of soda  Lunch: 12:30 to 1pm:  [place] lunch meat and rainey, with chips or veggie straws  Dinner: Roast with carrots and peas and potatoes  Snacks: no  Beverages: Soda, Tea  Has patient met with a dietitian in the past?: Yes    Being Active:  Being Active Assessed Today: Yes  Exercise:: Yes  Days per week of moderate to strenuous exercise (like a brisk walk): 2  On average, minutes per day of exercise at this level: 40  How intense was your typical exercise? : Moderate (like brisk walking)  Exercise Minutes per Week: 80  Barrier to exercise: None    Monitoring:  Monitoring Assessed Today: Yes  Did patient bring glucose meter to appointment? : No  Blood Glucose Meter:  (Contour " Ultra)  Times checking blood sugar at home (number): 3  Times checking blood sugar at home (per): Week    Taking Medications:  Diabetes Medication(s)       Biguanides       metFORMIN (GLUCOPHAGE XR) 500 MG 24 hr tablet Take 2 tablets (1,000 mg) by mouth 2 times daily (with meals)       Insulin       insulin aspart (NOVOLOG FLEXPEN) 100 UNIT/ML pen Novolog Flexpen. Inject 1 units per 10 gram carb unit before dinner, up to 15 units per meal.     insulin glargine 100 UNIT/ML pen Inject 42 Units Subcutaneous every morning            Taking Medication Assessed Today: Yes  Current Treatments: Insulin Injections, Oral Medication (taken by mouth)  Problems taking diabetes medications regularly?: No  Diabetes medication side effects?: No    Problem Solving:  Problem Solving Assessed Today: Yes  Is the patient at risk for hypoglycemia?: Yes  Hypoglycemia Frequency: Rarely  Hypoglycemia Treatment: Candy, Juice  Patient carries a carbohydrate source: Yes  Medical ID: No  Does patient have glucagon emergency kit?: No  Is the patient at risk for DKA?: No  Does patient have severe weather/disaster plan for diabetes management?: No  Does patient have sick day plan for diabetes management?: No    Reducing Risks:  Has dilated eye exam at least once a year?: Yes  Sees dentist every 6 months?: No  Feet checked by healthcare provider in the last year?: No    Healthy Coping:  Healthy Coping Assessed Today: Yes  Emotional response to diabetes: Acceptance  Informal Support system:: Family  Stage of change: PREPARATION (Decided to change - considering how)  Patient Activation Measure Survey Score:      11/15/2012     4:00 PM   MIA Score (Last Two)   MIA Raw Score 45   Activation Score 73.1   MIA Level 4         Care Plan and Education Provided:  Care Plan: Diabetes   Updates made by Kristina Medrano RD since 1/26/2024 12:00 AM        Problem: HbA1C Not In Goal         Goal: Establish Regular Follow-Ups with PCP         Task: Discuss with  PCP the recommended timing for patient's next follow up visit(s)    Responsible User: Kristina Medrano RD        Task: Discuss schedule for PCP visits with patient    Responsible User: Kristina Medrano RD        Goal: Get HbA1C Level in Goal         Task: Educate patient on diabetes education self-management topics    Responsible User: Kristina Medrano RD        Task: Educate patient on benefits of regular glucose monitoring    Responsible User: Kristina Medrano RD        Task: Refer patient to appropriate extended care team member, as needed (Medication Therapy Management, Behavioral Health, Physical Therapy, etc.)    Responsible User: Kristina Medrano RD        Task: Discuss diabetes treatment plan with patient    Responsible User: Kristina Medrano RD        Problem: Diabetes Self-Management Education Needed to Optimize Self-Care Behaviors         Goal: Understand diabetes pathophysiology and disease progression         Task: Provide education on diabetes pathophysiology and disease progression specfic to patient's diabetes type    Responsible User: Kristina eMdrano RD        Goal: Healthy Eating - follow a healthy eating pattern for diabetes         Task: Provide education on portion control and consistency in amount, composition and timing of food intake    Responsible User: Kristina Medrano RD        Task: Provide education on managing carbohydrate intake (carbohydrate counting, plate planning method, etc.) Completed 1/26/2024   Responsible User: Kristina Medrano RD        Task: Provide education on weight management    Responsible User: Kristina Medrano RD        Task: Provide education on heart healthy eating    Responsible User: Kristina Medrano RD        Task: Provide education on eating out    Responsible User: Kristina Medrano RD        Task: Develop individualized healthy eating plan with patient    Responsible User: Kristina Medrano RD        Goal: Being Active - get regular  physical activity, working up to at least 150 minutes per week         Task: Provide education on relationship of activity to glucose and precautions to take if at risk for low glucose    Responsible User: Kristina Medrano RD        Task: Discuss barriers to physical activity with patient    Responsible User: Kristina Medrano RD        Task: Develop physical activity plan with patient    Responsible User: Kristina Medrano RD        Task: Explore community resources including walking groups, assistance programs, and home videos    Responsible User: Kristina Medrano RD        Goal: Monitoring - monitor glucose and ketones as directed         Task: Provide education on blood glucose monitoring (purpose, proper technique, frequency, glucose targets, interpreting results, when to use glucose control solution, sharps disposal)    Responsible User: Kristina Medrano RD        Task: Provide education on continuous glucose monitoring (sensor placement, use of evan or /reader, understanding glucose trends, alerts and alarms, differences between sensor glucose and blood glucose) Completed 1/26/2024   Responsible User: Kristina Medrano RD        Task: Provide education on ketone monitoring (when to monitor, frequency, etc.)    Responsible User: Kristina Medrano RD        Goal: Taking Medication - patient is consistently taking medications as directed         Task: Provide education on action of prescribed medication, including when to take and possible side effects    Responsible User: Kristina Medrano RD        Task: Provide education on insulin and injectable diabetes medications, including administration, storage, site selection and rotation for injection sites    Responsible User: Kristina Medrano RD        Task: Discuss barriers to medication adherence with patient and provide management technique ideas as appropriate    Responsible User: Kristina Medrano RD        Task: Provide education on  frequency and refill details of medications    Responsible User: Kristina Medrano RD        Goal: Problem Solving - know how to prevent and manage short-term diabetes complications         Task: Provide education on high blood glucose - causes, signs/symptoms, prevention and treatment    Responsible User: Kristina Medrano RD        Task: Provide education on low blood glucose - causes, signs/symptoms, prevention, treatment, carrying a carbohydrate source at all times, and medical identification Completed 1/26/2024   Responsible User: Kristina Merdano RD        Task: Provide education on safe travel with diabetes    Responsible User: Kristina Medrano RD        Task: Provide education on how to care for diabetes on sick days    Responsible User: Kristina Medrano RD        Task: Provide education on when to call a health care provider    Responsible User: Kristina Medrano RD        Goal: Reducing Risks - know how to prevent and treat long-term diabetes complications         Task: Provide education on major complications of diabetes, prevention, early diagnostic measures and treatment of complications    Responsible User: Kristina Medrano RD        Task: Provide education on recommended care for dental, eye and foot health    Responsible User: Kristina Medrano RD        Task: Provide education on Hemoglobin A1c - goals and relationship to blood glucose levels    Responsible User: Kristina Medrano RD        Task: Provide education on recommendations for heart health - lipid levels and goals, blood pressure and goals, and aspirin therapy, if indicated    Responsible User: Kristina Medrano RD        Task: Provide education on tobacco cessation    Responsible User: Kristina Medrano RD        Goal: Healthy Coping - use available resources to cope with the challenges of managing diabetes         Task: Discuss recognizing feelings about having diabetes    Responsible User: Kristina Medrano RD         Task: Provide education on the benefits of making appropriate lifestyle changes    Responsible User: Kristina Medrano RD        Task: Provide education on benefits of utilizing support systems    Responsible User: Kristina Medrano RD        Task: Discuss methods for coping with stress    Responsible User: Kristina Medrano RD        Task: Provide education on when to seek professional counseling    Responsible User: Kristina Medrano RD Michelle DiDio, RD, LD, CDCES     Time Spent: 30 minutes  Encounter Type: Individual    Any diabetes medication dose changes were made via the CDE Protocol per the patient's referring provider. A copy of this encounter was shared with the provider.

## 2024-01-26 NOTE — LETTER
January 26, 2024      Stacy Brown  [address]   [place] [address]        To Whom It May Concern:    Stacy Brown  was seen on 1/26/2024.  Please excuse her  until 2/6/2024 due to illness.        Sincerely,        Estefanía Grullon PA-C

## 2024-01-26 NOTE — PATIENT INSTRUCTIONS
Hospital follow-up completed today.    Blood pressure in clinic is 112/72.  I am concerned that your blood pressure is too low and may contribute to dizziness.    Stop taking Amlodipine    Notify the clinic if you are consistently having blood pressure less than 120/80 or consistently more than 140/90.  Monitor for symptoms of lightheadedness and dizziness and follow-up sooner if needed.    Take blood pressure twice  a day, keep a long and follow-up in 1 week.  Bring your blood pressure cuff with you to next appointment to check for accuracy.    Drink more than 70 oz of water daily unless you are on a fluid restriction for medical reasons    Compression socks daily to help with blood pressure and orthostatic hypotension    Follow up with ophthalmology, referral placed    Follow up with neurology, referral placed    Diabetes:  Check blood sugar 4 times a day, write this down the log and follow-up in 1 week  Check your blood sugar fasting in the morning, prior to meals and use sliding scale insulin and carb ratio  Contact the clinic if your blood sugars greater than 300   Outplace a diabetic and referral in for additional diabetes management.  They are able to adjust insulin doses, help with CGM's, and other services.  Insulin glargine refill sent    Migraines:  You make continue children's Tylenol and ibuprofen as needed for migraines  Follow-up with neurology    Discussed stress at work and financial Difficulties.  I placed a referral to care coordination, they will call you to discuss resources and options    Nausea:  You may take Zofran as needed for nausea and vomiting    Follow-up with any new worsening or persistent symptoms    Go to the ER in case of emergency    See additional handout regarding migraine headache, reduced vision, dizziness, hypotension, and nausea and vomiting.    Contact the clinic with any questions or concerns      Patient understood and verbally consented to the treatment plan. Discussed  symptoms that would warrant an urgent or emergent visit. All of the patients' questions were answered. Patient was instructed to contact the clinic if questions or concerns arise. Recommend follow up appointments if symptoms worsen or fail to improve. Recommend follow up as needed. Recommend ER in the case of an emergency.    Estefanía Grullon PA-C    Please note: Voice recognition software may have been used in preparing this note, unintended word substitutions may be present.
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
## 2024-01-26 NOTE — PATIENT INSTRUCTIONS
Andriy Kumar,      Goals for Diabetes Care:    1. Eat 3 balanced meals each day - Monitor carb intake and aim for 45-60 grams per meal       Carbohydrate 1 choice = 15 grams     Aim to eat a balanced plate - half your plate fruits/veggies, 1/4 the plate protein and 1/4 plate starch (rice, potato, pasta)    2. Check blood sugars 3 times each day at varying times   Blood Glucose Targets:   1. Fasting and before meal target is 80 - 130 mg/dl   2. 2 hours after a meal target is < 180 mg/dl  Always remember to bring meter and/or log book to all appointments    3. Activity really helps improve blood sugars. Try to Incorporate 30 minutes activity into each day - does not need to be all at one time & walking counts!    4. Treating low BG. Rule of 15 = BG 50-70 mg/dL = 15 gram carb (4 oz juice, 4 glucose tabs, OR 4 oz pop), then recheck BG in 15 minutes. If still low repeat. (If BG <50 mg/dL = 8 oz pop/juice or 8 glucose tabs).    5. Take diabetes medications as prescribed   -Basaglar 42 units AM  -Novolog 1 unit for every 10 grams carbohydrate at meal (try to take 15 minutes prior to eating)  -Metformin  mg twice daily with meals    Follow up with your Diabetic Educator to assess BG targets and need for modifications to medications and/or lifestyle.    I ordered Nancy 3 sensors from your pharmacy:    Nancy  Instructions:     1.  The first hour after you place the sensor is the warm up period (black out period).  You will need to carry your blood glucose meter and check blood glucose during this time.     2.  Check blood sugar if feeling symptoms of hypoglycemia but meter is not displaying a low number- may be some variability between sensor and meter at times.     3.  Change sensor every 14 days.     4.  Read/scan blood sugars every 8 hours to ensure entirety of data is available and keep phone/reader near you.     Tip: Scan before each meal and at bedtime.     5.  Watch trend arrows- will let you know if blood sugars  are rising, falling or stable at the time you check.     6.  Nancy 3 have alarms. You can adjust both the high and low blood glucose alarm (when they will go off) or turn off high blood glucose alarm if alarming too much.     You cannot turn off the critical low blood glucose alarm     7.  You can shower, bathe, and swim with sensor on.     8.  Remove the sensor if you need to have an MRI or CT scan.     9.  Do not cover the sensor with extra adhesive (the small hole in the center of the sensor must remain uncovered), unless it is made for the Nancy.  If you have trouble with sensor falling off, these are approved products to help with sensor adhesion: [employer] Skin Tac, SKIN-PREP Protective Barrier Wipe and Mastisol Liquid Adhesive     10.  Check the dose if you take a multivitamin or Vitamin C (ascorbic acid). You want to limit daily intake of ascorbic acid to 500 mg/day or less, or it may falsely raise sensor glucose readings. This is more of a concern if you are on insulin or medication that can cause low blood glucose as you may miss a severe low glucose event.     Support:   If you have any trouble with use or if the sensor falls off early, call the customer service number for Nancy located on the sensor's box. They can often help troubleshoot or replace sensor if needed.     Nancy Customer Service: [phone]   https://[email or web address]/us-en/support/sensor-support-form-questions.html. Keep your Nancy sensor box with lot and/or serial numbers.    Discount Programs are available through [employer]:     [employer] Program (https://www."[employer]/us-en/myfreestyle.html)   To save on sensors, if told cost is more than $75: call [employer] line at [phone]      Interactive Tool on how place sensor:    [email or web address]%[email or web address].[email or web address]    Call with any questions.  Thank you!  Kristina Stover, RD, LD, CDCES    Certified Diabetes Care &   [facility] [phone] or Scheduling [phone]

## 2024-01-26 NOTE — LETTER
1/26/2024         RE: Stacy Brown  [address]  [facility] [address]        Dear Colleague,    Thank you for referring your patient, Stacy Brown, to the [facility]. Please see a copy of my visit note below.    Diabetes Self-Management Education & Support    Presents for: Individual review    Type of service:  Video Visit    If the video visit is dropped, the video visit invitation should be resent by: Text to cell phone: [phone]    Originating Location (pt. Location): Home  Distant Location (provider location): Offsite  Mode of Communication:  Video Conference via [employer]    Video Start Time:  3:07 PM  Video End Time (time video stopped): 3:38 PM    How would patient like to obtain AVS? MyChart      ASSESSMENT:  Zully met with her PCP today and was encouraged to make appointment with CDCES.  She was unclear about the reasoning for the meeting, during assessment found that she is not currently monitoring her blood glucose and states she stopped a few weeks ago.  She is taking her diabetes medications, Novolog is prescribed at an insulin to carbohydrate ratio of 1:10, based on our discussion she was not using this appropriately.  She would benefit from using a CGM, order pended for PCP today.  We will meet again in 3 weeks to review of the data and if needed adjust medications.      Patient's most recent   Lab Results   Component Value Date    A1C 8.0 09/15/2023    A1C 10.1 03/05/2021     is not meeting goal of <7.0    Diabetes knowledge and skills assessment:   Patient is knowledgeable in diabetes management concepts related to: Being Active, Monitoring, Taking Medication, and Healthy Coping    Continue education with the following diabetes management concepts: Healthy Eating, Problem Solving, and Reducing Risks    Based on learning assessment above, most appropriate setting for further diabetes education would be: Individual setting.      PLAN    1) Count  "carbohydrate for soda in the morning and dose Novolog accordingly  2) Look at food labels to get correct amount of carbohydrate in foods and Myplate handout to get carbohydrate amounts from foods without labels.  3) Call CDCES if unable to get the CGM     Topics to cover at upcoming visits: Healthy Eating, Problem Solving, and Reducing Risks    Follow-up: 2/16/24    See Care Plan for co-developed, patient-state behavior change goals.  AVS provided for patient today.    Education Materials Provided:  Carbohydrate Counting and My Plate Planner      SUBJECTIVE/OBJECTIVE:  Presents for: Individual review  Accompanied by: Self  Diabetes education in the past 24mo: No  Diabetes type: Type 2  Disease course: Stable  How confident are you filling out medical forms by yourself:: Not Assessed  Transportation concerns: No  Difficulty affording diabetes medication?: No  Difficulty affording diabetes testing supplies?: No  Other concerns:: None  Cultural Influences/Ethnic Background:  Not  or     Diabetes Symptoms & Complications:  Diabetes Related Symptoms: None  Weight trend: Stable  Symptom course: Worsening  Disease course: Stable  Complications assessed today?: Yes  Autonomic neuropathy: No  CVA: No  Heart disease: Yes  Nephropathy: No  Peripheral neuropathy: No  Peripheral Vascular Disease: No  Retinopathy: No  Sexual dysfunction: No    Patient Problem List and Family Medical History reviewed for relevant medical history, current medical status, and diabetes risk factors.    Vitals:  LMP 03/07/2021 (Approximate)   Estimated body mass index is 28.83 kg/m  as calculated from the following:    Height as of an earlier encounter on 1/26/24: 1.69 m (5' 6.54\").    Weight as of an earlier encounter on 1/26/24: 82.3 kg (181 lb 8 oz).   Last 3 BP:   BP Readings from Last 3 Encounters:   01/26/24 112/72   01/19/24 130/78   12/08/23 (!) 161/82       History   Smoking Status     Former     Packs/day: 0.50     Years: 6.00 " "    Types: Cigarettes     Quit date: 3/5/2021   Smokeless Tobacco     Never       Labs:  Lab Results   Component Value Date    A1C 8.0 09/15/2023    A1C 10.1 03/05/2021     Lab Results   Component Value Date     01/19/2024     12/15/2022     06/24/2021     Lab Results   Component Value Date    LDL 27 11/01/2022     03/06/2021     HDL Cholesterol   Date Value Ref Range Status   03/06/2021 43 (L) >49 mg/dL Final     Direct Measure HDL   Date Value Ref Range Status   11/01/2022 51 >=50 mg/dL Final   ]  GFR Estimate   Date Value Ref Range Status   01/19/2024 62 >60 mL/min/1.73m2 Final   06/24/2021 >90 >60 mL/min/[1.73_m2] Final     Comment:     Non  GFR Calc  Starting 12/18/2018, serum creatinine based estimated GFR (eGFR) will be   calculated using the Chronic Kidney Disease Epidemiology Collaboration   (CKD-EPI) equation.       GFR Estimate If Black   Date Value Ref Range Status   06/24/2021 >90 >60 mL/min/[1.73_m2] Final     Comment:      GFR Calc  Starting 12/18/2018, serum creatinine based estimated GFR (eGFR) will be   calculated using the Chronic Kidney Disease Epidemiology Collaboration   (CKD-EPI) equation.       Lab Results   Component Value Date    CR 1.09 01/19/2024    CR 0.52 06/24/2021     No results found for: \"MICROALBUMIN\"    Healthy Eating:  Healthy Eating Assessed Today: Yes  Meal planning/habits: Avoiding sweets, Carb counting, Low carb, Smaller portions  Meals include: Lunch, Dinner  Breakfast: 5:30 12 ounce can of soda  Lunch: 12:30 to 1pm:  [place] lunch meat and rainey, with chips or veggie straws  Dinner: Roast with carrots and peas and potatoes  Snacks: no  Beverages: Soda, Tea  Has patient met with a dietitian in the past?: Yes    Being Active:  Being Active Assessed Today: Yes  Exercise:: Yes  Days per week of moderate to strenuous exercise (like a brisk walk): 2  On average, minutes per day of exercise at this level: 40  How intense " was your typical exercise? : Moderate (like brisk walking)  Exercise Minutes per Week: 80  Barrier to exercise: None    Monitoring:  Monitoring Assessed Today: Yes  Did patient bring glucose meter to appointment? : No  Blood Glucose Meter:  (Contour Ultra)  Times checking blood sugar at home (number): 3  Times checking blood sugar at home (per): Week    Taking Medications:  Diabetes Medication(s)       Biguanides       metFORMIN (GLUCOPHAGE XR) 500 MG 24 hr tablet Take 2 tablets (1,000 mg) by mouth 2 times daily (with meals)       Insulin       insulin aspart (NOVOLOG FLEXPEN) 100 UNIT/ML pen Novolog Flexpen. Inject 1 units per 10 gram carb unit before dinner, up to 15 units per meal.     insulin glargine 100 UNIT/ML pen Inject 42 Units Subcutaneous every morning            Taking Medication Assessed Today: Yes  Current Treatments: Insulin Injections, Oral Medication (taken by mouth)  Problems taking diabetes medications regularly?: No  Diabetes medication side effects?: No    Problem Solving:  Problem Solving Assessed Today: Yes  Is the patient at risk for hypoglycemia?: Yes  Hypoglycemia Frequency: Rarely  Hypoglycemia Treatment: Candy, Juice  Patient carries a carbohydrate source: Yes  Medical ID: No  Does patient have glucagon emergency kit?: No  Is the patient at risk for DKA?: No  Does patient have severe weather/disaster plan for diabetes management?: No  Does patient have sick day plan for diabetes management?: No    Reducing Risks:  Has dilated eye exam at least once a year?: Yes  Sees dentist every 6 months?: No  Feet checked by healthcare provider in the last year?: No    Healthy Coping:  Healthy Coping Assessed Today: Yes  Emotional response to diabetes: Acceptance  Informal Support system:: Family  Stage of change: PREPARATION (Decided to change - considering how)  Patient Activation Measure Survey Score:      11/15/2012     4:00 PM   MIA Score (Last Two)   MIA Raw Score 45   Activation Score 73.1   MIA  Level 4         Care Plan and Education Provided:  Care Plan: Diabetes   Updates made by Kristina Medrano RD since 1/26/2024 12:00 AM        Problem: HbA1C Not In Goal         Goal: Establish Regular Follow-Ups with PCP         Task: Discuss with PCP the recommended timing for patient's next follow up visit(s)    Responsible User: Kristina Medrano RD        Task: Discuss schedule for PCP visits with patient    Responsible User: Kristina Medrano RD        Goal: Get HbA1C Level in Goal         Task: Educate patient on diabetes education self-management topics    Responsible User: Kristina Medrano RD        Task: Educate patient on benefits of regular glucose monitoring    Responsible User: Kristina Medrano RD        Task: Refer patient to appropriate extended care team member, as needed (Medication Therapy Management, Behavioral Health, Physical Therapy, etc.)    Responsible User: Kristina Medrano RD        Task: Discuss diabetes treatment plan with patient    Responsible User: Kristina Medrano RD        Problem: Diabetes Self-Management Education Needed to Optimize Self-Care Behaviors         Goal: Understand diabetes pathophysiology and disease progression         Task: Provide education on diabetes pathophysiology and disease progression specfic to patient's diabetes type    Responsible User: Kristina Medrano RD        Goal: Healthy Eating - follow a healthy eating pattern for diabetes         Task: Provide education on portion control and consistency in amount, composition and timing of food intake    Responsible User: Kristina Medrano RD        Task: Provide education on managing carbohydrate intake (carbohydrate counting, plate planning method, etc.) Completed 1/26/2024   Responsible User: Kristina Medrano RD        Task: Provide education on weight management    Responsible User: Kristina Medrano RD        Task: Provide education on heart healthy eating    Responsible User: Kristina Medrano  GILBERT KEITA        Task: Provide education on eating out    Responsible User: Kristina Medrano RD        Task: Develop individualized healthy eating plan with patient    Responsible User: Kristina Medrano RD        Goal: Being Active - get regular physical activity, working up to at least 150 minutes per week         Task: Provide education on relationship of activity to glucose and precautions to take if at risk for low glucose    Responsible User: Kristina Medrano RD        Task: Discuss barriers to physical activity with patient    Responsible User: Kristina Medrano RD        Task: Develop physical activity plan with patient    Responsible User: Kristina Medrano RD        Task: Explore community resources including walking groups, assistance programs, and home videos    Responsible User: Kristina Medrano RD        Goal: Monitoring - monitor glucose and ketones as directed         Task: Provide education on blood glucose monitoring (purpose, proper technique, frequency, glucose targets, interpreting results, when to use glucose control solution, sharps disposal)    Responsible User: Kristina Medrano RD        Task: Provide education on continuous glucose monitoring (sensor placement, use of evan or /reader, understanding glucose trends, alerts and alarms, differences between sensor glucose and blood glucose) Completed 1/26/2024   Responsible User: Kristina Medrano RD        Task: Provide education on ketone monitoring (when to monitor, frequency, etc.)    Responsible User: Kristina Medrano RD        Goal: Taking Medication - patient is consistently taking medications as directed         Task: Provide education on action of prescribed medication, including when to take and possible side effects    Responsible User: Kristina Medrano RD        Task: Provide education on insulin and injectable diabetes medications, including administration, storage, site selection and rotation for injection sites     Responsible User: Kristina Medrano RD        Task: Discuss barriers to medication adherence with patient and provide management technique ideas as appropriate    Responsible User: Kristina Medrano RD        Task: Provide education on frequency and refill details of medications    Responsible User: Kristina Medrano RD        Goal: Problem Solving - know how to prevent and manage short-term diabetes complications         Task: Provide education on high blood glucose - causes, signs/symptoms, prevention and treatment    Responsible User: Kristina Medrano RD        Task: Provide education on low blood glucose - causes, signs/symptoms, prevention, treatment, carrying a carbohydrate source at all times, and medical identification Completed 1/26/2024   Responsible User: Kristina Medrano RD        Task: Provide education on safe travel with diabetes    Responsible User: Kristina Medrano RD        Task: Provide education on how to care for diabetes on sick days    Responsible User: Kristina Medrano RD        Task: Provide education on when to call a health care provider    Responsible User: Kristina Medrano RD        Goal: Reducing Risks - know how to prevent and treat long-term diabetes complications         Task: Provide education on major complications of diabetes, prevention, early diagnostic measures and treatment of complications    Responsible User: Kristina Medrano RD        Task: Provide education on recommended care for dental, eye and foot health    Responsible User: Kristina Medrano RD        Task: Provide education on Hemoglobin A1c - goals and relationship to blood glucose levels    Responsible User: Kristina Medrano RD        Task: Provide education on recommendations for heart health - lipid levels and goals, blood pressure and goals, and aspirin therapy, if indicated    Responsible User: Kristina Medrano RD        Task: Provide education on tobacco cessation    Responsible User:  Kristina Medrano RD        Goal: Healthy Coping - use available resources to cope with the challenges of managing diabetes         Task: Discuss recognizing feelings about having diabetes    Responsible User: Kristina Medrano RD        Task: Provide education on the benefits of making appropriate lifestyle changes    Responsible User: Kristina Medrano RD        Task: Provide education on benefits of utilizing support systems    Responsible User: Kristina Medrano RD        Task: Discuss methods for coping with stress    Responsible User: Kristina Medrano RD        Task: Provide education on when to seek professional counseling    Responsible User: Kristina Medrano RD Michelle DiDio, RD, LD, [facility]     Time Spent: 30 minutes  Encounter Type: Individual    Any diabetes medication dose changes were made via the CDE Protocol per the patient's referring provider. A copy of this encounter was shared with the provider.

## 2024-01-28 ENCOUNTER — HEALTH MAINTENANCE LETTER (OUTPATIENT)
Age: 49
End: 2024-01-28

## 2024-01-29 ENCOUNTER — MYC MEDICAL ADVICE (OUTPATIENT)
Dept: FAMILY MEDICINE | Facility: CLINIC | Age: 49
End: 2024-01-29
Payer: COMMERCIAL

## 2024-01-29 ENCOUNTER — MYC REFILL (OUTPATIENT)
Dept: FAMILY MEDICINE | Facility: CLINIC | Age: 49
End: 2024-01-29
Payer: COMMERCIAL

## 2024-01-29 ENCOUNTER — PATIENT OUTREACH (OUTPATIENT)
Dept: CARE COORDINATION | Facility: CLINIC | Age: 49
End: 2024-01-29
Payer: COMMERCIAL

## 2024-01-29 DIAGNOSIS — Z71.89 OTHER SPECIFIED COUNSELING: Primary | Chronic | ICD-10-CM

## 2024-01-29 DIAGNOSIS — S82.891A CLOSED FRACTURE OF RIGHT ANKLE, INITIAL ENCOUNTER: ICD-10-CM

## 2024-01-29 NOTE — PROGRESS NOTES
Clinic Care Coordination Contact  Community Health Worker Initial Outreach    CHW Initial Information Gathering:  Referral Source: Care Team  Preferred Hospital: [facility]  [phone]  Preferred Urgent Care: [facility], [phone]  Current living arrangement:: I live in a private home with family ( and 17 year old son)  Type of residence:: Apartment  Community Resources: None  Supplies Currently Used at Home: None  Equipment Currently Used at Home: none  Informal Support system:: Spouse, Parent  No PCP office visit in Past Year: No  Transportation means:: Family, Regular car       Patient accepts CC: Yes. Patient scheduled for assessment with the SW on 1/30/24 at 2:00 pm. Patient noted desire to discuss Financial, out of work due to a complex migraine that lost vision for 24 hours. Can't get FMLA due to back in March 2023, the patient has not worked enough hours to receive FMLA again. Injured ankle back in 12/2/23. Out of work until 2/6/24 when the patient sees the doctor again. A lot of medical bills coming in.Medical Bills are also [facility] in [place]. The  was taken into the hospital and there will be a medical bill coming from his stay also. The CHW did also place an order for the FRW..     ANDRESSA Ortega  [phone]  [facility]cted [facility] Center  [facility]

## 2024-01-30 ENCOUNTER — PATIENT OUTREACH (OUTPATIENT)
Dept: CARE COORDINATION | Facility: CLINIC | Age: 49
End: 2024-01-30

## 2024-01-30 RX ORDER — OXYCODONE HYDROCHLORIDE 5 MG/1
5 TABLET ORAL DAILY PRN
Qty: 30 TABLET | Refills: 0 | Status: SHIPPED | OUTPATIENT
Start: 2024-02-08 | End: 2024-02-29

## 2024-01-30 NOTE — LETTER
[facility] CARE COORDINATION - [facility]  [address] [place] [address]  Clinic: [phone]      January 31, 2024    Stacy Brown  [address] [place] [address]      Dear Stacy,    I am a clinic care coordinator who works with Yaima Muse MD with the [facility]. I have been trying to reach you recently to introduce Clinic Care Coordination. Below is a description of clinic care coordination and how I can further assist you.       The clinic care coordination team is made up of a registered nurse, , financial resource worker and community health worker who understand the health care system. The goal of clinic care coordination is to help you manage your health and improve access to the health care system. Our team works alongside your provider to assist you in determining your health and social needs. We can help you obtain health care and community resources, providing you with necessary information and education. We can work with you through any barriers and develop a care plan that helps coordinate and strengthen the communication between you and your care team.  Our services are voluntary and are offered without charge to you personally.    Please feel free to contact me with any questions or concerns regarding care coordination and what we can offer.  You may also call ANDERSSA Silvestre [phone] to reschedule the missed initial screening      We are focused on providing you with the highest-quality healthcare experience possible.    Sincerely,     DIAZ Valente   [facility] - Care Coordination  [facility]   [phone]

## 2024-01-30 NOTE — PROGRESS NOTES
Clinic Care Coordination Contact  [facility]/Voicemail    Clinical Data: Care Coordinator Outreach    Outreach Documentation Number of Outreach Attempt   1/30/2024   2:19 PM 1       Left message on patient's voicemail with call back information and requested return call.    Plan: Care Coordinator will try to reach patient again in 1-2 business days.    DIAZ Valente  [facility] - Care Coordinator   1/30/2024   2:20 PM  [phone]

## 2024-01-30 NOTE — TELEPHONE ENCOUNTER
I do not recommend driving until your symptoms have completely resolved.  Recommend scheduling follow-up appointment to discuss medical clearance for driving.

## 2024-01-31 NOTE — PROGRESS NOTES
Clinic Care Coordination Contact  [facility]/Voicemail    Clinical Data: Care Coordinator Outreach    Outreach Documentation Number of Outreach Attempt   1/30/2024   2:19 PM 1   1/31/2024  10:43 AM 2       Left message on patient's voicemail with call back information and requested return call.    Plan: Care Coordinator will send care coordination introduction letter with care coordinator contact information and explanation of care coordination services via treadalonghart. Care Coordinator will do no further outreaches at this time.    DIAZ Valente  [facility] - Care Coordinator   1/31/2024   10:44 AM  [phone]

## 2024-02-05 ENCOUNTER — PATIENT OUTREACH (OUTPATIENT)
Dept: CARE COORDINATION | Facility: CLINIC | Age: 49
End: 2024-02-05
Payer: COMMERCIAL

## 2024-02-06 ENCOUNTER — VIRTUAL VISIT (OUTPATIENT)
Dept: FAMILY MEDICINE | Facility: CLINIC | Age: 49
End: 2024-02-06
Payer: COMMERCIAL

## 2024-02-06 DIAGNOSIS — M54.9 MECHANICAL BACK PAIN: ICD-10-CM

## 2024-02-06 DIAGNOSIS — R90.89 ABNORMAL FINDING ON MRI OF BRAIN: ICD-10-CM

## 2024-02-06 DIAGNOSIS — H54.7 VISION LOSS: ICD-10-CM

## 2024-02-06 DIAGNOSIS — S82.891G CLOSED FRACTURE OF RIGHT ANKLE WITH DELAYED HEALING, SUBSEQUENT ENCOUNTER: ICD-10-CM

## 2024-02-06 DIAGNOSIS — F33.1 MAJOR DEPRESSIVE DISORDER, RECURRENT EPISODE, MODERATE (H): ICD-10-CM

## 2024-02-06 DIAGNOSIS — Z79.4 TYPE 2 DIABETES MELLITUS WITH HYPERGLYCEMIA, WITH LONG-TERM CURRENT USE OF INSULIN (H): ICD-10-CM

## 2024-02-06 DIAGNOSIS — E11.65 TYPE 2 DIABETES MELLITUS WITH HYPERGLYCEMIA, WITH LONG-TERM CURRENT USE OF INSULIN (H): ICD-10-CM

## 2024-02-06 DIAGNOSIS — I25.10 CORONARY ARTERY DISEASE INVOLVING NATIVE CORONARY ARTERY OF NATIVE HEART WITHOUT ANGINA PECTORIS: ICD-10-CM

## 2024-02-06 DIAGNOSIS — G43.B0 OPHTHALMOPLEGIC MIGRAINE, NOT INTRACTABLE: Primary | ICD-10-CM

## 2024-02-06 PROCEDURE — 99215 OFFICE O/P EST HI 40 MIN: CPT | Mod: 95 | Performed by: FAMILY MEDICINE

## 2024-02-06 NOTE — PROGRESS NOTES
"    Instructions Relayed to Patient by Virtual Roomer:     Patient is active on [employer]:   Relayed following to patient: \"It looks like you are active on [employer], are you able to join the visit this way? If not, do you need us to send you a link now or would you like your provider to send a link via text or email when they are ready to initiate the visit?\"    Reminded patient to ensure they were logged on to virtual visit by arrival time listed. Documented in appointment notes if patient had flexibility to initiate visit sooner than arrival time. If pediatric virtual visit, ensured pediatric patient along with parent/guardian will be present for video visit.     Patient offered the website [email or web address] and/or phone number to [employer] Help line: [phone]    Stacy is a 48 year old who is being evaluated via a billable video visit.      How would you like to obtain your AVS? Ticies  If the video visit is dropped, the invitation should be resent by: Text to cell phone: [phone]  Will anyone else be joining your video visit? No      Assessment & Plan     ASSESSMENT/ORDERS:    ICD-10-CM    1. Ophthalmoplegic migraine, not intractable  G43.B0       2. Type 2 diabetes mellitus with hyperglycemia, with long-term current use of insulin (H)  E11.65     Z79.4       3. Vision loss  H54.7       4. Major depressive disorder, recurrent episode, moderate (H)  F33.1       5. Coronary artery disease involving native coronary artery of native heart without angina pectoris  I25.10       6. Closed fracture of right ankle with delayed healing, subsequent encounter  S82.192P         PLAN:   Patient cleared to return to work, restrictions for work updated, see letter  Follow-up with neurology for further evaluation, can reassess if repeat episode occurs in meantime.  Depression, coronary artery disease, and ankle fracture stable and reviewed.  Continue to follow-up with diabetic ed for continued blood sugar " management.  Did follow-up with radiology on MRI follow-up recommendations.  Initially called our radiologist to discuss, referred to reading radiologist.  Discussed with him, he recommended repeat in 6-12 months with and without contrast with tumor protocol to best evaluate lesion, but like incidental finding related to history of diabetes and coronary artery disease.  Long term ophthalmology plan for patient recommended, appointment number for [facility] given.      45 minutes spent by me on the date of the encounter doing chart review, review of outside records, review of test results, interpretation of tests, patient visit, documentation, and discussion with other provider(s)     MED REC REQUIRED  Post Medication Reconciliation Status: discharge medications reconciled and changed, per note/orders        Subjective   Stacy is a 48 year old, presenting for the following health issues:  Letter Request and Hospital F/U        2/6/2024     7:59 AM   Additional Questions   Roomed by Raymundo PRATT   Accompanied by Self     History of Present Illness       Reason for visit:  Follow up from emergency room and follow-up from visit with Dr Muse    She eats 2-3 servings of fruits and vegetables daily.She consumes 1 sweetened beverage(s) daily.She exercises with enough effort to increase her heart rate 10 to 19 minutes per day.  She exercises with enough effort to increase her heart rate 4 days per week.   She is taking medications regularly.       Hospital Follow-up Visit:    Hospital/Nursing Home/IP Rehab Facility:  Started at [facility] and then transferred to [facility]  Date of Admission: 01/19/2024  Date of Discharge: 01/20/2024  Reason(s) for Admission: Vision change    Was your hospitalization related to COVID-19? No   Problems taking medications regularly:  None  Medication changes since discharge: None  Problems adhering to non-medication therapy:  None    Summary of hospitalization:  Discharge  summary unavailable  Diagnostic Tests/Treatments reviewed.  Follow up needed: neurology appointment to be scheduled.  Other Healthcare Providers Involved in Patient s Care:         Specialist appointment - TBD with neurology  Update since discharge: improved.       Plan of care communicated with patient               Had a sudden vision loss, curtain pulling down over her visual fields.      No motor function loss, no loss of consciousness.    Was at [facility], had imaging done, went to [facility] for emergency eye exam and was placed on neurology floor for observation.  Retinas were okay.  Diagnosed with complex migraine.    Had normal MRI with an incidental finding with recommended interval follow-up MRI with and without contrast.      Normal CTA head/neck and CT.    Blood sugar range from 60-200s.  Is using a continuous glucose monitor since hospital discharge.      Continues to have stress at work related to her heart disease, ankle issues, depression and diabetes.  Her primary care provider had a hours limit at work placed on patient for this reason.  However, this is not worded in a way she can work and needs her letter to be listed in maximum days, not hours so she can perform her duties.  The limit is the same for her as previously noted from her primary care provider.    No repeat vision loss episodes since the last one occurred.      Is calling neurology back today for consult appointment.        Objective           Vitals:  No vitals were obtained today due to virtual visit.    Physical Exam   GENERAL: alert and no distress  EYES: Eyes grossly normal to inspection.  No discharge or erythema, or obvious scleral/conjunctival abnormalities.  RESP: No audible wheeze, cough, or visible cyanosis.    SKIN: Visible skin clear. No significant rash, abnormal pigmentation or lesions.  NEURO: Cranial nerves grossly intact.  Mentation and speech appropriate for age.  PSYCH: Appropriate affect, tone, and pace of  words          Video-Visit Details    Type of service:  Video Visit     Originating Location (pt. Location): Home    Distant Location (provider location):  On-site  Platform used for Video Visit: Boom  Signed Electronically by: Carlos Stoner MD

## 2024-02-06 NOTE — LETTER
February 6, 2024       Stacy Brown  [address]   [place] [address]        To Whom It May Concern:    Stacy Brown was evaluated by me today. For medical reasons, she is restricted to the following:   She may work 4 days per week. She may not work any overtime.    She will be re-evaluated at a time no later than June 12, 2024 for reassessment on work restrictions.        Sincerely,          Carlos Stoner MD

## 2024-02-08 NOTE — TELEPHONE ENCOUNTER
Pt lost med so early fill  Keiko Ray, Pharmacy [facility], [facility] [place] [phone]

## 2024-02-12 ENCOUNTER — PATIENT OUTREACH (OUTPATIENT)
Dept: CARE COORDINATION | Facility: CLINIC | Age: 49
End: 2024-02-12
Payer: COMMERCIAL

## 2024-02-12 ENCOUNTER — PATIENT OUTREACH (OUTPATIENT)
Dept: CARE COORDINATION | Facility: CLINIC | Age: 49
End: 2024-02-12

## 2024-02-26 ENCOUNTER — TELEPHONE (OUTPATIENT)
Dept: FAMILY MEDICINE | Facility: CLINIC | Age: 49
End: 2024-02-26
Payer: COMMERCIAL

## 2024-02-26 NOTE — TELEPHONE ENCOUNTER
Forms/Letter Request    Type of form/letter: OTHER: workability       Do we have the form/letter: Yes    Who is the form from? [employer] occupational [employer] (if other please explain)    Where did/will the form come from? form was mailed in    When is form/letter needed by: N/A    How would you like the form/letter returned:     Patient Notified form requests are processed in 5-7 business days:Yes    Could we send this information to you in [employer] or would you prefer to receive a phone call?:   Patient would like to be contacted via [employer]

## 2024-02-29 ENCOUNTER — MYC REFILL (OUTPATIENT)
Dept: FAMILY MEDICINE | Facility: CLINIC | Age: 49
End: 2024-02-29
Payer: COMMERCIAL

## 2024-02-29 DIAGNOSIS — S82.891A CLOSED FRACTURE OF RIGHT ANKLE, INITIAL ENCOUNTER: ICD-10-CM

## 2024-03-01 RX ORDER — OXYCODONE HYDROCHLORIDE 5 MG/1
5 TABLET ORAL DAILY PRN
Qty: 30 TABLET | Refills: 0 | Status: SHIPPED | OUTPATIENT
Start: 2024-03-06 | End: 2024-03-31

## 2024-03-06 ENCOUNTER — MYC MEDICAL ADVICE (OUTPATIENT)
Dept: FAMILY MEDICINE | Facility: CLINIC | Age: 49
End: 2024-03-06
Payer: COMMERCIAL

## 2024-03-06 NOTE — LETTER
March 8, 2024       Stacy Brown  [address]   [place] [address]        To Whom It May Concern:    For medical reasons, Stacy Brown is restricted to the following:   She may work 4 days per week. She may not work any overtime.    She will be re-evaluated at a time no later than June 12, 2024 for reassessment on work restrictions.        Sincerely,          Carlos Stoner MD

## 2024-03-06 NOTE — TELEPHONE ENCOUNTER
See [employer] message. No follow up appt scheduled currently. Are we able to do this for patient?

## 2024-03-08 ENCOUNTER — PATIENT OUTREACH (OUTPATIENT)
Dept: CARE COORDINATION | Facility: CLINIC | Age: 49
End: 2024-03-08
Payer: COMMERCIAL

## 2024-03-08 NOTE — TELEPHONE ENCOUNTER
Updated note created.  She needs a follow-up appointment with Dr. Muse    Will have staff notify patient.    Carlos Stoner MD

## 2024-03-20 DIAGNOSIS — Z98.890 STATUS POST SURGERY: Primary | ICD-10-CM

## 2024-03-26 ENCOUNTER — OFFICE VISIT (OUTPATIENT)
Dept: ORTHOPEDICS | Facility: CLINIC | Age: 49
End: 2024-03-26
Payer: OTHER MISCELLANEOUS

## 2024-03-26 ENCOUNTER — ANCILLARY PROCEDURE (OUTPATIENT)
Dept: GENERAL RADIOLOGY | Facility: CLINIC | Age: 49
End: 2024-03-26
Attending: ORTHOPAEDIC SURGERY
Payer: OTHER MISCELLANEOUS

## 2024-03-26 DIAGNOSIS — M19.90 ARTHRITIS: ICD-10-CM

## 2024-03-26 DIAGNOSIS — M25.571 PAIN IN JOINT, ANKLE AND FOOT, RIGHT: Primary | ICD-10-CM

## 2024-03-26 DIAGNOSIS — Z98.890 STATUS POST SURGERY: ICD-10-CM

## 2024-03-26 PROCEDURE — 99213 OFFICE O/P EST LOW 20 MIN: CPT | Performed by: ORTHOPAEDIC SURGERY

## 2024-03-26 PROCEDURE — 73610 X-RAY EXAM OF ANKLE: CPT | Mod: RT | Performed by: RADIOLOGY

## 2024-03-26 NOTE — LETTER
3/26/2024         RE: Stacy Brown  [address]  [place] [address]        Dear Colleague,    Thank you for referring your patient, Stacy Brown, to the [facility] [place]. Please see a copy of my visit note below.    Chief complaint: Status post right distal tibia open reduction internal fixation performed on April 5, 2023    Patient presents today for further follow-up.  Reports to continue having pain and difficulties with the ankle.  She is very clear about the fact that the pain is activity related.  Patient reports to have pain that seems to be piercing from the lateral malleolus into the ankle joint tip.  She also reports to have a 4-month of a stiffness.  Denies to have any improvement.    Her last ankle injection was in December 2023 and she reports that it helped for a while.    On today's visit she presents with probably a 25 degree of combined plantar and dorsiflexion.  Otherwise exam is fairly unremarkable.  There is no swelling.  There is no signs of infection or inflammation.    Plain x-rays of the ankle were reviewed today which are significant for showing very congruent to distal tibia articular surface.  Hardware is intact and in place.  There is well preservation of the joint space.    Assessment: Status post right distal tibia fracture internal fixation.  Residual pain.    Plan: I discussed with the patient and her  that at this point we could repeat the injection something that she was in agreement.  A new order for right ankle injection was placed on her chart.  This will be performed under fluoroscopic examination with lidocaine and Kenalog for diagnosis of osteoarthritis    At the same time organ to proceed with CT scan of the ankle to have a better understanding of the amount of arthritis and the potential presence of any bone chips or bone spurs.    Patient will follow-up with a phone encounter once the CT scan is available for  review.    In the meantime she has no activity restrictions.  All questions were answered.    TT 20 minutes      Carlos Riley MD

## 2024-03-26 NOTE — PROGRESS NOTES
Chief complaint: Status post right distal tibia open reduction internal fixation performed on April 5, 2023    Patient presents today for further follow-up.  Reports to continue having pain and difficulties with the ankle.  She is very clear about the fact that the pain is activity related.  Patient reports to have pain that seems to be piercing from the lateral malleolus into the ankle joint tip.  She also reports to have a 4-month of a stiffness.  Denies to have any improvement.    Her last ankle injection was in December 2023 and she reports that it helped for a while.    On today's visit she presents with probably a 25 degree of combined plantar and dorsiflexion.  Otherwise exam is fairly unremarkable.  There is no swelling.  There is no signs of infection or inflammation.    Plain x-rays of the ankle were reviewed today which are significant for showing very congruent to distal tibia articular surface.  Hardware is intact and in place.  There is well preservation of the joint space.    Assessment: Status post right distal tibia fracture internal fixation.  Residual pain.    Plan: I discussed with the patient and her  that at this point we could repeat the injection something that she was in agreement.  A new order for right ankle injection was placed on her chart.  This will be performed under fluoroscopic examination with lidocaine and Kenalog for diagnosis of osteoarthritis    At the same time organ to proceed with CT scan of the ankle to have a better understanding of the amount of arthritis and the potential presence of any bone chips or bone spurs.    Patient will follow-up with a phone encounter once the CT scan is available for review.    In the meantime she has no activity restrictions.  All questions were answered.    TT 20 minutes

## 2024-03-26 NOTE — NURSING NOTE
Reason For Visit:   Chief Complaint   Patient presents with    RECHECK     Follow up right ankle. Status post right ankle open reduction internal fixation performed on April 5, 2023.       48 year old  1975    Primary MD: Yaima Muse      [facility] 03/07/2021 (Approximate)            Simeon Chaney ATC

## 2024-03-28 ENCOUNTER — MYC MEDICAL ADVICE (OUTPATIENT)
Dept: ORTHOPEDICS | Facility: CLINIC | Age: 49
End: 2024-03-28
Payer: COMMERCIAL

## 2024-03-28 ENCOUNTER — TELEPHONE (OUTPATIENT)
Dept: FAMILY MEDICINE | Facility: CLINIC | Age: 49
End: 2024-03-28

## 2024-03-28 ENCOUNTER — MYC MEDICAL ADVICE (OUTPATIENT)
Dept: FAMILY MEDICINE | Facility: CLINIC | Age: 49
End: 2024-03-28
Payer: COMMERCIAL

## 2024-03-28 NOTE — TELEPHONE ENCOUNTER
See telephone encounter that was made about this and routed to Dr. Muse this morning. Patient was sent message back waiting to hear from Dr. Almaz Ortiz MA

## 2024-03-28 NOTE — TELEPHONE ENCOUNTER
FYI - Status Update    Who is Calling: patient    Update: Patient's work stated patient need to be able to go back to work with no restriction starting tomorrow or she no longer has a job, patient doesn't feel she can do that. She would like Dr. Abrahamson call her back to discuss this. She would like a call by tomorrow.       Does caller want a call/response back: Yes     Could we send this information to you in [employer] or would you prefer to receive a phone call?:   Patient would prefer a phone call   Okay to leave a detailed message?: Yes at Cell number on file:    Telephone Information:   Mobile [phone]

## 2024-03-29 ENCOUNTER — APPOINTMENT (OUTPATIENT)
Dept: CARDIOLOGY | Facility: CLINIC | Age: 49
End: 2024-03-29
Attending: STUDENT IN AN ORGANIZED HEALTH CARE EDUCATION/TRAINING PROGRAM
Payer: COMMERCIAL

## 2024-03-29 ENCOUNTER — HOSPITAL ENCOUNTER (EMERGENCY)
Facility: CLINIC | Age: 49
Discharge: HOME | End: 2024-03-29
Attending: STUDENT IN AN ORGANIZED HEALTH CARE EDUCATION/TRAINING PROGRAM | Admitting: STUDENT IN AN ORGANIZED HEALTH CARE EDUCATION/TRAINING PROGRAM
Payer: COMMERCIAL

## 2024-03-29 ENCOUNTER — APPOINTMENT (OUTPATIENT)
Dept: CT IMAGING | Facility: CLINIC | Age: 49
End: 2024-03-29
Attending: STUDENT IN AN ORGANIZED HEALTH CARE EDUCATION/TRAINING PROGRAM
Payer: COMMERCIAL

## 2024-03-29 ENCOUNTER — HOSPITAL ENCOUNTER (OUTPATIENT)
Facility: CLINIC | Age: 49
Setting detail: OBSERVATION
Discharge: HOME | End: 2024-03-30
Attending: STUDENT IN AN ORGANIZED HEALTH CARE EDUCATION/TRAINING PROGRAM | Admitting: INTERNAL MEDICINE
Payer: COMMERCIAL

## 2024-03-29 VITALS
SYSTOLIC BLOOD PRESSURE: 114 MMHG | HEART RATE: 60 BPM | OXYGEN SATURATION: 100 % | RESPIRATION RATE: 11 BRPM | DIASTOLIC BLOOD PRESSURE: 61 MMHG

## 2024-03-29 DIAGNOSIS — I25.2 HISTORY OF NON-ST ELEVATION MYOCARDIAL INFARCTION (NSTEMI): ICD-10-CM

## 2024-03-29 DIAGNOSIS — Z79.4 TYPE 2 DIABETES MELLITUS WITH HYPERGLYCEMIA, WITH LONG-TERM CURRENT USE OF INSULIN (H): ICD-10-CM

## 2024-03-29 DIAGNOSIS — R07.9 CHEST PAIN, UNSPECIFIED TYPE: ICD-10-CM

## 2024-03-29 DIAGNOSIS — K92.0 HEMATEMESIS, UNSPECIFIED WHETHER NAUSEA PRESENT: Primary | ICD-10-CM

## 2024-03-29 DIAGNOSIS — K52.9 ACUTE COLITIS: ICD-10-CM

## 2024-03-29 DIAGNOSIS — Z95.1 S/P CABG (CORONARY ARTERY BYPASS GRAFT): ICD-10-CM

## 2024-03-29 DIAGNOSIS — R11.2 NAUSEA AND VOMITING, UNSPECIFIED VOMITING TYPE: ICD-10-CM

## 2024-03-29 DIAGNOSIS — K92.2 LOWER GI BLEED: ICD-10-CM

## 2024-03-29 DIAGNOSIS — E11.65 TYPE 2 DIABETES MELLITUS WITH HYPERGLYCEMIA, WITH LONG-TERM CURRENT USE OF INSULIN (H): ICD-10-CM

## 2024-03-29 DIAGNOSIS — R55 NEAR SYNCOPE: ICD-10-CM

## 2024-03-29 DIAGNOSIS — I95.9 TRANSIENT HYPOTENSION: ICD-10-CM

## 2024-03-29 PROBLEM — E16.2 HYPOGLYCEMIA: Status: ACTIVE | Noted: 2024-03-29

## 2024-03-29 PROBLEM — K50.90 REGIONAL ENTERITIS (H): Status: ACTIVE | Noted: 2024-03-29

## 2024-03-29 LAB
ABO/RH(D): NORMAL
ALBUMIN SERPL BCG-MCNC: 4.5 G/DL (ref 3.5–5.2)
ALBUMIN UR-MCNC: NEGATIVE MG/DL
ALP SERPL-CCNC: 105 U/L (ref 40–150)
ALT SERPL W P-5'-P-CCNC: 17 U/L (ref 0–50)
AMPHETAMINES UR QL SCN: ABNORMAL
ANION GAP SERPL CALCULATED.3IONS-SCNC: 12 MMOL/L (ref 7–15)
ANION GAP SERPL CALCULATED.3IONS-SCNC: 9 MMOL/L (ref 7–15)
ANTIBODY SCREEN: NEGATIVE
APPEARANCE UR: CLEAR
APTT PPP: 24 SECONDS (ref 22–38)
APTT PPP: 26 SECONDS (ref 22–38)
AST SERPL W P-5'-P-CCNC: 14 U/L (ref 0–45)
BACTERIA #/AREA URNS HPF: ABNORMAL /HPF
BARBITURATES UR QL SCN: ABNORMAL
BASOPHILS # BLD AUTO: 0 10E3/UL (ref 0–0.2)
BASOPHILS # BLD AUTO: 0 10E3/UL (ref 0–0.2)
BASOPHILS NFR BLD AUTO: 0 %
BASOPHILS NFR BLD AUTO: 0 %
BENZODIAZ UR QL SCN: ABNORMAL
BILIRUB SERPL-MCNC: 0.3 MG/DL
BILIRUB UR QL STRIP: NEGATIVE
BUN SERPL-MCNC: 18.3 MG/DL (ref 6–20)
BUN SERPL-MCNC: 22.3 MG/DL (ref 6–20)
BZE UR QL SCN: ABNORMAL
CALCIUM SERPL-MCNC: 9.6 MG/DL (ref 8.6–10)
CALCIUM SERPL-MCNC: 9.7 MG/DL (ref 8.6–10)
CANNABINOIDS UR QL SCN: ABNORMAL
CHLORIDE SERPL-SCNC: 102 MMOL/L (ref 98–107)
CHLORIDE SERPL-SCNC: 102 MMOL/L (ref 98–107)
COLOR UR AUTO: YELLOW
CREAT SERPL-MCNC: 0.78 MG/DL (ref 0.51–0.95)
CREAT SERPL-MCNC: 0.83 MG/DL (ref 0.51–0.95)
D DIMER PPP FEU-MCNC: 4.88 UG/ML FEU (ref 0–0.5)
DEPRECATED HCO3 PLAS-SCNC: 21 MMOL/L (ref 22–29)
DEPRECATED HCO3 PLAS-SCNC: 22 MMOL/L (ref 22–29)
EGFRCR SERPLBLD CKD-EPI 2021: 86 ML/MIN/1.73M2
EGFRCR SERPLBLD CKD-EPI 2021: >90 ML/MIN/1.73M2
EOSINOPHIL # BLD AUTO: 0.1 10E3/UL (ref 0–0.7)
EOSINOPHIL # BLD AUTO: 0.1 10E3/UL (ref 0–0.7)
EOSINOPHIL NFR BLD AUTO: 1 %
EOSINOPHIL NFR BLD AUTO: 2 %
ERYTHROCYTE [DISTWIDTH] IN BLOOD BY AUTOMATED COUNT: 13 % (ref 10–15)
ERYTHROCYTE [DISTWIDTH] IN BLOOD BY AUTOMATED COUNT: 13 % (ref 10–15)
ETHANOL SERPL-MCNC: <0.01 G/DL
ETHANOL SERPL-MCNC: <0.01 G/DL
FENTANYL UR QL: ABNORMAL
FLUAV RNA SPEC QL NAA+PROBE: NEGATIVE
FLUBV RNA RESP QL NAA+PROBE: NEGATIVE
GLUCOSE BLDC GLUCOMTR-MCNC: 326 MG/DL (ref 70–99)
GLUCOSE BLDC GLUCOMTR-MCNC: 76 MG/DL (ref 70–99)
GLUCOSE BLDC GLUCOMTR-MCNC: 78 MG/DL (ref 70–99)
GLUCOSE BLDC GLUCOMTR-MCNC: 82 MG/DL (ref 70–99)
GLUCOSE SERPL-MCNC: 139 MG/DL (ref 70–99)
GLUCOSE SERPL-MCNC: 364 MG/DL (ref 70–99)
GLUCOSE UR STRIP-MCNC: >499 MG/DL
HCT VFR BLD AUTO: 36.2 % (ref 35–47)
HCT VFR BLD AUTO: 36.6 % (ref 35–47)
HEMOCCULT STL QL: POSITIVE
HGB BLD-MCNC: 12.3 G/DL (ref 11.7–15.7)
HGB BLD-MCNC: 12.4 G/DL (ref 11.7–15.7)
HGB UR QL STRIP: ABNORMAL
HOLD SPECIMEN: NORMAL
HYALINE CASTS: 4 /LPF
IMM GRANULOCYTES # BLD: 0 10E3/UL
IMM GRANULOCYTES # BLD: 0 10E3/UL
IMM GRANULOCYTES NFR BLD: 0 %
IMM GRANULOCYTES NFR BLD: 0 %
INR PPP: 0.98 (ref 0.85–1.15)
INR PPP: 1.03 (ref 0.85–1.15)
KETONES UR STRIP-MCNC: NEGATIVE MG/DL
LACTATE SERPL-SCNC: 1.6 MMOL/L (ref 0.7–2)
LEUKOCYTE ESTERASE UR QL STRIP: ABNORMAL
LIPASE SERPL-CCNC: 22 U/L (ref 13–60)
LVEF ECHO: NORMAL
LYMPHOCYTES # BLD AUTO: 1 10E3/UL (ref 0.8–5.3)
LYMPHOCYTES # BLD AUTO: 2.1 10E3/UL (ref 0.8–5.3)
LYMPHOCYTES NFR BLD AUTO: 23 %
LYMPHOCYTES NFR BLD AUTO: 9 %
MAGNESIUM SERPL-MCNC: 1.9 MG/DL (ref 1.7–2.3)
MCH RBC QN AUTO: 27.9 PG (ref 26.5–33)
MCH RBC QN AUTO: 28.1 PG (ref 26.5–33)
MCHC RBC AUTO-ENTMCNC: 33.6 G/DL (ref 31.5–36.5)
MCHC RBC AUTO-ENTMCNC: 34.3 G/DL (ref 31.5–36.5)
MCV RBC AUTO: 82 FL (ref 78–100)
MCV RBC AUTO: 83 FL (ref 78–100)
MONOCYTES # BLD AUTO: 0.6 10E3/UL (ref 0–1.3)
MONOCYTES # BLD AUTO: 0.7 10E3/UL (ref 0–1.3)
MONOCYTES NFR BLD AUTO: 6 %
MONOCYTES NFR BLD AUTO: 7 %
NEUTROPHILS # BLD AUTO: 6.4 10E3/UL (ref 1.6–8.3)
NEUTROPHILS # BLD AUTO: 8.6 10E3/UL (ref 1.6–8.3)
NEUTROPHILS NFR BLD AUTO: 69 %
NEUTROPHILS NFR BLD AUTO: 83 %
NITRATE UR QL: NEGATIVE
NRBC # BLD AUTO: 0 10E3/UL
NRBC # BLD AUTO: 0 10E3/UL
NRBC BLD AUTO-RTO: 0 /100
NRBC BLD AUTO-RTO: 0 /100
NT-PROBNP SERPL-MCNC: 136 PG/ML (ref 0–450)
OPIATES UR QL SCN: ABNORMAL
PCP QUAL URINE (ROCHE): ABNORMAL
PH UR STRIP: 5 [PH] (ref 5–7)
PLATELET # BLD AUTO: 239 10E3/UL (ref 150–450)
PLATELET # BLD AUTO: 292 10E3/UL (ref 150–450)
POTASSIUM SERPL-SCNC: 3.6 MMOL/L (ref 3.4–5.3)
POTASSIUM SERPL-SCNC: 4.7 MMOL/L (ref 3.4–5.3)
PROT SERPL-MCNC: 7.7 G/DL (ref 6.4–8.3)
RBC # BLD AUTO: 4.41 10E6/UL (ref 3.8–5.2)
RBC # BLD AUTO: 4.42 10E6/UL (ref 3.8–5.2)
RBC URINE: 1 /HPF
RSV RNA SPEC NAA+PROBE: NEGATIVE
SARS-COV-2 RNA RESP QL NAA+PROBE: NEGATIVE
SODIUM SERPL-SCNC: 132 MMOL/L (ref 135–145)
SODIUM SERPL-SCNC: 136 MMOL/L (ref 135–145)
SP GR UR STRIP: 1.02 (ref 1–1.03)
SPECIMEN EXPIRATION DATE: NORMAL
SQUAMOUS EPITHELIAL: 2 /HPF
TROPONIN T SERPL HS-MCNC: 10 NG/L
TROPONIN T SERPL HS-MCNC: 11 NG/L
TSH SERPL DL<=0.005 MIU/L-ACNC: 0.7 UIU/ML (ref 0.3–4.2)
UROBILINOGEN UR STRIP-MCNC: NORMAL MG/DL
WBC # BLD AUTO: 10.4 10E3/UL (ref 4–11)
WBC # BLD AUTO: 9.2 10E3/UL (ref 4–11)
WBC URINE: 3 /HPF

## 2024-03-29 PROCEDURE — 82962 GLUCOSE BLOOD TEST: CPT

## 2024-03-29 PROCEDURE — 84443 ASSAY THYROID STIM HORMONE: CPT | Performed by: STUDENT IN AN ORGANIZED HEALTH CARE EDUCATION/TRAINING PROGRAM

## 2024-03-29 PROCEDURE — 250N000009 HC RX 250: Performed by: STUDENT IN AN ORGANIZED HEALTH CARE EDUCATION/TRAINING PROGRAM

## 2024-03-29 PROCEDURE — 85379 FIBRIN DEGRADATION QUANT: CPT | Performed by: STUDENT IN AN ORGANIZED HEALTH CARE EDUCATION/TRAINING PROGRAM

## 2024-03-29 PROCEDURE — 96360 HYDRATION IV INFUSION INIT: CPT | Mod: 59 | Performed by: STUDENT IN AN ORGANIZED HEALTH CARE EDUCATION/TRAINING PROGRAM

## 2024-03-29 PROCEDURE — 255N000002 HC RX 255 OP 636: Performed by: INTERNAL MEDICINE

## 2024-03-29 PROCEDURE — 250N000011 HC RX IP 250 OP 636: Performed by: STUDENT IN AN ORGANIZED HEALTH CARE EDUCATION/TRAINING PROGRAM

## 2024-03-29 PROCEDURE — 93010 ELECTROCARDIOGRAM REPORT: CPT | Performed by: STUDENT IN AN ORGANIZED HEALTH CARE EDUCATION/TRAINING PROGRAM

## 2024-03-29 PROCEDURE — G0378 HOSPITAL OBSERVATION PER HR: HCPCS

## 2024-03-29 PROCEDURE — 96361 HYDRATE IV INFUSION ADD-ON: CPT | Performed by: STUDENT IN AN ORGANIZED HEALTH CARE EDUCATION/TRAINING PROGRAM

## 2024-03-29 PROCEDURE — 250N000011 HC RX IP 250 OP 636: Performed by: INTERNAL MEDICINE

## 2024-03-29 PROCEDURE — 258N000001 HC RX 258: Performed by: STUDENT IN AN ORGANIZED HEALTH CARE EDUCATION/TRAINING PROGRAM

## 2024-03-29 PROCEDURE — 81001 URINALYSIS AUTO W/SCOPE: CPT | Performed by: STUDENT IN AN ORGANIZED HEALTH CARE EDUCATION/TRAINING PROGRAM

## 2024-03-29 PROCEDURE — 96374 THER/PROPH/DIAG INJ IV PUSH: CPT | Mod: 59 | Performed by: STUDENT IN AN ORGANIZED HEALTH CARE EDUCATION/TRAINING PROGRAM

## 2024-03-29 PROCEDURE — 85730 THROMBOPLASTIN TIME PARTIAL: CPT | Performed by: STUDENT IN AN ORGANIZED HEALTH CARE EDUCATION/TRAINING PROGRAM

## 2024-03-29 PROCEDURE — 84484 ASSAY OF TROPONIN QUANT: CPT | Performed by: STUDENT IN AN ORGANIZED HEALTH CARE EDUCATION/TRAINING PROGRAM

## 2024-03-29 PROCEDURE — 99285 EMERGENCY DEPT VISIT HI MDM: CPT | Mod: 25 | Performed by: STUDENT IN AN ORGANIZED HEALTH CARE EDUCATION/TRAINING PROGRAM

## 2024-03-29 PROCEDURE — 96375 TX/PRO/DX INJ NEW DRUG ADDON: CPT | Mod: 59

## 2024-03-29 PROCEDURE — 85610 PROTHROMBIN TIME: CPT | Performed by: STUDENT IN AN ORGANIZED HEALTH CARE EDUCATION/TRAINING PROGRAM

## 2024-03-29 PROCEDURE — 83735 ASSAY OF MAGNESIUM: CPT | Performed by: STUDENT IN AN ORGANIZED HEALTH CARE EDUCATION/TRAINING PROGRAM

## 2024-03-29 PROCEDURE — 258N000003 HC RX IP 258 OP 636: Performed by: STUDENT IN AN ORGANIZED HEALTH CARE EDUCATION/TRAINING PROGRAM

## 2024-03-29 PROCEDURE — 96361 HYDRATE IV INFUSION ADD-ON: CPT

## 2024-03-29 PROCEDURE — 82077 ASSAY SPEC XCP UR&BREATH IA: CPT | Performed by: STUDENT IN AN ORGANIZED HEALTH CARE EDUCATION/TRAINING PROGRAM

## 2024-03-29 PROCEDURE — 99291 CRITICAL CARE FIRST HOUR: CPT | Mod: 25 | Performed by: STUDENT IN AN ORGANIZED HEALTH CARE EDUCATION/TRAINING PROGRAM

## 2024-03-29 PROCEDURE — 74177 CT ABD & PELVIS W/CONTRAST: CPT

## 2024-03-29 PROCEDURE — 74177 CT ABD & PELVIS W/CONTRAST: CPT | Mod: 76

## 2024-03-29 PROCEDURE — 99291 CRITICAL CARE FIRST HOUR: CPT | Performed by: STUDENT IN AN ORGANIZED HEALTH CARE EDUCATION/TRAINING PROGRAM

## 2024-03-29 PROCEDURE — 93010 ELECTROCARDIOGRAM REPORT: CPT | Mod: 76 | Performed by: STUDENT IN AN ORGANIZED HEALTH CARE EDUCATION/TRAINING PROGRAM

## 2024-03-29 PROCEDURE — 85025 COMPLETE CBC W/AUTO DIFF WBC: CPT | Mod: 91 | Performed by: STUDENT IN AN ORGANIZED HEALTH CARE EDUCATION/TRAINING PROGRAM

## 2024-03-29 PROCEDURE — 85730 THROMBOPLASTIN TIME PARTIAL: CPT | Mod: 91 | Performed by: STUDENT IN AN ORGANIZED HEALTH CARE EDUCATION/TRAINING PROGRAM

## 2024-03-29 PROCEDURE — C9113 INJ PANTOPRAZOLE SODIUM, VIA: HCPCS | Performed by: INTERNAL MEDICINE

## 2024-03-29 PROCEDURE — 86900 BLOOD TYPING SEROLOGIC ABO: CPT | Performed by: STUDENT IN AN ORGANIZED HEALTH CARE EDUCATION/TRAINING PROGRAM

## 2024-03-29 PROCEDURE — 83605 ASSAY OF LACTIC ACID: CPT | Performed by: STUDENT IN AN ORGANIZED HEALTH CARE EDUCATION/TRAINING PROGRAM

## 2024-03-29 PROCEDURE — 96375 TX/PRO/DX INJ NEW DRUG ADDON: CPT | Performed by: STUDENT IN AN ORGANIZED HEALTH CARE EDUCATION/TRAINING PROGRAM

## 2024-03-29 PROCEDURE — 96375 TX/PRO/DX INJ NEW DRUG ADDON: CPT | Mod: 59 | Performed by: STUDENT IN AN ORGANIZED HEALTH CARE EDUCATION/TRAINING PROGRAM

## 2024-03-29 PROCEDURE — 93005 ELECTROCARDIOGRAM TRACING: CPT | Performed by: STUDENT IN AN ORGANIZED HEALTH CARE EDUCATION/TRAINING PROGRAM

## 2024-03-29 PROCEDURE — 82077 ASSAY SPEC XCP UR&BREATH IA: CPT | Mod: 91 | Performed by: STUDENT IN AN ORGANIZED HEALTH CARE EDUCATION/TRAINING PROGRAM

## 2024-03-29 PROCEDURE — 82272 OCCULT BLD FECES 1-3 TESTS: CPT | Performed by: STUDENT IN AN ORGANIZED HEALTH CARE EDUCATION/TRAINING PROGRAM

## 2024-03-29 PROCEDURE — 999N000208 ECHOCARDIOGRAM COMPLETE

## 2024-03-29 PROCEDURE — 87637 SARSCOV2&INF A&B&RSV AMP PRB: CPT | Performed by: STUDENT IN AN ORGANIZED HEALTH CARE EDUCATION/TRAINING PROGRAM

## 2024-03-29 PROCEDURE — 83690 ASSAY OF LIPASE: CPT | Performed by: STUDENT IN AN ORGANIZED HEALTH CARE EDUCATION/TRAINING PROGRAM

## 2024-03-29 PROCEDURE — 80048 BASIC METABOLIC PNL TOTAL CA: CPT | Performed by: STUDENT IN AN ORGANIZED HEALTH CARE EDUCATION/TRAINING PROGRAM

## 2024-03-29 PROCEDURE — 250N000011 HC RX IP 250 OP 636: Performed by: EMERGENCY MEDICINE

## 2024-03-29 PROCEDURE — 36415 COLL VENOUS BLD VENIPUNCTURE: CPT | Performed by: STUDENT IN AN ORGANIZED HEALTH CARE EDUCATION/TRAINING PROGRAM

## 2024-03-29 PROCEDURE — 80307 DRUG TEST PRSMV CHEM ANLYZR: CPT | Performed by: STUDENT IN AN ORGANIZED HEALTH CARE EDUCATION/TRAINING PROGRAM

## 2024-03-29 PROCEDURE — 83880 ASSAY OF NATRIURETIC PEPTIDE: CPT | Performed by: STUDENT IN AN ORGANIZED HEALTH CARE EDUCATION/TRAINING PROGRAM

## 2024-03-29 PROCEDURE — 93306 TTE W/DOPPLER COMPLETE: CPT | Mod: 26 | Performed by: INTERNAL MEDICINE

## 2024-03-29 PROCEDURE — 258N000003 HC RX IP 258 OP 636: Performed by: INTERNAL MEDICINE

## 2024-03-29 PROCEDURE — 85025 COMPLETE CBC W/AUTO DIFF WBC: CPT | Performed by: STUDENT IN AN ORGANIZED HEALTH CARE EDUCATION/TRAINING PROGRAM

## 2024-03-29 PROCEDURE — 250N000013 HC RX MED GY IP 250 OP 250 PS 637: Performed by: INTERNAL MEDICINE

## 2024-03-29 RX ORDER — AMOXICILLIN 250 MG
1 CAPSULE ORAL 2 TIMES DAILY PRN
Status: DISCONTINUED | OUTPATIENT
Start: 2024-03-29 | End: 2024-03-30 | Stop reason: HOSPADM

## 2024-03-29 RX ORDER — ACETAMINOPHEN 325 MG/1
650 TABLET ORAL EVERY 4 HOURS PRN
Status: DISCONTINUED | OUTPATIENT
Start: 2024-03-29 | End: 2024-03-30 | Stop reason: HOSPADM

## 2024-03-29 RX ORDER — PROCHLORPERAZINE 25 MG
25 SUPPOSITORY, RECTAL RECTAL EVERY 12 HOURS PRN
Status: DISCONTINUED | OUTPATIENT
Start: 2024-03-29 | End: 2024-03-30 | Stop reason: HOSPADM

## 2024-03-29 RX ORDER — PROCHLORPERAZINE MALEATE 5 MG
10 TABLET ORAL EVERY 6 HOURS PRN
Status: DISCONTINUED | OUTPATIENT
Start: 2024-03-29 | End: 2024-03-30 | Stop reason: HOSPADM

## 2024-03-29 RX ORDER — MORPHINE SULFATE 4 MG/ML
4 INJECTION, SOLUTION INTRAMUSCULAR; INTRAVENOUS
Status: COMPLETED | OUTPATIENT
Start: 2024-03-29 | End: 2024-03-29

## 2024-03-29 RX ORDER — OXYCODONE HYDROCHLORIDE 5 MG/1
10 TABLET ORAL EVERY 4 HOURS PRN
Status: DISCONTINUED | OUTPATIENT
Start: 2024-03-29 | End: 2024-03-30 | Stop reason: HOSPADM

## 2024-03-29 RX ORDER — IOPAMIDOL 755 MG/ML
100 INJECTION, SOLUTION INTRAVASCULAR ONCE
Status: COMPLETED | OUTPATIENT
Start: 2024-03-29 | End: 2024-03-29

## 2024-03-29 RX ORDER — NICOTINE POLACRILEX 4 MG
15-30 LOZENGE BUCCAL
Status: DISCONTINUED | OUTPATIENT
Start: 2024-03-29 | End: 2024-03-30 | Stop reason: HOSPADM

## 2024-03-29 RX ORDER — LORAZEPAM 2 MG/ML
1 INJECTION INTRAMUSCULAR ONCE
Status: COMPLETED | OUTPATIENT
Start: 2024-03-29 | End: 2024-03-29

## 2024-03-29 RX ORDER — ONDANSETRON 2 MG/ML
4 INJECTION INTRAMUSCULAR; INTRAVENOUS EVERY 6 HOURS PRN
Status: DISCONTINUED | OUTPATIENT
Start: 2024-03-29 | End: 2024-03-30 | Stop reason: HOSPADM

## 2024-03-29 RX ORDER — SODIUM CHLORIDE 9 MG/ML
INJECTION, SOLUTION INTRAVENOUS CONTINUOUS
Status: DISCONTINUED | OUTPATIENT
Start: 2024-03-29 | End: 2024-03-29

## 2024-03-29 RX ORDER — ACETAMINOPHEN 650 MG/1
650 SUPPOSITORY RECTAL EVERY 4 HOURS PRN
Status: DISCONTINUED | OUTPATIENT
Start: 2024-03-29 | End: 2024-03-30 | Stop reason: HOSPADM

## 2024-03-29 RX ORDER — ONDANSETRON 4 MG/1
4 TABLET, ORALLY DISINTEGRATING ORAL EVERY 6 HOURS PRN
Status: DISCONTINUED | OUTPATIENT
Start: 2024-03-29 | End: 2024-03-30 | Stop reason: HOSPADM

## 2024-03-29 RX ORDER — IOPAMIDOL 755 MG/ML
500 INJECTION, SOLUTION INTRAVASCULAR ONCE
Status: COMPLETED | OUTPATIENT
Start: 2024-03-29 | End: 2024-03-29

## 2024-03-29 RX ORDER — ONDANSETRON 2 MG/ML
4 INJECTION INTRAMUSCULAR; INTRAVENOUS EVERY 30 MIN PRN
Status: DISCONTINUED | OUTPATIENT
Start: 2024-03-29 | End: 2024-03-29

## 2024-03-29 RX ORDER — AMOXICILLIN 250 MG
2 CAPSULE ORAL 2 TIMES DAILY PRN
Status: DISCONTINUED | OUTPATIENT
Start: 2024-03-29 | End: 2024-03-30 | Stop reason: HOSPADM

## 2024-03-29 RX ORDER — DEXTROSE MONOHYDRATE 25 G/50ML
50 INJECTION, SOLUTION INTRAVENOUS ONCE
Status: COMPLETED | OUTPATIENT
Start: 2024-03-29 | End: 2024-03-29

## 2024-03-29 RX ORDER — KETOROLAC TROMETHAMINE 15 MG/ML
15 INJECTION, SOLUTION INTRAMUSCULAR; INTRAVENOUS ONCE
Status: COMPLETED | OUTPATIENT
Start: 2024-03-29 | End: 2024-03-29

## 2024-03-29 RX ORDER — DEXTROSE MONOHYDRATE 25 G/50ML
25-50 INJECTION, SOLUTION INTRAVENOUS
Status: DISCONTINUED | OUTPATIENT
Start: 2024-03-29 | End: 2024-03-30 | Stop reason: HOSPADM

## 2024-03-29 RX ORDER — OXYCODONE HYDROCHLORIDE 5 MG/1
5 TABLET ORAL EVERY 4 HOURS PRN
Status: DISCONTINUED | OUTPATIENT
Start: 2024-03-29 | End: 2024-03-30 | Stop reason: HOSPADM

## 2024-03-29 RX ADMIN — IOPAMIDOL 86 ML: 755 INJECTION, SOLUTION INTRAVENOUS at 05:36

## 2024-03-29 RX ADMIN — OXYCODONE HYDROCHLORIDE 10 MG: 5 TABLET ORAL at 23:56

## 2024-03-29 RX ADMIN — LORAZEPAM 1 MG: 2 INJECTION INTRAMUSCULAR; INTRAVENOUS at 19:45

## 2024-03-29 RX ADMIN — MORPHINE SULFATE 4 MG: 4 INJECTION, SOLUTION INTRAMUSCULAR; INTRAVENOUS at 21:04

## 2024-03-29 RX ADMIN — SODIUM CHLORIDE 1000 ML: 9 INJECTION, SOLUTION INTRAVENOUS at 04:58

## 2024-03-29 RX ADMIN — IOPAMIDOL 90 ML: 755 INJECTION, SOLUTION INTRAVENOUS at 20:41

## 2024-03-29 RX ADMIN — SODIUM CHLORIDE 1000 ML: 9 INJECTION, SOLUTION INTRAVENOUS at 04:30

## 2024-03-29 RX ADMIN — PANTOPRAZOLE SODIUM 40 MG: 40 INJECTION, POWDER, FOR SOLUTION INTRAVENOUS at 23:56

## 2024-03-29 RX ADMIN — ONDANSETRON 4 MG: 2 INJECTION INTRAMUSCULAR; INTRAVENOUS at 21:02

## 2024-03-29 RX ADMIN — SODIUM CHLORIDE 60 ML: 9 INJECTION, SOLUTION INTRAVENOUS at 20:41

## 2024-03-29 RX ADMIN — KETOROLAC TROMETHAMINE 15 MG: 15 INJECTION, SOLUTION INTRAMUSCULAR; INTRAVENOUS at 08:12

## 2024-03-29 RX ADMIN — HUMAN ALBUMIN MICROSPHERES AND PERFLUTREN 3 ML: 10; .22 INJECTION, SOLUTION INTRAVENOUS at 08:40

## 2024-03-29 RX ADMIN — SODIUM CHLORIDE 70 ML: 9 INJECTION, SOLUTION INTRAVENOUS at 05:36

## 2024-03-29 RX ADMIN — DEXTROSE AND SODIUM CHLORIDE: 5; 900 INJECTION, SOLUTION INTRAVENOUS at 23:26

## 2024-03-29 RX ADMIN — SODIUM CHLORIDE 1000 ML: 9 INJECTION, SOLUTION INTRAVENOUS at 19:45

## 2024-03-29 RX ADMIN — SODIUM CHLORIDE: 9 INJECTION, SOLUTION INTRAVENOUS at 21:34

## 2024-03-29 RX ADMIN — MORPHINE SULFATE 4 MG: 4 INJECTION, SOLUTION INTRAMUSCULAR; INTRAVENOUS at 06:20

## 2024-03-29 RX ADMIN — DEXTROSE MONOHYDRATE 50 ML: 25 INJECTION, SOLUTION INTRAVENOUS at 20:56

## 2024-03-29 ASSESSMENT — ACTIVITIES OF DAILY LIVING (ADL)
ADLS_ACUITY_SCORE: 23
TOILETING_ISSUES: NO
ADLS_ACUITY_SCORE: 38
DIFFICULTY_COMMUNICATING: NO
ADLS_ACUITY_SCORE: 38
DOING_ERRANDS_INDEPENDENTLY_DIFFICULTY: NO
HEARING_DIFFICULTY_OR_DEAF: NO
CHANGE_IN_FUNCTIONAL_STATUS_SINCE_ONSET_OF_CURRENT_ILLNESS/INJURY: YES
ADLS_ACUITY_SCORE: 38
ADLS_ACUITY_SCORE: 38
WALKING_OR_CLIMBING_STAIRS_DIFFICULTY: NO
DRESSING/BATHING_DIFFICULTY: NO
ADLS_ACUITY_SCORE: 38
ADLS_ACUITY_SCORE: 36
CONCENTRATING,_REMEMBERING_OR_MAKING_DECISIONS_DIFFICULTY: NO
ADLS_ACUITY_SCORE: 38
EQUIPMENT_CURRENTLY_USED_AT_HOME: GLUCOMETER
ADLS_ACUITY_SCORE: 38
DIFFICULTY_EATING/SWALLOWING: NO
FALL_HISTORY_WITHIN_LAST_SIX_MONTHS: NO
ADLS_ACUITY_SCORE: 38
ADLS_ACUITY_SCORE: 38
WEAR_GLASSES_OR_BLIND: YES
VISION_MANAGEMENT: GLASSES

## 2024-03-29 ASSESSMENT — ENCOUNTER SYMPTOMS
LIGHT-HEADEDNESS: 1
FATIGUE: 0
ABDOMINAL PAIN: 1
APPETITE CHANGE: 0
EYE REDNESS: 0
HEMATURIA: 0
ACTIVITY CHANGE: 0
CHILLS: 0
ACTIVITY CHANGE: 0
HEADACHES: 0
VOMITING: 0
FEVER: 0
MYALGIAS: 0
FEVER: 0
NAUSEA: 0
ARTHRALGIAS: 0
DYSURIA: 0
NECK STIFFNESS: 0
DIARRHEA: 0
EYE REDNESS: 0
COUGH: 0
MYALGIAS: 0
APPETITE CHANGE: 0
SHORTNESS OF BREATH: 0
HEMATURIA: 0
NAUSEA: 0
ARTHRALGIAS: 0
DIZZINESS: 0
CHILLS: 0
NECK STIFFNESS: 0
SORE THROAT: 0
FATIGUE: 0
DYSURIA: 0
SHORTNESS OF BREATH: 0
HEADACHES: 0
RHINORRHEA: 0
DIARRHEA: 0
RHINORRHEA: 0
COUGH: 0
ABDOMINAL PAIN: 1
DIZZINESS: 0
VOMITING: 0
SORE THROAT: 0

## 2024-03-29 ASSESSMENT — COLUMBIA-SUICIDE SEVERITY RATING SCALE - C-SSRS
6. HAVE YOU EVER DONE ANYTHING, STARTED TO DO ANYTHING, OR PREPARED TO DO ANYTHING TO END YOUR LIFE?: NO
6. HAVE YOU EVER DONE ANYTHING, STARTED TO DO ANYTHING, OR PREPARED TO DO ANYTHING TO END YOUR LIFE?: NO
1. IN THE PAST MONTH, HAVE YOU WISHED YOU WERE DEAD OR WISHED YOU COULD GO TO SLEEP AND NOT WAKE UP?: NO
2. HAVE YOU ACTUALLY HAD ANY THOUGHTS OF KILLING YOURSELF IN THE PAST MONTH?: NO
2. HAVE YOU ACTUALLY HAD ANY THOUGHTS OF KILLING YOURSELF IN THE PAST MONTH?: NO
1. IN THE PAST MONTH, HAVE YOU WISHED YOU WERE DEAD OR WISHED YOU COULD GO TO SLEEP AND NOT WAKE UP?: NO

## 2024-03-29 NOTE — Clinical Note
Stacy Brown was seen and treated in our emergency department on 3/29/2024.  She may return to work on 04/02/2024.       If you have any questions or concerns, please don't hesitate to call.      Ezequiel Gold MD

## 2024-03-29 NOTE — ED TRIAGE NOTES
"Patient presents from home via EMS for concern of syncopal episode. Was attempting to have a bowel movement when she states she passed out on the toilet. Did not fall off the toilet and did not hit her head. Reports she is still lightheaded but nausea did subside (given zofran en route). 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     Triage Assessment (Adult)       Row Name 03/29/24 0350          Triage Assessment    Airway WDL WDL        Respiratory WDL    Respiratory WDL WDL        Skin Circulation/Temperature WDL    Skin Circulation/Temperature WDL WDL        Cardiac WDL    Cardiac WDL WDL        Peripheral/Neurovascular WDL    Peripheral Neurovascular WDL WDL        Cognitive/Neuro/Behavioral WDL    Cognitive/Neuro/Behavioral WDL X     Level of Consciousness alert     Arousal Level opens eyes spontaneously     Orientation oriented x 4     Speech clear     Mood/Behavior calm;cooperative        Pupils (CN II)    Pupil PERRLA yes     Pupil Size Left 2 mm     Pupil Size Right 2 mm        Alisha Coma Scale    Best Eye Response 4-->(E4) spontaneous     Best Motor Response 6-->(M6) obeys commands     Best Verbal Response 5-->(V5) oriented     Alisha Coma Scale Score 15                     "

## 2024-03-29 NOTE — DISCHARGE INSTRUCTIONS
Return here if your symptoms continue or you develop new symptoms you find concerning.    Your ultrasound of your heart looked good.  Do think that overall your symptoms are due to something called vasovagal syncope.  Stay hydrated.    Is important to follow-up with primary care to see how you are doing within the next week.
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
## 2024-03-29 NOTE — ED PROVIDER NOTES
Care signed out to me by my colleague.  Echocardiogram reassuring.  Patient has no further near syncopal episodes here.  Delta troponin not elevated.  Question whether her EKG changes were due to her transient hypotension.  Discussed this with her.  Discussed the need for strict return precautions if she does develop chest pain or dyspnea or continues to pass out.  Discharged in stable condition.     Ezequiel Gold MD  03/29/24 0902

## 2024-03-30 VITALS
SYSTOLIC BLOOD PRESSURE: 126 MMHG | BODY MASS INDEX: 31.36 KG/M2 | HEIGHT: 66 IN | HEART RATE: 58 BPM | RESPIRATION RATE: 18 BRPM | WEIGHT: 195.1 LBS | OXYGEN SATURATION: 96 % | TEMPERATURE: 97.8 F | DIASTOLIC BLOOD PRESSURE: 41 MMHG

## 2024-03-30 LAB
ANION GAP SERPL CALCULATED.3IONS-SCNC: 10 MMOL/L (ref 7–15)
BUN SERPL-MCNC: 12.9 MG/DL (ref 6–20)
CALCIUM SERPL-MCNC: 9 MG/DL (ref 8.6–10)
CHLORIDE SERPL-SCNC: 111 MMOL/L (ref 98–107)
CREAT SERPL-MCNC: 0.74 MG/DL (ref 0.51–0.95)
DEPRECATED HCO3 PLAS-SCNC: 21 MMOL/L (ref 22–29)
EGFRCR SERPLBLD CKD-EPI 2021: >90 ML/MIN/1.73M2
ERYTHROCYTE [DISTWIDTH] IN BLOOD BY AUTOMATED COUNT: 13 % (ref 10–15)
ERYTHROCYTE [DISTWIDTH] IN BLOOD BY AUTOMATED COUNT: 13.2 % (ref 10–15)
ERYTHROCYTE [DISTWIDTH] IN BLOOD BY AUTOMATED COUNT: 13.2 % (ref 10–15)
GLUCOSE BLDC GLUCOMTR-MCNC: 167 MG/DL (ref 70–99)
GLUCOSE BLDC GLUCOMTR-MCNC: 188 MG/DL (ref 70–99)
GLUCOSE BLDC GLUCOMTR-MCNC: 235 MG/DL (ref 70–99)
GLUCOSE BLDC GLUCOMTR-MCNC: 271 MG/DL (ref 70–99)
GLUCOSE BLDC GLUCOMTR-MCNC: 318 MG/DL (ref 70–99)
GLUCOSE SERPL-MCNC: 187 MG/DL (ref 70–99)
HCT VFR BLD AUTO: 30.3 % (ref 35–47)
HCT VFR BLD AUTO: 32.7 % (ref 35–47)
HCT VFR BLD AUTO: 33.2 % (ref 35–47)
HGB BLD-MCNC: 10.2 G/DL (ref 11.7–15.7)
HGB BLD-MCNC: 10.6 G/DL (ref 11.7–15.7)
HGB BLD-MCNC: 11 G/DL (ref 11.7–15.7)
MCH RBC QN AUTO: 27.5 PG (ref 26.5–33)
MCH RBC QN AUTO: 27.6 PG (ref 26.5–33)
MCH RBC QN AUTO: 27.9 PG (ref 26.5–33)
MCHC RBC AUTO-ENTMCNC: 32.4 G/DL (ref 31.5–36.5)
MCHC RBC AUTO-ENTMCNC: 33.1 G/DL (ref 31.5–36.5)
MCHC RBC AUTO-ENTMCNC: 33.7 G/DL (ref 31.5–36.5)
MCV RBC AUTO: 83 FL (ref 78–100)
MCV RBC AUTO: 83 FL (ref 78–100)
MCV RBC AUTO: 85 FL (ref 78–100)
PLATELET # BLD AUTO: 215 10E3/UL (ref 150–450)
PLATELET # BLD AUTO: 222 10E3/UL (ref 150–450)
PLATELET # BLD AUTO: 224 10E3/UL (ref 150–450)
POTASSIUM SERPL-SCNC: 4.3 MMOL/L (ref 3.4–5.3)
RBC # BLD AUTO: 3.66 10E6/UL (ref 3.8–5.2)
RBC # BLD AUTO: 3.86 10E6/UL (ref 3.8–5.2)
RBC # BLD AUTO: 3.99 10E6/UL (ref 3.8–5.2)
SODIUM SERPL-SCNC: 142 MMOL/L (ref 135–145)
WBC # BLD AUTO: 7.4 10E3/UL (ref 4–11)
WBC # BLD AUTO: 8 10E3/UL (ref 4–11)
WBC # BLD AUTO: 8.2 10E3/UL (ref 4–11)

## 2024-03-30 PROCEDURE — 250N000011 HC RX IP 250 OP 636: Performed by: INTERNAL MEDICINE

## 2024-03-30 PROCEDURE — 250N000013 HC RX MED GY IP 250 OP 250 PS 637: Performed by: INTERNAL MEDICINE

## 2024-03-30 PROCEDURE — 85027 COMPLETE CBC AUTOMATED: CPT | Mod: 91 | Performed by: INTERNAL MEDICINE

## 2024-03-30 PROCEDURE — 36415 COLL VENOUS BLD VENIPUNCTURE: CPT | Performed by: INTERNAL MEDICINE

## 2024-03-30 PROCEDURE — 96361 HYDRATE IV INFUSION ADD-ON: CPT

## 2024-03-30 PROCEDURE — 80048 BASIC METABOLIC PNL TOTAL CA: CPT | Performed by: INTERNAL MEDICINE

## 2024-03-30 PROCEDURE — 99207 PR NOT IN PERSON INPATIENT CONSULT STATISTICAL MARKER: CPT | Performed by: INTERNAL MEDICINE

## 2024-03-30 PROCEDURE — 99223 1ST HOSP IP/OBS HIGH 75: CPT | Mod: 95 | Performed by: INTERNAL MEDICINE

## 2024-03-30 PROCEDURE — 85027 COMPLETE CBC AUTOMATED: CPT | Performed by: INTERNAL MEDICINE

## 2024-03-30 PROCEDURE — 96376 TX/PRO/DX INJ SAME DRUG ADON: CPT

## 2024-03-30 PROCEDURE — G0378 HOSPITAL OBSERVATION PER HR: HCPCS

## 2024-03-30 PROCEDURE — 99239 HOSP IP/OBS DSCHRG MGMT >30: CPT | Performed by: NURSE PRACTITIONER

## 2024-03-30 PROCEDURE — 82962 GLUCOSE BLOOD TEST: CPT | Mod: 91

## 2024-03-30 PROCEDURE — C9113 INJ PANTOPRAZOLE SODIUM, VIA: HCPCS | Performed by: INTERNAL MEDICINE

## 2024-03-30 PROCEDURE — 258N000003 HC RX IP 258 OP 636: Performed by: INTERNAL MEDICINE

## 2024-03-30 RX ORDER — NALOXONE HYDROCHLORIDE 0.4 MG/ML
0.4 INJECTION, SOLUTION INTRAMUSCULAR; INTRAVENOUS; SUBCUTANEOUS
Status: DISCONTINUED | OUTPATIENT
Start: 2024-03-30 | End: 2024-03-30 | Stop reason: HOSPADM

## 2024-03-30 RX ORDER — DULOXETIN HYDROCHLORIDE 20 MG/1
20 CAPSULE, DELAYED RELEASE ORAL DAILY
Status: DISCONTINUED | OUTPATIENT
Start: 2024-03-30 | End: 2024-03-30 | Stop reason: HOSPADM

## 2024-03-30 RX ORDER — TIZANIDINE 2 MG/1
4 TABLET ORAL 3 TIMES DAILY
Status: DISCONTINUED | OUTPATIENT
Start: 2024-03-30 | End: 2024-03-30

## 2024-03-30 RX ORDER — NALOXONE HYDROCHLORIDE 0.4 MG/ML
0.2 INJECTION, SOLUTION INTRAMUSCULAR; INTRAVENOUS; SUBCUTANEOUS
Status: DISCONTINUED | OUTPATIENT
Start: 2024-03-30 | End: 2024-03-30 | Stop reason: HOSPADM

## 2024-03-30 RX ORDER — LISINOPRIL 2.5 MG/1
5 TABLET ORAL DAILY
Status: DISCONTINUED | OUTPATIENT
Start: 2024-03-30 | End: 2024-03-30 | Stop reason: HOSPADM

## 2024-03-30 RX ORDER — METOPROLOL TARTRATE 25 MG/1
25 TABLET, FILM COATED ORAL 2 TIMES DAILY
Status: DISCONTINUED | OUTPATIENT
Start: 2024-03-30 | End: 2024-03-30 | Stop reason: HOSPADM

## 2024-03-30 RX ORDER — ROSUVASTATIN CALCIUM 20 MG/1
20 TABLET, COATED ORAL DAILY
Status: DISCONTINUED | OUTPATIENT
Start: 2024-03-30 | End: 2024-03-30 | Stop reason: HOSPADM

## 2024-03-30 RX ORDER — ONDANSETRON 4 MG/1
4 TABLET, ORALLY DISINTEGRATING ORAL EVERY 6 HOURS PRN
Qty: 15 TABLET | Refills: 0 | Status: SHIPPED | OUTPATIENT
Start: 2024-03-30 | End: 2024-04-24

## 2024-03-30 RX ORDER — TIZANIDINE 2 MG/1
4 TABLET ORAL 3 TIMES DAILY
Status: DISCONTINUED | OUTPATIENT
Start: 2024-03-30 | End: 2024-03-30 | Stop reason: HOSPADM

## 2024-03-30 RX ORDER — PANTOPRAZOLE SODIUM 40 MG/1
40 TABLET, DELAYED RELEASE ORAL 2 TIMES DAILY
Qty: 60 TABLET | Refills: 0 | Status: SHIPPED | OUTPATIENT
Start: 2024-03-30 | End: 2024-07-15

## 2024-03-30 RX ADMIN — METOPROLOL TARTRATE 25 MG: 25 TABLET, FILM COATED ORAL at 00:35

## 2024-03-30 RX ADMIN — TIZANIDINE 4 MG: 2 TABLET ORAL at 00:35

## 2024-03-30 RX ADMIN — DEXTROSE AND SODIUM CHLORIDE: 5; 900 INJECTION, SOLUTION INTRAVENOUS at 09:16

## 2024-03-30 RX ADMIN — TIZANIDINE 4 MG: 2 TABLET ORAL at 08:27

## 2024-03-30 RX ADMIN — PANTOPRAZOLE SODIUM 40 MG: 40 INJECTION, POWDER, FOR SOLUTION INTRAVENOUS at 08:26

## 2024-03-30 RX ADMIN — OXYCODONE HYDROCHLORIDE 5 MG: 5 TABLET ORAL at 06:59

## 2024-03-30 RX ADMIN — METOPROLOL TARTRATE 25 MG: 25 TABLET, FILM COATED ORAL at 08:27

## 2024-03-30 RX ADMIN — LISINOPRIL 5 MG: 2.5 TABLET ORAL at 08:26

## 2024-03-30 RX ADMIN — ROSUVASTATIN CALCIUM 20 MG: 20 TABLET, FILM COATED ORAL at 08:27

## 2024-03-30 RX ADMIN — DULOXETINE HYDROCHLORIDE 20 MG: 20 CAPSULE, DELAYED RELEASE ORAL at 08:27

## 2024-03-30 RX ADMIN — TIZANIDINE 4 MG: 2 TABLET ORAL at 13:29

## 2024-03-30 ASSESSMENT — ACTIVITIES OF DAILY LIVING (ADL)
ADLS_ACUITY_SCORE: 23

## 2024-03-30 NOTE — DISCHARGE SUMMARY
"[facility]  Hospitalist Discharge Summary      Date of Admission:  3/29/2024  Date of Discharge:  3/30/2024  Discharging Provider: Jeannie Tellez CNP  Discharge Service: Hospitalist Service    Discharge Diagnoses   Nausea/vomiting  Possible colitis  Hematemesis  Syncope  T2DM  Chronic opiate dependence  Hx depression  Hx. CAD s/p CABG 2021    Clinically Significant Risk Factors     # Obesity: Estimated body mass index is 31.49 kg/m  as calculated from the following:    Height as of this encounter: 1.676 m (5' 6\").    Weight as of this encounter: 88.5 kg (195 lb 1.6 oz).       Follow-ups Needed After Discharge   Follow-up Appointments     Follow-up and recommended labs and tests       Follow up with primary care provider, Yaima Muse, within   7 days for hospital follow- up.  The following labs/tests are recommended:   cbc/bmp.            Discharge Disposition   Discharged to home  Condition at discharge: Stable    Hospital Course   48 year old female who presented to the ED on 3/29 around 3am. Patient recently lost her job of 20 years and has been very upset and depressed about it. She states that she started to have some abdominal pain that was lower abdominal pain that radiated to her pelvis. She had some nausea and vomited once. She went to the bathroom and had a large bowel movement. She felt lightheaded, called out to her  and then lost consciousness. She was laid down on the floor and was unconscious for 15 minutes per her husbands report. EMS were called and she was told that her BP as low. Unknown what her blood sugar was at that time. She denied any chest pain or palpitations. After a thorough investigation, including a normal echocardiogram, no elevation in cardiac enzymes and no neurologic findings consistent with stroke, she was discharged home and was doing well until around 1800 when her abdominal pain came back again and she had hematemesis. She came " back to the ED and was evaluated for hematemesis this time. Dr. Knight examined her and noted that she had some blood in her rectal vault, but no vaginal bleeding. Repeat imaging showed that she has transverse colitis. She denies any epigastric pain, SOB, chest pain, fevers, cough, rhinorrhea or neurologic changes.       Intractable nausea/vomiting  Colitis  Hematemesis  She developed some nausea/vomiting with some hematemesis and was found to have colitis on a repeat CT.  She does not have a positive infectious work up.  She had some blood on her rectal exam but her Hemoglobin is stable. Given her recents stressful life event, perhaps she has developed a stress ulcer and might need an endoscopy.   She was given IV crystalloid overnight.  Serial hemoglobins were:  12.4, 10.2, 10.6 and 11 respectively (the latter results after crystalloid hydration).  She was placed on a twice daily PPI but no antibiotics.  She has been hemodynamically stable.  She did have positive occult blood.  I discussed options for her and have recommended she discharge home on a twice daily PPI and have GI follow up for upper and lower endoscopy.  She will have her aspirin held as well.  She will also follow up with her primary physician.  I have instructed her to return to the hospital should she have significant ethan blood or intractable nausea or vomiting.       Syncope  She presented with syncope initially. She had a full evaluation and no cardiac etiology or neurology etiology was found. She had a normal echocardiogram and her troponin did not rise. Given that it occurred after a large bowel movement, likely due to vasovagal event. She might have some type of conversion disorder contributing too because she was recently fired from a job she has held for over 20 years and was very emotional prior to the event.   Follow outpatient GI as above and present back to ER if she should have further syncopal episodes.     CAD s/ p CABG.   Hold aspirin for now until seen for EGD/Colonoscopy.  Continue  rosuvastatin and metoprolol tartrate.    T2DM  She had some hypoglycemia on day of presentaton because she took her insulin but did not eat much food. Otherwise she was hyperglycemic when she first presented.   She should continue her home regimen.    Chronic Opiate Dependence  Chronic pain continue home tizanidine and oxycodone       Hypertension continue lisinopril at home.     Depression  As described above, she is likely having some form of decompensation of her depression given the very stressful change in her life, losing her job.   -Continue duloxetine at home.     Consultations This Hospital Stay   None    Code Status   Full Code    Time Spent on this Encounter   IJeannie CNP, personally saw the patient today and spent greater than 30 minutes discharging this patient.       Jeannie Tellez CNP  [address] MEDICAL SURGICAL  [address] [place] [address]  Phone: [phone]  ______________________________________________________________________    Physical Exam   Vital Signs: Temp: 97.8  F (36.6  C) Temp src: Oral BP: 126/41 Pulse: 58   Resp: 18 SpO2: 96 % O2 Device: None (Room air)    Weight: 195 lbs 1.6 oz    Gen:  Lying in bed no acute distress  HEENT:  normocephalic, atraumatic, oropharynx clear  Resp:  CTA posteriorly, normal respiratory effort  Card:  S1,S2, RRR no murmur, rub or gallop  Abd:  Soft, nondistended, non tender,  normoactive bowel sounds   Neuro:  Neuro exam non focal         Primary Care Physician   Yaima Muse    Discharge Orders      Adult GI  Referral - Procedure Only      Follow-up and recommended labs and tests     Follow up with primary care provider, Yaima Muse, within 7 days for hospital follow- up.  The following labs/tests are recommended: cbc/bmp.     Activity    Your activity upon discharge: activity as tolerated     Reason for your  hospital stay    Possible colitis/NVD/hematemisis     Diet    Follow this diet upon discharge: Orders Placed This Encounter      Combination Diet Moderate Consistent Carb (60 g CHO per Meal) Diet; No Caffeine Diet, Low Saturated Fat Na <2400mg Diet  Graves diet for now       Significant Results and Procedures   Most Recent 3 CBC's:  Recent Labs   Lab Test 03/30/24  1219 03/30/24  0531 03/30/24  0153   WBC 8.2 7.4 8.0   HGB 11.0* 10.6* 10.2*   MCV 83 85 83    224 215     Most Recent 3 BMP's:  Recent Labs   Lab Test 03/30/24  1147 03/30/24  1012 03/30/24  0743 03/30/24  0531 03/29/24  2057 03/29/24  1918 03/29/24  0609 03/29/24  0415   NA  --   --   --  142  --  136  --  132*   POTASSIUM  --   --   --  4.3  --  3.6  --  4.7   CHLORIDE  --   --   --  111*  --  102  --  102   CO2  --   --   --  21*  --  22  --  21*   BUN  --   --   --  12.9  --  18.3  --  22.3*   CR  --   --   --  0.74  --  0.83  --  0.78   ANIONGAP  --   --   --  10  --  12  --  9   LUNA  --   --   --  9.0  --  9.7  --  9.6   * 318* 235* 187*   < > 139*   < > 364*    < > = values in this interval not displayed.   ,   Results for orders placed or performed during the hospital encounter of 03/29/24   CT ABDOMEN PELVIS W CONTRAST    Narrative    EXAM: CT ABDOMEN PELVIS W CONTRAST  LOCATION: [facility]  DATE: 03/29/2024    INDICATION: Return within 24, hematemesis  COMPARISON: 07/06/2023  TECHNIQUE: CT scan of the abdomen and pelvis was performed following injection of IV contrast. Multiplanar reformats were obtained. Dose reduction techniques were used.  CONTRAST: 90 mL Isovue 370    FINDINGS:   LOWER CHEST: Normal.    HEPATOBILIARY: Status post cholecystectomy.    PANCREAS: Normal.    SPLEEN: Normal.    ADRENAL GLANDS: Normal.    KIDNEYS/BLADDER: Normal.    BOWEL: Long-segment mural thickening of the distal transverse colon consistent with colitis, correlate for infectious and inflammatory etiologies. No free  air or fluid. Normal appendix.    LYMPH NODES: Normal.    VASCULATURE: Mild atherosclerotic vascular calcifications.    PELVIC ORGANS: Normal.    MUSCULOSKELETAL: Normal.      Impression    IMPRESSION:   1.  Distal transverse colitis - correlate for infectious, inflammatory, and less likely ischemic etiologies.  2.  Status post cholecystectomy.  3.  Atherosclerotic vascular calcifications.     *Note: Due to a large number of results and/or encounters for the requested time period, some results have not been displayed. A complete set of results can be found in Results Review.       Discharge Medications   Current Discharge Medication List        START taking these medications    Details   ondansetron (ZOFRAN ODT) 4 MG ODT tab Take 1 tablet (4 mg) by mouth every 6 hours as needed for nausea or vomiting  Qty: 15 tablet, Refills: 0    Associated Diagnoses: Nausea and vomiting, unspecified vomiting type      pantoprazole (PROTONIX) 40 MG EC tablet Take 1 tablet (40 mg) by mouth 2 times daily  Qty: 60 tablet, Refills: 0    Associated Diagnoses: Hematemesis, unspecified whether nausea present           CONTINUE these medications which have NOT CHANGED    Details   Ascorbic Acid (VITAMIN C) 100 MG CHEW Take 1 chew tab by mouth daily      Continuous Blood Gluc Sensor (FREESTYLE MAXI 3 SENSOR) MISC 1 each every 14 days Use 1 sensor every 14 days.  Qty: 2 each, Refills: 5    Associated Diagnoses: Type 2 diabetes mellitus with hyperglycemia, with long-term current use of insulin (H); Type 2 diabetes mellitus with other circulatory complication, with long-term current use of insulin (H)      DULoxetine (CYMBALTA) 20 MG capsule Take 1 tablet once daily for 1 week, then increase to 1 tablet twice daily  Qty: 60 capsule, Refills: 1    Associated Diagnoses: Moderate major depression (H)      insulin aspart (NOVOLOG FLEXPEN) 100 UNIT/ML pen Novolog Flexpen. Inject 1 units per 10 gram carb unit before dinner, up to 15 units per  meal.  Qty: 15 mL, Refills: 3    Associated Diagnoses: Type 2 diabetes mellitus with hyperglycemia, with long-term current use of insulin (H)      insulin glargine 100 UNIT/ML pen Inject 42 Units Subcutaneous every morning  Qty: 45 mL, Refills: 1    Comments: If Lantus is not covered by insurance, may substitute Basaglar or Semglee or other insulin glargine product per insurance preference at same dose and frequency.    Associated Diagnoses: Type 2 diabetes mellitus with hyperglycemia, with long-term current use of insulin (H)      !! insulin pen needle (31G X 8 MM) 31G X 8 MM miscellaneous 1 Box of 100 insulin pen needles to be dispensed with every insulin pen prescription  Qty: 100 each, Refills: 3    Associated Diagnoses: Type 2 diabetes mellitus with hyperglycemia, with long-term current use of insulin (H)      !! insulin pen needle (NOVOFINE) 32G X 6 MM miscellaneous Use once daily or as directed.  Qty: 100 each, Refills: 3    Associated Diagnoses: Type 2 diabetes mellitus with hyperglycemia, with long-term current use of insulin (H)      lisinopril (ZESTRIL) 5 MG tablet Take 1 tablet (5 mg) by mouth daily  Qty: 90 tablet, Refills: 3    Comments: Note new dose  Associated Diagnoses: NSTEMI (non-ST elevated myocardial infarction) (H)      metFORMIN (GLUCOPHAGE XR) 500 MG 24 hr tablet Take 2 tablets (1,000 mg) by mouth 2 times daily (with meals)  Qty: 372 tablet, Refills: 3    Associated Diagnoses: Type 2 diabetes mellitus with hyperglycemia, with long-term current use of insulin (H)      metoprolol tartrate (LOPRESSOR) 25 MG tablet Take 1 tablet (25 mg) by mouth 2 times daily  Qty: 186 tablet, Refills: 3    Associated Diagnoses: NSTEMI (non-ST elevated myocardial infarction) (H)      Multiple Vitamins-Minerals (MULTI-VITAMIN GUMMIES) CHEW Take 1 chew tab by mouth daily       oxyCODONE (ROXICODONE) 5 MG tablet Take 1 tablet (5 mg) by mouth daily as needed for severe pain  Qty: 30 tablet, Refills: 0    Associated  Diagnoses: Closed fracture of right ankle, initial encounter      rosuvastatin (CRESTOR) 20 MG tablet Take 1 tablet (20 mg) by mouth daily  Qty: 93 tablet, Refills: 3    Associated Diagnoses: NSTEMI (non-ST elevated myocardial infarction) (H)      tiZANidine (ZANAFLEX) 4 MG tablet TAKE ONE TABLET BY MOUTH THREE TIMES A DAY  Qty: 270 tablet, Refills: 3    Associated Diagnoses: Mechanical back pain      blood glucose (NO BRAND SPECIFIED) test strip Use to test blood sugar up to 4 times daily or as directed. To accompany: Blood Glucose Monitor Brands: per insurance.  Qty: 200 strip, Refills: 6    Associated Diagnoses: Type 2 diabetes mellitus with hyperglycemia, with long-term current use of insulin (H)      blood glucose calibration (NO BRAND SPECIFIED) solution To accompany: Blood Glucose Monitor Brands: per insurance.  Qty: 1 Bottle, Refills: 3    Associated Diagnoses: Type 2 diabetes mellitus with hyperglycemia, with long-term current use of insulin (H)      blood glucose monitoring (FREESTYLE) lancets Use to test blood sugars 1-2 times daily or as directed, per patients glucose meter.  Qty: 3 Box, Refills: 3    Associated Diagnoses: Type 2 diabetes mellitus with hyperglycemia, with long-term current use of insulin (H)      Blood Glucose Monitoring Suppl (ACCU-CHEK COMPLETE) KIT 1 Device daily  Qty: 1 Device, Refills: 0    Associated Diagnoses: Type 2 diabetes, HbA1c goal < 7% (H)      nitroGLYcerin (NITROSTAT) 0.4 MG sublingual tablet For chest pain place 1 tablet under the tongue every 5 minutes for 3 doses. If symptoms persist 5 minutes after 1st dose call 911.  Qty: 25 tablet, Refills: 0    Associated Diagnoses: Type 2 diabetes mellitus with hyperglycemia, with long-term current use of insulin (H); Hyperlipidemia LDL goal <100       !! - Potential duplicate medications found. Please discuss with provider.        STOP taking these medications       aspirin (ASA) 325 MG EC tablet Comments:   Reason for Stopping:              Allergies   Allergies   Allergen Reactions    Amoxicillin Hives    Hydrocodone Nausea and Vomiting    Hydrocodone-Acetaminophen GI Disturbance     Tylenol is ok

## 2024-03-30 NOTE — ED NOTES
Pt up to ambulate to restroom - tolerated well. Urine collected and sent to lab. RN chaperone for rectal exam. Continues to c/o lower abd pain intermittently.

## 2024-03-30 NOTE — MEDICATION SCRIBE - ADMISSION MEDICATION HISTORY
Medication Scribe Admission Medication History    Admission medication history is complete. The information provided in this note is only as accurate as the sources available at the time of the update.    Information Source(s): Patient via phone    Pertinent Information: Med scribe review post RN review. Confirmed with patient regarding Freestyle maxi and last dose of Novolog Insulin     Changes made to PTA medication list:  Added: None  Deleted: None  Changed: Novolog insulin changed from before dinner to before every meal as per patient's report     Allergies reviewed with patient and updates made in EHR: yes    Medication History Completed By: EL PELLETIER 3/30/2024 12:34 PM    PTA Med List   Medication Sig Last Dose    Ascorbic Acid (VITAMIN C) 100 MG CHEW Take 1 chew tab by mouth daily More than a month    aspirin (ASA) 325 MG EC tablet Take 1 tablet (325 mg) by mouth daily 3/29/2024 at AM    Continuous Blood Gluc Sensor (FREESTYLE MAXI 3 SENSOR) MISC 1 each every 14 days Use 1 sensor every 14 days. 3/29/2024 at pm - replaced    DULoxetine (CYMBALTA) 20 MG capsule Take 1 tablet once daily for 1 week, then increase to 1 tablet twice daily (Patient taking differently: Take 20 mg by mouth daily Take 1 tablet once daily for 1 week, then increase to 1 tablet twice daily) 3/29/2024 at AM    insulin aspart (NOVOLOG FLEXPEN) 100 UNIT/ML pen Novolog Flexpen. Inject 1 units per 10 gram carb unit before dinner, up to 15 units per meal. (Patient taking differently: Novolog Flexpen. Inject 1 units per 10 gram carb unit before meals, up to 15 units per meal.) 3/29/2024 at evening- 11 units    insulin glargine 100 UNIT/ML pen Inject 42 Units Subcutaneous every morning 3/29/2024 at AM    insulin pen needle (31G X 8 MM) 31G X 8 MM miscellaneous 1 Box of 100 insulin pen needles to be dispensed with every insulin pen prescription     insulin pen needle (NOVOFINE) 32G X 6 MM miscellaneous Use once daily or as directed.      lisinopril (ZESTRIL) 5 MG tablet Take 1 tablet (5 mg) by mouth daily 3/29/2024 at AM    metFORMIN (GLUCOPHAGE XR) 500 MG 24 hr tablet Take 2 tablets (1,000 mg) by mouth 2 times daily (with meals) 3/29/2024 at AM    metoprolol tartrate (LOPRESSOR) 25 MG tablet Take 1 tablet (25 mg) by mouth 2 times daily 3/29/2024 at AM    Multiple Vitamins-Minerals (MULTI-VITAMIN GUMMIES) CHEW Take 1 chew tab by mouth daily  More than a month    oxyCODONE (ROXICODONE) 5 MG tablet Take 1 tablet (5 mg) by mouth daily as needed for severe pain 3/29/2024 at 1900    rosuvastatin (CRESTOR) 20 MG tablet Take 1 tablet (20 mg) by mouth daily 3/29/2024 at AM    tiZANidine (ZANAFLEX) 4 MG tablet TAKE ONE TABLET BY MOUTH THREE TIMES A DAY 3/29/2024 at AM

## 2024-03-30 NOTE — ED NOTES
Pt blood glucose per own meter is 137. Pt has been up to restroom, appears to be more comfortable. Provided warm blankets. VS and cardiac monitoring. Meds all administered per MAR. Will continue to monitor pt. SO at bedside.

## 2024-03-30 NOTE — ED NOTES
Pt states pain is tolerable but is intermittently moderate/severe pain. Pt appears more comfortable. Pt administered insulin per own supply at home PTA - pt reports blood glucose reading of 54 per own glucose monitor, RN administered orange juice and apple juice with crackers for pt. Will continue to monitor. Pt is going to CT.

## 2024-03-30 NOTE — ED NOTES
Pt c/o L anterior chest pain, worsening abd pain, shaking/cold and not feeling well after CT scan. Blood glucose reading on pt monitor 52. Bedside poct glucose completed by EDT as RN administering dextrose per MAR for hypoglycemia. Pt had already finished oj and apple juice continues with hypoglycemia. IV fluids are complete. VS and cardiac monitoring.

## 2024-03-30 NOTE — DISCHARGE INSTRUCTIONS
2 home insulin pens, Basaglar and Novolog.  Home oxycodone/meds stored in pharmacy.     Writer sent medications home with patient - AB, RN

## 2024-03-30 NOTE — PROGRESS NOTES
"PRIMARY DIAGNOSIS: \"GENERIC\" NURSING  OUTPATIENT/OBSERVATION GOALS TO BE MET BEFORE DISCHARGE:  ADLs back to baseline: Yes    Activity and level of assistance: Up with standby assistance.    Pain status: Improved-controlled with oral pain medications.    Return to near baseline physical activity: Yes     Discharge Planner Nurse   Safe discharge environment identified: Yes  Barriers to discharge: Yes       Entered by: Bindu Gregorio RN 03/30/2024 4:12 AM     Please review provider order for any additional goals.   Nurse to notify provider when observation goals have been met and patient is ready for discharge.        Pt c/o abdominal pain, prn Oxy x1 and scheduled Zanaflex given with some relief. Pt denies nausea, emesis sample not collected at this time.Pt ambulating SBA for safety. Pt denies any dizziness with ambulation.     Bg 76, juice given and recheck 167. D5NS infusing at 100ml/hr.   "

## 2024-03-30 NOTE — PROGRESS NOTES
S-(situation): Patient registered to Observation. Patient arrived to room 272 via Wheelchair from ED    B-(background): nausea, vomiting, acute colitis, hypoglycemia    A-(assessment): Pt c/o moderate abdominal pain. Denies nausea at this time. Pt ambulating SBA. BG 82. Pt tolerating PO intake and fluids. VSS on room air.     R-(recommendations): Orders and observation goals reviewed with Patient    Nursing Observation criteria listed below was met:    Skin issues/needs documented:Yes  Isolation needs addressed and Signage up: NA  Fall Prevention: Education given and documented: NA  Education Assessment documented:Yes  Admission Education Documented: Yes  New medication patient education completed and documented (Possible Side Effects of Common Medications handout): Yes  OBS video/handout Reviewed & Documented: Yes  Allergies Reviewed: Yes  Medication Reconciliation Complete: Yes  Home medications if not able to send immediately home with family stored here: Yes - home insulin labeled for hospital use and home medications stored in pharmacy  Reminder note placed in discharge instructions of home meds: Yes  Patient has discharge needs (If yes, please explain): No  Patient discharge preferences addressed and charted on white board:  Yes  Provider notified that patient has arrived to the unit: Yes

## 2024-03-30 NOTE — H&P
[facility]    History and Physical - Hospitalist Service       Date of Admission:  3/29/2024    Assessment & Plan      Stacy Brown is a 48 year old female admitted on 3/29/2024. She presented to the ED with a syncopal episode. She was evaluated in the ED and no abnormalities were found but she endorses nausea and vomiting that is not well controlled.     Intractable nausea/vomiting  Colitis  Hematemesis  She developed some nausea/vomiting with some hematemesis and was found to have colitis on a repeat CT. She had some blood on her rectal exam but her Hemoglobin is stable. Given her recents stressful life event, perhaps she has developed a stress ulcer and might need an endoscopy.   - observation status  - type and screen  - trend Hgb/hematocrit  - start on pantoprazole BID  - pain control  - c/w IVF  - symptomatic management    Syncope  She presented with syncope initially. She had a full evaluation and no cardiac etiology or neurology etiology was found. She had a normal echocardiogram and her troponin did not rise. Given that it occurred after a large bowel movement, I am leaning towards a vasovagal event. She might have some type of conversion disorder contributing too because she was recently fired from a job she has held for over 20 years and was very emotional prior to the event.   - monitor for now    CAD s/ p CABG  - c/w aspirin  - c/w rosuvastatin  - c/w metoprolol  - nitroglycerin SL prn    T2DM  She had some hypoglycemia today because she took her insulin but did not eat much food. Otherwise she was hyperglycemic when she first presented.   - c/w metformin  - c/w glargine 42 units daily  - c/w insulin sliding scale  - diabetic diet    Chronic Opiate Dependence  Chronic pain  - c/w tizanidine  - c/w oxycodone    Hypertension  - c/w lisinopril    Depression  As described above, she is likely having some form of decompensation of her depression given the very stressful  "change in her life, losing her job.   - c/w duloxetine  - set up with her psychiatrist on discharge         Diet: Combination Diet Moderate Consistent Carb (60 g CHO per Meal) Diet; No Caffeine Diet, Low Saturated Fat Na <2400mg Diet  DVT Prophylaxis: Low Risk/Ambulatory with no VTE prophylaxis indicated  Schultz Catheter: Not present  Lines: None     Code Status: Full Code    Clinically Significant Risk Factors Present on Admission                # Drug Induced Platelet Defect: home medication list includes an antiplatelet medication   # Hypertension: Noted on problem list      # Obesity: Estimated body mass index is 31.49 kg/m  as calculated from the following:    Height as of this encounter: 1.676 m (5' 6\").    Weight as of this encounter: 88.5 kg (195 lb 1.6 oz).       # Financial/Environmental Concerns:     # History of CABG: noted on surgical history       Disposition Plan      Expected Discharge Date: 03/30/2024                The patient's care was discussed with the Bedside Nurse and Patient.        Tj Roach MD  [facility]  Securely message with the Vocera Web Console (learn more here)  Text page via [facility] Paging/Directory      Visit/Communication Style   Virtual (Video) communication was used to evaluate Stacy.  Stacy consented to the use of video communication: yes  Video START time: 2320, 3/29/2024  Video STOP time: 2350, 3/29/2024   Patient's location: [facility]   Provider's location during the visit: [facility] site        ______________________________________________________________________    Chief Complaint   Syncope, Nausea/vomiting, hematemesis    History is obtained from the patient    History of Present Illness   Stacy Brown is a 48 year old female who presented to the ED today around 3am. She lost her job of 20 years and has been very upset and depressed about it. She states that she started to have some " abdominal pain that was lower abdominal pain that radiated to her pelvis. She had some nausea and vomited once. She went to the bathroom and had a large bowel movement. She felt lightheaded, called out to her  and then lost consciousness. She was laid down on the floor and was unconscious for 15 minutes. EMS were called and she was told that her BP as low. She denied any chest pain or palpitations. After a thorough investigation, including a normal echocardiogram, no elevation in cardiac enzymes and no neurologic findings consistent with stroke, she was discharged home and was doing well until around 1800 when her abdominal pain came back again and she had hematemesis. She came back to the ED and was evaluated for hematemesis this time. Dr. Knight examined her and noted that she had some blood in her rectal vault, but no vaginal bleeding. Repeat imaging showed that she has transverse colitis. She denies any epigastric pain, SOB, chest pain, fevers, cough, rhinorrhea or neurologic changes.     Review of Systems    General: Positive for chills, negative for fever, sweats, weakness  Eyes: negative for blurred vision, loss of vision  Ear Nose and Throat: negative for pharyngitis, speech or swallowing difficulties  Respiratory:  negative for sputum production, wheezing, METZGER, pleuritic pain, sob or cough  Cardiology:  negative for chest pain, palpitations, orthopnea, PND, edema, syncope   Gastrointestinal: Positive for abdominal pain, nausea, vomiting and hematemesis. Denies diarrhea, constipation, melena or hematochezia  Genitourinary: negative for frequency, urgency, dysuria, hematuria   Neurological: Positive for lightheadedness. Negative for focal weakness, paresthesia    Past Medical History    I have reviewed this patient's medical history and updated it with pertinent information if needed.   Past Medical History:   Diagnosis Date    CAD (coronary artery disease)     Knee pain, chronic     Mixed  hyperlipidemia     NSTEMI (non-ST elevated myocardial infarction) (H) 03/05/2021    S/P CABG (coronary artery bypass graft) 03/16/2021    Tobacco abuse disorder 11/21/2017    Type II or unspecified type diabetes mellitus without mention of complication, not stated as uncontrolled 08/16/2002    diagnosed 8/16/02, started insulin 2006       Past Surgical History   I have reviewed this patient's surgical history and updated it with pertinent information if needed.  Past Surgical History:   Procedure Laterality Date    BYPASS GRAFT ARTERY CORONARY N/A 3/9/2021    Procedure: CORONARY ARTERY BYPASS GRAFT X 4 (LIMA - LAD, SV - RPL, SV - PDA,  RA - OM) LEFT RADIAL ENDOARTERY HARVEST AND BILATERAL LEG ENDOVEIN HARVEST (ON CARDIOPULMONARY PUMP OXYGENATOR ; INTRAOPERATIVE TRANSESOPHAGEAL ECHOCARDIOGRAM BY ANESTHESIOLOGIST DR. NEL AVILA)   ;  Surgeon: Kunal Selby MD;  Location:  OR    CV HEART CATHETERIZATION WITH POSSIBLE INTERVENTION N/A 3/8/2021    Procedure: Heart Catheterization with Possible Intervention;  Surgeon: Vadim Kamara MD;  Location:  HEART CARDIAC CATH LAB    HC OPEN TX METATARSAL FRACTURE  age 12    softball injury,open fracture left foot    HC TOOTH EXTRACTION W/FORCEP      Extract wisdom teeth    INJECT JOINT SACROILIAC Left 1/11/2018    Procedure: INJECT JOINT SACROILIAC;  INJECT JOINT SACROILIAC LEFT;  Surgeon: Alan Marshall MD;  Location: [facility] OR    LAPAROSCOPIC CHOLECYSTECTOMY N/A 2/13/2023    Procedure: CHOLECYSTECTOMY, LAPAROSCOPIC;  Surgeon: Robert Diop DO;  Location: [facility] OR    OPERATIVE HYSTEROSCOPY WITH MORCELLATOR N/A 7/24/2018    Procedure: OPERATIVE HYSTEROSCOPY WITH MORCELLATOR (MYOSURE);  Exam under anesthesia, operative hysteroscopy, polypectomy, D & C;  Surgeon: Sindhu Peterson DO;  Location: [facility] OR    TUBAL LIGATION  7/27/2006    ZZC STABISM SURG,PREV EYE SURG,NOT MUSC      Right       Social History   I have reviewed this patient's social  history and updated it with pertinent information if needed.  Social History     Tobacco Use    Smoking status: Former     Packs/day: 0.50     Years: 6.00     Additional pack years: 0.00     Total pack years: 3.00     Types: Cigarettes     Quit date: 3/5/2021     Years since quitting: 3.0    Smokeless tobacco: Never   Vaping Use    Vaping Use: Never used   Substance Use Topics    Alcohol use: Yes     Alcohol/week: 0.0 standard drinks of alcohol     Comment: once a month    Drug use: No       Family History   I have reviewed this patient's family history and updated it with pertinent information if needed.  Family History   Problem Relation Age of Onset    Allergies Mother     Lipids Father         cholesterol    Diabetes Maternal Grandmother     Hypertension Maternal Grandmother     Heart Disease Maternal Grandmother         Bypass    Cancer Maternal Grandfather         Lung - metastatic    Alzheimer Disease Paternal Grandmother     Heart Disease Paternal Grandmother         valve replacement    Cerebrovascular Disease Paternal Grandfather     Anesthesia Reaction No family hx of     Colon Cancer No family hx of        Prior to Admission Medications   Prior to Admission Medications   Prescriptions Last Dose Informant Patient Reported? Taking?   Ascorbic Acid (VITAMIN C) 100 MG CHEW More than a month Self Yes Yes   Sig: Take 1 chew tab by mouth daily   Blood Glucose Monitoring Suppl (ACCU-CHEK COMPLETE) KIT  Self No Yes   Si Device daily   Continuous Blood Gluc Sensor (FREESTYLE MAXI 3 SENSOR) [facility]   No Yes   Si each every 14 days Use 1 sensor every 14 days.   DULoxetine (CYMBALTA) 20 MG capsule 3/29/2024 at AM Self No Yes   Sig: Take 1 tablet once daily for 1 week, then increase to 1 tablet twice daily   Patient taking differently: Take 20 mg by mouth daily Take 1 tablet once daily for 1 week, then increase to 1 tablet twice daily   Multiple Vitamins-Minerals (MULTI-VITAMIN GUMMIES) CHEW More than a month  Self Yes Yes   Sig: Take 1 chew tab by mouth daily    aspirin (ASA) 325 MG EC tablet 3/29/2024 at AM Self No Yes   Sig: Take 1 tablet (325 mg) by mouth daily   blood glucose (NO BRAND SPECIFIED) test strip  Self No Yes   Sig: Use to test blood sugar up to 4 times daily or as directed. To accompany: Blood Glucose Monitor Brands: per insurance.   blood glucose calibration (NO BRAND SPECIFIED) solution  Self No Yes   Sig: To accompany: Blood Glucose Monitor Brands: per insurance.   blood glucose monitoring (FREESTYLE) lancets  Self No Yes   Sig: Use to test blood sugars 1-2 times daily or as directed, per patients glucose meter.   insulin aspart (NOVOLOG FLEXPEN) 100 UNIT/ML pen  Self No Yes   Sig: Novolog Flexpen. Inject 1 units per 10 gram carb unit before dinner, up to 15 units per meal.   insulin glargine 100 UNIT/ML pen 3/29/2024 at AM  No Yes   Sig: Inject 42 Units Subcutaneous every morning   insulin pen needle (31G X 8 MM) 31G X 8 MM miscellaneous  Self No Yes   Si Box of 100 insulin pen needles to be dispensed with every insulin pen prescription   insulin pen needle (NOVOFINE) 32G X 6 MM miscellaneous  Self No Yes   Sig: Use once daily or as directed.   lisinopril (ZESTRIL) 5 MG tablet 3/29/2024 at AM Self No Yes   Sig: Take 1 tablet (5 mg) by mouth daily   metFORMIN (GLUCOPHAGE XR) 500 MG 24 hr tablet 3/29/2024 at AM Self No Yes   Sig: Take 2 tablets (1,000 mg) by mouth 2 times daily (with meals)   metoprolol tartrate (LOPRESSOR) 25 MG tablet 3/29/2024 at AM Self No Yes   Sig: Take 1 tablet (25 mg) by mouth 2 times daily   nitroGLYcerin (NITROSTAT) 0.4 MG sublingual tablet  Self No No   Sig: For chest pain place 1 tablet under the tongue every 5 minutes for 3 doses. If symptoms persist 5 minutes after 1st dose call 911.   Patient not taking: Reported on 2024   oxyCODONE (ROXICODONE) 5 MG tablet 3/29/2024 at 1900  No Yes   Sig: Take 1 tablet (5 mg) by mouth daily as needed for severe pain    rosuvastatin (CRESTOR) 20 MG tablet 3/29/2024 at AM Self No Yes   Sig: Take 1 tablet (20 mg) by mouth daily   tiZANidine (ZANAFLEX) 4 MG tablet 3/29/2024 at AM  No Yes   Sig: TAKE ONE TABLET BY MOUTH THREE TIMES A DAY      Facility-Administered Medications: None     Allergies   Allergies   Allergen Reactions    Amoxicillin Hives    Hydrocodone Nausea and Vomiting    Hydrocodone-Acetaminophen GI Disturbance     Tylenol is ok       Physical Exam   Vital Signs: Temp: 98.8  F (37.1  C) Temp src: Oral BP: (!) 176/78 Pulse: 95   Resp: 16 SpO2: 100 % O2 Device: None (Room air)    Weight: 195 lbs 1.6 oz    Gen:  Well-developed, well-nourished, in no acute distress, lying semi-supine in hospital stretcher  HEENT:  Anicteric sclera, PER, hearing intact to voice  Resp:  No accessory muscle use, breath sounds clear; no wheezes no rales no rhonchi  Card:  No murmur, normal S1, S2   Abd:  Soft per RN exam, TTP in the lower abdominal region, non-distended, normoactive bowel sounds are present  Musc:  Normal strength and movement of the major muscle groups without obvious deformity  Psych:  Good insight, oriented to person, place and time, not anxious, not agitated    Data     Recent Labs   Lab 03/29/24  2325 03/29/24  2235 03/29/24  2057 03/29/24  1918 03/29/24  0609 03/29/24  0415   WBC  --   --   --  9.2  --  10.4   HGB  --   --   --  12.4  --  12.3   MCV  --   --   --  82  --  83   PLT  --   --   --  292  --  239   INR  --   --   --  0.98  --  1.03   NA  --   --   --  136  --  132*   POTASSIUM  --   --   --  3.6  --  4.7   CHLORIDE  --   --   --  102  --  102   CO2  --   --   --  22  --  21*   BUN  --   --   --  18.3  --  22.3*   CR  --   --   --  0.83  --  0.78   ANIONGAP  --   --   --  12  --  9   LUNA  --   --   --  9.7  --  9.6   GLC 76 82 78 139*   < > 364*   ALBUMIN  --   --   --  4.5  --   --    PROTTOTAL  --   --   --  7.7  --   --    BILITOTAL  --   --   --  0.3  --   --    ALKPHOS  --   --   --  105  --   --    ALT   --   --   --  17  --   --    AST  --   --   --  14  --   --    LIPASE  --   --   --  22  --   --     < > = values in this interval not displayed.         Recent Results (from the past 24 hour(s))   CT Chest (PE) Abdomen Pelvis w Contrast    Narrative    EXAM: CT CHEST PE ABDOMEN PELVIS W CONTRAST  LOCATION: [facility]  DATE: 3/29/2024    INDICATION: near syncope   D dimer 4.8 concern for arterial pathology  elevated dimer w  abdo pain with near syncope and hypotensive  COMPARISON: None.  TECHNIQUE: CT chest pulmonary angiogram and routine CT abdomen pelvis with IV contrast. Arterial phase through the chest and venous phase through the abdomen and pelvis. Multiplanar reformats and MIP reconstructions were performed. Dose reduction   techniques were used.   CONTRAST: 86mL Isovue 370    FINDINGS:  ANGIOGRAM CHEST: Pulmonary arteries are normal caliber and negative for pulmonary emboli. Thoracic aorta is negative for dissection. No CT evidence of right heart strain.     LUNGS AND PLEURA: Lungs are clear. No pleural effusions.    MEDIASTINUM/AXILLAE: No lymphadenopathy. No thoracic aortic aneurysms. Sternotomy with post-CABG changes. No pericardial effusion. Visualized esophagus appears within normal limits. No thyroid masses. Heterogeneous appearance of the thyroid may suggest   multinodular goiter.    CORONARY ARTERY CALCIFICATION: Previous intervention (stents or CABG).    HEPATOBILIARY: Diffuse hepatic steatosis. No significant mass. No bile duct dilatation. Gallbladder is surgically absent.    PANCREAS: No significant mass, duct dilatation, or inflammatory change.    SPLEEN: Normal.    ADRENAL GLANDS: Normal.    KIDNEYS/BLADDER: Kidneys enhance symmetrically without evidence for pyelonephritis. No significant mass, stone, or hydronephrosis. The ureters and urinary bladder are unremarkable. Left pelvic phleboliths.    BOWEL: Nonspecific nonobstructive bowel gas pattern. Appendix  appears within normal limits.    LYMPH NODES: No lymphadenopathy.    VASCULATURE: No abdominal aortic aneurysm. Mild atheromatous and calcified plaques abdominal aorta and common iliac arteries.    PELVIC ORGANS: No pelvic masses.    MUSCULOSKELETAL: No concerning osseous abnormalities. Sternotomy. No inguinal or ventral hernias.      Impression    IMPRESSION:  1.  No pulmonary embolism.  2.  No acute findings in the chest, abdomen or pelvis.  3.  Diffuse hepatic steatosis.     Echocardiogram Complete   Result Value    LVEF  55%    [facility]    [id number]  [id number]  [id number]  [id number]^CHAUNCEY^ROSE     [facility]  Echocardiography Laboratory  [address] [place] [address]     Name: SARAH PHELPS  MRN: [id number]  : 1975  Study Date: 2024 07:43 AM  Age: 48 yrs  Gender: Female  Patient Location: [facility]  Reason For Study: Abn EKG  History: dm, hyperlipidemia, htn  Ordering Physician: ROSE GROSSMAN  Referring Physician: zain Muse  Performed By: Jacklyn Gilliland     BSA: 1.9 m2  Height: 66 in  Weight: 181 lb  HR: 60  BP: 120/64 mmHg  ______________________________________________________________________________  Procedure  Complete Portable Echo Adult. Optison (NDC #[id number]) given intravenously.  ______________________________________________________________________________  Interpretation Summary     1. The left ventricle is normal in structure, function and size. The visual  ejection fraction is estimated at 55%.  2. The right ventricle is normal in structure, function and size.  3. No valve disease.     No changes from echo 3/2021.  ______________________________________________________________________________  Left Ventricle  The left ventricle is normal in structure, function and size. There is normal  left ventricular wall thickness. The visual ejection fraction is estimated at  55%. Left ventricular diastolic function is normal. Normal left  ventricular  wall motion.     Right Ventricle  The right ventricle is normal in structure, function and size.     Atria  Normal left atrial size. Right atrial size is normal. There is no atrial shunt  seen.     Mitral Valve  There is no mitral regurgitation noted.     Tricuspid Valve  The tricuspid valve is normal in structure and function.     Aortic Valve  The aortic valve is normal in structure and function.     Pulmonic Valve  The pulmonic valve is not well seen, but is grossly normal.     Vessels  Normal ascending, transverse (arch), and descending aorta. The inferior vena  cava was normal in size with preserved respiratory variability.     Pericardium  There is no pericardial effusion.     Rhythm  Sinus rhythm was noted.  ______________________________________________________________________________  MMode/2D Measurements & Calculations  IVSd: 1.1 cm     LVIDd: 4.7 cm  LVIDs: 3.0 cm  LVPWd: 1.1 cm  FS: 35.9 %  LV mass(C)d: 181.2 grams  LV mass(C)dI: 94.5 grams/m2  Ao root diam: 2.8 cm  LA dimension: 3.2 cm  asc Aorta Diam: 2.8 cm  LA/Ao: 1.1  Ao root diam index Ht(cm/m): 1.7  Ao root diam index BSA (cm/m2): 1.5  Asc Ao diam index BSA (cm/m2): 1.5  Asc Ao diam index Ht(cm/m): 1.7  LA Volume (BP): 27.4 ml     LA Volume Index (BP): 14.3 ml/m2  RWT: 0.46  TAPSE: 2.4 cm     Doppler Measurements & Calculations  MV E max dejuan: 92.1 cm/sec  MV A max dejuan: 83.1 cm/sec  MV E/A: 1.1  MV dec time: 0.27 sec  Ao V2 max: 167.5 cm/sec  Ao max P.0 mmHg  LV V1 max P.0 mmHg  LV V1 max: 99.4 cm/sec  PA V2 max: 97.1 cm/sec  PA max PG: 3.8 mmHg  PA acc time: 0.18 sec  PI end-d dejuan: 92.3 cm/sec  TR max dejuan: 255.0 cm/sec  TR max P.0 mmHg  AV Dejuan Ratio (DI): 0.59  Medial E/e': 6.5  RV S Dejuan: 9.2 cm/sec     ______________________________________________________________________________  Report approved by: Eyal Leigh 2024 08:57 AM         CT ABDOMEN PELVIS W CONTRAST    Narrative    EXAM: CT ABDOMEN PELVIS  W CONTRAST  LOCATION: [facility]  DATE: 03/29/2024    INDICATION: Return within 24, hematemesis  COMPARISON: 07/06/2023  TECHNIQUE: CT scan of the abdomen and pelvis was performed following injection of IV contrast. Multiplanar reformats were obtained. Dose reduction techniques were used.  CONTRAST: 90 mL Isovue 370    FINDINGS:   LOWER CHEST: Normal.    HEPATOBILIARY: Status post cholecystectomy.    PANCREAS: Normal.    SPLEEN: Normal.    ADRENAL GLANDS: Normal.    KIDNEYS/BLADDER: Normal.    BOWEL: Long-segment mural thickening of the distal transverse colon consistent with colitis, correlate for infectious and inflammatory etiologies. No free air or fluid. Normal appendix.    LYMPH NODES: Normal.    VASCULATURE: Mild atherosclerotic vascular calcifications.    PELVIC ORGANS: Normal.    MUSCULOSKELETAL: Normal.      Impression    IMPRESSION:   1.  Distal transverse colitis - correlate for infectious, inflammatory, and less likely ischemic etiologies.  2.  Status post cholecystectomy.  3.  Atherosclerotic vascular calcifications.
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
## 2024-03-30 NOTE — ED TRIAGE NOTES
Pt here with abdominal pain, blood in stool and emesis.         Triage Assessment (Adult)       Row Name 03/29/24 3276          Triage Assessment    Airway WDL WDL        Respiratory WDL    Respiratory WDL WDL

## 2024-03-30 NOTE — PROGRESS NOTES
S-(situation): Patient discharged to home via personal vehicle with spouse.     B-(background): Observation goals met     A-(assessment): Pt A&Ox4. No N/V/D this shift. VSS. Hgb recheck was 11.0. Still c/o mild, lower abdominal pain that's tolerable. No prn pain medication given this shift. No c/o feeling lightheaded or dizziness. She di have elevated blood sugars and provider made aware. Gets scheduled insulin. Up with [facility].     R-(recommendations): Discharge instructions reviewed with patient. Listed belongings gathered and returned to patient.  Patient Education resolved: Yes  New medications-Pt. Has been educated about reason of use and side effects Yes  Home medications returned to patient Yes  Medication Bin checked and emptied on discharge Yes

## 2024-03-31 ENCOUNTER — MYC REFILL (OUTPATIENT)
Dept: FAMILY MEDICINE | Facility: CLINIC | Age: 49
End: 2024-03-31
Payer: COMMERCIAL

## 2024-03-31 DIAGNOSIS — S82.891A CLOSED FRACTURE OF RIGHT ANKLE, INITIAL ENCOUNTER: ICD-10-CM

## 2024-04-01 ENCOUNTER — APPOINTMENT (OUTPATIENT)
Dept: ULTRASOUND IMAGING | Facility: CLINIC | Age: 49
End: 2024-04-01
Attending: PHYSICIAN ASSISTANT
Payer: COMMERCIAL

## 2024-04-01 ENCOUNTER — TELEPHONE (OUTPATIENT)
Dept: GASTROENTEROLOGY | Facility: CLINIC | Age: 49
End: 2024-04-01
Payer: COMMERCIAL

## 2024-04-01 ENCOUNTER — APPOINTMENT (OUTPATIENT)
Dept: CT IMAGING | Facility: CLINIC | Age: 49
End: 2024-04-01
Attending: PHYSICIAN ASSISTANT
Payer: COMMERCIAL

## 2024-04-01 ENCOUNTER — HOSPITAL ENCOUNTER (EMERGENCY)
Facility: CLINIC | Age: 49
Discharge: HOME | End: 2024-04-01
Attending: PHYSICIAN ASSISTANT | Admitting: PHYSICIAN ASSISTANT
Payer: COMMERCIAL

## 2024-04-01 VITALS
TEMPERATURE: 98.4 F | WEIGHT: 195 LBS | SYSTOLIC BLOOD PRESSURE: 178 MMHG | HEART RATE: 6 BPM | OXYGEN SATURATION: 99 % | DIASTOLIC BLOOD PRESSURE: 71 MMHG | HEIGHT: 66 IN | BODY MASS INDEX: 31.34 KG/M2 | RESPIRATION RATE: 16 BRPM

## 2024-04-01 DIAGNOSIS — K92.0 HEMATEMESIS: Primary | ICD-10-CM

## 2024-04-01 DIAGNOSIS — R10.30 LOWER ABDOMINAL PAIN: ICD-10-CM

## 2024-04-01 LAB
ALBUMIN SERPL BCG-MCNC: 4.5 G/DL (ref 3.5–5.2)
ALBUMIN UR-MCNC: NEGATIVE MG/DL
ALP SERPL-CCNC: 100 U/L (ref 40–150)
ALT SERPL W P-5'-P-CCNC: 16 U/L (ref 0–50)
ANION GAP SERPL CALCULATED.3IONS-SCNC: 12 MMOL/L (ref 7–15)
APPEARANCE UR: CLEAR
AST SERPL W P-5'-P-CCNC: 21 U/L (ref 0–45)
BASOPHILS # BLD AUTO: 0.1 10E3/UL (ref 0–0.2)
BASOPHILS NFR BLD AUTO: 1 %
BILIRUB SERPL-MCNC: 0.3 MG/DL
BILIRUB UR QL STRIP: NEGATIVE
BUN SERPL-MCNC: 13 MG/DL (ref 6–20)
CALCIUM SERPL-MCNC: 10.5 MG/DL (ref 8.6–10)
CHLORIDE SERPL-SCNC: 103 MMOL/L (ref 98–107)
COLOR UR AUTO: NORMAL
CREAT SERPL-MCNC: 0.69 MG/DL (ref 0.51–0.95)
CRP SERPL-MCNC: 13.1 MG/L
DEPRECATED HCO3 PLAS-SCNC: 23 MMOL/L (ref 22–29)
EGFRCR SERPLBLD CKD-EPI 2021: >90 ML/MIN/1.73M2
EOSINOPHIL # BLD AUTO: 0 10E3/UL (ref 0–0.7)
EOSINOPHIL NFR BLD AUTO: 1 %
ERYTHROCYTE [DISTWIDTH] IN BLOOD BY AUTOMATED COUNT: 12.3 % (ref 10–15)
GLUCOSE SERPL-MCNC: 85 MG/DL (ref 70–99)
GLUCOSE UR STRIP-MCNC: NEGATIVE MG/DL
HCT VFR BLD AUTO: 37.9 % (ref 35–47)
HGB BLD-MCNC: 13.1 G/DL (ref 11.7–15.7)
HGB UR QL STRIP: NEGATIVE
IMM GRANULOCYTES # BLD: 0 10E3/UL
IMM GRANULOCYTES NFR BLD: 0 %
KETONES UR STRIP-MCNC: NEGATIVE MG/DL
LEUKOCYTE ESTERASE UR QL STRIP: NEGATIVE
LIPASE SERPL-CCNC: 20 U/L (ref 13–60)
LYMPHOCYTES # BLD AUTO: 1.4 10E3/UL (ref 0.8–5.3)
LYMPHOCYTES NFR BLD AUTO: 16 %
MCH RBC QN AUTO: 27.6 PG (ref 26.5–33)
MCHC RBC AUTO-ENTMCNC: 34.6 G/DL (ref 31.5–36.5)
MCV RBC AUTO: 80 FL (ref 78–100)
MONOCYTES # BLD AUTO: 0.6 10E3/UL (ref 0–1.3)
MONOCYTES NFR BLD AUTO: 6 %
NEUTROPHILS # BLD AUTO: 6.8 10E3/UL (ref 1.6–8.3)
NEUTROPHILS NFR BLD AUTO: 77 %
NITRATE UR QL: NEGATIVE
NRBC # BLD AUTO: 0 10E3/UL
NRBC BLD AUTO-RTO: 0 /100
PH UR STRIP: 7 [PH] (ref 5–7)
PLATELET # BLD AUTO: 142 10E3/UL (ref 150–450)
POTASSIUM SERPL-SCNC: 3.6 MMOL/L (ref 3.4–5.3)
PROT SERPL-MCNC: 7.8 G/DL (ref 6.4–8.3)
RBC # BLD AUTO: 4.74 10E6/UL (ref 3.8–5.2)
RBC URINE: 1 /HPF
SODIUM SERPL-SCNC: 138 MMOL/L (ref 135–145)
SP GR UR STRIP: 1.03 (ref 1–1.03)
SQUAMOUS EPITHELIAL: <1 /HPF
UROBILINOGEN UR STRIP-MCNC: NORMAL MG/DL
WBC # BLD AUTO: 8.8 10E3/UL (ref 4–11)
WBC URINE: <1 /HPF

## 2024-04-01 PROCEDURE — 74177 CT ABD & PELVIS W/CONTRAST: CPT

## 2024-04-01 PROCEDURE — 96375 TX/PRO/DX INJ NEW DRUG ADDON: CPT | Performed by: PHYSICIAN ASSISTANT

## 2024-04-01 PROCEDURE — 96376 TX/PRO/DX INJ SAME DRUG ADON: CPT | Performed by: PHYSICIAN ASSISTANT

## 2024-04-01 PROCEDURE — 81003 URINALYSIS AUTO W/O SCOPE: CPT | Performed by: PHYSICIAN ASSISTANT

## 2024-04-01 PROCEDURE — 250N000009 HC RX 250: Performed by: PHYSICIAN ASSISTANT

## 2024-04-01 PROCEDURE — 86140 C-REACTIVE PROTEIN: CPT | Performed by: PHYSICIAN ASSISTANT

## 2024-04-01 PROCEDURE — 250N000013 HC RX MED GY IP 250 OP 250 PS 637: Performed by: PHYSICIAN ASSISTANT

## 2024-04-01 PROCEDURE — 76830 TRANSVAGINAL US NON-OB: CPT

## 2024-04-01 PROCEDURE — 250N000011 HC RX IP 250 OP 636: Performed by: PHYSICIAN ASSISTANT

## 2024-04-01 PROCEDURE — 36415 COLL VENOUS BLD VENIPUNCTURE: CPT | Performed by: PHYSICIAN ASSISTANT

## 2024-04-01 PROCEDURE — 83690 ASSAY OF LIPASE: CPT | Performed by: PHYSICIAN ASSISTANT

## 2024-04-01 PROCEDURE — 85025 COMPLETE CBC W/AUTO DIFF WBC: CPT | Performed by: PHYSICIAN ASSISTANT

## 2024-04-01 PROCEDURE — 82247 BILIRUBIN TOTAL: CPT | Performed by: PHYSICIAN ASSISTANT

## 2024-04-01 PROCEDURE — 99285 EMERGENCY DEPT VISIT HI MDM: CPT | Performed by: PHYSICIAN ASSISTANT

## 2024-04-01 PROCEDURE — 258N000003 HC RX IP 258 OP 636: Performed by: PHYSICIAN ASSISTANT

## 2024-04-01 PROCEDURE — 96361 HYDRATE IV INFUSION ADD-ON: CPT | Performed by: PHYSICIAN ASSISTANT

## 2024-04-01 PROCEDURE — 96374 THER/PROPH/DIAG INJ IV PUSH: CPT | Mod: 59 | Performed by: PHYSICIAN ASSISTANT

## 2024-04-01 PROCEDURE — 99285 EMERGENCY DEPT VISIT HI MDM: CPT | Mod: 25 | Performed by: PHYSICIAN ASSISTANT

## 2024-04-01 PROCEDURE — 93976 VASCULAR STUDY: CPT

## 2024-04-01 RX ORDER — IOPAMIDOL 755 MG/ML
500 INJECTION, SOLUTION INTRAVASCULAR ONCE
Status: COMPLETED | OUTPATIENT
Start: 2024-04-01 | End: 2024-04-01

## 2024-04-01 RX ORDER — ONDANSETRON 2 MG/ML
4 INJECTION INTRAMUSCULAR; INTRAVENOUS EVERY 30 MIN PRN
Status: DISCONTINUED | OUTPATIENT
Start: 2024-04-01 | End: 2024-04-01 | Stop reason: HOSPADM

## 2024-04-01 RX ORDER — HYDROMORPHONE HYDROCHLORIDE 2 MG/1
2 TABLET ORAL EVERY 6 HOURS PRN
Qty: 5 TABLET | Refills: 0 | Status: SHIPPED | OUTPATIENT
Start: 2024-04-01 | End: 2024-04-04

## 2024-04-01 RX ORDER — HYDROMORPHONE HYDROCHLORIDE 1 MG/ML
0.5 INJECTION, SOLUTION INTRAMUSCULAR; INTRAVENOUS; SUBCUTANEOUS EVERY 30 MIN PRN
Status: COMPLETED | OUTPATIENT
Start: 2024-04-01 | End: 2024-04-01

## 2024-04-01 RX ORDER — HYDROMORPHONE HYDROCHLORIDE 2 MG/1
2 TABLET ORAL ONCE
Status: COMPLETED | OUTPATIENT
Start: 2024-04-01 | End: 2024-04-01

## 2024-04-01 RX ADMIN — SODIUM CHLORIDE 1000 ML: 9 INJECTION, SOLUTION INTRAVENOUS at 16:57

## 2024-04-01 RX ADMIN — SODIUM CHLORIDE 60 ML: 9 INJECTION, SOLUTION INTRAVENOUS at 17:56

## 2024-04-01 RX ADMIN — HYDROMORPHONE HYDROCHLORIDE 0.5 MG: 1 INJECTION, SOLUTION INTRAMUSCULAR; INTRAVENOUS; SUBCUTANEOUS at 18:43

## 2024-04-01 RX ADMIN — HYDROMORPHONE HYDROCHLORIDE 0.5 MG: 1 INJECTION, SOLUTION INTRAMUSCULAR; INTRAVENOUS; SUBCUTANEOUS at 19:39

## 2024-04-01 RX ADMIN — ONDANSETRON 4 MG: 2 INJECTION INTRAMUSCULAR; INTRAVENOUS at 17:05

## 2024-04-01 RX ADMIN — HYDROMORPHONE HYDROCHLORIDE 2 MG: 2 TABLET ORAL at 21:00

## 2024-04-01 RX ADMIN — HYDROMORPHONE HYDROCHLORIDE 0.5 MG: 1 INJECTION, SOLUTION INTRAMUSCULAR; INTRAVENOUS; SUBCUTANEOUS at 17:06

## 2024-04-01 RX ADMIN — IOPAMIDOL 95 ML: 755 INJECTION, SOLUTION INTRAVENOUS at 17:56

## 2024-04-01 ASSESSMENT — COLUMBIA-SUICIDE SEVERITY RATING SCALE - C-SSRS
1. IN THE PAST MONTH, HAVE YOU WISHED YOU WERE DEAD OR WISHED YOU COULD GO TO SLEEP AND NOT WAKE UP?: NO
2. HAVE YOU ACTUALLY HAD ANY THOUGHTS OF KILLING YOURSELF IN THE PAST MONTH?: NO
6. HAVE YOU EVER DONE ANYTHING, STARTED TO DO ANYTHING, OR PREPARED TO DO ANYTHING TO END YOUR LIFE?: NO

## 2024-04-01 ASSESSMENT — ACTIVITIES OF DAILY LIVING (ADL)
ADLS_ACUITY_SCORE: 38

## 2024-04-01 NOTE — ED TRIAGE NOTES
Pt reports she has been experiencing abdominal pain since Thursday which increased about an hour ago. Pain in pelvic region and vomiting at home.   Pt had 100mcg Fentanyl nasally and Zofran 4mg ODT in rig.     Triage Assessment (Adult)       Row Name 04/01/24 1548          Triage Assessment    Airway WDL WDL        Respiratory WDL    Respiratory WDL WDL        Skin Circulation/Temperature WDL    Skin Circulation/Temperature WDL WDL        Cardiac WDL    Cardiac WDL WDL        Peripheral/Neurovascular WDL    Peripheral Neurovascular WDL WDL        Cognitive/Neuro/Behavioral WDL    Cognitive/Neuro/Behavioral WDL WDL

## 2024-04-01 NOTE — TELEPHONE ENCOUNTER
"Endoscopy Scheduling Screen    Have you had a positive Covid test in the last 14 days?  No    What is your communication preference for Instructions and/or Bowel Prep?   MyChart    What insurance is in the chart?  Other:  [employer]    Ordering/Referring Provider:  CHANDAN OHARA   (If ordering provider performs procedure, schedule with ordering provider unless otherwise instructed. )    BMI: Estimated body mass index is 31.49 kg/m  as calculated from the following:    Height as of 3/29/24: 1.676 m (5' 6\").    Weight as of 3/29/24: 88.5 kg (195 lb 1.6 oz).     Sedation Ordered  MAC/deep sedation.   BMI<= 45 45 < BMI <= 48 48 < BMI < = 50  BMI > 50   No Restrictions No [facility]  No [facility]  [facility] with exceptions Hospital Only OR Only       Do you have a history of malignant hyperthermia?  No    (Females) Are you currently pregnant?   No     Have you been diagnosed or told you have pulmonary hypertension?   No    Do you have an LVAD?  No    Have you been told you have moderate to severe sleep apnea?  No    Have you been told you have COPD, asthma, or any other lung disease?  No    Do you have any heart conditions?  Yes     In the past year, have you had any hospitalizations for heart related issues including cardiomyopathy, heart attack, or stent placement?  No    Do you have any implantable devices in your body (pacemaker, ICD)?  No    Do you take nitroglycerine?  No - has rx but has not had to take    Have you ever had or are you waiting for an organ transplant?  No. Continue scheduling, no site restrictions.    Have you had a stroke or transient ischemic attack (TIA aka \"mini stroke\" in the last 6 months?   No    Have you been diagnosed with or been told you have cirrhosis of the liver?   No    Are you currently on dialysis?   No    Do you need assistance transferring?   No    BMI: Estimated body mass index is 31.49 kg/m  as calculated from the following:    Height as of 3/29/24: 1.676 m (5' 6\").    Weight as of " 3/29/24: 88.5 kg (195 lb 1.6 oz).     Is patients BMI > 40 and scheduling location [facility]?  No    Do you take an injectable medication for weight loss or diabetes (excluding insulin)?  No    Do you take the medication Naltrexone?  No    Do you take blood thinners?  No       Prep   Are you currently on dialysis or do you have chronic kidney disease?  No    Do you have a diagnosis of diabetes?  Yes (Golytely Prep)    Do you have a diagnosis of cystic fibrosis (CF)?  No    On a regular basis do you go 3 -5 days between bowel movements?  Yes (Extended Prep)    BMI > 40?  No    Preferred Pharmacy:    [address] [address]  Phone: [phone] Fax: [phone]      Patient prefers [facility]. Message to Varsha to let writer know of any openings in April.

## 2024-04-01 NOTE — ED PROVIDER NOTES
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 respiration 24, oxygen saturation 100% on room air.  Patient appears in no acute distress.  She does have left lower quadrant abdominal pain without rebound or guarding.  No hepatosplenomegaly.  No masses.  No hernia.  Remainder of the exam is otherwise negative.  Skin without rash.  IV was established.  Patient was given IV Dilaudid, IV normal saline, and IV Zofran for symptom management.  She did report improvement.  Laboratory workup with no significant acute abnormality with comprehensive metabolic panel, lipase, and CBC.  Borderline CRP at 13.1.  CT of the abdomen/pelvis confirm no acute abnormality.  Previous noted transverse colitis has resolved.  Thankfully, no sign of obstruction or perforation.  No free air.  No other acute findings in the abdomen/pelvis.  I performed independent review of the CT, and noted that there is a fair amount of bowel gas.  No significant stool burden.  Urinalysis negative.  Pelvic ultrasound with normal findings.  Good blood flow to both ovaries.  No torsion.  No masses.  Discussed with the patient that we did not find any acute emergent abnormality.  She does have a lot of bowel gas, which certainly could  contribute.  Discussed that we did not find any infectious etiology or abnormality requiring acute surgical intervention.  She does not have an acute abdomen currently.  Symptoms did improve with pain management.  Patient has already been set up with an upper endoscopy and colonoscopy and has this coming up.  This would be recommended to continue the evaluation regarding her ongoing abdominal pain.  She does not have any anemia or sign of GI bleeding currently.  Therefore, she is stable to return home.  Patient states that her regular oxycodone does not help her pain.  She requested something further for pain.  Has significant allergic reaction to hydrocodone.  I did give her 1 p.o. dose of Dilaudid here for pain management through the evening hours.  I did agree to give her #5 tablets of p.o. Dilaudid for severe breakthrough pain over the next few days if needed.  Discussed that this is not a long-term management for her condition.  Further pain management will need to be guided by her PCP.  I sent Dr. Muse a staff message regarding this patient's visit and inquired about a potential ED follow-up visit within the next few days.  Rest recommended to the patient.  Push clear fluids.  Trial Gas-X.  Indications for ED return reviewed.  Patient in agreement.  Her  was present to drive her home.     I have reviewed the nursing notes.    I have reviewed the findings, diagnosis, plan and need for follow up with the patient.           Medical Decision Making  The patient's presentation was of moderate complexity (an acute illness with systemic symptoms).    The patient's evaluation involved:  review of external note(s) from 2 sources (see separate area of note for details)  ordering and/or review of 3+ test(s) in this encounter (see separate area of note for details)    The patient's management necessitated high risk (drug therapy requiring intensive monitoring (see separate area of note for  details)).        Discharge Medication List as of 4/1/2024  9:02 PM        START taking these medications    Details   HYDROmorphone (DILAUDID) 2 MG tablet Take 1 tablet (2 mg) by mouth every 6 hours as needed for severe pain or breakthrough pain, Disp-5 tablet, R-0, E-Prescribe             Final diagnoses:   Lower abdominal pain     Disclaimer: This note consists of symbols derived from keyboarding, dictation and/or voice recognition software. As a result, there may be errors in the script that have gone undetected. Please consider this when interpreting information found in this chart.      4/1/2024   [facility] EMERGENCY DEPT       Alexandr Givens PA-C  04/01/24 [id number]

## 2024-04-02 ENCOUNTER — PATIENT OUTREACH (OUTPATIENT)
Dept: CARE COORDINATION | Facility: CLINIC | Age: 49
End: 2024-04-02
Payer: COMMERCIAL

## 2024-04-02 NOTE — DISCHARGE INSTRUCTIONS
It was a pleasure working with you today!  I hope your condition improves rapidly!     Thankfully, we did not find any emergent condition with your full evaluation today.  You did have a fair amount of gas in the bowel which can cause spasm and discomfort.  Please purchase over-the-counter Gas-X and try this.  Continue to drink a lot of clear fluids to stay hydrated.  You can use the Dilaudid for severe breakthrough pain.  Do not take this along with oxycodone, or within 6 hours of each other.  Utilize Tylenol first at a dose of 1000 mg every 6 hours as needed.  Please follow-up with your primary care physician in the next couple days for recheck.

## 2024-04-02 NOTE — PROGRESS NOTES
"  [facility]: [facility]: Post-Discharge Note  SITUATION                                                      Admission:    Admission Date: 03/29/24   Reason for Admission: Nausea/vomiting  Discharge:   Discharge Date: 04/30/24  Discharge Diagnosis: Acute colitis    BACKGROUND                                                      Per hospital discharge summary and inpatient provider notes:  48 year old female who presented to the ED on 3/29 around 3am. Patient recently lost her job of 20 years and has been very upset and depressed about it. She states that she started to have some abdominal pain that was lower abdominal pain that radiated to her pelvis. She had some nausea and vomited once. She went to the bathroom and had a large bowel movement. She felt lightheaded, called out to her  and then lost consciousness. She was laid down on the floor and was unconscious for 15 minutes per her husbands report. EMS were called and she was told that her BP as low. Unknown what her blood sugar was at that time. She denied any chest pain or palpitations. After a thorough investigation, including a normal echocardiogram, no elevation in cardiac enzymes and no neurologic findings consistent with stroke, she was discharged home and was doing well until around 1800 when her abdominal pain came back again and she had hematemesis. She came back to the ED and was evaluated for hematemesis this time. Dr. Knight examined her and noted that she had some blood in her rectal vault, but no vaginal bleeding. Repeat imaging showed that she has transverse colitis. She denies any epigastric pain, SOB, chest pain, fevers, cough, rhinorrhea or neurologic changes.       ASSESSMENT           Discharge Assessment  How are you doing now that you are home?: \" I am doing a little better today \"  How are your symptoms? (Red Flag symptoms escalate to triage hotline per guidelines): Improved  Do you feel your " condition is stable enough to be safe at home until your provider visit?: Yes  Does the patient have their discharge instructions? : Yes  Does the patient have questions regarding their discharge instructions? : No  Were you started on any new medications or were there changes to any of your previous medications? : Yes  Does the patient have all of their medications?: Yes  Do you have questions regarding any of your medications? : No  Do you have all of your needed medical supplies or equipment (DME)?  (i.e. oxygen tank, CPAP, cane, etc.): Yes  Discharge follow-up appointment scheduled within 14 calendar days? : Yes  Discharge Follow Up Appointment Date: 04/05/24  Discharge Follow Up Appointment Scheduled with?: Primary Care Provider    Post-op ([facility] Only)  If the patient had a surgery or procedure, do they have any questions for a nurse?: No           PLAN                                                      Outpatient Plan:  Follow-up Appointments     Follow-up and recommended labs and tests       Follow up with primary care provider, Yaima Muse, within   7 days for hospital follow- up.  The following labs/tests are recommended:   cbc/bmp.      Future Appointments   Date Time Provider Department [place]   4/5/2024  8:00 AM Estefanía Grullon PA-C [facility]   4/9/2024 12:40 PM PHCT1 PHCT [facility]   4/9/2024  1:00 PM PHXR1 PHXRAY [facility]   4/16/2024 11:30 AM Carlos Riley MD [facility]   5/14/2024  2:40 PM Yaima Muse MD [facility]   6/19/2024  2:30 PM Humble Smith MD [facility]   6/25/2024  9:30 AM [facility] [place]         For any urgent concerns, please contact our 24 hour nurse triage line: [phone] ([phone])         ANDRESSA Cleary

## 2024-04-03 ENCOUNTER — MYC MEDICAL ADVICE (OUTPATIENT)
Dept: FAMILY MEDICINE | Facility: CLINIC | Age: 49
End: 2024-04-03
Payer: COMMERCIAL

## 2024-04-03 NOTE — TELEPHONE ENCOUNTER
Patient is still on list for earlier opening at .     Final Scheduling Details     Procedure scheduled  Colonoscopy / Upper endoscopy (EGD)    Surgeon:  SHANKAR     Date of procedure:  5/15/2024     Pre-OP / PAC:   No - Not required for this site.    Location   - Patient preference.    Sedation   MAC/Deep Sedation - Per order.      Patient Reminders:   You will receive a call from a Nurse to review instructions and health history.  This assessment must be completed prior to your procedure.  Failure to complete the Nurse assessment may result in the procedure being cancelled.      On the day of your procedure, please designate an adult(s) who can drive you home stay with you for the next 24 hours. The medicines used in the exam will make you sleepy. You will not be able to drive.      You cannot take public transportation, ride share services, or non-medical taxi service without a responsible caregiver.  Medical transport services are allowed with the requirement that a responsible caregiver will receive you at your destination.  We require that drivers and caregivers are confirmed prior to your procedure.

## 2024-04-04 RX ORDER — OXYCODONE HYDROCHLORIDE 5 MG/1
5 TABLET ORAL DAILY PRN
Qty: 30 TABLET | Refills: 0 | Status: SHIPPED | OUTPATIENT
Start: 2024-04-05 | End: 2024-04-29

## 2024-04-04 ASSESSMENT — PATIENT HEALTH QUESTIONNAIRE - PHQ9
SUM OF ALL RESPONSES TO PHQ QUESTIONS 1-9: 10
SUM OF ALL RESPONSES TO PHQ QUESTIONS 1-9: 10
10. IF YOU CHECKED OFF ANY PROBLEMS, HOW DIFFICULT HAVE THESE PROBLEMS MADE IT FOR YOU TO DO YOUR WORK, TAKE CARE OF THINGS AT HOME, OR GET ALONG WITH OTHER PEOPLE: SOMEWHAT DIFFICULT

## 2024-04-05 ENCOUNTER — OFFICE VISIT (OUTPATIENT)
Dept: FAMILY MEDICINE | Facility: CLINIC | Age: 49
End: 2024-04-05
Payer: COMMERCIAL

## 2024-04-05 VITALS
HEART RATE: 77 BPM | RESPIRATION RATE: 20 BRPM | BODY MASS INDEX: 30.07 KG/M2 | HEIGHT: 67 IN | WEIGHT: 191.6 LBS | OXYGEN SATURATION: 97 % | TEMPERATURE: 97.6 F | DIASTOLIC BLOOD PRESSURE: 92 MMHG | SYSTOLIC BLOOD PRESSURE: 150 MMHG

## 2024-04-05 DIAGNOSIS — K52.9 ACUTE COLITIS: ICD-10-CM

## 2024-04-05 DIAGNOSIS — R11.2 NAUSEA AND VOMITING, UNSPECIFIED VOMITING TYPE: ICD-10-CM

## 2024-04-05 DIAGNOSIS — R55 SYNCOPE, UNSPECIFIED SYNCOPE TYPE: ICD-10-CM

## 2024-04-05 DIAGNOSIS — K92.0 HEMATEMESIS WITH NAUSEA: Primary | ICD-10-CM

## 2024-04-05 DIAGNOSIS — K27.9: ICD-10-CM

## 2024-04-05 LAB
ALBUMIN SERPL BCG-MCNC: 4.2 G/DL (ref 3.5–5.2)
ALP SERPL-CCNC: 102 U/L (ref 40–150)
ALT SERPL W P-5'-P-CCNC: 14 U/L (ref 0–50)
ANION GAP SERPL CALCULATED.3IONS-SCNC: 12 MMOL/L (ref 7–15)
AST SERPL W P-5'-P-CCNC: 13 U/L (ref 0–45)
BILIRUB SERPL-MCNC: 0.2 MG/DL
BUN SERPL-MCNC: 17.9 MG/DL (ref 6–20)
CALCIUM SERPL-MCNC: 9.5 MG/DL (ref 8.6–10)
CHLORIDE SERPL-SCNC: 104 MMOL/L (ref 98–107)
CREAT SERPL-MCNC: 0.73 MG/DL (ref 0.51–0.95)
DEPRECATED HCO3 PLAS-SCNC: 24 MMOL/L (ref 22–29)
EGFRCR SERPLBLD CKD-EPI 2021: >90 ML/MIN/1.73M2
ERYTHROCYTE [DISTWIDTH] IN BLOOD BY AUTOMATED COUNT: 12.4 % (ref 10–15)
GLUCOSE SERPL-MCNC: 266 MG/DL (ref 70–99)
HCT VFR BLD AUTO: 37.2 % (ref 35–47)
HGB BLD-MCNC: 12.5 G/DL (ref 11.7–15.7)
MCH RBC QN AUTO: 27.4 PG (ref 26.5–33)
MCHC RBC AUTO-ENTMCNC: 33.6 G/DL (ref 31.5–36.5)
MCV RBC AUTO: 82 FL (ref 78–100)
PLATELET # BLD AUTO: 350 10E3/UL (ref 150–450)
POTASSIUM SERPL-SCNC: 4.1 MMOL/L (ref 3.4–5.3)
PROT SERPL-MCNC: 7.4 G/DL (ref 6.4–8.3)
RBC # BLD AUTO: 4.56 10E6/UL (ref 3.8–5.2)
SODIUM SERPL-SCNC: 140 MMOL/L (ref 135–145)
WBC # BLD AUTO: 7.2 10E3/UL (ref 4–11)

## 2024-04-05 PROCEDURE — 36415 COLL VENOUS BLD VENIPUNCTURE: CPT

## 2024-04-05 PROCEDURE — 85027 COMPLETE CBC AUTOMATED: CPT

## 2024-04-05 PROCEDURE — 99495 TRANSJ CARE MGMT MOD F2F 14D: CPT

## 2024-04-05 PROCEDURE — 80053 COMPREHEN METABOLIC PANEL: CPT

## 2024-04-05 RX ORDER — SUCRALFATE 1 G/1
1 TABLET ORAL 4 TIMES DAILY
Qty: 120 TABLET | Refills: 0 | Status: SHIPPED | OUTPATIENT
Start: 2024-04-05 | End: 2024-05-05

## 2024-04-05 RX ORDER — FAMOTIDINE 20 MG/1
20 TABLET, FILM COATED ORAL 2 TIMES DAILY
Qty: 60 TABLET | Refills: 0 | Status: SHIPPED | OUTPATIENT
Start: 2024-04-05 | End: 2024-05-05

## 2024-04-05 ASSESSMENT — PAIN SCALES - GENERAL: PAINLEVEL: SEVERE PAIN (6)

## 2024-04-05 NOTE — PROGRESS NOTES
Assessment & Plan     Hematemesis with nausea  - Adult GI  Referral - Consult Only; Future  - sucralfate (CARAFATE) 1 GM tablet; Take 1 tablet (1 g) by mouth 4 times daily for 30 days  - famotidine (PEPCID) 20 MG tablet; Take 1 tablet (20 mg) by mouth 2 times daily for 30 days    Stress ulcer  - Adult GI  Referral - Consult Only; Future  - sucralfate (CARAFATE) 1 GM tablet; Take 1 tablet (1 g) by mouth 4 times daily for 30 days  - famotidine (PEPCID) 20 MG tablet; Take 1 tablet (20 mg) by mouth 2 times daily for 30 days    Acute colitis  - CBC with platelets; Future  - Comprehensive metabolic panel (BMP + Alb, Alk Phos, ALT, AST, Total. Bili, TP); Future  - Adult GI  Referral - Consult Only; Future  - sucralfate (CARAFATE) 1 GM tablet; Take 1 tablet (1 g) by mouth 4 times daily for 30 days  - famotidine (PEPCID) 20 MG tablet; Take 1 tablet (20 mg) by mouth 2 times daily for 30 days  - Comprehensive metabolic panel (BMP + Alb, Alk Phos, ALT, AST, Total. Bili, TP)  - CBC with platelets    Syncope, unspecified syncope type  - CBC with platelets; Future  - Comprehensive metabolic panel (BMP + Alb, Alk Phos, ALT, AST, Total. Bili, TP); Future  - Comprehensive metabolic panel (BMP + Alb, Alk Phos, ALT, AST, Total. Bili, TP)  - CBC with platelets    Nausea and vomiting, unspecified vomiting type  - CBC with platelets; Future  - Comprehensive metabolic panel (BMP + Alb, Alk Phos, ALT, AST, Total. Bili, TP); Future  - Adult GI  Referral - Consult Only; Future  - sucralfate (CARAFATE) 1 GM tablet; Take 1 tablet (1 g) by mouth 4 times daily for 30 days  - famotidine (PEPCID) 20 MG tablet; Take 1 tablet (20 mg) by mouth 2 times daily for 30 days  - Comprehensive metabolic panel (BMP + Alb, Alk Phos, ALT, AST, Total. Bili, TP)  - CBC with platelets    Ordering of each unique test  Prescription drug management  I spent a total of 40 minutes on the day of the visit.   Time spent by me doing  "chart review, history and exam, documentation and further activities per the note    MED REC REQUIRED  Post Medication Reconciliation Status: discharge medications reconciled and changed, per note/orders  BMI  Estimated body mass index is 30.07 kg/m  as calculated from the following:    Height as of this encounter: 1.7 m (5' 6.93\").    Weight as of this encounter: 86.9 kg (191 lb 9.6 oz).         See Patient Instructions  Patient Instructions   Hospital follow-up completed today    Per hospitalist recommendations: Follow-up with GI, get colonoscopy and EGD    GI referral placed    Patient requested note to return back to work without restrictions, note was provided to the patient.    Discussed recent stressors, financial concerns, and mental concerns.  I placed a referral to care coordination.    Continue Zofran as needed for nausea and vomiting    Go to the ER with any new or worsening symptoms    Discharge Orders          Adult GI  Referral - Procedure Only       Follow-up and recommended labs and tests      Follow up with primary care provider, Yaima Muse, within 7 days for hospital follow- up.  The following labs/tests are recommended: cbc/bmp.          Activity     Your activity upon discharge: activity as tolerated          Reason for your hospital stay     Possible colitis/NVD/hematemisis          Diet     Follow this diet upon discharge: Orders Placed This Encounter      Combination Diet Moderate Consistent Carb (60 g CHO per Meal) Diet; No Caffeine Diet, Low Saturated Fat Na <2400mg Diet  [place] diet for now          Take omeprazole 40 mg first thing in the morning on empty stomach.  Wait 1 hour prior to eating or taking other medications.  Take omeprazole 40 mg at bedtime.     Take famotidine 20 mg twice a day.    Take Carafate up to 4 times daily prior to meals and prior to bedtime.     Follow-up in 1-2 weeks, sooner if needed     Schedule follow-up with primary care provider   "   Concern for GI bleed  Take omeprazole, Pepcid, and Carafate as described above  I placed an emergent referral to GI, call them and schedule with them as soon as you are able to  Go to the ER with any new, worsening, or persistent symptoms  Do not take any ibuprofen, Aleve, or other NSAIDs     Learning About Acid-Reducing Medicines  What are they?     Acid-reducing medicines can help relieve heartburn and other symptoms of indigestion. They can help prevent damage to your digestive system from stomach acids. They also are used to treat reflux and ulcer symptoms.  These medicines include H2 blockers and proton pump inhibitors (PPIs). They help your stomach make less acid. You can buy them over the counter. Some of them also come in prescription strengths.  Antacids can also help relieve heartburn symptoms. They reduce the acid that is already in your stomach. You can buy them over the counter.  Which medicine is best for you depends on what is causing your symptoms.  How do they work?  Acid-reducing medicines work in two ways. H2 blockers and proton pump inhibitors (PPIs) lower the amount of acid your stomach makes. They don't work on the acid that's already there. Antacids work by making stomach juices less acidic. But your heartburn may come back as your stomach makes more acid.  What are some examples?  Examples of acid reducers include:  H2 blockers.  Tagamet (cimetidine)  Pepcid (famotidine)  Proton pump inhibitors (PPIs).  Nexium (esomeprazole)  Prevacid (lansoprazole)  Prilosec, Zegerid (omeprazole)  Protonix (pantoprazole)  Aciphex (rabeprazole)  Antacids.  Shaye Cast  What are side effects might you have?  Many people don't have side effects. And minor side effects might go away after a while.  H2 blockers can cause headaches or make you dizzy. They might cause diarrhea or constipation. You may have nausea and vomiting.  PPIs can cause headaches and diarrhea. Using them for a long time  "may raise your risk for infections or broken bones.  Some antacids can cause constipation or diarrhea. The brands vary in the ingredients they use. They can have different side effects.  If you use too much heartburn medicine, your body may not get enough of some minerals from your food.  How can you take these medicines safely?  Some H2 blockers and PPIs can affect how other medicines work. Tell your doctor if you use other medicines. He or she may change the dose or give you a different medicine.  Many antacids have aspirin in them. Read the label to make sure that you don't take too much. Too much aspirin can be harmful.  Be safe with medicines. Take your medicines exactly as prescribed. If you take over-the-counter medicine, be sure to read and follow all instructions on the label. Call your doctor if you think you are having a problem with your medicine.  Check with your doctor or pharmacist before you use any other medicines. This includes over-the-counter medicines. Tell your doctor about all of the medicines, vitamins, herbal products, and supplements you take. Taking some medicines together can cause problems.  Follow-up care is a key part of your treatment and safety. Be sure to make and go to all appointments, and call your doctor if you are having problems. It's also a good idea to know your test results and keep a list of the medicines you take.        What is a GI bleed?  \"GI\" stands for \"gastrointestinal.\" The GI system, or GI tract, includes all of the organs in the body that process food (figure 1). This includes the:  ?Esophagus (the tube that connects the mouth to the stomach)  ?Stomach  ?Small intestine (small bowel)  ?Large intestine (colon or large bowel)  A GI bleed is when any of these organs start to bleed. Often, you do not know that you are bleeding, because it's happening inside your body. But sometimes, there are signs that it is happening.  There are 2 common types of GI " "bleeds:  ?\"Upper GI bleeds\" - These affect the esophagus, the stomach, and the first part of the small intestine.  ?\"Lower GI bleeds\" - These affect the large intestine (colon).  Bleeding can also happen in the middle of the small intestine, but this is much less common. This is sometimes called \"mid-GI bleeding.\"  What are the symptoms of a GI bleed?  The symptoms depend on whether you have an upper or lower GI bleed. Some people have no symptoms. They find out that they have bleeding when a doctor or nurse does a rectal exam on them or a blood test shows that they have something called \"anemia.\" (Anemia is when a person has too few red blood cells.)  The symptoms of an upper GI bleed can include:  ?Vomiting blood or something that looks like coffee grounds  ?Diarrhea or bowel movements that look like black tar - This can happen with lower GI bleeds, too, but it is less common.  The symptoms of a lower GI bleed can include:  ?Bowel movements that look bloody - This can happen with upper GI bleeds, too, but it is less common.  Symptoms that can happen with either an upper or lower GI bleed include:  ?Feeling weak, lightheaded, or woozy (especially if you lose a lot of blood)  ?Racing heartbeat (if you lose a lot of blood)  ?Cramps or belly pain  ?Diarrhea  ?Pale skin  Should I see a doctor or nurse?  See your doctor or nurse right away if you:  ?Vomit blood or something that looks like coffee grounds  ?Have a bowel movement that looks like tar or has blood in it  ?Feel weak, lightheaded, or woozy  ?Have a racing heartbeat  ?Have severe belly pain  ?Turn much paler than normal  What can cause a GI bleed?  The most common causes of GI bleeds include:  ?Ulcers in the stomach or small intestine - Ulcers are sores on the lining of the GI tract.  ?Problems with the blood vessels, for example:  Swollen veins in the esophagus called \"varices\"  Abnormal blood vessels called \"arteriovenous malformations\" (\"AVMs\")  Fragile, " "swollen blood vessels called \"gastric antral vascular ectasia\" (\"GAVE\")  ?Diverticulosis - This is a condition in which tiny pouches form in the lining of the intestine.  ?Crohn disease or ulcerative colitis - These are conditions that can cause sores to form in the lining of the gut.  ?Hemorrhoids - These are swollen veins in the rectum (the lower part of the colon).  ?Anal fissures - These are tears around the anus.  ?Polyps - These are small growths that can form inside the colon.  ?Cancer (this is rare)  Is there a test for a GI bleed?  Yes. If your doctor or nurse suspects that you have a GI bleed, they will order 1 or more tests. Tests include:  ?Blood tests to check if:  You have enough red blood cells (the cells that carry oxygen)  Your blood is clotting normally  Your liver is working normally  ?Upper endoscopy - For this test, you will get medicine to make you sleepy and relaxed. Then, a doctor puts a thin tube called an \"endoscope\" in your mouth and down your throat. The tube has a light on the end and a camera that sends images of your GI tract to a TV screen. If the doctor sees any spots that are bleeding, they can use tools that go through the endoscope to help stop the bleeding.  ?Colonoscopy - This test is similar to an upper endoscopy, but lets the doctor look inside the colon. The tube goes in through the anus (figure 2).  ?Imaging tests that involve putting a dye or weakly radioactive chemical into the blood - These allow doctors to trace where the blood goes.  ?Capsule endoscopy - This test uses a small camera about the size of a vitamin pill. You swallow the camera, and it sends pictures to a recording device that you wear on a belt for 8 hours. A doctor then looks at the pictures. This test lets doctors look at the small intestine, which is hard to see with upper endoscopy or colonoscopy because it is very long. After the test, the camera passes with a bowel movement. Most people do not notice " "when it comes out.  How is a GI bleed treated?  Treatment depends on how much blood you have lost and what seems to be causing your bleeding. You might get 1 or more of these treatments:  ?Oxygen - This can be given through a mask or a tube that sits under your nose.  ?Blood or fluids given by IV - An IV is a thin tube that goes into a vein. This might be done to replace blood that you lost or treat a bleeding disorder.  ?Medicines to reduce stomach acid or treat a bleeding disorder  ?Medicines to help clean out and empty your GI system - This lets the doctor see more clearly what is happening inside.  ?Antibiotics  ?A small tube that goes up your nose and down your throat - This is a way for the doctor to rinse out your stomach.  Depending on where the bleeding seems to be, you might also have an upper endoscopy, a colonoscopy, or both. This can help the doctors find where the bleeding is coming from. Doctors can sometimes use the endoscope or colonoscope to seal off blood vessels and stop them from bleeding.  After the bleeding has stopped, your doctor or nurse will probably want to learn why you started bleeding in the first place. If you have ulcers or another condition that could lead to bleeding, they will want to make sure that those problems are treated.  Can a GI bleed be prevented?  To help lower your chances of getting a GI bleed:  ?Do not take medicines called \"NSAIDs\" too often, unless your doctor tells you that it is OK - These medicines can cause ulcers. Examples of NSAIDs include aspirin, ibuprofen (sample brand names: Advil, Motrin), and naproxen (sample brand names: Aleve, Naprosyn). If you have to take these medicines regularly, your doctor might give you another medicine to decrease your risk of bleeding.  ?Get treated for stomach ulcers, if you have them.  ?Get treatment for esophageal varices, if you have them - These are swollen blood vessels in the esophagus. They are common in people with a " "liver disease called \"cirrhosis.\" Getting treatment with medicines or a procedure can lower the risk of bleeding.     Patient understood and verbally consented to the treatment plan. Discussed symptoms that would warrant an urgent or emergent visit. All of the patients' questions were answered. Patient was instructed to contact the clinic if questions or concerns arise. Recommend follow up appointments if symptoms worsen or fail to improve. Recommend follow up as needed. Recommend ER in the case of an emergency.     Estefanía Grullon PA-C     Please note: Voice recognition software may have been used in preparing this note, unintended word substitutions may be present.        Kwadwo Kumar is a 48 year old, presenting for the following health issues:  Hospital F/U        4/5/2024     7:55 AM   Additional Questions   Roomed by Clair MOCK   Accompanied by Spouse- Humble     GAYE         4/2/2024    11:45 AM   Post Discharge Outreach   Admission Date 3/29/2024   Reason for Admission Nausea/vomiting   Discharge Date 4/30/2024   Discharge Diagnosis Acute colitis   How are you doing now that you are home? \" I am doing a little better today \"   How are your symptoms? (Red Flag symptoms escalate to triage hotline per guidelines) Improved   Do you feel your condition is stable enough to be safe at home until your provider visit? Yes   Does the patient have their discharge instructions?  Yes   Does the patient have questions regarding their discharge instructions?  No   Were you started on any new medications or were there changes to any of your previous medications?  Yes   Does the patient have all of their medications? Yes   Do you have questions regarding any of your medications?  No   Do you have all of your needed medical supplies or equipment (DME)?  (i.e. oxygen tank, CPAP, cane, etc.) Yes   Discharge follow-up appointment scheduled within 14 calendar days?  Yes   Discharge Follow Up Appointment Date 4/5/2024 "   Discharge Follow Up Appointment Scheduled with? Primary Care Provider     Hospital Follow-up Visit:    Hospital/Nursing Home/IP Rehab Facility: [facility]  Date of Admission: 03/29/2024  Date of Discharge: 03/30/2024  Reason(s) for Admission: Hematemesis, chest pain, colitis    Was your hospitalization related to COVID-19? No   Problems taking medications regularly:  None  Medication changes since discharge:        START taking these medications     Details   ondansetron (ZOFRAN ODT) 4 MG ODT tab Take 1 tablet (4 mg) by mouth every 6 hours as needed for nausea or vomiting  Qty: 15 tablet, Refills: 0     Associated Diagnoses: Nausea and vomiting, unspecified vomiting type       pantoprazole (PROTONIX) 40 MG EC tablet Take 1 tablet (40 mg) by mouth 2 times daily  Qty: 60 tablet, Refills: 0     Associated Diagnoses: Hematemesis, unspecified whether nausea present                   STOP taking these medications         aspirin (ASA) 325 MG EC tablet Comments:   Reason for Stopping:            Problems adhering to non-medication therapy:  None    Summary of hospitalization:  [facility] hospital discharge summary reviewed  Diagnostic Tests/Treatments reviewed.  Follow up needed: CBC, CMP  Other Healthcare Providers Involved in Patient s Care:          GI, primary care provider,  Update since discharge: improved.         Plan of care communicated with patient and family           Jeannie Tellez CNP   Nurse Practitioner  Medicine     Discharge Summary     Attested     Date of Service: 3/30/2024  1:45 PM  Creation Time: 3/30/2024  2:36 PM         Attestation signed by Ayden Hyde MD at 3/30/2024  3:17 PM     Physician Attestation   I have reviewed and discussed with the advanced practice provider their discharge plan for Stacy Brown. I did not participate in a shared visit by interviewing or examining the patient and this should be billed as an advanced practice provider only  "discharge.     Ayden Hyde MD  Date of Service (when I saw the patient): I did not personally see this patient today.            [facility]  [facility]  Hospitalist Discharge Summary       Date of Admission:  3/29/2024  Date of Discharge:  3/30/2024  Discharging Provider: Jeannie Tellez CNP  Discharge Service: Hospitalist Service        Discharge Diagnoses  Nausea/vomiting  Possible colitis  Hematemesis  Syncope  T2DM  Chronic opiate dependence  Hx depression  Hx. CAD s/p CABG 2021           Clinically Significant Risk Factors      # Obesity: Estimated body mass index is 31.49 kg/m  as calculated from the following:    Height as of this encounter: 1.676 m (5' 6\").    Weight as of this encounter: 88.5 kg (195 lb 1.6 oz).             Follow-ups Needed After Discharge  Follow-up Appointments     Follow-up and recommended labs and tests       Follow up with primary care provider, Yaima Msue, within   7 days for hospital follow- up.  The following labs/tests are recommended:   cbc/bmp.                   Discharge Disposition   Discharged to home  Condition at discharge: Stable           Hospital Course  48 year old female who presented to the ED on 3/29 around 3am. Patient recently lost her job of 20 years and has been very upset and depressed about it. She states that she started to have some abdominal pain that was lower abdominal pain that radiated to her pelvis. She had some nausea and vomited once. She went to the bathroom and had a large bowel movement. She felt lightheaded, called out to her  and then lost consciousness. She was laid down on the floor and was unconscious for 15 minutes per her husbands report. EMS were called and she was told that her BP as low. Unknown what her blood sugar was at that time. She denied any chest pain or palpitations. After a thorough investigation, including a normal echocardiogram, no elevation in cardiac " enzymes and no neurologic findings consistent with stroke, she was discharged home and was doing well until around 1800 when her abdominal pain came back again and she had hematemesis. She came back to the ED and was evaluated for hematemesis this time. Dr. Knight examined her and noted that she had some blood in her rectal vault, but no vaginal bleeding. Repeat imaging showed that she has transverse colitis. She denies any epigastric pain, SOB, chest pain, fevers, cough, rhinorrhea or neurologic changes.       Intractable nausea/vomiting  Colitis  Hematemesis  She developed some nausea/vomiting with some hematemesis and was found to have colitis on a repeat CT.  She does not have a positive infectious work up.  She had some blood on her rectal exam but her Hemoglobin is stable. Given her recents stressful life event, perhaps she has developed a stress ulcer and might need an endoscopy.   She was given IV crystalloid overnight.  Serial hemoglobins were:  12.4, 10.2, 10.6 and 11 respectively (the latter results after crystalloid hydration).  She was placed on a twice daily PPI but no antibiotics.  She has been hemodynamically stable.  She did have positive occult blood.  I discussed options for her and have recommended she discharge home on a twice daily PPI and have GI follow up for upper and lower endoscopy.  She will have her aspirin held as well.  She will also follow up with her primary physician.  I have instructed her to return to the hospital should she have significant ethan blood or intractable nausea or vomiting.       Syncope  She presented with syncope initially. She had a full evaluation and no cardiac etiology or neurology etiology was found. She had a normal echocardiogram and her troponin did not rise. Given that it occurred after a large bowel movement, likely due to vasovagal event. She might have some type of conversion disorder contributing too because she was recently fired from a job she  has held for over 20 years and was very emotional prior to the event.   Follow outpatient GI as above and present back to ER if she should have further syncopal episodes.     CAD s/ p CABG.  Hold aspirin for now until seen for EGD/Colonoscopy.  Continue  rosuvastatin and metoprolol tartrate.     T2DM  She had some hypoglycemia on day of presentaton because she took her insulin but did not eat much food. Otherwise she was hyperglycemic when she first presented.   She should continue her home regimen.     Chronic Opiate Dependence  Chronic pain continue home tizanidine and oxycodone        Hypertension continue lisinopril at home.     Depression  As described above, she is likely having some form of decompensation of her depression given the very stressful change in her life, losing her job.   -Continue duloxetine at home.            Consultations This Hospital Stay   None           Code Status   Full Code           Time Spent on this Encounter   I, Jeanine Tellez CNP, personally saw the patient today and spent greater than 30 minutes discharging this patient.     Jeannie Tellez CNP  [address] MEDICAL SURGICAL  [address] [place] [address]  Phone: [phone]  ______________________________________________________________________           Physical Exam  Vital Signs: Temp: 97.8  F (36.6  C) Temp src: Oral BP: 126/41 Pulse: 58   Resp: 18 SpO2: 96 % O2 Device: None (Room air)    Weight: 195 lbs 1.6 oz     Gen:  Lying in bed no acute distress  HEENT:  normocephalic, atraumatic, oropharynx clear  Resp:  CTA posteriorly, normal respiratory effort  Card:  S1,S2, RRR no murmur, rub or gallop  Abd:  Soft, nondistended, non tender,  normoactive bowel sounds   Neuro:  Neuro exam non focal                Primary Care Physician   Yaima Muse           Discharge Orders          Adult GI  Referral - Procedure Only       Follow-up and recommended labs and tests      Follow up  with primary care provider, Yaima Muse, within 7 days for hospital follow- up.  The following labs/tests are recommended: cbc/bmp.          Activity     Your activity upon discharge: activity as tolerated          Reason for your hospital stay     Possible colitis/NVD/hematemisis          Diet     Follow this diet upon discharge: Orders Placed This Encounter      Combination Diet Moderate Consistent Carb (60 g CHO per Meal) Diet; No Caffeine Diet, Low Saturated Fat Na <2400mg Diet  [place] diet for now               Significant Results and Procedures  Most Recent 3 CBC's:        Recent Labs   Lab Test 03/30/24  1219 03/30/24  0531 03/30/24  0153   WBC 8.2 7.4 8.0   HGB 11.0* 10.6* 10.2*   MCV 83 85 83    224 215      Most Recent 3 BMP's:             Recent Labs   Lab Test 03/30/24  1147 03/30/24  1012 03/30/24  0743 03/30/24  0531 03/29/24  2057 03/29/24  1918 03/29/24  0609 03/29/24  0415   NA  --   --   --  142  --  136  --  132*   POTASSIUM  --   --   --  4.3  --  3.6  --  4.7   CHLORIDE  --   --   --  111*  --  102  --  102   CO2  --   --   --  21*  --  22  --  21*   BUN  --   --   --  12.9  --  18.3  --  22.3*   CR  --   --   --  0.74  --  0.83  --  0.78   ANIONGAP  --   --   --  10  --  12  --  9   LUNA  --   --   --  9.0  --  9.7  --  9.6   * 318* 235* 187*   < > 139*   < > 364*    < > = values in this interval not displayed.   ,       Results for orders placed or performed during the hospital encounter of 03/29/24   CT ABDOMEN PELVIS W CONTRAST     Narrative     EXAM: CT ABDOMEN PELVIS W CONTRAST  LOCATION: [facility]  DATE: 03/29/2024     INDICATION: Return within 24, hematemesis  COMPARISON: 07/06/2023  TECHNIQUE: CT scan of the abdomen and pelvis was performed following injection of IV contrast. Multiplanar reformats were obtained. Dose reduction techniques were used.  CONTRAST: 90 mL Isovue 370     FINDINGS:   LOWER CHEST: Normal.      HEPATOBILIARY: Status post cholecystectomy.     PANCREAS: Normal.     SPLEEN: Normal.     ADRENAL GLANDS: Normal.     KIDNEYS/BLADDER: Normal.     BOWEL: Long-segment mural thickening of the distal transverse colon consistent with colitis, correlate for infectious and inflammatory etiologies. No free air or fluid. Normal appendix.     LYMPH NODES: Normal.     VASCULATURE: Mild atherosclerotic vascular calcifications.     PELVIC ORGANS: Normal.     MUSCULOSKELETAL: Normal.        Impression     IMPRESSION:   1.  Distal transverse colitis - correlate for infectious, inflammatory, and less likely ischemic etiologies.  2.  Status post cholecystectomy.  3.  Atherosclerotic vascular calcifications.      *Note: Due to a large number of results and/or encounters for the requested time period, some results have not been displayed. A complete set of results can be found in Results Review.               Discharge Medications  Current Discharge Medication List               START taking these medications     Details   ondansetron (ZOFRAN ODT) 4 MG ODT tab Take 1 tablet (4 mg) by mouth every 6 hours as needed for nausea or vomiting  Qty: 15 tablet, Refills: 0     Associated Diagnoses: Nausea and vomiting, unspecified vomiting type       pantoprazole (PROTONIX) 40 MG EC tablet Take 1 tablet (40 mg) by mouth 2 times daily  Qty: 60 tablet, Refills: 0     Associated Diagnoses: Hematemesis, unspecified whether nausea present                   CONTINUE these medications which have NOT CHANGED     Details   Ascorbic Acid (VITAMIN C) 100 MG CHEW Take 1 chew tab by mouth daily       Continuous Blood Gluc Sensor (FREESTYLE MAXI 3 SENSOR) MISC 1 each every 14 days Use 1 sensor every 14 days.  Qty: 2 each, Refills: 5     Associated Diagnoses: Type 2 diabetes mellitus with hyperglycemia, with long-term current use of insulin (H); Type 2 diabetes mellitus with other circulatory complication, with long-term current use of insulin (H)        DULoxetine (CYMBALTA) 20 MG capsule Take 1 tablet once daily for 1 week, then increase to 1 tablet twice daily  Qty: 60 capsule, Refills: 1     Associated Diagnoses: Moderate major depression (H)       insulin aspart (NOVOLOG FLEXPEN) 100 UNIT/ML pen Novolog Flexpen. Inject 1 units per 10 gram carb unit before dinner, up to 15 units per meal.  Qty: 15 mL, Refills: 3     Associated Diagnoses: Type 2 diabetes mellitus with hyperglycemia, with long-term current use of insulin (H)       insulin glargine 100 UNIT/ML pen Inject 42 Units Subcutaneous every morning  Qty: 45 mL, Refills: 1     Comments: If Lantus is not covered by insurance, may substitute Basaglar or Semglee or other insulin glargine product per insurance preference at same dose and frequency.    Associated Diagnoses: Type 2 diabetes mellitus with hyperglycemia, with long-term current use of insulin (H)       !! insulin pen needle (31G X 8 MM) 31G X 8 MM miscellaneous 1 Box of 100 insulin pen needles to be dispensed with every insulin pen prescription  Qty: 100 each, Refills: 3     Associated Diagnoses: Type 2 diabetes mellitus with hyperglycemia, with long-term current use of insulin (H)       !! insulin pen needle (NOVOFINE) 32G X 6 MM miscellaneous Use once daily or as directed.  Qty: 100 each, Refills: 3     Associated Diagnoses: Type 2 diabetes mellitus with hyperglycemia, with long-term current use of insulin (H)       lisinopril (ZESTRIL) 5 MG tablet Take 1 tablet (5 mg) by mouth daily  Qty: 90 tablet, Refills: 3     Comments: Note new dose  Associated Diagnoses: NSTEMI (non-ST elevated myocardial infarction) (H)       metFORMIN (GLUCOPHAGE XR) 500 MG 24 hr tablet Take 2 tablets (1,000 mg) by mouth 2 times daily (with meals)  Qty: 372 tablet, Refills: 3     Associated Diagnoses: Type 2 diabetes mellitus with hyperglycemia, with long-term current use of insulin (H)       metoprolol tartrate (LOPRESSOR) 25 MG tablet Take 1 tablet (25 mg) by mouth 2  times daily  Qty: 186 tablet, Refills: 3     Associated Diagnoses: NSTEMI (non-ST elevated myocardial infarction) (H)       Multiple Vitamins-Minerals (MULTI-VITAMIN GUMMIES) CHEW Take 1 chew tab by mouth daily        oxyCODONE (ROXICODONE) 5 MG tablet Take 1 tablet (5 mg) by mouth daily as needed for severe pain  Qty: 30 tablet, Refills: 0     Associated Diagnoses: Closed fracture of right ankle, initial encounter       rosuvastatin (CRESTOR) 20 MG tablet Take 1 tablet (20 mg) by mouth daily  Qty: 93 tablet, Refills: 3     Associated Diagnoses: NSTEMI (non-ST elevated myocardial infarction) (H)       tiZANidine (ZANAFLEX) 4 MG tablet TAKE ONE TABLET BY MOUTH THREE TIMES A DAY  Qty: 270 tablet, Refills: 3     Associated Diagnoses: Mechanical back pain       blood glucose (NO BRAND SPECIFIED) test strip Use to test blood sugar up to 4 times daily or as directed. To accompany: Blood Glucose Monitor Brands: per insurance.  Qty: 200 strip, Refills: 6     Associated Diagnoses: Type 2 diabetes mellitus with hyperglycemia, with long-term current use of insulin (H)       blood glucose calibration (NO BRAND SPECIFIED) solution To accompany: Blood Glucose Monitor Brands: per insurance.  Qty: 1 Bottle, Refills: 3     Associated Diagnoses: Type 2 diabetes mellitus with hyperglycemia, with long-term current use of insulin (H)       blood glucose monitoring (FREESTYLE) lancets Use to test blood sugars 1-2 times daily or as directed, per patients glucose meter.  Qty: 3 Box, Refills: 3     Associated Diagnoses: Type 2 diabetes mellitus with hyperglycemia, with long-term current use of insulin (H)       Blood Glucose Monitoring Suppl (ACCU-CHEK COMPLETE) KIT 1 Device daily  Qty: 1 Device, Refills: 0     Associated Diagnoses: Type 2 diabetes, HbA1c goal < 7% (H)       nitroGLYcerin (NITROSTAT) 0.4 MG sublingual tablet For chest pain place 1 tablet under the tongue every 5 minutes for 3 doses. If symptoms persist 5 minutes after 1st  dose call 911.  Qty: 25 tablet, Refills: 0     Associated Diagnoses: Type 2 diabetes mellitus with hyperglycemia, with long-term current use of insulin (H); Hyperlipidemia LDL goal <100        !! - Potential duplicate medications found. Please discuss with provider.               STOP taking these medications         aspirin (ASA) 325 MG EC tablet Comments:   Reason for Stopping:                       Allergies         Allergies   Allergen Reactions    Amoxicillin Hives    Hydrocodone Nausea and Vomiting    Hydrocodone-Acetaminophen GI Disturbance       Tylenol is ok                Cosigned by: Ayden Hyde MD at 3/30/2024  3:17 PM            ED to Hosp-Admission (Discharged) on 3/29/2024            Revision & Routing History            Detailed Report          Note shared with patient  Additional Orders and Documentation      Results  Imaging         Meds           Orders           Flowsheets      Encounter Info: History,     Allergies,     Education,     Care Plan,     Detailed Report     Hospital Problem List      Hypoglycemia    Lower GI bleed    Acute colitis    Nausea and vomiting, unspecified vomiting type  Care Timeline    03/29   Admitted from ED (Observation) 2229 03/30   Discharged 1510     Discharge      Home or Self Care      After Visit Summary (Printed 3/30/2024)      Discharge Orders  Adult GI  Referral - Procedure Only Pending Review  Medication List at Discharge      Ascorbic Acid 100 MG 1 chew tab Oral DAILY    Blood Glucose Calibration Normal To accompany: Blood Glucose Monitor Brands: per insurance.  Patient not taking: Reported on 3/30/2024    Blood Glucose Monitoring Suppl 1 Device Does not apply DAILY  Patient not taking: Reported on 3/30/2024    Continuous Blood Gluc Sensor 1 each Does not apply EVERY 14 DAYS, Use 1 sensor every 14 days.    DULoxetine HCl 20 MG Take 1 tablet once daily for 1 week, then increase to 1 tablet twice daily  Patient taking differently: Take 20 mg by  mouth daily Take 1 tablet once daily for 1 week, then increase to 1 tablet twice daily    Glucose Blood Use to test blood sugar up to 4 times daily or as directed. To accompany: Blood Glucose Monitor Brands: per insurance.  Patient not taking: Reported on 3/30/2024    Insulin Aspart 100 UNIT/ML Novolog Flexpen. Inject 1 units per 10 gram carb unit before dinner, up to 15 units per meal.  Patient taking differently: Novolog Flexpen. Inject 1 units per 10 gram carb unit before meals, up to 15 units per meal.    Insulin Glargine 42 Units Subcutaneous EVERY MORNING    Insulin Pen Needle      31G X 8 MM Misc, 1 Box of 100 insulin pen needles to be dispensed with every insulin pen prescription    32G X 6 MM Misc, Use once daily or as directed.    Lancets Use to test blood sugars 1-2 times daily or as directed, per patients glucose meter.  Patient not taking: Reported on 3/30/2024    Lisinopril 5 mg Oral DAILY    metFORMIN HCl 1,000 mg Oral 2 TIMES DAILY WITH MEALS    Metoprolol Tartrate 25 mg Oral 2 TIMES DAILY    Multiple Vitamins-Minerals 1 chew tab Oral DAILY    Nitroglycerin 0.4 MG For chest pain place 1 tablet under the tongue every 5 minutes for 3 doses. If symptoms persist 5 minutes after 1st dose call 911.  Patient not taking: Reported on 2/6/2024    Ondansetron 4 mg Oral EVERY 6 HOURS PRN    oxyCODONE HCl 5 mg Oral DAILY PRN    Pantoprazole Sodium 40 mg Oral 2 TIMES DAILY    Rosuvastatin Calcium 20 mg Oral DAILY    tiZANidine HCl 4 mg Oral 3 TIMES DAILY      Hospital follow-up for 5/20/2024.   Since hospital discharge, patient's symptoms have been improving.  She reports extreme stress in her life right now and emotional distress.  She reports that this all started when she received a text that she is not able to be working part-time anymore.  She was working 4 days a week.  She states that she needs a note stating that she can return to work full-time without restrictions.  She reports financial stressors and  "inability to afford medication.  We discussed good Rx and referral to care coordination for medication affordability.  She is no longer having any hematemesis or blood in the stool.  Nausea and vomiting have improved, she has been taking Zofran as needed.  Suspected upper GI bleed, has been taking Protonix twice a day with minimal relief.  Negative for chest pain or shortness of breath.  She reports lower abdominal/pelvic pain that is tender to palpation.  Discussed in the hospital she was diagnosed with colitis.  She did have an episode of syncope prior to being hospitalized.  She has a freestyle ricardo and average blood sugar over the past 3 days has been 146.  Currently blood sugar is 300 and she plans to take short-acting insulin when she gets home, she forgot to take it this morning.  She reports that she has appointments scheduled for EGD, colonoscopy, and follow-up with her primary care provider.            Review of Systems  Constitutional, HEENT, cardiovascular, pulmonary, GI, , musculoskeletal, neuro, skin, endocrine and psych systems are negative, except as otherwise noted.      Objective    BP (!) 150/92   Pulse 77   Temp 97.6  F (36.4  C) (Temporal)   Resp 20   Ht 1.7 m (5' 6.93\")   Wt 86.9 kg (191 lb 9.6 oz)   LMP 03/07/2021 (Approximate)   SpO2 97%   BMI 30.07 kg/m    Body mass index is 30.07 kg/m .  Physical Exam   GENERAL: alert and no distress  EYES: Eyes grossly normal to inspection, PERRL and conjunctivae and sclerae normal  RESP: lungs clear to auscultation - no rales, rhonchi or wheezes  CV: regular rate and rhythm, normal S1 S2, no S3 or S4, no murmur, click or rub, no peripheral edema  ABDOMEN: soft, mild tenderness palpation in the suprapubic region,, no hepatosplenomegaly, no masses and bowel sounds normal  MS: no gross musculoskeletal defects noted, no edema  SKIN: no suspicious lesions or rashes  NEURO: Normal strength and tone, mentation intact and speech normal  PSYCH: " Depressed appearing, crying during the conversation  Admission on 04/01/2024, Discharged on 04/01/2024   Component Date Value Ref Range Status    Sodium 04/01/2024 138  135 - 145 mmol/L Final    Reference intervals for this test were updated on 09/26/2023 to more accurately reflect our healthy population. There may be differences in the flagging of prior results with similar values performed with this method. Interpretation of those prior results can be made in the context of the updated reference intervals.     Potassium 04/01/2024 3.6  3.4 - 5.3 mmol/L Final    Carbon Dioxide (CO2) 04/01/2024 23  22 - 29 mmol/L Final    Anion Gap 04/01/2024 12  7 - 15 mmol/L Final    Urea Nitrogen 04/01/2024 13.0  6.0 - 20.0 mg/dL Final    Creatinine 04/01/2024 0.69  0.51 - 0.95 mg/dL Final    GFR Estimate 04/01/2024 >90  >60 mL/min/1.73m2 Final    Calcium 04/01/2024 10.5 (H)  8.6 - 10.0 mg/dL Final    Chloride 04/01/2024 103  98 - 107 mmol/L Final    Glucose 04/01/2024 85  70 - 99 mg/dL Final    Alkaline Phosphatase 04/01/2024 100  40 - 150 U/L Final    Reference intervals for this test were updated on 11/14/2023 to more accurately reflect our healthy population. There may be differences in the flagging of prior results with similar values performed with this method. Interpretation of those prior results can be made in the context of the updated reference intervals.    AST 04/01/2024 21  0 - 45 U/L Final    Reference intervals for this test were updated on 6/12/2023 to more accurately reflect our healthy population. There may be differences in the flagging of prior results with similar values performed with this method. Interpretation of those prior results can be made in the context of the updated reference intervals.    ALT 04/01/2024 16  0 - 50 U/L Final    Reference intervals for this test were updated on 6/12/2023 to more accurately reflect our healthy population. There may be differences in the flagging of prior results  with similar values performed with this method. Interpretation of those prior results can be made in the context of the updated reference intervals.      Protein Total 04/01/2024 7.8  6.4 - 8.3 g/dL Final    Albumin 04/01/2024 4.5  3.5 - 5.2 g/dL Final    Bilirubin Total 04/01/2024 0.3  <=1.2 mg/dL Final    Lipase 04/01/2024 20  13 - 60 U/L Final    CRP Inflammation 04/01/2024 13.10 (H)  <5.00 mg/L Final    Color Urine 04/01/2024 Straw  Colorless, Straw, Light Yellow, Yellow Final    Appearance Urine 04/01/2024 Clear  Clear Final    Glucose Urine 04/01/2024 Negative  Negative mg/dL Final    Bilirubin Urine 04/01/2024 Negative  Negative Final    Ketones Urine 04/01/2024 Negative  Negative mg/dL Final    Specific Gravity Urine 04/01/2024 1.035  1.003 - 1.035 Final    Blood Urine 04/01/2024 Negative  Negative Final    pH Urine 04/01/2024 7.0  5.0 - 7.0 Final    Protein Albumin Urine 04/01/2024 Negative  Negative mg/dL Final    Urobilinogen Urine 04/01/2024 Normal  Normal, 2.0 mg/dL Final    Nitrite Urine 04/01/2024 Negative  Negative Final    Leukocyte Esterase Urine 04/01/2024 Negative  Negative Final    RBC Urine 04/01/2024 1  <=2 /HPF Final    WBC Urine 04/01/2024 <1  <=5 /HPF Final    Squamous Epithelials Urine 04/01/2024 <1  <=1 /HPF Final    WBC Count 04/01/2024 8.8  4.0 - 11.0 10e3/uL Final    RBC Count 04/01/2024 4.74  3.80 - 5.20 10e6/uL Final    Hemoglobin 04/01/2024 13.1  11.7 - 15.7 g/dL Final    Hematocrit 04/01/2024 37.9  35.0 - 47.0 % Final    MCV 04/01/2024 80  78 - 100 fL Final    MCH 04/01/2024 27.6  26.5 - 33.0 pg Final    MCHC 04/01/2024 34.6  31.5 - 36.5 g/dL Final    RDW 04/01/2024 12.3  10.0 - 15.0 % Final    Platelet Count 04/01/2024 142 (L)  150 - 450 10e3/uL Final    % Neutrophils 04/01/2024 77  % Final    % Lymphocytes 04/01/2024 16  % Final    % Monocytes 04/01/2024 6  % Final    % Eosinophils 04/01/2024 1  % Final    % Basophils 04/01/2024 1  % Final    % Immature Granulocytes 04/01/2024 0   % Final    NRBCs per 100 WBC 04/01/2024 0  <1 /100 Final    Absolute Neutrophils 04/01/2024 6.8  1.6 - 8.3 10e3/uL Final    Absolute Lymphocytes 04/01/2024 1.4  0.8 - 5.3 10e3/uL Final    Absolute Monocytes 04/01/2024 0.6  0.0 - 1.3 10e3/uL Final    Absolute Eosinophils 04/01/2024 0.0  0.0 - 0.7 10e3/uL Final    Absolute Basophils 04/01/2024 0.1  0.0 - 0.2 10e3/uL Final    Absolute Immature Granulocytes 04/01/2024 0.0  <=0.4 10e3/uL Final    Absolute NRBCs 04/01/2024 0.0  10e3/uL Final     Results for orders placed or performed in visit on 04/05/24   CBC with platelets     Status: Normal   Result Value Ref Range    WBC Count 7.2 4.0 - 11.0 10e3/uL    RBC Count 4.56 3.80 - 5.20 10e6/uL    Hemoglobin 12.5 11.7 - 15.7 g/dL    Hematocrit 37.2 35.0 - 47.0 %    MCV 82 78 - 100 fL    MCH 27.4 26.5 - 33.0 pg    MCHC 33.6 31.5 - 36.5 g/dL    RDW 12.4 10.0 - 15.0 %    Platelet Count 350 150 - 450 10e3/uL     US Pelvis Cmplt w Transvag & Doppler LmtPel Duplex Limited    Result Date: 4/1/2024  EXAM: US PELVIS COMPLETE W TRANSVAGINAL AND DOPPLER LIMITED LOCATION: [facility] DATE: 4/1/2024 INDICATION: Pelvic pain COMPARISON: CT performed the same day TECHNIQUE: Transabdominal scans were performed. Endovaginal ultrasound was performed to better visualize the adnexa. Color flow with spectral Doppler and waveform analysis performed. FINDINGS: UTERUS: 7.2 x 3.9 x 4.2 cm. Normal in size and position with no masses. ENDOMETRIUM: 4 mm. Normal smooth endometrium. RIGHT OVARY: 2.7 x 1.5 x 1.6 cm. Normal with arterial and venous duplex flow identified. LEFT OVARY: 2.5 x 1.2 x 1.1 cm. Normal with arterial and venous duplex flow identified. No significant free fluid.     IMPRESSION:  1.  Normal pelvic ultrasound.     CT Abdomen Pelvis w Contrast    Result Date: 4/1/2024  EXAM: CT ABDOMEN PELVIS W CONTRAST LOCATION: [facility] DATE: 4/1/2024 INDICATION: severe LLQ abdominal pain,  recent transverse colitis on CT 3 29 24 COMPARISON: 03/29/2024 TECHNIQUE: CT scan of the abdomen and pelvis was performed following injection of IV contrast. Multiplanar reformats were obtained. Dose reduction techniques were used. CONTRAST: Isovue 370, 95Ml FINDINGS: LOWER CHEST: Normal. HEPATOBILIARY: Prior cholecystectomy. Mild biliary ectasia, stable. No suspicious liver lesion. PANCREAS: Normal. SPLEEN: Normal. ADRENAL GLANDS: Normal. KIDNEYS/BLADDER: Normal. BOWEL: No free air, free fluid, or inflammatory change. Colonic wall thickening seen on recent exam 03/29/2024 has resolved. No obstruction. Normal appendix. LYMPH NODES: Normal. VASCULATURE: No aneurysm. Circumferential mixed plaque within the distal abdominal aorta extends into the common iliac arteries bilaterally more there is mild stenosis of the right mid common iliac artery. PELVIC ORGANS: Normal. MUSCULOSKELETAL: Normal.     IMPRESSION: 1.  No acute abnormality in the abdomen or pelvis. Recent changes of mid transverse colitis seen on 03/29/2024 have resolved.    CT ABDOMEN PELVIS W CONTRAST    Result Date: 3/29/2024  EXAM: CT ABDOMEN PELVIS W CONTRAST LOCATION: [facility] DATE: 03/29/2024 INDICATION: Return within 24, hematemesis COMPARISON: 07/06/2023 TECHNIQUE: CT scan of the abdomen and pelvis was performed following injection of IV contrast. Multiplanar reformats were obtained. Dose reduction techniques were used. CONTRAST: 90 mL Isovue 370 FINDINGS: LOWER CHEST: Normal. HEPATOBILIARY: Status post cholecystectomy. PANCREAS: Normal. SPLEEN: Normal. ADRENAL GLANDS: Normal. KIDNEYS/BLADDER: Normal. BOWEL: Long-segment mural thickening of the distal transverse colon consistent with colitis, correlate for infectious and inflammatory etiologies. No free air or fluid. Normal appendix. LYMPH NODES: Normal. VASCULATURE: Mild atherosclerotic vascular calcifications. PELVIC ORGANS: Normal. MUSCULOSKELETAL: Normal.      IMPRESSION: 1.  Distal transverse colitis - correlate for infectious, inflammatory, and less likely ischemic etiologies. 2.  Status post cholecystectomy. 3.  Atherosclerotic vascular calcifications.         Signed Electronically by: Estefanía Grullon PA-C    Answers submitted by the patient for this visit:  Patient Health Questionnaire (Submitted on 4/4/2024)  If you checked off any problems, how difficult have these problems made it for you to do your work, take care of things at home, or get along with other people?: Somewhat difficult  PHQ9 TOTAL SCORE: 10

## 2024-04-05 NOTE — LETTER
April 5, 2024      Stacy Brown  [address]   [place] [address]        To Whom It May Concern:    Stacy Brown was seen in our clinic on 4/5/2024. She may return to work without restrictions.  She has a scheduled appointment on 4/9/2024 and will need the day off work to attend this appointment.  Please contact clinic if you have questions or concerns.  Thank you!      Sincerely,        Estefanía Grullon PA-C

## 2024-04-05 NOTE — RESULT ENCOUNTER NOTE
Hello -    Here are my comments about the recent results.  Your blood sugar is elevated at 266.  I recommend that you take your short acting insulin as was discussed today in clinic.  Continue diabetic medications and insulin regimen as directed, follow-up with your primary care provider.    Please let us know if you have any questions or concerns.    Regards,  Estefanía Grullon PA-C

## 2024-04-05 NOTE — PATIENT INSTRUCTIONS
Hospital follow-up completed today    Per hospitalist recommendations: Follow-up with GI, get colonoscopy and EGD    GI referral placed    Patient requested note to return back to work without restrictions, note was provided to the patient.    Discussed recent stressors, financial concerns, and mental concerns.  I placed a referral to care coordination.    Continue Zofran as needed for nausea and vomiting    Go to the ER with any new or worsening symptoms    Discharge Orders          Adult GI  Referral - Procedure Only       Follow-up and recommended labs and tests      Follow up with primary care provider, Yaima Muse, within 7 days for hospital follow- up.  The following labs/tests are recommended: cbc/bmp.          Activity     Your activity upon discharge: activity as tolerated          Reason for your hospital stay     Possible colitis/NVD/hematemisis          Diet     Follow this diet upon discharge: Orders Placed This Encounter      Combination Diet Moderate Consistent Carb (60 g CHO per Meal) Diet; No Caffeine Diet, Low Saturated Fat Na <2400mg Diet  [place] diet for now          Take omeprazole 40 mg first thing in the morning on empty stomach.  Wait 1 hour prior to eating or taking other medications.  Take omeprazole 40 mg at bedtime.     Take famotidine 20 mg twice a day.    Take Carafate up to 4 times daily prior to meals and prior to bedtime.     Follow-up in 1-2 weeks, sooner if needed     Schedule follow-up with primary care provider     Concern for GI bleed  Take omeprazole, Pepcid, and Carafate as described above  I placed an emergent referral to GI, call them and schedule with them as soon as you are able to  Go to the ER with any new, worsening, or persistent symptoms  Do not take any ibuprofen, Aleve, or other NSAIDs     Learning About Acid-Reducing Medicines  What are they?     Acid-reducing medicines can help relieve heartburn and other symptoms of indigestion. They can  help prevent damage to your digestive system from stomach acids. They also are used to treat reflux and ulcer symptoms.  These medicines include H2 blockers and proton pump inhibitors (PPIs). They help your stomach make less acid. You can buy them over the counter. Some of them also come in prescription strengths.  Antacids can also help relieve heartburn symptoms. They reduce the acid that is already in your stomach. You can buy them over the counter.  Which medicine is best for you depends on what is causing your symptoms.  How do they work?  Acid-reducing medicines work in two ways. H2 blockers and proton pump inhibitors (PPIs) lower the amount of acid your stomach makes. They don't work on the acid that's already there. Antacids work by making stomach juices less acidic. But your heartburn may come back as your stomach makes more acid.  What are some examples?  Examples of acid reducers include:  H2 blockers.  Tagamet (cimetidine)  Pepcid (famotidine)  Proton pump inhibitors (PPIs).  Nexium (esomeprazole)  Prevacid (lansoprazole)  Prilosec, Zegerid (omeprazole)  Protonix (pantoprazole)  Aciphex (rabeprazole)  Antacids.  Gaviscon  Mylanta  Maalox  Tums  What are side effects might you have?  Many people don't have side effects. And minor side effects might go away after a while.  H2 blockers can cause headaches or make you dizzy. They might cause diarrhea or constipation. You may have nausea and vomiting.  PPIs can cause headaches and diarrhea. Using them for a long time may raise your risk for infections or broken bones.  Some antacids can cause constipation or diarrhea. The brands vary in the ingredients they use. They can have different side effects.  If you use too much heartburn medicine, your body may not get enough of some minerals from your food.  How can you take these medicines safely?  Some H2 blockers and PPIs can affect how other medicines work. Tell your doctor if you use other medicines. He or she  "may change the dose or give you a different medicine.  Many antacids have aspirin in them. Read the label to make sure that you don't take too much. Too much aspirin can be harmful.  Be safe with medicines. Take your medicines exactly as prescribed. If you take over-the-counter medicine, be sure to read and follow all instructions on the label. Call your doctor if you think you are having a problem with your medicine.  Check with your doctor or pharmacist before you use any other medicines. This includes over-the-counter medicines. Tell your doctor about all of the medicines, vitamins, herbal products, and supplements you take. Taking some medicines together can cause problems.  Follow-up care is a key part of your treatment and safety. Be sure to make and go to all appointments, and call your doctor if you are having problems. It's also a good idea to know your test results and keep a list of the medicines you take.        What is a GI bleed?  \"GI\" stands for \"gastrointestinal.\" The GI system, or GI tract, includes all of the organs in the body that process food (figure 1). This includes the:  ?Esophagus (the tube that connects the mouth to the stomach)  ?Stomach  ?Small intestine (small bowel)  ?Large intestine (colon or large bowel)  A GI bleed is when any of these organs start to bleed. Often, you do not know that you are bleeding, because it's happening inside your body. But sometimes, there are signs that it is happening.  There are 2 common types of GI bleeds:  ?\"Upper GI bleeds\" - These affect the esophagus, the stomach, and the first part of the small intestine.  ?\"Lower GI bleeds\" - These affect the large intestine (colon).  Bleeding can also happen in the middle of the small intestine, but this is much less common. This is sometimes called \"mid-GI bleeding.\"  What are the symptoms of a GI bleed?  The symptoms depend on whether you have an upper or lower GI bleed. Some people have no symptoms. They find " "out that they have bleeding when a doctor or nurse does a rectal exam on them or a blood test shows that they have something called \"anemia.\" (Anemia is when a person has too few red blood cells.)  The symptoms of an upper GI bleed can include:  ?Vomiting blood or something that looks like coffee grounds  ?Diarrhea or bowel movements that look like black tar - This can happen with lower GI bleeds, too, but it is less common.  The symptoms of a lower GI bleed can include:  ?Bowel movements that look bloody - This can happen with upper GI bleeds, too, but it is less common.  Symptoms that can happen with either an upper or lower GI bleed include:  ?Feeling weak, lightheaded, or woozy (especially if you lose a lot of blood)  ?Racing heartbeat (if you lose a lot of blood)  ?Cramps or belly pain  ?Diarrhea  ?Pale skin  Should I see a doctor or nurse?  See your doctor or nurse right away if you:  ?Vomit blood or something that looks like coffee grounds  ?Have a bowel movement that looks like tar or has blood in it  ?Feel weak, lightheaded, or woozy  ?Have a racing heartbeat  ?Have severe belly pain  ?Turn much paler than normal  What can cause a GI bleed?  The most common causes of GI bleeds include:  ?Ulcers in the stomach or small intestine - Ulcers are sores on the lining of the GI tract.  ?Problems with the blood vessels, for example:  Swollen veins in the esophagus called \"varices\"  Abnormal blood vessels called \"arteriovenous malformations\" (\"AVMs\")  Fragile, swollen blood vessels called \"gastric antral vascular ectasia\" (\"GAVE\")  ?Diverticulosis - This is a condition in which tiny pouches form in the lining of the intestine.  ?Crohn disease or ulcerative colitis - These are conditions that can cause sores to form in the lining of the gut.  ?Hemorrhoids - These are swollen veins in the rectum (the lower part of the colon).  ?Anal fissures - These are tears around the anus.  ?Polyps - These are small growths that can " "form inside the colon.  ?Cancer (this is rare)  Is there a test for a GI bleed?  Yes. If your doctor or nurse suspects that you have a GI bleed, they will order 1 or more tests. Tests include:  ?Blood tests to check if:  You have enough red blood cells (the cells that carry oxygen)  Your blood is clotting normally  Your liver is working normally  ?Upper endoscopy - For this test, you will get medicine to make you sleepy and relaxed. Then, a doctor puts a thin tube called an \"endoscope\" in your mouth and down your throat. The tube has a light on the end and a camera that sends images of your GI tract to a TV screen. If the doctor sees any spots that are bleeding, they can use tools that go through the endoscope to help stop the bleeding.  ?Colonoscopy - This test is similar to an upper endoscopy, but lets the doctor look inside the colon. The tube goes in through the anus (figure 2).  ?Imaging tests that involve putting a dye or weakly radioactive chemical into the blood - These allow doctors to trace where the blood goes.  ?Capsule endoscopy - This test uses a small camera about the size of a vitamin pill. You swallow the camera, and it sends pictures to a recording device that you wear on a belt for 8 hours. A doctor then looks at the pictures. This test lets doctors look at the small intestine, which is hard to see with upper endoscopy or colonoscopy because it is very long. After the test, the camera passes with a bowel movement. Most people do not notice when it comes out.  How is a GI bleed treated?  Treatment depends on how much blood you have lost and what seems to be causing your bleeding. You might get 1 or more of these treatments:  ?Oxygen - This can be given through a mask or a tube that sits under your nose.  ?Blood or fluids given by IV - An IV is a thin tube that goes into a vein. This might be done to replace blood that you lost or treat a bleeding disorder.  ?Medicines to reduce stomach acid or " "treat a bleeding disorder  ?Medicines to help clean out and empty your GI system - This lets the doctor see more clearly what is happening inside.  ?Antibiotics  ?A small tube that goes up your nose and down your throat - This is a way for the doctor to rinse out your stomach.  Depending on where the bleeding seems to be, you might also have an upper endoscopy, a colonoscopy, or both. This can help the doctors find where the bleeding is coming from. Doctors can sometimes use the endoscope or colonoscope to seal off blood vessels and stop them from bleeding.  After the bleeding has stopped, your doctor or nurse will probably want to learn why you started bleeding in the first place. If you have ulcers or another condition that could lead to bleeding, they will want to make sure that those problems are treated.  Can a GI bleed be prevented?  To help lower your chances of getting a GI bleed:  ?Do not take medicines called \"NSAIDs\" too often, unless your doctor tells you that it is OK - These medicines can cause ulcers. Examples of NSAIDs include aspirin, ibuprofen (sample brand names: Advil, Motrin), and naproxen (sample brand names: Aleve, Naprosyn). If you have to take these medicines regularly, your doctor might give you another medicine to decrease your risk of bleeding.  ?Get treated for stomach ulcers, if you have them.  ?Get treatment for esophageal varices, if you have them - These are swollen blood vessels in the esophagus. They are common in people with a liver disease called \"cirrhosis.\" Getting treatment with medicines or a procedure can lower the risk of bleeding.     Patient understood and verbally consented to the treatment plan. Discussed symptoms that would warrant an urgent or emergent visit. All of the patients' questions were answered. Patient was instructed to contact the clinic if questions or concerns arise. Recommend follow up appointments if symptoms worsen or fail to improve. Recommend follow " up as needed. Recommend ER in the case of an emergency.     Estefanía Grullon PA-C     Please note: Voice recognition software may have been used in preparing this note, unintended word substitutions may be present.

## 2024-04-07 ENCOUNTER — HEALTH MAINTENANCE LETTER (OUTPATIENT)
Age: 49
End: 2024-04-07

## 2024-04-08 ENCOUNTER — PATIENT OUTREACH (OUTPATIENT)
Dept: CARE COORDINATION | Facility: CLINIC | Age: 49
End: 2024-04-08
Payer: COMMERCIAL

## 2024-04-08 NOTE — TELEPHONE ENCOUNTER
REFERRAL INFORMATION:  Referring Provider:  Estefanía Grullon PA-C   Referring Clinic:  [facility]   Reason for Visit/Diagnosis:   Hematemesis with nausea   Stress ulcer   Acute colitis   Nausea and vomiting, unspecified vomiting type        FUTURE VISIT INFORMATION:  Appointment Date: 5/1/2024  Appointment Time:      NOTES STATUS DETAILS   OFFICE NOTE from Referring Provider Internal 4/5/2024, 1/26/2024 OV with EMILI Grullon   OFFICE NOTE from Other Specialist N/A    HOSPITAL DISCHARGE SUMMARY/  ED VISITS Internal 4/1/2024 [facility] ED with HUGO Givens  3/29/2024 [facility] with AGUSTO Roach   OPERATIVE REPORT N/A    MEDICATION LIST Internal         ENDOSCOPY  N/A    COLONOSCOPY N/A    IMAGING (CT, MRI, EGD, MRCP, Small Bowel Follow Through/SBT, MR/CT Enterography) Internal 4/1/2024 CT ABD PEL   3/29/2024 CT ABD PEL   7/6/2023 CT ABD PEL   2/19/2023 CT ABD PEL   ..More in EPIC

## 2024-04-08 NOTE — PROGRESS NOTES
Clinic Care Coordination Contact  [facility]/Voicemail    Clinical Data: Care Coordinator Outreach    Outreach Documentation Number of Outreach Attempt   4/8/2024   2:37 PM 1       Left message on patient's voicemail with call back information and requested return call.    Plan: Care Coordinator will try to reach patient again in 1-2 business days.    ANDRESSA Ortega  [phone]  [facility]

## 2024-04-09 ENCOUNTER — HOSPITAL ENCOUNTER (OUTPATIENT)
Dept: CT IMAGING | Facility: CLINIC | Age: 49
Discharge: HOME | End: 2024-04-09
Attending: ORTHOPAEDIC SURGERY
Payer: OTHER MISCELLANEOUS

## 2024-04-09 ENCOUNTER — HOSPITAL ENCOUNTER (OUTPATIENT)
Dept: GENERAL RADIOLOGY | Facility: CLINIC | Age: 49
Discharge: HOME | End: 2024-04-09
Attending: ORTHOPAEDIC SURGERY
Payer: OTHER MISCELLANEOUS

## 2024-04-09 DIAGNOSIS — M25.571 PAIN IN JOINT, ANKLE AND FOOT, RIGHT: ICD-10-CM

## 2024-04-09 DIAGNOSIS — M19.90 ARTHRITIS: ICD-10-CM

## 2024-04-09 PROCEDURE — 255N000002 HC RX 255 OP 636: Performed by: RADIOLOGY

## 2024-04-09 PROCEDURE — 20605 DRAIN/INJ JOINT/BURSA W/O US: CPT | Mod: RT

## 2024-04-09 PROCEDURE — 250N000009 HC RX 250: Performed by: RADIOLOGY

## 2024-04-09 PROCEDURE — 250N000011 HC RX IP 250 OP 636: Performed by: RADIOLOGY

## 2024-04-09 PROCEDURE — 73700 CT LOWER EXTREMITY W/O DYE: CPT | Mod: RT

## 2024-04-09 RX ORDER — IOPAMIDOL 510 MG/ML
100 INJECTION, SOLUTION INTRAVASCULAR ONCE
Status: COMPLETED | OUTPATIENT
Start: 2024-04-09 | End: 2024-04-09

## 2024-04-09 RX ORDER — TRIAMCINOLONE ACETONIDE 40 MG/ML
40 INJECTION, SUSPENSION INTRA-ARTICULAR; INTRAMUSCULAR ONCE
Status: COMPLETED | OUTPATIENT
Start: 2024-04-09 | End: 2024-04-09

## 2024-04-09 RX ORDER — LIDOCAINE HYDROCHLORIDE 10 MG/ML
5 INJECTION, SOLUTION EPIDURAL; INFILTRATION; INTRACAUDAL; PERINEURAL ONCE
Status: COMPLETED | OUTPATIENT
Start: 2024-04-09 | End: 2024-04-09

## 2024-04-09 RX ORDER — BUPIVACAINE HYDROCHLORIDE 2.5 MG/ML
10 INJECTION, SOLUTION EPIDURAL; INFILTRATION; INTRACAUDAL ONCE
Status: COMPLETED | OUTPATIENT
Start: 2024-04-09 | End: 2024-04-09

## 2024-04-09 RX ADMIN — LIDOCAINE HYDROCHLORIDE 3 ML: 10 INJECTION, SOLUTION EPIDURAL; INFILTRATION; INTRACAUDAL; PERINEURAL at 13:27

## 2024-04-09 RX ADMIN — TRIAMCINOLONE ACETONIDE 40 MG: 40 INJECTION, SUSPENSION INTRA-ARTICULAR; INTRAMUSCULAR at 13:31

## 2024-04-09 RX ADMIN — IOPAMIDOL 1 ML: 510 INJECTION, SOLUTION INTRAVASCULAR at 13:28

## 2024-04-09 RX ADMIN — BUPIVACAINE HYDROCHLORIDE 2 ML: 2.5 INJECTION, SOLUTION EPIDURAL; INFILTRATION; INTRACAUDAL; PERINEURAL at 13:31

## 2024-04-10 NOTE — PROGRESS NOTES
Clinic Care Coordination Contact  Community Health Worker Initial Outreach    CHW Initial Information Gathering:  Referral Source: PCP  Preferred Hospital: [facility], [place]  [phone]  Preferred Urgent Care: [facility], [phone]  Current living arrangement:: I live in a private home with family ( and son)  Type of residence:: Apartment  Community Resources: None  Supplies Currently Used at Home: Diabetic Supplies  Equipment Currently Used at Home: none  Informal Support system:: Parent  No PCP office visit in Past Year: No  Transportation means:: Regular car       Patient accepts CC: Yes. Patient scheduled for assessment with the SW on 4/18/24 at 2:30 pm. Patient noted desire to discuss any and all supports that are out there. 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     Jordan CHW  [phone]  [facility]  [facility]

## 2024-04-12 ENCOUNTER — HOSPITAL ENCOUNTER (OUTPATIENT)
Facility: CLINIC | Age: 49
Discharge: HOME | End: 2024-04-12
Admitting: FAMILY MEDICINE
Payer: COMMERCIAL

## 2024-04-12 ENCOUNTER — PATIENT OUTREACH (OUTPATIENT)
Dept: CARE COORDINATION | Facility: CLINIC | Age: 49
End: 2024-04-12
Payer: COMMERCIAL

## 2024-04-12 PROCEDURE — 81001 URINALYSIS AUTO W/SCOPE: CPT

## 2024-04-16 ENCOUNTER — VIRTUAL VISIT (OUTPATIENT)
Dept: ORTHOPEDICS | Facility: CLINIC | Age: 49
End: 2024-04-16
Payer: OTHER MISCELLANEOUS

## 2024-04-16 DIAGNOSIS — M25.571 PAIN IN JOINT, ANKLE AND FOOT, RIGHT: Primary | ICD-10-CM

## 2024-04-16 PROCEDURE — 99213 OFFICE O/P EST LOW 20 MIN: CPT | Performed by: ORTHOPAEDIC SURGERY

## 2024-04-16 NOTE — PROGRESS NOTES
Chief complaint: Status post right distal tibia proximal internal fixation performed on April 5, 2023.  Right ankle posttraumatic arthritis    Today patient was interviewed via phone.  Patient authorize encounter.    Patient reports to be feeling quite well after the injection which was performed on April 9, 2024.    I discussed with the patient the natural history of her condition and the fact that she is not creating any further damage by having pain on the ankle.  Will do our best to control her inflammation.  From a surgical perspective at this point is only an ankle fusion and another 7 or 8 years she may qualify for an ankle replacement.    Patient was pleased with discussion.  She will follow-up accordingly.  Patient is cleared to proceed with future injections into the right ankle as long as they are 3 months apart.  This will be performed under fluoroscopic control with lidocaine and Kenalog for a diagnosis of posttraumatic arthritis of the ankle    I spent 17 minutes with the patient on today's phone interview.

## 2024-04-16 NOTE — LETTER
4/16/2024         RE: Stacy Brown  [address]  [place] [address]        Dear Colleague,    Thank you for referring your patient, Stacy Brown, to the [facility] [place]. Please see a copy of my visit note below.    Chief complaint: Status post right distal tibia proximal internal fixation performed on April 5, 2023.  Right ankle posttraumatic arthritis    Today patient was interviewed via phone.  Patient authorize encounter.    Patient reports to be feeling quite well after the injection which was performed on April 9, 2024.    I discussed with the patient the natural history of her condition and the fact that she is not creating any further damage by having pain on the ankle.  Will do our best to control her inflammation.  From a surgical perspective at this point is only an ankle fusion and another 7 or 8 years she may qualify for an ankle replacement.    Patient was pleased with discussion.  She will follow-up accordingly.  Patient is cleared to proceed with future injections into the right ankle as long as they are 3 months apart.  This will be performed under fluoroscopic control with lidocaine and Kenalog for a diagnosis of posttraumatic arthritis of the ankle    I spent 17 minutes with the patient on today's phone interview.      Carlos Riley MD

## 2024-04-18 ENCOUNTER — PATIENT OUTREACH (OUTPATIENT)
Dept: FAMILY MEDICINE | Facility: CLINIC | Age: 49
End: 2024-04-18
Payer: COMMERCIAL

## 2024-04-18 NOTE — PROGRESS NOTES
Clinic Care Coordination Contact  Clinic Care Coordination Contact  OUTREACH    Referral Information:  Referral Source: PCP    Primary Diagnosis: Psychosocial    Chief Complaint   Patient presents with    Clinic Care Coordination - Initial     SW CC        [place] Utilization: as below  Clinic Utilization  Difficulty keeping appointments:: No  Compliance Concerns: No  No-Show Concerns: No  No PCP office visit in Past Year: No  Utilization      No Show Count (past year)  1             ED Visits  6             Hospital Admissions  1                    Current as of: 4/16/2024  3:37 PM                Clinical Concerns:  Current Medical Concerns:  patient has had multiple injuries that restricted her work.  She is now back to work.     Current Behavioral Concerns: patient noted no concerns with her MH.     Education Provided to patient: n/a   Pain  Pain (GOAL):: Yes  Type: Chronic (>3mo)  Location of chronic pain:: Ankle right knee chest and head  Radiating: No  Progression: Constant  Description of pain: Sharp, Stabbing  Chronic pain severity:: 5  Limitation of routine activities due to chronic pain:: No  Alleviating Factors: Rest, Ice  Aggravating Factors: Activity  Health Maintenance Reviewed: Due/Overdue   Health Maintenance Due   Topic Date Due    ADVANCE CARE PLANNING  Never done    HEPATITIS A IMMUNIZATION (1 of 2 - Risk 2-dose series) Never done    HEPATITIS B IMMUNIZATION (1 of 3 - 19+ 3-dose series) Never done    YEARLY PREVENTIVE VISIT  07/13/2021    MAMMO SCREENING  08/03/2022    INFLUENZA VACCINE (1) 09/01/2023    COVID-19 Vaccine (5 - 2023-24 season) 09/01/2023    LIPID  11/01/2023    MICROALBUMIN  11/01/2023    A1C  03/15/2024       Clinical Pathway: None    Medication Management:  Medication review status: Medications reviewed and no changes reported per patient.             Functional Status:  Dependent ADLs:: Dressing  Bed or wheelchair confined:: No  Mobility Status: Independent  Fallen 2 or more  times in the past year?: No  Any fall with injury in the past year?: No    Living Situation:  Current living arrangement:: Other  Type of residence:: Apartment    Lifestyle & Psychosocial Needs:    Social Determinants of Health     Food Insecurity: High Risk (4/10/2024)    Food Insecurity     Within the past 12 months, did you worry that your food would run out before you got money to buy more?: No     Within the past 12 months, did the food you bought just not last and you didn t have money to get more?: Yes   Depression: Not at risk (4/5/2024)    PHQ-2     PHQ-2 Score: 2   Housing Stability: High Risk (4/10/2024)    Housing Stability     Do you have housing? : Yes     Are you worried about losing your housing?: Yes   Tobacco Use: Medium Risk (4/5/2024)    Patient History     Smoking Tobacco Use: Former     Smokeless Tobacco Use: Never     Passive Exposure: Not on file   Financial Resource Strain: Low Risk  (4/10/2024)    Financial Resource Strain     Within the past 12 months, have you or your family members you live with been unable to get utilities (heat, electricity) when it was really needed?: No   Alcohol Use: Not At Risk (4/10/2024)    AUDIT-C     Frequency of Alcohol Consumption: Never     Average Number of Drinks: Patient does not drink     Frequency of Binge Drinking: Never   Transportation Needs: Low Risk  (4/10/2024)    Transportation Needs     Within the past 12 months, has lack of transportation kept you from medical appointments, getting your medicines, non-medical meetings or appointments, work, or from getting things that you need?: No   Physical Activity: Sufficiently Active (4/10/2024)    Exercise Vital Sign     Days of Exercise per Week: 5 days     Minutes of Exercise per Session: 30 min   Interpersonal Safety: Not on file   Stress: Stress Concern Present (4/10/2024)    [employer] - Occupational Stress Questionnaire     Feeling of Stress : Very much   Social  Connections: Socially Isolated (4/10/2024)    Social Connection and Isolation Panel [NHANES]     Frequency of Communication with Friends and Family: Twice a week     Frequency of Social Gatherings with Friends and Family: Never     Attends Denominational Services: Never     Active Member of Clubs or Organizations: No     Attends Club or Organization Meetings: Never     Marital Status:    Health Literacy: Not on file     Diet:: Diabetic diet  Inadequate nutrition (GOAL):: No  Tube Feeding: No  Inadequate activity/exercise (GOAL):: No  Significant changes in sleep pattern (GOAL): No  Transportation means:: Regular car     Denominational or spiritual beliefs that impact treatment:: No  Mental health DX:: No  Chemical Dependency Status: No Current Concerns  Informal Support system:: None        Patient noted financial as her biggest area of need.  Discussed resources and sent via [employer]. Resources sent for community action program, fare for all, [employer] and [employer].  Pt is working on completing sana care application and did discuss that she can do some of it online through [employer].     Patient declined any other needs and declined enrolling in care coordination as she was only looking for resources.     Resources and Interventions:  Current Resources:      Community Resources: None  Supplies Currently Used at Home: Diabetic Supplies  Equipment Currently Used at Home: grab bar, tub/shower, shower chair, walker, rolling  Employment Status: employed full-time         Advance Care Plan/Directive  Advanced Care Plans/Directives on file:: No           Care Plan:  N/a    Patient/Caregiver understanding: n/a       Future Appointments                In 1 week Marii Diehl DO [facility]    In 3 weeks Yaima Muse MD [facility]    In 2 months Humble Smith MD [facility]    In 2  months Malick Fox MD [facility]            Plan: pt to follow up on resources sent.  Pt to call if she has further questions/concerns. No planned outreaches from care coordination at this time.     DIAZ Valente   [facility] - Care Coordination  [facility]   [phone]

## 2024-04-19 RX ORDER — BISACODYL 5 MG/1
TABLET, DELAYED RELEASE ORAL
Qty: 4 TABLET | Refills: 0 | Status: SHIPPED | OUTPATIENT
Start: 2024-04-19 | End: 2024-06-12

## 2024-04-19 NOTE — TELEPHONE ENCOUNTER
Extended Golytely Bowel Prep . Instructions were sent via [employer]. Bowel prep was sent 4/19/2024 to    [facility] - [place], [place] - [address] [place] [address]

## 2024-04-24 ENCOUNTER — HOSPITAL ENCOUNTER (EMERGENCY)
Facility: CLINIC | Age: 49
Discharge: HOME | End: 2024-04-24
Attending: FAMILY MEDICINE | Admitting: FAMILY MEDICINE
Payer: COMMERCIAL

## 2024-04-24 ENCOUNTER — APPOINTMENT (OUTPATIENT)
Dept: GENERAL RADIOLOGY | Facility: CLINIC | Age: 49
End: 2024-04-24
Attending: FAMILY MEDICINE

## 2024-04-24 VITALS
RESPIRATION RATE: 16 BRPM | SYSTOLIC BLOOD PRESSURE: 135 MMHG | DIASTOLIC BLOOD PRESSURE: 82 MMHG | OXYGEN SATURATION: 99 % | HEART RATE: 90 BPM | TEMPERATURE: 97.8 F | WEIGHT: 191 LBS | BODY MASS INDEX: 29.98 KG/M2

## 2024-04-24 DIAGNOSIS — S39.012A LOW BACK STRAIN, INITIAL ENCOUNTER: ICD-10-CM

## 2024-04-24 DIAGNOSIS — M54.12 CERVICAL RADICULOPATHY: ICD-10-CM

## 2024-04-24 PROCEDURE — 99284 EMERGENCY DEPT VISIT MOD MDM: CPT | Performed by: FAMILY MEDICINE

## 2024-04-24 PROCEDURE — 96372 THER/PROPH/DIAG INJ SC/IM: CPT | Performed by: FAMILY MEDICINE

## 2024-04-24 PROCEDURE — 250N000011 HC RX IP 250 OP 636: Mod: JZ | Performed by: FAMILY MEDICINE

## 2024-04-24 PROCEDURE — 99283 EMERGENCY DEPT VISIT LOW MDM: CPT | Performed by: FAMILY MEDICINE

## 2024-04-24 PROCEDURE — 72100 X-RAY EXAM L-S SPINE 2/3 VWS: CPT

## 2024-04-24 RX ORDER — CYCLOBENZAPRINE HCL 10 MG
10 TABLET ORAL 3 TIMES DAILY PRN
Qty: 15 TABLET | Refills: 0 | Status: SHIPPED | OUTPATIENT
Start: 2024-04-24 | End: 2024-04-29

## 2024-04-24 RX ORDER — LISINOPRIL 2.5 MG/1
2.5 TABLET ORAL DAILY
COMMUNITY
End: 2024-06-13

## 2024-04-24 RX ORDER — ORPHENADRINE CITRATE 30 MG/ML
60 INJECTION INTRAMUSCULAR; INTRAVENOUS ONCE
Status: COMPLETED | OUTPATIENT
Start: 2024-04-24 | End: 2024-04-24

## 2024-04-24 RX ORDER — METHYLPREDNISOLONE 4 MG
TABLET, DOSE PACK ORAL
Qty: 21 TABLET | Refills: 0 | Status: SHIPPED | OUTPATIENT
Start: 2024-04-24 | End: 2024-06-12

## 2024-04-24 RX ORDER — KETOROLAC TROMETHAMINE 30 MG/ML
60 INJECTION, SOLUTION INTRAMUSCULAR; INTRAVENOUS ONCE
Status: COMPLETED | OUTPATIENT
Start: 2024-04-24 | End: 2024-04-24

## 2024-04-24 RX ADMIN — KETOROLAC TROMETHAMINE 60 MG: 30 INJECTION, SOLUTION INTRAMUSCULAR; INTRAVENOUS at 12:06

## 2024-04-24 RX ADMIN — ORPHENADRINE CITRATE 60 MG: 60 INJECTION INTRAMUSCULAR; INTRAVENOUS at 12:08

## 2024-04-24 ASSESSMENT — COLUMBIA-SUICIDE SEVERITY RATING SCALE - C-SSRS
2. HAVE YOU ACTUALLY HAD ANY THOUGHTS OF KILLING YOURSELF IN THE PAST MONTH?: NO
1. IN THE PAST MONTH, HAVE YOU WISHED YOU WERE DEAD OR WISHED YOU COULD GO TO SLEEP AND NOT WAKE UP?: NO
6. HAVE YOU EVER DONE ANYTHING, STARTED TO DO ANYTHING, OR PREPARED TO DO ANYTHING TO END YOUR LIFE?: NO

## 2024-04-24 ASSESSMENT — ACTIVITIES OF DAILY LIVING (ADL)
ADLS_ACUITY_SCORE: 38
ADLS_ACUITY_SCORE: 38

## 2024-04-24 NOTE — MEDICATION SCRIBE - ADMISSION MEDICATION HISTORY
Medication Scribe Admission Medication History    Admission medication history is complete. The information provided in this note is only as accurate as the sources available at the time of the update.    Information Source(s): Patient and CareEverywhere/SureScripts via in-person    Pertinent Information: n/a    Changes made to PTA medication list:  Added: None  Deleted: BG testing supplies (uses Freestyle exclusively), [id number] pen needle (prefers smaller), zofran  Changed: lisinopril from 5mg dose to 2.5mg dose, zanaflex is prn    Allergies reviewed with patient and updates made in EHR: yes    Medication History Completed By: MARK HALE 4/24/2024 1:17 PM    PTA Med List   Medication Sig Note Last Dose    Ascorbic Acid (VITAMIN C) 100 MG CHEW Take 1 chew tab by mouth daily  4/24/2024 at am    bisacodyl (DULCOLAX) 5 MG EC tablet Two days prior to exam take two (2) tablets at 4pm. One day prior to exam take two (2) tablets at 4pm   at not started    Continuous Blood Gluc Sensor (FREESTYLE MAXI 3 SENSOR) MISC 1 each every 14 days Use 1 sensor every 14 days. 4/24/2024: New one to apply today at home  at off now    DULoxetine (CYMBALTA) 20 MG capsule Take 1 tablet once daily for 1 week, then increase to 1 tablet twice daily (Patient taking differently: Take 20 mg by mouth daily)  4/24/2024 at am    famotidine (PEPCID) 20 MG tablet Take 1 tablet (20 mg) by mouth 2 times daily for 30 days  4/24/2024 at am    insulin aspart (NOVOLOG FLEXPEN) 100 UNIT/ML pen Novolog Flexpen. Inject 1 units per 10 gram carb unit before dinner, up to 15 units per meal. (Patient taking differently: Inject Subcutaneous 3 times daily (with meals) Inject 1 unit per 10 gram carb unit before meals, up to 15 units per meal.)  4/23/2024 at supper 7u    insulin glargine 100 UNIT/ML pen Inject 42 Units Subcutaneous every morning  4/24/2024 at am    insulin pen needle (NOVOFINE) 32G X 6 MM miscellaneous Use once daily or as directed. (Patient taking  differently: Inject Subcutaneous 4 times daily For glargine & aspart, or as directed.)  4/24/2024 at am    lisinopril (ZESTRIL) 2.5 MG tablet Take 2.5 mg by mouth daily  4/24/2024 at am    metFORMIN (GLUCOPHAGE XR) 500 MG 24 hr tablet Take 2 tablets (1,000 mg) by mouth 2 times daily (with meals)  4/24/2024 at am    metoprolol tartrate (LOPRESSOR) 25 MG tablet Take 1 tablet (25 mg) by mouth 2 times daily  4/24/2024 at am    Multiple Vitamins-Minerals (MULTI-VITAMIN GUMMIES) CHEW Take 1 chew tab by mouth daily   4/24/2024 at am    nitroGLYcerin (NITROSTAT) 0.4 MG sublingual tablet For chest pain place 1 tablet under the tongue every 5 minutes for 3 doses. If symptoms persist 5 minutes after 1st dose call 911. 4/24/2024: Last used Fall 2021 More than a month at on hand    oxyCODONE (ROXICODONE) 5 MG tablet Take 1 tablet (5 mg) by mouth daily as needed for severe pain  4/23/2024 at hs    pantoprazole (PROTONIX) 40 MG EC tablet Take 1 tablet (40 mg) by mouth 2 times daily  4/24/2024 at am    polyethylene glycol (GOLYTELY) 236 g suspension Take as directed. Two days before your exam fill the first container with water. Cover and shake until mixed well. At 5:00pm drink one 8oz glass every 10-15 minutes until half (1/2) of the first container is empty. Store the remainder in the refrigerator. One day before your exam at 5:00pm drink the second half of the first container until it is gone. Before you go to bed mix the second container with water and put in refrigerator. Six hours before your check in time drink one 8oz glass every 10-15 minutes until half of container is empty. Discard the remainder of solution.   at not started    rosuvastatin (CRESTOR) 20 MG tablet Take 1 tablet (20 mg) by mouth daily  4/24/2024 at am    sucralfate (CARAFATE) 1 GM tablet Take 1 tablet (1 g) by mouth 4 times daily for 30 days (Patient taking differently: Take 1 g by mouth 4 times daily Before meals)  4/23/2024 at supper    tiZANidine  (ZANAFLEX) 4 MG tablet TAKE ONE TABLET BY MOUTH THREE TIMES A DAY (Patient taking differently: Take 4 mg by mouth 3 times daily as needed for muscle spasms)  4/23/2024 at hs

## 2024-04-24 NOTE — Clinical Note
Stacy Brown was seen and treated in our emergency department on 4/24/2024.  She may return to work on 04/29/2024.       If you have any questions or concerns, please don't hesitate to call.      Trevor Zuñiga MD
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
## 2024-04-24 NOTE — ED TRIAGE NOTES
Pt presents with low back pain after lifting and setting down box while working. Pt works for [employer]. Pt also having left sided neck pain and left arm pain from lifting.      Triage Assessment (Adult)       Row Name 04/24/24 1139          Triage Assessment    Airway WDL WDL        Respiratory WDL    Respiratory WDL WDL        Skin Circulation/Temperature WDL    Skin Circulation/Temperature WDL WDL        Cardiac WDL    Cardiac WDL WDL        Peripheral/Neurovascular WDL    Peripheral Neurovascular WDL WDL        Cognitive/Neuro/Behavioral WDL    Cognitive/Neuro/Behavioral WDL WDL

## 2024-04-29 ENCOUNTER — OFFICE VISIT (OUTPATIENT)
Dept: FAMILY MEDICINE | Facility: CLINIC | Age: 49
End: 2024-04-29
Payer: COMMERCIAL

## 2024-04-29 ENCOUNTER — MYC REFILL (OUTPATIENT)
Dept: FAMILY MEDICINE | Facility: CLINIC | Age: 49
End: 2024-04-29
Payer: COMMERCIAL

## 2024-04-29 VITALS
TEMPERATURE: 97.3 F | RESPIRATION RATE: 16 BRPM | HEIGHT: 66 IN | DIASTOLIC BLOOD PRESSURE: 82 MMHG | WEIGHT: 188.8 LBS | HEART RATE: 120 BPM | SYSTOLIC BLOOD PRESSURE: 126 MMHG | OXYGEN SATURATION: 98 % | BODY MASS INDEX: 30.34 KG/M2

## 2024-04-29 DIAGNOSIS — M54.12 CERVICAL RADICULOPATHY: Primary | ICD-10-CM

## 2024-04-29 DIAGNOSIS — S82.891A CLOSED FRACTURE OF RIGHT ANKLE, INITIAL ENCOUNTER: ICD-10-CM

## 2024-04-29 DIAGNOSIS — M99.03 SEGMENTAL DYSFUNCTION OF LUMBAR REGION: ICD-10-CM

## 2024-04-29 DIAGNOSIS — M99.04 SEGMENTAL DYSFUNCTION OF SACRAL REGION: ICD-10-CM

## 2024-04-29 DIAGNOSIS — G89.4 CHRONIC PAIN SYNDROME: ICD-10-CM

## 2024-04-29 PROCEDURE — 99214 OFFICE O/P EST MOD 30 MIN: CPT

## 2024-04-29 RX ORDER — CYCLOBENZAPRINE HCL 10 MG
10 TABLET ORAL 3 TIMES DAILY PRN
Qty: 15 TABLET | Refills: 0 | Status: SHIPPED | OUTPATIENT
Start: 2024-04-29 | End: 2024-06-12

## 2024-04-29 ASSESSMENT — PAIN SCALES - GENERAL: PAINLEVEL: SEVERE PAIN (7)
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
## 2024-04-29 NOTE — LETTER
April 29, 2024      Stacy Brown  [address]   [place] [place] [address]        To Whom It May Concern:    Stacy Brown  was seen on 4/29/2024.  Please excuse her until 5/15/2024 due to injury.        Sincerely,        Estefanía Grullon PA-C

## 2024-04-29 NOTE — PATIENT INSTRUCTIONS
Paper completed    Work note    Follow-up with pain management, call them as soon as possible    Outpatient MRI of cervical spine recommended by ER this is been ordered    Follow-up with your primary care provider as scheduled    Take Flexeril up to 3 times a day as needed for back spasms.  Do not drink, drive, or operate any machinery while taking this medication.    Patient understood and verbally consented to the treatment plan. Discussed symptoms that would warrant an urgent or emergent visit. All of the patients' questions were answered. Patient was instructed to contact the clinic if questions or concerns arise. Recommend follow up appointments if symptoms worsen or fail to improve. Recommend follow up as needed. Recommend ER in the case of an emergency.    Estefanía Grullon PA-C    Please note: Voice recognition software may have been used in preparing this note, unintended word substitutions may be present.

## 2024-05-01 ENCOUNTER — VIRTUAL VISIT (OUTPATIENT)
Dept: GASTROENTEROLOGY | Facility: CLINIC | Age: 49
End: 2024-05-01
Payer: COMMERCIAL

## 2024-05-01 ENCOUNTER — PRE VISIT (OUTPATIENT)
Dept: GASTROENTEROLOGY | Facility: CLINIC | Age: 49
End: 2024-05-01

## 2024-05-01 DIAGNOSIS — K52.9 ACUTE COLITIS: ICD-10-CM

## 2024-05-01 DIAGNOSIS — R11.2 NAUSEA AND VOMITING, UNSPECIFIED VOMITING TYPE: ICD-10-CM

## 2024-05-01 DIAGNOSIS — K27.9: ICD-10-CM

## 2024-05-01 DIAGNOSIS — K92.0 HEMATEMESIS WITH NAUSEA: ICD-10-CM

## 2024-05-01 PROCEDURE — 99204 OFFICE O/P NEW MOD 45 MIN: CPT | Mod: 95 | Performed by: INTERNAL MEDICINE

## 2024-05-01 RX ORDER — OXYCODONE HYDROCHLORIDE 5 MG/1
5 TABLET ORAL DAILY PRN
Qty: 30 TABLET | Refills: 0 | Status: SHIPPED | OUTPATIENT
Start: 2024-05-05 | End: 2024-05-25

## 2024-05-01 NOTE — PROGRESS NOTES
HPI:    Stacy  presents today for a video visit to discuss recent ER visit for severe abdominal pain as well as hematemesis and blood in the stool.  Did have CT during one ER visit which showed colitis in transverse colon but this resolved on repeat CT a few days later.  Pain is starting to return after ER visits -  does get better with bowel movements - generally has one BM a day  No further episodes of nausea or vomiting.  No recurrent hematemesis or episodes of blood in the stool.  Is on carafate and protonix. Unsure if they are helping.  Scheduled for EGD and colonoscopy 5/15  Is on oxycodone once daily - no recent changes in dosing    Is under a lot of stress at work - talks to a friend about this which she finds helpful.  Is not working with a psychologist.    Past Medical History:   Diagnosis Date    CAD (coronary artery disease)     Knee pain, chronic     Mixed hyperlipidemia     NSTEMI (non-ST elevated myocardial infarction) (H) 03/05/2021    S/P CABG (coronary artery bypass graft) 03/16/2021    Tobacco abuse disorder 11/21/2017    Type II or unspecified type diabetes mellitus without mention of complication, not stated as uncontrolled 08/16/2002    diagnosed 8/16/02, started insulin 2006       Past Surgical History:   Procedure Laterality Date    BYPASS GRAFT ARTERY CORONARY N/A 3/9/2021    Procedure: CORONARY ARTERY BYPASS GRAFT X 4 (LIMA - LAD, SV - RPL, SV - PDA,  RA - OM) LEFT RADIAL ENDOARTERY HARVEST AND BILATERAL LEG ENDOVEIN HARVEST (ON CARDIOPULMONARY PUMP OXYGENATOR ; INTRAOPERATIVE TRANSESOPHAGEAL ECHOCARDIOGRAM BY ANESTHESIOLOGIST DR. NEL AVILA)   ;  Surgeon: Kunal Selby MD;  Location:  OR    CV HEART CATHETERIZATION WITH POSSIBLE INTERVENTION N/A 3/8/2021    Procedure: Heart Catheterization with Possible Intervention;  Surgeon: Vadim Kamara MD;  Location:  HEART CARDIAC CATH LAB    HC OPEN TX METATARSAL FRACTURE  age 12    softball injury,open fracture  left foot    HC TOOTH EXTRACTION W/FORCEP      Extract wisdom teeth    INJECT JOINT SACROILIAC Left 1/11/2018    Procedure: INJECT JOINT SACROILIAC;  INJECT JOINT SACROILIAC LEFT;  Surgeon: Alan Marshall MD;  Location: [facility] OR    LAPAROSCOPIC CHOLECYSTECTOMY N/A 2/13/2023    Procedure: CHOLECYSTECTOMY, LAPAROSCOPIC;  Surgeon: Robert Diop DO;  Location: [facility] OR    OPERATIVE HYSTEROSCOPY WITH MORCELLATOR N/A 7/24/2018    Procedure: OPERATIVE HYSTEROSCOPY WITH MORCELLATOR (MYOSURE);  Exam under anesthesia, operative hysteroscopy, polypectomy, D & C;  Surgeon: Sindhu Peterson DO;  Location: [facility] OR    TUBAL LIGATION  7/27/2006    ZZC STABISM SURG,PREV EYE SURG,NOT MUSC      Right       Family History   Problem Relation Age of Onset    Allergies Mother     Lipids Father         cholesterol    Diabetes Maternal Grandmother     Hypertension Maternal Grandmother     Heart Disease Maternal Grandmother         Bypass    Cancer Maternal Grandfather         Lung - metastatic    Alzheimer Disease Paternal Grandmother     Heart Disease Paternal Grandmother         valve replacement    Cerebrovascular Disease Paternal Grandfather     Anesthesia Reaction No family hx of     Colon Cancer No family hx of        Social History     Tobacco Use    Smoking status: Former     Current packs/day: 0.00     Average packs/day: 0.5 packs/day for 6.0 years (3.0 ttl pk-yrs)     Types: Cigarettes     Start date: 3/5/2015     Quit date: 3/5/2021     Years since quitting: 3.1    Smokeless tobacco: Never   Substance Use Topics    Alcohol use: Yes     Alcohol/week: 0.0 standard drinks of alcohol     Comment: once a month        O:    Gen: no acute distress  HEENT: NCAT  Neck: normal ROM  Resp: nonlabored breathing  Neuro: no gross deficits  Psych: appropriate mood and affect    Assessment and Plan:    # colitis on imaging, abdominal pain - colitis resolved on follow-up CT scan but should be evalauted further with colonoscopy which is  already scheduled.  Does seem like pain may in part be related to constipation - will start daily miralax - if no improvement can add anti-spasmodic. Suspect stress maybe contributing to symptoms as well - did offer referral to health psychology today which patient declined for now    # hematemesis - likely emetogenic injury.  Hemoglobin fortunately stable and no further episodes.  EGD scheduled for further evaluation.  Will continue PPI for now.     RTC after testing    Marii Diehl DO     Video-Visit Details     Type of service:  Video Visit     Video Start Time: 4:00  Video End Time (time video stopped): 4:09 PM    Originating Location (pt. Location): home     Distant Location (provider location):  remote     Mode of Communication:  Video Conference via [employer]

## 2024-05-01 NOTE — NURSING NOTE
Is the patient currently in the state of [place]? YES    Visit mode:VIDEO    If the visit is dropped, the patient can be reconnected by: VIDEO VISIT: Send to e-mail at: [email or web address]    Will anyone else be joining the visit? YES: How would they like to receive their invitation? Send to e-mail:  will be in the room  (If patient encounters technical issues they should call [phone])    How would you like to obtain your AVS? MyChart    Are changes needed to the allergy or medication list? No    Are refills needed on medications prescribed by this physician? NO    Reason for visit: Consult    Maverick ACOSTA

## 2024-05-01 NOTE — PROGRESS NOTES
"Virtual Visit Details    Type of service:  Video Visit     Originating Location (pt. Location): {video visit patient location:663530::\"Home\"}  {PROVIDER LOCATION On-site should be selected for visits conducted from your clinic location or adjoining [facility] hospital, academic office, or other nearby [facility] building. Off-site should be selected for all other provider locations, including home:[id number]}  Distant Location (provider location):  {virtual location provider:[id number]}  Platform used for Video Visit: {Virtual Visit Platforms:858880::\"[employer]anz\"}  "

## 2024-05-01 NOTE — PATIENT INSTRUCTIONS
Please start miralax once daily  Keep the appointment for your EGD and colonoscopy on 5/15 - we will discuss next steps based on those results

## 2024-05-14 ENCOUNTER — HOSPITAL ENCOUNTER (OUTPATIENT)
Dept: MRI IMAGING | Facility: CLINIC | Age: 49
Discharge: HOME | End: 2024-05-14
Payer: OTHER MISCELLANEOUS

## 2024-05-14 ENCOUNTER — OFFICE VISIT (OUTPATIENT)
Dept: FAMILY MEDICINE | Facility: CLINIC | Age: 49
End: 2024-05-14
Payer: COMMERCIAL

## 2024-05-14 ENCOUNTER — ANESTHESIA EVENT (OUTPATIENT)
Dept: GASTROENTEROLOGY | Facility: CLINIC | Age: 49
End: 2024-05-14
Payer: COMMERCIAL

## 2024-05-14 VITALS
WEIGHT: 194 LBS | SYSTOLIC BLOOD PRESSURE: 150 MMHG | OXYGEN SATURATION: 99 % | DIASTOLIC BLOOD PRESSURE: 88 MMHG | BODY MASS INDEX: 30.45 KG/M2 | HEART RATE: 93 BPM | RESPIRATION RATE: 18 BRPM | HEIGHT: 67 IN | TEMPERATURE: 97.2 F

## 2024-05-14 DIAGNOSIS — E66.09 CLASS 1 OBESITY DUE TO EXCESS CALORIES WITH SERIOUS COMORBIDITY AND BODY MASS INDEX (BMI) OF 30.0 TO 30.9 IN ADULT: ICD-10-CM

## 2024-05-14 DIAGNOSIS — I25.2 HISTORY OF NON-ST ELEVATION MYOCARDIAL INFARCTION (NSTEMI): ICD-10-CM

## 2024-05-14 DIAGNOSIS — F43.10 PTSD (POST-TRAUMATIC STRESS DISORDER): ICD-10-CM

## 2024-05-14 DIAGNOSIS — Z79.4 TYPE 2 DIABETES MELLITUS WITH HYPERGLYCEMIA, WITH LONG-TERM CURRENT USE OF INSULIN (H): Primary | ICD-10-CM

## 2024-05-14 DIAGNOSIS — Z95.1 S/P CABG (CORONARY ARTERY BYPASS GRAFT): ICD-10-CM

## 2024-05-14 DIAGNOSIS — E11.65 TYPE 2 DIABETES MELLITUS WITH HYPERGLYCEMIA, WITH LONG-TERM CURRENT USE OF INSULIN (H): Primary | ICD-10-CM

## 2024-05-14 DIAGNOSIS — I10 BENIGN ESSENTIAL HYPERTENSION: ICD-10-CM

## 2024-05-14 DIAGNOSIS — H54.7 VISION LOSS: ICD-10-CM

## 2024-05-14 DIAGNOSIS — K52.9 ACUTE COLITIS: ICD-10-CM

## 2024-05-14 DIAGNOSIS — Z79.4 TYPE 2 DIABETES MELLITUS WITH OTHER CIRCULATORY COMPLICATION, WITH LONG-TERM CURRENT USE OF INSULIN (H): ICD-10-CM

## 2024-05-14 DIAGNOSIS — F32.1 MODERATE MAJOR DEPRESSION (H): ICD-10-CM

## 2024-05-14 DIAGNOSIS — E11.59 TYPE 2 DIABETES MELLITUS WITH OTHER CIRCULATORY COMPLICATION, WITH LONG-TERM CURRENT USE OF INSULIN (H): ICD-10-CM

## 2024-05-14 DIAGNOSIS — M54.12 CERVICAL RADICULOPATHY: ICD-10-CM

## 2024-05-14 DIAGNOSIS — E66.811 CLASS 1 OBESITY DUE TO EXCESS CALORIES WITH SERIOUS COMORBIDITY AND BODY MASS INDEX (BMI) OF 30.0 TO 30.9 IN ADULT: ICD-10-CM

## 2024-05-14 DIAGNOSIS — G89.4 CHRONIC PAIN SYNDROME: ICD-10-CM

## 2024-05-14 DIAGNOSIS — M54.9 MECHANICAL BACK PAIN: ICD-10-CM

## 2024-05-14 DIAGNOSIS — E78.5 HYPERLIPIDEMIA LDL GOAL <100: ICD-10-CM

## 2024-05-14 LAB
CREAT UR-MCNC: 48.4 MG/DL
MICROALBUMIN UR-MCNC: 72.5 MG/L
MICROALBUMIN/CREAT UR: 149.79 MG/G CR (ref 0–25)

## 2024-05-14 PROCEDURE — 82570 ASSAY OF URINE CREATININE: CPT | Performed by: FAMILY MEDICINE

## 2024-05-14 PROCEDURE — 99214 OFFICE O/P EST MOD 30 MIN: CPT | Performed by: FAMILY MEDICINE

## 2024-05-14 PROCEDURE — 82043 UR ALBUMIN QUANTITATIVE: CPT | Performed by: FAMILY MEDICINE

## 2024-05-14 PROCEDURE — 72141 MRI NECK SPINE W/O DYE: CPT

## 2024-05-14 ASSESSMENT — PAIN SCALES - GENERAL: PAINLEVEL: EXTREME PAIN (8)

## 2024-05-14 ASSESSMENT — LIFESTYLE VARIABLES: TOBACCO_USE: 1
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
## 2024-05-14 NOTE — LETTER
May 14, 2024      Stacy Brown  [address]   [facility] [address]        To Whom It May Concern:    Stacy Brown  was seen on 5/14/24.  Please excuse her from work due to injury. At this time her return to work is undetermined. She will be reevaluated in 1 month (6/14/24).          Sincerely,            Yaima Muse MD

## 2024-05-14 NOTE — PROGRESS NOTES
Assessment & Plan       ICD-10-CM    1. Type 2 diabetes mellitus with hyperglycemia, with long-term current use of insulin (H)  E11.65 Albumin Random Urine Quantitative with Creat Ratio    Z79.4       2. Type 2 diabetes mellitus with other circulatory complication, with long-term current use of insulin (H)  E11.59     Z79.4       3. Chronic pain syndrome  G89.4       4. History of non-ST elevation myocardial infarction (NSTEMI) March 2021  I25.2       5. S/P CABG (coronary artery bypass graft)  Z95.1       6. PTSD (post-traumatic stress disorder)  F43.10       7. HYPERLIPIDEMIA LDL GOAL <100  E78.5 Hemoglobin A1c     CANCELED: Lipid panel reflex to direct LDL Fasting      8. Vision loss  H54.7       9. Class 1 obesity due to excess calories with serious comorbidity and body mass index (BMI) of 30.0 to 30.9 in adult  E66.09     Z68.30       10. Moderate major depression (H)  F32.1       11. Mechanical back pain  M54.9       12. Benign essential hypertension  I10       13. Acute colitis  K52.9            Stacy has been unable to manage her health adequately. Her diabetes has not been in good control. She has not been able to balance working and taking care of her health and I advised her that I recommend she prioritize her health over her job at this time. I don't recommend she go back to work. I am recommending she apply for disability at this time. She has already had MI x 1 and needed CABG at a young age. Her diabetes is poorly controlled and her pain is uncontrolled as well.   I don't think she will be able to perform the job duties consistently at any time in the future, and her employer will not allow part time or reduced capacity work.     Will follow up with her MRI results as dictated by findings.     She is going to start the process for SSDI and I will assist her with any necessary paperwork.   At this time I am just leaving her off work and will see her back in 1 month to continue this process. If she  "gets paperwork before that, will assist her as able.     I am checking labs today, see orders, will notify with results.   She will proceed with her GI workup as planned.     I advised her to leave the cyst in her sternal scar alone at this time.       BMI  Estimated body mass index is 30.09 kg/m  as calculated from the following:    Height as of this encounter: 1.71 m (5' 7.32\").    Weight as of this encounter: 88 kg (194 lb).   Weight management plan: not addressed today, will start to work on more closely in next few months.       Yaima Muse MD     Kwadwo Kumar is a 48 year old, presenting for the following health issues:  Follow Up (Back injury) and Neck Pain        5/14/2024     2:12 PM   Additional Questions   Roomed by Xuan   Accompanied by      History of Present Illness       Back Pain:  She presents for follow up of back pain. Patient's back pain is a new problem.    Original cause of back pain: work related injury  First noticed back pain: 1-4 weeks ago  Patient feels back pain: constantlyLocation of back pain:  Right lower back, left lower back and left side of neck  Description of back pain: sharp  Back pain spreads: left shoulder    Since patient first noticed back pain, pain is: always present, but gets better and worse  Does back pain interfere with her job:  Yes  On a scale of 1-10 (10 being the worst), patient describes pain as:  8  What makes back pain worse: bending and standing   Acupuncture: not tried  Acetaminophen: not helpful  Activity or exercise: not helpful  Chiropractor:  Not tried  Cold: not helpful  Heat: not helpful  Massage: not tried  Muscle relaxants: helpful  NSAIDS: not helpful  Opioids: helpful  Physical Therapy: not tried  Rest: not helpful  Steroid Injection: not tried  Stretching: not helpful  Surgery: not tried  TENS unit: not tried  Topical pain relievers: not helpful  Other healthcare providers patient is seeing for back pain: None    She " "eats 2-3 servings of fruits and vegetables daily.She consumes 1 sweetened beverage(s) daily.She exercises with enough effort to increase her heart rate 20 to 29 minutes per day.  She exercises with enough effort to increase her heart rate 3 or less days per week.   She is taking medications regularly.       Stacy is here with her  Randal for follow up of her multiple health issues, including diabetes, chronic pain, and some newer issues.   She reports a history of back pain that started on the 24th of April, with numbness in two fingers, right index and middle fingers. Was seen in ED that day. She has not been working since then. She was seen in follow up and had an MRI of the cervical spine today.     She was in the ED 3/29 for near syncope, and then returned same day and was admitted with GI bleeding/hematemesis, and diagnosed with coliitis. She went home on 3/30, then was also seen in the ED on 4/1 for ongoing abdominal pain. She has an EGD and colonoscopy tomorrow. She is in the midst of her colon prep.     She has not been able to take good care of her diabetes or her overall health in general.   She also reports an episode of blindness on the 19th of January, described as a curtain coming down. She was admitted to [facility] and had stroke ruled out, was thought to be due to a complex migraine and poorly controlled diabetes mellitus.     She has been unable to work due to her back injury.   She also continues to have knee pain.     She has a history of CABG. She still has a small cyst on her sternal scar, hasn't really changed.     She also reports having high blood sugar levels, with a 90-day average of 192.     She does take THC gummies for pain.     She has left hip pain with walking.     10 pt ROS is otherwise negative except as noted in HPI.        Objective    BP (!) 150/88   Pulse 93   Temp 97.2  F (36.2  C) (Temporal)   Resp 18   Ht 1.71 m (5' 7.32\")   Wt 88 kg (194 lb)   LMP " 03/07/2021 (Approximate)   SpO2 99%   BMI 30.09 kg/m    Body mass index is 30.09 kg/m .  Physical Exam   Vitals noted.  Patient alert, oriented, and in no acute distress.   Neck:  Supple without lymphadenopathy, JVD or masses.   CV:  RRR without murmur.   Respiratory:  Lungs clear to auscultation bilaterally.   Extremities warm and dry without cyanosis, clubbing or edema.      Orders Placed This Encounter   Procedures    Hemoglobin A1c    Albumin Random Urine Quantitative with Creat Ratio    Lipid panel reflex to direct LDL Fasting            Signed Electronically by: Yaima Muse MD

## 2024-05-14 NOTE — RESULT ENCOUNTER NOTE
Please notify Stacy by telephone of the message below to raise her lisinopril dose so it doesn't get missed via [employer].   Yaima Muse MD

## 2024-05-14 NOTE — RESULT ENCOUNTER NOTE
Hello -    Here are my comments about the recent results.     IMPRESSION:  1. Multilevel cervical spondylosis without high-grade spinal canal or  neuroforaminal stenosis. Mild spinal canal stenosis at C6-7.  2. No abnormal spinal cord signal.      Please let us know if you have any questions or concerns.    Regards,  Estefanía Grullon PA-C

## 2024-05-15 ENCOUNTER — ANESTHESIA (OUTPATIENT)
Dept: GASTROENTEROLOGY | Facility: CLINIC | Age: 49
End: 2024-05-15
Payer: COMMERCIAL

## 2024-05-15 ENCOUNTER — HOSPITAL ENCOUNTER (OUTPATIENT)
Facility: CLINIC | Age: 49
Discharge: HOME | End: 2024-05-15
Attending: INTERNAL MEDICINE | Admitting: INTERNAL MEDICINE
Payer: COMMERCIAL

## 2024-05-15 ENCOUNTER — TELEPHONE (OUTPATIENT)
Dept: FAMILY MEDICINE | Facility: CLINIC | Age: 49
End: 2024-05-15

## 2024-05-15 VITALS
OXYGEN SATURATION: 99 % | TEMPERATURE: 97.4 F | SYSTOLIC BLOOD PRESSURE: 158 MMHG | DIASTOLIC BLOOD PRESSURE: 116 MMHG | HEART RATE: 89 BPM | WEIGHT: 194 LBS | RESPIRATION RATE: 18 BRPM | BODY MASS INDEX: 30.09 KG/M2

## 2024-05-15 LAB
CHOLEST SERPL-MCNC: 358 MG/DL
COLONOSCOPY: NORMAL
FASTING STATUS PATIENT QL REPORTED: YES
HBA1C MFR BLD: 9 %
HDLC SERPL-MCNC: 48 MG/DL
LDLC SERPL CALC-MCNC: 254 MG/DL
NONHDLC SERPL-MCNC: 310 MG/DL
TRIGL SERPL-MCNC: 279 MG/DL
UPPER GI ENDOSCOPY: NORMAL

## 2024-05-15 PROCEDURE — 80061 LIPID PANEL: CPT | Performed by: FAMILY MEDICINE

## 2024-05-15 PROCEDURE — 250N000009 HC RX 250: Performed by: NURSE ANESTHETIST, CERTIFIED REGISTERED

## 2024-05-15 PROCEDURE — 250N000011 HC RX IP 250 OP 636: Performed by: NURSE ANESTHETIST, CERTIFIED REGISTERED

## 2024-05-15 PROCEDURE — 370N000017 HC ANESTHESIA TECHNICAL FEE, PER MIN: Performed by: INTERNAL MEDICINE

## 2024-05-15 PROCEDURE — 88305 TISSUE EXAM BY PATHOLOGIST: CPT | Mod: 26 | Performed by: PATHOLOGY

## 2024-05-15 PROCEDURE — 45385 COLONOSCOPY W/LESION REMOVAL: CPT | Performed by: INTERNAL MEDICINE

## 2024-05-15 PROCEDURE — 258N000003 HC RX IP 258 OP 636: Performed by: NURSE ANESTHETIST, CERTIFIED REGISTERED

## 2024-05-15 PROCEDURE — 43239 EGD BIOPSY SINGLE/MULTIPLE: CPT | Performed by: INTERNAL MEDICINE

## 2024-05-15 PROCEDURE — 45380 COLONOSCOPY AND BIOPSY: CPT | Performed by: INTERNAL MEDICINE

## 2024-05-15 PROCEDURE — 88305 TISSUE EXAM BY PATHOLOGIST: CPT | Mod: TC,XU | Performed by: INTERNAL MEDICINE

## 2024-05-15 PROCEDURE — 36415 COLL VENOUS BLD VENIPUNCTURE: CPT | Performed by: FAMILY MEDICINE

## 2024-05-15 PROCEDURE — 83036 HEMOGLOBIN GLYCOSYLATED A1C: CPT | Performed by: FAMILY MEDICINE

## 2024-05-15 RX ORDER — OXYCODONE HYDROCHLORIDE 5 MG/1
5 TABLET ORAL
Status: DISCONTINUED | OUTPATIENT
Start: 2024-05-15 | End: 2024-05-15 | Stop reason: HOSPADM

## 2024-05-15 RX ORDER — NALOXONE HYDROCHLORIDE 0.4 MG/ML
0.1 INJECTION, SOLUTION INTRAMUSCULAR; INTRAVENOUS; SUBCUTANEOUS
Status: DISCONTINUED | OUTPATIENT
Start: 2024-05-15 | End: 2024-05-15 | Stop reason: HOSPADM

## 2024-05-15 RX ORDER — LIDOCAINE 40 MG/G
CREAM TOPICAL
Status: CANCELLED | OUTPATIENT
Start: 2024-05-15

## 2024-05-15 RX ORDER — SODIUM CHLORIDE, SODIUM LACTATE, POTASSIUM CHLORIDE, CALCIUM CHLORIDE 600; 310; 30; 20 MG/100ML; MG/100ML; MG/100ML; MG/100ML
INJECTION, SOLUTION INTRAVENOUS CONTINUOUS
Status: CANCELLED | OUTPATIENT
Start: 2024-05-15

## 2024-05-15 RX ORDER — PROPOFOL 10 MG/ML
INJECTION, EMULSION INTRAVENOUS CONTINUOUS PRN
Status: DISCONTINUED | OUTPATIENT
Start: 2024-05-15 | End: 2024-05-15

## 2024-05-15 RX ORDER — ONDANSETRON 2 MG/ML
4 INJECTION INTRAMUSCULAR; INTRAVENOUS EVERY 30 MIN PRN
Status: DISCONTINUED | OUTPATIENT
Start: 2024-05-15 | End: 2024-05-15 | Stop reason: HOSPADM

## 2024-05-15 RX ORDER — ONDANSETRON 4 MG/1
4 TABLET, ORALLY DISINTEGRATING ORAL EVERY 30 MIN PRN
Status: DISCONTINUED | OUTPATIENT
Start: 2024-05-15 | End: 2024-05-15 | Stop reason: HOSPADM

## 2024-05-15 RX ORDER — PROPOFOL 10 MG/ML
INJECTION, EMULSION INTRAVENOUS PRN
Status: DISCONTINUED | OUTPATIENT
Start: 2024-05-15 | End: 2024-05-15

## 2024-05-15 RX ORDER — LIDOCAINE HYDROCHLORIDE 20 MG/ML
INJECTION, SOLUTION INFILTRATION; PERINEURAL PRN
Status: DISCONTINUED | OUTPATIENT
Start: 2024-05-15 | End: 2024-05-15

## 2024-05-15 RX ORDER — ONDANSETRON 2 MG/ML
INJECTION INTRAMUSCULAR; INTRAVENOUS PRN
Status: DISCONTINUED | OUTPATIENT
Start: 2024-05-15 | End: 2024-05-15

## 2024-05-15 RX ORDER — SODIUM CHLORIDE, SODIUM LACTATE, POTASSIUM CHLORIDE, CALCIUM CHLORIDE 600; 310; 30; 20 MG/100ML; MG/100ML; MG/100ML; MG/100ML
INJECTION, SOLUTION INTRAVENOUS CONTINUOUS PRN
Status: DISCONTINUED | OUTPATIENT
Start: 2024-05-15 | End: 2024-05-15

## 2024-05-15 RX ORDER — OXYCODONE HYDROCHLORIDE 5 MG/1
10 TABLET ORAL
Status: DISCONTINUED | OUTPATIENT
Start: 2024-05-15 | End: 2024-05-15 | Stop reason: HOSPADM

## 2024-05-15 RX ORDER — DEXAMETHASONE SODIUM PHOSPHATE 10 MG/ML
4 INJECTION, SOLUTION INTRAMUSCULAR; INTRAVENOUS
Status: DISCONTINUED | OUTPATIENT
Start: 2024-05-15 | End: 2024-05-15 | Stop reason: HOSPADM

## 2024-05-15 RX ADMIN — LIDOCAINE HYDROCHLORIDE 50 MG: 20 INJECTION, SOLUTION INFILTRATION; PERINEURAL at 11:30

## 2024-05-15 RX ADMIN — ONDANSETRON 4 MG: 2 INJECTION INTRAMUSCULAR; INTRAVENOUS at 11:42

## 2024-05-15 RX ADMIN — PROPOFOL 100 MG: 10 INJECTION, EMULSION INTRAVENOUS at 11:31

## 2024-05-15 RX ADMIN — PROPOFOL 100 MG: 10 INJECTION, EMULSION INTRAVENOUS at 11:58

## 2024-05-15 RX ADMIN — PROPOFOL 150 MCG/KG/MIN: 10 INJECTION, EMULSION INTRAVENOUS at 11:31

## 2024-05-15 RX ADMIN — SODIUM CHLORIDE, POTASSIUM CHLORIDE, SODIUM LACTATE AND CALCIUM CHLORIDE: 600; 310; 30; 20 INJECTION, SOLUTION INTRAVENOUS at 11:26

## 2024-05-15 ASSESSMENT — ACTIVITIES OF DAILY LIVING (ADL)
ADLS_ACUITY_SCORE: 38

## 2024-05-15 NOTE — ANESTHESIA POSTPROCEDURE EVALUATION
Patient: Stacy Brown    Procedure: Procedure(s):  ESOPHAGOGASTRODUODENOSCOPY, WITH BIOPSY  COLONOSCOPY, WITH POLYPECTOMY       Anesthesia Type:  MAC    Note:  Disposition: Outpatient   Postop Pain Control: Uneventful            Sign Out: Well controlled pain   PONV: No   Neuro/Psych: Uneventful            Sign Out: Acceptable/Baseline neuro status   Airway/Respiratory: Uneventful            Sign Out: Acceptable/Baseline resp. status   CV/Hemodynamics: Uneventful            Sign Out: Acceptable CV status; No obvious hypovolemia; No obvious fluid overload   Other NRE: NONE   DID A NON-ROUTINE EVENT OCCUR? No           Last vitals:  Vitals Value Taken Time   /116 05/15/24 1250   Temp     Pulse 89 05/15/24 1250   Resp     SpO2 99 % 05/15/24 1252   Vitals shown include unfiled device data.    Electronically Signed By: JOVITA Ochoa CRNA  May 15, 2024  3:40 PM

## 2024-05-15 NOTE — ANESTHESIA PREPROCEDURE EVALUATION
Anesthesia Pre-Procedure Evaluation    Patient: Stacy Brown   MRN: [id number] : 1975        Procedure : Procedure(s):  Esophagoscopy, gastroscopy, duodenoscopy (EGD), combined  Colonoscopy          Past Medical History:   Diagnosis Date    CAD (coronary artery disease)     Knee pain, chronic     Mixed hyperlipidemia     NSTEMI (non-ST elevated myocardial infarction) (H) 2021    S/P CABG (coronary artery bypass graft) 2021    Tobacco abuse disorder 2017    Type II or unspecified type diabetes mellitus without mention of complication, not stated as uncontrolled 2002    diagnosed 02, started insulin       Past Surgical History:   Procedure Laterality Date    BYPASS GRAFT ARTERY CORONARY N/A 3/9/2021    Procedure: CORONARY ARTERY BYPASS GRAFT X 4 (LIMA - LAD, SV - RPL, SV - PDA,  RA - OM) LEFT RADIAL ENDOARTERY HARVEST AND BILATERAL LEG ENDOVEIN HARVEST (ON CARDIOPULMONARY PUMP OXYGENATOR ; INTRAOPERATIVE TRANSESOPHAGEAL ECHOCARDIOGRAM BY ANESTHESIOLOGIST DR. NEL AVILA)   ;  Surgeon: Kunal Selby MD;  Location:  OR    CV HEART CATHETERIZATION WITH POSSIBLE INTERVENTION N/A 3/8/2021    Procedure: Heart Catheterization with Possible Intervention;  Surgeon: Vadim Kamara MD;  Location:  HEART CARDIAC CATH LAB    HC OPEN TX METATARSAL FRACTURE  age 12    softball injury,open fracture left foot    HC TOOTH EXTRACTION W/FORCEP      Extract wisdom teeth    INJECT JOINT SACROILIAC Left 2018    Procedure: INJECT JOINT SACROILIAC;  INJECT JOINT SACROILIAC LEFT;  Surgeon: Alan Marshall MD;  Location: [facility]    LAPAROSCOPIC CHOLECYSTECTOMY N/A 2023    Procedure: CHOLECYSTECTOMY, LAPAROSCOPIC;  Surgeon: Robert Diop DO;  Location: [facility]    OPERATIVE HYSTEROSCOPY WITH MORCELLATOR N/A 2018    Procedure: OPERATIVE HYSTEROSCOPY WITH MORCELLATOR (MYOSURE);  Exam under anesthesia, operative hysteroscopy, polypectomy, D & C;  Surgeon:  Sindhu Peterson, DO;  Location: [facility] OR    TUBAL LIGATION  7/27/2006    ZZC STABISM SURG,PREV EYE SURG,NOT MUSC      Right      Allergies   Allergen Reactions    Amoxicillin Hives    Hydrocodone Nausea and Vomiting      Social History     Tobacco Use    Smoking status: Former     Current packs/day: 0.00     Average packs/day: 0.5 packs/day for 6.0 years (3.0 ttl pk-yrs)     Types: Cigarettes     Start date: 3/5/2015     Quit date: 3/5/2021     Years since quitting: 3.1    Smokeless tobacco: Never   Substance Use Topics    Alcohol use: Yes     Alcohol/week: 0.0 standard drinks of alcohol     Comment: once a month      Wt Readings from Last 1 Encounters:   05/14/24 88 kg (194 lb)        Anesthesia Evaluation   Pt has had prior anesthetic. Type: General.        ROS/MED HX  ENT/Pulmonary:     (+)                tobacco use, Past use,                       Neurologic:     (+)    peripheral neuropathy,                            Cardiovascular:     (+) Dyslipidemia hypertension- -  CAD - past MI CABG (X4)-date: 3/21 . -                                 Previous cardiac testing   Echo: Date: 3/21 Results:  Interpretation Summary     1. The left ventricle is normal in structure, function and size. The visual  ejection fraction is estimated at 60%.  2. The right ventricle is normal in structure, function and size.  3. No valve disease.  Stress Test:  Date: Results:    ECG Reviewed:  Date: 1/23 Results:  Unable to view EKG but per H/P - NSR  Cath:  Date: Results:      METS/Exercise Tolerance:     Hematologic:     (+)      anemia,          Musculoskeletal:       GI/Hepatic:  - neg GI/hepatic ROS   (+) GERD,      Inflammatory bowel disease, bowel prep,            Renal/Genitourinary:  - neg Renal ROS     Endo:     (+)  type II DM, Last HgA1c: 7.6, date: 11/22, Using insulin, - not using insulin pump.         Obesity,       Psychiatric/Substance Use:     (+) psychiatric history other (comment) and depression (PTSD)        Infectious Disease:  - neg infectious disease ROS     Malignancy:  - neg malignancy ROS     Other:  - neg other ROS    (+)  , H/O Chronic Pain,       Physical Exam    Airway        Mallampati: II   TM distance: > 3 FB   Neck ROM: full   Mouth opening: > 3 cm    Respiratory Devices and Support         Dental       (+) Multiple visibly decayed, broken teeth      Cardiovascular   cardiovascular exam normal       Rhythm and rate: regular and normal     Pulmonary   pulmonary exam normal        breath sounds clear to auscultation       Other findings: Poor dentition.  Many missing teeth.  OUTSIDE LABS:  CBC:   Lab Results   Component Value Date    WBC 7.2 04/05/2024    WBC 8.8 04/01/2024    HGB 12.5 04/05/2024    HGB 13.1 04/01/2024    HCT 37.2 04/05/2024    HCT 37.9 04/01/2024     04/05/2024     (L) 04/01/2024     BMP:   Lab Results   Component Value Date     04/05/2024     04/01/2024    POTASSIUM 4.1 04/05/2024    POTASSIUM 3.6 04/01/2024    CHLORIDE 104 04/05/2024    CHLORIDE 103 04/01/2024    CO2 24 04/05/2024    CO2 23 04/01/2024    BUN 17.9 04/05/2024    BUN 13.0 04/01/2024    CR 0.73 04/05/2024    CR 0.69 04/01/2024     (H) 04/05/2024    GLC 85 04/01/2024     COAGS:   Lab Results   Component Value Date    PTT 26 03/29/2024    INR 0.98 03/29/2024    FIBR 325 03/09/2021     POC:   Lab Results   Component Value Date     (H) 03/16/2021    HCG Negative 01/09/2019    HCGS Negative 11/29/2013     HEPATIC:   Lab Results   Component Value Date    ALBUMIN 4.2 04/05/2024    PROTTOTAL 7.4 04/05/2024    ALT 14 04/05/2024    AST 13 04/05/2024    ALKPHOS 102 04/05/2024    BILITOTAL 0.2 04/05/2024     OTHER:   Lab Results   Component Value Date    PH 7.37 03/09/2021    LACT 1.6 03/29/2024    A1C 8.0 (H) 09/15/2023    LUNA 9.5 04/05/2024    PHOS 2.5 03/11/2021    MAG 1.9 03/29/2024    LIPASE 20 04/01/2024    AMYLASE 65 11/17/2011    TSH 0.70 03/29/2024    CRP 58.0 (H) 12/14/2022    SED 25  "(H) 01/19/2024       Anesthesia Plan    ASA Status:  3    NPO Status:  NPO Appropriate    Anesthesia Type: MAC.     - Reason for MAC: straight local not clinically adequate   Induction: Propofol.   Maintenance: TIVA.        Consents    Anesthesia Plan(s) and associated risks, benefits, and realistic alternatives discussed. Questions answered and patient/representative(s) expressed understanding.     - Discussed:     - Discussed with:  Patient            Postoperative Care            Comments:    Other Comments: I have discussed all the risks and benefits of the anesthetic with the patient and they wish to proceed.            JOVITA Santana CRNA    I have reviewed the pertinent notes and labs in the chart from the past 30 days and (re)examined the patient.  Any updates or changes from those notes are reflected in this note.              # Obesity: Estimated body mass index is 30.09 kg/m  as calculated from the following:    Height as of 5/14/24: 1.71 m (5' 7.32\").    Weight as of 5/14/24: 88 kg (194 lb).      "

## 2024-05-15 NOTE — DISCHARGE INSTRUCTIONS
[facility]    Home Care Following Endoscopy          Activity:  You have just undergone an endoscopic procedure usually performed with conscious sedation.  Do not work or operate machinery (including a car) for at least 12 hours.    I encourage you to walk and attempt to pass this air as soon as possible.    Diet:  Return to the diet you were on before your procedure but eat lightly for the first 12-24 hours.  Drink plenty of water.  Resume any regular medications unless otherwise advised by your physician.  Please begin any new medication prescribed as a result of your procedure as directed by your physician.   If you had any biopsy or polyp removed please refrain from aspirin or aspirin products for 2 days.  If on Coumadin please restart as instructed by your physician.   Pain:  You may take Tylenol as needed for pain.  Expected Recovery:  You can expect some mild abdominal fullness and/or discomfort due to the air used to inflate your intestinal tract. It is also normal to have a mild sore throat after upper endoscopy.    Call Your Physician if You Have:  After Upper Endoscopy:  Shoulder, back or chest pain.  Difficulty breathing or swallowing.  Vomiting blood.  After Colonoscopy:  Worsening persisting abdominal pain which is worse with activity.  Fevers (>101 degrees F), chills or shakes.  Passage of continued blood with bowel movements.     Any questions or concerns about your recovery, please call [phone] or [facility] hours [facility] ([phone]) Nurse Advice Line.    Follow-up Care:  IF you did have polyps/biopsy tissue sample(s) removed.  The polyps/biopsy tissue sample(s) will be sent to pathology.    You should receive letter in your My Chart from Dr. Kuhn with your results within 1-2 weeks. If you do not participate in My Chart a physical letter will come in the mail in 2-3 weeks.  Please call if you have not received a notification of your results.  If asked to return to clinic  please make an appointment 1 week after your procedure.  Call [phone].

## 2024-05-15 NOTE — ANESTHESIA CARE TRANSFER NOTE
Patient: Stacy Brown    Procedure: Procedure(s):  ESOPHAGOGASTRODUODENOSCOPY, WITH BIOPSY  COLONOSCOPY, WITH POLYPECTOMY       Diagnosis: Hematemesis, unspecified whether nausea present [K92.0]  Diagnosis Additional Information: No value filed.    Anesthesia Type:   MAC     Note:    Oropharynx: oropharynx clear of all foreign objects  Level of Consciousness: awake  Oxygen Supplementation: room air    Independent Airway: airway patency satisfactory and stable  Dentition: dentition unchanged  Vital Signs Stable: post-procedure vital signs reviewed and stable  Report to RN Given: handoff report given  Patient transferred to: Phase II  Comments: To Phase II. Report to RN.  VSS Resp status stable.  Handoff Report: Identifed the Patient, Identified the Reponsible Provider, Reviewed the pertinent medical history, Discussed the surgical course, Reviewed Intra-OP anesthesia mangement and issues during anesthesia, Set expectations for post-procedure period and Allowed opportunity for questions and acknowledgement of understanding      Vitals:  Vitals Value Taken Time   BP     Temp     Pulse     Resp     SpO2 99 % 05/15/24 1228   Vitals shown include unfiled device data.    Electronically Signed By: JOVITA Santana CRNA  May 15, 2024  12:28 PM

## 2024-05-15 NOTE — LETTER
May 20, 2024      Stacy Brown  [address]   [place] [address]        Dear ,    We are writing to inform you of your test results.    A repeat colon exam in 5 yrs is suggested.      Resulted Orders   Surgical Pathology Exam   Result Value Ref Range    Case Report       Surgical Pathology Report                         Case: [id number]                                  Authorizing Provider:  Humble Kuhn MD        Collected:           05/15/2024 11:38 AM          Ordering Location:     [facility]          Received:            05/15/2024 12:38 PM                                 [facility]                                                          Pathologist:           Henrry Perez MD                                                                           Specimens:   A) - Stomach, Body, Gastric biopsies to r/o H pylori                                                B) - Large Intestine, Colon, Cecum, Cecum colon polyp                                               C) - Large Intestine, Colon, Descending, Descending colon polyp                            Final Diagnosis       A: Stomach, body, biopsy:  - Gastric body mucosa without diagnostic abnormality.  - Negative for Helicobacter pylori on H&E examination.     B: Large intestine, cecum, polyp, biopsy/polypectomy:  - Tubular adenoma negative for high-grade dysplasia and malignancy.     C: Large intestine, descending, polyp, biopsy/polypectomy:  - Tubular adenoma negative for high-grade dysplasia and malignancy.        Clinical Information       Procedure:  ESOPHAGOGASTRODUODENOSCOPY, WITH BIOPSY  COLONOSCOPY, WITH POLYPECTOMY  Pre-op Diagnosis: Hematemesis, unspecified whether nausea present [K92.0]  Post-op Diagnosis: K92.0 - Hematemesis, unspecified whether nausea present [ICD-10-CM]      Gross Description       A(1). Stomach, Body, Gastric  "biopsies to r/o H pylori:  The specimen is received in formalin, labeled with the patient's name, medical record number and other identifying information and designated  stomach, body . It consists of 2 tan soft tissue fragments ranging from 0.4-0.5 cm. Entirely submitted in one cassette.    B(2). Large Intestine, Colon, Cecum, Cecum colon polyp:  The specimen is received in formalin labeled with the patient's name, medical record number and other identifying information and designated \"cecum colon polyp\". It consists of 4 tan-pink mucosal soft tissue fragments, 0.4-1.4 cm.  The largest fragment is inked blue and sectioned.  Submitted entirely in 1 cassette.    C(3). Large Intestine, Colon, Descending, Descending colon polyp:  The specimen is received in formalin labeled with the patient's name, medical record number and other identifying information and designated \"descending colon polyp\". It consists of a 1.0 cm tan-pink mucosal soft tissue fragment.  Inked blue, bisected, and submitted entirely in 1 cassette.   (DEIDRE Monreal)5/16/2024 7:40 AM       Microscopic Description       A microscopic examination is performed.       Performing Labs       The technical component of this testing was completed at [facility].    Stain controls for all stains resulted within this report have been reviewed and show appropriate reactivity.       Case Images         If you have any questions or concerns, please call the clinic at the number listed above.       Sincerely,      Humble Kuhn MD            "

## 2024-05-15 NOTE — TELEPHONE ENCOUNTER
Patient called in regards to message that was left yesterday.  She has read her [facility] [facility] message and she is aware of the instruction to increase her lisinopril.      Patient HOWEVER wanted to let you know that she went to the lab yesterday and they were unsuccessful in drawing her blood.  She is having a procedure today and they will be taking it at that time.    Brisa Benitez XRO/

## 2024-05-17 ENCOUNTER — TELEPHONE (OUTPATIENT)
Dept: FAMILY MEDICINE | Facility: CLINIC | Age: 49
End: 2024-05-17
Payer: COMMERCIAL

## 2024-05-17 NOTE — TELEPHONE ENCOUNTER
Forms/Letter Request    Type of form/letter: OTHER: WORK COMP PAPER WORK        Do we have the form/letter: Yes    Who is the form from? Patient    Where did/will the form come from? Patient or family brought in       When is form/letter needed by: asap per pt     How would you like the form/letter returned: Mail  Is this the correct address?: Yes  [address]   [facility] [address]    Patient Notified form requests are processed in 5-7 business days:Yes    Could we send this information to you in [employer] or would you prefer to receive a phone call?:   No preference   Okay to leave a detailed message?: Yes at Home number on file [phone] (home)    [facility] TOO
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
## 2024-05-20 NOTE — RESULT ENCOUNTER NOTE
Stacy, your labs are bad again. We'll get these under better control once you are completely off work and focused on your health. Make sure you are taking your medications as prescribed. We'll discuss again at your follow up visit.   Yaima Muse MD

## 2024-05-23 ENCOUNTER — MYC MEDICAL ADVICE (OUTPATIENT)
Dept: FAMILY MEDICINE | Facility: CLINIC | Age: 49
End: 2024-05-23
Payer: COMMERCIAL

## 2024-05-25 ENCOUNTER — MYC REFILL (OUTPATIENT)
Dept: FAMILY MEDICINE | Facility: CLINIC | Age: 49
End: 2024-05-25
Payer: COMMERCIAL

## 2024-05-25 DIAGNOSIS — S82.891A CLOSED FRACTURE OF RIGHT ANKLE, INITIAL ENCOUNTER: ICD-10-CM

## 2024-05-29 RX ORDER — OXYCODONE HYDROCHLORIDE 5 MG/1
5 TABLET ORAL DAILY PRN
Qty: 30 TABLET | Refills: 0 | Status: SHIPPED | OUTPATIENT
Start: 2024-06-04 | End: 2024-06-30

## 2024-05-31 ENCOUNTER — MYC MEDICAL ADVICE (OUTPATIENT)
Dept: FAMILY MEDICINE | Facility: CLINIC | Age: 49
End: 2024-05-31
Payer: COMMERCIAL

## 2024-06-04 PROBLEM — K90.89 POOR IRON ABSORPTION: Status: RESOLVED | Noted: 2019-09-06 | Resolved: 2024-06-04

## 2024-06-04 PROBLEM — R73.9 TRANSIENT HYPERGLYCEMIA POST PROCEDURE: Status: RESOLVED | Noted: 2021-03-16 | Resolved: 2024-06-04

## 2024-06-04 PROBLEM — J21.0 RSV BRONCHIOLITIS: Status: RESOLVED | Noted: 2022-12-11 | Resolved: 2024-06-04

## 2024-06-04 PROBLEM — Z98.890 STATUS POST SURGERY: Status: RESOLVED | Noted: 2023-05-10 | Resolved: 2024-06-04

## 2024-06-04 PROBLEM — E16.2 HYPOGLYCEMIA: Status: RESOLVED | Noted: 2024-03-29 | Resolved: 2024-06-04

## 2024-06-11 ENCOUNTER — OFFICE VISIT (OUTPATIENT)
Dept: FAMILY MEDICINE | Facility: CLINIC | Age: 49
End: 2024-06-11
Payer: COMMERCIAL

## 2024-06-11 VITALS
TEMPERATURE: 97.4 F | OXYGEN SATURATION: 96 % | BODY MASS INDEX: 30.06 KG/M2 | HEART RATE: 54 BPM | DIASTOLIC BLOOD PRESSURE: 84 MMHG | HEIGHT: 68 IN | SYSTOLIC BLOOD PRESSURE: 126 MMHG | RESPIRATION RATE: 18 BRPM | WEIGHT: 198.38 LBS

## 2024-06-11 DIAGNOSIS — E66.811 CLASS 1 OBESITY DUE TO EXCESS CALORIES WITH SERIOUS COMORBIDITY AND BODY MASS INDEX (BMI) OF 30.0 TO 30.9 IN ADULT: ICD-10-CM

## 2024-06-11 DIAGNOSIS — Z79.4 TYPE 2 DIABETES MELLITUS WITH HYPERGLYCEMIA, WITH LONG-TERM CURRENT USE OF INSULIN (H): ICD-10-CM

## 2024-06-11 DIAGNOSIS — E11.65 TYPE 2 DIABETES MELLITUS WITH HYPERGLYCEMIA, WITH LONG-TERM CURRENT USE OF INSULIN (H): ICD-10-CM

## 2024-06-11 DIAGNOSIS — M54.9 MECHANICAL BACK PAIN: ICD-10-CM

## 2024-06-11 DIAGNOSIS — G89.4 CHRONIC PAIN SYNDROME: Primary | ICD-10-CM

## 2024-06-11 DIAGNOSIS — F11.90 CHRONIC, CONTINUOUS USE OF OPIOIDS: ICD-10-CM

## 2024-06-11 DIAGNOSIS — E66.09 CLASS 1 OBESITY DUE TO EXCESS CALORIES WITH SERIOUS COMORBIDITY AND BODY MASS INDEX (BMI) OF 30.0 TO 30.9 IN ADULT: ICD-10-CM

## 2024-06-11 DIAGNOSIS — M54.50 LOW BACK PAIN POTENTIALLY ASSOCIATED WITH RADICULOPATHY: ICD-10-CM

## 2024-06-11 DIAGNOSIS — I25.10 CORONARY ARTERY DISEASE INVOLVING NATIVE CORONARY ARTERY OF NATIVE HEART WITHOUT ANGINA PECTORIS: ICD-10-CM

## 2024-06-11 DIAGNOSIS — F43.10 PTSD (POST-TRAUMATIC STRESS DISORDER): ICD-10-CM

## 2024-06-11 DIAGNOSIS — Z95.1 S/P CABG (CORONARY ARTERY BYPASS GRAFT): ICD-10-CM

## 2024-06-11 DIAGNOSIS — R20.2 LEFT HAND PARESTHESIA: ICD-10-CM

## 2024-06-11 PROCEDURE — 99214 OFFICE O/P EST MOD 30 MIN: CPT | Performed by: FAMILY MEDICINE

## 2024-06-11 ASSESSMENT — PAIN SCALES - GENERAL: PAINLEVEL: SEVERE PAIN (7)

## 2024-06-11 NOTE — LETTER
June 11, 2024      Stacy Brwon  [id number]   [place] [address]        To Whom It May Concern:    Stacy Brown  was seen on 6/11/24.  For medical reasons I have recommended Stacy NOT return to work, will be on indefinite leave.           Sincerely,          Yaima Muse MD
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
## 2024-06-12 PROBLEM — F11.90 CHRONIC, CONTINUOUS USE OF OPIOIDS: Status: ACTIVE | Noted: 2024-06-12

## 2024-06-12 PROBLEM — R11.2 NAUSEA WITH VOMITING: Status: RESOLVED | Noted: 2022-12-12 | Resolved: 2024-06-12

## 2024-06-12 PROBLEM — D64.9 ANEMIA, UNSPECIFIED TYPE: Status: RESOLVED | Noted: 2022-12-12 | Resolved: 2024-06-12

## 2024-06-12 PROBLEM — S82.891A CLOSED FRACTURE OF RIGHT ANKLE: Status: RESOLVED | Noted: 2023-03-22 | Resolved: 2024-06-12

## 2024-06-12 PROBLEM — R20.2 LEFT HAND PARESTHESIA: Status: ACTIVE | Noted: 2024-06-12

## 2024-06-13 ENCOUNTER — MYC REFILL (OUTPATIENT)
Dept: FAMILY MEDICINE | Facility: CLINIC | Age: 49
End: 2024-06-13
Payer: COMMERCIAL

## 2024-06-13 DIAGNOSIS — Z79.4 TYPE 2 DIABETES MELLITUS WITH HYPERGLYCEMIA, WITH LONG-TERM CURRENT USE OF INSULIN (H): Primary | ICD-10-CM

## 2024-06-13 DIAGNOSIS — E11.65 TYPE 2 DIABETES MELLITUS WITH HYPERGLYCEMIA, WITH LONG-TERM CURRENT USE OF INSULIN (H): Primary | ICD-10-CM

## 2024-06-14 RX ORDER — LISINOPRIL 2.5 MG/1
2.5 TABLET ORAL DAILY
Qty: 90 TABLET | Refills: 3 | Status: SHIPPED | OUTPATIENT
Start: 2024-06-14

## 2024-06-14 NOTE — TELEPHONE ENCOUNTER
If you look back in med history, she has been on this a long time. The only reason it was historical with the last Rx was because she had a dose drop and didn't need a refill so was an update.  Yaima Muse MD

## 2024-06-18 ENCOUNTER — MYC MEDICAL ADVICE (OUTPATIENT)
Dept: FAMILY MEDICINE | Facility: CLINIC | Age: 49
End: 2024-06-18
Payer: COMMERCIAL

## 2024-06-19 ENCOUNTER — OFFICE VISIT (OUTPATIENT)
Dept: DERMATOLOGY | Facility: CLINIC | Age: 49
End: 2024-06-19
Attending: FAMILY MEDICINE
Payer: COMMERCIAL

## 2024-06-19 DIAGNOSIS — L91.0 KELOID SCAR: ICD-10-CM

## 2024-06-19 PROCEDURE — 11900 INJECT SKIN LESIONS </W 7: CPT | Performed by: DERMATOLOGY

## 2024-06-19 PROCEDURE — 99242 OFF/OP CONSLTJ NEW/EST SF 20: CPT | Mod: 25 | Performed by: DERMATOLOGY

## 2024-06-19 RX ORDER — TRIAMCINOLONE ACETONIDE 40 MG/ML
40 INJECTION, SUSPENSION INTRA-ARTICULAR; INTRAMUSCULAR ONCE
Status: COMPLETED | OUTPATIENT
Start: 2024-06-19 | End: 2024-06-19

## 2024-06-19 RX ADMIN — TRIAMCINOLONE ACETONIDE 40 MG: 40 INJECTION, SUSPENSION INTRA-ARTICULAR; INTRAMUSCULAR at 14:04

## 2024-06-19 NOTE — TELEPHONE ENCOUNTER
RN Triage    Patient Contact    Attempt # 1    Was call answered?  No.  Left message on voicemail with information to call me back.    Jessica Méndez RN on 6/19/2024 at 10:42 AM

## 2024-06-19 NOTE — PROGRESS NOTES
Stacy Brown , a 49 year old year old female patient, I was asked to see by Dr. Stoner for a scar/keloid.  Patient has no other skin complaints today.  Remainder of the HPI, Meds, PMH, Allergies, FH, and SH was reviewed in chart.      Past Medical History:   Diagnosis Date    CAD (coronary artery disease)     Closed fracture of right ankle 03/22/2023    Knee pain, chronic     Mixed hyperlipidemia     NSTEMI (non-ST elevated myocardial infarction) (H) 03/05/2021    S/P CABG (coronary artery bypass graft) 03/16/2021    Tobacco abuse disorder 11/21/2017    Type II or unspecified type diabetes mellitus without mention of complication, not stated as uncontrolled 08/16/2002    diagnosed 8/16/02, started insulin 2006       Past Surgical History:   Procedure Laterality Date    BYPASS GRAFT ARTERY CORONARY N/A 3/9/2021    Procedure: CORONARY ARTERY BYPASS GRAFT X 4 (LIMA - LAD, SV - RPL, SV - PDA,  RA - OM) LEFT RADIAL ENDOARTERY HARVEST AND BILATERAL LEG ENDOVEIN HARVEST (ON CARDIOPULMONARY PUMP OXYGENATOR ; INTRAOPERATIVE TRANSESOPHAGEAL ECHOCARDIOGRAM BY ANESTHESIOLOGIST DR. NEL AVILA)   ;  Surgeon: Kunal Selby MD;  Location:  OR    COLONOSCOPY N/A 5/15/2024    Procedure: COLONOSCOPY, WITH POLYPECTOMY;  Surgeon: Humble Kuhn MD;  Location:  GI    CV HEART CATHETERIZATION WITH POSSIBLE INTERVENTION N/A 3/8/2021    Procedure: Heart Catheterization with Possible Intervention;  Surgeon: Vadim Kamara MD;  Location:  HEART CARDIAC CATH LAB    ESOPHAGOSCOPY, GASTROSCOPY, DUODENOSCOPY (EGD), COMBINED N/A 5/15/2024    Procedure: ESOPHAGOGASTRODUODENOSCOPY, WITH BIOPSY;  Surgeon: Humble Kuhn MD;  Location: [facility]    HC OPEN TX METATARSAL FRACTURE  age 12    softball injury,open fracture left foot    HC TOOTH EXTRACTION W/FORCEP      Extract wisdom teeth    INJECT JOINT SACROILIAC Left 1/11/2018    Procedure: INJECT JOINT SACROILIAC;  INJECT JOINT SACROILIAC LEFT;  Surgeon: Alan Marshall  MD ALIREZA;  Location: [facility]    LAPAROSCOPIC CHOLECYSTECTOMY N/A 2/13/2023    Procedure: CHOLECYSTECTOMY, LAPAROSCOPIC;  Surgeon: Robert Diop DO;  Location: [facility]    OPERATIVE HYSTEROSCOPY WITH MORCELLATOR N/A 7/24/2018    Procedure: OPERATIVE HYSTEROSCOPY WITH MORCELLATOR (MYOSURE);  Exam under anesthesia, operative hysteroscopy, polypectomy, D & C;  Surgeon: Sidnhu Peterson DO;  Location: [facility]    TUBAL LIGATION  7/27/2006    ZZC STABISM SURG,PREV EYE SURG,NOT MUSC      Right        Family History   Problem Relation Age of Onset    Allergies Mother     Lipids Father         cholesterol    Diabetes Maternal Grandmother     Hypertension Maternal Grandmother     Heart Disease Maternal Grandmother         Bypass    Cancer Maternal Grandfather         Lung - metastatic    Alzheimer Disease Paternal Grandmother     Heart Disease Paternal Grandmother         valve replacement    Cerebrovascular Disease Paternal Grandfather     Anesthesia Reaction No family hx of     Colon Cancer No family hx of        Social History     Socioeconomic History    Marital status:      Spouse name: Humble    Number of children: 2    Years of education: 14    Highest education level: Not on file   Occupational History    Occupation: [employer]     Employer: [employer]   Tobacco Use    Smoking status: Former     Current packs/day: 0.00     Average packs/day: 0.5 packs/day for 6.0 years (3.0 ttl pk-yrs)     Types: Cigarettes     Start date: 3/5/2015     Quit date: 3/5/2021     Years since quitting: 3.2    Smokeless tobacco: Never   Vaping Use    Vaping status: Never Used   Substance and Sexual Activity    Alcohol use: Yes     Alcohol/week: 0.0 standard drinks of alcohol     Comment: once a month    Drug use: No    Sexual activity: Not Currently     Partners: Male     Birth control/protection: Surgical   Other Topics Concern     Service No    Blood Transfusions No    Caffeine Concern Yes     Comment: Diet  Pepsi/at least 20 ounces/day - advised not more than 16 ounces/day    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 Yes   Social History Narrative     and lives at home in [place] with Humble, and their daughter and son.     Social Determinants of Health     Financial Resource Strain: Low Risk  (4/10/2024)    Financial Resource Strain     Within the past 12 months, have you or your family members you live with been unable to get utilities (heat, electricity) when it was really needed?: No   Food Insecurity: High Risk (4/10/2024)    Food Insecurity     Within the past 12 months, did you worry that your food would run out before you got money to buy more?: No     Within the past 12 months, did the food you bought just not last and you didn t have money to get more?: Yes   Transportation Needs: Low Risk  (4/10/2024)    Transportation Needs     Within the past 12 months, has lack of transportation kept you from medical appointments, getting your medicines, non-medical meetings or appointments, work, or from getting things that you need?: No   Physical Activity: Sufficiently Active (4/10/2024)    Exercise Vital Sign     Days of Exercise per Week: 5 days     Minutes of Exercise per Session: 30 min   Stress: Stress Concern Present (4/10/2024)    [employer] [place] of Occupational Health - Occupational Stress Questionnaire     Feeling of Stress : Very much   Social Connections: Socially Isolated (4/10/2024)    Social Connection and Isolation Panel [NHANES]     Frequency of Communication with Friends and Family: Twice a week     Frequency of Social Gatherings with Friends  and Family: Never     Attends Advent Services: Never     Active Member of Clubs or Organizations: No     Attends Club or Organization Meetings: Never     Marital Status:    Interpersonal Safety: Not on file   Housing Stability: High Risk (4/10/2024)    Housing Stability     Do you have housing? : Yes     Are you worried about losing your housing?: Yes       Outpatient Encounter Medications as of 6/19/2024   Medication Sig Dispense Refill    Ascorbic Acid (VITAMIN C) 100 MG CHEW Take 1 chew tab by mouth daily      Continuous Blood Gluc Sensor (FREESTYLE MAXI 3 SENSOR) MISC 1 each every 14 days Use 1 sensor every 14 days. 2 each 5    DULoxetine (CYMBALTA) 20 MG capsule Take 1 tablet once daily for 1 week, then increase to 1 tablet twice daily (Patient taking differently: Take 20 mg by mouth daily) 60 capsule 1    insulin aspart (NOVOLOG FLEXPEN) 100 UNIT/ML pen Novolog Flexpen. Inject 1 units per 10 gram carb unit before dinner, up to 15 units per meal. (Patient taking differently: Inject Subcutaneous 3 times daily (with meals) Inject 1 unit per 10 gram carb unit before meals, up to 15 units per meal.) 15 mL 3    insulin glargine 100 UNIT/ML pen Inject 42 Units Subcutaneous every morning 45 mL 1    insulin pen needle (NOVOFINE) 32G X 6 MM miscellaneous Use once daily or as directed. (Patient not taking: Reported on 5/14/2024) 100 each 3    lisinopril (ZESTRIL) 2.5 MG tablet Take 1 tablet (2.5 mg) by mouth daily 90 tablet 3    metFORMIN (GLUCOPHAGE XR) 500 MG 24 hr tablet Take 2 tablets (1,000 mg) by mouth 2 times daily (with meals) 372 tablet 3    metoprolol tartrate (LOPRESSOR) 25 MG tablet Take 1 tablet (25 mg) by mouth 2 times daily 186 tablet 3    Multiple Vitamins-Minerals (MULTI-VITAMIN GUMMIES) CHEW Take 1 chew tab by mouth daily       nitroGLYcerin (NITROSTAT) 0.4 MG sublingual tablet For chest pain place 1 tablet under the tongue every 5 minutes for 3 doses. If symptoms persist 5 minutes after 1st  dose call 911. (Patient not taking: Reported on 4/29/2024) 25 tablet 0    oxyCODONE (ROXICODONE) 5 MG tablet Take 1 tablet (5 mg) by mouth daily as needed for severe pain 30 tablet 0    pantoprazole (PROTONIX) 40 MG EC tablet Take 1 tablet (40 mg) by mouth 2 times daily 60 tablet 0    rosuvastatin (CRESTOR) 20 MG tablet Take 1 tablet (20 mg) by mouth daily 93 tablet 3    tiZANidine (ZANAFLEX) 4 MG tablet TAKE ONE TABLET BY MOUTH THREE TIMES A DAY (Patient taking differently: Take 4 mg by mouth 3 times daily as needed for muscle spasms) 270 tablet 3     No facility-administered encounter medications on file as of 6/19/2024.             Review Of Systems  Skin: As above  Eyes: negative  Ears/Nose/Throat: negative  Respiratory: No shortness of breath, dyspnea on exertion, cough, or hemoptysis  Cardiovascular: negative  Gastrointestinal: negative  Genitourinary: negative  Musculoskeletal: negative  Neurologic: negative  Psychiatric: negative  Hematologic/Lymphatic/Immunologic: negative  Endocrine: negative      O:   NAD, WDWN, Alert & Oriented, Mood & Affect wnl, Vitals stable   General appearance chary ii   Vitals stable   Alert, oriented and in no acute distress     Keloid on central chest    Eyes: Conjunctivae/lids:Normal     ENT: Lips, mucosa: normal    MSK:Normal    Cardiovascular: peripheral edema none    Pulm: Breathing Normal    Neuro/Psych: Orientation:Normal; Mood/Affect:Normal      A/P:  1. Hypertrophic scar   Steroid injection, excision, radiation discussed with patient   IL TAC: PGACAC discussed.  Risks including but not limited to injection site reaction, bruising, no resolution.  All questions answered and entertained to patient s satisfaction.  Informed consent obtained.  IL TAC in concentration of 40mg/ml was injected ID to chest.  Total injected was  1 ml.  Patient tolerated without complications and given wound care instructions, including not to move product around.  Return in 4 weeks for follow-up  and possible additional IL TAC.    It was a pleasure speaking to Stacy Brown today.  Previous clinic  notes and pertinent laboratory tests were reviewed prior to Stacy Brown's visit.

## 2024-06-19 NOTE — LETTER
6/19/2024      Stacy Brown  [address]  [facility] [id number]      Dear Colleague,    Thank you for referring your patient, Stacy Brown, to the [facility]. Please see a copy of my visit note below.    Stacy Brown , a 49 year old year old female patient, I was asked to see by Dr. Stoner for a scar/keloid.  Patient has no other skin complaints today.  Remainder of the HPI, Meds, PMH, Allergies, FH, and SH was reviewed in chart.      Past Medical History:   Diagnosis Date     CAD (coronary artery disease)      Closed fracture of right ankle 03/22/2023     Knee pain, chronic      Mixed hyperlipidemia      NSTEMI (non-ST elevated myocardial infarction) (H) 03/05/2021     S/P CABG (coronary artery bypass graft) 03/16/2021     Tobacco abuse disorder 11/21/2017     Type II or unspecified type diabetes mellitus without mention of complication, not stated as uncontrolled 08/16/2002    diagnosed 8/16/02, started insulin 2006       Past Surgical History:   Procedure Laterality Date     BYPASS GRAFT ARTERY CORONARY N/A 3/9/2021    Procedure: CORONARY ARTERY BYPASS GRAFT X 4 (LIMA - LAD, SV - RPL, SV - PDA,  RA - OM) LEFT RADIAL ENDOARTERY HARVEST AND BILATERAL LEG ENDOVEIN HARVEST (ON CARDIOPULMONARY PUMP OXYGENATOR ; INTRAOPERATIVE TRANSESOPHAGEAL ECHOCARDIOGRAM BY ANESTHESIOLOGIST DR. NEL AVILA)   ;  Surgeon: Kunal Selby MD;  Location:  OR     COLONOSCOPY N/A 5/15/2024    Procedure: COLONOSCOPY, WITH POLYPECTOMY;  Surgeon: Humble Kuhn MD;  Location:  GI     CV HEART CATHETERIZATION WITH POSSIBLE INTERVENTION N/A 3/8/2021    Procedure: Heart Catheterization with Possible Intervention;  Surgeon: Vadim Kamara MD;  Location:  HEART CARDIAC CATH LAB     ESOPHAGOSCOPY, GASTROSCOPY, DUODENOSCOPY (EGD), COMBINED N/A 5/15/2024    Procedure: ESOPHAGOGASTRODUODENOSCOPY, WITH BIOPSY;  Surgeon: Humble Kuhn MD;  Location:  GI     HC OPEN TX METATARSAL  FRACTURE  age 12    softball injury,open fracture left foot     HC TOOTH EXTRACTION W/FORCEP      Extract wisdom teeth     INJECT JOINT SACROILIAC Left 1/11/2018    Procedure: INJECT JOINT SACROILIAC;  INJECT JOINT SACROILIAC LEFT;  Surgeon: Alan Marshall MD;  [facility]     LAPAROSCOPIC CHOLECYSTECTOMY N/A 2/13/2023    Procedure: CHOLECYSTECTOMY, LAPAROSCOPIC;  Surgeon: Robert Diop DO;  Location: [facility]     OPERATIVE HYSTEROSCOPY WITH MORCELLATOR N/A 7/24/2018    Procedure: OPERATIVE HYSTEROSCOPY WITH MORCELLATOR (MYOSURE);  Exam under anesthesia, operative hysteroscopy, polypectomy, D & C;  Surgeon: Sindhu Peterson DO;  Location: [facility]     TUBAL LIGATION  7/27/2006     ZZC STABISM SURG,PREV EYE SURG,NOT MUSC      Right        Family History   Problem Relation Age of Onset     Allergies Mother      Lipids Father         cholesterol     Diabetes Maternal Grandmother      Hypertension Maternal Grandmother      Heart Disease Maternal Grandmother         Bypass     Cancer Maternal Grandfather         Lung - metastatic     Alzheimer Disease Paternal Grandmother      Heart Disease Paternal Grandmother         valve replacement     Cerebrovascular Disease Paternal Grandfather      Anesthesia Reaction No family hx of      Colon Cancer No family hx of        Social History     Socioeconomic History     Marital status:      Spouse name: Humble     Number of children: 2     Years of education: 14     Highest education level: Not on file   Occupational History     Occupation: [employer]     Employer: [employer]   Tobacco Use     Smoking status: Former     Current packs/day: 0.00     Average packs/day: 0.5 packs/day for 6.0 years (3.0 ttl pk-yrs)     Types: Cigarettes     Start date: 3/5/2015     Quit date: 3/5/2021     Years since quitting: 3.2     Smokeless tobacco: Never   Vaping Use     Vaping status: Never Used   Substance and Sexual Activity     Alcohol use: Yes     Alcohol/week:  0.0 standard drinks of alcohol     Comment: once a month     Drug use: No     Sexual activity: Not Currently     Partners: Male     Birth control/protection: Surgical   Other Topics Concern      Service No     Blood Transfusions No     Caffeine Concern Yes     Comment: Diet Pepsi/at least 20 ounces/day - advised not more than 16 ounces/day     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 Yes   Social History Narrative     and lives at home in [place] with , Humble, and their daughter and son.     Social Determinants of Health     Financial Resource Strain: Low Risk  (4/10/2024)    Financial Resource Strain      Within the past 12 months, have you or your family members you live with been unable to get utilities (heat, electricity) when it was really needed?: No   Food Insecurity: High Risk (4/10/2024)    Food Insecurity      Within the past 12 months, did you worry that your food would run out before you got money to buy more?: No      Within the past 12 months, did the food you bought just not last and you didn t have money to get more?: Yes   Transportation Needs: Low Risk  (4/10/2024)    Transportation Needs      Within the past 12 months, has lack of transportation kept you from medical appointments, getting your medicines, non-medical meetings or appointments, work, or from getting things that you need?: No   Physical Activity: Sufficiently Active (4/10/2024)    Exercise Vital Sign      Days of Exercise per Week: 5 days      Minutes of Exercise per Session: 30 min   Stress: Stress Concern Present (4/10/2024)    [facility] of  Occupational Health - Occupational Stress Questionnaire      Feeling of Stress : Very much   Social Connections: Socially Isolated (4/10/2024)    Social Connection and Isolation Panel [NHANES]      Frequency of Communication with Friends and Family: Twice a week      Frequency of Social Gatherings with Friends and Family: Never      Attends Taoist Services: Never      Active Member of Clubs or Organizations: No      Attends Club or Organization Meetings: Never      Marital Status:    Interpersonal Safety: Not on file   Housing Stability: High Risk (4/10/2024)    Housing Stability      Do you have housing? : Yes      Are you worried about losing your housing?: Yes       Outpatient Encounter Medications as of 6/19/2024   Medication Sig Dispense Refill     Ascorbic Acid (VITAMIN C) 100 MG CHEW Take 1 chew tab by mouth daily       Continuous Blood Gluc Sensor ([place] MAIX 3 SENSOR) MISC 1 each every 14 days Use 1 sensor every 14 days. 2 each 5     DULoxetine (CYMBALTA) 20 MG capsule Take 1 tablet once daily for 1 week, then increase to 1 tablet twice daily (Patient taking differently: Take 20 mg by mouth daily) 60 capsule 1     insulin aspart (NOVOLOG FLEXPEN) 100 UNIT/ML pen Novolog Flexpen. Inject 1 units per 10 gram carb unit before dinner, up to 15 units per meal. (Patient taking differently: Inject Subcutaneous 3 times daily (with meals) Inject 1 unit per 10 gram carb unit before meals, up to 15 units per meal.) 15 mL 3     insulin glargine 100 UNIT/ML pen Inject 42 Units Subcutaneous every morning 45 mL 1     insulin pen needle (NOVOFINE) 32G X 6 MM miscellaneous Use once daily or as directed. (Patient not taking: Reported on 5/14/2024) 100 each 3     lisinopril (ZESTRIL) 2.5 MG tablet Take 1 tablet (2.5 mg) by mouth daily 90 tablet 3     metFORMIN (GLUCOPHAGE XR) 500 MG 24 hr tablet Take 2 tablets (1,000 mg) by mouth 2 times daily (with meals) 372 tablet 3     metoprolol tartrate (LOPRESSOR) 25 MG  tablet Take 1 tablet (25 mg) by mouth 2 times daily 186 tablet 3     Multiple Vitamins-Minerals (MULTI-VITAMIN GUMMIES) CHEW Take 1 chew tab by mouth daily        nitroGLYcerin (NITROSTAT) 0.4 MG sublingual tablet For chest pain place 1 tablet under the tongue every 5 minutes for 3 doses. If symptoms persist 5 minutes after 1st dose call 911. (Patient not taking: Reported on 4/29/2024) 25 tablet 0     oxyCODONE (ROXICODONE) 5 MG tablet Take 1 tablet (5 mg) by mouth daily as needed for severe pain 30 tablet 0     pantoprazole (PROTONIX) 40 MG EC tablet Take 1 tablet (40 mg) by mouth 2 times daily 60 tablet 0     rosuvastatin (CRESTOR) 20 MG tablet Take 1 tablet (20 mg) by mouth daily 93 tablet 3     tiZANidine (ZANAFLEX) 4 MG tablet TAKE ONE TABLET BY MOUTH THREE TIMES A DAY (Patient taking differently: Take 4 mg by mouth 3 times daily as needed for muscle spasms) 270 tablet 3     No facility-administered encounter medications on file as of 6/19/2024.             Review Of Systems  Skin: As above  Eyes: negative  Ears/Nose/Throat: negative  Respiratory: No shortness of breath, dyspnea on exertion, cough, or hemoptysis  Cardiovascular: negative  Gastrointestinal: negative  Genitourinary: negative  Musculoskeletal: negative  Neurologic: negative  Psychiatric: negative  Hematologic/Lymphatic/Immunologic: negative  Endocrine: negative      O:   NAD, WDWN, Alert & Oriented, Mood & Affect wnl, Vitals stable   General appearance chary ii   Vitals stable   Alert, oriented and in no acute distress     Keloid on central chest    Eyes: Conjunctivae/lids:Normal     ENT: Lips, mucosa: normal    MSK:Normal    Cardiovascular: peripheral edema none    Pulm: Breathing Normal    Neuro/Psych: Orientation:Normal; Mood/Affect:Normal      A/P:  1. Hypertrophic scar   Steroid injection, excision, radiation discussed with patient   IL TAC: PGACAC discussed.  Risks including but not limited to injection site reaction, bruising, no resolution.   All questions answered and entertained to patient s satisfaction.  Informed consent obtained.  IL TAC in concentration of 40mg/ml was injected ID to chest.  Total injected was  1 ml.  Patient tolerated without complications and given wound care instructions, including not to move product around.  Return in 4 weeks for follow-up and possible additional IL TAC.    It was a pleasure speaking to Stacy Brown today.  Previous clinic  notes and pertinent laboratory tests were reviewed prior to Stacy Brown's visit.      Again, thank you for allowing me to participate in the care of your patient.        Sincerely,        Humble Smith MD

## 2024-06-25 ENCOUNTER — OFFICE VISIT (OUTPATIENT)
Dept: NEUROLOGY | Facility: CLINIC | Age: 49
End: 2024-06-25
Payer: COMMERCIAL

## 2024-06-25 VITALS
BODY MASS INDEX: 30.62 KG/M2 | HEART RATE: 63 BPM | SYSTOLIC BLOOD PRESSURE: 128 MMHG | DIASTOLIC BLOOD PRESSURE: 83 MMHG | WEIGHT: 199.7 LBS

## 2024-06-25 DIAGNOSIS — G93.9 WHITE MATTER LESION OF CENTRAL NERVOUS SYSTEM: ICD-10-CM

## 2024-06-25 DIAGNOSIS — H53.9 VISION CHANGES: ICD-10-CM

## 2024-06-25 DIAGNOSIS — G43.001 MIGRAINE WITHOUT AURA AND WITH STATUS MIGRAINOSUS, NOT INTRACTABLE: Primary | ICD-10-CM

## 2024-06-25 PROCEDURE — 99204 OFFICE O/P NEW MOD 45 MIN: CPT | Performed by: INTERNAL MEDICINE
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
## 2024-06-29 ENCOUNTER — MYC MEDICAL ADVICE (OUTPATIENT)
Dept: FAMILY MEDICINE | Facility: CLINIC | Age: 49
End: 2024-06-29
Payer: COMMERCIAL

## 2024-06-30 ENCOUNTER — HOSPITAL ENCOUNTER (EMERGENCY)
Facility: CLINIC | Age: 49
Discharge: HOME | End: 2024-06-30
Attending: EMERGENCY MEDICINE | Admitting: EMERGENCY MEDICINE
Payer: COMMERCIAL

## 2024-06-30 ENCOUNTER — APPOINTMENT (OUTPATIENT)
Dept: CT IMAGING | Facility: CLINIC | Age: 49
End: 2024-06-30
Attending: EMERGENCY MEDICINE
Payer: COMMERCIAL

## 2024-06-30 ENCOUNTER — MYC REFILL (OUTPATIENT)
Dept: FAMILY MEDICINE | Facility: CLINIC | Age: 49
End: 2024-06-30

## 2024-06-30 VITALS
OXYGEN SATURATION: 95 % | SYSTOLIC BLOOD PRESSURE: 120 MMHG | TEMPERATURE: 97.6 F | HEART RATE: 68 BPM | DIASTOLIC BLOOD PRESSURE: 58 MMHG | RESPIRATION RATE: 14 BRPM

## 2024-06-30 DIAGNOSIS — G43.B0 OPHTHALMOPLEGIC MIGRAINE, NOT INTRACTABLE: ICD-10-CM

## 2024-06-30 DIAGNOSIS — S82.891A CLOSED FRACTURE OF RIGHT ANKLE, INITIAL ENCOUNTER: ICD-10-CM

## 2024-06-30 LAB
ANION GAP SERPL CALCULATED.3IONS-SCNC: 15 MMOL/L (ref 7–15)
APTT PPP: 21 SECONDS (ref 22–38)
BASOPHILS # BLD AUTO: 0 10E3/UL (ref 0–0.2)
BASOPHILS NFR BLD AUTO: 1 %
BUN SERPL-MCNC: 23.7 MG/DL (ref 6–20)
CALCIUM SERPL-MCNC: 10.1 MG/DL (ref 8.6–10)
CHLORIDE SERPL-SCNC: 97 MMOL/L (ref 98–107)
CREAT SERPL-MCNC: 0.85 MG/DL (ref 0.51–0.95)
DEPRECATED HCO3 PLAS-SCNC: 22 MMOL/L (ref 22–29)
EGFRCR SERPLBLD CKD-EPI 2021: 84 ML/MIN/1.73M2
EOSINOPHIL # BLD AUTO: 0.1 10E3/UL (ref 0–0.7)
EOSINOPHIL NFR BLD AUTO: 2 %
ERYTHROCYTE [DISTWIDTH] IN BLOOD BY AUTOMATED COUNT: 13.9 % (ref 10–15)
GLUCOSE BLDC GLUCOMTR-MCNC: 239 MG/DL (ref 70–99)
GLUCOSE SERPL-MCNC: 249 MG/DL (ref 70–99)
HCT VFR BLD AUTO: 33.8 % (ref 35–47)
HGB BLD-MCNC: 11.5 G/DL (ref 11.7–15.7)
HOLD SPECIMEN: NORMAL
IMM GRANULOCYTES # BLD: 0 10E3/UL
IMM GRANULOCYTES NFR BLD: 0 %
INR PPP: 0.97 (ref 0.85–1.15)
LYMPHOCYTES # BLD AUTO: 1.6 10E3/UL (ref 0.8–5.3)
LYMPHOCYTES NFR BLD AUTO: 21 %
MCH RBC QN AUTO: 27.4 PG (ref 26.5–33)
MCHC RBC AUTO-ENTMCNC: 34 G/DL (ref 31.5–36.5)
MCV RBC AUTO: 81 FL (ref 78–100)
MONOCYTES # BLD AUTO: 0.7 10E3/UL (ref 0–1.3)
MONOCYTES NFR BLD AUTO: 9 %
NEUTROPHILS # BLD AUTO: 5.1 10E3/UL (ref 1.6–8.3)
NEUTROPHILS NFR BLD AUTO: 68 %
NRBC # BLD AUTO: 0 10E3/UL
NRBC BLD AUTO-RTO: 0 /100
PLATELET # BLD AUTO: 241 10E3/UL (ref 150–450)
POTASSIUM SERPL-SCNC: 4.7 MMOL/L (ref 3.4–5.3)
RBC # BLD AUTO: 4.19 10E6/UL (ref 3.8–5.2)
SODIUM SERPL-SCNC: 134 MMOL/L (ref 135–145)
TROPONIN T SERPL HS-MCNC: 11 NG/L
WBC # BLD AUTO: 7.5 10E3/UL (ref 4–11)

## 2024-06-30 PROCEDURE — 70496 CT ANGIOGRAPHY HEAD: CPT

## 2024-06-30 PROCEDURE — 85730 THROMBOPLASTIN TIME PARTIAL: CPT | Performed by: EMERGENCY MEDICINE

## 2024-06-30 PROCEDURE — 99284 EMERGENCY DEPT VISIT MOD MDM: CPT | Mod: 25 | Performed by: EMERGENCY MEDICINE

## 2024-06-30 PROCEDURE — 258N000003 HC RX IP 258 OP 636: Performed by: EMERGENCY MEDICINE

## 2024-06-30 PROCEDURE — 250N000013 HC RX MED GY IP 250 OP 250 PS 637: Performed by: EMERGENCY MEDICINE

## 2024-06-30 PROCEDURE — 250N000009 HC RX 250: Performed by: EMERGENCY MEDICINE

## 2024-06-30 PROCEDURE — 36415 COLL VENOUS BLD VENIPUNCTURE: CPT | Performed by: EMERGENCY MEDICINE

## 2024-06-30 PROCEDURE — 85025 COMPLETE CBC W/AUTO DIFF WBC: CPT | Performed by: EMERGENCY MEDICINE

## 2024-06-30 PROCEDURE — 96365 THER/PROPH/DIAG IV INF INIT: CPT | Mod: 59 | Performed by: EMERGENCY MEDICINE

## 2024-06-30 PROCEDURE — 96366 THER/PROPH/DIAG IV INF ADDON: CPT | Performed by: EMERGENCY MEDICINE

## 2024-06-30 PROCEDURE — 250N000011 HC RX IP 250 OP 636: Performed by: EMERGENCY MEDICINE

## 2024-06-30 PROCEDURE — 80048 BASIC METABOLIC PNL TOTAL CA: CPT | Performed by: EMERGENCY MEDICINE

## 2024-06-30 PROCEDURE — 96375 TX/PRO/DX INJ NEW DRUG ADDON: CPT | Performed by: EMERGENCY MEDICINE

## 2024-06-30 PROCEDURE — 99285 EMERGENCY DEPT VISIT HI MDM: CPT | Mod: 25 | Performed by: EMERGENCY MEDICINE

## 2024-06-30 PROCEDURE — 85610 PROTHROMBIN TIME: CPT | Performed by: EMERGENCY MEDICINE

## 2024-06-30 PROCEDURE — 70450 CT HEAD/BRAIN W/O DYE: CPT | Mod: XU

## 2024-06-30 PROCEDURE — 84484 ASSAY OF TROPONIN QUANT: CPT | Performed by: EMERGENCY MEDICINE

## 2024-06-30 PROCEDURE — 93010 ELECTROCARDIOGRAM REPORT: CPT | Performed by: EMERGENCY MEDICINE

## 2024-06-30 PROCEDURE — 93005 ELECTROCARDIOGRAM TRACING: CPT | Performed by: EMERGENCY MEDICINE

## 2024-06-30 PROCEDURE — 99207 PR NO BILLABLE SERVICE THIS VISIT: CPT | Performed by: NURSE PRACTITIONER

## 2024-06-30 PROCEDURE — 82962 GLUCOSE BLOOD TEST: CPT

## 2024-06-30 RX ORDER — ONDANSETRON 4 MG/1
4 TABLET, ORALLY DISINTEGRATING ORAL EVERY 8 HOURS PRN
COMMUNITY
End: 2024-08-02

## 2024-06-30 RX ORDER — IOPAMIDOL 755 MG/ML
67 INJECTION, SOLUTION INTRAVASCULAR ONCE
Status: COMPLETED | OUTPATIENT
Start: 2024-06-30 | End: 2024-06-30

## 2024-06-30 RX ORDER — OXYCODONE HYDROCHLORIDE 5 MG/1
10 TABLET ORAL ONCE
Status: COMPLETED | OUTPATIENT
Start: 2024-06-30 | End: 2024-06-30

## 2024-06-30 RX ORDER — METOCLOPRAMIDE HYDROCHLORIDE 5 MG/ML
10 INJECTION INTRAMUSCULAR; INTRAVENOUS ONCE
Status: COMPLETED | OUTPATIENT
Start: 2024-06-30 | End: 2024-06-30

## 2024-06-30 RX ORDER — DIPHENHYDRAMINE HYDROCHLORIDE 50 MG/ML
25 INJECTION INTRAMUSCULAR; INTRAVENOUS ONCE
Status: COMPLETED | OUTPATIENT
Start: 2024-06-30 | End: 2024-06-30

## 2024-06-30 RX ORDER — DEXAMETHASONE SODIUM PHOSPHATE 10 MG/ML
10 INJECTION, SOLUTION INTRAMUSCULAR; INTRAVENOUS ONCE
Status: COMPLETED | OUTPATIENT
Start: 2024-06-30 | End: 2024-06-30

## 2024-06-30 RX ORDER — MAGNESIUM SULFATE 1 G/100ML
1 INJECTION INTRAVENOUS ONCE
Status: COMPLETED | OUTPATIENT
Start: 2024-06-30 | End: 2024-06-30

## 2024-06-30 RX ADMIN — METOCLOPRAMIDE 10 MG: 5 INJECTION, SOLUTION INTRAMUSCULAR; INTRAVENOUS at 17:41

## 2024-06-30 RX ADMIN — MAGNESIUM SULFATE HEPTAHYDRATE 1 G: 1 INJECTION, SOLUTION INTRAVENOUS at 17:47

## 2024-06-30 RX ADMIN — OXYCODONE HYDROCHLORIDE 10 MG: 5 TABLET ORAL at 18:22

## 2024-06-30 RX ADMIN — DIPHENHYDRAMINE HYDROCHLORIDE 25 MG: 50 INJECTION, SOLUTION INTRAMUSCULAR; INTRAVENOUS at 17:35

## 2024-06-30 RX ADMIN — SODIUM CHLORIDE 1000 ML: 9 INJECTION, SOLUTION INTRAVENOUS at 17:35

## 2024-06-30 RX ADMIN — SODIUM CHLORIDE 100 ML: 9 INJECTION, SOLUTION INTRAVENOUS at 16:45

## 2024-06-30 RX ADMIN — DEXAMETHASONE SODIUM PHOSPHATE 10 MG: 10 INJECTION, SOLUTION INTRAMUSCULAR; INTRAVENOUS at 17:38

## 2024-06-30 RX ADMIN — IOPAMIDOL 70 ML: 755 INJECTION, SOLUTION INTRAVENOUS at 16:44

## 2024-06-30 ASSESSMENT — ACTIVITIES OF DAILY LIVING (ADL)
ADLS_ACUITY_SCORE: 38

## 2024-06-30 ASSESSMENT — COLUMBIA-SUICIDE SEVERITY RATING SCALE - C-SSRS
6. HAVE YOU EVER DONE ANYTHING, STARTED TO DO ANYTHING, OR PREPARED TO DO ANYTHING TO END YOUR LIFE?: NO
1. IN THE PAST MONTH, HAVE YOU WISHED YOU WERE DEAD OR WISHED YOU COULD GO TO SLEEP AND NOT WAKE UP?: NO
2. HAVE YOU ACTUALLY HAD ANY THOUGHTS OF KILLING YOURSELF IN THE PAST MONTH?: NO

## 2024-06-30 NOTE — MEDICATION SCRIBE - ADMISSION MEDICATION HISTORY
Medication Scribe Admission Medication History    Admission medication history is complete. The information provided in this note is only as accurate as the sources available at the time of the update.    Information Source(s): Patient, Family member, and CareEverywhere/SureScripts via in-person    Pertinent Information: spouse Humble present    Changes made to PTA medication list:  Added: zofran  Deleted: emgality, nurtec  Changed: None    Allergies reviewed with patient and updates made in EHR: yes    Medication History Completed By: MARK HALE 6/30/2024 5:14 PM    PTA Med List   Medication Sig Last Dose    Ascorbic Acid (VITAMIN C) 100 MG CHEW Take 1 chew tab by mouth daily Past Week at am    Continuous Blood Gluc Sensor (FREESTYLE MAXI 3 SENSOR) MISC 1 each every 14 days Use 1 sensor every 14 days. 6/21/2024 at Rt arm    DULoxetine (CYMBALTA) 20 MG capsule Take 1 tablet once daily for 1 week, then increase to 1 tablet twice daily (Patient taking differently: Take 20 mg by mouth daily) 6/30/2024 at am    insulin aspart (NOVOLOG FLEXPEN) 100 UNIT/ML pen Novolog Flexpen. Inject 1 units per 10 gram carb unit before dinner, up to 15 units per meal. (Patient taking differently: Inject Subcutaneous 3 times daily (with meals) Inject 1 unit per 10 gram carb unit before meals, up to 15 units per meal.) 6/29/2024 at supper 11u    insulin glargine 100 UNIT/ML pen Inject 42 Units Subcutaneous every morning 6/30/2024 at am    insulin pen needle (NOVOFINE) 32G X 6 MM miscellaneous Use once daily or as directed. 6/30/2024 at am    lisinopril (ZESTRIL) 2.5 MG tablet Take 1 tablet (2.5 mg) by mouth daily 6/30/2024 at am    metFORMIN (GLUCOPHAGE XR) 500 MG 24 hr tablet Take 2 tablets (1,000 mg) by mouth 2 times daily (with meals) 6/30/2024 at am    metoprolol tartrate (LOPRESSOR) 25 MG tablet Take 1 tablet (25 mg) by mouth 2 times daily 6/30/2024 at am    Multiple Vitamins-Minerals (MULTI-VITAMIN GUMMIES) CHEW Take 1 chew tab  by mouth daily  6/30/2024 at am    nitroGLYcerin (NITROSTAT) 0.4 MG sublingual tablet For chest pain place 1 tablet under the tongue every 5 minutes for 3 doses. If symptoms persist 5 minutes after 1st dose call 911. More than a month at on hand    ondansetron (ZOFRAN ODT) 4 MG ODT tab Take 4 mg by mouth every 8 hours as needed for nausea or vomiting Past Month at [facility]    oxyCODONE (ROXICODONE) 5 MG tablet Take 1 tablet (5 mg) by mouth daily as needed for severe pain 6/29/2024 at hs    pantoprazole (PROTONIX) 40 MG EC tablet Take 1 tablet (40 mg) by mouth 2 times daily 6/30/2024 at am    rosuvastatin (CRESTOR) 20 MG tablet Take 1 tablet (20 mg) by mouth daily 6/30/2024 at am    tiZANidine (ZANAFLEX) 4 MG tablet TAKE ONE TABLET BY MOUTH THREE TIMES A DAY (Patient taking differently: Take 4 mg by mouth 3 times daily as needed for muscle spasms) 6/29/2024 at hs
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
## 2024-06-30 NOTE — ED TRIAGE NOTES
Patient presents with R eye partial vision loss at 1515 today. Denies numbness/tinglings. Patient states that she hasn't felt good for 2 days. Headache with eyes open.

## 2024-06-30 NOTE — CONSULTS
[facility]    Stroke Telephone Note    I was called by Gayla Stoner on 06/30/24 regarding patient Stacy Brown. The patient is a 49 year old female with history significant for DM2, HLD, tobacco use, and CAD. She presented to the ED for evaluation of acute onset right peripheral vision loss in the right eye only that began at 1515. She has reportedly been feeling unwell for the past few days with headache and photosensitivity. She presented to the ED in January 2024 for similar symptoms of right peripheral vision loss in both eyes. Hyperacute MRI was negative for acute stroke. She was evaluated by neurology and ophthalmology in [place] and diagnosed with migraine. Per notes, she has been unable to afford her prescribed migraine medications.     Vitals  BP: 91/60   Pulse: 60   Resp: 18   Temp: 97.6  F (36.4  C)        Stroke Code Data (for stroke code without tele)  Stroke code activated 06/30/24  1618   Stroke provider first response 06/30/24  1621   Last known normal 06/30/24  1510      Time of discovery (or onset of symptoms) 06/30/24  1515   Head CT read by Stroke Neuro Provider 06/30/24  1633   Was stroke code de-escalated? Yes  06/30/24        Imaging Findings  CT head: negative for acute pathology   CTA head/neck: no significant stenosis or LVO, awaiting formal read    Intravenous Thrombolysis  Not given due to:   - minor/isolated/quickly resolving symptoms  - stroke mimic: suspected migraine    Endovascular Treatment  Not initiated due to absence of proximal vessel occlusion    Impression  Monocular loss of peripheral vision with associated headache and photosensitivity, suspect migraine. She had a similar episode in January 2024 with negative hyperacute MRI brain. Subsequent ophthalmologic workup was reportedly negative.     Recommendations   - Management of suspected migraine per ED, if symptoms persist can consider repeat MRI    Case discussed with vascular  "neurology attending Dr. Perez.    My recommendations are based on the information provided over the phone by Stacy Brown's in-person providers. They are not intended to replace the clinical judgment of her in-person providers. I was not requested to personally see or examine the patient at this time.     Herminia Olivera, CNP  Vascular Neurology    To page me or covering stroke neurology team member, click here: AMCOM  Choose \"On Call\" tab at top, then select \"NEUROLOGY/ALL SITES\" from middle drop-down box, press Enter, then look for \"stroke\" or \"telestroke\" for your site.    "

## 2024-07-01 ENCOUNTER — NURSE TRIAGE (OUTPATIENT)
Dept: NURSING | Facility: CLINIC | Age: 49
End: 2024-07-01
Payer: COMMERCIAL

## 2024-07-01 ENCOUNTER — TELEPHONE (OUTPATIENT)
Dept: FAMILY MEDICINE | Facility: CLINIC | Age: 49
End: 2024-07-01
Payer: COMMERCIAL

## 2024-07-01 DIAGNOSIS — S82.891A CLOSED FRACTURE OF RIGHT ANKLE, INITIAL ENCOUNTER: ICD-10-CM

## 2024-07-01 NOTE — TELEPHONE ENCOUNTER
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 (Red Flag symptoms escalate to triage hotline per guidelines): Improved  Do you know how to contact your clinic care team if you have future questions or changes to your health status? : Yes  Does the patient have their discharge instructions? : Yes  Does the patient have questions regarding their discharge instructions? : No  Were you started on any new medications or were there changes to any of your previous medications? : Yes  Does the patient have all of their medications?: No (see comment)  Do you have questions regarding any of your medications? : Yes (see comment)  Do you have all of your needed medical supplies or equipment (DME)?  (i.e. oxygen tank, CPAP, cane, etc.): Yes    Follow up Plan      Patient has MRI upcoming and a discount card for her migraine medications.    Future Appointments   Date Time Provider Department Center   7/19/2024 10:15 AM [address]   8/6/2024  1:15 PM Humble Smith MD [facility]   9/4/2024  9:20 AM Yaima Muse MD [facility]   12/2/2024  1:30 PM Amy Huitron PA-C [facility] [place]   12/17/2024  9:30 AM Malick Fox MD [facility] [place]       Outpatient Plan as outlined on AVS reviewed with patient.    For any urgent concerns, please contact our 24 hour nurse triage line: [phone] ([phone])       Eloy Hernandez RN

## 2024-07-01 NOTE — ED NOTES
Patient was given discharge instructions and teaching has been done by this writer. All questions addressed. Patient is taking all of their belongings with them and has been shown where to exit the ED.   Nicolasa Hernandez RN

## 2024-07-01 NOTE — TELEPHONE ENCOUNTER
Patient calling with questions about safe dosage of magnesium to take while taking her other  medications. Advised to speak with pharmacist or PCP during office hours.   Reason for Disposition   [1] Caller has medicine question about med NOT prescribed by PCP AND [2] triager unable to answer question (e.g., compatibility with other med, storage)    Protocols used: Medication Question Call-A-AH

## 2024-07-01 NOTE — TELEPHONE ENCOUNTER
Medication Question or Refill        What medication are you calling about (include dose and sig)?: How much magnesium can patient take in a day for her migraines? She was in the ED over the weekend and told to take Magnesium.    Also wondering if patient should have an ED follow up with you?      Controlled Substance Agreement on file:   CSA -- Patient Level:     [Media Unavailable] Controlled Substance Agreement - Opioid - Scan on 10/26/2023 12:22 PM: opioid agreement   [Media Unavailable] Controlled Substance Agreement - Opioid - Scan on 9/15/2023  2:58 PM: OPIOID CONTROLLED SUBSTANCE AGREEMENT       Patient offered an appointment? Yes: When would you like an ED follow up? She does have an upcoming appointment in September with you.      Could we send this information to you in [employer] or would you prefer to receive a phone call?:   Patient would prefer a phone call   Okay to leave a detailed message?: Yes at Cell number on file:    Telephone Information:   Mobile [phone]

## 2024-07-01 NOTE — ED PROVIDER NOTES
I assumed patient's care at change of shift from Dr. Stoner.  Patient is in with acute onset of vision loss in her right eye.  Started while she was watching TV around 315 this afternoon.  Had similar symptoms in January of this year although that was more of a curtain type loss of vision bilaterally.  She was sent over to [facility] and saw ophthalmology and neurology and they felt that she had a complex migraine.    She did not feel well for couple days leading up to this and had some photo and phono sensitivity.  She maybe had slight drift of her right arm so they did do a stroke eval and stroke neuro was involved.  They felt treating her for a complex migraine would be appropriate.  If she was not improved then she may need to be admitted and get an MRI in the morning otherwise if she improves she can be discharged.  She had a CT and a CTA during that workup.    7:52 PM; patient is feeling much better and wishes to go home.  Vision has improved and headache is decreased.  She just saw neurology recently and was put on a couple of medicines but they both cost $60 a month which is cost prohibitive for them.  I suggested that she contact neurology in the morning to consider less expensive alternatives.  Their financial situation should improve significantly here in the next couple of weeks once the court case settles.  We discussed reportable signs when to return.  Verbal written discharge instruction given.  Patient and her  are comfortable with this plan.           Steve Orozco MD  06/30/24 1953

## 2024-07-01 NOTE — DISCHARGE INSTRUCTIONS
Call your neurologist in the morning to see if there are any less expensive alternatives for treatment of your complex migraines.  There certainly should be.  Return to the ED if you worsen or have any concerns.  It was a pleasure visiting with both of you tonight.  I am glad you are feeling better and hope you continue to improve.    Thank you for choosing [facility]. We appreciate the opportunity to meet your urgent medical needs. Please let us know if we could have done anything to make your stay more satisfying.    After discharge, please closely monitor for any new or worsening symptoms. Return to the Emergency Department if you develop any acute worsening signs or symptoms.    If you had lab work, cultures or imaging studies done during your stay, the final results may still be pending. We will call you if your plan of care needs to change. However, if you are not improving as expected, please follow up with your primary care provider or clinic.     Start any prescription medications that were prescribed to you and take them as directed.     Please see additional handouts that may be pertinent to your condition.

## 2024-07-02 RX ORDER — OXYCODONE HYDROCHLORIDE 5 MG/1
5 TABLET ORAL DAILY PRN
Qty: 30 TABLET | Refills: 0 | OUTPATIENT
Start: 2024-07-02

## 2024-07-02 RX ORDER — OXYCODONE HYDROCHLORIDE 5 MG/1
5 TABLET ORAL DAILY PRN
Qty: 30 TABLET | Refills: 0 | Status: SHIPPED | OUTPATIENT
Start: 2024-07-03 | End: 2024-07-14

## 2024-07-02 RX ORDER — OXYCODONE HYDROCHLORIDE 5 MG/1
TABLET ORAL
Qty: 30 TABLET | Refills: 0 | OUTPATIENT
Start: 2024-07-02

## 2024-07-02 NOTE — TELEPHONE ENCOUNTER
She can take 400 mg or 500 mg daily (these are common strengths they come in). I don't necessarily need to see her for an ED follow up unless she is not doing any better. I'd like her to give the magnesium a try and make sure she is drinking 60 ounces of water daily.     Triplicate request for oxycodone canceled.   Yaima Muse MD

## 2024-07-13 ENCOUNTER — MYC MEDICAL ADVICE (OUTPATIENT)
Dept: FAMILY MEDICINE | Facility: CLINIC | Age: 49
End: 2024-07-13
Payer: COMMERCIAL

## 2024-07-14 ENCOUNTER — HOSPITAL ENCOUNTER (EMERGENCY)
Facility: CLINIC | Age: 49
Discharge: HOME | End: 2024-07-14
Attending: EMERGENCY MEDICINE | Admitting: EMERGENCY MEDICINE
Payer: COMMERCIAL

## 2024-07-14 ENCOUNTER — MYC REFILL (OUTPATIENT)
Dept: FAMILY MEDICINE | Facility: CLINIC | Age: 49
End: 2024-07-14
Payer: COMMERCIAL

## 2024-07-14 VITALS
SYSTOLIC BLOOD PRESSURE: 194 MMHG | DIASTOLIC BLOOD PRESSURE: 90 MMHG | BODY MASS INDEX: 32.14 KG/M2 | HEART RATE: 68 BPM | WEIGHT: 200 LBS | OXYGEN SATURATION: 99 % | RESPIRATION RATE: 20 BRPM | HEIGHT: 66 IN | TEMPERATURE: 97.9 F

## 2024-07-14 DIAGNOSIS — F32.1 MODERATE MAJOR DEPRESSION (H): ICD-10-CM

## 2024-07-14 DIAGNOSIS — T81.49XA INCISIONAL INFECTION: ICD-10-CM

## 2024-07-14 PROCEDURE — 93005 ELECTROCARDIOGRAM TRACING: CPT | Performed by: EMERGENCY MEDICINE

## 2024-07-14 PROCEDURE — 250N000011 HC RX IP 250 OP 636: Performed by: EMERGENCY MEDICINE

## 2024-07-14 PROCEDURE — 96372 THER/PROPH/DIAG INJ SC/IM: CPT | Performed by: EMERGENCY MEDICINE

## 2024-07-14 PROCEDURE — 99284 EMERGENCY DEPT VISIT MOD MDM: CPT | Performed by: EMERGENCY MEDICINE

## 2024-07-14 PROCEDURE — 99284 EMERGENCY DEPT VISIT MOD MDM: CPT | Mod: 25

## 2024-07-14 PROCEDURE — 93010 ELECTROCARDIOGRAM REPORT: CPT | Performed by: EMERGENCY MEDICINE

## 2024-07-14 RX ORDER — CEPHALEXIN 500 MG/1
500 CAPSULE ORAL 4 TIMES DAILY
Qty: 28 CAPSULE | Refills: 0 | Status: SHIPPED | OUTPATIENT
Start: 2024-07-14 | End: 2024-07-21

## 2024-07-14 RX ORDER — OXYCODONE HYDROCHLORIDE 5 MG/1
5 TABLET ORAL EVERY 6 HOURS PRN
Qty: 12 TABLET | Refills: 0 | Status: SHIPPED | OUTPATIENT
Start: 2024-07-14 | End: 2024-07-15

## 2024-07-14 RX ADMIN — HYDROMORPHONE HYDROCHLORIDE 1 MG: 1 INJECTION, SOLUTION INTRAMUSCULAR; INTRAVENOUS; SUBCUTANEOUS at 13:47

## 2024-07-14 ASSESSMENT — ACTIVITIES OF DAILY LIVING (ADL): ADLS_ACUITY_SCORE: 36

## 2024-07-14 NOTE — DISCHARGE INSTRUCTIONS
-Warm compresses 15 minutes 3 times daily    -Cephalexin 500 mg 4 times daily x 7 days    -Prescription for oxycodone for severe pain as prescribed.  This is a narcotic.  Please do not mix with other narcotics or alcohol.  Do not operate machinery or drive an automobile if under the influence of narcotic.  They can cause constipation and confusion.    -Follow-up in the clinic with your primary doctor in 1 week to reassess the sternal incision/scars/infection

## 2024-07-14 NOTE — ED TRIAGE NOTES
"Patient c/o redness and pain to upper sternum at site where she received \"steroid injection\".      Triage Assessment (Adult)       Row Name 07/14/24 4650          Triage Assessment    Airway WDL WDL        Respiratory WDL    Respiratory WDL WDL        Skin Circulation/Temperature WDL    Skin Circulation/Temperature WDL X  upper sternum redness at injection site                     "
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
## 2024-07-15 ENCOUNTER — APPOINTMENT (OUTPATIENT)
Dept: CT IMAGING | Facility: CLINIC | Age: 49
End: 2024-07-15
Attending: STUDENT IN AN ORGANIZED HEALTH CARE EDUCATION/TRAINING PROGRAM
Payer: COMMERCIAL

## 2024-07-15 ENCOUNTER — HOSPITAL ENCOUNTER (EMERGENCY)
Facility: CLINIC | Age: 49
Discharge: HOME | End: 2024-07-15
Attending: STUDENT IN AN ORGANIZED HEALTH CARE EDUCATION/TRAINING PROGRAM | Admitting: STUDENT IN AN ORGANIZED HEALTH CARE EDUCATION/TRAINING PROGRAM
Payer: COMMERCIAL

## 2024-07-15 VITALS
WEIGHT: 199 LBS | DIASTOLIC BLOOD PRESSURE: 61 MMHG | SYSTOLIC BLOOD PRESSURE: 129 MMHG | TEMPERATURE: 98.2 F | HEIGHT: 66 IN | HEART RATE: 67 BPM | RESPIRATION RATE: 20 BRPM | OXYGEN SATURATION: 93 % | BODY MASS INDEX: 31.98 KG/M2

## 2024-07-15 DIAGNOSIS — R07.89 CHEST DISCOMFORT: ICD-10-CM

## 2024-07-15 DIAGNOSIS — L03.313 CELLULITIS OF CHEST WALL: ICD-10-CM

## 2024-07-15 LAB
ANION GAP SERPL CALCULATED.3IONS-SCNC: 13 MMOL/L (ref 7–15)
BASOPHILS # BLD AUTO: 0.1 10E3/UL (ref 0–0.2)
BASOPHILS NFR BLD AUTO: 1 %
BUN SERPL-MCNC: 21.3 MG/DL (ref 6–20)
CALCIUM SERPL-MCNC: 9.8 MG/DL (ref 8.6–10)
CHLORIDE SERPL-SCNC: 104 MMOL/L (ref 98–107)
CREAT SERPL-MCNC: 0.71 MG/DL (ref 0.51–0.95)
EGFRCR SERPLBLD CKD-EPI 2021: >90 ML/MIN/1.73M2
EOSINOPHIL # BLD AUTO: 0.1 10E3/UL (ref 0–0.7)
EOSINOPHIL NFR BLD AUTO: 2 %
ERYTHROCYTE [DISTWIDTH] IN BLOOD BY AUTOMATED COUNT: 14.2 % (ref 10–15)
GLUCOSE SERPL-MCNC: 108 MG/DL (ref 70–99)
HCO3 SERPL-SCNC: 21 MMOL/L (ref 22–29)
HCT VFR BLD AUTO: 37 % (ref 35–47)
HGB BLD-MCNC: 12.4 G/DL (ref 11.7–15.7)
IMM GRANULOCYTES # BLD: 0 10E3/UL
IMM GRANULOCYTES NFR BLD: 0 %
LYMPHOCYTES # BLD AUTO: 1.6 10E3/UL (ref 0.8–5.3)
LYMPHOCYTES NFR BLD AUTO: 19 %
MCH RBC QN AUTO: 27 PG (ref 26.5–33)
MCHC RBC AUTO-ENTMCNC: 33.5 G/DL (ref 31.5–36.5)
MCV RBC AUTO: 81 FL (ref 78–100)
MONOCYTES # BLD AUTO: 0.6 10E3/UL (ref 0–1.3)
MONOCYTES NFR BLD AUTO: 7 %
NEUTROPHILS # BLD AUTO: 5.9 10E3/UL (ref 1.6–8.3)
NEUTROPHILS NFR BLD AUTO: 71 %
NRBC # BLD AUTO: 0 10E3/UL
NRBC BLD AUTO-RTO: 0 /100
PLAT MORPH BLD: NORMAL
PLATELET # BLD AUTO: 256 10E3/UL (ref 150–450)
POTASSIUM SERPL-SCNC: 5 MMOL/L (ref 3.4–5.3)
RBC # BLD AUTO: 4.59 10E6/UL (ref 3.8–5.2)
RBC MORPH BLD: NORMAL
SODIUM SERPL-SCNC: 138 MMOL/L (ref 135–145)
WBC # BLD AUTO: 8.3 10E3/UL (ref 4–11)

## 2024-07-15 PROCEDURE — 250N000009 HC RX 250: Performed by: STUDENT IN AN ORGANIZED HEALTH CARE EDUCATION/TRAINING PROGRAM

## 2024-07-15 PROCEDURE — 85025 COMPLETE CBC W/AUTO DIFF WBC: CPT | Performed by: STUDENT IN AN ORGANIZED HEALTH CARE EDUCATION/TRAINING PROGRAM

## 2024-07-15 PROCEDURE — 80048 BASIC METABOLIC PNL TOTAL CA: CPT | Performed by: STUDENT IN AN ORGANIZED HEALTH CARE EDUCATION/TRAINING PROGRAM

## 2024-07-15 PROCEDURE — 36415 COLL VENOUS BLD VENIPUNCTURE: CPT | Performed by: STUDENT IN AN ORGANIZED HEALTH CARE EDUCATION/TRAINING PROGRAM

## 2024-07-15 PROCEDURE — 99285 EMERGENCY DEPT VISIT HI MDM: CPT | Mod: 25 | Performed by: STUDENT IN AN ORGANIZED HEALTH CARE EDUCATION/TRAINING PROGRAM

## 2024-07-15 PROCEDURE — 250N000011 HC RX IP 250 OP 636: Performed by: STUDENT IN AN ORGANIZED HEALTH CARE EDUCATION/TRAINING PROGRAM

## 2024-07-15 PROCEDURE — 99284 EMERGENCY DEPT VISIT MOD MDM: CPT | Performed by: STUDENT IN AN ORGANIZED HEALTH CARE EDUCATION/TRAINING PROGRAM

## 2024-07-15 PROCEDURE — 96374 THER/PROPH/DIAG INJ IV PUSH: CPT | Mod: 59 | Performed by: STUDENT IN AN ORGANIZED HEALTH CARE EDUCATION/TRAINING PROGRAM

## 2024-07-15 PROCEDURE — 71260 CT THORAX DX C+: CPT

## 2024-07-15 PROCEDURE — 96376 TX/PRO/DX INJ SAME DRUG ADON: CPT | Mod: 59 | Performed by: STUDENT IN AN ORGANIZED HEALTH CARE EDUCATION/TRAINING PROGRAM

## 2024-07-15 PROCEDURE — 96375 TX/PRO/DX INJ NEW DRUG ADDON: CPT | Performed by: STUDENT IN AN ORGANIZED HEALTH CARE EDUCATION/TRAINING PROGRAM

## 2024-07-15 RX ORDER — IOPAMIDOL 755 MG/ML
500 INJECTION, SOLUTION INTRAVASCULAR ONCE
Status: COMPLETED | OUTPATIENT
Start: 2024-07-15 | End: 2024-07-15

## 2024-07-15 RX ORDER — PREDNISONE 20 MG/1
TABLET ORAL
Qty: 10 TABLET | Refills: 0 | Status: SHIPPED | OUTPATIENT
Start: 2024-07-15 | End: 2024-07-25

## 2024-07-15 RX ORDER — DEXAMETHASONE SODIUM PHOSPHATE 10 MG/ML
10 INJECTION, SOLUTION INTRAMUSCULAR; INTRAVENOUS ONCE
Status: COMPLETED | OUTPATIENT
Start: 2024-07-15 | End: 2024-07-15

## 2024-07-15 RX ORDER — OXYCODONE HYDROCHLORIDE 5 MG/1
5 TABLET ORAL EVERY 4 HOURS
Qty: 12 TABLET | Refills: 0 | Status: SHIPPED | OUTPATIENT
Start: 2024-07-15 | End: 2024-07-17

## 2024-07-15 RX ORDER — HYDROMORPHONE HYDROCHLORIDE 1 MG/ML
0.5 INJECTION, SOLUTION INTRAMUSCULAR; INTRAVENOUS; SUBCUTANEOUS ONCE
Status: COMPLETED | OUTPATIENT
Start: 2024-07-15 | End: 2024-07-15

## 2024-07-15 RX ADMIN — SODIUM CHLORIDE 60 ML: 9 INJECTION, SOLUTION INTRAVENOUS at 12:05

## 2024-07-15 RX ADMIN — HYDROMORPHONE HYDROCHLORIDE 1 MG: 1 INJECTION, SOLUTION INTRAMUSCULAR; INTRAVENOUS; SUBCUTANEOUS at 11:48

## 2024-07-15 RX ADMIN — DEXAMETHASONE SODIUM PHOSPHATE 10 MG: 10 INJECTION, SOLUTION INTRAMUSCULAR; INTRAVENOUS at 13:06

## 2024-07-15 RX ADMIN — IOPAMIDOL 80 ML: 755 INJECTION, SOLUTION INTRAVENOUS at 12:05

## 2024-07-15 RX ADMIN — HYDROMORPHONE HYDROCHLORIDE 0.5 MG: 1 INJECTION, SOLUTION INTRAMUSCULAR; INTRAVENOUS; SUBCUTANEOUS at 13:12

## 2024-07-15 ASSESSMENT — ACTIVITIES OF DAILY LIVING (ADL)
ADLS_ACUITY_SCORE: 36
ADLS_ACUITY_SCORE: 38

## 2024-07-15 ASSESSMENT — ENCOUNTER SYMPTOMS
FEVER: 0
ABDOMINAL PAIN: 0
SHORTNESS OF BREATH: 0
COUGH: 0

## 2024-07-15 NOTE — TELEPHONE ENCOUNTER
Please verify with patient how she is taking this. 1 tablet (20 mg) bid or once daily. If she's only taking it once daily, please ask why. I'll fill it but I need to verify directions as she has been using it.   Yaima Muse MD

## 2024-07-15 NOTE — DISCHARGE INSTRUCTIONS
Follow-up with your cardiac team to have this area considered to be reinjected.  Please take the oxycodone safely every 4 hours for severe pain otherwise Tylenol should be the mainstay of treatment and the steroids.  Please return if you have any new symptoms.  Otherwise follow-up with your primary team as recommended and your primary care provider in 3 to 5 days for reevaluation

## 2024-07-15 NOTE — MEDICATION SCRIBE - ADMISSION MEDICATION HISTORY
Medication Scribe Admission Medication History    Admission medication history is complete. The information provided in this note is only as accurate as the sources available at the time of the update.    Information Source(s): Patient, Family member, and CareEverywhere/SureScripts via in-person    Pertinent Information: spouse Humble present    Changes made to PTA medication list:  Added: None  Deleted: protonix  Changed: MVI to supper    Allergies reviewed with patient and updates made in EHR: yes    Medication History Completed By: MARK HALE 7/15/2024 12:44 PM    PTA Med List   Medication Sig Note Last Dose    Ascorbic Acid (VITAMIN C) 100 MG CHEW Take 1 chew tab by mouth daily  Past Week at unkn    cephALEXin (KEFLEX) 500 MG capsule Take 1 capsule (500 mg) by mouth 4 times daily for 7 days 7/15/2024: Incision infection chest 7/15/2024 at 0700 1st    Continuous Blood Gluc Sensor (uchooseSTYLE MAXI 3 SENSOR) MISC 1 each every 14 days Use 1 sensor every 14 days.  7/7/2024 at [facility]    DULoxetine (CYMBALTA) 20 MG capsule Take 1 tablet once daily for 1 week, then increase to 1 tablet twice daily (Patient taking differently: Take 20 mg by mouth daily)  Past Week at ran out    galcanezumab-gnlm (EMGALITY) 120 MG/ML injection Inject 2 mLs (240 mg) Subcutaneous every 28 days  7/11/2024 at am    insulin aspart (NOVOLOG FLEXPEN) 100 UNIT/ML pen Novolog Flexpen. Inject 1 units per 10 gram carb unit before dinner, up to 15 units per meal. (Patient taking differently: Inject subcutaneously 3 times daily (with meals) Inject 1 unit per 10 gram carb unit before meals, up to 15 units per meal.)  7/14/2024 at supper 12u    insulin glargine 100 UNIT/ML pen Inject 42 Units Subcutaneous every morning (Patient taking differently: Inject 42 Units subcutaneously every morning Basaglar)  7/15/2024 at am    insulin pen needle (NOVOFINE) 32G X 6 MM miscellaneous Use once daily or as directed.  7/15/2024 at am    lisinopril (ZESTRIL) 2.5  MG tablet Take 1 tablet (2.5 mg) by mouth daily  7/15/2024 at am    metFORMIN (GLUCOPHAGE XR) 500 MG 24 hr tablet Take 2 tablets (1,000 mg) by mouth 2 times daily (with meals)  7/15/2024 at am    metoprolol tartrate (LOPRESSOR) 25 MG tablet Take 1 tablet (25 mg) by mouth 2 times daily  7/15/2024 at am    Multiple Vitamins-Minerals (MULTI-VITAMIN GUMMIES) CHEW Take 1 chew tab by mouth daily (with dinner)  7/14/2024 at [facility]    nitroGLYcerin (NITROSTAT) 0.4 MG sublingual tablet For chest pain place 1 tablet under the tongue every 5 minutes for 3 doses. If symptoms persist 5 minutes after 1st dose call 911.  More than a month at on hand    ondansetron (ZOFRAN ODT) 4 MG ODT tab Take 4 mg by mouth every 8 hours as needed for nausea or vomiting  More than a month at [facility]    oxyCODONE (ROXICODONE) 5 MG tablet Take 1 tablet (5 mg) by mouth every 6 hours as needed for pain  7/14/2024 at hs    rosuvastatin (CRESTOR) 20 MG tablet Take 1 tablet (20 mg) by mouth daily  7/15/2024 at am    tiZANidine (ZANAFLEX) 4 MG tablet TAKE ONE TABLET BY MOUTH THREE TIMES A DAY (Patient taking differently: Take 4 mg by mouth 3 times daily as needed for muscle spasms)  7/14/2024 at

## 2024-07-15 NOTE — ED TRIAGE NOTES
Reports 9/10 pain to chest incision, skin is red, says she was seen yesterday and today its worse.
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
## 2024-07-17 DIAGNOSIS — E11.59 TYPE 2 DIABETES MELLITUS WITH OTHER CIRCULATORY COMPLICATION, WITH LONG-TERM CURRENT USE OF INSULIN (H): ICD-10-CM

## 2024-07-17 DIAGNOSIS — Z79.4 TYPE 2 DIABETES MELLITUS WITH OTHER CIRCULATORY COMPLICATION, WITH LONG-TERM CURRENT USE OF INSULIN (H): ICD-10-CM

## 2024-07-17 DIAGNOSIS — E11.65 TYPE 2 DIABETES MELLITUS WITH HYPERGLYCEMIA, WITH LONG-TERM CURRENT USE OF INSULIN (H): ICD-10-CM

## 2024-07-17 DIAGNOSIS — Z79.4 TYPE 2 DIABETES MELLITUS WITH HYPERGLYCEMIA, WITH LONG-TERM CURRENT USE OF INSULIN (H): ICD-10-CM

## 2024-07-19 DIAGNOSIS — E11.59 TYPE 2 DIABETES MELLITUS WITH OTHER CIRCULATORY COMPLICATION, WITH LONG-TERM CURRENT USE OF INSULIN (H): ICD-10-CM

## 2024-07-19 DIAGNOSIS — Z79.4 TYPE 2 DIABETES MELLITUS WITH HYPERGLYCEMIA, WITH LONG-TERM CURRENT USE OF INSULIN (H): ICD-10-CM

## 2024-07-19 DIAGNOSIS — Z79.4 TYPE 2 DIABETES MELLITUS WITH OTHER CIRCULATORY COMPLICATION, WITH LONG-TERM CURRENT USE OF INSULIN (H): ICD-10-CM

## 2024-07-19 DIAGNOSIS — E11.65 TYPE 2 DIABETES MELLITUS WITH HYPERGLYCEMIA, WITH LONG-TERM CURRENT USE OF INSULIN (H): ICD-10-CM

## 2024-07-19 RX ORDER — DULOXETIN HYDROCHLORIDE 20 MG/1
20 CAPSULE, DELAYED RELEASE ORAL DAILY
Qty: 30 CAPSULE | Refills: 1 | Status: SHIPPED | OUTPATIENT
Start: 2024-07-19

## 2024-07-19 RX ORDER — BLOOD-GLUCOSE SENSOR
EACH MISCELLANEOUS
Qty: 2 EACH | Refills: 5 | Status: SHIPPED | OUTPATIENT
Start: 2024-07-19

## 2024-07-19 RX ORDER — BLOOD-GLUCOSE SENSOR
1 EACH MISCELLANEOUS
Qty: 2 EACH | Refills: 5 | Status: CANCELLED | OUTPATIENT
Start: 2024-07-19

## 2024-07-19 NOTE — TELEPHONE ENCOUNTER
Message handled by Nurse Triage with [employer] - provider name: Herbie.    Approval given to approve sensor.    Jessica Méndez RN on 7/19/2024 at 11:20 AM  .

## 2024-07-24 DIAGNOSIS — S82.891A CLOSED FRACTURE OF RIGHT ANKLE, INITIAL ENCOUNTER: ICD-10-CM

## 2024-07-25 RX ORDER — OXYCODONE HYDROCHLORIDE 5 MG/1
5 TABLET ORAL DAILY PRN
Qty: 30 TABLET | Refills: 0 | OUTPATIENT
Start: 2024-07-25

## 2024-07-25 RX ORDER — RIMEGEPANT SULFATE 75 MG/75MG
75 TABLET, ORALLY DISINTEGRATING ORAL DAILY PRN
COMMUNITY
Start: 2024-07-19

## 2024-07-25 NOTE — TELEPHONE ENCOUNTER
Refill request for oxycodone 5 mg. She should have supply from prior month's Rx along with extras she was given in ED. I don't want her continuing on extra pain medication long term. Won't be due for refill now again until 8/3/24.   Yaima Muse MD

## 2024-07-26 NOTE — TELEPHONE ENCOUNTER
Contacted patient, let patient know she will not have an available refill until 8/3/2024, patient stated she figured and was accepting of the news.     Yaneth Coy, VF

## 2024-07-28 ENCOUNTER — HOSPITAL ENCOUNTER (EMERGENCY)
Facility: CLINIC | Age: 49
Discharge: HOME | End: 2024-07-28
Attending: FAMILY MEDICINE | Admitting: FAMILY MEDICINE
Payer: COMMERCIAL

## 2024-07-28 VITALS
OXYGEN SATURATION: 96 % | DIASTOLIC BLOOD PRESSURE: 80 MMHG | RESPIRATION RATE: 18 BRPM | HEART RATE: 60 BPM | BODY MASS INDEX: 32.12 KG/M2 | SYSTOLIC BLOOD PRESSURE: 115 MMHG | TEMPERATURE: 98.2 F | HEIGHT: 66 IN

## 2024-07-28 DIAGNOSIS — R42 DIZZINESS: ICD-10-CM

## 2024-07-28 DIAGNOSIS — R25.1 TREMOR: ICD-10-CM

## 2024-07-28 DIAGNOSIS — R31.29 MICROSCOPIC HEMATURIA: ICD-10-CM

## 2024-07-28 DIAGNOSIS — R68.84 JAW PAIN: ICD-10-CM

## 2024-07-28 LAB
ALBUMIN SERPL BCG-MCNC: 3.8 G/DL (ref 3.5–5.2)
ALBUMIN UR-MCNC: NEGATIVE MG/DL
ALP SERPL-CCNC: 82 U/L (ref 40–150)
ALT SERPL W P-5'-P-CCNC: 19 U/L (ref 0–50)
ANION GAP SERPL CALCULATED.3IONS-SCNC: 15 MMOL/L (ref 7–15)
APPEARANCE UR: CLEAR
AST SERPL W P-5'-P-CCNC: 14 U/L (ref 0–45)
BASOPHILS # BLD AUTO: 0.1 10E3/UL (ref 0–0.2)
BASOPHILS NFR BLD AUTO: 0 %
BILIRUB SERPL-MCNC: 0.2 MG/DL
BILIRUB UR QL STRIP: NEGATIVE
BUN SERPL-MCNC: 17.4 MG/DL (ref 6–20)
CALCIUM SERPL-MCNC: 9.5 MG/DL (ref 8.8–10.4)
CHLORIDE SERPL-SCNC: 105 MMOL/L (ref 98–107)
COLOR UR AUTO: YELLOW
CREAT SERPL-MCNC: 1.09 MG/DL (ref 0.51–0.95)
EGFRCR SERPLBLD CKD-EPI 2021: 62 ML/MIN/1.73M2
EOSINOPHIL # BLD AUTO: 0.1 10E3/UL (ref 0–0.7)
EOSINOPHIL NFR BLD AUTO: 1 %
ERYTHROCYTE [DISTWIDTH] IN BLOOD BY AUTOMATED COUNT: 14.5 % (ref 10–15)
GLUCOSE BLDC GLUCOMTR-MCNC: 76 MG/DL (ref 70–99)
GLUCOSE BLDC GLUCOMTR-MCNC: 76 MG/DL (ref 70–99)
GLUCOSE SERPL-MCNC: 64 MG/DL (ref 70–99)
GLUCOSE UR STRIP-MCNC: NEGATIVE MG/DL
HCO3 SERPL-SCNC: 22 MMOL/L (ref 22–29)
HCT VFR BLD AUTO: 34.5 % (ref 35–47)
HGB BLD-MCNC: 11.6 G/DL (ref 11.7–15.7)
HGB UR QL STRIP: ABNORMAL
IMM GRANULOCYTES # BLD: 0.1 10E3/UL
IMM GRANULOCYTES NFR BLD: 1 %
KETONES UR STRIP-MCNC: NEGATIVE MG/DL
LEUKOCYTE ESTERASE UR QL STRIP: ABNORMAL
LYMPHOCYTES # BLD AUTO: 1.7 10E3/UL (ref 0.8–5.3)
LYMPHOCYTES NFR BLD AUTO: 13 %
MCH RBC QN AUTO: 27.6 PG (ref 26.5–33)
MCHC RBC AUTO-ENTMCNC: 33.6 G/DL (ref 31.5–36.5)
MCV RBC AUTO: 82 FL (ref 78–100)
MONOCYTES # BLD AUTO: 0.8 10E3/UL (ref 0–1.3)
MONOCYTES NFR BLD AUTO: 6 %
MUCOUS THREADS #/AREA URNS LPF: PRESENT /LPF
NEUTROPHILS # BLD AUTO: 10.6 10E3/UL (ref 1.6–8.3)
NEUTROPHILS NFR BLD AUTO: 79 %
NITRATE UR QL: NEGATIVE
NRBC # BLD AUTO: 0 10E3/UL
NRBC BLD AUTO-RTO: 0 /100
PH UR STRIP: 5 [PH] (ref 5–7)
PLATELET # BLD AUTO: 263 10E3/UL (ref 150–450)
POTASSIUM SERPL-SCNC: 4.1 MMOL/L (ref 3.4–5.3)
PROT SERPL-MCNC: 6.7 G/DL (ref 6.4–8.3)
RBC # BLD AUTO: 4.21 10E6/UL (ref 3.8–5.2)
RBC URINE: 13 /HPF
SODIUM SERPL-SCNC: 142 MMOL/L (ref 135–145)
SP GR UR STRIP: 1.02 (ref 1–1.03)
SQUAMOUS EPITHELIAL: <1 /HPF
TROPONIN T SERPL HS-MCNC: 14 NG/L
UROBILINOGEN UR STRIP-MCNC: NORMAL MG/DL
WBC # BLD AUTO: 13.3 10E3/UL (ref 4–11)
WBC URINE: 0 /HPF

## 2024-07-28 PROCEDURE — 93005 ELECTROCARDIOGRAM TRACING: CPT | Performed by: FAMILY MEDICINE

## 2024-07-28 PROCEDURE — 36415 COLL VENOUS BLD VENIPUNCTURE: CPT | Performed by: FAMILY MEDICINE

## 2024-07-28 PROCEDURE — 96374 THER/PROPH/DIAG INJ IV PUSH: CPT | Performed by: FAMILY MEDICINE

## 2024-07-28 PROCEDURE — 81003 URINALYSIS AUTO W/O SCOPE: CPT | Performed by: FAMILY MEDICINE

## 2024-07-28 PROCEDURE — 250N000011 HC RX IP 250 OP 636: Performed by: FAMILY MEDICINE

## 2024-07-28 PROCEDURE — 84484 ASSAY OF TROPONIN QUANT: CPT | Performed by: FAMILY MEDICINE

## 2024-07-28 PROCEDURE — 99285 EMERGENCY DEPT VISIT HI MDM: CPT | Mod: 25 | Performed by: FAMILY MEDICINE

## 2024-07-28 PROCEDURE — 85025 COMPLETE CBC W/AUTO DIFF WBC: CPT | Performed by: FAMILY MEDICINE

## 2024-07-28 PROCEDURE — 99284 EMERGENCY DEPT VISIT MOD MDM: CPT | Performed by: FAMILY MEDICINE

## 2024-07-28 PROCEDURE — 82962 GLUCOSE BLOOD TEST: CPT

## 2024-07-28 PROCEDURE — 93010 ELECTROCARDIOGRAM REPORT: CPT | Performed by: FAMILY MEDICINE

## 2024-07-28 PROCEDURE — 80053 COMPREHEN METABOLIC PANEL: CPT | Performed by: FAMILY MEDICINE

## 2024-07-28 PROCEDURE — 250N000013 HC RX MED GY IP 250 OP 250 PS 637: Performed by: FAMILY MEDICINE

## 2024-07-28 RX ORDER — ONDANSETRON 2 MG/ML
4 INJECTION INTRAMUSCULAR; INTRAVENOUS ONCE
Status: COMPLETED | OUTPATIENT
Start: 2024-07-28 | End: 2024-07-28

## 2024-07-28 RX ORDER — OXYCODONE HYDROCHLORIDE 5 MG/1
5 TABLET ORAL ONCE
Status: COMPLETED | OUTPATIENT
Start: 2024-07-28 | End: 2024-07-28

## 2024-07-28 RX ADMIN — OXYCODONE HYDROCHLORIDE 5 MG: 5 TABLET ORAL at 18:41

## 2024-07-28 RX ADMIN — ONDANSETRON 4 MG: 2 INJECTION INTRAMUSCULAR; INTRAVENOUS at 18:23

## 2024-07-28 ASSESSMENT — ACTIVITIES OF DAILY LIVING (ADL)
ADLS_ACUITY_SCORE: 38

## 2024-07-28 NOTE — ED TRIAGE NOTES
Pt reports feeling shaky, with jaw pain, nausea, and dizziness.      Triage Assessment (Adult)       Row Name 07/28/24 1726          Triage Assessment    Airway WDL WDL        Respiratory WDL    Respiratory WDL WDL
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
## 2024-07-29 NOTE — MEDICATION SCRIBE - ADMISSION MEDICATION HISTORY
Medication Scribe Admission Medication History    Admission medication history is complete. The information provided in this note is only as accurate as the sources available at the time of the update.    Information Source(s): Patient, Family member, and CareEverywhere/SureScripts via in-person    Pertinent Information: patient still has oxycodone left from last ER visit (7/15/24), last dose last night at bedtime.    Changes made to PTA medication list:  Added: None  Deleted: None  Changed: None    Allergies reviewed with patient and updates made in EHR: yes    Medication History Completed By: MARK HALE 7/28/2024 7:30 PM    PTA Med List   Medication Sig Last Dose    Ascorbic Acid (VITAMIN C) 100 MG CHEW Take 1 chew tab by mouth daily 7/28/2024 at am    Continuous Glucose Sensor (FREESTYLE MAXI 3 SENSOR) MIS APPLY 1 SENSOR AND CHANGE EVERY 14 DAYS AS DIRECTED  at not on    DULoxetine (CYMBALTA) 20 MG capsule Take 1 capsule (20 mg) by mouth daily 7/28/2024 at am    galcanezumab-gnlm (EMGALITY) 120 MG/ML injection Inject 2 mLs (240 mg) Subcutaneous every 28 days (Patient taking differently: Inject 240 mg subcutaneously every 28 days Every fourth Sunday) 7/5/2024 at am    insulin aspart (NOVOLOG FLEXPEN) 100 UNIT/ML pen Novolog Flexpen. Inject 1 units per 10 gram carb unit before dinner, up to 15 units per meal. (Patient taking differently: Inject subcutaneously 3 times daily (with meals) Inject 1 unit per 10 gram carb unit before meals, up to 15 units per meal.) 7/28/2024 at 1200 8u    insulin glargine 100 UNIT/ML pen Inject 42 Units Subcutaneous every morning (Patient taking differently: Inject 42 Units subcutaneously every morning Basaglar) 7/28/2024 at am    insulin pen needle (NOVOFINE) 32G X 6 MM miscellaneous Use once daily or as directed. 7/28/2024 at 1200    lisinopril (ZESTRIL) 2.5 MG tablet Take 1 tablet (2.5 mg) by mouth daily 7/28/2024 at am    metFORMIN (GLUCOPHAGE XR) 500 MG 24 hr tablet Take 2  tablets (1,000 mg) by mouth 2 times daily (with meals) 7/28/2024 at am    metoprolol tartrate (LOPRESSOR) 25 MG tablet Take 1 tablet (25 mg) by mouth 2 times daily 7/28/2024 at am    Multiple Vitamins-Minerals (MULTI-VITAMIN GUMMIES) CHEW Take 1 chew tab by mouth daily (with dinner) 7/28/2024 at am    nitroGLYcerin (NITROSTAT) 0.4 MG sublingual tablet For chest pain place 1 tablet under the tongue every 5 minutes for 3 doses. If symptoms persist 5 minutes after 1st dose call 911. More than a month at on hand    NURTEC 75 MG ODT tablet Place 75 mg under the tongue daily as needed for migraine  at not started    ondansetron (ZOFRAN ODT) 4 MG ODT tab Take 4 mg by mouth every 8 hours as needed for nausea or vomiting More than a month at [facility]    rosuvastatin (CRESTOR) 20 MG tablet Take 1 tablet (20 mg) by mouth daily 7/28/2024 at am    tiZANidine (ZANAFLEX) 4 MG tablet TAKE ONE TABLET BY MOUTH THREE TIMES A DAY (Patient taking differently: Take 4 mg by mouth 3 times daily as needed for muscle spasms) 7/27/2024 at

## 2024-07-29 NOTE — DISCHARGE INSTRUCTIONS
Your blood sugar was low, and I would like you to hold on your insulin dose tonight.  Check your blood sugars more frequently until you have your continuous glucose monitor back on.  During plenty of water over the next few days and rest.  Follow-up in the clinic to recheck your urinalysis to see if you have any persistent hematuria.  Return to the emergency department if you develop new or worsening symptoms.

## 2024-07-30 ENCOUNTER — HOSPITAL ENCOUNTER (OUTPATIENT)
Dept: MRI IMAGING | Facility: CLINIC | Age: 49
Discharge: HOME | End: 2024-07-30
Attending: INTERNAL MEDICINE | Admitting: INTERNAL MEDICINE
Payer: COMMERCIAL

## 2024-07-30 DIAGNOSIS — G43.001 MIGRAINE WITHOUT AURA AND WITH STATUS MIGRAINOSUS, NOT INTRACTABLE: ICD-10-CM

## 2024-07-30 PROCEDURE — 70553 MRI BRAIN STEM W/O & W/DYE: CPT

## 2024-07-30 PROCEDURE — 255N000002 HC RX 255 OP 636: Performed by: INTERNAL MEDICINE

## 2024-07-30 PROCEDURE — A9585 GADOBUTROL INJECTION: HCPCS | Performed by: INTERNAL MEDICINE

## 2024-07-30 RX ORDER — GADOBUTROL 604.72 MG/ML
10 INJECTION INTRAVENOUS ONCE
Status: COMPLETED | OUTPATIENT
Start: 2024-07-30 | End: 2024-07-30

## 2024-07-30 RX ADMIN — GADOBUTROL 9 ML: 604.72 INJECTION INTRAVENOUS at 12:43

## 2024-08-01 DIAGNOSIS — G89.4 CHRONIC PAIN SYNDROME: ICD-10-CM

## 2024-08-02 ENCOUNTER — HOSPITAL ENCOUNTER (EMERGENCY)
Facility: CLINIC | Age: 49
Discharge: HOME | End: 2024-08-02
Attending: EMERGENCY MEDICINE | Admitting: EMERGENCY MEDICINE
Payer: COMMERCIAL

## 2024-08-02 ENCOUNTER — APPOINTMENT (OUTPATIENT)
Dept: GENERAL RADIOLOGY | Facility: CLINIC | Age: 49
End: 2024-08-02
Attending: EMERGENCY MEDICINE
Payer: COMMERCIAL

## 2024-08-02 VITALS
DIASTOLIC BLOOD PRESSURE: 83 MMHG | OXYGEN SATURATION: 96 % | TEMPERATURE: 97.2 F | RESPIRATION RATE: 22 BRPM | BODY MASS INDEX: 30.99 KG/M2 | WEIGHT: 192 LBS | SYSTOLIC BLOOD PRESSURE: 190 MMHG | HEART RATE: 85 BPM

## 2024-08-02 DIAGNOSIS — E86.0 DEHYDRATION: ICD-10-CM

## 2024-08-02 DIAGNOSIS — R11.2 NAUSEA AND VOMITING, UNSPECIFIED VOMITING TYPE: ICD-10-CM

## 2024-08-02 LAB
ALBUMIN SERPL BCG-MCNC: 4.3 G/DL (ref 3.5–5.2)
ALBUMIN UR-MCNC: 30 MG/DL
ALP SERPL-CCNC: 103 U/L (ref 40–150)
ALT SERPL W P-5'-P-CCNC: 18 U/L (ref 0–50)
ANION GAP SERPL CALCULATED.3IONS-SCNC: 22 MMOL/L (ref 7–15)
APPEARANCE UR: CLEAR
AST SERPL W P-5'-P-CCNC: 15 U/L (ref 0–45)
BACTERIA #/AREA URNS HPF: ABNORMAL /HPF
BASOPHILS # BLD AUTO: 0.1 10E3/UL (ref 0–0.2)
BASOPHILS NFR BLD AUTO: 1 %
BILIRUB SERPL-MCNC: 0.5 MG/DL
BILIRUB UR QL STRIP: NEGATIVE
BUN SERPL-MCNC: 29.5 MG/DL (ref 6–20)
CALCIUM SERPL-MCNC: 10.1 MG/DL (ref 8.8–10.4)
CHLORIDE SERPL-SCNC: 101 MMOL/L (ref 98–107)
COLOR UR AUTO: YELLOW
CREAT SERPL-MCNC: 1.08 MG/DL (ref 0.51–0.95)
EGFRCR SERPLBLD CKD-EPI 2021: 63 ML/MIN/1.73M2
EOSINOPHIL # BLD AUTO: 0 10E3/UL (ref 0–0.7)
EOSINOPHIL NFR BLD AUTO: 0 %
ERYTHROCYTE [DISTWIDTH] IN BLOOD BY AUTOMATED COUNT: 13.9 % (ref 10–15)
GLUCOSE SERPL-MCNC: 246 MG/DL (ref 70–99)
GLUCOSE UR STRIP-MCNC: 150 MG/DL
HCO3 SERPL-SCNC: 17 MMOL/L (ref 22–29)
HCT VFR BLD AUTO: 38.1 % (ref 35–47)
HGB BLD-MCNC: 13.2 G/DL (ref 11.7–15.7)
HGB UR QL STRIP: ABNORMAL
HYALINE CASTS: 9 /LPF
IMM GRANULOCYTES # BLD: 0.1 10E3/UL
IMM GRANULOCYTES NFR BLD: 1 %
KETONES UR STRIP-MCNC: 80 MG/DL
LEUKOCYTE ESTERASE UR QL STRIP: NEGATIVE
LIPASE SERPL-CCNC: 46 U/L (ref 13–60)
LYMPHOCYTES # BLD AUTO: 0.8 10E3/UL (ref 0.8–5.3)
LYMPHOCYTES NFR BLD AUTO: 9 %
MCH RBC QN AUTO: 27.6 PG (ref 26.5–33)
MCHC RBC AUTO-ENTMCNC: 34.6 G/DL (ref 31.5–36.5)
MCV RBC AUTO: 80 FL (ref 78–100)
MONOCYTES # BLD AUTO: 0.4 10E3/UL (ref 0–1.3)
MONOCYTES NFR BLD AUTO: 4 %
MUCOUS THREADS #/AREA URNS LPF: PRESENT /LPF
NEUTROPHILS # BLD AUTO: 7.9 10E3/UL (ref 1.6–8.3)
NEUTROPHILS NFR BLD AUTO: 86 %
NITRATE UR QL: NEGATIVE
NRBC # BLD AUTO: 0 10E3/UL
NRBC BLD AUTO-RTO: 0 /100
PH UR STRIP: 5 [PH] (ref 5–7)
PLAT MORPH BLD: NORMAL
PLATELET # BLD AUTO: 204 10E3/UL (ref 150–450)
POTASSIUM SERPL-SCNC: 4.5 MMOL/L (ref 3.4–5.3)
PROT SERPL-MCNC: 7.6 G/DL (ref 6.4–8.3)
RBC # BLD AUTO: 4.79 10E6/UL (ref 3.8–5.2)
RBC MORPH BLD: NORMAL
RBC URINE: 1 /HPF
SODIUM SERPL-SCNC: 140 MMOL/L (ref 135–145)
SP GR UR STRIP: 1.02 (ref 1–1.03)
SQUAMOUS EPITHELIAL: 1 /HPF
TROPONIN T SERPL HS-MCNC: 13 NG/L
UROBILINOGEN UR STRIP-MCNC: NORMAL MG/DL
WBC # BLD AUTO: 9.2 10E3/UL (ref 4–11)
WBC URINE: 6 /HPF

## 2024-08-02 PROCEDURE — 84484 ASSAY OF TROPONIN QUANT: CPT | Performed by: EMERGENCY MEDICINE

## 2024-08-02 PROCEDURE — 250N000011 HC RX IP 250 OP 636: Performed by: EMERGENCY MEDICINE

## 2024-08-02 PROCEDURE — 36416 COLLJ CAPILLARY BLOOD SPEC: CPT | Performed by: EMERGENCY MEDICINE

## 2024-08-02 PROCEDURE — 83690 ASSAY OF LIPASE: CPT | Performed by: EMERGENCY MEDICINE

## 2024-08-02 PROCEDURE — 96375 TX/PRO/DX INJ NEW DRUG ADDON: CPT | Performed by: EMERGENCY MEDICINE

## 2024-08-02 PROCEDURE — 81003 URINALYSIS AUTO W/O SCOPE: CPT | Performed by: EMERGENCY MEDICINE

## 2024-08-02 PROCEDURE — 96361 HYDRATE IV INFUSION ADD-ON: CPT | Performed by: EMERGENCY MEDICINE

## 2024-08-02 PROCEDURE — 71045 X-RAY EXAM CHEST 1 VIEW: CPT

## 2024-08-02 PROCEDURE — 85025 COMPLETE CBC W/AUTO DIFF WBC: CPT | Performed by: EMERGENCY MEDICINE

## 2024-08-02 PROCEDURE — 99285 EMERGENCY DEPT VISIT HI MDM: CPT | Mod: 25 | Performed by: EMERGENCY MEDICINE

## 2024-08-02 PROCEDURE — 96374 THER/PROPH/DIAG INJ IV PUSH: CPT | Performed by: EMERGENCY MEDICINE

## 2024-08-02 PROCEDURE — 99284 EMERGENCY DEPT VISIT MOD MDM: CPT | Performed by: EMERGENCY MEDICINE

## 2024-08-02 PROCEDURE — 80053 COMPREHEN METABOLIC PANEL: CPT | Performed by: EMERGENCY MEDICINE

## 2024-08-02 PROCEDURE — 258N000003 HC RX IP 258 OP 636: Performed by: EMERGENCY MEDICINE

## 2024-08-02 RX ORDER — LORAZEPAM 2 MG/ML
1 INJECTION INTRAMUSCULAR ONCE
Status: COMPLETED | OUTPATIENT
Start: 2024-08-02 | End: 2024-08-02

## 2024-08-02 RX ORDER — CEPHALEXIN 500 MG/1
500 CAPSULE ORAL 4 TIMES DAILY
Qty: 28 CAPSULE | Refills: 0 | Status: SHIPPED | OUTPATIENT
Start: 2024-08-02 | End: 2024-08-09

## 2024-08-02 RX ORDER — DIPHENHYDRAMINE HYDROCHLORIDE 50 MG/ML
25 INJECTION INTRAMUSCULAR; INTRAVENOUS ONCE
Status: COMPLETED | OUTPATIENT
Start: 2024-08-02 | End: 2024-08-02

## 2024-08-02 RX ORDER — HYDROMORPHONE HYDROCHLORIDE 1 MG/ML
0.5 INJECTION, SOLUTION INTRAMUSCULAR; INTRAVENOUS; SUBCUTANEOUS EVERY 30 MIN PRN
Status: DISCONTINUED | OUTPATIENT
Start: 2024-08-02 | End: 2024-08-02 | Stop reason: HOSPADM

## 2024-08-02 RX ORDER — LORAZEPAM 1 MG/1
1 TABLET ORAL EVERY 6 HOURS PRN
Qty: 10 TABLET | Refills: 0 | Status: SHIPPED | OUTPATIENT
Start: 2024-08-02

## 2024-08-02 RX ORDER — ONDANSETRON 4 MG/1
4 TABLET, ORALLY DISINTEGRATING ORAL EVERY 8 HOURS PRN
Qty: 15 TABLET | Refills: 0 | Status: SHIPPED | OUTPATIENT
Start: 2024-08-02

## 2024-08-02 RX ADMIN — DIPHENHYDRAMINE HYDROCHLORIDE 25 MG: 50 INJECTION, SOLUTION INTRAMUSCULAR; INTRAVENOUS at 11:11

## 2024-08-02 RX ADMIN — PROCHLORPERAZINE EDISYLATE 10 MG: 5 INJECTION INTRAMUSCULAR; INTRAVENOUS at 11:13

## 2024-08-02 RX ADMIN — HYDROMORPHONE HYDROCHLORIDE 0.5 MG: 1 INJECTION, SOLUTION INTRAMUSCULAR; INTRAVENOUS; SUBCUTANEOUS at 11:57

## 2024-08-02 RX ADMIN — LORAZEPAM 1 MG: 2 INJECTION INTRAMUSCULAR; INTRAVENOUS at 14:46

## 2024-08-02 RX ADMIN — SODIUM CHLORIDE 1000 ML: 9 INJECTION, SOLUTION INTRAVENOUS at 11:57

## 2024-08-02 ASSESSMENT — ACTIVITIES OF DAILY LIVING (ADL)
ADLS_ACUITY_SCORE: 38

## 2024-08-02 NOTE — ED NOTES
Pt resting in bed, states she is more comfortable. Provider notified of dysuria. Will collect urine sample when pt is able. Ice water provided.

## 2024-08-02 NOTE — DISCHARGE INSTRUCTIONS
Return to the emergency department if new or worsening symptoms.  Use the Ativan sparingly for anxiety.  Follow-up with primary care.  I sent prescriptions for an antibiotic and Zofran to the pharmacy as well.  I hope you get better quickly.
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
## 2024-08-02 NOTE — ED TRIAGE NOTES
PT presents by EMS with c/o nausea and vomiting and Chest pain. PTR started vomiting today then proceeded to have substernal Chest pain. EMS en route administered aspirin 324 mg chewed. Nitroglycerin spray x 4 doses. 4 mg zofran IM. 4 mg zofran IV.

## 2024-08-03 ENCOUNTER — HOSPITAL ENCOUNTER (EMERGENCY)
Facility: CLINIC | Age: 49
Discharge: HOME | End: 2024-08-03
Attending: EMERGENCY MEDICINE | Admitting: EMERGENCY MEDICINE
Payer: COMMERCIAL

## 2024-08-03 VITALS
TEMPERATURE: 98 F | SYSTOLIC BLOOD PRESSURE: 143 MMHG | HEART RATE: 96 BPM | BODY MASS INDEX: 30.86 KG/M2 | RESPIRATION RATE: 20 BRPM | WEIGHT: 192 LBS | DIASTOLIC BLOOD PRESSURE: 71 MMHG | HEIGHT: 66 IN | OXYGEN SATURATION: 91 %

## 2024-08-03 DIAGNOSIS — R11.2 NAUSEA AND VOMITING, UNSPECIFIED VOMITING TYPE: ICD-10-CM

## 2024-08-03 LAB
ALBUMIN SERPL BCG-MCNC: 4.7 G/DL (ref 3.5–5.2)
ALBUMIN UR-MCNC: 100 MG/DL
ALP SERPL-CCNC: 104 U/L (ref 40–150)
ALT SERPL W P-5'-P-CCNC: 16 U/L (ref 0–50)
ANION GAP SERPL CALCULATED.3IONS-SCNC: 15 MMOL/L (ref 7–15)
APPEARANCE UR: CLEAR
AST SERPL W P-5'-P-CCNC: 9 U/L (ref 0–45)
B-OH-BUTYR SERPL-SCNC: 1.11 MMOL/L
BACTERIA #/AREA URNS HPF: ABNORMAL /HPF
BASE EXCESS BLDV CALC-SCNC: 4.6 MMOL/L (ref -3–3)
BASOPHILS # BLD AUTO: 0 10E3/UL (ref 0–0.2)
BASOPHILS NFR BLD AUTO: 0 %
BILIRUB SERPL-MCNC: 0.5 MG/DL
BILIRUB UR QL STRIP: NEGATIVE
BUN SERPL-MCNC: 24.6 MG/DL (ref 6–20)
CALCIUM SERPL-MCNC: 9.8 MG/DL (ref 8.8–10.4)
CHLORIDE SERPL-SCNC: 98 MMOL/L (ref 98–107)
COLOR UR AUTO: YELLOW
CREAT SERPL-MCNC: 0.95 MG/DL (ref 0.51–0.95)
EGFRCR SERPLBLD CKD-EPI 2021: 73 ML/MIN/1.73M2
EOSINOPHIL # BLD AUTO: 0 10E3/UL (ref 0–0.7)
EOSINOPHIL NFR BLD AUTO: 0 %
ERYTHROCYTE [DISTWIDTH] IN BLOOD BY AUTOMATED COUNT: 14.2 % (ref 10–15)
GLUCOSE BLDC GLUCOMTR-MCNC: 444 MG/DL (ref 70–99)
GLUCOSE SERPL-MCNC: 375 MG/DL (ref 70–99)
GLUCOSE UR STRIP-MCNC: >499 MG/DL
HCO3 BLDV-SCNC: 30 MMOL/L (ref 21–28)
HCO3 SERPL-SCNC: 27 MMOL/L (ref 22–29)
HCT VFR BLD AUTO: 38.6 % (ref 35–47)
HGB BLD-MCNC: 13.2 G/DL (ref 11.7–15.7)
HGB UR QL STRIP: ABNORMAL
IMM GRANULOCYTES # BLD: 0 10E3/UL
IMM GRANULOCYTES NFR BLD: 0 %
KETONES UR STRIP-MCNC: 20 MG/DL
LEUKOCYTE ESTERASE UR QL STRIP: NEGATIVE
LIPASE SERPL-CCNC: 35 U/L (ref 13–60)
LYMPHOCYTES # BLD AUTO: 0.8 10E3/UL (ref 0.8–5.3)
LYMPHOCYTES NFR BLD AUTO: 10 %
MCH RBC QN AUTO: 27.5 PG (ref 26.5–33)
MCHC RBC AUTO-ENTMCNC: 34.2 G/DL (ref 31.5–36.5)
MCV RBC AUTO: 80 FL (ref 78–100)
MONOCYTES # BLD AUTO: 0.5 10E3/UL (ref 0–1.3)
MONOCYTES NFR BLD AUTO: 6 %
MUCOUS THREADS #/AREA URNS LPF: PRESENT /LPF
NEUTROPHILS # BLD AUTO: 6.6 10E3/UL (ref 1.6–8.3)
NEUTROPHILS NFR BLD AUTO: 84 %
NITRATE UR QL: NEGATIVE
NRBC # BLD AUTO: 0 10E3/UL
NRBC BLD AUTO-RTO: 0 /100
O2/TOTAL GAS SETTING VFR VENT: 21 %
OXYHGB MFR BLDV: 44 % (ref 70–75)
PCO2 BLDV: 45 MM HG (ref 40–50)
PH BLDV: 7.43 [PH] (ref 7.32–7.43)
PH UR STRIP: 5 [PH] (ref 5–7)
PLATELET # BLD AUTO: 236 10E3/UL (ref 150–450)
PO2 BLDV: 27 MM HG (ref 25–47)
POTASSIUM SERPL-SCNC: 3.8 MMOL/L (ref 3.4–5.3)
PROT SERPL-MCNC: 8.1 G/DL (ref 6.4–8.3)
RBC # BLD AUTO: 4.8 10E6/UL (ref 3.8–5.2)
RBC URINE: 1 /HPF
SAO2 % BLDV: 45.1 % (ref 70–75)
SODIUM SERPL-SCNC: 140 MMOL/L (ref 135–145)
SP GR UR STRIP: 1.02 (ref 1–1.03)
TROPONIN T SERPL HS-MCNC: 26 NG/L
TROPONIN T SERPL HS-MCNC: 30 NG/L
UROBILINOGEN UR STRIP-MCNC: NORMAL MG/DL
WBC # BLD AUTO: 7.9 10E3/UL (ref 4–11)
WBC URINE: 1 /HPF

## 2024-08-03 PROCEDURE — 85025 COMPLETE CBC W/AUTO DIFF WBC: CPT | Performed by: EMERGENCY MEDICINE

## 2024-08-03 PROCEDURE — 36416 COLLJ CAPILLARY BLOOD SPEC: CPT | Performed by: EMERGENCY MEDICINE

## 2024-08-03 PROCEDURE — 82565 ASSAY OF CREATININE: CPT | Performed by: EMERGENCY MEDICINE

## 2024-08-03 PROCEDURE — 81001 URINALYSIS AUTO W/SCOPE: CPT | Performed by: EMERGENCY MEDICINE

## 2024-08-03 PROCEDURE — 82805 BLOOD GASES W/O2 SATURATION: CPT | Performed by: EMERGENCY MEDICINE

## 2024-08-03 PROCEDURE — 36415 COLL VENOUS BLD VENIPUNCTURE: CPT | Performed by: EMERGENCY MEDICINE

## 2024-08-03 PROCEDURE — 258N000003 HC RX IP 258 OP 636: Performed by: EMERGENCY MEDICINE

## 2024-08-03 PROCEDURE — 96375 TX/PRO/DX INJ NEW DRUG ADDON: CPT | Performed by: EMERGENCY MEDICINE

## 2024-08-03 PROCEDURE — 96361 HYDRATE IV INFUSION ADD-ON: CPT | Performed by: EMERGENCY MEDICINE

## 2024-08-03 PROCEDURE — 99284 EMERGENCY DEPT VISIT MOD MDM: CPT | Performed by: EMERGENCY MEDICINE

## 2024-08-03 PROCEDURE — 82010 KETONE BODYS QUAN: CPT | Performed by: EMERGENCY MEDICINE

## 2024-08-03 PROCEDURE — 96374 THER/PROPH/DIAG INJ IV PUSH: CPT | Performed by: EMERGENCY MEDICINE

## 2024-08-03 PROCEDURE — 82962 GLUCOSE BLOOD TEST: CPT

## 2024-08-03 PROCEDURE — 250N000011 HC RX IP 250 OP 636: Performed by: EMERGENCY MEDICINE

## 2024-08-03 PROCEDURE — 83690 ASSAY OF LIPASE: CPT | Performed by: EMERGENCY MEDICINE

## 2024-08-03 PROCEDURE — 84484 ASSAY OF TROPONIN QUANT: CPT | Performed by: EMERGENCY MEDICINE

## 2024-08-03 PROCEDURE — 99284 EMERGENCY DEPT VISIT MOD MDM: CPT | Mod: 25 | Performed by: EMERGENCY MEDICINE

## 2024-08-03 RX ORDER — OXYCODONE HYDROCHLORIDE 5 MG/1
5 TABLET ORAL EVERY 6 HOURS PRN
Qty: 5 TABLET | Refills: 0 | Status: SHIPPED | OUTPATIENT
Start: 2024-08-03 | End: 2024-08-05

## 2024-08-03 RX ORDER — HYDROMORPHONE HYDROCHLORIDE 1 MG/ML
0.5 INJECTION, SOLUTION INTRAMUSCULAR; INTRAVENOUS; SUBCUTANEOUS EVERY 30 MIN PRN
Status: DISCONTINUED | OUTPATIENT
Start: 2024-08-03 | End: 2024-08-03 | Stop reason: HOSPADM

## 2024-08-03 RX ORDER — DIPHENHYDRAMINE HYDROCHLORIDE 50 MG/ML
25 INJECTION INTRAMUSCULAR; INTRAVENOUS ONCE
Status: COMPLETED | OUTPATIENT
Start: 2024-08-03 | End: 2024-08-03

## 2024-08-03 RX ORDER — PROCHLORPERAZINE MALEATE 10 MG
10 TABLET ORAL EVERY 6 HOURS PRN
Qty: 20 TABLET | Refills: 0 | Status: SHIPPED | OUTPATIENT
Start: 2024-08-03

## 2024-08-03 RX ADMIN — HYDROMORPHONE HYDROCHLORIDE 0.5 MG: 1 INJECTION, SOLUTION INTRAMUSCULAR; INTRAVENOUS; SUBCUTANEOUS at 11:14

## 2024-08-03 RX ADMIN — PROCHLORPERAZINE EDISYLATE 10 MG: 5 INJECTION INTRAMUSCULAR; INTRAVENOUS at 10:39

## 2024-08-03 RX ADMIN — SODIUM CHLORIDE 1000 ML: 9 INJECTION, SOLUTION INTRAVENOUS at 11:06

## 2024-08-03 RX ADMIN — SODIUM CHLORIDE 1000 ML: 9 INJECTION, SOLUTION INTRAVENOUS at 13:55

## 2024-08-03 RX ADMIN — DIPHENHYDRAMINE HYDROCHLORIDE 25 MG: 50 INJECTION, SOLUTION INTRAMUSCULAR; INTRAVENOUS at 10:35

## 2024-08-03 RX ADMIN — SODIUM CHLORIDE 1000 ML: 9 INJECTION, SOLUTION INTRAVENOUS at 10:03

## 2024-08-03 ASSESSMENT — ACTIVITIES OF DAILY LIVING (ADL)
ADLS_ACUITY_SCORE: 38
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
## 2024-08-03 NOTE — DISCHARGE INSTRUCTIONS
I am glad you are feeling better.  Try the Compazine as needed for nausea vomiting.  You can also try Zofran if Compazine does not work.  Return to the ER if new or worsening symptoms.  I am glad you are feeling better.

## 2024-08-03 NOTE — ED TRIAGE NOTES
Pt was seen yesterday in the ED for chest pain, anxiety, and vomiting.  Pt today continues to have vomiting.  Has been unable to keep her medications down.  Pt complaining of throat pain and body aches.   Pt is type II diabetic, blood sugar meter has been reading high at home.       Triage Assessment (Adult)       Row Name 08/03/24 0916          Triage Assessment    Airway WDL WDL        Respiratory WDL    Respiratory WDL WDL        Skin Circulation/Temperature WDL    Skin Circulation/Temperature WDL WDL        Cardiac WDL    Cardiac WDL WDL        Peripheral/Neurovascular WDL    Peripheral Neurovascular WDL WDL        Cognitive/Neuro/Behavioral WDL    Cognitive/Neuro/Behavioral WDL WDL

## 2024-08-05 DIAGNOSIS — E11.65 TYPE 2 DIABETES MELLITUS WITH HYPERGLYCEMIA, WITH LONG-TERM CURRENT USE OF INSULIN (H): ICD-10-CM

## 2024-08-05 DIAGNOSIS — Z79.4 TYPE 2 DIABETES MELLITUS WITH HYPERGLYCEMIA, WITH LONG-TERM CURRENT USE OF INSULIN (H): ICD-10-CM

## 2024-08-05 DIAGNOSIS — G89.4 CHRONIC PAIN SYNDROME: Primary | ICD-10-CM

## 2024-08-05 RX ORDER — OXYCODONE AND ACETAMINOPHEN 5; 325 MG/1; MG/1
1 TABLET ORAL EVERY 6 HOURS PRN
Qty: 30 TABLET | Refills: 0 | OUTPATIENT
Start: 2024-08-05

## 2024-08-05 RX ORDER — OXYCODONE HYDROCHLORIDE 5 MG/1
5 TABLET ORAL DAILY PRN
Qty: 30 TABLET | Refills: 0 | Status: SHIPPED | OUTPATIENT
Start: 2024-08-05 | End: 2024-08-29

## 2024-08-05 RX ORDER — INSULIN ASPART 100 [IU]/ML
INJECTION, SOLUTION INTRAVENOUS; SUBCUTANEOUS
Qty: 15 ML | Refills: 3 | Status: SHIPPED | OUTPATIENT
Start: 2024-08-05

## 2024-08-05 NOTE — TELEPHONE ENCOUNTER
Percocet was the wrong medication. She doesn't use that, she uses plain oxycodone. Changing request.   Yaima Muse MD

## 2024-08-06 ENCOUNTER — TELEPHONE (OUTPATIENT)
Dept: FAMILY MEDICINE | Facility: CLINIC | Age: 49
End: 2024-08-06

## 2024-08-07 ENCOUNTER — MYC MEDICAL ADVICE (OUTPATIENT)
Dept: ORTHOPEDICS | Facility: CLINIC | Age: 49
End: 2024-08-07
Payer: COMMERCIAL

## 2024-08-07 DIAGNOSIS — M25.571 PAIN IN JOINT, ANKLE AND FOOT, RIGHT: ICD-10-CM

## 2024-08-07 DIAGNOSIS — M19.90 ARTHRITIS: Primary | ICD-10-CM

## 2024-08-15 NOTE — TELEPHONE ENCOUNTER
LM for the patient to call back and let her know that these are not a physicians statement of disability. We need the correct form for it to be filled out

## 2024-08-16 NOTE — TELEPHONE ENCOUNTER
LVMTCB Dr. Francis said the paperwork that was received is not the appropriate Physician paperwork for her to fill out for disability. Please notify patient to bring in new forms.

## 2024-08-21 ENCOUNTER — HOSPITAL ENCOUNTER (OUTPATIENT)
Dept: GENERAL RADIOLOGY | Facility: CLINIC | Age: 49
Discharge: HOME | End: 2024-08-21
Attending: ORTHOPAEDIC SURGERY | Admitting: ORTHOPAEDIC SURGERY
Payer: OTHER MISCELLANEOUS

## 2024-08-21 DIAGNOSIS — M19.90 ARTHRITIS: ICD-10-CM

## 2024-08-21 DIAGNOSIS — M25.571 PAIN IN JOINT, ANKLE AND FOOT, RIGHT: ICD-10-CM

## 2024-08-21 PROCEDURE — 20605 DRAIN/INJ JOINT/BURSA W/O US: CPT | Mod: RT

## 2024-08-21 PROCEDURE — 250N000009 HC RX 250: Performed by: RADIOLOGY

## 2024-08-21 PROCEDURE — 250N000011 HC RX IP 250 OP 636: Performed by: RADIOLOGY

## 2024-08-21 PROCEDURE — 255N000002 HC RX 255 OP 636: Performed by: RADIOLOGY

## 2024-08-21 RX ORDER — LIDOCAINE HYDROCHLORIDE 10 MG/ML
10 INJECTION, SOLUTION INFILTRATION; PERINEURAL ONCE
Status: COMPLETED | OUTPATIENT
Start: 2024-08-21 | End: 2024-08-21

## 2024-08-21 RX ORDER — BUPIVACAINE HYDROCHLORIDE 2.5 MG/ML
10 INJECTION, SOLUTION EPIDURAL; INFILTRATION; INTRACAUDAL ONCE
Status: COMPLETED | OUTPATIENT
Start: 2024-08-21 | End: 2024-08-21

## 2024-08-21 RX ORDER — IOPAMIDOL 510 MG/ML
100 INJECTION, SOLUTION INTRAVASCULAR ONCE
Status: COMPLETED | OUTPATIENT
Start: 2024-08-21 | End: 2024-08-21

## 2024-08-21 RX ORDER — TRIAMCINOLONE ACETONIDE 40 MG/ML
40 INJECTION, SUSPENSION INTRA-ARTICULAR; INTRAMUSCULAR ONCE
Status: COMPLETED | OUTPATIENT
Start: 2024-08-21 | End: 2024-08-21

## 2024-08-21 RX ADMIN — IOPAMIDOL 5 ML: 510 INJECTION, SOLUTION INTRAVASCULAR at 10:17

## 2024-08-21 RX ADMIN — BUPIVACAINE HYDROCHLORIDE 2 ML: 2.5 INJECTION, SOLUTION EPIDURAL; INFILTRATION; INTRACAUDAL; PERINEURAL at 10:17

## 2024-08-21 RX ADMIN — LIDOCAINE HYDROCHLORIDE 3 ML: 10 INJECTION, SOLUTION EPIDURAL; INFILTRATION; INTRACAUDAL; PERINEURAL at 10:17

## 2024-08-21 RX ADMIN — TRIAMCINOLONE ACETONIDE 40 MG: 40 INJECTION, SUSPENSION INTRA-ARTICULAR; INTRAMUSCULAR at 10:18

## 2024-08-23 NOTE — TELEPHONE ENCOUNTER
Sent a [employer] message stating that the forms are not the correct forms for the Provider to fill out for disability and we will need new forms if she still needs them to be filled out.

## 2024-08-29 ENCOUNTER — MYC REFILL (OUTPATIENT)
Dept: FAMILY MEDICINE | Facility: CLINIC | Age: 49
End: 2024-08-29
Payer: COMMERCIAL

## 2024-08-29 DIAGNOSIS — G89.4 CHRONIC PAIN SYNDROME: ICD-10-CM

## 2024-08-30 RX ORDER — OXYCODONE HYDROCHLORIDE 5 MG/1
5 TABLET ORAL DAILY PRN
Qty: 30 TABLET | Refills: 0 | Status: SHIPPED | OUTPATIENT
Start: 2024-09-04 | End: 2024-09-28

## 2024-09-26 ENCOUNTER — TELEPHONE (OUTPATIENT)
Dept: FAMILY MEDICINE | Facility: CLINIC | Age: 49
End: 2024-09-26
Payer: COMMERCIAL

## 2024-09-26 NOTE — TELEPHONE ENCOUNTER
Patient Quality Outreach    Patient is due for the following:   Diabetes -  Foot Exam  Breast Cancer Screening - Mammogram  Physical Preventive Adult Physical    Next Steps:   Patient was assigned appropriate questionnaire to complete    Type of outreach:    Chart review performed, no outreach needed.      Questions for provider review:    None           Leslie Tamez

## 2024-09-28 ENCOUNTER — MYC REFILL (OUTPATIENT)
Dept: FAMILY MEDICINE | Facility: CLINIC | Age: 49
End: 2024-09-28
Payer: COMMERCIAL

## 2024-09-28 DIAGNOSIS — G89.4 CHRONIC PAIN SYNDROME: ICD-10-CM

## 2024-10-01 RX ORDER — OXYCODONE HYDROCHLORIDE 5 MG/1
5 TABLET ORAL DAILY PRN
Qty: 30 TABLET | Refills: 0 | Status: SHIPPED | OUTPATIENT
Start: 2024-10-04

## 2024-10-02 ENCOUNTER — MYC MEDICAL ADVICE (OUTPATIENT)
Dept: FAMILY MEDICINE | Facility: CLINIC | Age: 49
End: 2024-10-02
Payer: COMMERCIAL

## 2024-10-02 ENCOUNTER — HOSPITAL ENCOUNTER (EMERGENCY)
Facility: CLINIC | Age: 49
Discharge: HOME | End: 2024-10-02
Attending: EMERGENCY MEDICINE | Admitting: EMERGENCY MEDICINE
Payer: COMMERCIAL

## 2024-10-02 ENCOUNTER — NURSE TRIAGE (OUTPATIENT)
Dept: FAMILY MEDICINE | Facility: CLINIC | Age: 49
End: 2024-10-02
Payer: COMMERCIAL

## 2024-10-02 VITALS
DIASTOLIC BLOOD PRESSURE: 78 MMHG | TEMPERATURE: 97.8 F | HEART RATE: 72 BPM | SYSTOLIC BLOOD PRESSURE: 120 MMHG | RESPIRATION RATE: 16 BRPM | OXYGEN SATURATION: 99 %

## 2024-10-02 DIAGNOSIS — M43.02 SPONDYLOLYSIS OF CERVICAL SPINE: ICD-10-CM

## 2024-10-02 PROCEDURE — 250N000011 HC RX IP 250 OP 636: Performed by: EMERGENCY MEDICINE

## 2024-10-02 PROCEDURE — 99284 EMERGENCY DEPT VISIT MOD MDM: CPT | Performed by: EMERGENCY MEDICINE

## 2024-10-02 PROCEDURE — 96372 THER/PROPH/DIAG INJ SC/IM: CPT | Performed by: EMERGENCY MEDICINE

## 2024-10-02 RX ORDER — METHYLPREDNISOLONE 4 MG/1
TABLET ORAL
Qty: 21 TABLET | Refills: 0 | Status: ON HOLD | OUTPATIENT
Start: 2024-10-02 | End: 2024-10-11

## 2024-10-02 RX ORDER — OXYCODONE HYDROCHLORIDE 5 MG/1
5 TABLET ORAL EVERY 12 HOURS PRN
Qty: 10 TABLET | Refills: 0 | Status: ON HOLD | OUTPATIENT
Start: 2024-10-02 | End: 2024-10-08

## 2024-10-02 RX ADMIN — HYDROMORPHONE HYDROCHLORIDE 1 MG: 1 INJECTION, SOLUTION INTRAMUSCULAR; INTRAVENOUS; SUBCUTANEOUS at 15:02

## 2024-10-02 ASSESSMENT — COLUMBIA-SUICIDE SEVERITY RATING SCALE - C-SSRS
6. HAVE YOU EVER DONE ANYTHING, STARTED TO DO ANYTHING, OR PREPARED TO DO ANYTHING TO END YOUR LIFE?: NO
2. HAVE YOU ACTUALLY HAD ANY THOUGHTS OF KILLING YOURSELF IN THE PAST MONTH?: NO
1. IN THE PAST MONTH, HAVE YOU WISHED YOU WERE DEAD OR WISHED YOU COULD GO TO SLEEP AND NOT WAKE UP?: NO

## 2024-10-02 ASSESSMENT — ACTIVITIES OF DAILY LIVING (ADL): ADLS_ACUITY_SCORE: 38

## 2024-10-02 NOTE — DISCHARGE INSTRUCTIONS
Your acute neck pain with referred pain into your shoulders and loss of normal cervical curvature is due to acute spasm.  This is was contributing to his severe neck pain and restriction in range of motion.  Recommend starting Medrol Dosepak.  Take as directed.  Take with food.  Since the muscle relaxants you have at home have not been helpful/only result in drowsiness/recommend discontinuing  Oxycodone 5 mg dose that you routinely take once a day can be increased to twice a day dosing for 5 days then I would recommend going back to your long-term dosing of just once per day.
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
## 2024-10-02 NOTE — ED TRIAGE NOTES
Pt presents with neck pain. Pt states she kinked neck a couple days ago. Pt with history of chronic neck pain.      Triage Assessment (Adult)       Row Name 10/02/24 [id number]          Triage Assessment    Airway WDL WDL        Respiratory WDL    Respiratory WDL WDL        Skin Circulation/Temperature WDL    Skin Circulation/Temperature WDL WDL        Cardiac WDL    Cardiac WDL WDL        Peripheral/Neurovascular WDL    Peripheral Neurovascular WDL WDL        Cognitive/Neuro/Behavioral WDL    Cognitive/Neuro/Behavioral WDL WDL

## 2024-10-02 NOTE — TELEPHONE ENCOUNTER
RN Triage    Patient Contact    Attempt # 1    Was call answered?  No.  Left message on voicemail with information to call me back. and sent GERA Villa, RN
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
## 2024-10-08 ENCOUNTER — HOSPITAL ENCOUNTER (INPATIENT)
Facility: CLINIC | Age: 49
LOS: 3 days | Discharge: HOME | End: 2024-10-11
Attending: FAMILY MEDICINE | Admitting: PEDIATRICS
Payer: COMMERCIAL

## 2024-10-08 ENCOUNTER — APPOINTMENT (OUTPATIENT)
Dept: CT IMAGING | Facility: CLINIC | Age: 49
End: 2024-10-08
Attending: PEDIATRICS
Payer: COMMERCIAL

## 2024-10-08 DIAGNOSIS — K59.03 DRUG INDUCED CONSTIPATION: ICD-10-CM

## 2024-10-08 DIAGNOSIS — E83.42 HYPOMAGNESEMIA: Primary | ICD-10-CM

## 2024-10-08 DIAGNOSIS — Z79.4 TYPE 2 DIABETES MELLITUS WITH HYPERGLYCEMIA, WITH LONG-TERM CURRENT USE OF INSULIN (H): ICD-10-CM

## 2024-10-08 DIAGNOSIS — R10.84 ABDOMINAL PAIN, GENERALIZED: ICD-10-CM

## 2024-10-08 DIAGNOSIS — E11.00 TYPE 2 DIABETES MELLITUS WITH HYPEROSMOLAR HYPERGLYCEMIC STATE (HHS) (H): ICD-10-CM

## 2024-10-08 DIAGNOSIS — E11.65 TYPE 2 DIABETES MELLITUS WITH HYPERGLYCEMIA, WITH LONG-TERM CURRENT USE OF INSULIN (H): ICD-10-CM

## 2024-10-08 PROBLEM — E87.29 KETOACIDOSIS: Status: ACTIVE | Noted: 2024-10-08

## 2024-10-08 PROBLEM — F33.1 MAJOR DEPRESSIVE DISORDER, RECURRENT EPISODE, MODERATE (H): Status: ACTIVE | Noted: 2022-11-14

## 2024-10-08 PROBLEM — E87.20 LACTIC ACIDOSIS: Status: ACTIVE | Noted: 2024-10-08

## 2024-10-08 PROBLEM — R74.8 ELEVATED LIPASE: Status: ACTIVE | Noted: 2024-10-08

## 2024-10-08 PROBLEM — E87.1 HYPONATREMIA: Status: ACTIVE | Noted: 2024-10-08

## 2024-10-08 PROBLEM — R30.0 DYSURIA: Status: ACTIVE | Noted: 2024-10-08

## 2024-10-08 PROBLEM — I25.2 HISTORY OF NON-ST ELEVATION MYOCARDIAL INFARCTION (NSTEMI): Status: ACTIVE | Noted: 2022-12-11

## 2024-10-08 PROBLEM — I25.10 CORONARY ARTERY DISEASE INVOLVING NATIVE CORONARY ARTERY OF NATIVE HEART WITHOUT ANGINA PECTORIS: Status: ACTIVE | Noted: 2022-12-11

## 2024-10-08 PROBLEM — E87.29 INCREASED ANION GAP METABOLIC ACIDOSIS: Status: ACTIVE | Noted: 2024-10-08

## 2024-10-08 PROBLEM — I10 SEVERE HYPERTENSION: Status: ACTIVE | Noted: 2024-10-08

## 2024-10-08 LAB
ALBUMIN SERPL BCG-MCNC: 3.9 G/DL (ref 3.5–5.2)
ALBUMIN SERPL BCG-MCNC: 4.6 G/DL (ref 3.5–5.2)
ALBUMIN UR-MCNC: 100 MG/DL
ALP SERPL-CCNC: 101 U/L (ref 40–150)
ALP SERPL-CCNC: 127 U/L (ref 40–150)
ALT SERPL W P-5'-P-CCNC: 21 U/L (ref 0–50)
ALT SERPL W P-5'-P-CCNC: 25 U/L (ref 0–50)
ANION GAP SERPL CALCULATED.3IONS-SCNC: 13 MMOL/L (ref 7–15)
ANION GAP SERPL CALCULATED.3IONS-SCNC: 25 MMOL/L (ref 7–15)
APPEARANCE UR: CLEAR
AST SERPL W P-5'-P-CCNC: 10 U/L (ref 0–45)
AST SERPL W P-5'-P-CCNC: 14 U/L (ref 0–45)
B-OH-BUTYR SERPL-SCNC: 3.32 MMOL/L
B-OH-BUTYR SERPL-SCNC: <0.18 MMOL/L
BASE EXCESS BLDV CALC-SCNC: 0.7 MMOL/L (ref -3–3)
BASE EXCESS BLDV CALC-SCNC: 1.7 MMOL/L (ref -3–3)
BASOPHILS # BLD AUTO: 0 10E3/UL (ref 0–0.2)
BASOPHILS NFR BLD AUTO: 0 %
BILIRUB DIRECT SERPL-MCNC: <0.2 MG/DL (ref 0–0.3)
BILIRUB SERPL-MCNC: 0.2 MG/DL
BILIRUB SERPL-MCNC: 0.4 MG/DL
BILIRUB UR QL STRIP: ABNORMAL
BUN SERPL-MCNC: 22.5 MG/DL (ref 6–20)
BUN SERPL-MCNC: 29.8 MG/DL (ref 6–20)
CALCIUM SERPL-MCNC: 10.5 MG/DL (ref 8.8–10.4)
CALCIUM SERPL-MCNC: 9.1 MG/DL (ref 8.8–10.4)
CHLORIDE SERPL-SCNC: 102 MMOL/L (ref 98–107)
CHLORIDE SERPL-SCNC: 89 MMOL/L (ref 98–107)
COLOR UR AUTO: YELLOW
CREAT SERPL-MCNC: 0.91 MG/DL (ref 0.51–0.95)
CREAT SERPL-MCNC: 1.06 MG/DL (ref 0.51–0.95)
CRP SERPL-MCNC: 4.07 MG/L
EGFRCR SERPLBLD CKD-EPI 2021: 64 ML/MIN/1.73M2
EGFRCR SERPLBLD CKD-EPI 2021: 77 ML/MIN/1.73M2
EOSINOPHIL # BLD AUTO: 0 10E3/UL (ref 0–0.7)
EOSINOPHIL NFR BLD AUTO: 0 %
ERYTHROCYTE [DISTWIDTH] IN BLOOD BY AUTOMATED COUNT: 13.2 % (ref 10–15)
EST. AVERAGE GLUCOSE BLD GHB EST-MCNC: 206 MG/DL
GLUCOSE BLDC GLUCOMTR-MCNC: 150 MG/DL (ref 70–99)
GLUCOSE BLDC GLUCOMTR-MCNC: 177 MG/DL (ref 70–99)
GLUCOSE BLDC GLUCOMTR-MCNC: 185 MG/DL (ref 70–99)
GLUCOSE BLDC GLUCOMTR-MCNC: 189 MG/DL (ref 70–99)
GLUCOSE BLDC GLUCOMTR-MCNC: 226 MG/DL (ref 70–99)
GLUCOSE BLDC GLUCOMTR-MCNC: 260 MG/DL (ref 70–99)
GLUCOSE BLDC GLUCOMTR-MCNC: 266 MG/DL (ref 70–99)
GLUCOSE BLDC GLUCOMTR-MCNC: 369 MG/DL (ref 70–99)
GLUCOSE BLDC GLUCOMTR-MCNC: 386 MG/DL (ref 70–99)
GLUCOSE BLDC GLUCOMTR-MCNC: 489 MG/DL (ref 70–99)
GLUCOSE SERPL-MCNC: 189 MG/DL (ref 70–99)
GLUCOSE SERPL-MCNC: 505 MG/DL (ref 70–99)
GLUCOSE UR STRIP-MCNC: 100 MG/DL
HBA1C MFR BLD: 8.8 %
HCO3 BLDV-SCNC: 23 MMOL/L (ref 21–28)
HCO3 BLDV-SCNC: 27 MMOL/L (ref 21–28)
HCO3 SERPL-SCNC: 18 MMOL/L (ref 22–29)
HCO3 SERPL-SCNC: 24 MMOL/L (ref 22–29)
HCT VFR BLD AUTO: 40.8 % (ref 35–47)
HGB BLD-MCNC: 14.6 G/DL (ref 11.7–15.7)
HGB UR QL STRIP: NEGATIVE
HOLD SPECIMEN: NORMAL
HYALINE CASTS: 1 /LPF
IMM GRANULOCYTES # BLD: 0 10E3/UL
IMM GRANULOCYTES NFR BLD: 0 %
KETONES UR STRIP-MCNC: 15 MG/DL
LACTATE SERPL-SCNC: 1.7 MMOL/L (ref 0.7–2)
LACTATE SERPL-SCNC: 2.1 MMOL/L (ref 0.7–2)
LACTATE SERPL-SCNC: 2.9 MMOL/L (ref 0.7–2)
LACTATE SERPL-SCNC: 3.2 MMOL/L (ref 0.7–2)
LEUKOCYTE ESTERASE UR QL STRIP: ABNORMAL
LIPASE SERPL-CCNC: 62 U/L (ref 13–60)
LIPASE SERPL-CCNC: 70 U/L (ref 13–60)
LYMPHOCYTES # BLD AUTO: 1.6 10E3/UL (ref 0.8–5.3)
LYMPHOCYTES NFR BLD AUTO: 12 %
MAGNESIUM SERPL-MCNC: 1.6 MG/DL (ref 1.7–2.3)
MAGNESIUM SERPL-MCNC: 1.7 MG/DL (ref 1.7–2.3)
MCH RBC QN AUTO: 27.7 PG (ref 26.5–33)
MCHC RBC AUTO-ENTMCNC: 35.8 G/DL (ref 31.5–36.5)
MCV RBC AUTO: 77 FL (ref 78–100)
MONOCYTES # BLD AUTO: 0.5 10E3/UL (ref 0–1.3)
MONOCYTES NFR BLD AUTO: 3 %
MUCOUS THREADS #/AREA URNS LPF: PRESENT /LPF
NEUTROPHILS # BLD AUTO: 11.4 10E3/UL (ref 1.6–8.3)
NEUTROPHILS NFR BLD AUTO: 84 %
NITRATE UR QL: NEGATIVE
NRBC # BLD AUTO: 0 10E3/UL
NRBC BLD AUTO-RTO: 0 /100
O2/TOTAL GAS SETTING VFR VENT: 21 %
O2/TOTAL GAS SETTING VFR VENT: 21 %
OSMOLALITY SERPL: 300 MMOL/KG (ref 275–295)
OSMOLALITY SERPL: 317 MMOL/KG (ref 275–295)
OXYHGB MFR BLDV: 69 % (ref 70–75)
OXYHGB MFR BLDV: 76 % (ref 70–75)
PCO2 BLDV: 31 MM HG (ref 40–50)
PCO2 BLDV: 42 MM HG (ref 40–50)
PH BLDV: 7.42 [PH] (ref 7.32–7.43)
PH BLDV: 7.49 [PH] (ref 7.32–7.43)
PH UR STRIP: 5.5 [PH] (ref 5–7)
PHOSPHATE SERPL-MCNC: 2.9 MG/DL (ref 2.5–4.5)
PLATELET # BLD AUTO: 395 10E3/UL (ref 150–450)
PO2 BLDV: 34 MM HG (ref 25–47)
PO2 BLDV: 41 MM HG (ref 25–47)
POTASSIUM SERPL-SCNC: 3.8 MMOL/L (ref 3.4–5.3)
POTASSIUM SERPL-SCNC: 4.3 MMOL/L (ref 3.4–5.3)
PROCALCITONIN SERPL IA-MCNC: 0.13 NG/ML
PROT SERPL-MCNC: 6.8 G/DL (ref 6.4–8.3)
PROT SERPL-MCNC: 8.2 G/DL (ref 6.4–8.3)
RBC # BLD AUTO: 5.28 10E6/UL (ref 3.8–5.2)
RBC URINE: 1 /HPF
SAO2 % BLDV: 69.9 % (ref 70–75)
SAO2 % BLDV: 77.1 % (ref 70–75)
SODIUM SERPL-SCNC: 132 MMOL/L (ref 135–145)
SODIUM SERPL-SCNC: 139 MMOL/L (ref 135–145)
SP GR UR STRIP: 1.02 (ref 1–1.03)
SQUAMOUS EPITHELIAL: 1 /HPF
UROBILINOGEN UR STRIP-MCNC: NORMAL MG/DL
WBC # BLD AUTO: 11.8 10E3/UL (ref 4–11)
WBC # BLD AUTO: 13.5 10E3/UL (ref 4–11)
WBC URINE: 14 /HPF

## 2024-10-08 PROCEDURE — 250N000009 HC RX 250: Performed by: STUDENT IN AN ORGANIZED HEALTH CARE EDUCATION/TRAINING PROGRAM

## 2024-10-08 PROCEDURE — 36415 COLL VENOUS BLD VENIPUNCTURE: CPT | Performed by: STUDENT IN AN ORGANIZED HEALTH CARE EDUCATION/TRAINING PROGRAM

## 2024-10-08 PROCEDURE — 80048 BASIC METABOLIC PNL TOTAL CA: CPT | Performed by: STUDENT IN AN ORGANIZED HEALTH CARE EDUCATION/TRAINING PROGRAM

## 2024-10-08 PROCEDURE — 99291 CRITICAL CARE FIRST HOUR: CPT | Mod: 25 | Performed by: FAMILY MEDICINE

## 2024-10-08 PROCEDURE — 87086 URINE CULTURE/COLONY COUNT: CPT | Performed by: PEDIATRICS

## 2024-10-08 PROCEDURE — 83930 ASSAY OF BLOOD OSMOLALITY: CPT | Performed by: FAMILY MEDICINE

## 2024-10-08 PROCEDURE — 99285 EMERGENCY DEPT VISIT HI MDM: CPT | Mod: 25

## 2024-10-08 PROCEDURE — 36415 COLL VENOUS BLD VENIPUNCTURE: CPT | Performed by: FAMILY MEDICINE

## 2024-10-08 PROCEDURE — 83036 HEMOGLOBIN GLYCOSYLATED A1C: CPT | Performed by: FAMILY MEDICINE

## 2024-10-08 PROCEDURE — 82310 ASSAY OF CALCIUM: CPT | Performed by: FAMILY MEDICINE

## 2024-10-08 PROCEDURE — 82805 BLOOD GASES W/O2 SATURATION: CPT | Performed by: PEDIATRICS

## 2024-10-08 PROCEDURE — 83690 ASSAY OF LIPASE: CPT | Performed by: PEDIATRICS

## 2024-10-08 PROCEDURE — 74177 CT ABD & PELVIS W/CONTRAST: CPT

## 2024-10-08 PROCEDURE — 99223 1ST HOSP IP/OBS HIGH 75: CPT | Mod: AI | Performed by: PEDIATRICS

## 2024-10-08 PROCEDURE — 83690 ASSAY OF LIPASE: CPT | Performed by: FAMILY MEDICINE

## 2024-10-08 PROCEDURE — 83735 ASSAY OF MAGNESIUM: CPT | Performed by: PEDIATRICS

## 2024-10-08 PROCEDURE — 250N000009 HC RX 250: Performed by: FAMILY MEDICINE

## 2024-10-08 PROCEDURE — 96376 TX/PRO/DX INJ SAME DRUG ADON: CPT

## 2024-10-08 PROCEDURE — 82805 BLOOD GASES W/O2 SATURATION: CPT | Performed by: FAMILY MEDICINE

## 2024-10-08 PROCEDURE — 82010 KETONE BODYS QUAN: CPT | Performed by: FAMILY MEDICINE

## 2024-10-08 PROCEDURE — 84100 ASSAY OF PHOSPHORUS: CPT | Performed by: PEDIATRICS

## 2024-10-08 PROCEDURE — 250N000011 HC RX IP 250 OP 636: Performed by: STUDENT IN AN ORGANIZED HEALTH CARE EDUCATION/TRAINING PROGRAM

## 2024-10-08 PROCEDURE — 258N000003 HC RX IP 258 OP 636: Performed by: FAMILY MEDICINE

## 2024-10-08 PROCEDURE — 250N000012 HC RX MED GY IP 250 OP 636 PS 637: Performed by: STUDENT IN AN ORGANIZED HEALTH CARE EDUCATION/TRAINING PROGRAM

## 2024-10-08 PROCEDURE — 83605 ASSAY OF LACTIC ACID: CPT | Performed by: FAMILY MEDICINE

## 2024-10-08 PROCEDURE — 85048 AUTOMATED LEUKOCYTE COUNT: CPT | Performed by: PEDIATRICS

## 2024-10-08 PROCEDURE — 250N000013 HC RX MED GY IP 250 OP 250 PS 637: Performed by: STUDENT IN AN ORGANIZED HEALTH CARE EDUCATION/TRAINING PROGRAM

## 2024-10-08 PROCEDURE — 82010 KETONE BODYS QUAN: CPT | Performed by: STUDENT IN AN ORGANIZED HEALTH CARE EDUCATION/TRAINING PROGRAM

## 2024-10-08 PROCEDURE — S5010 5% DEXTROSE AND 0.45% SALINE: HCPCS | Performed by: FAMILY MEDICINE

## 2024-10-08 PROCEDURE — 82962 GLUCOSE BLOOD TEST: CPT

## 2024-10-08 PROCEDURE — 250N000011 HC RX IP 250 OP 636: Performed by: PEDIATRICS

## 2024-10-08 PROCEDURE — 83605 ASSAY OF LACTIC ACID: CPT | Performed by: STUDENT IN AN ORGANIZED HEALTH CARE EDUCATION/TRAINING PROGRAM

## 2024-10-08 PROCEDURE — 250N000011 HC RX IP 250 OP 636: Performed by: FAMILY MEDICINE

## 2024-10-08 PROCEDURE — 82248 BILIRUBIN DIRECT: CPT | Performed by: FAMILY MEDICINE

## 2024-10-08 PROCEDURE — 99418 PROLNG IP/OBS E/M EA 15 MIN: CPT | Performed by: PEDIATRICS

## 2024-10-08 PROCEDURE — 86140 C-REACTIVE PROTEIN: CPT | Performed by: PEDIATRICS

## 2024-10-08 PROCEDURE — 120N000004 HC R&B MS OVERFLOW

## 2024-10-08 PROCEDURE — 83930 ASSAY OF BLOOD OSMOLALITY: CPT | Performed by: PEDIATRICS

## 2024-10-08 PROCEDURE — 250N000013 HC RX MED GY IP 250 OP 250 PS 637: Performed by: PEDIATRICS

## 2024-10-08 PROCEDURE — 83735 ASSAY OF MAGNESIUM: CPT | Performed by: FAMILY MEDICINE

## 2024-10-08 PROCEDURE — 36415 COLL VENOUS BLD VENIPUNCTURE: CPT | Performed by: PEDIATRICS

## 2024-10-08 PROCEDURE — 96365 THER/PROPH/DIAG IV INF INIT: CPT

## 2024-10-08 PROCEDURE — 85025 COMPLETE CBC W/AUTO DIFF WBC: CPT | Performed by: FAMILY MEDICINE

## 2024-10-08 PROCEDURE — 81003 URINALYSIS AUTO W/O SCOPE: CPT | Performed by: PEDIATRICS

## 2024-10-08 PROCEDURE — 96361 HYDRATE IV INFUSION ADD-ON: CPT

## 2024-10-08 PROCEDURE — 84145 PROCALCITONIN (PCT): CPT | Performed by: PEDIATRICS

## 2024-10-08 PROCEDURE — 96375 TX/PRO/DX INJ NEW DRUG ADDON: CPT

## 2024-10-08 PROCEDURE — 83605 ASSAY OF LACTIC ACID: CPT | Performed by: PEDIATRICS

## 2024-10-08 PROCEDURE — 250N000009 HC RX 250: Performed by: PEDIATRICS

## 2024-10-08 RX ORDER — NICOTINE POLACRILEX 4 MG
15-30 LOZENGE BUCCAL
Status: DISCONTINUED | OUTPATIENT
Start: 2024-10-08 | End: 2024-10-08

## 2024-10-08 RX ORDER — NALOXONE HYDROCHLORIDE 0.4 MG/ML
0.2 INJECTION, SOLUTION INTRAMUSCULAR; INTRAVENOUS; SUBCUTANEOUS
Status: DISCONTINUED | OUTPATIENT
Start: 2024-10-08 | End: 2024-10-11 | Stop reason: HOSPADM

## 2024-10-08 RX ORDER — DEXTROSE MONOHYDRATE 25 G/50ML
25-50 INJECTION, SOLUTION INTRAVENOUS
Status: DISCONTINUED | OUTPATIENT
Start: 2024-10-08 | End: 2024-10-11 | Stop reason: HOSPADM

## 2024-10-08 RX ORDER — HYDROMORPHONE HYDROCHLORIDE 1 MG/ML
0.5 INJECTION, SOLUTION INTRAMUSCULAR; INTRAVENOUS; SUBCUTANEOUS EVERY 30 MIN PRN
Status: COMPLETED | OUTPATIENT
Start: 2024-10-08 | End: 2024-10-08

## 2024-10-08 RX ORDER — NICOTINE POLACRILEX 4 MG
15-30 LOZENGE BUCCAL
Status: DISCONTINUED | OUTPATIENT
Start: 2024-10-08 | End: 2024-10-11 | Stop reason: HOSPADM

## 2024-10-08 RX ORDER — NALOXONE HYDROCHLORIDE 0.4 MG/ML
0.4 INJECTION, SOLUTION INTRAMUSCULAR; INTRAVENOUS; SUBCUTANEOUS
Status: DISCONTINUED | OUTPATIENT
Start: 2024-10-08 | End: 2024-10-11 | Stop reason: HOSPADM

## 2024-10-08 RX ORDER — HYDROMORPHONE HYDROCHLORIDE 1 MG/ML
0.3 INJECTION, SOLUTION INTRAMUSCULAR; INTRAVENOUS; SUBCUTANEOUS
Status: DISCONTINUED | OUTPATIENT
Start: 2024-10-08 | End: 2024-10-09

## 2024-10-08 RX ORDER — HYDROCORTISONE 25 MG/G
CREAM TOPICAL 4 TIMES DAILY PRN
Status: DISCONTINUED | OUTPATIENT
Start: 2024-10-08 | End: 2024-10-11 | Stop reason: HOSPADM

## 2024-10-08 RX ORDER — IOPAMIDOL 755 MG/ML
500 INJECTION, SOLUTION INTRAVASCULAR ONCE
Status: COMPLETED | OUTPATIENT
Start: 2024-10-08 | End: 2024-10-08

## 2024-10-08 RX ORDER — TIZANIDINE 2 MG/1
4 TABLET ORAL 3 TIMES DAILY PRN
Status: DISCONTINUED | OUTPATIENT
Start: 2024-10-08 | End: 2024-10-11 | Stop reason: HOSPADM

## 2024-10-08 RX ORDER — SODIUM CHLORIDE AND POTASSIUM CHLORIDE 150; 900 MG/100ML; MG/100ML
INJECTION, SOLUTION INTRAVENOUS CONTINUOUS
Status: DISCONTINUED | OUTPATIENT
Start: 2024-10-08 | End: 2024-10-08

## 2024-10-08 RX ORDER — METOPROLOL TARTRATE 1 MG/ML
5 INJECTION, SOLUTION INTRAVENOUS EVERY 6 HOURS
Status: DISCONTINUED | OUTPATIENT
Start: 2024-10-08 | End: 2024-10-09

## 2024-10-08 RX ORDER — ACETAMINOPHEN 325 MG/1
650 TABLET ORAL EVERY 4 HOURS PRN
Status: DISCONTINUED | OUTPATIENT
Start: 2024-10-08 | End: 2024-10-08

## 2024-10-08 RX ORDER — ONDANSETRON 2 MG/ML
4 INJECTION INTRAMUSCULAR; INTRAVENOUS EVERY 30 MIN PRN
Status: DISCONTINUED | OUTPATIENT
Start: 2024-10-08 | End: 2024-10-08

## 2024-10-08 RX ORDER — ONDANSETRON 4 MG/1
4 TABLET, ORALLY DISINTEGRATING ORAL EVERY 6 HOURS PRN
Status: DISCONTINUED | OUTPATIENT
Start: 2024-10-08 | End: 2024-10-11 | Stop reason: HOSPADM

## 2024-10-08 RX ORDER — ACETAMINOPHEN 325 MG/1
975 TABLET ORAL EVERY 6 HOURS PRN
Status: DISCONTINUED | OUTPATIENT
Start: 2024-10-08 | End: 2024-10-09

## 2024-10-08 RX ORDER — ONDANSETRON 2 MG/ML
4 INJECTION INTRAMUSCULAR; INTRAVENOUS EVERY 6 HOURS PRN
Status: DISCONTINUED | OUTPATIENT
Start: 2024-10-08 | End: 2024-10-11 | Stop reason: HOSPADM

## 2024-10-08 RX ORDER — DEXTROSE MONOHYDRATE AND SODIUM CHLORIDE 5; .45 G/100ML; G/100ML
1000 INJECTION, SOLUTION INTRAVENOUS CONTINUOUS PRN
Status: DISCONTINUED | OUTPATIENT
Start: 2024-10-08 | End: 2024-10-08

## 2024-10-08 RX ORDER — CALCIUM CARBONATE 500 MG/1
1000 TABLET, CHEWABLE ORAL 4 TIMES DAILY PRN
Status: DISCONTINUED | OUTPATIENT
Start: 2024-10-08 | End: 2024-10-11 | Stop reason: HOSPADM

## 2024-10-08 RX ORDER — DIPHENHYDRAMINE HCL 25 MG
25 CAPSULE ORAL EVERY 6 HOURS PRN
Status: DISCONTINUED | OUTPATIENT
Start: 2024-10-08 | End: 2024-10-11 | Stop reason: HOSPADM

## 2024-10-08 RX ORDER — HYDROMORPHONE HYDROCHLORIDE 1 MG/ML
0.5 INJECTION, SOLUTION INTRAMUSCULAR; INTRAVENOUS; SUBCUTANEOUS
Status: DISCONTINUED | OUTPATIENT
Start: 2024-10-08 | End: 2024-10-09

## 2024-10-08 RX ORDER — DEXTROSE MONOHYDRATE 25 G/50ML
25-50 INJECTION, SOLUTION INTRAVENOUS
Status: DISCONTINUED | OUTPATIENT
Start: 2024-10-08 | End: 2024-10-08

## 2024-10-08 RX ORDER — MAGNESIUM HYDROXIDE/ALUMINUM HYDROXICE/SIMETHICONE 120; 1200; 1200 MG/30ML; MG/30ML; MG/30ML
30 SUSPENSION ORAL EVERY 4 HOURS PRN
Status: DISCONTINUED | OUTPATIENT
Start: 2024-10-08 | End: 2024-10-11 | Stop reason: HOSPADM

## 2024-10-08 RX ADMIN — POTASSIUM CHLORIDE AND SODIUM CHLORIDE: 900; 150 INJECTION, SOLUTION INTRAVENOUS at 12:17

## 2024-10-08 RX ADMIN — HYDROMORPHONE HYDROCHLORIDE 0.5 MG: 1 INJECTION, SOLUTION INTRAMUSCULAR; INTRAVENOUS; SUBCUTANEOUS at 07:00

## 2024-10-08 RX ADMIN — ENALAPRILAT 1.25 MG: 1.25 INJECTION INTRAVENOUS at 09:38

## 2024-10-08 RX ADMIN — DEXTROSE AND SODIUM CHLORIDE 1000 ML: 5; 450 INJECTION, SOLUTION INTRAVENOUS at 07:45

## 2024-10-08 RX ADMIN — HYDROCORTISONE: 25 CREAM TOPICAL at 17:28

## 2024-10-08 RX ADMIN — IOPAMIDOL 94 ML: 755 INJECTION, SOLUTION INTRAVENOUS at 15:58

## 2024-10-08 RX ADMIN — HYDROMORPHONE HYDROCHLORIDE 0.3 MG: 1 INJECTION, SOLUTION INTRAMUSCULAR; INTRAVENOUS; SUBCUTANEOUS at 12:23

## 2024-10-08 RX ADMIN — SODIUM CHLORIDE 60 ML: 9 INJECTION, SOLUTION INTRAVENOUS at 15:58

## 2024-10-08 RX ADMIN — HYDROMORPHONE HYDROCHLORIDE 0.5 MG: 1 INJECTION, SOLUTION INTRAMUSCULAR; INTRAVENOUS; SUBCUTANEOUS at 05:35

## 2024-10-08 RX ADMIN — HYDROMORPHONE HYDROCHLORIDE 0.3 MG: 1 INJECTION, SOLUTION INTRAMUSCULAR; INTRAVENOUS; SUBCUTANEOUS at 22:04

## 2024-10-08 RX ADMIN — TIZANIDINE 4 MG: 2 TABLET ORAL at 22:04

## 2024-10-08 RX ADMIN — DIPHENHYDRAMINE HYDROCHLORIDE 25 MG: 25 CAPSULE ORAL at 17:03

## 2024-10-08 RX ADMIN — ENALAPRILAT 1.25 MG: 1.25 INJECTION INTRAVENOUS at 22:20

## 2024-10-08 RX ADMIN — INSULIN ASPART 2 UNITS: 100 INJECTION, SOLUTION INTRAVENOUS; SUBCUTANEOUS at 17:03

## 2024-10-08 RX ADMIN — METOPROLOL TARTRATE 5 MG: 1 INJECTION, SOLUTION INTRAVENOUS at 18:49

## 2024-10-08 RX ADMIN — INSULIN ASPART 1 UNITS: 100 INJECTION, SOLUTION INTRAVENOUS; SUBCUTANEOUS at 12:18

## 2024-10-08 RX ADMIN — METOPROLOL TARTRATE 5 MG: 1 INJECTION, SOLUTION INTRAVENOUS at 14:01

## 2024-10-08 RX ADMIN — ONDANSETRON 4 MG: 2 INJECTION INTRAMUSCULAR; INTRAVENOUS at 05:01

## 2024-10-08 RX ADMIN — SODIUM CHLORIDE 1000 ML: 9 INJECTION, SOLUTION INTRAVENOUS at 04:59

## 2024-10-08 RX ADMIN — HYDROMORPHONE HYDROCHLORIDE 0.5 MG: 1 INJECTION, SOLUTION INTRAMUSCULAR; INTRAVENOUS; SUBCUTANEOUS at 05:01

## 2024-10-08 RX ADMIN — SODIUM CHLORIDE 1000 ML: 9 INJECTION, SOLUTION INTRAVENOUS at 05:35

## 2024-10-08 RX ADMIN — PANTOPRAZOLE SODIUM 40 MG: 40 INJECTION, POWDER, FOR SOLUTION INTRAVENOUS at 21:06

## 2024-10-08 RX ADMIN — HYDROMORPHONE HYDROCHLORIDE 0.3 MG: 1 INJECTION, SOLUTION INTRAMUSCULAR; INTRAVENOUS; SUBCUTANEOUS at 16:38

## 2024-10-08 RX ADMIN — ONDANSETRON 4 MG: 2 INJECTION INTRAMUSCULAR; INTRAVENOUS at 05:35

## 2024-10-08 RX ADMIN — ALUMINUM HYDROXIDE, MAGNESIUM HYDROXIDE, AND DIMETHICONE 30 ML: 200; 20; 200 SUSPENSION ORAL at 11:14

## 2024-10-08 RX ADMIN — INSULIN HUMAN 5 UNITS/HR: 1 INJECTION, SOLUTION INTRAVENOUS at 05:47

## 2024-10-08 ASSESSMENT — ACTIVITIES OF DAILY LIVING (ADL)
ADLS_ACUITY_SCORE: 25
ADLS_ACUITY_SCORE: 38
ADLS_ACUITY_SCORE: 38
ADLS_ACUITY_SCORE: 23
ADLS_ACUITY_SCORE: 38
ADLS_ACUITY_SCORE: 38
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
## 2024-10-08 NOTE — ED TRIAGE NOTES
"Patient presents from home via EMS for concern of lower, generalized abdominal pain. Started yesterday afternoon. In the evening started vomiting. Her home BG read \"HIGH\", EMS got 491. States she cannot keep any of her meds down.      Triage Assessment (Adult)       Row Name 10/08/24 0443          Triage Assessment    Airway WDL WDL        Respiratory WDL    Respiratory WDL WDL        Skin Circulation/Temperature WDL    Skin Circulation/Temperature WDL WDL        Cardiac WDL    Cardiac WDL X;rhythm     Pulse Rate & Regularity tachycardic        Peripheral/Neurovascular WDL    Peripheral Neurovascular WDL WDL        Cognitive/Neuro/Behavioral WDL    Cognitive/Neuro/Behavioral WDL WDL                     "

## 2024-10-08 NOTE — PLAN OF CARE
S-(situation): Patient arrives to room 215 via cart from ED    B-(background): DKA insulin gtt    A-(assessment): A&Ox4. BP elevated (MD notified). PIV x1 with inulin and D51/2NS infusing. Denies nausea, having hiccups and belching. Able to transfer from bed to cart with SBA. Skin intact.     R-(recommendations): Orders reviewed with patient. Will monitor patient per MD orders.     Inpatient nursing criteria listed below were met:    Health care directives status obtained and documented: Yes  VTE ordered/documented: Yes  Skin issues/needs documented:NA  Isolation addressed and Signage used: NA  Fall Prevention: Care plan updated NA Education given and documented NA  Care Plan initiated and Co-Morbidities added: Yes  Education Assessment documented:Yes  Admission Education Documented: Yes  If present CAUTI/CLABI Education done: NA  New medication patient education completed and documented (Possible Side Effects of Common Medications handout): Yes  Allergies Reviewed: Yes  Admission Medication Reconciliation completed: Yes  Home medications if not able to send immediately home with family stored here: NA  Reminder note placed in discharge instructions regarding home meds: NA  Individualized care needs/preferences addressed and charted: Yes  Provider Notified that patient has arrived to the unit: Yes

## 2024-10-08 NOTE — ED NOTES
ED Nursing criteria listed below was addressed during verbal handoff:     Abnormal vitals: Yes  Abnormal results: Yes  Med Reconciliation completed: Yes  Meds given in ED: Yes  Any Overdue Meds: No  Core Measures: N/A  Isolation: No  Special needs: Yes  Skin assessment: Yes    Observation Patient  Education provided: N/A, inpatient    Patient to be admitted to ICU for BG monitoring/drip. Report given to Shirley LANDEROS. Requests patient be brought up after 0730.

## 2024-10-08 NOTE — MEDICATION SCRIBE - ADMISSION MEDICATION HISTORY
Medication Scribe Admission Medication History    Admission medication history is complete. The information provided in this note is only as accurate as the sources available at the time of the update.    Information Source(s): Patient and CareEverywhere/SureScripts via in-person    Pertinent Information: inpatient    Changes made to PTA medication list:  Added: None  Deleted: None  Changed: None    Allergies reviewed with patient and updates made in EHR: yes    Medication History Completed By: MARK HALE 10/8/2024 11:32 AM    PTA Med List   Medication Sig Last Dose    Continuous Glucose Sensor (FREESTYLE MAXI 3 SENSOR) MISC APPLY 1 SENSOR AND CHANGE EVERY 14 DAYS AS DIRECTED (Patient taking differently: 1 each every 14 days.) 10/7/2024 at L arm    DULoxetine (CYMBALTA) 20 MG capsule Take 1 capsule (20 mg) by mouth daily 10/6/2024 at 2000    insulin aspart (NOVOLOG FLEXPEN) 100 UNIT/ML pen INJECT 1 UNIT PER 10 GRAMS OF CARBS BEFORE DINNER, UP TO 15 UNITS PER MEAL. 10/7/2024 at 1900 11u    insulin glargine 100 UNIT/ML pen Inject 42 Units Subcutaneous every morning (Patient taking differently: Inject 42 Units subcutaneously every morning. Basaglar) 10/7/2024 at 0800    insulin pen needle (NOVOFINE) 32G X 6 MM miscellaneous Use once daily or as directed. 10/7/2024 at 1900    lisinopril (ZESTRIL) 2.5 MG tablet Take 1 tablet (2.5 mg) by mouth daily 10/7/2024 at 0800    LORazepam (ATIVAN) 1 MG tablet Take 1 tablet (1 mg) by mouth every 6 hours as needed for anxiety or nausea 10/7/2024 at am    metFORMIN (GLUCOPHAGE XR) 500 MG 24 hr tablet Take 2 tablets (1,000 mg) by mouth 2 times daily (with meals) 10/7/2024 at 0800    methylPREDNISolone (MEDROL DOSEPAK) 4 MG tablet therapy pack Follow Package Directions 10/7/2024 at 0800    metoprolol tartrate (LOPRESSOR) 25 MG tablet Take 1 tablet (25 mg) by mouth 2 times daily 10/7/2024 at 0800    Multiple Vitamins-Minerals (MULTI-VITAMIN GUMMIES) CHEW Take 1 chew tab by mouth  daily (with dinner) 10/7/2024 at 0800    naloxone (NARCAN) 4 MG/0.1ML nasal spray Spray 4 mg into one nostril alternating nostrils once as needed. on hand at never used    nitroGLYcerin (NITROSTAT) 0.4 MG sublingual tablet For chest pain place 1 tablet under the tongue every 5 minutes for 3 doses. If symptoms persist 5 minutes after 1st dose call 911. More than a month at on hand    NURTEC 75 MG ODT tablet Place 75 mg under the tongue daily as needed for migraine Past Week at unkn    ondansetron (ZOFRAN ODT) 4 MG ODT tab Take 1 tablet (4 mg) by mouth every 8 hours as needed for nausea 10/8/2024 at am    oxyCODONE (ROXICODONE) 5 MG tablet Take 1 tablet (5 mg) by mouth daily as needed for severe pain. 10/7/2024 at 2200    prochlorperazine (COMPAZINE) 10 MG tablet Take 1 tablet (10 mg) by mouth every 6 hours as needed for nausea or vomiting More than a month at unkn    rosuvastatin (CRESTOR) 20 MG tablet Take 1 tablet (20 mg) by mouth daily 10/7/2024 at 0800    tiZANidine (ZANAFLEX) 4 MG tablet TAKE ONE TABLET BY MOUTH THREE TIMES A DAY (Patient taking differently: Take 4 mg by mouth 3 times daily as needed for muscle spasms.) 10/7/2024 at 2200

## 2024-10-08 NOTE — CONSULTS
"SPIRITUAL HEALTH SERVICES Consult Note    Medical Surgical Intensive Care Unit at [facility].      REFERRAL SOURCE/REASON FOR VISIT - Saw patient per placement in the ICU.    SUMMARY - I visited Stacy, who was open to my introduction and to emotional and spiritual support.  She reported that she had gotten some relief from her nausea and vomiting, but not for her abdominal pain.  She added, \"It's been a rough couple of days.\"  When I asked if she would like a prayer, she replied, \"I think I need that.\"  I offered a prayer for her and gave her a blessing.  She was appreciative of the visit.         Plan - I will continue to follow patient and be available for any ongoing support needs until her discharge.     Venkata Velazquez, Ph.D, [facility]  Spiritual Health Services  [address] [place] [address]  [phone]  [email or web address]    Assessment -     Patient/Family Understanding of Illness and Goals of Care - Patient understands her illness and goals.    Strengths, Coping, and Resources - ernestina    Meaning, Beliefs, and Spirituality - Patient reported that she considers [employer], in [place], her parish.    "

## 2024-10-08 NOTE — H&P
[facility]    History and Physical - Hospitalist Service       Date of Admission:  10/8/2024    Assessment & Plan      Stacy Brown is a 49 year old female with type 2 diabetes treated with insulin, hypertension, CAD with CABG, chronic neck and back pain treated chronically with opiates, chronic iron deficiency, depression, and obesity who presented to the ER with 1 day history of persistent nausea and vomiting followed by onset of severe abdominal pain.  Initial evaluation in the ER was concerning for type 2 diabetes with hyperosmolar hyperglycemic state, ketoacidosis, lactic acidosis, SIRS, and severe hypertension.  Trigger for acute illness remains unclear, but she is at high risk for an adverse outcome including worsening metabolic acidosis from ketoacidosis and/or lactic acidosis, worsening hyperosmolar hyperglycemic state from diabetes, worsening SIRS, and complications of severe hypertension, so hospitalization is considered medically necessary.  Based on the presently available information, hospitalization for at least 2 midnights is anticipated.    Principal Problem:    Type 2 diabetes mellitus with hyperosmolar hyperglycemic state (HHS) (H)  Active Problems:    Coronary artery disease involving native coronary artery of native heart without angina pectoris    Nausea and vomiting, unspecified vomiting type    Abdominal pain, generalized    Ketoacidosis    Lactic acidosis    Hyponatremia    Elevated lipase    Dysuria    Severe hypertension    Increased anion gap metabolic acidosis    Hypomagnesemia    Class 1 obesity due to excess calories with serious comorbidity and body mass index (BMI) of 30.0 to 30.9 in adult    Chronic pain syndrome    Iron deficiency    S/P CABG (coronary artery bypass graft)    Major depressive disorder, recurrent episode, moderate (H)    History of non-ST elevation myocardial infarction (NSTEMI) March 2021    Chronic, continuous use of  opioids    Type 2 diabetes with hyperglycemic hyperosmolar state, ketoacidosis, and lactic acidosis  Increased anion gap metabolic acidosis  Known chronic type 2 diabetes treated with insulin with current A1c 8.8.  Presented to ER with severe hyperglycemia blood sugar 505, hyperosmolality, and increased anion gap metabolic acidosis including ketoacidosis and lactic acidosis all probably due to uncontrolled diabetes.  Combination of hyperglycemia with hyperosmolality, ketoacidosis, lactic acidosis may have all been exacerbated by recent outpatient treatment with corticosteroids.  These metabolic disturbances may have precipitated recurrent nausea and vomiting.  Hyperglycemia has improved after initiation of treatment with insulin infusion and both metabolic acidosis and ketoacidosis have resolved.  Lactic acid is improving.  As metabolic disturbances improved, she was switched from continuous IV insulin infusion to subcutaneous insulin about noon today.  -Ordered low-dose glargine insulin due to persistent GI symptoms and restricted diet  -Continue subcutaneous NovoLog correction scale but will not administer prandial NovoLog dosing until she can reliably tolerate oral intake  -Restrict diet to clear liquid diabetic diet until GI symptoms improve  -Ordered ongoing monitoring of electrolytes, blood gas, lactic acid, and serum osmolality until metabolic disturbances completely resolve  -If she develops worsening hyperglycemia, hyperosmolality, and/or metabolic acidosis, would have low threshold to resume insulin infusion until metabolic disturbances have completely subsided  -For now, continuing IV fluids but anticipate additional adjustments in IV fluids over the next 24 hours depending upon blood sugar readings and clinical course    Recurrent nausea and vomiting  Episodic severe abdominal pain  Mildly elevated lipase  History of transverse colitis March 2024  Acute illness started 10/7 with recurrent nausea and  vomiting worsened from her usual baseline followed by subsequent onset of severe abdominal pain lasting from evening 10/7 until presentation to the ER in early a.m. 10/8.  GI symptoms have been transiently relieved over the course of the day today with symptomatic treatments including antiemetics and IV narcotics, but GI symptoms continue to recur particularly after attempted oral intake.  Patient reporting severe abdominal pain started in the lower abdomen reminiscent to her of an episode of transverse colitis in March 2024 of unknown cause at that time.  However, lower abdominal pain has now resolved and she currently has upper abdominal tenderness.  Lipase was mildly elevated but trending toward improvement and may be elevated from recurrent nausea and vomiting.  Although possible, acute pancreatitis seems less likely.  Upper GI origin including peptic ulcer disease and/or Lubna-Mariscal tear are possible.  Recent treatment with corticosteroids along with ongoing psychosocial stressors increases her risk for peptic ulcer disease.  Recurrent vomiting increases risk for Lubna-Mariscal tear, and she had previous history of Lubna-Mariscal Mariscal tear documented endoscopically in May 2024.  So far she has not had hematemesis.  -Continue symptomatic treatment of nausea and vomiting with antiemetics and abdominal pain with IV narcotics  -Restrict diet to clear liquid diet today until GI symptoms improve or resolve  -Start IV PPI empirically  -Reconsider diagnostic EGD during hospitalization if she fails to improve or worsens  -Ordered diagnostic abdominal CT today    Severe hypertension  Known chronic hypertension normally treated with low-dose lisinopril and metoprolol, but she has not been able to tolerate oral medications reliably for over 24 hours due to recurrent vomiting.  She presented to the ER with severe hypertension initial blood pressure 178/145 and has continued to have moderately to severely elevated blood  pressures throughout hospitalization so far.  Blood pressure improved after 1 dose IV enalaprilat administered in a.m. on 10/8.  Severe hypertension could be exacerbated by severe pain.  However, severe hypertension also increases her risk for acute vascular complications including arterial dissection.  Previous abdomen CT in March 2024 did not demonstrate any signs of intra-abdominal aneurysm at that time.  -Ordered abdominal CT diagnostically  -Start IV metoprolol 5 mg every 6 hours  -Not restarting chronic doses of oral lisinopril or metoprolol until she can reliably tolerate oral intake including oral medication  -May repeat dose of IV enalaprilat as needed for severe hypertension despite doses of IV metoprolol    SIRS  Presented with tachycardia heart rate 120 and leukocytosis WBC 13.5 concerning for SIRS.  Tachycardia may be explained by inability to tolerate oral beta-blocker in the last day and stress of acute illness.  Leukocytosis may be attributable to recent treatment with corticosteroids.  Procalcitonin was low.  So far, infection is not identified or strongly suspected.  Lactic acidosis was more likely attributable to diabetes than infection or sepsis.  -Treat acute medical problems and monitor clinically for signs of infection  -Ordered recheck WBC    Dysuria  Patient currently has suprapubic tenderness on exam and is reporting mild dysuria.  UTI is possible, but  symptoms associated with metabolic disturbances including severe hyperglycemia and hyperosmolality are also possible.  -Ordered UA and possibly UC if UA results are suspicious for infection  -Not starting antibiotics empirically at this time but would have low threshold to start antibiotic for empiric treatment of UTI depending upon clinical course and UA results    Hypomagnesemia  Serum magnesium 1.6 at presentation concerning for mild hypomagnesemia.  She does not appear to be symptomatic from hypomagnesemia.  Recent poor oral intake may  contribute.  -Ordered recheck serum magnesium  -Anticipate starting magnesium supplementation for magnesium less than 1.6 unless she becomes symptomatic from hypomagnesemia    Pseudo-hyponatremia  Presented with serum sodium 132 concerning for mild hyponatremia.  However, serum glucose was 505, so pseudohyponatremia seems more likely.  Sodium rapidly normalized as hyperglycemia was treated.  -Ordered recheck electrolytes including sodium    Chronic neck and back pain  Chronic use of opiates  Patient has known chronic neck and back pain for which she is actively undergoing evaluation for disability through her PCP.  Her chronic pain syndrome is managed with medications including chronic use of opiates typically 1 dose of oxycodone daily.  She also uses muscle relaxants as needed.  -Anticipate restarting oral opiates and muscle relaxants according to her chronic dosing regimen once she is able to reliably tolerate oral intake    CAD status post CABG  History of STEMI  She has known CAD with history of non-STEMI in March 2021 and has had previous CABG.  Currently, CAD appears clinically stable even in context of severe hypertension.  Chronic medication treatments for secondary prevention include beta-blocker and statin.  She is not chronically taking antiplatelet therapy.  -Anticipate resuming chronic doses of oral beta-blocker and statin once she can reliably tolerate oral medication  -Not anticipating additional evaluation for acute ischemic heart disease during this hospitalization unless her clinical status changes    Depression  She carries previous diagnosis of depression treated chronically with duloxetine.  -Anticipate restarting her chronic oral dose of duloxetine once she can reliably tolerate oral medication    Iron deficiency  She carries previous diagnosis of iron deficiency.  Although she presents with microcytic RBC indices, she was not anemic at presentation although was probably  hemoconcentrated.  -Ordered recheck hemoglobin   -anticipate outpatient follow-up with PCP regarding chronic iron deficiency    Obesity  Appears obese with BMI 31.  Obesity likely contributes to health problems including diabetes.  -No acute intervention during hospitalization            Diet: Consistent Carbohydrate Diet Moderate Consistent Carb (60 g CHO per Meal) Diet    DVT Prophylaxis: Pneumatic Compression Devices  Schultz Catheter: Not present  Lines: None     Cardiac Monitoring: ACTIVE order. Indication: Tachyarrhythmias, acute (48 hours)  Code Status: Full Code      Clinically Significant Risk Factors Present on Admission         # Hyponatremia: Lowest Na = 132 mmol/L in last 2 days, will monitor as appropriate    # Hypomagnesemia: Lowest Mg = 1.6 mg/dL in last 2 days, will replace as needed  # Anion Gap Metabolic Acidosis: Highest Anion Gap = 25 mmol/L in last 2 days, will monitor and treat as appropriate      # Hypertension: Noted on problem list        # DMII: A1C = 8.8 % (Ref range: <5.7 %) within past 6 months        # Financial/Environmental Concerns:     # History of CABG: noted on surgical history       Disposition Plan     Medically Ready for Discharge: Anticipated in 2-4 Days           Ayden Hyde MD  Hospitalist Service  [facility]  Securely message with [employer] (more info)  Text page via [facility] Paging/Directory     ______________________________________________________________________    Chief Complaint   Abdominal pain    History is obtained from the patient, electronic health record, and patient's spouse    History of Present Illness   Stacy Brown is a 49 year old female who says she was in her usual health until early yesterday when she became nauseated and started to vomit.  Although she has intermittent nausea and vomiting on a chronic basis, her nausea and vomiting yesterday were more frequent, severe and persistent throughout the day.  She did not see  bile or blood in her vomitus.  She was not able to take oral medications yesterday because of of nausea and vomiting and was only able to tolerate occasional sips of liquids.  Most attempts at oral intake caused recurrent vomiting.  After being nauseated and having repeated vomiting through the day yesterday, she noted new onset severe abdominal pain last evening.  Abdominal pain was located below the bellybutton in the middle of the abdomen.  Location and type of the abdominal pain was similar to the abdominal pain she remembers having when she was diagnosed with transverse colitis in March 2024.  She tried using warm packs and cold packs without any benefit.  Movement did not seem to aggravate the abdominal pain.  She tried taking a dose of oxycodone but immediately vomited.  She had a normal bowel movement last evening but had no improvement or worsening of abdominal pain after that bowel movement.  There was no blood in her stool.  Her abdominal pain continued through the night last night and she was not able to sleep.  Ultimately, she presented to the ER early this morning about 4 AM because of the severe abdominal pain.  Initial treatment in the ER included doses of IV Dilaudid which alleviated the abdominal pain at least temporarily.  As her other medical problems improved with treatment in the ER and initial treatment in the hospital, she attempted oral intake which immediately caused worsening abdominal pain just before noon today.  Again, the abdominal pain was alleviated by a dose of IV Dilaudid and she presently reports minimal if any abdominal pain.      Past Medical History    Past Medical History:   Diagnosis Date    CAD (coronary artery disease)     Closed fracture of right ankle 03/22/2023    Knee pain, chronic     Mixed hyperlipidemia     NSTEMI (non-ST elevated myocardial infarction) (H) 03/05/2021    S/P CABG (coronary artery bypass graft) 03/16/2021    Tobacco abuse disorder 11/21/2017    Type  II or unspecified type diabetes mellitus without mention of complication, not stated as uncontrolled 08/16/2002    diagnosed 8/16/02, started insulin 2006     Patient was hospitalized in March 2024 for abdominal pain with CT findings suspicious for transverse colitis.  Follow-up colonoscopy in May 2024 was normal aside from 2 colon polyps that were removed and were found to be tubular adenomas without high-grade dysplasia or malignancy.  At that time she also had hematemesis.  Follow-up EGD in May 2024 demonstrated healing Lubna-Mariscal tear and signs of grade A reflux esophagitis but no active bleeding.  Indefinite use of PPI was recommended.  H. pylori testing was negative.    Past Surgical History   Past Surgical History:   Procedure Laterality Date    BYPASS GRAFT ARTERY CORONARY N/A 3/9/2021    Procedure: CORONARY ARTERY BYPASS GRAFT X 4 (LIMA - LAD, SV - RPL, SV - PDA,  RA - OM) LEFT RADIAL ENDOARTERY HARVEST AND BILATERAL LEG ENDOVEIN HARVEST (ON CARDIOPULMONARY PUMP OXYGENATOR ; INTRAOPERATIVE TRANSESOPHAGEAL ECHOCARDIOGRAM BY ANESTHESIOLOGIST DR. NEL AVILA)   ;  Surgeon: Kunal Selby MD;  Location:  OR    COLONOSCOPY N/A 5/15/2024    Procedure: COLONOSCOPY, WITH POLYPECTOMY;  Surgeon: Humble Kuhn MD;  Location:  GI    CV HEART CATHETERIZATION WITH POSSIBLE INTERVENTION N/A 3/8/2021    Procedure: Heart Catheterization with Possible Intervention;  Surgeon: Vadim Kamara MD;  Location:  HEART CARDIAC CATH LAB    ESOPHAGOSCOPY, GASTROSCOPY, DUODENOSCOPY (EGD), COMBINED N/A 5/15/2024    Procedure: ESOPHAGOGASTRODUODENOSCOPY, WITH BIOPSY;  Surgeon: Humble Kuhn MD;  Location:  GI    HC OPEN TX METATARSAL FRACTURE  age 12    softball injury,open fracture left foot    HC TOOTH EXTRACTION W/FORCEP      Extract wisdom teeth    INJECT JOINT SACROILIAC Left 1/11/2018    Procedure: INJECT JOINT SACROILIAC;  INJECT JOINT SACROILIAC LEFT;  Surgeon: Alan Marshall MD;  Location:   OR    LAPAROSCOPIC CHOLECYSTECTOMY N/A 2/13/2023    Procedure: CHOLECYSTECTOMY, LAPAROSCOPIC;  Surgeon: Robert Diop, DO;  Location: [facility] OR    OPERATIVE HYSTEROSCOPY WITH MORCELLATOR N/A 7/24/2018    Procedure: OPERATIVE HYSTEROSCOPY WITH MORCELLATOR (MYOSURE);  Exam under anesthesia, operative hysteroscopy, polypectomy, D & C;  Surgeon: Sindhu Peterson, DO;  Location: [facility] OR    TUBAL LIGATION  7/27/2006    ZZC STABISM SURG,PREV EYE SURG,NOT MUSC      Right       Prior to Admission Medications   Prior to Admission Medications   Prescriptions Last Dose Informant Patient Reported? Taking?   Ascorbic Acid (VITAMIN C) 100 MG CHEW 10/6/2024 at 0800 Self Yes No   Sig: Take 1 chew tab by mouth daily   Continuous Glucose Sensor (FREESTYLE MAXI 3 SENSOR) [facility] [place]2024 at L arm  No Yes   Sig: APPLY 1 SENSOR AND CHANGE EVERY 14 DAYS AS DIRECTED   Patient taking differently: 1 each every 14 days.   DULoxetine (CYMBALTA) 20 MG capsule 10/6/2024 at 2000  No Yes   Sig: Take 1 capsule (20 mg) by mouth daily   LORazepam (ATIVAN) 1 MG tablet 10/7/2024 at am  No Yes   Sig: Take 1 tablet (1 mg) by mouth every 6 hours as needed for anxiety or nausea   Multiple Vitamins-Minerals (MULTI-VITAMIN GUMMIES) CHEW 10/7/2024 at 0800 Self Yes Yes   Sig: Take 1 chew tab by mouth daily (with dinner)   NURTEC 75 MG ODT tablet Past Week at unkn  Yes Yes   Sig: Place 75 mg under the tongue daily as needed for migraine   insulin aspart (NOVOLOG FLEXPEN) 100 UNIT/ML pen 10/7/2024 at 1900 11u  No Yes   Sig: INJECT 1 UNIT PER 10 GRAMS OF CARBS BEFORE DINNER, UP TO 15 UNITS PER MEAL.   insulin glargine 100 UNIT/ML pen 10/7/2024 at 0800 Self No Yes   Sig: Inject 42 Units Subcutaneous every morning   Patient taking differently: Inject 42 Units subcutaneously every morning. Basaglar   insulin pen needle (NOVOFINE) 32G X 6 MM miscellaneous 10/7/2024 at 1900 Self No Yes   Sig: Use once daily or as directed.   lisinopril (ZESTRIL) 2.5 MG tablet  10/7/2024 at 0800  No Yes   Sig: Take 1 tablet (2.5 mg) by mouth daily   metFORMIN (GLUCOPHAGE XR) 500 MG 24 hr tablet 10/7/2024 at 0800 Self No Yes   Sig: Take 2 tablets (1,000 mg) by mouth 2 times daily (with meals)   methylPREDNISolone (MEDROL DOSEPAK) 4 MG tablet therapy pack 10/7/2024 at 0800  No Yes   Sig: Follow Package Directions   metoprolol tartrate (LOPRESSOR) 25 MG tablet 10/7/2024 at 0800 Self No Yes   Sig: Take 1 tablet (25 mg) by mouth 2 times daily   naloxone (NARCAN) 4 MG/0.1ML nasal spray on hand at never used  Yes Yes   Sig: Spray 4 mg into one nostril alternating nostrils once as needed.   nitroGLYcerin (NITROSTAT) 0.4 MG sublingual tablet More than a month at on hand Self No Yes   Sig: For chest pain place 1 tablet under the tongue every 5 minutes for 3 doses. If symptoms persist 5 minutes after 1st dose call 911.   ondansetron (ZOFRAN ODT) 4 MG ODT tab 10/8/2024 at am  No Yes   Sig: Take 1 tablet (4 mg) by mouth every 8 hours as needed for nausea   oxyCODONE (ROXICODONE) 5 MG tablet 10/7/2024 at 2200  No Yes   Sig: Take 1 tablet (5 mg) by mouth daily as needed for severe pain.   prochlorperazine (COMPAZINE) 10 MG tablet More than a month at unkn  No Yes   Sig: Take 1 tablet (10 mg) by mouth every 6 hours as needed for nausea or vomiting   rosuvastatin (CRESTOR) 20 MG tablet 10/7/2024 at 0800 Self No Yes   Sig: Take 1 tablet (20 mg) by mouth daily   tiZANidine (ZANAFLEX) 4 MG tablet 10/7/2024 at 2200  No Yes   Sig: TAKE ONE TABLET BY MOUTH THREE TIMES A DAY   Patient taking differently: Take 4 mg by mouth 3 times daily as needed for muscle spasms.      Facility-Administered Medications: None        Review of Systems    The 10 point Review of Systems is negative other than noted in the HPI or here.     Social History   I have reviewed this patient's social history and updated it with pertinent information if needed.  Social History     Tobacco Use    Smoking status: Former     Current packs/day:  0.00     Average packs/day: 0.5 packs/day for 6.0 years (3.0 ttl pk-yrs)     Types: Cigarettes     Start date: 3/5/2015     Quit date: 3/5/2021     Years since quitting: 3.5    Smokeless tobacco: Never   Vaping Use    Vaping status: Never Used   Substance Use Topics    Alcohol use: Yes     Comment: rarely    Drug use: No     Patient and her spouse reports significant psychosocial stressors, particularly financial.  They report that her spouse is currently on disability and that the patient herself is in the process for applying for disability through her PCP.    Family History   I have reviewed this patient's family history and updated it with pertinent information if needed.  Family History   Problem Relation Age of Onset    Allergies Mother     Lipids Father         cholesterol    Diabetes Maternal Grandmother     Hypertension Maternal Grandmother     Heart Disease Maternal Grandmother         Bypass    Cancer Maternal Grandfather         Lung - metastatic    Alzheimer Disease Paternal Grandmother     Heart Disease Paternal Grandmother         valve replacement    Cerebrovascular Disease Paternal Grandfather     Anesthesia Reaction No family hx of     Colon Cancer No family hx of          Allergies   Allergies   Allergen Reactions    Amoxicillin Hives    Hydrocodone Nausea and Vomiting        Physical Exam   Vital Signs: Temp: 98.7  F (37.1  C) Temp src: Oral BP: (!) 189/115 Pulse: 91   Resp: 12 SpO2: 95 % O2 Device: None (Room air)    Patient Vitals for the past 24 hrs:   BP Temp Temp src Pulse Resp SpO2 Weight   10/08/24 0915 -- -- -- 91 12 95 % --   10/08/24 0900 (!) 189/115 -- -- 101 13 96 % --   10/08/24 0845 -- -- -- 95 (!) 8 95 % --   10/08/24 0830 (!) 186/95 -- -- 101 17 97 % --   10/08/24 0815 (!) 176/103 -- -- 110 13 97 % --   10/08/24 0800 (!) 180/96 98.7  F (37.1  C) Oral 113 -- 98 % --   10/08/24 0745 -- -- -- -- -- 98 % --   10/08/24 0730 (!) 170/96 -- -- 103 -- 96 % --   10/08/24 0715 -- -- -- -- --  96 % --   10/08/24 0700 (!) 184/98 -- -- 95 18 97 % --   10/08/24 0630 (!) 186/92 -- -- 95 18 96 % --   10/08/24 0609 -- -- -- -- -- 97 % --   10/08/24 0559 (!) 181/100 -- -- 100 -- 98 % --   10/08/24 0530 (!) 182/104 -- -- 106 20 97 % --   10/08/24 0529 (!) 191/95 -- -- 105 -- 99 % --   10/08/24 0441 (!) 178/145 98.5  F (36.9  C) Oral (!) 121 22 98 % 87.1 kg (192 lb)     Weight: 192 lbs 0 oz Body mass index is 30.99 kg/m .  Wt Readings from Last 4 Encounters:   10/08/24 87.1 kg (192 lb)   08/03/24 87.1 kg (192 lb)   08/02/24 87.1 kg (192 lb)   07/15/24 90.3 kg (199 lb)       Constitutional: Ill-appearing obese middle-aged woman though without signs of acute distress resting in bed  Eyes: Anicteric sclerae, clear conjunctivae  ENT: Clear ear canals and nares, normal oropharynx  Neck: Trachea midline, no stridor, symmetric  Hematologic / Lymphatic: no cervical lymphadenopathy and no supraclavicular lymphadenopathy  Respiratory: Normal respiratory effort, diminished breath sounds throughout, clear lungs  Cardiovascular: Regular rate and rhythm, symmetric radial pulses, brisk capillary refill, no peripheral edema, no loud gallop or murmur  GI: Hypoactive bowel sounds, obese abdomen, soft, mild to moderate tenderness in the right upper quadrant and epigastrium without peritoneal signs, mild suprapubic tenderness  Skin: Some flushing of the cheeks, no other rashes  Musculoskeletal: No gross limb anomalies  Neurologic: Alert and maintains wakefulness and attention, answers questions appropriately, no overt focal neurologic deficits  Neuropsychiatric: Mood and affect appear generally appropriate for the circumstances    Medical Decision Making       102 MINUTES SPENT BY ME on the date of service doing chart review, history, exam, documentation & further activities per the note.      Data     I have personally reviewed the following data over the past 24 hrs:    11.8 (H)  \   14.6   / 395     139 102 22.5 (H) /  189 (H)    3.8 24 0.91 \     ALT: 21 AST: 10 AP: 101 TBILI: 0.2   ALB: 3.9 TOT PROTEIN: 6.8 LIPASE: 62 (H)     TSH: N/A T4: N/A A1C: 8.8 (H)     Procal: 0.13 CRP: 4.07 Lactic Acid: 1.7         Initial serum osmolality 317 with 295 the upper limit of normal  Initial lactic acid 2.9 increasing as high as 3.2  Initial serum ketones elevated at 3.3 but subsequently normal  Blood sugars 150-189 after starting insulin infusion this morning    Venous Blood Gas  Recent Labs   Lab 10/08/24  1304 10/08/24  0457   PHV 7.42 7.49*   PCO2V 42 31*   PO2V 41 34   HCO3V 27 23   RUFINA 1.7 0.7   O2PER 21 21       Recent Labs   Lab 10/08/24  1304 10/08/24  1153 10/08/24  1107 10/08/24  1030 10/08/24  0544 10/08/24  0457   WBC 11.8*  --   --   --   --  13.5*   HGB  --   --   --   --   --  14.6   MCV  --   --   --   --   --  77*   PLT  --   --   --   --   --  395   NA  --   --  139  --   --  132*   POTASSIUM  --   --  3.8  --   --  4.3   CHLORIDE  --   --  102  --   --  89*   CO2  --   --  24  --   --  18*   BUN  --   --  22.5*  --   --  29.8*   CR  --   --  0.91  --   --  1.06*   ANIONGAP  --   --  13  --   --  25*   LUNA  --   --  9.1  --   --  10.5*   GLC  --  189* 189* 177*   < > 505*   ALBUMIN  --   --  3.9  --   --  4.6   PROTTOTAL  --   --  6.8  --   --  8.2   BILITOTAL  --   --  0.2  --   --  0.4   ALKPHOS  --   --  101  --   --  127   ALT  --   --  21  --   --  25   AST  --   --  10  --   --  14   LIPASE  --   --  62*  --   --  70*    < > = values in this interval not displayed.

## 2024-10-08 NOTE — PLAN OF CARE
Goal Outcome Evaluation:      Plan of Care Reviewed With: patient    Overall Patient Progress: improving    Outcome Evaluation: BP elevated, Scheduled IV metoprolol and PRN vasotac ordered. Pain in lower abdomen managed with Maalox and Dilaudid IV. Insulin gtt off and IVF changed to NS with 20 KCL at rate of 50 mL/hr. Diet initially advanced to Carb controlled diet/regular food and following the first few bites patient developed severe abdominal pain and increase nausea. Now seems to be tolerating clear liquid diet. Nausea improved. Up to bedside commode to void, urine sample sent. CT abdomen and pelvis pending. Following contrast dye, patient noticed a new rash around PIV site that is very itchy, PRN benedryl and hydrocortisone cream given. Strength improving and able to ambulate with SBA.

## 2024-10-09 LAB
ANION GAP SERPL CALCULATED.3IONS-SCNC: 15 MMOL/L (ref 7–15)
BACTERIA UR CULT: NORMAL
BASOPHILS # BLD AUTO: 0.1 10E3/UL (ref 0–0.2)
BASOPHILS NFR BLD AUTO: 1 %
BUN SERPL-MCNC: 16.1 MG/DL (ref 6–20)
CALCIUM SERPL-MCNC: 9.4 MG/DL (ref 8.8–10.4)
CHLORIDE SERPL-SCNC: 100 MMOL/L (ref 98–107)
CREAT SERPL-MCNC: 0.9 MG/DL (ref 0.51–0.95)
EGFRCR SERPLBLD CKD-EPI 2021: 78 ML/MIN/1.73M2
EOSINOPHIL # BLD AUTO: 0.1 10E3/UL (ref 0–0.7)
EOSINOPHIL NFR BLD AUTO: 2 %
ERYTHROCYTE [DISTWIDTH] IN BLOOD BY AUTOMATED COUNT: 13.5 % (ref 10–15)
GLUCOSE BLDC GLUCOMTR-MCNC: 237 MG/DL (ref 70–99)
GLUCOSE BLDC GLUCOMTR-MCNC: 254 MG/DL (ref 70–99)
GLUCOSE BLDC GLUCOMTR-MCNC: 266 MG/DL (ref 70–99)
GLUCOSE BLDC GLUCOMTR-MCNC: 277 MG/DL (ref 70–99)
GLUCOSE BLDC GLUCOMTR-MCNC: 296 MG/DL (ref 70–99)
GLUCOSE SERPL-MCNC: 274 MG/DL (ref 70–99)
HCO3 SERPL-SCNC: 22 MMOL/L (ref 22–29)
HCT VFR BLD AUTO: 40.5 % (ref 35–47)
HGB BLD-MCNC: 13.9 G/DL (ref 11.7–15.7)
IMM GRANULOCYTES # BLD: 0 10E3/UL
IMM GRANULOCYTES NFR BLD: 0 %
LYMPHOCYTES # BLD AUTO: 2.8 10E3/UL (ref 0.8–5.3)
LYMPHOCYTES NFR BLD AUTO: 30 %
MAGNESIUM SERPL-MCNC: 1.8 MG/DL (ref 1.7–2.3)
MCH RBC QN AUTO: 28 PG (ref 26.5–33)
MCHC RBC AUTO-ENTMCNC: 34.3 G/DL (ref 31.5–36.5)
MCV RBC AUTO: 82 FL (ref 78–100)
MONOCYTES # BLD AUTO: 0.6 10E3/UL (ref 0–1.3)
MONOCYTES NFR BLD AUTO: 7 %
NEUTROPHILS # BLD AUTO: 5.7 10E3/UL (ref 1.6–8.3)
NEUTROPHILS NFR BLD AUTO: 61 %
NRBC # BLD AUTO: 0 10E3/UL
NRBC BLD AUTO-RTO: 0 /100
OSMOLALITY SERPL: 303 MMOL/KG (ref 275–295)
PLATELET # BLD AUTO: 338 10E3/UL (ref 150–450)
POTASSIUM SERPL-SCNC: 4.2 MMOL/L (ref 3.4–5.3)
RBC # BLD AUTO: 4.97 10E6/UL (ref 3.8–5.2)
SODIUM SERPL-SCNC: 137 MMOL/L (ref 135–145)
WBC # BLD AUTO: 9.4 10E3/UL (ref 4–11)

## 2024-10-09 PROCEDURE — 85025 COMPLETE CBC W/AUTO DIFF WBC: CPT | Performed by: HOSPITALIST

## 2024-10-09 PROCEDURE — 80048 BASIC METABOLIC PNL TOTAL CA: CPT | Performed by: HOSPITALIST

## 2024-10-09 PROCEDURE — 250N000011 HC RX IP 250 OP 636: Performed by: STUDENT IN AN ORGANIZED HEALTH CARE EDUCATION/TRAINING PROGRAM

## 2024-10-09 PROCEDURE — 250N000012 HC RX MED GY IP 250 OP 636 PS 637: Performed by: PEDIATRICS

## 2024-10-09 PROCEDURE — 99233 SBSQ HOSP IP/OBS HIGH 50: CPT | Performed by: STUDENT IN AN ORGANIZED HEALTH CARE EDUCATION/TRAINING PROGRAM

## 2024-10-09 PROCEDURE — 83930 ASSAY OF BLOOD OSMOLALITY: CPT | Performed by: PEDIATRICS

## 2024-10-09 PROCEDURE — 36415 COLL VENOUS BLD VENIPUNCTURE: CPT | Performed by: HOSPITALIST

## 2024-10-09 PROCEDURE — 83735 ASSAY OF MAGNESIUM: CPT | Performed by: HOSPITALIST

## 2024-10-09 PROCEDURE — 250N000013 HC RX MED GY IP 250 OP 250 PS 637: Performed by: STUDENT IN AN ORGANIZED HEALTH CARE EDUCATION/TRAINING PROGRAM

## 2024-10-09 PROCEDURE — 250N000009 HC RX 250: Performed by: PEDIATRICS

## 2024-10-09 PROCEDURE — 250N000011 HC RX IP 250 OP 636: Performed by: PEDIATRICS

## 2024-10-09 PROCEDURE — 250N000013 HC RX MED GY IP 250 OP 250 PS 637: Performed by: PEDIATRICS

## 2024-10-09 PROCEDURE — 120N000001 HC R&B MED SURG/OB

## 2024-10-09 RX ORDER — OXYCODONE HYDROCHLORIDE 5 MG/1
5 TABLET ORAL EVERY 8 HOURS PRN
Status: DISCONTINUED | OUTPATIENT
Start: 2024-10-09 | End: 2024-10-10

## 2024-10-09 RX ORDER — HYDROMORPHONE HYDROCHLORIDE 1 MG/ML
0.3 INJECTION, SOLUTION INTRAMUSCULAR; INTRAVENOUS; SUBCUTANEOUS
Status: DISCONTINUED | OUTPATIENT
Start: 2024-10-09 | End: 2024-10-09

## 2024-10-09 RX ORDER — LISINOPRIL 2.5 MG/1
2.5 TABLET ORAL DAILY
Status: DISCONTINUED | OUTPATIENT
Start: 2024-10-09 | End: 2024-10-11 | Stop reason: HOSPADM

## 2024-10-09 RX ORDER — DULOXETIN HYDROCHLORIDE 20 MG/1
20 CAPSULE, DELAYED RELEASE ORAL DAILY
Status: DISCONTINUED | OUTPATIENT
Start: 2024-10-09 | End: 2024-10-11 | Stop reason: HOSPADM

## 2024-10-09 RX ORDER — SENNOSIDES 8.6 MG
8.6 TABLET ORAL 2 TIMES DAILY
Status: DISCONTINUED | OUTPATIENT
Start: 2024-10-09 | End: 2024-10-11 | Stop reason: HOSPADM

## 2024-10-09 RX ORDER — METOPROLOL TARTRATE 25 MG/1
25 TABLET, FILM COATED ORAL 2 TIMES DAILY
Status: DISCONTINUED | OUTPATIENT
Start: 2024-10-09 | End: 2024-10-11 | Stop reason: HOSPADM

## 2024-10-09 RX ORDER — ACETAMINOPHEN 325 MG/1
650 TABLET ORAL EVERY 6 HOURS PRN
Status: DISCONTINUED | OUTPATIENT
Start: 2024-10-09 | End: 2024-10-10

## 2024-10-09 RX ORDER — ROSUVASTATIN CALCIUM 20 MG/1
20 TABLET, COATED ORAL DAILY
Status: DISCONTINUED | OUTPATIENT
Start: 2024-10-09 | End: 2024-10-11 | Stop reason: HOSPADM

## 2024-10-09 RX ORDER — HYDROMORPHONE HYDROCHLORIDE 1 MG/ML
0.3 INJECTION, SOLUTION INTRAMUSCULAR; INTRAVENOUS; SUBCUTANEOUS EVERY 4 HOURS PRN
Status: DISCONTINUED | OUTPATIENT
Start: 2024-10-09 | End: 2024-10-10

## 2024-10-09 RX ADMIN — HYDROMORPHONE HYDROCHLORIDE 0.5 MG: 1 INJECTION, SOLUTION INTRAMUSCULAR; INTRAVENOUS; SUBCUTANEOUS at 08:07

## 2024-10-09 RX ADMIN — PANTOPRAZOLE SODIUM 40 MG: 40 INJECTION, POWDER, FOR SOLUTION INTRAVENOUS at 08:35

## 2024-10-09 RX ADMIN — ONDANSETRON 4 MG: 2 INJECTION INTRAMUSCULAR; INTRAVENOUS at 19:13

## 2024-10-09 RX ADMIN — METOPROLOL TARTRATE 25 MG: 25 TABLET, FILM COATED ORAL at 20:20

## 2024-10-09 RX ADMIN — HYDROMORPHONE HYDROCHLORIDE 0.3 MG: 1 INJECTION, SOLUTION INTRAMUSCULAR; INTRAVENOUS; SUBCUTANEOUS at 15:24

## 2024-10-09 RX ADMIN — ACETAMINOPHEN 975 MG: 325 TABLET ORAL at 05:39

## 2024-10-09 RX ADMIN — HYDROMORPHONE HYDROCHLORIDE 0.3 MG: 1 INJECTION, SOLUTION INTRAMUSCULAR; INTRAVENOUS; SUBCUTANEOUS at 11:05

## 2024-10-09 RX ADMIN — TIZANIDINE 4 MG: 2 TABLET ORAL at 19:08

## 2024-10-09 RX ADMIN — OXYCODONE HYDROCHLORIDE 5 MG: 5 TABLET ORAL at 12:49

## 2024-10-09 RX ADMIN — INSULIN ASPART 3 UNITS: 100 INJECTION, SOLUTION INTRAVENOUS; SUBCUTANEOUS at 08:36

## 2024-10-09 RX ADMIN — METOPROLOL TARTRATE 5 MG: 1 INJECTION, SOLUTION INTRAVENOUS at 08:35

## 2024-10-09 RX ADMIN — DULOXETINE HYDROCHLORIDE 20 MG: 20 CAPSULE, DELAYED RELEASE ORAL at 10:59

## 2024-10-09 RX ADMIN — ROSUVASTATIN CALCIUM 20 MG: 20 TABLET, FILM COATED ORAL at 10:59

## 2024-10-09 RX ADMIN — SENNOSIDES 8.6 MG: 8.6 TABLET, FILM COATED ORAL at 20:20

## 2024-10-09 RX ADMIN — INSULIN ASPART 3 UNITS: 100 INJECTION, SOLUTION INTRAVENOUS; SUBCUTANEOUS at 17:08

## 2024-10-09 RX ADMIN — METOPROLOL TARTRATE 5 MG: 1 INJECTION, SOLUTION INTRAVENOUS at 02:39

## 2024-10-09 RX ADMIN — INSULIN GLARGINE 40 UNITS: 100 INJECTION, SOLUTION SUBCUTANEOUS at 08:35

## 2024-10-09 RX ADMIN — PANTOPRAZOLE SODIUM 40 MG: 40 INJECTION, POWDER, FOR SOLUTION INTRAVENOUS at 20:20

## 2024-10-09 RX ADMIN — HYDROMORPHONE HYDROCHLORIDE 0.5 MG: 1 INJECTION, SOLUTION INTRAMUSCULAR; INTRAVENOUS; SUBCUTANEOUS at 05:39

## 2024-10-09 RX ADMIN — TIZANIDINE 4 MG: 2 TABLET ORAL at 10:59

## 2024-10-09 RX ADMIN — INSULIN ASPART 4 UNITS: 100 INJECTION, SOLUTION INTRAVENOUS; SUBCUTANEOUS at 12:19

## 2024-10-09 RX ADMIN — ONDANSETRON 4 MG: 2 INJECTION INTRAMUSCULAR; INTRAVENOUS at 08:07

## 2024-10-09 RX ADMIN — HYDROMORPHONE HYDROCHLORIDE 0.3 MG: 1 INJECTION, SOLUTION INTRAMUSCULAR; INTRAVENOUS; SUBCUTANEOUS at 22:20

## 2024-10-09 RX ADMIN — LISINOPRIL 2.5 MG: 2.5 TABLET ORAL at 10:59

## 2024-10-09 RX ADMIN — OXYCODONE HYDROCHLORIDE 5 MG: 5 TABLET ORAL at 21:10

## 2024-10-09 ASSESSMENT — ACTIVITIES OF DAILY LIVING (ADL)
ADLS_ACUITY_SCORE: 25
ADLS_ACUITY_SCORE: 23
ADLS_ACUITY_SCORE: 25
ADLS_ACUITY_SCORE: 23
ADLS_ACUITY_SCORE: 23
ADLS_ACUITY_SCORE: 25
ADLS_ACUITY_SCORE: 25
ADLS_ACUITY_SCORE: 23
ADLS_ACUITY_SCORE: 25
ADLS_ACUITY_SCORE: 23
ADLS_ACUITY_SCORE: 25
ADLS_ACUITY_SCORE: 23
ADLS_ACUITY_SCORE: 25
ADLS_ACUITY_SCORE: 25

## 2024-10-09 NOTE — PLAN OF CARE
Problem: Adult Inpatient Plan of Care  Goal: Plan of Care Review  Description: The Plan of Care Review/Shift note should be completed every shift.  The Outcome Evaluation is a brief statement about your assessment that the patient is improving, declining, or no change.  This information will be displayed automatically on your shift  note.  10/9/2024 0525 by Alfonzo Aguirre RN  Outcome: Progressing  10/9/2024 0518 by Alfonzo Aguirre RN  Outcome: Progressing  10/9/2024 0517 by Alfonzo Aguirre RN  Outcome: Progressing  Goal: Absence of Hospital-Acquired Illness or Injury  10/9/2024 0525 by Alfonzo Aguirre RN  Outcome: Progressing  10/9/2024 0518 by Alfonzo Aguirre RN  Outcome: Progressing  10/9/2024 0517 by Alfonzo Aguirre RN  Outcome: Progressing  Intervention: Identify and Manage Fall Risk  Recent Flowsheet Documentation  Taken 10/9/2024 0300 by Alfonzo Aguirre RN  Safety Promotion/Fall Prevention:   activity supervised   clutter free environment maintained   nonskid shoes/slippers when out of bed   room near nurse's station   safety round/check completed   supervised activity  Taken 10/8/2024 2000 by Alfonzo Aguirre RN  Safety Promotion/Fall Prevention:   activity supervised   clutter free environment maintained   nonskid shoes/slippers when out of bed   room near nurse's station   safety round/check completed   supervised activity  Intervention: Prevent Skin Injury  Recent Flowsheet Documentation  Taken 10/9/2024 0300 by Alfonzo Aguirre RN  Body Position: position changed independently  Taken 10/8/2024 2000 by Alfonzo Aguirre RN  Body Position: position changed independently  Goal: Optimal Comfort and Wellbeing  10/9/2024 0525 by Alfonzo Aguirre RN  Outcome: Progressing  10/9/2024 0518 by Alfonzo Aguirre RN  Outcome: Progressing  10/9/2024 0517 by Alfonzo Aguirre RN  Outcome: Progressing  Intervention: Monitor Pain and Promote Comfort  Recent Flowsheet Documentation  Taken 10/8/2024 2206 by Alfonzo Aguirre  RN  Pain Management Interventions: medication (see MAR)  Goal: Readiness for Transition of Care  10/9/2024 0525 by Alfonzo Aguirre RN  Outcome: Progressing  10/9/2024 0518 by Alfonzo Aguirre RN  Outcome: Progressing  10/9/2024 0517 by Alfonzo Aguirre RN  Outcome: Progressing     Problem: Diabetic Ketoacidosis  Goal: Optimal Coping  10/9/2024 0525 by Alfonzo Aguirre RN  Outcome: Progressing  10/9/2024 0518 by Alfonzo Aguirre RN  Outcome: Progressing  10/9/2024 0517 by Alfonzo Aguirre RN  Outcome: Progressing  Goal: Fluid and Electrolyte Balance with Absence of Ketosis  10/9/2024 0525 by Alfonzo Aguirre RN  Outcome: Progressing  10/9/2024 0518 by Alfonzo Aguirre RN  Outcome: Progressing  10/9/2024 0517 by Alfonzo Aguirre RN  Outcome: Progressing       Blood sugars remain elevated at 266 and 254.   Blood pressures were also elevated early in the shift, Vasotec given once, scheduled Metoprolol given, last blood pressure was 115/89.  Dilaudid given once for abdominal pain and Pt was able to sleep.   Pt is tolerating small amounts of clear liquids with no complaints of nausea.

## 2024-10-09 NOTE — PROGRESS NOTES
[facility]    Medicine Progress Note - Hospitalist Service    Date of Admission:  10/8/2024    Assessment & Plan      Stacy Brown is a 49 year old female with type 2 diabetes treated with insulin, hypertension, CAD with CABG, chronic neck and back pain treated chronically with opiates, chronic iron deficiency, depression, and obesity who presented to the ER with 1 day history of persistent nausea and vomiting followed by onset of severe abdominal pain.  Initial evaluation in the ER was concerning for type 2 diabetes with hyperosmolar hyperglycemic state, ketoacidosis, lactic acidosis, SIRS, and severe hypertension.  Trigger for acute illness remains unclear, but she was at high risk for an adverse outcome including worsening metabolic acidosis from ketoacidosis and/or lactic acidosis, worsening hyperosmolar hyperglycemic state from diabetes, worsening SIRS, and complications of severe hypertension, so hospitalization is considered medically necessary.     Principal Problem:    Type 2 diabetes mellitus with hyperosmolar hyperglycemic state (HHS) (H)  Active Problems:    Coronary artery disease involving native coronary artery of native heart without angina pectoris    Nausea and vomiting, unspecified vomiting type    Abdominal pain, generalized    Ketoacidosis    Lactic acidosis    Hyponatremia    Elevated lipase    Dysuria    Severe hypertension    Increased anion gap metabolic acidosis    Hypomagnesemia    Class 1 obesity due to excess calories with serious comorbidity and body mass index (BMI) of 30.0 to 30.9 in adult    Chronic pain syndrome    Iron deficiency    S/P CABG (coronary artery bypass graft)    Major depressive disorder, recurrent episode, moderate (H)    History of non-ST elevation myocardial infarction (NSTEMI) March 2021    Chronic, continuous use of opioids    Type 2 diabetes with hyperglycemic hyperosmolar state, ketoacidosis, and lactic acidosis  Increased  anion gap metabolic acidosis  Known chronic type 2 diabetes treated with insulin with current A1c 8.8.  Presented to ER with severe hyperglycemia blood sugar 505, hyperosmolality, and increased anion gap metabolic acidosis including ketoacidosis and lactic acidosis all probably due to uncontrolled diabetes.  Combination of hyperglycemia with hyperosmolality, ketoacidosis, lactic acidosis may have all been exacerbated by recent outpatient treatment with corticosteroids.  These metabolic disturbances may have precipitated recurrent nausea and vomiting.  Hyperglycemia has improved after initiation of treatment with insulin infusion and both metabolic acidosis and ketoacidosis have resolved.  Lactic acid has resolved.  As metabolic disturbances improved, she was switched from continuous IV insulin infusion to subcutaneous insulin about noon today.    - Advancing diet today, advance as tolerated to diabetic, low-carb diet  - Continue insulin glargine 40U subQ every am   - Continue subcutaneous NovoLog correction scale   - adding prandial NovoLog dosing   - Ordered ongoing monitoring of electrolytes and serum osmolality until metabolic disturbances completely resolve    Recurrent nausea and vomiting  Episodic severe abdominal pain  Mildly elevated lipase  History of transverse colitis March 2024  Acute illness started 10/7 with recurrent nausea and vomiting worsened from her usual baseline followed by subsequent onset of severe abdominal pain lasting from evening 10/7 until presentation to the ER in early a.m. 10/8.  GI symptoms have been transiently relieved over the course of the day today with symptomatic treatments including antiemetics and IV narcotics, but GI symptoms continue to recur particularly after attempted oral intake.  Patient reporting severe abdominal pain started in the lower abdomen reminiscent to her of an episode of transverse colitis in March 2024 of unknown cause at that time.  However, lower  abdominal pain has now resolved and she currently has upper abdominal tenderness.  Lipase was mildly elevated but trending toward improvement and may be elevated from recurrent nausea and vomiting.  Although possible, acute pancreatitis seems less likely.  Upper GI origin including peptic ulcer disease and/or Lubna-Mariscal tear are possible.  Recent treatment with corticosteroids along with ongoing psychosocial stressors increases her risk for peptic ulcer disease.  Recurrent vomiting increases risk for Lub[place]-Mariscal tear, and she had previous history of Lub[place]-Mariscal Mariscal tear documented endoscopically in May 2024.  So far she has not had hematemesis. CT of the abdomen with contrast without findings to account for patient's symptoms.   -Continue symptomatic treatment of nausea and vomiting with antiemetics and abdominal pain with IV narcotics  -Continue IV PPI empirically  -Reconsider diagnostic EGD during hospitalization if she fails to improve or worsens    Severe hypertension  Known chronic hypertension normally treated with low-dose lisinopril and metoprolol, but she has not been able to tolerate oral medications reliably for over 24 hours due to recurrent vomiting.    - Restarted lisinopril 2.5 mg daily   - Restarted metoprolol tartrate 25 mg BID    SIRS, resolved   Presented with tachycardia heart rate 120 and leukocytosis WBC 13.5 concerning for SIRS.  Tachycardia may be explained by inability to tolerate oral beta-blocker in the last day and stress of acute illness.  Leukocytosis may be attributable to recent treatment with corticosteroids.  Procalcitonin was low.  So far, infection is not identified or strongly suspected.  Lactic acidosis was more likely attributable to diabetes than infection or sepsis.    Dysuria  Patient currently has suprapubic tenderness on exam and is reporting mild dysuria.  UTI is possible, but  symptoms associated with metabolic disturbances including severe hyperglycemia and  hyperosmolality are also possible.  -UA/UC negative for infection    Hypomagnesemia, resolved   Serum magnesium 1.6 at presentation concerning for mild hypomagnesemia.  She does not appear to be symptomatic from hypomagnesemia.  Recent poor oral intake may contribute.  -replaced     Pseudo-hyponatremia, resolved   Presented with serum sodium 132 concerning for mild hyponatremia.  However, serum glucose was 505, so pseudohyponatremia seems more likely.  Sodium rapidly normalized as hyperglycemia was treated.    Chronic neck and back pain  Chronic use of opiates  Patient has known chronic neck and back pain for which she is actively undergoing evaluation for disability through her PCP.  Her chronic pain syndrome is managed with medications including chronic use of opiates typically 1 dose of oxycodone daily.  She also uses muscle relaxants as needed.  -restarting oral opiates and muscle relaxants according to her chronic dosing regimen     CAD status post CABG  History of STEMI  She has known CAD with history of non-STEMI in March 2021 and has had previous CABG.  Currently, CAD appears clinically stable even in context of severe hypertension.  Chronic medication treatments for secondary prevention include beta-blocker and statin.  She is not chronically taking antiplatelet therapy.  -resuming chronic doses of oral beta-blocker and statin  -Not anticipating additional evaluation for acute ischemic heart disease during this hospitalization unless her clinical status changes    Depression  She carries previous diagnosis of depression treated chronically with duloxetine.  -restarting her chronic oral dose of duloxetine     Iron deficiency  She carries previous diagnosis of iron deficiency.  Although she presents with microcytic RBC indices, she was not anemic at presentation although was probably hemoconcentrated.  -anticipate outpatient follow-up with PCP regarding chronic iron deficiency    Obesity  Appears obese with  BMI 31.  Obesity likely contributes to health problems including diabetes.  -No acute intervention during hospitalization            Diet: Advance Diet as Tolerated: Fully Advanced to diet(s) per Provider order; Moderate Consistent Carb (60 g CHO per Meal) Diet    DVT Prophylaxis: Pneumatic Compression Devices  Schultz Catheter: Not present  Lines: None     Cardiac Monitoring: None  Code Status: Full Code      Clinically Significant Risk Factors         # Hyponatremia: Lowest Na = 132 mmol/L in last 2 days, will monitor as appropriate  # Hypochloremia: Lowest Cl = 89 mmol/L in last 2 days, will monitor as appropriate    # Hypomagnesemia: Lowest Mg = 1.6 mg/dL in last 2 days, will replace as needed  # Anion Gap Metabolic Acidosis: Highest Anion Gap = 25 mmol/L in last 2 days, will monitor and treat as appropriate      # Hypertension: Noted on problem list          # DMII: A1C = 8.8 % (Ref range: <5.7 %) within past 6 months, PRESENT ON ADMISSION      # Financial/Environmental Concerns:     # History of CABG: noted on surgical history       Disposition Plan     Medically Ready for Discharge: Anticipated in 2-4 Days             Sanna Shkula MD  Hospitalist Service  [facility]  Securely message with [employer] (more info)  Text page via [facility] Paging/Directory   ______________________________________________________________________    Interval History   Patient is tearful this morning, states that she is tired of the abdominal pain, tired of the nausea.  She tells me that this abdominal pain is different from her usual colitis, describes it as a deep pain within the abdomen, located throughout the lower abdomen.  She is also currently having a muscle spasm in her neck, which is chronic for her, having some back and hip pain.  All of the pain is making patient tired, tearful.  Patient denies chest pain or shortness of breath.  She is having increased appetite, tolerating clear liquid diet,  would like to try something more substantial such as mashed potatoes or soup.    Physical Exam   Vital Signs: Temp: 98.3  F (36.8  C) Temp src: Oral BP: 104/58 Pulse: 65   Resp: 14 SpO2: 99 % O2 Device: None (Room air)    Weight: 197 lbs 0 oz    Constitutional: Ill-appearing, crying, although no signs of acute distress  Respiratory: Normal respiratory effort, clear lungs  Cardiovascular: Regular rate and rhythm, no murmur appreciated  GI: Obese, soft, nontender to touch  Neuro: Awake and alert and oriented x 3    Medical Decision Making       60 MINUTES SPENT BY ME on the date of service doing chart review, history, exam, documentation & further activities per the note.      Data     I have personally reviewed the following data over the past 24 hrs:    9.4  \   13.9   / 338     137 100 16.1 /  296 (H)   4.2 22 0.90 \       Imaging results reviewed over the past 24 hrs:   Recent Results (from the past 24 hour(s))   CT Abdomen Pelvis w Contrast    Narrative    EXAM: CT ABDOMEN PELVIS W CONTRAST  LOCATION: [facility]  DATE: 10/8/2024    INDICATION: severe abdominal pain, recurrent nausea and vomiting, severe hypertension, diabetes with hyperglycemic hyperosmolar state, lactic acidosis  COMPARISON: CT abdomen and pelvis of 04/01/2024 median sternotomy wires.  TECHNIQUE: CT scan of the abdomen and pelvis was performed following injection of IV contrast. Multiplanar reformats were obtained. Dose reduction techniques were used.  CONTRAST: Isovue 370, 94mL    FINDINGS:   LOWER CHEST: Normal.    HEPATOBILIARY: Hepatomegaly measuring 21 cm in length. Mild diffuse fatty infiltration of the liver. Cholecystectomy.    PANCREAS: Normal.    SPLEEN: Normal.    ADRENAL GLANDS: Normal.    KIDNEYS/BLADDER: Normal.    BOWEL: Normal appendix. No evidence for bowel obstruction. No bowel wall thickening or inflammatory changes.    LYMPH NODES: Normal.    VASCULATURE: Atherosclerotic disease abdominal aorta  and iliac arteries, with moderate stenosis of the right common iliac artery, unchanged. The celiac trunk, superior mesenteric artery, inferior mesenteric artery, and both renal arteries are patent   without significant narrowing.    PELVIC ORGANS: 8mm hyperdensity in the uterine wall likely representing a fibroid.    MUSCULOSKELETAL: Normal.      Impression    IMPRESSION:   1.  Hepatomegaly and mild diffuse fatty infiltration of the liver.  2.  Atherosclerotic disease.  3.  Probable small uterine fibroid.  4.  No findings to account for the patient's symptoms.     Recent Labs   Lab 10/09/24  1201 10/09/24  0813 10/09/24  0555 10/09/24  0237 10/08/24  2103 10/08/24  1848   * 277* 274* 254* 266* 386*

## 2024-10-09 NOTE — CONSULTS
"SPIRITUAL HEALTH SERVICES Consult Note     Medical Surgical Intensive Care Unit at [facility].       REFERRAL SOURCE/REASON FOR VISIT - Saw patient as a follow-up to her placement in the ICU.     SUMMARY - I visited Stacy again today.  She was again open to emotional and spiritual support.  She reported that she was still experiencing abdominal pain, not any better, and was having nausea today.  She said, with regard to her pain and nausea, \"I'm tired of it.\"  Then, she told me about having \"a meltdown this morning, with crying. . . .\"  I offered words of encouragement and reassurance to her and said a prayer for her and gave her a blessing.  She was appreciative of the visit.          Plan - I will continue to follow patient and be available for any ongoing support needs until her discharge.      Venkata Velazquez, Ph.D,   Spiritual Health Services  [address] [place], [place] [address] [phone]  [email or web address]     Assessment -      Patient/Family Understanding of Illness and Goals of Care - Patient understands her illness and goals.     Strengths, Coping, and Resources - ernestina     Meaning, Beliefs, and Spirituality - Patient reported that she considers [facility] [employer], in [place], her parish.  "

## 2024-10-09 NOTE — PLAN OF CARE
Goal Outcome Evaluation:           Overall Patient Progress: no changeOverall Patient Progress: no change    Outcome Evaluation: C/o pain in abdomen, back, and neck rating 9/10. Tender in RLQ and lower middle abdomen. Medicated with Oxycodone and IV Dilaudid this shift.  Stated she cannot go home until she knows what is causing her pain. Admitted that she is on Oxycodone once a day at home. Up independently. Ate 25% of lunch. oriented x4. VSS.  here visiting. Blood sugar at noon was 296. Will continue to monitor pain and blood sugars.

## 2024-10-09 NOTE — PROGRESS NOTES
S- Transfer to 253 from 215.    B- DKA    A- Brief systems assessment: Pt is A&Ox4. VSS on RA. PRN Dilaudid x2 and Zanaflex given for abdominal and back pain. Pt ambulates w/ SBA. Bgl- 277 and 296. Insulin given per sliding scale.     /58   Pulse 65   Temp 98.3  F (36.8  C) (Oral)   Resp 14   Wt 89.4 kg (197 lb)   LMP 03/07/2021 (Approximate)   SpO2 99%   BMI 31.80 kg/m       R- Transfer to 253 per physician orders. Continue to monitor pt and update physician as needed.      Code status: Full Code  Skin: Scattered bruising  Fall Risk: Yes- Department fall risk interventions implemented.  Isolation and Signage: None  Medication drips upon transfer: Saline locked.   Blue Bin checked and medications transfer out with patient:Yes

## 2024-10-09 NOTE — PLAN OF CARE
Goal Outcome Evaluation:      Plan of Care Reviewed With: patient    Overall Patient Progress: no changeOverall Patient Progress: no change    Outcome Evaluation: States that her pain really has not changed much. States the the Dilaudid keeps the pain away a bit longer. Showered and stated she felt better afterwards. but rated pain 7/10 in back, abdomen, and neck. Requested to get Tizanidine at 1900, Oxycodone at 2050, and IV Dilaudid at 2200. BP is 163/77 now and was 158/78 at shift change this afternoon. Up independently. Ate mashed potatoes and gravy as well as lemon ice and drank Diet Pepsi for supper. Blood sugar was 266 before supper.

## 2024-10-10 LAB
ANION GAP SERPL CALCULATED.3IONS-SCNC: 16 MMOL/L (ref 7–15)
BUN SERPL-MCNC: 14.9 MG/DL (ref 6–20)
CALCIUM SERPL-MCNC: 9.5 MG/DL (ref 8.8–10.4)
CHLORIDE SERPL-SCNC: 101 MMOL/L (ref 98–107)
CREAT SERPL-MCNC: 0.79 MG/DL (ref 0.51–0.95)
EGFRCR SERPLBLD CKD-EPI 2021: >90 ML/MIN/1.73M2
GLUCOSE BLDC GLUCOMTR-MCNC: 182 MG/DL (ref 70–99)
GLUCOSE BLDC GLUCOMTR-MCNC: 186 MG/DL (ref 70–99)
GLUCOSE BLDC GLUCOMTR-MCNC: 239 MG/DL (ref 70–99)
GLUCOSE BLDC GLUCOMTR-MCNC: 254 MG/DL (ref 70–99)
GLUCOSE BLDC GLUCOMTR-MCNC: 317 MG/DL (ref 70–99)
GLUCOSE SERPL-MCNC: 271 MG/DL (ref 70–99)
HCO3 SERPL-SCNC: 20 MMOL/L (ref 22–29)
HOLD SPECIMEN: NORMAL
OSMOLALITY SERPL: 295 MMOL/KG (ref 275–295)
POTASSIUM SERPL-SCNC: 4.5 MMOL/L (ref 3.4–5.3)
SODIUM SERPL-SCNC: 137 MMOL/L (ref 135–145)

## 2024-10-10 PROCEDURE — 90656 IIV3 VACC NO PRSV 0.5 ML IM: CPT | Performed by: STUDENT IN AN ORGANIZED HEALTH CARE EDUCATION/TRAINING PROGRAM

## 2024-10-10 PROCEDURE — 250N000011 HC RX IP 250 OP 636: Mod: JW | Performed by: STUDENT IN AN ORGANIZED HEALTH CARE EDUCATION/TRAINING PROGRAM

## 2024-10-10 PROCEDURE — 36415 COLL VENOUS BLD VENIPUNCTURE: CPT | Performed by: STUDENT IN AN ORGANIZED HEALTH CARE EDUCATION/TRAINING PROGRAM

## 2024-10-10 PROCEDURE — 120N000001 HC R&B MED SURG/OB

## 2024-10-10 PROCEDURE — 80048 BASIC METABOLIC PNL TOTAL CA: CPT | Performed by: STUDENT IN AN ORGANIZED HEALTH CARE EDUCATION/TRAINING PROGRAM

## 2024-10-10 PROCEDURE — 250N000013 HC RX MED GY IP 250 OP 250 PS 637: Performed by: STUDENT IN AN ORGANIZED HEALTH CARE EDUCATION/TRAINING PROGRAM

## 2024-10-10 PROCEDURE — 250N000013 HC RX MED GY IP 250 OP 250 PS 637: Performed by: PEDIATRICS

## 2024-10-10 PROCEDURE — 83930 ASSAY OF BLOOD OSMOLALITY: CPT | Performed by: STUDENT IN AN ORGANIZED HEALTH CARE EDUCATION/TRAINING PROGRAM

## 2024-10-10 PROCEDURE — G0008 ADMIN INFLUENZA VIRUS VAC: HCPCS | Performed by: STUDENT IN AN ORGANIZED HEALTH CARE EDUCATION/TRAINING PROGRAM

## 2024-10-10 PROCEDURE — 250N000009 HC RX 250: Performed by: PEDIATRICS

## 2024-10-10 PROCEDURE — 250N000011 HC RX IP 250 OP 636: Performed by: PEDIATRICS

## 2024-10-10 PROCEDURE — 99233 SBSQ HOSP IP/OBS HIGH 50: CPT | Performed by: STUDENT IN AN ORGANIZED HEALTH CARE EDUCATION/TRAINING PROGRAM

## 2024-10-10 PROCEDURE — 82310 ASSAY OF CALCIUM: CPT | Performed by: STUDENT IN AN ORGANIZED HEALTH CARE EDUCATION/TRAINING PROGRAM

## 2024-10-10 RX ORDER — ACETAMINOPHEN 500 MG
1000 TABLET ORAL 2 TIMES DAILY
Status: DISCONTINUED | OUTPATIENT
Start: 2024-10-10 | End: 2024-10-11 | Stop reason: HOSPADM

## 2024-10-10 RX ORDER — OXYCODONE HYDROCHLORIDE 5 MG/1
5 TABLET ORAL EVERY 6 HOURS PRN
Status: DISCONTINUED | OUTPATIENT
Start: 2024-10-10 | End: 2024-10-11 | Stop reason: HOSPADM

## 2024-10-10 RX ORDER — PANTOPRAZOLE SODIUM 40 MG/1
40 TABLET, DELAYED RELEASE ORAL
Status: DISCONTINUED | OUTPATIENT
Start: 2024-10-10 | End: 2024-10-11 | Stop reason: HOSPADM

## 2024-10-10 RX ADMIN — HYDROMORPHONE HYDROCHLORIDE 0.3 MG: 1 INJECTION, SOLUTION INTRAMUSCULAR; INTRAVENOUS; SUBCUTANEOUS at 06:30

## 2024-10-10 RX ADMIN — INFLUENZA A VIRUS A/VICTORIA/4897/2022 IVR-238 (H1N1) ANTIGEN (FORMALDEHYDE INACTIVATED), INFLUENZA A VIRUS A/CALIFORNIA/122/2022 SAN-022 (H3N2) ANTIGEN (FORMALDEHYDE INACTIVATED), AND INFLUENZA B VIRUS B/MICHIGAN/01/2021 ANTIGEN (FORMALDEHYDE INACTIVATED) 0.5 ML: 15; 15; 15 INJECTION, SUSPENSION INTRAMUSCULAR at 11:42

## 2024-10-10 RX ADMIN — ALUMINUM HYDROXIDE, MAGNESIUM HYDROXIDE, AND DIMETHICONE 30 ML: 200; 20; 200 SUSPENSION ORAL at 06:26

## 2024-10-10 RX ADMIN — INSULIN GLARGINE 40 UNITS: 100 INJECTION, SOLUTION SUBCUTANEOUS at 09:17

## 2024-10-10 RX ADMIN — DIPHENHYDRAMINE HYDROCHLORIDE 25 MG: 25 CAPSULE ORAL at 23:51

## 2024-10-10 RX ADMIN — TIZANIDINE 4 MG: 2 TABLET ORAL at 14:28

## 2024-10-10 RX ADMIN — DULOXETINE HYDROCHLORIDE 20 MG: 20 CAPSULE, DELAYED RELEASE ORAL at 09:04

## 2024-10-10 RX ADMIN — OXYCODONE HYDROCHLORIDE 5 MG: 5 TABLET ORAL at 17:38

## 2024-10-10 RX ADMIN — PANTOPRAZOLE SODIUM 40 MG: 40 TABLET, DELAYED RELEASE ORAL at 20:59

## 2024-10-10 RX ADMIN — OXYCODONE HYDROCHLORIDE 5 MG: 5 TABLET ORAL at 11:42

## 2024-10-10 RX ADMIN — SENNOSIDES 8.6 MG: 8.6 TABLET, FILM COATED ORAL at 20:59

## 2024-10-10 RX ADMIN — TIZANIDINE 4 MG: 2 TABLET ORAL at 04:57

## 2024-10-10 RX ADMIN — SENNOSIDES 8.6 MG: 8.6 TABLET, FILM COATED ORAL at 09:04

## 2024-10-10 RX ADMIN — METOPROLOL TARTRATE 25 MG: 25 TABLET, FILM COATED ORAL at 09:04

## 2024-10-10 RX ADMIN — ROSUVASTATIN CALCIUM 20 MG: 20 TABLET, FILM COATED ORAL at 09:04

## 2024-10-10 RX ADMIN — METOPROLOL TARTRATE 25 MG: 25 TABLET, FILM COATED ORAL at 20:59

## 2024-10-10 RX ADMIN — TIZANIDINE 4 MG: 2 TABLET ORAL at 22:54

## 2024-10-10 RX ADMIN — INSULIN ASPART 4 UNITS: 100 INJECTION, SOLUTION INTRAVENOUS; SUBCUTANEOUS at 09:20

## 2024-10-10 RX ADMIN — ACETAMINOPHEN 650 MG: 325 TABLET ORAL at 09:04

## 2024-10-10 RX ADMIN — ONDANSETRON 4 MG: 2 INJECTION INTRAMUSCULAR; INTRAVENOUS at 12:20

## 2024-10-10 RX ADMIN — LISINOPRIL 2.5 MG: 2.5 TABLET ORAL at 09:04

## 2024-10-10 RX ADMIN — ALUMINUM HYDROXIDE, MAGNESIUM HYDROXIDE, AND DIMETHICONE 30 ML: 200; 20; 200 SUSPENSION ORAL at 19:22

## 2024-10-10 RX ADMIN — ACETAMINOPHEN 1000 MG: 500 TABLET ORAL at 20:59

## 2024-10-10 RX ADMIN — OXYCODONE HYDROCHLORIDE 5 MG: 5 TABLET ORAL at 23:51

## 2024-10-10 RX ADMIN — PANTOPRAZOLE SODIUM 40 MG: 40 INJECTION, POWDER, FOR SOLUTION INTRAVENOUS at 05:58

## 2024-10-10 ASSESSMENT — ACTIVITIES OF DAILY LIVING (ADL)
ADLS_ACUITY_SCORE: 23
DEPENDENT_IADLS:: INDEPENDENT
ADLS_ACUITY_SCORE: 23

## 2024-10-10 NOTE — PLAN OF CARE
"Goal Outcome Evaluation:    4 hour shift note:      Plan of Care Reviewed With: patient    Overall Patient Progress: no changeOverall Patient Progress: no change    Outcome Evaluation: Pt is A&Ox4. VSS on RA. Up independently in the room. Complaining of having a \"sick\" feeling with some abdominal pain. PRN oxycodone given x1. Small amount of intake for dinner. Expresses no appetite.      "

## 2024-10-10 NOTE — CONSULTS
Care Management Initial Consult    General Information  Assessment completed with: Patient, VM-chart review,         Primary Care Provider verified and updated as needed:     Readmission within the last 30 days:        Reason for Consult: other (see comments) (High Risk)  Advance Care Planning: Advance Care Planning Reviewed: no concerns identified        Communication Assessment  Patient's communication style: spoken language (English or Bilingual)    Hearing Difficulty or Deaf: no   Wear Glasses or Blind: yes    Cognitive  Cognitive/Neuro/Behavioral: WDL                      Living Environment:   People in home: spouse     Current living Arrangements: apartment      Able to return to prior arrangements: yes       Family/Social Support:  Care provided by: spouse/significant other, child(valencia)  Provides care for: no one  Marital Status:   Support system: , Children  Humble       Description of Support System: Involved, Supportive    Support Assessment: Adequate family and caregiver support    Current Resources:   Patient receiving home care services: No      Community Resources: None  Equipment currently used at home: grab bar, tub/shower  Supplies currently used at home:      Employment/Financial:  Employment Status:  N/A      Financial Concerns:  None       Does the patient's insurance plan have a 3 day qualifying hospital stay waiver?  No    Lifestyle & Psychosocial Needs:  Social Determinants of Health     Food Insecurity: Low Risk  (10/8/2024)    Food Insecurity     Within the past 12 months, did you worry that your food would run out before you got money to buy more?: No     Within the past 12 months, did the food you bought just not last and you didn t have money to get more?: No   Depression: Not at risk (5/14/2024)    PHQ-2     PHQ-2 Score: 2   Housing Stability: Low Risk  (10/8/2024)    Housing Stability     Do you have housing? : Yes     Are you worried about losing your housing?: No    Tobacco Use: Medium Risk (7/28/2024)    Patient History     Smoking Tobacco Use: Former     Smokeless Tobacco Use: Never     Passive Exposure: Not on file   Financial Resource Strain: Low Risk  (10/8/2024)    Financial Resource Strain     Within the past 12 months, have you or your family members you live with been unable to get utilities (heat, electricity) when it was really needed?: No   Alcohol Use: Not At Risk (4/10/2024)    AUDIT-C     Frequency of Alcohol Consumption: Never     Average Number of Drinks: Patient does not drink     Frequency of Binge Drinking: Never   Transportation Needs: Low Risk  (10/8/2024)    Transportation Needs     Within the past 12 months, has lack of transportation kept you from medical appointments, getting your medicines, non-medical meetings or appointments, work, or from getting things that you need?: No   Physical Activity: Sufficiently Active (4/10/2024)    Exercise Vital Sign     Days of Exercise per Week: 5 days     Minutes of Exercise per Session: 30 min   Interpersonal Safety: Low Risk  (10/8/2024)    Interpersonal Safety     Do you feel physically and emotionally safe where you currently live?: Yes     Within the past 12 months, have you been hit, slapped, kicked or otherwise physically hurt by someone?: No     Within the past 12 months, have you been humiliated or emotionally abused in other ways by your partner or ex-partner?: No   Stress: Stress Concern Present (4/10/2024)    [employer] - Occupational Stress Questionnaire     Feeling of Stress : Very much   Social Connections: Socially Isolated (4/10/2024)    Social Connection and Isolation Panel [NHANES]     Frequency of Communication with Friends and Family: Twice a week     Frequency of Social Gatherings with Friends and Family: Never     Attends Tenriism Services: Never     Active Member of Clubs or Organizations: No     Attends Club or Organization Meetings: Never     Marital Status:     Health Literacy: Not on file       Functional Status:  Prior to admission patient needed assistance:   Dependent ADLs:: Independent  Dependent IADLs:: Independent       Mental Health Status:  Mental Health Status: No Current Concerns       Chemical Dependency Status:  Chemical Dependency Status: No Current Concerns             Values/Beliefs:  Spiritual, Cultural Beliefs, Shinto Practices, Values that affect care:                 Discussed  Partnership in Safe Discharge Planning  document with patient/family: No    Additional Information:  Consult received due to High Risk score. Initial assessment completed with patient, at bedside.    Patient lives with her  and 18 yr old son, in an apartment in [place]. Patient is independent with ambulation and all ADLs at baseline.  Patient denies any discharge needs.     High Risk - Confirmed PCP and sent [facility] task to schedule follow up appt.    Family transport at discharge.    DIAZ Escalona  [facility] [phone]/ [facility] [phone]  Care Management

## 2024-10-10 NOTE — PLAN OF CARE
Goal Outcome Evaluation:      Plan of Care Reviewed With: patient    Overall Patient Progress: improvingOverall Patient Progress: improving     VSS on RA. A&O x4. C/o pain to abdomen, back and neck 7-8/10 managed with oxycodone x1 and dilaudid x1. BG-237 and 239, insulin given per MAR. Denies nausea. Pt c/o abdominal pain 9/10 at 6AM, gave IV protonix early,  PRN maalox given and dilaudid x1.

## 2024-10-10 NOTE — PLAN OF CARE
Goal Outcome Evaluation: [id number]      Plan of Care Reviewed With: patient    Overall Patient Progress: improvingOverall Patient Progress: improving    Outcome Evaluation: The patient is A&Ox4, VSS, pain improving with 5mg Oxycodone and Tylenol. Plan is to D/C tomorrow.

## 2024-10-10 NOTE — PROGRESS NOTES
[facility]    Medicine Progress Note - Hospitalist Service    Date of Admission:  10/8/2024    Assessment & Plan   Stacy Brown is a 49 year old female with type 2 diabetes treated with insulin, hypertension, CAD with CABG, chronic neck and back pain treated chronically with opiates, chronic iron deficiency, depression, and obesity who presented to the ER with 1 day history of persistent nausea and vomiting followed by onset of severe abdominal pain.  Initial evaluation in the ER was concerning for type 2 diabetes with hyperosmolar hyperglycemic state, ketoacidosis, lactic acidosis, SIRS, and severe hypertension.  Trigger for acute illness remains unclear, but she was at high risk for an adverse outcome including worsening metabolic acidosis from ketoacidosis and/or lactic acidosis, worsening hyperosmolar hyperglycemic state from diabetes, worsening SIRS, and complications of severe hypertension, so hospitalization is considered medically necessary.     Principal Problem:    Type 2 diabetes mellitus with hyperosmolar hyperglycemic state (HHS) (H)  Active Problems:    Coronary artery disease involving native coronary artery of native heart without angina pectoris    Nausea and vomiting, unspecified vomiting type    Abdominal pain, generalized    Ketoacidosis    Lactic acidosis    Hyponatremia    Elevated lipase    Dysuria    Severe hypertension    Increased anion gap metabolic acidosis    Hypomagnesemia    Class 1 obesity due to excess calories with serious comorbidity and body mass index (BMI) of 30.0 to 30.9 in adult    Chronic pain syndrome    Iron deficiency    S/P CABG (coronary artery bypass graft)    Major depressive disorder, recurrent episode, moderate (H)    History of non-ST elevation myocardial infarction (NSTEMI) March 2021    Chronic, continuous use of opioids    Type 2 diabetes with hyperglycemic hyperosmolar state, ketoacidosis, and lactic acidosis  Increased anion  gap metabolic acidosis  Known chronic type 2 diabetes treated with insulin with current A1c 8.8.  Presented to ER with severe hyperglycemia blood sugar 505, hyperosmolality, and increased anion gap metabolic acidosis including ketoacidosis and lactic acidosis all probably due to uncontrolled diabetes.  Combination of hyperglycemia with hyperosmolality, ketoacidosis, lactic acidosis may have all been exacerbated by recent outpatient treatment with corticosteroids.  These metabolic disturbances may have precipitated recurrent nausea and vomiting.  Hyperglycemia has improved after initiation of treatment with insulin infusion and both metabolic acidosis and ketoacidosis have resolved.  Lactic acid has resolved.  As metabolic disturbances improved, she was switched from continuous IV insulin infusion to subcutaneous insulin on 10/9.     - low carb diabetic diet   - Adjusting insulin dosing to home dosing   - insulin glargine 42U subQ every am   - Continue subcutaneous NovoLog correction scale   - Carb counting for prandial dosing  - Discharge tomorrow morning    Recurrent nausea and vomiting  Episodic severe abdominal pain  Mildly elevated lipase  History of transverse colitis March 2024  Acute illness started 10/7 with recurrent nausea and vomiting worsened from her usual baseline followed by subsequent onset of severe abdominal pain lasting from evening 10/7 until presentation to the ER in early a.m. 10/8.  GI symptoms have been transiently relieved over the course of the day today with symptomatic treatments including antiemetics and IV narcotics, but GI symptoms continue to recur particularly after attempted oral intake.  Patient reporting severe abdominal pain started in the lower abdomen reminiscent to her of an episode of transverse colitis in March 2024 of unknown cause at that time.  However, lower abdominal pain has now resolved and she currently has upper abdominal tenderness.  Lipase was mildly elevated but  trending toward improvement and may be elevated from recurrent nausea and vomiting.  Although possible, acute pancreatitis seems less likely.  Upper GI origin including peptic ulcer disease and/or Lubna-Mariscal tear are possible.  Recent treatment with corticosteroids along with ongoing psychosocial stressors increases her risk for peptic ulcer disease.  Recurrent vomiting increases risk for Lubna-Mariscal tear, and she had previous history of Lubna-Mariscal Mariscal tear documented endoscopically in May 2024.  So far she has not had hematemesis. CT of the abdomen with contrast without findings to account for patient's symptoms.     -Switching over IV PPI to p.o.  -Overall, patient has improved.  She seems more comfortable today.  She does say IV Dilaudid helps the most.  Explained that IV Dilaudid has been discontinued in preparation for discharge.  Patient was understanding.  Back on home oxycodone, but had increased intervals as needed.    -Scheduling  ibuprofen    Severe hypertension, improving  Known chronic hypertension normally treated with low-dose lisinopril and metoprolol, but she has not been able to tolerate oral medications reliably for over 24 hours due to recurrent vomiting.    - Continue lisinopril 2.5 mg daily   - Continue metoprolol tartrate 25 mg BID    SIRS, resolved   Presented with tachycardia heart rate 120 and leukocytosis WBC 13.5 concerning for SIRS.  Tachycardia may be explained by inability to tolerate oral beta-blocker in the last day and stress of acute illness.  Leukocytosis may be attributable to recent treatment with corticosteroids.  Procalcitonin was low.  So far, infection is not identified or strongly suspected.  Lactic acidosis was more likely attributable to diabetes than infection or sepsis.    Dysuria  Patient currently has suprapubic tenderness on exam and is reporting mild dysuria.  UTI is possible, but  symptoms associated with metabolic disturbances including severe  hyperglycemia and hyperosmolality are also possible.  -UA/UC negative for infection    Hypomagnesemia, resolved   Serum magnesium 1.6 at presentation concerning for mild hypomagnesemia.  She does not appear to be symptomatic from hypomagnesemia.  Recent poor oral intake may contribute.  -replaced     Pseudo-hyponatremia, resolved   Presented with serum sodium 132 concerning for mild hyponatremia.  However, serum glucose was 505, so pseudohyponatremia seems more likely.  Sodium rapidly normalized as hyperglycemia was treated.    Chronic neck and back pain  Chronic use of opiates  Patient has known chronic neck and back pain for which she is actively undergoing evaluation for disability through her PCP.  Her chronic pain syndrome is managed with medications including chronic use of opiates typically 1 dose of oxycodone daily.  She also uses muscle relaxants as needed.  -restarting oral opiates and muscle relaxants according to her chronic dosing regimen     CAD status post CABG  History of STEMI  She has known CAD with history of non-STEMI in March 2021 and has had previous CABG.  Currently, CAD appears clinically stable even in context of severe hypertension.  Chronic medication treatments for secondary prevention include beta-blocker and statin.  She is not chronically taking antiplatelet therapy.  -resuming chronic doses of oral beta-blocker and statin  -Not anticipating additional evaluation for acute ischemic heart disease during this hospitalization unless her clinical status changes    Depression  She carries previous diagnosis of depression treated chronically with duloxetine.  -restarting her chronic oral dose of duloxetine     Iron deficiency  She carries previous diagnosis of iron deficiency.  Although she presents with microcytic RBC indices, she was not anemic at presentation although was probably hemoconcentrated.  -anticipate outpatient follow-up with PCP regarding chronic iron  deficiency    Obesity  Appears obese with BMI 31.  Obesity likely contributes to health problems including diabetes.  -No acute intervention during hospitalization            Diet: Advance Diet as Tolerated: Fully Advanced to diet(s) per Provider order; Moderate Consistent Carb (60 g CHO per Meal) Diet    DVT Prophylaxis: Pneumatic Compression Devices  Schultz Catheter: Not present  Lines: None     Cardiac Monitoring: None  Code Status: Full Code      Clinically Significant Risk Factors                   # Hypertension: Noted on problem list          # DMII: A1C = 8.8 % (Ref range: <5.7 %) within past 6 months, PRESENT ON ADMISSION      # Financial/Environmental Concerns:     # History of CABG: noted on surgical history       Disposition Plan     Medically Ready for Discharge: Anticipated Tomorrow             Sanna Shukla MD  Hospitalist Service  [facility]  Securely message with [employer] (more info)  Text page via [employer] Paging/Directory   ______________________________________________________________________    Interval History   Patient seems more comfortable today.  She does say IV Dilaudid helps the most.  Explained that plan is to discontinue IV Dilaudid in preparation for discharge and will cause constipation, further complicating abdominal pain.  Patient is understanding.  Continues to have chronic aches and pains.  Discussed plan to discharge tomorrow.    Physical Exam   Vital Signs: Temp: 98.4  F (36.9  C) Temp src: Oral BP: (!) 152/85 Pulse: 63   Resp: 18 SpO2: 98 % O2 Device: None (Room air)    Weight: 198 lbs 1.6 oz    Constitutional: Comfortable, not in acute distress  Respiratory: Normal respiratory effort, clear lungs  Cardiovascular: Regular rate and rhythm, no murmur appreciated  GI: Soft, bowel sounds normal  Neuro: Awake and alert and oriented x 3    Medical Decision Making       40 MINUTES SPENT BY ME on the date of service doing chart review, history, exam,  documentation & further activities per the note.      Data     I have personally reviewed the following data over the past 24 hrs:    N/A  \   N/A   / N/A     137 101 14.9 /  254 (H)   4.5 20 (L) 0.79 \       Imaging results reviewed over the past 24 hrs:   No results found for this or any previous visit (from the past 24 hour(s)).       Latest Reference Range & Units 10/09/24 05:55 10/10/24 05:26   Osmolality 275 - 295 mmol/kg 303 (H) 295   (H): Data is abnormally high    Recent Labs   Lab 10/10/24  1130 10/10/24  0820 10/10/24  0526 10/10/24  0247 10/09/24  2146 10/09/24  1704   * 317* 271* 239* 237* 266*

## 2024-10-11 VITALS
TEMPERATURE: 98.5 F | WEIGHT: 195.8 LBS | SYSTOLIC BLOOD PRESSURE: 121 MMHG | OXYGEN SATURATION: 98 % | HEART RATE: 83 BPM | DIASTOLIC BLOOD PRESSURE: 79 MMHG | RESPIRATION RATE: 16 BRPM | BODY MASS INDEX: 31.6 KG/M2

## 2024-10-11 LAB
ANION GAP SERPL CALCULATED.3IONS-SCNC: 13 MMOL/L (ref 7–15)
BUN SERPL-MCNC: 13.1 MG/DL (ref 6–20)
CALCIUM SERPL-MCNC: 9.6 MG/DL (ref 8.8–10.4)
CHLORIDE SERPL-SCNC: 102 MMOL/L (ref 98–107)
CREAT SERPL-MCNC: 0.86 MG/DL (ref 0.51–0.95)
EGFRCR SERPLBLD CKD-EPI 2021: 82 ML/MIN/1.73M2
GLUCOSE BLDC GLUCOMTR-MCNC: 173 MG/DL (ref 70–99)
GLUCOSE BLDC GLUCOMTR-MCNC: 202 MG/DL (ref 70–99)
GLUCOSE SERPL-MCNC: 181 MG/DL (ref 70–99)
HCO3 SERPL-SCNC: 24 MMOL/L (ref 22–29)
HOLD SPECIMEN: NORMAL
POTASSIUM SERPL-SCNC: 4.2 MMOL/L (ref 3.4–5.3)
SODIUM SERPL-SCNC: 139 MMOL/L (ref 135–145)

## 2024-10-11 PROCEDURE — 250N000013 HC RX MED GY IP 250 OP 250 PS 637: Performed by: STUDENT IN AN ORGANIZED HEALTH CARE EDUCATION/TRAINING PROGRAM

## 2024-10-11 PROCEDURE — 80048 BASIC METABOLIC PNL TOTAL CA: CPT | Performed by: STUDENT IN AN ORGANIZED HEALTH CARE EDUCATION/TRAINING PROGRAM

## 2024-10-11 PROCEDURE — 250N000011 HC RX IP 250 OP 636: Performed by: PEDIATRICS

## 2024-10-11 PROCEDURE — 99239 HOSP IP/OBS DSCHRG MGMT >30: CPT | Performed by: STUDENT IN AN ORGANIZED HEALTH CARE EDUCATION/TRAINING PROGRAM

## 2024-10-11 PROCEDURE — 36415 COLL VENOUS BLD VENIPUNCTURE: CPT | Performed by: STUDENT IN AN ORGANIZED HEALTH CARE EDUCATION/TRAINING PROGRAM

## 2024-10-11 PROCEDURE — 250N000013 HC RX MED GY IP 250 OP 250 PS 637: Performed by: PEDIATRICS

## 2024-10-11 RX ORDER — POLYETHYLENE GLYCOL 3350 17 G/17G
17 POWDER, FOR SOLUTION ORAL DAILY
COMMUNITY
Start: 2024-10-11

## 2024-10-11 RX ORDER — PANTOPRAZOLE SODIUM 40 MG/1
40 TABLET, DELAYED RELEASE ORAL EVERY MORNING
Qty: 30 TABLET | Refills: 0 | Status: SHIPPED | OUTPATIENT
Start: 2024-10-11 | End: 2024-11-10

## 2024-10-11 RX ORDER — SENNOSIDES 8.6 MG
1-2 TABLET ORAL DAILY PRN
COMMUNITY
Start: 2024-10-11

## 2024-10-11 RX ORDER — POLYETHYLENE GLYCOL 3350 17 G/17G
17 POWDER, FOR SOLUTION ORAL DAILY
Status: DISCONTINUED | OUTPATIENT
Start: 2024-10-11 | End: 2024-10-11 | Stop reason: HOSPADM

## 2024-10-11 RX ADMIN — TIZANIDINE 4 MG: 2 TABLET ORAL at 08:02

## 2024-10-11 RX ADMIN — ACETAMINOPHEN 1000 MG: 500 TABLET ORAL at 08:51

## 2024-10-11 RX ADMIN — ROSUVASTATIN CALCIUM 20 MG: 20 TABLET, FILM COATED ORAL at 08:51

## 2024-10-11 RX ADMIN — ONDANSETRON 4 MG: 4 TABLET, ORALLY DISINTEGRATING ORAL at 08:02

## 2024-10-11 RX ADMIN — DULOXETINE HYDROCHLORIDE 20 MG: 20 CAPSULE, DELAYED RELEASE ORAL at 08:51

## 2024-10-11 RX ADMIN — OXYCODONE HYDROCHLORIDE 5 MG: 5 TABLET ORAL at 10:43

## 2024-10-11 RX ADMIN — METOPROLOL TARTRATE 25 MG: 25 TABLET, FILM COATED ORAL at 08:51

## 2024-10-11 RX ADMIN — POLYETHYLENE GLYCOL 3350 17 G: 17 POWDER, FOR SOLUTION ORAL at 10:15

## 2024-10-11 RX ADMIN — PANTOPRAZOLE SODIUM 40 MG: 40 TABLET, DELAYED RELEASE ORAL at 07:46

## 2024-10-11 RX ADMIN — SENNOSIDES 8.6 MG: 8.6 TABLET, FILM COATED ORAL at 08:51

## 2024-10-11 RX ADMIN — LISINOPRIL 2.5 MG: 2.5 TABLET ORAL at 08:51

## 2024-10-11 ASSESSMENT — ACTIVITIES OF DAILY LIVING (ADL)
ADLS_ACUITY_SCORE: 23
ADLS_ACUITY_SCORE: 23
ADLS_ACUITY_SCORE: 24
ADLS_ACUITY_SCORE: 23
ADLS_ACUITY_SCORE: 24

## 2024-10-11 NOTE — PROGRESS NOTES
Care Management Discharge Note    Discharge Date: 10/11/2024     Discharge Disposition: Home    Discharge Services: None    Discharge DME: None    Discharge Transportation: family or friend will provide    Private pay costs discussed: Not applicable    Does the patient's insurance plan have a 3 day qualifying hospital stay waiver?  No    PAS Confirmation Code:  N/A  Patient/family educated on Medicare website which has current facility and service quality ratings: no    Education Provided on the Discharge Plan:    Persons Notified of Discharge Plans: Patient  Patient/Family in Agreement with the Plan: yes    Handoff Referral Completed: Yes, [facility] PCP: Internal handoff referral completed    Additional Information:  Patient medically ready for discharge today. Plan is for patient to discharge home. No identified discharge needs.    PCP follow up appt on 10/22/24.    DIAZ Escalona  [facility] [phone]/ [facility] [phone]  Care Management

## 2024-10-11 NOTE — PLAN OF CARE
Goal Outcome Evaluation:      Plan of Care Reviewed With: patient    Overall Patient Progress: no changeOverall Patient Progress: no change    Outcome Evaluation: Pt is alert and oriented x4, VSS on RA, independent in room. Pain managed with scheduled tylenol, PRN zanaflex, 5mg oxycodone and 25mg oral benedryl for itching. , 173 and 181.

## 2024-10-11 NOTE — PROGRESS NOTES
SPIRITUAL HEALTH SERVICES Progress Note    Medical Surgical Unit at [facility].      REFERRAL SOURCE/REASON FOR VISIT - Patient is Sabianist.    SUMMARY - One of our Eucharistic Ministers visited Stacy and gave her Holy Communion.           Plan - Eucharistic Ministers will continue to follow Stacy to offer her Holy Communion until her discharge.     Venkata Velazquez, Ph.D, [facility]  Spiritual Health Services  [address] [place], [place] [address] [phone]  [email or web address]    Assessment -     Strengths, Coping, and Resources - ernestina, ernestina community    Meaning, Beliefs, and Spirituality - Patient is Sabianist.

## 2024-10-11 NOTE — PROGRESS NOTES
S-(situation): Patient discharged to home via private vehicle with .    B-(background): Type 2 diabetes mellitus with hyperosmolar hyperglycemic state     A-(assessment): Up independent, orientated x4. Given Novolog sliding scale to cover blood sugars. Daily Lantus given. PRN oxycodone, zofran and tizanidine given. Last blood sugar 202. Patient hasn't had a BM since Monday. Scheduled Senna and new order for Miralax given.    /79 (BP Location: Right arm, Cuff Size: Adult Regular)   Pulse 83   Temp 98.5  F (36.9  C)   Resp 16   Wt 88.8 kg (195 lb 12.8 oz)   LMP 03/07/2021 (Approximate)   SpO2 98%   BMI 31.60 kg/m        R-(recommendations): Discharge instructions reviewed with  . Listed belongings gathered and returned to patient. Yes        Discharge Nursing Criteria:   Patient Education given and documented: (Diabetes):  {Yes   Care Plan and Patient education resolved: Yes    New Medications- pt has been educated about purpose and side effects: Yes       MISC  Prescriptions if needed, hard copies sent with patient  Yes  Home medications returned to patient: Insulin pens given to patient.  Medication Bin checked and emptied on discharge Yes  Patient reports post-discharge pain management plan is effective: Yes

## 2024-10-11 NOTE — PLAN OF CARE
Goal Outcome Evaluation:      Plan of Care Reviewed With: patient          Outcome Evaluation: Home

## 2024-10-11 NOTE — DISCHARGE SUMMARY
[facility]  Hospitalist Discharge Summary      Date of Admission:  10/8/2024  Date of Discharge:  10/11/2024  Discharging Provider: Sanna Shukla MD  Discharge Service: Hospitalist Service    Discharge Diagnoses   Principal Problem:    Type 2 diabetes mellitus with hyperosmolar hyperglycemic state (HHS) (H)  Active Problems:    Coronary artery disease involving native coronary artery of native heart without angina pectoris    Nausea and vomiting, unspecified vomiting type    Abdominal pain, generalized    Ketoacidosis    Lactic acidosis    Hyponatremia    Elevated lipase    Dysuria    Severe hypertension    Increased anion gap metabolic acidosis    Hypomagnesemia    Class 1 obesity due to excess calories with serious comorbidity and body mass index (BMI) of 30.0 to 30.9 in adult    Chronic pain syndrome    Iron deficiency    S/P CABG (coronary artery bypass graft)    Major depressive disorder, recurrent episode, moderate (H)    History of non-ST elevation myocardial infarction (NSTEMI) March 2021    Chronic, continuous use of opioids    Clinically Significant Risk Factors     # DMII: A1C = 8.8 % (Ref range: <5.7 %) within past 6 months       Follow-ups Needed After Discharge       Discharge Disposition   Discharged to home  Condition at discharge: Stable    Hospital Course   Stacy Brown is a 49 year old female with type 2 diabetes treated with insulin, hypertension, CAD with CABG, chronic neck and back pain treated chronically with opiates, chronic iron deficiency, depression, and obesity who presented to the ER with 1 day history of persistent nausea and vomiting followed by onset of severe abdominal pain. Patient was admitted for type 2 diabetes with hyperosmolar hyperglycemic state, ketoacidosis, lactic acidosis, SIRS, and severe hypertension.      Type 2 diabetes with hyperglycemic hyperosmolar state, ketoacidosis, and lactic acidosis  Increased anion gap metabolic  acidosis  Known chronic type 2 diabetes treated with insulin with current A1c 8.8.  Presented to ER with severe hyperglycemia blood sugar 505, hyperosmolality, and increased anion gap metabolic acidosis including ketoacidosis and lactic acidosis all probably due to uncontrolled diabetes.  Combination of hyperglycemia with hyperosmolality, ketoacidosis, lactic acidosis may have all been exacerbated by recent outpatient treatment with corticosteroids.  These metabolic disturbances may have precipitated recurrent nausea and vomiting.  Hyperglycemia has improved after initiation of treatment with insulin infusion and both metabolic acidosis and ketoacidosis have resolved.  Lactic acid has resolved.  As metabolic disturbances improved, she was switched from continuous IV insulin infusion to subcutaneous insulin on 10/9.  Patient was discharged home with her prior insulin dosing.    Recurrent nausea and vomiting  Episodic severe abdominal pain  Mildly elevated lipase  History of transverse colitis March 2024  Acute illness started 10/7 with recurrent nausea and vomiting worsened from her usual baseline followed by subsequent onset of severe abdominal pain lasting from evening 10/7 until presentation to the ER in early a.m. 10/8.  Patient reporting severe abdominal pain started in the lower abdomen reminiscent to her of an episode of transverse colitis in March 2024 of unknown cause at that time. CT of the abdomen with contrast without findings to account for patient's symptoms.  Overall, patient improved, back to home dosing of pain medications.    Consultations This Hospital Stay   CARE MANAGEMENT / SOCIAL WORK IP CONSULT    Code Status   Full Code    Time Spent on this Encounter   I, Sanna Shukla MD, personally saw the patient today and spent greater than 30 minutes discharging this patient.       Sanna Shukla MD  [facility] 2A MEDICAL SURGICAL  [address] [place]  [address]  Phone: [phone]  ______________________________________________________________________    Physical Exam   Vital Signs: Temp: 98.5  F (36.9  C) Temp src: Oral BP: 121/79 Pulse: 83   Resp: 16 SpO2: 98 % O2 Device: None (Room air)    Weight: 195 lbs 12.8 oz  Constitutional: Comfortable, not in acute distress  Respiratory: Normal respiratory effort, clear lungs  Cardiovascular: Regular rate and rhythm, no murmur appreciated  GI: Soft, bowel sounds normal  Neuro: Awake and alert and oriented x 3       Primary Care Physician   Yaima Muse    Discharge Orders       Significant Results and Procedures   Most Recent 3 CBC's:  Recent Labs   Lab Test 10/09/24  0555 10/08/24  1304 10/08/24  0457 08/03/24  1103   WBC 9.4 11.8* 13.5* 7.9   HGB 13.9  --  14.6 13.2   MCV 82  --  77* 80     --  395 236     Most Recent 3 BMP's:  Recent Labs   Lab Test 10/11/24  0751 10/11/24  0550 10/11/24  0235 10/10/24  0820 10/10/24  0526 10/09/24  0813 10/09/24  0555   NA  --  139  --   --  137  --  137   POTASSIUM  --  4.2  --   --  4.5  --  4.2   CHLORIDE  --  102  --   --  101  --  100   CO2  --  24  --   --  20*  --  22   BUN  --  13.1  --   --  14.9  --  16.1   CR  --  0.86  --   --  0.79  --  0.90   ANIONGAP  --  13  --   --  16*  --  15   LUNA  --  9.6  --   --  9.5  --  9.4   * 181* 173*   < > 271*   < > 274*    < > = values in this interval not displayed.     Most Recent Hemoglobin A1c:  Recent Labs   Lab Test 10/08/24  0457   A1C 8.8*     Most Recent 6 glucoses:  Recent Labs   Lab Test 10/11/24  0751 10/11/24  0550 10/11/24  0235 10/10/24  2038 10/10/24  1649 10/10/24  1130   * 181* 173* 186* 182* 254*   ,   Results for orders placed or performed during the hospital encounter of 10/08/24   CT Abdomen Pelvis w Contrast    Narrative    EXAM: CT ABDOMEN PELVIS W CONTRAST  LOCATION: [facility]  DATE: 10/8/2024    INDICATION: severe abdominal pain, recurrent  nausea and vomiting, severe hypertension, diabetes with hyperglycemic hyperosmolar state, lactic acidosis  COMPARISON: CT abdomen and pelvis of 04/01/2024 median sternotomy wires.  TECHNIQUE: CT scan of the abdomen and pelvis was performed following injection of IV contrast. Multiplanar reformats were obtained. Dose reduction techniques were used.  CONTRAST: Isovue 370, 94mL    FINDINGS:   LOWER CHEST: Normal.    HEPATOBILIARY: Hepatomegaly measuring 21 cm in length. Mild diffuse fatty infiltration of the liver. Cholecystectomy.    PANCREAS: Normal.    SPLEEN: Normal.    ADRENAL GLANDS: Normal.    KIDNEYS/BLADDER: Normal.    BOWEL: Normal appendix. No evidence for bowel obstruction. No bowel wall thickening or inflammatory changes.    LYMPH NODES: Normal.    VASCULATURE: Atherosclerotic disease abdominal aorta and iliac arteries, with moderate stenosis of the right common iliac artery, unchanged. The celiac trunk, superior mesenteric artery, inferior mesenteric artery, and both renal arteries are patent   without significant narrowing.    PELVIC ORGANS: 8mm hyperdensity in the uterine wall likely representing a fibroid.    MUSCULOSKELETAL: Normal.      Impression    IMPRESSION:   1.  Hepatomegaly and mild diffuse fatty infiltration of the liver.  2.  Atherosclerotic disease.  3.  Probable small uterine fibroid.  4.  No findings to account for the patient's symptoms.     *Note: Due to a large number of results and/or encounters for the requested time period, some results have not been displayed. A complete set of results can be found in Results Review.       Discharge Medications   Current Discharge Medication List        CONTINUE these medications which have NOT CHANGED    Details   Continuous Glucose Sensor (FREESTYLE MAXI 3 SENSOR) MISC APPLY 1 SENSOR AND CHANGE EVERY 14 DAYS AS DIRECTED  Qty: 2 each, Refills: 5    Associated Diagnoses: Type 2 diabetes mellitus with hyperglycemia, with long-term current use of  insulin (H); Type 2 diabetes mellitus with other circulatory complication, with long-term current use of insulin (H)      DULoxetine (CYMBALTA) 20 MG capsule Take 1 capsule (20 mg) by mouth daily  Qty: 30 capsule, Refills: 1    Associated Diagnoses: Moderate major depression (H)      insulin aspart (NOVOLOG FLEXPEN) 100 UNIT/ML pen INJECT 1 UNIT PER 10 GRAMS OF CARBS BEFORE DINNER, UP TO 15 UNITS PER MEAL.  Qty: 15 mL, Refills: 3    Associated Diagnoses: Type 2 diabetes mellitus with hyperglycemia, with long-term current use of insulin (H)      insulin glargine 100 UNIT/ML pen Inject 42 Units Subcutaneous every morning  Qty: 45 mL, Refills: 1    Comments: If Lantus is not covered by insurance, may substitute Basaglar or Semglee or other insulin glargine product per insurance preference at same dose and frequency.    Associated Diagnoses: Type 2 diabetes mellitus with hyperglycemia, with long-term current use of insulin (H)      insulin pen needle (NOVOFINE) 32G X 6 MM miscellaneous Use once daily or as directed.  Qty: 100 each, Refills: 3    Associated Diagnoses: Type 2 diabetes mellitus with hyperglycemia, with long-term current use of insulin (H)      lisinopril (ZESTRIL) 2.5 MG tablet Take 1 tablet (2.5 mg) by mouth daily  Qty: 90 tablet, Refills: 3    Associated Diagnoses: Type 2 diabetes mellitus with hyperglycemia, with long-term current use of insulin (H)      LORazepam (ATIVAN) 1 MG tablet Take 1 tablet (1 mg) by mouth every 6 hours as needed for anxiety or nausea  Qty: 10 tablet, Refills: 0      metFORMIN (GLUCOPHAGE XR) 500 MG 24 hr tablet Take 2 tablets (1,000 mg) by mouth 2 times daily (with meals)  Qty: 372 tablet, Refills: 3    Associated Diagnoses: Type 2 diabetes mellitus with hyperglycemia, with long-term current use of insulin (H)      methylPREDNISolone (MEDROL DOSEPAK) 4 MG tablet therapy pack Follow Package Directions  Qty: 21 tablet, Refills: 0      metoprolol tartrate (LOPRESSOR) 25 MG tablet  Take 1 tablet (25 mg) by mouth 2 times daily  Qty: 186 tablet, Refills: 3    Associated Diagnoses: NSTEMI (non-ST elevated myocardial infarction) (H)      Multiple Vitamins-Minerals (MULTI-VITAMIN GUMMIES) CHEW Take 1 chew tab by mouth daily (with dinner)      naloxone (NARCAN) 4 MG/0.1ML nasal spray Spray 4 mg into one nostril alternating nostrils once as needed.      nitroGLYcerin (NITROSTAT) 0.4 MG sublingual tablet For chest pain place 1 tablet under the tongue every 5 minutes for 3 doses. If symptoms persist 5 minutes after 1st dose call 911.  Qty: 25 tablet, Refills: 0    Associated Diagnoses: Type 2 diabetes mellitus with hyperglycemia, with long-term current use of insulin (H); Hyperlipidemia LDL goal <100      NURTEC 75 MG ODT tablet Place 75 mg under the tongue daily as needed for migraine      ondansetron (ZOFRAN ODT) 4 MG ODT tab Take 1 tablet (4 mg) by mouth every 8 hours as needed for nausea  Qty: 15 tablet, Refills: 0      oxyCODONE (ROXICODONE) 5 MG tablet Take 1 tablet (5 mg) by mouth daily as needed for severe pain.  Qty: 30 tablet, Refills: 0    Comments: OK to fill on or after 10/4/24  Associated Diagnoses: Chronic pain syndrome      prochlorperazine (COMPAZINE) 10 MG tablet Take 1 tablet (10 mg) by mouth every 6 hours as needed for nausea or vomiting  Qty: 20 tablet, Refills: 0      rosuvastatin (CRESTOR) 20 MG tablet Take 1 tablet (20 mg) by mouth daily  Qty: 93 tablet, Refills: 3    Associated Diagnoses: NSTEMI (non-ST elevated myocardial infarction) (H)      tiZANidine (ZANAFLEX) 4 MG tablet TAKE ONE TABLET BY MOUTH THREE TIMES A DAY  Qty: 270 tablet, Refills: 3    Associated Diagnoses: Mechanical back pain      Ascorbic Acid (VITAMIN C) 100 MG CHEW Take 1 chew tab by mouth daily           Allergies   Allergies   Allergen Reactions    Amoxicillin Hives    Hydrocodone Nausea and Vomiting

## 2024-10-14 ENCOUNTER — PATIENT OUTREACH (OUTPATIENT)
Dept: CARE COORDINATION | Facility: CLINIC | Age: 49
End: 2024-10-14
Payer: COMMERCIAL

## 2024-10-14 ENCOUNTER — TELEPHONE (OUTPATIENT)
Dept: PHARMACY | Facility: CLINIC | Age: 49
End: 2024-10-14
Payer: COMMERCIAL

## 2024-10-14 NOTE — PROGRESS NOTES
Clinic Care Coordination Contact  [facility]/Voicemail    Clinical Data: Care Coordinator Outreach    Outreach Documentation Number of Outreach Attempt   10/14/2024  11:13 AM 1       Left message on patient's voicemail with call back information and requested return call.    Plan: Care Coordinator will try to reach patient again in 1-2 business days.    Angela Aragon RN Care Coordination   [facility]  Email: [email or web address]  Phone: [phone]

## 2024-10-14 NOTE — TELEPHONE ENCOUNTER
MTM referral from: Transitions of Care (recent hospital discharge, TCU discharge, or ED visit)    MTM referral outreach attempt #1 on October 14, 2024 at 10:42 AM      Outcome: Spoke with patient declined    Use alayna, use MARY for the carrier/Plan on the flowsheet          Kristine Sexton [facility]  -[facility]  [phone]

## 2024-10-14 NOTE — LETTER
[facility] CARE COORDINATION  [address] [place] [address]    October 16, 2024    Stacy Brown  [address] [address] [place] [address]   [place] [address]      Dear Stacy,    I am a clinic care coordinator who works with Yaima Muse MD with the [facility]. I wanted to introduce myself and provide you with my contact information for you to be able to call me with any questions or concerns. Below is a description of clinic care coordination and how I can further assist you.       The clinic care coordination team is made up of a registered nurse, , financial resource worker and community health worker who understand the health care system. The goal of clinic care coordination is to help you manage your health and improve access to the health care system. Our team works alongside your provider to assist you in determining your health and social needs. We can help you obtain health care and community resources, providing you with necessary information and education. We can work with you through any barriers and develop a care plan that helps coordinate and strengthen the communication between you and your care team.  Our services are voluntary and are offered without charge to you personally.    Please feel free to contact me with any questions or concerns regarding care coordination and what we can offer.      We are focused on providing you with the highest-quality healthcare experience possible.    Sincerely,     Angela Aragon RN Care Coordination   [facility]-  [address], [place], [place]  Phone: [phone]

## 2024-10-21 NOTE — TELEPHONE ENCOUNTER
Ad[facility] to [facility] or by calling.    Estephanie Coffman, BSN, RN      Detail Level: Detailed Quality 431: Preventive Care And Screening: Unhealthy Alcohol Use - Screening: Patient not identified as an unhealthy alcohol user when screened for unhealthy alcohol use using a systematic screening method Quality 226: Preventive Care And Screening: Tobacco Use: Screening And Cessation Intervention: Patient screened for tobacco use and is an ex/non-smoker Quality 47: Advance Care Plan: Advance care planning not documented, reason not otherwise specified. Quality 130: Documentation Of Current Medications In The Medical Record: Current Medications Documented No deformities present

## 2024-10-22 ENCOUNTER — TELEPHONE (OUTPATIENT)
Dept: FAMILY MEDICINE | Facility: CLINIC | Age: 49
End: 2024-10-22

## 2024-10-22 ENCOUNTER — OFFICE VISIT (OUTPATIENT)
Dept: FAMILY MEDICINE | Facility: CLINIC | Age: 49
End: 2024-10-22
Payer: COMMERCIAL

## 2024-10-22 ENCOUNTER — MYC REFILL (OUTPATIENT)
Dept: FAMILY MEDICINE | Facility: CLINIC | Age: 49
End: 2024-10-22

## 2024-10-22 VITALS
TEMPERATURE: 97 F | SYSTOLIC BLOOD PRESSURE: 103 MMHG | WEIGHT: 202.2 LBS | DIASTOLIC BLOOD PRESSURE: 66 MMHG | HEART RATE: 68 BPM | OXYGEN SATURATION: 97 % | RESPIRATION RATE: 17 BRPM | BODY MASS INDEX: 32.5 KG/M2 | HEIGHT: 66 IN

## 2024-10-22 DIAGNOSIS — L03.116 CELLULITIS OF LEFT ANTERIOR LOWER LEG: ICD-10-CM

## 2024-10-22 DIAGNOSIS — E11.65 TYPE 2 DIABETES MELLITUS WITH HYPERGLYCEMIA, WITH LONG-TERM CURRENT USE OF INSULIN (H): ICD-10-CM

## 2024-10-22 DIAGNOSIS — F32.1 MODERATE MAJOR DEPRESSION (H): ICD-10-CM

## 2024-10-22 DIAGNOSIS — I10 BENIGN ESSENTIAL HYPERTENSION: ICD-10-CM

## 2024-10-22 DIAGNOSIS — R10.84 ABDOMINAL PAIN, GENERALIZED: ICD-10-CM

## 2024-10-22 DIAGNOSIS — G89.4 CHRONIC PAIN SYNDROME: ICD-10-CM

## 2024-10-22 DIAGNOSIS — Z79.4 TYPE 2 DIABETES MELLITUS WITH HYPERGLYCEMIA, WITH LONG-TERM CURRENT USE OF INSULIN (H): ICD-10-CM

## 2024-10-22 DIAGNOSIS — Z09 HOSPITAL DISCHARGE FOLLOW-UP: Primary | ICD-10-CM

## 2024-10-22 DIAGNOSIS — F11.90 CHRONIC, CONTINUOUS USE OF OPIOIDS: ICD-10-CM

## 2024-10-22 LAB
ANION GAP SERPL CALCULATED.3IONS-SCNC: 12 MMOL/L (ref 7–15)
BASOPHILS # BLD AUTO: 0 10E3/UL (ref 0–0.2)
BASOPHILS NFR BLD AUTO: 0 %
BUN SERPL-MCNC: 14.8 MG/DL (ref 6–20)
CALCIUM SERPL-MCNC: 9.9 MG/DL (ref 8.8–10.4)
CHLORIDE SERPL-SCNC: 104 MMOL/L (ref 98–107)
CREAT SERPL-MCNC: 0.74 MG/DL (ref 0.51–0.95)
EGFRCR SERPLBLD CKD-EPI 2021: >90 ML/MIN/1.73M2
EOSINOPHIL # BLD AUTO: 0.2 10E3/UL (ref 0–0.7)
EOSINOPHIL NFR BLD AUTO: 2 %
ERYTHROCYTE [DISTWIDTH] IN BLOOD BY AUTOMATED COUNT: 13.5 % (ref 10–15)
GLUCOSE SERPL-MCNC: 96 MG/DL (ref 70–99)
HCO3 SERPL-SCNC: 21 MMOL/L (ref 22–29)
HCT VFR BLD AUTO: 37.8 % (ref 35–47)
HGB BLD-MCNC: 12.7 G/DL (ref 11.7–15.7)
IMM GRANULOCYTES # BLD: 0 10E3/UL
IMM GRANULOCYTES NFR BLD: 0 %
LYMPHOCYTES # BLD AUTO: 2.1 10E3/UL (ref 0.8–5.3)
LYMPHOCYTES NFR BLD AUTO: 31 %
MCH RBC QN AUTO: 27.5 PG (ref 26.5–33)
MCHC RBC AUTO-ENTMCNC: 33.6 G/DL (ref 31.5–36.5)
MCV RBC AUTO: 82 FL (ref 78–100)
MONOCYTES # BLD AUTO: 0.5 10E3/UL (ref 0–1.3)
MONOCYTES NFR BLD AUTO: 8 %
NEUTROPHILS # BLD AUTO: 4 10E3/UL (ref 1.6–8.3)
NEUTROPHILS NFR BLD AUTO: 58 %
NRBC # BLD AUTO: 0 10E3/UL
NRBC BLD AUTO-RTO: 0 /100
PLATELET # BLD AUTO: 307 10E3/UL (ref 150–450)
POTASSIUM SERPL-SCNC: 4.2 MMOL/L (ref 3.4–5.3)
RBC # BLD AUTO: 4.61 10E6/UL (ref 3.8–5.2)
SODIUM SERPL-SCNC: 137 MMOL/L (ref 135–145)
WBC # BLD AUTO: 6.8 10E3/UL (ref 4–11)

## 2024-10-22 PROCEDURE — 85025 COMPLETE CBC W/AUTO DIFF WBC: CPT | Performed by: NURSE PRACTITIONER

## 2024-10-22 PROCEDURE — 99495 TRANSJ CARE MGMT MOD F2F 14D: CPT | Performed by: NURSE PRACTITIONER

## 2024-10-22 PROCEDURE — 80048 BASIC METABOLIC PNL TOTAL CA: CPT | Performed by: NURSE PRACTITIONER

## 2024-10-22 PROCEDURE — 36415 COLL VENOUS BLD VENIPUNCTURE: CPT | Performed by: NURSE PRACTITIONER

## 2024-10-22 RX ORDER — CYCLOBENZAPRINE HCL 10 MG
10 TABLET ORAL 3 TIMES DAILY PRN
Qty: 30 TABLET | Refills: 5 | Status: SHIPPED | OUTPATIENT
Start: 2024-10-22

## 2024-10-22 RX ORDER — DULOXETIN HYDROCHLORIDE 20 MG/1
20 CAPSULE, DELAYED RELEASE ORAL 2 TIMES DAILY
Qty: 30 CAPSULE | Refills: 1 | Status: SHIPPED | OUTPATIENT
Start: 2024-10-22 | End: 2024-10-22

## 2024-10-22 RX ORDER — INSULIN GLARGINE 100 [IU]/ML
42 INJECTION, SOLUTION SUBCUTANEOUS EVERY MORNING
Qty: 45 ML | Refills: 1 | Status: SHIPPED | OUTPATIENT
Start: 2024-10-22

## 2024-10-22 RX ORDER — DOXYCYCLINE 100 MG/1
100 CAPSULE ORAL 2 TIMES DAILY
Qty: 20 CAPSULE | Refills: 0 | Status: SHIPPED | OUTPATIENT
Start: 2024-10-22 | End: 2024-11-01

## 2024-10-22 RX ORDER — DULOXETIN HYDROCHLORIDE 20 MG/1
20 CAPSULE, DELAYED RELEASE ORAL 2 TIMES DAILY
Qty: 60 CAPSULE | Refills: 0 | Status: SHIPPED | OUTPATIENT
Start: 2024-10-22 | End: 2024-11-21

## 2024-10-22 ASSESSMENT — PAIN SCALES - GENERAL: PAINLEVEL: SEVERE PAIN (6)

## 2024-10-22 NOTE — TELEPHONE ENCOUNTER
Pharmacy calling to review prescription for duloxetine 20 mg.    Written to take 2 times daily, but sent for only 30 capsules with 1 refill. This would be a 15 day supply each dispense.   Pharmacy wondering if sig is correct or should quantity be changed for 30-day dispense?    Rubina MEDRANON, RN
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
## 2024-10-29 ENCOUNTER — MEDICAL CORRESPONDENCE (OUTPATIENT)
Dept: HEALTH INFORMATION MANAGEMENT | Facility: CLINIC | Age: 49
End: 2024-10-29
Payer: COMMERCIAL

## 2024-10-30 ENCOUNTER — TRANSFERRED RECORDS (OUTPATIENT)
Dept: HEALTH INFORMATION MANAGEMENT | Facility: CLINIC | Age: 49
End: 2024-10-30
Payer: COMMERCIAL

## 2024-10-31 ENCOUNTER — MYC REFILL (OUTPATIENT)
Dept: FAMILY MEDICINE | Facility: CLINIC | Age: 49
End: 2024-10-31
Payer: COMMERCIAL

## 2024-10-31 DIAGNOSIS — G89.4 CHRONIC PAIN SYNDROME: ICD-10-CM

## 2024-10-31 RX ORDER — OXYCODONE HYDROCHLORIDE 5 MG/1
5 TABLET ORAL DAILY PRN
Qty: 30 TABLET | Refills: 0 | Status: SHIPPED | OUTPATIENT
Start: 2024-11-03

## 2024-11-07 ENCOUNTER — TELEPHONE (OUTPATIENT)
Dept: FAMILY MEDICINE | Facility: CLINIC | Age: 49
End: 2024-11-07
Payer: COMMERCIAL

## 2024-11-07 NOTE — TELEPHONE ENCOUNTER
Forms/Letter Request    Type of form/letter: Disability      Is Release of Information needed?: No    Do we have the form/letter: Yes: In mailbox    Who is the form from? Patient    Where did/will the form come from? Patient or family brought in       When is form/letter needed by: ASAP    How would you like the form/letter returned: [employer]  And mail [id number] [facility] [id number]    Patient Notified form requests are processed in 5-7 business days:Yes    Could we send this information to you in [employer] or would you prefer to receive a phone call?:   Patient would like to be contacted via [employer]
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
## 2024-11-08 NOTE — TELEPHONE ENCOUNTER
Sent patient [employer] message that forms were sent to medical records for release.

## 2024-11-15 ENCOUNTER — VIRTUAL VISIT (OUTPATIENT)
Dept: FAMILY MEDICINE | Facility: CLINIC | Age: 49
End: 2024-11-15
Payer: COMMERCIAL

## 2024-11-15 DIAGNOSIS — I21.4 NSTEMI (NON-ST ELEVATED MYOCARDIAL INFARCTION) (H): ICD-10-CM

## 2024-11-15 DIAGNOSIS — F32.1 MODERATE MAJOR DEPRESSION (H): ICD-10-CM

## 2024-11-15 DIAGNOSIS — E66.09 CLASS 1 OBESITY DUE TO EXCESS CALORIES WITH SERIOUS COMORBIDITY AND BODY MASS INDEX (BMI) OF 30.0 TO 30.9 IN ADULT: ICD-10-CM

## 2024-11-15 DIAGNOSIS — E78.5 HYPERLIPIDEMIA LDL GOAL <100: ICD-10-CM

## 2024-11-15 DIAGNOSIS — R10.84 ABDOMINAL PAIN, GENERALIZED: ICD-10-CM

## 2024-11-15 DIAGNOSIS — Z79.4 TYPE 2 DIABETES MELLITUS WITH HYPERGLYCEMIA, WITH LONG-TERM CURRENT USE OF INSULIN (H): Primary | ICD-10-CM

## 2024-11-15 DIAGNOSIS — F11.90 CHRONIC, CONTINUOUS USE OF OPIOIDS: ICD-10-CM

## 2024-11-15 DIAGNOSIS — E11.65 TYPE 2 DIABETES MELLITUS WITH HYPERGLYCEMIA, WITH LONG-TERM CURRENT USE OF INSULIN (H): Primary | ICD-10-CM

## 2024-11-15 DIAGNOSIS — G89.4 CHRONIC PAIN SYNDROME: ICD-10-CM

## 2024-11-15 DIAGNOSIS — F43.10 PTSD (POST-TRAUMATIC STRESS DISORDER): ICD-10-CM

## 2024-11-15 DIAGNOSIS — F41.9 ANXIETY: ICD-10-CM

## 2024-11-15 DIAGNOSIS — Z95.1 S/P CABG (CORONARY ARTERY BYPASS GRAFT): ICD-10-CM

## 2024-11-15 DIAGNOSIS — R11.2 NAUSEA AND VOMITING, UNSPECIFIED VOMITING TYPE: ICD-10-CM

## 2024-11-15 DIAGNOSIS — E66.811 CLASS 1 OBESITY DUE TO EXCESS CALORIES WITH SERIOUS COMORBIDITY AND BODY MASS INDEX (BMI) OF 30.0 TO 30.9 IN ADULT: ICD-10-CM

## 2024-11-15 PROCEDURE — G2211 COMPLEX E/M VISIT ADD ON: HCPCS | Mod: 95 | Performed by: FAMILY MEDICINE

## 2024-11-15 PROCEDURE — 99214 OFFICE O/P EST MOD 30 MIN: CPT | Mod: 95 | Performed by: FAMILY MEDICINE

## 2024-11-15 ASSESSMENT — PATIENT HEALTH QUESTIONNAIRE - PHQ9: SUM OF ALL RESPONSES TO PHQ QUESTIONS 1-9: 8
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
## 2024-11-16 PROBLEM — M70.62 GREATER TROCHANTERIC BURSITIS OF BOTH HIPS: Status: RESOLVED | Noted: 2019-08-14 | Resolved: 2024-11-16
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
## 2024-11-16 PROBLEM — D69.59 PLATELET DYSFUNCTION DUE TO DRUGS: Status: RESOLVED | Noted: 2022-12-11 | Resolved: 2024-11-16

## 2024-11-16 PROBLEM — M70.61 GREATER TROCHANTERIC BURSITIS OF BOTH HIPS: Status: RESOLVED | Noted: 2019-08-14 | Resolved: 2024-11-16

## 2024-11-16 PROBLEM — T50.905A PLATELET DYSFUNCTION DUE TO DRUGS: Status: RESOLVED | Noted: 2022-12-11 | Resolved: 2024-11-16

## 2024-11-16 PROBLEM — R42 DIZZINESS: Status: RESOLVED | Noted: 2019-08-28 | Resolved: 2024-11-16

## 2024-11-16 RX ORDER — NITROGLYCERIN 0.4 MG/1
TABLET SUBLINGUAL
Qty: 25 TABLET | Refills: 0 | Status: SHIPPED | OUTPATIENT
Start: 2024-11-16

## 2024-11-16 RX ORDER — METFORMIN HYDROCHLORIDE 500 MG/1
1000 TABLET, EXTENDED RELEASE ORAL 2 TIMES DAILY WITH MEALS
Qty: 360 TABLET | Refills: 3 | Status: SHIPPED | OUTPATIENT
Start: 2024-11-16

## 2024-11-16 RX ORDER — DULOXETIN HYDROCHLORIDE 20 MG/1
20 CAPSULE, DELAYED RELEASE ORAL 2 TIMES DAILY
Qty: 180 CAPSULE | Refills: 3 | Status: SHIPPED | OUTPATIENT
Start: 2024-11-16

## 2024-11-16 RX ORDER — PANTOPRAZOLE SODIUM 40 MG/1
40 TABLET, DELAYED RELEASE ORAL EVERY MORNING
Qty: 90 TABLET | Refills: 1 | Status: SHIPPED | OUTPATIENT
Start: 2024-11-16

## 2024-11-16 RX ORDER — LORAZEPAM 1 MG/1
1 TABLET ORAL EVERY 6 HOURS PRN
Qty: 10 TABLET | Refills: 0 | Status: SHIPPED | OUTPATIENT
Start: 2024-11-16

## 2024-11-16 RX ORDER — ROSUVASTATIN CALCIUM 20 MG/1
20 TABLET, COATED ORAL DAILY
Qty: 93 TABLET | Refills: 3 | Status: SHIPPED | OUTPATIENT
Start: 2024-11-16

## 2024-11-16 RX ORDER — METOPROLOL TARTRATE 25 MG/1
25 TABLET, FILM COATED ORAL 2 TIMES DAILY
Qty: 186 TABLET | Refills: 3 | Status: SHIPPED | OUTPATIENT
Start: 2024-11-16

## 2024-11-18 ENCOUNTER — TELEPHONE (OUTPATIENT)
Dept: FAMILY MEDICINE | Facility: CLINIC | Age: 49
End: 2024-11-18
Payer: COMMERCIAL

## 2024-11-18 NOTE — TELEPHONE ENCOUNTER
Prior Authorization Retail Medication Request    Medication/Dose: Pantoprazole 40mg  Diagnosis and ICD code (if different than what is on RX):  n/a  New/renewal/insurance change PA/secondary ins. PA:  Previously Tried and Failed:  n/a  Rationale:  per ins: FOR OVERRIDE HAVE MD CALL [phone]  MAX QTY OF 90.000  DAYS    Insurance   Primary: [facility] [employer]  Insurance ID:  [id number]    Secondary (if applicable):n/a  Insurance ID:  n/a    Pharmacy Information (if different than what is on RX)  Name:  n/a  Phone:  n/a  Fax:n/a    Clinic Information  Preferred routing pool for dept communication: n/a    Thank You~  Daysi Cruz CPhT  [facility]  [place]

## 2024-11-19 ENCOUNTER — TELEPHONE (OUTPATIENT)
Dept: FAMILY MEDICINE | Facility: CLINIC | Age: 49
End: 2024-11-19
Payer: COMMERCIAL

## 2024-11-19 NOTE — TELEPHONE ENCOUNTER
----- Message from Yaima Muse sent at 11/16/2024  1:36 PM CST -----  Regarding: needs appt  Hi all, please schedule Stacy for an establish care visit with Dr. Francisco sometime in January, mid to late month. Thank you.   Yaima Muse MD

## 2024-11-20 NOTE — TELEPHONE ENCOUNTER
PA Initiation    Medication: PANTOPRAZOLE SODIUM 40 MG PO [facility]  Insurance Company: [employer] - Phone [phone] Fax [phone]  Pharmacy Filling the Rx: [facility] [place], [place] - [address] [place] [address]  Filling Pharmacy Phone:    Filling Pharmacy Fax:    Start Date: 11/20/2024

## 2024-11-20 NOTE — TELEPHONE ENCOUNTER
Prior Authorization Approval    Medication: PANTOPRAZOLE SODIUM 40 MG PO [facility]  Authorization Effective Date: 10/21/2024  Authorization Expiration Date: 11/20/2025  Approved Dose/Quantity: UD  Reference #:     Insurance Company: [employer] - Phone [phone] Fax [phone]  Expected CoPay: $  0  CoPay Card Available: No    Financial Assistance Needed: N/A  Which Pharmacy is filling the prescription: [facility] [place] [address]  Pharmacy Notified: YES  Patient Notified: NO

## 2024-11-25 ENCOUNTER — MYC REFILL (OUTPATIENT)
Dept: FAMILY MEDICINE | Facility: CLINIC | Age: 49
End: 2024-11-25
Payer: COMMERCIAL

## 2024-11-25 DIAGNOSIS — G89.4 CHRONIC PAIN SYNDROME: ICD-10-CM

## 2024-11-27 RX ORDER — OXYCODONE HYDROCHLORIDE 5 MG/1
5 TABLET ORAL DAILY PRN
Qty: 30 TABLET | Refills: 0 | Status: SHIPPED | OUTPATIENT
Start: 2024-12-03

## 2024-11-29 ENCOUNTER — APPOINTMENT (OUTPATIENT)
Dept: GENERAL RADIOLOGY | Facility: CLINIC | Age: 49
End: 2024-11-29
Attending: PHYSICIAN ASSISTANT
Payer: COMMERCIAL

## 2024-11-29 ENCOUNTER — HOSPITAL ENCOUNTER (EMERGENCY)
Facility: CLINIC | Age: 49
Discharge: HOME | End: 2024-11-29
Attending: PHYSICIAN ASSISTANT | Admitting: PHYSICIAN ASSISTANT
Payer: COMMERCIAL

## 2024-11-29 VITALS
RESPIRATION RATE: 20 BRPM | HEART RATE: 100 BPM | TEMPERATURE: 98.1 F | OXYGEN SATURATION: 100 % | SYSTOLIC BLOOD PRESSURE: 184 MMHG | DIASTOLIC BLOOD PRESSURE: 110 MMHG

## 2024-11-29 DIAGNOSIS — R05.9 COUGH: ICD-10-CM

## 2024-11-29 DIAGNOSIS — M70.62 TROCHANTERIC BURSITIS OF BOTH HIPS: ICD-10-CM

## 2024-11-29 DIAGNOSIS — M70.61 TROCHANTERIC BURSITIS OF BOTH HIPS: ICD-10-CM

## 2024-11-29 LAB
FLUAV RNA SPEC QL NAA+PROBE: NEGATIVE
FLUBV RNA RESP QL NAA+PROBE: NEGATIVE
RSV RNA SPEC NAA+PROBE: NEGATIVE
SARS-COV-2 RNA RESP QL NAA+PROBE: NEGATIVE

## 2024-11-29 PROCEDURE — 250N000011 HC RX IP 250 OP 636: Performed by: PHYSICIAN ASSISTANT

## 2024-11-29 PROCEDURE — 73522 X-RAY EXAM HIPS BI 3-4 VIEWS: CPT

## 2024-11-29 PROCEDURE — 99285 EMERGENCY DEPT VISIT HI MDM: CPT | Mod: 25 | Performed by: PHYSICIAN ASSISTANT

## 2024-11-29 PROCEDURE — 87637 SARSCOV2&INF A&B&RSV AMP PRB: CPT | Performed by: PHYSICIAN ASSISTANT

## 2024-11-29 PROCEDURE — 20610 DRAIN/INJ JOINT/BURSA W/O US: CPT | Performed by: PHYSICIAN ASSISTANT

## 2024-11-29 PROCEDURE — 71045 X-RAY EXAM CHEST 1 VIEW: CPT

## 2024-11-29 PROCEDURE — 99284 EMERGENCY DEPT VISIT MOD MDM: CPT | Mod: 25 | Performed by: PHYSICIAN ASSISTANT

## 2024-11-29 PROCEDURE — 96372 THER/PROPH/DIAG INJ SC/IM: CPT | Mod: 59 | Performed by: PHYSICIAN ASSISTANT

## 2024-11-29 RX ORDER — TRIAMCINOLONE ACETONIDE 40 MG/ML
40 INJECTION, SUSPENSION INTRA-ARTICULAR; INTRAMUSCULAR ONCE
Status: COMPLETED | OUTPATIENT
Start: 2024-11-29 | End: 2024-11-29

## 2024-11-29 RX ORDER — BUPIVACAINE HYDROCHLORIDE 5 MG/ML
10 INJECTION, SOLUTION PERINEURAL ONCE
Status: COMPLETED | OUTPATIENT
Start: 2024-11-29 | End: 2024-11-29

## 2024-11-29 RX ORDER — OXYCODONE HYDROCHLORIDE 5 MG/1
5 TABLET ORAL EVERY 6 HOURS PRN
Qty: 12 TABLET | Refills: 0 | Status: SHIPPED | OUTPATIENT
Start: 2024-11-29

## 2024-11-29 RX ORDER — KETOROLAC TROMETHAMINE 30 MG/ML
30 INJECTION, SOLUTION INTRAMUSCULAR; INTRAVENOUS ONCE
Status: COMPLETED | OUTPATIENT
Start: 2024-11-29 | End: 2024-11-29

## 2024-11-29 RX ORDER — BUPIVACAINE HYDROCHLORIDE 5 MG/ML
10 INJECTION, SOLUTION EPIDURAL; INTRACAUDAL ONCE
Status: DISCONTINUED | OUTPATIENT
Start: 2024-11-29 | End: 2024-11-29

## 2024-11-29 RX ADMIN — HYDROMORPHONE HYDROCHLORIDE 1 MG: 1 INJECTION, SOLUTION INTRAMUSCULAR; INTRAVENOUS; SUBCUTANEOUS at 21:04

## 2024-11-29 RX ADMIN — BUPIVACAINE HYDROCHLORIDE 50 MG: 5 INJECTION, SOLUTION EPIDURAL; INTRACAUDAL at 23:17

## 2024-11-29 RX ADMIN — KETOROLAC TROMETHAMINE 30 MG: 30 INJECTION, SOLUTION INTRAMUSCULAR at 21:03

## 2024-11-29 RX ADMIN — TRIAMCINOLONE ACETONIDE 40 MG: 40 INJECTION, SUSPENSION INTRA-ARTICULAR; INTRAMUSCULAR at 23:18

## 2024-11-29 ASSESSMENT — ACTIVITIES OF DAILY LIVING (ADL)
ADLS_ACUITY_SCORE: 58

## 2024-11-30 NOTE — DISCHARGE INSTRUCTIONS
It was a pleasure working with you today!  I hope your condition improves rapidly!     Please ice the outside of your hips for 20 minutes every couple hours for the next 2 days.  This should help with the inflammation and also helps with any type of rebound discomfort from the injections.  It is important that you rest for the next 2-3 days as well.  Please follow-up with Dr. Clay in 2 weeks if symptoms are not improving with these injections.
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
## 2024-12-03 ENCOUNTER — TELEPHONE (OUTPATIENT)
Dept: FAMILY MEDICINE | Facility: CLINIC | Age: 49
End: 2024-12-03
Payer: OTHER MISCELLANEOUS

## 2024-12-03 ENCOUNTER — TELEPHONE (OUTPATIENT)
Dept: FAMILY MEDICINE | Facility: CLINIC | Age: 49
End: 2024-12-03
Payer: COMMERCIAL

## 2024-12-03 NOTE — TELEPHONE ENCOUNTER
Shyanne Francisco MD  P [facility]  ER visit riya reviewed. Elevated BP at that time. Can we make sure she is check ing BP at home or can we facilitate RN check of BP?

## 2024-12-03 NOTE — TELEPHONE ENCOUNTER
ER visit riya reviewed. Elevated BP at that time. Can we make sure she is check ing BP at home or can we facilitate RN check of BP?

## 2024-12-04 ENCOUNTER — MYC MEDICAL ADVICE (OUTPATIENT)
Dept: FAMILY MEDICINE | Facility: CLINIC | Age: 49
End: 2024-12-04
Payer: COMMERCIAL

## 2024-12-05 NOTE — TELEPHONE ENCOUNTER
Patient has a BP cuff at home and will take readings and get back to us.  I asked her to schedule an appointment if needed.    Brisa Benitez XRO/

## 2024-12-07 ENCOUNTER — MYC MEDICAL ADVICE (OUTPATIENT)
Dept: FAMILY MEDICINE | Facility: CLINIC | Age: 49
End: 2024-12-07
Payer: COMMERCIAL

## 2024-12-09 ENCOUNTER — TELEPHONE (OUTPATIENT)
Dept: ORTHOPEDICS | Facility: CLINIC | Age: 49
End: 2024-12-09
Payer: OTHER MISCELLANEOUS

## 2024-12-09 NOTE — TELEPHONE ENCOUNTER
Order does reflect bilateral but diagnosis updated to reflect right and left ankle pain and arthritis.

## 2024-12-09 NOTE — TELEPHONE ENCOUNTER
Can we make follow up appointment for patient. Markedly elevated Bps at home. Can we make follow up with same day provider for Bps and then plan to establish in [place] with me?

## 2024-12-09 NOTE — TELEPHONE ENCOUNTER
Other: Patient is scheduled for imaging tomorrow.     Current orders indicate (R) ankle only but patient says it's supposed to be BOTH ankles.    If Dr. Riley wants BOTH ankles done please submit NEW order in chart to reflect BOTH ankles.

## 2024-12-10 ENCOUNTER — HOSPITAL ENCOUNTER (OUTPATIENT)
Dept: GENERAL RADIOLOGY | Facility: CLINIC | Age: 49
Discharge: HOME | End: 2024-12-10
Attending: ORTHOPAEDIC SURGERY
Payer: OTHER MISCELLANEOUS

## 2024-12-10 ENCOUNTER — OFFICE VISIT (OUTPATIENT)
Dept: FAMILY MEDICINE | Facility: CLINIC | Age: 49
End: 2024-12-10
Payer: COMMERCIAL

## 2024-12-10 VITALS
DIASTOLIC BLOOD PRESSURE: 86 MMHG | OXYGEN SATURATION: 100 % | HEIGHT: 66 IN | HEART RATE: 76 BPM | TEMPERATURE: 98.6 F | RESPIRATION RATE: 20 BRPM | SYSTOLIC BLOOD PRESSURE: 148 MMHG | WEIGHT: 193 LBS | BODY MASS INDEX: 31.02 KG/M2

## 2024-12-10 DIAGNOSIS — I10 BENIGN ESSENTIAL HYPERTENSION: Primary | ICD-10-CM

## 2024-12-10 DIAGNOSIS — Z79.4 TYPE 2 DIABETES MELLITUS WITH HYPERGLYCEMIA, WITH LONG-TERM CURRENT USE OF INSULIN (H): ICD-10-CM

## 2024-12-10 DIAGNOSIS — M25.571 PAIN IN JOINT, ANKLE AND FOOT, RIGHT: ICD-10-CM

## 2024-12-10 DIAGNOSIS — G89.4 CHRONIC PAIN SYNDROME: ICD-10-CM

## 2024-12-10 DIAGNOSIS — E11.65 TYPE 2 DIABETES MELLITUS WITH HYPERGLYCEMIA, WITH LONG-TERM CURRENT USE OF INSULIN (H): ICD-10-CM

## 2024-12-10 DIAGNOSIS — M19.90 ARTHRITIS: ICD-10-CM

## 2024-12-10 DIAGNOSIS — M25.572 PAIN IN JOINT, ANKLE AND FOOT, LEFT: ICD-10-CM

## 2024-12-10 PROCEDURE — G2211 COMPLEX E/M VISIT ADD ON: HCPCS | Performed by: NURSE PRACTITIONER

## 2024-12-10 PROCEDURE — 20605 DRAIN/INJ JOINT/BURSA W/O US: CPT | Mod: 50

## 2024-12-10 PROCEDURE — 255N000002 HC RX 255 OP 636: Performed by: RADIOLOGY

## 2024-12-10 PROCEDURE — 96372 THER/PROPH/DIAG INJ SC/IM: CPT | Performed by: RADIOLOGY

## 2024-12-10 PROCEDURE — 250N000009 HC RX 250: Performed by: RADIOLOGY

## 2024-12-10 PROCEDURE — 99214 OFFICE O/P EST MOD 30 MIN: CPT | Performed by: NURSE PRACTITIONER

## 2024-12-10 PROCEDURE — 77002 NEEDLE LOCALIZATION BY XRAY: CPT

## 2024-12-10 PROCEDURE — 250N000011 HC RX IP 250 OP 636: Performed by: RADIOLOGY

## 2024-12-10 RX ORDER — INSULIN ASPART 100 [IU]/ML
INJECTION, SOLUTION INTRAVENOUS; SUBCUTANEOUS
Qty: 45 ML | Refills: 3 | Status: SHIPPED | OUTPATIENT
Start: 2024-12-10

## 2024-12-10 RX ORDER — TRIAMCINOLONE ACETONIDE 40 MG/ML
40 INJECTION, SUSPENSION INTRA-ARTICULAR; INTRAMUSCULAR ONCE
Status: COMPLETED | OUTPATIENT
Start: 2024-12-10 | End: 2024-12-10

## 2024-12-10 RX ORDER — LISINOPRIL 10 MG/1
10 TABLET ORAL DAILY
Qty: 60 TABLET | Refills: 0 | Status: SHIPPED | OUTPATIENT
Start: 2024-12-10

## 2024-12-10 RX ORDER — LIDOCAINE HYDROCHLORIDE 10 MG/ML
5 INJECTION, SOLUTION EPIDURAL; INFILTRATION; INTRACAUDAL; PERINEURAL ONCE
Status: COMPLETED | OUTPATIENT
Start: 2024-12-10 | End: 2024-12-10

## 2024-12-10 RX ORDER — BUPIVACAINE HYDROCHLORIDE 2.5 MG/ML
10 INJECTION, SOLUTION EPIDURAL; INFILTRATION; INTRACAUDAL ONCE
Status: COMPLETED | OUTPATIENT
Start: 2024-12-10 | End: 2024-12-10

## 2024-12-10 RX ORDER — IOPAMIDOL 510 MG/ML
100 INJECTION, SOLUTION INTRAVASCULAR ONCE
Status: COMPLETED | OUTPATIENT
Start: 2024-12-10 | End: 2024-12-10

## 2024-12-10 RX ADMIN — BUPIVACAINE HYDROCHLORIDE 4 ML: 2.5 INJECTION, SOLUTION EPIDURAL; INFILTRATION; INTRACAUDAL; PERINEURAL at 12:44

## 2024-12-10 RX ADMIN — TRIAMCINOLONE ACETONIDE 40 ML: 40 INJECTION, SUSPENSION INTRA-ARTICULAR; INTRAMUSCULAR at 12:44

## 2024-12-10 RX ADMIN — IOPAMIDOL 2 ML: 510 INJECTION, SOLUTION INTRAVASCULAR at 12:44

## 2024-12-10 RX ADMIN — TRIAMCINOLONE ACETONIDE 40 MG: 40 INJECTION, SUSPENSION INTRA-ARTICULAR; INTRAMUSCULAR at 12:52

## 2024-12-10 RX ADMIN — LIDOCAINE HYDROCHLORIDE 3 ML: 10 INJECTION, SOLUTION EPIDURAL; INFILTRATION; INTRACAUDAL; PERINEURAL at 12:44

## 2024-12-10 RX ADMIN — LIDOCAINE HYDROCHLORIDE 3 ML: 10 INJECTION, SOLUTION EPIDURAL; INFILTRATION; INTRACAUDAL; PERINEURAL at 12:51

## 2024-12-10 ASSESSMENT — ANXIETY QUESTIONNAIRES
4. TROUBLE RELAXING: MORE THAN HALF THE DAYS
2. NOT BEING ABLE TO STOP OR CONTROL WORRYING: NEARLY EVERY DAY
6. BECOMING EASILY ANNOYED OR IRRITABLE: NEARLY EVERY DAY
IF YOU CHECKED OFF ANY PROBLEMS ON THIS QUESTIONNAIRE, HOW DIFFICULT HAVE THESE PROBLEMS MADE IT FOR YOU TO DO YOUR WORK, TAKE CARE OF THINGS AT HOME, OR GET ALONG WITH OTHER PEOPLE: VERY DIFFICULT
5. BEING SO RESTLESS THAT IT IS HARD TO SIT STILL: MORE THAN HALF THE DAYS
GAD7 TOTAL SCORE: 16
3. WORRYING TOO MUCH ABOUT DIFFERENT THINGS: NEARLY EVERY DAY
8. IF YOU CHECKED OFF ANY PROBLEMS, HOW DIFFICULT HAVE THESE MADE IT FOR YOU TO DO YOUR WORK, TAKE CARE OF THINGS AT HOME, OR GET ALONG WITH OTHER PEOPLE?: VERY DIFFICULT
1. FEELING NERVOUS, ANXIOUS, OR ON EDGE: MORE THAN HALF THE DAYS
7. FEELING AFRAID AS IF SOMETHING AWFUL MIGHT HAPPEN: SEVERAL DAYS
GAD7 TOTAL SCORE: 16

## 2024-12-10 ASSESSMENT — ENCOUNTER SYMPTOMS: HYPERTENSION: 1

## 2024-12-10 ASSESSMENT — PAIN SCALES - GENERAL: PAINLEVEL_OUTOF10: EXTREME PAIN (9)

## 2024-12-10 NOTE — PROGRESS NOTES
Assessment & Plan     Benign essential hypertension  Blood pressure elevated in clinic today, home readings are similar. Discussed goal BP less than 130/80. She feels her chronic pain is likely increasing her blood pressure at this time. We discussed with her other chronic conditions and high risk of cardiovascular complication, I would recommend increasing her lisinopril for better blood pressure control while she works with pain management and orthopedics on her chronic pain.She is agreeable to this today. Encouraged low sodium diet, increased exercise and weight loss. Home blood pressure monitoring recommended. Will have her return for nurse BP check and lab in 1-2 weeks. Discussed symptoms of hypotension, she will follow-up if symptoms develop or persist.  - lisinopril (ZESTRIL) 10 MG tablet; Take 1 tablet (10 mg) by mouth daily.  - Basic metabolic panel  (Ca, Cl, CO2, Creat, Gluc, K, Na, BUN); Future    Chronic pain syndrome  Following with pain management and Orthopedics. No changes to medication regimen made at this time. She is scheduled for injection later today.     I explained my diagnostic considerations and recommendations to the patient, who voiced understanding and agreement with the assessment and treatment plan. All questions were answered to patient's apparent satisfaction. We discussed potential side effects of any prescribed or recommended therapies, as well as expectations for response to treatments and importance of lifestyle measures that may improve symptoms. Patient was advised to contact our office if there are new symptoms or no improvement or worsening of conditions or symptoms.    The longitudinal plan of care for the diagnosis(es)/condition(s) as documented were addressed during this visit. Due to the added complexity in care, I will continue to support Stacy in the subsequent management and with ongoing continuity of care.              Subjective   Stacy is a 49 year old,  presenting for the following health issues:  Hypertension and Diabetes        12/10/2024    10:11 AM   Additional Questions   Roomed by Brittney EMMANUEL     History of Present Illness       Diabetes:   She presents for follow up of diabetes.   She is checking home blood glucose with a continuous glucose monitor.   She checks blood glucose before meals, after meals, before and after meals and at bedtime.  Blood glucose is sometimes over 200 and sometimes under 70. She is aware of hypoglycemia symptoms including shakiness, dizziness and weakness.    She has no concerns regarding her diabetes at this time.  She is having numbness in feet and weight loss.            Hyperlipidemia:  She presents for follow up of hyperlipidemia.   She is taking medication to lower cholesterol. She is not having myalgia or other side effects to statin medications.    Hypertension: She presents for follow up of hypertension.  She does not check blood pressure  regularly outside of the clinic. Outside blood pressures have been over 140/90. She follows a low salt diet.     She eats 2-3 servings of fruits and vegetables daily.She consumes 2 sweetened beverage(s) daily.She exercises with enough effort to increase her heart rate 9 or less minutes per day.  She exercises with enough effort to increase her heart rate 3 or less days per week. She is missing 1 dose(s) of medications per week.  She is not taking prescribed medications regularly due to remembering to take.     Hypertension - Stacy presents for follow-up of her hypertension. She has been taking Lisinopril 2.5mg as well as metoprolol Taking medications as instructed. They are checking blood pressures at home until her home BP cuff broke. Review of numbers shows blood pressures are not in good control. Home readings have been 150-160's/100's. No new or significant medication side effects noted. No stroke-like symptoms, no chest pain on exertion, no dyspnea on exertion, no swelling of ankles, no  palpitations. No orthostatic hypotension, no urinary abnormalities noted. She notes having more pain recently. She was in the ED on 11/29/2024 with concerns of pain in her hips. She was diagnosed with bursitis of the hips and is following with Orthopedics. She is scheduled for injections later today. She has a history of chronic pain, she taking Oxycodone once daily which is chronic for her.     Patient Active Problem List   Diagnosis    Class 1 obesity due to excess calories with serious comorbidity and body mass index (BMI) of 30.0 to 30.9 in adult    Type 2 diabetes mellitus with hyperglycemia, with long-term current use of insulin (H)    HYPERLIPIDEMIA LDL GOAL <100    Restless legs syndrome (RLS)    Insomnia    Chronic pain syndrome    PTSD (post-traumatic stress disorder)    Concussion without loss of consciousness, subsequent encounter    Subacromial impingement of right shoulder    Segmental dysfunction of cervical region    Segmental dysfunction of thoracic region    Segmental dysfunction of upper extremity    Segmental dysfunction of sacral region    Mechanical back pain    Lumbar radiculopathy    Low back pain potentially associated with radiculopathy    Benign essential hypertension    Iron deficiency    Segmental dysfunction of lumbar region    Segmental dysfunction of lower extremity    Trochanteric bursitis of right hip    Malabsorption of iron    Greater trochanteric bursitis of left hip    S/P CABG (coronary artery bypass graft)    Major depressive disorder, recurrent episode, moderate (H)    History of non-ST elevation myocardial infarction (NSTEMI) March 2021    Coronary artery disease involving native coronary artery of native heart without angina pectoris    Hypoalbuminemia    Anxiety    Type 2 diabetes mellitus with other circulatory complication, with long-term current use of insulin (H)    Vision loss    Lower GI bleed    Acute colitis    Nausea and vomiting, unspecified vomiting type    Left  hand paresthesia    Chronic, continuous use of opioids    Type 2 diabetes mellitus with hyperosmolar hyperglycemic state (HHS) (H)    Abdominal pain, generalized    Ketoacidosis    Lactic acidosis    Hyponatremia    Elevated lipase    Dysuria    Severe hypertension    Increased anion gap metabolic acidosis    Hypomagnesemia     Current Outpatient Medications   Medication Sig Dispense Refill    Continuous Glucose Sensor (FREESTYLE MAXI 3 SENSOR) [facility] SENSOR AND CHANGE EVERY 14 DAYS AS DIRECTED 2 each 5    DULoxetine (CYMBALTA) 20 MG capsule Take 1 capsule (20 mg) by mouth 2 times daily. 180 capsule 3    insulin aspart (NOVOLOG FLEXPEN) 100 UNIT/ML pen INJECT 1 UNIT PER 10 GRAMS OF CARBS BEFORE DINNER, UP TO 15 UNITS PER MEAL. 15 mL 3    insulin glargine 100 UNIT/ML pen Inject 42 Units subcutaneously every morning. 45 mL 1    insulin pen needle (NOVOFINE) 32G X 6 MM miscellaneous Use 4 times daily or as directed. 400 each 3    lisinopril (ZESTRIL) 10 MG tablet Take 1 tablet (10 mg) by mouth daily. 60 tablet 0    LORazepam (ATIVAN) 1 MG tablet Take 1 tablet (1 mg) by mouth every 6 hours as needed for anxiety or nausea. 10 tablet 0    metFORMIN (GLUCOPHAGE XR) 500 MG 24 hr tablet Take 2 tablets (1,000 mg) by mouth 2 times daily (with meals). 360 tablet 3    metoprolol tartrate (LOPRESSOR) 25 MG tablet Take 1 tablet (25 mg) by mouth 2 times daily. 186 tablet 3    Multiple Vitamins-Minerals (MULTI-VITAMIN GUMMIES) CHEW Take 1 chew tab by mouth daily (with dinner)      naloxone (NARCAN) 4 MG/0.1ML nasal spray Spray 4 mg into one nostril alternating nostrils once as needed.      nitroGLYcerin (NITROSTAT) 0.4 MG sublingual tablet For chest pain place 1 tablet under the tongue every 5 minutes for 3 doses. If symptoms persist 5 minutes after 1st dose call 911. 25 tablet 0    NURTEC 75 MG ODT tablet Place 75 mg under the tongue daily as needed for migraine      oxyCODONE (ROXICODONE) 5 MG tablet Take 1 tablet (5 mg) by  "mouth every 6 hours as needed for severe pain. 12 tablet 0    oxyCODONE (ROXICODONE) 5 MG tablet Take 1 tablet (5 mg) by mouth daily as needed for severe pain. 30 tablet 0    pantoprazole (PROTONIX) 40 MG EC tablet Take 1 tablet (40 mg) by mouth every morning. 90 tablet 1    polyethylene glycol (MIRALAX) 17 GM/Dose powder Take 17 g by mouth daily.      rosuvastatin (CRESTOR) 20 MG tablet Take 1 tablet (20 mg) by mouth daily. 93 tablet 3    tiZANidine (ZANAFLEX) 4 MG tablet TAKE ONE TABLET BY MOUTH THREE TIMES A  tablet 3     No current facility-administered medications for this visit.     Review of Systems  Constitutional, HEENT, cardiovascular, pulmonary, gi and gu systems are negative, except as otherwise noted.      Objective    BP (!) 148/86   Pulse 76   Temp 98.6  F (37  C) (Temporal)   Resp 20   Ht 1.676 m (5' 6\")   Wt 87.5 kg (193 lb)   LMP 03/07/2021 (Approximate)   SpO2 100%   BMI 31.15 kg/m    Body mass index is 31.15 kg/m .  Physical Exam  Vitals reviewed.   Constitutional:       General: She is not in acute distress.     Appearance: Normal appearance.   HENT:      Mouth/Throat:      Mouth: Mucous membranes are moist.      Pharynx: Oropharynx is clear.   Eyes:      Extraocular Movements: Extraocular movements intact.      Conjunctiva/sclera: Conjunctivae normal.   Cardiovascular:      Rate and Rhythm: Normal rate and regular rhythm.      Heart sounds: Normal heart sounds.   Pulmonary:      Effort: Pulmonary effort is normal.      Breath sounds: Normal breath sounds.   Musculoskeletal:      Cervical back: Neck supple.   Lymphadenopathy:      Cervical: No cervical adenopathy.   Skin:     General: Skin is warm and dry.   Neurological:      Mental Status: She is alert and oriented to person, place, and time. Mental status is at baseline.   Psychiatric:         Mood and Affect: Mood normal.         Behavior: Behavior normal.                    Signed Electronically by: Cammy Chaudhry NP    "

## 2024-12-14 ENCOUNTER — HOSPITAL ENCOUNTER (EMERGENCY)
Facility: CLINIC | Age: 49
Discharge: HOME | End: 2024-12-14
Attending: PHYSICIAN ASSISTANT | Admitting: PHYSICIAN ASSISTANT
Payer: COMMERCIAL

## 2024-12-14 ENCOUNTER — APPOINTMENT (OUTPATIENT)
Dept: GENERAL RADIOLOGY | Facility: CLINIC | Age: 49
End: 2024-12-14
Attending: PHYSICIAN ASSISTANT
Payer: COMMERCIAL

## 2024-12-14 VITALS
WEIGHT: 193 LBS | OXYGEN SATURATION: 98 % | RESPIRATION RATE: 20 BRPM | SYSTOLIC BLOOD PRESSURE: 151 MMHG | TEMPERATURE: 98 F | DIASTOLIC BLOOD PRESSURE: 89 MMHG | HEART RATE: 85 BPM | BODY MASS INDEX: 31.15 KG/M2

## 2024-12-14 DIAGNOSIS — R07.89 ATYPICAL CHEST PAIN: ICD-10-CM

## 2024-12-14 LAB
ANION GAP SERPL CALCULATED.3IONS-SCNC: 13 MMOL/L (ref 7–15)
ATRIAL RATE - MUSE: 113 BPM
BASOPHILS # BLD AUTO: 0 10E3/UL (ref 0–0.2)
BASOPHILS NFR BLD AUTO: 0 %
BUN SERPL-MCNC: 37.7 MG/DL (ref 6–20)
CALCIUM SERPL-MCNC: 9.8 MG/DL (ref 8.8–10.4)
CHLORIDE SERPL-SCNC: 100 MMOL/L (ref 98–107)
CREAT SERPL-MCNC: 0.86 MG/DL (ref 0.51–0.95)
DIASTOLIC BLOOD PRESSURE - MUSE: NORMAL MMHG
EGFRCR SERPLBLD CKD-EPI 2021: 82 ML/MIN/1.73M2
EOSINOPHIL # BLD AUTO: 0 10E3/UL (ref 0–0.7)
EOSINOPHIL NFR BLD AUTO: 0 %
ERYTHROCYTE [DISTWIDTH] IN BLOOD BY AUTOMATED COUNT: 13.1 % (ref 10–15)
GLUCOSE SERPL-MCNC: 139 MG/DL (ref 70–99)
HCO3 SERPL-SCNC: 23 MMOL/L (ref 22–29)
HCT VFR BLD AUTO: 41.2 % (ref 35–47)
HGB BLD-MCNC: 14.2 G/DL (ref 11.7–15.7)
IMM GRANULOCYTES # BLD: 0.1 10E3/UL
IMM GRANULOCYTES NFR BLD: 1 %
INTERPRETATION ECG - MUSE: NORMAL
LYMPHOCYTES # BLD AUTO: 2.3 10E3/UL (ref 0.8–5.3)
LYMPHOCYTES NFR BLD AUTO: 20 %
MCH RBC QN AUTO: 27.3 PG (ref 26.5–33)
MCHC RBC AUTO-ENTMCNC: 34.5 G/DL (ref 31.5–36.5)
MCV RBC AUTO: 79 FL (ref 78–100)
MONOCYTES # BLD AUTO: 0.8 10E3/UL (ref 0–1.3)
MONOCYTES NFR BLD AUTO: 7 %
NEUTROPHILS # BLD AUTO: 7.9 10E3/UL (ref 1.6–8.3)
NEUTROPHILS NFR BLD AUTO: 72 %
NRBC # BLD AUTO: 0 10E3/UL
NRBC BLD AUTO-RTO: 0 /100
P AXIS - MUSE: 48 DEGREES
PLATELET # BLD AUTO: 315 10E3/UL (ref 150–450)
POTASSIUM SERPL-SCNC: 4.3 MMOL/L (ref 3.4–5.3)
PR INTERVAL - MUSE: 128 MS
QRS DURATION - MUSE: 92 MS
QT - MUSE: 328 MS
QTC - MUSE: 449 MS
R AXIS - MUSE: 111 DEGREES
RBC # BLD AUTO: 5.2 10E6/UL (ref 3.8–5.2)
SODIUM SERPL-SCNC: 136 MMOL/L (ref 135–145)
SYSTOLIC BLOOD PRESSURE - MUSE: NORMAL MMHG
T AXIS - MUSE: 33 DEGREES
TROPONIN T SERPL HS-MCNC: 13 NG/L
TROPONIN T SERPL HS-MCNC: 15 NG/L
VENTRICULAR RATE- MUSE: 113 BPM
WBC # BLD AUTO: 11 10E3/UL (ref 4–11)

## 2024-12-14 PROCEDURE — 250N000009 HC RX 250: Performed by: PHYSICIAN ASSISTANT

## 2024-12-14 PROCEDURE — 96372 THER/PROPH/DIAG INJ SC/IM: CPT | Performed by: PHYSICIAN ASSISTANT

## 2024-12-14 PROCEDURE — 36415 COLL VENOUS BLD VENIPUNCTURE: CPT | Performed by: PHYSICIAN ASSISTANT

## 2024-12-14 PROCEDURE — 99284 EMERGENCY DEPT VISIT MOD MDM: CPT | Performed by: PHYSICIAN ASSISTANT

## 2024-12-14 PROCEDURE — 93010 ELECTROCARDIOGRAM REPORT: CPT | Performed by: PHYSICIAN ASSISTANT

## 2024-12-14 PROCEDURE — 82435 ASSAY OF BLOOD CHLORIDE: CPT | Performed by: PHYSICIAN ASSISTANT

## 2024-12-14 PROCEDURE — 250N000013 HC RX MED GY IP 250 OP 250 PS 637: Performed by: PHYSICIAN ASSISTANT

## 2024-12-14 PROCEDURE — 85004 AUTOMATED DIFF WBC COUNT: CPT | Performed by: PHYSICIAN ASSISTANT

## 2024-12-14 PROCEDURE — 85041 AUTOMATED RBC COUNT: CPT | Performed by: PHYSICIAN ASSISTANT

## 2024-12-14 PROCEDURE — 84484 ASSAY OF TROPONIN QUANT: CPT | Performed by: PHYSICIAN ASSISTANT

## 2024-12-14 PROCEDURE — 71046 X-RAY EXAM CHEST 2 VIEWS: CPT

## 2024-12-14 PROCEDURE — 93005 ELECTROCARDIOGRAM TRACING: CPT

## 2024-12-14 PROCEDURE — 250N000011 HC RX IP 250 OP 636: Performed by: PHYSICIAN ASSISTANT

## 2024-12-14 PROCEDURE — 80048 BASIC METABOLIC PNL TOTAL CA: CPT | Performed by: PHYSICIAN ASSISTANT

## 2024-12-14 PROCEDURE — 99285 EMERGENCY DEPT VISIT HI MDM: CPT | Mod: 25

## 2024-12-14 RX ORDER — OXYCODONE HYDROCHLORIDE 5 MG/1
10 TABLET ORAL ONCE
Status: COMPLETED | OUTPATIENT
Start: 2024-12-14 | End: 2024-12-14

## 2024-12-14 RX ORDER — TIZANIDINE 2 MG/1
4 TABLET ORAL 3 TIMES DAILY
COMMUNITY
Start: 2024-11-22

## 2024-12-14 RX ORDER — KETOROLAC TROMETHAMINE 30 MG/ML
30 INJECTION, SOLUTION INTRAMUSCULAR; INTRAVENOUS ONCE
Status: COMPLETED | OUTPATIENT
Start: 2024-12-14 | End: 2024-12-14

## 2024-12-14 RX ORDER — MAGNESIUM HYDROXIDE/ALUMINUM HYDROXICE/SIMETHICONE 120; 1200; 1200 MG/30ML; MG/30ML; MG/30ML
15 SUSPENSION ORAL ONCE
Status: COMPLETED | OUTPATIENT
Start: 2024-12-14 | End: 2024-12-14

## 2024-12-14 RX ORDER — LIDOCAINE HYDROCHLORIDE 20 MG/ML
15 SOLUTION OROPHARYNGEAL ONCE
Status: COMPLETED | OUTPATIENT
Start: 2024-12-14 | End: 2024-12-14

## 2024-12-14 RX ORDER — NITROGLYCERIN 0.4 MG/1
0.4 TABLET SUBLINGUAL EVERY 5 MIN PRN
Status: DISCONTINUED | OUTPATIENT
Start: 2024-12-14 | End: 2024-12-15 | Stop reason: HOSPADM

## 2024-12-14 RX ADMIN — KETOROLAC TROMETHAMINE 30 MG: 30 INJECTION, SOLUTION INTRAMUSCULAR at 19:40

## 2024-12-14 RX ADMIN — LIDOCAINE HYDROCHLORIDE 15 ML: 20 SOLUTION ORAL at 18:06

## 2024-12-14 RX ADMIN — ALUMINUM HYDROXIDE, MAGNESIUM HYDROXIDE, AND SIMETHICONE 15 ML: 1200; 120; 1200 SUSPENSION ORAL at 18:06

## 2024-12-14 RX ADMIN — OXYCODONE HYDROCHLORIDE 10 MG: 5 TABLET ORAL at 19:40

## 2024-12-14 ASSESSMENT — ACTIVITIES OF DAILY LIVING (ADL)
ADLS_ACUITY_SCORE: 58

## 2024-12-14 NOTE — ED TRIAGE NOTES
Patient presents with concern of mid sternal chest pain that radiates into bilateral neck and jaw, and into the left ear. Started this afternoon when she was walking around [employer] with her son. States she came home and tried to relax, pain has persisted. She took 2 nitroglycerin with no relief and began heading this way. Took another in the lobby around 5PM, with no relief. Has extensive cardiac history.     Triage Assessment (Adult)       Row Name 12/14/24 1000          Respiratory WDL    Respiratory WDL WDL        Skin Circulation/Temperature WDL    Skin Circulation/Temperature WDL WDL        Cardiac WDL    Cardiac WDL X        Peripheral/Neurovascular WDL    Peripheral Neurovascular WDL WDL        Cognitive/Neuro/Behavioral WDL    Cognitive/Neuro/Behavioral WDL WDL
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
## 2024-12-15 NOTE — DISCHARGE INSTRUCTIONS
Your workup today showed that pain is not likely to be coming from your heart.  I think it is musculoskeletal in nature.  Try her home pain medication in addition to over-the-counter Tylenol as needed.  You can also try over-the-counter Lidoderm patches to your chest wall to see if that would help.  I would like you to fall call your clinic provider Monday to schedule closer follow-up for reevaluation if symptoms are not improving on their own.  For any new or worsening concerns please return to the emergency department.    Thank you for choosing [facility]'s Emergency Department. It was a pleasure taking care of you today. If you have any questions, please call [phone].    Vielka Sandra, PARoberC

## 2024-12-15 NOTE — MEDICATION SCRIBE - ADMISSION MEDICATION HISTORY
Medication Scribe Admission Medication History    Admission medication history is complete. The information provided in this note is only as accurate as the sources available at the time of the update.    Information Source(s): Patient and CareEverywhere/SureScripts via in-person    Pertinent Information: Verified no use of pain pump, inhalers or prescription eye drops currently.    Changes made to PTA medication list:  Added: None  Deleted: ativan, oxycodone q6h prn  Changed: miralax to prn, tizanidine strength from 4mg to 2mg (same dosing)    Allergies reviewed with patient and updates made in EHR: yes    Medication History Completed By: MARK HALE 12/14/2024 6:15 PM    PTA Med List   Medication Sig Note Last Dose/Taking    Continuous Glucose Sensor (FREESTYLE MAXI 3 SENSOR) MISC APPLY 1 SENSOR AND CHANGE EVERY 14 DAYS AS DIRECTED (Patient taking differently: 1 each every 14 days.)  12/10/2024    DULoxetine (CYMBALTA) 20 MG capsule Take 1 capsule (20 mg) by mouth 2 times daily.  12/14/2024 at  8:00 AM    insulin glargine 100 UNIT/ML pen Inject 42 Units subcutaneously every morning. (Patient taking differently: Inject 42 Units subcutaneously every morning. BASAGLAR)  12/14/2024 at  8:00 AM    insulin pen needle (NOVOFINE) 32G X 6 MM miscellaneous Use 4 times daily or as directed.  12/14/2024 at  8:00 AM    lisinopril (ZESTRIL) 10 MG tablet Take 1 tablet (10 mg) by mouth daily.  12/14/2024 at  8:00 AM    metFORMIN (GLUCOPHAGE XR) 500 MG 24 hr tablet Take 2 tablets (1,000 mg) by mouth 2 times daily (with meals).  12/14/2024 at  8:00 AM    metoprolol tartrate (LOPRESSOR) 25 MG tablet Take 1 tablet (25 mg) by mouth 2 times daily.  12/14/2024 at  8:00 AM    Multiple Vitamins-Minerals (MULTI-VITAMIN GUMMIES) CHEW Take 1 chew tab by mouth every evening.  12/13/2024 at  8:00 PM    nitroGLYcerin (NITROSTAT) 0.4 MG sublingual tablet For chest pain place 1 tablet under the tongue every 5 minutes for 3 doses. If symptoms  persist 5 minutes after 1st dose call 911.  12/14/2024 at  5:00 PM    NOVOLOG FLEXPEN 100 UNIT/ML soln INJECT 1 UNIT PER 10 GRAMS OF CARBS BEFORE DINNER, UP TO 15 UNITS PER MEAL. 12/14/2024: 15 units at 1500 12/14/2024 at  3:00 PM    NURTEC 75 MG ODT tablet Place 75 mg under the tongue daily as needed for migraine  Past Month    oxyCODONE (ROXICODONE) 5 MG tablet Take 1 tablet (5 mg) by mouth daily as needed for severe pain.  12/13/2024 at  9:00 PM    pantoprazole (PROTONIX) 40 MG EC tablet Take 1 tablet (40 mg) by mouth every morning.  12/14/2024 at  8:00 AM    polyethylene glycol (MIRALAX) 17 GM/Dose powder Take 17 g by mouth daily. (Patient taking differently: Take 17 g by mouth daily as needed for constipation.)  12/13/2024 Morning    rosuvastatin (CRESTOR) 20 MG tablet Take 1 tablet (20 mg) by mouth daily.  12/14/2024    tiZANidine (ZANAFLEX) 2 MG tablet Take 4 mg by mouth 3 times daily.  12/14/2024 at 12:00 PM

## 2024-12-15 NOTE — ED NOTES
Pt BG dropping quickly per internal monitor; 168  one hour ago trending down and currently 94. Pt given applesauce to eat now; crackers PB and juice at bedside.

## 2024-12-16 ENCOUNTER — PATIENT OUTREACH (OUTPATIENT)
Dept: CARE COORDINATION | Facility: CLINIC | Age: 49
End: 2024-12-16
Payer: COMMERCIAL

## 2024-12-16 NOTE — PROGRESS NOTES
Clinical Product Navigator reviewed chart.   CPN Initial Information Gathering  Referral Source: ED Follow-Up  Referrals Places: Care Coordination    Patient identified due to high utilization with recent discharge for care management support with high probability risk of admission.      [facility] (internal & external) discharge report identified patient having had 10 ED admissions and 1 hospital admissions in the last 6 months. Per chart review, patient has high risk score for hospital admission or ED visit of 94.4%.     Per chart review, patient meets criteria for Medication Therapy Management (MTM); please consider referral. Patient would benefit from care management support for chronic disease management.    Met referral criteria for Care Coordinator; referral to be sent.    DIAZ Siddiqui  Social Work Care Coordinator   Clinical Product Navigation

## 2024-12-18 ENCOUNTER — PATIENT OUTREACH (OUTPATIENT)
Dept: CARE COORDINATION | Facility: CLINIC | Age: 49
End: 2024-12-18
Payer: COMMERCIAL

## 2024-12-18 NOTE — LETTER
[facility] CARE COORDINATION  [address] [facility] [address]    December 19, 2024    Stacy Brown  [address] [address] [place] [address]   [facility] [address]      Dear Stacy,    I am a clinic care coordinator who works with Shyanne Francisco MD with the [facility]. I wanted to introduce myself and provide you with my contact information for you to be able to call me with any questions or concerns. Below is a description of clinic care coordination and how I can further assist you.       The clinic care coordination team is made up of a registered nurse, , financial resource worker and community health worker who understand the health care system. The goal of clinic care coordination is to help you manage your health and improve access to the health care system. Our team works alongside your provider to assist you in determining your health and social needs. We can help you obtain health care and community resources, providing you with necessary information and education. We can work with you through any barriers and develop a care plan that helps coordinate and strengthen the communication between you and your care team.  Our services are voluntary and are offered without charge to you personally.    Please feel free to contact me with any questions or concerns regarding care coordination and what we can offer.      We are focused on providing you with the highest-quality healthcare experience possible.    Sincerely,     Angela Aragon RN Care Coordination   [facility]  [place], [place], [place]  Phone: [phone]

## 2024-12-18 NOTE — PROGRESS NOTES
Clinic Care Coordination Contact  [facility]/Voicemail    Clinical Data: Care Coordinator Outreach    Outreach Documentation Number of Outreach Attempt   12/18/2024  12:39 PM 1       Left message on patient's voicemail with call back information and requested return call.      Plan: Care Coordinator will try to reach patient again in 1-2 business days.    Angela Aragon RN Care Coordination   [facility]  Email: [email or web address]  Phone: [phone]

## 2024-12-19 NOTE — PROGRESS NOTES
Clinic Care Coordination Contact  [facility]/Voicemail    Clinical Data: Care Coordinator Outreach    Outreach Documentation Number of Outreach Attempt   12/18/2024  12:39 PM 1   12/19/2024  10:29 AM 2       Left message on patient's voicemail with call back information and requested return call.      Plan: Care Coordinator will send care coordination introduction letter with care coordinator contact information and explanation of care coordination services via [employer]. Care Coordinator will do no further outreaches at this time.    Angela Aragon, RN Care Coordination   [facility]  Email: [email or web address]  Phone: [phone]

## 2024-12-26 ENCOUNTER — LAB (OUTPATIENT)
Dept: LAB | Facility: OTHER | Age: 49
End: 2024-12-26
Payer: COMMERCIAL

## 2024-12-26 DIAGNOSIS — E11.65 TYPE 2 DIABETES MELLITUS WITH HYPERGLYCEMIA, WITH LONG-TERM CURRENT USE OF INSULIN (H): ICD-10-CM

## 2024-12-26 DIAGNOSIS — Z95.1 S/P CABG (CORONARY ARTERY BYPASS GRAFT): ICD-10-CM

## 2024-12-26 DIAGNOSIS — E78.5 HYPERLIPIDEMIA LDL GOAL <100: ICD-10-CM

## 2024-12-26 DIAGNOSIS — I10 BENIGN ESSENTIAL HYPERTENSION: ICD-10-CM

## 2024-12-26 DIAGNOSIS — I21.4 NSTEMI (NON-ST ELEVATED MYOCARDIAL INFARCTION) (H): ICD-10-CM

## 2024-12-26 DIAGNOSIS — Z79.4 TYPE 2 DIABETES MELLITUS WITH HYPERGLYCEMIA, WITH LONG-TERM CURRENT USE OF INSULIN (H): ICD-10-CM

## 2024-12-26 LAB
ANION GAP SERPL CALCULATED.3IONS-SCNC: 14 MMOL/L (ref 7–15)
BUN SERPL-MCNC: 19.5 MG/DL (ref 6–20)
CALCIUM SERPL-MCNC: 9.4 MG/DL (ref 8.8–10.4)
CHLORIDE SERPL-SCNC: 103 MMOL/L (ref 98–107)
CHOLEST SERPL-MCNC: 353 MG/DL
CREAT SERPL-MCNC: 0.8 MG/DL (ref 0.51–0.95)
EGFRCR SERPLBLD CKD-EPI 2021: 90 ML/MIN/1.73M2
FASTING STATUS PATIENT QL REPORTED: NO
FASTING STATUS PATIENT QL REPORTED: NO
GLUCOSE SERPL-MCNC: 87 MG/DL (ref 70–99)
HCO3 SERPL-SCNC: 21 MMOL/L (ref 22–29)
HDLC SERPL-MCNC: 68 MG/DL
LDLC SERPL CALC-MCNC: 216 MG/DL
NONHDLC SERPL-MCNC: 285 MG/DL
POTASSIUM SERPL-SCNC: 4.4 MMOL/L (ref 3.4–5.3)
SODIUM SERPL-SCNC: 138 MMOL/L (ref 135–145)
TRIGL SERPL-MCNC: 343 MG/DL

## 2025-01-08 ENCOUNTER — HOSPITAL ENCOUNTER (EMERGENCY)
Facility: CLINIC | Age: 50
Discharge: OTHER INSTITUTION | End: 2025-01-08
Attending: EMERGENCY MEDICINE
Payer: COMMERCIAL

## 2025-01-08 ENCOUNTER — HOSPITAL ENCOUNTER (OUTPATIENT)
Facility: CLINIC | Age: 50
Setting detail: OBSERVATION
Discharge: HOME | End: 2025-01-09
Attending: INTERNAL MEDICINE | Admitting: STUDENT IN AN ORGANIZED HEALTH CARE EDUCATION/TRAINING PROGRAM
Payer: COMMERCIAL

## 2025-01-08 VITALS
RESPIRATION RATE: 20 BRPM | HEIGHT: 66 IN | OXYGEN SATURATION: 93 % | DIASTOLIC BLOOD PRESSURE: 80 MMHG | BODY MASS INDEX: 32.14 KG/M2 | TEMPERATURE: 97.7 F | SYSTOLIC BLOOD PRESSURE: 144 MMHG | WEIGHT: 200 LBS | HEART RATE: 98 BPM

## 2025-01-08 DIAGNOSIS — Z79.4 TYPE 2 DIABETES MELLITUS WITH HYPERGLYCEMIA, WITH LONG-TERM CURRENT USE OF INSULIN (H): ICD-10-CM

## 2025-01-08 DIAGNOSIS — K59.03 DRUG INDUCED CONSTIPATION: ICD-10-CM

## 2025-01-08 DIAGNOSIS — E83.42 HYPOMAGNESEMIA: ICD-10-CM

## 2025-01-08 DIAGNOSIS — E11.65 TYPE 2 DIABETES MELLITUS WITH HYPERGLYCEMIA, WITH LONG-TERM CURRENT USE OF INSULIN (H): ICD-10-CM

## 2025-01-08 DIAGNOSIS — R73.9 HYPERGLYCEMIA: ICD-10-CM

## 2025-01-08 DIAGNOSIS — R79.89 ELEVATED BLOOD KETONE BODY LEVEL: ICD-10-CM

## 2025-01-08 DIAGNOSIS — R79.89 ELEVATED LACTIC ACID LEVEL: ICD-10-CM

## 2025-01-08 DIAGNOSIS — E86.0 DEHYDRATION: ICD-10-CM

## 2025-01-08 LAB
ALBUMIN SERPL BCG-MCNC: 4.1 G/DL (ref 3.5–5.2)
ALBUMIN UR-MCNC: 200 MG/DL
ALP SERPL-CCNC: 117 U/L (ref 40–150)
ALT SERPL W P-5'-P-CCNC: 20 U/L (ref 0–50)
ANION GAP SERPL CALCULATED.3IONS-SCNC: 15 MMOL/L (ref 7–15)
ANION GAP SERPL CALCULATED.3IONS-SCNC: 20 MMOL/L (ref 7–15)
APPEARANCE UR: CLEAR
AST SERPL W P-5'-P-CCNC: 14 U/L (ref 0–45)
B-OH-BUTYR SERPL-SCNC: 2.16 MMOL/L
B-OH-BUTYR SERPL-SCNC: 3.61 MMOL/L
BASE EXCESS BLDV CALC-SCNC: -2.2 MMOL/L (ref -3–3)
BASE EXCESS BLDV CALC-SCNC: 1.1 MMOL/L (ref -3–3)
BASOPHILS # BLD AUTO: 0 10E3/UL (ref 0–0.2)
BASOPHILS NFR BLD AUTO: 0 %
BILIRUB SERPL-MCNC: 0.4 MG/DL
BILIRUB UR QL STRIP: NEGATIVE
BUN SERPL-MCNC: 18.7 MG/DL (ref 6–20)
BUN SERPL-MCNC: 21.2 MG/DL (ref 6–20)
CALCIUM SERPL-MCNC: 9 MG/DL (ref 8.8–10.4)
CALCIUM SERPL-MCNC: 9.8 MG/DL (ref 8.8–10.4)
CHLORIDE SERPL-SCNC: 100 MMOL/L (ref 98–107)
CHLORIDE SERPL-SCNC: 103 MMOL/L (ref 98–107)
COLOR UR AUTO: YELLOW
CREAT SERPL-MCNC: 0.85 MG/DL (ref 0.51–0.95)
CREAT SERPL-MCNC: 0.94 MG/DL (ref 0.51–0.95)
EGFRCR SERPLBLD CKD-EPI 2021: 74 ML/MIN/1.73M2
EGFRCR SERPLBLD CKD-EPI 2021: 84 ML/MIN/1.73M2
EOSINOPHIL # BLD AUTO: 0 10E3/UL (ref 0–0.7)
EOSINOPHIL NFR BLD AUTO: 0 %
ERYTHROCYTE [DISTWIDTH] IN BLOOD BY AUTOMATED COUNT: 13.8 % (ref 10–15)
EST. AVERAGE GLUCOSE BLD GHB EST-MCNC: 206 MG/DL
GLUCOSE BLDC GLUCOMTR-MCNC: 149 MG/DL (ref 70–99)
GLUCOSE BLDC GLUCOMTR-MCNC: 159 MG/DL (ref 70–99)
GLUCOSE BLDC GLUCOMTR-MCNC: 163 MG/DL (ref 70–99)
GLUCOSE SERPL-MCNC: 148 MG/DL (ref 70–99)
GLUCOSE SERPL-MCNC: 254 MG/DL (ref 70–99)
GLUCOSE UR STRIP-MCNC: 500 MG/DL
HBA1C MFR BLD: 8.8 %
HCO3 BLDV-SCNC: 21 MMOL/L (ref 21–28)
HCO3 BLDV-SCNC: 26 MMOL/L (ref 21–28)
HCO3 SERPL-SCNC: 19 MMOL/L (ref 22–29)
HCO3 SERPL-SCNC: 21 MMOL/L (ref 22–29)
HCT VFR BLD AUTO: 41.7 % (ref 35–47)
HGB BLD-MCNC: 14 G/DL (ref 11.7–15.7)
HGB UR QL STRIP: NEGATIVE
IMM GRANULOCYTES # BLD: 0 10E3/UL
IMM GRANULOCYTES NFR BLD: 0 %
KETONES UR STRIP-MCNC: >150 MG/DL
LACTATE SERPL-SCNC: 1.6 MMOL/L (ref 0.7–2)
LACTATE SERPL-SCNC: 3.8 MMOL/L (ref 0.7–2)
LEUKOCYTE ESTERASE UR QL STRIP: NEGATIVE
LYMPHOCYTES # BLD AUTO: 0.8 10E3/UL (ref 0.8–5.3)
LYMPHOCYTES NFR BLD AUTO: 8 %
MAGNESIUM SERPL-MCNC: 1.4 MG/DL (ref 1.7–2.3)
MCH RBC QN AUTO: 27.2 PG (ref 26.5–33)
MCHC RBC AUTO-ENTMCNC: 33.6 G/DL (ref 31.5–36.5)
MCV RBC AUTO: 81 FL (ref 78–100)
MONOCYTES # BLD AUTO: 0.5 10E3/UL (ref 0–1.3)
MONOCYTES NFR BLD AUTO: 6 %
MUCOUS THREADS #/AREA URNS LPF: PRESENT /LPF
NEUTROPHILS # BLD AUTO: 8.1 10E3/UL (ref 1.6–8.3)
NEUTROPHILS NFR BLD AUTO: 86 %
NITRATE UR QL: NEGATIVE
NRBC # BLD AUTO: 0 10E3/UL
NRBC BLD AUTO-RTO: 0 /100
O2/TOTAL GAS SETTING VFR VENT: 21 %
O2/TOTAL GAS SETTING VFR VENT: 21 %
OSMOLALITY SERPL: 307 MMOL/KG (ref 275–295)
OXYHGB MFR BLDV: 42 % (ref 70–75)
OXYHGB MFR BLDV: 78 % (ref 70–75)
PCO2 BLDV: 32 MM HG (ref 40–50)
PCO2 BLDV: 41 MM HG (ref 40–50)
PH BLDV: 7.41 [PH] (ref 7.32–7.43)
PH BLDV: 7.43 [PH] (ref 7.32–7.43)
PH UR STRIP: 5.5 [PH] (ref 5–7)
PLATELET # BLD AUTO: 283 10E3/UL (ref 150–450)
PO2 BLDV: 24 MM HG (ref 25–47)
PO2 BLDV: 42 MM HG (ref 25–47)
POTASSIUM SERPL-SCNC: 4.3 MMOL/L (ref 3.4–5.3)
POTASSIUM SERPL-SCNC: 4.8 MMOL/L (ref 3.4–5.3)
PROT SERPL-MCNC: 7.3 G/DL (ref 6.4–8.3)
RBC # BLD AUTO: 5.15 10E6/UL (ref 3.8–5.2)
RBC URINE: 1 /HPF
SAO2 % BLDV: 42.7 % (ref 70–75)
SAO2 % BLDV: 79.6 % (ref 70–75)
SODIUM SERPL-SCNC: 139 MMOL/L (ref 135–145)
SODIUM SERPL-SCNC: 139 MMOL/L (ref 135–145)
SP GR UR STRIP: 1.03 (ref 1–1.03)
SQUAMOUS EPITHELIAL: 1 /HPF
UROBILINOGEN UR STRIP-MCNC: 2 MG/DL
WBC # BLD AUTO: 9.4 10E3/UL (ref 4–11)
WBC URINE: 1 /HPF

## 2025-01-08 PROCEDURE — 83605 ASSAY OF LACTIC ACID: CPT | Performed by: EMERGENCY MEDICINE

## 2025-01-08 PROCEDURE — 96366 THER/PROPH/DIAG IV INF ADDON: CPT | Performed by: EMERGENCY MEDICINE

## 2025-01-08 PROCEDURE — 82010 KETONE BODYS QUAN: CPT | Performed by: EMERGENCY MEDICINE

## 2025-01-08 PROCEDURE — 83735 ASSAY OF MAGNESIUM: CPT | Performed by: EMERGENCY MEDICINE

## 2025-01-08 PROCEDURE — 96375 TX/PRO/DX INJ NEW DRUG ADDON: CPT | Performed by: EMERGENCY MEDICINE

## 2025-01-08 PROCEDURE — 82962 GLUCOSE BLOOD TEST: CPT

## 2025-01-08 PROCEDURE — 99285 EMERGENCY DEPT VISIT HI MDM: CPT | Performed by: EMERGENCY MEDICINE

## 2025-01-08 PROCEDURE — 81003 URINALYSIS AUTO W/O SCOPE: CPT | Performed by: EMERGENCY MEDICINE

## 2025-01-08 PROCEDURE — 200N000001 HC R&B ICU

## 2025-01-08 PROCEDURE — 250N000009 HC RX 250: Performed by: STUDENT IN AN ORGANIZED HEALTH CARE EDUCATION/TRAINING PROGRAM

## 2025-01-08 PROCEDURE — 82805 BLOOD GASES W/O2 SATURATION: CPT | Performed by: EMERGENCY MEDICINE

## 2025-01-08 PROCEDURE — 36415 COLL VENOUS BLD VENIPUNCTURE: CPT | Performed by: EMERGENCY MEDICINE

## 2025-01-08 PROCEDURE — 85004 AUTOMATED DIFF WBC COUNT: CPT | Performed by: EMERGENCY MEDICINE

## 2025-01-08 PROCEDURE — 99291 CRITICAL CARE FIRST HOUR: CPT | Mod: 25 | Performed by: STUDENT IN AN ORGANIZED HEALTH CARE EDUCATION/TRAINING PROGRAM

## 2025-01-08 PROCEDURE — 96365 THER/PROPH/DIAG IV INF INIT: CPT | Performed by: EMERGENCY MEDICINE

## 2025-01-08 PROCEDURE — 258N000003 HC RX IP 258 OP 636: Performed by: STUDENT IN AN ORGANIZED HEALTH CARE EDUCATION/TRAINING PROGRAM

## 2025-01-08 PROCEDURE — 83930 ASSAY OF BLOOD OSMOLALITY: CPT | Performed by: EMERGENCY MEDICINE

## 2025-01-08 PROCEDURE — 80053 COMPREHEN METABOLIC PANEL: CPT | Performed by: EMERGENCY MEDICINE

## 2025-01-08 PROCEDURE — 258N000003 HC RX IP 258 OP 636: Performed by: EMERGENCY MEDICINE

## 2025-01-08 PROCEDURE — G0379 DIRECT REFER HOSPITAL OBSERV: HCPCS | Mod: 25

## 2025-01-08 PROCEDURE — 96368 THER/DIAG CONCURRENT INF: CPT | Performed by: EMERGENCY MEDICINE

## 2025-01-08 PROCEDURE — 250N000011 HC RX IP 250 OP 636: Performed by: EMERGENCY MEDICINE

## 2025-01-08 PROCEDURE — 83036 HEMOGLOBIN GLYCOSYLATED A1C: CPT | Performed by: STUDENT IN AN ORGANIZED HEALTH CARE EDUCATION/TRAINING PROGRAM

## 2025-01-08 RX ORDER — SODIUM CHLORIDE 9 MG/ML
1000 INJECTION, SOLUTION INTRAVENOUS CONTINUOUS
Status: DISCONTINUED | OUTPATIENT
Start: 2025-01-08 | End: 2025-01-08 | Stop reason: HOSPADM

## 2025-01-08 RX ORDER — MAGNESIUM SULFATE HEPTAHYDRATE 40 MG/ML
2 INJECTION, SOLUTION INTRAVENOUS ONCE
Status: COMPLETED | OUTPATIENT
Start: 2025-01-08 | End: 2025-01-08

## 2025-01-08 RX ORDER — DEXTROSE MONOHYDRATE 25 G/50ML
25-50 INJECTION, SOLUTION INTRAVENOUS
Status: DISCONTINUED | OUTPATIENT
Start: 2025-01-08 | End: 2025-01-08 | Stop reason: HOSPADM

## 2025-01-08 RX ORDER — DEXTROSE MONOHYDRATE AND SODIUM CHLORIDE 5; .45 G/100ML; G/100ML
1000 INJECTION, SOLUTION INTRAVENOUS CONTINUOUS PRN
Status: DISCONTINUED | OUTPATIENT
Start: 2025-01-08 | End: 2025-01-08 | Stop reason: HOSPADM

## 2025-01-08 RX ADMIN — SODIUM CHLORIDE 2000 ML: 9 INJECTION, SOLUTION INTRAVENOUS at 18:02

## 2025-01-08 RX ADMIN — MAGNESIUM SULFATE HEPTAHYDRATE 2 G: 40 INJECTION, SOLUTION INTRAVENOUS at 20:02

## 2025-01-08 RX ADMIN — DEXTROSE AND SODIUM CHLORIDE 1000 ML: 5; 450 INJECTION, SOLUTION INTRAVENOUS at 20:32

## 2025-01-08 RX ADMIN — INSULIN HUMAN 1 UNITS/HR: 1 INJECTION, SOLUTION INTRAVENOUS at 20:17

## 2025-01-08 ASSESSMENT — ACTIVITIES OF DAILY LIVING (ADL)
ADLS_ACUITY_SCORE: 58

## 2025-01-08 NOTE — ED TRIAGE NOTES
Was brought in by EMS for abdominal pain with nausea and vomiting that started around noon today. Got Zofran 4mg oral and inapsine 2.5mg IM      Triage Assessment (Adult)       Row Name 01/08/25 1545          Triage Assessment    Airway WDL WDL        Respiratory WDL    Respiratory WDL WDL        Cognitive/Neuro/Behavioral WDL    Cognitive/Neuro/Behavioral WDL WDL
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
## 2025-01-09 VITALS
DIASTOLIC BLOOD PRESSURE: 49 MMHG | WEIGHT: 215.61 LBS | TEMPERATURE: 97.7 F | RESPIRATION RATE: 16 BRPM | BODY MASS INDEX: 34.65 KG/M2 | HEIGHT: 66 IN | OXYGEN SATURATION: 100 % | SYSTOLIC BLOOD PRESSURE: 114 MMHG | HEART RATE: 63 BPM

## 2025-01-09 PROBLEM — E83.42 HYPOMAGNESEMIA: Status: ACTIVE | Noted: 2024-10-08

## 2025-01-09 PROBLEM — F33.1 MAJOR DEPRESSIVE DISORDER, RECURRENT EPISODE, MODERATE (H): Status: ACTIVE | Noted: 2022-11-14

## 2025-01-09 PROBLEM — R73.9 HYPERGLYCEMIA: Status: ACTIVE | Noted: 2025-01-09

## 2025-01-09 PROBLEM — I10 BENIGN ESSENTIAL HYPERTENSION: Status: ACTIVE | Noted: 2019-08-28

## 2025-01-09 PROBLEM — E11.00 HYPEROSMOLAR HYPERGLYCEMIC STATE (HHS) (H): Status: ACTIVE | Noted: 2025-01-09

## 2025-01-09 PROBLEM — F41.9 ANXIETY: Status: ACTIVE | Noted: 2023-04-06

## 2025-01-09 PROBLEM — F43.10 PTSD (POST-TRAUMATIC STRESS DISORDER): Status: ACTIVE | Noted: 2018-05-31

## 2025-01-09 PROBLEM — I25.2 HISTORY OF NON-ST ELEVATION MYOCARDIAL INFARCTION (NSTEMI): Status: ACTIVE | Noted: 2022-12-11

## 2025-01-09 PROBLEM — Z95.1 S/P CABG (CORONARY ARTERY BYPASS GRAFT): Status: ACTIVE | Noted: 2021-03-16

## 2025-01-09 PROBLEM — E11.10 KETOSIS DUE TO DIABETES (H): Status: ACTIVE | Noted: 2025-01-09

## 2025-01-09 PROBLEM — F11.90 CHRONIC, CONTINUOUS USE OF OPIOIDS: Status: ACTIVE | Noted: 2024-06-12

## 2025-01-09 PROBLEM — I25.10 CORONARY ARTERY DISEASE INVOLVING NATIVE CORONARY ARTERY OF NATIVE HEART WITHOUT ANGINA PECTORIS: Status: ACTIVE | Noted: 2022-12-11

## 2025-01-09 LAB
ANION GAP SERPL CALCULATED.3IONS-SCNC: 12 MMOL/L (ref 7–15)
ANION GAP SERPL CALCULATED.3IONS-SCNC: 14 MMOL/L (ref 7–15)
B-OH-BUTYR SERPL-SCNC: 0.4 MMOL/L
B-OH-BUTYR SERPL-SCNC: <0.18 MMOL/L
BUN SERPL-MCNC: 14.6 MG/DL (ref 6–20)
BUN SERPL-MCNC: 15.1 MG/DL (ref 6–20)
CALCIUM SERPL-MCNC: 9 MG/DL (ref 8.8–10.4)
CALCIUM SERPL-MCNC: 9.1 MG/DL (ref 8.8–10.4)
CHLORIDE SERPL-SCNC: 103 MMOL/L (ref 98–107)
CHLORIDE SERPL-SCNC: 109 MMOL/L (ref 98–107)
CREAT SERPL-MCNC: 0.77 MG/DL (ref 0.51–0.95)
CREAT SERPL-MCNC: 0.8 MG/DL (ref 0.51–0.95)
EGFRCR SERPLBLD CKD-EPI 2021: 90 ML/MIN/1.73M2
EGFRCR SERPLBLD CKD-EPI 2021: >90 ML/MIN/1.73M2
GLUCOSE BLDC GLUCOMTR-MCNC: 137 MG/DL (ref 70–99)
GLUCOSE BLDC GLUCOMTR-MCNC: 139 MG/DL (ref 70–99)
GLUCOSE BLDC GLUCOMTR-MCNC: 140 MG/DL (ref 70–99)
GLUCOSE BLDC GLUCOMTR-MCNC: 142 MG/DL (ref 70–99)
GLUCOSE BLDC GLUCOMTR-MCNC: 150 MG/DL (ref 70–99)
GLUCOSE BLDC GLUCOMTR-MCNC: 153 MG/DL (ref 70–99)
GLUCOSE BLDC GLUCOMTR-MCNC: 189 MG/DL (ref 70–99)
GLUCOSE SERPL-MCNC: 151 MG/DL (ref 70–99)
GLUCOSE SERPL-MCNC: 154 MG/DL (ref 70–99)
HCO3 SERPL-SCNC: 16 MMOL/L (ref 22–29)
HCO3 SERPL-SCNC: 23 MMOL/L (ref 22–29)
HOLD SPECIMEN: NORMAL
MAGNESIUM SERPL-MCNC: 2.1 MG/DL (ref 1.7–2.3)
PHOSPHATE SERPL-MCNC: 2.1 MG/DL (ref 2.5–4.5)
POTASSIUM SERPL-SCNC: 3.9 MMOL/L (ref 3.4–5.3)
POTASSIUM SERPL-SCNC: 5.8 MMOL/L (ref 3.4–5.3)
SODIUM SERPL-SCNC: 138 MMOL/L (ref 135–145)
SODIUM SERPL-SCNC: 139 MMOL/L (ref 135–145)

## 2025-01-09 PROCEDURE — 36416 COLLJ CAPILLARY BLOOD SPEC: CPT | Performed by: INTERNAL MEDICINE

## 2025-01-09 PROCEDURE — 96366 THER/PROPH/DIAG IV INF ADDON: CPT

## 2025-01-09 PROCEDURE — 250N000009 HC RX 250: Performed by: INTERNAL MEDICINE

## 2025-01-09 PROCEDURE — 96365 THER/PROPH/DIAG IV INF INIT: CPT

## 2025-01-09 PROCEDURE — 83735 ASSAY OF MAGNESIUM: CPT | Performed by: INTERNAL MEDICINE

## 2025-01-09 PROCEDURE — 36415 COLL VENOUS BLD VENIPUNCTURE: CPT | Performed by: INTERNAL MEDICINE

## 2025-01-09 PROCEDURE — 80048 BASIC METABOLIC PNL TOTAL CA: CPT | Performed by: INTERNAL MEDICINE

## 2025-01-09 PROCEDURE — 250N000011 HC RX IP 250 OP 636

## 2025-01-09 PROCEDURE — G0378 HOSPITAL OBSERVATION PER HR: HCPCS

## 2025-01-09 PROCEDURE — 250N000013 HC RX MED GY IP 250 OP 250 PS 637: Performed by: INTERNAL MEDICINE

## 2025-01-09 PROCEDURE — 82310 ASSAY OF CALCIUM: CPT | Performed by: INTERNAL MEDICINE

## 2025-01-09 PROCEDURE — 250N000012 HC RX MED GY IP 250 OP 636 PS 637

## 2025-01-09 PROCEDURE — 82962 GLUCOSE BLOOD TEST: CPT

## 2025-01-09 PROCEDURE — 250N000013 HC RX MED GY IP 250 OP 250 PS 637

## 2025-01-09 PROCEDURE — 82010 KETONE BODYS QUAN: CPT | Performed by: INTERNAL MEDICINE

## 2025-01-09 PROCEDURE — 99207 PR NO BILLABLE SERVICE THIS VISIT: CPT

## 2025-01-09 PROCEDURE — 258N000003 HC RX IP 258 OP 636: Performed by: INTERNAL MEDICINE

## 2025-01-09 PROCEDURE — 258N000003 HC RX IP 258 OP 636

## 2025-01-09 PROCEDURE — 84100 ASSAY OF PHOSPHORUS: CPT | Performed by: INTERNAL MEDICINE

## 2025-01-09 PROCEDURE — 96372 THER/PROPH/DIAG INJ SC/IM: CPT

## 2025-01-09 PROCEDURE — 99238 HOSP IP/OBS DSCHRG MGMT 30/<: CPT

## 2025-01-09 RX ORDER — NALOXONE HYDROCHLORIDE 0.4 MG/ML
0.2 INJECTION, SOLUTION INTRAMUSCULAR; INTRAVENOUS; SUBCUTANEOUS
Status: DISCONTINUED | OUTPATIENT
Start: 2025-01-09 | End: 2025-01-09 | Stop reason: HOSPADM

## 2025-01-09 RX ORDER — INSULIN GLARGINE 100 [IU]/ML
44 INJECTION, SOLUTION SUBCUTANEOUS EVERY MORNING
Qty: 45 ML | Refills: 1 | Status: SHIPPED | OUTPATIENT
Start: 2025-01-09

## 2025-01-09 RX ORDER — INSULIN ASPART 100 [IU]/ML
INJECTION, SOLUTION INTRAVENOUS; SUBCUTANEOUS
Status: SHIPPED
Start: 2025-01-09

## 2025-01-09 RX ORDER — NICOTINE POLACRILEX 4 MG
15-30 LOZENGE BUCCAL
Status: DISCONTINUED | OUTPATIENT
Start: 2025-01-09 | End: 2025-01-09 | Stop reason: HOSPADM

## 2025-01-09 RX ORDER — DEXTROSE MONOHYDRATE 25 G/50ML
25-50 INJECTION, SOLUTION INTRAVENOUS
Status: DISCONTINUED | OUTPATIENT
Start: 2025-01-09 | End: 2025-01-09 | Stop reason: HOSPADM

## 2025-01-09 RX ORDER — ROSUVASTATIN CALCIUM 20 MG/1
20 TABLET, COATED ORAL DAILY
Status: DISCONTINUED | OUTPATIENT
Start: 2025-01-09 | End: 2025-01-09 | Stop reason: HOSPADM

## 2025-01-09 RX ORDER — OXYCODONE HYDROCHLORIDE 5 MG/1
5 TABLET ORAL DAILY PRN
Status: DISCONTINUED | OUTPATIENT
Start: 2025-01-09 | End: 2025-01-09 | Stop reason: HOSPADM

## 2025-01-09 RX ORDER — PANTOPRAZOLE SODIUM 40 MG/1
40 TABLET, DELAYED RELEASE ORAL EVERY MORNING
Status: DISCONTINUED | OUTPATIENT
Start: 2025-01-09 | End: 2025-01-09 | Stop reason: HOSPADM

## 2025-01-09 RX ORDER — SODIUM CHLORIDE, SODIUM LACTATE, POTASSIUM CHLORIDE, CALCIUM CHLORIDE 600; 310; 30; 20 MG/100ML; MG/100ML; MG/100ML; MG/100ML
INJECTION, SOLUTION INTRAVENOUS CONTINUOUS
Status: DISCONTINUED | OUTPATIENT
Start: 2025-01-09 | End: 2025-01-09 | Stop reason: HOSPADM

## 2025-01-09 RX ORDER — NALOXONE HYDROCHLORIDE 0.4 MG/ML
0.4 INJECTION, SOLUTION INTRAMUSCULAR; INTRAVENOUS; SUBCUTANEOUS
Status: DISCONTINUED | OUTPATIENT
Start: 2025-01-09 | End: 2025-01-09 | Stop reason: HOSPADM

## 2025-01-09 RX ORDER — ENOXAPARIN SODIUM 100 MG/ML
40 INJECTION SUBCUTANEOUS EVERY 24 HOURS
Status: DISCONTINUED | OUTPATIENT
Start: 2025-01-09 | End: 2025-01-09 | Stop reason: HOSPADM

## 2025-01-09 RX ORDER — ONDANSETRON 2 MG/ML
4 INJECTION INTRAMUSCULAR; INTRAVENOUS EVERY 6 HOURS PRN
Status: DISCONTINUED | OUTPATIENT
Start: 2025-01-09 | End: 2025-01-09 | Stop reason: HOSPADM

## 2025-01-09 RX ORDER — DEXTROSE MONOHYDRATE 25 G/50ML
25-50 INJECTION, SOLUTION INTRAVENOUS
Status: DISCONTINUED | OUTPATIENT
Start: 2025-01-09 | End: 2025-01-09

## 2025-01-09 RX ORDER — POLYETHYLENE GLYCOL 3350 17 G/17G
17 POWDER, FOR SOLUTION ORAL DAILY PRN
COMMUNITY
Start: 2025-01-09

## 2025-01-09 RX ORDER — DULOXETIN HYDROCHLORIDE 20 MG/1
20 CAPSULE, DELAYED RELEASE ORAL 2 TIMES DAILY
Status: DISCONTINUED | OUTPATIENT
Start: 2025-01-09 | End: 2025-01-09 | Stop reason: HOSPADM

## 2025-01-09 RX ORDER — NICOTINE POLACRILEX 4 MG
15-30 LOZENGE BUCCAL
Status: DISCONTINUED | OUTPATIENT
Start: 2025-01-09 | End: 2025-01-09

## 2025-01-09 RX ORDER — ONDANSETRON 4 MG/1
4 TABLET, ORALLY DISINTEGRATING ORAL EVERY 6 HOURS PRN
Status: DISCONTINUED | OUTPATIENT
Start: 2025-01-09 | End: 2025-01-09 | Stop reason: HOSPADM

## 2025-01-09 RX ORDER — METOPROLOL TARTRATE 25 MG/1
25 TABLET, FILM COATED ORAL 2 TIMES DAILY
Status: DISCONTINUED | OUTPATIENT
Start: 2025-01-09 | End: 2025-01-09 | Stop reason: HOSPADM

## 2025-01-09 RX ORDER — DEXTROSE MONOHYDRATE, SODIUM CHLORIDE, AND POTASSIUM CHLORIDE 50; 1.49; 4.5 G/1000ML; G/1000ML; G/1000ML
INJECTION, SOLUTION INTRAVENOUS CONTINUOUS
Status: DISCONTINUED | OUTPATIENT
Start: 2025-01-09 | End: 2025-01-09

## 2025-01-09 RX ORDER — LISINOPRIL 10 MG/1
10 TABLET ORAL DAILY
Status: DISCONTINUED | OUTPATIENT
Start: 2025-01-09 | End: 2025-01-09 | Stop reason: HOSPADM

## 2025-01-09 RX ORDER — SODIUM CHLORIDE AND POTASSIUM CHLORIDE 150; 450 MG/100ML; MG/100ML
INJECTION, SOLUTION INTRAVENOUS CONTINUOUS
Status: DISCONTINUED | OUTPATIENT
Start: 2025-01-09 | End: 2025-01-09

## 2025-01-09 RX ADMIN — INSULIN ASPART 2 UNITS: 100 INJECTION, SOLUTION INTRAVENOUS; SUBCUTANEOUS at 12:40

## 2025-01-09 RX ADMIN — LISINOPRIL 10 MG: 10 TABLET ORAL at 08:07

## 2025-01-09 RX ADMIN — ENOXAPARIN SODIUM 40 MG: 40 INJECTION SUBCUTANEOUS at 09:16

## 2025-01-09 RX ADMIN — PANTOPRAZOLE SODIUM 40 MG: 40 TABLET, DELAYED RELEASE ORAL at 08:07

## 2025-01-09 RX ADMIN — OXYCODONE 5 MG: 5 TABLET ORAL at 02:11

## 2025-01-09 RX ADMIN — TIZANIDINE 4 MG: 4 TABLET ORAL at 14:08

## 2025-01-09 RX ADMIN — SODIUM CHLORIDE, POTASSIUM CHLORIDE, SODIUM LACTATE AND CALCIUM CHLORIDE: 600; 310; 30; 20 INJECTION, SOLUTION INTRAVENOUS at 09:15

## 2025-01-09 RX ADMIN — ROSUVASTATIN CALCIUM 20 MG: 20 TABLET, FILM COATED ORAL at 08:07

## 2025-01-09 RX ADMIN — TIZANIDINE 4 MG: 4 TABLET ORAL at 02:11

## 2025-01-09 RX ADMIN — INSULIN GLARGINE 42 UNITS: 100 INJECTION, SOLUTION SUBCUTANEOUS at 09:19

## 2025-01-09 RX ADMIN — METOPROLOL TARTRATE 25 MG: 25 TABLET, FILM COATED ORAL at 08:07

## 2025-01-09 RX ADMIN — DULOXETINE HYDROCHLORIDE 20 MG: 20 CAPSULE, DELAYED RELEASE PELLETS ORAL at 08:08

## 2025-01-09 RX ADMIN — TIZANIDINE 4 MG: 4 TABLET ORAL at 08:07

## 2025-01-09 RX ADMIN — INSULIN HUMAN 1 UNITS/HR: 1 INJECTION, SOLUTION INTRAVENOUS at 02:14

## 2025-01-09 RX ADMIN — POTASSIUM CHLORIDE, DEXTROSE MONOHYDRATE AND SODIUM CHLORIDE: 150; 5; 450 INJECTION, SOLUTION INTRAVENOUS at 02:22

## 2025-01-09 ASSESSMENT — ACTIVITIES OF DAILY LIVING (ADL)
CHANGE_IN_FUNCTIONAL_STATUS_SINCE_ONSET_OF_CURRENT_ILLNESS/INJURY: NO
FALL_HISTORY_WITHIN_LAST_SIX_MONTHS: NO
DIFFICULTY_COMMUNICATING: NO
WEAR_GLASSES_OR_BLIND: YES
DIFFICULTY_EATING/SWALLOWING: NO
ADLS_ACUITY_SCORE: 35
CONCENTRATING,_REMEMBERING_OR_MAKING_DECISIONS_DIFFICULTY: NO
ADLS_ACUITY_SCORE: 35
WALKING_OR_CLIMBING_STAIRS_DIFFICULTY: NO
ADLS_ACUITY_SCORE: 35
ADLS_ACUITY_SCORE: 35
TOILETING_ISSUES: NO
DRESSING/BATHING_DIFFICULTY: NO
ADLS_ACUITY_SCORE: 35
DOING_ERRANDS_INDEPENDENTLY_DIFFICULTY: NO
HEARING_DIFFICULTY_OR_DEAF: NO

## 2025-01-09 NOTE — PROGRESS NOTES
Skin affirmation note    Admitting nurse completed full skin assessment, Stan score and Stan interventions. This writer agrees with the initial skin assessment findings.     Madeline Ash RN
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
## 2025-01-09 NOTE — PROGRESS NOTES
Transfer Note  Data:   Reason for Transport:  Transitioned to M/S unit (2309)    Stacy Brown was transferred to  rm 2309  via wheelchair at 1025.  Patient was accompanied by CNA. Equipment used for transport: None. Family was aware of reason for transport: no. All belongings sent with Patient.    Action:  Report: given to LETI Vang.      Response:  Patient's condition when transferred was stable.    LR @ 200. RA. A&O.    Judith Sanabria RN

## 2025-01-09 NOTE — ED PROVIDER NOTES
"     Emergency Department Patient Sign-out       Brief HPI: Patient was signed out to me by the previous ER physician.  Reviewed previous documentation and workup.  Reviewed previous admissions for DKA/HHS.  Has similar labs findings to previous.  Would likely benefit from insulin infusion and DKA protocol.      Exam:   Patient Vitals for the past 24 hrs:   Temp Temp src Pulse Resp SpO2 Height Weight   01/08/25 1543 97.7  F (36.5  C) Temporal 97 20 97 % 1.676 m (5' 6\") 90.7 kg (200 lb)           ED RESULTS:   Results for orders placed or performed during the hospital encounter of 01/08/25 (from the past 24 hours)   CBC with Platelets & Differential     Status: None    Collection Time: 01/08/25  5:16 PM    Narrative    The following orders were created for panel order CBC with Platelets & Differential.  Procedure                               Abnormality         Status                     ---------                               -----------         ------                     CBC with platelets and d...[[id number]]                      Final result                 Please view results for these tests on the individual orders.   Comprehensive metabolic panel     Status: Abnormal    Collection Time: 01/08/25  5:16 PM   Result Value Ref Range    Sodium 139 135 - 145 mmol/L    Potassium 4.8 3.4 - 5.3 mmol/L    Carbon Dioxide (CO2) 19 (L) 22 - 29 mmol/L    Anion Gap 20 (H) 7 - 15 mmol/L    Urea Nitrogen 21.2 (H) 6.0 - 20.0 mg/dL    Creatinine 0.94 0.51 - 0.95 mg/dL    GFR Estimate 74 >60 mL/min/1.73m2    Calcium 9.8 8.8 - 10.4 mg/dL    Chloride 100 98 - 107 mmol/L    Glucose 254 (H) 70 - 99 mg/dL    Alkaline Phosphatase 117 40 - 150 U/L    AST 14 0 - 45 U/L    ALT 20 0 - 50 U/L    Protein Total 7.3 6.4 - 8.3 g/dL    Albumin 4.1 3.5 - 5.2 g/dL    Bilirubin Total 0.4 <=1.2 mg/dL   CBC with platelets and differential     Status: None    Collection Time: 01/08/25  5:16 PM   Result Value Ref Range    WBC Count 9.4 4.0 - 11.0 " 10e3/uL    RBC Count 5.15 3.80 - 5.20 10e6/uL    Hemoglobin 14.0 11.7 - 15.7 g/dL    Hematocrit 41.7 35.0 - 47.0 %    MCV 81 78 - 100 fL    MCH 27.2 26.5 - 33.0 pg    MCHC 33.6 31.5 - 36.5 g/dL    RDW 13.8 10.0 - 15.0 %    Platelet Count 283 150 - 450 10e3/uL    % Neutrophils 86 %    % Lymphocytes 8 %    % Monocytes 6 %    % Eosinophils 0 %    % Basophils 0 %    % Immature Granulocytes 0 %    NRBCs per 100 WBC 0 <1 /100    Absolute Neutrophils 8.1 1.6 - 8.3 10e3/uL    Absolute Lymphocytes 0.8 0.8 - 5.3 10e3/uL    Absolute Monocytes 0.5 0.0 - 1.3 10e3/uL    Absolute Eosinophils 0.0 0.0 - 0.7 10e3/uL    Absolute Basophils 0.0 0.0 - 0.2 10e3/uL    Absolute Immature Granulocytes 0.0 <=0.4 10e3/uL    Absolute NRBCs 0.0 10e3/uL   Ketone Beta-Hydroxybutyrate Quantitative     Status: Abnormal    Collection Time: 01/08/25  5:16 PM   Result Value Ref Range    Ketone (Beta-Hydroxybutyrate) Quantitative 3.61 (HH) <=0.30 mmol/L   Magnesium     Status: Abnormal    Collection Time: 01/08/25  5:16 PM   Result Value Ref Range    Magnesium 1.4 (L) 1.7 - 2.3 mg/dL   UA with Microscopic reflex to Culture     Status: Abnormal    Collection Time: 01/08/25  5:17 PM    Specimen: Urine, Clean Catch   Result Value Ref Range    Color Urine Yellow Colorless, Straw, Light Yellow, Yellow    Appearance Urine Clear Clear    Glucose Urine 500 (A) Negative mg/dL    Bilirubin Urine Negative Negative    Ketones Urine >150 (A) Negative mg/dL    Specific Gravity Urine 1.032 1.003 - 1.035    Blood Urine Negative Negative    pH Urine 5.5 5.0 - 7.0    Protein Albumin Urine 200 (A) Negative mg/dL    Urobilinogen Urine 2.0 Normal, 2.0 mg/dL    Nitrite Urine Negative Negative    Leukocyte Esterase Urine Negative Negative    Mucus Urine Present (A) None Seen /LPF    RBC Urine 1 <=2 /HPF    WBC Urine 1 <=5 /HPF    Squamous Epithelials Urine 1 <=1 /HPF    Narrative    Urine Culture not indicated   Lactic acid whole blood with 1x repeat in 2 hr when >2      Status: Abnormal    Collection Time: 01/08/25  5:49 PM   Result Value Ref Range    Lactic Acid, Initial 3.8 (H) 0.7 - 2.0 mmol/L   Blood gas venous     Status: Abnormal    Collection Time: 01/08/25  5:49 PM   Result Value Ref Range    pH Venous 7.43 7.32 - 7.43    pCO2 Venous 32 (L) 40 - 50 mm Hg    pO2 Venous 42 25 - 47 mm Hg    Bicarbonate Venous 21 21 - 28 mmol/L    Base Excess/Deficit Venous -2.2 -3.0 - 3.0 mmol/L    FIO2 21     Oxyhemoglobin Venous 78 (H) 70 - 75 %    O2 Sat, Venous 79.6 (H) 70.0 - 75.0 %    Narrative    In healthy individuals, oxyhemoglobin (O2Hb) and oxygen saturation (SO2) are approximately equal. In the presence of dyshemoglobins, oxyhemoglobin can be considerably lower than oxygen saturation.       ED MEDICATIONS:   Medications   sodium chloride 0.9% BOLUS 2,000 mL (2,000 mLs Intravenous $New Bag 1/8/25 4949)   magnesium sulfate 2 g in 50 mL sterile water intermittent infusion (has no administration in time range)   insulin aspart (NovoLOG) injection (RAPID ACTING) (has no administration in time range)   dextrose 5% and 0.45% NaCl infusion (has no administration in time range)   dextrose 50 % injection 25-50 mL (has no administration in time range)   sodium chloride 0.9 % infusion (has no administration in time range)   insulin regular (MYXREDLIN) 1 unit/mL infusion (has no administration in time range)         Impression:    ICD-10-CM    1. Dehydration  E86.0       2. Type 2 diabetes mellitus with hyperglycemia, with long-term current use of insulin (H)  E11.65     Z79.4       3. Elevated blood ketone body level  R79.89       4. Elevated lactic acid level  R79.89       5. Hyperglycemia  R73.9     Level [place] 254      6. Hypomagnesemia  E83.42                 Avery Tripp MD

## 2025-01-09 NOTE — PLAN OF CARE
End Of Shift Note        Plan: Continue insulin drip.     Subjective/Objective:    Neuro: WDL, chronic pain reported in feet.     Cardiac: WDL    Resp: Lungs clear.     GI/: WDL,     Endo: Insulin drip. ~1 unit(s)/hour. ~150 BS.     MSK: WDL    Skin: WDL    LDAs: 2x PIV in left arm. Very hard IV start. IV in left thumb.

## 2025-01-09 NOTE — DISCHARGE SUMMARY
[facility]  Discharge Summary  Hospital Medicine       Date of Admission:  1/8/2025  Date of Discharge:  1/9/2025  Discharging Provider: Mirlande Garnett PA-C    Identification and Chief Compaint: Stacy Brown is a 49 year old female with a medical history including T2DM on insulin, coronary artery disease s/p CABG, chronic pain syndrome on chronic opioids, depression, hypertension, restless leg syndrome, PTSD who presented to ER 1/8 for abdominal pain, nausea, & emesis. She is admitted to [facility] for management of diabetic ketosis without acidosis. She was initiated on insulin ggt in ER, quickly transitioned to subcutaneous insulin on admission, and is now tolerating diet, subQ insulin, and labs have improved.     Discharge Diagnoses   Type 2 diabetes mellitus with hyperglycemia and ketosis  Probable gastroenteritis  Nausea, vomiting, abdominal pain  Hypomagnesemia, resolved  CAD s/p CABG 3/9/2021  Chronic pain syndrome  Chronic opioid use  Depression  Hypertension     Follow-ups Needed After Discharge   Follow-up Appointments       Follow-up and recommended labs and tests       Follow up with primary care provider, Shyanne Francisco, within 7 days for hospital follow- up.  Recommend reviewing Libre3 data to eval if mild increase in insulin at discharge was adequate to control hyperglycemia.              Hospital Course   Stacy Brown is a 49 year old female with a medical history including T2DM on insulin, coronary artery disease s/p CABG, chronic pain syndrome on chronic opioids, depression, hypertension, restless leg syndrome, PTSD who presented to ER 1/8 for abdominal pain, nausea, & emesis. She is admitted to [facility] for management of diabetic ketosis without acidosis.     Type 2 diabetes mellitus with hyperglycemia and ketosis    Presents with abdominal pain, nausea, emesis x2 days. Had not taken insulin 1 day PTA due to severe fatigue & GI discomfort. No PO intake x1day PTA.      Diabetes with overall poor but improving control, hemoglobin A1C 8.8% on 1/8/2024.     Managed PTA with glargine 42u every morning, mealtime ssi with novolog 1u:10g carbs (max 15 units per meal).     Suspect hyperglycemia & ketosis is multifactorial: emesis, missed insulin for full day, no PO intake, poor hydration.     Patient was started on insulin ggt & DKA protocol in ER. Lactic acid 3.8 ?  1.6 with IVF. AM 1/9 ketones <0.18. Switch to subQ insulin AM 1/9. Tolerating diet without any abdominal pain or nausea.   - INCREASE PTA insulin glargine to 44u Qam (up from 42u)  - INCREASE PTA mealtime carb scale to 1u:8g carbs (up from 1u:10g carbs)  - Moderate carb diet  - Hypoglycemia monitoring & management protocols in place    Probable gastroenteritis  Nausea, vomiting, abdominal pain  Diarrheal illness Reggie - New Years Day. Feeling ok, then got emesis & abdominal pain x2 days PTA. Suspect recurrent viral GI illness since sxs resolved quickly with IVF & anti-emetics.   - Resume diet & supportive care as per T2DM, above    Hypomagnesemia, resolved  Mg 1.4 in ER, repleted & recheck Mg 2.1.   - Recommend regular PCP follow up with recheck labs    CAD s/p CABG 3/9/2021  CABG (proximal / mid LAD, mid circumflex, mid RCA, proximal RPL) 3/9/2021.   - Continued PTA metoprolol tartrate 25mg BID  - Continued PTA rosuvastatin 20mg daily     Chronic pain syndrome  Chronic opioid use  - Continued PTA oxycodone 5mg daily prn  - Continued PTA tizanidine 4mg TID    Depression  - Continued PTA duloxetine 20mg BID    Hypertension  - Continued PTA lisinopril 10mg daily     Diet: Moderate Consistent Carb (60 g CHO per Meal) Diet    DVT Prophylaxis: Enoxaparin (Lovenox) SQ  Schultz Catheter: Not present  Lines: None     Code Status: Full Code      Consultations This Hospital Stay   None    Code Status   Full Code    The discharge plan was discussed with the patient, bedside RN, charge RN, and all expressed understanding.     Time  Spent on this Encounter   Total time on this discharge was 25 minutes.       Mirlande Garnett PA-C  [facility]  ______________________________________________________________________    Physical Exam   Vital Signs: Temp: 97.7  F (36.5  C) Temp src: Oral BP: 114/49 Pulse: 63   Resp: 16 SpO2: 100 % O2 Device: None (Room air)    Weight: 215 lbs 9.76 oz    Constitutional: alert and oriented x3, laying in bed, appears comfortable   CV: regular rate & rhythm, no murmurs, rubs, or gallops. Radial pulse 2+.   Respiratory: CTA bilaterally, respirations unlabored on room air.   GI: Bowel sounds present throughout. Abdomen soft, nontender to palpation  Skin: Warm and dry. Scabs over arms.   Musculoskeletal: muscle bulk as expected  Neuro: distal sensation intact to lower extremities bilaterally. Alert, interacting appropriately.        Primary Care Physician   Shyanne Francisco  [address] [place] [address]     Discharge Disposition   Discharged to home  Condition at discharge: Good    Significant Results and Procedures   Results for orders placed or performed during the hospital encounter of 12/14/24   XR Chest 2 Views    Narrative    EXAM: XR CHEST 2 VIEWS  LOCATION: [facility]  DATE: 12/14/2024  INDICATION: chest pain  COMPARISON: 11/29/2024    Impression    IMPRESSION: No consolidation. No pleural effusions or pneumothorax. Normal cardiac size. Median sternotomy.     *Note: Due to a large number of results and/or encounters for the requested time period, some results have not been displayed. A complete set of results can be found in Results Review.     Procedures  None    Discharge Orders      Reason for your hospital stay    You were hospitalized for elevated glucose & ketones. During hospitalization you received frequent glucose monitoring & insulin, as well as IV fluids. You were transitioned back to subcutaneous insulin and are now ready for  discharge.     Follow-up and recommended labs and tests     Follow up with primary care provider, Shyanne Francisco, within 7 days for hospital follow- up.  Recommend reviewing Libre3 data to eval if mild increase in insulin at discharge was adequate to control hyperglycemia.     Activity    Your activity upon discharge: activity as tolerated     Diet    Follow this diet upon discharge: Current Diet:Orders Placed This Encounter      Moderate Consistent Carb (60 g CHO per Meal) Diet     Discharge Medications   Current Discharge Medication List        CONTINUE these medications which have CHANGED    Details   insulin aspart (NOVOLOG FLEXPEN) 100 UNIT/ML pen INJECT 1 UNIT PER 8 GRAMS OF CARBS BEFORE DINNER, UP TO 15 UNITS PER MEAL.    Associated Diagnoses: Type 2 diabetes mellitus with hyperglycemia, with long-term current use of insulin (H)      insulin glargine 100 UNIT/ML pen Inject 44 Units subcutaneously every morning.  Qty: 45 mL, Refills: 1    Comments: If Lantus is not covered by insurance, may substitute Basaglar or Semglee or other insulin glargine product per insurance preference at same dose and frequency.    Associated Diagnoses: Type 2 diabetes mellitus with hyperglycemia, with long-term current use of insulin (H)      polyethylene glycol (MIRALAX) 17 GM/Dose powder Take 17 g by mouth daily as needed for constipation.    Associated Diagnoses: Drug induced constipation           CONTINUE these medications which have NOT CHANGED    Details   Continuous Glucose Sensor (FREESTYLE MAXI 3 SENSOR) MISC APPLY 1 SENSOR AND CHANGE EVERY 14 DAYS AS DIRECTED  Qty: 2 each, Refills: 5    Associated Diagnoses: Type 2 diabetes mellitus with hyperglycemia, with long-term current use of insulin (H); Type 2 diabetes mellitus with other circulatory complication, with long-term current use of insulin (H)      DULoxetine (CYMBALTA) 20 MG capsule Take 1 capsule (20 mg) by mouth 2 times daily.  Qty: 180 capsule, Refills: 3     Associated Diagnoses: Moderate major depression (H); Chronic pain syndrome      insulin pen needle (NOVOFINE) 32G X 6 MM miscellaneous Use 4 times daily or as directed.  Qty: 400 each, Refills: 3    Associated Diagnoses: Type 2 diabetes mellitus with hyperglycemia, with long-term current use of insulin (H)      lisinopril (ZESTRIL) 10 MG tablet Take 1 tablet (10 mg) by mouth daily.  Qty: 60 tablet, Refills: 0    Associated Diagnoses: Benign essential hypertension      metFORMIN (GLUCOPHAGE XR) 500 MG 24 hr tablet Take 2 tablets (1,000 mg) by mouth 2 times daily (with meals).  Qty: 360 tablet, Refills: 3    Associated Diagnoses: Type 2 diabetes mellitus with hyperglycemia, with long-term current use of insulin (H)      metoprolol tartrate (LOPRESSOR) 25 MG tablet Take 1 tablet (25 mg) by mouth 2 times daily.  Qty: 186 tablet, Refills: 3    Associated Diagnoses: S/P CABG (coronary artery bypass graft); NSTEMI (non-ST elevated myocardial infarction) (H)      Multiple Vitamins-Minerals (MULTI-VITAMIN GUMMIES) CHEW Take 1 chew tab by mouth every morning.      naloxone (NARCAN) 4 MG/0.1ML nasal spray Spray 4 mg into one nostril alternating nostrils once as needed.      nitroGLYcerin (NITROSTAT) 0.4 MG sublingual tablet For chest pain place 1 tablet under the tongue every 5 minutes for 3 doses. If symptoms persist 5 minutes after 1st dose call 911.  Qty: 25 tablet, Refills: 0    Comments: Don't fill now, will call if needed.  Associated Diagnoses: S/P CABG (coronary artery bypass graft); NSTEMI (non-ST elevated myocardial infarction) (H)      NURTEC 75 MG ODT tablet Place 75 mg under the tongue daily as needed for migraine      oxyCODONE (ROXICODONE) 5 MG tablet TAKE 1 TABLET (5 MG) BY MOUTH DAILY AS NEEDED FOR SEVERE PAIN.  Qty: 30 tablet, Refills: 0    Associated Diagnoses: Chronic pain syndrome      pantoprazole (PROTONIX) 40 MG EC tablet Take 1 tablet (40 mg) by mouth every morning.  Qty: 90 tablet, Refills: 1     Associated Diagnoses: Abdominal pain, generalized      rosuvastatin (CRESTOR) 20 MG tablet Take 1 tablet (20 mg) by mouth daily.  Qty: 93 tablet, Refills: 3    Associated Diagnoses: Type 2 diabetes mellitus with hyperglycemia, with long-term current use of insulin (H); S/P CABG (coronary artery bypass graft); NSTEMI (non-ST elevated myocardial infarction) (H)      tiZANidine (ZANAFLEX) 2 MG tablet Take 4 mg by mouth 3 times daily.           Allergies   Allergies   Allergen Reactions    Amoxicillin Hives    Hydrocodone Nausea and Vomiting

## 2025-01-09 NOTE — PLAN OF CARE
"[place] [facility] ADMISSION NOTE    Patient admitted to room ICU 3 at approximately 2354 via cart from [facility]. Patient was accompanied by nurse.     Verbal SBAR report received from [facility] ED prior to patient arrival.     Patient ambulated to bed independently. Patient alert and oriented X 3. The patient is not having any pain.  . Admission vital signs: Blood pressure 133/83, pulse 97, temperature 99.6  F (37.6  C), temperature source Oral, resp. rate 19, height 1.778 m (5' 10\"), weight 108 kg (238 lb 1.6 oz), SpO2 98 %. Patient was oriented to plan of care, call light, bed controls, tv, telephone, bathroom, and visiting hours.     Risk Assessment    The following safety risks were identified during admission: none. Yellow risk band applied: NO.     Skin Initial Assessment    This writer admitted this patient and completed a full skin assessment and Stan score in the Adult PCS flowsheet. Appropriate interventions initiated as needed.     Secondary skin check completed by LETI Correa.    Stan Risk Assessment  Sensory Perception: 4-->no impairment  Moisture: 4-->rarely moist  Activity: 3-->walks occasionally  Mobility: 4-->no limitation  Nutrition: 3-->adequate  Friction and Shear: 3-->no apparent problem  Stan Score: 21    Education    Patient has a [place] to Observation order: No  Observation education completed and documented: No    "

## 2025-01-09 NOTE — MEDICATION SCRIBE - ADMISSION MEDICATION HISTORY
Medication Scribe Admission Medication History    Admission medication history is complete. The information provided in this note is only as accurate as the sources available at the time of the update.    Information Source(s): Patient via in-person    Pertinent Information: Pt does not have continuous glucose sensor in as she didn't pick it up from pharmacy and was due to change yesterday.     Changes made to PTA medication list:  Added: None  Deleted: None  Changed: Changed oxycodone to Daily HS instead of PRN.     Allergies reviewed with patient and updates made in EHR: yes    Medication History Completed By: Garry Molina 1/9/2025 11:40 AM    PTA Med List   Medication Sig Note Last Dose/Taking    Continuous Glucose Sensor (FREESTYLE MAXI 3 SENSOR) MISC APPLY 1 SENSOR AND CHANGE EVERY 14 DAYS AS DIRECTED (Patient taking differently: 1 applicator every 14 days.) 1/9/2025: Supposed to go on yesterday, pt didn't  from pharmacy. Pt does not have one one right now.  Past Week    DULoxetine (CYMBALTA) 20 MG capsule Take 1 capsule (20 mg) by mouth 2 times daily.  1/7/2025 Evening    insulin glargine 100 UNIT/ML pen Inject 42 Units subcutaneously every morning. (Patient taking differently: Inject 42 Units subcutaneously every morning. BASAGLAR)  1/7/2025 Evening    insulin pen needle (NOVOFINE) 32G X 6 MM miscellaneous Use 4 times daily or as directed.  Past Week    lisinopril (ZESTRIL) 10 MG tablet Take 1 tablet (10 mg) by mouth daily.  1/7/2025 Morning    metFORMIN (GLUCOPHAGE XR) 500 MG 24 hr tablet Take 2 tablets (1,000 mg) by mouth 2 times daily (with meals).  1/7/2025 Evening    metoprolol tartrate (LOPRESSOR) 25 MG tablet Take 1 tablet (25 mg) by mouth 2 times daily.  1/7/2025 Evening    Multiple Vitamins-Minerals (MULTI-VITAMIN GUMMIES) CHEW Take 1 chew tab by mouth every morning.  1/7/2025 Morning    naloxone (NARCAN) 4 MG/0.1ML nasal spray Spray 4 mg into one nostril alternating nostrils once as  needed.  Unknown    nitroGLYcerin (NITROSTAT) 0.4 MG sublingual tablet For chest pain place 1 tablet under the tongue every 5 minutes for 3 doses. If symptoms persist 5 minutes after 1st dose call 911.  11/29/2024    NOVOLOG FLEXPEN 100 UNIT/ML soln INJECT 1 UNIT PER 10 GRAMS OF CARBS BEFORE DINNER, UP TO 15 UNITS PER MEAL.  1/7/2025 Evening    NURTEC 75 MG ODT tablet Place 75 mg under the tongue daily as needed for migraine  More than a month    oxyCODONE (ROXICODONE) 5 MG tablet TAKE 1 TABLET (5 MG) BY MOUTH DAILY AS NEEDED FOR SEVERE PAIN. (Patient taking differently: Take 5 mg by mouth nightly as needed for severe pain.)  1/7/2025 Bedtime    pantoprazole (PROTONIX) 40 MG EC tablet Take 1 tablet (40 mg) by mouth every morning.  1/7/2025 Morning    polyethylene glycol (MIRALAX) 17 GM/Dose powder Take 17 g by mouth daily. (Patient taking differently: Take 17 g by mouth daily as needed for constipation.)  1/7/2025 Morning    rosuvastatin (CRESTOR) 20 MG tablet Take 1 tablet (20 mg) by mouth daily.  1/7/2025 Morning    tiZANidine (ZANAFLEX) 2 MG tablet Take 4 mg by mouth 3 times daily.  1/7/2025 Bedtime

## 2025-01-09 NOTE — PROGRESS NOTES
[facility] Medicine Progress Note  Date of Service: 01/09/2025    Assessment & Plan   Stacy Brown is a 49 year old female with a medical history including T2DM on insulin, coronary artery disease s/p CABG, chronic pain syndrome on chronic opioids, depression, hypertension, restless leg syndrome, PTSD who presented to ER 1/8 for abdominal pain, nausea, & emesis. She is admitted to [facility] for management of diabetic ketosis without acidosis.     Type 2 diabetes mellitus with hyperglycemia and ketosis    Presents with abdominal pain, nausea, emesis x2 days. Had not taken insulin 1 day PTA due to severe fatigue & GI discomfort. No PO intake x1day PTA.     Diabetes with overall poor but improving control, hemoglobin A1C 8.8% on 1/8/2024.     Managed PTA with glargine 42u every morning, mealtime ssi with novolog 1u:10g carbs (max 15 units per meal).     Suspect hyperglycemia & ketosis is multifactorial: emesis, missed insulin for full day, no PO intake, poor hydration.     Patient was started on insulin ggt & DKA protocol in ER. Lactic acid 3.8 ?  1.6 with IVF. AM 1/9 ketones <0.18. Switch to subQ insulin AM 1/9.   - Resume PTA insulin glargine 42u Qam  - High insulin sliding scale   - Moderate carb diet  - Hypoglycemia monitoring & management protocols in place    Probable gastroenteritis  Nausea, vomiting, abdominal pain  Diarrheal illness Reggie - New Years Day. Feeling ok, then got emesis & abdominal pain x2 days PTA. Suspect recurrent viral GI illness since sxs resolved quickly with IVF & anti-emetics.   - Resume diet & supportive care as per T2DM, above    Hypomagnesemia, resolved  Mg 1.4 in ER, repleted & recheck Mg 2.1.     CAD s/p CABG 3/9/2021  CABG (proximal / mid LAD, mid circumflex, mid RCA, proximal RPL) 3/9/2021.   - Continue PTA metoprolol tartrate 25mg BID  - Continue PTA rosuvastatin 20mg daily     Chronic pain syndrome  Chronic opioid use  - Continue PTA  oxycodone 5mg daily prn  - Continue PTA tizanidine 4mg TID    Depression  - Continue PTA duloxetine 20mg BID    Hypertension  - Continue PTA lisinopril 10mg daily       Diet: Moderate Consistent Carb (60 g CHO per Meal) Diet    DVT Prophylaxis: Enoxaparin (Lovenox) SQ  Schultz Catheter: Not present  Lines: None     Code Status: Full Code       Discussion/Disposition: improved, back on subQ insulin. If able to tolerate diet & subQ insulin plan for discharge late this afternoon.     Medically Ready for Discharge: Anticipated Today    The patient's care was discussed with the Attending Physician, Dr. Navi Jacobs, bedside RN, and the patient .    Mirlande Garnett PA-C        Medical Decision Making   Moderate complexity    35 MINUTES SPENT BY ME on the date of service doing chart review, history, exam, documentation & further activities per the note.        Interval History   Doing well, endorses diarrhea & GI illness she got from a family member on Christmas eve, this resolved around New Years Day. Since then she has felt ok until 2 days PTA when she became fatigued with GI discomfort. Had emesis with any oral intake 1 day PTA so she did not take any insulin. She was not eating or drinking anything for 1-2 days PTA.   Presented to ER for abdominal discomfort.   Since admission she feels improved. No more abdominal pain or nausea. Tolerating small amount of PO intake. Is disappointed she ended up in the hospital because she was working really hard to manage her diabetes. Usually has on Libre3 but it ran out yesterday and she didn't have the energy to get a new one. Data from previous Libra3 shows that she was mostly in range (glucose 72 - 180).     Physical Exam   Temp:  [97.7  F (36.5  C)-98.2  F (36.8  C)] 98.1  F (36.7  C)  Pulse:  [] 73  Resp:  [11-20] 19  BP: (100-155)/(59-98) 155/81  SpO2:  [93 %-100 %] 99 %    Weights:   Vitals:    01/08/25 5986   Weight: 97.8 kg (215 lb 9.8 oz)    Body mass index is 34.8  kg/m .    Constitutional: alert and oriented x3, laying in bed, appears comfortable   CV: regular rate & rhythm, no murmurs, rubs, or gallops. Radial pulse 2+.   Respiratory: CTA bilaterally, respirations unlabored on room air.   GI: Bowel sounds present throughout. Abdomen soft, nontender to palpation  Skin: Warm and dry. Scabs over arms.   Musculoskeletal: muscle bulk as expected  Neuro: distal sensation intact to lower extremities bilaterally. Alert, interacting appropriately.     Data   Recent Labs   Lab 01/09/25  0706 01/09/25  0646 01/09/25  0457 01/09/25  0219 01/09/25  0203 01/08/25  2215 01/08/25  2113 01/08/25 2011 01/08/25  1716   WBC  --   --   --   --   --   --   --   --  9.4   HGB  --   --   --   --   --   --   --   --  14.0   MCV  --   --   --   --   --   --   --   --  81   PLT  --   --   --   --   --   --   --   --  283   NA  --  139  --   --  138  --  139  --  139   POTASSIUM  --  5.8*  --   --  3.9  --  4.3  --  4.8   CHLORIDE  --  109*  --   --  103  --  103  --  100   CO2  --  16*  --   --  23  --  21*  --  19*   BUN  --  14.6  --   --  15.1  --  18.7  --  21.2*   CR  --  0.77  --   --  0.80  --  0.85  --  0.94   ANIONGAP  --  14  --   --  12  --  15  --  20*   LUNA  --  9.0  --   --  9.1  --  9.0  --  9.8   * 154* 142*   < > 151*   < > 148*   < > 254*   ALBUMIN  --   --   --   --   --   --   --   --  4.1   PROTTOTAL  --   --   --   --   --   --   --   --  7.3   BILITOTAL  --   --   --   --   --   --   --   --  0.4   ALKPHOS  --   --   --   --   --   --   --   --  117   ALT  --   --   --   --   --   --   --   --  20   AST  --   --   --   --   --   --   --   --  14    < > = values in this interval not displayed.     I reviewed all new labs and imaging results over the last 24 hours.     Medications   Current Facility-Administered Medications   Medication Dose Route Frequency Provider Last Rate Last Admin    0.45% sodium chloride + KCl 20 mEq/L infusion   Intravenous Continuous Sanchez,  MD Xuan        dextrose 5% and 0.45% NaCl + KCl 20 mEq/L infusion   Intravenous Continuous Xuan Sanchez  mL/hr at 01/09/25 0600 Rate Verify at 01/09/25 0600    insulin regular (MYXREDLIN) 1 unit/mL infusion   Intravenous Continuous Xuan Sanchez MD 0.5 mL/hr at 01/09/25 0700 0.5 Units/hr at 01/09/25 0700    Patient is already receiving anticoagulation with heparin, enoxaparin (LOVENOX), warfarin (COUMADIN)  or other anticoagulant medication   Does not apply Continuous PRN Xuan Sanchez MD         Current Facility-Administered Medications   Medication Dose Route Frequency Provider Last Rate Last Admin    DULoxetine (CYMBALTA) DR capsule 20 mg  20 mg Oral BID Xuan Sanchez MD        enoxaparin ANTICOAGULANT (LOVENOX) injection 40 mg  40 mg Subcutaneous Q24H Xuan Sanchez MD        lisinopril (ZESTRIL) tablet 10 mg  10 mg Oral Daily Xuan Sanchez MD        metoprolol tartrate (LOPRESSOR) tablet 25 mg  25 mg Oral BID Xuan Sanchez MD        pantoprazole (PROTONIX) EC tablet 40 mg  40 mg Oral QAM Xuan Sanchez MD        rosuvastatin (CRESTOR) tablet 20 mg  20 mg Oral Daily Xuan Sanchez MD        tiZANidine (ZANAFLEX) tablet 4 mg  4 mg Oral TID Xuan Sanchez MD   4 mg at 01/09/25 0211     Mirlande Garnett PA-C

## 2025-01-09 NOTE — PROGRESS NOTES
Reviewing chart due to high probability of admit to Med/Surg unit.    Taj Castro RN on 1/9/2025 at 10:20 AM

## 2025-01-09 NOTE — PLAN OF CARE
Goal Outcome Evaluation:      Plan of Care Reviewed With: patient               [place] [facility] DISCHARGE NOTE    Patient discharged to home at 3:10 PM via wheel chair. Accompanied by spouse and staff. Discharge instructions reviewed with patient and spouse, opportunity offered to ask questions. Prescriptions sent to patients preferred pharmacy. All belongings sent with patient.    Taj Castro RN on 1/9/2025 at 3:15 PM

## 2025-01-11 ENCOUNTER — PATIENT OUTREACH (OUTPATIENT)
Dept: CARE COORDINATION | Facility: CLINIC | Age: 50
End: 2025-01-11
Payer: COMMERCIAL

## 2025-01-11 NOTE — PROGRESS NOTES
Connected Care Resource Center Contact  [facility]/Voicemail     Clinical Data: Post-Discharge Outreach     Outreach attempted x 2.  Left message on patient's voicemail, providing [facility]'s central phone number of [phone] ([phone]) for questions/concerns and/or to schedule an appt with an [facility] provider, if they do not have a PCP.      Plan:  [facility] will do no further outreaches at this time.        Dione GUTIERREZ, Community Health Worker  Clinic Care Coordination  [facility]  Phone: [phone]      *Connected Care Resource Team does NOT follow patient ongoing. Referrals are identified based on internal discharge reports and the outreach is to ensure patient has an understanding of their discharge instructions

## 2025-01-14 ENCOUNTER — TELEPHONE (OUTPATIENT)
Dept: FAMILY MEDICINE | Facility: CLINIC | Age: 50
End: 2025-01-14
Payer: COMMERCIAL

## 2025-01-14 NOTE — TELEPHONE ENCOUNTER
MTM referral from: Transitions of Care (recent hospital discharge, TCU discharge, or ED visit)    MTM referral outreach attempt #2 on January 14, 2025 at 10:11 AM      Outcome: Left Message    Use amber  for the carrier/Plan on the flowsheet      Naldo Message Sent    Ju Ferrell CMA  MTM

## 2025-01-20 LAB
GLUCOSE BLDC GLUCOMTR-MCNC: 151 MG/DL (ref 70–99)
GLUCOSE BLDC GLUCOMTR-MCNC: 153 MG/DL (ref 70–99)
GLUCOSE BLDC GLUCOMTR-MCNC: 154 MG/DL (ref 70–99)

## 2025-01-28 ASSESSMENT — PATIENT HEALTH QUESTIONNAIRE - PHQ9
SUM OF ALL RESPONSES TO PHQ QUESTIONS 1-9: 12
SUM OF ALL RESPONSES TO PHQ QUESTIONS 1-9: 12
10. IF YOU CHECKED OFF ANY PROBLEMS, HOW DIFFICULT HAVE THESE PROBLEMS MADE IT FOR YOU TO DO YOUR WORK, TAKE CARE OF THINGS AT HOME, OR GET ALONG WITH OTHER PEOPLE: SOMEWHAT DIFFICULT

## 2025-01-29 ENCOUNTER — OFFICE VISIT (OUTPATIENT)
Dept: FAMILY MEDICINE | Facility: CLINIC | Age: 50
End: 2025-01-29
Payer: COMMERCIAL

## 2025-01-29 ENCOUNTER — MYC MEDICAL ADVICE (OUTPATIENT)
Dept: FAMILY MEDICINE | Facility: CLINIC | Age: 50
End: 2025-01-29

## 2025-01-29 VITALS
OXYGEN SATURATION: 99 % | SYSTOLIC BLOOD PRESSURE: 120 MMHG | WEIGHT: 197.8 LBS | TEMPERATURE: 97.4 F | HEART RATE: 79 BPM | DIASTOLIC BLOOD PRESSURE: 76 MMHG | HEIGHT: 67 IN | RESPIRATION RATE: 16 BRPM | BODY MASS INDEX: 31.04 KG/M2

## 2025-01-29 DIAGNOSIS — Z12.31 ENCOUNTER FOR SCREENING MAMMOGRAM FOR BREAST CANCER: ICD-10-CM

## 2025-01-29 DIAGNOSIS — Z95.1 S/P CABG (CORONARY ARTERY BYPASS GRAFT): ICD-10-CM

## 2025-01-29 DIAGNOSIS — R30.0 DYSURIA: ICD-10-CM

## 2025-01-29 DIAGNOSIS — F33.1 MAJOR DEPRESSIVE DISORDER, RECURRENT EPISODE, MODERATE (H): ICD-10-CM

## 2025-01-29 DIAGNOSIS — R30.0 DYSURIA: Primary | ICD-10-CM

## 2025-01-29 DIAGNOSIS — Z12.31 VISIT FOR SCREENING MAMMOGRAM: ICD-10-CM

## 2025-01-29 DIAGNOSIS — E11.59 TYPE 2 DIABETES MELLITUS WITH OTHER CIRCULATORY COMPLICATION, WITH LONG-TERM CURRENT USE OF INSULIN (H): Primary | ICD-10-CM

## 2025-01-29 DIAGNOSIS — E78.5 HYPERLIPIDEMIA LDL GOAL <100: ICD-10-CM

## 2025-01-29 DIAGNOSIS — E11.00 HYPEROSMOLAR HYPERGLYCEMIC STATE (HHS) (H): ICD-10-CM

## 2025-01-29 DIAGNOSIS — G89.4 CHRONIC PAIN SYNDROME: ICD-10-CM

## 2025-01-29 DIAGNOSIS — Z79.4 TYPE 2 DIABETES MELLITUS WITH OTHER CIRCULATORY COMPLICATION, WITH LONG-TERM CURRENT USE OF INSULIN (H): Primary | ICD-10-CM

## 2025-01-29 DIAGNOSIS — I21.4 NSTEMI (NON-ST ELEVATED MYOCARDIAL INFARCTION) (H): ICD-10-CM

## 2025-01-29 LAB
ALBUMIN SERPL BCG-MCNC: 4.7 G/DL (ref 3.5–5.2)
ALBUMIN UR-MCNC: NEGATIVE MG/DL
ALP SERPL-CCNC: 114 U/L (ref 40–150)
ALT SERPL W P-5'-P-CCNC: 25 U/L (ref 0–50)
AMPHETAMINES UR QL SCN: ABNORMAL
ANION GAP SERPL CALCULATED.3IONS-SCNC: 13 MMOL/L (ref 7–15)
APPEARANCE UR: CLEAR
AST SERPL W P-5'-P-CCNC: 17 U/L (ref 0–45)
BARBITURATES UR QL SCN: ABNORMAL
BENZODIAZ UR QL SCN: ABNORMAL
BILIRUB SERPL-MCNC: 0.3 MG/DL
BILIRUB UR QL STRIP: NEGATIVE
BUN SERPL-MCNC: 18.4 MG/DL (ref 6–20)
BZE UR QL SCN: ABNORMAL
CALCIUM SERPL-MCNC: 10 MG/DL (ref 8.8–10.4)
CANNABINOIDS UR QL SCN: ABNORMAL
CHLORIDE SERPL-SCNC: 102 MMOL/L (ref 98–107)
CLUE CELLS: ABNORMAL
COLOR UR AUTO: ABNORMAL
CREAT SERPL-MCNC: 0.89 MG/DL (ref 0.51–0.95)
EGFRCR SERPLBLD CKD-EPI 2021: 79 ML/MIN/1.73M2
FENTANYL UR QL: ABNORMAL
GLUCOSE SERPL-MCNC: 204 MG/DL (ref 70–99)
GLUCOSE UR STRIP-MCNC: 300 MG/DL
HCO3 SERPL-SCNC: 23 MMOL/L (ref 22–29)
HGB UR QL STRIP: ABNORMAL
KETONES UR STRIP-MCNC: NEGATIVE MG/DL
LEUKOCYTE ESTERASE UR QL STRIP: ABNORMAL
NITRATE UR QL: NEGATIVE
OPIATES UR QL SCN: ABNORMAL
PCP QUAL URINE (ROCHE): ABNORMAL
PH UR STRIP: 5 [PH] (ref 5–7)
POTASSIUM SERPL-SCNC: 4.3 MMOL/L (ref 3.4–5.3)
PROT SERPL-MCNC: 7.8 G/DL (ref 6.4–8.3)
RBC URINE: 3 /HPF
SODIUM SERPL-SCNC: 138 MMOL/L (ref 135–145)
SP GR UR STRIP: 1.01 (ref 1–1.03)
SQUAMOUS EPITHELIAL: 1 /HPF
TRICHOMONAS, WET PREP: ABNORMAL
UROBILINOGEN UR STRIP-MCNC: NORMAL MG/DL
WBC CLUMPS #/AREA URNS HPF: PRESENT /HPF
WBC URINE: 65 /HPF
WBC'S/HIGH POWER FIELD, WET PREP: ABNORMAL
YEAST, WET PREP: ABNORMAL

## 2025-01-29 PROCEDURE — 36415 COLL VENOUS BLD VENIPUNCTURE: CPT | Performed by: FAMILY MEDICINE

## 2025-01-29 PROCEDURE — 87086 URINE CULTURE/COLONY COUNT: CPT | Performed by: FAMILY MEDICINE

## 2025-01-29 PROCEDURE — 99214 OFFICE O/P EST MOD 30 MIN: CPT | Performed by: FAMILY MEDICINE

## 2025-01-29 PROCEDURE — 81001 URINALYSIS AUTO W/SCOPE: CPT | Mod: 59 | Performed by: FAMILY MEDICINE

## 2025-01-29 PROCEDURE — 87210 SMEAR WET MOUNT SALINE/INK: CPT | Performed by: FAMILY MEDICINE

## 2025-01-29 PROCEDURE — 80053 COMPREHEN METABOLIC PANEL: CPT | Performed by: FAMILY MEDICINE

## 2025-01-29 PROCEDURE — 80307 DRUG TEST PRSMV CHEM ANLYZR: CPT | Performed by: FAMILY MEDICINE

## 2025-01-29 PROCEDURE — G2211 COMPLEX E/M VISIT ADD ON: HCPCS | Performed by: FAMILY MEDICINE

## 2025-01-29 RX ORDER — NORTRIPTYLINE HYDROCHLORIDE 10 MG/1
CAPSULE ORAL
Qty: 70 CAPSULE | Refills: 0 | Status: SHIPPED | OUTPATIENT
Start: 2025-01-29 | End: 2025-03-12

## 2025-01-29 RX ORDER — OXYCODONE HYDROCHLORIDE 5 MG/1
5 TABLET ORAL DAILY PRN
Qty: 45 TABLET | Refills: 0 | Status: SHIPPED | OUTPATIENT
Start: 2025-01-29

## 2025-01-29 RX ORDER — TIZANIDINE 2 MG/1
4 TABLET ORAL 3 TIMES DAILY
Qty: 120 TABLET | Refills: 4 | Status: SHIPPED | OUTPATIENT
Start: 2025-01-29

## 2025-01-29 RX ORDER — CEPHALEXIN 500 MG/1
500 CAPSULE ORAL 2 TIMES DAILY
Qty: 14 CAPSULE | Refills: 0 | Status: SHIPPED | OUTPATIENT
Start: 2025-01-29

## 2025-01-29 ASSESSMENT — ANXIETY QUESTIONNAIRES
GAD7 TOTAL SCORE: 10
2. NOT BEING ABLE TO STOP OR CONTROL WORRYING: SEVERAL DAYS
IF YOU CHECKED OFF ANY PROBLEMS ON THIS QUESTIONNAIRE, HOW DIFFICULT HAVE THESE PROBLEMS MADE IT FOR YOU TO DO YOUR WORK, TAKE CARE OF THINGS AT HOME, OR GET ALONG WITH OTHER PEOPLE: SOMEWHAT DIFFICULT
4. TROUBLE RELAXING: MORE THAN HALF THE DAYS
7. FEELING AFRAID AS IF SOMETHING AWFUL MIGHT HAPPEN: SEVERAL DAYS
GAD7 TOTAL SCORE: 10
GAD7 TOTAL SCORE: 10
6. BECOMING EASILY ANNOYED OR IRRITABLE: MORE THAN HALF THE DAYS
1. FEELING NERVOUS, ANXIOUS, OR ON EDGE: MORE THAN HALF THE DAYS
7. FEELING AFRAID AS IF SOMETHING AWFUL MIGHT HAPPEN: SEVERAL DAYS
8. IF YOU CHECKED OFF ANY PROBLEMS, HOW DIFFICULT HAVE THESE MADE IT FOR YOU TO DO YOUR WORK, TAKE CARE OF THINGS AT HOME, OR GET ALONG WITH OTHER PEOPLE?: SOMEWHAT DIFFICULT
3. WORRYING TOO MUCH ABOUT DIFFERENT THINGS: SEVERAL DAYS
5. BEING SO RESTLESS THAT IT IS HARD TO SIT STILL: SEVERAL DAYS

## 2025-01-29 ASSESSMENT — PAIN SCALES - GENERAL: PAINLEVEL_OUTOF10: SEVERE PAIN (8)

## 2025-01-29 NOTE — PROGRESS NOTES
Assessment & Plan     (E11.59,  Z79.4) Type 2 diabetes mellitus with other circulatory complication, with long-term current use of insulin (H)  (primary encounter diagnosis)  Comment: Patient is a type II diabetic with history of CAD as well as peripheral vascular disease it would appear.  She has a history of NSTEMI as well as bypass.  She is currently medically managed.  Her A1c as of 3 weeks ago was 8.8 down slightly from 9 8 months ago but still above our goal range for her age and given her cardiac history.  Will refer to diabetes education.  Random blood glucose today was at 204  Plan: Adult Diabetes Education  Referral,         Comprehensive metabolic panel (BMP + Alb, Alk         Phos, ALT, AST, Total. Bili, TP)    (R30.0) Dysuria  Comment: UA notable for large glucose and negative nitrites.  She has experience sometimes like this before and responded well to Keflex.  Keflex was prescribed however urine culture does not indicate significant infection and only has urogenital holger.  Will continue to monitor symptoms.  Suspect possible interstitial cystitis based on history  Plan: Urine Culture, cephALEXin (KEFLEX) 500 MG         capsule      (E11.00) Hyperosmolar hyperglycemic state (HHS) (H)  Comment: History of recent hospitalization for same.  Will continue to monitor.  Blood glucose today was over 200 however urine ketones were negative on UA.  Will continue to monitor    (F33.1) Major depressive disorder, recurrent episode, moderate (H)  Comment: Patient with multiple medical comorbidities that is including CAD.  Was started on duloxetine last year but self discontinued due to side effects.  She was amenable to a trial of nortriptyline today for chronic pain as well as mood and will see if she tolerates.  She may be a good candidate eventually for a trial of venlafaxine versus sertraline or citalopram will continue to monitor denies any SI/HI/AVH.    (G89.4) Chronic pain syndrome  Comment:  Patient with chronic pain she is amenable to the addition of a TCA.  She was requesting increase of her oxycodone from 30-60.  We discussed a trial of 45 pills.  A referral to pain management the patient was amenable to both of those compromises  Plan: tiZANidine (ZANAFLEX) 2 MG tablet,         nortriptyline (PAMELOR) 10 MG capsule, Pain         Management  Referral, Urine Drug         Screen, oxyCODONE (ROXICODONE) 5 MG tablet    (Z12.31) Encounter for screening mammogram for breast cancer  Plan: MA Screening Bilateral w/ Eris    (Z95.1) S/P CABG (coronary artery bypass graft)  Comment: Patient with a history of bypass.  Currently on beta-blockade as well as lisinopril 10, statin.  She is on no antiplatelet therapy at this juncture and we did discuss establishing care with cardiology for continued management and monitoring  Plan: Adult Cardiology Eval  Referral    (I21.4) NSTEMI (non-ST elevated myocardial infarction) (H)  Comment: Extensive cardiac history as noted above.  Patient would benefit from establishing with cardiology  Plan: Adult Cardiology Eval  Referral    (E78.5) Hyperlipidemia LDL goal <100  Comment: On rosuvastatin will continue      The longitudinal plan of care for the diagnosis(es)/condition(s) as documented were addressed during this visit. Due to the added complexity in care, I will continue to support Stacy in the subsequent management and with ongoing continuity of care.        Work on weight loss  Regular exercise    Subjective   Stacy is a 49 year old, presenting for the following health issues:  Establish Care        1/29/2025     3:18 PM   Additional Questions   Roomed by Davonte     Via the Health Maintenance questionnaire, the patient has reported the following services have been completed -Eye Exam: [facility] best 2024-01-10, this information has been sent to the abstraction team.  History of Present Illness       Reason for visit:  Establish care   She is  "taking medications regularly.             Objective    /76 (BP Location: Right arm, Patient Position: Sitting, Cuff Size: Adult Regular)   Pulse 79   Temp 97.4  F (36.3  C) (Temporal)   Resp 16   Ht 1.705 m (5' 7.13\")   Wt 89.7 kg (197 lb 12.8 oz)   LMP 03/07/2021 (Approximate)   SpO2 99%   BMI 30.86 kg/m    Body mass index is 30.86 kg/m .  Physical Exam  Constitutional:       General: She is not in acute distress.     Appearance: Normal appearance. She is not ill-appearing, toxic-appearing or diaphoretic.   HENT:      Head: Normocephalic.      Nose: Nose normal.      Mouth/Throat:      Mouth: Mucous membranes are moist.   Eyes:      General: No scleral icterus.     Pupils: Pupils are equal, round, and reactive to light.   Cardiovascular:      Rate and Rhythm: Normal rate and regular rhythm.      Pulses: Normal pulses.      Heart sounds: Murmur heard.   Pulmonary:      Effort: Pulmonary effort is normal. No respiratory distress.      Breath sounds: Normal breath sounds. No stridor. No wheezing, rhonchi or rales.   Abdominal:      General: Abdomen is flat. There is no distension.      Palpations: Abdomen is soft. There is no mass.      Tenderness: There is no abdominal tenderness. There is no guarding or rebound.      Hernia: No hernia is present.   Skin:     General: Skin is warm and dry.      Capillary Refill: Capillary refill takes less than 2 seconds.   Neurological:      Mental Status: She is alert. Mental status is at baseline.   Psychiatric:         Mood and Affect: Mood normal.         Behavior: Behavior normal.         Thought Content: Thought content normal.         Judgment: Judgment normal.        Results for orders placed or performed in visit on 01/29/25   UA Macroscopic with reflex to Microscopic and Culture - Lab Collect     Status: Abnormal    Specimen: Urine, NOS   Result Value Ref Range    Color Urine Light Yellow Colorless, Straw, Light Yellow, Yellow    Appearance Urine Clear Clear "    Glucose Urine 300 (A) Negative mg/dL    Bilirubin Urine Negative Negative    Ketones Urine Negative Negative mg/dL    Specific Gravity Urine 1.008 1.003 - 1.035    Blood Urine Small (A) Negative    pH Urine 5.0 5.0 - 7.0    Protein Albumin Urine Negative Negative mg/dL    Urobilinogen Urine Normal Normal, 2.0 mg/dL    Nitrite Urine Negative Negative    Leukocyte Esterase Urine Large (A) Negative    WBC Clumps Urine Present (A) None Seen /HPF    RBC Urine 3 (H) <=2 /HPF    WBC Urine 65 (H) <=5 /HPF    Squamous Epithelials Urine 1 <=1 /HPF    Narrative    Urine Culture ordered based on laboratory criteria   Comprehensive metabolic panel (BMP + Alb, Alk Phos, ALT, AST, Total. Bili, TP)     Status: Abnormal   Result Value Ref Range    Sodium 138 135 - 145 mmol/L    Potassium 4.3 3.4 - 5.3 mmol/L    Carbon Dioxide (CO2) 23 22 - 29 mmol/L    Anion Gap 13 7 - 15 mmol/L    Urea Nitrogen 18.4 6.0 - 20.0 mg/dL    Creatinine 0.89 0.51 - 0.95 mg/dL    GFR Estimate 79 >60 mL/min/1.73m2    Calcium 10.0 8.8 - 10.4 mg/dL    Chloride 102 98 - 107 mmol/L    Glucose 204 (H) 70 - 99 mg/dL    Alkaline Phosphatase 114 40 - 150 U/L    AST 17 0 - 45 U/L    ALT 25 0 - 50 U/L    Protein Total 7.8 6.4 - 8.3 g/dL    Albumin 4.7 3.5 - 5.2 g/dL    Bilirubin Total 0.3 <=1.2 mg/dL   Urine Drug Screen Panel     Status: Abnormal   Result Value Ref Range    Amphetamines Urine Screen Negative Screen Negative    Barbituates Urine Screen Negative Screen Negative    Benzodiazepine Urine Screen Negative Screen Negative    Cannabinoids Urine Screen Positive (A) Screen Negative    Cocaine Urine Screen Negative Screen Negative    Fentanyl Qual Urine Screen Negative Screen Negative    Opiates Urine Screen Negative Screen Negative    PCP Urine Screen Negative Screen Negative   Wet prep - lab collect     Status: Abnormal    Specimen: Vagina; Swab   Result Value Ref Range    Trichomonas Absent Absent    Yeast Absent Absent    Clue Cells Absent Absent     WBCs/high power field 1+ (A) None   Urine Culture     Status: None    Specimen: Urine, NOS   Result Value Ref Range    Culture <10,000 CFU/mL Mixture of Urogenital Sofie    Urine Drug Screen     Status: Abnormal    Narrative    The following orders were created for panel order Urine Drug Screen.  Procedure                               Abnormality         Status                     ---------                               -----------         ------                     Urine Drug Screen Panel[[id number]]      Abnormal            Final result                 Please view results for these tests on the individual orders.           Signed Electronically by: Shyanne Francisco MD

## 2025-01-29 NOTE — LETTER

## 2025-01-30 LAB — BACTERIA UR CULT: NORMAL

## 2025-02-03 ENCOUNTER — MYC MEDICAL ADVICE (OUTPATIENT)
Dept: FAMILY MEDICINE | Facility: CLINIC | Age: 50
End: 2025-02-03
Payer: COMMERCIAL

## 2025-02-06 ENCOUNTER — MYC REFILL (OUTPATIENT)
Dept: FAMILY MEDICINE | Facility: CLINIC | Age: 50
End: 2025-02-06
Payer: COMMERCIAL

## 2025-02-06 DIAGNOSIS — Z79.4 TYPE 2 DIABETES MELLITUS WITH OTHER CIRCULATORY COMPLICATION, WITH LONG-TERM CURRENT USE OF INSULIN (H): ICD-10-CM

## 2025-02-06 DIAGNOSIS — E11.65 TYPE 2 DIABETES MELLITUS WITH HYPERGLYCEMIA, WITH LONG-TERM CURRENT USE OF INSULIN (H): ICD-10-CM

## 2025-02-06 DIAGNOSIS — Z79.4 TYPE 2 DIABETES MELLITUS WITH HYPERGLYCEMIA, WITH LONG-TERM CURRENT USE OF INSULIN (H): ICD-10-CM

## 2025-02-06 DIAGNOSIS — E11.59 TYPE 2 DIABETES MELLITUS WITH OTHER CIRCULATORY COMPLICATION, WITH LONG-TERM CURRENT USE OF INSULIN (H): ICD-10-CM

## 2025-02-06 RX ORDER — ACYCLOVIR 800 MG/1
TABLET ORAL
Qty: 2 EACH | Refills: 5 | OUTPATIENT
Start: 2025-02-06

## 2025-02-06 RX ORDER — ACYCLOVIR 800 MG/1
TABLET ORAL
Qty: 30 EACH | Refills: 5 | Status: SHIPPED | OUTPATIENT
Start: 2025-02-06

## 2025-02-11 ENCOUNTER — HOSPITAL ENCOUNTER (OUTPATIENT)
Dept: MAMMOGRAPHY | Facility: CLINIC | Age: 50
Discharge: HOME | End: 2025-02-11
Attending: FAMILY MEDICINE
Payer: COMMERCIAL

## 2025-02-11 DIAGNOSIS — Z12.31 ENCOUNTER FOR SCREENING MAMMOGRAM FOR BREAST CANCER: ICD-10-CM

## 2025-02-11 PROCEDURE — 77063 BREAST TOMOSYNTHESIS BI: CPT

## 2025-02-11 PROCEDURE — 77067 SCR MAMMO BI INCL CAD: CPT

## 2025-02-19 ENCOUNTER — MYC REFILL (OUTPATIENT)
Dept: FAMILY MEDICINE | Facility: CLINIC | Age: 50
End: 2025-02-19
Payer: COMMERCIAL

## 2025-02-19 DIAGNOSIS — G43.E09 CHRONIC MIGRAINE WITH AURA WITHOUT STATUS MIGRAINOSUS, NOT INTRACTABLE: Primary | ICD-10-CM

## 2025-02-20 NOTE — TELEPHONE ENCOUNTER
Clinic RN: Please investigate patient's chart or contact patient if the information cannot be found because the medication is listed as historical or discontinued. Confirm patient is taking this medication. Document findings and route refill encounter to provider for approval or denial.    Demetrice BOSS, RN, PHN

## 2025-02-24 ENCOUNTER — MYC REFILL (OUTPATIENT)
Dept: FAMILY MEDICINE | Facility: CLINIC | Age: 50
End: 2025-02-24
Payer: COMMERCIAL

## 2025-02-24 DIAGNOSIS — G89.4 CHRONIC PAIN SYNDROME: ICD-10-CM

## 2025-02-24 NOTE — TELEPHONE ENCOUNTER
RN TRIAGE CALL:    Patient Contact    Attempt # 1    Was call answered?  No.  Left message on voicemail with information to call me back. Also sent [employer].     MARCELA AlbertsN, RN

## 2025-02-24 NOTE — TELEPHONE ENCOUNTER
Patient requesting refill on Nurtec. Writer attempted to triage, called and MyCharted patient. Patient stated in MyChart another provider usually fills this for her. Routing to PCP to request prescription.     Laisha Dean, MARCELAN, RN

## 2025-02-25 RX ORDER — OXYCODONE HYDROCHLORIDE 5 MG/1
5 TABLET ORAL DAILY PRN
Qty: 45 TABLET | Refills: 0 | Status: SHIPPED | OUTPATIENT
Start: 2025-02-25

## 2025-02-25 RX ORDER — RIMEGEPANT SULFATE 75 MG/75MG
75 TABLET, ORALLY DISINTEGRATING ORAL DAILY PRN
Qty: 12 TABLET | Refills: 3 | Status: SHIPPED | OUTPATIENT
Start: 2025-02-25

## 2025-02-25 NOTE — TELEPHONE ENCOUNTER
MyChart sent to notify patient that Nurtec was sent to pharmacy this morning.     MARCELA AlbertsN, RN

## 2025-03-04 LAB
ATRIAL RATE - MUSE: 113 BPM
DIASTOLIC BLOOD PRESSURE - MUSE: NORMAL MMHG
INTERPRETATION ECG - MUSE: NORMAL
P AXIS - MUSE: 48 DEGREES
PR INTERVAL - MUSE: 128 MS
QRS DURATION - MUSE: 92 MS
QT - MUSE: 328 MS
QTC - MUSE: 449 MS
R AXIS - MUSE: 111 DEGREES
SYSTOLIC BLOOD PRESSURE - MUSE: NORMAL MMHG
T AXIS - MUSE: 33 DEGREES
VENTRICULAR RATE- MUSE: 113 BPM

## 2025-03-11 ENCOUNTER — MYC MEDICAL ADVICE (OUTPATIENT)
Dept: FAMILY MEDICINE | Facility: CLINIC | Age: 50
End: 2025-03-11
Payer: COMMERCIAL

## 2025-03-11 DIAGNOSIS — G89.4 CHRONIC PAIN SYNDROME: ICD-10-CM

## 2025-03-11 NOTE — TELEPHONE ENCOUNTER
Routing to provider. See patient request below. Medication pended.    Stacy Brown to [facility] - Primary [facility] (supporting Shyanne Francisco MD) 3/11/25  2:16 PM    My prescription is due to be refilled on March 15th and [facility] is closed on Saturday and Sunday.  Is there anyway I can get it sent to [facility]. Please let m e know    Katie Layne, RN, BSN

## 2025-03-12 RX ORDER — OXYCODONE HYDROCHLORIDE 5 MG/1
5 TABLET ORAL DAILY PRN
Qty: 45 TABLET | Refills: 0 | Status: SHIPPED | OUTPATIENT
Start: 2025-03-12

## 2025-03-26 ENCOUNTER — HOSPITAL ENCOUNTER (OUTPATIENT)
Dept: GENERAL RADIOLOGY | Facility: CLINIC | Age: 50
Discharge: HOME | End: 2025-03-26
Attending: ORTHOPAEDIC SURGERY
Payer: OTHER MISCELLANEOUS

## 2025-03-26 DIAGNOSIS — M19.90 ARTHRITIS: ICD-10-CM

## 2025-03-26 DIAGNOSIS — M25.571 PAIN IN JOINT, ANKLE AND FOOT, RIGHT: ICD-10-CM

## 2025-03-26 PROCEDURE — 250N000011 HC RX IP 250 OP 636: Performed by: RADIOLOGY

## 2025-03-26 PROCEDURE — 96372 THER/PROPH/DIAG INJ SC/IM: CPT | Performed by: RADIOLOGY

## 2025-03-26 PROCEDURE — 77002 NEEDLE LOCALIZATION BY XRAY: CPT

## 2025-03-26 PROCEDURE — 250N000009 HC RX 250: Performed by: RADIOLOGY

## 2025-03-26 PROCEDURE — 255N000002 HC RX 255 OP 636: Performed by: RADIOLOGY

## 2025-03-26 RX ORDER — IOPAMIDOL 510 MG/ML
100 INJECTION, SOLUTION INTRAVASCULAR ONCE
Status: COMPLETED | OUTPATIENT
Start: 2025-03-26 | End: 2025-03-26

## 2025-03-26 RX ORDER — TRIAMCINOLONE ACETONIDE 40 MG/ML
80 INJECTION, SUSPENSION INTRA-ARTICULAR; INTRAMUSCULAR ONCE
Status: COMPLETED | OUTPATIENT
Start: 2025-03-26 | End: 2025-03-26

## 2025-03-26 RX ORDER — LIDOCAINE HYDROCHLORIDE 10 MG/ML
5 INJECTION, SOLUTION EPIDURAL; INFILTRATION; INTRACAUDAL; PERINEURAL ONCE
Status: COMPLETED | OUTPATIENT
Start: 2025-03-26 | End: 2025-03-26

## 2025-03-26 RX ORDER — BUPIVACAINE HYDROCHLORIDE 2.5 MG/ML
10 INJECTION, SOLUTION EPIDURAL; INFILTRATION; INTRACAUDAL; PERINEURAL ONCE
Status: COMPLETED | OUTPATIENT
Start: 2025-03-26 | End: 2025-03-26

## 2025-03-26 RX ADMIN — TRIAMCINOLONE ACETONIDE 80 MG: 40 INJECTION, SUSPENSION INTRA-ARTICULAR; INTRAMUSCULAR at 10:55

## 2025-03-26 RX ADMIN — LIDOCAINE HYDROCHLORIDE 5 ML: 10 INJECTION, SOLUTION EPIDURAL; INFILTRATION; INTRACAUDAL; PERINEURAL at 10:50

## 2025-03-26 RX ADMIN — BUPIVACAINE HYDROCHLORIDE 4 ML: 2.5 INJECTION, SOLUTION EPIDURAL; INFILTRATION; INTRACAUDAL; PERINEURAL at 10:55

## 2025-03-26 RX ADMIN — IOPAMIDOL 2 ML: 510 INJECTION, SOLUTION INTRAVASCULAR at 10:55

## 2025-04-02 ENCOUNTER — HOSPITAL ENCOUNTER (EMERGENCY)
Facility: CLINIC | Age: 50
Discharge: HOME | End: 2025-04-02
Attending: STUDENT IN AN ORGANIZED HEALTH CARE EDUCATION/TRAINING PROGRAM | Admitting: STUDENT IN AN ORGANIZED HEALTH CARE EDUCATION/TRAINING PROGRAM
Payer: COMMERCIAL

## 2025-04-02 VITALS
DIASTOLIC BLOOD PRESSURE: 83 MMHG | RESPIRATION RATE: 20 BRPM | SYSTOLIC BLOOD PRESSURE: 140 MMHG | HEIGHT: 66 IN | WEIGHT: 190.7 LBS | HEART RATE: 75 BPM | BODY MASS INDEX: 30.65 KG/M2 | TEMPERATURE: 98.2 F | OXYGEN SATURATION: 95 %

## 2025-04-02 DIAGNOSIS — T14.8XXA SKIN WOUND FROM SURGICAL INCISION: ICD-10-CM

## 2025-04-02 DIAGNOSIS — R07.9 CHEST PAIN, UNSPECIFIED TYPE: ICD-10-CM

## 2025-04-02 LAB
ALBUMIN SERPL BCG-MCNC: 4.7 G/DL (ref 3.5–5.2)
ALP SERPL-CCNC: 88 U/L (ref 40–150)
ALT SERPL W P-5'-P-CCNC: 25 U/L (ref 0–50)
ANION GAP SERPL CALCULATED.3IONS-SCNC: 15 MMOL/L (ref 7–15)
AST SERPL W P-5'-P-CCNC: 17 U/L (ref 0–45)
ATRIAL RATE - MUSE: 88 BPM
BASOPHILS # BLD AUTO: 0.1 10E3/UL (ref 0–0.2)
BASOPHILS NFR BLD AUTO: 1 %
BILIRUB SERPL-MCNC: 0.3 MG/DL
BUN SERPL-MCNC: 23.6 MG/DL (ref 6–20)
CALCIUM SERPL-MCNC: 10.2 MG/DL (ref 8.8–10.4)
CHLORIDE SERPL-SCNC: 103 MMOL/L (ref 98–107)
CREAT SERPL-MCNC: 0.8 MG/DL (ref 0.51–0.95)
CRP SERPL-MCNC: <3 MG/L
DIASTOLIC BLOOD PRESSURE - MUSE: NORMAL MMHG
EGFRCR SERPLBLD CKD-EPI 2021: 90 ML/MIN/1.73M2
EOSINOPHIL # BLD AUTO: 0.1 10E3/UL (ref 0–0.7)
EOSINOPHIL NFR BLD AUTO: 1 %
ERYTHROCYTE [DISTWIDTH] IN BLOOD BY AUTOMATED COUNT: 14.6 % (ref 10–15)
GLUCOSE SERPL-MCNC: 107 MG/DL (ref 70–99)
HCO3 SERPL-SCNC: 21 MMOL/L (ref 22–29)
HCT VFR BLD AUTO: 38.1 % (ref 35–47)
HGB BLD-MCNC: 13.2 G/DL (ref 11.7–15.7)
HOLD SPECIMEN: NORMAL
IMM GRANULOCYTES # BLD: 0.1 10E3/UL
IMM GRANULOCYTES NFR BLD: 1 %
INTERPRETATION ECG - MUSE: NORMAL
LACTATE SERPL-SCNC: 1.8 MMOL/L (ref 0.7–2)
LIPASE SERPL-CCNC: 16 U/L (ref 13–60)
LYMPHOCYTES # BLD AUTO: 1.7 10E3/UL (ref 0.8–5.3)
LYMPHOCYTES NFR BLD AUTO: 18 %
MAGNESIUM SERPL-MCNC: 1.5 MG/DL (ref 1.7–2.3)
MCH RBC QN AUTO: 28.2 PG (ref 26.5–33)
MCHC RBC AUTO-ENTMCNC: 34.6 G/DL (ref 31.5–36.5)
MCV RBC AUTO: 81 FL (ref 78–100)
MONOCYTES # BLD AUTO: 0.7 10E3/UL (ref 0–1.3)
MONOCYTES NFR BLD AUTO: 8 %
NEUTROPHILS # BLD AUTO: 6.7 10E3/UL (ref 1.6–8.3)
NEUTROPHILS NFR BLD AUTO: 73 %
NRBC # BLD AUTO: 0 10E3/UL
NRBC BLD AUTO-RTO: 0 /100
P AXIS - MUSE: 50 DEGREES
PLATELET # BLD AUTO: 366 10E3/UL (ref 150–450)
POTASSIUM SERPL-SCNC: 4.4 MMOL/L (ref 3.4–5.3)
PR INTERVAL - MUSE: 108 MS
PROT SERPL-MCNC: 7.5 G/DL (ref 6.4–8.3)
QRS DURATION - MUSE: 90 MS
QT - MUSE: 364 MS
QTC - MUSE: 440 MS
R AXIS - MUSE: 110 DEGREES
RBC # BLD AUTO: 4.68 10E6/UL (ref 3.8–5.2)
SODIUM SERPL-SCNC: 139 MMOL/L (ref 135–145)
SYSTOLIC BLOOD PRESSURE - MUSE: NORMAL MMHG
T AXIS - MUSE: 51 DEGREES
TROPONIN T SERPL HS-MCNC: 11 NG/L
TROPONIN T SERPL HS-MCNC: 11 NG/L
VENTRICULAR RATE- MUSE: 88 BPM
WBC # BLD AUTO: 9.3 10E3/UL (ref 4–11)

## 2025-04-02 PROCEDURE — 85018 HEMOGLOBIN: CPT | Performed by: STUDENT IN AN ORGANIZED HEALTH CARE EDUCATION/TRAINING PROGRAM

## 2025-04-02 PROCEDURE — 250N000009 HC RX 250: Performed by: STUDENT IN AN ORGANIZED HEALTH CARE EDUCATION/TRAINING PROGRAM

## 2025-04-02 PROCEDURE — 84484 ASSAY OF TROPONIN QUANT: CPT | Performed by: STUDENT IN AN ORGANIZED HEALTH CARE EDUCATION/TRAINING PROGRAM

## 2025-04-02 PROCEDURE — 250N000011 HC RX IP 250 OP 636: Performed by: STUDENT IN AN ORGANIZED HEALTH CARE EDUCATION/TRAINING PROGRAM

## 2025-04-02 PROCEDURE — 36415 COLL VENOUS BLD VENIPUNCTURE: CPT | Performed by: STUDENT IN AN ORGANIZED HEALTH CARE EDUCATION/TRAINING PROGRAM

## 2025-04-02 PROCEDURE — 83735 ASSAY OF MAGNESIUM: CPT | Performed by: STUDENT IN AN ORGANIZED HEALTH CARE EDUCATION/TRAINING PROGRAM

## 2025-04-02 PROCEDURE — 83605 ASSAY OF LACTIC ACID: CPT | Performed by: STUDENT IN AN ORGANIZED HEALTH CARE EDUCATION/TRAINING PROGRAM

## 2025-04-02 PROCEDURE — 83690 ASSAY OF LIPASE: CPT | Performed by: STUDENT IN AN ORGANIZED HEALTH CARE EDUCATION/TRAINING PROGRAM

## 2025-04-02 PROCEDURE — 86140 C-REACTIVE PROTEIN: CPT | Performed by: STUDENT IN AN ORGANIZED HEALTH CARE EDUCATION/TRAINING PROGRAM

## 2025-04-02 PROCEDURE — 82565 ASSAY OF CREATININE: CPT | Performed by: STUDENT IN AN ORGANIZED HEALTH CARE EDUCATION/TRAINING PROGRAM

## 2025-04-02 PROCEDURE — 99284 EMERGENCY DEPT VISIT MOD MDM: CPT | Performed by: STUDENT IN AN ORGANIZED HEALTH CARE EDUCATION/TRAINING PROGRAM

## 2025-04-02 PROCEDURE — 85004 AUTOMATED DIFF WBC COUNT: CPT | Performed by: STUDENT IN AN ORGANIZED HEALTH CARE EDUCATION/TRAINING PROGRAM

## 2025-04-02 PROCEDURE — 84155 ASSAY OF PROTEIN SERUM: CPT | Performed by: STUDENT IN AN ORGANIZED HEALTH CARE EDUCATION/TRAINING PROGRAM

## 2025-04-02 PROCEDURE — 96374 THER/PROPH/DIAG INJ IV PUSH: CPT | Mod: 59 | Performed by: STUDENT IN AN ORGANIZED HEALTH CARE EDUCATION/TRAINING PROGRAM

## 2025-04-02 PROCEDURE — 82310 ASSAY OF CALCIUM: CPT | Performed by: STUDENT IN AN ORGANIZED HEALTH CARE EDUCATION/TRAINING PROGRAM

## 2025-04-02 PROCEDURE — 87040 BLOOD CULTURE FOR BACTERIA: CPT | Performed by: STUDENT IN AN ORGANIZED HEALTH CARE EDUCATION/TRAINING PROGRAM

## 2025-04-02 PROCEDURE — 250N000013 HC RX MED GY IP 250 OP 250 PS 637: Performed by: STUDENT IN AN ORGANIZED HEALTH CARE EDUCATION/TRAINING PROGRAM

## 2025-04-02 PROCEDURE — 99285 EMERGENCY DEPT VISIT HI MDM: CPT | Mod: 25 | Performed by: STUDENT IN AN ORGANIZED HEALTH CARE EDUCATION/TRAINING PROGRAM

## 2025-04-02 PROCEDURE — 96375 TX/PRO/DX INJ NEW DRUG ADDON: CPT | Mod: 59 | Performed by: STUDENT IN AN ORGANIZED HEALTH CARE EDUCATION/TRAINING PROGRAM

## 2025-04-02 PROCEDURE — 250N000011 HC RX IP 250 OP 636: Mod: JZ | Performed by: STUDENT IN AN ORGANIZED HEALTH CARE EDUCATION/TRAINING PROGRAM

## 2025-04-02 RX ORDER — IOPAMIDOL 755 MG/ML
500 INJECTION, SOLUTION INTRAVASCULAR ONCE
Status: COMPLETED | OUTPATIENT
Start: 2025-04-02 | End: 2025-04-02

## 2025-04-02 RX ORDER — OXYCODONE HYDROCHLORIDE 5 MG/1
5 TABLET ORAL EVERY 6 HOURS PRN
Qty: 12 TABLET | Refills: 0 | Status: SHIPPED | OUTPATIENT
Start: 2025-04-02 | End: 2025-04-05

## 2025-04-02 RX ORDER — OXYCODONE HYDROCHLORIDE 5 MG/1
5 TABLET ORAL ONCE
Status: COMPLETED | OUTPATIENT
Start: 2025-04-02 | End: 2025-04-02

## 2025-04-02 RX ORDER — KETOROLAC TROMETHAMINE 15 MG/ML
15 INJECTION, SOLUTION INTRAMUSCULAR; INTRAVENOUS ONCE
Status: COMPLETED | OUTPATIENT
Start: 2025-04-02 | End: 2025-04-02

## 2025-04-02 RX ORDER — FENTANYL CITRATE 50 UG/ML
50 INJECTION, SOLUTION INTRAMUSCULAR; INTRAVENOUS ONCE
Status: COMPLETED | OUTPATIENT
Start: 2025-04-02 | End: 2025-04-02

## 2025-04-02 RX ADMIN — OXYCODONE HYDROCHLORIDE 5 MG: 5 TABLET ORAL at 15:08

## 2025-04-02 RX ADMIN — FAMOTIDINE 20 MG: 10 INJECTION, SOLUTION INTRAVENOUS at 11:42

## 2025-04-02 RX ADMIN — FENTANYL CITRATE 50 MCG: 50 INJECTION, SOLUTION INTRAMUSCULAR; INTRAVENOUS at 12:39

## 2025-04-02 RX ADMIN — IOPAMIDOL 75 ML: 755 INJECTION, SOLUTION INTRAVENOUS at 12:15

## 2025-04-02 RX ADMIN — KETOROLAC TROMETHAMINE 15 MG: 15 INJECTION, SOLUTION INTRAMUSCULAR; INTRAVENOUS at 11:44

## 2025-04-02 RX ADMIN — SODIUM CHLORIDE 70 ML: 9 INJECTION, SOLUTION INTRAVENOUS at 12:15

## 2025-04-02 ASSESSMENT — ACTIVITIES OF DAILY LIVING (ADL)
ADLS_ACUITY_SCORE: 58
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
## 2025-04-02 NOTE — ED TRIAGE NOTES
Comes in with a wound to her heart surgery bypass incision, hx quadrupl bypass 4 years ago. Also c/o of chest pressure 9/10     Triage Assessment (Adult)       Row Name 04/02/25 1045          Triage Assessment    Airway WDL WDL        Respiratory WDL    Respiratory WDL WDL        Skin Circulation/Temperature WDL    Skin Circulation/Temperature WDL WDL        Cognitive/Neuro/Behavioral WDL    Cognitive/Neuro/Behavioral WDL WDL        [place]isha Coma Scale    Best Eye Response 4-->(E4) spontaneous     Best Motor Response 6-->(M6) obeys commands     Best Verbal Response 5-->(V5) oriented     [place] Coma Scale Score 15

## 2025-04-02 NOTE — DISCHARGE INSTRUCTIONS
We discussed all of the testing that was performed here in the ER today.  I also discussed your wound and the images with the thoracic surgery team, they feel as if the skin wound is far enough away from the sternal wires that they do not feel like it is related.  We will have you use antibiotic ointment and monitor the area of redness and a wound over the next week, the primary doctor can reevaluate this during your follow-up visit.  We usually have patients that come to the ER with chest pain follow-up with the cardiology clinic to determine if they want any more heart testing.  We also discussed the possibility that if this wound does not heal you may want to have it evaluated by dermatologist for possible tissue sampling.  If you feel like things are changing for the worse feel free to come back to the ER for reevaluation.  I did send a few tablets of pain medications to your pharmacy to bridge you for pain control until you are able to follow-up with the primary doctor.  It appears that you also have the pain management clinic to follow-up with.    Please call your primary doctor in the morning to discuss your ER visit, make sure you are doing well, and to follow up on any incidental findings. Please come back to the ER for re evaluation if you feel like things are getting worse or have other concerns.

## 2025-04-03 LAB
BACTERIA BLD CULT: NORMAL
BACTERIA BLD CULT: NORMAL

## 2025-04-07 ENCOUNTER — HOSPITAL ENCOUNTER (EMERGENCY)
Facility: CLINIC | Age: 50
Discharge: HOME | End: 2025-04-07
Attending: FAMILY MEDICINE | Admitting: FAMILY MEDICINE
Payer: COMMERCIAL

## 2025-04-07 VITALS
HEART RATE: 77 BPM | TEMPERATURE: 97.5 F | DIASTOLIC BLOOD PRESSURE: 70 MMHG | BODY MASS INDEX: 30.34 KG/M2 | RESPIRATION RATE: 24 BRPM | OXYGEN SATURATION: 99 % | WEIGHT: 188 LBS | SYSTOLIC BLOOD PRESSURE: 109 MMHG

## 2025-04-07 DIAGNOSIS — R07.89 CHEST WALL PAIN: ICD-10-CM

## 2025-04-07 DIAGNOSIS — S21.101A INFECTION OF WOUND OF STERNUM: ICD-10-CM

## 2025-04-07 DIAGNOSIS — L08.9 INFECTION OF WOUND OF STERNUM: ICD-10-CM

## 2025-04-07 LAB
ANION GAP SERPL CALCULATED.3IONS-SCNC: 16 MMOL/L (ref 7–15)
ATRIAL RATE - MUSE: 81 BPM
BACTERIA BLD CULT: NO GROWTH
BACTERIA BLD CULT: NO GROWTH
BASOPHILS # BLD AUTO: 0.1 10E3/UL (ref 0–0.2)
BASOPHILS NFR BLD AUTO: 1 %
BUN SERPL-MCNC: 25.9 MG/DL (ref 6–20)
CALCIUM SERPL-MCNC: 10.1 MG/DL (ref 8.8–10.4)
CHLORIDE SERPL-SCNC: 101 MMOL/L (ref 98–107)
CREAT SERPL-MCNC: 0.83 MG/DL (ref 0.51–0.95)
CRP SERPL-MCNC: <3 MG/L
DIASTOLIC BLOOD PRESSURE - MUSE: NORMAL MMHG
EGFRCR SERPLBLD CKD-EPI 2021: 86 ML/MIN/1.73M2
EOSINOPHIL # BLD AUTO: 0.1 10E3/UL (ref 0–0.7)
EOSINOPHIL NFR BLD AUTO: 1 %
ERYTHROCYTE [DISTWIDTH] IN BLOOD BY AUTOMATED COUNT: 14 % (ref 10–15)
GLUCOSE SERPL-MCNC: 209 MG/DL (ref 70–99)
HCO3 SERPL-SCNC: 22 MMOL/L (ref 22–29)
HCT VFR BLD AUTO: 35.9 % (ref 35–47)
HGB BLD-MCNC: 12.1 G/DL (ref 11.7–15.7)
IMM GRANULOCYTES # BLD: 0.1 10E3/UL
IMM GRANULOCYTES NFR BLD: 1 %
INTERPRETATION ECG - MUSE: NORMAL
LYMPHOCYTES # BLD AUTO: 1.5 10E3/UL (ref 0.8–5.3)
LYMPHOCYTES NFR BLD AUTO: 18 %
MCH RBC QN AUTO: 28.3 PG (ref 26.5–33)
MCHC RBC AUTO-ENTMCNC: 33.7 G/DL (ref 31.5–36.5)
MCV RBC AUTO: 84 FL (ref 78–100)
MONOCYTES # BLD AUTO: 0.5 10E3/UL (ref 0–1.3)
MONOCYTES NFR BLD AUTO: 6 %
NEUTROPHILS # BLD AUTO: 6.1 10E3/UL (ref 1.6–8.3)
NEUTROPHILS NFR BLD AUTO: 74 %
NRBC # BLD AUTO: 0 10E3/UL
NRBC BLD AUTO-RTO: 0 /100
P AXIS - MUSE: 41 DEGREES
PLATELET # BLD AUTO: 335 10E3/UL (ref 150–450)
POTASSIUM SERPL-SCNC: 4.4 MMOL/L (ref 3.4–5.3)
PR INTERVAL - MUSE: 120 MS
QRS DURATION - MUSE: 92 MS
QT - MUSE: 370 MS
QTC - MUSE: 429 MS
R AXIS - MUSE: 95 DEGREES
RBC # BLD AUTO: 4.28 10E6/UL (ref 3.8–5.2)
SODIUM SERPL-SCNC: 139 MMOL/L (ref 135–145)
SYSTOLIC BLOOD PRESSURE - MUSE: NORMAL MMHG
T AXIS - MUSE: 40 DEGREES
TROPONIN T SERPL HS-MCNC: 14 NG/L
VENTRICULAR RATE- MUSE: 81 BPM
WBC # BLD AUTO: 8.3 10E3/UL (ref 4–11)

## 2025-04-07 PROCEDURE — 96372 THER/PROPH/DIAG INJ SC/IM: CPT | Performed by: FAMILY MEDICINE

## 2025-04-07 PROCEDURE — 87205 SMEAR GRAM STAIN: CPT | Performed by: FAMILY MEDICINE

## 2025-04-07 PROCEDURE — 80048 BASIC METABOLIC PNL TOTAL CA: CPT | Performed by: FAMILY MEDICINE

## 2025-04-07 PROCEDURE — 96376 TX/PRO/DX INJ SAME DRUG ADON: CPT | Performed by: FAMILY MEDICINE

## 2025-04-07 PROCEDURE — 99284 EMERGENCY DEPT VISIT MOD MDM: CPT | Performed by: FAMILY MEDICINE

## 2025-04-07 PROCEDURE — 250N000011 HC RX IP 250 OP 636: Performed by: FAMILY MEDICINE

## 2025-04-07 PROCEDURE — 85025 COMPLETE CBC W/AUTO DIFF WBC: CPT | Performed by: FAMILY MEDICINE

## 2025-04-07 PROCEDURE — 99284 EMERGENCY DEPT VISIT MOD MDM: CPT | Mod: 25 | Performed by: FAMILY MEDICINE

## 2025-04-07 PROCEDURE — 36415 COLL VENOUS BLD VENIPUNCTURE: CPT | Performed by: FAMILY MEDICINE

## 2025-04-07 PROCEDURE — 96361 HYDRATE IV INFUSION ADD-ON: CPT | Performed by: FAMILY MEDICINE

## 2025-04-07 PROCEDURE — 93010 ELECTROCARDIOGRAM REPORT: CPT | Performed by: FAMILY MEDICINE

## 2025-04-07 PROCEDURE — 258N000003 HC RX IP 258 OP 636: Performed by: FAMILY MEDICINE

## 2025-04-07 PROCEDURE — 96375 TX/PRO/DX INJ NEW DRUG ADDON: CPT | Performed by: FAMILY MEDICINE

## 2025-04-07 PROCEDURE — 93005 ELECTROCARDIOGRAM TRACING: CPT | Performed by: FAMILY MEDICINE

## 2025-04-07 PROCEDURE — 84484 ASSAY OF TROPONIN QUANT: CPT | Performed by: FAMILY MEDICINE

## 2025-04-07 PROCEDURE — 86140 C-REACTIVE PROTEIN: CPT | Performed by: FAMILY MEDICINE

## 2025-04-07 PROCEDURE — 96374 THER/PROPH/DIAG INJ IV PUSH: CPT | Performed by: FAMILY MEDICINE

## 2025-04-07 RX ORDER — KETOROLAC TROMETHAMINE 30 MG/ML
30 INJECTION, SOLUTION INTRAMUSCULAR; INTRAVENOUS ONCE
Status: COMPLETED | OUTPATIENT
Start: 2025-04-07 | End: 2025-04-07

## 2025-04-07 RX ORDER — PROMETHAZINE HYDROCHLORIDE 25 MG/ML
25 INJECTION INTRAMUSCULAR; INTRAVENOUS ONCE
Status: COMPLETED | OUTPATIENT
Start: 2025-04-07 | End: 2025-04-07

## 2025-04-07 RX ORDER — ONDANSETRON 4 MG/1
4 TABLET, ORALLY DISINTEGRATING ORAL EVERY 8 HOURS PRN
Qty: 10 TABLET | Refills: 0 | Status: SHIPPED | OUTPATIENT
Start: 2025-04-07

## 2025-04-07 RX ORDER — HYDROMORPHONE HYDROCHLORIDE 1 MG/ML
0.5 INJECTION, SOLUTION INTRAMUSCULAR; INTRAVENOUS; SUBCUTANEOUS
Status: COMPLETED | OUTPATIENT
Start: 2025-04-07 | End: 2025-04-07

## 2025-04-07 RX ORDER — ONDANSETRON 2 MG/ML
4 INJECTION INTRAMUSCULAR; INTRAVENOUS ONCE
Status: COMPLETED | OUTPATIENT
Start: 2025-04-07 | End: 2025-04-07

## 2025-04-07 RX ORDER — CEPHALEXIN 500 MG/1
500 CAPSULE ORAL 4 TIMES DAILY
Qty: 28 CAPSULE | Refills: 0 | Status: SHIPPED | OUTPATIENT
Start: 2025-04-07 | End: 2025-04-14

## 2025-04-07 RX ADMIN — HYDROMORPHONE HYDROCHLORIDE 1 MG: 1 INJECTION, SOLUTION INTRAMUSCULAR; INTRAVENOUS; SUBCUTANEOUS at 08:50

## 2025-04-07 RX ADMIN — PROMETHAZINE HYDROCHLORIDE 25 MG: 25 INJECTION INTRAMUSCULAR; INTRAVENOUS at 09:00

## 2025-04-07 RX ADMIN — ONDANSETRON 4 MG: 2 INJECTION, SOLUTION INTRAMUSCULAR; INTRAVENOUS at 09:51

## 2025-04-07 RX ADMIN — KETOROLAC TROMETHAMINE 30 MG: 30 INJECTION, SOLUTION INTRAMUSCULAR at 08:52

## 2025-04-07 RX ADMIN — HYDROMORPHONE HYDROCHLORIDE 0.5 MG: 1 INJECTION, SOLUTION INTRAMUSCULAR; INTRAVENOUS; SUBCUTANEOUS at 11:33

## 2025-04-07 RX ADMIN — SODIUM CHLORIDE 500 ML: 0.9 INJECTION, SOLUTION INTRAVENOUS at 09:52

## 2025-04-07 ASSESSMENT — ACTIVITIES OF DAILY LIVING (ADL)
ADLS_ACUITY_SCORE: 58

## 2025-04-07 NOTE — ED TRIAGE NOTES
Pt comes in a with wound on her chest that appeared 10 days ago on her quadruple bypass surgery scar from 4 years ago. She was seen here for the same issue 5 days ago. She has also been nauseous the past 5 days.
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
## 2025-04-08 ENCOUNTER — OFFICE VISIT (OUTPATIENT)
Dept: FAMILY MEDICINE | Facility: CLINIC | Age: 50
End: 2025-04-08
Payer: COMMERCIAL

## 2025-04-08 VITALS
DIASTOLIC BLOOD PRESSURE: 80 MMHG | TEMPERATURE: 98.2 F | BODY MASS INDEX: 30.51 KG/M2 | WEIGHT: 189 LBS | SYSTOLIC BLOOD PRESSURE: 122 MMHG | HEART RATE: 110 BPM | OXYGEN SATURATION: 97 % | RESPIRATION RATE: 18 BRPM

## 2025-04-08 DIAGNOSIS — R11.2 NAUSEA AND VOMITING, UNSPECIFIED VOMITING TYPE: Primary | ICD-10-CM

## 2025-04-08 DIAGNOSIS — R19.7 DIARRHEA OF PRESUMED INFECTIOUS ORIGIN: ICD-10-CM

## 2025-04-08 DIAGNOSIS — G89.4 CHRONIC PAIN SYNDROME: ICD-10-CM

## 2025-04-08 PROCEDURE — 99214 OFFICE O/P EST MOD 30 MIN: CPT | Performed by: FAMILY MEDICINE

## 2025-04-08 PROCEDURE — 3079F DIAST BP 80-89 MM HG: CPT | Performed by: FAMILY MEDICINE

## 2025-04-08 PROCEDURE — 1125F AMNT PAIN NOTED PAIN PRSNT: CPT | Performed by: FAMILY MEDICINE

## 2025-04-08 PROCEDURE — G2211 COMPLEX E/M VISIT ADD ON: HCPCS | Performed by: FAMILY MEDICINE

## 2025-04-08 PROCEDURE — 3074F SYST BP LT 130 MM HG: CPT | Performed by: FAMILY MEDICINE

## 2025-04-08 RX ORDER — ONDANSETRON 4 MG/1
8 TABLET, FILM COATED ORAL EVERY 8 HOURS PRN
Qty: 36 TABLET | Refills: 1 | Status: SHIPPED | OUTPATIENT
Start: 2025-04-08

## 2025-04-08 RX ORDER — FENTANYL 62.5 UG/H
62.5 PATCH, EXTENDED RELEASE TRANSDERMAL
Qty: 10 PATCH | Refills: 0 | Status: SHIPPED | OUTPATIENT
Start: 2025-04-08 | End: 2025-04-09

## 2025-04-08 RX ORDER — SCOPOLAMINE 1 MG/3D
1 PATCH, EXTENDED RELEASE TRANSDERMAL
Qty: 10 PATCH | Refills: 1 | Status: SHIPPED | OUTPATIENT
Start: 2025-04-08

## 2025-04-08 ASSESSMENT — PATIENT HEALTH QUESTIONNAIRE - PHQ9
10. IF YOU CHECKED OFF ANY PROBLEMS, HOW DIFFICULT HAVE THESE PROBLEMS MADE IT FOR YOU TO DO YOUR WORK, TAKE CARE OF THINGS AT HOME, OR GET ALONG WITH OTHER PEOPLE: VERY DIFFICULT
SUM OF ALL RESPONSES TO PHQ QUESTIONS 1-9: 15
SUM OF ALL RESPONSES TO PHQ QUESTIONS 1-9: 15

## 2025-04-08 ASSESSMENT — ENCOUNTER SYMPTOMS
VOMITING: 1
CHILLS: 1
DIARRHEA: 1
ABDOMINAL PAIN: 1

## 2025-04-08 ASSESSMENT — PAIN SCALES - GENERAL: PAINLEVEL_OUTOF10: SEVERE PAIN (8)

## 2025-04-08 NOTE — PROGRESS NOTES
Assessment & Plan     (R11.2) Nausea and vomiting, unspecified vomiting type  (primary encounter diagnosis)  Comment: Patient with worsening nausea and vomiting over the last week and a half.  She reports that she has been having nausea and diarrhea which got significantly worse last night.  She has had multiple ER visits all of which appear to be essentially benign.  She has a negative EKG and normal electrolyte levels at her last visit.  She has frequent vomiting sometimes up to 20 times a day per her report and had fecal incontinence yesterday with significant diarrhea that was watery.  She is amenable to stool testing which we will do today.  Trial scopolamine patch and we discussed increased dosing of her Zofran.  Plan: scopolamine (TRANSDERM) 1 MG/3DAYS 72 hr patch,        ondansetron (ZOFRAN) 4 MG tablet, fentaNYL 62.5        MCG/HR PT72    (R19.7) Diarrhea of presumed infectious origin  Comment: Would recommend enteric bacteria and virus panel  Plan: scopolamine (TRANSDERM) 1 MG/3DAYS 72 hr patch,        Enteric Bacteria and Virus Panel by BURT Stool,         fentaNYL 62.5 MCG/HR PT72    (G89.4) Chronic pain syndrome  Comment: Patient with significant pain and history of chronic pain syndrome for which she is on chronic opioid treatment.  She has been unable to keep down even her pills at this juncture.  We discussed doing transdermal fentanyl for a short period of time until she can tolerate p.o. intake and the patient is amenable to trial.  Plan: fentaNYL 62.5 MCG/HR PT72       The longitudinal plan of care for the diagnosis(es)/condition(s) as documented were addressed during this visit. Due to the added complexity in care, I will continue to support Stacy in the subsequent management and with ongoing continuity of care.      MED REC REQUIRED  Post Medication Reconciliation Status:       Follow up stool results prn sooner if issues.     Subjective   Stacy is a 49 year old, presenting for the following  health issues:  Vomiting (Hasn't been able to keep medication down, needs pain relief) and Derm Problem        4/8/2025    10:33 AM   Additional Questions   Roomed by Karey   Accompanied by  sandoval     Vomiting   This is a new problem. The current episode started more than 1 week ago. The problem occurs more than 10 times per day. The problem has been gradually worsening. The emesis has an appearance of bilious material. There has been no fever. Associated symptoms include abdominal pain, chills and diarrhea.       Was told that she has lesions on spine, with no further information   Pain in chest, unable to keep food, water or medicine down       Acute Illness  Acute illness concerns: vomiting   Onset/Duration: 9 days ago   Symptoms:  Fever: YES- off and on  Chills/Sweats: YES  Headache (location?): YES  Sinus Pressure: No  Conjunctivitis:  No  Ear Pain: YES: bilateral, pressure in ears   Rhinorrhea: No  Congestion: No  Sore Throat: YES- from vomiting   Cough: no  Wheeze: No  Decreased Appetite: YES but unable to eat   Nausea: YES  Vomiting: YES  Diarrhea: YES, had incontinent episode last night   Dysuria/Freq.: YES  Dysuria or Hematuria: No  Fatigue/Achiness: YES  Sick/Strep Exposure: No  Therapies tried and outcome: has tried taking medication/pain meds/zofran but not helping and throws it right up     Skin Lesion  Onset/Duration: a couple weeks ago   Description  Location: sternum   Color: pink and open   Border description:  red, skin color, has had purulent drainage no bleeding   Character: no rash  Itching: moderate  Bleeding:  No  Intensity:  moderate  Progression of Symptoms:  worsening  Accompanying signs and symptoms:   Bleeding: No  Scaling: No  Excessive sun exposure/tanning: No  Sunscreen used: No  History:           Any previous history of skin cancer: No  Any family history of melanoma: YES  Previous episodes of similar lesion: No  Precipitating or alleviating factors: covering it prevents pt  from itching     Therapies tried and outcome: pt unable to keep medicine down, tries to keep wound covered and clean           Objective    /80   Pulse 110   Temp 98.2  F (36.8  C) (Temporal)   Resp 18   Wt 85.7 kg (189 lb)   LMP 03/07/2021 (Approximate)   SpO2 97%   BMI 30.51 kg/m    Body mass index is 30.51 kg/m .  Physical Exam  Constitutional:       General: She is not in acute distress.     Appearance: She is ill-appearing. She is not toxic-appearing or diaphoretic.   Cardiovascular:      Rate and Rhythm: Normal rate.      Pulses: Normal pulses.   Pulmonary:      Effort: Pulmonary effort is normal. No respiratory distress.   Skin:     General: Skin is warm and dry.      Capillary Refill: Capillary refill takes less than 2 seconds.   Neurological:      Mental Status: She is alert.   Psychiatric:         Behavior: Behavior normal.         Thought Content: Thought content normal.         Judgment: Judgment normal.      Comments: Tired/fatigued                Signed Electronically by: Shyanne Francisco MD    Answers submitted by the patient for this visit:  Patient Health Questionnaire (Submitted on 4/8/2025)  If you checked off any problems, how difficult have these problems made it for you to do your work, take care of things at home, or get along with other people?: Very difficult  PHQ9 TOTAL SCORE: 15

## 2025-04-09 ENCOUNTER — PATIENT OUTREACH (OUTPATIENT)
Dept: CARE COORDINATION | Facility: CLINIC | Age: 50
End: 2025-04-09
Payer: COMMERCIAL

## 2025-04-09 ENCOUNTER — TELEPHONE (OUTPATIENT)
Dept: FAMILY MEDICINE | Facility: CLINIC | Age: 50
End: 2025-04-09
Payer: COMMERCIAL

## 2025-04-09 DIAGNOSIS — G89.4 CHRONIC PAIN SYNDROME: Primary | ICD-10-CM

## 2025-04-09 DIAGNOSIS — R11.2 NAUSEA AND VOMITING, UNSPECIFIED VOMITING TYPE: ICD-10-CM

## 2025-04-09 LAB
ADV 40+41 DNA STL QL NAA+NON-PROBE: NEGATIVE
ASTRO TYP 1-8 RNA STL QL NAA+NON-PROBE: NEGATIVE
BACTERIA WND CULT: NO GROWTH
C CAYETANENSIS DNA STL QL NAA+NON-PROBE: NEGATIVE
CAMPYLOBACTER DNA SPEC NAA+PROBE: NEGATIVE
CRYPTOSP DNA STL QL NAA+NON-PROBE: NEGATIVE
E COLI O157 DNA STL QL NAA+NON-PROBE: NORMAL
E HISTOLYT DNA STL QL NAA+NON-PROBE: NEGATIVE
EAEC ASTA GENE ISLT QL NAA+PROBE: NEGATIVE
EC STX1+STX2 GENES STL QL NAA+NON-PROBE: NEGATIVE
EPEC EAE GENE STL QL NAA+NON-PROBE: NEGATIVE
ETEC LTA+ST1A+ST1B TOX ST NAA+NON-PROBE: NEGATIVE
G LAMBLIA DNA STL QL NAA+NON-PROBE: NEGATIVE
GRAM STAIN RESULT: NORMAL
GRAM STAIN RESULT: NORMAL
NOROVIRUS GI+II RNA STL QL NAA+NON-PROBE: NEGATIVE
P SHIGELLOIDES DNA STL QL NAA+NON-PROBE: NEGATIVE
RVA RNA STL QL NAA+NON-PROBE: NEGATIVE
SALMONELLA SP RPOD STL QL NAA+PROBE: NEGATIVE
SAPO I+II+IV+V RNA STL QL NAA+NON-PROBE: NEGATIVE
SHIGELLA SP+EIEC IPAH ST NAA+NON-PROBE: NEGATIVE
V CHOLERAE DNA SPEC QL NAA+PROBE: NEGATIVE
VIBRIO DNA SPEC NAA+PROBE: NEGATIVE
Y ENTEROCOL DNA STL QL NAA+PROBE: NEGATIVE

## 2025-04-09 PROCEDURE — 87507 IADNA-DNA/RNA PROBE TQ 12-25: CPT | Performed by: FAMILY MEDICINE

## 2025-04-09 RX ORDER — FENTANYL 12.5 UG/1
1 PATCH TRANSDERMAL
Qty: 5 PATCH | Refills: 0 | Status: SHIPPED | OUTPATIENT
Start: 2025-04-09

## 2025-04-09 NOTE — TELEPHONE ENCOUNTER
Huddled with Pharmacy staff to establish MME and readjust rx for fentanyl patches. They have 12 mcg patches at [facility] will change dosage and send rx.

## 2025-04-09 NOTE — LETTER
Stacy Brown  [address] [address] [place] [address]   [facility] [id number]    Dear Stacy Brown,      I am a team member within the Connected [facility] Center with [facility]. I recently tried to reach you to ensure you were doing well following a recent visit within our health system. I also wanted to take this chance to introduce Clinic Care Coordination.     Below is a description of Clinic Care Coordination and how this team can further assist you:       The Clinic Care Coordination team is made up of a Registered Nurse, , Financial Resource Worker, and a Community Health Worker who understand and can help navigate the health care system. The goal of clinic care coordination is to help you manage your health, improve access to care, and achieve optimal health outcomes. They work alongside your provider to assist you in determining your health and social needs, obtain health care and community resources, and provide you with necessary information and education. Clinic Care Coordination can work with you through any barriers and develop a care plan that helps coordinate and strengthen the relationship between you and your care team.    If you wish to connect with the Clinic Care Coordination Team, please let your [facility] Primary Care Provider or Clinic Care Team know and they can place a referral. The Clinic Care Coordination team will then reach out by phone to further support you.    We are focused on providing you with the highest-quality healthcare experience possible.    Sincerely,   Denice OSEGUERA  Community Health Worker    Connected Care Resources   [facility]'s [address] ([phone]).

## 2025-04-09 NOTE — PROGRESS NOTES
Clinic Care Coordination Contact  Community Health Worker Initial Outreach    CHW Initial Information Gathering:  Referral Source: ED Follow-Up  Current living arrangement:: Not Assessed  CHW Additional Questions  If ED/Hospital discharge, follow-up appointment scheduled as recommended?: Yes  Medication changes made following ED/Hospital discharge?: No  MyChart active?: Yes  Patient sent Social Drivers of Health questionnaire?: No    Patient accepts CC: No, Patient expressed no additional support is needed at this time. Patient will be sent Care Coordination introduction letter for future reference.     Denice Ortiz  Community Health Worker    Connected Care Resources   [facility]     *Connected Care Resources Team does NOT   follow patient ongoing. Referrals are identified   based on internal discharge report and the outreach is to ensure   Patient has understanding of their discharge instructions.

## 2025-04-14 ENCOUNTER — OFFICE VISIT (OUTPATIENT)
Dept: FAMILY MEDICINE | Facility: CLINIC | Age: 50
End: 2025-04-14
Payer: COMMERCIAL

## 2025-04-14 VITALS
TEMPERATURE: 97.5 F | HEART RATE: 88 BPM | OXYGEN SATURATION: 97 % | RESPIRATION RATE: 16 BRPM | BODY MASS INDEX: 30.83 KG/M2 | DIASTOLIC BLOOD PRESSURE: 84 MMHG | SYSTOLIC BLOOD PRESSURE: 138 MMHG | WEIGHT: 191 LBS

## 2025-04-14 DIAGNOSIS — G89.4 CHRONIC PAIN SYNDROME: ICD-10-CM

## 2025-04-14 DIAGNOSIS — Z79.4 TYPE 2 DIABETES MELLITUS WITH HYPERGLYCEMIA, WITH LONG-TERM CURRENT USE OF INSULIN (H): ICD-10-CM

## 2025-04-14 DIAGNOSIS — T81.89XD NON-HEALING SURGICAL WOUND, SUBSEQUENT ENCOUNTER: Primary | ICD-10-CM

## 2025-04-14 DIAGNOSIS — E11.65 TYPE 2 DIABETES MELLITUS WITH HYPERGLYCEMIA, WITH LONG-TERM CURRENT USE OF INSULIN (H): ICD-10-CM

## 2025-04-14 DIAGNOSIS — R11.2 NAUSEA AND VOMITING, UNSPECIFIED VOMITING TYPE: ICD-10-CM

## 2025-04-14 DIAGNOSIS — L02.213 ABSCESS OF CHEST WALL: ICD-10-CM

## 2025-04-14 PROCEDURE — 99214 OFFICE O/P EST MOD 30 MIN: CPT | Performed by: FAMILY MEDICINE

## 2025-04-14 PROCEDURE — 3079F DIAST BP 80-89 MM HG: CPT | Performed by: FAMILY MEDICINE

## 2025-04-14 PROCEDURE — 1125F AMNT PAIN NOTED PAIN PRSNT: CPT | Performed by: FAMILY MEDICINE

## 2025-04-14 PROCEDURE — 3075F SYST BP GE 130 - 139MM HG: CPT | Performed by: FAMILY MEDICINE

## 2025-04-14 PROCEDURE — G2211 COMPLEX E/M VISIT ADD ON: HCPCS | Performed by: FAMILY MEDICINE

## 2025-04-14 RX ORDER — FENTANYL 12.5 UG/1
1 PATCH TRANSDERMAL
Qty: 5 PATCH | Refills: 0 | Status: SHIPPED | OUTPATIENT
Start: 2025-04-14

## 2025-04-14 ASSESSMENT — ENCOUNTER SYMPTOMS: VOMITING: 1

## 2025-04-14 ASSESSMENT — ANXIETY QUESTIONNAIRES
8. IF YOU CHECKED OFF ANY PROBLEMS, HOW DIFFICULT HAVE THESE MADE IT FOR YOU TO DO YOUR WORK, TAKE CARE OF THINGS AT HOME, OR GET ALONG WITH OTHER PEOPLE?: SOMEWHAT DIFFICULT
GAD7 TOTAL SCORE: 13
6. BECOMING EASILY ANNOYED OR IRRITABLE: MORE THAN HALF THE DAYS
GAD7 TOTAL SCORE: 13
3. WORRYING TOO MUCH ABOUT DIFFERENT THINGS: MORE THAN HALF THE DAYS
5. BEING SO RESTLESS THAT IT IS HARD TO SIT STILL: MORE THAN HALF THE DAYS
IF YOU CHECKED OFF ANY PROBLEMS ON THIS QUESTIONNAIRE, HOW DIFFICULT HAVE THESE PROBLEMS MADE IT FOR YOU TO DO YOUR WORK, TAKE CARE OF THINGS AT HOME, OR GET ALONG WITH OTHER PEOPLE: SOMEWHAT DIFFICULT
7. FEELING AFRAID AS IF SOMETHING AWFUL MIGHT HAPPEN: SEVERAL DAYS
4. TROUBLE RELAXING: MORE THAN HALF THE DAYS
4. TROUBLE RELAXING: MORE THAN HALF THE DAYS
7. FEELING AFRAID AS IF SOMETHING AWFUL MIGHT HAPPEN: SEVERAL DAYS
7. FEELING AFRAID AS IF SOMETHING AWFUL MIGHT HAPPEN: SEVERAL DAYS
GAD7 TOTAL SCORE: 13
IF YOU CHECKED OFF ANY PROBLEMS ON THIS QUESTIONNAIRE, HOW DIFFICULT HAVE THESE PROBLEMS MADE IT FOR YOU TO DO YOUR WORK, TAKE CARE OF THINGS AT HOME, OR GET ALONG WITH OTHER PEOPLE: SOMEWHAT DIFFICULT
GAD7 TOTAL SCORE: 13
6. BECOMING EASILY ANNOYED OR IRRITABLE: MORE THAN HALF THE DAYS
GAD7 TOTAL SCORE: 13
7. FEELING AFRAID AS IF SOMETHING AWFUL MIGHT HAPPEN: SEVERAL DAYS
3. WORRYING TOO MUCH ABOUT DIFFERENT THINGS: MORE THAN HALF THE DAYS
2. NOT BEING ABLE TO STOP OR CONTROL WORRYING: MORE THAN HALF THE DAYS
8. IF YOU CHECKED OFF ANY PROBLEMS, HOW DIFFICULT HAVE THESE MADE IT FOR YOU TO DO YOUR WORK, TAKE CARE OF THINGS AT HOME, OR GET ALONG WITH OTHER PEOPLE?: SOMEWHAT DIFFICULT
GAD7 TOTAL SCORE: 13
5. BEING SO RESTLESS THAT IT IS HARD TO SIT STILL: MORE THAN HALF THE DAYS
1. FEELING NERVOUS, ANXIOUS, OR ON EDGE: MORE THAN HALF THE DAYS
2. NOT BEING ABLE TO STOP OR CONTROL WORRYING: MORE THAN HALF THE DAYS
1. FEELING NERVOUS, ANXIOUS, OR ON EDGE: MORE THAN HALF THE DAYS

## 2025-04-14 ASSESSMENT — PAIN SCALES - GENERAL: PAINLEVEL_OUTOF10: MODERATE PAIN (6)

## 2025-04-14 NOTE — PROGRESS NOTES
Assessment & Plan     (T81.89XD) Non-healing surgical wound, subsequent encounter  (primary encounter diagnosis)  Comment: Patient is a 49-year-old female with a history of recent nausea and vomiting likely viral in nature.  She has a history of chronic pain syndrome and has a recent opening of an abscess on the chest wall.  Please see prior ER notes for full details but essentially patient had opening of this chest wall wound due to retching from her nausea and vomiting.  It is had issues healing at this juncture.  We tried hydrocolloid patches at last visit but she reports that there is significant drainage from the site. On exam exquisite tenderness and some concern for tracts of drainage on exam. Would rec wound care nurse and surgical consult.   Plan: Adult Eye  Referral, US Soft Tissue         Chest Wall or Upper Back, Wound Care Referral,         Adult Gen Surg  Referral    (E11.65,  Z79.4) Type 2 diabetes mellitus with hyperglycemia, with long-term current use of insulin (H)  Comment: on insulin currently will continue to monitor will need follow up labs at follow up appt.     (L02.213) Abscess of chest wall  Comment: obtain US to ensure no   Plan: US Soft Tissue Chest Wall or Upper Back, Wound         Care Referral, Adult Gen Surg          Referral    (G89.4) Chronic pain syndrome  Comment: unable to tolerate PO intake prior but now on sips. Advised can trial a SHORT course of transdermal opioid with a plan to discontinue when able to tolerate medication again.   Plan: fentaNYL (DURAGESIC) 12 mcg/hr 72 hr patch    (R11.2) Nausea and vomiting, unspecified vomiting type  Comment: much improved form prior. Able to take in some PO and up 2# from prior.   Plan: fentaNYL (DURAGESIC) 12 mcg/hr 72 hr patch        The longitudinal plan of care for the diagnosis(es)/condition(s) as documented were addressed during this visit. Due to the added complexity in care, I will continue to support  Stacy in the subsequent management and with ongoing continuity of care.        Follow up with specialist care.     Subjective   Stacy is a 49 year old, presenting for the following health issues:  Vomiting and Lesion (On chest not getting better)      4/14/2025     2:46 PM   Additional Questions   Roomed by Alaina MCKEE   Accompanied by  Ab     History of Present Illness       Mental Health Follow-up:  Patient presents to follow-up on Depression & Anxiety.Patient's depression since last visit has been:  Worse  The patient is having other symptoms associated with depression.  Patient's anxiety since last visit has been:  Bad  The patient is not having other symptoms associated with anxiety.  Any significant life events: financial concerns, housing concerns and health concerns  Patient is feeling anxious or having panic attacks.  Patient has no concerns about alcohol or drug use.    Diabetes:   She presents for follow up of diabetes.   She is checking home blood glucose with a continuous glucose monitor.   She checks blood glucose before meals, after meals, before and after meals and at bedtime.  Blood glucose is sometimes over 200 and sometimes under 70. She is aware of hypoglycemia symptoms including shakiness and dizziness.    She has no concerns regarding her diabetes at this time.  She is having burning in feet and excessive thirst.  The patient has not had a diabetic eye exam in the last 12 months.          Hyperlipidemia:  She presents for follow up of hyperlipidemia.   She is taking medication to lower cholesterol. She is having myalgia or other side effects to statin medications.    Hypertension: She presents for follow up of hypertension.  She does not check blood pressure  regularly outside of the clinic. Outpatient blood pressures have not been over 140/90. She follows a low salt diet.     Reason for visit:  Follow    She eats 2-3 servings of fruits and vegetables daily.She consumes 1 sweetened  beverage(s) daily.She exercises with enough effort to increase her heart rate 20 to 29 minutes per day.  She exercises with enough effort to increase her heart rate 4 days per week. She is missing 1 dose(s) of medications per week.              Objective    /84   Pulse 88   Temp 97.5  F (36.4  C) (Temporal)   Resp 16   Wt 86.6 kg (191 lb)   LMP 03/07/2021 (Approximate)   SpO2 97%   BMI 30.83 kg/m    Body mass index is 30.83 kg/m .  Physical Exam  Constitutional:       General: She is not in acute distress.     Appearance: She is not ill-appearing, toxic-appearing or diaphoretic.   Cardiovascular:      Rate and Rhythm: Normal rate and regular rhythm.      Pulses: Normal pulses.   Pulmonary:      Effort: Pulmonary effort is normal. No respiratory distress.      Breath sounds: Normal breath sounds. No wheezing or rales.   Chest:          Comments: Smaller open area of wound on the lower area of midline scar. Exquisitely tender to palpation of the anterior chest well about 2 cm superior to open area. ?drainage from the lateral areas of anterior chest wall. ?tracts.   Abdominal:      Palpations: Abdomen is soft.   Skin:     General: Skin is warm.      Capillary Refill: Capillary refill takes less than 2 seconds.   Neurological:      Mental Status: She is alert. Mental status is at baseline.   Psychiatric:         Mood and Affect: Mood normal.         Thought Content: Thought content normal.         Judgment: Judgment normal.                    Signed Electronically by: Shyanne Francisco MD

## 2025-04-15 ENCOUNTER — TELEPHONE (OUTPATIENT)
Dept: SURGERY | Facility: CLINIC | Age: 50
End: 2025-04-15

## 2025-04-15 ENCOUNTER — OFFICE VISIT (OUTPATIENT)
Dept: SURGERY | Facility: CLINIC | Age: 50
End: 2025-04-15
Attending: FAMILY MEDICINE
Payer: COMMERCIAL

## 2025-04-15 ENCOUNTER — HOSPITAL ENCOUNTER (OUTPATIENT)
Dept: ULTRASOUND IMAGING | Facility: CLINIC | Age: 50
Discharge: HOME | End: 2025-04-15
Attending: FAMILY MEDICINE | Admitting: FAMILY MEDICINE
Payer: COMMERCIAL

## 2025-04-15 VITALS
BODY MASS INDEX: 30.83 KG/M2 | DIASTOLIC BLOOD PRESSURE: 82 MMHG | SYSTOLIC BLOOD PRESSURE: 122 MMHG | WEIGHT: 191 LBS | TEMPERATURE: 96.5 F

## 2025-04-15 DIAGNOSIS — L02.213 ABSCESS OF CHEST WALL: ICD-10-CM

## 2025-04-15 DIAGNOSIS — M86.48 CHRONIC OSTEOMYELITIS OF OTHER SITE WITH DRAINING SINUS (H): ICD-10-CM

## 2025-04-15 DIAGNOSIS — T81.89XD NON-HEALING SURGICAL WOUND, SUBSEQUENT ENCOUNTER: Primary | ICD-10-CM

## 2025-04-15 DIAGNOSIS — T81.89XD NON-HEALING SURGICAL WOUND, SUBSEQUENT ENCOUNTER: ICD-10-CM

## 2025-04-15 PROCEDURE — 76604 US EXAM CHEST: CPT | Mod: 52

## 2025-04-15 ASSESSMENT — PAIN SCALES - GENERAL: PAINLEVEL_OUTOF10: SEVERE PAIN (8)
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
## 2025-04-15 NOTE — TELEPHONE ENCOUNTER
[facility] Call Center    Phone Message    May a detailed message be left on voicemail: yes     Reason for Call: Other: Patient called and scheduled appt for referral to General Surgery.  Appt was made for today, 4/15/25, at 4pm.  Records in [facility], per patient.     Action Taken: Message routed to:  [facility] ([facility] Patient Care Specialty Pool    Travel Screening: Not Applicable

## 2025-04-15 NOTE — PROGRESS NOTES
WOUND CARE        Action & Treatment order per doctor: Previous dressing was removed noting a small amount of drainage. Wound(s) cleansed with Microklenz. Applied Triad, covered with gauze, secured with tape.      Patient verbalized understanding of treatment plan.       Bety Russell RN on 4/15/2025 at 1:53 PM
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
## 2025-04-16 ENCOUNTER — HOSPITAL ENCOUNTER (EMERGENCY)
Facility: CLINIC | Age: 50
Discharge: HOME | End: 2025-04-16
Attending: FAMILY MEDICINE | Admitting: FAMILY MEDICINE
Payer: COMMERCIAL

## 2025-04-16 VITALS
RESPIRATION RATE: 22 BRPM | HEART RATE: 107 BPM | DIASTOLIC BLOOD PRESSURE: 97 MMHG | OXYGEN SATURATION: 99 % | SYSTOLIC BLOOD PRESSURE: 162 MMHG | TEMPERATURE: 98 F

## 2025-04-16 DIAGNOSIS — R07.89 CHEST WALL PAIN: ICD-10-CM

## 2025-04-16 LAB
ANION GAP SERPL CALCULATED.3IONS-SCNC: 11 MMOL/L (ref 7–15)
BASOPHILS # BLD AUTO: 0 10E3/UL (ref 0–0.2)
BASOPHILS NFR BLD AUTO: 0 %
BUN SERPL-MCNC: 21.4 MG/DL (ref 6–20)
CALCIUM SERPL-MCNC: 9.4 MG/DL (ref 8.8–10.4)
CHLORIDE SERPL-SCNC: 109 MMOL/L (ref 98–107)
CREAT SERPL-MCNC: 1.05 MG/DL (ref 0.51–0.95)
CRP SERPL-MCNC: 4.99 MG/L
EGFRCR SERPLBLD CKD-EPI 2021: 65 ML/MIN/1.73M2
EOSINOPHIL # BLD AUTO: 0.1 10E3/UL (ref 0–0.7)
EOSINOPHIL NFR BLD AUTO: 1 %
ERYTHROCYTE [DISTWIDTH] IN BLOOD BY AUTOMATED COUNT: 13.6 % (ref 10–15)
GLUCOSE BLDC GLUCOMTR-MCNC: 65 MG/DL (ref 70–99)
GLUCOSE SERPL-MCNC: 112 MG/DL (ref 70–99)
HCO3 SERPL-SCNC: 24 MMOL/L (ref 22–29)
HCT VFR BLD AUTO: 32 % (ref 35–47)
HGB BLD-MCNC: 11 G/DL (ref 11.7–15.7)
IMM GRANULOCYTES # BLD: 0 10E3/UL
IMM GRANULOCYTES NFR BLD: 0 %
LYMPHOCYTES # BLD AUTO: 1.8 10E3/UL (ref 0.8–5.3)
LYMPHOCYTES NFR BLD AUTO: 24 %
MCH RBC QN AUTO: 28.4 PG (ref 26.5–33)
MCHC RBC AUTO-ENTMCNC: 34.4 G/DL (ref 31.5–36.5)
MCV RBC AUTO: 83 FL (ref 78–100)
MONOCYTES # BLD AUTO: 0.7 10E3/UL (ref 0–1.3)
MONOCYTES NFR BLD AUTO: 9 %
NEUTROPHILS # BLD AUTO: 4.8 10E3/UL (ref 1.6–8.3)
NEUTROPHILS NFR BLD AUTO: 65 %
NRBC # BLD AUTO: 0 10E3/UL
NRBC BLD AUTO-RTO: 0 /100
PLATELET # BLD AUTO: 291 10E3/UL (ref 150–450)
POTASSIUM SERPL-SCNC: 3.7 MMOL/L (ref 3.4–5.3)
RBC # BLD AUTO: 3.87 10E6/UL (ref 3.8–5.2)
SODIUM SERPL-SCNC: 144 MMOL/L (ref 135–145)
WBC # BLD AUTO: 7.4 10E3/UL (ref 4–11)

## 2025-04-16 PROCEDURE — 250N000011 HC RX IP 250 OP 636: Mod: JZ | Performed by: FAMILY MEDICINE

## 2025-04-16 PROCEDURE — 99284 EMERGENCY DEPT VISIT MOD MDM: CPT | Performed by: FAMILY MEDICINE

## 2025-04-16 PROCEDURE — 86140 C-REACTIVE PROTEIN: CPT | Performed by: FAMILY MEDICINE

## 2025-04-16 PROCEDURE — 96374 THER/PROPH/DIAG INJ IV PUSH: CPT | Performed by: FAMILY MEDICINE

## 2025-04-16 PROCEDURE — 96375 TX/PRO/DX INJ NEW DRUG ADDON: CPT | Performed by: FAMILY MEDICINE

## 2025-04-16 PROCEDURE — 85041 AUTOMATED RBC COUNT: CPT | Performed by: FAMILY MEDICINE

## 2025-04-16 PROCEDURE — 80048 BASIC METABOLIC PNL TOTAL CA: CPT | Performed by: FAMILY MEDICINE

## 2025-04-16 PROCEDURE — 85004 AUTOMATED DIFF WBC COUNT: CPT | Performed by: FAMILY MEDICINE

## 2025-04-16 PROCEDURE — 82962 GLUCOSE BLOOD TEST: CPT

## 2025-04-16 PROCEDURE — 36415 COLL VENOUS BLD VENIPUNCTURE: CPT | Performed by: FAMILY MEDICINE

## 2025-04-16 PROCEDURE — 96376 TX/PRO/DX INJ SAME DRUG ADON: CPT | Performed by: FAMILY MEDICINE

## 2025-04-16 PROCEDURE — 87040 BLOOD CULTURE FOR BACTERIA: CPT | Performed by: FAMILY MEDICINE

## 2025-04-16 PROCEDURE — 99284 EMERGENCY DEPT VISIT MOD MDM: CPT | Mod: 25 | Performed by: FAMILY MEDICINE

## 2025-04-16 RX ORDER — KETOROLAC TROMETHAMINE 15 MG/ML
10 INJECTION, SOLUTION INTRAMUSCULAR; INTRAVENOUS ONCE
Status: COMPLETED | OUTPATIENT
Start: 2025-04-16 | End: 2025-04-16

## 2025-04-16 RX ORDER — HYDROMORPHONE HYDROCHLORIDE 1 MG/ML
0.5 INJECTION, SOLUTION INTRAMUSCULAR; INTRAVENOUS; SUBCUTANEOUS EVERY 30 MIN PRN
Status: DISCONTINUED | OUTPATIENT
Start: 2025-04-16 | End: 2025-04-17 | Stop reason: HOSPADM

## 2025-04-16 RX ORDER — HYDROMORPHONE HYDROCHLORIDE 2 MG/1
2 TABLET ORAL EVERY 6 HOURS PRN
Qty: 10 TABLET | Refills: 0 | Status: SHIPPED | OUTPATIENT
Start: 2025-04-16 | End: 2025-04-19

## 2025-04-16 RX ADMIN — KETOROLAC TROMETHAMINE 10 MG: 15 INJECTION, SOLUTION INTRAMUSCULAR; INTRAVENOUS at 22:13

## 2025-04-16 RX ADMIN — HYDROMORPHONE HYDROCHLORIDE 0.5 MG: 1 INJECTION, SOLUTION INTRAMUSCULAR; INTRAVENOUS; SUBCUTANEOUS at 23:29

## 2025-04-16 RX ADMIN — HYDROMORPHONE HYDROCHLORIDE 0.5 MG: 1 INJECTION, SOLUTION INTRAMUSCULAR; INTRAVENOUS; SUBCUTANEOUS at 22:17

## 2025-04-16 ASSESSMENT — PATIENT HEALTH QUESTIONNAIRE - PHQ9
SUM OF ALL RESPONSES TO PHQ QUESTIONS 1-9: 17
10. IF YOU CHECKED OFF ANY PROBLEMS, HOW DIFFICULT HAVE THESE PROBLEMS MADE IT FOR YOU TO DO YOUR WORK, TAKE CARE OF THINGS AT HOME, OR GET ALONG WITH OTHER PEOPLE: VERY DIFFICULT
SUM OF ALL RESPONSES TO PHQ QUESTIONS 1-9: 17

## 2025-04-16 ASSESSMENT — ACTIVITIES OF DAILY LIVING (ADL)
ADLS_ACUITY_SCORE: 58

## 2025-04-17 ENCOUNTER — OFFICE VISIT (OUTPATIENT)
Dept: FAMILY MEDICINE | Facility: CLINIC | Age: 50
End: 2025-04-17
Payer: COMMERCIAL

## 2025-04-17 ENCOUNTER — TELEPHONE (OUTPATIENT)
Dept: CARDIOLOGY | Facility: CLINIC | Age: 50
End: 2025-04-17

## 2025-04-17 VITALS
DIASTOLIC BLOOD PRESSURE: 86 MMHG | OXYGEN SATURATION: 100 % | BODY MASS INDEX: 31.05 KG/M2 | RESPIRATION RATE: 20 BRPM | HEART RATE: 83 BPM | TEMPERATURE: 97.2 F | SYSTOLIC BLOOD PRESSURE: 132 MMHG | WEIGHT: 192.4 LBS

## 2025-04-17 DIAGNOSIS — E11.65 TYPE 2 DIABETES MELLITUS WITH HYPERGLYCEMIA, WITH LONG-TERM CURRENT USE OF INSULIN (H): Primary | ICD-10-CM

## 2025-04-17 DIAGNOSIS — D18.09 VERTEBRAL BODY HEMANGIOMA: ICD-10-CM

## 2025-04-17 DIAGNOSIS — T81.89XD NON-HEALING SURGICAL WOUND, SUBSEQUENT ENCOUNTER: ICD-10-CM

## 2025-04-17 DIAGNOSIS — Z79.4 TYPE 2 DIABETES MELLITUS WITH HYPERGLYCEMIA, WITH LONG-TERM CURRENT USE OF INSULIN (H): Primary | ICD-10-CM

## 2025-04-17 DIAGNOSIS — R11.2 NAUSEA AND VOMITING, UNSPECIFIED VOMITING TYPE: ICD-10-CM

## 2025-04-17 LAB — BACTERIA BLD CULT: NORMAL

## 2025-04-17 RX ORDER — METOCLOPRAMIDE 5 MG/1
5 TABLET ORAL
Qty: 30 TABLET | Refills: 1 | Status: SHIPPED | OUTPATIENT
Start: 2025-04-17

## 2025-04-17 ASSESSMENT — PAIN SCALES - GENERAL: PAINLEVEL_OUTOF10: SEVERE PAIN (8)

## 2025-04-17 ASSESSMENT — ENCOUNTER SYMPTOMS
FEVER: 0
CHILLS: 1
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
## 2025-04-17 NOTE — PROGRESS NOTES
Assessment & Plan     ASSESSMENT/ORDERS:    ICD-10-CM    1. Type 2 diabetes mellitus with hyperglycemia, with long-term current use of insulin (H)  E11.65     Z79.4       2. Nausea and vomiting, unspecified vomiting type  R11.2 metoclopramide (REGLAN) 5 MG tablet      3. Non-healing surgical wound, subsequent encounter  T81.89XD       4. Vertebral body hemangioma  D18.09           Assessment & Plan  Type 2 diabetes mellitus with hyperglycemia, with long-term current use of insulin (H)  - Continue insulin injections. Will trial adding reglan (metoclopramide) to manage potential gastroparesis causing nausea, with instructions to take it before meals three times a day and at bedtime. Monitor blood sugar levels.    Nausea and vomiting, unspecified vomiting type  - Continue taking zofran for nausea. Will add reglan to help with nausea and potential gastroparesis. Monitor hydration status and ensure adequate fluid intake. If unable to maintain hydration, seek medical attention.    Non-healing surgical wound, subsequent encounter  - Concern for possible infection of the sternum bone, but current blood work does not indicate a severe infection. MRI scheduled for April 24, 2025, to further investigate.  - Await MRI results to determine further management. Follow up with the surgeon on April 28, 2025, to discuss MRI results.    Pain Management  - Current pain management includes the use of prescribed medications that she does not currently require any changes. The patient has expressed difficulty in keeping oral medications down due to vomiting. The plan includes ensuring medications are retained and considering alternative methods if necessary. The patient is advised to monitor pain levels and report any significant changes. Follow-up in 3 weeks for recheck and review of current wound care and pain management.    Hemangioma  - The vertebral body hemangioma at T4 is currently asymptomatic. discussed vertebral body  hemangiomas are common benign tumors of the spine and are often incidental findings on imaging studies. They are generally asymptomatic and do not require intervention, only surveillance. However, a small subset can become symptomatic and exhibit aggressive behavior, potentially causing nerve compression. Stacy has been advised to be mindful of any back pain or neurological symptoms.    The longitudinal plan of care for the diagnosis(es)/condition(s) as documented were addressed during this visit. Due to the added complexity in care, I will continue to support Stacy in the subsequent management and with ongoing continuity of care.        43 minutes spent by me on the date of the encounter doing chart review, review of test results, interpretation of tests, patient visit, and documentation             Subjective   Stacy is a 49 year old, presenting for the following health issues:  Hypertension, Diabetes, and Derm Problem        4/17/2025     2:38 PM   Additional Questions   Roomed by Brittney EMMANUEL     History of Present Illness       Mental Health Follow-up:  Patient presents to follow-up on Depression & Anxiety.Patient's depression since last visit has been:  Worse  The patient is having other symptoms associated with depression.  Patient's anxiety since last visit has been:  Bad  The patient is not having other symptoms associated with anxiety.  Any significant life events: financial concerns, housing concerns and health concerns  Patient is feeling anxious or having panic attacks.  Patient has no concerns about alcohol or drug use.    Diabetes:   She presents for follow up of diabetes.   She is checking home blood glucose with a continuous glucose monitor.   She checks blood glucose before meals, after meals, before and after meals and at bedtime.  Blood glucose is sometimes over 200 and sometimes under 70. She is aware of hypoglycemia symptoms including shakiness and dizziness.    She has no concerns regarding her  diabetes at this time.  She is having burning in feet and excessive thirst.  The patient has not had a diabetic eye exam in the last 12 months.          Hyperlipidemia:  She presents for follow up of hyperlipidemia.   She is taking medication to lower cholesterol. She is having myalgia or other side effects to statin medications.    Hypertension: She presents for follow up of hypertension.  She does not check blood pressure  regularly outside of the clinic. Outpatient blood pressures have not been over 140/90. She follows a low salt diet.     Reason for visit:  Follow    She eats 2-3 servings of fruits and vegetables daily.She consumes 1 sweetened beverage(s) daily.She exercises with enough effort to increase her heart rate 20 to 29 minutes per day.  She exercises with enough effort to increase her heart rate 4 days per week. She is missing 1 dose(s) of medications per week.   - Stacy Brown, 49-year-old female, has been experiencing issues with a keloid scar from a bypass graft performed 4 years ago. The scar split open between a month and a month and a half ago.  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  - The wound has been causing significant pain, extending across the entire chest area.  - Stacy has been experiencing projectile vomiting and difficulty keeping food and medications down, including pain meds, diabetic meds, and nausea meds.  - She has been feeling fatigued and tired for over a month.  - She has been dealing with back pain, which she attributes to her 21-year career as a .  - She has been experiencing diarrhea and dehydration due to vomiting and inability to retain fluids.  - Stacy has been using insulin, which is the only medication she can keep in due to its injection method.  - No abnormalities in blood work indicating infection.  - A CT scan of the chest on April 2, 2025, revealed a lesion on the spine at T4, possibly a  hemangioma.                  Objective    /86   Pulse 83   Temp 97.2  F (36.2  C) (Temporal)   Resp 20   Wt 87.3 kg (192 lb 6.4 oz)   LMP 03/07/2021 (Approximate)   SpO2 100%   BMI 31.05 kg/m    Body mass index is 31.05 kg/m .  Physical Exam   - CHEST: open wound on inferior aspect around the vertical keloid scar with surrounding redness, tenderness to palpitation around the open area of wound. No purulent discharge    Results    - CT scan of the chest on April 2, 2025, showed a lesion on the spine at T4, possibly a hemangioma.  - CBC results from the emergency room visit showed a normal white blood cell count of 7.4, slightly low hemoglobin of 11.0, and normal platelet count of 291.  - CRP level was normal at 4.99.             Signed Electronically by: Carlos Stoner MD

## 2025-04-17 NOTE — TELEPHONE ENCOUNTER
Left Voicemail (1st Attempt) and Sent Mychart (1st Attempt) for the patient to call back and schedule the following:    Appointment type: NEW CARDIO  Provider: ANY GENERAL MD  Return date: NEXT AVAILABLE  Specialty phone number: [phone] OPT 1  Additional appointment(s) needed: N/A  Additonal Notes: GENERAL CARDIO PATIENT. NOT CONGENITAL.

## 2025-04-17 NOTE — PATIENT INSTRUCTIONS
Based on our discussion, I have outlined the following instructions for you:      - Keep taking your insulin injections as you have been doing. You might want to start taking Reglan (metoclopramide) to help with stomach issues. If you do, take it before meals three times a day and also at bedtime. Keep an eye on your blood sugar levels.  - Continue using Zofran to help with nausea. You might want to start taking Reglan to help with nausea and stomach issues. Make sure you drink enough fluids. If you find it hard to stay hydrated, get medical help.  - Wait for the MRI results to know what to do next. Make sure to see the surgeon on April 28, 2025, to talk about the MRI results.    Thank you again for your visit, and we look forward to supporting you in your journey to better health.

## 2025-04-17 NOTE — ED TRIAGE NOTES
Patient with open wound to previous CABG incision, did see surgeon yesterday. Here tonight with increased pain, is on a fentanyl patch and took oxycodone. Does have an MRI scheduled for next week.      Triage Assessment (Adult)       Row Name 04/16/25 1947          Triage Assessment    Airway WDL WDL        Respiratory WDL    Respiratory WDL WDL        Skin Circulation/Temperature WDL    Skin Circulation/Temperature WDL X

## 2025-04-19 ENCOUNTER — HEALTH MAINTENANCE LETTER (OUTPATIENT)
Age: 50
End: 2025-04-19

## 2025-04-21 ENCOUNTER — HOSPITAL ENCOUNTER (OUTPATIENT)
Dept: WOUND CARE | Facility: CLINIC | Age: 50
Discharge: HOME | End: 2025-04-21
Attending: FAMILY MEDICINE | Admitting: FAMILY MEDICINE
Payer: COMMERCIAL

## 2025-04-21 DIAGNOSIS — T81.89XD NON-HEALING SURGICAL WOUND, SUBSEQUENT ENCOUNTER: ICD-10-CM

## 2025-04-21 DIAGNOSIS — L02.213 ABSCESS OF CHEST WALL: ICD-10-CM

## 2025-04-21 PROCEDURE — G0463 HOSPITAL OUTPT CLINIC VISIT: HCPCS

## 2025-04-21 NOTE — PATIENT INSTRUCTIONS
Today we looked at the wound on your sternum today.  There is some nice new granular tissue there and no longer any slough.    You need to have that MRI to rule out the bone infection issues.    For now continue with the spray cleanser, MicroKlenz, and dry well.  Start using the Vashe wound cleanser after the MicroKlenz.  - Apply the Vashe wound cleanser to a sterile gauze pad, damp but not wet.  - Let the Vashe damp gauze sit for up to 5 minutes, remove and let air dry or dab dry with gauze.    Continue to apply the Triad paste to the wound and cover with gauze and tape.  IF the cloth tape causes any irritation try the blue tape instead.    We will have you follow up with us in the Wound clinic after your MRI and follow up with Dr Leandra SANABRIA.     Call with any concerns [phone].  Jason Schmidt RN cwocn

## 2025-04-21 NOTE — PROGRESS NOTES
[facility] Ost[facility]    Start of Care in [facility] Wound Clinic: 4/21/2025   Referring Provider: Dr Shyanne Francisco MD dated 4/14/25.  Primary Care Provider: Carlos Stoner   Wound Location: Sternal     Wound Clinic Visit: Initial visit today 4/21/25    Reason for Visit:  Evaluate and make any recommendations for wound care for sternal wound.     Subjective:  On arrival today with her  Humble for her initial visit to the Wound and Ostomy clinic, they add to the Wound History below.     Wound History:   Pt underwent CABG surgery in March of 2021.  The incision healed but scar became keloid develop which was injected with a steroid about a year ago.  See pt submitted photo back from October of 2023 of the keloid scaring below.   She made two visits in early April this year 2025 for concerns of an open wound to the site, and followed up with Dr Nolan SANABRIA.  She was referred to Dr Leandra SANABRIA and was seen on 4/15/25.  Per his progress noted - Patient is a 65-year-old white female who had a CABG done in March 2021. She has a history of a keloid of that wound which was injected with steroid about a year ago. About 1 month ago open wound opened in the sternum on the superior aspect. Is been persistently draining since. She reports pain along the sternum and in the chest wall. She is diabetic and her A1c is 8.8. She denies any fevers or chills. She describes a serous drainage from the wound. She now presents to me for evaluation of her open chest wall wound.  The wound itself is clean.  I did review a recent chest CT and in the area of the wound there appears to be a tract going down to bone near a wire.  This is concerning for possible wire infection versus underlying osteomyelitis.  I discussed this with the patient and she expressed understanding.  I also explained if there is underlying wire or osteomyelitis infection this would prevent the wound from closing.  She again  expressed understanding.  After discussion with the patient the plan at this time is start Triad to the wound and then obtain an MRI of her chest to rule out osteomyelitis.    - MRI is scheduled for 4/24 and she is scheduled to see Dr Mobley again 4/28.    - Initial visit to the Wound and Ostomy clinic today 4/21, wound has made some progress but remains open, added in the use of Vashe as rinse or 5 minutes soak as extra antimicrobial, see Assessment and Plan below for more details.    Past Medical History:  Patient Active Problem List   Diagnosis    Class 1 obesity due to excess calories with serious comorbidity and body mass index (BMI) of 30.0 to 30.9 in adult    Type 2 diabetes mellitus with hyperglycemia, with long-term current use of insulin (H)    Hyperlipidemia LDL goal <100    Restless legs syndrome (RLS)    Insomnia    Chronic pain syndrome    PTSD (post-traumatic stress disorder)    Concussion without loss of consciousness, subsequent encounter    Subacromial impingement of right shoulder    Segmental dysfunction of cervical region    Segmental dysfunction of thoracic region    Segmental dysfunction of upper extremity    Segmental dysfunction of sacral region    Mechanical back pain    Lumbar radiculopathy    Low back pain potentially associated with radiculopathy    Benign essential hypertension    Iron deficiency    Segmental dysfunction of lumbar region    Segmental dysfunction of lower extremity    Trochanteric bursitis of right hip    Malabsorption of iron    Greater trochanteric bursitis of left hip    S/P CABG (coronary artery bypass graft)    Major depressive disorder, recurrent episode, moderate (H)    History of non-ST elevation myocardial infarction (NSTEMI) March 2021    Coronary artery disease involving native coronary artery of native heart without angina pectoris    Hypoalbuminemia    Anxiety    Type 2 diabetes mellitus with other circulatory complication, with long-term current use of  insulin (H)    Vision loss    Lower GI bleed    Acute colitis    Nausea and vomiting, unspecified vomiting type    Left hand paresthesia    Chronic, continuous use of opioids    Type 2 diabetes mellitus with hyperosmolar hyperglycemic state (HHS) (H)    Abdominal pain, generalized    Ketoacidosis    Lactic acidosis    Hyponatremia    Elevated lipase    Dysuria    Severe hypertension    Increased anion gap metabolic acidosis    Hypomagnesemia    Hyperosmolar hyperglycemic state (HHS) (H)    Hyperglycemia    Ketosis due to diabetes (H)                   Tobacco Use:     Tobacco Use      Smoking status: Former        Packs/day: 0.00        Years: 0.5 packs/day for 6.0 years (3.0 ttl pk-yrs)        Types: Cigarettes        Start date: 3/5/2015        Quit date: 3/5/2021        Years since quittin.1      Smokeless tobacco: Never     Diabetic: type 2  HgbA1C:   Hemoglobin A1C   Date Value Ref Range Status   2025 8.8 (H) <5.7 % Final     Comment:     Normal <5.7%   Prediabetes 5.7-6.4%    Diabetes 6.5% or higher     Note: Adopted from ADA consensus guidelines.   2021 10.1 (H) 0 - 5.6 % Final     Comment:     Normal <5.7% Prediabetes 5.7-6.4%  Diabetes 6.5% or higher - adopted from ADA   consensus guidelines.     Checks Blood Glucose?:  Pt has CGM but at initial clinic visit  sensors are not functioning correctly and she will be reapplying new probe soon.    Personal/social history:  Pt lives independently with her  is the primary care giver for her.  No current home care services.    Objective:   Current treatment plan: Cares current provided by pt's  in the home daily as ordered by Dr Mobley.  Sternal wound:  - Cleansing with MicroKlenz spray cleanser.  - Applying Triad paste to the wound bed.  - Covering with dry gauze secured with Medipore tape  Last changed: this am    Wound #1 Sternal recently reopened surgical wound, DOS 2021.  Stage/tissue depth: full thickness as is  over/within scar tissue  0.4 cm L x 0.4 cm W x 0.2 cm D  Tunneling: none noted  Undermining: none noted  Wound bed type/amount: approximately 10 % dry remains of some Triad paste and new scar tissue form 1 - 5 o'clock edge, 40 % granular tissue and 50 % red clean tissue with no granular buds present; Not fluctuant  Wound Edges: open but distinctly edematous  Periwound: localized edema with blanchable dark red to dusky purple pigmentation, no increased warmth to touch, scar tissue, to the superior along scar line is another area of erythema and hyperpigmentation of tissue with small palpable lump.  Drainage: small amounts serosanguinous now, was seropurulent per pt in recent past.  Odor: no  Pain: pain at the wound is minimal currently, has more sever pain     Photo's from today's initial visit to the Wound and Ostomy clinic 4/21/25.    Photo from 4/14/25 appointment with Dr Nolan SANABRIA.      Photo from 4/8/25 appointment with NEELA Francisco MD.    Photo from 4/7/25 [facility] ED.      Photo from 4/2/25 [facility] ED.    Photo pt submitted 10/23/23.      Below not assessed:  Dorsalis Pedal Pulse:   Posterior Tibial Pulse:   Hair growth:   Capillary Refill:   Feet/toes color:   Nails:   R Leg: Edema. Ankle circumference. Calf circumference.  L Leg: Edema. Ankle circumference. Calf circumference.    Mobility: wnl  Current offloading/footwear: not assessed this visit   Sensation: LE sensation not assessed, sternal and wound areas of the sternum some numbness in areas    Other callusing/areas of concern: None noted, no head to toe assessment completed    Diet: Regular, poor appetite due to nausea, difficult to keep foods down currently.    Assessment:  Today the open sternal wound appear to have a more healthy wound bed with no slough and granular tissue present.  The wound edges appear to continue to have issues with localized edema.  The periwound skin based on pictures and today's assessment continues to have deeper  pigmentation to the scar both around the open wound and to the superior.    Factors impacting wound healing:   Poor nutrition: inadequate supply of protein, carbohydrates, fatty acids, and trace elements essential for all phases of wound healing.  Teaching on need for increased protein in diet for healing.  Delayed healing as part of normal aging process  Reduced Blood Supply: inadequate perfusion to heal wound, appears adequate  Medication: NA  Chemotherapy: suppresses the immune system and inflammatory response, NA  Radiotherapy: increases production of free radical which damage cells, NA  Psychological stress: related to open wound and concerns of possible interventions needed for healing based on upcoming MRI results.  Obesity: decreases tissue perfusion  Infection: prolongs inflammatory phase, uses vital nutrients, impairs epithelialization and releases toxins.  Pt has completed all oral antibiotics ordered, will start antimicrobial cleanser Vashe today as additional topical supplemental antibacterial.  Underlying Disease: diabetes mellitis  Maceration: reduces wound tensile strength and inhibits epithelial migration, none noted  Patient compliance, appears motivated to heal  Unrelieved pressure, NA  Immobility, NA  Substance abuse: NA    Plan:  Discussed with the pt today and did teaching with her and her  on the wound status and need for MRI to help determine healing potential and guide ongoing cares.  Though the wound has some granular tissue present and looks healthier overall based on pictures, will not heal if osteomyelitis is found on MRI or if any infection is active related to surgical wires.  Current cares as appropriate and pt likely did not need to be seen by woc nurse until MRI is completed and ongoing course of cares/needs identified but will add in Vashe today as extra antimicrobial element.  MRI will also give some information concerning the entire sternal area and may show what the  hyperpigmentation of the scar is related to any infection.  No change made to the current plan of care with Triad as primary dressing.  At this I will not schedule the pt for a follow up visit.  After MRI 4/24 and follow up visit with Dr Mobley 4/28 will know if pt will need ongoing Wound and Ostomy clinic visit.    Discussed/Education provided: plan of care with rationale;     Topical care: Cares provided today in clinic and to be done ongoing daily by pt with her husbands assist.  Sternal wound:  - Remove old dressing and cleanse the wound and MicroKlenz, and dry well.  - Apply the Vashe wound cleanser to a sterile gauze pad, damp but not wet.  - Let the Vashe damp gauze sit for up to 5 minutes, remove and let air dry or dab dry with gauze.  - Fill wound bed with Triad paste and smaller smear to the surrounding skin/scar tissue.  - Cover with gauze and Medipore tape.  (IF the cloth tape causes any irritation try the blue Micropore S tape instead).  - Change dressing once daily.    Pt is unable but her  is able to perform cares/has been caring for wound.    Additional recommendations: None at this time    The following discharge instructions were reviewed with and sent home with the patient:  See AVS    The following supplies were sent home with the patient:  Vashe opened today    Return visit: MRI 4/24.  Dr Mobley 4/28.  No follow up scheduled with [facility] nurse at this time.    Verbal, written, & demonstrative education provided.  Face to face time: approximately 55 minutes  Procedure: KEIKO Schmidt RN, CWOCN  [phone]

## 2025-04-22 LAB — BACTERIA BLD CULT: NO GROWTH

## 2025-04-23 ENCOUNTER — PATIENT OUTREACH (OUTPATIENT)
Dept: CARE COORDINATION | Facility: CLINIC | Age: 50
End: 2025-04-23
Payer: COMMERCIAL

## 2025-04-24 ENCOUNTER — MYC MEDICAL ADVICE (OUTPATIENT)
Dept: SURGERY | Facility: CLINIC | Age: 50
End: 2025-04-24
Payer: COMMERCIAL

## 2025-04-24 DIAGNOSIS — T81.89XD NON-HEALING SURGICAL WOUND, SUBSEQUENT ENCOUNTER: Primary | ICD-10-CM

## 2025-04-24 DIAGNOSIS — M86.48 CHRONIC OSTEOMYELITIS OF OTHER SITE WITH DRAINING SINUS (H): ICD-10-CM

## 2025-04-27 RX ORDER — LORAZEPAM 0.5 MG/1
TABLET ORAL
Qty: 2 TABLET | Refills: 0 | Status: SHIPPED | OUTPATIENT
Start: 2025-04-27

## 2025-04-28 ENCOUNTER — HOSPITAL ENCOUNTER (OUTPATIENT)
Dept: MRI IMAGING | Facility: CLINIC | Age: 50
Discharge: HOME | End: 2025-04-28
Attending: SPECIALIST | Admitting: SPECIALIST
Payer: COMMERCIAL

## 2025-04-28 ENCOUNTER — TELEPHONE (OUTPATIENT)
Dept: FAMILY MEDICINE | Facility: CLINIC | Age: 50
End: 2025-04-28

## 2025-04-28 DIAGNOSIS — Z79.4 TYPE 2 DIABETES MELLITUS WITH HYPERGLYCEMIA, WITH LONG-TERM CURRENT USE OF INSULIN (H): Primary | ICD-10-CM

## 2025-04-28 DIAGNOSIS — E11.65 TYPE 2 DIABETES MELLITUS WITH HYPERGLYCEMIA, WITH LONG-TERM CURRENT USE OF INSULIN (H): Primary | ICD-10-CM

## 2025-04-28 PROCEDURE — 250N000011 HC RX IP 250 OP 636: Performed by: EMERGENCY MEDICINE

## 2025-04-28 PROCEDURE — A9585 GADOBUTROL INJECTION: HCPCS | Performed by: SPECIALIST

## 2025-04-28 PROCEDURE — 255N000002 HC RX 255 OP 636: Performed by: SPECIALIST

## 2025-04-28 PROCEDURE — 71552 MRI CHEST W/O & W/DYE: CPT

## 2025-04-28 RX ORDER — GADOBUTROL 604.72 MG/ML
8.5 INJECTION INTRAVENOUS ONCE
Status: COMPLETED | OUTPATIENT
Start: 2025-04-28 | End: 2025-04-28

## 2025-04-28 RX ORDER — LORAZEPAM 2 MG/ML
0.5 INJECTION INTRAMUSCULAR ONCE
Status: COMPLETED | OUTPATIENT
Start: 2025-04-28 | End: 2025-04-28

## 2025-04-28 RX ADMIN — GADOBUTROL 8.5 ML: 604.72 INJECTION INTRAVENOUS at 19:36

## 2025-04-28 RX ADMIN — LORAZEPAM 0.5 MG: 2 INJECTION INTRAMUSCULAR; INTRAVENOUS at 18:19

## 2025-04-28 NOTE — TELEPHONE ENCOUNTER
2 Per ins need new rx sent for Freestyle Nancy 3 Plus Sensor sent   Keiko Ray, Pharmacy [facility] [place] [phone]

## 2025-04-29 RX ORDER — ACYCLOVIR 800 MG/1
TABLET ORAL
Qty: 6 EACH | Refills: 1 | Status: SHIPPED | OUTPATIENT
Start: 2025-04-29

## 2025-05-01 ENCOUNTER — TELEPHONE (OUTPATIENT)
Dept: FAMILY MEDICINE | Facility: CLINIC | Age: 50
End: 2025-05-01

## 2025-05-01 ENCOUNTER — OFFICE VISIT (OUTPATIENT)
Dept: SURGERY | Facility: CLINIC | Age: 50
End: 2025-05-01
Payer: COMMERCIAL

## 2025-05-01 VITALS
BODY MASS INDEX: 30.99 KG/M2 | SYSTOLIC BLOOD PRESSURE: 110 MMHG | WEIGHT: 192 LBS | TEMPERATURE: 97.4 F | DIASTOLIC BLOOD PRESSURE: 80 MMHG

## 2025-05-01 DIAGNOSIS — T81.89XD NON-HEALING SURGICAL WOUND, SUBSEQUENT ENCOUNTER: Primary | ICD-10-CM

## 2025-05-01 DIAGNOSIS — Z79.4 TYPE 2 DIABETES MELLITUS WITH HYPERGLYCEMIA, WITH LONG-TERM CURRENT USE OF INSULIN (H): Primary | ICD-10-CM

## 2025-05-01 DIAGNOSIS — E11.65 TYPE 2 DIABETES MELLITUS WITH HYPERGLYCEMIA, WITH LONG-TERM CURRENT USE OF INSULIN (H): Primary | ICD-10-CM

## 2025-05-01 RX ORDER — HYDROCHLOROTHIAZIDE 12.5 MG/1
CAPSULE ORAL
Qty: 6 EACH | Refills: 1 | Status: SHIPPED | OUTPATIENT
Start: 2025-05-01

## 2025-05-01 ASSESSMENT — PAIN SCALES - GENERAL: PAINLEVEL_OUTOF10: SEVERE PAIN (7)

## 2025-05-01 NOTE — TELEPHONE ENCOUNTER
Per ins still Need rx sent for Freestyle Libre3  PLUS to change days ins wont cover other one anymore  Keiko Ray, Pharmacy [facility] [place] [phone]

## 2025-05-01 NOTE — PROGRESS NOTES
Follow-up MRI and chest wound.    Subjective:  Patient has been using Triad to wound.  Wound get filled in which she was switched to Voshe by by the care nurse.  Now reports it is much better.  Had MRI for which she now follows up.      Objective:  B/P: 110/80, T: 97.4, P: Data Unavailable, R: Data Unavailable  Chest: Previously open wound is now fully healed.  No signs of infection.    XAM: MR CHEST W/O and W CONTRAST  LOCATION: [facility]  DATE: 4/28/2025     INDICATION: Patient is 4 years status post a CABG. Now returns with draining median sternotomy wound. CT chest suggests sinus going to bone. Rule out osteomyelitis.  COMPARISON: CT chest 4/2/2025.  TECHNIQUE: Routine MRI chest protocol.   CONTRAST: 8.5 mL Gadavist     FINDINGS:      Study is limited due to artifact from the median sternotomy wires.     At the level of the vitamin E marker is apparent slight dehiscence of the upper midline incision (8/25). There is no significant fluid or definite fluid tract to the adjacent sternum. Linear T1/T2 hypointense observation extending posterior likely   reflects scar tissue from the incision. Very minimal adjacent enhancement. Evaluation of the sternum is essentially nondiagnostic due to the sternal wire artifact.     Visualized portions of the lung fields are grossly unremarkable, although limited due to motion artifact. Visualized portion of the upper abdomen is grossly unremarkable. Probable thoracic spine vertebral body hemangiomas.                                                                      IMPRESSION:  Limited study due to artifact from the median sternotomy wires.     1.  At the level of the vitamin E marker is apparent slight dehiscence of the superior midline incision. No significant fluid or definite fluid tract to the adjacent sternum.  2.  Evaluation of the sternum osseous structures is essentially nondiagnostic due to the sternal wire  artifact.      Assessment/plan:  Patient is status post median sternotomy with the previously open wound.  The wound is now closed on today's visit.  Her MRI did not reveal any signs of osteo but there was a lot of wire artifact.  The plan at this time is just have her keep the area clean and dry.  Should the wound reopen then she will restart the Triad or the Voshe call us.  If it reopens a bone scan will be ordered.  Otherwise she will follow-up with us as necessary.    Johny Mobley MD, FACS

## 2025-05-01 NOTE — LETTER
5/1/2025      Stacy Brown  [address]  [place] [address]      Dear Colleague,    Thank you for referring your patient, Stacy Brown, to the [facility]. Please see a copy of my visit note below.    Follow-up MRI and chest wound.    Subjective:  Patient has been using Triad to wound.  Wound get filled in which she was switched to Voshe by by the care nurse.  Now reports it is much better.  Had MRI for which she now follows up.      Objective:  B/P: 110/80, T: 97.4, P: Data Unavailable, R: Data Unavailable  Chest: Previously open wound is now fully healed.  No signs of infection.    XAM: MR CHEST W/O and W CONTRAST  LOCATION: [facility]  DATE: 4/28/2025     INDICATION: Patient is 4 years status post a CABG. Now returns with draining median sternotomy wound. CT chest suggests sinus going to bone. Rule out osteomyelitis.  COMPARISON: CT chest 4/2/2025.  TECHNIQUE: Routine MRI chest protocol.   CONTRAST: 8.5 mL Gadavist     FINDINGS:      Study is limited due to artifact from the median sternotomy wires.     At the level of the vitamin E marker is apparent slight dehiscence of the upper midline incision (8/25). There is no significant fluid or definite fluid tract to the adjacent sternum. Linear T1/T2 hypointense observation extending posterior likely   reflects scar tissue from the incision. Very minimal adjacent enhancement. Evaluation of the sternum is essentially nondiagnostic due to the sternal wire artifact.     Visualized portions of the lung fields are grossly unremarkable, although limited due to motion artifact. Visualized portion of the upper abdomen is grossly unremarkable. Probable thoracic spine vertebral body hemangiomas.                                                                      IMPRESSION:  Limited study due to artifact from the median sternotomy wires.     1.  At the level of the vitamin E marker is apparent slight  dehiscence of the superior midline incision. No significant fluid or definite fluid tract to the adjacent sternum.  2.  Evaluation of the sternum osseous structures is essentially nondiagnostic due to the sternal wire artifact.      Assessment/plan:  Patient is status post median sternotomy with the previously open wound.  The wound is now closed on today's visit.  Her MRI did not reveal any signs of osteo but there was a lot of wire artifact.  The plan at this time is just have her keep the area clean and dry.  Should the wound reopen then she will restart the Triad or the Voshe call us.  If it reopens a bone scan will be ordered.  Otherwise she will follow-up with us as necessary.    Johny Mobley MD, FACS      Again, thank you for allowing me to participate in the care of your patient.        Sincerely,        Johny Mobley MD    Electronically signed

## 2025-05-04 ENCOUNTER — MYC MEDICAL ADVICE (OUTPATIENT)
Dept: FAMILY MEDICINE | Facility: CLINIC | Age: 50
End: 2025-05-04
Payer: COMMERCIAL

## 2025-05-04 ENCOUNTER — NURSE TRIAGE (OUTPATIENT)
Dept: NURSING | Facility: CLINIC | Age: 50
End: 2025-05-04
Payer: COMMERCIAL

## 2025-05-04 NOTE — TELEPHONE ENCOUNTER
Nurse Triage SBAR    Is this a 2nd Level Triage? YES, LICENSED PRACTITIONER REVIEW IS REQUIRED    Situation: Medication Question    Background: Patient began using Fentanyl patches on 4/14/25, put last patch on today.    Assessment: Patient reports symptoms since using patch including hand tremors, moderate weakness of hands, insomnia, agitation, forgetfulness, 6/10 headache, and difficulty controlling emotions. Symptoms have been getting progressively worse. Denies severe pain, dizziness, chest pain, difficulty breathing, or current abdominal pain. She has a clinic appointment with PCP on 5/8/25.    Protocol Recommended Disposition:   Call PCP Now    Recommendation:     Paged to provider    Does the patient meet one of the following criteria for ADS visit consideration? No    [facility] Provider consult indicated.    Reason for page: Medication Question, Side Effects    Specialty Group number: [id number]   Specialty Group: -Family Medicine    Initial page sent to Dr. Cristobal by RN at 1649.     [facility] No response from Dr. Cristobal.     Specialty Group number (same as initial page): [id number]   Specialty Group (same as initial page): -Family Medicine    Second page sent to Dr. Cristobal (same provider as initial page sent) by RN at 1705.    [facility] Provider, Dr. Cristobal, returning page to Nurse Advisors at 1710    Provider recommended plan of care:   -Okay to remove patch tonight as patient is on lowest dose, nothing to taper down to, but she will not have pain coverage.    Provider Recommendation Follow Up:   Reached patient/caregiver. Informed of provider's recommendations. Patient verbalized understanding and agrees with the plan. She plans to leave the patch on tonight and watch for red flag symptoms like difficulty breathing or somnolence. She plans to contact PCP in the morning.        Rosa Isela Mcconnell RN  05/04/25 5:19 PM  [facility] Nurse Advisor    Reason for  Disposition   [1] Caller has URGENT medicine question about med that PCP or specialist prescribed AND [2] triager unable to answer question    Additional Information   Negative: [1] Intentional drug overdose AND [2] suicidal thoughts or ideas   Negative: MORE THAN A DOUBLE DOSE of a prescription or over-the-counter (OTC) drug   Negative: [1] DOUBLE DOSE (an extra dose or lesser amount) of prescription drug AND [2] any symptoms (e.g., dizziness, nausea, pain, sleepiness)   Negative: [1] DOUBLE DOSE (an extra dose or lesser amount) of over-the-counter (OTC) drug AND [2] any symptoms (e.g., dizziness, nausea, pain, sleepiness)   Negative: Took another person's prescription drug   Negative: [1] DOUBLE DOSE (an extra dose or lesser amount) of prescription drug AND [2] NO symptoms  (Exception: A double dose of antibiotics.)   Negative: Diabetes drug error or overdose (e.g., took wrong type of insulin or took extra dose)   Negative: [1] Prescription not at pharmacy AND [2] was prescribed by PCP recently (Exception: Triager has access to EMR and prescription is recorded there. Go to Home Care and confirm for pharmacy.)   Negative: [1] Pharmacy calling with prescription question AND [2] triager unable to answer question    Protocols used: Medication Question Call-A-

## 2025-05-08 ENCOUNTER — OFFICE VISIT (OUTPATIENT)
Dept: FAMILY MEDICINE | Facility: CLINIC | Age: 50
End: 2025-05-08
Payer: COMMERCIAL

## 2025-05-08 ENCOUNTER — TELEPHONE (OUTPATIENT)
Dept: FAMILY MEDICINE | Facility: CLINIC | Age: 50
End: 2025-05-08

## 2025-05-08 VITALS
HEART RATE: 90 BPM | RESPIRATION RATE: 20 BRPM | OXYGEN SATURATION: 99 % | WEIGHT: 190.13 LBS | DIASTOLIC BLOOD PRESSURE: 72 MMHG | BODY MASS INDEX: 30.56 KG/M2 | TEMPERATURE: 97.9 F | HEIGHT: 66 IN | SYSTOLIC BLOOD PRESSURE: 108 MMHG

## 2025-05-08 DIAGNOSIS — S82.891S CLOSED FRACTURE OF RIGHT ANKLE, SEQUELA: ICD-10-CM

## 2025-05-08 DIAGNOSIS — F11.90 CHRONIC, CONTINUOUS USE OF OPIOIDS: ICD-10-CM

## 2025-05-08 DIAGNOSIS — F32.1 MODERATE MAJOR DEPRESSION (H): ICD-10-CM

## 2025-05-08 DIAGNOSIS — R10.84 ABDOMINAL PAIN, GENERALIZED: ICD-10-CM

## 2025-05-08 DIAGNOSIS — G89.4 CHRONIC PAIN SYNDROME: Primary | ICD-10-CM

## 2025-05-08 PROCEDURE — 3078F DIAST BP <80 MM HG: CPT | Performed by: FAMILY MEDICINE

## 2025-05-08 PROCEDURE — 1125F AMNT PAIN NOTED PAIN PRSNT: CPT | Performed by: FAMILY MEDICINE

## 2025-05-08 PROCEDURE — 3074F SYST BP LT 130 MM HG: CPT | Performed by: FAMILY MEDICINE

## 2025-05-08 PROCEDURE — G2211 COMPLEX E/M VISIT ADD ON: HCPCS | Performed by: FAMILY MEDICINE

## 2025-05-08 PROCEDURE — 99214 OFFICE O/P EST MOD 30 MIN: CPT | Performed by: FAMILY MEDICINE

## 2025-05-08 RX ORDER — DULOXETIN HYDROCHLORIDE 60 MG/1
60 CAPSULE, DELAYED RELEASE ORAL DAILY
Qty: 30 CAPSULE | Refills: 5 | Status: SHIPPED | OUTPATIENT
Start: 2025-05-08

## 2025-05-08 RX ORDER — NORTRIPTYLINE HYDROCHLORIDE 25 MG/1
25 CAPSULE ORAL AT BEDTIME
Qty: 30 CAPSULE | Refills: 5 | Status: SHIPPED | OUTPATIENT
Start: 2025-05-08

## 2025-05-08 RX ORDER — OXYCODONE HYDROCHLORIDE 5 MG/1
5 TABLET ORAL 2 TIMES DAILY PRN
Qty: 60 TABLET | Refills: 0 | Status: SHIPPED | OUTPATIENT
Start: 2025-05-08

## 2025-05-08 ASSESSMENT — PAIN SCALES - GENERAL: PAINLEVEL_OUTOF10: SEVERE PAIN (9)

## 2025-05-08 NOTE — LETTER
Opioid / Opioid Plus Controlled Substance Agreement    This is an agreement between you and your provider about the safe and appropriate use of controlled substance/opioids prescribed by your care team. Controlled substances are medicines that can cause physical and mental dependence (abuse).    There are strict laws about having and using these medicines. We here at [facility] are committing to working with you in your efforts to get better. To support you in this work, we ll help you schedule regular office appointments for medicine refills. If we must cancel or change your appointment for any reason, we ll make sure you have enough medicine to last until your next appointment.     As a Provider, I will:  Listen carefully to your concerns and treat you with respect.   Recommend a treatment plan that I believe is in your best interest. This plan may involve therapies other than opioid pain medication.   Talk with you often about the possible benefits, and the risk of harm of any medicine that we prescribe for you.   Provide a plan on how to taper (discontinue or go off) using this medicine if the decision is made to stop its use.    As a Patient, I understand that opioid(s):   Are a controlled substance prescribed by my care team to help me function or work and manage my condition(s).   Are strong medicines and can cause serious side effects such as:  Drowsiness, which can seriously affect my driving ability  A lower breathing rate, enough to cause death  Harm to my thinking ability   Depression   Abuse of and addiction to this medicine  Need to be taken exactly as prescribed. Combining opioids with certain medicines or chemicals (such as illegal drugs, sedatives, sleeping pills, and benzodiazepines) can be dangerous or even fatal. If I stop opioids suddenly, I may have severe withdrawal symptoms.  Do not work for all types of pain nor for all patients. If they re not helpful, I may be asked to stop  them.        The risks, benefits and side effects of these medicine(s) were explained to me. I agree that:  I will take part in other treatments as advised by my care team. This may be psychiatry or counseling, physical therapy, behavioral therapy, group treatment or a referral to a specialist.     I will keep all my appointments. I understand that this is part of the monitoring of opioids. My care team may require an office visit for EVERY opioid/controlled substance refill. If I miss appointments or don t follow instructions, my care team may stop my medicine.    I will take my medicines as prescribed. I will not change the dose or schedule unless my care team tells me to. There will be no refills if I run out early.     I may be asked to come to the clinic and complete a urine drug test or complete a pill count at any time. If I don t give a urine sample or participate in a pill count, the care team may stop my medicine.    I will only receive prescriptions from this clinic for chronic pain. If I am treated by another provider for acute pain issues, I will tell them that I am taking opioid pain medication for chronic pain and that I have a treatment agreement with this provider. I will inform my [facility] care team within one business day if I am given a prescription for any pain medication by another healthcare provider. My [facility] care team can contact other providers and pharmacists about my use of any medicines.    It is up to me to make sure that I don t run out of my medicines on weekends or holidays. If my care team is willing to refill my opioid prescription without a visit, I must request refills only during office hours. Refills may take up to 3 business days to process. I will use one pharmacy to fill all my opioid and other controlled substance prescriptions. I will notify the clinic about any changes to my insurance or medication availability.    I am responsible for my  prescriptions. If the medicine/prescription is lost, stolen or destroyed, it will not be replaced. I also agree not to share controlled substance medicines with anyone.    I am aware I should not use any illegal or recreational drugs. I agree not to drink alcohol unless my care team says I can.       If I enroll in the [place] [facility] program, I will tell my care team prior to my next refill.     I will tell my care team right away if I become pregnant, have a new medical problem treated outside of my regular clinic, or have a change in my medications.    I understand that this medicine can affect my thinking, judgment and reaction time. Alcohol and drugs affect the brain and body, which can affect the safety of my driving. Being under the influence of alcohol or drugs can affect my decision-making, behaviors, personal safety, and the safety of others. Driving while impaired (DWI) can occur if a person is driving, operating, or in physical control of a car, motorcycle, boat, snowmobile, ATV, motorbike, off-road vehicle, or any other motor vehicle ([place] Statute 169A.20). I understand the risk if I choose to drive or operate any vehicle or machinery.    I understand that if I do not follow any of the conditions above, my prescriptions or treatment may be stopped or changed.          Opioids  What You Need to Know    What are opioids?   Opioids are pain medicines that must be prescribed by a doctor. They are also known as narcotics.     Examples are:   morphine (MS Contin, Anisha)  oxycodone (Oxycontin)  oxycodone and acetaminophen (Percocet)  hydrocodone and acetaminophen (Vicodin, Norco)   fentanyl patch (Duragesic)   hydromorphone (Dilaudid)   methadone  codeine (Tylenol #3)     What do opioids do well?   Opioids are best for severe short-term pain such as after a surgery or injury. They may work well for cancer pain. They may help some people with long-lasting (chronic) pain.     What do opioids NOT do  well?   Opioids never get rid of pain entirely, and they don t work well for most patients with chronic pain. Opioids don t reduce swelling, one of the causes of pain.                                    Other ways to manage chronic pain and improve function include:     Treat the health problem that may be causing pain  Anti-inflammation medicines, which reduce swelling and tenderness, such as ibuprofen (Advil, Motrin) or naproxen (Aleve)  Acetaminophen (Tylenol)  Antidepressants and anti-seizure medicines, especially for nerve pain  Topical treatments such as patches or creams  Injections or nerve blocks  Chiropractic or osteopathic treatment  Acupuncture, massage, deep breathing, meditation, visual imagery, aromatherapy  Use heat or ice at the pain site  Physical therapy   Exercise  Stop smoking  Take part in therapy       Risks and side effects     Talk to your doctor before you start or decide to keep taking opioids. Possible side effects include:    Lowering your breathing rate enough to cause death  Overdose, including death, especially if taking higher than prescribed doses  Worse depression symptoms; less pleasure in things you usually enjoy  Feeling tired or sluggish  Slower thoughts or cloudy thinking  Being more sensitive to pain over time; pain is harder to control  Trouble sleeping or restless sleep  Changes in hormone levels (for example, less testosterone)  Changes in sex drive or ability to have sex  Constipation  Unsafe driving  Itching and sweating  Dizziness  Nausea, throwing up and dry mouth    What else should I know about opioids?    Opioids may lead to dependence, tolerance, or addiction.    Dependence means that if you stop or reduce the medicine too quickly, you will have withdrawal symptoms. These include loose poop (diarrhea), jitters, flu-like symptoms, nervousness and tremors. Dependence is not the same as addiction.                     Tolerance means needing higher doses over time to  get the same effect. This may increase the chance of serious side effects.    Addiction is when people improperly use a substance that harms their body, their mind or their relations with others. Use of opiates can cause a relapse of addiction if you have a history of drug or alcohol abuse.    People who have used opioids for a long time may have a lower quality of life, worse depression, higher levels of pain and more visits to doctors.    You can overdose on opioids. Take these steps to lower your risk of overdose:    Recognize the signs:  Signs of overdose include decrease or loss of consciousness (blackout), slowed breathing, trouble waking up and blue lips. If someone is worried about overdose, they should call 911.    Talk to your doctor about Narcan (naloxone).   If you are at risk for overdose, you may be given a prescription for Narcan. This medicine very quickly reverses the effects of opioids.   If you overdose, a friend or family member can give you Narcan while waiting for the ambulance. They need to know the signs of overdose and how to give Narcan.     Don't use alcohol or street drugs.   Taking them with opioids can cause death.    Do not take any of these medicines unless your doctor says it s OK. Taking these with opioids can cause death:  Benzodiazepines, such as lorazepam (Ativan), alprazolam (Xanax) or diazepam (Valium)  Muscle relaxers, such as cyclobenzaprine (Flexeril)  Sleeping pills like zolpidem (Ambien)   Other opioids      How to keep you and other people safe while taking opioids:    Never share your opioids with others.  Opioid medicines are regulated by the Drug Enforcement Agency (TORY). Selling or sharing medications is a criminal act.    2. Be sure to store opioids in a secure place, locked up if possible. Young children can easily swallow them and overdose.    3. When you are traveling with your medicines, keep them in the original bottles. If you use a pill box, be sure you also  carry a copy of your medicine list from your clinic or pharmacy.    4. Safe disposal of opioids    Most pharmacies have places to get rid of medicine, called disposal kiosks. Medicine disposal options are also available in every [facility]. Search your county and  medication disposal  to find more options. You can find more details at:  [email or web address]://[email or web address]-medications     I agree that my provider, clinic care team, and pharmacy may work with any city, state or federal law enforcement agency that investigates the misuse, sale, or other diversion of my controlled medicine. I will allow my provider to discuss my care with, or share a copy of, this agreement with any other treating provider, pharmacy or emergency room where I receive care.    I have read this agreement and have asked questions about anything I did not understand.    _______________________________________________________  Patient Signature - Stacy Brown _____________________                   Date     _______________________________________________________  Provider Signature - Carlos Stoner MD   _____________________                   Date     _______________________________________________________  Witness Signature (required if provider not present while patient signing)   _____________________                   Date

## 2025-05-08 NOTE — PATIENT INSTRUCTIONS
Based on our discussion, I have outlined the following instructions for you:      - Stop using the fentanyl patch.  - Start taking nortriptyline 25 mg capsule as directed.  - We increased duloxetine to 60 mg daily to help with pain.  - Take oxycodone 5 mg twice a day for one month, and then we will check and likely lower the dose.  - Watch out for side effects like dry mouth or headaches when taking nortriptyline and duloxetine together.  - Continue getting injections for your right ankle every 3 months as directed by your surgeon.  - Think about ankle fusion surgery if the injections stop helping.  - Pay attention to any mood changes or unusual feelings when taking nortriptyline and duloxetine together.    Thank you again for your visit, and we look forward to supporting you in your journey to better health.

## 2025-05-08 NOTE — PROGRESS NOTES
Assessment & Plan     ASSESSMENT/ORDERS:    ICD-10-CM    1. Chronic pain syndrome  G89.4 tiZANidine (ZANAFLEX) 4 MG tablet     nortriptyline (PAMELOR) 25 MG capsule     DULoxetine (CYMBALTA) 60 MG capsule      2. Abdominal pain, generalized  R10.84       3. Chronic, continuous use of opioids  F11.90       4. Closed fracture of right ankle, sequela  S82.891S oxyCODONE (ROXICODONE) 5 MG tablet      5. Moderate major depression (H)  F32.1 DULoxetine (CYMBALTA) 60 MG capsule          Assessment & Plan  Chronic pain syndrome  - Chronic pain management is challenging due to intolerance to fentanyl patch and issues with oral medication absorption when nausea and vomiting worse.  nausea improved, can go back to oral medications. Pain management strategy includes nortriptyline and duloxetine for nerve-based pain.  - Discontinue fentanyl patch. Prescribe nortriptyline 25 mg capsule. Continue duloxetine 40 mg daily (20 mg twice daily). Consider increasing duloxetine to 60 mg daily for better pain management. Oxycodone 5 mg twice daily for one month, with plan to reassess and potentially reduce dosage.  - Risks and side effects: Monitor for side effects such as dry mouth or headaches with nortriptyline and duloxetine combination.    Closed fracture of right ankle, sequela  - Persistent pain and instability in the right ankle due to previous fracture. Ankle fusion may be necessary if injections cease to provide relief.  - Continue with standing order for right ankle injections every 3 months as directed by orthopedic surgeon. Consider ankle fusion if injections are no longer effective.    Moderate major depression  - Depression management includes nortriptyline and duloxetine. Nortriptyline may help with mood and pain management.  - Prescribe nortriptyline 25 mg capsule. Continue duloxetine 40 mg daily (20 mg twice daily). Consider increasing duloxetine to 60 mg daily for better mood management.  - Risks and side effects:  "Monitor for mood changes and potential serotonin-related side effects with nortriptyline and duloxetine combination.    The longitudinal plan of care for the diagnosis(es)/condition(s) as documented were addressed during this visit. Due to the added complexity in care, I will continue to support Stacy in the subsequent management and with ongoing continuity of care.            BMI  Estimated body mass index is 30.69 kg/m  as calculated from the following:    Height as of this encounter: 1.676 m (5' 6\").    Weight as of this encounter: 86.2 kg (190 lb 2 oz).             Subjective   Stacy is a 49 year old, presenting for the following health issues:  Follow Up (Sore on chest.)        5/8/2025     9:04 AM   Additional Questions   Roomed by Xuan   Accompanied by          5/8/2025   Forms   Any forms needing to be completed Yes     History of Present Illness       Reason for visit:  Follow up for chest wound and discuss medication    She eats 2-3 servings of fruits and vegetables daily.She consumes 1 sweetened beverage(s) daily.She exercises with enough effort to increase her heart rate 9 or less minutes per day.  She exercises with enough effort to increase her heart rate 3 or less days per week.   She is taking medications regularly.   - Stacy Brown, 49-year-old female  - Follow-up for pain management after previous visit 3 weeks ago; wants to establish care with this provider today  - Scar on chest healed, but experiencing horrible pain across chest  - Fentanyl patch was used for pain management but caused severe nausea and inability to keep food down; discontinued due to intolerance  - Last used fentanyl patch for pain management a long time ago; not used in recent years  - Pain pills, including oxycodone, not taken for 2-3 days; hydromorphone prescribed in ER on April 16, 2025, but not effective  - Historically prescribed oxycodone 5 mg tablet, 30 a month, by Doctor Muse, previous PCP  - " Moving to [facility] by June 13, 2025  - Previously prescribed Zofran and scopolamine for nausea, but no longer needed  - Lorazepam was a one-time prescription from Dr. Mobley  - Muscle relaxer prescription changed from 4 mg to 2 mg due to pharmacy availability  - Previously used pantoprazole for acid reflux, now using over-the-counter liquid antacid  - Nortriptyline tried for pain management and mood, but discontinued due to emotional side effects; considering retrying  - Duloxetine used for mood and pain management; currently taking 40 mg daily  - Pain management for nerve damage, hip pain, and ankle issues; ankle not healed well after surgery in March 2024  - Ankle injections performed under X-ray; CT scan planned to assess arthritis and bone issues  - Ankle fusion discussed as a future option if injections stop working  - Experiencing pain in scar area on chest and ankle  - Concerns about potential dementia due to behavior changes; nurse identified fentanyl patch as cause  - Eye exam needed; last done about a year and a half ago  - No current issues with eating or drinking      Diabetes Follow-up    How often are you checking your blood sugar? Continuous glucose monitor  What time of day are you checking your blood sugars (select all that apply)?  Continuous monitor  Have you had any blood sugars above 200?  Yes   Have you had any blood sugars below 70?  Yes   What symptoms do you notice when your blood sugar is low?  Shaky and sweaty  What concerns do you have today about your diabetes? None and Blood sugar is often over 200   Do you have any of these symptoms? (Select all that apply)  Numbness in feet and Burning in feet  Have you had a diabetic eye exam in the last 12 months? No            Hyperlipidemia Follow-Up    Are you regularly taking any medication or supplement to lower your cholesterol?   Yes-    Are you having muscle aches or other side effects that you think could be caused by your cholesterol  "lowering medication?  No    Hypertension Follow-up    Do you check your blood pressure regularly outside of the clinic? No   Are you following a low salt diet? Yes  Are your blood pressures ever more than 140 on the top number (systolic) OR more   than 90 on the bottom number (diastolic), for example 140/90? N/A    BP Readings from Last 2 Encounters:   05/08/25 108/72   05/01/25 110/80     Hemoglobin A1C (%)   Date Value   01/08/2025 8.8 (H)   10/08/2024 8.8 (H)   03/05/2021 10.1 (H)   10/29/2020 12.1 (H)     LDL Cholesterol Calculated (mg/dL)   Date Value   12/26/2024 216 (H)   05/15/2024 254 (H)   03/06/2021 178 (H)   08/11/2019 162 (H)                 Objective    /72   Pulse 90   Temp 97.9  F (36.6  C) (Temporal)   Resp 20   Ht 1.676 m (5' 6\")   Wt 86.2 kg (190 lb 2 oz)   LMP 03/07/2021 (Approximate)   SpO2 99%   BMI 30.69 kg/m    Body mass index is 30.69 kg/m .  Physical Exam  Constitutional:       Appearance: Normal appearance. She is well-developed.   Cardiovascular:      Rate and Rhythm: Normal rate and regular rhythm.      Heart sounds: Normal heart sounds, S1 normal and S2 normal. No murmur heard.  Pulmonary:      Effort: Pulmonary effort is normal. No respiratory distress.      Breath sounds: Normal breath sounds. No wheezing, rhonchi or rales.   Neurological:      Mental Status: She is alert.            Results           Signed Electronically by: Carlos Stoner MD    "

## 2025-05-08 NOTE — TELEPHONE ENCOUNTER
Forms/Letter Request    Type of form/letter: Disability      Is Release of Information needed?: Yes  Was an RYAN obtained?  Yes    Do we have the form/letter: No    Who is the form from? [employer] [facility] system,   [employer]  Where did/will the form come from? Patient or family brought in       When is form/letter needed by: ASAP    How would you like the form/letter returned: [employer] and Mail to [facility]   Attn: Disability longterm  [id number]  [place] [address]    Patient Notified form requests are processed in 5-7 business days:Yes    Could we send this information to you in [employer] or would you prefer to receive a phone call?:   Patient would prefer a phone call   Okay to leave a detailed message?: Yes at Cell number on file:    Telephone Information:   Mobile [phone]

## 2025-05-11 PROBLEM — E87.29 KETOACIDOSIS: Status: RESOLVED | Noted: 2024-10-08 | Resolved: 2025-05-11

## 2025-05-11 PROBLEM — I10 SEVERE HYPERTENSION: Status: RESOLVED | Noted: 2024-10-08 | Resolved: 2025-05-11

## 2025-05-11 PROBLEM — E87.29 INCREASED ANION GAP METABOLIC ACIDOSIS: Status: RESOLVED | Noted: 2024-10-08 | Resolved: 2025-05-11

## 2025-05-11 PROBLEM — K92.2 LOWER GI BLEED: Status: RESOLVED | Noted: 2024-03-29 | Resolved: 2025-05-11

## 2025-05-11 PROBLEM — K52.9 ACUTE COLITIS: Status: RESOLVED | Noted: 2024-03-29 | Resolved: 2025-05-11

## 2025-05-11 PROBLEM — M99.04 SEGMENTAL DYSFUNCTION OF SACRAL REGION: Status: RESOLVED | Noted: 2019-04-10 | Resolved: 2025-05-11

## 2025-05-11 PROBLEM — H54.7 VISION LOSS: Status: RESOLVED | Noted: 2024-01-20 | Resolved: 2025-05-11

## 2025-05-11 PROBLEM — M54.16 LUMBAR RADICULOPATHY: Status: RESOLVED | Noted: 2019-08-14 | Resolved: 2025-05-11

## 2025-05-11 PROBLEM — R11.2 NAUSEA AND VOMITING, UNSPECIFIED VOMITING TYPE: Status: RESOLVED | Noted: 2024-03-29 | Resolved: 2025-05-11

## 2025-05-11 PROBLEM — M99.02 SEGMENTAL DYSFUNCTION OF THORACIC REGION: Status: RESOLVED | Noted: 2019-04-10 | Resolved: 2025-05-11

## 2025-05-11 PROBLEM — R30.0 DYSURIA: Status: RESOLVED | Noted: 2024-10-08 | Resolved: 2025-05-11

## 2025-05-11 PROBLEM — M99.01 SEGMENTAL DYSFUNCTION OF CERVICAL REGION: Status: RESOLVED | Noted: 2019-04-10 | Resolved: 2025-05-11

## 2025-05-11 PROBLEM — M99.07 SEGMENTAL DYSFUNCTION OF UPPER EXTREMITY: Status: RESOLVED | Noted: 2019-04-10 | Resolved: 2025-05-11

## 2025-05-11 PROBLEM — S06.0X0D CONCUSSION WITHOUT LOSS OF CONSCIOUSNESS, SUBSEQUENT ENCOUNTER: Status: RESOLVED | Noted: 2018-07-02 | Resolved: 2025-05-11

## 2025-05-11 PROBLEM — E11.00 TYPE 2 DIABETES MELLITUS WITH HYPEROSMOLAR HYPERGLYCEMIC STATE (HHS) (H): Status: RESOLVED | Noted: 2024-10-08 | Resolved: 2025-05-11

## 2025-05-11 PROBLEM — R73.9 HYPERGLYCEMIA: Status: RESOLVED | Noted: 2025-01-09 | Resolved: 2025-05-11

## 2025-05-11 PROBLEM — E87.1 HYPONATREMIA: Status: RESOLVED | Noted: 2024-10-08 | Resolved: 2025-05-11

## 2025-05-11 PROBLEM — E83.42 HYPOMAGNESEMIA: Status: RESOLVED | Noted: 2024-10-08 | Resolved: 2025-05-11

## 2025-05-11 PROBLEM — E87.20 LACTIC ACIDOSIS: Status: RESOLVED | Noted: 2024-10-08 | Resolved: 2025-05-11

## 2025-05-11 PROBLEM — E11.10 KETOSIS DUE TO DIABETES (H): Status: RESOLVED | Noted: 2025-01-09 | Resolved: 2025-05-11

## 2025-05-11 PROBLEM — M54.9 MECHANICAL BACK PAIN: Status: RESOLVED | Noted: 2019-04-10 | Resolved: 2025-05-11

## 2025-05-11 PROBLEM — M75.41 SUBACROMIAL IMPINGEMENT OF RIGHT SHOULDER: Status: RESOLVED | Noted: 2018-10-17 | Resolved: 2025-05-11

## 2025-05-12 ENCOUNTER — MYC MEDICAL ADVICE (OUTPATIENT)
Dept: FAMILY MEDICINE | Facility: CLINIC | Age: 50
End: 2025-05-12
Payer: COMMERCIAL

## 2025-05-13 NOTE — TELEPHONE ENCOUNTER
The patient sent a my chart message looking for paperwork from her appointment that she dropped off on 5/8/25    Linda

## 2025-05-15 ENCOUNTER — MYC MEDICAL ADVICE (OUTPATIENT)
Dept: FAMILY MEDICINE | Facility: CLINIC | Age: 50
End: 2025-05-15
Payer: COMMERCIAL

## 2025-05-15 DIAGNOSIS — S06.0X0S CONCUSSION WITHOUT LOSS OF CONSCIOUSNESS, SEQUELA: ICD-10-CM

## 2025-05-15 DIAGNOSIS — G89.4 CHRONIC PAIN SYNDROME: Primary | ICD-10-CM

## 2025-05-15 DIAGNOSIS — S82.891S CLOSED FRACTURE OF RIGHT ANKLE, SEQUELA: ICD-10-CM

## 2025-05-15 DIAGNOSIS — F32.1 MODERATE MAJOR DEPRESSION (H): ICD-10-CM

## 2025-05-15 NOTE — TELEPHONE ENCOUNTER
Patient stated that Dr. Stoner is not understanding what the letter is actually used for and it is not Social Security Disability it is so she can access to her Pension.     Patient would like Dr. Stoner to reach out to Dr. Muse to see if she can write the letter in more detail to why she is not able to work because she knows all of the patients health history.

## 2025-05-15 NOTE — TELEPHONE ENCOUNTER
Spoke with Dr. Stoner about patient's further requests of the disability letter and he stated that this type of letter that is being requested needs to include extensive details of the patients disabilities and capabilities on all work function levels.  This type of extensive assessment on patient's permanent disability is out of the family practice scope.     Dr. Stoner stated that Dr. Muse would also not be included in writing this type of letter and evaluation.    Patient is suggested to look into finding an occupational disability assessment doctor also known as an independent medical examiner that can do this type of full evaluation.    [employer] message sent to patient with above information.

## 2025-05-16 NOTE — CONFIDENTIAL NOTE
You could order Functional Capacity Testing, this is done at [facility] in the Physical Therapy department.    Brisa Benitez XRO/

## 2025-05-19 NOTE — TELEPHONE ENCOUNTER
Referral placed.  Will have staff notify patient and find out if the referral itself needs to be printed and/or fax anywhere.    Carlos Stoner MD

## 2025-05-22 ENCOUNTER — MYC MEDICAL ADVICE (OUTPATIENT)
Dept: FAMILY MEDICINE | Facility: CLINIC | Age: 50
End: 2025-05-22
Payer: COMMERCIAL

## 2025-05-31 ENCOUNTER — MYC MEDICAL ADVICE (OUTPATIENT)
Dept: FAMILY MEDICINE | Facility: CLINIC | Age: 50
End: 2025-05-31
Payer: COMMERCIAL

## 2025-06-02 NOTE — TELEPHONE ENCOUNTER
Patient requesting early fill of oxycodone today 6/2/24 due to moving. Script written for fill 6/4/25.    Fanta Sarabia RN on 6/2/2025 at 8:33 AM

## 2025-06-02 NOTE — TELEPHONE ENCOUNTER
Pharmacy notified.  RN Triage    Patient Contact    Attempt # 1    Was call answered?  No.  Left message on voicemail with information to call me back.    Please let patient know she can  her medication today.  Jessica Méndez RN on 6/2/2025 at 3:04 PM

## 2025-06-16 ENCOUNTER — MYC MEDICAL ADVICE (OUTPATIENT)
Dept: FAMILY MEDICINE | Facility: CLINIC | Age: 50
End: 2025-06-16
Payer: COMMERCIAL

## 2025-06-16 DIAGNOSIS — E11.65 TYPE 2 DIABETES MELLITUS WITH HYPERGLYCEMIA, WITH LONG-TERM CURRENT USE OF INSULIN (H): ICD-10-CM

## 2025-06-16 DIAGNOSIS — Z79.4 TYPE 2 DIABETES MELLITUS WITH HYPERGLYCEMIA, WITH LONG-TERM CURRENT USE OF INSULIN (H): ICD-10-CM

## 2025-06-16 RX ORDER — INSULIN GLARGINE 100 [IU]/ML
44 INJECTION, SOLUTION SUBCUTANEOUS EVERY MORNING
Qty: 45 ML | Refills: 1 | Status: SHIPPED | OUTPATIENT
Start: 2025-06-16

## 2025-06-23 ENCOUNTER — MYC REFILL (OUTPATIENT)
Dept: FAMILY MEDICINE | Facility: CLINIC | Age: 50
End: 2025-06-23
Payer: COMMERCIAL

## 2025-06-23 DIAGNOSIS — S82.891S CLOSED FRACTURE OF RIGHT ANKLE, SEQUELA: ICD-10-CM

## 2025-06-24 RX ORDER — OXYCODONE HYDROCHLORIDE 5 MG/1
5 TABLET ORAL 2 TIMES DAILY PRN
Qty: 60 TABLET | Refills: 0 | Status: SHIPPED | OUTPATIENT
Start: 2025-07-02

## (undated) DEVICE — CLIP HORIZON MULTI MED BLUE 002204

## (undated) DEVICE — DRAIN CHEST TUBE 32FR STR 8032

## (undated) DEVICE — CANISTER WOUND VAC W/GEL 500ML M8275063/5

## (undated) DEVICE — SU PDS II 0 ENDOLOOP EZ10G

## (undated) DEVICE — TUBING SUCTION 6"X3/16" N56A

## (undated) DEVICE — SU ETHIBOND 0 CT-1 CR 8X18" CX21D

## (undated) DEVICE — DEVICE ACTIVE LAP PLUME FILTERATION PUREVIEW 620030100

## (undated) DEVICE — DRAPE GYN/UROLOGY FLUID POUCH TUR 29455

## (undated) DEVICE — PACK GENERAL LAPAOSCOPY

## (undated) DEVICE — CANNULA PERFUSION ARTERIAL EOPA 18FR 12" 77418

## (undated) DEVICE — TOTE ANGIO CORP PC15AT SAN32CC83O

## (undated) DEVICE — GLOVE PROTEXIS W/NEU-THERA 6.5  2D73TE65

## (undated) DEVICE — TUBING SUCTION 12"X1/4" N612

## (undated) DEVICE — SOL NACL 0.9% IRRIG 1000ML BOTTLE 2F7124

## (undated) DEVICE — DRSG WOUND VAC SPONGE MED BLACK M8275052/5

## (undated) DEVICE — DRAIN CHEST TUBE RIGHT ANGLED 32FR 8132

## (undated) DEVICE — SOL WATER IRRIG 1000ML BOTTLE 07139-09

## (undated) DEVICE — SYR 05ML LL W/O NDL

## (undated) DEVICE — SU PROLENE 4-0 SHDA 36" 8521H

## (undated) DEVICE — SOL RINGERS LACTATED 1000ML BAG 2B2324X

## (undated) DEVICE — ESU ENDO SCISSORS 5MM CVD 5DCS

## (undated) DEVICE — SEAL SET MYOSURE ROD LENS SCOPE SINGLE USE 40-902

## (undated) DEVICE — SU PROLENE 5-0 RB-1DA 36"  8556H

## (undated) DEVICE — SU STEEL 6 CCS 4X18" M654G

## (undated) DEVICE — GOWN IMPERVIOUS SPECIALTY XL/XLONG 39049

## (undated) DEVICE — RESERVOIR CELL SAVING BLOOD COLLECTION EL2120

## (undated) DEVICE — DRSG TELFA 3X8" 1238

## (undated) DEVICE — PREP CHLORAPREP W/ORANGE TINT 10.5ML 930715

## (undated) DEVICE — DRSG KERLIX 4 1/2"X4YDS ROLL 6715

## (undated) DEVICE — LINEN TOWEL PACK X5 5464

## (undated) DEVICE — PREP CHLORAPREP 26ML TINTED ORANGE  260815

## (undated) DEVICE — CANNULA VENOUS 2 STAGE 32-40FR

## (undated) DEVICE — SYR 03ML LL W/O NDL

## (undated) DEVICE — SUCTION CANISTER MEDIVAC LINER 3000ML W/LID 65651-530

## (undated) DEVICE — ENDO TROCAR FIRST ENTRY KII FIOS Z-THRD 11X100MM CTF33

## (undated) DEVICE — SURGICEL HEMOSTAT 2X14" 1951

## (undated) DEVICE — SU SILK 1 TIE 10X30" SA87G

## (undated) DEVICE — Device

## (undated) DEVICE — SU VICRYL 3-0 FS-1 27" J442H

## (undated) DEVICE — SU PROLENE 7-0 BV-1DA 4X24" M8702

## (undated) DEVICE — PACK TUBING MINI VAC CUSTOM 1/2X3/8T BB9J78R4

## (undated) DEVICE — CABLE MYO/LEAD PACING BLUE DISP 019-535

## (undated) DEVICE — PREP SKIN SCRUB TRAY 4461A

## (undated) DEVICE — PUNCH AORTIC 4.0MMX8" RCB40

## (undated) DEVICE — SYR EAR BULB 3OZ 0035830

## (undated) DEVICE — SOL NACL 0.9% INJ 1000ML BAG 07983-09

## (undated) DEVICE — SU VICRYL 0 CTX 36" J370H

## (undated) DEVICE — SU ETHIBOND 3-0 BBDA 36" X588H

## (undated) DEVICE — KIT ENDO TURNOVER/PROCEDURE CARRY-ON 101822

## (undated) DEVICE — SUCTION CANISTER DOLPHIN 3000ML

## (undated) DEVICE — ESU ELEC BLADE 2.75" COATED/INSULATED E1455

## (undated) DEVICE — CANNULA VESSEL DLP  30001

## (undated) DEVICE — SLEEVE TR BAND RADIAL COMPRESSION DEVICE 24CM TRB24-REG

## (undated) DEVICE — ENDO TROCAR SLEEVE KII Z-THREADED 05X100MM CTS02

## (undated) DEVICE — PACK MINI VAC CUSTOM CARDOPULMONARY BB5Z97R15

## (undated) DEVICE — BLADE KNIFE BEAVER 6900

## (undated) DEVICE — CONNECTOR DRAIN CHEST Y EXTENSION SET 19909

## (undated) DEVICE — SOL WATER IRRIG 1000ML BOTTLE 2F7114

## (undated) DEVICE — SYR 50ML LL W/O NDL 309653

## (undated) DEVICE — BLOWER/MISTER CLEARVIEW 22150

## (undated) DEVICE — SNARE CAPIVATOR ROUND COLD SNR BX10 M00561101

## (undated) DEVICE — KIT WASH CELL SAVING ATL2001

## (undated) DEVICE — KIT ENDO FIRST STEP DISINFECTANT 200ML W/POUCH EP-4

## (undated) DEVICE — DRSG ADAPTIC 3X8" 6113

## (undated) DEVICE — DECANTER BAG 2002S

## (undated) DEVICE — SU PROLENE 6-0 C-1DA 30" M8706

## (undated) DEVICE — NDL INSUFFLATION 13GA 120MM C2201

## (undated) DEVICE — ESU HOOK TIP 5MM CONMED

## (undated) DEVICE — LINEN TOWEL PACK X6 WHITE 5487

## (undated) DEVICE — SU VICRYL 3-0 KS 27" UND J663H

## (undated) DEVICE — SU VICRYL 3-0 SH 27" J316H

## (undated) DEVICE — DEVICE ASSEMBLY SUCTION/ANTI COAG BTC93

## (undated) DEVICE — SU ETHIBOND 2-0 SHDA 30" X563H

## (undated) DEVICE — CLIP SPRING FOGARTY SOFTJAW CSOFT6

## (undated) DEVICE — LEAD PACER MYOCARDIAL BIPOLAR TEMPORARY 53CM 6495F

## (undated) DEVICE — VESSEL LOOPS RED MAXI

## (undated) DEVICE — CABLE MYO/LEAD PACING WHITE DISP 019-530

## (undated) DEVICE — GLOVE PROTEXIS BLUE W/NEU-THERA 8.0  2D73EB80

## (undated) DEVICE — LINEN TOWEL PACK X30 5481

## (undated) DEVICE — ADH SKIN CLOSURE PREMIERPRO EXOFIN 1.0ML 3470

## (undated) DEVICE — LEAD ELEC MYOCARDIO PACING TEMPORARY MEDTRONIC

## (undated) DEVICE — LINEN LEG ROLL 5489

## (undated) DEVICE — ENDO TROCAR FIRST ENTRY KII FIOS Z-THRD 05X100MM CTF03

## (undated) DEVICE — SOMASENSOR CEREBRAL OXIMETER ADULT SAFB-SM

## (undated) DEVICE — ESU GROUND PAD UNIVERSAL W/O CORD

## (undated) DEVICE — SUCTION CATH AIRLIFE TRI-FLO W/CONTROL PORT 14FR  T60C

## (undated) DEVICE — LUBRICATING JELLY 4.25OZ

## (undated) DEVICE — SPONGE RAY-TEC 4X8" 7318

## (undated) DEVICE — SUCTION MANIFOLD NEPTUNE 2 SYS 4 PORT 0702-020-000

## (undated) DEVICE — CATH DIAGNOSTIC RADIAL 5FR TIG 4.0

## (undated) DEVICE — SU VICRYL 2-0 CT-1 27" UND J259H

## (undated) DEVICE — CLIP HORIZON MULTI SM YELLOW 001204

## (undated) DEVICE — SU PROLENE 4-0 RB-1DA 36" 8557H

## (undated) DEVICE — KIT HAND CONTROL ANGIOTOUCH ACIST 65CM AT-P65

## (undated) DEVICE — SU PLEDGET SOFT TFE 3/8"X3/26"X1/16" PCP40

## (undated) DEVICE — PACK MINOR SBA15MIFSE

## (undated) DEVICE — SU STEEL MYO/WIRE II STERNOTOMY 8 BE-1 3X14" 048-217

## (undated) DEVICE — CONNECTOR PREFUSION REDUCER 3/8X1/2" W/O LL 6013

## (undated) DEVICE — POSITIONER ASSIST ESSTECH 3S T401210S

## (undated) DEVICE — SU MONOCRYL 4-0 PS-2 18" UND Y496G

## (undated) DEVICE — CANNULA PERFUSION AORTIC ROOT 22FR 20012

## (undated) DEVICE — ADPT PERFUSION MULTIPLE

## (undated) DEVICE — ESU CORD MONOPOLAR 10'  E0510

## (undated) DEVICE — NDL ECLIPSE 18GA 1.5"

## (undated) DEVICE — PACK OPEN HEART PV12OH524

## (undated) DEVICE — NDL SPINAL 22GA 3.5" QUINCKE 405181

## (undated) DEVICE — ENDO CLIP CARTIRDGE K2 MED/LG 10 1112

## (undated) DEVICE — ESU SUCTION/IRRIGATION SYSTEM PISTOL GRIP

## (undated) DEVICE — GLOVE DERMASSURE GREEN PF 7.5 LATEX FREE MSG6575

## (undated) DEVICE — GLOVE PROTEXIS W/NEU-THERA 7.5  2D73TE75

## (undated) DEVICE — MANIFOLD KIT ANGIO AUTOMATED 014613

## (undated) DEVICE — PAD PERI W/WINGS 1580A

## (undated) DEVICE — SYR 50ML SLIP TIP W/O NDL 309654

## (undated) DEVICE — GLOVE LINER COTTON MED 32-2076

## (undated) DEVICE — NDL COUNTER 10CT

## (undated) DEVICE — RX SURGIFLO HEMOSTATIC MATRIX W/THROMBIN 8ML 2994

## (undated) DEVICE — ENDO FORCEP ENDOJAW BIOPSY 2.8MMX230CM FB-220U

## (undated) DEVICE — COVER TABLE POLY 65X90" 8186

## (undated) DEVICE — KIT ENDO VASOVIEW HEMOPRO 2 VH-4000

## (undated) DEVICE — PROTECTOR ARM ONE-STEP TRENDELENBURG 40418

## (undated) DEVICE — SU PROLENE 7-0 BV175-6 24' M8737

## (undated) DEVICE — TUBING SYS AQUILEX BLUE INFLOW AQL-110 YLW OUTFLOW AQL-111

## (undated) RX ORDER — PROPOFOL 10 MG/ML
INJECTION, EMULSION INTRAVENOUS
Status: DISPENSED
Start: 2024-05-15

## (undated) RX ORDER — HEPARIN SODIUM 200 [USP'U]/100ML
INJECTION, SOLUTION INTRAVENOUS
Status: DISPENSED
Start: 2021-03-08

## (undated) RX ORDER — PROPOFOL 10 MG/ML
INJECTION, EMULSION INTRAVENOUS
Status: DISPENSED
Start: 2018-07-24

## (undated) RX ORDER — VERAPAMIL HYDROCHLORIDE 2.5 MG/ML
INJECTION, SOLUTION INTRAVENOUS
Status: DISPENSED
Start: 2021-03-09

## (undated) RX ORDER — CHLORHEXIDINE GLUCONATE ORAL RINSE 1.2 MG/ML
SOLUTION DENTAL
Status: DISPENSED
Start: 2021-03-09

## (undated) RX ORDER — FENTANYL CITRATE 50 UG/ML
INJECTION, SOLUTION INTRAMUSCULAR; INTRAVENOUS
Status: DISPENSED
Start: 2023-02-13

## (undated) RX ORDER — HEPARIN SODIUM 1000 [USP'U]/ML
INJECTION, SOLUTION INTRAVENOUS; SUBCUTANEOUS
Status: DISPENSED
Start: 2021-03-09

## (undated) RX ORDER — DEXAMETHASONE SODIUM PHOSPHATE 10 MG/ML
INJECTION, SOLUTION INTRAMUSCULAR; INTRAVENOUS
Status: DISPENSED
Start: 2023-02-13

## (undated) RX ORDER — ONDANSETRON 2 MG/ML
INJECTION INTRAMUSCULAR; INTRAVENOUS
Status: DISPENSED
Start: 2024-05-15

## (undated) RX ORDER — LIDOCAINE HYDROCHLORIDE 10 MG/ML
INJECTION, SOLUTION INFILTRATION; PERINEURAL
Status: DISPENSED
Start: 2021-03-09

## (undated) RX ORDER — FENTANYL CITRATE 50 UG/ML
INJECTION, SOLUTION INTRAMUSCULAR; INTRAVENOUS
Status: DISPENSED
Start: 2021-03-08

## (undated) RX ORDER — LIDOCAINE HYDROCHLORIDE 10 MG/ML
INJECTION, SOLUTION EPIDURAL; INFILTRATION; INTRACAUDAL; PERINEURAL
Status: DISPENSED
Start: 2024-05-15

## (undated) RX ORDER — LIDOCAINE HYDROCHLORIDE 10 MG/ML
INJECTION, SOLUTION EPIDURAL; INFILTRATION; INTRACAUDAL; PERINEURAL
Status: DISPENSED
Start: 2023-02-13

## (undated) RX ORDER — GABAPENTIN 300 MG/1
CAPSULE ORAL
Status: DISPENSED
Start: 2018-07-24

## (undated) RX ORDER — PROPOFOL 10 MG/ML
INJECTION, EMULSION INTRAVENOUS
Status: DISPENSED
Start: 2021-03-09

## (undated) RX ORDER — DEXAMETHASONE SODIUM PHOSPHATE 4 MG/ML
INJECTION, SOLUTION INTRA-ARTICULAR; INTRALESIONAL; INTRAMUSCULAR; INTRAVENOUS; SOFT TISSUE
Status: DISPENSED
Start: 2018-07-24

## (undated) RX ORDER — HYDROMORPHONE HYDROCHLORIDE 1 MG/ML
INJECTION, SOLUTION INTRAMUSCULAR; INTRAVENOUS; SUBCUTANEOUS
Status: DISPENSED
Start: 2023-02-13

## (undated) RX ORDER — LIDOCAINE HYDROCHLORIDE 10 MG/ML
INJECTION, SOLUTION EPIDURAL; INFILTRATION; INTRACAUDAL; PERINEURAL
Status: DISPENSED
Start: 2021-03-09

## (undated) RX ORDER — CLINDAMYCIN PHOSPHATE 900 MG/50ML
INJECTION, SOLUTION INTRAVENOUS
Status: DISPENSED
Start: 2021-03-09

## (undated) RX ORDER — HEPARIN SODIUM 1000 [USP'U]/ML
INJECTION, SOLUTION INTRAVENOUS; SUBCUTANEOUS
Status: DISPENSED
Start: 2021-03-08

## (undated) RX ORDER — FENTANYL CITRATE 50 UG/ML
INJECTION, SOLUTION INTRAMUSCULAR; INTRAVENOUS
Status: DISPENSED
Start: 2018-07-24

## (undated) RX ORDER — SUFENTANIL CITRATE 50 UG/ML
INJECTION EPIDURAL; INTRAVENOUS
Status: DISPENSED
Start: 2021-03-09

## (undated) RX ORDER — NICARDIPINE HYDROCHLORIDE 2.5 MG/ML
INJECTION INTRAVENOUS
Status: DISPENSED
Start: 2021-03-09

## (undated) RX ORDER — LIDOCAINE HYDROCHLORIDE 20 MG/ML
INJECTION, SOLUTION EPIDURAL; INFILTRATION; INTRACAUDAL; PERINEURAL
Status: DISPENSED
Start: 2021-03-09

## (undated) RX ORDER — ACETAMINOPHEN 325 MG/1
TABLET ORAL
Status: DISPENSED
Start: 2018-07-24

## (undated) RX ORDER — GLYCOPYRROLATE 0.2 MG/ML
INJECTION INTRAMUSCULAR; INTRAVENOUS
Status: DISPENSED
Start: 2023-02-13

## (undated) RX ORDER — KETOROLAC TROMETHAMINE 30 MG/ML
INJECTION, SOLUTION INTRAMUSCULAR; INTRAVENOUS
Status: DISPENSED
Start: 2018-07-24

## (undated) RX ORDER — CEFAZOLIN SODIUM 1 G/3ML
INJECTION, POWDER, FOR SOLUTION INTRAMUSCULAR; INTRAVENOUS
Status: DISPENSED
Start: 2021-03-09

## (undated) RX ORDER — PAPAVERINE HYDROCHLORIDE 30 MG/ML
INJECTION INTRAMUSCULAR; INTRAVENOUS
Status: DISPENSED
Start: 2021-03-09

## (undated) RX ORDER — EPHEDRINE SULFATE 50 MG/ML
INJECTION, SOLUTION INTRAMUSCULAR; INTRAVENOUS; SUBCUTANEOUS
Status: DISPENSED
Start: 2023-02-13

## (undated) RX ORDER — KETOROLAC TROMETHAMINE 30 MG/ML
INJECTION, SOLUTION INTRAMUSCULAR; INTRAVENOUS
Status: DISPENSED
Start: 2023-02-13

## (undated) RX ORDER — KETAMINE HYDROCHLORIDE 10 MG/ML
INJECTION, SOLUTION INTRAMUSCULAR; INTRAVENOUS
Status: DISPENSED
Start: 2021-03-09

## (undated) RX ORDER — LIDOCAINE HYDROCHLORIDE 10 MG/ML
INJECTION, SOLUTION EPIDURAL; INFILTRATION; INTRACAUDAL; PERINEURAL
Status: DISPENSED
Start: 2021-03-08

## (undated) RX ORDER — HYDROMORPHONE HYDROCHLORIDE 1 MG/ML
INJECTION, SOLUTION INTRAMUSCULAR; INTRAVENOUS; SUBCUTANEOUS
Status: DISPENSED
Start: 2021-03-09

## (undated) RX ORDER — VANCOMYCIN HYDROCHLORIDE 500 MG/10ML
INJECTION, POWDER, LYOPHILIZED, FOR SOLUTION INTRAVENOUS
Status: DISPENSED
Start: 2021-03-09

## (undated) RX ORDER — ONDANSETRON 2 MG/ML
INJECTION INTRAMUSCULAR; INTRAVENOUS
Status: DISPENSED
Start: 2018-07-24

## (undated) RX ORDER — OXYCODONE HYDROCHLORIDE 5 MG/1
TABLET ORAL
Status: DISPENSED
Start: 2018-07-24

## (undated) RX ORDER — PROTAMINE SULFATE 10 MG/ML
INJECTION, SOLUTION INTRAVENOUS
Status: DISPENSED
Start: 2021-03-09